# Patient Record
Sex: FEMALE | Race: BLACK OR AFRICAN AMERICAN | Employment: UNEMPLOYED | URBAN - METROPOLITAN AREA
[De-identification: names, ages, dates, MRNs, and addresses within clinical notes are randomized per-mention and may not be internally consistent; named-entity substitution may affect disease eponyms.]

---

## 2017-01-04 ENCOUNTER — OFFICE VISIT (OUTPATIENT)
Dept: FAMILY MEDICINE CLINIC | Age: 62
End: 2017-01-04

## 2017-01-04 ENCOUNTER — HOSPITAL ENCOUNTER (OUTPATIENT)
Dept: LAB | Age: 62
Discharge: HOME | End: 2017-01-04
Payer: MEDICARE

## 2017-01-04 VITALS
HEIGHT: 72 IN | TEMPERATURE: 98.6 F | DIASTOLIC BLOOD PRESSURE: 76 MMHG | SYSTOLIC BLOOD PRESSURE: 129 MMHG | HEART RATE: 82 BPM | WEIGHT: 173.8 LBS | OXYGEN SATURATION: 96 % | BODY MASS INDEX: 23.54 KG/M2 | RESPIRATION RATE: 14 BRPM

## 2017-01-04 DIAGNOSIS — R29.6 FALLS FREQUENTLY: ICD-10-CM

## 2017-01-04 DIAGNOSIS — E03.9 ACQUIRED HYPOTHYROIDISM: ICD-10-CM

## 2017-01-04 DIAGNOSIS — E11.65 TYPE 2 DIABETES MELLITUS WITH HYPERGLYCEMIA, WITHOUT LONG-TERM CURRENT USE OF INSULIN (HCC): ICD-10-CM

## 2017-01-04 DIAGNOSIS — Z00.00 MEDICARE ANNUAL WELLNESS VISIT, SUBSEQUENT: Primary | ICD-10-CM

## 2017-01-04 PROCEDURE — 83036 HEMOGLOBIN GLYCOSYLATED A1C: CPT

## 2017-01-04 NOTE — PATIENT INSTRUCTIONS
Learning About Diabetes Food Guidelines  Your Care Instructions  Meal planning is important to manage diabetes. It helps keep your blood sugar at a target level (which you set with your doctor). You don't have to eat special foods. You can eat what your family eats, including sweets once in a while. But you do have to pay attention to how often you eat and how much you eat of certain foods. You may want to work with a dietitian or a certified diabetes educator (CDE) to help you plan meals and snacks. A dietitian or CDE can also help you lose weight if that is one of your goals. What should you know about eating carbs? Managing the amount of carbohydrate (carbs) you eat is an important part of healthy meals when you have diabetes. Carbohydrate is found in many foods. · Learn which foods have carbs. And learn the amounts of carbs in different foods. ¨ Bread, cereal, pasta, and rice have about 15 grams of carbs in a serving. A serving is 1 slice of bread (1 ounce), ½ cup of cooked cereal, or 1/3 cup of cooked pasta or rice. ¨ Fruits have 15 grams of carbs in a serving. A serving is 1 small fresh fruit, such as an apple or orange; ½ of a banana; ½ cup of cooked or canned fruit; ½ cup of fruit juice; 1 cup of melon or raspberries; or 2 tablespoons of dried fruit. ¨ Milk and no-sugar-added yogurt have 15 grams of carbs in a serving. A serving is 1 cup of milk or 2/3 cup of no-sugar-added yogurt. ¨ Starchy vegetables have 15 grams of carbs in a serving. A serving is ½ cup of mashed potatoes or sweet potato; 1 cup winter squash; ½ of a small baked potato; ½ cup of cooked beans; or ½ cup cooked corn or green peas. · Learn how much carbs to eat each day and at each meal. A dietitian or CDE can teach you how to keep track of the amount of carbs you eat. This is called carbohydrate counting. · If you are not sure how to count carbohydrate grams, use the Plate Method to plan meals.  It is a good, quick way to make sure that you have a balanced meal. It also helps you spread carbs throughout the day. ¨ Divide your plate by types of foods. Put non-starchy vegetables on half the plate, meat or other protein food on one-quarter of the plate, and a grain or starchy vegetable in the final quarter of the plate. To this you can add a small piece of fruit and 1 cup of milk or yogurt, depending on how many carbs you are supposed to eat at a meal.  · Try to eat about the same amount of carbs at each meal. Do not \"save up\" your daily allowance of carbs to eat at one meal.  · Proteins have very little or no carbs per serving. Examples of proteins are beef, chicken, turkey, fish, eggs, tofu, cheese, cottage cheese, and peanut butter. A serving size of meat is 3 ounces, which is about the size of a deck of cards. Examples of meat substitute serving sizes (equal to 1 ounce of meat) are 1/4 cup of cottage cheese, 1 egg, 1 tablespoon of peanut butter, and ½ cup of tofu. How can you eat out and still eat healthy? · Learn to estimate the serving sizes of foods that have carbohydrate. If you measure food at home, it will be easier to estimate the amount in a serving of restaurant food. · If the meal you order has too much carbohydrate (such as potatoes, corn, or baked beans), ask to have a low-carbohydrate food instead. Ask for a salad or green vegetables. · If you use insulin, check your blood sugar before and after eating out to help you plan how much to eat in the future. · If you eat more carbohydrate at a meal than you had planned, take a walk or do other exercise. This will help lower your blood sugar. What else should you know? · Limit saturated fat, such as the fat from meat and dairy products. This is a healthy choice because people who have diabetes are at higher risk of heart disease. So choose lean cuts of meat and nonfat or low-fat dairy products. Use olive or canola oil instead of butter or shortening when cooking.   · Don't skip meals. Your blood sugar may drop too low if you skip meals and take insulin or certain medicines for diabetes. · Check with your doctor before you drink alcohol. Alcohol can cause your blood sugar to drop too low. Alcohol can also cause a bad reaction if you take certain diabetes medicines. Follow-up care is a key part of your treatment and safety. Be sure to make and go to all appointments, and call your doctor if you are having problems. It's also a good idea to know your test results and keep a list of the medicines you take. Where can you learn more? Go to http://marie-alvin.info/. Enter Y048 in the search box to learn more about \"Learning About Diabetes Food Guidelines. \"  Current as of: May 23, 2016  Content Version: 11.1  © 9143-2266 [employer]. Care instructions adapted under license by [employer] (which disclaims liability or warranty for this information). If you have questions about a medical condition or this instruction, always ask your healthcare professional. [place] [address] any warranty or liability for your use of this information. Schedule of Personalized Health Plan  (Provide Copy to Patient)  The best way to stay healthy is to live a healthy lifestyle. A healthy lifestyle includes regular exercise, eating a well-balanced diet, keeping a healthy weight and not smoking. Regular physical exams and screening tests are another important way to take care of yourself. Preventive exams provided by health care providers can find health problems early when treatment works best and can keep you from getting certain diseases or illnesses. Preventive services include exams, lab tests, screenings, shots, monitoring and information to help you take care of your own health. All people over 65 should have a pneumonia shot. Pneumonia shots are usually only needed once in a lifetime unless your doctor decides differently.      All people over 65 should have a yearly flu shot. People over 65 are at medium to high risk for Hepatitis B. Three shots are needed for complete protection. In addition to your physical exam, some screening tests are recommended:    Bone mass measurement (dexa scan) is recommended every two years  Diabetes Mellitus screening is recommended every year. Glaucoma is an eye disease caused by high pressure in the eye. An eye exam is recommended every year. Cardiovascular screening tests that check your cholesterol and other blood fat (lipid) levels are recommended every five years. Colorectal Cancer screening tests help to find pre-cancerous polyps (growths in the colon) so they can be removed before they turn into cancer. Tests ordered for screening depend on your personal and family history risk factors.     Screening for Breast Cancer is recommended yearly with a mammogram.    Screening for Cervical Cancer is recommended every two years (annually for certain risk factors, such as previous history of STD or abnormal PAP in past 7 years), with a Pelvic Exam with PAP    Here is a list of your current Health Maintenance items with a due date:  Health Maintenance   Topic Date Due    INFLUENZA AGE 9 TO ADULT  08/01/2016    EYE EXAM RETINAL OR DILATED Q1  11/25/2016    HEMOGLOBIN A1C Q6M  12/08/2016    FOOT EXAM Q1  06/08/2017    MICROALBUMIN Q1  06/08/2017    LIPID PANEL Q1  06/15/2017    BREAST CANCER SCRN MAMMOGRAM  12/14/2018    PAP AKA CERVICAL CYTOLOGY  01/04/2020    COLONOSCOPY  01/18/2022    DTaP/Tdap/Td series (2 - Td) 06/16/2024    Hepatitis C Screening  Completed    Pneumococcal 19-64 Medium Risk  Addressed

## 2017-01-04 NOTE — PROGRESS NOTES
Chief Complaint   Patient presents with    Diabetes       1. Have you been to the ER, urgent care clinic since your last visit? Hospitalized since your last visit? No    2. Have you seen or consulted any other health care providers outside of the [address] [place] [address] since your last visit? Include any pap smears or colon screening. No    Body mass index is 23.57 kg/(m^2).

## 2017-01-04 NOTE — MR AVS SNAPSHOT
Visit Information Date & Time Provider Department Dept. Phone Encounter #  
 1/4/2017  2:45 PM Jacob Lamar, [address] [phone] [id number] Follow-up Instructions Return in about 6 months (around 7/4/2017) for DM with Dr. Kirill Hannah. Routing History Upcoming Health Maintenance Date Due INFLUENZA AGE 9 TO ADULT 8/1/2016 EYE EXAM RETINAL OR DILATED Q1 11/25/2016 HEMOGLOBIN A1C Q6M 12/8/2016 FOOT EXAM Q1 6/8/2017 MICROALBUMIN Q1 6/8/2017 LIPID PANEL Q1 6/15/2017 BREAST CANCER SCRN MAMMOGRAM 12/14/2018 PAP AKA CERVICAL CYTOLOGY 1/4/2020 COLONOSCOPY 1/18/2022 DTaP/Tdap/Td series (2 - Td) 6/16/2024 Allergies as of 1/4/2017  Review Complete On: 1/4/2017 By: Jacob Lamar DO Severity Noted Reaction Type Reactions Bee Sting [Sting, Bee]  06/25/2010    Not Reported This Time Also allergic to egg products Betadine [Povidone-iodine]  05/17/2016    Rash Egg  06/25/2015    Itching Penicillins  06/25/2010    Not Reported This Time Current Immunizations  Reviewed on 1/4/2017 Name Date Tdap 6/16/2014 Reviewed by Jacob Lamar DO on 1/4/2017 at  3:02 PM  
You Were Diagnosed With   
  
 Codes Comments Medicare annual wellness visit, subsequent    -  Primary ICD-10-CM: Z00.00 ICD-9-CM: V70.0 Type 2 diabetes mellitus with hyperglycemia, without long-term current use of insulin (HCC)     ICD-10-CM: E11.65 ICD-9-CM: 250.00, 790.29 Acquired hypothyroidism     ICD-10-CM: E03.9 ICD-9-CM: 244.9 Falls frequently     ICD-10-CM: R29.6 ICD-9-CM: V15.88 Vitals BP Pulse Temp Resp Height(growth percentile) Weight(growth percentile) 129/76 (BP 1 Location: Left arm, BP Patient Position: Sitting) 82 98.6 °F (37 °C) (Oral) 14 6' (1.829 m) 173 lb 12.8 oz (78.8 kg) LMP SpO2 BMI OB Status Smoking Status 09/01/2010 96% 23.57 kg/m2 Postmenopausal Current Every Day Smoker Vitals History BMI and BSA Data Body Mass Index Body Surface Area  
 23.57 kg/m 2 2 m 2 Preferred Pharmacy Pharmacy Name Phone [facility] [address], [address] [address] [phone] Your Updated Medication List  
  
   
This list is accurate as of: 1/4/17  3:20 PM.  Always use your most recent med list.  
  
  
  
  
 [employer] 80 unit/mL injectable gel Generic drug:  corticotropin 1 mL by SubCUTAneous route daily. 80 units subq q day for 10 days  
  
 aspirin 81 mg chewable tablet Take 1 Tab by mouth daily. * Blood-Glucose Meter monitoring kit Per insurance preference on glucometer. To test 2x/d. Dx diabetes 250.00 Easy Touch Health Pro meter * Blood-Glucose Meter monitoring kit  
by SubCUTAneous route Before breakfast and dinner. Free Style Lite glucometer and test strips * Blood-Glucose Meter monitoring kit  
by SubCUTAneous route Before breakfast and dinner. Glucometer that insurance will cover * CRESTOR 20 mg tablet Generic drug:  rosuvastatin TAKE 1 TABLET BY MOUTH EVERY EVENING  
  
 * rosuvastatin 20 mg tablet Commonly known as:  CRESTOR  
TAKE 1 TABLET BY MOUTH EVERY EVENING  
  
 dantrolene 100 mg capsule Commonly known as:  DANTRIUM  
100 mg three (3) times daily. DURAGESIC 75 mcg/hr Generic drug:  fentaNYL  
1 Patch by TransDERmal route every seventy-two (72) hours. GILENYA 0.5 mg Cap Generic drug:  fingolimod Take 1 Cap by mouth daily. * glucose blood VI test strips strip Commonly known as:  ASCENSIA AUTODISC VI, ONE TOUCH ULTRA TEST VI  
100 Each by Does Not Apply route two (2) times a day. Free Style Precision Daniel glucometer Dx E11.9  Indications: TYPE 1 DIABETES MELLITUS  
  
 * glucose blood VI test strips strip Commonly known as:  FREESTYLE LITE STRIPS  
 Use as directed 2 times daily  
  
 insulin glargine 100 unit/mL injection Commonly known as:  LANTUS  
70 Units by SubCUTAneous route daily. Insulin Syringe-Needle U-100 0.5 mL 31 gauge x 5/16 Syrg 1 Syringe by SubCUTAneous route four (4) times daily. Insulin Syringe-Needle U-100 1 mL 30 gauge x 1/2\" Syrg Commonly known as:  BD INSULIN SYRINGE ULTRA-FINE  
1 Syringe by SubCUTAneous route nightly. Lancets Misc Use to check blood sugar bid , dx-Diabetes Mellitus 250.00  
  
 levothyroxine 175 mcg tablet Commonly known as:  SYNTHROID  
TAKE 1 TABLET BY MOUTH DAILY BEFORE BREAKFAST  
  
 LIDODERM 5 % Generic drug:  lidocaine  
by TransDERmal route every twenty-four (24) hours. Apply patch to the affected area for 12 hours a day and remove for 12 hours a day. LYRICA 200 mg capsule Generic drug:  pregabalin Take 200 mg by mouth two (2) times a day. MESTINON TIMESPAN 180 mg SR tablet Generic drug:  pyridostigmine bromide Take 180 mg by mouth two (2) times a day. metFORMIN 1,000 mg tablet Commonly known as:  GLUCOPHAGE  
TAKE 1 TAB BY MOUTH TWO (2) TIMES DAILY (WITH MEALS). PARoxetine 40 mg tablet Commonly known as:  PAXIL TAKE 1.5 TABS BY MOUTH DAILY. PROVIGIL 200 mg tablet Generic drug:  modafinil Take 200 mg by mouth two (2) times a day. ROBAXIN-750 PO Take  by mouth. TENS UNIT ELECTRODES  
by Does Not Apply route. prn  
  
 TOPAMAX 200 mg tablet Generic drug:  topiramate Take  by mouth two (2) times a day. traZODone 50 mg tablet Commonly known as:  DESYREL  
  
 VALIUM 10 mg tablet Generic drug:  diazePAM  
Take 5 mg by mouth every eight (8) hours as needed for Anxiety. VITAMIN D2 50,000 unit capsule Generic drug:  ergocalciferol Take 50,000 Units by mouth two (2) times a week. * Notice:   This list has 7 medication(s) that are the same as other medications prescribed for you. Read the directions carefully, and ask your doctor or other care provider to review them with you. Follow-up Instructions Return in about 6 months (around 7/4/2017) for DM with Dr. Rob Mora. Referral Information Referral ID Referred By Referred To  
  
 [id number] Griffin Tavares Not Available Visits Status Start Date End Date 1 New Request 1/4/17 1/4/18 If your referral has a status of pending review or denied, additional information will be sent to support the outcome of this decision. Patient Instructions Learning About Diabetes Food Guidelines Your Care Instructions Meal planning is important to manage diabetes. It helps keep your blood sugar at a target level (which you set with your doctor). You don't have to eat special foods. You can eat what your family eats, including sweets once in a while. But you do have to pay attention to how often you eat and how much you eat of certain foods. You may want to work with a dietitian or a certified diabetes educator (CDE) to help you plan meals and snacks. A dietitian or CDE can also help you lose weight if that is one of your goals. What should you know about eating carbs? Managing the amount of carbohydrate (carbs) you eat is an important part of healthy meals when you have diabetes. Carbohydrate is found in many foods. · Learn which foods have carbs. And learn the amounts of carbs in different foods. ¨ Bread, cereal, pasta, and rice have about 15 grams of carbs in a serving. A serving is 1 slice of bread (1 ounce), ½ cup of cooked cereal, or 1/3 cup of cooked pasta or rice. ¨ Fruits have 15 grams of carbs in a serving. A serving is 1 small fresh fruit, such as an apple or orange; ½ of a banana; ½ cup of cooked or canned fruit; ½ cup of fruit juice; 1 cup of melon or raspberries; or 2 tablespoons of dried fruit. ¨ Milk and no-sugar-added yogurt have 15 grams of carbs in a serving. A serving is 1 cup of milk or 2/3 cup of no-sugar-added yogurt. ¨ Starchy vegetables have 15 grams of carbs in a serving. A serving is ½ cup of mashed potatoes or sweet potato; 1 cup winter squash; ½ of a small baked potato; ½ cup of cooked beans; or ½ cup cooked corn or green peas. · Learn how much carbs to eat each day and at each meal. A dietitian or CDE can teach you how to keep track of the amount of carbs you eat. This is called carbohydrate counting. · If you are not sure how to count carbohydrate grams, use the Plate Method to plan meals. It is a good, quick way to make sure that you have a balanced meal. It also helps you spread carbs throughout the day. ¨ Divide your plate by types of foods. Put non-starchy vegetables on half the plate, meat or other protein food on one-quarter of the plate, and a grain or starchy vegetable in the final quarter of the plate. To this you can add a small piece of fruit and 1 cup of milk or yogurt, depending on how many carbs you are supposed to eat at a meal. 
· Try to eat about the same amount of carbs at each meal. Do not \"save up\" your daily allowance of carbs to eat at one meal. 
· Proteins have very little or no carbs per serving. Examples of proteins are beef, chicken, turkey, fish, eggs, tofu, cheese, cottage cheese, and peanut butter. A serving size of meat is 3 ounces, which is about the size of a deck of cards. Examples of meat substitute serving sizes (equal to 1 ounce of meat) are 1/4 cup of cottage cheese, 1 egg, 1 tablespoon of peanut butter, and ½ cup of tofu. How can you eat out and still eat healthy? · Learn to estimate the serving sizes of foods that have carbohydrate. If you measure food at home, it will be easier to estimate the amount in a serving of restaurant food.  
· If the meal you order has too much carbohydrate (such as potatoes, corn, or baked beans), ask to have a low-carbohydrate food instead. Ask for a salad or green vegetables. · If you use insulin, check your blood sugar before and after eating out to help you plan how much to eat in the future. · If you eat more carbohydrate at a meal than you had planned, take a walk or do other exercise. This will help lower your blood sugar. What else should you know? · Limit saturated fat, such as the fat from meat and dairy products. This is a healthy choice because people who have diabetes are at higher risk of heart disease. So choose lean cuts of meat and nonfat or low-fat dairy products. Use olive or canola oil instead of butter or shortening when cooking. · Don't skip meals. Your blood sugar may drop too low if you skip meals and take insulin or certain medicines for diabetes. · Check with your doctor before you drink alcohol. Alcohol can cause your blood sugar to drop too low. Alcohol can also cause a bad reaction if you take certain diabetes medicines. Follow-up care is a key part of your treatment and safety. Be sure to make and go to all appointments, and call your doctor if you are having problems. It's also a good idea to know your test results and keep a list of the medicines you take. Where can you learn more? Go to http://marie-alvin.info/. Enter Q184 in the search box to learn more about \"Learning About Diabetes Food Guidelines. \" Current as of: May 23, 2016 Content Version: 11.1 © 9349-0231 [employer]. Care instructions adapted under license by [employer] (which disclaims liability or warranty for this information). If you have questions about a medical condition or this instruction, always ask your healthcare professional. [place] [address] any warranty or liability for your use of this information. Schedule of Personalized Health Plan (Provide Copy to Patient) The best way to stay healthy is to live a healthy lifestyle. A healthy lifestyle includes regular exercise, eating a well-balanced diet, keeping a healthy weight and not smoking. Regular physical exams and screening tests are another important way to take care of yourself. Preventive exams provided by health care providers can find health problems early when treatment works best and can keep you from getting certain diseases or illnesses. Preventive services include exams, lab tests, screenings, shots, monitoring and information to help you take care of your own health. All people over 65 should have a pneumonia shot. Pneumonia shots are usually only needed once in a lifetime unless your doctor decides differently. All people over 65 should have a yearly flu shot. People over 65 are at medium to high risk for Hepatitis B. Three shots are needed for complete protection. In addition to your physical exam, some screening tests are recommended: 
 
Bone mass measurement (dexa scan) is recommended every two years Diabetes Mellitus screening is recommended every year. Glaucoma is an eye disease caused by high pressure in the eye. An eye exam is recommended every year. Cardiovascular screening tests that check your cholesterol and other blood fat (lipid) levels are recommended every five years. Colorectal Cancer screening tests help to find pre-cancerous polyps (growths in the colon) so they can be removed before they turn into cancer. Tests ordered for screening depend on your personal and family history risk factors. Screening for Breast Cancer is recommended yearly with a mammogram. 
 
Screening for Cervical Cancer is recommended every two years (annually for certain risk factors, such as previous history of STD or abnormal PAP in past 7 years), with a Pelvic Exam with PAP Here is a list of your current Health Maintenance items with a due date: 
Health Maintenance Topic Date Due  
  INFLUENZA AGE 9 TO ADULT  08/01/2016  
 EYE EXAM RETINAL OR DILATED Q1  11/25/2016  
 HEMOGLOBIN A1C Q6M  12/08/2016  
 FOOT EXAM Q1  06/08/2017  MICROALBUMIN Q1  06/08/2017  LIPID PANEL Q1  06/15/2017  BREAST CANCER SCRN MAMMOGRAM  12/14/2018  PAP AKA CERVICAL CYTOLOGY  01/04/2020  COLONOSCOPY  01/18/2022  DTaP/Tdap/Td series (2 - Td) 06/16/2024  Hepatitis C Screening  Completed  Pneumococcal 19-64 Medium Risk  Addressed Introducing [facility] & HEALTH SERVICES! Dear Sid Paiz: 
Thank you for requesting a [employer] account. Our records indicate that you already have an active [employer] account. You can access your account anytime at [email or web address] New [email or web address]. [employer]/The New York Times Did you know that you can access your hospital and ER discharge instructions at any time in [employer]? You can also review all of your test results from your hospital stay or ER visit. Additional Information If you have questions, please visit the Frequently Asked Questions section of the [employer] website at [email or web address] [email or web address] Remember, [employer] is NOT to be used for urgent needs. For medical emergencies, dial 911. Now available from your iPhone and Android! Please provide this summary of care documentation to your next provider. Your primary care clinician is listed as Esperanza Macias. If you have any questions after today's visit, please call [phone].

## 2017-01-04 NOTE — PROGRESS NOTES
Subjective:     Manuel Galvin is a 63 y.o. female seen for follow up of diabetes and hypothyroidism. 1.  DM     Fasting AM BG have been in 80s despite holding metformin and lantus since about 9/7/16 due to reported symptomatic hypoglycemia. Most of the time in 62s    Is not feeling hypoglycemic, is due for repeat A1C now    She also has diabetes, hyperlipidemia. Diabetic Review of Systems - medication compliance: compliant most of the time, diabetic diet compliance: compliant all of the time    Diabetic monitoring:     Lab Results   Component Value Date/Time    Hemoglobin A1c 6.5 06/08/2016 12:15 PM    Hemoglobin A1c 6.5 12/16/2015 03:29 PM    Hemoglobin A1c 6.1 06/25/2015 12:13 PM    Glucose 164 05/18/2016 04:45 AM    Glucose (POC) 194 05/19/2016 08:57 PM    Microalbumin/Creat ratio (mg/g creat) 5 08/23/2010 12:19 PM    Microalb/Creat ratio (ug/mg creat.) 10.7 06/08/2016 12:15 PM    Microalbumin,urine random 0.58 08/23/2010 12:19 PM    LDL, calculated 51 06/15/2016 09:15 AM    Creatinine 0.96 05/18/2016 04:45 AM       Last A1C: 6.5% in 6/2016  Serum Cr:  UTD   Urine microalbumin: UTD  Lipid panel: LDL at goal  Diabetic Eye exam: UTD, will request this  Foot exam: sees podiatry, UTD    Pneumonia vaccine: not indicated due to recent exacerbation of MS and neurological symptoms  Annual flu vaccine for this season: refuses    On Ace-i or ARB: yes  On statin: yes, Crestor 20 mg PO daily   On metformin:  In past but currently holding     Following medications are for above conditions    Medication Sig    rosuvastatin (CRESTOR) 20 mg tablet TAKE 1 TABLET BY MOUTH EVERY EVENING        insulin glargine (LANTUS) 100 unit/mL injection 70 Units by SubCUTAneous route daily. (Patient taking differently: 70 Units by SubCUTAneous route nightly.)    NOT TAKING         metFORMIN (GLUCOPHAGE) 1,000 mg tablet TAKE 1 TAB BY MOUTH TWO (2) TIMES DAILY (WITH MEALS).     NOT TAKING       Patient Active Problem List   Diagnosis Code    Sleep apnea G47.30    Endometriosis N80.9    Lumbosacral plexopathy G54.1    HTN (hypertension) I10    FH: breast cancer Z80.3    Hyperlipemia E78.5    Hypothyroid E03.9    Ureteral calculus N20.1    MS (multiple sclerosis) ([address] G35    Myasthenia gravis ([address] G70.00    Vitamin D deficiency E55.9    Benign cyst of left breast N60.02    Multiple sclerosis exacerbation ([address] G35    Diabetes mellitus type 2, controlled ([address] E11.9    Cervical spinal stenosis M48.02     Patient Active Problem List    Diagnosis Date Noted    Cervical spinal stenosis 12/02/2016    Diabetes mellitus type 2, controlled ([address] 09/13/2016    Multiple sclerosis exacerbation ([address] 05/17/2016    Benign cyst of left breast 03/21/2016    Vitamin D deficiency 03/17/2016    Myasthenia gravis ([address]     MS (multiple sclerosis) ([facility]     Sleep apnea 06/25/2010    Endometriosis 06/25/2010    Lumbosacral plexopathy 06/25/2010    HTN (hypertension) 06/25/2010    FH: breast cancer 06/25/2010    Hyperlipemia 06/25/2010    Hypothyroid 06/25/2010    Ureteral calculus 06/25/2010     Current Outpatient Prescriptions   Medication Sig Dispense Refill    TENS UNIT ELECTRODES by Does Not Apply route. prn      glucose blood VI test strips (FREESTYLE LITE STRIPS) strip Use as directed 2 times daily 100 Strip 11    Blood-Glucose Meter monitoring kit by SubCUTAneous route Before breakfast and dinner. Free Style Lite glucometer and test strips 1 Kit 0    Blood-Glucose Meter monitoring kit by SubCUTAneous route Before breakfast and dinner. Glucometer that insurance will cover 1 Kit 0    rosuvastatin (CRESTOR) 20 mg tablet TAKE 1 TABLET BY MOUTH EVERY EVENING 90 Tab 3    glucose blood VI test strips (ASCENSIA AUTODISC VI, ONE TOUCH ULTRA TEST VI) strip 100 Each by Does Not Apply route two (2) times a day.  Free Style Precision Daniel glucometer  Dx E11.9  Indications: TYPE 1 DIABETES MELLITUS 100 Strip 3    Lancets misc Use to check blood sugar bid , dx-Diabetes Mellitus 250.00 1 Package 11    levothyroxine (SYNTHROID) 175 mcg tablet TAKE 1 TABLET BY MOUTH DAILY BEFORE BREAKFAST 30 Tab 11    CRESTOR 20 mg tablet TAKE 1 TABLET BY MOUTH EVERY EVENING 30 Tab 0    dantrolene (DANTRIUM) 100 mg capsule 100 mg three (3) times daily.  aspirin 81 mg chewable tablet Take 1 Tab by mouth daily. 30 Tab 3    insulin glargine (LANTUS) 100 unit/mL injection 70 Units by SubCUTAneous route daily. (Patient taking differently: 70 Units by SubCUTAneous route nightly.) 1 Vial 11    PARoxetine (PAXIL) 40 mg tablet TAKE 1.5 TABS BY MOUTH DAILY. (Patient taking differently: nightly. TAKE 1.5 TABS BY MOUTH DAILY. - Note Pt prefers to take at HS) 45 Tab 2    GILENYA 0.5 mg cap Take 1 Cap by mouth daily.  traZODone (DESYREL) 50 mg tablet       Insulin Syringe-Needle U-100 1/2 mL 31 x 5/16\" syrg 1 Syringe by SubCUTAneous route four (4) times daily. 100 Syringe 0    metFORMIN (GLUCOPHAGE) 1,000 mg tablet TAKE 1 TAB BY MOUTH TWO (2) TIMES DAILY (WITH MEALS). 180 Tab 2    Blood-Glucose Meter monitoring kit Per insurance preference on glucometer. To test 2x/d. Dx diabetes 250.00 Easy Touch Health Pro meter 1 Kit 0    pyridostigmine bromide (MESTINON TIMESPAN) 180 mg SR tablet Take 180 mg by mouth two (2) times a day.  modafinil (PROVIGIL) 200 mg tablet Take 200 mg by mouth two (2) times a day.  LYRICA 200 mg capsule Take 200 mg by mouth two (2) times a day.  METHOCARBAMOL (ROBAXIN-750 PO) Take  by mouth.  lidocaine (LIDODERM) 5 %(700 mg/patch) by TransDERmal route every twenty-four (24) hours. Apply patch to the affected area for 12 hours a day and remove for 12 hours a day.  ACTHAR H.P. 80 unit/mL injectable gel 1 mL by SubCUTAneous route daily. 80 units subq q day for 10 days      diazepam (VALIUM) 10 mg tablet Take 5 mg by mouth every eight (8) hours as needed for Anxiety.       ergocalciferol (VITAMIN D2) 50,000 unit capsule Take 50,000 Units by mouth two (2) times a week.  Insulin Syringe-Needle U-100 (BD INSULIN SYRINGE ULTRA-FINE) 1 mL 30 x 1/2\" Syrg 1 Syringe by SubCUTAneous route nightly. 30 Syringe prn    fentaNYL (DURAGESIC) 75 mcg/hr 1 Patch by TransDERmal route every seventy-two (72) hours.  topiramate (TOPAMAX) 200 mg tablet Take  by mouth two (2) times a day. Allergies   Allergen Reactions    Bee Sting [Sting, Bee] Not Reported This Time     Also allergic to egg products    Betadine [Povidone-Iodine] Rash    Egg Itching    Penicillins Not Reported This Time        Lab Results   Component Value Date/Time    Hemoglobin A1c 6.5 06/08/2016 12:15 PM    Hemoglobin A1c 6.5 12/16/2015 03:29 PM    Hemoglobin A1c 6.1 06/25/2015 12:13 PM    Glucose 164 05/18/2016 04:45 AM    Glucose (POC) 194 05/19/2016 08:57 PM    Microalbumin/Creat ratio (mg/g creat) 5 08/23/2010 12:19 PM    Microalb/Creat ratio (ug/mg creat.) 10.7 06/08/2016 12:15 PM    Microalbumin,urine random 0.58 08/23/2010 12:19 PM    LDL, calculated 51 06/15/2016 09:15 AM    Creatinine 0.96 05/18/2016 04:45 AM      Lab Results   Component Value Date/Time    Cholesterol, total 119 06/15/2016 09:15 AM    Cholesterol, Total 171 12/11/2014 12:00 AM    HDL Cholesterol 50 06/15/2016 09:15 AM    LDL, calculated 51 06/15/2016 09:15 AM    Triglyceride 92 06/15/2016 09:15 AM    CHOL/HDL Ratio 4.3 08/23/2010 12:19 PM       Lab Results   Component Value Date/Time    ALT 23 05/17/2016 04:53 PM    AST 12 05/17/2016 04:53 PM    Alk.  phosphatase 56 05/17/2016 04:53 PM    Bilirubin, direct 0.0 02/10/2016    Bilirubin, total 0.2 05/17/2016 04:53 PM       Lab Results   Component Value Date/Time    GFR est AA >60 05/18/2016 04:45 AM    GFR est non-AA 60 05/18/2016 04:45 AM    Creatinine 0.96 05/18/2016 04:45 AM    BUN 15 05/18/2016 04:45 AM    Sodium 142 05/18/2016 04:45 AM    Potassium 4.2 05/18/2016 04:45 AM    Chloride 111 05/18/2016 04:45 AM    CO2 25 05/18/2016 04:45 AM Review of Systems  Pertinent items are noted in HPI. Objective:     Visit Vitals    /76 (BP 1 Location: Left arm, BP Patient Position: Sitting)    Pulse 82    Temp 98.6 °F (37 °C) (Oral)    Resp 14    Ht 6' (1.829 m)    Wt 173 lb 12.8 oz (78.8 kg)    LMP 09/01/2010    SpO2 96%    BMI 23.57 kg/m2     Appearance: alert, well appearing, and in no distress. Exam: BP well controlled  Pulm: non labored on RA  Gait: ambulates with cane  Neuro: sits to stand=7 seconds    Lab review: orders written for new lab studies as appropriate; see orders. Assessment/Plan:         ICD-10-CM ICD-9-CM    1. Type 2 diabetes mellitus with hyperglycemia, without long-term current use of insulin (HCC) E11.65 250.00 HEMOGLOBIN A1C WITH EAG to determine dose and re initiation of metformin and lantus as needed. At baseline, I believe that she needs metformin given benefits of metformin             790.29      2. Acquired hypothyroidism E03.9 244.9 TSH due annually in 3/2016               Follow-up Disposition:  Return in about 6 months (around 7/4/2017) for DM with Dr. Radha Holley. Dr. Mario Medina D.O.   Physician

## 2017-01-04 NOTE — PROGRESS NOTES
Benita Narvaez is a 63 y.o. female and presents for annual Medicare Wellness Visit. Problem List: Reviewed with patient and discussed risk factors.     Patient Active Problem List   Diagnosis Code    Sleep apnea G47.30    Endometriosis N80.9    Lumbosacral plexopathy G54.1    HTN (hypertension) I10    FH: breast cancer Z80.3    Hyperlipemia E78.5    Hypothyroid E03.9    Ureteral calculus N20.1    MS (multiple sclerosis) ([address] G35    Myasthenia gravis ([address] G70.00    Vitamin D deficiency E55.9    Benign cyst of left breast N60.02    Multiple sclerosis exacerbation ([address] G35    Diabetes mellitus type 2, controlled ([address] E11.9    Cervical spinal stenosis M48.02       Current medical providers:  Patient Care Team:  Eunice Sr DO as PCP - General  Kashif Danielle RN as Nurse Navigator (Family Practice)  Bobby Juárez MD (Orthopedic Surgery)    [facility]  Past Surgical History   Procedure Laterality Date    Pr thyroidectomy       1975    Pr abdomen surgery proc unlisted  9/01     bowel resection    Hx gyn  9/01     ovarian cyst    Hx orthopaedic  2/98     arthroscopy right knee    Hx orthopaedic  1997     right thumb tendon repair x 2    Pr breast surgery procedure unlisted  2006     breast cyst-both breasts at different times    Pr colonoscopy flx dx w/collj spec when pfrmd  3/25/02    Hx cyst incision and drainage          SH: Reviewed with patient  Social History   Substance Use Topics    Smoking status: Current Every Day Smoker     Packs/day: 0.50     Years: 30.00     Types: Cigarettes    Smokeless tobacco: Never Used    Alcohol use No       FH: Reviewed with patient  Family History   Problem Relation Age of Onset    Arthritis-osteo Mother     Diabetes Mother     Elevated Lipids Mother     Headache Mother     Heart Disease Mother     Hypertension Mother     Stroke Mother     Diabetes Father     Elevated Lipids Father    [employer] Heart Disease Father     Hypertension Father     Diabetes Sister    Eden Villalobos Elevated Lipids Sister     Headache Sister     Hypertension Sister     Migraines Sister     Cancer Sister 62     breast ca    Breast Cancer Sister 62    Diabetes Brother     Elevated Lipids Brother     Hypertension Brother     Asthma Son     Lung Disease Son        Medications/Allergies: Reviewed with patient  Current Outpatient Prescriptions on File Prior to Visit   Medication Sig Dispense Refill    TENS UNIT ELECTRODES by Does Not Apply route. prn      glucose blood VI test strips (FREESTYLE LITE STRIPS) strip Use as directed 2 times daily 100 Strip 11    Blood-Glucose Meter monitoring kit by SubCUTAneous route Before breakfast and dinner. Free Style Lite glucometer and test strips 1 Kit 0    Blood-Glucose Meter monitoring kit by SubCUTAneous route Before breakfast and dinner. Glucometer that insurance will cover 1 Kit 0    rosuvastatin (CRESTOR) 20 mg tablet TAKE 1 TABLET BY MOUTH EVERY EVENING 90 Tab 3    glucose blood VI test strips (ASCENSIA AUTODISC VI, ONE TOUCH ULTRA TEST VI) strip 100 Each by Does Not Apply route two (2) times a day. Free Style Precision Daniel glucometer  Dx E11.9  Indications: TYPE 1 DIABETES MELLITUS 100 Strip 3    Lancets misc Use to check blood sugar bid , dx-Diabetes Mellitus 250.00 1 Package 11    levothyroxine (SYNTHROID) 175 mcg tablet TAKE 1 TABLET BY MOUTH DAILY BEFORE BREAKFAST 30 Tab 11    CRESTOR 20 mg tablet TAKE 1 TABLET BY MOUTH EVERY EVENING 30 Tab 0    dantrolene (DANTRIUM) 100 mg capsule 100 mg three (3) times daily.  aspirin 81 mg chewable tablet Take 1 Tab by mouth daily. 30 Tab 3    insulin glargine (LANTUS) 100 unit/mL injection 70 Units by SubCUTAneous route daily. (Patient taking differently: 70 Units by SubCUTAneous route nightly.) 1 Vial 11    PARoxetine (PAXIL) 40 mg tablet TAKE 1.5 TABS BY MOUTH DAILY. (Patient taking differently: nightly.  TAKE 1.5 TABS BY MOUTH DAILY.  - Note Pt prefers to take at HS) 45 Tab 2    GILENYA 0.5 mg cap Take 1 Cap by mouth daily.  traZODone (DESYREL) 50 mg tablet       Insulin Syringe-Needle U-100 1/2 mL 31 x 5/16\" syrg 1 Syringe by SubCUTAneous route four (4) times daily. 100 Syringe 0    metFORMIN (GLUCOPHAGE) 1,000 mg tablet TAKE 1 TAB BY MOUTH TWO (2) TIMES DAILY (WITH MEALS). 180 Tab 2    Blood-Glucose Meter monitoring kit Per insurance preference on glucometer. To test 2x/d. Dx diabetes 250.00 Easy Touch Health Pro meter 1 Kit 0    pyridostigmine bromide (MESTINON TIMESPAN) 180 mg SR tablet Take 180 mg by mouth two (2) times a day.  modafinil (PROVIGIL) 200 mg tablet Take 200 mg by mouth two (2) times a day.  LYRICA 200 mg capsule Take 200 mg by mouth two (2) times a day.  METHOCARBAMOL (ROBAXIN-750 PO) Take  by mouth.  lidocaine (LIDODERM) 5 %(700 mg/patch) by TransDERmal route every twenty-four (24) hours. Apply patch to the affected area for 12 hours a day and remove for 12 hours a day.  ACTHAR H.P. 80 unit/mL injectable gel 1 mL by SubCUTAneous route daily. 80 units subq q day for 10 days      diazepam (VALIUM) 10 mg tablet Take 5 mg by mouth every eight (8) hours as needed for Anxiety.  ergocalciferol (VITAMIN D2) 50,000 unit capsule Take 50,000 Units by mouth two (2) times a week.  Insulin Syringe-Needle U-100 (BD INSULIN SYRINGE ULTRA-FINE) 1 mL 30 x 1/2\" Syrg 1 Syringe by SubCUTAneous route nightly. 30 Syringe prn    fentaNYL (DURAGESIC) 75 mcg/hr 1 Patch by TransDERmal route every seventy-two (72) hours.  topiramate (TOPAMAX) 200 mg tablet Take  by mouth two (2) times a day. No current facility-administered medications on file prior to visit.        Allergies   Allergen Reactions    Bee Sting [Sting, Bee] Not Reported This Time     Also allergic to egg products    Betadine [Povidone-Iodine] Rash    Egg Itching    Penicillins Not Reported This Time Objective:  Visit Vitals    /76 (BP 1 Location: Left arm, BP Patient Position: Sitting)    Pulse 82    Temp 98.6 °F (37 °C) (Oral)    Resp 14    Ht 6' (1.829 m)    Wt 173 lb 12.8 oz (78.8 kg)    LMP 09/01/2010    SpO2 96%    BMI 23.57 kg/m2    Body mass index is 23.57 kg/(m^2). Assessment of cognitive impairment: Alert and oriented x 3    Depression Screen:   PHQ 2 / 9, over the last two weeks 1/4/2017   Little interest or pleasure in doing things Not at all   Feeling down, depressed or hopeless Not at all   Total Score PHQ 2 0       Fall Risk Assessment:  No flowsheet data found. Functional Ability:   Does the patient exhibit a steady gait? No, ambulates with cane and has MS as well   How long did it take the patient to get up and walk from a sitting position? 7 seconds   Is the patient self reliant?  (ie can do own laundry, meals, household chores)  Needs some assistance with this from son that lives with her, sometimes she needs more help than others    She has someone come in 2 days per week to assist with showers     Does the patient handle his/her own medications? yes     Does the patient handle his/her own money? Yes       Is the patients home safe (ie good lighting, handrails on stairs and bath, etc.)? Yes       Did you notice or did patient express any hearing difficulties? no     Did you notice or did patient express any vision difficulties? yes     Were distance and reading eye charts used? No, sees someone for this regularly       Advance Care Planning:   Patient was offered the opportunity to discuss advance care planning:  yes     Does patient have an Advance Directive:  yes   If no, did you provide information on Caring Connections?   yes       Plan:      Health Maintenance   Topic Date Due    INFLUENZA AGE 9 TO ADULT  08/01/2016    EYE EXAM RETINAL OR DILATED Q1  11/25/2016    HEMOGLOBIN A1C Q6M  12/08/2016    FOOT EXAM Q1  06/08/2017    MICROALBUMIN Q1 06/08/2017    LIPID PANEL Q1  06/15/2017    BREAST CANCER SCRN MAMMOGRAM  12/14/2018    PAP AKA CERVICAL CYTOLOGY  01/04/2020    COLONOSCOPY  01/18/2022    DTaP/Tdap/Td series (2 - Td) 06/16/2024    Hepatitis C Screening  Completed    Pneumococcal 19-64 Medium Risk  Addressed       *Patient verbalized understanding and agreement with the plan. A copy of the After Visit Summary with personalized health plan was given to the patient today. Dr. Lashae Cedeño D.O.   Physician

## 2017-01-04 NOTE — Clinical Note
Im going to order home safety eval with [facility] home health for them to make recommendations for home safety equipment, will follow up with you as well

## 2017-01-05 ENCOUNTER — DOCUMENTATION ONLY (OUTPATIENT)
Dept: HOME HEALTH SERVICES | Facility: HOME HEALTH | Age: 62
End: 2017-01-05

## 2017-01-05 ENCOUNTER — TELEPHONE (OUTPATIENT)
Dept: FAMILY MEDICINE CLINIC | Age: 62
End: 2017-01-05

## 2017-01-05 LAB
EST. AVERAGE GLUCOSE BLD GHB EST-MCNC: 123 MG/DL
HBA1C MFR BLD: 5.9 % (ref 4.8–5.6)

## 2017-01-05 NOTE — PROGRESS NOTES
Spoke to [place] nurse of Dr. Brookie Baumgarten and informed her that patient is open to [facility]. Writer spoke to the patient and confirmed she is open to [facility] for SN. She would like PT/OT referral sent to [facility], [facility] nurse is aware and will send referral to [place].

## 2017-01-05 NOTE — TELEPHONE ENCOUNTER
[facility] [address] [facility] [facility] Sherrill temple advised the patient was previously evaluated with [facility] home health and would like to continue care with there office. Writer requested referral coordinator to schedule with [facility].

## 2017-01-05 NOTE — PROGRESS NOTES
A1C is low, which is great for a couple of reasons. 1.  Her diabetes is well controlled off of the insulin. 2.  Lower A1C means less damage that diabetes is doing to body. PLAN:     1. Continue to HOLD lantus insulin, DO NOT restart. 2. Restart metformin at dose of 1000 mg PO with dinner (NOT TWICE A DAY AS BEFORE), because metformin has great benefit in diabetics. 3.  Track Blood glucose daily for 1 week after restarting metformin and then call me with BG readings. TASHA, can you call her next Friday to review BG log and report then back to me please?   Thank you, Dr. Carline Moore

## 2017-01-10 ENCOUNTER — TELEPHONE (OUTPATIENT)
Dept: FAMILY MEDICINE CLINIC | Age: 62
End: 2017-01-10

## 2017-01-10 ENCOUNTER — PATIENT OUTREACH (OUTPATIENT)
Dept: FAMILY MEDICINE CLINIC | Age: 62
End: 2017-01-10

## 2017-01-10 NOTE — TELEPHONE ENCOUNTER
----- Message from Roberta Denver sent at 1/10/2017 10:42 AM EST -----  Regarding: Dr. Krystle Darling  Patient needs an order for OT and PT sent over to [facility] at [phone], ATTN: Will Batista. She no longer uses [employer]. Best contact number is [phone].

## 2017-01-10 NOTE — PROGRESS NOTES
Patient called NN after having left a message earlier today for 's nurse. Gilmer Moreno had order Home Health eval by OT/PT after last ov- to evaluate for home safety and DME recommendations for safety. Order originally sent to [facility] but patient has been using [facility] and requested that the order be faxed to them. NN printed off order and faxed to attn of Bianca at [facility], fax #[phone] and called Bianca([phone]) to let her know it was faxed. Advised Bianca to call NN if needs anything else. Notified patient that this was done. Reminded her that I would be checking with her Friday for BS numbers for past week. She said Gilmer Moreno had told her to call on Wednesday-we decided to do this on Thursday and told her I would call her that morning for the results. Patient thanked me for faxing the order to [facility].

## 2017-01-12 ENCOUNTER — PATIENT OUTREACH (OUTPATIENT)
Dept: FAMILY MEDICINE CLINIC | Age: 62
End: 2017-01-12

## 2017-01-12 NOTE — PROGRESS NOTES
Patient saw Janelle Merino on 1/4-she advised Hiwot to restart the Metformin once a day. NN called patient today to see how her blood sugar had been running since starting the Metformin. Patient says she has been taking Metformin 1000mg q pm(after dinner). FBS as follow:  1/5- 91 1/6-40 (felt very foggy-ate something to bring it up)   1/7-69     1/8-67    1/9-80    1/10-80    1/11-79    Today, 1/12-95. Advised will let Janelle Merino know the results. Patient reports [facility] to come Monday to recert her for Medicare, PT to start after that time.

## 2017-01-12 NOTE — PATIENT INSTRUCTIONS
Diabetes and Preventing Falls: Care Instructions  Your Care Instructions  If you are an older adult who has diabetes, you may have a higher risk of falling. Complications of diabetessuch as nerve damage, foot problems, and reduced visionmay increase your risk of a fall. Some of your medicines also may add to your risk. By making your home safer, you can lower your risk of falling. Doing things to prevent diabetes complications may also help to lower your risk. You can make your home safer with a few simple measures. Follow-up care is a key part of your treatment and safety. Be sure to make and go to all appointments, and call your doctor if you are having problems. It's also a good idea to know your test results and keep a list of the medicines you take. How can you care for yourself at home? Taking care of yourself  · Keep your blood sugar at a target level (which you set with your doctor). · Exercise regularly to improve your strength, muscle tone, and balance. Walk if you can. Swimming may be a good choice if you cannot walk easily. · Have your vision checked as often as your doctor recommends. It is usually once a year or more often if you have eye problems. · Know the side effects of the medicines you take. Ask your doctor or pharmacist whether the medicines you take can affect your balance. Sleeping pills or sedatives can affect your balance. · Limit the amount of alcohol you drink. Alcohol can impair your balance and other senses. · Have your doctor check your feet during each visit. If you have a foot problem, see your doctor. Preventing falls at home  · Remove raised doorway thresholds, throw rugs, and clutter. Repair loose carpet or raised areas in the floor. · Move furniture and electrical cords to keep them out of walking paths. · Use nonskid floor wax, and wipe up spills right away, especially on ceramic tile floors. · If you use a walker or cane, put rubber tips on it.  If you use crutches, clean the bottoms of them regularly with an abrasive pad, such as steel wool. · Keep your house well lit, especially Mammie Sitter, and outside walkways. Use night-lights in areas such as hallways and bathrooms. Add extra light switches or use remote switches (such as switches that go on or off when you clap your hands) to make it easier to turn lights on if you have to get up during the night. · Install sturdy handrails on stairways. Put grab bars near your shower, bathtub, and toilet. · Store household items on low shelves so that you do not have to climb or reach high. Or use a reaching device that you can get at a medical supply store. If you have to climb for something, use a step stool with handrails, or ask someone to get it for you. · Keep a cordless phone and a flashlight with new batteries by your bed. If possible, put a phone in each of the main rooms of your house, or carry a cell phone in case you fall and cannot reach a phone. Or you can wear a device around your neck or wrist. You push a button that sends a signal for help. · Wear low-heeled shoes that fit well and give your feet good support. Use footwear with nonskid soles. Check the heels and soles of your shoes for wear. Repair or replace worn heels or soles. · Do not wear socks without shoes on wood floors. · Walk on the grass when the sidewalks are slippery. If you live in an area that gets snow and ice in the winter, sprinkle salt on slippery steps and sidewalks. Where can you learn more? Go to http://marie-alvin.info/. Enter D039 in the search box to learn more about \"Diabetes and Preventing Falls: Care Instructions. \"  Current as of: August 4, 2016  Content Version: 11.1  © 5626-8785 [employer]. Care instructions adapted under license by [employer] (which disclaims liability or warranty for this information).  If you have questions about a medical condition or this instruction, always ask your healthcare professional. [place] [address] any warranty or liability for your use of this information.

## 2017-01-18 ENCOUNTER — CLINICAL SUPPORT (OUTPATIENT)
Dept: FAMILY MEDICINE CLINIC | Age: 62
End: 2017-01-18

## 2017-01-18 DIAGNOSIS — Z71.89 ENCOUNTER FOR COUNSELING FOR CARE MANAGEMENT OF PATIENT WITH CHRONIC CONDITIONS AND COMPLEX HEALTH NEEDS USING NURSE-BASED MODEL: Primary | ICD-10-CM

## 2017-01-18 DIAGNOSIS — G35 MULTIPLE SCLEROSIS (HCC): ICD-10-CM

## 2017-01-18 RX ORDER — METFORMIN HYDROCHLORIDE 500 MG/1
500 TABLET ORAL
COMMUNITY
End: 2017-01-19

## 2017-01-18 NOTE — PROGRESS NOTES
Complex Case Management- Session #13,  Start time=2pm    Update    Nurse Navigator met with patient for Session #13 of CCM. Patient came into the office, using cane, sat down quickly in chair next to door-somewhat off balance. Affect more down and depressed today, said nothing is going right with her body. Told NN that her right leg had been swollen since Saturday. Has been elevating her leg and finally swelling was better this am. Denies any warmth or redness to the area, no pain in leg but right knee has continued to hurt since she fell on it prior to Christmas(.History of arthroscopy by Farhan Sen about 10 years ago.) Never got an xray done on knee. Agreed that she probably needed to be seen by Fernando Alaniz. With Fernando Alaniz leaving next week, no appointments available. Scheduled her to see  tomorrow at 1:45pm.   [facility] has not done the assessment for falls in her home yet. Did come Monday to re-certify her. Believes they will come for the assessment fairly soon. Brought her log of blood sugars- decreased dose of Metformin to 500mg qd on 1/13-, 1/14-72, 15th-74, 16th-83, 17th-79 and today, the 18th-89. Will have  review and advise. Patient spoke at length about family discord. Mother's 85th birthday was this past weekend- she did attend, upset that mother brought her an outfit to wear- felt like mother didn't think she owned anything appropriate to wear. Felt left out with the planning-done by her sisters. Did give a toast for her mother and included sisters in the toast. Youngest sister did not think she meant what she said. Tearful talking about the weekend. Continues to wear the TENS unit-grateful for the relief it provides. Her mother had mentioned she could use one for her aches and pains and patient found one on [employer] and bought for her mother. Mother offered to pay her for it but patient would not accept it.    Has not weighed since appt with -but feels she may be still losing. Not having any swallowing issues, just tired and doesn't want to eat sometimes or if upset with family, loses her appetite. Continues to use the neck traction and incentive spirometer. BP remains slightly low, has had episodes with dizziness when standing or bending over- has not fallen, able to grab on to something. Did need to go to bathroom while in this session, got up quickly, unsteady, NN escorted her to the bathroom. After leaving BR, had the dizzy feeling. Once seated, NN checked her bp- 132/70. Dizzy spell passes fairly quickly. Urged her to try and get up more slowly- stand still holding onto something for few seconds, before walking. Spoke of needing to put a grab bar in her shower. Still having some occasional numbness in left arm-attributes it to the cold or rain. Speech not as clear, stumbled over words more. Spoke of upcoming cruise in August to [facility]- mood much brighter as discussed. Had hoped extended family would join them, most are not able to go but she plans to go forward with the trip. Called son when session finished and he helped steady her as she left. Review of Care Plan  Nurse Navigator reviewed care plan with patient. Continues to use the incentive spirometer and neck traction q day. Continues to meet with therapist each week. Nutrition fluctuating-no appetite when dealing with family stress  Reviewed med list-Metformin 500mg qd and will send FBS to  to review. Gait unsteady, Home Health eval pending re frequent falls. Medication List  Reviewed medication list with patient. For last week, taking decreased dose of Metformin, 500mg qd. Brought FBS for NN and  to review. No longer on the Lantus. Understands the purpose and potential side effects of each medication. Takes meds as ordered. Strengths  Continues to attend all appointments with providers. Continues to follow all doctor recommendations.   Knowledgeable about her chronic illness and diagnosis. Good sense of humor. Challenges  Depression and grief over recent loss of father. Much family discord and dysfunction. MS-frequent exacerbations that affect ambulation and speech as well as coordination and swallowing. Financial-limited income, requires help with ADL's and on costly MS meds. Transportation-unable to drive due to poor eyesight. Relies on son to drive her to all appointments and he is only available on Wednesdays. Goals  Eat 3 ADA meals per day with snacks as needed. Continue therapy with psychologist each week. Call NN prn for new issues and concerns-continue monthly CMM sessions. Take meds as ordered. Attend all appointments with providers. Monitor BP and BS. Care Team Members  Laurie Olguin,Neurologist  -Sleep Specialist  Johanna Tay-Psychologist  Kalyn Tyler Manager    History of Medical Problems  Unchanged, see 's office visit note of 1/4/17. Barriers to Care  Financial-limited income, on disability  Transportation-unable to drive, depends on son  Family dysfunction-mother and sisters cause patient much stress and angst  Lack of family support due to the above. Son is primary support since death of father and oldest sister. MS related issues-dysphagia,gait and ambulation problems. Appointments    =1/19/17 at 1:45pm re leg swelling and will be assuming care upon upcoming departure of Shannan Palaciosdy from the practice.   -February 2017  -  Jennifer Dean -February 15, 2pm    Summary of Visit  Performed assessment of current health status  Reviewed GIC and Health Maintenance needs  Reviewed importance of eating 3 well balanced meals per day  Reviewed medication list with patient  Gave support and encouragement as discussed stress and dysfunction with family members  Reviewed plan of care and updated current problems  Scheduled appointment with  to evaluate recent leg swelling and unsteady gait. Long Term Goal  Encourage effective health promoting behaviors through adherence to medical regimen and use of all resources available. Plan for Next Visit  Assess depression and grief  Assess stress  Assess nutrition  Review medications  Assess ambulation and gait  Assess episodes of dizziness and bp readings  Assess episodes of numbness in left forearm  Allow patient opportunity to vent and discuss feelings/emotions re family    Interventions for Case Management of Chronic Conditions  Assessed current health status and changes since last session-BP checked after episode of dizziness and message sent to provider. Reviewed GIC and  routine needs  Reviewed medication list-sent message to  re FBS on decreased dose of Metformin  Discussed financial issues with patient-reviewed resources  Reviewed strengths and resources available for her  Encouragement and support given re family dysfunction  Support given for her determination in managing her health care needs  Scheduled patient for appointment with  for 1/19/17 at 1:45pm re swelling of leg and pain in same knee.     Evaluation  Reviewed care plan with patient  Patient has provided input into POC and agrees with goals  Copy of revised plan mailed to patient  Reminded to call  or Doctor on call for help prn with problems or concerns  Scheduled next appointment for CCM for Wednesday, February 15th at 2pm.     End of Session- 3:02pm

## 2017-01-19 ENCOUNTER — HOSPITAL ENCOUNTER (OUTPATIENT)
Dept: VASCULAR SURGERY | Age: 62
Discharge: HOME | End: 2017-01-19
Attending: FAMILY MEDICINE
Payer: MEDICARE

## 2017-01-19 ENCOUNTER — OFFICE VISIT (OUTPATIENT)
Dept: FAMILY MEDICINE CLINIC | Age: 62
End: 2017-01-19

## 2017-01-19 ENCOUNTER — PATIENT OUTREACH (OUTPATIENT)
Dept: FAMILY MEDICINE CLINIC | Age: 62
End: 2017-01-19

## 2017-01-19 VITALS
RESPIRATION RATE: 18 BRPM | SYSTOLIC BLOOD PRESSURE: 114 MMHG | HEART RATE: 79 BPM | OXYGEN SATURATION: 98 % | BODY MASS INDEX: 23.7 KG/M2 | WEIGHT: 175 LBS | HEIGHT: 72 IN | DIASTOLIC BLOOD PRESSURE: 70 MMHG | TEMPERATURE: 98.2 F

## 2017-01-19 DIAGNOSIS — G89.29 CHRONIC PAIN OF RIGHT KNEE: Primary | ICD-10-CM

## 2017-01-19 DIAGNOSIS — M25.561 ACUTE PAIN OF RIGHT KNEE: ICD-10-CM

## 2017-01-19 DIAGNOSIS — M25.561 CHRONIC PAIN OF RIGHT KNEE: Primary | ICD-10-CM

## 2017-01-19 PROCEDURE — 93971 EXTREMITY STUDY: CPT

## 2017-01-19 NOTE — PATIENT INSTRUCTIONS
Knee Pain or Injury: Care Instructions  Your Care Instructions    Injuries are a common cause of knee problems. Sudden (acute) injuries may be caused by a direct blow to the knee. They can also be caused by abnormal twisting, bending, or falling on the knee. Pain, bruising, or swelling may be severe, and may start within minutes of the injury. Overuse is another cause of knee pain. Other causes are climbing stairs, kneeling, and other activities that use the knee. Everyday wear and tear, especially as you get older, also can cause knee pain. Rest, along with home treatment, often relieves pain and allows your knee to heal. If you have a serious knee injury, you may need tests and treatment. Follow-up care is a key part of your treatment and safety. Be sure to make and go to all appointments, and call your doctor if you are having problems. It's also a good idea to know your test results and keep a list of the medicines you take. How can you care for yourself at home? · Be safe with medicines. Read and follow all instructions on the label. ¨ If the doctor gave you a prescription medicine for pain, take it as prescribed. ¨ If you are not taking a prescription pain medicine, ask your doctor if you can take an over-the-counter medicine. · Rest and protect your knee. Take a break from any activity that may cause pain. · Put ice or a cold pack on your knee for 10 to 20 minutes at a time. Put a thin cloth between the ice and your skin. · Prop up a sore knee on a pillow when you ice it or anytime you sit or lie down for the next 3 days. Try to keep it above the level of your heart. This will help reduce swelling. · If your knee is not swollen, you can put moist heat, a heating pad, or a warm cloth on your knee. · If your doctor recommends an elastic bandage, sleeve, or other type of support for your knee, wear it as directed.   · Follow your doctor's instructions about how much weight you can put on your leg. Use a cane, crutches, or a walker as instructed. · Follow your doctor's instructions about activity during your healing process. If you can do mild exercise, slowly increase your activity. · Reach and stay at a healthy weight. Extra weight can strain the joints, especially the knees and hips, and make the pain worse. Losing even a few pounds may help. When should you call for help? Call 911 anytime you think you may need emergency care. For example, call if:  · You have symptoms of a blood clot in your lung (called a pulmonary embolism). These may include:  ¨ Sudden chest pain. ¨ Trouble breathing. ¨ Coughing up blood. Call your doctor now or seek immediate medical care if:  · You have severe or increasing pain. · Your leg or foot turns cold or changes color. · You cannot stand or put weight on your knee. · Your knee looks twisted or bent out of shape. · You cannot move your knee. · You have signs of infection, such as:  ¨ Increased pain, swelling, warmth, or redness. ¨ Red streaks leading from the knee. ¨ Pus draining from a place on your knee. ¨ A fever. · You have signs of a blood clot in your leg (called a deep vein thrombosis), such as:  ¨ Pain in your calf, back of the knee, thigh, or groin. ¨ Redness and swelling in your leg or groin. Watch closely for changes in your health, and be sure to contact your doctor if:  · You have tingling, weakness, or numbness in your knee. · You have any new symptoms, such as swelling. · You have bruises from a knee injury that last longer than 2 weeks. · You do not get better as expected. Where can you learn more? Go to http://marie-alvin.info/. Enter K195 in the search box to learn more about \"Knee Pain or Injury: Care Instructions. \"  Current as of: May 27, 2016  Content Version: 11.1  © 4339-0565 [employer].  Care instructions adapted under license by [employer] (which disclaims liability or warranty for this information). If you have questions about a medical condition or this instruction, always ask your healthcare professional. [place] [address] any warranty or liability for your use of this information.

## 2017-01-19 NOTE — PROCEDURES
[facility]  *** FINAL REPORT ***    Name: Jessica Officer  MRN: [id number]    Outpatient  : 1955  HIS Order #: [id number]  [id number] [facility] Visit #: [id number]  Date: 2017    TYPE OF TEST: Peripheral Venous Testing    REASON FOR TEST  Pain in limb    Right Leg:-  Deep venous thrombosis:           No  Superficial venous thrombosis:    No  Deep venous insufficiency:        Not examined  Superficial venous insufficiency: Not examined      INTERPRETATION/FINDINGS  PROCEDURE:  Color duplex ultrasound imaging of lower extremity veins. FINDINGS:       Right: The common femoral, deep femoral, femoral, popliteal,  posterior tibial, peroneal, and great saphenous are patent and without   evidence of thrombus where visualized; each is is compressible and  there is no narrowing of the flow channel on color Doppler imaging. Phasic flow is observed in the common femoral vein. Left:   The common femoral vein is patent and without evidence of   thrombus. Phasic flow is observed. This extremity was not otherwise   evaluated. IMPRESSION:  No evidence of right lower extremity vein thrombosis. ADDITIONAL COMMENTS    I have personally reviewed the data relevant to the interpretation of  this  study.     TECHNOLOGIST: FADUMO Tamez, RCS  Signed: 2017 04:02 PM    PHYSICIAN: Mitali Restrepo MD  Signed: 2017 04:18 PM

## 2017-01-19 NOTE — PROGRESS NOTES
Patient Name: Keri Samayoa   MRN: [id number]    Denver Cherry is a 63 y.o. female who presents with the following:     R knee pain  Reports she tripped and fell on her R leg prior to Christmas. Has been wrapping and elevated her R leg without improvement. Over the weekend, she reports increased swelling in her lower leg. The swelling seems to improve once she keeps it elevated. Has MS and has some difficulty walking at baseline due to foot drop. Does report she feels like her R knee will given out when standing on it, which is new since the fall. Does have hx of R knee arthroscopy for presumed arthritis in 1998. Denies hx of VTE, cancer, prolonged immobility, CP, SOB. Does smoke cigarettes. Review of Systems   Constitutional: Negative for chills, diaphoresis, fever, malaise/fatigue and weight loss. Respiratory: Negative for cough, hemoptysis, sputum production, shortness of breath and wheezing. Cardiovascular: Positive for leg swelling. Negative for chest pain, palpitations and PND. Musculoskeletal: Positive for falls and joint pain (R knee pain). Neurological: Negative for weakness. The patient's medications, allergies, past medical history, surgical history, family history and social history were reviewed and updated where appropriate. Current Outpatient Prescriptions   Medication Sig    TENS UNIT ELECTRODES by Does Not Apply route. prn    glucose blood VI test strips (FREESTYLE LITE STRIPS) strip Use as directed 2 times daily    Blood-Glucose Meter monitoring kit by SubCUTAneous route Before breakfast and dinner.  Free Style Lite glucometer and test strips    rosuvastatin (CRESTOR) 20 mg tablet TAKE 1 TABLET BY MOUTH EVERY EVENING    Lancets misc Use to check blood sugar bid , dx-Diabetes Mellitus 250.00    levothyroxine (SYNTHROID) 175 mcg tablet TAKE 1 TABLET BY MOUTH DAILY BEFORE BREAKFAST    CRESTOR 20 mg tablet TAKE 1 TABLET BY MOUTH EVERY EVENING    dantrolene (DANTRIUM) 100 mg capsule 100 mg three (3) times daily.  aspirin 81 mg chewable tablet Take 1 Tab by mouth daily.  PARoxetine (PAXIL) 40 mg tablet TAKE 1.5 TABS BY MOUTH DAILY. (Patient taking differently: nightly. TAKE 1.5 TABS BY MOUTH DAILY. - Note Pt prefers to take at HS)    GILENYA 0.5 mg cap Take 1 Cap by mouth daily.  traZODone (DESYREL) 50 mg tablet     pyridostigmine bromide (MESTINON TIMESPAN) 180 mg SR tablet Take 180 mg by mouth two (2) times a day.  modafinil (PROVIGIL) 200 mg tablet Take 200 mg by mouth two (2) times a day.  LYRICA 200 mg capsule Take 200 mg by mouth two (2) times a day.  METHOCARBAMOL (ROBAXIN-750 PO) Take  by mouth.  lidocaine (LIDODERM) 5 %(700 mg/patch) by TransDERmal route every twenty-four (24) hours. Apply patch to the affected area for 12 hours a day and remove for 12 hours a day.  ACTHAR H.P. 80 unit/mL injectable gel 1 mL by SubCUTAneous route daily. 80 units subq q day for 10 days    diazepam (VALIUM) 10 mg tablet Take 5 mg by mouth every eight (8) hours as needed for Anxiety.  ergocalciferol (VITAMIN D2) 50,000 unit capsule Take 50,000 Units by mouth two (2) times a week.  fentaNYL (DURAGESIC) 75 mcg/hr 1 Patch by TransDERmal route every seventy-two (72) hours.  topiramate (TOPAMAX) 200 mg tablet Take  by mouth two (2) times a day. No current facility-administered medications for this visit. OBJECTIVE      Visit Vitals    /70 (BP 1 Location: Right arm, BP Patient Position: Sitting)    Pulse 79    Temp 98.2 °F (36.8 °C) (Oral)    Resp 18    Ht 6' (1.829 m)    Wt 175 lb (79.4 kg)    LMP 09/01/2010    SpO2 98%    BMI 23.73 kg/m2       Physical Exam   Constitutional: She is well-developed, well-nourished, and in no distress. No distress. HENT:   Head: Normocephalic and atraumatic.    Right Ear: External ear normal.   Left Ear: External ear normal.   Eyes: Conjunctivae and EOM are normal. Pupils are equal, round, and reactive to light. Cardiovascular: Normal rate, regular rhythm and normal heart sounds. Exam reveals no gallop and no friction rub. No murmur heard. Pulmonary/Chest: Effort normal and breath sounds normal. No respiratory distress. She has no wheezes. Musculoskeletal:   Bilateral knees and calf diameter appear symmetric without warmth or swelling. R knee notable for TTP along posterior calf and lateral/medial joint lines. + Posterior drawer test and Maria C's sign; negative Lachman's and Deejay's. Pt unable to do Douglas test due to pain. Skin: She is not diaphoretic. Psychiatric: Mood, memory, affect and judgment normal.   Nursing note and vitals reviewed. ASSESSMENT AND PLAN  Jesus Galvin is a 63 y.o. female who presents today for:     1. Chronic pain of right knee  Will r/o bony abnormalities with xray; suspect possible OA and/or ligament involvement. Well's score for DVT is low but given physical exam and hx of smoking, will obtain US to r/o DVT. Recommend RICE therapy in the interim. Will follow up on results today. - XR KNEE RT 3 V; Future  - DUPLEX LOWER EXT VENOUS RIGHT; Future      Medications Discontinued During This Encounter   Medication Reason    Blood-Glucose Meter monitoring kit Not A Current Medication    Blood-Glucose Meter monitoring kit Not A Current Medication    glucose blood VI test strips (ASCENSIA AUTODISC VI, ONE TOUCH ULTRA TEST VI) strip Not A Current Medication    metFORMIN (GLUCOPHAGE) 500 mg tablet Not A Current Medication    insulin glargine (LANTUS) 100 unit/mL injection Not A Current Medication    Insulin Syringe-Needle U-100 (BD INSULIN SYRINGE ULTRA-FINE) 1 mL 30 x 1/2\" Syrg Not A Current Medication    Insulin Syringe-Needle U-100 1/2 mL 31 x 5/16\" syrg Not A Current Medication    metFORMIN (GLUCOPHAGE) 1,000 mg tablet Not A Current Medication       Follow-up Disposition:  Return if symptoms worsen or fail to improve.     Medication risks/benefits/costs/interactions/alternatives discussed with patient. Advised patient to call back or return to office if symptoms worsen/change/persist. If patient cannot reach us or should anything more severe/urgent arise he/she should proceed directly to the nearest emergency department. Discussed expected course/resolution/complications of diagnosis in detail with patient. Patient given a written after visit summary which includes his/her diagnoses, current medications and vitals. Patient expressed understanding with the diagnosis and plan.      Luz Maria Gonzalez M.D.

## 2017-01-19 NOTE — PROGRESS NOTES
Patient presents in office today to establish care with provider. vesta transfer  Patient c/o right leg discomfort and swelling x 1 month    Reviewed record in preparation for visit and have obtained necessary documentation. Identified pt with two pt identifiers(name and ).       Health Maintenance Due   Topic    INFLUENZA AGE 9 TO ADULT          Chief Complaint   Patient presents with   [facility] transfer    Leg Swelling     right leg pain post fall in december        Wt Readings from Last 3 Encounters:   17 175 lb (79.4 kg)   17 173 lb 12.8 oz (78.8 kg)   10/26/16 172 lb (78 kg)     Temp Readings from Last 3 Encounters:   17 98.2 °F (36.8 °C) (Oral)   17 98.6 °F (37 °C) (Oral)   10/26/16 98.4 °F (36.9 °C) (Oral)     BP Readings from Last 3 Encounters:   17 114/70   17 129/76   10/26/16 134/78     Pulse Readings from Last 3 Encounters:   17 79   17 82   10/26/16 78           Learning Assessment:  :     Learning Assessment 2014   PRIMARY LEARNER Patient Patient   HIGHEST LEVEL OF EDUCATION - PRIMARY LEARNER  - 4 YEARS OF COLLEGE   BARRIERS PRIMARY LEARNER - PHYSICAL   PRIMARY LANGUAGE ENGLISH ENGLISH   LEARNER PREFERENCE PRIMARY LISTENING LISTENING   ANSWERED BY patient self   RELATIONSHIP SELF SELF       Depression Screening:  :     PHQ 2 / 9, over the last two weeks 2017   Little interest or pleasure in doing things Not at all   Feeling down, depressed or hopeless Several days   Total Score PHQ 2 1         Coordination of Care Questionnaire:  :     1) Have you been to an emergency room, urgent care clinic since your last visit? no   Hospitalized since your last visit? no             2) Have you seen or consulted any other health care providers outside of [address] [place] [address] since your last visit? no  (Include any pap smears or colon screenings in this section.)    Patient is accompanied by self I have received verbal consent from [address] [place] [address] to discuss any/all medical information while they are present in the room.

## 2017-01-19 NOTE — MR AVS SNAPSHOT
Visit Information Date & Time Provider Department Dept. Phone Encounter #  
 1/19/2017  1:45 PM Katia Lauren  [facility] [phone] [id number] Follow-up Instructions Return if symptoms worsen or fail to improve. Your Appointments 2/15/2017  2:00 PM  
Nurse Visit with Angie Han [facility] ([facility]) Appt Note: [facility]  
 [address] [facility] [address]  
[phone]  
  
   
 [place] [address] [address]  
  
    
 6/8/2017  9:30 AM  
ROUTINE CARE with Katia Lauern MD  
[facility] ([facility]) Appt Note: 6 months f/u per dr Avila Urbano [address] [phone]  
[phone]  
  
   
 [address] [address] Upcoming Health Maintenance Date Due INFLUENZA AGE 9 TO ADULT 8/1/2016 FOOT EXAM Q1 6/8/2017 MICROALBUMIN Q1 6/8/2017 LIPID PANEL Q1 6/15/2017 HEMOGLOBIN A1C Q6M 7/4/2017 EYE EXAM RETINAL OR DILATED Q1 10/12/2017 BREAST CANCER SCRN MAMMOGRAM 12/14/2018 PAP AKA CERVICAL CYTOLOGY 1/4/2020 COLONOSCOPY 1/18/2022 DTaP/Tdap/Td series (2 - Td) 6/16/2024 Allergies as of 1/19/2017  Review Complete On: 1/19/2017 By: Brenda Campos LPN Severity Noted Reaction Type Reactions Bee Sting [Sting, Bee]  06/25/2010    Not Reported This Time Also allergic to egg products Betadine [Povidone-iodine]  05/17/2016    Rash Egg  06/25/2015    Itching Penicillins  06/25/2010    Not Reported This Time Current Immunizations  Reviewed on 1/4/2017 Name Date Tdap 6/16/2014 Not reviewed this visit You Were Diagnosed With   
  
 Codes Comments Acute pain of right knee    -  Primary ICD-10-CM: M25.561 ICD-9-CM: 719.46 Vitals BP Pulse Temp Resp Height(growth percentile) Weight(growth percentile) 114/70 (BP 1 Location: Right arm, BP Patient Position: Sitting) 79 98.2 °F (36.8 °C) (Oral) 18 6' (1.829 m) 175 lb (79.4 kg) LMP SpO2 BMI OB Status Smoking Status 09/01/2010 98% 23.73 kg/m2 Postmenopausal Current Every Day Smoker Vitals History BMI and BSA Data Body Mass Index Body Surface Area  
 23.73 kg/m 2 2.01 m 2 Preferred Pharmacy Pharmacy Name Phone [facility] [address] [phone] Your Updated Medication List  
  
   
This list is accurate as of: 1/19/17  2:24 PM.  Always use your most recent med list.  
  
  
  
  
 Shantelle Scale H.P. 80 unit/mL injectable gel Generic drug:  corticotropin 1 mL by SubCUTAneous route daily. 80 units subq q day for 10 days  
  
 aspirin 81 mg chewable tablet Take 1 Tab by mouth daily. Blood-Glucose Meter monitoring kit  
by SubCUTAneous route Before breakfast and dinner. Free Style Lite glucometer and test strips * CRESTOR 20 mg tablet Generic drug:  rosuvastatin TAKE 1 TABLET BY MOUTH EVERY EVENING  
  
 * rosuvastatin 20 mg tablet Commonly known as:  CRESTOR  
TAKE 1 TABLET BY MOUTH EVERY EVENING  
  
 dantrolene 100 mg capsule Commonly known as:  DANTRIUM  
100 mg three (3) times daily. DURAGESIC 75 mcg/hr Generic drug:  fentaNYL  
1 Patch by TransDERmal route every seventy-two (72) hours. GILENYA 0.5 mg Cap Generic drug:  fingolimod Take 1 Cap by mouth daily. glucose blood VI test strips strip Commonly known as:  FREESTYLE LITE STRIPS Use as directed 2 times daily  
  
 insulin glargine 100 unit/mL injection Commonly known as:  LANTUS  
70 Units by SubCUTAneous route daily. Insulin Syringe-Needle U-100 0.5 mL 31 gauge x 5/16 Syrg 1 Syringe by SubCUTAneous route four (4) times daily. Insulin Syringe-Needle U-100 1 mL 30 gauge x 1/2\" Syrg Commonly known as:  BD INSULIN SYRINGE ULTRA-FINE  
1 Syringe by SubCUTAneous route nightly. Lancets Misc Use to check blood sugar bid , dx-Diabetes Mellitus 250.00  
  
 levothyroxine 175 mcg tablet Commonly known as:  SYNTHROID  
TAKE 1 TABLET BY MOUTH DAILY BEFORE BREAKFAST  
  
 LIDODERM 5 % Generic drug:  lidocaine  
by TransDERmal route every twenty-four (24) hours. Apply patch to the affected area for 12 hours a day and remove for 12 hours a day. LYRICA 200 mg capsule Generic drug:  pregabalin Take 200 mg by mouth two (2) times a day. MESTINON TIMESPAN 180 mg SR tablet Generic drug:  pyridostigmine bromide Take 180 mg by mouth two (2) times a day. metFORMIN 1,000 mg tablet Commonly known as:  GLUCOPHAGE  
TAKE 1 TAB BY MOUTH TWO (2) TIMES DAILY (WITH MEALS). PARoxetine 40 mg tablet Commonly known as:  PAXIL TAKE 1.5 TABS BY MOUTH DAILY. PROVIGIL 200 mg tablet Generic drug:  modafinil Take 200 mg by mouth two (2) times a day. ROBAXIN-750 PO Take  by mouth. TENS UNIT ELECTRODES  
by Does Not Apply route. prn  
  
 TOPAMAX 200 mg tablet Generic drug:  topiramate Take  by mouth two (2) times a day. traZODone 50 mg tablet Commonly known as:  DESYREL  
  
 VALIUM 10 mg tablet Generic drug:  diazePAM  
Take 5 mg by mouth every eight (8) hours as needed for Anxiety. VITAMIN D2 50,000 unit capsule Generic drug:  ergocalciferol Take 50,000 Units by mouth two (2) times a week. * Notice: This list has 2 medication(s) that are the same as other medications prescribed for you. Read the directions carefully, and ask your doctor or other care provider to review them with you. Follow-up Instructions Return if symptoms worsen or fail to improve. To-Do List   
 01/19/2017 Imaging:  DUPLEX LOWER EXT VENOUS RIGHT   
  
 01/19/2017 Imaging:  XR KNEE RT 3 V Patient Instructions Knee Pain or Injury: Care Instructions Your Care Instructions Injuries are a common cause of knee problems. Sudden (acute) injuries may be caused by a direct blow to the knee. They can also be caused by abnormal twisting, bending, or falling on the knee. Pain, bruising, or swelling may be severe, and may start within minutes of the injury. Overuse is another cause of knee pain. Other causes are climbing stairs, kneeling, and other activities that use the knee. Everyday wear and tear, especially as you get older, also can cause knee pain. Rest, along with home treatment, often relieves pain and allows your knee to heal. If you have a serious knee injury, you may need tests and treatment. Follow-up care is a key part of your treatment and safety. Be sure to make and go to all appointments, and call your doctor if you are having problems. It's also a good idea to know your test results and keep a list of the medicines you take. How can you care for yourself at home? · Be safe with medicines. Read and follow all instructions on the label. ¨ If the doctor gave you a prescription medicine for pain, take it as prescribed. ¨ If you are not taking a prescription pain medicine, ask your doctor if you can take an over-the-counter medicine. · Rest and protect your knee. Take a break from any activity that may cause pain. · Put ice or a cold pack on your knee for 10 to 20 minutes at a time. Put a thin cloth between the ice and your skin. · Prop up a sore knee on a pillow when you ice it or anytime you sit or lie down for the next 3 days. Try to keep it above the level of your heart. This will help reduce swelling. · If your knee is not swollen, you can put moist heat, a heating pad, or a warm cloth on your knee. · If your doctor recommends an elastic bandage, sleeve, or other type of support for your knee, wear it as directed.  
· Follow your doctor's instructions about how much weight you can put on your leg. Use a cane, crutches, or a walker as instructed. · Follow your doctor's instructions about activity during your healing process. If you can do mild exercise, slowly increase your activity. · Reach and stay at a healthy weight. Extra weight can strain the joints, especially the knees and hips, and make the pain worse. Losing even a few pounds may help. When should you call for help? Call 911 anytime you think you may need emergency care. For example, call if: 
· You have symptoms of a blood clot in your lung (called a pulmonary embolism). These may include: 
¨ Sudden chest pain. ¨ Trouble breathing. ¨ Coughing up blood. Call your doctor now or seek immediate medical care if: 
· You have severe or increasing pain. · Your leg or foot turns cold or changes color. · You cannot stand or put weight on your knee. · Your knee looks twisted or bent out of shape. · You cannot move your knee. · You have signs of infection, such as: 
¨ Increased pain, swelling, warmth, or redness. ¨ Red streaks leading from the knee. ¨ Pus draining from a place on your knee. ¨ A fever. · You have signs of a blood clot in your leg (called a deep vein thrombosis), such as: 
¨ Pain in your calf, back of the knee, thigh, or groin. ¨ Redness and swelling in your leg or groin. Watch closely for changes in your health, and be sure to contact your doctor if: 
· You have tingling, weakness, or numbness in your knee. · You have any new symptoms, such as swelling. · You have bruises from a knee injury that last longer than 2 weeks. · You do not get better as expected. Where can you learn more? Go to http://marie-alvin.info/. Enter K195 in the search box to learn more about \"Knee Pain or Injury: Care Instructions. \" Current as of: May 27, 2016 Content Version: 11.1 © 5554-5086 [employer].  Care instructions adapted under license by [address] (which disclaims liability or warranty for this information). If you have questions about a medical condition or this instruction, always ask your healthcare professional. [address] any warranty or liability for your use of this information. Introducing [facility]! Dear Jenene Epley: 
Thank you for requesting a [employer] account. Our records indicate that you already have an active [employer] account. You can access your account anytime at [email or web address] Did you know that you can access your hospital and ER discharge instructions at any time in [employer]? You can also review all of your test results from your hospital stay or ER visit. Additional Information If you have questions, please visit the Frequently Asked Questions section of the [employer] website at [email or web address] Remember, [employer] is NOT to be used for urgent needs. For medical emergencies, dial 911. Now available from your iPhone and Android! Please provide this summary of care documentation to your next provider. Your primary care clinician is listed as Brandon Diamond. If you have any questions after today's visit, please call [phone].

## 2017-01-19 NOTE — PROGRESS NOTES
NN had sent Karl Irelandurning list of patient's FBS after reducing Metformin to 500mg qd. Per  advice, notified patient to discontinue the Metformin. Patient agreed and thought that was a good idea. Will continue to monitor FBS and report if notices changes.

## 2017-01-20 ENCOUNTER — PATIENT MESSAGE (OUTPATIENT)
Dept: FAMILY MEDICINE CLINIC | Age: 62
End: 2017-01-20

## 2017-01-20 DIAGNOSIS — M17.11 PRIMARY OSTEOARTHRITIS OF RIGHT KNEE: Primary | ICD-10-CM

## 2017-01-23 ENCOUNTER — TELEPHONE (OUTPATIENT)
Dept: FAMILY MEDICINE CLINIC | Age: 62
End: 2017-01-23

## 2017-01-23 ENCOUNTER — PATIENT OUTREACH (OUTPATIENT)
Dept: FAMILY MEDICINE CLINIC | Age: 62
End: 2017-01-23

## 2017-01-23 NOTE — PROGRESS NOTES
Patient called NN for results of Xray and venous doppler. NN read her the following results:  Notes Recorded by Noreen Lovelace MD on 1/20/2017 at 6:44 PM  Sent via Cloudary:    Your ultrasound did not show any blood clots which is great news but your xray of your right knee shows a small fluid build up as well as age-related arthritis (a.k.a. Degenerative changes of the joint). I'd recommend you to see an orthopedic doctor given your ongoing symptoms and history of arthritis. Do you have an orthopedic doctor that you follow? If not, I'm happy to place a new referral and our scheduling department can help schedule this for you. In the interim, please continue to rest, use heat/ice therapy (20 minutes on, alternating heat and ice, several times daily), and ACE bandage (a knee sleeve with a cut out of the knee cap may provide more stability; you can buy these at any retail pharmacy). You may also use Tylenol 650 mg every 6 hours as needed (do not take more than 3000 mg in a 24 hour period).            Study Result   Patient says she went to [facility] years ago. Requests that appointment be scheduled on a Wednesday when her son can take her to the appt. Advised patient that Belinda Ferrell in scheduling would probably be calling her with the appointment.

## 2017-01-23 NOTE — Clinical Note
[facility] called me for her results-had not seen the My Chart message-read it to her. [facility] day son can take her. Thanks.

## 2017-01-23 NOTE — PROGRESS NOTES
Please notify patient regarding their test results (sent via [address] but pt has not yet read results online): Your ultrasound did not show any blood clots which is great news but your xray of your right knee shows a small fluid build up as well as age-related arthritis (a.k.a. Degenerative changes of the joint). I'd recommend you to see an orthopedic doctor given your ongoing symptoms and history of arthritis. Do you have an orthopedic doctor that you follow? If not, I'm happy to place a new referral and our scheduling department can help schedule this for you. In the interim, please continue to rest, use heat/ice therapy (20 minutes on, alternating heat and ice, several times daily), and ACE bandage (a knee sleeve with a cut out of the knee cap may provide more stability; you can buy these at any retail pharmacy). You may also use Tylenol 650 mg every 6 hours as needed (do not take more than 3000 mg in a 24 hour period).

## 2017-01-23 NOTE — PROGRESS NOTES
Spoke with patient regarding results and recommendations. Voiced understanding. Preferred that we call [facility] to schedule appt - left message for Dr Gabriella Horne nurse to call us back. Patients prefers Monday or Wednesday, but if to far out, will take first available.

## 2017-02-15 ENCOUNTER — CLINICAL SUPPORT (OUTPATIENT)
Dept: FAMILY MEDICINE CLINIC | Age: 62
End: 2017-02-15

## 2017-02-15 DIAGNOSIS — G35 MS (MULTIPLE SCLEROSIS) (HCC): Primary | ICD-10-CM

## 2017-02-15 NOTE — PROGRESS NOTES
Complex Case Management- Session #14     Start Time 2:20pm      Update  Nurse Navigator met with patient for Session #14 of CCM. Patient came into office, few minutes late, using cane. Apologized for tardiness. Sat down in chair, balance seemed slightly better today than last month. Affect brighter but admits that her MS is not doing \"too well. \"  Says she will see neuro, , on 2/17 for follow up visit. But says after today, having several appointments, she will be in bed until Friday-achey,tired and joints swollen. Had cortisone injection at [address] but did not work like it has in the past. Had her last injection about the end of January or early February and didn't last long at all. Has not helped her knee much at all. NN asked her about her balance- says she has good and bad days. Has exercises to improve balance from Physical Therapy- does work on the exercises, not sure if they help. Medications are the same. Brought in a list of her FBS- range from low of 61 up to 141(after eating pizza night before). Advised to continue to monitor BS. Says she is swallowing ok with most foods, except steak or other thick beefs. Nutrition is better for most part. Some days appetite is poor-nothing excites her but will try and eat. NN suggested she use the Boost drinks at that time to get in her calories and vitamins and minerals. Has [facility] Pt coming to work with her- the physical therapist works on exercises with her to improve balance and strength. Never had the home assessment done re falls. Reports she hasn't been falling lately- usually just loses balance and grabs onto wall or son. Occasional bouts of dizziness- when she checks, bp is normal. Happens with change of position or just walking. Also spoke of her urgency with urination. Has never been able to tolerate the OAB medications. Has tried them all she says.   With her balance, and TENS unit in back pocket, sometimes incontinent with dealing with all of that. NN suggested she start a urinary program of going q hour-whether has the urge or not. So that she won't be rushed and won;t have the incontinence. Briefly mentioned her mother-says mother going on a cruise to [place]- patient had been planning a trip to [facility]. Mother wants her to change her plans and go with she and sister. Patient says she is not going, doesn't see much of mother and is best for her this way. Did try to reach out to mother and invited her to dinner, but mother called that morning to say she was having GI bug and afraid to go. Patient says she does not plan on asking again. Review of Care Plan  Nurse Navigator reviewed care plan with patient. Continues to use incentive spirometer and neck traction q day. Continues to meet with therapist each week. Nutrition- improving, but occasional days with poor appetite. Reviewed medication list- no longer on DM medication. BS seems stable. Will continue to monitor. Gait slightly better today-continues to work with PT on exercises to improve. Medication List  Reviewed mediation list with patient. No longer on the Metformin. Understands the purpose and potential side effects of each medication and takes as ordered. Strengths  Continues to attend all appointments with providers. Continues to follow all doctor recommendations and POC. Knowledgeable about her chronic illness and diagnosis. Great sense of humor. Coping well with loss of father and sister this past year. Challenges  Recent loss of father and loss of sister last spring-depression and grief. Much family discord and dysfunction. MS-frequent exacerbations that affect ambulation and speech as well as coordination and swallowing. Financial-limited income and requires help with ADL's and on costly MS meds. Transportation-unable to drive d/t poor eyesight.  Relies on son to drive her to all appointments on his one day off from work-Wednesdays. Goals  Eat 3 ADA meals per day with snacks as needed. Continue psychological therapy q week. Call NN prn for new issues and concerns-continue monthly CCM sessions. Take meds as ordered. Attend all appointments with providers. Monitor bp and bs. Care Team Members  , PCP  , Neurologist  , Sleep specialist  Amanda Campbell, Psychologist  Ran Sheffield Manager    History of Medical Problems  See 1/4/17 note per -unchanged since then. Barriers to Care  Financial-limited income,on disability  Transportation-unable to drive, must rely on son  Family dysfunction-mother and sisters cause patient much stress and angst. Provide little to no support. Son is primary support. MS issues-dysphagia,gait,ambulation problems,urgency of urination    Appointments  - February 17   -June 8  [address]  Olga Zach- March 15th,2 pm    Summary of Visit  Performed assessment of current health status  Reviewed GIC and HM needs  Reviewed importance of eating 3 well balanced meals q day  Reviewed medication list with patient  Gave support and encouragement re stress and dysfunction from family members  Reviewed plan of care and updated current problems     Long Term Goal  Encourage effective health promoting behaviors through adherence to medical regimen and use of all resources available.     Plan for Next Visit  Assess depression and grief  Assess stress  Assess nutrition  Review medications  Assess ambulation and gait  Assess episodes of dizziness and bp readings  Assess episodes of numbness in left forearm  Allow patient opportunity to vent and discuss feelings/emotions re family  Assess OAB    Interventions for Case Management of Chronic Conditions  Assessed current health status and changes since last session  Reviewed GIC and HM routine needs  Reviewed medication list.  Reviewed her blood sugar numbers after stopping the Metformin  Made suggestions re OAB- establish a bathroom plan for q one hour regardless of need  Discussed financial issues with patient-reviewed resources  Reviewed her strengths and gave support  Encouragement and support given re family dysfunction  Support given for her determination in managing her health care needs. Evaluation  Reviewed care plan with patient  Patient has provided input into POC and agrees with goals  Copy of revised plan provided to patient  Reminded to call  of Doctor for help prn with problems or concerns.   Scheduled next appointment for CCM for Wednesday, March 15 at 2pm    End of Session- 3:15pm

## 2017-02-20 ENCOUNTER — PATIENT OUTREACH (OUTPATIENT)
Dept: FAMILY MEDICINE CLINIC | Age: 62
End: 2017-02-20

## 2017-02-20 NOTE — PROGRESS NOTES
Patient called, had mammogram done in December 2016-normal, advised repeat one year. Received letter today stating she was due for mammogram. Prior to December, had mammo and ultrasound of left breast done 3/2016-normal and recommended repeat mammogram in one year. Advised they must have overlooked the once she had in December, can disregard that reminder. Good until Dec 2017. Patient thanked me for checking.

## 2017-02-24 DIAGNOSIS — E78.5 HYPERLIPIDEMIA LDL GOAL <100: ICD-10-CM

## 2017-02-24 RX ORDER — ROSUVASTATIN CALCIUM 20 MG/1
TABLET, COATED ORAL
Qty: 90 TAB | Refills: 1 | Status: SHIPPED | OUTPATIENT
Start: 2017-02-24 | End: 2020-01-06 | Stop reason: SDUPTHER

## 2017-03-01 ENCOUNTER — PATIENT OUTREACH (OUTPATIENT)
Dept: FAMILY MEDICINE CLINIC | Age: 62
End: 2017-03-01

## 2017-03-01 NOTE — Clinical Note
Had a fall on Saturday. See note. PT/OT plan to discharge her in about a week-she doesn't feel ready for that-feels she has a balance issue and needs more therapy.

## 2017-03-01 NOTE — PROGRESS NOTES
NN called patient today, returning her request for a call. Patient gave 2 identifiers. Reported she had a big fall on Saturday. Was outside, had gone out in her wheelchair, and decided to walk around yard a little. Got up slowly from chair, walked about 15 steps and then fell-landed in grass, but ground was hard. Landed on right side and right knee. Had no idea why it happened, denied any dizziness. Did not have cane with her. Son was inside asleep and didn't hear her calling for him. She crawled over to her wheelchair and pulled herself up into the chair. Denied hitting her head and said no LOC. Very sore and achey since, has been resting and laying around since then. Did not go to ED for evaluation because she said she could \"move everything. \" Did report it to OT and PT. Says OT and PT are planning on discharging her in about a week and she doesn't think she is ready for that especially after this fall- says she has a balance issue. Advised will notify  and check with her re recommendations. Advised to call back for any changes in condition or concerns.

## 2017-03-02 ENCOUNTER — TELEPHONE (OUTPATIENT)
Dept: FAMILY MEDICINE CLINIC | Age: 62
End: 2017-03-02

## 2017-03-15 ENCOUNTER — CLINICAL SUPPORT (OUTPATIENT)
Dept: FAMILY MEDICINE CLINIC | Age: 62
End: 2017-03-15

## 2017-03-15 DIAGNOSIS — G35 MULTIPLE SCLEROSIS (HCC): ICD-10-CM

## 2017-03-15 DIAGNOSIS — R42 DIZZINESS: ICD-10-CM

## 2017-03-15 DIAGNOSIS — Z71.89 COMPLEX CARE COORDINATION: Primary | ICD-10-CM

## 2017-03-15 NOTE — PROGRESS NOTES
Complex Case Management    Session #15, Start time-  1:50pm    Update  Nurse Navigator met with patient for Session #15 of CCM. Patient arrived early today, using her cane, but appeared more steady on her feet. Affect brighter today. Reports she is wearing her leg braces for added support- had to get them adjusted at [facility] orth[facility] before being able to use them. Gradually getting used to them again, says they are helping stabilize her. No falls reported since her phone call to me on 3/1 when said she had fallen a few days before while out in her yard. Discussed the importance of always have her cane or walker with her. She agreed. Had mentioned at last session that PT and OT were discharging her. She told them that  did not want them to stop until he re-evaluated her in April. Said that PT had recertified her today and OT will certify her tomorrow. Says PT still hasn't done the safety assessment of her house. Described a hand held device that she is using throughout day to strengthen hands and arms. To see neurologist, Julita Guzman, in April, on the 12th. Still has a nursing assistant come 2 x a week to help her with baths and other ADL's. Gave NN list of blood sugars from last month. Range from high of 128 down to 51. Currently not on any DM medications. Will give list to pcp to review, . As far as her swallowing and eating, says doing well- still has to chew beef thoroughly and it will still sometimes get stuck. But does well with most other foods. Still issues with no appetite at times, but knows to have a Boost at those times and maybe Oodles of Noodles. Speech, stuttering at times, also noted to be searching for words at times during session. Still has occasional dizziness -passes quickly. Not able to check bp at the time. Considering a wrist bp monitor so she can check.   Briefly discussed mother- mother wants her to call her each day and patient not willing to do that-\"she can call me. \"  When asked about depression- patient upset and told NN that her therapist had lost her job and has not seen her in several months. Had worked with same therapist for several years. Hoping when she gets a new job that she can see her there. Review of Care Plan  Nurse Navigator reviewed POC with patient. Continues to use incentive spirometer and neck traction each day. Has not been able to meet with therapist.  Nayeli Clancy, occasional days with poor appetite. Reviewed medication list-no changes. Not on any DM medications-blood sugar stable. Will continue to monitor. Gait better today-wearing leg braces. Continues to work with PT/OT on exercises to improve. Medication List  Reviewed with patient-no changes. Strengths   Continues to attend all appointments with providers. Continues to follow all doctor recommendations and POC  Knowledgeable about her chronic illness  Great sense of humor  Coping well with loss of father and sister in last year    Challenges  Loss of father and sister in last year. Much family discord and dysfunction  MS-frequent exacerbations that affect ambulation and speech as well as coordination and swallowing  Financial-limited income and requires help with ADL's and on costly MS meds. Transportation-unable to drive d/t poor vision-relies on son who is only available one day a week. Goals  Eat 3 ADA meals per day with nutritious snacks prn  Find new psychologist  Call NN prn for new issues and concerns-continue monthly CCM sessions  Take meds as ordered. Attend all appointments with providers. Monitor blood pressure and blood sugar. Care Team Members and Appointments  , PCP-June 8th  ,Neurologist-April 12  ,Sleep Specialist-prn  Psychologist-to be determined  Damon Driver Manager-April 12    History of Medical Problems  See Vane Gutierrez note of 1/4/17-no changes since then.     Summary of Visit  Performed assessment of current health status  Reviewed GIC and HM needs  Reviewed importance of eating 3 well balanced meals per day  Reviewed med list with patient  Gave support and encouragement re continued dysfunction in family and recent loss of therapist.  Reviewed POC and updated current problems    Long Term Goal  Encourage effective health promoting behaviors through adherence to medical regimen and use of all resources available.     Plan for Next Visit  Assess for depression and grief  Assess stress  Assess nutrition  Review meds  Assess ambulation and gait  Assess episodes of dizziness and bp readings  Allow patient opportunity to vent and discuss feelings/emotions towards family  Asses OAB    Interventions for Case Management of Chronic Conditions  Assessed current health status and changes since last session  Reviewed GIC and HM needs  Reviewed med list  Reviewed blood sugar numbers since off all DM meds  Encouraged her to follow bathroom plan-q one hour regardless of need  Discussed financial issues with patient-reviewed resources  Reviewed her strengths and gave support  Encouragement and support given re family dysfunction and loss of therapist  Support given for her determination in managing her health care needs    Evaluation  Reviewed care plan with patient  Patient has provided input into POC and agrees with goals  Copy of revised plan provided to patient  Reminded to call  for help prn with problems or concerns  Scheduled next session for April 12 at 2pm    End of Session- 2:40pm

## 2017-04-06 ENCOUNTER — PATIENT OUTREACH (OUTPATIENT)
Dept: FAMILY MEDICINE CLINIC | Age: 62
End: 2017-04-06

## 2017-04-12 ENCOUNTER — OFFICE VISIT (OUTPATIENT)
Dept: NEUROLOGY | Age: 62
End: 2017-04-12

## 2017-04-12 ENCOUNTER — CLINICAL SUPPORT (OUTPATIENT)
Dept: FAMILY MEDICINE CLINIC | Age: 62
End: 2017-04-12

## 2017-04-12 VITALS
OXYGEN SATURATION: 98 % | HEIGHT: 72 IN | HEART RATE: 75 BPM | SYSTOLIC BLOOD PRESSURE: 114 MMHG | DIASTOLIC BLOOD PRESSURE: 68 MMHG | TEMPERATURE: 98 F | RESPIRATION RATE: 18 BRPM | BODY MASS INDEX: 24.33 KG/M2 | WEIGHT: 179.6 LBS

## 2017-04-12 DIAGNOSIS — G35 MS (MULTIPLE SCLEROSIS) (HCC): Primary | ICD-10-CM

## 2017-04-12 DIAGNOSIS — M21.372 FOOT DROP, BILATERAL: ICD-10-CM

## 2017-04-12 DIAGNOSIS — G70.00 MG (MYASTHENIA GRAVIS) (HCC): ICD-10-CM

## 2017-04-12 DIAGNOSIS — G43.709 CHRONIC MIGRAINE WITHOUT AURA WITHOUT STATUS MIGRAINOSUS, NOT INTRACTABLE: ICD-10-CM

## 2017-04-12 DIAGNOSIS — G35 MULTIPLE SCLEROSIS (HCC): ICD-10-CM

## 2017-04-12 DIAGNOSIS — M21.371 FOOT DROP, BILATERAL: ICD-10-CM

## 2017-04-12 DIAGNOSIS — R26.9 GAIT DISORDER: ICD-10-CM

## 2017-04-12 DIAGNOSIS — R29.6 FREQUENT FALLS: ICD-10-CM

## 2017-04-12 DIAGNOSIS — Z71.89 COMPLEX CARE COORDINATION: Primary | ICD-10-CM

## 2017-04-12 RX ORDER — FENTANYL 75 UG/H
1 PATCH TRANSDERMAL
Qty: 10 PATCH | Refills: 0 | Status: SHIPPED | OUTPATIENT
Start: 2017-04-12 | End: 2017-05-17 | Stop reason: SDUPTHER

## 2017-04-12 RX ORDER — PREGABALIN 200 MG/1
200 CAPSULE ORAL 2 TIMES DAILY
Qty: 180 CAP | Refills: 3 | Status: SHIPPED | OUTPATIENT
Start: 2017-04-12 | End: 2017-10-18 | Stop reason: SDUPTHER

## 2017-04-12 NOTE — MR AVS SNAPSHOT
Visit Information Date & Time Provider Department Dept. Phone Encounter #  
 4/12/2017  9:40 AM Los Heart MD [facility] at [facility] [phone] [id number] Follow-up Instructions Return in about 4 weeks (around 5/10/2017). Your Appointments 4/12/2017  2:00 PM  
Nurse Visit with Favio Valentino [address] [facility] ([facility]) Appt Note: Complex Case Management [address] [facility] [address]  
[phone]  
  
   
 [address] [address]  
  
    
 6/8/2017  9:30 AM  
ROUTINE CARE with Nick Stapelton MD  
[address] [place] [address] ([facility]) Appt Note: 6 months f/u per dr Jo Tanner [address] [address]  
[phone]  
  
   
 [address] [facility] [address] [address] Upcoming Health Maintenance Date Due INFLUENZA AGE 9 TO ADULT 8/1/2016 FOOT EXAM Q1 6/8/2017 MICROALBUMIN Q1 6/8/2017 LIPID PANEL Q1 6/15/2017 HEMOGLOBIN A1C Q6M 7/4/2017 EYE EXAM RETINAL OR DILATED Q1 10/12/2017 BREAST CANCER SCRN MAMMOGRAM 12/14/2018 PAP AKA CERVICAL CYTOLOGY 1/4/2020 COLONOSCOPY 1/18/2022 DTaP/Tdap/Td series (2 - Td) 6/16/2024 Allergies as of 4/12/2017  Review Complete On: 4/12/2017 By: Jeanette Montes De Oca MD  
  
 Severity Noted Reaction Type Reactions Bee Sting [Sting, Bee]  06/25/2010    Not Reported This Time Also allergic to egg products Betadine [Povidone-iodine]  05/17/2016    Rash Egg  06/25/2015    Itching Penicillins  06/25/2010    Not Reported This Time Current Immunizations  Reviewed on 1/4/2017 Name Date Tdap 6/16/2014 Not reviewed this visit You Were Diagnosed With   
  
 Codes Comments MS (multiple sclerosis) ([facility]    -  Primary ICD-10-CM: G35 
ICD-9-CM: 271 MG (myasthenia gravis) ([facility]     ICD-10-CM: G70.00 ICD-9-CM: 358.00 Frequent falls     ICD-10-CM: R29.6 ICD-9-CM: V15.88 Gait disorder     ICD-10-CM: R26.9 ICD-9-CM: 484. 2 Chronic migraine without aura without status migrainosus, not intractable     ICD-10-CM: L42.640 ICD-9-CM: 346.70 Foot drop, bilateral     ICD-10-CM: M21.371, M21.372 ICD-9-CM: 736.79 Vitals BP Pulse Temp Resp Height(growth percentile) Weight(growth percentile) 114/68 (BP 1 Location: Right arm, BP Patient Position: Sitting) 75 98 °F (36.7 °C) (Oral) 18 6' (1.829 m) 179 lb 9.6 oz (81.5 kg) LMP SpO2 BMI OB Status Smoking Status 09/01/2010 98% 24.36 kg/m2 Postmenopausal Current Every Day Smoker Vitals History BMI and BSA Data Body Mass Index Body Surface Area  
 24.36 kg/m 2 2.03 m 2 Preferred Pharmacy Pharmacy Name Phone [facility] [address] [phone] Your Updated Medication List  
  
   
This list is accurate as of: 4/12/17 11:07 AM.  Always use your most recent med list.  
  
  
  
  
 ACTHAR H.P. 80 unit/mL injectable gel Generic drug:  corticotropin 1 mL by SubCUTAneous route daily. 80 units subq q day for 10 days  
  
 aspirin 81 mg chewable tablet Take 1 Tab by mouth daily. Blood-Glucose Meter monitoring kit  
by SubCUTAneous route Before breakfast and dinner. Free Style Lite glucometer and test strips  
  
 dantrolene 100 mg capsule Commonly known as:  DANTRIUM  
100 mg three (3) times daily. fentaNYL 75 mcg/hr Commonly known as:  DURAGESIC  
1 Patch by TransDERmal route every seventy-two (72) hours. Max Daily Amount: 1 Patch. GILENYA 0.5 mg Cap Generic drug:  fingolimod Take 1 Cap by mouth daily. glucose blood VI test strips strip Commonly known as:  FREESTYLE LITE STRIPS Use as directed 2 times daily Lancets Misc Use to check blood sugar bid , dx-Diabetes Mellitus 250.00  
  
 levothyroxine 175 mcg tablet Commonly known as:  SYNTHROID  
TAKE 1 TABLET BY MOUTH DAILY BEFORE BREAKFAST  
  
 LIDODERM 5 % Generic drug:  lidocaine  
by TransDERmal route every twenty-four (24) hours. Apply patch to the affected area for 12 hours a day and remove for 12 hours a day. LYRICA 200 mg capsule Generic drug:  pregabalin Take 200 mg by mouth two (2) times a day. MESTINON TIMESPAN 180 mg SR tablet Generic drug:  pyridostigmine bromide Take 180 mg by mouth two (2) times a day. PARoxetine 40 mg tablet Commonly known as:  PAXIL TAKE 1.5 TABS BY MOUTH DAILY. PROVIGIL 200 mg tablet Generic drug:  modafinil Take 200 mg by mouth two (2) times a day. ROBAXIN-750 PO Take  by mouth. rosuvastatin 20 mg tablet Commonly known as:  CRESTOR  
TAKE 1 TABLET BY MOUTH EVERY EVENING  
  
 TENS UNIT ELECTRODES  
by Does Not Apply route. prn  
  
 TOPAMAX 200 mg tablet Generic drug:  topiramate Take  by mouth two (2) times a day. traZODone 50 mg tablet Commonly known as:  DESYREL  
  
 VALIUM 10 mg tablet Generic drug:  diazePAM  
Take 5 mg by mouth every eight (8) hours as needed for Anxiety. VITAMIN D2 50,000 unit capsule Generic drug:  ergocalciferol Take 50,000 Units by mouth every seven (7) days. Prescriptions Printed Refills  
 fentaNYL (DURAGESIC) 75 mcg/hr 0 Si Patch by TransDERmal route every seventy-two (72) hours. Max Daily Amount: 1 Patch. Class: Print Route: TransDERmal  
  
Follow-up Instructions Return in about 4 weeks (around 5/10/2017). Introducing [facility]! Dear Angi Tenorio: 
Thank you for requesting a [employer]OM account. Our records indicate that you already have an active DZZOM account. You can access your account anytime at [email or web address] Did you know that you can access your hospital and ER discharge instructions at any time in DZZOM?   You can also review all of your test results from your hospital stay or ER visit. Additional Information If you have questions, please visit the Frequently Asked Questions section of the [employer] website at [email or web address] Remember, [employer] is NOT to be used for urgent needs. For medical emergencies, dial 911. Now available from your iPhone and Android! Please provide this summary of care documentation to your next provider. Your primary care clinician is listed as Brandon Diamond. If you have any questions after today's visit, please call [phone].

## 2017-04-12 NOTE — TELEPHONE ENCOUNTER
[facility] assistance program, spoke with [place]. Patient will need a new prescription for Lyrica 200 mg 1 tablet 1 po bid #180. Fax new prescription to [phone] attention Alida Snyder.

## 2017-04-12 NOTE — PROGRESS NOTES
Chief Complaint   Patient presents with    Multiple Sclerosis    Medication Refill     Patient stated she had three falls since December 2016.

## 2017-04-12 NOTE — PROGRESS NOTES
Complex Case Management - Session #16, Start time- 2:00pm      Update  Patient in good spirits. Arrived promptly, driven by son as usual. Using cane, gait appeared more steady. Braces on legs noted. States she uses them for added support whenever goes out. Saw  today, neurologist, he wants to see her back in one month. (usually sees him q 3 months). Denies any recent falls.  did order for PT and OT to continue. Brought list of blood sugars from past month- FBS range from 63 to 96. Advised will share with . Continues to remain off of DM medications. Eating and swallowing well, minimal problems. Infrequent dizziness she reports. Speech, seems improved on this visit. Less stuttering and less searching for words noted. Does complain of right knee pain and sciatica. Has scheduled appt to see ortho, appt to see  is tomorrow. Patient will call me with update on that visit. Hopeful pain can be dealt with prior to her cruise in August- really looking forward to that trip. Has a female friend going with her for assistance, son also to go. Review of Care Plan   Nurse Navigator reviewed care plan with patient. Continues to use incentive spirometer and neck traction each day as directed. Still has not met with psychologist.  Nutrition stable, rare days with no appetite. Reviewed meds-no changes. Gait better today for most part-wearing leg braces. Continues to meet with PT/OT. Medication List  Reviewed with patient- no changes    Strengths  Attends all appointments with providers. Follows all doctor recommendations and POC  Knowledgeable about her chronic disease  Great sense of humor  Coping well with loss of father and sister in last year    Challenges  Loss of father and sister last year.   Family discord and dysfunction  MS-frequent exacerbations that affect ambulation and speech as well as coordination and swallowing  Financial-limited income, requires help with ADL's and on costly MS meds. Transportation-unable to drive d/t poor vision-relies on son who is only available one day a week. Goals  Eat 3 ADA meals per day with nutritious snacks prn  Find new psychologist  Call NN prn for new issues and concerns-continue monthly CCM sessions  Take meds as ordered. Attend all appointments with providers. Monitor blood pressure and blood sugar. Care Team Members and Appointments  ,PCP, June 8th  ,Neurologist-  [facility] Specialist  Psychologist-  Americo Cast manager- May     History of Medical Problems  See Carolina Michel note of 1/4/17-no changes since then. Summary of Visit  Performed assessment of current health status  Reviewed HM needs  Reviewed importance of eating 3 well balanced meals qd  Reviewed med list with patient  Gave support and encouragement re continued dysfunction in family and loss of cherished therapist  Encouraged to update NN on recommendations from ortho appointment tomorrow  Reviewed POC and updated current problems    Long Term Goal  Encourage effective health promoting behaviors through adherence to medical regimen and use of all resources availaable. Plan for Next Visit  Assess depression and grief  Assess stress  Assess nutrition  Review meds  Assess ambulation and gait  Assess episodes of dizziness and bp readings  Allow patient opportunity to vent and discuss feeling towards family   Assess OAB  Discuss results from seeing orthopedic, Dr.Dobzyniak prado right knee pain.     Interventions for Case Management of Chronic Conditions  Assessed current health status and changes since last session  Reviewed GIC and HM needs  Reviewed med list  Reviewed blood sugar numbers since off all DM meds  Encouraged her to continue to follow bathroom plan-q one hour regardless of need  Discussed financial issues with patient-reviewed resources  Reviewed her strengths and gave support  Encouragement and support given re family dysfunction and loss of therapist  Support given for her determination in managing her health care needs     Evaluation  Reviewed care plan with patient  Patient has provided input into POC and agrees with goals  Copy of revised plan provided to patient  Reminded to call  for help prn with problems or concerns  Scheduled next session for May- patient will make date of appt when calls back with update from Ortho.     End of Session- 3:15pm

## 2017-04-12 NOTE — PROGRESS NOTES
Neurology Progress Note    NAME:  Ab Galvin   :   1955   MRN:   [id number]     Date/Time:  2017  Subjective:      Ab Galvin is a 63 y.o. female here today for follow up. Says she has been having more falls eventhough she is currently under physical therapy. The knee pain also has gotten worse. Patient is scheduled to see orthopedic. Review of Systems:  Per HPI - Otherwise 12 point ROS was negative     []Unable to obtain  ROS due to  []mental status change  []sedated   []intubated    Medications reviewed:  Current Outpatient Prescriptions   Medication Sig Dispense Refill    fentaNYL (DURAGESIC) 75 mcg/hr 1 Patch by TransDERmal route every seventy-two (72) hours. Max Daily Amount: 1 Patch. 10 Patch 0    rosuvastatin (CRESTOR) 20 mg tablet TAKE 1 TABLET BY MOUTH EVERY EVENING 90 Tab 1    TENS UNIT ELECTRODES by Does Not Apply route. prn      glucose blood VI test strips (FREESTYLE LITE STRIPS) strip Use as directed 2 times daily 100 Strip 11    Blood-Glucose Meter monitoring kit by SubCUTAneous route Before breakfast and dinner. Free Style Lite glucometer and test strips 1 Kit 0    Lancets misc Use to check blood sugar bid , dx-Diabetes Mellitus 250.00 1 Package 11    levothyroxine (SYNTHROID) 175 mcg tablet TAKE 1 TABLET BY MOUTH DAILY BEFORE BREAKFAST 30 Tab 11    dantrolene (DANTRIUM) 100 mg capsule 100 mg three (3) times daily.  aspirin 81 mg chewable tablet Take 1 Tab by mouth daily. 30 Tab 3    PARoxetine (PAXIL) 40 mg tablet TAKE 1.5 TABS BY MOUTH DAILY. (Patient taking differently: nightly. TAKE 1.5 TABS BY MOUTH DAILY. - Note Pt prefers to take at HS) 45 Tab 2    GILENYA 0.5 mg cap Take 1 Cap by mouth daily.  traZODone (DESYREL) 50 mg tablet       pyridostigmine bromide (MESTINON TIMESPAN) 180 mg SR tablet Take 180 mg by mouth two (2) times a day.  modafinil (PROVIGIL) 200 mg tablet Take 200 mg by mouth two (2) times a day.       LYRICA 200 mg capsule Take 200 mg by mouth two (2) times a day.  METHOCARBAMOL (ROBAXIN-750 PO) Take  by mouth.  lidocaine (LIDODERM) 5 %(700 mg/patch) by TransDERmal route every twenty-four (24) hours. Apply patch to the affected area for 12 hours a day and remove for 12 hours a day.  ACTHAR H.P. 80 unit/mL injectable gel 1 mL by SubCUTAneous route daily. 80 units subq q day for 10 days      diazepam (VALIUM) 10 mg tablet Take 5 mg by mouth every eight (8) hours as needed for Anxiety.  ergocalciferol (VITAMIN D2) 50,000 unit capsule Take 50,000 Units by mouth every seven (7) days.  topiramate (TOPAMAX) 200 mg tablet Take  by mouth two (2) times a day. Objective:   Vitals:  Vitals:    04/12/17 1033   BP: 114/68   Pulse: 75   Resp: 18   Temp: 98 °F (36.7 °C)   TempSrc: Oral   SpO2: 98%   Weight: 179 lb 9.6 oz (81.5 kg)   Height: 6' (1.829 m)   PainSc:   9   PainLoc: Leg   LMP: 09/01/2010       PHYSICAL EXAM:  General:    Alert, cooperative, no distress, appears stated age. Head:   Normocephalic, without obvious abnormality, atraumatic. Eyes:   Conjunctivae/corneas clear. PERRLA  Nose:  Nares normal. No drainage or sinus tenderness. Throat:    Lips, mucosa, and tongue normal.  No Thrush  Neck:  Supple, symmetrical,  no adenopathy, thyroid: non tender    no carotid bruit and no JVD. Back:    Symmetric,  tenderness. Lungs:   Clear to auscultation bilaterally. No Wheezing or Rhonchi. No rales. Chest wall:  No tenderness or deformity. No Accessory muscle use. Heart:   Regular rate and rhythm,  no murmur, rub or gallop. Abdomen:   Soft, non-tender. Not distended. Bowel sounds normal. No masses  Extremities: Extremities normal, atraumatic, No cyanosis. No edema. No clubbing  Skin:     Texture, turgor normal. No rashes or lesions. Not Jaundiced  Lymph nodes: Cervical, supraclavicular normal.  Psych:  Good insight. Not depressed. Anxious. NEUROLOGICAL EXAM:  Appearance:   The patient is well developed, well nourished, provides a coherent history and is in no acute distress. Mental Status: Oriented to time, place and person. Mood and affect appropriate. Cranial Nerves:   Intact visual fields. Fundi are benign. RACHEL, Decreased EOM, no nystagmus, no ptosis. Facial sensation is normal. Corneal reflexes are intact. Facial movement is symmetric. Hearing is normal bilaterally. Palate is midline with normal sternocleidomastoid and trapezius muscles are normal. Tongue is midline. Motor:  5/5 strength in upper and 4/5 lower proximal and distal muscles. Normal bulk and tone. No fasciculations. Reflexes:   Deep tendon reflexes 2+/4 and symmetrical.   Sensory:   Normal to touch, pinprick and vibration. Gait:  Unsteady  gait. Tremor:   tremor noted. Cerebellar:  No cerebellar signs present. Neurovascular:  Normal heart sounds and regular rhythm, peripheral pulses intact, and no carotid bruits. Lab Data Reviewed:    No visits with results within 3 Month(s) from this visit. Latest known visit with results is:    Office Visit on 01/04/2017   Component Date Value Ref Range Status    Hemoglobin A1c 01/04/2017 5.9* 4.8 - 5.6 % Final    Comment:          Pre-diabetes: 5.7 - 6.4           Diabetes: >6.4           Glycemic control for adults with diabetes: <7.0      Estimated average glucose 01/04/2017 123  mg/dL Final       CT Results (recent):    Results from [place] encounter on 05/17/16   CT HEAD WO CONT   Narrative **Final Report**      ICD Codes / Adm. Diagnosis: 75  185336 / Numbness  Extremity Weakness  Examination:  CT HEAD WO CON  - [id number] - May 17 2016  6:03PM  Accession No:  [id number]  Reason:  left arm numbness/weakness      REPORT:  EXAM:  CT HEAD WO CON  INDICATION:   left arm numbness/weakness  COMPARISON: None. .  TECHNIQUE: Unenhanced CT of the head was performed using 5 mm images. Coronal and sagittal reformats were produced. Brain and bone windows were   generated. Safia Canela FINDINGS:  The ventricles and sulci are normal in size, shape and configuration and   midline. There is no significant white matter disease. There is no   intracranial hemorrhage, extra-axial collection, mass, mass effect or   midline shift. The basilar cisterns are open. No acute infarct is   identified. The bone windows demonstrate no abnormalities. The visualized   portions of the paranasal sinuses and mastoid air cells are clear. Safia Lacyo IMPRESSION:   1. No evidence of acute intracranial abnormality by this modality. Signing/Reading Doctor: Shawn Cuenca ([id number])    Milla Nichole ([id number])  May 17 2016  6:25PM                                   MRI Results (recent):    Results from Hospital Encounter encounter on 11/02/16    [facility] CONT   Narrative CLINICAL HISTORY: Neck pain     INDICATION: Neck pain    COMPARISON: None    TECHNIQUE: MR imaging of the cervical spine was performed including sagittal T1,  T2, STIR;  axial GRE, T1. Contrast was not administered. FINDINGS:    There is normal alignment of the cervical spine. Vertebral body heights are  maintained. No marrow edema. The craniocervical junction is intact. Cord signal intensity is within normal limits. Minimal increased STIR signal at  T1-T2 is not confirmed on the thoracic imaging. C2/3:   The spinal canal and foramina are widely patent. C3/4:  There is a moderate-sized left central disc protrusion with uncovertebral  spurring and facet degeneration, slightly indenting the left ventral spinal cord  and causing mild to moderate leftward spinal canal and foraminal stenosis. Mild  right foraminal stenosis. C4/5:  There is diffuse disc bulge, causing minimal spinal canal stenosis. Foramina are patent. C5/6:  Posterior disc osteophyte complex with uncovertebral spurring and facet  degeneration results in mild spinal canal and minimal bilateral foraminal  stenosis.     C6/7:  Posterior disc osteophyte complex with uncovertebral spurring and facet  degeneration results in mild to moderate spinal canal and mild bilateral  foraminal stenosis. C7/T1:   The spinal canal and foramina are widely patent. Impression IMPRESSION:      Stable degenerative changes particularly on the left at C3-4, and at C6-7 as  discussed above. 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 TECHNIQUE:   MR imaging of the thoracic spine was performed with the following sequences:  sagittal T1, T2, stir; axial T1, gradient echo. FINDINGS:    There is a mild disc protrusion at T12-L1 with minimal superimposed extrusion  cranially oriented. . There is no canal compromise. There is no foraminal  compromise. Normal disc bulge at T4-T5. The thoracic aorta is normal in caliber. There is no pleural effusion. No fracture or dislocation. . Vertebral body heights are maintained. Marrow signal is normal. The course,  caliber, and signal intensity of the spinal cord are normal. The spinal canal  and neural foramina are widely patent at all levels. IMPRESSION:   Minimal disc degenerative change at T12-L1 is stable when compared to  10/10/2011. Otherwise minimal degenerative changes. There is no canal or foraminal compromise present. IR Results (recent):  No results found for this or any previous visit.     VAS/US Results (recent):    Results from Hospital Encounter encounter on 01/19/17   DUPLEX LOWER EXT VENOUS RIGHT          Assesment  Patient Active Problem List   Diagnosis Code    Sleep apnea G47.30    Endometriosis N80.9    Lumbosacral plexopathy G54.1    HTN (hypertension) I10    FH: breast cancer Z80.3    Hyperlipemia E78.5    Hypothyroid E03.9    Ureteral calculus N20.1    MS (multiple sclerosis) ([address] G35    Myasthenia gravis ([address] G70.00    Vitamin D deficiency E55.9    Benign cyst of left breast N60.02    Multiple sclerosis exacerbation ([address] G35    Diabetes mellitus type 2, controlled ([address] E11.9    Cervical spinal stenosis M48.02      ___________________________________________________  PLAN: Continue current management      ICD-10-CM ICD-9-CM    1. MS (multiple sclerosis) (HCC) G35 340 fentaNYL (DURAGESIC) 75 mcg/hr   2. MG (myasthenia gravis) ([address] G70.00 358.00    3. Frequent falls R29.6 V15.88    4. Gait disorder R26.9 781.2    5. Chronic migraine without aura without status migrainosus, not intractable G43.709 346.70    6. Foot drop, bilateral M21.371 736.79     M21.372       Follow-up Disposition:  Return in about 4 weeks (around 5/10/2017).            ___________________________________________________    Total time spent with patient:  []15   []25   []35   [] __ minutes    Care Plan discussed with:    []Patient   []Family    []Care Manager   []Consultant/Specialist :    ___________________________________________________    Attending Physician: Mayra Hayes MD

## 2017-04-13 ENCOUNTER — PATIENT OUTREACH (OUTPATIENT)
Dept: FAMILY MEDICINE CLINIC | Age: 62
End: 2017-04-13

## 2017-04-13 NOTE — PROGRESS NOTES
Patient called NN today to report on ortho appt with  today. He took xrays of her hip and said it is in bad shape and causing knee to hurt. Said the hip is much worse than the knee. He will schedule her to have a cortisone shot at [facility] under fluoroscopy next week. Then she will see him to f/u in 2 weeks to determine if it helped or not. If no better, will need a hip replacement. She told him about her cruise in August, he told her she will be fine by then. Patient disappointed on the phone, NN reminded her that she suspected something was wrong and that medicine itself would not fix it. Urged her to stay positive and hope that the injection will help her since she has not had one before. Also told her that if she needs a hip replacement, recovery from hip replacement is easier than if had knee replaced. Patient listened and seemed to brighten up a little. Agrees to take it one day at a time. Agreed to talk to NN again next week re cortisone injection. Will defer scheduling next CCM session until knows what is going to happen. Patient agreed with that POC. Reminded to call NN prn- patient thanked me.

## 2017-04-19 ENCOUNTER — HOSPITAL ENCOUNTER (OUTPATIENT)
Dept: GENERAL RADIOLOGY | Age: 62
Discharge: HOME | End: 2017-04-19
Attending: ORTHOPAEDIC SURGERY
Payer: MEDICARE

## 2017-04-19 DIAGNOSIS — M16.11 OSTEOARTHRITIS OF ONE HIP, RIGHT: ICD-10-CM

## 2017-04-19 DIAGNOSIS — M17.11 LOCALIZED OSTEOARTHRITIS OF RIGHT KNEE: ICD-10-CM

## 2017-04-19 PROCEDURE — 20610 DRAIN/INJ JOINT/BURSA W/O US: CPT

## 2017-04-19 PROCEDURE — 74011636320 HC RX REV CODE- 636/320: Performed by: RADIOLOGY

## 2017-04-19 RX ORDER — BUPIVACAINE HYDROCHLORIDE 5 MG/ML
5 INJECTION, SOLUTION EPIDURAL; INTRACAUDAL
Status: CANCELLED | OUTPATIENT
Start: 2017-04-19 | End: 2017-04-19

## 2017-04-19 RX ORDER — TRIAMCINOLONE ACETONIDE 40 MG/ML
40 INJECTION, SUSPENSION INTRA-ARTICULAR; INTRAMUSCULAR
Status: CANCELLED | OUTPATIENT
Start: 2017-04-19 | End: 2017-04-19

## 2017-04-19 RX ORDER — TRIAMCINOLONE ACETONIDE 40 MG/ML
40 INJECTION, SUSPENSION INTRA-ARTICULAR; INTRAMUSCULAR
Status: DISPENSED | OUTPATIENT
Start: 2017-04-19 | End: 2017-04-19

## 2017-04-19 RX ORDER — BUPIVACAINE HYDROCHLORIDE 5 MG/ML
5 INJECTION, SOLUTION EPIDURAL; INTRACAUDAL
Status: DISPENSED | OUTPATIENT
Start: 2017-04-19 | End: 2017-04-19

## 2017-04-19 RX ADMIN — IOHEXOL 3 ML: 180 INJECTION INTRAVENOUS at 12:00

## 2017-04-19 NOTE — PROGRESS NOTES
Spoke with Latesha Terrell, Pharmacist, regarding use of Kenalog and pt. Taking Gilenya and Mestinon Timespan and \"red flag\" alert - Latesha Terrell states it is ok to give the pt. The Kenalog for the hip injection and it would not affect either medicine.

## 2017-05-05 ENCOUNTER — PATIENT OUTREACH (OUTPATIENT)
Dept: FAMILY MEDICINE CLINIC | Age: 62
End: 2017-05-05

## 2017-05-05 NOTE — PROGRESS NOTES
Patient called 5/4 to report the ortho injection had not helped her hip pain and surgeon wants to do a hip replacement on 6/5. Needs to have a Pre Op appointment with pcp. Message sent to  for Nasra Al- scheduled patient for 5/11 at 9am.  Will meet with patient directly after that appointment for monthly CCM session.

## 2017-05-11 ENCOUNTER — CLINICAL SUPPORT (OUTPATIENT)
Dept: FAMILY MEDICINE CLINIC | Age: 62
End: 2017-05-11

## 2017-05-11 ENCOUNTER — HOSPITAL ENCOUNTER (OUTPATIENT)
Dept: LAB | Age: 62
Discharge: HOME | End: 2017-05-11
Payer: MEDICARE

## 2017-05-11 ENCOUNTER — OFFICE VISIT (OUTPATIENT)
Dept: FAMILY MEDICINE CLINIC | Age: 62
End: 2017-05-11

## 2017-05-11 VITALS
HEART RATE: 76 BPM | OXYGEN SATURATION: 98 % | TEMPERATURE: 98.3 F | WEIGHT: 182.38 LBS | DIASTOLIC BLOOD PRESSURE: 66 MMHG | HEIGHT: 72 IN | BODY MASS INDEX: 24.7 KG/M2 | SYSTOLIC BLOOD PRESSURE: 102 MMHG | RESPIRATION RATE: 18 BRPM

## 2017-05-11 DIAGNOSIS — G35 MS (MULTIPLE SCLEROSIS) (HCC): ICD-10-CM

## 2017-05-11 DIAGNOSIS — Z01.818 PRE-OP EXAMINATION: Primary | ICD-10-CM

## 2017-05-11 DIAGNOSIS — E11.8 CONTROLLED TYPE 2 DIABETES MELLITUS WITH COMPLICATION, WITHOUT LONG-TERM CURRENT USE OF INSULIN (HCC): ICD-10-CM

## 2017-05-11 DIAGNOSIS — E03.9 HYPOTHYROIDISM, UNSPECIFIED TYPE: ICD-10-CM

## 2017-05-11 DIAGNOSIS — E78.5 HYPERLIPIDEMIA, UNSPECIFIED HYPERLIPIDEMIA TYPE: ICD-10-CM

## 2017-05-11 PROCEDURE — 82043 UR ALBUMIN QUANTITATIVE: CPT

## 2017-05-11 PROCEDURE — 85027 COMPLETE CBC AUTOMATED: CPT

## 2017-05-11 PROCEDURE — 84443 ASSAY THYROID STIM HORMONE: CPT

## 2017-05-11 PROCEDURE — 83036 HEMOGLOBIN GLYCOSYLATED A1C: CPT

## 2017-05-11 PROCEDURE — 87086 URINE CULTURE/COLONY COUNT: CPT

## 2017-05-11 PROCEDURE — 80053 COMPREHEN METABOLIC PANEL: CPT

## 2017-05-11 PROCEDURE — 86901 BLOOD TYPING SEROLOGIC RH(D): CPT

## 2017-05-11 PROCEDURE — 80061 LIPID PANEL: CPT

## 2017-05-11 PROCEDURE — 85610 PROTHROMBIN TIME: CPT

## 2017-05-11 NOTE — PROGRESS NOTES
Patient Name: Mariela Johnson   MRN: [id number]    Evan Pratt is a 63 y.o. female who presents with the following:     Pre Op  Procedure: Right hip replacement  Expected Date: 6/5/2017  Surgeon: Dr. Sid Gutierrez  Hx of complications with general anesthesia: none  Signs and symptoms of cardiovascular disease:  none  Have any of the following: CVA, CHF, Cr > 2.0, insulin-dependent DM, ischemic cardiac disease, or suprainguinal vascular/intrathroacic/intraabdominal surgery:  none  Able to meet > 4 METS: yes  Hx of possible KRISHAN per sleep study, not compliant with CPAP machine. Denies history of allergy to latex/tape/adhesive, bleeding problems, VTE. No records of prior influenza or pneumonia vaccines on file. Hx of diet-controlled diabetes meilltus  Hx of multiple sclerosis and myasthenia gravis. Has upcoming neurology appointment next week. Review of Systems   Constitutional: Negative for chills, fever, malaise/fatigue and weight loss. Respiratory: Negative for cough, hemoptysis, sputum production, shortness of breath and wheezing. Cardiovascular: Negative for chest pain, palpitations, leg swelling and PND. Gastrointestinal: Negative for abdominal pain, blood in stool, constipation, diarrhea, nausea and vomiting. Musculoskeletal: Positive for joint pain (R hip). Negative for back pain, falls, myalgias and neck pain. Neurological: Negative for dizziness, sensory change, focal weakness and loss of consciousness. Psychiatric/Behavioral: Negative for depression. The patient is not nervous/anxious. All other systems reviewed and are negative. The patient's medications, allergies, past medical history, surgical history, family history and social history were reviewed and updated where appropriate. Prior to Admission medications    Medication Sig Start Date End Date Taking?  Authorizing Provider   fentaNYL (DURAGESIC) 75 mcg/hr 1 Patch by TransDERmal route every seventy-two (72) hours. Max Daily Amount: 1 Patch. 4/12/17  Yes Los Kahn MD   pregabalin (LYRICA) 200 mg capsule Take 1 Cap by mouth two (2) times a day. Max Daily Amount: 400 mg. 4/12/17  Yes Los Kahn MD   rosuvastatin (CRESTOR) 20 mg tablet TAKE 1 TABLET BY MOUTH EVERY EVENING 2/24/17  Yes Opal Maier MD   TENS UNIT ELECTRODES by Does Not Apply route. prn   Yes Historical Provider   glucose blood VI test strips (FREESTYLE LITE STRIPS) strip Use as directed 2 times daily 9/28/16  Yes Corrine Burnett, DO   Blood-Glucose Meter monitoring kit by SubCUTAneous route Before breakfast and dinner. Free Style Lite glucometer and test strips 9/13/16  Yes Corrine Burnett, DO   Lancets misc Use to check blood sugar bid , dx-Diabetes Mellitus 250.00 9/7/16  Yes Corrine Burnett, DO   levothyroxine (SYNTHROID) 175 mcg tablet TAKE 1 TABLET BY MOUTH DAILY BEFORE BREAKFAST 9/7/16  Yes Corrine Burnett, DO   dantrolene (DANTRIUM) 100 mg capsule 100 mg three (3) times daily. 6/3/16  Yes Historical Provider   aspirin 81 mg chewable tablet Take 1 Tab by mouth daily. 5/19/16  Yes Candis Arce MD   PARoxetine (PAXIL) 40 mg tablet TAKE 1.5 TABS BY MOUTH DAILY. Patient taking differently: nightly. TAKE 1.5 TABS BY MOUTH DAILY. - Note Pt prefers to take at [facility] 12/29/15  Yes Corrine Burnett,    GILENYA 0.5 mg cap Take 1 Cap by mouth daily. 11/20/15  Yes Historical Provider   traZODone (DESYREL) 50 mg tablet  10/15/15  Yes Historical Provider   pyridostigmine bromide (MESTINON TIMESPAN) 180 mg SR tablet Take 180 mg by mouth two (2) times a day. Yes Historical Provider   modafinil (PROVIGIL) 200 mg tablet Take 200 mg by mouth two (2) times a day. Yes Historical Provider   METHOCARBAMOL (ROBAXIN-750 PO) Take  by mouth. Yes Historical Provider   lidocaine (LIDODERM) 5 %(700 mg/patch) by TransDERmal route every twenty-four (24) hours. Apply patch to the affected area for 12 hours a day and remove for 12 hours a day.    Yes Historical Provider   ACTHAR H.P. 80 unit/mL injectable gel 1 mL by SubCUTAneous route daily. 80 units subq q day for 10 days 14  Yes Historical Provider   diazepam (VALIUM) 10 mg tablet Take 5 mg by mouth every eight (8) hours as needed for Anxiety. Yes Historical Provider   ergocalciferol (VITAMIN D2) 50,000 unit capsule Take 50,000 Units by mouth every seven (7) days. Yes Historical Provider   topiramate (TOPAMAX) 200 mg tablet Take  by mouth two (2) times a day.    Yes Historical Provider       Allergies   Allergen Reactions    Bee Sting [Sting, Bee] Not Reported This Time     Also allergic to egg products    Betadine [Povidone-Iodine] Rash    Egg Itching    Penicillins Not Reported This Time         Past Medical History:   Diagnosis Date    Benign cyst of left breast 3/21/2016    Cervical spinal stenosis 2016    Moderate C6-7    Depression     Diabetes mellitus type 2, controlled ([facility] 2016    Endometriosis 2010    FH: breast cancer 2010    Chart states FH breast/ovary Ca, CAD,DM     HTN, goal below 140/90 2010    Hypercholesterolemia     Hypothyroid 2010    Incontinence     Lumbosacral plexopathy 2010    MS (multiple sclerosis) ([facility]     Myasthenia gravis ([facility] [id number]    Sleep apnea 2010    Ureteral calculus 2010    Vitamin D deficiency 3/17/2016       Past Surgical History:   Procedure Laterality Date    ABDOMEN SURGERY PROC UNLISTED      bowel resection    BREAST SURGERY PROCEDURE UNLISTED      breast cyst-both breasts at different times    HX  SECTION  1986    HX CYST INCISION AND DRAINAGE      HX GYN      ovarian cyst    HX ORTHOPAEDIC      arthroscopy right knee    HX ORTHOPAEDIC      right thumb tendon repair x 2    HX OTHER SURGICAL      Janee Cath x2    HX SMALL BOWEL RESECTION      HX THYROIDECTOMY             Family History   Problem Relation Age of Onset    Arthritis-osteo Mother     Diabetes Mother     Elevated Lipids Mother     Headache Mother     Heart Disease Mother     Hypertension Mother     Stroke Mother     Neuropathy Mother     Diabetes Father     Elevated Lipids Father     Heart Disease Father     Hypertension Father     Diabetes Sister     Elevated Lipids Sister     Headache Sister     Hypertension Sister     Migraines Sister     Cancer Sister 62     breast ca    Breast Cancer Sister 62    Diabetes Brother     Elevated Lipids Brother     Hypertension Brother     Asthma Son     Lung Disease Son        Social History     Social History    Marital status:      Spouse name: N/A    Number of children: N/A    Years of education: N/A     Occupational History    Not on file. Social History Main Topics    Smoking status: Current Every Day Smoker     Packs/day: 0.50     Years: 30.00     Types: Cigarettes    Smokeless tobacco: Never Used    Alcohol use No    Drug use: No    Sexual activity: No     Other Topics Concern    Not on file     Social History Narrative           OBJECTIVE    Visit Vitals    /66 (BP 1 Location: Right arm, BP Patient Position: Sitting)    Pulse 76    Temp 98.3 °F (36.8 °C) (Oral)    Resp 18    Ht 6' (1.829 m)    Wt 182 lb 6 oz (82.7 kg)    LMP 09/01/2010    SpO2 98%    BMI 24.73 kg/m2       Physical Exam   Constitutional: She is well-developed, well-nourished, and in no distress. No distress. Walks with cane   HENT:   Head: Normocephalic and atraumatic. Right Ear: External ear normal.   Left Ear: External ear normal.   Eyes: Conjunctivae and EOM are normal. Pupils are equal, round, and reactive to light. Cardiovascular: Normal rate, regular rhythm and normal heart sounds. Exam reveals no gallop and no friction rub. No murmur heard. Pulmonary/Chest: Effort normal and breath sounds normal. No respiratory distress. She has no wheezes. Skin: She is not diaphoretic.    Psychiatric: Mood, memory, affect and judgment normal.   Nursing note and vitals reviewed. ASSESSMENT AND PLAN  Ann Galvin is a 63 y.o. female who presents today for:    1. Pre-op examination  Risk of cardiac death and nonfatal myocardial infarction for noncardiac surgical procedures:  Intermediate due to orthopedic surgery (1% to 5%)  Revised Cardiac Risk Index of a major cardiac event is 0.4%. Patient has one clinical predictor of perioperative cardiac complications given history of diabetes (diet-controlled). These risk calculators have been reviewed with the patient who expressed understanding of the relative cardiac risk of his/her upcoming procedure given his/her current risk factors. Will hold off on official surgical clearance until patient see her neurologist next week given her history of multiple sclerosis and myasthenia gravis and review of medications as well as anesthesiology considerations. ASA hold for 10 days prior to surgery. Pt will have EKG done at pre-op visit at orthopedic's office.  - CULTURE, URINE  - METABOLIC PANEL, COMPREHENSIVE  - CBC W/O DIFF  - PROTHROMBIN TIME + INR  - TYPE & SCREEN      2. Controlled type 2 diabetes mellitus with complication, without long-term current use of insulin (HCC)  Stable, continue current treatment pending review of labs. - HEMOGLOBIN A1C WITH EAG  - MICROALBUMIN, UR, RAND W/ MICROALBUMIN/CREA RATIO    3. Hyperlipidemia, unspecified hyperlipidemia type  Will calculate ASCVD risk score pending labs. - LIPID PANEL    4. Hypothyroidism, unspecified type  - TSH AND FREE T4       There are no discontinued medications. Follow-up Disposition:  Return in about 8 weeks (around 7/3/2017) for DM follow up. Medication risks/benefits/costs/interactions/alternatives discussed with patient.   Advised patient to call back or return to office if symptoms worsen/change/persist. If patient cannot reach us or should anything more severe/urgent arise he/she should proceed directly to the nearest emergency department. Discussed expected course/resolution/complications of diagnosis in detail with patient. Patient given a written after visit summary which includes his/her diagnoses, current medications and vitals. Patient expressed understanding with the diagnosis and plan.      Racheal Marinelli M.D.

## 2017-05-11 NOTE — PROGRESS NOTES
Patient presents in office today for pre-op exam for hip replacement 5/9/2017 with Veronique Benitez MD at [address] [facility] Complaint   Patient presents with    Pre-op Exam     hip replacement 5/9/2017 with [facility]onique [facility]itez MD at [facility]       1. Have you been to the ER, urgent care clinic since your last visit? Hospitalized since your last visit? No    2. Have you seen or consulted any other health care providers outside of the [address] [place] [address] since your last visit? Include any pap smears or colon screening.  No     Vitals:    05/11/17 0929   BP: 102/66   BP 1 Location: Right arm   BP Patient Position: Sitting   Pulse: 76   Resp: 18   Temp: 98.3 °F (36.8 °C)   TempSrc: Oral   SpO2: 98%   Weight: 182 lb 6 oz (82.7 kg)   Height: 6' (1.829 m)

## 2017-05-11 NOTE — MR AVS SNAPSHOT
Visit Information Date & Time Provider Department Dept. Phone Encounter #  
 5/11/2017  9:00 AM Shalini Garcia MD [address] [place] [address] [phone] [id number] Follow-up Instructions Return in about 8 weeks (around 7/3/2017) for DM follow up. Your Appointments 5/17/2017  9:40 AM  
Follow Up with Los Cox MD  
[facility]) Appt Note: FOLLOW UP, EST, MS PATIENT  
 [address] 
[facility] [address] [facility] Upcoming Health Maintenance Date Due ZOSTER VACCINE AGE 60> 4/25/2017 FOOT EXAM Q1 6/8/2017 MICROALBUMIN Q1 6/8/2017 LIPID PANEL Q1 6/15/2017 HEMOGLOBIN A1C Q6M 7/4/2017 INFLUENZA AGE 9 TO ADULT 8/1/2017 EYE EXAM RETINAL OR DILATED Q1 10/12/2017 BREAST CANCER SCRN MAMMOGRAM 12/14/2018 PAP AKA CERVICAL CYTOLOGY 1/4/2020 COLONOSCOPY 1/18/2022 DTaP/Tdap/Td series (2 - Td) 6/16/2024 Allergies as of 5/11/2017  Review Complete On: 5/11/2017 By: Shalini Garcia MD  
  
 Severity Noted Reaction Type Reactions Bee Sting [Sting, Bee] High 06/25/2010    Anaphylaxis Also allergic to egg products Penicillins High 06/25/2010    Anaphylaxis Betadine [Povidone-iodine]  05/17/2016    Rash Egg  06/25/2015    Itching Current Immunizations  Reviewed on 1/4/2017 Name Date Tdap 6/16/2014 Not reviewed this visit You Were Diagnosed With   
  
 Codes Comments Pre-op examination    -  Primary ICD-10-CM: T18.332 ICD-9-CM: V72.84 Controlled type 2 diabetes mellitus with complication, without long-term current use of insulin (HCC)     ICD-10-CM: E11.8 ICD-9-CM: 250.90 Hyperlipidemia, unspecified hyperlipidemia type     ICD-10-CM: E78.5 ICD-9-CM: 272.4 Hypothyroidism, unspecified type     ICD-10-CM: E03.9 ICD-9-CM: 803. 9 Vitals BP Pulse Temp Resp Height(growth percentile) Weight(growth percentile) 102/66 (BP 1 Location: Right arm, BP Patient Position: Sitting) 76 98.3 °F (36.8 °C) (Oral) 18 6' (1.829 m) 182 lb 6 oz (82.7 kg) LMP SpO2 BMI OB Status Smoking Status 09/01/2010 98% 24.73 kg/m2 Postmenopausal Current Every Day Smoker BMI and BSA Data Body Mass Index Body Surface Area 24.73 kg/m 2 2.05 m 2 Preferred Pharmacy Pharmacy Name Phone [facility] [address] [place] [address], [facility] [address] [phone] Your Updated Medication List  
  
   
This list is accurate as of: 5/11/17 10:00 AM.  Always use your most recent med list.  
  
  
  
  
 Genny Middleton H.P. 80 unit/mL injectable gel Generic drug:  corticotropin 1 mL by SubCUTAneous route daily. 80 units subq q day for 10 days  
  
 aspirin 81 mg chewable tablet Take 1 Tab by mouth daily. Blood-Glucose Meter monitoring kit  
by SubCUTAneous route Before breakfast and dinner. Free Style Lite glucometer and test strips  
  
 dantrolene 100 mg capsule Commonly known as:  DANTRIUM  
100 mg three (3) times daily. fentaNYL 75 mcg/hr Commonly known as:  DURAGESIC  
1 Patch by TransDERmal route every seventy-two (72) hours. Max Daily Amount: 1 Patch. GILENYA 0.5 mg Cap Generic drug:  fingolimod Take 1 Cap by mouth daily. glucose blood VI test strips strip Commonly known as:  FREESTYLE LITE STRIPS Use as directed 2 times daily Lancets Misc Use to check blood sugar bid , dx-Diabetes Mellitus 250.00  
  
 levothyroxine 175 mcg tablet Commonly known as:  SYNTHROID  
TAKE 1 TABLET BY MOUTH DAILY BEFORE BREAKFAST  
  
 LIDODERM 5 % Generic drug:  lidocaine  
by TransDERmal route every twenty-four (24) hours. Apply patch to the affected area for 12 hours a day and remove for 12 hours a day. MESTINON TIMESPAN 180 mg SR tablet Generic drug:  pyridostigmine bromide Take 180 mg by mouth two (2) times a day. PARoxetine 40 mg tablet Commonly known as:  PAXIL TAKE 1.5 TABS BY MOUTH DAILY. pregabalin 200 mg capsule Commonly known as:  Kai Salinas Take 1 Cap by mouth two (2) times a day. Max Daily Amount: 400 mg. PROVIGIL 200 mg tablet Generic drug:  modafinil Take 200 mg by mouth two (2) times a day. ROBAXIN-750 PO Take  by mouth. rosuvastatin 20 mg tablet Commonly known as:  CRESTOR  
TAKE 1 TABLET BY MOUTH EVERY EVENING  
  
 TENS UNIT ELECTRODES  
by Does Not Apply route. prn  
  
 TOPAMAX 200 mg tablet Generic drug:  topiramate Take  by mouth two (2) times a day. traZODone 50 mg tablet Commonly known as:  DESYREL  
  
 VALIUM 10 mg tablet Generic drug:  diazePAM  
Take 5 mg by mouth every eight (8) hours as needed for Anxiety. VITAMIN D2 50,000 unit capsule Generic drug:  ergocalciferol Take 50,000 Units by mouth every seven (7) days. We Performed the Following CULTURE, URINE [id number] CPT(R)] HEMOGLOBIN A1C WITH EAG [21237 CPT(R)] LIPID PANEL [87880 CPT(R)] MICROALBUMIN, UR, RAND W/ MICROALBUMIN/CREA RATIO [id number] CPT(R)] TSH AND FREE T4 [45066 CPT(R)] Follow-up Instructions Return in about 8 weeks (around 7/3/2017) for DM follow up. To-Do List   
 05/18/2017 8:00 AM  
  Appointment with [facility] PAT EXAM RM 1 at [place] [address] ([phone])  
  
 05/18/2017 9:30 AM  
  Appointment with [facility] PAT CLASSROOM at [place] [address] ([phone]) Patient Instructions Learning About Diabetes Food Guidelines Your Care Instructions Meal planning is important to manage diabetes. It helps keep your blood sugar at a target level (which you set with your doctor). You don't have to eat special foods.  You can eat what your family eats, including sweets once in a while. But you do have to pay attention to how often you eat and how much you eat of certain foods. You may want to work with a dietitian or a certified diabetes educator (CDE) to help you plan meals and snacks. A dietitian or CDE can also help you lose weight if that is one of your goals. What should you know about eating carbs? Managing the amount of carbohydrate (carbs) you eat is an important part of healthy meals when you have diabetes. Carbohydrate is found in many foods. · Learn which foods have carbs. And learn the amounts of carbs in different foods. ¨ Bread, cereal, pasta, and rice have about 15 grams of carbs in a serving. A serving is 1 slice of bread (1 ounce), ½ cup of cooked cereal, or 1/3 cup of cooked pasta or rice. ¨ Fruits have 15 grams of carbs in a serving. A serving is 1 small fresh fruit, such as an apple or orange; ½ of a banana; ½ cup of cooked or canned fruit; ½ cup of fruit juice; 1 cup of melon or raspberries; or 2 tablespoons of dried fruit. ¨ Milk and no-sugar-added yogurt have 15 grams of carbs in a serving. A serving is 1 cup of milk or 2/3 cup of no-sugar-added yogurt. ¨ Starchy vegetables have 15 grams of carbs in a serving. A serving is ½ cup of mashed potatoes or sweet potato; 1 cup winter squash; ½ of a small baked potato; ½ cup of cooked beans; or ½ cup cooked corn or green peas. · Learn how much carbs to eat each day and at each meal. A dietitian or CDE can teach you how to keep track of the amount of carbs you eat. This is called carbohydrate counting. · If you are not sure how to count carbohydrate grams, use the Plate Method to plan meals. It is a good, quick way to make sure that you have a balanced meal. It also helps you spread carbs throughout the day. ¨ Divide your plate by types of foods.  Put non-starchy vegetables on half the plate, meat or other protein food on one-quarter of the plate, and a grain or starchy vegetable in the final quarter of the plate. To this you can add a small piece of fruit and 1 cup of milk or yogurt, depending on how many carbs you are supposed to eat at a meal. 
· Try to eat about the same amount of carbs at each meal. Do not \"save up\" your daily allowance of carbs to eat at one meal. 
· Proteins have very little or no carbs per serving. Examples of proteins are beef, chicken, turkey, fish, eggs, tofu, cheese, cottage cheese, and peanut butter. A serving size of meat is 3 ounces, which is about the size of a deck of cards. Examples of meat substitute serving sizes (equal to 1 ounce of meat) are 1/4 cup of cottage cheese, 1 egg, 1 tablespoon of peanut butter, and ½ cup of tofu. How can you eat out and still eat healthy? · Learn to estimate the serving sizes of foods that have carbohydrate. If you measure food at home, it will be easier to estimate the amount in a serving of restaurant food. · If the meal you order has too much carbohydrate (such as potatoes, corn, or baked beans), ask to have a low-carbohydrate food instead. Ask for a salad or green vegetables. · If you use insulin, check your blood sugar before and after eating out to help you plan how much to eat in the future. · If you eat more carbohydrate at a meal than you had planned, take a walk or do other exercise. This will help lower your blood sugar. What else should you know? · Limit saturated fat, such as the fat from meat and dairy products. This is a healthy choice because people who have diabetes are at higher risk of heart disease. So choose lean cuts of meat and nonfat or low-fat dairy products. Use olive or canola oil instead of butter or shortening when cooking. · Don't skip meals. Your blood sugar may drop too low if you skip meals and take insulin or certain medicines for diabetes. · Check with your doctor before you drink alcohol.  Alcohol can cause your blood sugar to drop too low. Alcohol can also cause a bad reaction if you take certain diabetes medicines. Follow-up care is a key part of your treatment and safety. Be sure to make and go to all appointments, and call your doctor if you are having problems. It's also a good idea to know your test results and keep a list of the medicines you take. Where can you learn more? Go to http://marie-alvin.info/. Enter W245 in the search box to learn more about \"Learning About Diabetes Food Guidelines. \" Current as of: May 23, 2016 Content Version: 11.2 © 0843-3311 [employer]. Care instructions adapted under license by [employer] (which disclaims liability or warranty for this information). If you have questions about a medical condition or this instruction, always ask your healthcare professional. [address] any warranty or liability for your use of this information. Introducing [facility]! Dear Yessenia Rodriguez: 
Thank you for requesting a Re-APP account. Our records indicate that you already have an active Re-APP account. You can access your account anytime at [email or web address] Did you know that you can access your hospital and ER discharge instructions at any time in Re-APP? You can also review all of your test results from your hospital stay or ER visit. Additional Information If you have questions, please visit the Frequently Asked Questions section of the Re-APP website at [email or web address] Remember, Re-APP is NOT to be used for urgent needs. For medical emergencies, dial 911. Now available from your iPhone and Android! Please provide this summary of care documentation to your next provider. Your primary care clinician is listed as Brandon Diamond. If you have any questions after today's visit, please call [phone].

## 2017-05-11 NOTE — Clinical Note
Hi Dr. Santos Craven,  We have a mutual patient who is about to undergo elective hip surgery. Given her history of MS and myasthenia gravis with anesthesia considerations, I would appreciate your input on her surgical clearance and if she needs to take any additional steps. From a cardiac standpoint, I do not see any barriers. Thanks so much!   Sincerely, Diamond Aguayo M.D.

## 2017-05-11 NOTE — LETTER
6/12/2017 9:42 AM 
 
Ms. Hiwot Galvin [address] [facility] [address] Dear Hay SHAI Kamar: 
 
Please find your most recent results below. Resulted Orders HEMOGLOBIN A1C WITH EAG Result Value Ref Range Hemoglobin A1c 6.2 (H) 4.8 - 5.6 % Comment:  
            Pre-diabetes: 5.7 - 6.4 Diabetes: >6.4 Glycemic control for adults with diabetes: <7.0 Estimated average glucose 131 mg/dL Narrative Performed at:  [address]  [id number] : Marvin Gonzalez MD, Phone:  [phone] MICROALBUMIN, UR, RAND W/ MICROALBUMIN/CREA RATIO Result Value Ref Range Creatinine, urine 154.4 Not Estab. mg/dL Microalbumin, urine 13.0 Not Estab. ug/mL Microalb/Creat ratio (ug/mg creat.) 8.4 0.0 - 30.0 mg/g creat Narrative Performed at:  [address]  [id number] : Marvin Gonzalez MD, Phone:  [phone] CULTURE, URINE Result Value Ref Range Urine Culture, Routine No growth Narrative Performed at:  [address] [place] [address]  [id number] : Arnoldo Goff MD, Phone:  [phone] TSH AND FREE T4 Result Value Ref Range TSH 1.730 0.450 - 4.500 uIU/mL T4, Free 1.19 0.82 - 1.77 ng/dL Narrative Performed at:  [address]  [id number] : Marvin Gonzalez MD, Phone:  [phone] LIPID PANEL Result Value Ref Range Cholesterol, total 186 100 - 199 mg/dL Triglyceride 92 0 - 149 mg/dL HDL Cholesterol 52 >39 mg/dL VLDL, calculated 18 5 - 40 mg/dL LDL, calculated 116 (H) 0 - 99 mg/dL Narrative Performed at:  [address]  [id number] : Marvin Gonzalez MD, Phone:  [phone] METABOLIC PANEL, COMPREHENSIVE Result Value Ref Range Glucose 87 65 - 99 mg/dL BUN 11 8 - 27 mg/dL Creatinine 0.71 0.57 - 1.00 mg/dL GFR est non-AA 93 >59 mL/min/1.73 GFR est  >59 mL/min/1.73  
 BUN/Creatinine ratio 15 12 - 28 Sodium 143 134 - 144 mmol/L Potassium 4.5 3.5 - 5.2 mmol/L Chloride 105 96 - 106 mmol/L  
 CO2 21 18 - 29 mmol/L Calcium 9.0 8.7 - 10.3 mg/dL Protein, total 6.1 6.0 - 8.5 g/dL Albumin 4.0 3.6 - 4.8 g/dL GLOBULIN, TOTAL 2.1 1.5 - 4.5 g/dL A-G Ratio 1.9 1.2 - 2.2 Bilirubin, total <0.2 0.0 - 1.2 mg/dL Alk. phosphatase 49 39 - 117 IU/L  
 AST (SGOT) 12 0 - 40 IU/L  
 ALT (SGPT) 8 0 - 32 IU/L Narrative Performed at:  [address]  [id number] : Piper Chavira MD, Phone:  [phone] CBC W/O DIFF Result Value Ref Range WBC 3.9 3.4 - 10.8 x10E3/uL  
 RBC 4.47 3.77 - 5.28 x10E6/uL HGB 13.1 11.1 - 15.9 g/dL HCT 41.2 34.0 - 46.6 % MCV 92 79 - 97 fL  
 MCH 29.3 26.6 - 33.0 pg  
 MCHC 31.8 31.5 - 35.7 g/dL  
 RDW 14.8 12.3 - 15.4 % PLATELET 861 (L) 993 - 379 x10E3/uL Narrative Performed at:  [address]  [id number] : Piper Chavira MD, Phone:  [phone] PROTHROMBIN TIME + INR Result Value Ref Range INR 1.0 0.8 - 1.2 Comment:  
   Reference interval is for non-anticoagulated patients. Suggested INR therapeutic range for Vitamin K 
antagonist therapy: 
   Standard Dose (moderate intensity 
                  therapeutic range):       2.0 - 3.0 Higher intensity therapeutic range       2.5 - 3.5 Prothrombin time 10.6 9.1 - 12.0 sec Narrative Performed at:  [address]  [id number] : Piper Chavira MD, Phone:  [phone] TYPE & SCREEN Result Value Ref Range ABO Group O Rh (D) Positive Comment:  
   Please note: Prior records for this patient's ABO / Rh type are not available for additional verification. Antibody screen Negative Negative Narrative Performed at:  [address]  [id number] : Doris Griffith MD, Phone:  [phone] CVD REPORT Result Value Ref Range INTERPRETATION Note Comment:  
   Supplement report is available. Narrative Performed at:  [address] [place] [address]  [id number] : Amy Valladraes PhD, Phone:  [phone] RECOMMENDATIONS: 
We have attempted to contact you by phone to inform you of these results and were unsuccessful so we are sending you this letter of notification. A1c stable, continue diet/exercise. No medications needed at this time. TSH WNL, continue current medications. Mildly elevated LDL, continue statin. Platelets are 092 (improved compared to prior). Please call me if you have any questions: [phone] Sincerely, Cathy Holman MD

## 2017-05-11 NOTE — PATIENT INSTRUCTIONS

## 2017-05-11 NOTE — PROGRESS NOTES
Complex Case Management   - Session #17            Start Time 10:30am    Update  Patient came to see  for pre op appointment today and then saw NN for CCM. Has surgery scheduled for 6/5 for right Total Hip Replacement at [facility] by . Spirits were good- seems very accepting of need for this surgery to improve quality of her life. Hopeful that by the time of her cruise in August, that she will be much more comfortable than now. Has attended the class at the hospital re the surgery. Had her book with her today of what to expect, etc.  Told NN that she is starting to freeze some meals in preparation for when won't be cooking, to help her son. Expects to be in hospital one night and then return home. Son works nights- he will take off the day she comes home and the next day. She hopes he will go back to work after that. Trying to find someone to stay with her for the first 2 nights he goes back to work in case she needs anything. Friends are checking around for her as well. Says mother has offered to help pay for any assistance she may need. Also reports mother bought her a new mattress and box spring. Says mother would rather help her monetarily than in person. Has not heard anything from her 2 sisters about offering any help- does not expect to hear from them. Blood sugar numbers remain very good- range from low of 68 to high of 111. Has appointment to see neurologist next week. Speech better, no stuttering noticed- did have several instances of word searching. Gait about the same- walked patient to her car after session ended. Carried her water bottle and Hip replacement notebook for her. She used her cane for support. Reports no falls in last month. No dizziness noted. No problems with her swallowing. Says she is trying hard to stay healthy so can have surgery 6/5 as scheduled.   Has not started seeing her psychologist again but has heard she may be starting at a new office so hopeful that she will be able to reconnect with her. Review of Care Plan  Nurse Navigator reviewed care plan with patient. Continues to use incentive spirometer and neck traction daily as directed. Still has not met with psychologist yet. Hopeful it will be soon. Nutrition stable, no swallowing issues noted, appetite stable. Reviewed meds-no changes. Gait stable- continues to wear leg braces and using cane. PT/OT continues. Medication List  Reviewed, unchanged. Strengths  Attends all appointments with providers. Follows all doctor recommendations and POC  Knowledgeable about her chronic disease  Great sense of humor  Coping well with loss of father and sister in last year    Challenges  Loss of father and sister last year  Family discord and dysfunction  MS-frequent exacerbations that affect ambulation and speech as well as coordination and swallowing  Financial-limited income, requires help with ADL's and on costly MS meds. Transportation- unable to drive d/t poor vision-relies on son who is only available one day a week. Upcoming surgery for right hip replacement. Goals  Eat 3 ADA meals per day with nutritious snacks prn  Find new psychologist  Call NN prn for new issues and concerns-continue monthly CCM sessions. Take meds as ordered. Attend all appointments with providers. Monitor blood pressure and blood sugar. Care Team Members    ,PCP  ,Neurologist  [facility] [place] Specialist  ,Orthopedist  Psychologist,_________  Lowell Hernandez Manager    History of Medical Problems  See  note for 5/11/17 at [facility] visit. Summary of Visit  Performed assessment of current health status  Reviewed HM needs  Reviewed importance of eating 3 well balanced meals each day  Reviewed medication list with patient  Gave support and encouragement re continued dysfunction in family.   Allowed to express feelings and plans as prepares for upcoming hip surgery in June. Reviewed POC and updated current problems    Long Term Goal  Encourage effective health promoting behaviors through adherence to medical regimen and use of all available resources    Plan for Next Visit  Assess depression and grief  Assess stress  Assess nutrition and review meds  Assess post op recovery and needs  Assess episodes of dizziness and bp readings  Allow patient to vent feelings towards family  Assess OAB    Interventions for Case Management of Chronic Conditions  Assessed current health status and changes since last session  Reviewed GIC and HM needs  Reviewed med list  Reviewed blood sugar results  Discussed financial issues with patient- reviewed resources  Reviewed strengths and gave support  Encouragement and support given re family dysfunction  Support given for her determination in managing her health care needs  Reviewed plans for upcoming surgery and care for when home    Evaluation  Reviewed care plan with patient  Patient has provided input into POC and agrees with goals  Copy of revised plan given to patient  Reminded to call  for help with problems or concerns  Will schedule next session after surgery.     End of Session- 11:05am

## 2017-05-12 LAB
ABO GROUP BLD: NORMAL
ALBUMIN SERPL-MCNC: 4 G/DL (ref 3.6–4.8)
ALBUMIN/CREAT UR: 8.4 MG/G CREAT (ref 0–30)
ALBUMIN/GLOB SERPL: 1.9 {RATIO} (ref 1.2–2.2)
ALP SERPL-CCNC: 49 IU/L (ref 39–117)
ALT SERPL-CCNC: 8 IU/L (ref 0–32)
AST SERPL-CCNC: 12 IU/L (ref 0–40)
BACTERIA UR CULT: NO GROWTH
BILIRUB SERPL-MCNC: <0.2 MG/DL (ref 0–1.2)
BLD GP AB SCN SERPL QL: NEGATIVE
BUN SERPL-MCNC: 11 MG/DL (ref 8–27)
BUN/CREAT SERPL: 15 (ref 12–28)
CALCIUM SERPL-MCNC: 9 MG/DL (ref 8.7–10.3)
CHLORIDE SERPL-SCNC: 105 MMOL/L (ref 96–106)
CHOLEST SERPL-MCNC: 186 MG/DL (ref 100–199)
CO2 SERPL-SCNC: 21 MMOL/L (ref 18–29)
CREAT SERPL-MCNC: 0.71 MG/DL (ref 0.57–1)
CREAT UR-MCNC: 154.4 MG/DL
ERYTHROCYTE [DISTWIDTH] IN BLOOD BY AUTOMATED COUNT: 14.8 % (ref 12.3–15.4)
EST. AVERAGE GLUCOSE BLD GHB EST-MCNC: 131 MG/DL
GLOBULIN SER CALC-MCNC: 2.1 G/DL (ref 1.5–4.5)
GLUCOSE SERPL-MCNC: 87 MG/DL (ref 65–99)
HBA1C MFR BLD: 6.2 % (ref 4.8–5.6)
HCT VFR BLD AUTO: 41.2 % (ref 34–46.6)
HDLC SERPL-MCNC: 52 MG/DL
HGB BLD-MCNC: 13.1 G/DL (ref 11.1–15.9)
INR PPP: 1 (ref 0.8–1.2)
INTERPRETATION, 910389: NORMAL
LDLC SERPL CALC-MCNC: 116 MG/DL (ref 0–99)
MCH RBC QN AUTO: 29.3 PG (ref 26.6–33)
MCHC RBC AUTO-ENTMCNC: 31.8 G/DL (ref 31.5–35.7)
MCV RBC AUTO: 92 FL (ref 79–97)
MICROALBUMIN UR-MCNC: 13 UG/ML
PLATELET # BLD AUTO: 127 X10E3/UL (ref 150–379)
POTASSIUM SERPL-SCNC: 4.5 MMOL/L (ref 3.5–5.2)
PROT SERPL-MCNC: 6.1 G/DL (ref 6–8.5)
PROTHROMBIN TIME: 10.6 SEC (ref 9.1–12)
RBC # BLD AUTO: 4.47 X10E6/UL (ref 3.77–5.28)
RH BLD: POSITIVE
SODIUM SERPL-SCNC: 143 MMOL/L (ref 134–144)
T4 FREE SERPL-MCNC: 1.19 NG/DL (ref 0.82–1.77)
TRIGL SERPL-MCNC: 92 MG/DL (ref 0–149)
TSH SERPL DL<=0.005 MIU/L-ACNC: 1.73 UIU/ML (ref 0.45–4.5)
VLDLC SERPL CALC-MCNC: 18 MG/DL (ref 5–40)
WBC # BLD AUTO: 3.9 X10E3/UL (ref 3.4–10.8)

## 2017-05-17 ENCOUNTER — OFFICE VISIT (OUTPATIENT)
Dept: NEUROLOGY | Age: 62
End: 2017-05-17

## 2017-05-17 VITALS
WEIGHT: 181.2 LBS | BODY MASS INDEX: 24.54 KG/M2 | OXYGEN SATURATION: 99 % | TEMPERATURE: 98.2 F | HEIGHT: 72 IN | SYSTOLIC BLOOD PRESSURE: 102 MMHG | RESPIRATION RATE: 16 BRPM | DIASTOLIC BLOOD PRESSURE: 61 MMHG | HEART RATE: 66 BPM

## 2017-05-17 DIAGNOSIS — R26.9 GAIT DISORDER: ICD-10-CM

## 2017-05-17 DIAGNOSIS — G43.009 MIGRAINE WITHOUT AURA AND WITHOUT STATUS MIGRAINOSUS, NOT INTRACTABLE: ICD-10-CM

## 2017-05-17 DIAGNOSIS — M21.372 FOOT DROP, BILATERAL: ICD-10-CM

## 2017-05-17 DIAGNOSIS — G89.4 CHRONIC PAIN DISORDER: ICD-10-CM

## 2017-05-17 DIAGNOSIS — G70.00 MG (MYASTHENIA GRAVIS) (HCC): ICD-10-CM

## 2017-05-17 DIAGNOSIS — G35 MS (MULTIPLE SCLEROSIS) (HCC): Primary | ICD-10-CM

## 2017-05-17 DIAGNOSIS — M21.371 FOOT DROP, BILATERAL: ICD-10-CM

## 2017-05-17 DIAGNOSIS — R29.6 FREQUENT FALLS: ICD-10-CM

## 2017-05-17 DIAGNOSIS — G37.9 DEMYELINATING DISEASE (HCC): ICD-10-CM

## 2017-05-17 RX ORDER — FENTANYL 75 UG/H
1 PATCH TRANSDERMAL
Qty: 10 PATCH | Refills: 0 | Status: SHIPPED | OUTPATIENT
Start: 2017-05-17 | End: 2017-10-18 | Stop reason: SDUPTHER

## 2017-05-17 NOTE — PROGRESS NOTES
Chief Complaint   Patient presents with    Multiple Sclerosis    Medication Refill     1. Have you been to the ER, urgent care clinic since your last visit? Hospitalized since your last visit? no    2. Have you seen or consulted any other health care providers outside of the [address] [place] [address] since your last visit? Include any pap smears or colon screening.  Dr. Phoebe Anne

## 2017-05-17 NOTE — MR AVS SNAPSHOT
Visit Information Date & Time Provider Department Dept. Phone Encounter #  
 5/17/2017  9:40 AM Los Gant MD [facility] at [facility] [phone] [id number] Follow-up Instructions Return in about 4 weeks (around 6/14/2017). Your Appointments 7/3/2017  9:45 AM  
ROUTINE CARE with Evelia Jain MD  
[facility]) Appt Note: 2 mo. follow up  
 [address] [phone]  
[phone]  
  
   
 [address] [address] Upcoming Health Maintenance Date Due ZOSTER VACCINE AGE 60> 4/25/2017 FOOT EXAM Q1 6/8/2017 INFLUENZA AGE 9 TO ADULT 8/1/2017 EYE EXAM RETINAL OR DILATED Q1 10/12/2017 HEMOGLOBIN A1C Q6M 11/11/2017 MICROALBUMIN Q1 5/11/2018 LIPID PANEL Q1 5/11/2018 BREAST CANCER SCRN MAMMOGRAM 12/14/2018 PAP AKA CERVICAL CYTOLOGY 1/4/2020 COLONOSCOPY 1/18/2022 DTaP/Tdap/Td series (2 - Td) 6/16/2024 Allergies as of 5/17/2017  Review Complete On: 5/17/2017 By: Mayra Hayes MD  
  
 Severity Noted Reaction Type Reactions Bee Sting [Sting, Bee] High 06/25/2010    Anaphylaxis Also allergic to egg products Penicillins High 06/25/2010    Anaphylaxis Betadine [Povidone-iodine]  05/17/2016    Rash Egg  06/25/2015    Itching Current Immunizations  Reviewed on 1/4/2017 Name Date Tdap 6/16/2014 Not reviewed this visit You Were Diagnosed With   
  
 Codes Comments MS (multiple sclerosis) ([facility]    -  Primary ICD-10-CM: G35 
ICD-9-CM: 866 Chronic pain disorder     ICD-10-CM: G89.4 ICD-9-CM: 338.4 MG (myasthenia gravis) ([facility]     ICD-10-CM: G70.00 ICD-9-CM: 358.00 Gait disorder     ICD-10-CM: R26.9 ICD-9-CM: 781.2 Frequent falls     ICD-10-CM: R29.6 ICD-9-CM: V15.88 Migraine without aura and without status migrainosus, not intractable     ICD-10-CM: V89.541 ICD-9-CM: 346.10 Foot drop, bilateral     ICD-10-CM: M21.371, M21.372 ICD-9-CM: 736.79 Vitals BP Pulse Temp Resp Height(growth percentile) Weight(growth percentile) 102/61 (BP 1 Location: Right arm, BP Patient Position: Sitting) 66 98.2 °F (36.8 °C) (Oral) 16 6' (1.829 m) 181 lb 3.2 oz (82.2 kg) LMP SpO2 BMI OB Status Smoking Status 09/01/2010 99% 24.58 kg/m2 Postmenopausal Current Every Day Smoker Vitals History BMI and BSA Data Body Mass Index Body Surface Area 24.58 kg/m 2 2.04 m 2 Preferred Pharmacy Pharmacy Name Phone [facility] [address] [place] [address] [phone] Your Updated Medication List  
  
   
This list is accurate as of: 5/17/17 11:10 AM.  Always use your most recent med list.  
  
  
  
  
 Joshua Velez H.P. 80 unit/mL injectable gel Generic drug:  corticotropin 1 mL by SubCUTAneous route daily. 80 units subq q day for 10 days  
  
 aspirin 81 mg chewable tablet Take 1 Tab by mouth daily. Blood-Glucose Meter monitoring kit  
by SubCUTAneous route Before breakfast and dinner. Free Style Lite glucometer and test strips  
  
 dantrolene 100 mg capsule Commonly known as:  DANTRIUM  
100 mg three (3) times daily. fentaNYL 75 mcg/hr Commonly known as:  DURAGESIC  
1 Patch by TransDERmal route every seventy-two (72) hours. Max Daily Amount: 1 Patch. GILENYA 0.5 mg Cap Generic drug:  fingolimod Take 1 Cap by mouth daily. glucose blood VI test strips strip Commonly known as:  FREESTYLE LITE STRIPS Use as directed 2 times daily Lancets Misc Use to check blood sugar bid , dx-Diabetes Mellitus 250.00  
  
 levothyroxine 175 mcg tablet Commonly known as:  SYNTHROID  
TAKE 1 TABLET BY MOUTH DAILY BEFORE BREAKFAST  
  
 LIDODERM 5 % Generic drug:  lidocaine  
by TransDERmal route every twenty-four (24) hours.  Apply patch to the affected area for 12 hours a day and remove for 12 hours a day. MESTINON TIMESPAN 180 mg SR tablet Generic drug:  pyridostigmine bromide Take 180 mg by mouth two (2) times a day. PARoxetine 40 mg tablet Commonly known as:  PAXIL TAKE 1.5 TABS BY MOUTH DAILY. pregabalin 200 mg capsule Commonly known as:  Daphane Ang Take 1 Cap by mouth two (2) times a day. Max Daily Amount: 400 mg. PROVIGIL 200 mg tablet Generic drug:  modafinil Take 200 mg by mouth two (2) times a day. ROBAXIN-750 PO Take  by mouth. rosuvastatin 20 mg tablet Commonly known as:  CRESTOR  
TAKE 1 TABLET BY MOUTH EVERY EVENING  
  
 TENS UNIT ELECTRODES  
by Does Not Apply route. prn  
  
 TOPAMAX 200 mg tablet Generic drug:  topiramate Take  by mouth two (2) times a day. traZODone 50 mg tablet Commonly known as:  DESYREL  
  
 VALIUM 10 mg tablet Generic drug:  diazePAM  
Take 5 mg by mouth every eight (8) hours as needed for Anxiety. VITAMIN D2 50,000 unit capsule Generic drug:  ergocalciferol Take 50,000 Units by mouth every seven (7) days. Prescriptions Printed Refills  
 fentaNYL (DURAGESIC) 75 mcg/hr 0 Si Patch by TransDERmal route every seventy-two (72) hours. Max Daily Amount: 1 Patch. Class: Print Route: TransDERmal  
  
Follow-up Instructions Return in about 4 weeks (around 2017). To-Do List   
 2017 8:00 AM  
  Appointment with [facility] PAT EXAM RM 1 at [place] [address] ([phone])  
  
 2017 9:30 AM  
  Appointment with [facility] PAT CLASSROOM at [place] [address] ([phone]) Introducing [facility]! Dear Evie Lafleur: 
Thank you for requesting a [email or web address] account. Our records indicate that you already have an active [email or web address] account. You can access your account anytime at [email or web address] Did you know that you can access your hospital and ER discharge instructions at any time in Aereo? You can also review all of your test results from your hospital stay or ER visit. Additional Information If you have questions, please visit the Frequently Asked Questions section of the Aereo website at [email or web address] Remember, Aereo is NOT to be used for urgent needs. For medical emergencies, dial 911. Now available from your iPhone and Android! Please provide this summary of care documentation to your next provider. Your primary care clinician is listed as Brandon Diamond. If you have any questions after today's visit, please call [phone].

## 2017-05-18 ENCOUNTER — HOSPITAL ENCOUNTER (OUTPATIENT)
Dept: PREADMISSION TESTING | Age: 62
Discharge: HOME | End: 2017-05-18
Payer: MEDICARE

## 2017-05-18 ENCOUNTER — APPOINTMENT (OUTPATIENT)
Dept: PREADMISSION TESTING | Age: 62
End: 2017-05-18
Payer: MEDICARE

## 2017-05-18 VITALS
HEART RATE: 73 BPM | TEMPERATURE: 98.6 F | SYSTOLIC BLOOD PRESSURE: 112 MMHG | BODY MASS INDEX: 24.41 KG/M2 | HEIGHT: 72 IN | WEIGHT: 180.25 LBS | DIASTOLIC BLOOD PRESSURE: 74 MMHG

## 2017-05-18 LAB
ABO + RH BLD: NORMAL
ATRIAL RATE: 65 BPM
BLOOD GROUP ANTIBODIES SERPL: NORMAL
CALCULATED P AXIS, ECG09: 39 DEGREES
CALCULATED R AXIS, ECG10: -12 DEGREES
CALCULATED T AXIS, ECG11: 5 DEGREES
DIAGNOSIS, 93000: NORMAL
P-R INTERVAL, ECG05: 160 MS
Q-T INTERVAL, ECG07: 406 MS
QRS DURATION, ECG06: 78 MS
QTC CALCULATION (BEZET), ECG08: 422 MS
SPECIMEN EXP DATE BLD: NORMAL
VENTRICULAR RATE, ECG03: 65 BPM

## 2017-05-18 PROCEDURE — 93005 ELECTROCARDIOGRAM TRACING: CPT

## 2017-05-18 PROCEDURE — 36415 COLL VENOUS BLD VENIPUNCTURE: CPT | Performed by: ORTHOPAEDIC SURGERY

## 2017-05-18 PROCEDURE — 86900 BLOOD TYPING SEROLOGIC ABO: CPT | Performed by: ORTHOPAEDIC SURGERY

## 2017-05-18 RX ORDER — NAPROXEN 500 MG/1
500 TABLET ORAL AS NEEDED
COMMUNITY
End: 2017-06-07

## 2017-05-18 NOTE — PERIOP NOTES
PREOPERATIVE INSTRUCTIONS REVIEWED WITH PATIENT. PATIENT GIVEN SIX PACKS OF CHG WIPES. INSTRUCTIONS (TO BE REVIEWED IN CLASS) ON USE OF CHG WIPES. PATIENT GIVEN SSI INFECTION SHEET. MRSA/MMSA TREATMENT INSTRUCTION SHEET GIVEN WITH AN EXPLANATION TO PATIENT THAT THEY WILL BE NOTIFIED IF TREATMENT INSTRUCTIONS NEED TO BE INITIATED. PATIENT WAS GIVEN THE OPPORTUNITY TO ASK QUESTIONS ON THE INFORMATION PROVIDED. Sidney Barthel

## 2017-05-19 LAB
BACTERIA SPEC CULT: NORMAL
BACTERIA SPEC CULT: NORMAL
SERVICE CMNT-IMP: NORMAL

## 2017-05-22 ENCOUNTER — APPOINTMENT (OUTPATIENT)
Dept: CT IMAGING | Age: 62
End: 2017-05-22
Attending: EMERGENCY MEDICINE
Payer: MEDICARE

## 2017-05-22 ENCOUNTER — PATIENT OUTREACH (OUTPATIENT)
Dept: FAMILY MEDICINE CLINIC | Age: 62
End: 2017-05-22

## 2017-05-22 ENCOUNTER — HOSPITAL ENCOUNTER (EMERGENCY)
Age: 62
Discharge: HOME | End: 2017-05-22
Attending: EMERGENCY MEDICINE
Payer: MEDICARE

## 2017-05-22 VITALS
SYSTOLIC BLOOD PRESSURE: 106 MMHG | RESPIRATION RATE: 18 BRPM | OXYGEN SATURATION: 99 % | DIASTOLIC BLOOD PRESSURE: 50 MMHG | BODY MASS INDEX: 24.9 KG/M2 | HEART RATE: 66 BPM | HEIGHT: 72 IN | WEIGHT: 183.86 LBS | TEMPERATURE: 98.5 F

## 2017-05-22 DIAGNOSIS — S06.0X0A CONCUSSION, WITHOUT LOC, INITIAL ENCOUNTER: Primary | ICD-10-CM

## 2017-05-22 PROCEDURE — 99283 EMERGENCY DEPT VISIT LOW MDM: CPT

## 2017-05-22 PROCEDURE — 70450 CT HEAD/BRAIN W/O DYE: CPT

## 2017-05-22 RX ORDER — BUTALBITAL, ACETAMINOPHEN AND CAFFEINE 300; 40; 50 MG/1; MG/1; MG/1
1 CAPSULE ORAL
Qty: 10 CAP | Refills: 0 | Status: SHIPPED | OUTPATIENT
Start: 2017-05-22 | End: 2019-05-03

## 2017-05-22 NOTE — ED PROVIDER NOTES
HPI Comments:   Janeth Gutierrez is a 63 y.o. female with a hx of MS, sleep apnea, endometriosis, hypothyroidism, hypercholesteremia, myasthenia gravis, depression, DM, cervical spinal stenosis, arthritis, and HTN presenting to the ED with her  C/O HA, nausea, fatigue, epistaxis, and nasal pain s/p GLF that occurred 2 days ago. Pt reports she was walking in her yard 2 days ago when her legs gave out and she fell forward, hitting her face directly on a piece of wood. She denies LOC and saw her PCP today regarding the symptoms who told her to come directly to the ED for a head CT. Pt is ambulatory with a cane at her baseline and is scheduled to have a hip replacement on 06/05/17 because of her arthritis. She is a current smoker (0.5ppd). Patient denies any other symptoms or complaints. She specifically denies any fevers, chills, vomiting, chest pain, shortness of breath, rash, diarrhea, sweating or weight loss. PCP: Micaela Wang MD    There are no other complaints, changes or physical findings at this time. Written by KRISTEN Hdz, as dictated by Carnella Cranker. Saqib Greene MD      The history is provided by the patient. No  was used.         Past Medical History:   Diagnosis Date    Arthritis     hip, hands    Benign cyst of left breast 3/21/2016    Cervical spinal stenosis 12/2/2016    Moderate C6-7    Depression     Diabetes mellitus type 2, controlled ([address] 9/13/2016    Endometriosis 6/25/2010    FH: breast cancer 6/25/2010    Chart states FH breast/ovary Ca, CAD,DM     HTN, goal below 140/90 6/25/2010    CURRENTLY NO MEDICATIONS (5/18/17)    Hypercholesterolemia     Hypothyroid 6/25/2010    Incontinence     Lumbosacral plexopathy 6/25/2010    MS (multiple sclerosis) ([address]     Myasthenia gravis ([address] [id number]    Sleep apnea 6/25/2010    Ureteral calculus 6/25/2010    Vitamin D deficiency 3/17/2016       Past Surgical History:   Procedure Laterality Date    ABDOMEN SURGERY PROC UNLISTED      bowel resection    BREAST SURGERY PROCEDURE UNLISTED      breast cyst-both breasts at different times    HX  SECTION  1986    HX CYST INCISION AND DRAINAGE      HX GI      COLONOSCOPY    HX GYN      ovarian cyst    HX HEENT  1974    THYROIDECTOMY    HX KNEE ARTHROSCOPY Right     HX ORTHOPAEDIC  1997    right thumb tendon repair x 2    HX ORTHOPAEDIC Left     CARPAL TUNNEL    HX OTHER SURGICAL      Janee Cath x2    HX SMALL BOWEL RESECTION      HX THYROIDECTOMY      1975    HX VASCULAR ACCESS      PORT -A- CATH X2         Family History:   Problem Relation Age of Onset   Masoud Arthritis-osteo Mother     Diabetes Mother     Elevated Lipids Mother     Headache Mother     Heart Disease Mother     Hypertension Mother     Stroke Mother     Neuropathy Mother     Diabetes Father     Elevated Lipids Father     Heart Disease Father     Hypertension Father     Diabetes Sister     Elevated Lipids Sister     Headache Sister     Hypertension Sister     Migraines Sister     Cancer Sister 62     breast ca W/METS TO BRAIN    Breast Cancer Sister 62    Diabetes Brother     Elevated Lipids Brother     Hypertension Brother     Asthma Son     Lung Disease Son     Thyroid Disease Son      GRAVES    Diabetes Sister     Neuropathy Sister     Anesth Problems Neg Hx        Social History     Social History    Marital status:      Spouse name: N/A    Number of children: N/A    Years of education: N/A     Occupational History    Not on file. Social History Main Topics    Smoking status: Current Every Day Smoker     Packs/day: 0.50     Years: 30.00     Types: Cigarettes    Smokeless tobacco: Never Used    Alcohol use No    Drug use: No    Sexual activity: No     Other Topics Concern    Not on file     Social History Narrative         ALLERGIES: Bee sting [sting, bee]; Penicillins;  Betadine [povidone-iodine]; and Egg    Review of Systems   Constitutional: Positive for fatigue. Negative for activity change, appetite change, chills, fever and unexpected weight change. HENT: Positive for nosebleeds. Negative for congestion, hearing loss, rhinorrhea, sneezing and voice change. +Nasal pain   Eyes: Negative. Negative for pain and visual disturbance. Respiratory: Negative. Negative for apnea, cough, choking, chest tightness and shortness of breath. Cardiovascular: Negative. Negative for chest pain and palpitations. Gastrointestinal: Positive for nausea. Negative for abdominal distention, abdominal pain, blood in stool, diarrhea and vomiting. Genitourinary: Negative. Negative for difficulty urinating, flank pain, frequency and urgency. No discharge   Musculoskeletal: Negative. Negative for arthralgias, back pain, myalgias and neck stiffness. Skin: Negative. Negative for color change and rash. Neurological: Positive for headaches. Negative for dizziness, seizures, syncope, speech difficulty, weakness and numbness. Hematological: Negative for adenopathy. Psychiatric/Behavioral: Negative. Negative for agitation, behavioral problems, dysphoric mood and suicidal ideas. The patient is not nervous/anxious. Vitals:    05/22/17 1153 05/22/17 1245   BP: 141/74 106/50   Pulse: 66    Resp: 18    Temp: 98.5 °F (36.9 °C)    SpO2: 98% 99%   Weight: 83.4 kg (183 lb 13.8 oz)    Height: 6' (1.829 m)             Physical Exam   Constitutional: She is oriented to person, place, and time. She appears well-developed and well-nourished. No distress. HENT:   Head: Normocephalic and atraumatic. Mouth/Throat: Oropharynx is clear and moist. No oropharyngeal exudate. Eyes: Conjunctivae and EOM are normal. Pupils are equal, round, and reactive to light. Right eye exhibits no discharge. Left eye exhibits no discharge. Neck: Normal range of motion. Neck supple.    No cervical spine tenderness Cardiovascular: Normal rate, regular rhythm and intact distal pulses. Exam reveals no gallop and no friction rub. No murmur heard. Pulmonary/Chest: Effort normal and breath sounds normal. No respiratory distress. She has no wheezes. She has no rales. She exhibits no tenderness. Abdominal: Soft. Bowel sounds are normal. She exhibits no distension and no mass. There is no tenderness. There is no rebound and no guarding. Musculoskeletal: Normal range of motion. She exhibits no edema. Lymphadenopathy:     She has no cervical adenopathy. Neurological: She is alert and oriented to person, place, and time. No cranial nerve deficit. Coordination normal.   Skin: Skin is warm and dry. No rash noted. No erythema. Psychiatric: She has a normal mood and affect. Nursing note and vitals reviewed. MDM  Number of Diagnoses or Management Options  Diagnosis management comments: DDx: contusion, subdural, concussion       Amount and/or Complexity of Data Reviewed  Tests in the radiology section of CPT®: ordered and reviewed    Patient Progress  Patient progress: stable    Procedures    Chief Complaint   Patient presents with   Tc Mera     pt reports she fell on Saturday due to having MS, reports she hit her nose and had bleeding, continues with headache and soreness to nose    Headache    Facial Pain       12:45 PM  The patients presenting problems have been discussed, and they are in agreement with the care plan formulated and outlined with them. I have encouraged them to ask questions as they arise throughout their visit. VITAL SIGNS:  Patient Vitals for the past 12 hrs:   Temp Pulse Resp BP SpO2   05/22/17 1245 - - - 106/50 99 %   05/22/17 1153 98.5 °F (36.9 °C) 66 18 141/74 98 %       RADIOLOGY RESULTS:  The following have been ordered and reviewed:  CT Results  (Last 48 hours)               05/22/17 1302  CT HEAD WO CONT Final result    Impression:  IMPRESSION: No acute findings. Narrative:  EXAM:  CT HEAD WO CONT       INDICATION:   Concussion mild or moderate acute, status post fall on Saturday   onto face with headache since. COMPARISON: MRI 5/18/2016, CT 5/17/2016. TECHNIQUE: Unenhanced CT of the head was performed using 5 mm images. Brain and   bone windows were generated. CT dose reduction was achieved through use of a   standardized protocol tailored for this examination and automatic exposure   control for dose modulation. FINDINGS:   The ventricles and sulci are normal in size, shape and configuration and   midline. There is no significant white matter disease. There is no intracranial   hemorrhage, extra-axial collection, mass, mass effect or midline shift. The   basilar cisterns are open. No acute infarct is identified. The bone windows   demonstrate no abnormalities. The visualized portions of the paranasal sinuses   and mastoid air cells are clear. PROGRESS NOTES:    1:19 PM  Hiwot Galvin's  results have been reviewed with her. She has been counseled regarding her diagnosis. She verbally conveys understanding and agreement of the signs, symptoms, diagnosis, treatment and prognosis and additionally agrees to follow up as recommended with Dr. Leidy Rocha MD in 24 - 48 hours. She also agrees with the care-plan and conveys that all of her questions have been answered. I have also put together some discharge instructions for her that include: 1) educational information regarding their diagnosis, 2) how to care for their diagnosis at home, as well a 3) list of reasons why they would want to return to the ED prior to their follow-up appointment, should their condition change. DIAGNOSIS:    1.  Concussion, without LOC, initial encounter        PLAN:  Follow-up Information     Follow up With Details Comments JAS Gray MD Call in 2 days As needed, If symptoms worsen [place] [address]  [phone]          Current Discharge Medication List      START taking these medications    Details   butalbital-acetaminophen-caff (FIORICET) -40 mg per capsule Take 1 Cap by mouth every four (4) hours as needed for Pain. Max Daily Amount: 6 Caps. Qty: 10 Cap, Refills: 0               ED COURSE: The patients hospital course has been uncomplicated. Attestation: This note is prepared by Macey Santos, acting as Scribe for Candelario Payne. Jany Gibson, [facility] Jany Gisbon MD: The scribe's documentation has been prepared under my direction and personally reviewed by me in its entirety. I confirm that the note above accurately reflects all work, treatment, procedures, and medical decision making performed by me.

## 2017-05-22 NOTE — PROGRESS NOTES
Patient called NN to report that she fell on Saturday while outside in her yard. Landed on face/nose. Bled x 3 hours. Nose still sore and eyes red. Nose tender and swollen on bridge of nose and left nostril. Unsure if hit her head, hit face on a piece of wood. Denies any LOC. Was starting to get up when son came out. Has had a bad headache since and balance seems worse. Using her cane. Did not go to ED on Saturday, requesting appt to see pcp.  checked with  who advised she should go to ED for evaluation. May need CT. Called patient back and relayed the recommendation.  Said she would get her son to take her to [id number] [facility] ED.

## 2017-05-22 NOTE — ED NOTES
Assumed care of pt. Pt. Placed in position of comfort. Call bell within reach. Pt. Made aware of care plan. Pt came in with complaints with a fall on Saturday. Pt stated that she fell on her face. Her nose bleed for three hours per the patient. The patient also stated she has had a headache since the fall.

## 2017-05-22 NOTE — PROGRESS NOTES
Called patient back re her visit to ED today after our conversation this am when she told me about her fall on Saturday. Patient reports she did have a concussion which explains the nasty headache. Was given a prescription for Fioricet - CT was done, no fracture. Taking it easy. Her aide is coming this evening to give her a bath, but otherwise resting. Using her walker in the house instead of just the cane for additional support. Was treated at [address] [facility] ED and discharged to home. Reviewed discharge instructions and meds- no changes except did get new rx for the Fioricet. REviewed red flags- dizziness, LOC, confusion, can't stay awake, worsening of the headache not relieved by medication. Advised will check back with her in 1-2 days, she has my direct number if any concerns or issues. She thanked me for the call.

## 2017-05-22 NOTE — DISCHARGE INSTRUCTIONS
Concussion: Care Instructions  Your Care Instructions    A concussion is a kind of injury to the brain. It happens when the head receives a hard blow. The impact can jar or shake the brain against the skull. This interrupts the brain's normal activities. Although you may have cuts or bruises on your head or face, you may have no other visible signs of a brain injury. In most cases, damage to the brain from a concussion can't be seen in tests such as a CT or MRI scan. For a few weeks, you may have low energy, dizziness, trouble sleeping, a headache, ringing in your ears, or nausea. You may also feel anxious, grumpy, or depressed. You may have problems with memory and concentration. These symptoms are common after a concussion. They should slowly improve over time. Sometimes this takes weeks or even months. Someone who lives with you should know how to care for you. Please share this and all information with a caregiver who will be available to help if needed. Follow-up care is a key part of your treatment and safety. Be sure to make and go to all appointments, and call your doctor if you are having problems. It's also a good idea to know your test results and keep a list of the medicines you take. How can you care for yourself at home? Pain control  · Put ice or a cold pack on the part of your head that hurts for 10 to 20 minutes at a time. Put a thin cloth between the ice and your skin. · Be safe with medicines. Read and follow all instructions on the label. ¨ If the doctor gave you a prescription medicine for pain, take it as prescribed. ¨ If you are not taking a prescription pain medicine, ask your doctor if you can take an over-the-counter medicine. Recovery  · Follow your doctor's instructions. He or she will tell you if you need someone to watch you closely for the next 24 hours or longer. · Rest is the best way to recover from a concussion.  You need to rest your body and your brain:  ¨ Get plenty of sleep at night. And take rest breaks during the day. ¨ Avoid activities that take a lot of physical or mental work. This includes housework, exercise, schoolwork, video games, text messaging, and using the computer. ¨ You may need to change your school or work schedule while you recover. ¨ Return to your normal activities slowly. Do not try to do too much at once. · Do not drink alcohol or use illegal drugs. Alcohol and illegal drugs can slow your recovery. And they can increase your risk of a second brain injury. · Avoid activities that could lead to another concussion. Follow your doctor's instructions for a gradual return to activity and sports. · Ask your doctor when it's okay for you to drive a car, ride a bike, or operate machinery. How should you return to activity? Your return to sports or activity should be gradual. It should only begin when all symptoms of a concussion are gone, both while at rest and during exercise or exertion. Doctors and concussion specialists suggest steps to follow for returning to sports after a concussion. Use these steps as a guide. You should slowly progress through the following levels of activity:  1. No activity. This means complete physical and mental rest.  2. Light aerobic activity. This can include walking, swimming, or other exercise at less than 70% of maximum heart rate. No resistance training is included in this step. 3. Sport-specific exercise. This includes running drills or skating drills (depending on the sport), but no head impact. 4. Noncontact training drills. This includes more complex training drills such as passing. The athlete may also begin light resistance training. 5. Full-contact practice. The athlete can participate in normal training. 6. Return to normal game play. This is the final step and allows the athlete to join in normal game play. Watch and keep track of your progress.  It should take at least 6 days for you to go from light activity to normal game play. Make sure that you can stay at each new level of activity for at least 24 hours without symptoms, or as long as your doctor says, before doing more. If one or more symptoms come back, return to a lower level of activity for at least 24 hours. Don't move on until all symptoms are gone. When should you call for help? Call 911 anytime you think you may need emergency care. For example, call if:  · You have a seizure. · You passed out (lost consciousness). · You are confused or can't stay awake. Call your doctor now or seek immediate medical care if:  · You have new or worse vomiting. · You feel less alert. · You have new weakness or numbness in any part of your body. Watch closely for changes in your health, and be sure to contact your doctor if:  · You do not get better as expected. · You have new symptoms, such as headaches, trouble concentrating, or changes in mood. Where can you learn more? Go to http://maire-alvin.info/. Enter E957 in the search box to learn more about \"Concussion: Care Instructions. \"  Current as of: October 14, 2016  Content Version: 11.2  © 9723-7848 [email or web address]. Care instructions adapted under license by [employer] (which disclaims liability or warranty for this information). If you have questions about a medical condition or this instruction, always ask your healthcare professional. [place] [address] any warranty or liability for your use of this information.

## 2017-05-22 NOTE — PROGRESS NOTES
Physical Therapy Screening:  Physical Therapy consult is not indicated at this time. A screening was performed on this patient's chart based on their entrance into the emergency department and potential need for physical therapy identified. The patients chart was reviewed and the patient was interviewed/screened. A physical therapy consult is not indicated at this time based on their prior level of function or current medical status. Pt to ED after fall in her yard on Sat. Her main concern is that she hit her face on a piece of wood. She has a hx of MS and R hip pain for which she is scheduled for THR on June 5, 2017. She lives with her son. She states that her functional status at baseline is fluctuating and that sometimes she needs A and sometimes not. She uses a cane, has a rollator, and often uses a motorized scooter out of doors but states that over the weekend the chair needed a new battery (which she has now received) and therefore she was walking outside. She also owns a BSC and a shower seat. She has support of family and friends and per NN note, she is working on extra support for after her surgery. She voices no new needs, concerns, or questions. Please consult physical therapy if any therapy needs arise. Thank you.      Alen Camarena, PT

## 2017-05-22 NOTE — PROGRESS NOTES
Physical Therapy    Met with pt just prior to d/c to answer questions she then voiced re: rehab and therapy post her THR. Questions answered about inpt rehab vs. [facility] rehab and about choosing [facility] agency. Pt for d/c to home with son. No other needs voiced.     Mortimer Many, PT

## 2017-05-22 NOTE — ED NOTES
Dr. Sen Heard at bedside to provide discharge paperwork. Vital signs stable. Pt in no apparent distress at this time. Mental status at baseline. Ambulatory to waiting room with steady gate, discharge paperwork in hand. Accompanied by son.

## 2017-06-01 ENCOUNTER — PATIENT OUTREACH (OUTPATIENT)
Dept: FAMILY MEDICINE CLINIC | Age: 62
End: 2017-06-01

## 2017-06-01 NOTE — PROGRESS NOTES
Please notify patient regarding their test results:    A1c stable, continue diet/exercise. No medications needed at this time. TSH WNL, continue current medications. Mildly elevated LDL, continue statin. Platelets are 857 (improved compared to prior). Please fax all results to orthopedic's office, including my recent office note and this note. According to the [facility] and AHA Guidelines of perioperative cardiovascular evaluation for noncardiac surgery, patient is cleared for elective R hip replacement (one clinical predictor, intermediate risk procedure, > 4 METS). However, pt should meet with anesthesia prior to surgery to discuss how her history of multiple sclerosis and myasthenia gravis may affect anesthesia options.  Pt has already seen her neurologist.

## 2017-06-04 PROBLEM — M16.9 DEGENERATIVE JOINT DISEASE (DJD) OF HIP: Status: ACTIVE | Noted: 2017-06-04

## 2017-06-05 ENCOUNTER — APPOINTMENT (OUTPATIENT)
Dept: GENERAL RADIOLOGY | Age: 62
DRG: 470 | End: 2017-06-05
Attending: ORTHOPAEDIC SURGERY
Payer: MEDICARE

## 2017-06-05 ENCOUNTER — HOSPITAL ENCOUNTER (INPATIENT)
Age: 62
LOS: 2 days | Discharge: HOME HEALTH | DRG: 470 | End: 2017-06-07
Attending: ORTHOPAEDIC SURGERY | Admitting: ORTHOPAEDIC SURGERY
Payer: MEDICARE

## 2017-06-05 ENCOUNTER — ANESTHESIA EVENT (OUTPATIENT)
Dept: SURGERY | Age: 62
DRG: 470 | End: 2017-06-05
Payer: MEDICARE

## 2017-06-05 ENCOUNTER — ANESTHESIA (OUTPATIENT)
Dept: SURGERY | Age: 62
DRG: 470 | End: 2017-06-05
Payer: MEDICARE

## 2017-06-05 PROBLEM — M17.11 RIGHT KNEE DJD: Status: ACTIVE | Noted: 2017-06-05

## 2017-06-05 LAB
ABO + RH BLD: NORMAL
BLOOD GROUP ANTIBODIES SERPL: NORMAL
GLUCOSE BLD STRIP.AUTO-MCNC: 124 MG/DL (ref 65–100)
GLUCOSE BLD STRIP.AUTO-MCNC: 193 MG/DL (ref 65–100)
GLUCOSE BLD STRIP.AUTO-MCNC: 75 MG/DL (ref 65–100)
GLUCOSE BLD STRIP.AUTO-MCNC: 79 MG/DL (ref 65–100)
GLUCOSE BLD STRIP.AUTO-MCNC: 83 MG/DL (ref 65–100)
GLUCOSE BLD STRIP.AUTO-MCNC: 99 MG/DL (ref 65–100)
SERVICE CMNT-IMP: ABNORMAL
SERVICE CMNT-IMP: ABNORMAL
SERVICE CMNT-IMP: NORMAL
SPECIMEN EXP DATE BLD: NORMAL

## 2017-06-05 PROCEDURE — 73501 X-RAY EXAM HIP UNI 1 VIEW: CPT

## 2017-06-05 PROCEDURE — 77030018836 HC SOL IRR NACL ICUM -A: Performed by: ORTHOPAEDIC SURGERY

## 2017-06-05 PROCEDURE — 77030006822 HC BLD SAW SAG BRSM -B: Performed by: ORTHOPAEDIC SURGERY

## 2017-06-05 PROCEDURE — C1776 JOINT DEVICE (IMPLANTABLE): HCPCS | Performed by: ORTHOPAEDIC SURGERY

## 2017-06-05 PROCEDURE — 77030018846 HC SOL IRR STRL H20 ICUM -A: Performed by: ORTHOPAEDIC SURGERY

## 2017-06-05 PROCEDURE — G8978 MOBILITY CURRENT STATUS: HCPCS

## 2017-06-05 PROCEDURE — 77030002933 HC SUT MCRYL J&J -A: Performed by: ORTHOPAEDIC SURGERY

## 2017-06-05 PROCEDURE — 74011250636 HC RX REV CODE- 250/636

## 2017-06-05 PROCEDURE — 74011250636 HC RX REV CODE- 250/636: Performed by: PHYSICIAN ASSISTANT

## 2017-06-05 PROCEDURE — G8979 MOBILITY GOAL STATUS: HCPCS

## 2017-06-05 PROCEDURE — 77030012935 HC DRSG AQUACEL BMS -B: Performed by: ORTHOPAEDIC SURGERY

## 2017-06-05 PROCEDURE — 97161 PT EVAL LOW COMPLEX 20 MIN: CPT

## 2017-06-05 PROCEDURE — 74011250637 HC RX REV CODE- 250/637: Performed by: PHYSICIAN ASSISTANT

## 2017-06-05 PROCEDURE — 76010000171 HC OR TIME 2 TO 2.5 HR INTENSV-TIER 1: Performed by: ORTHOPAEDIC SURGERY

## 2017-06-05 PROCEDURE — 0SR903A REPLACEMENT OF RIGHT HIP JOINT WITH CERAMIC SYNTHETIC SUBSTITUTE, UNCEMENTED, OPEN APPROACH: ICD-10-PCS | Performed by: ORTHOPAEDIC SURGERY

## 2017-06-05 PROCEDURE — 82962 GLUCOSE BLOOD TEST: CPT

## 2017-06-05 PROCEDURE — 77030020365 HC SOL INJ SOD CL 0.9% 50ML: Performed by: ORTHOPAEDIC SURGERY

## 2017-06-05 PROCEDURE — 76000 FLUOROSCOPY <1 HR PHYS/QHP: CPT

## 2017-06-05 PROCEDURE — 65270000029 HC RM PRIVATE

## 2017-06-05 PROCEDURE — 74011000250 HC RX REV CODE- 250

## 2017-06-05 PROCEDURE — 77030011640 HC PAD GRND REM COVD -A: Performed by: ORTHOPAEDIC SURGERY

## 2017-06-05 PROCEDURE — C9290 INJ, BUPIVACAINE LIPOSOME: HCPCS | Performed by: PHYSICIAN ASSISTANT

## 2017-06-05 PROCEDURE — 36415 COLL VENOUS BLD VENIPUNCTURE: CPT | Performed by: ORTHOPAEDIC SURGERY

## 2017-06-05 PROCEDURE — 97530 THERAPEUTIC ACTIVITIES: CPT

## 2017-06-05 PROCEDURE — 77030029372 HC ADH SKN CLSR PRINEO J&J -C: Performed by: ORTHOPAEDIC SURGERY

## 2017-06-05 PROCEDURE — 74011000258 HC RX REV CODE- 258

## 2017-06-05 PROCEDURE — 77030034850: Performed by: ORTHOPAEDIC SURGERY

## 2017-06-05 PROCEDURE — 74011250636 HC RX REV CODE- 250/636: Performed by: ANESTHESIOLOGY

## 2017-06-05 PROCEDURE — 76060000035 HC ANESTHESIA 2 TO 2.5 HR: Performed by: ORTHOPAEDIC SURGERY

## 2017-06-05 PROCEDURE — 74011000250 HC RX REV CODE- 250: Performed by: PHYSICIAN ASSISTANT

## 2017-06-05 PROCEDURE — 77030012893

## 2017-06-05 PROCEDURE — 76210000016 HC OR PH I REC 1 TO 1.5 HR: Performed by: ORTHOPAEDIC SURGERY

## 2017-06-05 PROCEDURE — 77030020788: Performed by: ORTHOPAEDIC SURGERY

## 2017-06-05 PROCEDURE — 77030031139 HC SUT VCRL2 J&J -A: Performed by: ORTHOPAEDIC SURGERY

## 2017-06-05 PROCEDURE — 74011000258 HC RX REV CODE- 258: Performed by: PHYSICIAN ASSISTANT

## 2017-06-05 PROCEDURE — 86900 BLOOD TYPING SEROLOGIC ABO: CPT | Performed by: ORTHOPAEDIC SURGERY

## 2017-06-05 DEVICE — ACETABULAR SHELL Ø54 TWO HOLES
Type: IMPLANTABLE DEVICE | Site: HIP | Status: FUNCTIONAL
Brand: MPACT ACETABULAR SYSTEM

## 2017-06-05 DEVICE — FEMORAL HEAD Ø 36 SIZE S
Type: IMPLANTABLE DEVICE | Site: HIP | Status: FUNCTIONAL
Brand: MECTACER BIOLOX DELTA FEMORAL BALL HEAD

## 2017-06-05 DEVICE — MPACT SHELL SCREW PLUG
Type: IMPLANTABLE DEVICE | Site: HIP | Status: FUNCTIONAL
Brand: MPACT ACETABULAR SYSTEM

## 2017-06-05 DEVICE — CANCELLOUS BONE SCREW FLAT HEAD Ø 6,5 L35
Type: IMPLANTABLE DEVICE | Site: HIP | Status: FUNCTIONAL
Brand: MPACT ACETABULAR SYSTEM

## 2017-06-05 DEVICE — QUADRA H CEMENTLESS STEM STANDARD SIZE 2 SHORT NECK
Type: IMPLANTABLE DEVICE | Site: HIP | Status: FUNCTIONAL
Brand: QUADRA H FEMORAL STEMS

## 2017-06-05 DEVICE — FLAT PE  HC LINER Ø 36 / E
Type: IMPLANTABLE DEVICE | Site: HIP | Status: FUNCTIONAL
Brand: MPACT ACETABULAR SYSTEM

## 2017-06-05 DEVICE — STEM FEM PRSS FT 1 HIP PRIMARY CEM UPLR/BPLR LNR POLYETH: Type: IMPLANTABLE DEVICE | Status: FUNCTIONAL

## 2017-06-05 DEVICE — CANCELLOUS BONE SCREW FLAT HEAD Ø 6,5 L20
Type: IMPLANTABLE DEVICE | Site: HIP | Status: FUNCTIONAL
Brand: MPACT ACETABULAR SYSTEM

## 2017-06-05 RX ORDER — OXYCODONE HYDROCHLORIDE 5 MG/1
10 TABLET ORAL
Status: DISCONTINUED | OUTPATIENT
Start: 2017-06-05 | End: 2017-06-07 | Stop reason: HOSPADM

## 2017-06-05 RX ORDER — HYDROMORPHONE HYDROCHLORIDE 1 MG/ML
0.5 INJECTION, SOLUTION INTRAMUSCULAR; INTRAVENOUS; SUBCUTANEOUS
Status: ACTIVE | OUTPATIENT
Start: 2017-06-05 | End: 2017-06-06

## 2017-06-05 RX ORDER — HYDROMORPHONE HYDROCHLORIDE 1 MG/ML
0.2 INJECTION, SOLUTION INTRAMUSCULAR; INTRAVENOUS; SUBCUTANEOUS
Status: DISCONTINUED | OUTPATIENT
Start: 2017-06-05 | End: 2017-06-05 | Stop reason: HOSPADM

## 2017-06-05 RX ORDER — HYDROXYZINE HYDROCHLORIDE 10 MG/1
10 TABLET, FILM COATED ORAL
Status: DISCONTINUED | OUTPATIENT
Start: 2017-06-05 | End: 2017-06-07 | Stop reason: HOSPADM

## 2017-06-05 RX ORDER — MIDAZOLAM HYDROCHLORIDE 1 MG/ML
0.5 INJECTION, SOLUTION INTRAMUSCULAR; INTRAVENOUS
Status: DISCONTINUED | OUTPATIENT
Start: 2017-06-05 | End: 2017-06-05 | Stop reason: HOSPADM

## 2017-06-05 RX ORDER — OXYCODONE HYDROCHLORIDE 5 MG/1
5 TABLET ORAL
Status: DISCONTINUED | OUTPATIENT
Start: 2017-06-05 | End: 2017-06-07 | Stop reason: HOSPADM

## 2017-06-05 RX ORDER — DIAZEPAM 5 MG/1
5-10 TABLET ORAL
Status: DISCONTINUED | OUTPATIENT
Start: 2017-06-05 | End: 2017-06-07 | Stop reason: HOSPADM

## 2017-06-05 RX ORDER — PROPOFOL 10 MG/ML
INJECTION, EMULSION INTRAVENOUS AS NEEDED
Status: DISCONTINUED | OUTPATIENT
Start: 2017-06-05 | End: 2017-06-05 | Stop reason: HOSPADM

## 2017-06-05 RX ORDER — POLYETHYLENE GLYCOL 3350 17 G/17G
17 POWDER, FOR SOLUTION ORAL DAILY
Status: DISCONTINUED | OUTPATIENT
Start: 2017-06-06 | End: 2017-06-07 | Stop reason: HOSPADM

## 2017-06-05 RX ORDER — BUPIVACAINE HYDROCHLORIDE 5 MG/ML
INJECTION, SOLUTION EPIDURAL; INTRACAUDAL AS NEEDED
Status: DISCONTINUED | OUTPATIENT
Start: 2017-06-05 | End: 2017-06-05 | Stop reason: HOSPADM

## 2017-06-05 RX ORDER — SODIUM CHLORIDE 0.9 % (FLUSH) 0.9 %
5-10 SYRINGE (ML) INJECTION EVERY 8 HOURS
Status: DISCONTINUED | OUTPATIENT
Start: 2017-06-06 | End: 2017-06-07 | Stop reason: HOSPADM

## 2017-06-05 RX ORDER — VANCOMYCIN/0.9 % SOD CHLORIDE 1.5G/250ML
1500 PLASTIC BAG, INJECTION (ML) INTRAVENOUS ONCE
Status: COMPLETED | OUTPATIENT
Start: 2017-06-05 | End: 2017-06-05

## 2017-06-05 RX ORDER — FACIAL-BODY WIPES
10 EACH TOPICAL DAILY PRN
Status: DISCONTINUED | OUTPATIENT
Start: 2017-06-07 | End: 2017-06-07 | Stop reason: HOSPADM

## 2017-06-05 RX ORDER — LIDOCAINE HYDROCHLORIDE 10 MG/ML
0.1 INJECTION, SOLUTION EPIDURAL; INFILTRATION; INTRACAUDAL; PERINEURAL AS NEEDED
Status: DISCONTINUED | OUTPATIENT
Start: 2017-06-05 | End: 2017-06-05 | Stop reason: HOSPADM

## 2017-06-05 RX ORDER — SODIUM CHLORIDE 0.9 % (FLUSH) 0.9 %
5-10 SYRINGE (ML) INJECTION AS NEEDED
Status: DISCONTINUED | OUTPATIENT
Start: 2017-06-05 | End: 2017-06-05 | Stop reason: HOSPADM

## 2017-06-05 RX ORDER — SODIUM CHLORIDE 0.9 % (FLUSH) 0.9 %
5-10 SYRINGE (ML) INJECTION EVERY 8 HOURS
Status: DISCONTINUED | OUTPATIENT
Start: 2017-06-05 | End: 2017-06-05 | Stop reason: HOSPADM

## 2017-06-05 RX ORDER — ROSUVASTATIN CALCIUM 10 MG/1
20 TABLET, COATED ORAL
Status: DISCONTINUED | OUTPATIENT
Start: 2017-06-06 | End: 2017-06-07 | Stop reason: HOSPADM

## 2017-06-05 RX ORDER — ROPIVACAINE HYDROCHLORIDE 5 MG/ML
30 INJECTION, SOLUTION EPIDURAL; INFILTRATION; PERINEURAL ONCE
Status: DISCONTINUED | OUTPATIENT
Start: 2017-06-05 | End: 2017-06-05 | Stop reason: HOSPADM

## 2017-06-05 RX ORDER — KETOROLAC TROMETHAMINE 30 MG/ML
30 INJECTION, SOLUTION INTRAMUSCULAR; INTRAVENOUS EVERY 6 HOURS
Status: DISPENSED | OUTPATIENT
Start: 2017-06-05 | End: 2017-06-06

## 2017-06-05 RX ORDER — DANTROLENE SODIUM 25 MG/1
100 CAPSULE ORAL
Status: DISCONTINUED | OUTPATIENT
Start: 2017-06-05 | End: 2017-06-07 | Stop reason: HOSPADM

## 2017-06-05 RX ORDER — SODIUM CHLORIDE 9 MG/ML
25 INJECTION, SOLUTION INTRAVENOUS CONTINUOUS
Status: DISCONTINUED | OUTPATIENT
Start: 2017-06-05 | End: 2017-06-05 | Stop reason: HOSPADM

## 2017-06-05 RX ORDER — LIDOCAINE 50 MG/G
1 PATCH TOPICAL
Status: DISCONTINUED | OUTPATIENT
Start: 2017-06-05 | End: 2017-06-07 | Stop reason: HOSPADM

## 2017-06-05 RX ORDER — MIDAZOLAM HYDROCHLORIDE 1 MG/ML
1 INJECTION, SOLUTION INTRAMUSCULAR; INTRAVENOUS AS NEEDED
Status: DISCONTINUED | OUTPATIENT
Start: 2017-06-05 | End: 2017-06-05 | Stop reason: HOSPADM

## 2017-06-05 RX ORDER — PREGABALIN 100 MG/1
200 CAPSULE ORAL 2 TIMES DAILY
Status: DISCONTINUED | OUTPATIENT
Start: 2017-06-05 | End: 2017-06-07 | Stop reason: HOSPADM

## 2017-06-05 RX ORDER — PYRIDOSTIGMINE BROMIDE 180 MG/1
180 TABLET, EXTENDED RELEASE ORAL 2 TIMES DAILY
Status: DISCONTINUED | OUTPATIENT
Start: 2017-06-05 | End: 2017-06-07 | Stop reason: HOSPADM

## 2017-06-05 RX ORDER — NALOXONE HYDROCHLORIDE 0.4 MG/ML
0.4 INJECTION, SOLUTION INTRAMUSCULAR; INTRAVENOUS; SUBCUTANEOUS AS NEEDED
Status: DISCONTINUED | OUTPATIENT
Start: 2017-06-05 | End: 2017-06-07 | Stop reason: HOSPADM

## 2017-06-05 RX ORDER — CELECOXIB 200 MG/1
200 CAPSULE ORAL ONCE
Status: COMPLETED | OUTPATIENT
Start: 2017-06-05 | End: 2017-06-05

## 2017-06-05 RX ORDER — SODIUM CHLORIDE, SODIUM LACTATE, POTASSIUM CHLORIDE, CALCIUM CHLORIDE 600; 310; 30; 20 MG/100ML; MG/100ML; MG/100ML; MG/100ML
75 INJECTION, SOLUTION INTRAVENOUS CONTINUOUS
Status: DISCONTINUED | OUTPATIENT
Start: 2017-06-05 | End: 2017-06-05 | Stop reason: HOSPADM

## 2017-06-05 RX ORDER — MORPHINE SULFATE 2 MG/ML
2 INJECTION, SOLUTION INTRAMUSCULAR; INTRAVENOUS
Status: DISCONTINUED | OUTPATIENT
Start: 2017-06-05 | End: 2017-06-05 | Stop reason: HOSPADM

## 2017-06-05 RX ORDER — DEXAMETHASONE SODIUM PHOSPHATE 4 MG/ML
INJECTION, SOLUTION INTRA-ARTICULAR; INTRALESIONAL; INTRAMUSCULAR; INTRAVENOUS; SOFT TISSUE AS NEEDED
Status: DISCONTINUED | OUTPATIENT
Start: 2017-06-05 | End: 2017-06-05 | Stop reason: HOSPADM

## 2017-06-05 RX ORDER — METHOCARBAMOL 500 MG/1
500 TABLET, FILM COATED ORAL
Status: DISCONTINUED | OUTPATIENT
Start: 2017-06-05 | End: 2017-06-07 | Stop reason: HOSPADM

## 2017-06-05 RX ORDER — AMOXICILLIN 250 MG
1 CAPSULE ORAL 2 TIMES DAILY
Status: DISCONTINUED | OUTPATIENT
Start: 2017-06-05 | End: 2017-06-07 | Stop reason: HOSPADM

## 2017-06-05 RX ORDER — FENTANYL CITRATE 50 UG/ML
25 INJECTION, SOLUTION INTRAMUSCULAR; INTRAVENOUS
Status: DISCONTINUED | OUTPATIENT
Start: 2017-06-05 | End: 2017-06-05 | Stop reason: HOSPADM

## 2017-06-05 RX ORDER — PROPOFOL 10 MG/ML
INJECTION, EMULSION INTRAVENOUS
Status: DISCONTINUED | OUTPATIENT
Start: 2017-06-05 | End: 2017-06-05 | Stop reason: HOSPADM

## 2017-06-05 RX ORDER — PAROXETINE HYDROCHLORIDE 20 MG/1
60 TABLET, FILM COATED ORAL
Status: DISCONTINUED | OUTPATIENT
Start: 2017-06-05 | End: 2017-06-07 | Stop reason: HOSPADM

## 2017-06-05 RX ORDER — ACETAMINOPHEN 325 MG/1
650 TABLET ORAL EVERY 6 HOURS
Status: DISCONTINUED | OUTPATIENT
Start: 2017-06-05 | End: 2017-06-07 | Stop reason: HOSPADM

## 2017-06-05 RX ORDER — DIPHENHYDRAMINE HYDROCHLORIDE 50 MG/ML
12.5 INJECTION, SOLUTION INTRAMUSCULAR; INTRAVENOUS AS NEEDED
Status: DISCONTINUED | OUTPATIENT
Start: 2017-06-05 | End: 2017-06-05 | Stop reason: HOSPADM

## 2017-06-05 RX ORDER — TOPIRAMATE 100 MG/1
200 TABLET, FILM COATED ORAL 2 TIMES DAILY
Status: DISCONTINUED | OUTPATIENT
Start: 2017-06-05 | End: 2017-06-07 | Stop reason: HOSPADM

## 2017-06-05 RX ORDER — SODIUM CHLORIDE, SODIUM LACTATE, POTASSIUM CHLORIDE, CALCIUM CHLORIDE 600; 310; 30; 20 MG/100ML; MG/100ML; MG/100ML; MG/100ML
INJECTION, SOLUTION INTRAVENOUS
Status: DISCONTINUED | OUTPATIENT
Start: 2017-06-05 | End: 2017-06-05 | Stop reason: HOSPADM

## 2017-06-05 RX ORDER — SODIUM CHLORIDE, SODIUM LACTATE, POTASSIUM CHLORIDE, CALCIUM CHLORIDE 600; 310; 30; 20 MG/100ML; MG/100ML; MG/100ML; MG/100ML
125 INJECTION, SOLUTION INTRAVENOUS CONTINUOUS
Status: DISCONTINUED | OUTPATIENT
Start: 2017-06-05 | End: 2017-06-05 | Stop reason: HOSPADM

## 2017-06-05 RX ORDER — SODIUM CHLORIDE 0.9 % (FLUSH) 0.9 %
5-10 SYRINGE (ML) INJECTION AS NEEDED
Status: DISCONTINUED | OUTPATIENT
Start: 2017-06-05 | End: 2017-06-07 | Stop reason: HOSPADM

## 2017-06-05 RX ORDER — BUTALBITAL, ACETAMINOPHEN AND CAFFEINE 50; 325; 40 MG/1; MG/1; MG/1
1 TABLET ORAL
Status: DISCONTINUED | OUTPATIENT
Start: 2017-06-05 | End: 2017-06-07 | Stop reason: HOSPADM

## 2017-06-05 RX ORDER — PHENYLEPHRINE HCL IN 0.9% NACL 0.4MG/10ML
SYRINGE (ML) INTRAVENOUS AS NEEDED
Status: DISCONTINUED | OUTPATIENT
Start: 2017-06-05 | End: 2017-06-05 | Stop reason: HOSPADM

## 2017-06-05 RX ORDER — PREGABALIN 75 MG/1
75 CAPSULE ORAL ONCE
Status: COMPLETED | OUTPATIENT
Start: 2017-06-05 | End: 2017-06-05

## 2017-06-05 RX ORDER — ASPIRIN 325 MG
325 TABLET, DELAYED RELEASE (ENTERIC COATED) ORAL 2 TIMES DAILY
Status: DISCONTINUED | OUTPATIENT
Start: 2017-06-05 | End: 2017-06-07 | Stop reason: HOSPADM

## 2017-06-05 RX ORDER — ONDANSETRON 2 MG/ML
INJECTION INTRAMUSCULAR; INTRAVENOUS AS NEEDED
Status: DISCONTINUED | OUTPATIENT
Start: 2017-06-05 | End: 2017-06-05 | Stop reason: HOSPADM

## 2017-06-05 RX ORDER — VANCOMYCIN/0.9 % SOD CHLORIDE 1.5G/250ML
1500 PLASTIC BAG, INJECTION (ML) INTRAVENOUS EVERY 12 HOURS
Status: COMPLETED | OUTPATIENT
Start: 2017-06-05 | End: 2017-06-06

## 2017-06-05 RX ORDER — SODIUM CHLORIDE 9 MG/ML
125 INJECTION, SOLUTION INTRAVENOUS CONTINUOUS
Status: DISPENSED | OUTPATIENT
Start: 2017-06-05 | End: 2017-06-06

## 2017-06-05 RX ORDER — MODAFINIL 100 MG/1
200 TABLET ORAL 2 TIMES DAILY
Status: DISCONTINUED | OUTPATIENT
Start: 2017-06-05 | End: 2017-06-06

## 2017-06-05 RX ORDER — ONDANSETRON 2 MG/ML
4 INJECTION INTRAMUSCULAR; INTRAVENOUS
Status: ACTIVE | OUTPATIENT
Start: 2017-06-05 | End: 2017-06-06

## 2017-06-05 RX ORDER — LEVOTHYROXINE SODIUM 175 UG/1
175 TABLET ORAL
Status: DISCONTINUED | OUTPATIENT
Start: 2017-06-05 | End: 2017-06-07 | Stop reason: HOSPADM

## 2017-06-05 RX ORDER — TRAZODONE HYDROCHLORIDE 50 MG/1
50 TABLET ORAL
Status: DISCONTINUED | OUTPATIENT
Start: 2017-06-05 | End: 2017-06-07 | Stop reason: HOSPADM

## 2017-06-05 RX ORDER — FENTANYL CITRATE 50 UG/ML
50 INJECTION, SOLUTION INTRAMUSCULAR; INTRAVENOUS AS NEEDED
Status: DISCONTINUED | OUTPATIENT
Start: 2017-06-05 | End: 2017-06-05 | Stop reason: HOSPADM

## 2017-06-05 RX ORDER — FENTANYL 75 UG/H
1 PATCH TRANSDERMAL
Status: DISCONTINUED | OUTPATIENT
Start: 2017-06-06 | End: 2017-06-07 | Stop reason: HOSPADM

## 2017-06-05 RX ADMIN — CELECOXIB 200 MG: 200 CAPSULE ORAL at 09:55

## 2017-06-05 RX ADMIN — SODIUM CHLORIDE, SODIUM LACTATE, POTASSIUM CHLORIDE, AND CALCIUM CHLORIDE 125 ML/HR: 600; 310; 30; 20 INJECTION, SOLUTION INTRAVENOUS at 09:45

## 2017-06-05 RX ADMIN — PYRIDOSTIGMINE BROMIDE 180 MG: 180 TABLET, EXTENDED RELEASE ORAL at 17:38

## 2017-06-05 RX ADMIN — LEVOTHYROXINE SODIUM 175 MCG: 175 TABLET ORAL at 22:24

## 2017-06-05 RX ADMIN — OXYCODONE HYDROCHLORIDE 5 MG: 5 TABLET ORAL at 18:41

## 2017-06-05 RX ADMIN — OXYCODONE HYDROCHLORIDE 5 MG: 5 TABLET ORAL at 19:34

## 2017-06-05 RX ADMIN — DEXAMETHASONE SODIUM PHOSPHATE 4 MG: 4 INJECTION, SOLUTION INTRA-ARTICULAR; INTRALESIONAL; INTRAMUSCULAR; INTRAVENOUS; SOFT TISSUE at 11:53

## 2017-06-05 RX ADMIN — KETOROLAC TROMETHAMINE 30 MG: 30 INJECTION, SOLUTION INTRAMUSCULAR at 19:34

## 2017-06-05 RX ADMIN — PREGABALIN 75 MG: 75 CAPSULE ORAL at 10:00

## 2017-06-05 RX ADMIN — SODIUM CHLORIDE, SODIUM LACTATE, POTASSIUM CHLORIDE, CALCIUM CHLORIDE: 600; 310; 30; 20 INJECTION, SOLUTION INTRAVENOUS at 11:40

## 2017-06-05 RX ADMIN — VANCOMYCIN HYDROCHLORIDE 1500 MG: 10 INJECTION, POWDER, LYOPHILIZED, FOR SOLUTION INTRAVENOUS at 10:26

## 2017-06-05 RX ADMIN — BUPIVACAINE HYDROCHLORIDE 14 MG: 5 INJECTION, SOLUTION EPIDURAL; INTRACAUDAL at 11:50

## 2017-06-05 RX ADMIN — SODIUM CHLORIDE 125 ML/HR: 900 INJECTION, SOLUTION INTRAVENOUS at 14:45

## 2017-06-05 RX ADMIN — PROPOFOL 50 MG: 10 INJECTION, EMULSION INTRAVENOUS at 12:58

## 2017-06-05 RX ADMIN — OXYCODONE HYDROCHLORIDE 5 MG: 5 TABLET ORAL at 23:43

## 2017-06-05 RX ADMIN — PROPOFOL 50 MG: 10 INJECTION, EMULSION INTRAVENOUS at 12:55

## 2017-06-05 RX ADMIN — OXYCODONE HYDROCHLORIDE 5 MG: 5 TABLET ORAL at 23:24

## 2017-06-05 RX ADMIN — Medication 80 MCG: at 13:29

## 2017-06-05 RX ADMIN — ACETAMINOPHEN 650 MG: 325 TABLET, FILM COATED ORAL at 17:38

## 2017-06-05 RX ADMIN — ONDANSETRON 4 MG: 2 INJECTION INTRAMUSCULAR; INTRAVENOUS at 11:53

## 2017-06-05 RX ADMIN — ASPIRIN 325 MG: 325 TABLET, DELAYED RELEASE ORAL at 17:38

## 2017-06-05 RX ADMIN — VANCOMYCIN HYDROCHLORIDE 1500 MG: 10 INJECTION, POWDER, LYOPHILIZED, FOR SOLUTION INTRAVENOUS at 22:23

## 2017-06-05 RX ADMIN — TOPIRAMATE 200 MG: 100 TABLET, FILM COATED ORAL at 17:38

## 2017-06-05 RX ADMIN — Medication 100 MCG: at 12:59

## 2017-06-05 RX ADMIN — Medication 80 MCG: at 13:22

## 2017-06-05 RX ADMIN — DOCUSATE SODIUM AND SENNOSIDES 1 TABLET: 8.6; 5 TABLET, FILM COATED ORAL at 17:38

## 2017-06-05 RX ADMIN — PAROXETINE HYDROCHLORIDE 60 MG: 20 TABLET, FILM COATED ORAL at 22:24

## 2017-06-05 RX ADMIN — SODIUM CHLORIDE 125 ML/HR: 900 INJECTION, SOLUTION INTRAVENOUS at 23:26

## 2017-06-05 RX ADMIN — PROPOFOL 100 MCG/KG/MIN: 10 INJECTION, EMULSION INTRAVENOUS at 11:47

## 2017-06-05 RX ADMIN — PREGABALIN 200 MG: 100 CAPSULE ORAL at 17:38

## 2017-06-05 RX ADMIN — ACETAMINOPHEN 650 MG: 325 TABLET, FILM COATED ORAL at 23:24

## 2017-06-05 RX ADMIN — Medication 80 MCG: at 12:57

## 2017-06-05 NOTE — ANESTHESIA PREPROCEDURE EVALUATION
Anesthetic History   No history of anesthetic complications            Review of Systems / Medical History  Patient summary reviewed, nursing notes reviewed and pertinent labs reviewed    Pulmonary        Sleep apnea: No treatment           Neuro/Psych   Within defined limits           Cardiovascular    Hypertension: well controlled                   GI/Hepatic/Renal  Within defined limits              Endo/Other    Diabetes: well controlled, type 2  Hypothyroidism: well controlled       Other Findings   Comments: Multiple Sclerosis  Myasthenia Gravis         Physical Exam    Airway  Mallampati: II  TM Distance: > 6 cm  Neck ROM: normal range of motion   Mouth opening: Normal     Cardiovascular  Regular rate and rhythm,  S1 and S2 normal,  no murmur, click, rub, or gallop             Dental  No notable dental hx       Pulmonary  Breath sounds clear to auscultation               Abdominal  GI exam deferred       Other Findings            Anesthetic Plan    ASA: 3  Anesthesia type: spinal          Induction: Intravenous  Anesthetic plan and risks discussed with: Patient

## 2017-06-05 NOTE — H&P
Hiwot TOSCANO Minor  Location: [place] [address] [address]  Patient #: [id number]  : 1955  Single / Language: English / Race: Black or   Female      History of Present Illness  The patient is a 63year old female who presents with a complaint of follow up for right hip pain after intra-articular injection on 17. Patient gained only a very short duration of symptomatic relief after her right hip injection.  She states that she has to do something with his hip.  It is limiting her ability to ambulate.  It's causing her pain and decreased function.  She has significant osteoarthritis and protrusio of the right hip on x-rays.  On exam she has a very stiff hip with irritability to range of motion.  We discussed all other options today. Problem List/Past Medical   Osteoarthritis of one hip, right (715.95  M16.11)   HAND, ARTHRITIS (715.94)   HAND, PAIN (719.43)   TRIGGER FINGER (727.03  M65.30)   HAND, CONTUSION (923.20)   REVIEW OF SYSTEMS: Systems were reviewed by the provider.   Localized osteoarthritis of right knee (715.36  M17.11)   Knee pain, right anterior (719.46  M25.561)   Knee effusion, right (719.06  M25.461)   Essential Hypertension     Allergies  Penicillins  Anaphylaxis. Family History   Bladder problems   Asthma   Breast cancer   Heart disease in female family member before age 77   Diabetes Mellitus   Thyroid problems     Social History   Tobacco use  Current every day smoker. 2014: Current every day smoker: 0.5 packs per day, started smoking in  at age 23 (18.5 pack years), smokes 0 cigar(s) per week, uses 0 can(s) of smokeless tobacco per week. Johana John Exposure  2014: occasionally  Exercise  2014: Walks occasionally. Seat Belt Use  : always  Illicit drug use  : none  Alcohol use  2014: Never drinks. Caffeine use  2014: Drinks coffee and soft drinks 3-4 times a day.   Tobacco / smoke exposure  2014: No    Medication History   Tecfidera  (240MG Capsule DR, Oral) Active. MetFORMIN HCl  (Oral) Specific dose unknown - Active. Topamax  (200MG Tablet, Oral) Active. Mestinon  (180MG Tablet ER, Oral) Active. Lipitor  (40MG Tablet, Oral) Active. Paxil  (Oral) Specific dose unknown - Active. Neurontin  (Oral) Specific dose unknown - Active. FentaNYL  (75MCG/HR Patch 72HR, Transdermal) Active. Levothyroxine Sodium  (175MCG Tablet, Oral) Active. Valium  (10MG Tablet, Oral) Active. Vitamin D2  (Oral) Specific dose unknown - Active. Naproxen Sodium  (500MG Tablet ER, Oral) Active. Robaxin-750  (750MG Tablet, Oral) Active. Lisinopril  (10MG Tablet, Oral) Active. Lidoderm  (5% Patch, External) Active. Provigil  (Oral) Specific dose unknown - Active. Medications Reconciled     Past Surgical History   Thyroidectomy; Total   Dilation and Curettage of Uterus   Carpal Tunnel Repair  left  Arthroscopy of Knee  right   Delivery  1 time  Colon Polyp Removal - Colonoscopy     Other Problems  Unspecified Diagnosis   Diabetes Mellitus   Bladder Problems   Hypercholesterolemia   Arthritis   Thyroid Disease   Other disease, cancer, significant illness         Review of Systems  General Present- Fatigue and Weight Loss. Not Present- Appetite Loss, Chills, Fever, Night Sweats and Weight Gain. Skin Not Present- Itching, Nail Changes, Poor Wound Healing, Rash, Skin Color Changes, Suspicious Lesions and Yellowish Skin Color. HEENT Not Present- Decreased Hearing, Earache, Hoarseness, Loose Teeth, Nose Bleed, Ringing in the Ears and Sore Throat. Respiratory Present- Snoring. Not Present- Bloody sputum, Chronic Cough, Difficulty Breathing, Wakes up from Sleep Wheezing or Short of Breath and Wheezing. Gastrointestinal Not Present- Abdominal Pain, Black, Tarry Stool, Change in Bowel Habits, Cirrhosis, Constipation, Diarrhea, Difficulty Swallowing, Nausea and Vomiting. Female Genitourinary Present- Frequency.  Not Present- Blood in Urine, Painful Urination, Pelvic Pain, Trouble Starting Urinary Stream and Urgency. Musculoskeletal Present- Joint Pain. Not Present- Back Pain, Joint Stiffness and Joint Swelling. Neurological Present- Numbness and Tremor. Not Present- Fainting, Headaches, Memory Loss, Seizures, Tingling, Unsteadiness and Weakness. Psychiatric Not Present- Anxiety, Bipolar and Depression. Hematology Not Present- Abnormal Bruising , Enlarged Lymph Nodes, Excessive bleeding and Skin Discoloration. Vitals   5/3/2017 9:51 AM  Weight: 170 lb   Height: 72 in   Weight was reported by patient. Height was reported by patient. Body Surface Area: 1.99 m²   Body Mass Index: 23.06 kg/m²                Physical Exam   Musculoskeletal  Global Assessment  Examination of related systems reveals - well-developed, well-nourished, in no acute distress, alert and oriented x 3. Right Lower Extremity - Note: Patient's hip was examined. Her hip is stiff and painful. Very limited range of motion. She extends fully and flexes only to about 85°. Log roll test and impingement tests are both positive Straight leg raise and femoral nerve stretch test are negative. Extremity is sensate and perfused with palpable dorsalis pedis pulse. Hip flexors, quads, ankle plantar flexors, ankle dorsiflexors have 5 out of 5 strength. Assessment & Plan  Osteoarthritis of one hip, right (715.95  M16.11)  Impression: Long discussion today. All questions are answered. She has decided to proceed with a total hip. I discussed her previous surgeries and she states that she does well with anesthesia. She will obtain preoperative clearance from her primary care Dr. Aura Castaneda questions are answered. She was scheduled for a right total hip today. Current Plans  Pt Education - How to access health information online: discussed with patient and provided information.   Pt Education - Educational materials were provided.: discussed with patient and provided information.   EDUCATION ABOUT SMOKING CESSATION BY ONE ON ONE INSTRUCTION (4000F)  Pt Education - Smoking: Ways to Quit: smoking  REVIEW OF SYSTEMS: Systems were reviewed by the provider.(V49.9)  Weight above 97th percentile (V49.89  Z78.9)  Current Plans  LIFESTYLE EDUCATION REGARDING DIET (68859)  Localized osteoarthritis of right knee (715.36  M17.11)

## 2017-06-05 NOTE — PERIOP NOTES
TRANSFER - OUT REPORT:    Verbal report given to Alan Smith RN(name) on Hay GIBBONS Minor  being transferred to [facility](unit) for routine post - op       Report consisted of patients Situation, Background, Assessment and   Recommendations(SBAR). Time Pre op antibiotic given:1026  Anesthesia Stop time: 1401    Information from the following report(s) SBAR, OR Summary, Intake/Output, MAR, Recent Results and Cardiac Rhythm SR/SB. was reviewed with the receiving nurse. Opportunity for questions and clarification was provided. Is the patient on 02? NO    Is the patient on a monitor? NO    Is the nurse transporting with the patient? NO    Surgical Waiting Area notified of patient's transfer from PACU? YES      The following personal items collected during your admission accompanied patient upon transfer:   Dental Appliance: Dental Appliances: None  Vision:    Hearing Aid:    Jewelry: Jewelry: Ring (Not able to remove ring on right ring finger.)  Clothing: Clothing: With patient  Other Valuables:  Other Valuables: Rosaura Varma, With patient  Valuables sent to safe:

## 2017-06-05 NOTE — IP AVS SNAPSHOT
[facility] [facility] [address] [place] [address] 
[phone] Patient: Nahum Kent MRN: [id number] ETM:6/63/[id number] You are allergic to the following Allergen Reactions Bee Sting (Sting, Bee) Anaphylaxis Also allergic to egg products Penicillins Anaphylaxis Betadine (Povidone-Iodine) Rash Egg Itching Recent Documentation Height Weight BMI OB Status Smoking Status 1.829 m 81.6 kg 24.41 kg/m2 Postmenopausal Current Every Day Smoker Emergency Contacts Name Discharge Info Relation Home Work Mobile Minor III,Wilmer DISCHARGE CAREGIVER [3] Son [22] [phone] About your hospitalization You were admitted on:  June 5, 2017 You last received care in the:  [address] [facility] You were discharged on:  June 7, 2017 Unit phone number:  [phone] Why you were hospitalized Your primary diagnosis was:  Degenerative Joint Disease (Djd) Of Hip Your diagnoses also included:  Chronic, Continuous Use Of Opioids Providers Seen During Your Hospitalizations Provider Role Specialty Primary office phone David Johnston MD Attending Provider Orthopedic Surgery [phone] Your Primary Care Physician (PCP) Primary Care Physician Office Phone Office Fax [facility] [phone] Follow-up Information Follow up With Details Comments Contact Info [address] [place] [id number] 
[phone] Shalini Garcia MD   [address] [place] [address] 
[phone] Your Appointments Wednesday June 28, 2017 10:00 AM EDT New Patient with [address] MD [facility]) [address] [place] [address]  
[phone]  Monday July 03, 2017  9:45 AM EDT  
 ROUTINE CARE with Ary Foreman MD  
[facility]) [address]  
[phone] Current Discharge Medication List  
  
START taking these medications Dose & Instructions Dispensing Information Comments Morning Noon Evening Bedtime  
 aspirin delayed-release 325 mg tablet Your last dose was: Your next dose is:    
   
   
 Dose:  325 mg Take 1 Tab by mouth two (2) times a day. Quantity:  60 Tab Refills:  0  
     
   
   
   
  
 oxyCODONE IR 10 mg Tab immediate release tablet Commonly known as:  Angela Gutierrez Your last dose was: Your next dose is:    
   
   
 Dose:  2.5-5 mg Take 0.5 Tabs by mouth every three (3) hours as needed. Max Daily Amount: 40 mg.  
 Quantity:  60 Tab Refills:  0 CONTINUE these medications which have CHANGED Dose & Instructions Dispensing Information Comments Morning Noon Evening Bedtime  
 levothyroxine 175 mcg tablet Commonly known as:  SYNTHROID What changed:   
- how much to take 
- how to take this - when to take this 
- additional instructions Your last dose was: Your next dose is: TAKE 1 TABLET BY MOUTH DAILY BEFORE BREAKFAST Quantity:  30 Tab Refills:  11 PARoxetine 40 mg tablet Commonly known as:  PAXIL What changed:   
- how much to take 
- how to take this - when to take this 
- additional instructions Your last dose was: Your next dose is: TAKE 1.5 TABS BY MOUTH DAILY. Quantity:  45 Tab Refills:  2 CONTINUE these medications which have NOT CHANGED Dose & Instructions Dispensing Information Comments Morning Noon Evening Bedtime ACTHAR H.P. 80 unit/mL injectable gel Generic drug:  corticotropin Your last dose was: Your next dose is: Dose:  1 mL  
1 mL by SubCUTAneous route daily. 80 units subq q day for 10 days Refills:  0 Blood-Glucose Meter monitoring kit Your last dose was: Your next dose is:    
   
   
 by SubCUTAneous route Before breakfast and dinner. Free Style Lite glucometer and test strips Quantity:  1 Kit Refills:  0 Free Style Lite glucometer please  
    
   
   
   
  
 butalbital-acetaminophen-caff -40 mg per capsule Commonly known as:  [employer] Your last dose was: Your next dose is:    
   
   
 Dose:  1 Cap Take 1 Cap by mouth every four (4) hours as needed for Pain. Max Daily Amount: 6 Caps. Quantity:  10 Cap Refills:  0  
     
   
   
   
  
 dantrolene 100 mg capsule Commonly known as:  DANTRIUM Your last dose was: Your next dose is:    
   
   
 Dose:  100 mg  
100 mg as needed. Refills:  0  
     
   
   
   
  
 fentaNYL 75 mcg/hr Commonly known as:  Cheraw [place] Your last dose was: Your next dose is:    
   
   
 Dose:  1 Patch 1 Patch by TransDERmal route every seventy-two (72) hours. Max Daily Amount: 1 Patch. Quantity:  10 Patch Refills:  0 GILENYA 0.5 mg Cap Generic drug:  fingolimod Your last dose was: Your next dose is:    
   
   
 Dose:  1 Cap Take 1 Cap by mouth daily. Refills:  0 Lancets Misc Your last dose was: Your next dose is:    
   
   
 Use to check blood sugar bid , dx-Diabetes Mellitus 250.00 Quantity:  1 Package Refills:  11 LIDODERM 5 % Generic drug:  lidocaine Your last dose was: Your next dose is:    
   
   
 by TransDERmal route every twenty-four (24) hours. Apply patch to the affected area for 12 hours a day and remove for 12 hours a day. Refills:  0 MESTINON TIMESPAN 180 mg SR tablet Generic drug:  pyridostigmine bromide Your last dose was: Your next dose is:    
   
   
 Dose:  180 mg Take 180 mg by mouth two (2) times a day. Refills:  0  
     
   
   
   
  
 pregabalin 200 mg capsule Commonly known as:  Cathryne Sit Your last dose was: Your next dose is:    
   
   
 Dose:  200 mg Take 1 Cap by mouth two (2) times a day. Max Daily Amount: 400 mg. Quantity:  180 Cap Refills:  3 PROVIGIL 200 mg tablet Generic drug:  modafinil Your last dose was: Your next dose is:    
   
   
 Dose:  200 mg Take 200 mg by mouth two (2) times a day. Refills:  0  
     
   
   
   
  
 ROBAXIN-750 PO Your last dose was: Your next dose is:    
   
   
 Dose:  1 Tab Take 1 Tab by mouth as needed. Refills:  0  
     
   
   
   
  
 rosuvastatin 20 mg tablet Commonly known as:  CRESTOR Your last dose was: Your next dose is: TAKE 1 TABLET BY MOUTH EVERY EVENING Quantity:  90 Tab Refills:  1 TENS UNIT ELECTRODES Your last dose was: Your next dose is:    
   
   
 by Does Not Apply route. prn Refills:  0  
     
   
   
   
  
 TOPAMAX 200 mg tablet Generic drug:  topiramate Your last dose was: Your next dose is:    
   
   
 Dose:  200 mg Take 200 mg by mouth two (2) times a day. Refills:  0  
     
   
   
   
  
 traZODone 50 mg tablet Commonly known as:  Jerelene Occitan Your last dose was: Your next dose is:    
   
   
 Dose:  50 mg Take 50 mg by mouth nightly as needed. Refills:  0 VALIUM 10 mg tablet Generic drug:  diazePAM  
   
Your last dose was: Your next dose is:    
   
   
 Dose:  5-10 mg Take 5-10 mg by mouth as needed for Anxiety. Refills:  0  
     
   
   
   
  
 VITAMIN D2 50,000 unit capsule Generic drug:  ergocalciferol Your last dose was: Your next dose is: Dose:  95992 Units Take 50,000 Units by mouth every . Refills:  0 STOP taking these medications   
 glucose blood VI test strips strip Commonly known as:  FREESTYLE LITE STRIPS  
   
  
 naproxen 500 mg tablet Commonly known as:  NAPROSYN Where to Get Your Medications Information on where to get these meds will be given to you by the nurse or doctor. ! Ask your nurse or doctor about these medications  
  aspirin delayed-release 325 mg tablet  
 oxyCODONE IR 10 mg Tab immediate release tablet Discharge Instructions Patient meets criteria for BUNDLED PAYMENT  
for Care Improvement Initiative Criteria Contact Information for Orthopedic Nurse Navigator:     
KAMILLA Huerta, RN-BC 
[phone] X:[phone] D:[phone] After Hospital Care Plan:  Discharge Instructions Anterior Approach Hip Replacement-Dr. Farhana Pretty Patient Name:Hiwot Galvin Date of procedure:2017 Procedure:Procedure(s): RIGHT TOTAL HIP ARTHROPLASTY ANTERIOR APPROACH Surgeon:Surgeon(s) and Role: Luis Rasheed MD - Primary PCP: Rogers You MD 
Date of discharge: No discharge date for patient encounter. Follow up appointments ? Follow up with Dr. Farhana Pretty in 3 weeks. Call [phone] to make an appointment. ? If home health has been arranged for you the agency will contact you to arrange dates/times for visits. Please call them if you do not hear from them within 24 hours after you are discharged When to call your Orthopaedic Surgeon: Call [phone]. If you need to reach us after 5pm or on a weekend; call [phone] and the on call physician will be contacted ? Increased hip pain, unrelieved pain or if you have difficulty or are unable to walk ? Signs of infection-if your incision is red; continues to have drainage; drainage has a foul odor or if you have a persistent fever over 101 degrees ? Signs of a blood clot in your leg-calf pain, tenderness, redness, swelling of lower leg When to call your Primary Care Physician: 
? Concerns about medical conditions such as diabetes, high blood pressure, asthma, congestive heart failure ? Call if blood sugars are elevated, persistent headache or dizziness, coughing or congestion, constipation or diarrhea, burning with urination, abnormal heart rate When to call 911and go to the nearest emergency room ? Acute onset of chest pain, shortness of breath, difficulty breathing Activity ? Weight bearing as tolerated with walker or crutches. Refer to pages 23-33 of your handbook for instructions and pictures ? Complete your Home Exercise Program daily as instructed by your therapist.  Refer to pages 36-42 of your handbook for instructions and pictures ? Get up every one hour and walk (except at night when sleeping) ? AVOID sudden and extreme movement of your hip (surgical leg) ? Do not drive or operate heavy machinery Incision Care ? The Aquacel (brown, waterproof) surgical dressing is to remain on your hip for 7 days. On the 7th day have someone gently peel the dressing off by carefully lifting the edge and stretching it slightly to break the adhesive seal 
? If you have steri-strips (small, white pieces of tape) on your incision, they may come off when you remove the Aquacel surgical dressing. This is okay. You may now leave your incision open to air ? If your Aquacel dressing comes loose/off before the 7th day, you may replace it with a dry sterile gauze dressing; change it daily. Once your incision is not draining, you may leave it open to air ? You may take a shower with the Aquacel dressing in place. Once the Aquacel is removed, you may shower and get your incision wet but do not submerge your incision under water in a bath tub, hot tub or swimming pool for 6 weeks after surgery. Preventing blood clots ? Take Aspirin as prescribed by Dr. Van Jain for one month following surgery ? Wear elastic stockings (TEDS) for 4 weeks. You may remove them for approximately 1 hour daily for showering/sponge bathing Pain management ? Take pain medication as prescribed; decrease the amount you use as your pain lessens ? Avoid alcoholic beverages while taking pain medication ? Do not take any over-the-counter medication except for Tylenol (acetaminophen) ? Please be aware that many medications contain Tylenol. We do not want you to over medicate so please read the information below as a guide. Do not take more than 4 Grams of Tylenol in a 24 hour period. (There are 1000 milligrams in one Gram) 
o  325 mg of Tylenol per tablet (do not take more than 12 tablets in 24 hours) 
o 500 mg of Tylenol per tablet (do not take more than 8 tablets in 24 hours) ? You may place an ice bag on your hip for 15-20 minutes after exercising and as needed throughout the day and night Diet Resume usual diet; drink plenty of fluids; eat foods high in fiber You may want to take a stool softener (such as Senokot-S or Colace) to prevent constipation while you are taking pain medication. If constipation occurs, take a laxative (such as Dulcolax tablets, Milk of Magnesia, or a suppository) Home Health Care Protocol (to be followed by [address]) Nursing-per physicians order Remove Aquacel dressing at 7 days post-op Complete head to toe assessment, vital signs Medication reconciliation Review pain management Manage chronic medical conditions Physical Therapy-per physicians order Weight bearing status: 
Precautions at Admission: WBAT, Fall Right Side Weight Bearing: As tolerated Mobility Status: 
Supine to Sit: Modified independent, Supervision Sit to Stand: Stand-by asssistance Sit to Supine:  (pt returned to chair) Gait: 
Distance (ft): 250 Feet (ft) Ambulation - Level of Assistance: Stand-by asssistance Assistive Device: Gait belt, Walker, rolling Gait Abnormalities: Decreased step clearance (minor path deviations w/ rollator (B), pt able to correct) ADL status overall composite: Toilet Transfer : Minimum assistance, Additional time, Adaptive equipment, Assist x1 Physical Therapy ? Assessment and evaluation-bed mobility; functional transfers (bed, chair, bathroom, stairs); ambulation with equipment, car transfers, safety and ability to get out of house in the event of an emergency ? AVOID sudden and extreme movement of the hip (surgical leg) ? Discuss pain management ? Review how to do ADLs. Refer to pages 43-47 of handbook Home Exercise Program-refer to pages 36-42 of handbook Discharge Orders None ACO Transitions of Care Introducing [employer] [address] offers a voluntary care coordination program to provide high quality service and care to [facility] fee-for-service beneficiaries. [facility]ids[facility] Boss was designed to help you enhance your health and well-being through the following services: ? Transitions of Care  support for individuals who are transitioning from one care setting to another (example: Hospital to home). ? Chronic and Complex Care Coordination  support for individuals and caregivers of those with serious or chronic illnesses or with more than one chronic (ongoing) condition and those who take a number of different medications. If you meet specific medical criteria, a [facility] [address] may call you directly to coordinate your care with your primary care physician and your other care providers. For questions about the [facility]) programs, please, contact your physicians office. For general questions or additional information about Accountable Care Organizations: Please visit www.medicare.gov/acos. html or call 1-800-MEDICARE (1-159.906.5802) TTY users should call 7-891.908.6394. Introducing [facility]! Dear Smiley Vick: 
Thank you for requesting a [employer] account. Our records indicate that you already have an active [employer] account. You can access your account anytime at [email or web address] Did you know that you can access your hospital and ER discharge instructions at any time in [employer]? You can also review all of your test results from your hospital stay or ER visit. Additional Information If you have questions, please visit the Frequently Asked Questions section of the [employer] website at [email or web address] Remember, [employer] is NOT to be used for urgent needs. For medical emergencies, dial 911. Now available from your iPhone and Android! General Information Please provide this summary of care documentation to your next provider. Patient Signature:  ____________________________________________________________ Date:  ____________________________________________________________  
  
Gianni Spenc[place] Provider Signature:  ____________________________________________________________ Date:  ____________________________________________________________

## 2017-06-05 NOTE — PROGRESS NOTES
1700 - BG = 79, patient given 1 cup juice  1715 - BG = 75, patient given 1 cup juice  1730 - BG = 99

## 2017-06-05 NOTE — PROGRESS NOTES
TRANSFER - IN REPORT:    Verbal report received from PACU on Hiwot K Minor  being received from PACU for routine post - op      Report consisted of patients Situation, Background, Assessment and   Recommendations(SBAR). Information from the following report(s) SBAR was reviewed with the receiving nurse. Opportunity for questions and clarification was provided. Assessment completed upon patients arrival to unit and care assumed.

## 2017-06-05 NOTE — PROGRESS NOTES
Primary Nurse Ary Kat and Doris Wilde, RN performed a dual skin assessment on this patient No impairment noted  Esteban score is 19

## 2017-06-05 NOTE — PROGRESS NOTES
Problem: Mobility Impaired (Adult and Pediatric)  Goal: *Acute Goals and Plan of Care (Insert Text)  Physical Therapy Goals  Initiated 6/5/2017    1. Patient will move from supine to sit and sit to supine , scoot up and down and roll side to side in bed with independence within 4 days. 2. Patient will perform sit to stand with modified independence within 4 days. 3. Patient will ambulate with modified independence for 300 feet with the least restrictive device within 4 days. 4. Patient will perform hip home exercise program per protocol with independence within 4 days. Does not have stairs in her discharge environment  PHYSICAL THERAPY EVALUATION  Patient: Bogdan Galvin (59 y.o. female)  Date: 6/5/2017  Primary Diagnosis: OSTEOARTHRITIS RIGHT HIP  Right knee DJD  Procedure(s) (LRB):  RIGHT TOTAL HIP ARTHROPLASTY ANTERIOR APPROACH (Right) Day of Surgery   Precautions:   Fall, WBAT      ASSESSMENT :  Based on the objective data described below, the patient presents with resolving numbness but not fully resolved from anesthesia. She was agreeable to eval. She has a history of MS and at baseline amb using bilateral AFOs which she described as \"90% of the time\", but also has a history of falls. She required mod assist X 2 to come to sitting at EOB and note good unsupported sitting balance. Once in sitting her BP was 143/98 (per chart HTN goal is < 140/90). She sat at the EOB for several minutes and then returned to supine with mod assist X 2. Above discussed with her nurse. PT will see to progress mobility. Pt expressed desire to be discharged back to her home where she lives with her son who will be her . Disposition depending on progress     Patient will benefit from skilled intervention to address the above impairments.   Patients rehabilitation potential is considered to be Good  Factors which may influence rehabilitation potential include:   [ ]         None noted  [ ]         Mental ability/status  [X] Medical condition  [X]         Home/family situation and support systems  [ ]         Safety awareness  [ ]         Pain tolerance/management  [ ]         Other:        PLAN :  Recommendations and Planned Interventions:  [X]           Bed Mobility Training             [ ]    Neuromuscular Re-Education  [X]           Transfer Training                   [ ]    Orthotic/Prosthetic Training  [X]           Gait Training                         [ ]    Modalities  [X]           Therapeutic Exercises           [ ]    Edema Management/Control  [X]           Therapeutic Activities            [X]    Patient and Family Training/Education  [ ]           Other (comment):     Frequency/Duration: Patient will be followed by physical therapy  twice daily to address goals. Discharge Recommendations: Home Health and To Be Determined  Further Equipment Recommendations for Discharge: to be determined by PT, has a rollator but not a rolling walker. SUBJECTIVE:   Patient stated Oh, I'm not going to walk?, this after we had discussed how her BP was elevated after supine to sit and how she was still partially numb still from anesthesia.       OBJECTIVE DATA SUMMARY:   Consult received, chart reviewed, pt cleared by nursing  HISTORY:    Past Medical History:   Diagnosis Date    Arthritis       hip, hands    Benign cyst of left breast 3/21/2016    Cervical spinal stenosis 12/2/2016     Moderate C6-7    Depression      Diabetes mellitus type 2, controlled ([address] 9/13/2016    Endometriosis 6/25/2010    FH: breast cancer 6/25/2010     Chart states FH breast/ovary Ca, CAD,DM     HTN, goal below 140/90 6/25/2010     CURRENTLY NO MEDICATIONS (5/18/17)    Hypercholesterolemia      Hypothyroid 6/25/2010    Incontinence      Lumbosacral plexopathy 6/25/2010    MS (multiple sclerosis) ([address]      Myasthenia gravis ([facility] [address] [id number]    Sleep apnea 6/25/2010    Ureteral calculus 6/25/2010    Vitamin D deficiency 3/17/2016 Past Surgical History:   Procedure Laterality Date    ABDOMEN SURGERY PROC UNLISTED        bowel resection    BREAST SURGERY PROCEDURE UNLISTED        breast cyst-both breasts at different times    HX  SECTION   1986    HX CYST INCISION AND DRAINAGE        HX GI         COLONOSCOPY    HX GYN        ovarian cyst    HX HEENT   1974     THYROIDECTOMY    HX KNEE ARTHROSCOPY Right     HX ORTHOPAEDIC   1997     right thumb tendon repair x 2    HX ORTHOPAEDIC Left       CARPAL TUNNEL    HX OTHER SURGICAL         Janee Cath x2    HX SMALL BOWEL RESECTION        HX THYROIDECTOMY         1975    HX VASCULAR ACCESS         PORT -A- CATH X2     Prior Level of Function/Home Situation: mod I using a rollator and bilateral AFOs. History of multiple falls. Personal factors and/or comorbidities impacting plan of care: MS, DM, myasthesia Gravis, and alone at times at home     [address]: Private residence  Wheelchair Ramp: Yes  One/Two Story Residence: One story  Living Alone: No  Support Systems: Family member(s), Child(jose m)  Patient Expects to be Discharged to[de-identified] Private residence  Current DME Used/Available at Home: Ayse Nieves, rollator, Wheelchair, has AFOs here in her room     EXAMINATION/PRESENTATION/DECISION MAKING:   Critical Behavior:  Neurologic State: Alert  Orientation Level: Oriented X4, Appropriate for age  Cognition: Appropriate decision making, Appropriate safety awareness, Appropriate for age attention/concentration, Follows commands     Hearing:   Auditory  Auditory Impairment: None  Hearing Aids/Status: Other (comment) (does not own)  Skin:  Refer to MD and nursing notes  Edema: none noted  Range Of Motion:  AROM: Generally decreased, functional                       Strength:    Strength: Generally decreased, functional                    Tone & Sensation:                  Sensation: Impaired (still partially numb from anesthesia ) Coordination:     Vision:      Functional Mobility:  Bed Mobility:     Supine to Sit: Assist x2; Moderate assistance  Sit to Supine: Assist x2; Moderate assistance     Transfers:      not assessed                       Balance:   Sitting: Impaired  Sitting - Static: Fair (occasional)  Sitting - Dynamic: Fair (occasional)  Ambulation/Gait Training:                                                                               Stairs: Therapeutic Exercises: Ankle pumps     Functional Measure:  Timed up and go:      Timed Get Up And Go Test: 0 (not able to complete)      Timed Up and Go and G-code impairment scale:  Percentage of Impairment CH     0%    CI     1-19% CJ     20-39% CK     40-59% CL     60-79% CM     80-99% CN      100%   Timed   Score 0-56 10 11-12 13-14 15-16 17-18 19 20          < than 10 seconds=Normal  Greater then 13.5 seconds (in elderly)=Increased fall risk   Tamar Kate Woolacott M. Predicting the probability for falls in community dwelling older adults using the Timed Up and Go Test. Phys Ther. 2000;80:896-903. G codes: In compliance with CMSs Claims Based Outcome Reporting, the following G-code set was chosen for this patient based on their primary functional limitation being treated: The outcome measure chosen to determine the severity of the functional limitation was the TUG with a score of 0 which was correlated with the impairment scale.       · Mobility - Walking and Moving Around:               - CURRENT STATUS:    CN - 100% impaired, limited or restricted               - GOAL STATUS:           CJ - 20%-39% impaired, limited or restricted               - D/C STATUS:                       ---------------To be determined---------------      Physical Therapy Evaluation Charge Determination   History Examination Presentation Decision-Making   HIGH Complexity :3+ comorbidities / personal factors will impact the outcome/ POC  LOW Complexity : 1-2 Standardized tests and measures addressing body structure, function, activity limitation and / or participation in recreation  MEDIUM Complexity : Evolving with changing characteristics  LOW Complexity : FOTO score of       Based on the above components, the patient evaluation is determined to be of the following complexity level: LOW      Pain:  Pain Scale 1: Numeric (0 - 10)  Pain Intensity 1: 5  Pain Location 1: Hip  Pain Orientation 1: Right  Pain Description 1: Sharp  Pain Intervention(s) 1: Medication (see MAR)  Activity Tolerance:   See assessment above  Please refer to the flowsheet for vital signs taken during this treatment. After treatment:   [ ]         Patient left in no apparent distress sitting up in chair  [X]         Patient left in no apparent distress in bed  [X]         Call bell left within reach  [X]         Nursing notified  [ ]         Caregiver present  [ ]         Bed alarm activated      COMMUNICATION/EDUCATION:   The patients plan of care was discussed with: Registered Nurse.  [X]         Fall prevention education was provided and the patient/caregiver indicated understanding. [X]         Patient/family have participated as able in goal setting and plan of care. [X]         Patient/family agree to work toward stated goals and plan of care. [ ]         Patient understands intent and goals of therapy, but is neutral about his/her participation. [ ]         Patient is unable to participate in goal setting and plan of care.      Thank you for this referral.  Eliazar Ackerman   Time Calculation: 26 mins

## 2017-06-05 NOTE — OP NOTES
OPERATIVE REPORT  RIGHT TOTAL HIP REPLACEMENT (ANTERIOR APPROACH)    NAME: Kinsey Galvin  MRN: [id number]  :  1955  AGE: 63 y.o. DATE OF SURGERY:  2017    PREOPERATIVE DIAGNOSIS: Severe degenerative joint disease, right hip. POSTOPERATIVE DIAGNOSIS: Severe degenerative joint disease, right hip. PROCEDURES PERFORMED: Right total hip replacement - Anterior approach    SURGEON: Venkatesh Dent MD.    ASSISTANT: Mikal Beck. Haritha Kang PA-C    ANESTHESIA: Spinal    ESTIMATED BLOOD LOSS: 150 mL. DRAINS: None. COMPLICATIONS: None. SPECIMENS REMOVED: None. PRE-OP ANTIBIOTIC: Ancef 2 gram    IMPLANT:   Implant Name Type Inv. Item Serial No.  Lot No. LRB No. Used Action   SHELL ACET CUP 2H 52MM -- MPACT TWO HOLE - SNA  SHELL ACET CUP 2H 52MM -- MPACT TWO HOLE NA [address] [facility] [id number] Right 1 Implanted   LINER ACET FLAT HI X SZ E 36MM -- MPACT - SNA  LINER ACET FLAT HI X SZ E 36MM -- MPACT NA [employer] [place] [id number] Right 1 Implanted   PLUG ACET SHELL -- MPACT - SNA  PLUG ACET SHELL -- MPACT NA [employer] [place] [id number] Right 1 Implanted   SCR BNE CANC FLAT HD 6.5X35MM -- MPACT - SNA  SCR BNE CANC FLAT HD 6.5X35MM -- MPACT NA [employer] [place] [id number] Right 1 Implanted   SCR BNE CANC FLAT HD 6.5X20MM -- MPACT - SNA  SCR BNE CANC FLAT HD 6.5X20MM -- MPACT NA [employer] [place] [id number] Right 1 Implanted   HEAD FEM SM 36MM CER -- BIOLOX DELTA - SNA  HEAD FEM SM 36MM CER -- BIOLOX DELTA NA [employer] [place] [id number] Right 1 Implanted   STEM FEM CMTL STD SZ 2 -- SHRT NECK QUADRA H - SNA   STEM FEM CMTL STD SZ 2 -- SHRT NECK QUADRA H NA [employer] [place] [id number] Right 1 Implanted       INDICATIONS: 63 yrs female  with severe DJD of the right hip. The patient's right hip has been progressive in terms of symptoms. The patient now has severe activity limitation. The patient has continued with conservative management without adequate relief or improvement of functional limitations. We discussed options and she wished to proceed with right total hip replacement. The patient has continued with conservative management without adequate relief or improvement of functional limitations. DESCRIPTION OF PROCEDURE: Anesthetic was initiated. Preoperative dose of IV  Ancef was given. Og catheter was placed. The right side was  confirmed as the operative side, prepped and draped in the usual sterile fashion. Skin was covered with Ioban occlusive dressing. Direct anterior exposure was made to the patient's hip through the sartorius tensor interval. Anterior hip vasculature was cauterized. Retractors were taken out to observe for bleeding and there was none. The capsule was identified, opened and T'd distally. The femoral neck was osteotomized. Femoral head was removed from the acetabulum, which was exposed and soft tissues were removed. The acetabulum was progressively  reamed to 54 and a 54 trial shell was impacted with good press-fit. This was removed and a 54 Medacta shell was impacted in the acetabulum in 40 degrees of abduction in an anatomic-type anteversion. Bone spurs were removed and 6.5 screws x2 were placed. The polyethylene liner was placed. Femur was positioned and elevated from the wound. The medullary canal was entered. Flexible reamers were utilized as the patient had a narrowed femoral canal, broached to a size 2. Calcar planed and then trialed. A 36 mm, -4 hip ball was the most appropriate for leg length and tension with a standard offset stem. The hip was dislocated. The anterior greater trochanter was trimmed down to enhance flexion, rotation and stability. The trial was removed and the real stem was impacted. The real hip ball was placed. The hip was reduced. After copious irrigation, the capsule was closed with #2 Vicryl sutures. I irrigated the skin, subcutaneous and deep wound. I closed the fascia of the tensor fascia kimberly with #2 Vicryl sutures. Skin and subcutaneous were irrigated.  Soft tissues were infiltrated with local anesthetic. Skin and subcutaneous were closed in a standard fashion. Sterile dressing was applied. There were no complications. No specimen was sent. The procedure was a RIGHT TOTAL HIP REPLACEMENT using a Medacta total hip construct. Anh Faye PA-C was of vital assistance throughout the duration of the procedure. The patient was transferred to the recovery room in stable condition.

## 2017-06-05 NOTE — IP AVS SNAPSHOT
Current Discharge Medication List  
  
START taking these medications Dose & Instructions Dispensing Information Comments Morning Noon Evening Bedtime  
 aspirin delayed-release 325 mg tablet Your last dose was: Your next dose is:    
   
   
 Dose:  325 mg Take 1 Tab by mouth two (2) times a day. Quantity:  60 Tab Refills:  0  
     
   
   
   
  
 oxyCODONE IR 10 mg Tab immediate release tablet Commonly known as:  Ilda Ryder Your last dose was: Your next dose is:    
   
   
 Dose:  2.5-5 mg Take 0.5 Tabs by mouth every three (3) hours as needed. Max Daily Amount: 40 mg.  
 Quantity:  60 Tab Refills:  0 CONTINUE these medications which have CHANGED Dose & Instructions Dispensing Information Comments Morning Noon Evening Bedtime  
 levothyroxine 175 mcg tablet Commonly known as:  SYNTHROID What changed:   
- how much to take 
- how to take this - when to take this 
- additional instructions Your last dose was: Your next dose is: TAKE 1 TABLET BY MOUTH DAILY BEFORE BREAKFAST Quantity:  30 Tab Refills:  11 PARoxetine 40 mg tablet Commonly known as:  PAXIL What changed:   
- how much to take 
- how to take this - when to take this 
- additional instructions Your last dose was: Your next dose is: TAKE 1.5 TABS BY MOUTH DAILY. Quantity:  45 Tab Refills:  2 CONTINUE these medications which have NOT CHANGED Dose & Instructions Dispensing Information Comments Morning Noon Evening Bedtime ACTHAR H.P. 80 unit/mL injectable gel Generic drug:  corticotropin Your last dose was: Your next dose is:    
   
   
 Dose:  1 mL  
1 mL by SubCUTAneous route daily. 80 units subq q day for 10 days Refills:  0 Blood-Glucose Meter monitoring kit Your last dose was: Your next dose is:    
   
   
 by SubCUTAneous route Before breakfast and dinner. Free Style Lite glucometer and test strips Quantity:  1 Kit Refills:  0 Free Style Lite glucometer please  
    
   
   
   
  
 butalbital-acetaminophen-caff -40 mg per capsule Commonly known as:  [employer] Your last dose was: Your next dose is:    
   
   
 Dose:  1 Cap Take 1 Cap by mouth every four (4) hours as needed for Pain. Max Daily Amount: 6 Caps. Quantity:  10 Cap Refills:  0  
     
   
   
   
  
 dantrolene 100 mg capsule Commonly known as:  DANTRIUM Your last dose was: Your next dose is:    
   
   
 Dose:  100 mg  
100 mg as needed. Refills:  0  
     
   
   
   
  
 fentaNYL 75 mcg/hr Commonly known as:  Inetta Breslow Your last dose was: Your next dose is:    
   
   
 Dose:  1 Patch 1 Patch by TransDERmal route every seventy-two (72) hours. Max Daily Amount: 1 Patch. Quantity:  10 Patch Refills:  0 GILENYA 0.5 mg Cap Generic drug:  fingolimod Your last dose was: Your next dose is:    
   
   
 Dose:  1 Cap Take 1 Cap by mouth daily. Refills:  0 Lancets Misc Your last dose was: Your next dose is:    
   
   
 Use to check blood sugar bid , dx-Diabetes Mellitus 250.00 Quantity:  1 Package Refills:  11 LIDODERM 5 % Generic drug:  lidocaine Your last dose was: Your next dose is:    
   
   
 by TransDERmal route every twenty-four (24) hours. Apply patch to the affected area for 12 hours a day and remove for 12 hours a day. Refills:  0 MESTINON TIMESPAN 180 mg SR tablet Generic drug:  pyridostigmine bromide Your last dose was: Your next dose is:    
   
   
 Dose:  180 mg Take 180 mg by mouth two (2) times a day. Refills:  0 pregabalin 200 mg capsule Commonly known as:  Yvette Lopez Your last dose was: Your next dose is:    
   
   
 Dose:  200 mg Take 1 Cap by mouth two (2) times a day. Max Daily Amount: 400 mg. Quantity:  180 Cap Refills:  3 PROVIGIL 200 mg tablet Generic drug:  modafinil Your last dose was: Your next dose is:    
   
   
 Dose:  200 mg Take 200 mg by mouth two (2) times a day. Refills:  0  
     
   
   
   
  
 ROBAXIN-750 PO Your last dose was: Your next dose is:    
   
   
 Dose:  1 Tab Take 1 Tab by mouth as needed. Refills:  0  
     
   
   
   
  
 rosuvastatin 20 mg tablet Commonly known as:  CRESTOR Your last dose was: Your next dose is: TAKE 1 TABLET BY MOUTH EVERY EVENING Quantity:  90 Tab Refills:  1 TENS UNIT ELECTRODES Your last dose was: Your next dose is:    
   
   
 by Does Not Apply route. prn Refills:  0  
     
   
   
   
  
 TOPAMAX 200 mg tablet Generic drug:  topiramate Your last dose was: Your next dose is:    
   
   
 Dose:  200 mg Take 200 mg by mouth two (2) times a day. Refills:  0  
     
   
   
   
  
 traZODone 50 mg tablet Commonly known as:  Deatra Madie Your last dose was: Your next dose is:    
   
   
 Dose:  50 mg Take 50 mg by mouth nightly as needed. Refills:  0 VALIUM 10 mg tablet Generic drug:  diazePAM  
   
Your last dose was: Your next dose is:    
   
   
 Dose:  5-10 mg Take 5-10 mg by mouth as needed for Anxiety. Refills:  0  
     
   
   
   
  
 VITAMIN D2 50,000 unit capsule Generic drug:  ergocalciferol Your last dose was: Your next dose is:    
   
   
 Dose:  95146 Units Take 50,000 Units by mouth every Sunday. Refills:  0 STOP taking these medications glucose blood VI test strips strip Commonly known as:  FREESTYLE LITE STRIPS  
   
  
 naproxen 500 mg tablet Commonly known as:  NAPROSYN Where to Get Your Medications Information on where to get these meds will be given to you by the nurse or doctor. ! Ask your nurse or doctor about these medications  
  aspirin delayed-release 325 mg tablet  
 oxyCODONE IR 10 mg Tab immediate release tablet

## 2017-06-05 NOTE — PERIOP NOTES
Patient: Alessnadra Jimenez MRN: [id number]  SSN: [id number]   YOB: 1955  Age: 63 y.o. Sex: female     Patient is status post Procedure(s):  RIGHT TOTAL HIP ARTHROPLASTY ANTERIOR APPROACH. Surgeon(s) and Role:     * Marsha Borges MD - Primary    Local/Dose/Irrigation:  exparel 20ml, morphine 10mg, toradol 30mg, bupivicaine 0.5% plain, 70ml of normal saline injected to right hip                Venous Access Device power port 05/17/16 (Active)       Venous Access Device POWER PORT Upper chest (subclavicular area, right (Active)      Peripheral IV 06/05/17 Left Hand (Active)   Site Assessment Clean, dry, & intact 6/5/2017  9:44 AM   Phlebitis Assessment 0 6/5/2017  9:44 AM   Infiltration Assessment 0 6/5/2017  9:44 AM   Dressing Status Clean, dry, & intact 6/5/2017  9:44 AM   Dressing Type Transparent 6/5/2017  9:44 AM   Hub Color/Line Status Green; Infusing 6/5/2017  9:44 AM   Alcohol Cap Used Yes 6/5/2017  9:44 AM                           Dressing/Packing:  Wound Hip Anterior;Right-DRESSING TYPE: Adhesive wound closure strips (Steri-Strips); Silver products (aquacell dressing) (06/05/17 1321)  Splint/Cast:  ]    Other:

## 2017-06-06 PROBLEM — F11.90 CHRONIC, CONTINUOUS USE OF OPIOIDS: Chronic | Status: ACTIVE | Noted: 2017-06-05

## 2017-06-06 LAB
ANION GAP BLD CALC-SCNC: 4 MMOL/L (ref 5–15)
BUN SERPL-MCNC: 13 MG/DL (ref 6–20)
BUN/CREAT SERPL: 17 (ref 12–20)
CALCIUM SERPL-MCNC: 7.7 MG/DL (ref 8.5–10.1)
CHLORIDE SERPL-SCNC: 116 MMOL/L (ref 97–108)
CO2 SERPL-SCNC: 23 MMOL/L (ref 21–32)
CREAT SERPL-MCNC: 0.77 MG/DL (ref 0.55–1.02)
GLUCOSE BLD STRIP.AUTO-MCNC: 118 MG/DL (ref 65–100)
GLUCOSE BLD STRIP.AUTO-MCNC: 159 MG/DL (ref 65–100)
GLUCOSE BLD STRIP.AUTO-MCNC: 187 MG/DL (ref 65–100)
GLUCOSE BLD STRIP.AUTO-MCNC: 209 MG/DL (ref 65–100)
GLUCOSE SERPL-MCNC: 173 MG/DL (ref 65–100)
HGB BLD-MCNC: 10.6 G/DL (ref 11.5–16)
POTASSIUM SERPL-SCNC: 4.2 MMOL/L (ref 3.5–5.1)
SERVICE CMNT-IMP: ABNORMAL
SODIUM SERPL-SCNC: 143 MMOL/L (ref 136–145)

## 2017-06-06 PROCEDURE — 74011250636 HC RX REV CODE- 250/636: Performed by: PHYSICIAN ASSISTANT

## 2017-06-06 PROCEDURE — G8988 SELF CARE GOAL STATUS: HCPCS

## 2017-06-06 PROCEDURE — 82962 GLUCOSE BLOOD TEST: CPT

## 2017-06-06 PROCEDURE — 97165 OT EVAL LOW COMPLEX 30 MIN: CPT

## 2017-06-06 PROCEDURE — 77010033678 HC OXYGEN DAILY

## 2017-06-06 PROCEDURE — 80048 BASIC METABOLIC PNL TOTAL CA: CPT | Performed by: ORTHOPAEDIC SURGERY

## 2017-06-06 PROCEDURE — 74011636637 HC RX REV CODE- 636/637: Performed by: NURSE PRACTITIONER

## 2017-06-06 PROCEDURE — 36415 COLL VENOUS BLD VENIPUNCTURE: CPT | Performed by: ORTHOPAEDIC SURGERY

## 2017-06-06 PROCEDURE — 97530 THERAPEUTIC ACTIVITIES: CPT

## 2017-06-06 PROCEDURE — 97116 GAIT TRAINING THERAPY: CPT

## 2017-06-06 PROCEDURE — 85018 HEMOGLOBIN: CPT | Performed by: ORTHOPAEDIC SURGERY

## 2017-06-06 PROCEDURE — 65270000029 HC RM PRIVATE

## 2017-06-06 PROCEDURE — G8987 SELF CARE CURRENT STATUS: HCPCS

## 2017-06-06 PROCEDURE — 97535 SELF CARE MNGMENT TRAINING: CPT

## 2017-06-06 PROCEDURE — 74011250637 HC RX REV CODE- 250/637: Performed by: PHYSICIAN ASSISTANT

## 2017-06-06 RX ORDER — INSULIN LISPRO 100 [IU]/ML
INJECTION, SOLUTION INTRAVENOUS; SUBCUTANEOUS
Status: DISCONTINUED | OUTPATIENT
Start: 2017-06-06 | End: 2017-06-07 | Stop reason: HOSPADM

## 2017-06-06 RX ORDER — MODAFINIL 100 MG/1
200 TABLET ORAL
Status: DISCONTINUED | OUTPATIENT
Start: 2017-06-06 | End: 2017-06-07 | Stop reason: HOSPADM

## 2017-06-06 RX ORDER — DEXTROSE 50 % IN WATER (D50W) INTRAVENOUS SYRINGE
12.5-25 AS NEEDED
Status: DISCONTINUED | OUTPATIENT
Start: 2017-06-06 | End: 2017-06-06 | Stop reason: ALTCHOICE

## 2017-06-06 RX ORDER — MAGNESIUM SULFATE 100 %
4 CRYSTALS MISCELLANEOUS AS NEEDED
Status: DISCONTINUED | OUTPATIENT
Start: 2017-06-06 | End: 2017-06-07 | Stop reason: HOSPADM

## 2017-06-06 RX ADMIN — ASPIRIN 325 MG: 325 TABLET, DELAYED RELEASE ORAL at 18:00

## 2017-06-06 RX ADMIN — OXYCODONE HYDROCHLORIDE 10 MG: 5 TABLET ORAL at 08:58

## 2017-06-06 RX ADMIN — Medication 10 ML: at 05:41

## 2017-06-06 RX ADMIN — ROSUVASTATIN CALCIUM 20 MG: 10 TABLET ORAL at 21:50

## 2017-06-06 RX ADMIN — KETOROLAC TROMETHAMINE 30 MG: 30 INJECTION, SOLUTION INTRAMUSCULAR at 14:00

## 2017-06-06 RX ADMIN — TOPIRAMATE 200 MG: 100 TABLET, FILM COATED ORAL at 08:58

## 2017-06-06 RX ADMIN — PREGABALIN 200 MG: 100 CAPSULE ORAL at 19:55

## 2017-06-06 RX ADMIN — POLYETHYLENE GLYCOL 3350 17 G: 17 POWDER, FOR SOLUTION ORAL at 09:00

## 2017-06-06 RX ADMIN — PYRIDOSTIGMINE BROMIDE 180 MG: 180 TABLET, EXTENDED RELEASE ORAL at 19:56

## 2017-06-06 RX ADMIN — PYRIDOSTIGMINE BROMIDE 180 MG: 180 TABLET, EXTENDED RELEASE ORAL at 08:54

## 2017-06-06 RX ADMIN — DOCUSATE SODIUM AND SENNOSIDES 1 TABLET: 8.6; 5 TABLET, FILM COATED ORAL at 09:00

## 2017-06-06 RX ADMIN — PREGABALIN 200 MG: 100 CAPSULE ORAL at 08:56

## 2017-06-06 RX ADMIN — TOPIRAMATE 200 MG: 100 TABLET, FILM COATED ORAL at 19:55

## 2017-06-06 RX ADMIN — ACETAMINOPHEN 650 MG: 325 TABLET, FILM COATED ORAL at 19:55

## 2017-06-06 RX ADMIN — PAROXETINE HYDROCHLORIDE 60 MG: 20 TABLET, FILM COATED ORAL at 21:50

## 2017-06-06 RX ADMIN — Medication 10 ML: at 15:54

## 2017-06-06 RX ADMIN — LEVOTHYROXINE SODIUM 175 MCG: 175 TABLET ORAL at 21:50

## 2017-06-06 RX ADMIN — ACETAMINOPHEN 650 MG: 325 TABLET, FILM COATED ORAL at 05:41

## 2017-06-06 RX ADMIN — DOCUSATE SODIUM AND SENNOSIDES 1 TABLET: 8.6; 5 TABLET, FILM COATED ORAL at 19:55

## 2017-06-06 RX ADMIN — ASPIRIN 325 MG: 325 TABLET, DELAYED RELEASE ORAL at 09:00

## 2017-06-06 RX ADMIN — INSULIN LISPRO 2 UNITS: 100 INJECTION, SOLUTION INTRAVENOUS; SUBCUTANEOUS at 21:50

## 2017-06-06 RX ADMIN — OXYCODONE HYDROCHLORIDE 10 MG: 5 TABLET ORAL at 04:43

## 2017-06-06 NOTE — PROGRESS NOTES
NP ORTHO PROGRESS NOTE  Post Op day: 1 Day Post-Op    June 6, 2017 2:53 PM     Misael Galvin  [id number]  female  63 y.o.   1955    Admit date: 6/5/2017  Date of Surgery: 6/5/2017   Procedures: Procedure(s):  RIGHT TOTAL HIP ARTHROPLASTY ANTERIOR APPROACH  Admitting Physician: Carol Dooley MD   Surgeon: Sisi Felix) and Role:     * Carol Dooley MD - Primary    Chart/Meds/Labs Reviewed  Current Facility-Administered Medications   Medication Dose Route Frequency    glucose chewable tablet 16 g  4 Tab Oral PRN    glucagon (GLUCAGEN) injection 1 mg  1 mg IntraMUSCular PRN    insulin lispro (HUMALOG) injection   SubCUTAneous AC&HS    dextrose 10 % infusion 125-250 mL  125-250 mL IntraVENous PRN    modafinil (PROVIGIL) tablet 200 mg  200 mg Oral BID PRN    0.9% sodium chloride infusion  125 mL/hr IntraVENous CONTINUOUS    sodium chloride (NS) flush 5-10 mL  5-10 mL IntraVENous Q8H    sodium chloride (NS) flush 5-10 mL  5-10 mL IntraVENous PRN    acetaminophen (TYLENOL) tablet 650 mg  650 mg Oral Q6H    oxyCODONE IR (ROXICODONE) tablet 5 mg  5 mg Oral Q3H PRN    oxyCODONE IR (ROXICODONE) tablet 10 mg  10 mg Oral Q3H PRN    HYDROmorphone (PF) (DILAUDID) injection 0.5 mg  0.5 mg IntraVENous Q4H PRN    naloxone (NARCAN) injection 0.4 mg  0.4 mg IntraVENous PRN    ondansetron (ZOFRAN) injection 4 mg  4 mg IntraVENous Q4H PRN    hydrOXYzine HCl (ATARAX) tablet 10 mg  10 mg Oral Q8H PRN    senna-docusate (PERICOLACE) 8.6-50 mg per tablet 1 Tab  1 Tab Oral BID    polyethylene glycol (MIRALAX) packet 17 g  17 g Oral DAILY    [START ON 6/7/2017] bisacodyl (DULCOLAX) suppository 10 mg  10 mg Rectal DAILY PRN    aspirin delayed-release tablet 325 mg  325 mg Oral BID    ketorolac (TORADOL) injection 30 mg  30 mg IntraVENous Q6H    corticotropin injectable gel 80 Units  80 Units SubCUTAneous DAILY    fingolimod (Gilenya) cap 0.5 mg **Patient Supplied Medication**  0.5 mg Oral DAILY    butalbital-acetaminophen-caffeine (FIORICET, ESGIC) -40 mg per tablet 1 Tab  1 Tab Oral Q6H PRN    diazePAM (VALIUM) tablet 5-10 mg  5-10 mg Oral Q8H PRN    fentaNYL (DURAGESIC) 75 mcg/hr patch 1 Patch  1 Patch TransDERmal Q72H    pregabalin (LYRICA) capsule 200 mg  200 mg Oral BID    topiramate (TOPAMAX) tablet 200 mg  200 mg Oral BID    rosuvastatin (CRESTOR) tablet 20 mg  20 mg Oral QHS    pyridostigmine bromide (MESTINON SR) tablet 180 mg  180 mg Oral BID    PARoxetine (PAXIL) tablet 60 mg  60 mg Oral QHS    levothyroxine (SYNTHROID) tablet 175 mcg  175 mcg Oral QHS    lidocaine (LIDODERM) 5 % patch 1 Patch  1 Patch TransDERmal DAILY PRN    methocarbamol (ROBAXIN) tablet 500 mg  500 mg Oral TID PRN    dantrolene (DANTRIUM) capsule 100 mg  100 mg Oral TID PRN    traZODone (DESYREL) tablet 50 mg  50 mg Oral QHS PRN    sodium chloride 0.9 % bolus infusion 500 mL  500 mL IntraVENous PRN       Subjective:    Complaints: None  Denies Dizziness, CP, SOB, N/V, Abdominal pain, Constipation, diarrhea, numbness or tingling of extremities. Able to perform ankle pumps very slowly--limited by her MS & MG. .  Progressing very slowly with mobility. Hoping she'll be able to go home with her son & home care services. Incisional pain control: well controlled  Pain Control:   Pain Assessment  Pain Scale 1: Numeric (0 - 10)  Pain Intensity 1: 3  Pain Onset 1: post op  Pain Location 1: Hip  Pain Orientation 1: Right  Pain Description 1: Aching  Pain Intervention(s) 1: Medication (see MAR), Ice    Oral diet: Tolerating diet well    Objective:  General: Tends to repeat herself & poor eye contact but is Alert,Ox4, cooperative, NAD. HEENT: Atraumatic, PERRL, anicteric sclerae  Lungs: Bilateral expansion. Equal excursion. No accessory muscle use. Gastrointestinal:  Soft, non-tender, non-distended  Extremities:  Neurovasc exam WDL. + DP pulses. Sensation intact to light touch.           Motor: + DF/PF Weak  --baseline see PMH          Calves non-tender upon palpation or with passive stretch. No significant erythema or swelling. Dressing: clean, dry and intact.     Vital Signs:   Visit Vitals    /66 (BP 1 Location: Right arm, BP Patient Position: At rest)    Pulse 72    Temp 99.6 °F (37.6 °C)    Resp 16    Ht 6' (1.829 m)    Wt 81.6 kg (180 lb)    LMP 2010    SpO2 97%    BMI 24.41 kg/m2      O2 Device: Room air   Patient Vitals for the past 24 hrs:   BP Temp Pulse Resp SpO2   17 0800 107/66 99.6 °F (37.6 °C) 72 16 97 %   17 0417 110/71 98.6 °F (37 °C) 75 15 98 %   17 2347 101/52 98.6 °F (37 °C) 66 14 98 %   17 2150 100/61 98.2 °F (36.8 °C) 70 16 100 %   17 1832 110/59 97.8 °F (36.6 °C) 64 16 100 %   17 1733 118/63 98 °F (36.7 °C) 74 16 96 %   17 1648 143/68 97.3 °F (36.3 °C) (!) 55 16 100 %   17 1640 (!) 143/98 - (!) 56 - -   17 1635 142/75 - (!) 58 - -   17 1533 117/59 97.4 °F (36.3 °C) (!) 55 14 100 %   17 1500 134/55 - (!) 55 10 98 %     Temp (24hrs), Av.2 °F (36.8 °C), Min:97.3 °F (36.3 °C), Max:99.6 °F (37.6 °C)      Intake/output-last 8 hours:        Intake/output- 24 hours:   1901 -  0700  In: 650 [I.V.:650]  Out: 1071 [Urine:2275]    LAB:   Recent Results (from the past 24 hour(s))   GLUCOSE, POC    Collection Time: 17  3:06 PM   Result Value Ref Range    Glucose (POC) 83 65 - 100 mg/dL    Performed by Pool Zavala    GLUCOSE, POC    Collection Time: 17  5:00 PM   Result Value Ref Range    Glucose (POC) 79 65 - 100 mg/dL    Performed by JOSÉ ANTONIO RUSSELL    GLUCOSE, POC    Collection Time: 17  5:16 PM   Result Value Ref Range    Glucose (POC) 75 65 - 100 mg/dL    Performed by JOSÉ ANTONIO RUSSELL    GLUCOSE, POC    Collection Time: 17  5:34 PM   Result Value Ref Range    Glucose (POC) 99 65 - 100 mg/dL    Performed by JOSÉ ANTONIO RUSSELL    GLUCOSE, POC    Collection Time: 17  9:36 PM Result Value Ref Range    Glucose (POC) 193 (H) 65 - 100 mg/dL    Performed by Kourtney Antunez    METABOLIC PANEL, BASIC    Collection Time: 06/06/17  4:59 AM   Result Value Ref Range    Sodium 143 136 - 145 mmol/L    Potassium 4.2 3.5 - 5.1 mmol/L    Chloride 116 (H) 97 - 108 mmol/L    CO2 23 21 - 32 mmol/L    Anion gap 4 (L) 5 - 15 mmol/L    Glucose 173 (H) 65 - 100 mg/dL    BUN 13 6 - 20 MG/DL    Creatinine 0.77 0.55 - 1.02 MG/DL    BUN/Creatinine ratio 17 12 - 20      GFR est AA >60 >60 ml/min/1.73m2    GFR est non-AA >60 >60 ml/min/1.73m2    Calcium 7.7 (L) 8.5 - 10.1 MG/DL   HEMOGLOBIN    Collection Time: 06/06/17  4:59 AM   Result Value Ref Range    HGB 10.6 (L) 11.5 - 16.0 g/dL   GLUCOSE, POC    Collection Time: 06/06/17  6:22 AM   Result Value Ref Range    Glucose (POC) 187 (H) 65 - 100 mg/dL    Performed by Sheree, POC    Collection Time: 06/06/17 11:58 AM   Result Value Ref Range    Glucose (POC) 159 (H) 65 - 100 mg/dL    Performed by Acacia Bruno       Lab Results   Component Value Date/Time    INR 1.0 05/11/2017 10:20 AM    INR 1.0 05/17/2016 04:53 PM    INR 1.1 04/04/2015 01:00 PM     Lab Results   Component Value Date/Time    HGB 10.6 06/06/2017 04:59 AM    HGB 13.1 05/11/2017 10:20 AM    HGB 13.5 05/18/2016 04:45 AM    HGB 14.0 05/17/2016 04:53 PM     Recent Labs      06/06/17   0459   NA  143   K  4.2   CL  116*   BUN  13   CREA  0.77   GLU  173*   CA  7.7*       PT/OT:   Gait:  Gait  Speed/Gerda: Pace decreased (<100 feet/min), Slow  Step Length: Left shortened, Right shortened  Stance:  (right slightly decreased)  Ambulation - Level of Assistance: Contact guard assistance  Distance (ft): 100 Feet (ft)  Assistive Device: Gait belt, Walker, rolling                 PATIENT MOBILITY  Bed Mobility Training  Supine to Sit:  (pt recieved sitting in chair)  Sit to Supine: Stand-by asssistance, Additional time  Transfer Training  Sit to Stand: Contact guard assistance  Stand to Sit: Contact guard assistance      Gait Training  Assistive Device: Gait belt, Walker, rolling  Ambulation - Level of Assistance: Contact guard assistance  Distance (ft): 100 Feet (ft)   Weight Bearing Status  Right Side Weight Bearing: As tolerated        Assessment and Plan    Principal Problem:    Degenerative joint disease (DJD) of hip (6/4/2017)      Overview: RIGHT TOTAL HIP REPLACEMENT 6/5/17    Active Problems:    Chronic, continuous use of opioids (6/5/2017)      Overview: Fentanyl patch          POD#1 Procedure(s):  RIGHT TOTAL HIP ARTHROPLASTY ANTERIOR APPROACH  Expected acute blood loss anemia related to surgery. No indications of bleeding. VSSAF  BG elevated this afternoon. Labs trending as expected. VTE prophylaxis: ASA, Mobilization, Ankle pumping exercises, SCDs per order   Weight bearing:  WBAT  Pain management:  Multi-modal pain plan, see above for medication,  Ice packs & elevation of extremity per orders, active gentle ROM & mobilize frequently for short periods of time. Bowel regimen. PT/OT:  Needs AFO for ambulation  Wound benign. Neurovascularly intact. Progressing steadily for this woman with multiple co-morbidites. Continue present plans per orthopedic attending surgeon & interdisciplinary team for joint replacement. Discharge Planning: Home with home health care services if continues to progress.   Plans per Dr. Bradley Ford    Signed By: Oriana Samayoa, SWATI    RN, MSN, MA, Adult NP-BC

## 2017-06-06 NOTE — PROGRESS NOTES
Care Management Interventions  PCP Verified by CM: Yes (Dr. Godwin Vazquez)  Palliative Care Consult (Criteria: CHF and RRAT>21): No  Reason for No Palliative Care Consult: Other (see comment) (no needs)  Mode of Transport at Discharge: Other (see comment) (family)  Transition of Care Consult (CM Consult): [address]: No  Reason Outside [place]: Patient already serviced by other home care/hospice agency (patient is followed by [facility])  MyChart Signup: No  Discharge Durable Medical Equipment: No (has all DME)  Physical Therapy Consult: Yes  Occupational Therapy Consult: Yes  Speech Therapy Consult: No  Current Support Network: Own Home (Live with son)  Confirm Follow Up Transport: Family  Plan discussed with Pt/Family/Caregiver: Yes  Freedom of Choice Offered: Yes  Discharge Location  Discharge Placement: Home with home health    Home care orders noted. CRM met with the patient to offer agency choice. The patient has been followed by [facility] for [facility] PT, OT , and [facility] aid. CRM will resume this care and add the orthopedic orders to the referral. Orders sent via [employer]. The patient will discharge home tomorrow by car/family. She lives with her son.  CURTIS

## 2017-06-06 NOTE — PROGRESS NOTES
Spiritual Care Partner Volunteer visited patient in [address] [place] [address] [address] on 6/6/17. Documented by:  Yuliet Mina M.Div.    Paging Service [phone])

## 2017-06-06 NOTE — PROGRESS NOTES
Problem: Self Care Deficits Care Plan (Adult)  Goal: *Acute Goals and Plan of Care (Insert Text)  Occupational Therapy Goals  Initiated 6/6/2017  1. Patient will perform lower body ADLs with AE supervision/set-up within 7 day(s). 2. Patient will perform upper body ADLs standing 5 mins without fatigue or LOB with modified independence within 7 day(s). 3. Patient will perform toilet transfer with modified independence within 7 day(s). 4. Patient will perform all aspects of toileting with modified independence within 7 day(s). 5. Patient will participate in upper extremity therapeutic exercise/activities with modified independence for 10 minutes within 7 day(s). 6. Patient will utilize energy conservation techniques during functional activities without cues within 7 day(s). OCCUPATIONAL THERAPY EVALUATION  Patient: Carisa Galvin (59 y.o. female)  Date: 6/6/2017  Primary Diagnosis: OSTEOARTHRITIS RIGHT HIP  Right knee DJD  Procedure(s) (LRB):  RIGHT TOTAL HIP ARTHROPLASTY ANTERIOR APPROACH (Right) 1 Day Post-Op   Precautions:   WBAT, Fall      ASSESSMENT :  Based on the objective data described below, the patient presents with impaired standing balance and standing tolerance, decreased functional reach to feet, h/o MS and myasthenia gravis, episode of perseveration during assessment, h/o falls, generally decreased functional strength s/p R THR POD 1. Pt is overall IND to modA for self-care tasks. At baseline, pt lives with son, has home aide (2x week for bathing) and HHOT/PT. Pt wears B AFOs at baseline with h/o frequent falls. Pt able to complete bed mobility supervision, sit<>stand, including toilet transfer with Clarissa and CGA using RW for mobility. Pt does require additional time with all tasks, good safety awareness. Pt has all necessary DME/AE at baseline. Pt educated on home safety/modification, ADL safety and activity modification; pt verbalized good understanding.  Pt was able to don AFOs seated EOB with supervision/set-up. Recommend follow-up next session with further LB dressing and standing tasks. Anticipate pt to discharge home with HHPT/OT. Patient will benefit from skilled intervention to address the above impairments. Patients rehabilitation potential is considered to be Good  Factors which may influence rehabilitation potential include:   [ ]                None noted  [ ]                Mental ability/status  [X]                Medical condition  [ ]                Home/family situation and support systems  [ ]                Safety awareness  [ ]                Pain tolerance/management  [ ]                Other:        PLAN :  Recommendations and Planned Interventions:  [X]                  Self Care Training                  [X]           Therapeutic Activities  [X]                  Functional Mobility Training    [X]           Cognitive Retraining  [X]                  Therapeutic Exercises           [X]           Endurance Activities  [X]                  Balance Training                   [ ]           Neuromuscular Re-Education  [ ]                  Visual/Perceptual Training     [X]      Home Safety Training  [X]                  Patient Education                 [X]           Family Training/Education  [ ]                  Other (comment):     Frequency/Duration: Patient will be followed by occupational therapy 5 times a week to address goals. Discharge Recommendations: Home Health OT  Further Equipment Recommendations for Discharge: TBD       SUBJECTIVE:   Patient stated The bedside commode is next to my. ...  Pt with episode of perseveration during assessment.       OBJECTIVE DATA SUMMARY:   HISTORY:   Past Medical History:   Diagnosis Date    Arthritis       hip, hands    Benign cyst of left breast 3/21/2016    Cervical spinal stenosis 12/2/2016     Moderate C6-7    Depression      Diabetes mellitus type 2, controlled ([address] 9/13/2016    Endometriosis 6/25/2010    FH: breast cancer 2010     Chart states FH breast/ovary Ca, CAD,DM     HTN, goal below 140/90 2010     CURRENTLY NO MEDICATIONS (17)    Hypercholesterolemia      Hypothyroid 2010    Incontinence      Lumbosacral plexopathy 2010    [facility] [address]      Myasthenia gravis ([facility] [address]     Sleep apnea 2010    Ureteral calculus 2010    Vitamin D deficiency 3/17/2016     Past Surgical History:   Procedure Laterality Date    ABDOMEN SURGERY PROC UNLISTED        bowel resection    BREAST SURGERY PROCEDURE UNLISTED        breast cyst-both breasts at different times    HX  SECTION   1986    HX CYST INCISION AND DRAINAGE        HX GI         COLONOSCOPY    HX GYN        ovarian cyst    HX HEENT        THYROIDECTOMY    HX KNEE ARTHROSCOPY Right     HX ORTHOPAEDIC        right thumb tendon repair x 2    HX ORTHOPAEDIC Left       CARPAL TUNNEL    HX OTHER SURGICAL         Janee Cath x2    HX SMALL BOWEL RESECTION        HX THYROIDECTOMY             HX VASCULAR ACCESS         PORT -A- CATH X2        Prior Level of Function/Home Situation: Pt reports she lives with son, overall Ana to Clarissa for self-care tasks, uses rollator/RW/wheelchair for mobility. Has home health aide 2x week for bathing, HHOT/PT.    Expanded or extensive additional review of patient history: h/o MS and Mysathenia gravis, h/o falls     Home Situation  Home Environment: Private residence  # Steps to Enter: 0  Wheelchair Ramp: Yes  One/Two Story Residence: One story  Living Alone: No  Support Systems: Child(jose m)  Patient Expects to be Discharged to[de-identified] Private residence  Current DME Used/Available at Home: Pattricia Delay, rollator, Wheelchair (bedside commode)  [X]  Right hand dominant             [ ]  Left hand dominant     EXAMINATION OF PERFORMANCE DEFICITS:  Cognitive/Behavioral Status:  Neurologic State: Alert  Orientation Level: Oriented X4  Cognition: Appropriate safety awareness; Appropriate decision making  Perception: Appears intact  Perseveration: Perseverates during conversation  Safety/Judgement: Awareness of environment;Good awareness of safety precautions     Skin: appears intact     Edema: none noted in BUEs     Hearing: Auditory  Auditory Impairment: None  Hearing Aids/Status: Other (comment) (does not own)     Vision/Perceptual:                 Glasses, WFL, no diplopoa                     Range of Motion:     AROM: Within functional limits  PROM: Within functional limits                       Strength:     Strength: Within functional limits                 Coordination:     Fine Motor Skills-Upper: Left Intact; Right Intact    Gross Motor Skills-Upper: Left Intact; Right Intact     Tone & Sensation:     Sensation: Intact                       Balance:  Sitting: Intact  Standing: Intact; With support  Standing - Static: Good  Standing - Dynamic : Fair     Functional Mobility and Transfers for ADLs:  Bed Mobility:  Supine to Sit:  (pt received sitting in chair)  Sit to Supine: Minimum assistance (LE management)     Transfers:  Sit to Stand: Contact guard assistance  Stand to Sit: Contact guard assistance  Toilet Transfer : Minimum assistance; Additional time; Adaptive equipment;Assist x1     ADL Assessment:  Feeding: Modified independent     Oral Facial Hygiene/Grooming: Stand-by assistance (standing at sink)     Bathing: Minimum assistance; Adaptive equipment; Additional time;Assist x1 (inferred if seated)     Upper Body Dressing: Supervision;Setup     Lower Body Dressing: Minimum assistance; Adaptive equipment; Additional time;Assist x1     Toileting: Minimum assistance; Adaptive equipment; Additional time;Assist x1                 ADL Intervention and task modifications:           Cognitive Retraining  Safety/Judgement: Awareness of environment;Good awareness of safety precautions     Bathing: Patient instructed when bathing to not submerge wound in water, stand to shower or sponge bathe, cover wound with plastic and tape to ensure no water reaches bandage/wound without cues. Patient indicated understanding. Dressing joint: Patient instructed to don/doff RLE first/last.  Patient instructed to don all clothing while sitting prior to standing, doff all clothing to knees while standing, then sit to doff clothing off from knees to feet in order to facilitate fall prevention, pain management, and energy conservation with RW. Patient indicated understanding/recalled strategies with min cues. Home safety: Patient instructed on home modifications and safety (raise height of ADL objects, appropriate height of chair surfaces, recliner safety, change of floor surfaces, clear pathways) to increase independence and fall prevention with RW. Patient indicated understanding. Standing: Patient instructed to walk up to sink/counter top/surfaces, step into walker to increase safety of joint and fall prevention with RW. Patient educated about hip anatomy verbally and with pictures and educated to avoid internal rotation of RLE. Instructed to apply concept to ADLs within the home (no reaching across body to R side, square off while using objects, slide objects along surfaces). Patient instructed to increase amount of time standing, observe standing position during ADLs in order to increase even weight bearing through bilateral LEs in order to increase independence with ADLs. Goal to be reached 30 days post - op, per orthopedic surgeon or per PT. Patient indicated understanding. Tub transfer: Patient instructed regarding when it is safe to begin transfer into tub (complete stairs with PT, advance exercises with PT high enough to clear tub height). Patient instructed to use the same technique as used with stairs when entering and exiting tub (\"up with good leg, down with bad leg\"). Patient indicated understanding.         Functional Measure:  Barthel Index:      Bathing: 0  Bladder: 5  Bowels: 5  Groomin  Dressin  Feeding: 10  Mobility: 10  Stairs: 0  Toilet Use: 5  Transfer (Bed to Chair and Back): 10  Total: 55         Barthel and G-code impairment scale:  Percentage of impairment CH  0% CI  1-19% CJ  20-39% CK  40-59% CL  60-79% CM  80-99% CN  100%   Barthel Score 0-100 100 99-80 79-60 59-40 20-39 1-19    0   Barthel Score 0-20 20 17-19 13-16 9-12 5-8 1-4 0      The Barthel ADL Index: Guidelines  1. The index should be used as a record of what a patient does, not as a record of what a patient could do. 2. The main aim is to establish degree of independence from any help, physical or verbal, however minor and for whatever reason. 3. The need for supervision renders the patient not independent. 4. A patient's performance should be established using the best available evidence. Asking the patient, friends/relatives and nurses are the usual sources, but direct observation and common sense are also important. However direct testing is not needed. 5. Usually the patient's performance over the preceding 24-48 hours is important, but occasionally longer periods will be relevant. 6. Middle categories imply that the patient supplies over 50 per cent of the effort. 7. Use of aids to be independent is allowed. Sal Ni., Barthel, DMarcosW. (4317). Functional evaluation: the Barthel Index. [address] [facility] (14)2. BETTY Nguyen, Abdifatah Mckeon., Fantasma Gordon., [place], [address] [place] [address] (). Measuring the change indisability after inpatient rehabilitation; comparison of the responsiveness of the Barthel Index and Functional [place] Measure. Journal of Neurology, Neurosurgery, and Psychiatry, 66(4), 077-620. Nereida Antonio, N.J.A, CHEN Estevez, & Pablo Sherman M.A. (2004.) Assessment of post-stroke quality of life in cost-effectiveness studies: The usefulness of the Barthel Index and the EuroQoL-5D. Quality of Life Research, 13, 474-32            G codes:   In compliance with CMSs Claims Based Outcome Reporting, the following G-code set was chosen for this patient based on their primary functional limitation being treated: The outcome measure chosen to determine the severity of the functional limitation was the Barthel Index with a score of 55/100 which was correlated with the impairment scale. · Self Care:               - CURRENT STATUS:    CK - 40%-59% impaired, limited or restricted               - GOAL STATUS:           CJ - 20%-39% impaired, limited or restricted               - D/C STATUS:                       ---------------To be determined---------------      Occupational Therapy Evaluation Charge Determination   History Examination Decision-Making   MEDIUM Complexity : Expanded review of history including physical, cognitive and psychosocial  history  MEDIUM Complexity : 3-5 performance deficits relating to physical, cognitive , or psychosocial skils that result in activity limitations and / or participation restrictions MEDIUM Complexity : Patient may present with comorbidities that affect occupational performnce. Miniml to moderate modification of tasks or assistance (eg, physical or verbal ) with assesment(s) is necessary to enable patient to complete evaluation       Based on the above components, the patient evaluation is determined to be of the following complexity level: MEDIUM  Activity Tolerance:   VSS     Please refer to the flowsheet for vital signs taken during this treatment. After treatment:   [X] Patient left in no apparent distress sitting up in chair  [ ] Patient left in no apparent distress in bed  [X] Call bell left within reach  [X] Nursing notified  [ ] Caregiver present  [ ] Bed alarm activated      COMMUNICATION/EDUCATION:   The patients plan of care was discussed with: Physical Therapist and Registered Nurse.  [X]    Home safety education was provided and the patient/caregiver indicated understanding.   [X] Patient/family have participated as able in goal setting and plan of care. [X]    Patient/family agree to work toward stated goals and plan of care. [ ]    Patient understands intent and goals of therapy, but is neutral about his/her participation. [ ]    Patient is unable to participate in goal setting and plan of care. This patients plan of care is appropriate for delegation to ZACK.      Thank you for this referral.  Mario Curry, OT  Time Calculation: 45 mins

## 2017-06-06 NOTE — DIABETES MGMT
DTC Progress Note    Recommendations/ Comments: Chart reviewed due to variable blood sugars. Pt with low blood sugar yesterday afternoon but today blood sugars is >180. Pt's diet has been advanced and correction scale Humalog has been ordered. DTC to continue to follow. Chart reviewed on Hiwot Galvin. Patient is a 63 y.o. female with known diabetes on no diabetes medication at home. A1c:   Lab Results   Component Value Date/Time    Hemoglobin A1c 6.2 05/11/2017 10:20 AM    Hemoglobin A1c 5.9 01/04/2017 03:31 PM       Recent Glucose Results:   Lab Results   Component Value Date/Time     (H) 06/06/2017 04:59 AM    GLUCPOC 187 (H) 06/06/2017 06:22 AM    GLUCPOC 193 (H) 06/05/2017 09:36 PM    GLUCPOC 99 06/05/2017 05:34 PM        Lab Results   Component Value Date/Time    Creatinine 0.77 06/06/2017 04:59 AM     Estimated Creatinine Clearance: 89.7 mL/min (based on Cr of 0.77). Active Orders   Diet    DIET DIABETIC CONSISTENT CARB Regular        PO intake: No data found. Current hospital DM medication: correction scale Humalog, normal sensitivity. Will continue to follow as needed.     Thank you  Kenn Coon RD, CDE

## 2017-06-06 NOTE — PROGRESS NOTES
Problem: Mobility Impaired (Adult and Pediatric)  Goal: *Acute Goals and Plan of Care (Insert Text)  Physical Therapy Goals  Initiated 6/5/2017    1. Patient will move from supine to sit and sit to supine , scoot up and down and roll side to side in bed with independence within 4 days. 2. Patient will perform sit to stand with modified independence within 4 days. 3. Patient will ambulate with modified independence for 300 feet with the least restrictive device within 4 days. 4. Patient will perform hip home exercise program per protocol with independence within 4 days. Does not have stairs in her discharge environment   PHYSICAL THERAPY TREATMENT  Patient: Kinsey Galvin (59 y.o. female)  Date: 6/6/2017  Diagnosis: OSTEOARTHRITIS RIGHT HIP  Right knee DJD Degenerative joint disease (DJD) of hip  Procedure(s) (LRB):  RIGHT TOTAL HIP ARTHROPLASTY ANTERIOR APPROACH (Right) 1 Day Post-Op  Precautions: WBAT, Fall  Chart, physical therapy assessment, plan of care and goals were reviewed. ASSESSMENT:  Pt received sitting in chair, agreeable to PT however pt appeared to be w/ pain upon therapist entry to room (pt hands clasped together and held close to mouth as pt grimacing w/EC). Pt at this point reported she was \"feeling okay\" but would occassionally begin to grimace with gait belt placement (pt still agreeable to gait training). Pt amb approx 50 Ft this afternoon as she reported 8/10 p! w/ gait. Noted during gait pt appeared somewhat loopy as pt randomly making music tunes and with delayed answers to therapist questions and somewhat forgetful about current task at hand (verbal/tactile cues to assist pt and RW w/ turning around to head back to room. Pt returned supine to bed (Min A for LE's only), /73 from 119/66 (pre-gait). RN informed of above. Will follow continue to follow.    Progression toward goals:  [X]      Improving appropriately and progressing toward goals  [ ]      Improving slowly and progressing toward goals  [ ]      Not making progress toward goals and plan of care will be adjusted       PLAN:  Patient continues to benefit from skilled intervention to address the above impairments. Continue treatment per established plan of care. Discharge Recommendations:  Home Health  Further Equipment Recommendations for Discharge:  Rolling walker vs rollator (pt owns)       SUBJECTIVE:   Pt reported 8/10 pain in during gait training. Reported she would be ready for pain med while getting into bed. OBJECTIVE DATA SUMMARY:   Functional Mobility Training:  Bed Mobility:     Supine to Sit:  (pt received sitting in chair)  Sit to Supine: Minimum assistance (LE management)                       Transfers:  Sit to Stand: Contact guard assistance  Stand to Sit: Contact guard assistance                             Ambulation/Gait Training:  Distance (ft): 50 Feet (ft)  Assistive Device: Gait belt;Walker, rolling  Ambulation - Level of Assistance: Contact guard assistance           Right Side Weight Bearing: As tolerated        Stance:  (right slightly decreased)  Speed/Gerda: Pace decreased (<100 feet/min); Slow  Step Length: Left shortened;Right shortened                                Therapeutic Exercises:   Exercises performed per protocol. See morning treatment note for description. Pain:  Pain Scale 1: Numeric (0 - 10)  Pain Intensity 1: 3  Pain Location 1: Hip  Pain Orientation 1: Right  Pain Description 1: Aching  Pain Intervention(s) 1: Medication (see MAR); Ice  Activity Tolerance:   Fair  Please refer to the flowsheet for vital signs taken during this treatment.   After treatment:   [ ] Patient left in no apparent distress sitting up  in chair  [X] Patient left in no apparent distress in bed  [X] Call bell left within reach  [X] Nursing notified  [X] Caregiver present  [ ] Bed alarm activated      COMMUNICATION/COLLABORATION:   The patients plan of care was discussed with: Registered Nurse Ruddy Morales A Means,PTA   Time Calculation: 24 mins

## 2017-06-06 NOTE — PROGRESS NOTES
Problem: Mobility Impaired (Adult and Pediatric)  Goal: *Acute Goals and Plan of Care (Insert Text)  Physical Therapy Goals  Initiated 6/5/2017    1. Patient will move from supine to sit and sit to supine , scoot up and down and roll side to side in bed with independence within 4 days. 2. Patient will perform sit to stand with modified independence within 4 days. 3. Patient will ambulate with modified independence for 300 feet with the least restrictive device within 4 days. 4. Patient will perform hip home exercise program per protocol with independence within 4 days. Does not have stairs in her discharge environment   PHYSICAL THERAPY TREATMENT  Patient: Kevin Galvin (59 y.o. 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 ASSESSMENT:  Pt received sitting in chair, just received lunch tray but pt agreeable to work w/ PT. Pt able to don/doff AFO's (B) w/ independence using shoe horn. While pt doing so, IV site bleeding and RN called for assistance. Pt required CGA-SBA for bed mobility and transfer. Pt CGA w/ gait training using RW (aprox 100FT), reports full WBing through RLE, no LOB or buckling noted. Pt VSS as BP remained in low 100's/60's. Pt returned to bed (SBA),additional time for bed mobility as pt transferred into max elevated bed as pt reported having high bed at home). Pt w/ needs met items in reach. Will follow for afternoon PT, will plan for stair training this afternoon as able and appropriate.    Progression toward goals:  [X]      Improving appropriately and progressing toward goals  [ ]      Improving slowly and progressing toward goals  [ ]      Not making progress toward goals and plan of care will be adjusted       PLAN:  Patient continues to benefit from skilled intervention to address the above impairments. Continue treatment per established plan of care. Discharge Recommendations:  Home Health  Further Equipment Recommendations for Discharge:  rolling walker vs rollator (pt owns )       SUBJECTIVE:   Patient stated I can walk with you now.  as pt just received lunch tray. OBJECTIVE DATA SUMMARY:   Critical Behavior:  Neurologic State: Alert  Orientation Level: Oriented X4  Cognition: Follows commands, Decreased attention/concentration, Recognition of people/places, Appropriate decision making, Appropriate for age attention/concentration, Appropriate safety awareness     Functional Mobility Training:  Bed Mobility:     Supine to Sit:  (pt recieved sitting in chair)  Sit to Supine: Stand-by asssistance; Additional time                       Transfers:  Sit to Stand: Contact guard assistance  Stand to Sit: Contact guard assistance                             Balance:  Sitting: Intact  Standing: Intact; With support  Ambulation/Gait Training:  Distance (ft): 100 Feet (ft)  Assistive Device: Gait belt;Walker, rolling  Ambulation - Level of Assistance: Contact guard assistance           Right Side Weight Bearing: As tolerated        Stance:  (right slightly decreased)  Speed/Gerda: Pace decreased (<100 feet/min); Slow  Step Length: Left shortened;Right shortened                    Stairs:            Therapeutic Exercises:   SUPINE  EXERCISES   Sets   Reps   Active Active Assist   Passive Self ROM   Comments   Ankle Pumps   10 [X]                           [ ]                           [ ]                           [ ]                               Quad Sets   8 [ ]                           [ ]                           [ ]                           [ ]                               Heel Slides   8 [ ]                           [ ]                           [X]                           [ ]                               Hip Abduction [ ]                           [ ]                           [ ]                           [ ]                               Glut Sets   8 [ ]                           [ ]                           [ ]                           [ ]                                     [ ]                           [ ]                           [ ]                           [ ]                                     [ ]                           [ ]                           [ ]                           [ ]                                   Steven Ordoñez     [ ]                           [ ]                           [ ]                           [ ]                               Hip Abduction     [ ]                           [ ]                           [ ]                           [ ]                                     [ ]                           [ ]                           [ ]                           [ ]                                     [ ]                           [ ]                           [ ]                           [ ]                                     Pain:  Pain Scale 1: Numeric (0 - 10)  Pain Intensity 1: 3  Pain Location 1: Hip  Pain Orientation 1: Right  Pain Description 1: Aching  Pain Intervention(s) 1: Medication (see MAR); Ice  Activity Tolerance:   Good  Please refer to the flowsheet for vital signs taken during this treatment.   After treatment:   [ ] Patient left in no apparent distress sitting up in chair  [X] Patient left in no apparent distress in bed  [X] Call bell left within reach  [X] Nursing notified  [ ] Caregiver present  [ ] Bed alarm activated      COMMUNICATION/COLLABORATION:   The patients plan of care was discussed with: Registered Nurse Jose Alejandro Barnes PTA   Time Calculation: 36 mins

## 2017-06-06 NOTE — PROGRESS NOTES
Problem: Discharge Planning  Goal: *Discharge to safe environment  Outcome: Progressing Towards Goal  For home care

## 2017-06-06 NOTE — ANESTHESIA POSTPROCEDURE EVALUATION
Post-Anesthesia Evaluation and Assessment    Patient: Janeth Dale MRN: [id number]  SSN: [id number]    YOB: 1955  Age: 63 y.o. Sex: female       Cardiovascular Function/Vital Signs  Visit Vitals    /71 (BP 1 Location: Right arm, BP Patient Position: At rest)    Pulse 75    Temp 37 °C (98.6 °F)    Resp 15    Ht 6' (1.829 m)    Wt 81.6 kg (180 lb)    SpO2 98%    BMI 24.41 kg/m2       Patient is status post general anesthesia for Procedure(s):  RIGHT TOTAL HIP ARTHROPLASTY ANTERIOR APPROACH. Nausea/Vomiting: None    Postoperative hydration reviewed and adequate. Pain:  Pain Scale 1: Numeric (0 - 10) (06/06/17 0443)  Pain Intensity 1: 7 (06/06/17 0443)   Managed    Neurological Status:   Neuro (WDL): Exceptions to [facility] (06/05/17 1445)  Neuro  Neurologic State: Alert; Appropriate for age;Eyes open spontaneously (06/05/17 2100)  Orientation Level: Appropriate for age;Oriented to place;Oriented to person;Oriented to situation;Oriented to time;Oriented X4 (06/05/17 2100)  Cognition: Appropriate decision making; Appropriate for age attention/concentration; Appropriate safety awareness; Follows commands (06/05/17 2100)  Speech: Appropriate for age;Delayed responses (06/05/17 2100)  Assessment L Pupil: Brisk;Round (06/05/17 1550)  Size L Pupil (mm): 3 (06/05/17 1550)  Assessment R Pupil: Brisk;Round (06/05/17 1550)  Size R Pupil (mm): 3 (06/05/17 1550)  LUE Motor Response: Purposeful (06/05/17 1550)  LLE Motor Response: Weak;Numbness (06/05/17 1550)  RUE Motor Response: Purposeful (06/05/17 1550)  RLE Motor Response: Weak;Numbness (06/05/17 1550)   At baseline    Mental Status and Level of Consciousness: Arousable    Pulmonary Status:   O2 Device: Room air (06/05/17 1832)   Adequate oxygenation and airway patent    Complications related to anesthesia: None    Post-anesthesia assessment completed.  No concerns    Signed By: Brian Spencer MD     June 6, 2017

## 2017-06-06 NOTE — ANESTHESIA PROCEDURE NOTES
Spinal Block    Start time: 6/5/2017 11:41 AM  End time: 6/5/2017 11:46 AM  Performed by: Ralph Rollins  Authorized by: Ralph Rollins     Pre-procedure:   Indications: primary anesthetic  Preanesthetic Checklist: patient identified, risks and benefits discussed, anesthesia consent, patient being monitored and timeout performed    Timeout Time: 11:41          Spinal Block:   Patient Position:  Seated    Prep: Betadine      Location:  L3-4  Technique:  Single shot        Needle:   Needle Type:  Pencil-tip  Needle Gauge:  25 G  Attempts:  1      Events: CSF confirmed, no blood with aspiration and no paresthesia        Assessment:  Insertion:  Uncomplicated  Patient tolerance:  Patient tolerated the procedure well with no immediate complications

## 2017-06-07 VITALS
TEMPERATURE: 98.6 F | OXYGEN SATURATION: 97 % | HEIGHT: 72 IN | SYSTOLIC BLOOD PRESSURE: 89 MMHG | BODY MASS INDEX: 24.38 KG/M2 | WEIGHT: 180 LBS | RESPIRATION RATE: 16 BRPM | HEART RATE: 65 BPM | DIASTOLIC BLOOD PRESSURE: 49 MMHG

## 2017-06-07 LAB
ANION GAP BLD CALC-SCNC: 9 MMOL/L (ref 5–15)
BUN SERPL-MCNC: 10 MG/DL (ref 6–20)
BUN/CREAT SERPL: 13 (ref 12–20)
CALCIUM SERPL-MCNC: 8.8 MG/DL (ref 8.5–10.1)
CHLORIDE SERPL-SCNC: 115 MMOL/L (ref 97–108)
CO2 SERPL-SCNC: 19 MMOL/L (ref 21–32)
CREAT SERPL-MCNC: 0.76 MG/DL (ref 0.55–1.02)
GLUCOSE BLD STRIP.AUTO-MCNC: 145 MG/DL (ref 65–100)
GLUCOSE BLD STRIP.AUTO-MCNC: 154 MG/DL (ref 65–100)
GLUCOSE SERPL-MCNC: 117 MG/DL (ref 65–100)
HGB BLD-MCNC: 11.7 G/DL (ref 11.5–16)
POTASSIUM SERPL-SCNC: 4.5 MMOL/L (ref 3.5–5.1)
SERVICE CMNT-IMP: ABNORMAL
SERVICE CMNT-IMP: ABNORMAL
SODIUM SERPL-SCNC: 143 MMOL/L (ref 136–145)

## 2017-06-07 PROCEDURE — 85018 HEMOGLOBIN: CPT | Performed by: ORTHOPAEDIC SURGERY

## 2017-06-07 PROCEDURE — 97530 THERAPEUTIC ACTIVITIES: CPT

## 2017-06-07 PROCEDURE — 36415 COLL VENOUS BLD VENIPUNCTURE: CPT | Performed by: ORTHOPAEDIC SURGERY

## 2017-06-07 PROCEDURE — 82962 GLUCOSE BLOOD TEST: CPT

## 2017-06-07 PROCEDURE — 77030012893

## 2017-06-07 PROCEDURE — 80048 BASIC METABOLIC PNL TOTAL CA: CPT | Performed by: ORTHOPAEDIC SURGERY

## 2017-06-07 PROCEDURE — 74011250637 HC RX REV CODE- 250/637: Performed by: PHYSICIAN ASSISTANT

## 2017-06-07 PROCEDURE — 97116 GAIT TRAINING THERAPY: CPT

## 2017-06-07 RX ORDER — ASPIRIN 325 MG
325 TABLET, DELAYED RELEASE (ENTERIC COATED) ORAL 2 TIMES DAILY
Qty: 60 TAB | Refills: 0 | Status: SHIPPED | OUTPATIENT
Start: 2017-06-07 | End: 2018-06-05

## 2017-06-07 RX ORDER — OXYCODONE HYDROCHLORIDE 10 MG/1
2.5-5 TABLET ORAL
Qty: 60 TAB | Refills: 0 | Status: SHIPPED | OUTPATIENT
Start: 2017-06-07 | End: 2017-12-07

## 2017-06-07 RX ADMIN — ASPIRIN 325 MG: 325 TABLET, DELAYED RELEASE ORAL at 09:49

## 2017-06-07 RX ADMIN — PREGABALIN 200 MG: 100 CAPSULE ORAL at 09:49

## 2017-06-07 RX ADMIN — ACETAMINOPHEN 650 MG: 325 TABLET, FILM COATED ORAL at 06:30

## 2017-06-07 RX ADMIN — PYRIDOSTIGMINE BROMIDE 180 MG: 180 TABLET, EXTENDED RELEASE ORAL at 09:48

## 2017-06-07 RX ADMIN — TOPIRAMATE 200 MG: 100 TABLET, FILM COATED ORAL at 09:49

## 2017-06-07 NOTE — PROGRESS NOTES
..Bedside and Verbal shift change report given to Lesvia Bacon (oncoming nurse) by García Avila (offgoing nurse). Report included the following information SBAR.

## 2017-06-07 NOTE — PROGRESS NOTES
[address]  [facility] Bundled Payment Handoff     FROM:    [place]                            TO:[facility]                                                      ([address] [place] [address] or Facility name)  Maciej Webb [address]  [address] [address]  Dept: [address]: [phone]                                      Room#:  555/01                                                      Discharging Nurse:  Judie Acevedo RN  Unit FW#:[phone]         SITUATION      ASAScore: ASA 3 - Patient with moderate systemic disease with functional limitations    Admitted:  6/5/2017  Hospital Day: 3      Attending Provider:  Reuben Babin MD     Consultations:  None    PCP:  Shakila Moya MD   [phone]     Admitting Dx:  OSTEOARTHRITIS RIGHT HIP  Right knee DJD       Principal Problem:    Degenerative joint disease (DJD) of hip (6/4/2017)      Overview: RIGHT TOTAL HIP REPLACEMENT 6/5/17    Active Problems:    Chronic, continuous use of opioids (6/5/2017)      Overview: Fentanyl patch      2 Days Post-Op of   Procedure(s):  RIGHT TOTAL HIP ARTHROPLASTY ANTERIOR APPROACH   BY: Reuben Babin MD             ON: 6/5/2017                  Code Status: Full Code             Advance Directive? Verified (Send w/patient)     Isolation:  There are currently no Active Isolations       MDRO: No current active infections    BACKGROUND     Allergies:   Allergies   Allergen Reactions    Bee Sting [Sting, Bee] Anaphylaxis     Also allergic to egg products    Penicillins Anaphylaxis    Betadine [Povidone-Iodine] Rash    Egg Itching       Past Medical History:   Diagnosis Date    Arthritis     hip, hands    Benign cyst of left breast 3/21/2016    Cervical spinal stenosis 12/2/2016    Moderate C6-7    Depression     Diabetes mellitus type 2, controlled ([facility] 9/13/2016    Endometriosis 6/25/2010    FH: breast cancer 6/25/2010    Chart states FH breast/ovary Ca, CAD,DM     HTN, goal below 140/90 2010    CURRENTLY NO MEDICATIONS (17)    Hypercholesterolemia     Hypothyroid 2010    Incontinence     Lumbosacral plexopathy 2010    [facility]) [facility]     Myasthenia gravis ([facility]     Sleep apnea 2010    Ureteral calculus 2010    Vitamin D deficiency 3/17/2016       Past Surgical History:   Procedure Laterality Date    ABDOMEN SURGERY PROC UNLISTED      bowel resection    BREAST SURGERY PROCEDURE UNLISTED      breast cyst-both breasts at different times    HX  SECTION  1986    HX CYST INCISION AND DRAINAGE      HX GI      COLONOSCOPY    HX GYN      ovarian cyst    HX HEENT  1974    THYROIDECTOMY    HX KNEE ARTHROSCOPY Right     HX ORTHOPAEDIC      right thumb tendon repair x 2    HX ORTHOPAEDIC Left     CARPAL TUNNEL    HX OTHER SURGICAL      Janee Cath x2    HX SMALL BOWEL RESECTION      HX THYROIDECTOMY          HX VASCULAR ACCESS      PORT -A- CATH X2       Prior to Admission Medications   Prescriptions Last Dose Informant Patient Reported? Taking? ACTHAR H.P. 80 unit/mL injectable gel Not Taking at Unknown time  Yes No   Si mL by SubCUTAneous route daily. 80 units subq q day for 10 days   Blood-Glucose Meter monitoring kit   No No   Sig: by SubCUTAneous route Before breakfast and dinner. Free Style Lite glucometer and test strips   GILENYA 0.5 mg cap 2017 at 0600 Self Yes Yes   Sig: Take 1 Cap by mouth daily. Lancets misc   No No   Sig: Use to check blood sugar bid , dx-Diabetes Mellitus 250.00   METHOCARBAMOL (ROBAXIN-750 PO) Not Taking at Unknown time Self Yes No   Sig: Take 1 Tab by mouth as needed. PARoxetine (PAXIL) 40 mg tablet 2017 at 1900 Self No Yes   Sig: TAKE 1.5 TABS BY MOUTH DAILY. Patient taking differently: Take 60 mg by mouth nightly. TAKE 1.5 TABS BY MOUTH DAILY.   - Note Pt prefers to take at HS   TENS UNIT ELECTRODES   Yes No   Sig: by Does Not Apply route. prn   butalbital-acetaminophen-caff (FIORICET) -40 mg per capsule Not Taking at Unknown time  No No   Sig: Take 1 Cap by mouth every four (4) hours as needed for Pain. Max Daily Amount: 6 Caps. dantrolene (DANTRIUM) 100 mg capsule 2017 Self Yes No   Si mg as needed. diazepam (VALIUM) 10 mg tablet Not Taking at Unknown time Self Yes No   Sig: Take 5-10 mg by mouth as needed for Anxiety.   ergocalciferol (VITAMIN D2) 50,000 unit capsule 2017 Self Yes No   Sig: Take 50,000 Units by mouth every . fentaNYL (DURAGESIC) 75 mcg/hr 6/3/2017  No No   Si Patch by TransDERmal route every seventy-two (72) hours. Max Daily Amount: 1 Patch. glucose blood VI test strips (FREESTYLE LITE STRIPS) strip   No No   Sig: Use as directed 2 times daily   levothyroxine (SYNTHROID) 175 mcg tablet 2017 at 1900 Self No Yes   Sig: TAKE 1 TABLET BY MOUTH DAILY BEFORE BREAKFAST   Patient taking differently: Take 175 mcg by mouth nightly. TAKE 1 TABLET BY MOUTH DAILY BEFORE BREAKFAST   lidocaine (LIDODERM) 5 %(700 mg/patch) 2017 at Unknown time  Yes Yes   Sig: by TransDERmal route every twenty-four (24) hours. Apply patch to the affected area for 12 hours a day and remove for 12 hours a day. modafinil (PROVIGIL) 200 mg tablet 2017 at 0700 Self Yes Yes   Sig: Take 200 mg by mouth two (2) times a day. naproxen (NAPROSYN) 500 mg tablet Not Taking at Unknown time Self Yes No   Sig: Take 500 mg by mouth as needed. pregabalin (LYRICA) 200 mg capsule 2017 at 0600 Self No Yes   Sig: Take 1 Cap by mouth two (2) times a day. Max Daily Amount: 400 mg.   pyridostigmine bromide (MESTINON TIMESPAN) 180 mg SR tablet 2017 at 0600 Self Yes Yes   Sig: Take 180 mg by mouth two (2) times a day.    rosuvastatin (CRESTOR) 20 mg tablet 2017 at 0700  No Yes   Sig: TAKE 1 TABLET BY MOUTH EVERY EVENING   topiramate (TOPAMAX) 200 mg tablet 2017 at 0600 Self Yes Yes   Sig: Take 200 mg by mouth two (2) times a day. traZODone (DESYREL) 50 mg tablet Not Taking at Unknown time Self Yes No   Sig: Take 50 mg by mouth nightly as needed. Facility-Administered Medications: None       Vaccinations:    Immunization History   Administered Date(s) Administered    Tdap 06/16/2014         ASSESSMENT   Age: 63 y.o. Gender: female        Height: Height: 6' (182.9 cm)                    Weight:Weight: 81.6 kg (180 lb)     Patient Vitals for the past 8 hrs:   Temp Pulse Resp BP SpO2   06/07/17 0910 98.8 °F (37.1 °C) 73 16 109/63 96 %   06/07/17 0900 - 73 - 92/63 -   06/07/17 0730 - - 16 - -            Active Orders   Diet    DIET DIABETIC CONSISTENT CARB Regular       Orientation: Orientation Level: Oriented to person, Oriented to place, Disoriented to time, Oriented to situation    Active Lines/Drains:  (Peg Tube / Og / CL or S/L?):no    Urinary Status: Incontinent briefs, Incontinent      Last BM: Last Bowel Movement Date: 06/06/17 (per report)     Skin Integrity: Incision (comment) (right hip)   Wound Hip Anterior;Right-DRESSING STATUS: Clean, dry, and intact    Wound Hip Anterior;Right-DRESSING TYPE: Silver products (aquacel)    Mobility: Slightly limited   Weight Bearing Status: WBAT (Weight Bearing as Tolerated)      Gait Training  Assistive Device: Gait belt, Walker, rolling  Ambulation - Level of Assistance: Stand-by asssistance  Distance (ft): 250 Feet (ft)  Stairs - Level of Assistance: Contact guard assistance  Number of Stairs Trained: 4  Rail Use: Both     On Anticoagulation?  YES  Aspirin                                      Last dose:  6/7/2017at 0900    Pain Medications given: Tylenol 650 mg                               Last dose: 6/7/2017 at  1350    Lab Results   Component Value Date/Time    Glucose 117 06/07/2017 05:02 AM    Hemoglobin A1c 6.2 05/11/2017 10:20 AM    INR 1.0 05/11/2017 10:20 AM    INR 1.0 05/17/2016 04:53 PM    HGB 11.7 06/07/2017 05:02 AM    HGB 10.6 06/06/2017 04:59 AM    HGB 13.1 05/11/2017 10:20 AM    HGB 13.5 05/18/2016 04:45 AM       Readmission Risks:  Score:19    RECOMMENDATION     See After Visit Summary (AVS) for:  · Discharge instructions  · After [address]   · Medication Reconciliation          [facility] Orthopaedic Nurse Navigator  KAMILLA Silveira, RN-BC       Office  [phone]  Cell      [phone]  Fax      [phone]  [email or web address]             . [email or web address]

## 2017-06-07 NOTE — PROGRESS NOTES
Susan Romo accepted the [facility] referral. Bandar Bass

## 2017-06-07 NOTE — DISCHARGE INSTRUCTIONS
Patient meets criteria for   BUNDLED PAYMENT   for Care Improvement Initiative Criteria    Contact Information for Orthopedic Nurse Navigator:      KAMILLA Martinez, RN-BC  M:[phone]  U:[phone]  Q:[phone]      After Hospital Care Plan:  Discharge Instructions Anterior Approach   Hip Replacement-Dr. Hancock    Patient Name:Hiwot Galvin  Date of procedure:6/5/2017    Procedure:Procedure(s):  RIGHT TOTAL HIP ARTHROPLASTY ANTERIOR APPROACH  Surgeon:Surgeon(s) and Role:     * Nohemi Robb MD - Primary   PCP: Geovany Gomes MD  Date of discharge: No discharge date for patient encounter. Follow up appointments   Follow up with Dr. Helga Wright in 3 weeks. Call [phone] to make an appointment.  If home health has been arranged for you the agency will contact you to arrange dates/times for visits. Please call them if you do not hear from them within 24 hours after you are discharged    When to call your Orthopaedic Surgeon: Call [phone]. If you need to reach us after 5pm or on a weekend; call [phone] and the on call physician will be contacted   Increased hip pain, unrelieved pain or if you have difficulty or are unable to walk   Signs of infection-if your incision is red; continues to have drainage; drainage has a foul odor or if you have a persistent fever over 101 degrees   Signs of a blood clot in your leg-calf pain, tenderness, redness, swelling of lower leg    When to call your Primary Care Physician:   Concerns about medical conditions such as diabetes, high blood pressure, asthma, congestive heart failure   Call if blood sugars are elevated, persistent headache or dizziness, coughing or congestion, constipation or diarrhea, burning with urination, abnormal heart rate    When to call [id number] go to the nearest emergency room   Acute onset of chest pain, shortness of breath, difficulty breathing    Activity   Weight bearing as tolerated with walker or crutches. Refer to pages 23-33 of your handbook for instructions and pictures   Complete your Home Exercise Program daily as instructed by your therapist.  Refer to pages 36-42 of your handbook for instructions and pictures   Get up every one hour and walk (except at night when sleeping)   AVOID sudden and extreme movement of your hip (surgical leg)   Do not drive or operate heavy machinery    Incision Care     The Aquacel (brown, waterproof) surgical dressing is to remain on your hip for 7 days. On the 7th day have someone gently peel the dressing off by carefully lifting the edge and stretching it slightly to break the adhesive seal   If you have steri-strips (small, white pieces of tape) on your incision, they may come off when you remove the Aquacel surgical dressing. This is okay. You may now leave your incision open to air   If your Aquacel dressing comes loose/off before the 7th day, you may replace it with a dry sterile gauze dressing; change it daily. Once your incision is not draining, you may leave it open to air   You may take a shower with the Aquacel dressing in place. Once the Aquacel is removed, you may shower and get your incision wet but do not submerge your incision under water in a bath tub, hot tub or swimming pool for 6 weeks after surgery. Preventing blood clots   Take Aspirin as prescribed by Dr. Sommer Sotelo for one month following surgery   Wear elastic stockings (TEDS) for 4 weeks. You may remove them for approximately 1 hour daily for showering/sponge bathing    Pain management   Take pain medication as prescribed; decrease the amount you use as your pain lessens   Avoid alcoholic beverages while taking pain medication   Do not take any over-the-counter medication except for Tylenol (acetaminophen)   Please be aware that many medications contain Tylenol. We do not want you to over medicate so please read the information below as a guide.   Do not take more than 4 Grams of Tylenol in a 24 hour period. (There are 1000 milligrams in one Gram)  o  325 mg of Tylenol per tablet (do not take more than 12 tablets in 24 hours)  o 500 mg of Tylenol per tablet (do not take more than 8 tablets in 24 hours)     You may place an ice bag on your hip for 15-20 minutes after exercising and as needed throughout the day and night    Diet  Resume usual diet; drink plenty of fluids; eat foods high in fiber  You may want to take a stool softener (such as Senokot-S or Colace) to prevent constipation while you are taking pain medication. If constipation occurs, take a laxative (such as Dulcolax tablets, Milk of Magnesia, or a suppository)    [facility] Protocol ([facility])    Nursing-per physicians order  Remove Aquacel dressing at 7 days post-op  Complete head to toe assessment, vital signs  Medication reconciliation  Review pain management  Manage chronic medical conditions    Physical Therapy-per physicians order    Weight bearing status:  Precautions at Admission: WBAT, Fall     Right Side Weight Bearing: As tolerated    Mobility Status:  Supine to Sit: Modified independent, Supervision  Sit to Stand: Stand-by asssistance  Sit to Supine:  (pt returned to chair)       Gait:  Distance (ft): 250 Feet (ft)  Ambulation - Level of Assistance: Stand-by asssistance  Assistive Device: Gait belt, Walker, rolling  Gait Abnormalities: Decreased step clearance (minor path deviations w/ rollator (B), pt able to correct)    ADL status overall composite: Toilet Transfer : Minimum assistance, Additional time, Adaptive equipment, Assist x1       Physical Therapy   Assessment and evaluation-bed mobility; functional transfers (bed, chair, bathroom, stairs); ambulation with equipment, car transfers, safety and ability to get out of house in the event of an emergency   AVOID sudden and extreme movement of the hip (surgical leg)   Discuss pain management   Review how to do ADLs. Refer to pages 43-47 of handbook    Home Exercise Program-refer to pages 36-42 of handbook

## 2017-06-07 NOTE — PROGRESS NOTES
Patient and son instructed in all discharge  information, medications, wound management and home care is arranged. Discharged with all belongings, rxs and supplies for home use.

## 2017-06-07 NOTE — PROGRESS NOTES
Problem: Mobility Impaired (Adult and Pediatric)  Goal: *Acute Goals and Plan of Care (Insert Text)  Physical Therapy Goals  Initiated 6/5/2017    1. Patient will move from supine to sit and sit to supine , scoot up and down and roll side to side in bed with independence within 4 days. 2. Patient will perform sit to stand with modified independence within 4 days. 3. Patient will ambulate with modified independence for 300 feet with the least restrictive device within 4 days. 4. Patient will perform hip home exercise program per protocol with independence within 4 days. Does not have stairs in her discharge environment   PHYSICAL THERAPY TREATMENT  Patient: Kevin Galvin (59 y.o. female)  Date: 6/7/2017  Diagnosis: OSTEOARTHRITIS RIGHT HIP  Right knee DJD Degenerative joint disease (DJD) of hip  Procedure(s) (LRB):  RIGHT TOTAL HIP ARTHROPLASTY ANTERIOR APPROACH (Right) 2 Days Post-Op  Precautions: WBAT, Fall  Chart, physical therapy assessment, plan of care and goals were reviewed. ASSESSMENT:  Pt received supine in bed sleeping, but easy to awaken; agreeable to PT. Pt able to transfer to sitting EOB w/ Mod I-Supervision and able to hipolito shorts and (B) AFO's and shoes Independently. Pt transfers w/ SBA. Pt improved gait distance this morning, amb approx 250 FT w/ use of Rollator (minor path deviations noted bilaterally but pt able to correct). Although pt does not have stairs as part of goal or to enter home, pt practiced 4 steps using both rails,CGA (cleared). Pt requested to use bathroom prior to returning to chair (Independent-Mod I). Noted pt BP 80's/50's (right arm) after rest but asymptomatic (64/68 left arm s/p ankle pumps); RN made aware. Pt remained in chair and informed to call out for RN assist if beginning to feel orthostatic sxs. Provided pt w/ gait belt for LE assist, reviewed HEP , and icing schedule w/ pt. Pt reported son works nights but home during day, and has night nurse.   Pt cleared from PT standpoint for d/c home. Owns rollator and reports comfortability and confidence w/ use as she used rollator PTA. Progression toward goals:  [X]      Improving appropriately and progressing toward goals  [ ]      Improving slowly and progressing toward goals  [ ]      Not making progress toward goals and plan of care will be adjusted       PLAN:  Patient continues to benefit from skilled intervention to address the above impairments. Continue treatment per established plan of care. Discharge Recommendations:  Home Health  Further Equipment Recommendations for Discharge:  Rollator (pt owns and reported feeling more comfortable and steady w/use of rollator at this time)       SUBJECTIVE:   \"Oh that would be delicious! \" referring to sitting up in chair. OBJECTIVE DATA SUMMARY:   Functional Mobility Training:  Bed Mobility:     Supine to Sit: Modified independent;Supervision  Sit to Supine:  (pt returned to chair)                       Transfers:  Sit to Stand: Stand-by asssistance  Stand to Sit: Stand-by asssistance                             Ambulation/Gait Training:  Distance (ft): 250 Feet (ft)  Assistive Device: Gait belt;Walker, rolling  Ambulation - Level of Assistance: Stand-by asssistance        Gait Abnormalities: Decreased step clearance (minor path deviations w/ rollator (B), pt able to correct)  Right Side Weight Bearing: As tolerated     Base of Support: Narrowed     Speed/Gerda: Pace decreased (<100 feet/min); Slow  Step Length: Left shortened;Right shortened                               Stairs:  Number of Stairs Trained: 4  Stairs - Level of Assistance: Contact guard assistance  Rail Use: Both           Pain:  Pain Scale 1: Numeric (0 - 10)  Pain Intensity 1: 6  Pain Location 1: Hip  Pain Orientation 1: Right  Pain Description 1: Sore  Pain Intervention(s) 1: Ice  Activity Tolerance:   Good  Please refer to the flowsheet for vital signs taken during this treatment.   After treatment: [X] Patient left in no apparent distress sitting up  in chair  [ ] Patient left in no apparent distress in bed  [X] Call bell left within reach  [X] Nursing notified  [ ] Caregiver present  [ ] Bed alarm activated      COMMUNICATION/COLLABORATION:   The patients plan of care was discussed with: Registered Nurse Herb Barnes PTA   Time Calculation: 44 mins

## 2017-06-07 NOTE — PROGRESS NOTES
Bedside and Verbal shift change report given to SUMIT Soriano (oncoming nurse) by Cathleen Grimm RN (offgoing nurse). Report included the following information SBAR, Procedure Summary, Intake/Output, MAR, Recent Results and Med Rec Status.

## 2017-06-07 NOTE — PROGRESS NOTES
Complaints: none   Events: none      GEN:  NAD. AOx3   ABD:  S/NT/ND   RLE:  Dressing C/D/I    5/5 motor    Calf nttp (Bilat)    Sensate all distribution to light touch    1+ dp/pt pulses, foot perfused      Lab Results   Component Value Date/Time    HGB 11.7 06/07/2017 05:02 AM    INR 1.0 05/11/2017 10:20 AM            POD #2 RIGHT TOTAL HIP REPLACEMENT. Satisfactory progress. DVT Prophylaxis: Aspirin, SCD, BRENDA   Weight Bearing: WBAT RLE   Pain Control: PRN oral narcotics, celebrex  Anticipated Discharge Date: 6/7/17   Disposition: Home, HHPT.

## 2017-06-08 ENCOUNTER — PATIENT OUTREACH (OUTPATIENT)
Dept: FAMILY MEDICINE CLINIC | Age: 62
End: 2017-06-08

## 2017-06-08 NOTE — PROGRESS NOTES
Patient was admitted to [facility] 6/5 and discharged to home 6/7 after having right Total Hip Replacement per .    Per discharge summary by  on 6/7:  ENGLISH [13]   Hospital Problems as of 6/7/2017   Reviewed: 6/6/2017  2:53 PM by Ivy Girard NP            Class Noted - Resolved Last Modified POA   Active Problems    Degenerative joint disease (DJD) of hip  6/4/2017 - Present 6/4/2017 by Payal Lopes PA-C Yes    Entered by Payal Lopes PA-C    Overview Signed 6/4/2017 10:48 PM by Payal Lopes PA-C    RIGHT TOTAL HIP REPLACEMENT 6/5/17    Chronic, continuous use of opioids (Chronic)  6/5/2017 - Present 6/6/2017 by Ivy Girard NP Yes    Entered by Ivy Girard NP    Overview Signed 6/6/2017  2:53 PM by Ivy Girard NP    Fentanyl patch   Non-Hospital Problems as of 6/7/2017   Reviewed: 6/6/2017  2:53 PM by Ivy Girard NP            Class Noted - Resolved Last Modified   Active Problems    Sleep apnea  6/25/2010 - Present 6/25/2010 by Gt Cutler    Entered by Gt Cutler    Endometriosis  6/25/2010 - Present 6/25/2010 by Gt Cutler    Entered by Gt Cutler    Lumbosacral plexopathy  6/25/2010 - Present 6/25/2010 by Gt Cutler    Entered by Gt Cutler    HTN, goal below 140/90  6/25/2010 - Present 5/11/2017 by Diamond Aguayo MD    Entered by Gt Cutler    FH: breast cancer  6/25/2010 - Present 6/25/2010 by Gt Cutler    Entered by Gt Cutler    Overview Signed 6/25/2010 10:05 AM by Gt Cutler    Chart states [place] breast/ovary Ca, CAD,DM    Hyperlipemia  6/25/2010 - Present 6/25/2010 by Gt Cutler    Entered by Gt Cutler    Hypothyroid  6/25/2010 - Present 6/25/2010 by Gt Cutler    Entered by Gt Cutler    Ureteral calculus  6/25/2010 - Present 6/25/2010 by Gt Culter    Entered by Gt Cutler    MS (multiple sclerosis) [facility])  Unknown - Present 8/25/2011 by Larry Alejandre RN    Entered by Orion John Mary Rockwell RN    Myasthenia gravis [facility])  Unknown - Present 8/30/2012 by Lin Vasquez RN    Entered by Lin Vasquez RN    Vitamin D deficiency  3/17/2016 - Present 3/17/2016 by Zee Borges, DO    Entered by Zee Borges, DO    Benign cyst of left breast  3/21/2016 - Present 3/21/2016 by Zee Borges, DO    Entered by Zee Borges, DO    Diabetes mellitus type 2, controlled ([address]  9/13/2016 - Present 9/13/2016 by Monique Li, DO    Entered by Monique Li, DO    Cervical spinal stenosis  12/2/2016 - Present 12/2/2016 by Monique Li, DO    Entered by Monique Li, DO    Overview Signed 12/2/2016 11:29 AM by Monique Li, DO    Moderate C6-7    Depression  Unknown - Present 5/11/2017 by Shakila Moya MD    Entered by Shakila Moya MD   You are allergic to the following   Allergen Reactions   Bee Sting (Sting, Bee) Anaphylaxis   Also allergic to egg products       Penicillins Anaphylaxis       Betadine (Povidone-Iodine) Rash       Egg Itching   Current Discharge Medication List   START taking these medications      Dose & Instructions Dispensing Information Comments   aspirin delayed-release 325 mg tablet    Dose: 325 mg   Take 1 Tab by mouth two (2) times a day. Quantity: 60 Tab   Refills: 0       oxyCODONE IR 10 mg Tab immediate release tablet   Commonly known as: ROXICODONE    Dose: 2.5-5 mg   Take 0.5 Tabs by mouth every three (3) hours as needed.  Max Daily Amount: 40 mg.    Quantity: 60 Tab   Refills: 0       CONTINUE these medications which have CHANGED      Dose & Instructions Dispensing Information Comments   levothyroxine 175 mcg tablet   Commonly known as: SYNTHROID   What changed:   - how much to take  - how to take this  - when to take this  - additional instructions    TAKE 1 TABLET BY MOUTH DAILY BEFORE BREAKFAST    Quantity: 30 Tab   Refills: 11       PARoxetine 40 mg tablet   Commonly known as: PAXIL   What changed:   - how much to take  - how to take this  - when to take this  - additional instructions    TAKE 1.5 TABS BY MOUTH DAILY. Quantity: 45 Tab   Refills: 2       CONTINUE these medications which have NOT CHANGED      Dose & Instructions Dispensing Information Comments   ACTHAR H.P. 80 unit/mL injectable gel   Generic drug: corticotropin    Dose: 1 mL   1 mL by SubCUTAneous route daily. 80 units subq q day for 10 days    Refills: 0       Blood-Glucose Meter monitoring kit    by SubCUTAneous route Before breakfast and dinner. Free Style Lite glucometer and test strips    Quantity: 1 Kit   Refills: 0    Free Style Lite glucometer please      butalbital-acetaminophen-caff -40 mg per capsule   Commonly known as: FIORICET    Dose: 1 Cap   Take 1 Cap by mouth every four (4) hours as needed for Pain. Max Daily Amount: 6 Caps. Quantity: 10 Cap   Refills: 0       dantrolene 100 mg capsule   Commonly known as: DANTRIUM    Dose: 100 mg   100 mg as needed. Refills: 0       fentaNYL 75 mcg/hr   Commonly known as: DURAGESIC    Dose: 1 Patch   1 Patch by TransDERmal route every seventy-two (72) hours. Max Daily Amount: 1 Patch. Quantity: 10 Patch   Refills: 0       GILENYA 0.5 mg Cap   Generic drug: fingolimod    Dose: 1 Cap   Take 1 Cap by mouth daily. Refills: 0       Lancets Misc    Use to check blood sugar bid , dx-Diabetes Mellitus 250.00    Quantity: 1 Package   Refills: 11       LIDODERM 5 %   Generic drug: lidocaine    by TransDERmal route every twenty-four (24) hours. Apply patch to the affected area for 12 hours a day and remove for 12 hours a day. Refills: 0       MESTINON TIMESPAN 180 mg SR tablet   Generic drug: pyridostigmine bromide    Dose: 180 mg   Take 180 mg by mouth two (2) times a day. Refills: 0       pregabalin 200 mg capsule   Commonly known as: LYRICA    Dose: 200 mg   Take 1 Cap by mouth two (2) times a day. Max Daily Amount: 400 mg.     Quantity: 180 Cap   Refills: 3       PROVIGIL 200 mg tablet Generic drug: modafinil    Dose: 200 mg   Take 200 mg by mouth two (2) times a day. Refills: 0       ROBAXIN-750 PO    Dose: 1 Tab   Take 1 Tab by mouth as needed. Refills: 0       rosuvastatin 20 mg tablet   Commonly known as: CRESTOR    TAKE 1 TABLET BY MOUTH EVERY EVENING    Quantity: 90 Tab   Refills: 1       TENS UNIT ELECTRODES    by Does Not Apply route. prn    Refills: 0       TOPAMAX 200 mg tablet   Generic drug: topiramate    Dose: 200 mg   Take 200 mg by mouth two (2) times a day. Refills: 0       traZODone 50 mg tablet   Commonly known as: DESYREL    Dose: 50 mg   Take 50 mg by mouth nightly as needed. Refills: 0       VALIUM 10 mg tablet   Generic drug: diazePAM    Dose: 5-10 mg   Take 5-10 mg by mouth as needed for Anxiety. Refills: 0       VITAMIN D2 50,000 unit capsule   Generic drug: ergocalciferol    Dose: 46298 Units   Take 50,000 Units by mouth every Sunday. Refills: 0       STOP taking these medications      Comments   glucose blood VI test strips strip   Commonly known as: FREESTYLE LITE STRIPS           naproxen 500 mg tablet   Commonly known as: NAPROSYN                  Current Immunizations   Name Date   Tdap 6/16/2014   Surgery Information   ID Date/Time Status Primary Surgeon All Procedures Location         [id number] 6/5/2017 11 Shobha Langston MD RIGHT TOTAL HIP ARTHROPLASTY ANTERIOR APPROACH [facility] MAIN OR     Follow-up Information   Follow up With Details Comments Contact Info   [facility]   [address] [place] [address].    [facility] [address]   [address] [address] [address]   [phone]      Discharge Instructions   Patient meets criteria for   BUNDLED PAYMENT   for Care Improvement Initiative Criteria     Contact Information for Orthopedic Nurse Navigator:      KAMILLA Babb, RN-BC  D:[phone]  Y:[phone]  L:[phone]        After Hospital Care Plan: Discharge Instructions Anterior Approach   Hip Replacement-Dr. Shahab Moraes     Patient Name:Hiwot Galvin  Date of procedure:6/5/2017    Procedure:Procedure(s):  RIGHT TOTAL HIP ARTHROPLASTY ANTERIOR APPROACH  Surgeon:Surgeon(s) and Role:  * Hilda Briggs MD - Primary   PCP: Leidy Rocha MD  Date of discharge: No discharge date for patient encounter.       Follow up appointments  · Follow up with Dr. Shahab Moraes in 3 weeks. Call [phone] to make an appointment.     · If home health has been arranged for you the agency will contact you to arrange dates/times for visits. Please call them if you do not hear from them within 24 hours after you are discharged     When to call your Orthopaedic Surgeon: Call [phone]. If you need to reach us after 5pm or on a weekend; call [phone] and the on call physician will be contacted  · Increased hip pain, unrelieved pain or if you have difficulty or are unable to walk  · Signs of infection-if your incision is red; continues to have drainage; drainage has a foul odor or if you have a persistent fever over 101 degrees  · Signs of a blood clot in your leg-calf pain, tenderness, redness, swelling of lower leg     When to call your Primary Care Physician:  · Concerns about medical conditions such as diabetes, high blood pressure, asthma, congestive heart failure  · Call if blood sugars are elevated, persistent headache or dizziness, coughing or congestion, constipation or diarrhea, burning with urination, abnormal heart rate     When to call 911and go to the nearest emergency room  · Acute onset of chest pain, shortness of breath, difficulty breathing     Activity  · Weight bearing as tolerated with walker or crutches.  Refer to pages 23-33 of your handbook for instructions and pictures  · Complete your Home Exercise Program daily as instructed by your therapist. Refer to pages 36-42 of your handbook for instructions and pictures  · Get up every one hour and walk (except at night when sleeping)  · AVOID sudden and extreme movement of your hip (surgical leg)  · Do not drive or operate heavy machinery     Incision Care     · The Aquacel (brown, waterproof) surgical dressing is to remain on your hip for 7 days. On the 7th day have someone gently peel the dressing off by carefully lifting the edge and stretching it slightly to break the adhesive seal  · If you have steri-strips (small, white pieces of tape) on your incision, they may come off when you remove the Aquacel surgical dressing. This is okay. You may now leave your incision open to air  · If your Aquacel dressing comes loose/off before the 7th day, you may replace it with a dry sterile gauze dressing; change it daily. Once your incision is not draining, you may leave it open to air  · You may take a shower with the Aquacel dressing in place. Once the Aquacel is removed, you may shower and get your incision wet but do not submerge your incision under water in a bath tub, hot tub or swimming pool for 6 weeks after surgery.     Preventing blood clots  · Take Aspirin as prescribed by Dr. Alfreda Gabriel for one month following surgery  · Wear elastic stockings (TEDS) for 4 weeks. You may remove them for approximately 1 hour daily for showering/sponge bathing     Pain management  · Take pain medication as prescribed; decrease the amount you use as your pain lessens  · Avoid alcoholic beverages while taking pain medication  · Do not take any over-the-counter medication except for Tylenol (acetaminophen)  · Please be aware that many medications contain Tylenol. We do not want you to over medicate so please read the information below as a guide. Do not take more than 4 Grams of Tylenol in a 24 hour period.  (There are 1000 milligrams in one Gram)  ¨ 325 mg of Tylenol per tablet (do not take more than 12 tablets in 24 hours)  ¨ 500 mg of Tylenol per tablet (do not take more than 8 tablets in 24 hours)     · You may place an ice bag on your hip for 15-20 minutes after exercising and as needed throughout the day and night     Diet  Resume usual diet; drink plenty of fluids; eat foods high in fiber  You may want to take a stool softener (such as Senokot-S or Colace) to prevent constipation while you are taking pain medication. If constipation occurs, take a laxative (such as Dulcolax tablets, Milk of Magnesia, or a suppository)     Home Health Care Protocol (to be followed by [address])     Nursing-per physicians order  Remove Aquacel dressing at 7 days post-op  Complete head to toe assessment, vital signs  Medication reconciliation  Review pain management  Manage chronic medical conditions     Physical Therapy-per physicians order     Weight bearing status:  Precautions at Admission: WBAT, Fall      Right Side Weight Bearing: As tolerated     Mobility Status:  Supine to Sit: Modified independent, Supervision  Sit to Stand: Stand-by asssistance  Sit to Supine: (pt returned to chair)         Gait:  Distance (ft): 250 Feet (ft)  Ambulation - Level of Assistance: Stand-by asssistance  Assistive Device: Gait belt, Walker, rolling  Gait Abnormalities: Decreased step clearance (minor path deviations w/ rollator (B), pt able to correct)     ADL status overall composite:              Toilet Transfer : Minimum assistance, Additional time, Adaptive equipment, Assist x1        Physical Therapy  · Assessment and evaluation-bed mobility; functional transfers (bed, chair, bathroom, stairs); ambulation with equipment, car transfers, safety and ability to get out of house in the event of an emergency  · AVOID sudden and extreme movement of the hip (surgical leg)  · Discuss pain management  · Review how to do ADLs. Refer to pages 43-47 of handbook     Home Exercise Program-refer to pages 36-42 of handbook           Called patient today, CAMILLE, will call her back shortly.

## 2017-06-09 ENCOUNTER — NURSE NAVIGATOR (OUTPATIENT)
Dept: OTHER | Age: 62
End: 2017-06-09

## 2017-06-09 ENCOUNTER — PATIENT OUTREACH (OUTPATIENT)
Dept: FAMILY MEDICINE CLINIC | Age: 62
End: 2017-06-09

## 2017-06-09 NOTE — PROGRESS NOTES
This note will not be viewable in [address] Post Discharge Follow-up contact after Joint Replacement    Patient discharged on 6/7/17  By  Sherita Sahu   following  right hip Arthroplasty. Spoke with patient today, who reports they are \" not having any pain to speak of.\"  Denies Fever, Shortness of Breath or Chest Pain. Home Health has visited. Patient also reports:. Incision  clean, dry, intact  Calf is non-tender,   operative extremity has moderate swelling. Pain is well managed. Discussed use of ice & elevation. is progressing with therapy and is exercising independently. Taking Aspirin for anticoagulation, Tylenol for pain. Patient   is not experiencing symptoms of constipation & urinating without difficulty. Discussed side effects of anticoagulants & pain medications (bleeding/bruising, constipation, lightheaded/dizziness)  Follow up appointment is not scheduled; but patient states plan to schedule. Discussed calling surgeon Dr Raquel Huynh  for drainage, bleeding, swelling in operative extremity, fever or pain. Discussed calling PCP Dr Josie Shafer with other medical issues.

## 2017-06-09 NOTE — PROGRESS NOTES
Patient admitted to [facility] 6/5 and discharged to home 6/7 after right total hip replacement per .  Per  Discharge Summary note on 6/7:    ENGLISH [13]   Hospital Problems as of 6/7/2017   Reviewed: 6/6/2017  2:53 PM by Manfred Navarro NP            Class Noted - Resolved Last Modified POA   Active Problems    Degenerative joint disease (DJD) of hip  6/4/2017 - Present 6/4/2017 by Nehemiah Carson PA-C Yes    Entered by Nehemiah Carson PA-C    Overview Signed 6/4/2017 10:48 PM by Nehemiah Carson PA-C    RIGHT TOTAL HIP REPLACEMENT 6/5/17    Chronic, continuous use of opioids (Chronic)  6/5/2017 - Present 6/6/2017 by Manfred Navarro NP Yes    Entered by Manfred Navarro NP    Overview Signed 6/6/2017  2:53 PM by Manfred Navarro NP    Fentanyl patch   Non-Hospital Problems as of 6/7/2017   Reviewed: 6/6/2017  2:53 PM by Manfred Navarro NP            Class Noted - Resolved Last Modified   Active Problems    Sleep apnea  6/25/2010 - Present 6/25/2010 by Giovanna Craig    Entered by Giovanna Craig    Endometriosis  6/25/2010 - Present 6/25/2010 by Giovanna Craig    Entered by Giovanna Craig    Lumbosacral plexopathy  6/25/2010 - Present 6/25/2010 by Giovanna Craig    Entered by Giovanna Craig    HTN, goal below 140/90  6/25/2010 - Present 5/11/2017 by Loki Negron MD    Entered by Giovanna Craig    FH: breast cancer  6/25/2010 - Present 6/25/2010 by Giovanna Craig    Entered by Giovanna Craig    Overview Signed 6/25/2010 10:05 AM by Giovanna Craig    Chart states [place] breast/ovary Ca, CAD,DM    Hyperlipemia  6/25/2010 - Present 6/25/2010 by Giovanna Craig    Entered by Giovanna Craig    Hypothyroid  6/25/2010 - Present 6/25/2010 by Giovanna Craig    Entered by Giovanna Craig    Ureteral calculus  6/25/2010 - Present 6/25/2010 by Giovanna Craig    Entered by Giovanna Craig    MS (multiple sclerosis) [facility])  Unknown - Present 8/25/2011 by Jeffy Liu RN    Entered by Tiffanie Alarcon Jessica Gil RN    Myasthenia gravis [facility])  Unknown - Present 8/30/2012 by Nito Degroot RN    Entered by Nito Degroot RN    Vitamin D deficiency  3/17/2016 - Present 3/17/2016 by Carlos Manuel Cartagena, DO    Entered by Carlos Manuel Cartagena, DO    Benign cyst of left breast  3/21/2016 - Present 3/21/2016 by Carlos Manuel Cartagena, DO    Entered by Carlos Manuel Cartagena,     Diabetes mellitus type 2, controlled ([facility] [address]  9/13/2016 - Present 9/13/2016 by Breana Velez, DO    Entered by Breana Velez,     Cervical spinal stenosis  12/2/2016 - Present 12/2/2016 by Breana Velez, DO    Entered by Breana Velez, DO    Overview Signed 12/2/2016 11:29 AM by Breana Velez,     Moderate C6-7    Depression  Unknown - Present 5/11/2017 by Eduardo Dorsey MD    Entered by Eduardo Dorsey MD   You are allergic to the following   Allergen Reactions   Bee Sting (Sting, Bee) Anaphylaxis   Also allergic to egg products       Penicillins Anaphylaxis       Betadine (Povidone-Iodine) Rash       Egg Itching   Current Discharge Medication List   START taking these medications      Dose & Instructions Dispensing Information Comments   aspirin delayed-release 325 mg tablet    Dose: 325 mg   Take 1 Tab by mouth two (2) times a day. Quantity: 60 Tab   Refills: 0       oxyCODONE IR 10 mg Tab immediate release tablet   Commonly known as: ROXICODONE    Dose: 2.5-5 mg   Take 0.5 Tabs by mouth every three (3) hours as needed.  Max Daily Amount: 40 mg.    Quantity: 60 Tab   Refills: 0       CONTINUE these medications which have CHANGED      Dose & Instructions Dispensing Information Comments   levothyroxine 175 mcg tablet   Commonly known as: SYNTHROID   What changed:   - how much to take  - how to take this  - when to take this  - additional instructions    TAKE 1 TABLET BY MOUTH DAILY BEFORE BREAKFAST    Quantity: 30 Tab   Refills: 11       PARoxetine 40 mg tablet   Commonly known as: PAXIL   What changed:   - how much to take  - how to take this  - when to take this  - additional instructions    TAKE 1.5 TABS BY MOUTH DAILY. Quantity: 45 Tab   Refills: 2       CONTINUE these medications which have NOT CHANGED      Dose & Instructions Dispensing Information Comments   ACTHAR H.P. 80 unit/mL injectable gel   Generic drug: corticotropin    Dose: 1 mL   1 mL by SubCUTAneous route daily. 80 units subq q day for 10 days    Refills: 0       Blood-Glucose Meter monitoring kit    by SubCUTAneous route Before breakfast and dinner. Free Style Lite glucometer and test strips    Quantity: 1 Kit   Refills: 0    Free Style Lite glucometer please      butalbital-acetaminophen-caff -40 mg per capsule   Commonly known as: FIORICET    Dose: 1 Cap   Take 1 Cap by mouth every four (4) hours as needed for Pain. Max Daily Amount: 6 Caps. Quantity: 10 Cap   Refills: 0       dantrolene 100 mg capsule   Commonly known as: DANTRIUM    Dose: 100 mg   100 mg as needed. Refills: 0       fentaNYL 75 mcg/hr   Commonly known as: DURAGESIC    Dose: 1 Patch   1 Patch by TransDERmal route every seventy-two (72) hours. Max Daily Amount: 1 Patch. Quantity: 10 Patch   Refills: 0       GILENYA 0.5 mg Cap   Generic drug: fingolimod    Dose: 1 Cap   Take 1 Cap by mouth daily. Refills: 0       Lancets Misc    Use to check blood sugar bid , dx-Diabetes Mellitus 250.00    Quantity: 1 Package   Refills: 11       LIDODERM 5 %   Generic drug: lidocaine    by TransDERmal route every twenty-four (24) hours. Apply patch to the affected area for 12 hours a day and remove for 12 hours a day. Refills: 0       MESTINON TIMESPAN 180 mg SR tablet   Generic drug: pyridostigmine bromide    Dose: 180 mg   Take 180 mg by mouth two (2) times a day. Refills: 0       pregabalin 200 mg capsule   Commonly known as: LYRICA    Dose: 200 mg   Take 1 Cap by mouth two (2) times a day. Max Daily Amount: 400 mg.     Quantity: 180 Cap   Refills: 3       PROVIGIL 200 mg tablet Generic drug: modafinil    Dose: 200 mg   Take 200 mg by mouth two (2) times a day. Refills: 0       ROBAXIN-750 PO    Dose: 1 Tab   Take 1 Tab by mouth as needed. Refills: 0       rosuvastatin 20 mg tablet   Commonly known as: CRESTOR    TAKE 1 TABLET BY MOUTH EVERY EVENING    Quantity: 90 Tab   Refills: 1       TENS UNIT ELECTRODES    by Does Not Apply route. prn    Refills: 0       TOPAMAX 200 mg tablet   Generic drug: topiramate    Dose: 200 mg   Take 200 mg by mouth two (2) times a day. Refills: 0       traZODone 50 mg tablet   Commonly known as: DESYREL    Dose: 50 mg   Take 50 mg by mouth nightly as needed. Refills: 0       VALIUM 10 mg tablet   Generic drug: diazePAM    Dose: 5-10 mg   Take 5-10 mg by mouth as needed for Anxiety. Refills: 0       VITAMIN D2 50,000 unit capsule   Generic drug: ergocalciferol    Dose: 72302 Units   Take 50,000 Units by mouth every Sunday. Refills: 0       STOP taking these medications      Comments   glucose blood VI test strips strip   Commonly known as: FREESTYLE LITE STRIPS           naproxen 500 mg tablet   Commonly known as: NAPROSYN                  Current Immunizations   Name Date   Tdap 6/16/2014   Surgery Information   ID Date/Time Status Primary Surgeon All Procedures Location         [id number] 6/5/2017 11 Shobha Langston MD RIGHT TOTAL HIP ARTHROPLASTY ANTERIOR APPROACH [facility] MAIN OR     Follow-up Information   Follow up With Details Comments Contact Info   [facility]   [address] [place] [address] 99.    [facility] [address]   [address] [address] [address]   [phone]      Discharge Instructions   Patient meets criteria for   BUNDLED PAYMENT   for Care Improvement Initiative Criteria     Contact Information for Orthopedic Nurse Navigator:      KAMILLA Clayton, RN-BC  P:[phone]  P:[phone]  M:[phone]        After Hospital Care Plan: Discharge Instructions Anterior Approach   Hip Replacement-Dr. Bernard Arellano     Patient Name:Hiwot Galvin  Date of procedure:6/5/2017    Procedure:Procedure(s):  RIGHT TOTAL HIP ARTHROPLASTY ANTERIOR APPROACH  Surgeon:Surgeon(s) and Role:  * Thurlow Spurling, MD - Primary   PCP: Stephanie Espinoza MD  Date of discharge: No discharge date for patient encounter.       Follow up appointments  · Follow up with Dr. Bernard Arellano in 3 weeks. Call [phone] to make an appointment.     · If home health has been arranged for you the agency will contact you to arrange dates/times for visits. Please call them if you do not hear from them within 24 hours after you are discharged     When to call your Orthopaedic Surgeon: Call [phone]. If you need to reach us after 5pm or on a weekend; call [phone] and the on call physician will be contacted  · Increased hip pain, unrelieved pain or if you have difficulty or are unable to walk  · Signs of infection-if your incision is red; continues to have drainage; drainage has a foul odor or if you have a persistent fever over 101 degrees  · Signs of a blood clot in your leg-calf pain, tenderness, redness, swelling of lower leg     When to call your Primary Care Physician:  · Concerns about medical conditions such as diabetes, high blood pressure, asthma, congestive heart failure  · Call if blood sugars are elevated, persistent headache or dizziness, coughing or congestion, constipation or diarrhea, burning with urination, abnormal heart rate     When to call 911and go to the nearest emergency room  · Acute onset of chest pain, shortness of breath, difficulty breathing     Activity  · Weight bearing as tolerated with walker or crutches.  Refer to pages 23-33 of your handbook for instructions and pictures  · Complete your Home Exercise Program daily as instructed by your therapist. Refer to pages 36-42 of your handbook for instructions and pictures  · Get up every one hour and walk (except at night when sleeping)  · AVOID sudden and extreme movement of your hip (surgical leg)  · Do not drive or operate heavy machinery     Incision Care     · The Aquacel (brown, waterproof) surgical dressing is to remain on your hip for 7 days. On the 7th day have someone gently peel the dressing off by carefully lifting the edge and stretching it slightly to break the adhesive seal  · If you have steri-strips (small, white pieces of tape) on your incision, they may come off when you remove the Aquacel surgical dressing. This is okay. You may now leave your incision open to air  · If your Aquacel dressing comes loose/off before the 7th day, you may replace it with a dry sterile gauze dressing; change it daily. Once your incision is not draining, you may leave it open to air  · You may take a shower with the Aquacel dressing in place. Once the Aquacel is removed, you may shower and get your incision wet but do not submerge your incision under water in a bath tub, hot tub or swimming pool for 6 weeks after surgery.     Preventing blood clots  · Take Aspirin as prescribed by Dr. Rajiv Barnett for one month following surgery  · Wear elastic stockings (TEDS) for 4 weeks. You may remove them for approximately 1 hour daily for showering/sponge bathing     Pain management  · Take pain medication as prescribed; decrease the amount you use as your pain lessens  · Avoid alcoholic beverages while taking pain medication  · Do not take any over-the-counter medication except for Tylenol (acetaminophen)  · Please be aware that many medications contain Tylenol. We do not want you to over medicate so please read the information below as a guide. Do not take more than 4 Grams of Tylenol in a 24 hour period.  (There are 1000 milligrams in one Gram)  ¨ 325 mg of Tylenol per tablet (do not take more than 12 tablets in 24 hours)  ¨ 500 mg of Tylenol per tablet (do not take more than 8 tablets in 24 hours)     · You may place an ice bag on your hip for 15-20 minutes after exercising and as needed throughout the day and night     Diet  Resume usual diet; drink plenty of fluids; eat foods high in fiber  You may want to take a stool softener (such as Senokot-S or Colace) to prevent constipation while you are taking pain medication. If constipation occurs, take a laxative (such as Dulcolax tablets, Milk of Magnesia, or a suppository)     Home Health Care Protocol (to be followed by Wiliam Palacioss Baldemar)     Nursing-per physicians order  Remove Aquacel dressing at 7 days post-op  Complete head to toe assessment, vital signs  Medication reconciliation  Review pain management  Manage chronic medical conditions     Physical Therapy-per physicians order     Weight bearing status:  Precautions at Admission: WBAT, Fall      Right Side Weight Bearing: As tolerated     Mobility Status:  Supine to Sit: Modified independent, Supervision  Sit to Stand: Stand-by asssistance  Sit to Supine: (pt returned to chair)         Gait:  Distance (ft): 250 Feet (ft)  Ambulation - Level of Assistance: Stand-by asssistance  Assistive Device: Gait belt, Walker, rolling  Gait Abnormalities: Decreased step clearance (minor path deviations w/ rollator (B), pt able to correct)     ADL status overall composite:              Toilet Transfer : Minimum assistance, Additional time, Adaptive equipment, Assist x1        Physical Therapy  · Assessment and evaluation-bed mobility; functional transfers (bed, chair, bathroom, stairs); ambulation with equipment, car transfers, safety and ability to get out of house in the event of an emergency  · AVOID sudden and extreme movement of the hip (surgical leg)  · Discuss pain management  · Review how to do ADLs. Refer to pages 43-47 of handbook     Home Exercise Program-refer to pages 36-42 of handbook        NN called patient- she gave 2 identifiers. Sounded good on the phone, in good spirits. Reports pain is \"just a 7.\" For her, not bad at all.  Has not taken the Oxycodone since before left the hospital.  Has all the equipment- did need to raise the shower chair. OT came last night, told her she was doing better than most patients that had the surgery 2 weeks before. Walking around the house, sleeping well. Did say the numbness was starting to wear off and may need to take the Oxycodone. Reviewed med list, reviewed discharge instructions and red flags. Mother came by and brought 3  Meals and changed her bed. Sister-in-law came by today and brought some more food. Pleased to have family checking on her. Made appt for f/u with ortho on 6/29 with . Advised her that I will check back with her next week-can call me prn.

## 2017-06-12 NOTE — PROGRESS NOTES
Tried several times to reach the patient, results were already sent to [facility]  before the result note, will mail letter to patient today to convey.

## 2017-06-22 ENCOUNTER — PATIENT OUTREACH (OUTPATIENT)
Dept: FAMILY MEDICINE CLINIC | Age: 62
End: 2017-06-22

## 2017-06-22 NOTE — PROGRESS NOTES
Yancy Pond is a 63 y.o. female   This patient is in [facility]. Had right hip replacement on 6/5 at [facility] by . Summary of patients top three medical problems:     Problem 1: MS     Problem 2: Myastenia Gravis     Problem 3: DM    Patient's challenges to self management identified:  MS -frequent exacerbations, lack of family support and financial concerns-on Medicare/Medicaid and with her MS, on expensive medications. Patients motivational level on a scale of 0-10: 10+  Medication Management:  Patient is a former nurse. Excellent understanding of her meds. Has only needed to take the Oxycodone x 1 since coming home post hip replacement. Advance Care Planning:   Patient was offered the opportunity to discuss advance care planning:  NA   Does patient have an Advance Directive: yes   If no, did you provide information on Advance Care Planning? NA     [employer], Referrals, and Durable Medical Equipment: Has Home health with [facility]- PT and OT as well as a nurse q week, has cane and rollator, leg braces. Follow up appointments:  - June 28, -June 29, -J[place]- 7/3. Goals      Attends follow-up appointments as directed.  Attends follow-up appointments as directed.  Identification of barriers to adherence to a plan of care such as inability to afford medications, lack of insurance, lack of transportation, etc.            Need to use walker instead of cane for added stability       Knowledge and adherence to medication plan. Take new rx- Fioricet, as directed for h/a pain       Prevent complications post hospitalization.  Understands red flags post discharge.        Low Risk            10       Total Score        4 More than 1 Admission in calendar year    6 Charlson Comorbidity Score        Criteria that do not apply:    Relationship with PCP    Patient Living Status    Patient Length of Stay > 5    Patient Insurance is Medicare, Medicaid or Self Pay       Pain reports her pain has been very minimal, very tolerable-currently about a #4. Has only taken one of the Oxycodone tablets since discharge. Steady on her feet, walking a lot- in her house and outside when with OT or her son. Was outside yesterday while son cut the grass, used her walker to walk and then rested in chair. Hip was hurting when she came in but not unbearable. Has a bedside commode in her room- had her son put the frame over her toilet so she could have more privacy and feel more \"normal.\" Uses [facility]-Physical therapy come 3 x week and OT 2 x week. Nurse comes weekly to check incision- healing well. Moving well getting in and out of bed. So glad she had the surgery, feels it was very successful. Does still have some areas of numbness- advised this is normal. She will discuss with  next week. Able to sleep on right side without discomfort. BS numbers have been good- FBS highest has been 124-but usually around 74-93. Not on any diabetic meds at this time. Pleased that she can now move her right foot up and down-has not been able to do this in years. MS has been stable, no exacerbations since surgery. Sees neurologist,  on 6/28 and ortho on 6/29. Barriers to care- family discord and lack of support. Has improved a bit with her mother who has come to see her several times, one sister has not been in touch to check on her. Financial- on limited income, takes expensive meds for her MS. Son lives with her and is her primary support. Determination remains very high- first time PT came to her house, she was ready to take a walk, the therapist had to slow her down and explain that they had to first do the bed exercises. Continues to use her rollator but hoping to graduate to the cane next week. She feels she is ready. Fall risk much lower at this time. POC- advised patient to call with update after neuro and ortho appt next week.  Patient agreed. Reminded to call NN if any questions or concerns in meantime. Total time during encounter- 25 minutes. Patient verbalized understanding of all information discussed. Patient has this Nurse Navigators contact information for any further questions, concerns, or needs. A written copy of this care plan was mailed to patient.

## 2017-06-23 NOTE — DISCHARGE SUMMARY
@[email or web address]@ [address] [place] [id number]    DISCHARGE SUMMARY     Patient: Carisa Galvin                             Medical Record Number: [id number]                : 1955  Age: 63 y.o. Admit Date: 2017  Discharge Date: 2017  Admission Diagnosis: OSTEOARTHRITIS RIGHT HIP  Right knee DJD  Discharge Diagnosis: OSTEOARTHRITIS RIGHT HIP  Procedures: Procedure(s):  RIGHT TOTAL HIP ARTHROPLASTY ANTERIOR APPROACH  Surgeon: Nohemi Robb MD  Assistants: Jackie Cao PA-C  Anesthesia: general  Complications: None     History of Present Illness:  Tracy Billings is a 63 y.o. female with a history of Right hip pain, swelling, and marked loss of function. Despite conservative management and after clinical and radiographic evaluation, it was determined that she suffered from end-stage osteoarthritis and would benefit from Procedure(s):  RIGHT TOTAL HIP ARTHROPLASTY ANTERIOR APPROACH, which she consented to undergo after a discussion of the risks, benefits, alternatives, rehab concerns, and potential complications of surgery. Hospital Course:  Carisa Galvin tolerated the procedure well. She was transferred  to the recovery room in stable condition. After a brief stay the patient was then transferred to the Joint Replacement Unit at [address] [place] [address].  On postoperative day #1, the dressing was clean and dry, she was neurovascularly intact. The patient was afebrile and vital signs were stable. Calves were soft and non-tender bilaterally. On postoperative day  # 2, the patient was tolerating a regular diet and making satisfactory progress with physical therapy. Hemoglobin and INR prior to discharge were   Lab Results   Component Value Date/Time    HGB 11.7 2017 05:02 AM    INR 1.0 2017 10:20 AM   .  Sharonda Galvin made satisfactory progress with physical therapy and was discharged to Home in stable condition on postoperative day 2.   She was provided with routine postoperative instructions and advised to follow up in my office in 3 weeks following discharge from the hospital.  She was prescribed aspirin for DVT prophylaxis and oxycodone for post-operative pain. Discharge Medications:  Cannot display discharge medications since this patient is not currently admitted.       Signed by: Doris Dill MD  6/22/2017

## 2017-06-26 ENCOUNTER — PATIENT OUTREACH (OUTPATIENT)
Dept: FAMILY MEDICINE CLINIC | Age: 62
End: 2017-06-26

## 2017-06-26 NOTE — Clinical Note
Was dizzy this am, did not eat much yesterday. FBS today was 50. Ate breakfast, came up to 70. Advised to eat 3 well balanced meals q day and check FBS for next 3-4 days and let us know.

## 2017-06-26 NOTE — PROGRESS NOTES
Patient called NN today to report she was feeling a little bit dizzy this am. Checked her bp and it was normal at 110/70. Then checked her FBS- was 50. Ate breakfast and checked it again at 70. Reports she didn't eat much yesterday, didn't have much of an appetite. Advised to check FBS for next 3-4 days and call back Thursday. Advised if remains low, call sooner. Patient agreed to [address]. Reminded to try and eat 3 well balanced meals, substitute Boost if no appetite. Patient agreeable. Reports doing well post hip replacement. Sitting outside this am waiting for PT to come. No complications noted with rehab at this time. Will talk to her on Thursday, prn sooner if needed.

## 2017-06-30 ENCOUNTER — PATIENT OUTREACH (OUTPATIENT)
Dept: FAMILY MEDICINE CLINIC | Age: 62
End: 2017-06-30

## 2017-06-30 NOTE — PROGRESS NOTES
Returned patient call from 6/29-when called her today, LM for her to call me back. Patient called me back. Saw her ortho, Subhash Noble, yesterday. He was very pleased with her progress. Incision looks good, she can stop the BRENDA stockings and return to see him for f/u in 2 months. He asked if she needed any more of the pain medication, she told him she had only used one tab since her surgery. Also he extended her Home PT/OT for another month, 2 x week. She can transition to a cane, patient has been using her cane when in her house already. Also can start riding the stationary bike-she has one at home, will begin with PT supervision. Patient in great spirits, pleased with her progress. Looking forward to her cruise in August. Dr. Pamela العراقي says she will be in good shape for the cruise. Positive support given for all of her hard work. She has appt with pcp on 7/3 at 9am and will stop by to see NN after that appointment. On track with all of her goals-see below. Goals      Attends follow-up appointments as directed.  Attends follow-up appointments as directed.  Identification of barriers to adherence to a plan of care such as inability to afford medications, lack of insurance, lack of transportation, etc.            Need to use walker instead of cane for added stability       Knowledge and adherence to medication plan. Take new rx- Fioricet, as directed for h/a pain       Prevent complications post hospitalization.  Understands red flags post discharge. Will see her briefly on 7/3 for follow up after appt with pcp.

## 2017-07-03 ENCOUNTER — OFFICE VISIT (OUTPATIENT)
Dept: FAMILY MEDICINE CLINIC | Age: 62
End: 2017-07-03

## 2017-07-03 VITALS
OXYGEN SATURATION: 98 % | SYSTOLIC BLOOD PRESSURE: 118 MMHG | BODY MASS INDEX: 24.46 KG/M2 | WEIGHT: 180.6 LBS | HEART RATE: 71 BPM | DIASTOLIC BLOOD PRESSURE: 111 MMHG | TEMPERATURE: 98.7 F | HEIGHT: 72 IN | RESPIRATION RATE: 18 BRPM

## 2017-07-03 DIAGNOSIS — R22.1 PALPABLE MASS OF NECK: ICD-10-CM

## 2017-07-03 DIAGNOSIS — E11.8 CONTROLLED TYPE 2 DIABETES MELLITUS WITH COMPLICATION, WITHOUT LONG-TERM CURRENT USE OF INSULIN (HCC): Primary | ICD-10-CM

## 2017-07-03 NOTE — PROGRESS NOTES
\"Reviewed record in preparation for visit and have obtained the necessary documentation\"  Chief Complaint   Patient presents with    Cholesterol Problem     follow up     Diabetes     follow up     Thyroid Problem     folllow up     Hypertension     follow up       Patient presents in the office today for a follow up of hypertension,hyperlipidemia,diabetes,and hypothyroidism       1. Have you been to the ER, urgent care clinic since your last visit? Hospitalized since your last visit? No    2. Have you seen or consulted any other health care providers outside of the [address] [place] [address] since your last visit? Include any pap smears or colon screening.  No

## 2017-07-03 NOTE — PROGRESS NOTES
Patient Name: Donovan Arango   MRN: [id number]    Connie Bower is a 63 y.o. female who presents with the following:     She is seen for diabetes, hypertension and hyperlipidemia. Since last visit she reports no chest pain, dyspnea or TIA's, no numbness, tingling or pain in extremities, no unusual visual symptoms, no hypoglycemia. Home glucose monitoring: is performed regularly, values are usually normal.  She reports medication compliance: n/a - patient not on medications (diet controlled). Medication side effects: none. Diabetic diet compliance: compliant most of the time. Lab review: labs reviewed and discussed with patient. Eye exam: UTD. States that she has difficulty with her vision and has trouble caring for her feet. Lab Results   Component Value Date/Time    Hemoglobin A1c 6.2 05/11/2017 10:20 AM     States that she noticed 2 weeks ago, a palpable bump on her anterior neck. Denies pain, difficulty swallowing, increase in size; patient is unsure if it has been there for a long time and she just recently noticed. History of hypothyroidism status post thyroidectomy. She recently had her right hip replaced very well with recovery. Currently participates in physical therapy. Review of Systems   Constitutional: Negative for fever, malaise/fatigue and weight loss. Respiratory: Negative for cough, hemoptysis, shortness of breath and wheezing. Cardiovascular: Negative for chest pain, palpitations, leg swelling and PND. Gastrointestinal: Negative for abdominal pain, constipation, diarrhea, nausea and vomiting. The patient's medications, allergies, past medical history, surgical history, family history and social history were reviewed and updated where appropriate. Prior to Admission medications    Medication Sig Start Date End Date Taking?  Authorizing Provider   oxyCODONE IR (ROXICODONE) 10 mg tab immediate release tablet Take 0.5 Tabs by mouth every three (3) hours as needed. Max Daily Amount: 40 mg. 6/7/17  Yes Karolyn Hatchet, MD   butalbital-acetaminophen-caff (FIORICET) -40 mg per capsule Take 1 Cap by mouth every four (4) hours as needed for Pain. Max Daily Amount: 6 Caps. 5/22/17  Yes Pancho Streeter MD   fentaNYL (DURAGESIC) 75 mcg/hr 1 Patch by TransDERmal route every seventy-two (72) hours. Max Daily Amount: 1 Patch. 5/17/17  Yes Los Kahn MD   pregabalin (LYRICA) 200 mg capsule Take 1 Cap by mouth two (2) times a day. Max Daily Amount: 400 mg. 4/12/17  Yes Los Kahn MD   rosuvastatin (CRESTOR) 20 mg tablet TAKE 1 TABLET BY MOUTH EVERY EVENING 2/24/17  Yes Regine Conti MD   TENS UNIT ELECTRODES by Does Not Apply route. prn   Yes Historical Provider   Blood-Glucose Meter monitoring kit by SubCUTAneous route Before breakfast and dinner. Free Style Lite glucometer and test strips 9/13/16  Yes Lark Falling, DO   Lancets misc Use to check blood sugar bid , dx-Diabetes Mellitus 250.00 9/7/16  Yes Lark Falling, DO   levothyroxine (SYNTHROID) 175 mcg tablet TAKE 1 TABLET BY MOUTH DAILY BEFORE BREAKFAST  Patient taking differently: Take 175 mcg by mouth nightly. TAKE 1 TABLET BY MOUTH DAILY BEFORE BREAKFAST 9/7/16  Yes Lark Falling, DO   dantrolene (DANTRIUM) 100 mg capsule 100 mg as needed. 6/3/16  Yes Historical Provider   PARoxetine (PAXIL) 40 mg tablet TAKE 1.5 TABS BY MOUTH DAILY. Patient taking differently: Take 60 mg by mouth nightly. TAKE 1.5 TABS BY MOUTH DAILY. - Note Pt prefers to take at [facility] 12/29/15  Yes Lark Falling, DO   GILENYA 0.5 mg cap Take 1 Cap by mouth daily. 11/20/15  Yes Historical Provider   traZODone (DESYREL) 50 mg tablet Take 50 mg by mouth nightly as needed. 10/15/15  Yes Historical Provider   pyridostigmine bromide (MESTINON TIMESPAN) 180 mg SR tablet Take 180 mg by mouth two (2) times a day. Yes Historical Provider   modafinil (PROVIGIL) 200 mg tablet Take 200 mg by mouth two (2) times a day. Yes Historical Provider   METHOCARBAMOL (ROBAXIN-750 PO) Take 1 Tab by mouth as needed. Yes Historical Provider   lidocaine (LIDODERM) 5 %(700 mg/patch) by TransDERmal route every twenty-four (24) hours. Apply patch to the affected area for 12 hours a day and remove for 12 hours a day. Yes Historical Provider   ACTHAR H.P. 80 unit/mL injectable gel 1 mL by SubCUTAneous route daily. 80 units subq q day for 10 days 6/13/14  Yes Historical Provider   diazepam (VALIUM) 10 mg tablet Take 5-10 mg by mouth as needed for Anxiety. Yes Historical Provider   ergocalciferol (VITAMIN D2) 50,000 unit capsule Take 50,000 Units by mouth every Sunday. Yes Historical Provider   topiramate (TOPAMAX) 200 mg tablet Take 200 mg by mouth two (2) times a day. Yes Historical Provider   aspirin delayed-release 325 mg tablet Take 1 Tab by mouth two (2) times a day. 6/7/17   Jordan Fuchs MD       Allergies   Allergen Reactions    Bee Sting [Sting, Bee] Anaphylaxis     Also allergic to egg products    Penicillins Anaphylaxis    Betadine [Povidone-Iodine] Rash    Egg Itching           OBJECTIVE    Visit Vitals    BP (!) 118/111 (BP 1 Location: Left arm, BP Patient Position: Sitting)    Pulse 71    Temp 98.7 °F (37.1 °C) (Oral)    Resp 18    Ht 6' (1.829 m)    Wt 180 lb 9.6 oz (81.9 kg)    LMP 09/01/2010    SpO2 98%    BMI 24.49 kg/m2       Physical Exam   Constitutional: She is well-developed, well-nourished, and in no distress. No distress. Neck: Trachea normal. No thyroid mass and no thyromegaly present. Cardiovascular: Normal rate, regular rhythm and normal heart sounds. Exam reveals no gallop and no friction rub. No murmur heard. Pulmonary/Chest: Effort normal and breath sounds normal. No respiratory distress. She has no wheezes. Musculoskeletal: Normal range of motion. She exhibits no edema, tenderness or deformity.    Neurological:   Sensory exam of the foot is normal, tested with the monofilament. Good pulses, no lesions or ulcers. Skin: Skin is warm and dry. No rash noted. She is not diaphoretic. No erythema. Nursing note and vitals reviewed. ASSESSMENT AND PLAN  Malinda Galvin is a 63 y.o. female who presents today for:    1. Controlled type 2 diabetes mellitus with complication, without long-term current use of insulin ([address]  Well-controlled with diet. Will refer patient to podiatry given difficulty with vision for regular foot care. - REFERRAL TO PODIATRY  -  DIABETES FOOT EXAM    2. Palpable mass of neck  Likely normal anatomic variant, possibly laryngeal prominence. Discussed with patient to call clinic if she observes any symptoms or changes in appearance. There are no discontinued medications. Follow-up Disposition:  Return in about 3 months (around 10/3/2017) for DM follow up. Time: 25 minutes was spent with this patient face to face discussing test results, follow up visits, and when repeat testing. I discussed diagnoses, risk factors and treatment for each based on current recommendations and literature. Greater than 50% of total visit time was spent in counseling and coordination of care. Medication risks/benefits/costs/interactions/alternatives discussed with patient. Advised patient to call back or return to office if symptoms worsen/change/persist. If patient cannot reach us or should anything more severe/urgent arise he/she should proceed directly to the nearest emergency department. Discussed expected course/resolution/complications of diagnosis in detail with patient. Patient given a written after visit summary which includes his/her diagnoses, current medications and vitals. Patient expressed understanding with the diagnosis and plan.      Bobbi Alejandre M.D.

## 2017-07-03 NOTE — PATIENT INSTRUCTIONS
Learning About Foot and Toenail Care  Checking your loved one's feet and keeping them clean and soft can help prevent cracks and infection in the skin. This is especially important for people who have diabetes. Keeping toenails trimmed--and polished if that's what the person likes--also helps the person feel well-groomed. If the person you care for has diabetes or has foot problems, such as bad bunions and corns, think about taking them to see a podiatrist. This is a doctor who specializes in the care of the feet. Sometimes a podiatrist will come to the home if the person can't go out for visits. Try to take the person for salon pedicures if that is what they want. It's a chance to get out and see people and continue a favorite activity. You can do basic nail care at home. Usually all you need to do is keep the nails clean and at a safe length. How do you trim someone's toenails? Try to trim the person's nails every week. Or check the nails each week to see if they need to be trimmed. It's easiest to trim nails after the person has had a shower or foot bath. It makes the nails softer and easier to trim. Start by gathering your supplies. You will need toenail clippers and a nail file. You may also need nail polish and nail polish remover. To trim the nails:  1. Wash and dry your hands. You don't need to wear gloves. 2. Use nail polish remover to take off any polish. 3. Hold the person's foot and toe steady with one hand while you trim the nail with your other hand. Trim the nails straight across. Leave the nails a little longer at the corners so that the sharp ends don't cut into the skin. 4. Keep the nails no longer than the tip of the toes. 5. Let the nails dry if they are still damp and soft. 6. Use a nail file to gently smooth the edges of the nails, especially at the corners. They may be sharp after the nails are cut straight. 7. Apply nail polish, if the person wants it.   If the person's nails are thick and discolored, it may be safest to have a podiatrist cut them. What else do you need to know? When you're caring for someone's nails, it is important to remember not to trim or cut the cuticles. A minor cut in a cuticle could lead to an infection. Wash the feet daily in the shower or bath or in a basin made for washing feet. It's extra important to wash the feet carefully if the person has diabetes. After washing the feet, dry gently. Put lotion on the feet, especially on the heels. But don't put it between the toes. If the person doesn't have diabetes and you see signs of athlete's foot (such as dry, cracking, or itchy skin between the toes), you can try an over-the-counter medicine. These medicines can kill the fungus that causes athlete's foot. If the problem doesn't go away, talk to the person's doctor. Look every day for cuts or signs of infection, such as pain, swelling, redness, or warmth. If you see any of these signs--especially in someone who has diabetes--call the doctor. Where can you learn more? Go to http://marie-alvin.info/. Enter A726 in the search box to learn more about \"Learning About Foot and Toenail Care. \"  Current as of: March 17, 2017  Content Version: 11.3  © 4971-2580 [employer]. Care instructions adapted under license by [employer] (which disclaims liability or warranty for this information). If you have questions about a medical condition or this instruction, always ask your healthcare professional. [place] [address] any warranty or liability for your use of this information.

## 2017-07-03 NOTE — MR AVS SNAPSHOT
Visit Information Date & Time Provider Department Dept. Phone Encounter #  
 7/3/2017  9:45 AM Abrahan Capone MD [facility] [phone] [id number] Follow-up Instructions Return in about 3 months (around 10/3/2017). Your Appointments 7/3/2017  9:45 AM  
ROUTINE CARE with Abrahan Capone MD  
[facility]) Appt Note: 2 mo. follow up  
 [address]  
[phone]  
  
   
 [place] [address] [address]  
  
    
 7/12/2017  9:00 AM  
Follow Up with Los Carranza MD  
[facility]) Appt Note: 6 weeks; f/u  
 [address] [facility] Upcoming Health Maintenance Date Due ZOSTER VACCINE AGE 60> 4/25/2017 FOOT EXAM Q1 6/8/2017 INFLUENZA AGE 9 TO ADULT 8/1/2017 EYE EXAM RETINAL OR DILATED Q1 10/12/2017 HEMOGLOBIN A1C Q6M 11/11/2017 MICROALBUMIN Q1 5/11/2018 LIPID PANEL Q1 5/11/2018 BREAST CANCER SCRN MAMMOGRAM 12/14/2018 PAP AKA CERVICAL CYTOLOGY 1/4/2020 COLONOSCOPY 1/18/2022 DTaP/Tdap/Td series (2 - Td) 6/16/2024 Allergies as of 7/3/2017  Review Complete On: 7/3/2017 By: Bobbi Galan LPN Severity Noted Reaction Type Reactions Bee Sting [Sting, Bee] High 06/25/2010    Anaphylaxis Also allergic to egg products Penicillins High 06/25/2010    Anaphylaxis Betadine [Povidone-iodine]  05/17/2016    Rash Egg  06/25/2015    Itching Current Immunizations  Reviewed on 1/4/2017 Name Date Tdap 6/16/2014 Not reviewed this visit You Were Diagnosed With   
  
 Codes Comments Controlled type 2 diabetes mellitus with complication, without long-term current use of insulin ([facility] [address]    -  Primary ICD-10-CM: E11.8 ICD-9-CM: 250.90 Vitals BP Pulse Temp Resp Height(growth percentile) Weight(growth percentile) (!) 118/111 (BP 1 Location: Left arm, BP Patient Position: Sitting) 71 98.7 °F (37.1 °C) (Oral) 18 6' (1.829 m) 180 lb 9.6 oz (81.9 kg) LMP SpO2 BMI OB Status Smoking Status 09/01/2010 98% 24.49 kg/m2 Postmenopausal Current Every Day Smoker Vitals History BMI and BSA Data Body Mass Index Body Surface Area  
 24.49 kg/m 2 2.04 m 2 Preferred Pharmacy Pharmacy Name Phone [facility] [address] [place] [address], [facility] [address] [phone] Your Updated Medication List  
  
   
This list is accurate as of: 7/3/17  9:39 AM.  Always use your most recent med list.  
  
  
  
  
 ACTHAR H.P. 80 unit/mL injectable gel Generic drug:  corticotropin 1 mL by SubCUTAneous route daily. 80 units subq q day for 10 days  
  
 aspirin delayed-release 325 mg tablet Take 1 Tab by mouth two (2) times a day. Blood-Glucose Meter monitoring kit  
by SubCUTAneous route Before breakfast and dinner. Free Style Lite glucometer and test strips  
  
 butalbital-acetaminophen-caff -40 mg per capsule Commonly known as:  [employer] Take 1 Cap by mouth every four (4) hours as needed for Pain. Max Daily Amount: 6 Caps. dantrolene 100 mg capsule Commonly known as:  DANTRIUM  
100 mg as needed. fentaNYL 75 mcg/hr Commonly known as:  DURAGESIC  
1 Patch by TransDERmal route every seventy-two (72) hours. Max Daily Amount: 1 Patch. GILENYA 0.5 mg Cap Generic drug:  fingolimod Take 1 Cap by mouth daily. Lancets Misc Use to check blood sugar bid , dx-Diabetes Mellitus 250.00  
  
 levothyroxine 175 mcg tablet Commonly known as:  SYNTHROID  
TAKE 1 TABLET BY MOUTH DAILY BEFORE BREAKFAST  
  
 LIDODERM 5 % Generic drug:  lidocaine  
by TransDERmal route every twenty-four (24) hours.  Apply patch to the affected area for 12 hours a day and remove for 12 hours a day. MESTINON TIMESPAN 180 mg SR tablet Generic drug:  pyridostigmine bromide Take 180 mg by mouth two (2) times a day. oxyCODONE IR 10 mg Tab immediate release tablet Commonly known as:  [place] Take 0.5 Tabs by mouth every three (3) hours as needed. Max Daily Amount: 40 mg. PARoxetine 40 mg tablet Commonly known as:  PAXIL TAKE 1.5 TABS BY MOUTH DAILY. pregabalin 200 mg capsule Commonly known as:  [place] Take 1 Cap by mouth two (2) times a day. Max Daily Amount: 400 mg. PROVIGIL 200 mg tablet Generic drug:  modafinil Take 200 mg by mouth two (2) times a day. ROBAXIN-750 PO Take 1 Tab by mouth as needed. rosuvastatin 20 mg tablet Commonly known as:  CRESTOR  
TAKE 1 TABLET BY MOUTH EVERY EVENING  
  
 TENS UNIT ELECTRODES  
by Does Not Apply route. prn  
  
 TOPAMAX 200 mg tablet Generic drug:  topiramate Take 200 mg by mouth two (2) times a day. traZODone 50 mg tablet Commonly known as:  [place] Take 50 mg by mouth nightly as needed. VALIUM 10 mg tablet Generic drug:  diazePAM  
Take 5-10 mg by mouth as needed for Anxiety. VITAMIN D2 50,000 unit capsule Generic drug:  ergocalciferol Take 50,000 Units by mouth every Sunday. We Performed the Following REFERRAL TO PODIATRY [REF90 Custom] Follow-up Instructions Return in about 3 months (around 10/3/2017). Referral Information Referral ID Referred By Referred To  
  
 [id number] [facility] [address]   
   [address] [place] Visits Status Start Date End Date 1 New Request 7/3/17 7/3/18 If your referral has a status of pending review or denied, additional information will be sent to support the outcome of this decision. Patient Instructions Learning About Foot and Toenail Care Checking your loved one's feet and keeping them clean and soft can help prevent cracks and infection in the skin. This is especially important for people who have diabetes. Keeping toenails trimmedand polished if that's what the person likesalso helps the person feel well-groomed. If the person you care for has diabetes or has foot problems, such as bad bunions and corns, think about taking them to see a podiatrist. This is a doctor who specializes in the care of the feet. Sometimes a podiatrist will come to the home if the person can't go out for visits. Try to take the person for salon pedicures if that is what they want. It's a chance to get out and see people and continue a favorite activity. You can do basic nail care at home. Usually all you need to do is keep the nails clean and at a safe length. How do you trim someone's toenails? Try to trim the person's nails every week. Or check the nails each week to see if they need to be trimmed. It's easiest to trim nails after the person has had a shower or foot bath. It makes the nails softer and easier to trim. Start by gathering your supplies. You will need toenail clippers and a nail file. You may also need nail polish and nail polish remover. To trim the nails: 
1. Wash and dry your hands. You don't need to wear gloves. 2. Use nail polish remover to take off any polish. 3. Hold the person's foot and toe steady with one hand while you trim the nail with your other hand. Trim the nails straight across. Leave the nails a little longer at the corners so that the sharp ends don't cut into the skin. 4. Keep the nails no longer than the tip of the toes. 5. Let the nails dry if they are still damp and soft. 6. Use a nail file to gently smooth the edges of the nails, especially at the corners. They may be sharp after the nails are cut straight. 7. Apply nail polish, if the person wants it. If the person's nails are thick and discolored, it may be safest to have a podiatrist cut them. What else do you need to know? When you're caring for someone's nails, it is important to remember not to trim or cut the cuticles. A minor cut in a cuticle could lead to an infection. Wash the feet daily in the shower or bath or in a basin made for washing feet. It's extra important to wash the feet carefully if the person has diabetes. After washing the feet, dry gently. Put lotion on the feet, especially on the heels. But don't put it between the toes. If the person doesn't have diabetes and you see signs of athlete's foot (such as dry, cracking, or itchy skin between the toes), you can try an over-the-counter medicine. These medicines can kill the fungus that causes athlete's foot. If the problem doesn't go away, talk to the person's doctor. Look every day for cuts or signs of infection, such as pain, swelling, redness, or warmth. If you see any of these signsespecially in someone who has diabetescall the doctor. Where can you learn more? Go to http://marie-alvin.info/. Enter A726 in the search box to learn more about \"Learning About Foot and Toenail Care. \" Current as of: March 17, 2017 Content Version: 11.3 © 6005-3065 [employer]. Care instructions adapted under license by [employer] (which disclaims liability or warranty for this information). If you have questions about a medical condition or this instruction, always ask your healthcare professional. [place] [address] any warranty or liability for your use of this information. Introducing [facility]! Dear Lidia Barker: 
Thank you for requesting a [facility] account. Our records indicate that you already have an active [facility] account. You can access your account anytime at [email or web address]. [employer] Did you know that you can access your hospital and ER discharge instructions at any time in [employer]? You can also review all of your test results from your hospital stay or ER visit. Additional Information If you have questions, please visit the Frequently Asked Questions section of the [employer] website at [email or web address] Remember, [employer] is NOT to be used for urgent needs. For medical emergencies, dial 911. Now available from your iPhone and Android! Please provide this summary of care documentation to your next provider. Your primary care clinician is listed as Brandon Diamond. If you have any questions after today's visit, please call [phone].

## 2017-07-12 ENCOUNTER — OFFICE VISIT (OUTPATIENT)
Dept: NEUROLOGY | Age: 62
End: 2017-07-12

## 2017-07-12 VITALS
OXYGEN SATURATION: 97 % | BODY MASS INDEX: 24.49 KG/M2 | SYSTOLIC BLOOD PRESSURE: 118 MMHG | HEART RATE: 85 BPM | WEIGHT: 180.8 LBS | DIASTOLIC BLOOD PRESSURE: 67 MMHG | TEMPERATURE: 97.7 F | RESPIRATION RATE: 16 BRPM | HEIGHT: 72 IN

## 2017-07-12 DIAGNOSIS — M54.16 LUMBAR RADICULOPATHY: ICD-10-CM

## 2017-07-12 DIAGNOSIS — G89.4 CHRONIC PAIN SYNDROME: ICD-10-CM

## 2017-07-12 DIAGNOSIS — R26.9 GAIT DISORDER: ICD-10-CM

## 2017-07-12 DIAGNOSIS — F11.90 CHRONIC, CONTINUOUS USE OF OPIOIDS: Primary | ICD-10-CM

## 2017-07-12 DIAGNOSIS — Z98.890 STATUS POST HIP SURGERY: ICD-10-CM

## 2017-07-12 DIAGNOSIS — G35 MS (MULTIPLE SCLEROSIS) (HCC): ICD-10-CM

## 2017-07-12 DIAGNOSIS — G43.009 MIGRAINE WITHOUT AURA AND WITHOUT STATUS MIGRAINOSUS, NOT INTRACTABLE: ICD-10-CM

## 2017-07-12 DIAGNOSIS — G70.00 MG (MYASTHENIA GRAVIS) (HCC): ICD-10-CM

## 2017-07-12 RX ORDER — CLINDAMYCIN HYDROCHLORIDE 300 MG/1
300 CAPSULE ORAL AS NEEDED
COMMUNITY
Start: 2017-06-30 | End: 2021-10-26

## 2017-07-12 NOTE — MR AVS SNAPSHOT
Visit Information Date & Time Provider Department Dept. Phone Encounter #  
 7/12/2017  9:00 AM Cletus Katrina Lefort, MD [facility] at [facility] [phone] [id number] Follow-up Instructions Return in about 3 months (around 10/12/2017). Your Appointments 10/2/2017 10:00 AM  
New Patient with Lor Avila MD  
[facility]) Appt Note: 3 mo follow up  
 [address] [phone]  
[phone]  
  
   
 [address] [address] Upcoming Health Maintenance Date Due INFLUENZA AGE 9 TO ADULT 8/1/2017 EYE EXAM RETINAL OR DILATED Q1 10/12/2017 HEMOGLOBIN A1C Q6M 11/11/2017 MICROALBUMIN Q1 5/11/2018 LIPID PANEL Q1 5/11/2018 FOOT EXAM Q1 7/3/2018 BREAST CANCER SCRN MAMMOGRAM 12/14/2018 PAP AKA CERVICAL CYTOLOGY 1/4/2020 COLONOSCOPY 1/18/2022 DTaP/Tdap/Td series (2 - Td) 6/16/2024 Allergies as of 7/12/2017  Review Complete On: 7/12/2017 By: Matt Womack MD  
  
 Severity Noted Reaction Type Reactions Bee Sting [Sting, Bee] High 06/25/2010    Anaphylaxis Also allergic to egg products Penicillins High 06/25/2010    Anaphylaxis Betadine [Povidone-iodine]  05/17/2016    Rash Egg  06/25/2015    Itching Current Immunizations  Reviewed on 1/4/2017 Name Date Tdap 6/16/2014 Not reviewed this visit You Were Diagnosed With   
  
 Codes Comments Chronic, continuous use of opioids    -  Primary ICD-10-CM: F11.90 ICD-9-CM: 305.51   
 MS (multiple sclerosis) ([facility] [address] [address]     ICD-10-CM: G35 
ICD-9-CM: 828 Lumbar radiculopathy     ICD-10-CM: M54.16 
ICD-9-CM: 724.4 MG (myasthenia gravis) ([facility] [address]     ICD-10-CM: G70.00 ICD-9-CM: 358.00 Migraine without aura and without status migrainosus, not intractable     ICD-10-CM: V32.488 ICD-9-CM: 346.10 Gait disorder     ICD-10-CM: R26.9 ICD-9-CM: 781.2 Chronic pain syndrome     ICD-10-CM: G89.4 ICD-9-CM: 338. 4 Vitals BP Pulse Temp Resp Height(growth percentile) Weight(growth percentile)  
 118/67 (BP 1 Location: Right arm, BP Patient Position: Sitting) 85 97.7 °F (36.5 °C) (Oral) 16 6' (1.829 m) 180 lb 12.8 oz (82 kg) LMP SpO2 BMI OB Status Smoking Status 09/01/2010 97% 24.52 kg/m2 Postmenopausal Current Every Day Smoker Vitals History BMI and BSA Data Body Mass Index Body Surface Area 24.52 kg/m 2 2.04 m 2 Preferred Pharmacy Pharmacy Name Phone [facility] [address] [facility], [facility] [address] [phone] Your Updated Medication List  
  
   
This list is accurate as of: 7/12/17  9:33 AM.  Always use your most recent med list.  
  
  
  
  
 ACTHAR H.P. 80 unit/mL injectable gel Generic drug:  corticotropin 1 mL by SubCUTAneous route daily. 80 units subq q day for 10 days  
  
 aspirin delayed-release 325 mg tablet Take 1 Tab by mouth two (2) times a day. Blood-Glucose Meter monitoring kit  
by SubCUTAneous route Before breakfast and dinner. Free Style Lite glucometer and test strips  
  
 butalbital-acetaminophen-caff -40 mg per capsule Commonly known as:  [employer] Take 1 Cap by mouth every four (4) hours as needed for Pain. Max Daily Amount: 6 Caps. clindamycin 300 mg capsule Commonly known as:  CLEOCIN  
  
 dantrolene 100 mg capsule Commonly known as:  DANTRIUM  
100 mg as needed. fentaNYL 75 mcg/hr Commonly known as:  DURAGESIC  
1 Patch by TransDERmal route every seventy-two (72) hours. Max Daily Amount: 1 Patch. GILENYA 0.5 mg Cap Generic drug:  fingolimod Take 1 Cap by mouth daily. Lancets Misc Use to check blood sugar bid , dx-Diabetes Mellitus 250.00  
  
 levothyroxine 175 mcg tablet Commonly known as:  SYNTHROID  
TAKE 1 TABLET BY MOUTH DAILY BEFORE BREAKFAST  
  
 LIDODERM 5 % Generic drug:  lidocaine  
by TransDERmal route every twenty-four (24) hours. Apply patch to the affected area for 12 hours a day and remove for 12 hours a day. MESTINON TIMESPAN 180 mg SR tablet Generic drug:  pyridostigmine bromide Take 180 mg by mouth two (2) times a day. oxyCODONE IR 10 mg Tab immediate release tablet Commonly known as:  [employer] Estimable Take 0.5 Tabs by mouth every three (3) hours as needed. Max Daily Amount: 40 mg. PARoxetine 40 mg tablet Commonly known as:  PAXIL TAKE 1.5 TABS BY MOUTH DAILY. pregabalin 200 mg capsule Commonly known as:  Jayant Lefort Take 1 Cap by mouth two (2) times a day. Max Daily Amount: 400 mg. PROVIGIL 200 mg tablet Generic drug:  modafinil Take 200 mg by mouth two (2) times a day. ROBAXIN-750 PO Take 1 Tab by mouth as needed. rosuvastatin 20 mg tablet Commonly known as:  CRESTOR  
TAKE 1 TABLET BY MOUTH EVERY EVENING  
  
 TENS UNIT ELECTRODES  
by Does Not Apply route. prn  
  
 TOPAMAX 200 mg tablet Generic drug:  topiramate Take 200 mg by mouth two (2) times a day. traZODone 50 mg tablet Commonly known as:  Rico Harrier Take 50 mg by mouth nightly as needed. VALIUM 10 mg tablet Generic drug:  diazePAM  
Take 5-10 mg by mouth as needed for Anxiety. VITAMIN D2 50,000 unit capsule Generic drug:  ergocalciferol Take 50,000 Units by mouth every Sunday. We Performed the Following [facility] MONITORING [[id number] Custom] [address] Comments:  
 PT/OT  
 REFERRAL TO OCCUPATIONAL THERAPY [REF53 Custom] REFERRAL TO PHYSICAL THERAPY [JRB74 Custom] Follow-up Instructions Return in about 3 months (around 10/12/2017). Referral Information Referral ID Referred By Referred To  
  
 [id number] Dawna Ashby C Not Available Visits Status Start Date End Date 1 New Request 7/12/17 7/12/18 If your referral has a status of pending review or denied, additional information will be sent to support the outcome of this decision. Referral ID Referred By Referred To  
 [id number] Fernando CISNEROS Not Available Visits Status Start Date End Date 1 New Request 7/12/17 7/12/18 If your referral has a status of pending review or denied, additional information will be sent to support the outcome of this decision. Referral ID Referred By Referred To  
 [id number] Fernando CISNEROS Not Available Visits Status Start Date End Date 1 New Request 7/12/17 7/12/18 If your referral has a status of pending review or denied, additional information will be sent to support the outcome of this decision. Introducing [facility]! Dear Kimberlyn December: 
Thank you for requesting a brick&mobile account. Our records indicate that you already have an active brick&mobile account. You can access your account anytime at [email or web address] Did you know that you can access your hospital and ER discharge instructions at any time in brick&mobile? You can also review all of your test results from your hospital stay or ER visit. Additional Information If you have questions, please visit the Frequently Asked Questions section of the brick&mobile website at [email or web address] Remember, brick&mobile is NOT to be used for urgent needs. For medical emergencies, dial 911. Now available from your iPhone and Android! Please provide this summary of care documentation to your next provider. Your primary care clinician is listed as Brandon Diamond. If you have any questions after today's visit, please call [phone].

## 2017-07-12 NOTE — PROGRESS NOTES
Chief Complaint   Patient presents with    Multiple Sclerosis     1. Have you been to the ER, urgent care clinic since your last visit? Hospitalized since your last visit? 5/22/17 [facility]    2. Have you seen or consulted any other health care providers outside of the [address] [place] [address] since your last visit? Include any pap smears or colon screening. [facility].     Pill count =   Patient did not bring medications     Pill count not verified with patient  Patient reports taking medication    UDS obtained and sent  Pain contract on file   made available for Dr. Wilfrid Joseph for review  Script given for

## 2017-07-12 NOTE — PROGRESS NOTES
Neurology Progress Note    NAME:  Caroline Galvin   :   1955   MRN:   [id number]     Date/Time:  8/3/2017  Subjective:      Caroline Galvin is a 63 y.o. female here today for follow up. Patient was accompanied by her  to the visit. She just had hip surgery , according to patient everything went well and she has been doing fairly well, she is yet to start active physical therapy and occupational therapy. Says she still experiences pain but the pain has been or slightly increased after the surgery, medication has been helping. Headache waxes and wanes, throbbing in nature, frequency is variable, slightly increased since after the surgery. Admits fatigue, blurry vision, occasional double vision, neck pain, back pain, joint and muscle pain, numbness and tingling sensation, dysphagia. Denies odynophagia, chest pain, constipation diarrhea, hematochezia, hematuria,      Review of Systems:   Neurological ROS: positive for - dizziness, gait disturbance, headaches, impaired coordination/balance, numbness/tingling and weakness      Medications reviewed:  Current Outpatient Prescriptions   Medication Sig Dispense Refill    clindamycin (CLEOCIN) 300 mg capsule       aspirin delayed-release 325 mg tablet Take 1 Tab by mouth two (2) times a day. 60 Tab 0    butalbital-acetaminophen-caff (FIORICET) -40 mg per capsule Take 1 Cap by mouth every four (4) hours as needed for Pain. Max Daily Amount: 6 Caps. 10 Cap 0    fentaNYL (DURAGESIC) 75 mcg/hr 1 Patch by TransDERmal route every seventy-two (72) hours. Max Daily Amount: 1 Patch. 10 Patch 0    pregabalin (LYRICA) 200 mg capsule Take 1 Cap by mouth two (2) times a day. Max Daily Amount: 400 mg. 180 Cap 3    rosuvastatin (CRESTOR) 20 mg tablet TAKE 1 TABLET BY MOUTH EVERY EVENING 90 Tab 1    TENS UNIT ELECTRODES by Does Not Apply route. prn      Blood-Glucose Meter monitoring kit by SubCUTAneous route Before breakfast and dinner.  Free Style Lite glucometer and test strips 1 Kit 0    Lancets misc Use to check blood sugar bid , dx-Diabetes Mellitus 250.00 1 Package 11    levothyroxine (SYNTHROID) 175 mcg tablet TAKE 1 TABLET BY MOUTH DAILY BEFORE BREAKFAST (Patient taking differently: Take 175 mcg by mouth nightly. TAKE 1 TABLET BY MOUTH DAILY BEFORE BREAKFAST) 30 Tab 11    dantrolene (DANTRIUM) 100 mg capsule 100 mg as needed.  PARoxetine (PAXIL) 40 mg tablet TAKE 1.5 TABS BY MOUTH DAILY. (Patient taking differently: Take 60 mg by mouth nightly. TAKE 1.5 TABS BY MOUTH DAILY. - Note Pt prefers to take at HS) 45 Tab 2    GILENYA 0.5 mg cap Take 1 Cap by mouth daily.  traZODone (DESYREL) 50 mg tablet Take 50 mg by mouth nightly as needed.  pyridostigmine bromide (MESTINON TIMESPAN) 180 mg SR tablet Take 180 mg by mouth two (2) times a day.  modafinil (PROVIGIL) 200 mg tablet Take 200 mg by mouth two (2) times a day.  METHOCARBAMOL (ROBAXIN-750 PO) Take 1 Tab by mouth as needed.  lidocaine (LIDODERM) 5 %(700 mg/patch) by TransDERmal route every twenty-four (24) hours. Apply patch to the affected area for 12 hours a day and remove for 12 hours a day.  ACTHAR H.P. 80 unit/mL injectable gel 1 mL by SubCUTAneous route daily. 80 units subq q day for 10 days      diazepam (VALIUM) 10 mg tablet Take 5-10 mg by mouth as needed for Anxiety.  ergocalciferol (VITAMIN D2) 50,000 unit capsule Take 50,000 Units by mouth every Sunday.  topiramate (TOPAMAX) 200 mg tablet Take 200 mg by mouth two (2) times a day.  oxyCODONE IR (ROXICODONE) 10 mg tab immediate release tablet Take 0.5 Tabs by mouth every three (3) hours as needed.  Max Daily Amount: 40 mg. 60 Tab 0        Objective:   Vitals:  Vitals:    07/12/17 0847 07/12/17 0850   BP: (!) 143/103 118/67   Pulse: 83 85   Resp: 16    Temp: 97.7 °F (36.5 °C)    TempSrc: Oral    SpO2: 97%    Weight: 180 lb 12.8 oz (82 kg)    Height: 6' (1.829 m)    PainSc:   0 - No pain    LMP: 09/01/2010               Lab Data Reviewed:  Lab Results   Component Value Date/Time    WBC 3.9 05/11/2017 10:20 AM    HCT 41.2 05/11/2017 10:20 AM    HGB 11.7 06/07/2017 05:02 AM    PLATELET 100 16/34/9897 10:20 AM       Lab Results   Component Value Date/Time    Sodium 143 06/07/2017 05:02 AM    Potassium 4.5 06/07/2017 05:02 AM    Chloride 115 06/07/2017 05:02 AM    CO2 19 06/07/2017 05:02 AM    Glucose 117 06/07/2017 05:02 AM    BUN 10 06/07/2017 05:02 AM    Creatinine 0.76 06/07/2017 05:02 AM    Calcium 8.8 06/07/2017 05:02 AM       No components found for: TROPQUANT    No results found for: SHAHIDA      Lab Results   Component Value Date/Time    Hemoglobin A1c 6.2 05/11/2017 10:20 AM        Lab Results   Component Value Date/Time    Vitamin B12 434 02/10/2016    Folate 12.0 02/10/2016       No results found for: SHAHIDA, CLARIBEL Jain    Lab Results   Component Value Date/Time    Cholesterol, total 186 05/11/2017 10:20 AM    HDL Cholesterol 52 05/11/2017 10:20 AM    LDL, calculated 116 05/11/2017 10:20 AM    VLDL, calculated 18 05/11/2017 10:20 AM    Triglyceride 92 05/11/2017 10:20 AM    CHOL/HDL Ratio 4.3 08/23/2010 12:19 PM         CT Results (recent):    Results from [place] encounter on 05/22/17   CT HEAD WO CONT   Narrative EXAM:  CT HEAD WO CONT    INDICATION:   Concussion mild or moderate acute, status post fall on Saturday  onto face with headache since. COMPARISON: MRI 5/18/2016, CT 5/17/2016. TECHNIQUE: Unenhanced CT of the head was performed using 5 mm images. Brain and  bone windows were generated. CT dose reduction was achieved through use of a  standardized protocol tailored for this examination and automatic exposure  control for dose modulation. FINDINGS:  The ventricles and sulci are normal in size, shape and configuration and  midline. There is no significant white matter disease.  There is no intracranial  hemorrhage, extra-axial collection, mass, mass effect or midline shift.  The  basilar cisterns are open. No acute infarct is identified. The bone windows  demonstrate no abnormalities. The visualized portions of the paranasal sinuses  and mastoid air cells are clear. Impression IMPRESSION: No acute findings. MRI Results (recent):    Results from [place] encounter on 11/02/16   MRI [facility] SPINE WO CONT   Narrative CLINICAL HISTORY: Neck pain     INDICATION: Neck pain    COMPARISON: None    TECHNIQUE: MR imaging of the cervical spine was performed including sagittal T1,  T2, STIR;  axial GRE, T1. Contrast was not administered. FINDINGS:    There is normal alignment of the cervical spine. Vertebral body heights are  maintained. No marrow edema. The craniocervical junction is intact. Cord signal intensity is within normal limits. Minimal increased STIR signal at  T1-T2 is not confirmed on the thoracic imaging. C2/3:   The spinal canal and foramina are widely patent. C3/4:  There is a moderate-sized left central disc protrusion with uncovertebral  spurring and facet degeneration, slightly indenting the left ventral spinal cord  and causing mild to moderate leftward spinal canal and foraminal stenosis. Mild  right foraminal stenosis. C4/5:  There is diffuse disc bulge, causing minimal spinal canal stenosis. Foramina are patent. C5/6:  Posterior disc osteophyte complex with uncovertebral spurring and facet  degeneration results in mild spinal canal and minimal bilateral foraminal  stenosis. C6/7:  Posterior disc osteophyte complex with uncovertebral spurring and facet  degeneration results in mild to moderate spinal canal and mild bilateral  foraminal stenosis. C7/T1:   The spinal canal and foramina are widely patent. Impression IMPRESSION:      Stable degenerative changes particularly on the left at C3-4, and at C6-7 as  discussed above.                 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 TECHNIQUE:   MR imaging of the thoracic spine was performed with the following sequences:  sagittal T1, T2, stir; axial T1, gradient echo. FINDINGS:    There is a mild disc protrusion at T12-L1 with minimal superimposed extrusion  cranially oriented. . There is no canal compromise. There is no foraminal  compromise. Normal disc bulge at T4-T5. The thoracic aorta is normal in caliber. There is no pleural effusion. No fracture or dislocation. . Vertebral body heights are maintained. Marrow signal is normal. The course,  caliber, and signal intensity of the spinal cord are normal. The spinal canal  and neural foramina are widely patent at all levels. IMPRESSION:   Minimal disc degenerative change at T12-L1 is stable when compared to  10/10/2011. Otherwise minimal degenerative changes. There is no canal or foraminal compromise present. IR Results (recent):  No results found for this or any previous visit. VAS/US Results (recent):    Results from Hospital Encounter encounter on 01/19/17   DUPLEX LOWER EXT VENOUS RIGHT    PHYSICAL EXAM:  General:    Alert, cooperative, no distress, appears stated age. Head:   Normocephalic, without obvious abnormality, atraumatic. Eyes:   Conjunctivae/corneas clear. PERRLA  Nose:  Nares normal. No drainage or sinus tenderness. Throat:    Lips, mucosa, and tongue normal.  No Thrush  Neck:  Supple, symmetrical,  no adenopathy, thyroid: non tender    no carotid bruit and no JVD. Back:    Symmetric,  No CVA tenderness. Lungs:   Clear to auscultation bilaterally. No Wheezing or Rhonchi. No rales. Chest wall:  No tenderness or deformity. No Accessory muscle use.   Heart:   Regular rate and rhythm,  no murmur, rub or gallop. Abdomen:   Soft, non-tender. Not distended. Bowel sounds normal. No masses  Extremities: Extremities normal, atraumatic, No cyanosis. No edema. No clubbing  Skin:     Texture, turgor normal. No rashes or lesions. Not Jaundiced  Lymph nodes: Cervical, supraclavicular normal.  Psych:  Good insight. Not depressed. Not anxious or agitated. NEUROLOGICAL EXAM:  Appearance: The patient is well developed, well nourished, provides a coherent history and is in no acute distress. Mental Status: Oriented to time, place and person. Mood and affect appropriate. Cranial Nerves:   Intact visual fields. Fundi are benign. RACHEL, EOM's full, no nystagmus, no ptosis. Facial sensation is normal. Corneal reflexes are intact. Facial movement is symmetric. Hearing is normal bilaterally. Palate is midline with normal sternocleidomastoid and trapezius muscles are normal. Tongue is midline. Motor:  5/5 strength in upper and lower proximal and distal muscles. Normal bulk and tone. No fasciculations. Reflexes:   Deep tendon reflexes 2+/4 and symmetrical.   Sensory:   Decreased sensation to touch, pinprick and vibration. Gait:  Unsteady gait. Ambulates with walker. Tremor:   No tremor noted. Cerebellar:  No cerebellar signs present. Neurovascular:  Normal heart sounds and regular rhythm, peripheral pulses intact, and no carotid bruits. Assesment  1. Chronic, continuous use of opioids    - COMPLIANCE DRUG SCREEN/PRESCRIPTION MONITORING    ___________________________________________________  PLAN:      ICD-10-CM ICD-9-CM    1. Chronic, continuous use of opioids F11.90 305.51 COMPLIANCE DRUG SCREEN/PRESCRIPTION MONITORING   2. MS (multiple sclerosis) ([facility] G35 340    3.  Lumbar radiculopathy M54.16 724.4 REFERRAL TO PHYSICAL THERAPY      REFERRAL TO OCCUPATIONAL THERAPY      REFERRAL TO HOME HEALTH   4. MG (myasthenia gravis) ([facility] G70.00 358.00 REFERRAL TO PHYSICAL THERAPY      REFERRAL TO OCCUPATIONAL THERAPY      REFERRAL TO HOME HEALTH   5. Migraine without aura and without status migrainosus, not intractable G43.009 346.10    6. Gait disorder R26.9 781.2 REFERRAL TO PHYSICAL THERAPY      REFERRAL TO OCCUPATIONAL THERAPY      REFERRAL TO HOME HEALTH   7. Chronic pain syndrome G89.4 338.4    8. Status post hip surgery Z98.890 V45.89      Follow-up Disposition:  Return in about 3 months (around 10/12/2017).          ___________________________________________________    Total time spent with patient:  []15   []25   []35   [] __ minutes    Care Plan discussed with:    [x]Patient   []Family    []Care Manager   []Consultant/Specialist :    ___________________________________________________    Attending Physician: Kaye Sutton MD

## 2017-07-21 LAB — DRUGS UR: NORMAL

## 2017-07-28 ENCOUNTER — TELEPHONE (OUTPATIENT)
Dept: NEUROLOGY | Age: 62
End: 2017-07-28

## 2017-07-28 NOTE — TELEPHONE ENCOUNTER
Called patient, ID verified times 2. Patient would like to resume PT with [facility]. Order faxed over to Giovanny Trina Mcdermott I at [phone].

## 2017-08-11 DIAGNOSIS — E11.9 CONTROLLED TYPE 2 DIABETES MELLITUS WITHOUT COMPLICATION, WITHOUT LONG-TERM CURRENT USE OF INSULIN (HCC): Primary | ICD-10-CM

## 2017-08-11 RX ORDER — LANCETS
EACH MISCELLANEOUS
Qty: 200 EACH | Refills: 5 | Status: SHIPPED | OUTPATIENT
Start: 2017-08-11 | End: 2017-12-07

## 2017-09-05 ENCOUNTER — PATIENT OUTREACH (OUTPATIENT)
Dept: FAMILY MEDICINE CLINIC | Age: 62
End: 2017-09-05

## 2017-09-05 NOTE — PROGRESS NOTES
NN called patient to schedule appt for Case Management. She had just returned from a cruise. Scheduled appt for 9/13 at [address] [place] [address] of how much she had enjoyed the cruise and new hip did well.

## 2017-10-02 ENCOUNTER — OFFICE VISIT (OUTPATIENT)
Dept: FAMILY MEDICINE CLINIC | Age: 62
End: 2017-10-02

## 2017-10-02 VITALS
BODY MASS INDEX: 24.24 KG/M2 | SYSTOLIC BLOOD PRESSURE: 130 MMHG | HEART RATE: 95 BPM | DIASTOLIC BLOOD PRESSURE: 70 MMHG | TEMPERATURE: 98.4 F | RESPIRATION RATE: 18 BRPM | HEIGHT: 72 IN | WEIGHT: 179 LBS

## 2017-10-02 DIAGNOSIS — E11.9 DIABETES MELLITUS TYPE 2, DIET-CONTROLLED (HCC): Primary | ICD-10-CM

## 2017-10-02 LAB — HBA1C MFR BLD HPLC: 5.7 %

## 2017-10-02 NOTE — PROGRESS NOTES
Patient Name: Angie Lovelace   MRN: [id number]    Cal Craig is a 63 y.o. female who presents with the following:     She is seen for diabetes. Since last visit she reports no chest pain or dyspnea, no chest pain, dyspnea or TIA's, no unusual visual symptoms, no hypoglycemia, no medication side effects noted, had one episodes of dizziness with BP of 100/50 last week; resolved in 30 mins; no CP or numbness. Home glucose monitoring: values are usually normal.  She reports medication compliance: n/a - patient not on medications (diet controlled). Medication side effects: none. Diabetic diet compliance: noncompliant much of the time. Lab review: A1c today is 5.7. Eye exam: UTD. Lab Results   Component Value Date/Time    Hemoglobin A1c 6.2 05/11/2017 10:20 AM    Hemoglobin A1c (POC) 5.7 10/02/2017 10:40 AM       Review of Systems   Constitutional: Negative for fever, malaise/fatigue and weight loss. Respiratory: Negative for cough, hemoptysis, shortness of breath and wheezing. Cardiovascular: Negative for chest pain, palpitations, leg swelling and PND. Gastrointestinal: Negative for abdominal pain, constipation, diarrhea, nausea and vomiting. Neurological: Positive for dizziness. The patient's medications, allergies, past medical history, surgical history, family history and social history were reviewed and updated where appropriate. Prior to Admission medications    Medication Sig Start Date End Date Taking? Authorizing Provider   Lancets misc Use to check blood sugar bid , dx-Diabetes Mellitus E11.9 8/11/17  Yes Eligah Schirmer, MD   clindamycin (CLEOCIN) 300 mg capsule  6/30/17  Yes Historical Provider   aspirin delayed-release 325 mg tablet Take 1 Tab by mouth two (2) times a day. 6/7/17  Yes Lucas Noble MD   oxyCODONE IR (ROXICODONE) 10 mg tab immediate release tablet Take 0.5 Tabs by mouth every three (3) hours as needed.  Max Daily Amount: 40 mg. 6/7/17  Yes Toby Castillo MD   butalbital-acetaminophen-caff (FIORICET) -40 mg per capsule Take 1 Cap by mouth every four (4) hours as needed for Pain. Max Daily Amount: 6 Caps. 5/22/17  Yes Prince Wheeler MD   fentaNYL (DURAGESIC) 75 mcg/hr 1 Patch by TransDERmal route every seventy-two (72) hours. Max Daily Amount: 1 Patch. 5/17/17  Yes Los Kahn MD   pregabalin (LYRICA) 200 mg capsule Take 1 Cap by mouth two (2) times a day. Max Daily Amount: 400 mg. 4/12/17  Yes Los Kahn MD   rosuvastatin (CRESTOR) 20 mg tablet TAKE 1 TABLET BY MOUTH EVERY EVENING 2/24/17  Yes Chandler Davidson MD   TENS UNIT ELECTRODES by Does Not Apply route. prn   Yes Historical Provider   Blood-Glucose Meter monitoring kit by SubCUTAneous route Before breakfast and dinner. Free Style Lite glucometer and test strips 9/13/16  Yes Cal Harrington DO   levothyroxine (SYNTHROID) 175 mcg tablet TAKE 1 TABLET BY MOUTH DAILY BEFORE BREAKFAST  Patient taking differently: Take 175 mcg by mouth nightly. TAKE 1 TABLET BY MOUTH DAILY BEFORE BREAKFAST 9/7/16  Yes Cal Harrington DO   dantrolene (DANTRIUM) 100 mg capsule 100 mg as needed. 6/3/16  Yes Historical Provider   PARoxetine (PAXIL) 40 mg tablet TAKE 1.5 TABS BY MOUTH DAILY. Patient taking differently: Take 60 mg by mouth nightly. TAKE 1.5 TABS BY MOUTH DAILY. - Note Pt prefers to take at [facility] 12/29/15  Yes Cal Harrington DO   GILENYA 0.5 mg cap Take 1 Cap by mouth daily. 11/20/15  Yes Historical Provider   traZODone (DESYREL) 50 mg tablet Take 50 mg by mouth nightly as needed. 10/15/15  Yes Historical Provider   pyridostigmine bromide (MESTINON TIMESPAN) 180 mg SR tablet Take 180 mg by mouth two (2) times a day. Yes Historical Provider   modafinil (PROVIGIL) 200 mg tablet Take 200 mg by mouth two (2) times a day. Yes Historical Provider   METHOCARBAMOL (ROBAXIN-750 PO) Take 1 Tab by mouth as needed.    Yes Historical Provider   lidocaine (LIDODERM) 5 %(700 mg/patch) by TransDERmal route every twenty-four (24) hours. Apply patch to the affected area for 12 hours a day and remove for 12 hours a day. Yes Historical Provider   ACTHAR H.P. 80 unit/mL injectable gel 1 mL by SubCUTAneous route daily. 80 units subq q day for 10 days 6/13/14  Yes Historical Provider   diazepam (VALIUM) 10 mg tablet Take 5-10 mg by mouth as needed for Anxiety. Yes Historical Provider   ergocalciferol (VITAMIN D2) 50,000 unit capsule Take 50,000 Units by mouth every Sunday. Yes Historical Provider   topiramate (TOPAMAX) 200 mg tablet Take 200 mg by mouth two (2) times a day. Yes Historical Provider       Allergies   Allergen Reactions    Bee Sting [Sting, Bee] Anaphylaxis     Also allergic to egg products    Penicillins Anaphylaxis    Betadine [Povidone-Iodine] Rash    Egg Itching           OBJECTIVE    Visit Vitals    /70 (BP 1 Location: Left arm, BP Patient Position: Sitting)    Pulse 95    Temp 98.4 °F (36.9 °C) (Oral)    Resp 18    Ht 6' 1.93\" (1.878 m)    Wt 179 lb (81.2 kg)    LMP 09/01/2010    BMI 23.03 kg/m2       Physical Exam   Constitutional: She is well-developed, well-nourished, and in no distress. No distress. HENT:   Head: Normocephalic and atraumatic. Right Ear: External ear normal.   Left Ear: External ear normal.   Eyes: Conjunctivae and EOM are normal. Pupils are equal, round, and reactive to light. Cardiovascular: Normal rate, regular rhythm and normal heart sounds. Exam reveals no gallop and no friction rub. No murmur heard. Pulmonary/Chest: Effort normal and breath sounds normal. No respiratory distress. She has no wheezes. Skin: She is not diaphoretic. Psychiatric: Mood, memory, affect and judgment normal.   Nursing note and vitals reviewed. ASSESSMENT AND PLAN  Frances Galvin is a 63 y.o. female who presents today for:    1. Diabetes mellitus type 2, diet-controlled ([address]  Stable, continue current treatment.   One episode of dizziness, likely due to dehydration. Pt to call back if she notices any worsening symptoms.  - AMB POC HEMOGLOBIN A1C       There are no discontinued medications. Follow-up Disposition:  Return in about 6 months (around 4/2/2018) for DM follow up. Medication risks/benefits/costs/interactions/alternatives discussed with patient. Advised patient to call back or return to office if symptoms worsen/change/persist. If patient cannot reach us or should anything more severe/urgent arise he/she should proceed directly to the nearest emergency department. Discussed expected course/resolution/complications of diagnosis in detail with patient. Patient given a written after visit summary which includes his/her diagnoses, current medications and vitals. Patient expressed understanding with the diagnosis and plan.      Jurgen Nugent M.D.

## 2017-10-02 NOTE — PATIENT INSTRUCTIONS

## 2017-10-02 NOTE — PROGRESS NOTES
Chief Complaint   Patient presents with    Diabetes     follow up     1. Have you been to the ER, urgent care clinic since your last visit? Hospitalized since your last visit? No    2. Have you seen or consulted any other health care providers outside of the [address] [place] [address] since your last visit? Include any pap smears or colon screening.  No

## 2017-10-02 NOTE — MR AVS SNAPSHOT
Visit Information Date & Time Provider Department Dept. Phone Encounter #  
 10/2/2017 10:00 AM Noreen Lovelace MD [address] [place] [address] [phone] [id number] Follow-up Instructions Return in about 6 months (around 4/2/2018) for DM follow up. Your Appointments 10/18/2017  9:40 AM  
Follow Up with Los Zepeda MD  
[facility]) Appt Note: 3 months [place] [address] [facility] Upcoming Health Maintenance Date Due  
 EYE EXAM RETINAL OR DILATED Q1 10/12/2017 HEMOGLOBIN A1C Q6M 11/11/2017 MEDICARE YEARLY EXAM 1/5/2018 MICROALBUMIN Q1 5/11/2018 LIPID PANEL Q1 5/11/2018 FOOT EXAM Q1 7/3/2018 BREAST CANCER SCRN MAMMOGRAM 12/14/2018 PAP AKA CERVICAL CYTOLOGY 1/4/2020 COLONOSCOPY 1/18/2022 DTaP/Tdap/Td series (2 - Td) 6/16/2024 Allergies as of 10/2/2017  Review Complete On: 10/2/2017 By: Noreen Lovelace MD  
  
 Severity Noted Reaction Type Reactions Bee Sting [Sting, Bee] High 06/25/2010    Anaphylaxis Also allergic to egg products Penicillins High 06/25/2010    Anaphylaxis Betadine [Povidone-iodine]  05/17/2016    Rash Egg  06/25/2015    Itching Current Immunizations  Reviewed on 1/4/2017 Name Date Tdap 6/16/2014 Not reviewed this visit You Were Diagnosed With   
  
 Codes Comments Diabetes mellitus type 2, diet-controlled ([facility]    -  Primary ICD-10-CM: E11.9 ICD-9-CM: 250.00 Vitals BP Pulse Temp Resp Height(growth percentile) Weight(growth percentile) 130/70 (BP 1 Location: Left arm, BP Patient Position: Sitting) 95 98.4 °F (36.9 °C) (Oral) 18 6' 1.93\" (1.878 m) 179 lb (81.2 kg) LMP BMI OB Status Smoking Status 09/01/2010 23.03 kg/m2 Postmenopausal Current Every Day Smoker Vitals History BMI and BSA Data Body Mass Index Body Surface Area 23.03 kg/m 2 2.06 m 2 Preferred Pharmacy Pharmacy Name Phone [facility] [address], [address] [place] [address] Drive [phone] Your Updated Medication List  
  
   
This list is accurate as of: 10/2/17 10:49 AM.  Always use your most recent med list.  
  
  
  
  
 Renetta Braver H.P. 80 unit/mL injectable gel Generic drug:  corticotropin 1 mL by SubCUTAneous route daily. 80 units subq q day for 10 days  
  
 aspirin delayed-release 325 mg tablet Take 1 Tab by mouth two (2) times a day. Blood-Glucose Meter monitoring kit  
by SubCUTAneous route Before breakfast and dinner. Free Style Lite glucometer and test strips  
  
 butalbital-acetaminophen-caff -40 mg per capsule Commonly known as:  [employer] Take 1 Cap by mouth every four (4) hours as needed for Pain. Max Daily Amount: 6 Caps. clindamycin 300 mg capsule Commonly known as:  CLEOCIN  
  
 dantrolene 100 mg capsule Commonly known as:  DANTRIUM  
100 mg as needed. fentaNYL 75 mcg/hr Commonly known as:  DURAGESIC  
1 Patch by TransDERmal route every seventy-two (72) hours. Max Daily Amount: 1 Patch. GILENYA 0.5 mg Cap Generic drug:  fingolimod Take 1 Cap by mouth daily. Lancets Misc Use to check blood sugar bid , dx-Diabetes Mellitus E11.9  
  
 levothyroxine 175 mcg tablet Commonly known as:  SYNTHROID  
TAKE 1 TABLET BY MOUTH DAILY BEFORE BREAKFAST  
  
 LIDODERM 5 % Generic drug:  lidocaine  
by TransDERmal route every twenty-four (24) hours. Apply patch to the affected area for 12 hours a day and remove for 12 hours a day. MESTINON TIMESPAN 180 mg SR tablet Generic drug:  pyridostigmine bromide Take 180 mg by mouth two (2) times a day. oxyCODONE IR 10 mg Tab immediate release tablet Commonly known as:  Jeni Dinh  
 Take 0.5 Tabs by mouth every three (3) hours as needed. Max Daily Amount: 40 mg. PARoxetine 40 mg tablet Commonly known as:  PAXIL TAKE 1.5 TABS BY MOUTH DAILY. pregabalin 200 mg capsule Commonly known as:  Fredrick Flash Take 1 Cap by mouth two (2) times a day. Max Daily Amount: 400 mg. PROVIGIL 200 mg tablet Generic drug:  modafinil Take 200 mg by mouth two (2) times a day. ROBAXIN-750 PO Take 1 Tab by mouth as needed. rosuvastatin 20 mg tablet Commonly known as:  CRESTOR  
TAKE 1 TABLET BY MOUTH EVERY EVENING  
  
 TENS UNIT ELECTRODES  
by Does Not Apply route. prn  
  
 TOPAMAX 200 mg tablet Generic drug:  topiramate Take 200 mg by mouth two (2) times a day. traZODone 50 mg tablet Commonly known as:  [place] [employer] Take 50 mg by mouth nightly as needed. VALIUM 10 mg tablet Generic drug:  diazePAM  
Take 5-10 mg by mouth as needed for Anxiety. VITAMIN D2 50,000 unit capsule Generic drug:  ergocalciferol Take 50,000 Units by mouth every Sunday. We Performed the Following AMB POC HEMOGLOBIN A1C [69666 CPT(R)] Follow-up Instructions Return in about 6 months (around 4/2/2018) for DM follow up. Patient Instructions Learning About Diabetes Food Guidelines Your Care Instructions Meal planning is important to manage diabetes. It helps keep your blood sugar at a target level (which you set with your doctor). You don't have to eat special foods. You can eat what your family eats, including sweets once in a while. But you do have to pay attention to how often you eat and how much you eat of certain foods. You may want to work with a dietitian or a certified diabetes educator (CDE) to help you plan meals and snacks. A dietitian or CDE can also help you lose weight if that is one of your goals. What should you know about eating carbs?  
Managing the amount of carbohydrate (carbs) you eat is an important part of healthy meals when you have diabetes. Carbohydrate is found in many foods. · Learn which foods have carbs. And learn the amounts of carbs in different foods. ¨ Bread, cereal, pasta, and rice have about 15 grams of carbs in a serving. A serving is 1 slice of bread (1 ounce), ½ cup of cooked cereal, or 1/3 cup of cooked pasta or rice. ¨ Fruits have 15 grams of carbs in a serving. A serving is 1 small fresh fruit, such as an apple or orange; ½ of a banana; ½ cup of cooked or canned fruit; ½ cup of fruit juice; 1 cup of melon or raspberries; or 2 tablespoons of dried fruit. ¨ Milk and no-sugar-added yogurt have 15 grams of carbs in a serving. A serving is 1 cup of milk or 2/3 cup of no-sugar-added yogurt. ¨ Starchy vegetables have 15 grams of carbs in a serving. A serving is ½ cup of mashed potatoes or sweet potato; 1 cup winter squash; ½ of a small baked potato; ½ cup of cooked beans; or ½ cup cooked corn or green peas. · Learn how much carbs to eat each day and at each meal. A dietitian or CDE can teach you how to keep track of the amount of carbs you eat. This is called carbohydrate counting. · If you are not sure how to count carbohydrate grams, use the Plate Method to plan meals. It is a good, quick way to make sure that you have a balanced meal. It also helps you spread carbs throughout the day. ¨ Divide your plate by types of foods. Put non-starchy vegetables on half the plate, meat or other protein food on one-quarter of the plate, and a grain or starchy vegetable in the final quarter of the plate. To this you can add a small piece of fruit and 1 cup of milk or yogurt, depending on how many carbs you are supposed to eat at a meal. 
· Try to eat about the same amount of carbs at each meal. Do not \"save up\" your daily allowance of carbs to eat at one meal. 
· Proteins have very little or no carbs per serving.  Examples of proteins are beef, chicken, turkey, fish, eggs, tofu, cheese, cottage cheese, and peanut butter. A serving size of meat is 3 ounces, which is about the size of a deck of cards. Examples of meat substitute serving sizes (equal to 1 ounce of meat) are 1/4 cup of cottage cheese, 1 egg, 1 tablespoon of peanut butter, and ½ cup of tofu. How can you eat out and still eat healthy? · Learn to estimate the serving sizes of foods that have carbohydrate. If you measure food at home, it will be easier to estimate the amount in a serving of restaurant food. · If the meal you order has too much carbohydrate (such as potatoes, corn, or baked beans), ask to have a low-carbohydrate food instead. Ask for a salad or green vegetables. · If you use insulin, check your blood sugar before and after eating out to help you plan how much to eat in the future. · If you eat more carbohydrate at a meal than you had planned, take a walk or do other exercise. This will help lower your blood sugar. What else should you know? · Limit saturated fat, such as the fat from meat and dairy products. This is a healthy choice because people who have diabetes are at higher risk of heart disease. So choose lean cuts of meat and nonfat or low-fat dairy products. Use olive or canola oil instead of butter or shortening when cooking. · Don't skip meals. Your blood sugar may drop too low if you skip meals and take insulin or certain medicines for diabetes. · Check with your doctor before you drink alcohol. Alcohol can cause your blood sugar to drop too low. Alcohol can also cause a bad reaction if you take certain diabetes medicines. Follow-up care is a key part of your treatment and safety. Be sure to make and go to all appointments, and call your doctor if you are having problems. It's also a good idea to know your test results and keep a list of the medicines you take. Where can you learn more? Go to http://marie-alvin.info/.  
Enter J046 in the search box to learn more about \"Learning About Diabetes Food Guidelines. \" Current as of: March 13, 2017 Content Version: 11.3 © 5190-5295 [employer], [employer]. Care instructions adapted under license by [employer] (which disclaims liability or warranty for this information). If you have questions about a medical condition or this instruction, always ask your healthcare professional. [address] any warranty or liability for your use of this information. Introducing [facility]! Dear [facility]: 
Thank you for requesting a [employer] account. Our records indicate that you already have an active [employer] account. You can access your account anytime at [email or web address]. [email or web address] Did you know that you can access your hospital and ER discharge instructions at any time in [employer]? You can also review all of your test results from your hospital stay or ER visit. Additional Information If you have questions, please visit the Frequently Asked Questions section of the [employer] website at [email or web address] Remember, [employer] is NOT to be used for urgent needs. For medical emergencies, dial 911. Now available from your iPhone and Android! Please provide this summary of care documentation to your next provider. Your primary care clinician is listed as Brandon Diamond. If you have any questions after today's visit, please call [phone].

## 2017-10-18 ENCOUNTER — OFFICE VISIT (OUTPATIENT)
Dept: NEUROLOGY | Age: 62
End: 2017-10-18

## 2017-10-18 VITALS
HEART RATE: 76 BPM | SYSTOLIC BLOOD PRESSURE: 105 MMHG | WEIGHT: 178.6 LBS | OXYGEN SATURATION: 99 % | BODY MASS INDEX: 24.19 KG/M2 | RESPIRATION RATE: 16 BRPM | HEIGHT: 72 IN | TEMPERATURE: 97.8 F | DIASTOLIC BLOOD PRESSURE: 70 MMHG

## 2017-10-18 DIAGNOSIS — G70.00 MG (MYASTHENIA GRAVIS) (HCC): ICD-10-CM

## 2017-10-18 DIAGNOSIS — G35 MS (MULTIPLE SCLEROSIS) (HCC): ICD-10-CM

## 2017-10-18 DIAGNOSIS — Z79.891 USE OF OPIATES FOR THERAPEUTIC PURPOSES: Primary | ICD-10-CM

## 2017-10-18 DIAGNOSIS — R26.9 GAIT DISORDER: ICD-10-CM

## 2017-10-18 DIAGNOSIS — M47.819 VERTEBRAL ARTHROPATHY: ICD-10-CM

## 2017-10-18 DIAGNOSIS — R20.2 PARESTHESIA: ICD-10-CM

## 2017-10-18 RX ORDER — FENTANYL 75 UG/H
1 PATCH TRANSDERMAL
Qty: 10 PATCH | Refills: 0 | Status: SHIPPED | OUTPATIENT
Start: 2017-10-18 | End: 2017-10-18 | Stop reason: SDUPTHER

## 2017-10-18 RX ORDER — FENTANYL 75 UG/H
1 PATCH TRANSDERMAL
Qty: 10 PATCH | Refills: 0 | Status: SHIPPED | OUTPATIENT
Start: 2017-11-18 | End: 2017-12-26 | Stop reason: SDUPTHER

## 2017-10-18 RX ORDER — ERGOCALCIFEROL 1.25 MG/1
50000 CAPSULE ORAL
Qty: 4 CAP | Refills: 3 | Status: SHIPPED | OUTPATIENT
Start: 2017-10-18 | End: 2018-02-07 | Stop reason: SDUPTHER

## 2017-10-18 RX ORDER — PREGABALIN 200 MG/1
200 CAPSULE ORAL 2 TIMES DAILY
Qty: 180 CAP | Refills: 3 | Status: SHIPPED | OUTPATIENT
Start: 2017-10-18 | End: 2018-12-14 | Stop reason: SDUPTHER

## 2017-10-18 NOTE — MR AVS SNAPSHOT
Visit Information Date & Time Provider Department Dept. Phone Encounter #  
 10/18/2017  9:40 AM Los Pastrana MD [facility] [phone] [id number] Follow-up Instructions Return in about 2 months (around 12/18/2017). Upcoming Health Maintenance Date Due  
 EYE EXAM RETINAL OR DILATED Q1 10/12/2017 MEDICARE YEARLY EXAM 1/5/2018 HEMOGLOBIN A1C Q6M 4/2/2018 MICROALBUMIN Q1 5/11/2018 LIPID PANEL Q1 5/11/2018 FOOT EXAM Q1 7/3/2018 BREAST CANCER SCRN MAMMOGRAM 12/14/2018 PAP AKA CERVICAL CYTOLOGY 1/4/2020 COLONOSCOPY 1/18/2022 DTaP/Tdap/Td series (2 - Td) 6/16/2024 Allergies as of 10/18/2017  Review Complete On: 10/18/2017 By: Triny Mendoza MD  
  
 Severity Noted Reaction Type Reactions Bee Sting [Sting, Bee] High 06/25/2010    Anaphylaxis Also allergic to egg products Penicillins High 06/25/2010    Anaphylaxis Betadine [Povidone-iodine]  05/17/2016    Rash Egg  06/25/2015    Itching Current Immunizations  Reviewed on 1/4/2017 Name Date Tdap 6/16/2014 Not reviewed this visit You Were Diagnosed With   
  
 Codes Comments Use of opiates for therapeutic purposes    -  Primary ICD-10-CM: N90.878 ICD-9-CM: V58.69   
 MS (multiple sclerosis) ([email or web address]     ICD-10-CM: G35 
ICD-9-CM: 556 Gait disorder     ICD-10-CM: R26.9 ICD-9-CM: 781.2 MG (myasthenia gravis) ([facility] [[email or web address] 75.)     ICD-10-CM: G70.00 ICD-9-CM: 358.00 Paresthesia     ICD-10-CM: R20.2 ICD-9-CM: 753. 0 Vertebral arthropathy     ICD-10-CM: M95.8 ICD-9-CM: 721.90 Vitals BP Pulse Temp Resp Height(growth percentile) Weight(growth percentile) 105/70 (BP 1 Location: Left arm, BP Patient Position: Sitting) 76 97.8 °F (36.6 °C) (Temporal) 16 6' 1\" (1.854 m) 178 lb 9.6 oz (81 kg) LMP SpO2 BMI OB Status Smoking Status 09/01/2010 99% 23.56 kg/m2 Postmenopausal Current Every Day Smoker Vitals History BMI and BSA Data Body Mass Index Body Surface Area  
 23.56 kg/m 2 2.04 m 2 Preferred Pharmacy Pharmacy Name [facility] [address], [address] [place] [address] Drive [phone] Your Updated Medication List  
  
   
This list is accurate as of: 10/18/17 11:05 AM.  Always use your most recent med list.  
  
  
  
  
 ACTHAR H.P. 80 unit/mL injectable gel Generic drug:  corticotropin 1 mL by SubCUTAneous route daily. 80 units subq q day for 10 days  
  
 aspirin delayed-release 325 mg tablet Take 1 Tab by mouth two (2) times a day. Blood-Glucose Meter monitoring kit  
by SubCUTAneous route Before breakfast and dinner. Free Style Lite glucometer and test strips  
  
 butalbital-acetaminophen-caff -40 mg per capsule Commonly known as:  [employer] Take 1 Cap by mouth every four (4) hours as needed for Pain. Max Daily Amount: 6 Caps. clindamycin 300 mg capsule Commonly known as:  CLEOCIN Prior to dental procedures  
  
 dantrolene 100 mg capsule Commonly known as:  DANTRIUM  
100 mg as needed. ergocalciferol 50,000 unit capsule Commonly known as:  VITAMIN D2 Take 1 Cap by mouth every seven (7) days. fentaNYL 75 mcg/hr Commonly known as:  DURAGESIC  
1 Patch by TransDERmal route every seventy-two (72) hours. Max Daily Amount: 1 Patch. Start taking on:  11/18/2017 GILENYA 0.5 mg Cap Generic drug:  fingolimod Take 1 Cap by mouth daily. Lancets Misc Use to check blood sugar bid , dx-Diabetes Mellitus E11.9  
  
 levothyroxine 175 mcg tablet Commonly known as:  SYNTHROID  
TAKE 1 TABLET BY MOUTH DAILY BEFORE BREAKFAST  
  
 LIDODERM 5 % Generic drug:  lidocaine  
by TransDERmal route every twenty-four (24) hours. Apply patch to the affected area for 12 hours a day and remove for 12 hours a day. MESTINON TIMESPAN 180 mg SR tablet Generic drug:  pyridostigmine bromide Take 180 mg by mouth two (2) times a day. oxyCODONE IR 10 mg Tab immediate release tablet Commonly known as:  [place] Take 0.5 Tabs by mouth every three (3) hours as needed. Max Daily Amount: 40 mg. PARoxetine 40 mg tablet Commonly known as:  PAXIL TAKE 1.5 TABS BY MOUTH DAILY. pregabalin 200 mg capsule Commonly known as:  [employer] Take 1 Cap by mouth two (2) times a day. Max Daily Amount: 400 mg. PROVIGIL 200 mg tablet Generic drug:  modafinil Take 200 mg by mouth two (2) times a day. ROBAXIN-750 PO Take 1 Tab by mouth as needed. rosuvastatin 20 mg tablet Commonly known as:  CRESTOR  
TAKE 1 TABLET BY MOUTH EVERY EVENING  
  
 TENS UNIT ELECTRODES  
by Does Not Apply route. prn  
  
 TOPAMAX 200 mg tablet Generic drug:  topiramate Take 200 mg by mouth two (2) times a day. traZODone 50 mg tablet Commonly known as:  Jose Cower Take 50 mg by mouth nightly as needed. VALIUM 10 mg tablet Generic drug:  diazePAM  
Take 5-10 mg by mouth as needed for Anxiety. Prescriptions Printed Refills  
 pregabalin (LYRICA) 200 mg capsule 3 Sig: Take 1 Cap by mouth two (2) times a day. Max Daily Amount: 400 mg. Class: Print Route: Oral  
 fentaNYL (DURAGESIC) 75 mcg/hr 0 Starting on: 2017 Si Patch by TransDERmal route every seventy-two (72) hours. Max Daily Amount: 1 Patch. Class: Print Route: TransDERmal  
  
Prescriptions Sent to Pharmacy Refills  
 ergocalciferol (VITAMIN D2) 50,000 unit capsule 3 Sig: Take 1 Cap by mouth every seven (7) days. Class: Normal  
 Pharmacy: [facility] [address] Ph #: [phone] Route: Oral  
  
We Performed the Following [address] [place] [address] MONITORING [[id number] Custom] Follow-up Instructions Return in about 2 months (around 12/18/2017). Introducing [facility] & HEALTH [facility]! Dear Rossy Hebert: 
Thank you for requesting a [employer] account. Our records indicate that you already have an active [employer] account. You can access your account anytime at [email or web address] Did you know that you can access your hospital and ER discharge instructions at any time in [employer]? You can also review all of your test results from your hospital stay or ER visit. Additional Information If you have questions, please visit the Frequently Asked Questions section of the [employer] website at [email or web address] Remember, [employer] is NOT to be used for urgent needs. For medical emergencies, dial 911. Now available from your iPhone and Android! Please provide this summary of care documentation to your next provider. Your primary care clinician is listed as Brandon Diamond. If you have any questions after today's visit, please call [phone].

## 2017-10-18 NOTE — PROGRESS NOTES
Neurology Progress Note    NAME:  Ashlyn Galvin   :   1955   MRN:   [id number]     Date/Time:  10/18/2017  Subjective:      Ashlyn Galvin is a 63 y.o. female here today for follow up for MS,Pain and fall. Patient was accompanied by her son to this visit  Headache waxes and wanes, throbbing in nature, frequency is variable  Pain is sharp in nature and radiate down to the leg, activity makes it worse, howevever, medication helps   Says she had one fall since last visit, which is a big improvement from previous  She noted that there is eye pain with movement of the eye, but is improving. Review of Systems - General ROS: positive for  - fatigue  Psychological ROS: positive for - anxiety and depression  Ophthalmic ROS: positive for - blurry vision, decreased vision, eye pain and uses glasses  ENT ROS: positive for - headaches and vertigo  Allergy and Immunology ROS: negative  Hematological and Lymphatic ROS: negative  Endocrine ROS: negative  Respiratory ROS: no cough, shortness of breath, or wheezing  Cardiovascular ROS: no chest pain or dyspnea on exertion  Gastrointestinal ROS: no abdominal pain, change in bowel habits, or black or bloody stools  Genito-Urinary ROS: no dysuria, trouble voiding, or hematuria  Musculoskeletal ROS: positive for - gait disturbance, joint pain, joint stiffness, joint swelling, muscle pain and muscular weakness  Neurological ROS: positive for - dizziness, gait disturbance, impaired coordination/balance, numbness/tingling, visual changes and weakness  Dermatological ROS: negative        Medications reviewed:  Current Outpatient Prescriptions   Medication Sig Dispense Refill    Lancets misc Use to check blood sugar bid , dx-Diabetes Mellitus E11.9 200 Each 5    clindamycin (CLEOCIN) 300 mg capsule Prior to dental procedures      aspirin delayed-release 325 mg tablet Take 1 Tab by mouth two (2) times a day.  60 Tab 0    butalbital-acetaminophen-caff (FIORICET) -40 mg per capsule Take 1 Cap by mouth every four (4) hours as needed for Pain. Max Daily Amount: 6 Caps. 10 Cap 0    fentaNYL (DURAGESIC) 75 mcg/hr 1 Patch by TransDERmal route every seventy-two (72) hours. Max Daily Amount: 1 Patch. 10 Patch 0    pregabalin (LYRICA) 200 mg capsule Take 1 Cap by mouth two (2) times a day. Max Daily Amount: 400 mg. 180 Cap 3    rosuvastatin (CRESTOR) 20 mg tablet TAKE 1 TABLET BY MOUTH EVERY EVENING 90 Tab 1    TENS UNIT ELECTRODES by Does Not Apply route. prn      Blood-Glucose Meter monitoring kit by SubCUTAneous route Before breakfast and dinner. Free Style Lite glucometer and test strips 1 Kit 0    levothyroxine (SYNTHROID) 175 mcg tablet TAKE 1 TABLET BY MOUTH DAILY BEFORE BREAKFAST (Patient taking differently: Take 175 mcg by mouth nightly. TAKE 1 TABLET BY MOUTH DAILY BEFORE BREAKFAST) 30 Tab 11    dantrolene (DANTRIUM) 100 mg capsule 100 mg as needed.  PARoxetine (PAXIL) 40 mg tablet TAKE 1.5 TABS BY MOUTH DAILY. (Patient taking differently: Take 60 mg by mouth nightly. TAKE 1.5 TABS BY MOUTH DAILY. - Note Pt prefers to take at HS) 45 Tab 2    GILENYA 0.5 mg cap Take 1 Cap by mouth daily.  traZODone (DESYREL) 50 mg tablet Take 50 mg by mouth nightly as needed.  pyridostigmine bromide (MESTINON TIMESPAN) 180 mg SR tablet Take 180 mg by mouth two (2) times a day.  modafinil (PROVIGIL) 200 mg tablet Take 200 mg by mouth two (2) times a day.  lidocaine (LIDODERM) 5 %(700 mg/patch) by TransDERmal route every twenty-four (24) hours. Apply patch to the affected area for 12 hours a day and remove for 12 hours a day.  ACTHAR H.P. 80 unit/mL injectable gel 1 mL by SubCUTAneous route daily. 80 units subq q day for 10 days      diazepam (VALIUM) 10 mg tablet Take 5-10 mg by mouth as needed for Anxiety.  ergocalciferol (VITAMIN D2) 50,000 unit capsule Take 50,000 Units by mouth every Sunday.       topiramate (TOPAMAX) 200 mg tablet Take 200 mg by mouth two (2) times a day.  oxyCODONE IR (ROXICODONE) 10 mg tab immediate release tablet Take 0.5 Tabs by mouth every three (3) hours as needed. Max Daily Amount: 40 mg. 60 Tab 0    METHOCARBAMOL (ROBAXIN-750 PO) Take 1 Tab by mouth as needed.           Objective:   Vitals:  Vitals:    10/18/17 1008   BP: 105/70   Pulse: 76   Resp: 16   Temp: 97.8 °F (36.6 °C)   TempSrc: Temporal   SpO2: 99%   Weight: 178 lb 9.6 oz (81 kg)   Height: 6' 1\" (1.854 m)   PainSc:   4   PainLoc: Generalized   LMP: 09/01/2010               Lab Data Reviewed:  Lab Results   Component Value Date/Time    WBC 3.9 05/11/2017 10:20 AM    HCT 41.2 05/11/2017 10:20 AM    HGB 11.7 06/07/2017 05:02 AM    PLATELET 419 75/76/5497 10:20 AM       Lab Results   Component Value Date/Time    Sodium 143 06/07/2017 05:02 AM    Potassium 4.5 06/07/2017 05:02 AM    Chloride 115 06/07/2017 05:02 AM    CO2 19 06/07/2017 05:02 AM    Glucose 117 06/07/2017 05:02 AM    BUN 10 06/07/2017 05:02 AM    Creatinine 0.76 06/07/2017 05:02 AM    Calcium 8.8 06/07/2017 05:02 AM       No components found for: TROPQUANT    No results found for: SHAHIDA      Lab Results   Component Value Date/Time    Hemoglobin A1c 6.2 05/11/2017 10:20 AM    Hemoglobin A1c (POC) 5.7 10/02/2017 10:40 AM        Lab Results   Component Value Date/Time    Vitamin B12 434 02/10/2016    Folate 12.0 02/10/2016       No results found for: Vira PINEDO XBANA    Lab Results   Component Value Date/Time    Cholesterol, total 186 05/11/2017 10:20 AM    HDL Cholesterol 52 05/11/2017 10:20 AM    LDL, calculated 116 05/11/2017 10:20 AM    VLDL, calculated 18 05/11/2017 10:20 AM    Triglyceride 92 05/11/2017 10:20 AM    CHOL/HDL Ratio 4.3 08/23/2010 12:19 PM         CT Results (recent):    Results from Hospital Encounter encounter on 05/22/17   CT HEAD WO CONT   Narrative EXAM:  CT HEAD WO CONT    INDICATION:   Concussion mild or moderate acute, status post fall on Saturday  onto face with headache since.    COMPARISON: MRI 5/18/2016, CT 5/17/2016. TECHNIQUE: Unenhanced CT of the head was performed using 5 mm images. Brain and  bone windows were generated. CT dose reduction was achieved through use of a  standardized protocol tailored for this examination and automatic exposure  control for dose modulation. FINDINGS:  The ventricles and sulci are normal in size, shape and configuration and  midline. There is no significant white matter disease. There is no intracranial  hemorrhage, extra-axial collection, mass, mass effect or midline shift. The  basilar cisterns are open. No acute infarct is identified. The bone windows  demonstrate no abnormalities. The visualized portions of the paranasal sinuses  and mastoid air cells are clear. Impression IMPRESSION: No acute findings. MRI Results (recent):    Results from [place] encounter on 11/02/16   MRI [facility] SPINE WO CONT   Narrative CLINICAL HISTORY: Neck pain     INDICATION: Neck pain    COMPARISON: None    TECHNIQUE: MR imaging of the cervical spine was performed including sagittal T1,  T2, STIR;  axial GRE, T1. Contrast was not administered. FINDINGS:    There is normal alignment of the cervical spine. Vertebral body heights are  maintained. No marrow edema. The craniocervical junction is intact. Cord signal intensity is within normal limits. Minimal increased STIR signal at  T1-T2 is not confirmed on the thoracic imaging. C2/3:   The spinal canal and foramina are widely patent. C3/4:  There is a moderate-sized left central disc protrusion with uncovertebral  spurring and facet degeneration, slightly indenting the left ventral spinal cord  and causing mild to moderate leftward spinal canal and foraminal stenosis. Mild  right foraminal stenosis. C4/5:  There is diffuse disc bulge, causing minimal spinal canal stenosis. Foramina are patent.     C5/6:  Posterior disc osteophyte complex with uncovertebral spurring and facet  degeneration results in mild spinal canal and minimal bilateral foraminal  stenosis. C6/7:  Posterior disc osteophyte complex with uncovertebral spurring and facet  degeneration results in mild to moderate spinal canal and mild bilateral  foraminal stenosis. C7/T1:   The spinal canal and foramina are widely patent. Impression IMPRESSION:      Stable degenerative changes particularly on the left at C3-4, and at C6-7 as  discussed above. 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 TECHNIQUE:   MR imaging of the thoracic spine was performed with the following sequences:  sagittal T1, T2, stir; axial T1, gradient echo. FINDINGS:    There is a mild disc protrusion at T12-L1 with minimal superimposed extrusion  cranially oriented. . There is no canal compromise. There is no foraminal  compromise. Normal disc bulge at T4-T5. The thoracic aorta is normal in caliber. There is no pleural effusion. No fracture or dislocation. . Vertebral body heights are maintained. Marrow signal is normal. The course,  caliber, and signal intensity of the spinal cord are normal. The spinal canal  and neural foramina are widely patent at all levels. IMPRESSION:   Minimal disc degenerative change at T12-L1 is stable when compared to  10/10/2011. Otherwise minimal degenerative changes. There is no canal or foraminal compromise present. IR Results (recent):  No results found for this or any previous visit.     VAS/US Results (recent):    Results from Hospital Encounter encounter on 01/19/17   DUPLEX LOWER EXT VENOUS RIGHT    PHYSICAL EXAM:  General:    Alert, cooperative, no distress, appears stated age.     Head:   Normocephalic, without obvious abnormality, atraumatic. Eyes:   Conjunctivae/corneas clear. PERRLA  Nose:  Nares normal. No drainage or sinus tenderness. Throat:    Lips, mucosa, and tongue normal.  No Thrush  Neck:  Supple, symmetrical,  no adenopathy, thyroid: non tender    no carotid bruit and no JVD. Back:    Symmetric,  No CVA tenderness. Lungs:   Clear to auscultation bilaterally. No Wheezing or Rhonchi. No rales. Chest wall:  No tenderness or deformity. No Accessory muscle use. Heart:   Regular rate and rhythm,  no murmur, rub or gallop. Abdomen:   Soft, non-tender. Not distended. Bowel sounds normal. No masses  Extremities: Extremities normal, atraumatic, No cyanosis. No edema. No clubbing  Skin:     Texture, turgor normal. No rashes or lesions. Not Jaundiced  Lymph nodes: Cervical, supraclavicular normal.  Psych:  Good insight. Not depressed. Not anxious or agitated. NEUROLOGICAL EXAM:  Appearance: The patient is well developed, well nourished, provides a coherent history and is in no acute distress. Mental Status: Oriented to time, place and person. Mood and affect appropriate. Cranial Nerves:   Intact visual fields. Fundi are benign. RACHEL, EOM's full, no nystagmus, no ptosis. Facial sensation is normal. Corneal reflexes are intact. Facial movement is symmetric. Hearing is normal bilaterally. Palate is midline with normal sternocleidomastoid and trapezius muscles are normal. Tongue is midline. Motor:  5/5 strength in upper and 5-/5 lower proximal and distal muscles. Normal bulk and tone. No fasciculations. Reflexes:   Deep tendon reflexes 2+/4 and symmetrical.   Sensory:   Decreased sensation to touch, pinprick and vibration. Gait:  Abnormal gait. Ambulates with cane   Tremor:   No tremor noted. Cerebellar:  No cerebellar signs present. Neurovascular:  Normal heart sounds and regular rhythm, peripheral pulses intact, and no carotid bruits. Assesment  1. MS (multiple sclerosis) ([facility])    - COMPLIANCE DRUG SCREEN/PRESCRIPTION MONITORING  - fentaNYL (DURAGESIC) 75 mcg/hr; 1 Patch by TransDERmal route every seventy-two (72) hours. Max Daily Amount: 1 Patch. Dispense: 10 Patch; Refill: 0  - ergocalciferol (VITAMIN D2) 50,000 unit capsule; Take 1 Cap by mouth every Sunday. - pregabalin (LYRICA) 200 mg capsule; Take 1 Cap by mouth two (2) times a day. Max Daily Amount: 400 mg. Dispense: 180 Cap; Refill: 3    2. Use of opiates for therapeutic purposes    - COMPLIANCE DRUG SCREEN/PRESCRIPTION MONITORING    3. Gait disorder  Physical therapy    4. MG (myasthenia gravis) ([facility])  Stable    5. Paresthesia  Stable    6. Vertebral arthropathy  Stable    ___________________________________________________  PLAN:      ICD-10-CM ICD-9-CM    1. Use of opiates for therapeutic purposes Z79.891 V58.69 COMPLIANCE DRUG SCREEN/PRESCRIPTION MONITORING   2. MS (multiple sclerosis) ([facility]) G35 340 COMPLIANCE DRUG SCREEN/PRESCRIPTION MONITORING      fentaNYL (DURAGESIC) 75 mcg/hr      ergocalciferol (VITAMIN D2) 50,000 unit capsule      pregabalin (LYRICA) 200 mg capsule   3. Gait disorder R26.9 781.2    4. MG (myasthenia gravis) ([address] G70.00 358.00    5. Paresthesia R20.2 782.0    6. Vertebral arthropathy M95.8 721.90      Follow-up Disposition:  Return in about 2 months (around 12/18/2017).            ___________________________________________________    Total time spent with patient:  []15   []25   []35   [] __ minutes    Care Plan discussed with:    [x]Patient   []Family    []Care Manager   []Consultant/Specialist :    ___________________________________________________    Attending Physician: Dorita Puga MD

## 2017-10-18 NOTE — PROGRESS NOTES
Chief Complaint   Patient presents with    Multiple Sclerosis    Medication Refill     1. Have you been to the ER, urgent care clinic since your last visit? Hospitalized since your last visit? no    2. Have you seen or consulted any other health care providers outside of the [address] [place] [address] since your last visit? Include any pap smears or colon screening.  Podiatry center    Pill count =        Pill count verified with patient  Patient reports taking medication  Q   UDS obtained and sent =  Pain contract =  [facility] made available for             Review  Script given for

## 2017-10-19 ENCOUNTER — TELEPHONE (OUTPATIENT)
Dept: FAMILY MEDICINE CLINIC | Age: 62
End: 2017-10-19

## 2017-10-19 NOTE — TELEPHONE ENCOUNTER
----- Message from Basia Espino sent at 10/19/2017 12:29 PM EDT -----  Regarding: Dr. Iliana Barr is requesting 3 mo f/up scheduling for January, February, or March. Best contact number is [phone] or [phone].

## 2017-10-19 NOTE — TELEPHONE ENCOUNTER
Whitley Richard can you please assist with scheduling this as Dr. Foreign Inman schedule is not made yet for next year?

## 2017-10-24 LAB — DRUGS UR: NORMAL

## 2017-11-01 ENCOUNTER — PATIENT OUTREACH (OUTPATIENT)
Dept: FAMILY MEDICINE CLINIC | Age: 62
End: 2017-11-01

## 2017-11-01 NOTE — PROGRESS NOTES
Complex Case Management    Session started- 3pm  Ended-4pm    Patient in good spirits. Walking well without cane or walker. Not wearing leg braces today. Only slightly off balance at times. Initial time for appointment was at 2pm- but she took a nap and overslept. Called to notify me that she was running late. Rescheduled for 3pm.   Had not met with patient since May-prior to her hip replacement. Saw  on 10/18 and  on 10/2. Has scheduled f/u ov with both. Prior psychologist did not start up with a new firm, has retired instead. Patient disappointed to lose her. Reports blood sugar readings run about 79-85. Blood pressure about 110-120/60-70. Pain today about #6 due to a \"MS headache. \" Thinks probably weather related. The weather changes recently have caused more headaches. Still uses her neck traction and works on the incentive spirometer. Nutrition-appetite good and bad at times. Only occasional swallowing issues. No falls, several instances of losing her balance-thinks it may be d/t her going \"too fast.\"   Review of meds done- no longer taking Oxycodone,Fioricet,Valium, Robaxin or Provigil. Noted in med reconciliation. Has wedding this Saturday for a nephew. Expecting mother or sisters to question what she will be wearing. Plans to go with her son. Next week, going to a cabin in [facility] with her mother for a few days. Mother had invited sisters but patient insisted that this was just time for she and her mother to connect and others are not invited. Not sure how this will work out- support given to her for trying to improve their strained relationship. Son will drive them there on Wednesday, Nov 8th and pick them up on Saturday, Nov 11th. Checking her new Medicare Part D coverage to see what the cost of her meds will be. On several expensive meds for her MS- gets 2 of them from the manufacturers (Nas Cox would have cost her $8000/month).  Financial concerns continue d/t her limited income and requires help from Aides with her ADL's and the costly meds. Speech better, no stuttering noted, but did have several instances of word searching. Goals      Attends follow-up appointments as directed. 11/1/17 Patient continues to see neurologist on regular basis-last appt was 10/18 and will see again in December. Saw pcp,  on 10/2 and has scheduled f/u with her. Met with CM today for monthly appt.mbt       Attends follow-up appointments as directed. 11/1/17 Saw neurologist,, on 10/18-will see again in December. Saw pcp,  on 10/2/17 and will see again in 6 months. Diligent about attending her appointments. mbt       Identification of barriers to adherence to a plan of care such as inability to afford medications, lack of insurance, lack of transportation, etc.            11/1/17-Patient reviewing what her new Medicare Part D policy will cover. Has several costly MS meds- gets 2 from the drug  companies. Doesn't qualify for LIS d/t her income. mbt       Knowledge and adherence to medication plan. Take new rx- Fioricet, as directed for h/a pain  11/1/17-reviewed meds. Good understanding of each one- no longer on Oxycodone, Fioricet,Valium,Robaxin or Provigil. Takes current meds as directed. mbt          Encouraged patient to continue to use the incentive spirometer and neck traction qd as directed. Encouraged to eat 3 well balanced meals per day- chew thoroughly and small bites. Monitor blood sugar several times per week as directed. Report any high or low readings to pcp. Attend all appointments as scheduled and follow POC. Use cane or walker as needed when gait impaired, leg braces prn. Monitor bp and report any episodes of hypertension or hypotension. Notify NN CM of any concerns or questions prn.     End of session- 4pm  Next appointment, Dec 13th at 3pm.

## 2017-11-20 ENCOUNTER — TELEPHONE (OUTPATIENT)
Dept: NEUROLOGY | Age: 62
End: 2017-11-20

## 2017-11-20 NOTE — TELEPHONE ENCOUNTER
Patient nneds rx done for Lyrica refaxed to [facility] at [phone]    Name,  Address,  Telephone,       has to be on the Rx

## 2017-12-04 ENCOUNTER — PATIENT OUTREACH (OUTPATIENT)
Dept: FAMILY MEDICINE CLINIC | Age: 62
End: 2017-12-04

## 2017-12-04 NOTE — PROGRESS NOTES
12/4/17-patient called to report that she fell on Saturday. Was standing next to a chair, next thing she remembers was being on floor and yelling for her son. Thinks she hit her left hip on a table but doesn't remember the circumstances. May have had brief LOC. Son was asleep, heard her fall and then yelling for him and he came and helped her up. Did not go to the ED. Says she is just sore on that side, no pain except for that hip. (right hip replacement 6/2017). Says area is swollen,bruised and tender but able to walk without problem. Pain no worse than her usual MS pain. Has not needed to take any PRN pain medication. Also reports a brief dizzy spell last week- grabbed onto something and sat down. Lasted about 5 minutes- was alone-got into bed as soon as the dizziness passed. Reports the dizzy spells occur about 1-2 x a month. BP usually very normal, lowest was at 80/40 when taken by OT. Unusual for her. Advised will discuss with  for further recs and let her know.

## 2017-12-04 NOTE — Clinical Note
Also mentioned bp was 80/40 -lowest she has had, taken by OT- usually normal. Did not say how recent bp was done.

## 2017-12-04 NOTE — Clinical Note
Patient called me today-fell on Saturday, ? Brief LOC. Hit left hip-sore and tender but walking without problem. Did not go to ED. Also mentioned brief dizzy spell last week- 5 min-grabbed onto something and sat down, once over, got in bed. Happens about 1-2 times a month. OV to evaluate?

## 2017-12-06 ENCOUNTER — PATIENT OUTREACH (OUTPATIENT)
Dept: FAMILY MEDICINE CLINIC | Age: 62
End: 2017-12-06

## 2017-12-06 NOTE — PROGRESS NOTES
Patient had called to report she fell at home on Saturday, ? Brief LOC- doesn't remember what happened but son heard the fall, and heard her calling for him. Complaining of left hip pain. Denies any pain except for  Hip. He helped her up, sore and tender, but has been able to walk without problem. Pain in general is no worse than her usual pain from her MS. Not taking any prn pain meds. Sees neuro on 12/26. Also reported dizzy spell last week that lasted about 5 minutes. Discussed with , recommend OV to evaluate. Patient hesitant but agreed to come in tomorrow, 12/7 at 11am to see pcp for evaluation.

## 2017-12-07 ENCOUNTER — HOSPITAL ENCOUNTER (OUTPATIENT)
Dept: CT IMAGING | Age: 62
Discharge: HOME | End: 2017-12-07
Attending: FAMILY MEDICINE
Payer: MEDICARE

## 2017-12-07 ENCOUNTER — OFFICE VISIT (OUTPATIENT)
Dept: FAMILY MEDICINE CLINIC | Age: 62
End: 2017-12-07

## 2017-12-07 VITALS
HEIGHT: 72 IN | RESPIRATION RATE: 18 BRPM | SYSTOLIC BLOOD PRESSURE: 112 MMHG | BODY MASS INDEX: 25.06 KG/M2 | OXYGEN SATURATION: 98 % | TEMPERATURE: 98.3 F | HEART RATE: 63 BPM | DIASTOLIC BLOOD PRESSURE: 56 MMHG | WEIGHT: 185 LBS

## 2017-12-07 DIAGNOSIS — R40.20 LOC (LOSS OF CONSCIOUSNESS) (HCC): Primary | ICD-10-CM

## 2017-12-07 DIAGNOSIS — R55 SYNCOPE, UNSPECIFIED SYNCOPE TYPE: Primary | ICD-10-CM

## 2017-12-07 DIAGNOSIS — R40.20 LOC (LOSS OF CONSCIOUSNESS) (HCC): ICD-10-CM

## 2017-12-07 DIAGNOSIS — R01.1 SYSTOLIC MURMUR: ICD-10-CM

## 2017-12-07 PROCEDURE — 70450 CT HEAD/BRAIN W/O DYE: CPT

## 2017-12-07 NOTE — PATIENT INSTRUCTIONS

## 2017-12-07 NOTE — MR AVS SNAPSHOT
Visit Information Date & Time Provider Department Dept. Phone Encounter #  
 12/7/2017 11:00 AM Don Glynn MD [address] [place] [address] [phone] [id number] Follow-up Instructions Return if symptoms worsen or fail to improve. Your Appointments 12/26/2017  1:40 PM  
[facility] Appt Note: fuv  
 [address] [place] [address] [facility] [address]  
[phone]  
  
   
 [address] [facility]  
  
    
 4/2/2018  1:30 PM  
ROUTINE CARE with Don Glynn MD  
[facility]) Appt Note: 6 month dm  
 [address] [address]  
[phone]  
  
   
 [address] [address] Upcoming Health Maintenance Date Due  
 EYE EXAM RETINAL OR DILATED Q1 10/12/2017 MEDICARE YEARLY EXAM 1/5/2018 HEMOGLOBIN A1C Q6M 4/2/2018 MICROALBUMIN Q1 5/11/2018 LIPID PANEL Q1 5/11/2018 FOOT EXAM Q1 7/3/2018 BREAST CANCER SCRN MAMMOGRAM 12/14/2018 PAP AKA CERVICAL CYTOLOGY 1/4/2020 COLONOSCOPY 1/18/2022 DTaP/Tdap/Td series (2 - Td) 6/16/2024 Allergies as of 12/7/2017  Review Complete On: 12/7/2017 By: Narinder Major Severity Noted Reaction Type Reactions Bee Sting [Sting, Bee] High 06/25/2010    Anaphylaxis Also allergic to egg products Penicillins High 06/25/2010    Anaphylaxis Betadine [Povidone-iodine]  05/17/2016    Rash Egg  06/25/2015    Itching Current Immunizations  Reviewed on 1/4/2017 Name Date Tdap 6/16/2014 Not reviewed this visit You Were Diagnosed With   
  
 Codes Comments LOC (loss of consciousness) ([facility] [address]    -  Primary ICD-10-CM: R40.20 ICD-9-CM: 780.09 Vitals BP Pulse Temp Resp Height(growth percentile) Weight(growth percentile) 112/56 (BP 1 Location: Right arm, BP Patient Position: Sitting) 63 98.3 °F (36.8 °C) (Oral) 18 6' 1\" (1.854 m) 185 lb (83.9 kg) LMP SpO2 BMI OB Status Smoking Status 09/01/2010 98% 24.41 kg/m2 Postmenopausal Current Every Day Smoker Vitals History BMI and BSA Data Body Mass Index Body Surface Area  
 24.41 kg/m 2 2.08 m 2 Preferred Pharmacy Pharmacy Name Phone [facility] [address] [address] [address] [phone] Your Updated Medication List  
  
   
This list is accurate as of: 12/7/17 11:51 AM.  Always use your most recent med list.  
  
  
  
  
 [employer]. 80 unit/mL injectable gel Generic drug:  corticotropin 1 mL by SubCUTAneous route daily. 80 units subq q day for 10 days  
  
 aspirin delayed-release 325 mg tablet Take 1 Tab by mouth two (2) times a day. Blood-Glucose Meter monitoring kit  
by SubCUTAneous route Before breakfast and dinner. Free Style Lite glucometer and test strips  
  
 butalbital-acetaminophen-caff -40 mg per capsule Commonly known as:  [employer] Take 1 Cap by mouth every four (4) hours as needed for Pain. Max Daily Amount: 6 Caps. clindamycin 300 mg capsule Commonly known as:  CLEOCIN Prior to dental procedures  
  
 dantrolene 100 mg capsule Commonly known as:  DANTRIUM  
100 mg as needed. ergocalciferol 50,000 unit capsule Commonly known as:  VITAMIN D2 Take 1 Cap by mouth every seven (7) days. fentaNYL 75 mcg/hr Commonly known as:  DURAGESIC  
1 Patch by TransDERmal route every seventy-two (72) hours. Max Daily Amount: 1 Patch. GILENYA 0.5 mg Cap Generic drug:  fingolimod Take 1 Cap by mouth daily. levothyroxine 175 mcg tablet Commonly known as:  SYNTHROID  
TAKE 1 TABLET BY MOUTH DAILY BEFORE BREAKFAST  
  
 LIDODERM 5 % Generic drug:  lidocaine by TransDERmal route every twenty-four (24) hours. Apply patch to the affected area for 12 hours a day and remove for 12 hours a day. MESTINON TIMESPAN 180 mg SR tablet Generic drug:  pyridostigmine bromide Take 180 mg by mouth two (2) times a day. PARoxetine 40 mg tablet Commonly known as:  PAXIL TAKE 1.5 TABS BY MOUTH DAILY. pregabalin 200 mg capsule Commonly known as:  Edman Speaker Take 1 Cap by mouth two (2) times a day. Max Daily Amount: 400 mg.  
  
 rosuvastatin 20 mg tablet Commonly known as:  CRESTOR  
TAKE 1 TABLET BY MOUTH EVERY EVENING  
  
 TENS UNIT ELECTRODES  
by Does Not Apply route. prn  
  
 TOPAMAX 200 mg tablet Generic drug:  topiramate Take 200 mg by mouth two (2) times a day. Follow-up Instructions Return if symptoms worsen or fail to improve. To-Do List   
 12/07/2017 Imaging:  CT HEAD WO CONT   
  
 12/20/2017 2:00 PM  
  Appointment with [facility] 3 at [address] [place] [address] ([phone]) Shower or bathe using soap and water. Do not use deodorant, powder, perfumes, or lotion the day of your exam.  If your prior mammograms were not performed at [facility] 6 please bring films with you or forward prior images 2 days before your procedure. Check in at registration 15min before your appointment time unless you were instructed to do otherwise. A script is not necessary, but if you have one, please bring it on the day of the mammogram or have it faxed to the department. [facility] [phone] [facility]  [phone] [facility] [address] [place]  [phone] [facility] [phone] [facility] [address] [facility] [phone] Patient Instructions Preventing Falls: Care Instructions Your Care Instructions Getting around your home safely can be a challenge if you have injuries or health problems that make it easy for you to fall.  Loose rugs and furniture in walkways are among the dangers for many older people who have problems walking or who have poor eyesight. People who have conditions such as arthritis, osteoporosis, or dementia also have to be careful not to fall. You can make your home safer with a few simple measures. Follow-up care is a key part of your treatment and safety. Be sure to make and go to all appointments, and call your doctor if you are having problems. It's also a good idea to know your test results and keep a list of the medicines you take. How can you care for yourself at home? Taking care of yourself · You may get dizzy if you do not drink enough water. To prevent dehydration, drink plenty of fluids, enough so that your urine is light yellow or clear like water. Choose water and other caffeine-free clear liquids. If you have kidney, heart, or liver disease and have to limit fluids, talk with your doctor before you increase the amount of fluids you drink. · Exercise regularly to improve your strength, muscle tone, and balance. Walk if you can. Swimming may be a good choice if you cannot walk easily. · Have your vision and hearing checked each year or any time you notice a change. If you have trouble seeing and hearing, you might not be able to avoid objects and could lose your balance. · Know the side effects of the medicines you take. Ask your doctor or pharmacist whether the medicines you take can affect your balance. Sleeping pills or sedatives can affect your balance. · Limit the amount of alcohol you drink. Alcohol can impair your balance and other senses. · Ask your doctor whether calluses or corns on your feet need to be removed. If you wear loose-fitting shoes because of calluses or corns, you can lose your balance and fall. · Talk to your doctor if you have numbness in your feet. Preventing falls at home · Remove raised doorway thresholds, throw rugs, and clutter. Repair loose carpet or raised areas in the floor. · Move furniture and electrical cords to keep them out of walking paths. · Use nonskid floor wax, and wipe up spills right away, especially on ceramic tile floors. · If you use a walker or cane, put rubber tips on it. If you use crutches, clean the bottoms of them regularly with an abrasive pad, such as steel wool. · Keep your house well lit, especially Larry Fake, and outside walkways. Use night-lights in areas such as hallways and bathrooms. Add extra light switches or use remote switches (such as switches that go on or off when you clap your hands) to make it easier to turn lights on if you have to get up during the night. · Install sturdy handrails on stairways. · Move items in your cabinets so that the things you use a lot are on the lower shelves (about waist level). · Keep a cordless phone and a flashlight with new batteries by your bed. If possible, put a phone in each of the main rooms of your house, or carry a cell phone in case you fall and cannot reach a phone. Or, you can wear a device around your neck or wrist. You push a button that sends a signal for help. · Wear low-heeled shoes that fit well and give your feet good support. Use footwear with nonskid soles. Check the heels and soles of your shoes for wear. Repair or replace worn heels or soles. · Do not wear socks without shoes on wood floors. · Walk on the grass when the sidewalks are slippery. If you live in an area that gets snow and ice in the winter, sprinkle salt on slippery steps and sidewalks. Preventing falls in the bath · Install grab bars and nonskid mats inside and outside your shower or tub and near the toilet and sinks. · Use shower chairs and bath benches. · Use a hand-held shower head that will allow you to sit while showering.  
· Get into a tub or shower by putting the weaker leg in first. Get out of a tub or shower with your strong side first. 
 · Repair loose toilet seats and consider installing a raised toilet seat to make getting on and off the toilet easier. · Keep your bathroom door unlocked while you are in the shower. Where can you learn more? Go to http://marie-alvin.info/. Enter [phone] in the search box to learn more about \"Preventing Falls: Care Instructions. \" Current as of: May 12, 2017 Content Version: 11.4 © 7729-9359 [employer]. Care instructions adapted under license by [employer] (which disclaims liability or warranty for this information). If you have questions about a medical condition or this instruction, always ask your healthcare professional. [address] any warranty or liability for your use of this information. Introducing [facility] & HEALTH SERVICES! Dear Jerad John: 
Thank you for requesting a [employer] account. Our records indicate that you already have an active [employer] account. You can access your account anytime at [email or web address] Did you know that you can access your hospital and ER discharge instructions at any time in [employer]? You can also review all of your test results from your hospital stay or ER visit. Additional Information If you have questions, please visit the Frequently Asked Questions section of the [employer] website at [email or web address] Remember, [employer] is NOT to be used for urgent needs. For medical emergencies, dial 911. Now available from your iPhone and Android! Please provide this summary of care documentation to your next provider. Your primary care clinician is listed as Brandon Diamond. If you have any questions after today's visit, please call [phone].

## 2017-12-07 NOTE — PROGRESS NOTES
Chief Complaint   Patient presents with    Dizziness     on and off for a few months    Fall Saturday - follow up      1. Have you been to the ER, urgent care clinic since your last visit? Hospitalized since your last visit? No    2. Have you seen or consulted any other health care providers outside of the [facility] since your last visit? Include any pap smears or colon screening.  No

## 2017-12-07 NOTE — PROGRESS NOTES
Please notify patient regarding their test results:    Good news, CT head does not show any bleed. Recommend pursuing TTE to evaluate heart murmur.

## 2017-12-07 NOTE — PROGRESS NOTES
Patient Name: Daiana Eric   MRN: [id number]    Opal Bangura is a 63 y.o. female who presents with the following:     Patient reports fall episode last Saturday. Was feeling well prior to fall. Woke up from the ground without recalling how she fell. Fall was unwitnessed. Noted that picture frames fell off the wall therefore patient assumed she hit the table, presumably with her left hip as it was sore. Denies any headache, head injury, tongue biting, urination. Denies feeling unwell prior to fall. Her son found her on the floor and stated that she expressed some slow speech at the time. Since the fall, she feels a little confused and that she \"is not all the way there. \"  Left hip pain has resolved. Does have a chronic history of imbalance secondary to MS, followed by neurology. Is on multiple controlled substances for pain management. Denies any recent changes in medications. Reports that this fall is the first time in which she has lost consciousness. Previously received physical therapy for gait stability. Has a home aide that comes her house about twice a week. Review of Systems   Constitutional: Negative for fever, malaise/fatigue and weight loss. Respiratory: Negative for cough, hemoptysis, shortness of breath and wheezing. Cardiovascular: Negative for chest pain, palpitations, leg swelling and PND. Gastrointestinal: Negative for abdominal pain, constipation, diarrhea, nausea and vomiting. Musculoskeletal: Positive for falls and joint pain. Neurological: Positive for loss of consciousness. Negative for dizziness, tingling, tremors, sensory change, focal weakness, seizures and headaches. The patient's medications, allergies, past medical history, surgical history, family history and social history were reviewed and updated where appropriate. Prior to Admission medications    Medication Sig Start Date End Date Taking?  Authorizing Provider   ergocalciferol (VITAMIN D2) 50,000 unit capsule Take 1 Cap by mouth every seven (7) days. 10/18/17  Yes Los Kahn MD   pregabalin (LYRICA) 200 mg capsule Take 1 Cap by mouth two (2) times a day. Max Daily Amount: 400 mg. 10/18/17  Yes Los Kahn MD   fentaNYL (DURAGESIC) 75 mcg/hr 1 Patch by TransDERmal route every seventy-two (72) hours. Max Daily Amount: 1 Patch. 11/18/17  Yes Los Kahn MD   clindamycin (CLEOCIN) 300 mg capsule Prior to dental procedures 6/30/17  Yes Historical Provider   aspirin delayed-release 325 mg tablet Take 1 Tab by mouth two (2) times a day. 6/7/17  Yes Obdulio Mcghee MD   butalbital-acetaminophen-caff (FIORICET) -40 mg per capsule Take 1 Cap by mouth every four (4) hours as needed for Pain. Max Daily Amount: 6 Caps. 5/22/17  Yes Tracie Gonzalez MD   rosuvastatin (CRESTOR) 20 mg tablet TAKE 1 TABLET BY MOUTH EVERY EVENING 2/24/17  Yes Charity Coon MD   TENS UNIT ELECTRODES by Does Not Apply route. prn   Yes Historical Provider   levothyroxine (SYNTHROID) 175 mcg tablet TAKE 1 TABLET BY MOUTH DAILY BEFORE BREAKFAST  Patient taking differently: Take 175 mcg by mouth nightly. TAKE 1 TABLET BY MOUTH DAILY BEFORE BREAKFAST 9/7/16  Yes [place] Ibanivers, DO   dantrolene (DANTRIUM) 100 mg capsule 100 mg as needed. 6/3/16  Yes Historical Provider   PARoxetine (PAXIL) 40 mg tablet TAKE 1.5 TABS BY MOUTH DAILY. Patient taking differently: Take 60 mg by mouth nightly. TAKE 1.5 TABS BY MOUTH DAILY. - Note Pt prefers to take at [facility] 12/29/15  Yes Tamir Quivers, DO   GILENYA 0.5 mg cap Take 1 Cap by mouth daily. 11/20/15  Yes Historical Provider   pyridostigmine bromide (MESTINON TIMESPAN) 180 mg SR tablet Take 180 mg by mouth two (2) times a day. Yes Historical Provider   lidocaine (LIDODERM) 5 %(700 mg/patch) by TransDERmal route every twenty-four (24) hours. Apply patch to the affected area for 12 hours a day and remove for 12 hours a day.    Yes Historical Provider   [employer] 80 unit/mL injectable gel 1 mL by SubCUTAneous route daily. 80 units subq q day for 10 days 6/13/14  Yes Historical Provider   topiramate (TOPAMAX) 200 mg tablet Take 200 mg by mouth two (2) times a day. Yes Historical Provider   Blood-Glucose Meter monitoring kit by SubCUTAneous route Before breakfast and dinner. Free Style Lite glucometer and test strips 9/13/16   Lashae Davidson, DO       Allergies   Allergen Reactions    Bee Sting [Sting, Bee] Anaphylaxis     Also allergic to egg products    Penicillins Anaphylaxis    Betadine [Povidone-Iodine] Rash    Egg Itching           OBJECTIVE    Visit Vitals    /56 (BP 1 Location: Right arm, BP Patient Position: Sitting)    Pulse 63    Temp 98.3 °F (36.8 °C) (Oral)    Resp 18    Ht 6' 1\" (1.854 m)    Wt 185 lb (83.9 kg)    LMP 09/01/2010    SpO2 98%    BMI 24.41 kg/m2       Physical Exam   Constitutional: She is oriented to person, place, and time and well-developed, well-nourished, and in no distress. No distress. HENT:   Head: Normocephalic and atraumatic. Right Ear: External ear normal.   Left Ear: External ear normal.   Eyes: Conjunctivae and EOM are normal. Pupils are equal, round, and reactive to light. Cardiovascular: Normal rate and regular rhythm. Exam reveals no gallop and no friction rub. Murmur (grade II/VII systolic murmur at LLSB) heard. Pulmonary/Chest: Effort normal and breath sounds normal. No respiratory distress. She has no wheezes. Neurological: She is alert and oriented to person, place, and time. She is not agitated and not disoriented. She displays no weakness, no tremor, facial symmetry, normal stance and normal speech. No cranial nerve deficit or sensory deficit. She has an abnormal Romberg Test. Coordination and gait normal.   Skin: She is not diaphoretic. Psychiatric: Mood, memory, affect and judgment normal.   Nursing note and vitals reviewed. ASSESSMENT AND PLAN  Quinton Galvin is a 63 y.o. female who presents today for:    1. LOC (loss of consciousness) ([address]  First episode in which pt had LOC with fall. Will obtain CT head today to r/o ICH. If normal, consider TTE given murmur for syncope work up. Pt otherwise appears clinically well. Pt to keep upcoming neuro appt. - CT HEAD WO CONT; Future       Medications Discontinued During This Encounter   Medication Reason    Lancets misc Not A Current Medication    oxyCODONE IR (ROXICODONE) 10 mg tab immediate release tablet Not A Current Medication    diazepam (VALIUM) 10 mg tablet Not A Current Medication    METHOCARBAMOL (ROBAXIN-750 PO) Not A Current Medication    modafinil (PROVIGIL) 200 mg tablet Not A Current Medication    traZODone (DESYREL) 50 mg tablet Not A Current Medication       Follow-up Disposition:  Return if symptoms worsen or fail to improve. Medication risks/benefits/costs/interactions/alternatives discussed with patient. Advised patient to call back or return to office if symptoms worsen/change/persist. If patient cannot reach us or should anything more severe/urgent arise he/she should proceed directly to the nearest emergency department. Discussed expected course/resolution/complications of diagnosis in detail with patient. Patient given a written after visit summary which includes his/her diagnoses, current medications and vitals. Patient expressed understanding with the diagnosis and plan.      Pari Coffman M.D.

## 2017-12-18 DIAGNOSIS — G43.909 MIGRAINE WITHOUT STATUS MIGRAINOSUS, NOT INTRACTABLE, UNSPECIFIED MIGRAINE TYPE: Primary | ICD-10-CM

## 2017-12-18 RX ORDER — TOPIRAMATE 200 MG/1
200 TABLET ORAL 2 TIMES DAILY
Qty: 60 TAB | Refills: 3 | Status: SHIPPED | OUTPATIENT
Start: 2017-12-18 | End: 2018-04-12 | Stop reason: SDUPTHER

## 2017-12-20 ENCOUNTER — HOSPITAL ENCOUNTER (OUTPATIENT)
Dept: MAMMOGRAPHY | Age: 62
Discharge: HOME | End: 2017-12-20
Attending: FAMILY MEDICINE
Payer: MEDICARE

## 2017-12-20 DIAGNOSIS — Z12.31 VISIT FOR SCREENING MAMMOGRAM: ICD-10-CM

## 2017-12-20 PROCEDURE — 77067 SCR MAMMO BI INCL CAD: CPT

## 2017-12-26 ENCOUNTER — TELEPHONE (OUTPATIENT)
Dept: NEUROLOGY | Age: 62
End: 2017-12-26

## 2017-12-26 ENCOUNTER — OFFICE VISIT (OUTPATIENT)
Dept: NEUROLOGY | Age: 62
End: 2017-12-26

## 2017-12-26 VITALS
WEIGHT: 183 LBS | OXYGEN SATURATION: 90 % | HEIGHT: 72 IN | BODY MASS INDEX: 24.79 KG/M2 | DIASTOLIC BLOOD PRESSURE: 70 MMHG | HEART RATE: 71 BPM | SYSTOLIC BLOOD PRESSURE: 130 MMHG
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
## 2017-12-26 DIAGNOSIS — R42 DIZZINESS: ICD-10-CM

## 2017-12-26 DIAGNOSIS — G54.1 LUMBOSACRAL PLEXOPATHY: ICD-10-CM

## 2017-12-26 DIAGNOSIS — G70.00 MG (MYASTHENIA GRAVIS) (HCC): ICD-10-CM

## 2017-12-26 DIAGNOSIS — R40.4 TRANSIENT ALTERATION OF AWARENESS: ICD-10-CM

## 2017-12-26 DIAGNOSIS — G43.009 MIGRAINE WITHOUT AURA AND WITHOUT STATUS MIGRAINOSUS, NOT INTRACTABLE: ICD-10-CM

## 2017-12-26 DIAGNOSIS — G35 MS (MULTIPLE SCLEROSIS) (HCC): Primary | ICD-10-CM

## 2017-12-26 DIAGNOSIS — G35 MULTIPLE SCLEROSIS EXACERBATION (HCC): ICD-10-CM

## 2017-12-26 PROBLEM — F33.9 RECURRENT DEPRESSION (HCC): Status: ACTIVE | Noted: 2017-12-26

## 2017-12-26 RX ORDER — FENTANYL 75 UG/H
1 PATCH TRANSDERMAL
Qty: 10 PATCH | Refills: 0 | Status: SHIPPED | OUTPATIENT
Start: 2017-12-26 | End: 2018-01-24 | Stop reason: SDUPTHER

## 2017-12-26 RX ORDER — DANTROLENE SODIUM 100 MG/1
100 CAPSULE ORAL AS NEEDED
Qty: 90 CAP | Refills: 3 | Status: SHIPPED | OUTPATIENT
Start: 2017-12-26 | End: 2019-03-19 | Stop reason: SDUPTHER

## 2017-12-26 NOTE — PATIENT INSTRUCTIONS

## 2017-12-26 NOTE — TELEPHONE ENCOUNTER
Spoke with Rui Varghese at [address] [place] [address] at [phone] to cancel prescription for Acthar H.P.

## 2017-12-26 NOTE — PROGRESS NOTES
Neurology Progress Note    NAME:  Yanet Galvin   :   1955   MRN:   [id number]     Date/Time:  2017  Subjective:      Yanet Galvin is a 63 y.o. female here today for  follow up for MS , fall  and headache  Patient was accompanied by her son to the visit. She said that lately she has not been feeling herself, she is always tired and weak. Says she had a fall because her legs gave out on her. She has been having a lot of leg spasms, the leg spasm tend to wake patient up at night. She noted that she has been having frequent headaches, ,headache is throbbing in nature mostly frontal, associated with blurry vision, occasional double vision nausea. According to patient, she has been having dizziness with or without headaches, numbness and tingling sensation. I told patient that she is definitely having relapse of MS and as she does not tolerate Steroid, will star patient on Acthar gel. Patient has had osteoporosis from steroid. .  Review of Systems - General ROS: positive for  - fatigue, hot flashes, night sweats and sleep disturbance  Psychological ROS: positive for - anxiety, depression and sleep disturbances  Ophthalmic ROS: positive for - blurry vision, decreased vision, double vision and photophobia  ENT ROS: positive for - headaches, tinnitus, vertigo and visual changes  Allergy and Immunology ROS: negative  Hematological and Lymphatic ROS: negative  Endocrine ROS: negative  Respiratory ROS: no cough, shortness of breath, or wheezing  Cardiovascular ROS: no chest pain or dyspnea on exertion  Gastrointestinal ROS: no abdominal pain, change in bowel habits, or black or bloody stools  Genito-Urinary ROS: no dysuria, trouble voiding, or hematuria  Musculoskeletal ROS: positive for - gait disturbance, joint pain, joint stiffness, muscle pain and muscular weakness  Neurological ROS: positive for - dizziness, gait disturbance, headaches, impaired coordination/balance, numbness/tingling, speech problems, visual changes and weakness  Dermatological ROS: negative,    Medications reviewed:  Current Outpatient Prescriptions   Medication Sig Dispense Refill    topiramate (TOPAMAX) 200 mg tablet Take 1 Tab by mouth two (2) times a day. 60 Tab 3    ergocalciferol (VITAMIN D2) 50,000 unit capsule Take 1 Cap by mouth every seven (7) days. 4 Cap 3    pregabalin (LYRICA) 200 mg capsule Take 1 Cap by mouth two (2) times a day. Max Daily Amount: 400 mg. 180 Cap 3    fentaNYL (DURAGESIC) 75 mcg/hr 1 Patch by TransDERmal route every seventy-two (72) hours. Max Daily Amount: 1 Patch. 10 Patch 0    clindamycin (CLEOCIN) 300 mg capsule Prior to dental procedures      aspirin delayed-release 325 mg tablet Take 1 Tab by mouth two (2) times a day. 60 Tab 0    butalbital-acetaminophen-caff (FIORICET) -40 mg per capsule Take 1 Cap by mouth every four (4) hours as needed for Pain. Max Daily Amount: 6 Caps. 10 Cap 0    rosuvastatin (CRESTOR) 20 mg tablet TAKE 1 TABLET BY MOUTH EVERY EVENING 90 Tab 1    TENS UNIT ELECTRODES by Does Not Apply route. prn      Blood-Glucose Meter monitoring kit by SubCUTAneous route Before breakfast and dinner. Free Style Lite glucometer and test strips 1 Kit 0    levothyroxine (SYNTHROID) 175 mcg tablet TAKE 1 TABLET BY MOUTH DAILY BEFORE BREAKFAST (Patient taking differently: Take 175 mcg by mouth nightly. TAKE 1 TABLET BY MOUTH DAILY BEFORE BREAKFAST) 30 Tab 11    dantrolene (DANTRIUM) 100 mg capsule 100 mg as needed.  PARoxetine (PAXIL) 40 mg tablet TAKE 1.5 TABS BY MOUTH DAILY. (Patient taking differently: Take 60 mg by mouth nightly. TAKE 1.5 TABS BY MOUTH DAILY. - Note Pt prefers to take at HS) 45 Tab 2    GILENYA 0.5 mg cap Take 1 Cap by mouth daily.  pyridostigmine bromide (MESTINON TIMESPAN) 180 mg SR tablet Take 180 mg by mouth two (2) times a day.  lidocaine (LIDODERM) 5 %(700 mg/patch) by TransDERmal route every twenty-four (24) hours.  Apply patch to the affected area for 12 hours a day and remove for 12 hours a day.  ACTHAR H.P. 80 unit/mL injectable gel 1 mL by SubCUTAneous route daily. 80 units subq q day for 10 days          Objective:   Vitals:  Vitals:    12/26/17 1348   BP: 130/70   Pulse: 71   SpO2: 90%   Weight: 183 lb (83 kg)   Height: 6' 1\" (1.854 m)   PainSc:   8   PainLoc: Generalized   LMP: 09/01/2010       Lab Data Reviewed:  Lab Results   Component Value Date/Time    WBC 3.9 05/11/2017 10:20 AM    HCT 41.2 05/11/2017 10:20 AM    HGB 11.7 06/07/2017 05:02 AM    PLATELET 266 39/33/1343 10:20 AM       Lab Results   Component Value Date/Time    Sodium 143 06/07/2017 05:02 AM    Potassium 4.5 06/07/2017 05:02 AM    Chloride 115 06/07/2017 05:02 AM    CO2 19 06/07/2017 05:02 AM    Glucose 117 06/07/2017 05:02 AM    BUN 10 06/07/2017 05:02 AM    Creatinine 0.76 06/07/2017 05:02 AM    Calcium 8.8 06/07/2017 05:02 AM       No components found for: TROPQUANT    No results found for: SHAHIDA      Lab Results   Component Value Date/Time    Hemoglobin A1c 6.2 05/11/2017 10:20 AM    Hemoglobin A1c (POC) 5.7 10/02/2017 10:40 AM        Lab Results   Component Value Date/Time    Vitamin B12 434 02/10/2016    Folate 12.0 02/10/2016       No results found for: Arnoldo PINEDO GaveCLARIBEL    Lab Results   Component Value Date/Time    Cholesterol, total 186 05/11/2017 10:20 AM    HDL Cholesterol 52 05/11/2017 10:20 AM    LDL, calculated 116 05/11/2017 10:20 AM    VLDL, calculated 18 05/11/2017 10:20 AM    Triglyceride 92 05/11/2017 10:20 AM    CHOL/HDL Ratio 4.3 08/23/2010 12:19 PM         CT Results (recent):    Results from Hospital Encounter encounter on 12/07/17   CT HEAD WO CONT   Narrative EXAM:  CT HEAD WO CONT    INDICATION: Fall one week ago with head injury and loss of consciousness. COMPARISON: CT head 5/22/2017    TECHNIQUE: Noncontrast head CT. Coronal and sagittal reformats.  CT dose  reduction was achieved through use of a standardized protocol tailored for this  examination and automatic exposure control for dose modulation. Adaptive  statistical iterative reconstruction (ASIR) was utilized. FINDINGS: The ventricles and sulci are age-appropriate without hydrocephalus. There is no mass effect or midline shift. There is no intracranial hemorrhage or  extra-axial fluid collection. There is no abnormal parenchymal attenuation. The  gray-white matter differentiation is maintained. The basal cisterns are patent. The osseous structures are intact. There is a small amount of aerated secretions  in the right greater than left maxillary sinuses. The remaining visualized  paranasal sinuses and mastoid air cells are clear. Impression IMPRESSION:   1. No acute intracranial abnormality. 2. A small amount of aerated secretions in the right greater than left maxillary  sinuses can be seen with acute sinusitis. MRI Results (recent):    Results from [place] encounter on 11/02/16   MRI [facility] SPINE WO CONT   Narrative CLINICAL HISTORY: Neck pain     INDICATION: Neck pain    COMPARISON: None    TECHNIQUE: MR imaging of the cervical spine was performed including sagittal T1,  T2, STIR;  axial GRE, T1. Contrast was not administered. FINDINGS:    There is normal alignment of the cervical spine. Vertebral body heights are  maintained. No marrow edema. The craniocervical junction is intact. Cord signal intensity is within normal limits. Minimal increased STIR signal at  T1-T2 is not confirmed on the thoracic imaging. C2/3:   The spinal canal and foramina are widely patent. C3/4:  There is a moderate-sized left central disc protrusion with uncovertebral  spurring and facet degeneration, slightly indenting the left ventral spinal cord  and causing mild to moderate leftward spinal canal and foraminal stenosis. Mild  right foraminal stenosis. C4/5:  There is diffuse disc bulge, causing minimal spinal canal stenosis.   Foramina are patent. C5/6:  Posterior disc osteophyte complex with uncovertebral spurring and facet  degeneration results in mild spinal canal and minimal bilateral foraminal  stenosis. C6/7:  Posterior disc osteophyte complex with uncovertebral spurring and facet  degeneration results in mild to moderate spinal canal and mild bilateral  foraminal stenosis. C7/T1:   The spinal canal and foramina are widely patent. Impression IMPRESSION:      Stable degenerative changes particularly on the left at C3-4, and at C6-7 as  discussed above. 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 TECHNIQUE:   MR imaging of the thoracic spine was performed with the following sequences:  sagittal T1, T2, stir; axial T1, gradient echo. FINDINGS:    There is a mild disc protrusion at T12-L1 with minimal superimposed extrusion  cranially oriented. . There is no canal compromise. There is no foraminal  compromise. Normal disc bulge at T4-T5. The thoracic aorta is normal in caliber. There is no pleural effusion. No fracture or dislocation. . Vertebral body heights are maintained. Marrow signal is normal. The course,  caliber, and signal intensity of the spinal cord are normal. The spinal canal  and neural foramina are widely patent at all levels. IMPRESSION:   Minimal disc degenerative change at T12-L1 is stable when compared to  10/10/2011. Otherwise minimal degenerative changes. There is no canal or foraminal compromise present. IR Results (recent):  No results found for this or any previous visit.     VAS/US Results (recent):    Results from Hospital Encounter encounter on 01/19/17   DUPLEX LOWER EXT VENOUS RIGHT    PHYSICAL EXAM:  General:    Alert, cooperative, no distress, appears stated age. Head:   Normocephalic, without obvious abnormality, atraumatic. Eyes:   Conjunctivae/corneas clear. PERRLA  Nose:  Nares normal. No drainage or sinus tenderness. Throat:    Lips, mucosa, and tongue normal.  No Thrush  Neck:  Supple, symmetrical,  no adenopathy, thyroid: non tender    no carotid bruit and no JVD. Paraspinal tenderness  Back:    Symmetric,  Bilateral tenderness. Lungs:   Clear to auscultation bilaterally. No Wheezing or Rhonchi. No rales. Chest wall:  No tenderness or deformity. No Accessory muscle use. Heart:   Regular rate and rhythm,  no murmur, rub or gallop. Abdomen:   Soft, non-tender. Not distended. Bowel sounds normal. No masses  Extremities: Extremities normal, atraumatic, No cyanosis. No edema. No clubbing  Skin:     Texture, turgor normal. No rashes or lesions. Not Jaundiced  Lymph nodes: Cervical, supraclavicular normal.  Psych:  Good insight. Not depressed. Not anxious or agitated. NEUROLOGICAL EXAM:  Appearance: The patient is well developed, well nourished, provides a coherent history and is in no acute distress. Mental Status: Oriented to time, place and person. Mood and affect appropriate. Cranial Nerves:   Intact visual fields. Fundi are benign. RACHEL, EOM's full, no nystagmus, no ptosis. Facial sensation is normal. Corneal reflexes are intact. Facial movement is symmetric. Hearing is normal bilaterally. Palate is midline with normal sternocleidomastoid and trapezius muscles are normal. Tongue is midline. Motor:  5/5 strength in upper and 4+/5 lower proximal and distal muscles. Normal bulk and tone. No fasciculations. Reflexes:   Deep tendon reflexes 2+/4 and symmetrical.   Sensory:   Decrease sensation to touch, pinprick and vibration. Gait:  Abormal gait. Ambulates with cane    Tremor:   Mild  tremor noted. Cerebellar:  No cerebellar signs present. Neurovascular:  Normal heart sounds and regular rhythm, peripheral pulses intact, and no carotid bruits. Assesment  1. MS (multiple sclerosis) ([facility])  Acthar gel for relapse    2. Lumbosacral plexopathy  Physical therapy    3. Fall, initial encounter  Physical therapy    4. MG (myasthenia gravis) ([facility])  Stable    5. Migraine without aura and without status migrainosus, not intractable  Stable    ___________________________________________________  PLAN:Medication reviewed with patient      ICD-10-CM ICD-9-CM    1. MS (multiple sclerosis) ([facility] G35 340    2. Lumbosacral plexopathy G54.1 353.1    3. Fall, initial encounter [address] XXXA E888.9    4. [facility] G70.00 358.00    5. Migraine without aura and without status migrainosus, not intractable G43.009 346.10      Follow-up Disposition:  Return in about 3 months (around 3/26/2018).          ___________________________________________________    Total time spent with patient:  []15   []25   []35   [] __ minutes    Care Plan discussed with:    [x]Patient   []Family    []Care Manager   []Consultant/Specialist :    ___________________________________________________    Attending Physician: Kaleb Khan MD

## 2017-12-26 NOTE — MR AVS SNAPSHOT
Visit Information Date & Time Provider Department Dept. Phone Encounter #  
 12/26/2017  1:40 PM Los Ramirez MD [facility] at [facility] [phone] [id number] Follow-up Instructions Return in about 4 weeks (around 1/23/2018). Your Appointments 4/2/2018  1:30 PM  
ROUTINE CARE with Jahaira Mcduffie MD  
[facility]) Appt Note: 6 month dm  
 [address] [address]  
[phone]  
  
   
 [address] [address] Upcoming Health Maintenance Date Due  
 EYE EXAM RETINAL OR DILATED Q1 10/12/2017 MEDICARE YEARLY EXAM 1/5/2018 HEMOGLOBIN A1C Q6M 4/2/2018 MICROALBUMIN Q1 5/11/2018 LIPID PANEL Q1 5/11/2018 FOOT EXAM Q1 7/3/2018 PAP AKA CERVICAL CYTOLOGY 1/4/2020 COLONOSCOPY 1/18/2022 DTaP/Tdap/Td series (2 - Td) 6/16/2024 Allergies as of 12/26/2017  Review Complete On: 12/26/2017 By: Thania Stearns MD  
  
 Severity Noted Reaction Type Reactions Bee Sting [Sting, Bee] High 06/25/2010    Anaphylaxis Also allergic to egg products Penicillins High 06/25/2010    Anaphylaxis Betadine [Povidone-iodine]  05/17/2016    Rash Egg  06/25/2015    Itching Current Immunizations  Reviewed on 1/4/2017 Name Date Tdap 6/16/2014 Not reviewed this visit You Were Diagnosed With   
  
 Codes Comments MS (multiple sclerosis) ([facility]    -  Primary ICD-10-CM: G35 
ICD-9-CM: 183 Lumbosacral plexopathy     ICD-10-CM: G54.1 ICD-9-CM: 353.1 Fall, initial encounter     ICD-10-CM: W19. Rei Potash ICD-9-CM: E888.9 MG (myasthenia gravis) ([facility]     ICD-10-CM: G70.00 ICD-9-CM: 358.00 Migraine without aura and without status migrainosus, not intractable     ICD-10-CM: I27.683 ICD-9-CM: 346.10 Multiple sclerosis exacerbation ([address]     ICD-10-CM: G35 
ICD-9-CM: 070 Dizziness     ICD-10-CM: V55 ICD-9-CM: 780.4 Transient alteration of awareness     ICD-10-CM: R40.4 ICD-9-CM: 780.02 Vitals BP Pulse Height(growth percentile) Weight(growth percentile) LMP SpO2  
 130/70 71 6' 1\" (1.854 m) 183 lb (83 kg) 09/01/2010 90% BMI OB Status Smoking Status 24.14 kg/m2 Postmenopausal Current Every Day Smoker BMI and BSA Data Body Mass Index Body Surface Area  
 24.14 kg/m 2 2.07 m 2 Preferred Pharmacy Pharmacy Name Phone [facility] [address], [facility] [address] [phone] Your Updated Medication List  
  
   
This list is accurate as of: 12/26/17  2:31 PM.  Always use your most recent med list.  
  
  
  
  
 aspirin delayed-release 325 mg tablet Take 1 Tab by mouth two (2) times a day. Blood-Glucose Meter monitoring kit  
by SubCUTAneous route Before breakfast and dinner. Free Style Lite glucometer and test strips  
  
 butalbital-acetaminophen-caff -40 mg per capsule Commonly known as:  [employer] Take 1 Cap by mouth every four (4) hours as needed for Pain. Max Daily Amount: 6 Caps. clindamycin 300 mg capsule Commonly known as:  CLEOCIN Prior to dental procedures  
  
 corticotropin 80 unit/mL injectable gel Commonly known as:  ACTHAR H.P.  
1 mL by SubCUTAneous route daily. 80 units subq q day for 10 days  
  
 dantrolene 100 mg capsule Commonly known as:  DANTRIUM Take 1 Cap by mouth as needed. ergocalciferol 50,000 unit capsule Commonly known as:  VITAMIN D2 Take 1 Cap by mouth every seven (7) days. fentaNYL 75 mcg/hr Commonly known as:  DURAGESIC  
1 Patch by TransDERmal route every seventy-two (72) hours. Max Daily Amount: 1 Patch. GILENYA 0.5 mg Cap Generic drug:  fingolimod Take 1 Cap by mouth daily. levothyroxine 175 mcg tablet Commonly known as:  SYNTHROID  
TAKE 1 TABLET BY MOUTH DAILY BEFORE BREAKFAST  
  
 LIDODERM 5 % Generic drug:  lidocaine  
by TransDERmal route every twenty-four (24) hours. Apply patch to the affected area for 12 hours a day and remove for 12 hours a day. MESTINON TIMESPAN 180 mg SR tablet Generic drug:  pyridostigmine bromide Take 180 mg by mouth two (2) times a day. PARoxetine 40 mg tablet Commonly known as:  PAXIL TAKE 1.5 TABS BY MOUTH DAILY. pregabalin 200 mg capsule Commonly known as:  Murriel Simone Take 1 Cap by mouth two (2) times a day. Max Daily Amount: 400 mg.  
  
 rosuvastatin 20 mg tablet Commonly known as:  CRESTOR  
TAKE 1 TABLET BY MOUTH EVERY EVENING  
  
 TENS UNIT ELECTRODES  
by Does Not Apply route. prn  
  
 topiramate 200 mg tablet Commonly known as:  TOPAMAX Take 1 Tab by mouth two (2) times a day. Prescriptions Printed Refills  
 fentaNYL (DURAGESIC) 75 mcg/hr 0 Si Patch by TransDERmal route every seventy-two (72) hours. Max Daily Amount: 1 Patch. Class: Print Route: TransDERmal  
  
Prescriptions Sent to Pharmacy Refills  
 corticotropin (ACTHAR H.P.) 80 unit/mL injectable gel 1 Si mL by SubCUTAneous route daily. 80 units subq q day for 10 days Class: Normal  
 Pharmacy: [facility] [address] [place] [address] [employer] [facility] Ph #: [phone] Route: SubCUTAneous  
 dantrolene (DANTRIUM) 100 mg capsule 3 Sig: Take 1 Cap by mouth as needed. Class: Normal  
 Pharmacy: [facility] [address] [place] [address] Ph #: [phone] Route: Oral  
  
Follow-up Instructions Return in about 4 weeks (around 2018). To-Do List   
 2017 10:00 AM  
(Arrive by 9:30 AM) Appointment with ECHOCARDIOGRAM ROOM ED [facility]; ECHOCARDIOGRAM ROOM [facility] NON-INVASIVE CARD ([phone]) Please be prepared to remove everything from the waist up and put on a gown. 2018 Imaging:  MRI BRAIN W WO CONT Patient Instructions A Healthy Lifestyle: Care Instructions Your Care Instructions A healthy lifestyle can help you feel good, stay at a healthy weight, and have plenty of energy for both work and play. A healthy lifestyle is something you can share with your whole family. A healthy lifestyle also can lower your risk for serious health problems, such as high blood pressure, heart disease, and diabetes. You can follow a few steps listed below to improve your health and the health of your family. Follow-up care is a key part of your treatment and safety. Be sure to make and go to all appointments, and call your doctor if you are having problems. It's also a good idea to know your test results and keep a list of the medicines you take. How can you care for yourself at home? · Do not eat too much sugar, fat, or fast foods. You can still have dessert and treats now and then. The goal is moderation. · Start small to improve your eating habits. Pay attention to portion sizes, drink less juice and soda pop, and eat more fruits and vegetables. ¨ Eat a healthy amount of food. A 3-ounce serving of meat, for example, is about the size of a deck of cards. Fill the rest of your plate with vegetables and whole grains. ¨ Limit the amount of soda and sports drinks you have every day. Drink more water when you are thirsty. ¨ Eat at least 5 servings of fruits and vegetables every day. It may seem like a lot, but it is not hard to reach this goal. A serving or helping is 1 piece of fruit, 1 cup of vegetables, or 2 cups of leafy, raw vegetables. Have an apple or some carrot sticks as an afternoon snack instead of a candy bar. Try to have fruits and/or vegetables at every meal. 
· Make exercise part of your daily routine. You may want to start with simple activities, such as walking, bicycling, or slow swimming.  Try to be active 30 to 60 minutes every day. You do not need to do all 30 to 60 minutes all at once. For example, you can exercise 3 times a day for 10 or 20 minutes. Moderate exercise is safe for most people, but it is always a good idea to talk to your doctor before starting an exercise program. 
· Keep moving. Jethro Broom the lawn, work in the garden, or [employer]. Take the stairs instead of the elevator at work. · If you smoke, quit. People who smoke have an increased risk for heart attack, stroke, cancer, and other lung illnesses. Quitting is hard, but there are ways to boost your chance of quitting tobacco for good. ¨ Use nicotine gum, patches, or lozenges. ¨ Ask your doctor about stop-smoking programs and medicines. ¨ Keep trying. In addition to reducing your risk of diseases in the future, you will notice some benefits soon after you stop using tobacco. If you have shortness of breath or asthma symptoms, they will likely get better within a few weeks after you quit. · Limit how much alcohol you drink. Moderate amounts of alcohol (up to 2 drinks a day for men, 1 drink a day for women) are okay. But drinking too much can lead to liver problems, high blood pressure, and other health problems. Family health If you have a family, there are many things you can do together to improve your health. · Eat meals together as a family as often as possible. · Eat healthy foods. This includes fruits, vegetables, lean meats and dairy, and whole grains. · Include your family in your fitness plan. Most people think of activities such as jogging or tennis as the way to fitness, but there are many ways you and your family can be more active. Anything that makes you breathe hard and gets your heart pumping is exercise. Here are some tips: 
¨ Walk to do errands or to take your child to school or the bus. ¨ Go for a family bike ride after dinner instead of watching TV. Where can you learn more? Go to http://marie-alvin.info/. Enter Q515 in the search box to learn more about \"A Healthy Lifestyle: Care Instructions. \" Current as of: May 12, 2017 Content Version: 11.4 © 1078-5407 ISH. Care instructions adapted under license by [employer] (which disclaims liability or warranty for this information). If you have questions about a medical condition or this instruction, always ask your healthcare professional. [address] any warranty or liability for your use of this information. Introducing [facility]! Dear Asaf Zhang: 
Thank you for requesting a [employer] account. Our records indicate that you already have an active [employer] account. You can access your account anytime at [email or web address] Did you know that you can access your hospital and ER discharge instructions at any time in [employer]? You can also review all of your test results from your hospital stay or ER visit. Additional Information If you have questions, please visit the Frequently Asked Questions section of the [employer] website at [email or web address] Remember, [employer] is NOT to be used for urgent needs. For medical emergencies, dial 911. Now available from your iPhone and Android! Please provide this summary of care documentation to your next provider. Your primary care clinician is listed as Brandon Diamond. If you have any questions after today's visit, please call [phone].

## 2017-12-27 ENCOUNTER — HOSPITAL ENCOUNTER (OUTPATIENT)
Dept: NON INVASIVE DIAGNOSTICS | Age: 62
Discharge: HOME | End: 2017-12-27
Attending: FAMILY MEDICINE
Payer: MEDICARE

## 2017-12-27 DIAGNOSIS — R01.1 SYSTOLIC MURMUR: ICD-10-CM

## 2017-12-27 DIAGNOSIS — R55 SYNCOPE, UNSPECIFIED SYNCOPE TYPE: ICD-10-CM

## 2017-12-27 PROCEDURE — 93306 TTE W/DOPPLER COMPLETE: CPT

## 2017-12-28 ENCOUNTER — PATIENT OUTREACH (OUTPATIENT)
Dept: FAMILY MEDICINE CLINIC | Age: 62
End: 2017-12-28

## 2017-12-28 NOTE — PROGRESS NOTES
Patient called  to update her on recent office visit with her neurologist, Estiven Marrero, on Tuesday, 12/26. Had been having lots of problems recently with dizziness, balance issues, etc. No falls.  said she was having a serious exacerbation of MS and put her on 10 days of the Acthgar gel, MRI of brain sched for 1/10 and EEG- sched for 1/10. Will go back to see him in one month. Speech slow and lot of trouble finding her words in our conversation today. Says she usually sees some improvement in symptoms by fifth day of the medication. Was given one vial of the Achthgar, will equal 5 doses. Hoping her specialty pharmacy, [facility], will be able to send her another 5 days and bill insurance for 1/1/18. (currently in [facility]). Suggested she call [facility] specialty to discuss with nurse there and if an issue, could get 's nurse to try and help. (he only had the one vial sample.)   Advised to let me know if she needs anything from us or if symptoms worsen. Patient agreed to do so.   Scheduled next CCM session for 1/17 at 3pm.

## 2018-01-04 NOTE — PROGRESS NOTES
Please notify patient regarding their test results:    TTE of heart shows mild leaky valves (tricuspid and aortic valve regurgitation). There is evidence of pulmonary hypertension (high blood pressure from the lung, likely from chronic smoking). Will continue to monitor.

## 2018-01-05 PROBLEM — E11.40 TYPE 2 DIABETES MELLITUS WITH DIABETIC NEUROPATHY (HCC): Status: ACTIVE | Noted: 2018-01-05

## 2018-01-06 NOTE — PROGRESS NOTES
Call to patient.  verified. Informed patient of results and recommendations.  Letter sent per request.

## 2018-01-10 ENCOUNTER — HOSPITAL ENCOUNTER (OUTPATIENT)
Dept: MRI IMAGING | Age: 63
Discharge: HOME | End: 2018-01-10
Attending: PSYCHIATRY & NEUROLOGY
Payer: MEDICARE

## 2018-01-10 ENCOUNTER — HOSPITAL ENCOUNTER (OUTPATIENT)
Dept: NEUROLOGY | Age: 63
Discharge: HOME | End: 2018-01-10
Attending: PSYCHIATRY & NEUROLOGY
Payer: MEDICARE
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
## 2018-01-10 DIAGNOSIS — R42 DIZZINESS: ICD-10-CM

## 2018-01-10 DIAGNOSIS — G35 MS (MULTIPLE SCLEROSIS) (HCC): ICD-10-CM

## 2018-01-10 DIAGNOSIS — R40.4 TRANSIENT ALTERATION OF AWARENESS: ICD-10-CM

## 2018-01-10 DIAGNOSIS — G35 MULTIPLE SCLEROSIS EXACERBATION (HCC): ICD-10-CM

## 2018-01-10 PROCEDURE — A9576 INJ PROHANCE MULTIPACK: HCPCS | Performed by: PSYCHIATRY & NEUROLOGY

## 2018-01-10 PROCEDURE — 70553 MRI BRAIN STEM W/O & W/DYE: CPT

## 2018-01-10 PROCEDURE — 74011250636 HC RX REV CODE- 250/636: Performed by: PSYCHIATRY & NEUROLOGY

## 2018-01-10 PROCEDURE — 95816 EEG AWAKE AND DROWSY: CPT

## 2018-01-10 RX ADMIN — GADOTERIDOL 16 ML: 279.3 INJECTION, SOLUTION INTRAVENOUS at 13:00

## 2018-01-11 ENCOUNTER — TELEPHONE (OUTPATIENT)
Dept: NEUROLOGY | Age: 63
End: 2018-01-11

## 2018-01-11 DIAGNOSIS — G35 MS (MULTIPLE SCLEROSIS) (HCC): Primary | ICD-10-CM

## 2018-01-11 NOTE — TELEPHONE ENCOUNTER
Edwin Erickson of [facility] radiology called to inquire if the office received the MRI of the brain with and without contrast. Edwin Erickson will fax over the report.

## 2018-01-12 RX ORDER — CLOPIDOGREL BISULFATE 75 MG/1
75 TABLET ORAL DAILY
Qty: 30 TAB | Refills: 2 | Status: SHIPPED | OUTPATIENT
Start: 2018-01-12 | End: 2018-04-12 | Stop reason: SDUPTHER

## 2018-01-12 NOTE — PROCEDURES
Patient Name: Susanne Villasenor  : 1955  Age: 63 y.o. Ordering physician: Los Kahn  Date of EE/10/18  EEG procedure number: [id number]  Diagnosis: Altered mental status  Interpreting physician: Leidy Yousif MD      ELECTROENCEPHALOGRAM REPORT     PROCEDURE: EEG. CLINICAL INDICATION: The patient is a 63 y.o. female who is being evaluated for baseline electro cerebral activities and to rule out seizure focus. EEG CLASSIFICATION: Normal    DESCRIPTION OF THE RECORD:   The background of this recording contains a posteriorly-located occipital alpha rhythm of 8 Hz that attenuates with eye opening. Throughout the recording, there were no clear areas of focal slowing nor spike or spike-and-wave discharges seen. Hyperventilation was not performed. Photic stimulation produced no response in the posterior head regions. During the recording the patient did not achieve stage II sleep    INTERPRETATION: This is a normal electroencephalogram with the patient awake, showing no clear focal abnormalities or epileptiform activity. A normal EEG doesn't not rule out seizures. Clinical correlation recommended.       Leidy Yousif MD  Neurologist

## 2018-01-16 ENCOUNTER — PATIENT OUTREACH (OUTPATIENT)
Dept: FAMILY MEDICINE CLINIC | Age: 63
End: 2018-01-16

## 2018-01-16 NOTE — PROGRESS NOTES
Patient called, scheduled to come tomorrow for appt with NN for CCM. Concerned about possible snow-request call from NN to confirm in am. NN agreed to do so. Can reschedule if doesn't look like safe conditions.

## 2018-01-17 ENCOUNTER — PATIENT OUTREACH (OUTPATIENT)
Dept: FAMILY MEDICINE CLINIC | Age: 63
End: 2018-01-17

## 2018-01-17 NOTE — Clinical Note
Recent MRI and EEG show ? Stroke per neuro. Started Plavix. appt to see neuro 1/24. Do you want to see her sooner than April?

## 2018-01-17 NOTE — PROGRESS NOTES
Called patient to reschedule appt for this afternoon d/t snow. Rescheduled for 1/24 at 1:30pm.  Patient reports just got a call from  re MRI and EEG done last week- showed possible stroke. He ordered her to start Plavix and stop the ASA. Has appt to see him 1/24 at 10am.   Patient sounded upset on phone. Hasn't told her son, discussed how she will let him know. Reminded to check bp and report out of range results. Has been using cane in her home, admits balance is really off-urged her to use walker to help avoid any potential falls. Advised will notify  of recent results. Was not scheduled to see her until April. Will check to see if she suggests earlier appt.

## 2018-01-19 ENCOUNTER — PATIENT OUTREACH (OUTPATIENT)
Dept: FAMILY MEDICINE CLINIC | Age: 63
End: 2018-01-19

## 2018-01-19 NOTE — PROGRESS NOTES
Patient had reported to NN earlier in the week, that neurologist had told her the EEG and MRI indicated she had had a stroke. She has f/u with pcp in April, wondered if she should see pcp earlier. Advised, ok to wait until April, neuro is best to manage this issue. (per ).   Reminded of our CM appt WEdnesday 1/24 at 1:30pm.

## 2018-01-24 ENCOUNTER — TELEPHONE (OUTPATIENT)
Dept: NEUROLOGY | Age: 63
End: 2018-01-24

## 2018-01-24 ENCOUNTER — PATIENT OUTREACH (OUTPATIENT)
Dept: FAMILY MEDICINE CLINIC | Age: 63
End: 2018-01-24

## 2018-01-24 ENCOUNTER — OFFICE VISIT (OUTPATIENT)
Dept: NEUROLOGY | Age: 63
End: 2018-01-24

## 2018-01-24 VITALS
OXYGEN SATURATION: 99 % | BODY MASS INDEX: 26.06 KG/M2 | SYSTOLIC BLOOD PRESSURE: 112 MMHG | RESPIRATION RATE: 16 BRPM | WEIGHT: 192.4 LBS | HEIGHT: 72 IN | HEART RATE: 73 BPM | DIASTOLIC BLOOD PRESSURE: 73 MMHG | TEMPERATURE: 98.1 F

## 2018-01-24 DIAGNOSIS — G35 MS (MULTIPLE SCLEROSIS) (HCC): ICD-10-CM

## 2018-01-24 DIAGNOSIS — I63.39 CEREBROVASCULAR ACCIDENT (CVA) DUE TO THROMBOSIS OF OTHER CEREBRAL ARTERY (HCC): ICD-10-CM

## 2018-01-24 DIAGNOSIS — G25.3 MYOCLONUS: ICD-10-CM

## 2018-01-24 DIAGNOSIS — G89.4 CHRONIC PAIN SYNDROME: Primary | ICD-10-CM

## 2018-01-24 DIAGNOSIS — R26.9 GAIT DISORDER: ICD-10-CM

## 2018-01-24 DIAGNOSIS — Z79.891 USE OF OPIATES FOR THERAPEUTIC PURPOSES: ICD-10-CM

## 2018-01-24 RX ORDER — FENTANYL 75 UG/H
1 PATCH TRANSDERMAL
Qty: 10 PATCH | Refills: 0 | Status: SHIPPED | OUTPATIENT
Start: 2018-01-24 | End: 2018-03-07 | Stop reason: SDUPTHER

## 2018-01-24 NOTE — TELEPHONE ENCOUNTER
Order faxed to [facility], confirmation received.

## 2018-01-24 NOTE — PROGRESS NOTES
Chief Complaint   Patient presents with    Multiple Sclerosis    Results     MRI     1. Have you been to the ER, urgent care clinic since your last visit? Hospitalized since your last visit? no    2. Have you seen or consulted any other health care providers outside of the [address] [place] [address] since your last visit? Include any pap smears or colon screening.  No    Pill count not performed patient did not bring medications   Pill count not verified with patient  Patient reports taking medication     UDS obtained and sent   Pain contract on file   made available for Dr. Jhony Bedoya given for Fentanyl patches

## 2018-01-24 NOTE — MR AVS SNAPSHOT
[address] [place] [address] 
[phone] Patient: Liliane Meyers MRN: [id number] SXV:6/64/2097 Visit Information Date & Time Provider Department Dept. Phone Encounter #  
 1/24/2018 10:00 AM [facility] [phone] [id number] Follow-up Instructions Return in about 6 weeks (around 3/7/2018). Your Appointments 4/2/2018  1:30 PM  
ROUTINE CARE with Twin Varma MD  
[facility]) Appt Note: 6 month dm  
 [address] [address]  
[phone]  
  
   
 [address] [address] Upcoming Health Maintenance Date Due  
 EYE EXAM RETINAL OR DILATED Q1 10/12/2017 MEDICARE YEARLY EXAM 1/5/2018 HEMOGLOBIN A1C Q6M 4/2/2018 MICROALBUMIN Q1 5/11/2018 LIPID PANEL Q1 5/11/2018 FOOT EXAM Q1 7/3/2018 BREAST CANCER SCRN MAMMOGRAM 12/20/2019 PAP AKA CERVICAL CYTOLOGY 1/4/2020 COLONOSCOPY 1/18/2022 DTaP/Tdap/Td series (2 - Td) 6/16/2024 Allergies as of 1/24/2018  Review Complete On: 1/24/2018 By: Sivakumar Rothman MD  
  
 Severity Noted Reaction Type Reactions Bee Sting [Sting, Bee] High 06/25/2010    Anaphylaxis Also allergic to egg products Penicillins High 06/25/2010    Anaphylaxis Betadine [Povidone-iodine]  05/17/2016    Rash Egg  06/25/2015    Itching Current Immunizations  Reviewed on 1/4/2017 Name Date Tdap 6/16/2014 Not reviewed this visit You Were Diagnosed With   
  
 Codes Comments Chronic pain syndrome    -  Primary ICD-10-CM: G89.4 ICD-9-CM: 338.4 MS (multiple sclerosis) ([facility]     ICD-10-CM: G35 
ICD-9-CM: 053 Use of opiates for therapeutic purposes     ICD-10-CM: Z79.891 ICD-9-CM: V58.69  Cerebrovascular accident (CVA) due to thrombosis of other cerebral artery ([facility]     ICD-10-CM: I63.39 
 ICD-9-CM: 434.01 Gait disorder     ICD-10-CM: R26.9 ICD-9-CM: 112. 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  
 25.38 kg/m 2 2.12 m 2 Preferred Pharmacy Pharmacy Name Phone [facility] [address] [address] [address] [phone] Your Updated Medication List  
  
   
This list is accurate as of: 1/24/18 11:14 AM.  Always use your most recent med list.  
  
  
  
  
 aspirin delayed-release 325 mg tablet Take 1 Tab by mouth two (2) times a day. Blood-Glucose Meter monitoring kit  
by SubCUTAneous route Before breakfast and dinner. Free Style Lite glucometer and test strips  
  
 butalbital-acetaminophen-caff -40 mg per capsule Commonly known as:  [employer] Take 1 Cap by mouth every four (4) hours as needed for Pain. Max Daily Amount: 6 Caps. clindamycin 300 mg capsule Commonly known as:  CLEOCIN Prior to dental procedures  
  
 clopidogrel 75 mg Tab Commonly known as:  PLAVIX Take 1 Tab by mouth daily. corticotropin 80 unit/mL injectable gel Commonly known as:  ACTHAR H.P.  
1 mL by SubCUTAneous route daily. 80 units subq q day for 10 days  
  
 dantrolene 100 mg capsule Commonly known as:  DANTRIUM Take 1 Cap by mouth as needed. ergocalciferol 50,000 unit capsule Commonly known as:  VITAMIN D2 Take 1 Cap by mouth every seven (7) days. fentaNYL 75 mcg/hr Commonly known as:  DURAGESIC  
1 Patch by TransDERmal route every seventy-two (72) hours.  Max Daily Amount: 1 Patch. GILENYA 0.5 mg Cap Generic drug:  fingolimod Take 1 Cap by mouth daily. levothyroxine 175 mcg tablet Commonly known as:  SYNTHROID  
TAKE 1 TABLET BY MOUTH DAILY BEFORE BREAKFAST  
  
 LIDODERM 5 % Generic drug:  lidocaine  
by TransDERmal route every twenty-four (24) hours. Apply patch to the affected area for 12 hours a day and remove for 12 hours a day. MESTINON TIMESPAN 180 mg SR tablet Generic drug:  pyridostigmine bromide Take 180 mg by mouth two (2) times a day. PARoxetine 40 mg tablet Commonly known as:  PAXIL TAKE 1.5 TABS BY MOUTH DAILY. pregabalin 200 mg capsule Commonly known as:  Har[place]  Take 1 Cap by mouth two (2) times a day. Max Daily Amount: 400 mg.  
  
 rosuvastatin 20 mg tablet Commonly known as:  CRESTOR  
TAKE 1 TABLET BY MOUTH EVERY EVENING  
  
 TENS UNIT ELECTRODES  
by Does Not Apply route. prn  
  
 topiramate 200 mg tablet Commonly known as:  TOPAMAX Take 1 Tab by mouth two (2) times a day. Prescriptions Printed Refills  
 fentaNYL (DURAGESIC) 75 mcg/hr 0 Si Patch by TransDERmal route every seventy-two (72) hours. Max Daily Amount: 1 Patch. Class: Print Route: TransDERmal  
  
We Performed the Following [address] MONITORING [[id number] Custom] [address] Comments: FOR  PT/OT 
S/P CVA Follow-up Instructions Return in about 6 weeks (around 3/7/2018). Referral Information Referral ID Referred By Referred To  
  
 [id number] [place] CISNEROS Not Available Visits Status Start Date End Date 1 New Request 18 If your referral has a status of pending review or denied, additional information will be sent to support the outcome of this decision. Introducing [facility]! Dear Hay: 
Thank you for requesting a [employer] account.   Our records indicate that you already have an active [employer] account. You can access your account anytime at [email or web address] Did you know that you can access your hospital and ER discharge instructions at any time in [employer]? You can also review all of your test results from your hospital stay or ER visit. Additional Information If you have questions, please visit the Frequently Asked Questions section of the [employer] website at [email or web address] Remember, [employer] is NOT to be used for urgent needs. For medical emergencies, dial 911. Now available from your iPhone and Android! Please provide this summary of care documentation to your next provider. Your primary care clinician is listed as Brandon Diamond. If you have any questions after today's visit, please call [phone].

## 2018-01-24 NOTE — PROGRESS NOTES
Complex Case Management    Session started at 1:30pm.  Ended- 2:32pm    Patient came in with son assisting her, using her walker. Saw her neurologist earlier in day. He had notified her last week that after reviewing results of MRI and EEG done 1/10, dx as having CVA. Started her on Plavix 75mg last week. Had son explain to me today that neurologist was unsure why she had suffered a stroke- bp always excellent, cholesterol good, doesn't fit the mold. Said stroke was in left frontal area of brain. Has a 50/50 chance of reoccurring. Said this stroke was small, but next one will be \"bad. \" He ordered OT/PT HH to help improve her strength/balance/gait. Will use [facility] as has used them before. He had really wanted her to go to [place] [address] 3 x week for PT/OT, but would have disrupted her son's sleep schedule too much and she would not have been able to continue having the aide to help her with bathing. So patient requested to try the [place] at home for now. Will see  in about 6 weeks, will reassess then. Reports pain level of about #8 today, \"hurts all over. \" Blood sugars remain very good, usually about 100's. BP ranges from 100-120/63-78. Speech has not worsened, less stuttering and word searching noted. Has noticed big difference in her handwriting and says she leans to left when ambulating and increased difficulty in walking. Patient tearful at times, sad affect. Spoke of wishing her father and sister were around to talk to about this event. (both are ). Asked her if she had spoken to mother, nodded her head and said mother just told her after listening, that she was going out shopping. Son sat with mother throughout session, very supportive. She is very close to him. Spoke of wanting to work in her garden when spring comes and wanting to take a trip to the [place] in [place].   Reminded patient of how well she does when has PT/OT, encouraged her to stay focused on plans she wants to do and work towards those goals. Her previous therapist is hopefully going to start a new job soon and patient seemed very excited about being able to see her again for therapy. Spoke of family reunion planned for July in [address] be expensive, nothing to do there both she and son said; Feels obligated to go-urged patient to think of own feelings and needs before making decision to go or not. Scheduled next session for 2/28-reminded to call NN if upset and needs to talk or vent feelings. Goals        Patient Stated     improve balance and gait (pt-stated)            1/24/18 Met with patient for CCM. Recent dx of stroke. Saw neuro earlier today-has ordered PT/OT to assist with ambulation/strength/balance. Using walker when out of house and cane/walker when at home to prevent falls. Has LifeAlert bracelet to call for help if alone. mbt         Other     Attends follow-up appointments as directed. 11/1/17 Saw neurologist,, on 10/18-will see again in December. Saw pcp,  on 10/2/17 and will see again in 6 months. Diligent about attending her appointments. mbt       Identification of barriers to adherence to a plan of care such as inability to afford medications, lack of insurance, lack of transportation, etc.            11/1/17-Patient reviewing what her new Medicare Part D policy will cover. Has several costly MS meds- gets 2 from the drug  companies. Doesn't qualify for LIS d/t her income. mbt       Knowledge and adherence to medication plan. Take new rx- Fioricet, as directed for h/a pain  11/1/17-reviewed meds. Good understanding of each one- no longer on Oxycodone, Fioricet,Valium,Robaxin or Provigil. Takes current meds as directed. mbt

## 2018-01-24 NOTE — PROGRESS NOTES
Neurology Progress Note    NAME:  Elio Galvin   :   1955   MRN:   [id number]     Date/Time:  2018  Subjective:      Eilo Galvin is a 63 y.o. female here today for follow up for  Abnoral  Brain  Mri. Patient was walked in today because of abnormal MRI Result, the Brain MRI showed Ischemic lesions which was acute consistent with stroke, this was discussed with patient. Patient says her body jumps at times, she continues to have numbness and tingling sensation. in the hands, there is also weakness. She says she is usually falling to her right side when she is walking, she has difficulty writing. She reports periodic choking. Review of Systems - General ROS: positive for  - fatigue and sleep disturbance  Psychological ROS: positive for - anxiety, depression and sleep disturbances  Ophthalmic ROS: positive for - blurry vision, decreased vision, double vision and photophobia  ENT ROS: positive for - headaches, tinnitus, vertigo and visual changes  Allergy and Immunology ROS: negative  Hematological and Lymphatic ROS: negative  Endocrine ROS: negative  Respiratory ROS: no cough, shortness of breath, or wheezing  Cardiovascular ROS: no chest pain or dyspnea on exertion  Gastrointestinal ROS: no abdominal pain, change in bowel habits, or black or bloody stools  Genito-Urinary ROS: no dysuria, trouble voiding, or hematuria  Musculoskeletal ROS: positive for - gait disturbance, joint pain, joint stiffness, joint swelling, muscle pain and muscular weakness  Neurological ROS: positive for - dizziness, gait disturbance, headaches, impaired coordination/balance, numbness/tingling, speech problems, tremors, visual changes and weakness  Dermatological ROS: negative    Medications reviewed:  Current Outpatient Prescriptions   Medication Sig Dispense Refill    clopidogrel (PLAVIX) 75 mg tab Take 1 Tab by mouth daily. 30 Tab 2    fentaNYL (DURAGESIC) 75 mcg/hr 1 Patch by TransDERmal route every seventy-two (72) hours. Max Daily Amount: 1 Patch. 10 Patch 0    dantrolene (DANTRIUM) 100 mg capsule Take 1 Cap by mouth as needed. 90 Cap 3    corticotropin (ACTHAR H.P.) 80 unit/mL injectable gel 1 mL by SubCUTAneous route daily. 80 units subq q day for 10 days 10 mL 1    topiramate (TOPAMAX) 200 mg tablet Take 1 Tab by mouth two (2) times a day. 60 Tab 3    ergocalciferol (VITAMIN D2) 50,000 unit capsule Take 1 Cap by mouth every seven (7) days. 4 Cap 3    pregabalin (LYRICA) 200 mg capsule Take 1 Cap by mouth two (2) times a day. Max Daily Amount: 400 mg. 180 Cap 3    clindamycin (CLEOCIN) 300 mg capsule Prior to dental procedures      butalbital-acetaminophen-caff (FIORICET) -40 mg per capsule Take 1 Cap by mouth every four (4) hours as needed for Pain. Max Daily Amount: 6 Caps. 10 Cap 0    rosuvastatin (CRESTOR) 20 mg tablet TAKE 1 TABLET BY MOUTH EVERY EVENING 90 Tab 1    TENS UNIT ELECTRODES by Does Not Apply route. prn      levothyroxine (SYNTHROID) 175 mcg tablet TAKE 1 TABLET BY MOUTH DAILY BEFORE BREAKFAST (Patient taking differently: Take 175 mcg by mouth nightly. TAKE 1 TABLET BY MOUTH DAILY BEFORE BREAKFAST) 30 Tab 11    PARoxetine (PAXIL) 40 mg tablet TAKE 1.5 TABS BY MOUTH DAILY. (Patient taking differently: Take 60 mg by mouth nightly. TAKE 1.5 TABS BY MOUTH DAILY. - Note Pt prefers to take at HS) 45 Tab 2    GILENYA 0.5 mg cap Take 1 Cap by mouth daily.  pyridostigmine bromide (MESTINON TIMESPAN) 180 mg SR tablet Take 180 mg by mouth two (2) times a day.  lidocaine (LIDODERM) 5 %(700 mg/patch) by TransDERmal route every twenty-four (24) hours. Apply patch to the affected area for 12 hours a day and remove for 12 hours a day.  aspirin delayed-release 325 mg tablet Take 1 Tab by mouth two (2) times a day. 60 Tab 0    Blood-Glucose Meter monitoring kit by SubCUTAneous route Before breakfast and dinner.  Free Style Lite glucometer and test strips 1 Kit 0        Objective:   Vitals:  Vitals: 01/24/18 1004   BP: 112/73   Pulse: 73   Resp: 16   Temp: 98.1 °F (36.7 °C)   TempSrc: Temporal   SpO2: 99%   Weight: 192 lb 6.4 oz (87.3 kg)   Height: 6' 1\" (1.854 m)   PainSc:   8   PainLoc: Generalized   LMP: 09/01/2010               Lab Data Reviewed:  Lab Results   Component Value Date/Time    WBC 3.9 05/11/2017 10:20 AM    HCT 41.2 05/11/2017 10:20 AM    HGB 11.7 06/07/2017 05:02 AM    PLATELET 730 49/09/5021 10:20 AM       Lab Results   Component Value Date/Time    Sodium 143 06/07/2017 05:02 AM    Potassium 4.5 06/07/2017 05:02 AM    Chloride 115 06/07/2017 05:02 AM    CO2 19 06/07/2017 05:02 AM    Glucose 117 06/07/2017 05:02 AM    BUN 10 06/07/2017 05:02 AM    Creatinine 0.76 06/07/2017 05:02 AM    Calcium 8.8 06/07/2017 05:02 AM       No components found for: TROPQUANT    No results found for: SHAHIDA      Lab Results   Component Value Date/Time    Hemoglobin A1c 6.2 05/11/2017 10:20 AM    Hemoglobin A1c (POC) 5.7 10/02/2017 10:40 AM        Lab Results   Component Value Date/Time    Vitamin B12 434 02/10/2016    Folate 12.0 02/10/2016       No results found for: SHAHIDA, Dossie Fong, XBANA    Lab Results   Component Value Date/Time    Cholesterol, total 186 05/11/2017 10:20 AM    HDL Cholesterol 52 05/11/2017 10:20 AM    LDL, calculated 116 05/11/2017 10:20 AM    VLDL, calculated 18 05/11/2017 10:20 AM    Triglyceride 92 05/11/2017 10:20 AM    CHOL/HDL Ratio 4.3 08/23/2010 12:19 PM         CT Results (recent):    Results from Hospital Encounter encounter on 12/07/17   CT HEAD WO CONT   Narrative EXAM:  CT HEAD WO CONT    INDICATION: Fall one week ago with head injury and loss of consciousness. COMPARISON: CT head 5/22/2017    TECHNIQUE: Noncontrast head CT. Coronal and sagittal reformats. CT dose  reduction was achieved through use of a standardized protocol tailored for this  examination and automatic exposure control for dose modulation.  Adaptive  statistical iterative reconstruction (ASIR) was utilized. FINDINGS: The ventricles and sulci are age-appropriate without hydrocephalus. There is no mass effect or midline shift. There is no intracranial hemorrhage or  extra-axial fluid collection. There is no abnormal parenchymal attenuation. The  gray-white matter differentiation is maintained. The basal cisterns are patent. The osseous structures are intact. There is a small amount of aerated secretions  in the right greater than left maxillary sinuses. The remaining visualized  paranasal sinuses and mastoid air cells are clear. Impression IMPRESSION:   1. No acute intracranial abnormality. 2. A small amount of aerated secretions in the right greater than left maxillary  sinuses can be seen with acute sinusitis. MRI Results (recent):    Results from [place] encounter on 01/10/18   MRI BRAIN W WO CONT   Narrative INDICATION:  ms exacerbation, ams     COMPARISON:  May 18, 2016    TECHNIQUE:  MR imaging of the brain was performed with sagittal T1, axial T1,  T2, FLAIR, GRE, DWI/ADC; pre and post contrast multiplanar T1 utilizing 20 mL  gadolinium. FINDINGS:      Ventricles:  Midline, no hydrocephalus. Brain Parenchyma/Brainstem:  No definite change in a few small T2  hyperintensities in the supratentorial white matter and antonieta allowing for motion  on the prior study. Punctate focus of diffusion restriction in the posterior  left frontal lobe near the vertex is new. Intracranial Hemorrhage:  None. Basal Cisterns:  Normal.   Flow Voids:  Normal.  Post Contrast:  No abnormal parenchymal or meningeal enhancement. Additional Comments:  Small fluid level right maxillary sinus may have increased  slightly in the interval.         Impression IMPRESSION:    1. Punctate focus of diffusion restriction without enhancement posterior left  frontal lobe near the vertex suggests small acute infarction.  Demyelinating  disease may also present in this fashion but felt to be less likely. 2. Other nonspecific white matter lesions as well as pontine T2 hyperintensity  without definite change. 23X              IR Results (recent):  No results found for this or any previous visit. VAS/US Results (recent):    Results from Hospital Encounter encounter on 01/19/17   DUPLEX LOWER EXT VENOUS RIGHT    PHYSICAL EXAM:  General:    Alert, cooperative, no distress, appears stated age. Head:   Normocephalic, without obvious abnormality, atraumatic. Eyes:   Conjunctivae/corneas clear. PERRLA  Nose:  Nares normal. No drainage or sinus tenderness. Throat:    Lips, mucosa, and tongue normal.  No Thrush  Neck:  Supple, symmetrical,  no adenopathy, thyroid: non tender    no carotid bruit and no JVD. Paraspinal tenderness  Back:    Symmetric,  Bilateral tenderness. Lungs:   Clear to auscultation bilaterally. No Wheezing or Rhonchi. No rales. Chest wall:  No tenderness or deformity. No Accessory muscle use. Heart:   Regular rate and rhythm,  no murmur, rub or gallop. Abdomen:   Soft, non-tender. Not distended. Bowel sounds normal. No masses  Extremities: Extremities normal, atraumatic, No cyanosis. No edema. No clubbing  Skin:     Texture, turgor normal. No rashes or lesions. Not Jaundiced  Lymph nodes: Cervical, supraclavicular normal.  Psych:  Good insight. Not depressed. Anxious . NEUROLOGICAL EXAM:  Appearance: The patient is well developed, well nourished, provides a coherent history and is in no acute distress. Mental Status: Oriented to time, place and person. Mood and affect appropriate. Cranial Nerves:   Intact visual fields. Fundi are benign. RACHEL, EOM's full, no nystagmus, no ptosis. Facial sensation is normal. Corneal reflexes are intact. Facial movement is symmetric. Hearing is normal bilaterally. Palate is midline with normal sternocleidomastoid and trapezius muscles are normal. Tongue is midline.    Motor:  5/5 strength in upper and 4+/5 lower proximal and distal muscles. Normal bulk and tone. No fasciculations. Reflexes:   Deep tendon reflexes 2+/4 and symmetrical.   Sensory:   Decrease senasation to touch, pinprick and vibration. Gait:  Abnormal gait. Ambulates with cane   Tremor:   Mild tremor noted. Cerebellar:  No cerebellar signs present. Neurovascular:  Normal heart sounds and regular rhythm, peripheral pulses intact, and no carotid bruits. Assesment  1. MS (multiple sclerosis) ([facility])    - fentaNYL (DURAGESIC) 75 mcg/hr; 1 Patch by TransDERmal route every seventy-two (72) hours. Max Daily Amount: 1 Patch. Dispense: 10 Patch; Refill: 0  - COMPLIANCE DRUG SCREEN/PRESCRIPTION MONITORING    2. Chronic pain syndrome    - COMPLIANCE DRUG SCREEN/PRESCRIPTION MONITORING    3. Use of opiates for therapeutic purposes    - COMPLIANCE DRUG SCREEN/PRESCRIPTION MONITORING    4. Cerebrovascular accident (CVA) due to thrombosis of other cerebral artery ([facility]  Continue management    5. Gait disorder  Physical therapy    6. Myoclonus  Continue management    ___________________________________________________  PLAN:Medication reviewed with patient      ICD-10-CM ICD-9-CM    1. Chronic pain syndrome G89.4 338.4 COMPLIANCE DRUG SCREEN/PRESCRIPTION MONITORING   2. MS (multiple sclerosis) ([facility]) G35 340 fentaNYL (DURAGESIC) 75 mcg/hr      COMPLIANCE DRUG SCREEN/PRESCRIPTION MONITORING   3. Use of opiates for therapeutic purposes Z79.891 V58.69 COMPLIANCE DRUG SCREEN/PRESCRIPTION MONITORING   4. Cerebrovascular accident (CVA) due to thrombosis of other cerebral artery ([facility] I63.39 434.01    5. Gait disorder R26.9 781.2    6. Myoclonus G25.3 333.2      Follow-up Disposition:  Return in about 2 months (around 3/24/2018).          ___________________________________________________    Total time spent with patient:  []15   []25   []35   [] __ minutes    Care Plan discussed with:    [x]Patient   []Family    []Care Manager   []Consultant/Specialist :    ___________________________________________________    Attending Physician: Alyson Iqbal MD

## 2018-01-30 LAB — DRUGS UR: NORMAL

## 2018-02-07 ENCOUNTER — TELEPHONE (OUTPATIENT)
Dept: NEUROLOGY | Age: 63
End: 2018-02-07

## 2018-02-07 DIAGNOSIS — G35 MS (MULTIPLE SCLEROSIS) (HCC): ICD-10-CM

## 2018-02-07 RX ORDER — ERGOCALCIFEROL 1.25 MG/1
CAPSULE ORAL
Qty: 4 CAP | Refills: 0 | Status: SHIPPED | OUTPATIENT
Start: 2018-02-07 | End: 2018-05-22 | Stop reason: SDUPTHER

## 2018-02-07 NOTE — TELEPHONE ENCOUNTER
Called patient to see if she knew the date of when she started the Gilenya due to [employer] needed the  Initial date of when she started the medication. Patient stated she started 6/3/2016. Spoke with [employer], ID of the patient verified times 2. Marylin was given the date of 6/3/16 per the patient. Marylin stated please fax documentation to [phone].

## 2018-02-07 NOTE — TELEPHONE ENCOUNTER
Call Counts include 234 beds at the [facility] at [phone] and find out what they are waiting. Patient mentioned something about first dose observation. Patient would like a call back to know you are working on. She cannot get her medication until this is done.

## 2018-02-28 ENCOUNTER — PATIENT OUTREACH (OUTPATIENT)
Dept: FAMILY MEDICINE CLINIC | Age: 63
End: 2018-02-28

## 2018-02-28 NOTE — PROGRESS NOTES
Met with patient for CCM session. Began at 3:08pm and ended at 4:05pm.  Patient very unsteady as she came in, using cane, accompanied by son. He did not stay, did help her sit in the chair. She was in good spirits initially but mood changed as began to talk about son's loss of job. Depends on his income to supplement her disability to pay the bills. Unsure where the money will come from if he is out of work for very long. Reports she is tired all the time, sleeps off and on during the night. No energy, fell asleep during OT exercises yesterday. Says she had not taken any prn meds, just her regular everyday meds-nothing that would cause her to fall asleep. Frequent days of just dragging. Says her bp has been good- 106-120/50-74 is the range. BS has been good as well. Highest BS was 140(had eaten ice cream night before.) Next appt with  is 3/7. Has not had any blackout spells, does get dizzy - but bp ok when she checks it. Drinking fluids, urinating and having regular BMs. Stress over son's loss of job-hopes he can find a job in his field of IT. Has discussed with mother but she has not offered any assistance, did tell rest of her family without asking Hi[place]ot's permission to do so. She has not heard from any of them either. Medications for her MS are very expensive but neurology office gets hers from Patient Assistance. The only one that insurance covers is the Fentanyl patch for pain, and when she gets in the donut hole, has to pay out of pocket. Gave her information on [employer] to give to neurology office in hopes that they can sign up to participate with the program-she would be eligible to get the patches through [employer] if they signed up. Sees  3/7-urged to advise him of the fatigue and dizziness that she is experiencing.    Spoke of family reunion in [place] this summer-had not been excited about it before, now definitely plans to not go-mother has asked her why not- patient didn't bother to explain, recognizing that mother does not have \"a clue. \"  Urged patient to focus on her health-and to call if symptoms worsen or has any questions or concerns. Goals        Patient Stated     improve balance and gait (pt-stated)            1/24/18 Met with patient for CCM. Recent dx of stroke. Saw neuro earlier today-has ordered PT/OT to assist with ambulation/strength/balance. Using walker when out of house and cane/walker when at home to prevent falls. Has LifeAlert bracelet to call for help if alone. mbt  2/28/18 CCM session with patient. Balance very unsteady. Using cane for support. NN held her arm to walk her out. Continues to have OT/PT. Urged caution when ambulating. mbt         Other     Attends follow-up appointments as directed. 11/1/17 Saw neurologist,, on 10/18-will see again in December. Saw pcp,  on 10/2/17 and will see again in 6 months. Diligent about attending her appointments. mbt       Identification of barriers to adherence to a plan of care such as inability to afford medications, lack of insurance, lack of transportation, etc.            11/1/17-Patient reviewing what her new Medicare Part D policy will cover. Has several costly MS meds- gets 2 from the drug  companies. Doesn't qualify for LIS d/t her income. mbt  2/28/18 Son has just lost his job and benefits, relies on his income to help pay the bills. Meds very expensive, neurology office gets most for her through Patient Assistance. Gave her information on [employer] to see if  would be able to participate. mbt       Knowledge and adherence to medication plan. Take new rx- Fioricet, as directed for h/a pain  11/1/17-reviewed meds. Good understanding of each one- no longer on Oxycodone, Fioricet,Valium,Robaxin or Provigil. Takes current meds as directed. mbt

## 2018-03-07 ENCOUNTER — OFFICE VISIT (OUTPATIENT)
Dept: NEUROLOGY | Age: 63
End: 2018-03-07

## 2018-03-07 VITALS
WEIGHT: 195.6 LBS | TEMPERATURE: 99.2 F | DIASTOLIC BLOOD PRESSURE: 70 MMHG | RESPIRATION RATE: 16 BRPM | BODY MASS INDEX: 26.49 KG/M2 | SYSTOLIC BLOOD PRESSURE: 100 MMHG | OXYGEN SATURATION: 97 % | HEART RATE: 82 BPM | HEIGHT: 72 IN

## 2018-03-07 DIAGNOSIS — G70.00 MG (MYASTHENIA GRAVIS) (HCC): ICD-10-CM

## 2018-03-07 DIAGNOSIS — G35 MS (MULTIPLE SCLEROSIS) (HCC): ICD-10-CM

## 2018-03-07 DIAGNOSIS — Z79.891 USE OF OPIATES FOR THERAPEUTIC PURPOSES: Primary | ICD-10-CM

## 2018-03-07 DIAGNOSIS — R20.2 PARESTHESIA: ICD-10-CM

## 2018-03-07 DIAGNOSIS — R26.9 GAIT DISORDER: ICD-10-CM

## 2018-03-07 DIAGNOSIS — R20.9 CVA, OLD, ALTERATIONS OF SENSATIONS: ICD-10-CM

## 2018-03-07 DIAGNOSIS — I69.398 CVA, OLD, ALTERATIONS OF SENSATIONS: ICD-10-CM

## 2018-03-07 RX ORDER — FENTANYL 75 UG/H
1 PATCH TRANSDERMAL
Qty: 10 PATCH | Refills: 0 | Status: SHIPPED | OUTPATIENT
Start: 2018-04-07 | End: 2018-06-08 | Stop reason: SDUPTHER

## 2018-03-07 RX ORDER — SUMATRIPTAN 100 MG/1
TABLET, FILM COATED ORAL
Qty: 12 TAB | Refills: 3 | Status: SHIPPED | OUTPATIENT
Start: 2018-03-07 | End: 2019-05-03

## 2018-03-07 RX ORDER — FENTANYL 75 UG/H
1 PATCH TRANSDERMAL
Qty: 10 PATCH | Refills: 0 | Status: SHIPPED | OUTPATIENT
Start: 2018-03-07 | End: 2018-03-07 | Stop reason: SDUPTHER

## 2018-03-07 RX ORDER — MECLIZINE HYDROCHLORIDE 25 MG/1
25 TABLET ORAL
Qty: 30 TAB | Refills: 1 | Status: SHIPPED | OUTPATIENT
Start: 2018-03-07 | End: 2018-03-17

## 2018-03-07 NOTE — MR AVS SNAPSHOT
Finesse Bengali 
 
 
 [place] [address] [facility] 
[phone] Patient: Kye Henry MRN: [id number] AJM:[id number] Visit Information Date & Time Provider Department Dept. Phone Encounter #  
 3/7/2018  1:40 PM Los Huggins MD [facility] at [facility] [phone] [id number] Follow-up Instructions Return in about 3 months (around 6/7/2018). Your Appointments 4/2/2018  1:30 PM  
ROUTINE CARE with Gabriel Brown MD  
[facility]) Appt Note: 6 month dm  
 [address] [address]  
[phone]  
  
   
 [address] [address] Upcoming Health Maintenance Date Due  
 EYE EXAM RETINAL OR DILATED Q1 10/12/2017 MEDICARE YEARLY EXAM 1/5/2018 HEMOGLOBIN A1C Q6M 4/2/2018 MICROALBUMIN Q1 5/11/2018 LIPID PANEL Q1 5/11/2018 FOOT EXAM Q1 7/3/2018 BREAST CANCER SCRN MAMMOGRAM 12/20/2019 PAP AKA CERVICAL CYTOLOGY 1/4/2020 COLONOSCOPY 1/18/2022 DTaP/Tdap/Td series (2 - Td) 6/16/2024 Allergies as of 3/7/2018  Review Complete On: 3/7/2018 By: Fede Aguilar MD  
  
 Severity Noted Reaction Type Reactions Bee Sting [Sting, Bee] High 06/25/2010    Anaphylaxis Also allergic to egg products Penicillins High 06/25/2010    Anaphylaxis Betadine [Povidone-iodine]  05/17/2016    Rash Egg  06/25/2015    Itching Current Immunizations  Reviewed on 1/4/2017 Name Date Tdap 6/16/2014 Not reviewed this visit You Were Diagnosed With   
  
 Codes Comments Use of opiates for therapeutic purposes    -  Primary ICD-10-CM: A25.778 ICD-9-CM: V58.69   
 MS [facility]) ([facility]     ICD-10-CM: G35 
ICD-9-CM: 292 Gait disorder     ICD-10-CM: R26.9 ICD-9-CM: 781.2 CVA, old, alterations of sensations     ICD-10-CM: I69.398, R20.9 ICD-9-CM: 438.6 Paresthesia     ICD-10-CM: R20.2 ICD-9-CM: 782.0 MG (myasthenia gravis) ([facility]     ICD-10-CM: G70.00 ICD-9-CM: 358.00 Vitals BP Pulse Temp Resp Height(growth percentile) Weight(growth percentile) 100/70 (BP 1 Location: Left arm, BP Patient Position: Sitting) 82 99.2 °F (37.3 °C) (Oral) 16 6' 1\" (1.854 m) 195 lb 9.6 oz (88.7 kg) LMP SpO2 BMI OB Status Smoking Status 09/01/2010 97% 25.81 kg/m2 Postmenopausal Current Every Day Smoker Vitals History BMI and BSA Data Body Mass Index Body Surface Area  
 25.81 kg/m 2 2.14 m 2 Preferred Pharmacy Pharmacy Name Phone [facility] [address] [phone] Your Updated Medication List  
  
   
This list is accurate as of 3/7/18  2:45 PM.  Always use your most recent med list.  
  
  
  
  
 aspirin delayed-release 325 mg tablet Take 1 Tab by mouth two (2) times a day. Blood-Glucose Meter monitoring kit  
by SubCUTAneous route Before breakfast and dinner. Free Style Lite glucometer and test strips  
  
 butalbital-acetaminophen-caff -40 mg per capsule Commonly known as:  [employer] Take 1 Cap by mouth every four (4) hours as needed for Pain. Max Daily Amount: 6 Caps. clindamycin 300 mg capsule Commonly known as:  CLEOCIN Prior to dental procedures  
  
 clopidogrel 75 mg Tab Commonly known as:  PLAVIX Take 1 Tab by mouth daily. corticotropin 80 unit/mL injectable gel Commonly known as:  ACTHAR H.P.  
1 mL by SubCUTAneous route daily. 80 units subq q day for 10 days  
  
 dantrolene 100 mg capsule Commonly known as:  DANTRIUM Take 1 Cap by mouth as needed. ergocalciferol 50,000 unit capsule Commonly known as:  ERGOCALCIFEROL  
TAKE 1 CAPSULE BY MOUTH EVERY 7 DAYS  
  
 fentaNYL 75 mcg/hr Commonly known as:  DURAGESIC  
1 Patch by TransDERmal route every seventy-two (72) hours.  Max Daily Amount: 1 Patch. Start taking on:  2018 GILENYA 0.5 mg Cap Generic drug:  fingolimod Take 1 Cap by mouth daily. levothyroxine 175 mcg tablet Commonly known as:  SYNTHROID  
TAKE 1 TABLET BY MOUTH DAILY BEFORE BREAKFAST  
  
 LIDODERM 5 % Generic drug:  lidocaine  
by TransDERmal route every twenty-four (24) hours. Apply patch to the affected area for 12 hours a day and remove for 12 hours a day. meclizine 25 mg tablet Commonly known as:  ANTIVERT Take 1 Tab by mouth nightly for 10 days. MESTINON TIMESPAN 180 mg SR tablet Generic drug:  pyridostigmine bromide Take 180 mg by mouth two (2) times a day. PARoxetine 40 mg tablet Commonly known as:  PAXIL TAKE 1.5 TABS BY MOUTH DAILY. pregabalin 200 mg capsule Commonly known as:  [facility] Take 1 Cap by mouth two (2) times a day. Max Daily Amount: 400 mg.  
  
 rosuvastatin 20 mg tablet Commonly known as:  CRESTOR  
TAKE 1 TABLET BY MOUTH EVERY EVENING  
  
 SUMAtriptan 100 mg tablet Commonly known as:  IMITREX  
1 tab at onset of moderate-severe migraine; may repeat 1 tab in 2 hours; Limit: 2 tabs in 24/ hrs, not more than 3 days a week TENS UNIT ELECTRODES  
by Does Not Apply route. prn  
  
 topiramate 200 mg tablet Commonly known as:  TOPAMAX Take 1 Tab by mouth two (2) times a day. Prescriptions Printed Refills  
 fentaNYL (DURAGESIC) 75 mcg/hr 0 Starting on: 2018 Si Patch by TransDERmal route every seventy-two (72) hours. Max Daily Amount: 1 Patch. Class: Print Route: TransDERmal  
  
Prescriptions Sent to Pharmacy Refills  
 meclizine (ANTIVERT) 25 mg tablet 1 Sig: Take 1 Tab by mouth nightly for 10 days. Class: Normal  
 Pharmacy: [facility] [address] [place] [address] Ph #: [phone]  Route: Oral  
 SUMAtriptan (IMITREX) 100 mg tablet 3  
 Si tab at onset of moderate-severe migraine; may repeat 1 tab in 2 hours; Limit: 2 tabs in 24/ hrs, not more than 3 days a week Class: Normal  
 Pharmacy: [facility] [address], [address] [place] [address] #: [phone] We Performed the Following [address] MONITORING [[id number] Custom] Follow-up Instructions Return in about 3 months (around 2018). Introducing [facility]! Dear Tal Austin: 
Thank you for requesting a [email or web address] account. Our records indicate that you already have an active [email or web address] account. You can access your account anytime at [email or web address] Did you know that you can access your hospital and ER discharge instructions at any time in [email or web address]? You can also review all of your test results from your hospital stay or ER visit. Additional Information If you have questions, please visit the Frequently Asked Questions section of the [email or web address] website at [email or web address] Remember, [email or web address] is NOT to be used for urgent needs. For medical emergencies, dial 911. Now available from your iPhone and Android! Please provide this summary of care documentation to your next provider. Your primary care clinician is listed as Brandon Diamond. If you have any questions after today's visit, please call [phone].

## 2018-03-07 NOTE — PROGRESS NOTES
Neurology Progress Note    NAME:  Gaviota Galvin   :   1955   MRN:   [id number]     Date/Time:  3/7/2018  Subjective:      Gaviota Galvin is a 63 y.o. female here today for follow up for MS,CVA, Frequent falls,MG. Patient was accompanied by son to the visit. Says he has been having increasing difficulty ambulating, this has been going quite sometime,her motorized wheel chair  has not been working, according to patient, she has had some falls. The wheel chair needs to be fixed or procuring of a new one. She reported frequent Vertigo which started lately, she has not identified aggravating factor. She noted that she has been drowsy most of the time and always very tired. Headache has been frequent, more than 3 times a week, headache is throbbing in nature, associated with dizziness , blurry vision and double vision, nausea and occasional vomiting.   Review of Systems - General ROS: positive for  - fatigue, malaise, night sweats, sleep disturbance and weight loss  Psychological ROS: positive for - anxiety, depression and sleep disturbances  Ophthalmic ROS: positive for - blurry vision, decreased vision, double vision and photophobia  ENT ROS: positive for - headaches, tinnitus, vertigo and visual changes  Allergy and Immunology ROS: negative  Hematological and Lymphatic ROS: negative  Endocrine ROS: negative  Respiratory ROS: no cough, shortness of breath, or wheezing  Cardiovascular ROS: no chest pain or dyspnea on exertion  Gastrointestinal ROS: no abdominal pain, change in bowel habits, or black or bloody stools  Genito-Urinary ROS: no dysuria, trouble voiding, or hematuria  Musculoskeletal ROS: positive for - gait disturbance, joint pain, joint stiffness, joint swelling, muscle pain and muscular weakness  Neurological ROS: positive for - dizziness, gait disturbance, headaches, impaired coordination/balance, numbness/tingling, speech problems, visual changes and weakness  Dermatological ROS: negative  Medications reviewed:  Current Outpatient Prescriptions   Medication Sig Dispense Refill    ergocalciferol (ERGOCALCIFEROL) 50,000 unit capsule TAKE 1 CAPSULE BY MOUTH EVERY 7 DAYS 4 Cap 0    fentaNYL (DURAGESIC) 75 mcg/hr 1 Patch by TransDERmal route every seventy-two (72) hours. Max Daily Amount: 1 Patch. 10 Patch 0    clopidogrel (PLAVIX) 75 mg tab Take 1 Tab by mouth daily. 30 Tab 2    dantrolene (DANTRIUM) 100 mg capsule Take 1 Cap by mouth as needed. 90 Cap 3    corticotropin (ACTHAR H.P.) 80 unit/mL injectable gel 1 mL by SubCUTAneous route daily. 80 units subq q day for 10 days 10 mL 1    topiramate (TOPAMAX) 200 mg tablet Take 1 Tab by mouth two (2) times a day. 60 Tab 3    pregabalin (LYRICA) 200 mg capsule Take 1 Cap by mouth two (2) times a day. Max Daily Amount: 400 mg. 180 Cap 3    clindamycin (CLEOCIN) 300 mg capsule Prior to dental procedures      butalbital-acetaminophen-caff (FIORICET) -40 mg per capsule Take 1 Cap by mouth every four (4) hours as needed for Pain. Max Daily Amount: 6 Caps. 10 Cap 0    rosuvastatin (CRESTOR) 20 mg tablet TAKE 1 TABLET BY MOUTH EVERY EVENING 90 Tab 1    TENS UNIT ELECTRODES by Does Not Apply route. prn      Blood-Glucose Meter monitoring kit by SubCUTAneous route Before breakfast and dinner. Free Style Lite glucometer and test strips 1 Kit 0    levothyroxine (SYNTHROID) 175 mcg tablet TAKE 1 TABLET BY MOUTH DAILY BEFORE BREAKFAST (Patient taking differently: Take 175 mcg by mouth nightly. TAKE 1 TABLET BY MOUTH DAILY BEFORE BREAKFAST) 30 Tab 11    PARoxetine (PAXIL) 40 mg tablet TAKE 1.5 TABS BY MOUTH DAILY. (Patient taking differently: Take 60 mg by mouth nightly. TAKE 1.5 TABS BY MOUTH DAILY. - Note Pt prefers to take at HS) 45 Tab 2    GILENYA 0.5 mg cap Take 1 Cap by mouth daily.  pyridostigmine bromide (MESTINON TIMESPAN) 180 mg SR tablet Take 180 mg by mouth two (2) times a day.       lidocaine (LIDODERM) 5 %(700 mg/patch) by TransDERmal route every twenty-four (24) hours. Apply patch to the affected area for 12 hours a day and remove for 12 hours a day.  aspirin delayed-release 325 mg tablet Take 1 Tab by mouth two (2) times a day. 60 Tab 0        Objective:   Vitals:  Vitals:    03/07/18 1413   BP: 100/70   Pulse: 82   Resp: 16   Temp: 99.2 °F (37.3 °C)   TempSrc: Oral   SpO2: 97%   Weight: 195 lb 9.6 oz (88.7 kg)   Height: 6' 1\" (1.854 m)   PainSc:   8   PainLoc: Generalized   LMP: 09/01/2010     Lab Data Reviewed:  Lab Results   Component Value Date/Time    WBC 3.9 05/11/2017 10:20 AM    HCT 41.2 05/11/2017 10:20 AM    HGB 11.7 06/07/2017 05:02 AM    PLATELET 645 (L) 95/62/0100 10:20 AM       Lab Results   Component Value Date/Time    Sodium 143 06/07/2017 05:02 AM    Potassium 4.5 06/07/2017 05:02 AM    Chloride 115 (H) 06/07/2017 05:02 AM    CO2 19 (L) 06/07/2017 05:02 AM    Glucose 117 (H) 06/07/2017 05:02 AM    BUN 10 06/07/2017 05:02 AM    Creatinine 0.76 06/07/2017 05:02 AM    Calcium 8.8 06/07/2017 05:02 AM       No components found for: TROPQUANT    No results found for: SHAHIDA      Lab Results   Component Value Date/Time    Hemoglobin A1c 6.2 (H) 05/11/2017 10:20 AM    Hemoglobin A1c (POC) 5.7 10/02/2017 10:40 AM        Lab Results   Component Value Date/Time    Vitamin B12 434 02/10/2016    Folate 12.0 02/10/2016       No results found for: Aziza PINEDO XBANA    Lab Results   Component Value Date/Time    Cholesterol, total 186 05/11/2017 10:20 AM    HDL Cholesterol 52 05/11/2017 10:20 AM    LDL, calculated 116 (H) 05/11/2017 10:20 AM    VLDL, calculated 18 05/11/2017 10:20 AM    Triglyceride 92 05/11/2017 10:20 AM    CHOL/HDL Ratio 4.3 08/23/2010 12:19 PM         CT Results (recent):    Results from Hospital Encounter encounter on 12/07/17   CT HEAD WO CONT   Narrative EXAM:  CT HEAD WO CONT    INDICATION: Fall one week ago with head injury and loss of consciousness.     COMPARISON: CT head 5/22/2017    TECHNIQUE: Noncontrast head CT. Coronal and sagittal reformats. CT dose  reduction was achieved through use of a standardized protocol tailored for this  examination and automatic exposure control for dose modulation. Adaptive  statistical iterative reconstruction (ASIR) was utilized. FINDINGS: The ventricles and sulci are age-appropriate without hydrocephalus. There is no mass effect or midline shift. There is no intracranial hemorrhage or  extra-axial fluid collection. There is no abnormal parenchymal attenuation. The  gray-white matter differentiation is maintained. The basal cisterns are patent. The osseous structures are intact. There is a small amount of aerated secretions  in the right greater than left maxillary sinuses. The remaining visualized  paranasal sinuses and mastoid air cells are clear. Impression IMPRESSION:   1. No acute intracranial abnormality. 2. A small amount of aerated secretions in the right greater than left maxillary  sinuses can be seen with acute sinusitis. MRI Results (recent):    Results from [place] encounter on 01/10/18   MRI BRAIN W WO CONT   Narrative INDICATION:  ms exacerbation, ams     COMPARISON:  May 18, 2016    TECHNIQUE:  MR imaging of the brain was performed with sagittal T1, axial T1,  T2, FLAIR, GRE, DWI/ADC; pre and post contrast multiplanar T1 utilizing 20 mL  gadolinium. FINDINGS:      Ventricles:  Midline, no hydrocephalus. Brain Parenchyma/Brainstem:  No definite change in a few small T2  hyperintensities in the supratentorial white matter and antonieta allowing for motion  on the prior study. Punctate focus of diffusion restriction in the posterior  left frontal lobe near the vertex is new. Intracranial Hemorrhage:  None. Basal Cisterns:  Normal.   Flow Voids:  Normal.  Post Contrast:  No abnormal parenchymal or meningeal enhancement.   Additional Comments:  Small fluid level right maxillary sinus may have increased  slightly in the interval.         Impression IMPRESSION:    1. Punctate focus of diffusion restriction without enhancement posterior left  frontal lobe near the vertex suggests small acute infarction. Demyelinating  disease may also present in this fashion but felt to be less likely. 2. Other nonspecific white matter lesions as well as pontine T2 hyperintensity  without definite change. 23X              IR Results (recent):  No results found for this or any previous visit. VAS/US Results (recent):    Results from Hospital Encounter encounter on 01/19/17   DUPLEX LOWER EXT VENOUS RIGHT    PHYSICAL EXAM:  General:    Alert, cooperative, no distress, appears stated age. Head:   Normocephalic, without obvious abnormality, atraumatic. Eyes:   Conjunctivae/corneas clear. PERRLA  Nose:  Nares normal. No drainage or sinus tenderness. Throat:    Lips, mucosa, and tongue normal.  No Thrush  Neck:  Supple, symmetrical,  no adenopathy, thyroid: non tender    no carotid bruit and no JVD. Paraspinal tenderness  Back:    Symmetric,  Diffuse tenderness. Lungs:   Clear to auscultation bilaterally. No Wheezing or Rhonchi. No rales. Chest wall:  No tenderness or deformity. No Accessory muscle use. Heart:   Regular rate and rhythm,  no murmur, rub or gallop. Abdomen:   Soft, non-tender. Not distended. Bowel sounds normal. No masses  Extremities: Extremities normal, atraumatic, No cyanosis. No edema. No clubbing  Skin:     Texture, turgor normal. No rashes or lesions. Not Jaundiced  Lymph nodes: Cervical, supraclavicular normal.  Psych:  Good insight. Not depressed. Anxious . NEUROLOGICAL EXAM:  Appearance: The patient is well developed, well nourished, provides a coherent history and is in no acute distress. Mental Status: Oriented to time, place and person. Mood and affect appropriate. Cranial Nerves:   Intact visual fields. Fundi are benign. RACHEL, EOM's full, no nystagmus, no ptosis. Facial sensation is normal. Corneal reflexes are intact. Facial movement is symmetric. Hearing is normal bilaterally. Palate is midline with normal sternocleidomastoid and trapezius muscles are normal. Tongue is midline. Motor:  5-/5 strength in upper and 3+/5  lower proximal and distal muscles. Normal bulk and tone. No fasciculations. Reflexes:   Deep tendon reflexes 2+/4 and symmetrical.   Sensory:   Decreased sensation to touch, pinprick and vibration. Gait:  Abnormal gait. Ambulates with walker and wheelchair   Tremor:   No tremor noted. Cerebellar:  No cerebellar signs present. Neurovascular:  Normal heart sounds and regular rhythm, peripheral pulses intact, and no carotid bruits. Assesment  1. MS (multiple sclerosis) ([facility])    - fentaNYL (DURAGESIC) 75 mcg/hr; 1 Patch by TransDERmal route every seventy-two (72) hours. Max Daily Amount: 1 Patch. Dispense: 10 Patch; Refill: 0  - COMPLIANCE DRUG SCREEN/PRESCRIPTION MONITORING    2. Use of opiates for therapeutic purposes    - COMPLIANCE DRUG SCREEN/PRESCRIPTION MONITORING    3. Gait disorder  Motorized wheelchair repair    4. CVA, old, alterations of sensations   Stable  Follow up MRI Brain next visit    5. Paresthesia  Stable    ___________________________________________________  PLAN: Medication reviewed with patient  Motorized wheelchair      ICD-10-CM ICD-9-CM    1. Use of opiates for therapeutic purposes Z79.891 V58.69 COMPLIANCE DRUG SCREEN/PRESCRIPTION MONITORING   2. MS (multiple sclerosis) ([facility]) G35 340 fentaNYL (DURAGESIC) 75 mcg/hr      COMPLIANCE DRUG SCREEN/PRESCRIPTION MONITORING   3. Gait disorder R26.9 781.2    4. CVA, old, alterations of sensations I69.398 438.6     R20.9     5. Paresthesia R20.2 782.0      Follow-up Disposition:  Return in about 3 months (around 6/7/2018).            ___________________________________________________    Total time spent with patient:  []15   []25   []35   [] __ minutes    Care Plan discussed with:    [x]Patient   []Family    []Care Manager   []Consultant/Specialist :    ___________________________________________________    Attending Physician: Fede Aguilar MD

## 2018-03-07 NOTE — PROGRESS NOTES
Chief Complaint   Patient presents with    Multiple Sclerosis    Gait Problem     several months per patient     1. Have you been to the ER, urgent care clinic since your last visit? Hospitalized since your last visit? No    2. Have you seen or consulted any other health care providers outside of the [address] [place] [address] since your last visit? Include any pap smears or colon screening.  No    Pill count not performed patient did not bring medicaitons      Pill count not verified with patient  Patient reports taking medication     UDS obtained and sent   Pain contract on file   made available for Dr. Casillas Serve given for Fentanyl

## 2018-03-13 LAB — DRUGS UR: NORMAL

## 2018-03-28 ENCOUNTER — PATIENT OUTREACH (OUTPATIENT)
Dept: FAMILY MEDICINE CLINIC | Age: 63
End: 2018-03-28

## 2018-03-28 NOTE — PROGRESS NOTES
Patient came in accompanied by son. He was holding onto her arm to steady her, she was not using a cane or walker. Said she had left cane in the car. Plopped down in the chair. Asked her about recent falls- said she has had many close calls. Physical therapy had discharged her earlier in the month and she feels her gait is still off and ambulation unsteady as well. Continues with dizziness,  gave her rx for Meclizine to take at -but makes her sleep late. He also gave her Imitrex for headaches, seem more frequent and intense. Hasn't started using the Imitrex yet. Rates pain level today at #8- pain in right shoulder and arm. Has started using TENS unit again on her right leg and very effective. Has noticed decrease in energy and increase in sleeping. Says she has good days and bad days, tired of being inside, looking forward to warmer weather to get outside. Had told  that her motorized WC was not working, says part has been ordered for it but it is still usable as is now. Son still unemployed. Worried about finances. They live together and depend on his income and her disability. Has reapplied to the [address] [place] [address] for funding to cover an aide 4 hours a week. Had used [id number] before but not pleased and working on getting another agency  ([facility]) and try their aides. Given information on [facility] as well to consider. Reviewed medications- 17 in total of RX and OTC. Assessment- physical decline noted as MS symptoms continue. Depression noted although able to laugh and joke at times with son during session. Gait disturbances prominent. Encouraged to use cane/walker at all times, discussed other fall precautions-remove scatter rugs, straighten rooms/floor so won't trip on anything, get up from chair/bed slowly to get balance, etc. Suggest meditation techniques to help with anxiety/depression. Call NN prn if concerns/anxiety.      Scheduled next session for 4/25 at 2pm.     Goals        Patient Stated     improve balance and gait (pt-stated)            1/24/18 Met with patient for CCM. Recent dx of stroke. Saw neuro earlier today-has ordered PT/OT to assist with ambulation/strength/balance. Using walker when out of house and cane/walker when at home to prevent falls. Has LifeAlert bracelet to call for help if alone. mbt  2/28/18 CCM session with patient. Balance very unsteady. Using cane for support. NN held her arm to walk her out. Continues to have OT/PT. Urged caution when ambulating. mbt  3/28/18 CCM session-gait continues to be poor. Did not have cane or walker-son lightly assisting her. PT discharged her earlier in month. Continues with OT. Urged caution when ambulating, reminded to use cane/walker at all times. Notify  of poor gait and request PT be restarted. mbt         Other     Attends follow-up appointments as directed. 11/1/17 Saw neurologist,, on 10/18-will see again in December. Saw pcp,  on 10/2/17 and will see again in 6 months. Diligent about attending her appointments. mbt  3/28/18 Saw  3/7/18. Attends all f/u appointments. 4/2-appt with  for f/u scheduled. mbt       Identification of barriers to adherence to a plan of care such as inability to afford medications, lack of insurance, lack of transportation, etc.            11/1/17-Patient reviewing what her new Medicare Part D policy will cover. Has several costly MS meds- gets 2 from the drug  companies. Doesn't qualify for LIS d/t her income. mbt  2/28/18 Son has just lost his job and benefits, relies on his income to help pay the bills. Meds very expensive, neurology office gets most for her through Patient Assistance. Gave her information on [employer] to see if  would be able to participate. mbt  3/28/18 Son still out of work. Finances tight.  He is able to assist her more at home and taking her to appointments. Medicare insurance -does not qualify for Medicaid. NN to continue to research resources for her.mbt       Knowledge and adherence to medication plan. Take new rx- Fioricet, as directed for h/a pain  11/1/17-reviewed meds. Good understanding of each one- no longer on Oxycodone, Fioricet,Valium,Robaxin or Provigil. Takes current meds as directed. mbt  3/28/18 Review of meds. Has not started imitrex yet- rxd for her headaches. Urged to use prn. Tries to avoid prn meds unless absolutely needed. mbt labor/delivery

## 2018-04-05 ENCOUNTER — PATIENT OUTREACH (OUTPATIENT)
Dept: FAMILY MEDICINE CLINIC | Age: 63
End: 2018-04-05

## 2018-04-05 NOTE — PROGRESS NOTES
Patient called, had scheduled to see NN for Case Management on Wed 4/25 but has an appt with pcp on Friday, 4/27, so would like to see NN that morning. Advised will need to check on possible NN meeting that morning, but if none-would be glad to see her then.

## 2018-04-12 DIAGNOSIS — G35 MS (MULTIPLE SCLEROSIS) (HCC): ICD-10-CM

## 2018-04-12 DIAGNOSIS — G43.909 MIGRAINE WITHOUT STATUS MIGRAINOSUS, NOT INTRACTABLE, UNSPECIFIED MIGRAINE TYPE: ICD-10-CM

## 2018-04-12 RX ORDER — TOPIRAMATE 200 MG/1
TABLET ORAL
Qty: 60 TAB | Refills: 0 | Status: SHIPPED | OUTPATIENT
Start: 2018-04-12 | End: 2018-11-28 | Stop reason: SDUPTHER

## 2018-04-12 RX ORDER — CLOPIDOGREL BISULFATE 75 MG/1
TABLET ORAL
Qty: 30 TAB | Refills: 0 | Status: SHIPPED | OUTPATIENT
Start: 2018-04-12 | End: 2018-05-12 | Stop reason: SDUPTHER

## 2018-04-18 ENCOUNTER — PATIENT OUTREACH (OUTPATIENT)
Dept: FAMILY MEDICINE CLINIC | Age: 63
End: 2018-04-18

## 2018-04-18 NOTE — PROGRESS NOTES
Patient called to confirm appt for CM on 4/27 after appt with  for ov. Advised still trying to determine if I have a meeting that morning off-site. Will let her know asap. Also, patient mentioned she had a minor fall last pm, in her bedroom, sprained her right wrist. Did  Not go to ED, said it is painful, but not too bad. Icing it off and on. Swelling is minimal. Declined suggestion to go to ED for evaluation or office visit here. Wants to wait and see how it does for now. Advised to call back if symptoms don't improve. Patient agreed to do so.

## 2018-04-27 ENCOUNTER — OFFICE VISIT (OUTPATIENT)
Dept: FAMILY MEDICINE CLINIC | Age: 63
End: 2018-04-27

## 2018-04-27 VITALS
BODY MASS INDEX: 27.06 KG/M2 | WEIGHT: 199.8 LBS | HEART RATE: 72 BPM | SYSTOLIC BLOOD PRESSURE: 112 MMHG | RESPIRATION RATE: 18 BRPM | HEIGHT: 72 IN | TEMPERATURE: 98.5 F | DIASTOLIC BLOOD PRESSURE: 62 MMHG | OXYGEN SATURATION: 95 %

## 2018-04-27 DIAGNOSIS — G70.00 MYASTHENIA GRAVIS (HCC): ICD-10-CM

## 2018-04-27 DIAGNOSIS — E11.9 DIABETES MELLITUS TYPE 2, DIET-CONTROLLED (HCC): ICD-10-CM

## 2018-04-27 DIAGNOSIS — R13.10 DYSPHAGIA, UNSPECIFIED TYPE: ICD-10-CM

## 2018-04-27 DIAGNOSIS — E78.5 HYPERLIPIDEMIA, UNSPECIFIED HYPERLIPIDEMIA TYPE: ICD-10-CM

## 2018-04-27 DIAGNOSIS — Z00.00 ENCOUNTER FOR MEDICARE ANNUAL WELLNESS EXAM: Primary | ICD-10-CM

## 2018-04-27 DIAGNOSIS — G35 MS (MULTIPLE SCLEROSIS) (HCC): ICD-10-CM

## 2018-04-27 RX ORDER — MECLIZINE HYDROCHLORIDE 25 MG/1
TABLET ORAL
COMMUNITY
End: 2018-10-25

## 2018-04-27 NOTE — ASSESSMENT & PLAN NOTE
This condition is managed by Specialist.  Lab Results   Component Value Date/Time    WBC 3.9 05/11/2017 10:20 AM    HGB 11.7 06/07/2017 05:02 AM    HCT 41.2 05/11/2017 10:20 AM    PLATELET 193 52/58/7777 10:20 AM    Creatinine 0.76 06/07/2017 05:02 AM    BUN 10 06/07/2017 05:02 AM    Potassium 4.5 06/07/2017 05:02 AM    INR 1.0 05/11/2017 10:20 AM    Prothrombin time 10.6 05/11/2017 10:20 AM

## 2018-04-27 NOTE — PATIENT INSTRUCTIONS

## 2018-04-27 NOTE — PROGRESS NOTES
Chief Complaint   Patient presents with    Diabetes     fasting follow up    Cholesterol Problem    Hypertension     1. Have you been to the ER, urgent care clinic since your last visit? Hospitalized since your last visit? No    2. Have you seen or consulted any other health care providers outside of the [facility] since your last visit? Include any pap smears or colon screening.  No

## 2018-04-27 NOTE — ASSESSMENT & PLAN NOTE
Stable, based on history, physical exam and review of pertinent labs, studies and medications; meds reconciled; continue current treatment plan. Key Antihyperglycemic Medications     Patient is on no antihyperglycemic meds. Other Key Diabetic Medications             topiramate (TOPAMAX) 200 mg tablet  (Taking) TAKE 1 TABLET BY MOUTH TWICE DAILY    pregabalin (LYRICA) 200 mg capsule  (Taking) Take 1 Cap by mouth two (2) times a day.  Max Daily Amount: 400 mg.    rosuvastatin (CRESTOR) 20 mg tablet  (Taking) TAKE 1 TABLET BY MOUTH EVERY EVENING        Lab Results   Component Value Date/Time    Hemoglobin A1c 6.2 05/11/2017 10:20 AM    Hemoglobin A1c (POC) 5.7 10/02/2017 10:40 AM    Glucose 117 06/07/2017 05:02 AM    Creatinine 0.76 06/07/2017 05:02 AM    Microalb/Creat ratio (ug/mg creat.) 8.4 05/11/2017 10:21 AM    Cholesterol, total 186 05/11/2017 10:20 AM    HDL Cholesterol 52 05/11/2017 10:20 AM    LDL, calculated 116 05/11/2017 10:20 AM    Triglyceride 92 05/11/2017 10:20 AM     Diabetic Foot and Eye Exam HM Status   Topic Date Due    Eye Exam  10/12/2017    Diabetic Foot Care  07/03/2018

## 2018-04-27 NOTE — MR AVS SNAPSHOT
[place] Plan 
 
 
 [address] [facility] [address] [place] [address] 
[phone] Patient: Nestor Stokes MRN: [id number] SMU:[id number] Visit Information Date & Time Provider Department Dept. Phone Encounter #  
 4/27/2018  9:30 AM Robi Doty  [place] [phone] [id number] Follow-up Instructions Return in about 6 months (around 10/27/2018) for DM follow up. Your Appointments 6/8/2018  1:40 PM  
Follow Up with MD [facility]) Appt Note: fuv  
 [address] [facility] [place], operated [facility] Upcoming Health Maintenance Date Due  
 EYE EXAM RETINAL OR DILATED Q1 10/12/2017 MEDICARE YEARLY EXAM 3/14/2018 HEMOGLOBIN A1C Q6M 4/2/2018 MICROALBUMIN Q1 5/11/2018 LIPID PANEL Q1 5/11/2018 FOOT EXAM Q1 7/3/2018 Influenza Age 5 to Adult 8/1/2018 BREAST CANCER SCRN MAMMOGRAM 12/20/2019 PAP AKA CERVICAL CYTOLOGY 1/4/2020 COLONOSCOPY 1/18/2022 DTaP/Tdap/Td series (2 - Td) 6/16/2024 Allergies as of 4/27/2018  Review Complete On: 4/27/2018 By: Per Gutierrez LPN Severity Noted Reaction Type Reactions Bee Sting [Sting, Bee] High 06/25/2010    Anaphylaxis Also allergic to egg products Penicillins High 06/25/2010    Anaphylaxis Betadine [Povidone-iodine]  05/17/2016    Rash Egg  06/25/2015    Itching Current Immunizations  Reviewed on 1/4/2017 Name Date Tdap 6/16/2014 Not reviewed this visit You Were Diagnosed With   
  
 Codes Comments Diabetes mellitus type 2, diet-controlled ([facility] [address]    -  Primary ICD-10-CM: E11.9 ICD-9-CM: 250.00 Hyperlipidemia, unspecified hyperlipidemia type     ICD-10-CM: E78.5 ICD-9-CM: 272.4 Dysphagia, unspecified type     ICD-10-CM: R13.10 ICD-9-CM: 787.20 Vitals BP Pulse Temp Resp Height(growth percentile) Weight(growth percentile) 112/62 (BP 1 Location: Right arm, BP Patient Position: Sitting) 72 98.5 °F (36.9 °C) (Oral) 18 6' 1\" (1.854 m) 199 lb 12.8 oz (90.6 kg) LMP SpO2 BMI OB Status Smoking Status 09/01/2010 95% 26.36 kg/m2 Postmenopausal Current Every Day Smoker Vitals History BMI and BSA Data Body Mass Index Body Surface Area  
 26.36 kg/m 2 2.16 m 2 Preferred Pharmacy Pharmacy Name Phone [facility] [address], [facility] [address] [phone] Your Updated Medication List  
  
   
This list is accurate as of 4/27/18 10:34 AM.  Always use your most recent med list.  
  
  
  
  
 aspirin delayed-release 325 mg tablet Take 1 Tab by mouth two (2) times a day. Blood-Glucose Meter monitoring kit  
by SubCUTAneous route Before breakfast and dinner. Free Style Lite glucometer and test strips  
  
 butalbital-acetaminophen-caff -40 mg per capsule Commonly known as:  [employer] Take 1 Cap by mouth every four (4) hours as needed for Pain. Max Daily Amount: 6 Caps. clindamycin 300 mg capsule Commonly known as:  CLEOCIN Prior to dental procedures  
  
 clopidogrel 75 mg Tab Commonly known as:  PLAVIX TAKE 1 TABLET BY MOUTH EVERY DAY  
  
 corticotropin 80 unit/mL injectable gel Commonly known as:  ACTHAR H.P.  
1 mL by SubCUTAneous route daily. 80 units subq q day for 10 days  
  
 dantrolene 100 mg capsule Commonly known as:  DANTRIUM Take 1 Cap by mouth as needed. ergocalciferol 50,000 unit capsule Commonly known as:  ERGOCALCIFEROL  
TAKE 1 CAPSULE BY MOUTH EVERY 7 DAYS  
  
 fentaNYL 75 mcg/hr Commonly known as:  DURAGESIC  
1 Patch by TransDERmal route every seventy-two (72) hours. Max Daily Amount: 1 Patch. GILENYA 0.5 mg Cap Generic drug:  fingolimod Take 1 Cap by mouth daily. levothyroxine 175 mcg tablet Commonly known as:  SYNTHROID  
TAKE 1 TABLET BY MOUTH DAILY BEFORE BREAKFAST  
  
 LIDODERM 5 % Generic drug:  lidocaine  
by TransDERmal route every twenty-four (24) hours. Apply patch to the affected area for 12 hours a day and remove for 12 hours a day. meclizine 25 mg tablet Commonly known as:  ANTIVERT Take  by mouth three (3) times daily as needed. MESTINON TIMESPAN 180 mg SR tablet Generic drug:  pyridostigmine bromide Take 180 mg by mouth two (2) times a day. PARoxetine 40 mg tablet Commonly known as:  PAXIL TAKE 1.5 TABS BY MOUTH DAILY. pregabalin 200 mg capsule Commonly known as:  Anika Neville Take 1 Cap by mouth two (2) times a day. Max Daily Amount: 400 mg.  
  
 rosuvastatin 20 mg tablet Commonly known as:  CRESTOR  
TAKE 1 TABLET BY MOUTH EVERY EVENING  
  
 SUMAtriptan 100 mg tablet Commonly known as:  IMITREX  
1 tab at onset of moderate-severe migraine; may repeat 1 tab in 2 hours; Limit: 2 tabs in 24/ hrs, not more than 3 days a week TENS UNIT ELECTRODES  
by Does Not Apply route. prn  
  
 topiramate 200 mg tablet Commonly known as:  TOPAMAX TAKE 1 TABLET BY MOUTH TWICE DAILY We Performed the Following HEMOGLOBIN A1C WITH EAG [84810 CPT(R)] LIPID PANEL [88017 CPT(R)] METABOLIC PANEL, COMPREHENSIVE [44645 CPT(R)] MICROALBUMIN, UR, RAND W/ MICROALB/CREAT RATIO [id number] CPT(R)] Follow-up Instructions Return in about 6 months (around 10/27/2018) for DM follow up. To-Do List   
 04/27/2018 Imaging:  US THYROID/PARATHYROID/SOFT TISS Patient Instructions Learning About Diabetes Food Guidelines Your Care Instructions Meal planning is important to manage diabetes. It helps keep your blood sugar at a target level (which you set with your doctor). You don't have to eat special foods.  You can eat what your family eats, including sweets once in a while. But you do have to pay attention to how often you eat and how much you eat of certain foods. You may want to work with a dietitian or a certified diabetes educator (CDE) to help you plan meals and snacks. A dietitian or CDE can also help you lose weight if that is one of your goals. What should you know about eating carbs? Managing the amount of carbohydrate (carbs) you eat is an important part of healthy meals when you have diabetes. Carbohydrate is found in many foods. · Learn which foods have carbs. And learn the amounts of carbs in different foods. ¨ Bread, cereal, pasta, and rice have about 15 grams of carbs in a serving. A serving is 1 slice of bread (1 ounce), ½ cup of cooked cereal, or 1/3 cup of cooked pasta or rice. ¨ Fruits have 15 grams of carbs in a serving. A serving is 1 small fresh fruit, such as an apple or orange; ½ of a banana; ½ cup of cooked or canned fruit; ½ cup of fruit juice; 1 cup of melon or raspberries; or 2 tablespoons of dried fruit. ¨ Milk and no-sugar-added yogurt have 15 grams of carbs in a serving. A serving is 1 cup of milk or 2/3 cup of no-sugar-added yogurt. ¨ Starchy vegetables have 15 grams of carbs in a serving. A serving is ½ cup of mashed potatoes or sweet potato; 1 cup winter squash; ½ of a small baked potato; ½ cup of cooked beans; or ½ cup cooked corn or green peas. · Learn how much carbs to eat each day and at each meal. A dietitian or CDE can teach you how to keep track of the amount of carbs you eat. This is called carbohydrate counting. · If you are not sure how to count carbohydrate grams, use the Plate Method to plan meals. It is a good, quick way to make sure that you have a balanced meal. It also helps you spread carbs throughout the day. ¨ Divide your plate by types of foods.  Put non-starchy vegetables on half the plate, meat or other protein food on one-quarter of the plate, and a grain or starchy vegetable in the final quarter of the plate. To this you can add a small piece of fruit and 1 cup of milk or yogurt, depending on how many carbs you are supposed to eat at a meal. 
· Try to eat about the same amount of carbs at each meal. Do not \"save up\" your daily allowance of carbs to eat at one meal. 
· Proteins have very little or no carbs per serving. Examples of proteins are beef, chicken, turkey, fish, eggs, tofu, cheese, cottage cheese, and peanut butter. A serving size of meat is 3 ounces, which is about the size of a deck of cards. Examples of meat substitute serving sizes (equal to 1 ounce of meat) are 1/4 cup of cottage cheese, 1 egg, 1 tablespoon of peanut butter, and ½ cup of tofu. How can you eat out and still eat healthy? · Learn to estimate the serving sizes of foods that have carbohydrate. If you measure food at home, it will be easier to estimate the amount in a serving of restaurant food. · If the meal you order has too much carbohydrate (such as potatoes, corn, or baked beans), ask to have a low-carbohydrate food instead. Ask for a salad or green vegetables. · If you use insulin, check your blood sugar before and after eating out to help you plan how much to eat in the future. · If you eat more carbohydrate at a meal than you had planned, take a walk or do other exercise. This will help lower your blood sugar. What else should you know? · Limit saturated fat, such as the fat from meat and dairy products. This is a healthy choice because people who have diabetes are at higher risk of heart disease. So choose lean cuts of meat and nonfat or low-fat dairy products. Use olive or canola oil instead of butter or shortening when cooking. · Don't skip meals. Your blood sugar may drop too low if you skip meals and take insulin or certain medicines for diabetes. · Check with your doctor before you drink alcohol.  Alcohol can cause your blood sugar to drop too low. Alcohol can also cause a bad reaction if you take certain diabetes medicines. Follow-up care is a key part of your treatment and safety. Be sure to make and go to all appointments, and call your doctor if you are having problems. It's also a good idea to know your test results and keep a list of the medicines you take. Where can you learn more? Go to http://marie-alvin.info/. Enter F021 in the search box to learn more about \"Learning About Diabetes Food Guidelines. \" Current as of: March 13, 2017 Content Version: 11.4 © 5007-6834 [employer]. Care instructions adapted under license by [employer] (which disclaims liability or warranty for this information). If you have questions about a medical condition or this instruction, always ask your healthcare professional. [address] any warranty or liability for your use of this information. Introducing [facility]! Dear Abhijit Paz: 
Thank you for requesting a [employer] account. Our records indicate that you already have an active [employer] account. You can access your account anytime at [email or web address] Did you know that you can access your hospital and ER discharge instructions at any time in [employer]? You can also review all of your test results from your hospital stay or ER visit. Additional Information If you have questions, please visit the Frequently Asked Questions section of the [employer] website at [email or web address] Remember, [employer] is NOT to be used for urgent needs. For medical emergencies, dial 911. Now available from your iPhone and Android! Please provide this summary of care documentation to your next provider. Your primary care clinician is listed as Brandon Diamond. If you have any questions after today's visit, please call [phone].

## 2018-04-27 NOTE — PROGRESS NOTES
Patient Name: Slick Hoyos   MRN: [id number]    Jakob Armas is a 66 y.o. female who presents with the following:     She is seen for diabetes and hyperlipidemia. Since last visit she reports no chest pain, dyspnea or TIA's, no numbness, tingling or pain in extremities, no unusual visual symptoms, no hypoglycemia, no significant changes. Home glucose monitoring: is performed regularly, fasting values range . She reports medication compliance: n/a - patient not on medications (diet controlled). Medication side effects: none. Diabetic diet compliance: noncompliant some of the time. Lab review: orders written for new lab studies as appropriate; see orders. Eye exam: overdue. Patient would like to give another 3 months before she does blood work as she knows that she has not been eating well lately. Hoping that warmer weather will encourage her to be more physically active. Recently had reported a palpable bump on her anterior neck. She does have a history of hypothyroidism status post partial thyroidectomy. Recently over the past month, she states that she has noticed that it has become more difficult for her to swallow and feels like there is more of a fullness in her neck. Denies any active heartburn. Is an active cigarette smoker. Lab Results   Component Value Date/Time    Hemoglobin A1c 6.2 (H) 05/11/2017 10:20 AM    Hemoglobin A1c (POC) 5.7 10/02/2017 10:40 AM       Review of Systems   Constitutional: Negative for fever, malaise/fatigue and weight loss. Respiratory: Negative for cough, hemoptysis, shortness of breath and wheezing. Cardiovascular: Negative for chest pain, palpitations, leg swelling and PND. Gastrointestinal: Negative for abdominal pain, constipation, diarrhea, heartburn, nausea and vomiting.        The patient's medications, allergies, past medical history, surgical history, family history and social history were reviewed and updated where appropriate. Prior to Admission medications    Medication Sig Start Date End Date Taking? Authorizing Provider   meclizine (ANTIVERT) 25 mg tablet Take  by mouth three (3) times daily as needed. Yes Historical Provider   clopidogrel (PLAVIX) 75 mg tab TAKE 1 TABLET BY MOUTH EVERY DAY 4/12/18  Yes Los Kahn MD   topiramate (TOPAMAX) 200 mg tablet TAKE 1 TABLET BY MOUTH TWICE DAILY 4/12/18  Yes Los Kahn MD   fentaNYL (DURAGESIC) 75 mcg/hr 1 Patch by TransDERmal route every seventy-two (72) hours. Max Daily Amount: 1 Patch. 4/7/18  Yes Los Kahn MD   ergocalciferol (ERGOCALCIFEROL) 50,000 unit capsule TAKE 1 CAPSULE BY MOUTH EVERY 7 DAYS 2/7/18  Yes Los Kahn MD   dantrolene (DANTRIUM) 100 mg capsule Take 1 Cap by mouth as needed. 12/26/17  Yes Los Kahn MD   corticotropin (ACTHAR H.P.) 80 unit/mL injectable gel 1 mL by SubCUTAneous route daily. 80 units subq q day for 10 days 12/26/17  Yes Los Kahn MD   pregabalin (LYRICA) 200 mg capsule Take 1 Cap by mouth two (2) times a day. Max Daily Amount: 400 mg. 10/18/17  Yes Los Kahn MD   clindamycin (CLEOCIN) 300 mg capsule Prior to dental procedures 6/30/17  Yes Historical Provider   butalbital-acetaminophen-caff (FIORICET) -40 mg per capsule Take 1 Cap by mouth every four (4) hours as needed for Pain. Max Daily Amount: 6 Caps. 5/22/17  Yes Nghia Perez MD   rosuvastatin (CRESTOR) 20 mg tablet TAKE 1 TABLET BY MOUTH EVERY EVENING 2/24/17  Yes Marlon Hernandez MD   TENS UNIT ELECTRODES by Does Not Apply route. prn   Yes Historical Provider   Blood-Glucose Meter monitoring kit by SubCUTAneous route Before breakfast and dinner. Free Style Lite glucometer and test strips  Patient taking differently: by SubCUTAneous route Before breakfast and dinner.  Free Style Lite glucometer and test strips-TESTS COUPLE TIMES PER WEEK 9/13/16  Yes Azzie Car, DO   levothyroxine (SYNTHROID) 175 mcg tablet TAKE 1 TABLET BY MOUTH DAILY BEFORE BREAKFAST  Patient taking differently: Take 175 mcg by mouth nightly. TAKE 1 TABLET BY MOUTH DAILY BEFORE BREAKFAST 9/7/16  Yes Eduardo Dry, DO   PARoxetine (PAXIL) 40 mg tablet TAKE 1.5 TABS BY MOUTH DAILY. Patient taking differently: Take 60 mg by mouth nightly. TAKE 1.5 TABS BY MOUTH DAILY. - Note Pt prefers to take at [facility] 12/29/15  Yes Eduardo Dry, DO   GILENYA 0.5 mg cap Take 1 Cap by mouth daily. 11/20/15  Yes Historical Provider   pyridostigmine bromide (MESTINON TIMESPAN) 180 mg SR tablet Take 180 mg by mouth two (2) times a day. Yes Historical Provider   lidocaine (LIDODERM) 5 %(700 mg/patch) by TransDERmal route every twenty-four (24) hours. Apply patch to the affected area for 12 hours a day and remove for 12 hours a day. Yes Historical Provider   SUMAtriptan (IMITREX) 100 mg tablet 1 tab at onset of moderate-severe migraine; may repeat 1 tab in 2 hours; Limit: 2 tabs in 24/ hrs, not more than 3 days a week 3/7/18   Los Kahn MD   aspirin delayed-release 325 mg tablet Take 1 Tab by mouth two (2) times a day. 6/7/17   Jordan Fuchs MD       Allergies   Allergen Reactions    Bee Sting [Sting, Bee] Anaphylaxis     Also allergic to egg products    Penicillins Anaphylaxis    Betadine [Povidone-Iodine] Rash    Egg Itching           OBJECTIVE    Visit Vitals    /62 (BP 1 Location: Right arm, BP Patient Position: Sitting)    Pulse 72    Temp 98.5 °F (36.9 °C) (Oral)    Resp 18    Ht 6' 1\" (1.854 m)    Wt 199 lb 12.8 oz (90.6 kg)    LMP 09/01/2010    SpO2 95%    BMI 26.36 kg/m2       Physical Exam   Constitutional: She is oriented to person, place, and time and well-developed, well-nourished, and in no distress. No distress. Eyes: Conjunctivae and EOM are normal. Pupils are equal, round, and reactive to light. Neck: Normal range of motion. Neck supple. No tracheal deviation present. No thyromegaly present. Palpable laryngeal prominence. Cardiovascular: Normal rate, regular rhythm and normal heart sounds. Exam reveals no gallop and no friction rub. No murmur heard. Pulmonary/Chest: Effort normal and breath sounds normal. No respiratory distress. She has no wheezes. Lymphadenopathy:     She has no cervical adenopathy. Neurological: She is alert and oriented to person, place, and time. Skin: Skin is warm and dry. No rash noted. She is not diaphoretic. Psychiatric: Mood, memory, affect and judgment normal.   Nursing note and vitals reviewed. ASSESSMENT AND PLAN  Santiago Galvin is a 66 y.o. female who presents today for:    1. Encounter for Medicare annual wellness exam  See other note. 2. Diabetes mellitus type 2, diet-controlled ([facility] [address]  Pt to come by lab only in 3 months. I have reviewed/discussed the above normal BMI with the patient. I have recommended the following interventions: dietary management education, guidance, and counseling, encourage exercise, monitor weight and prescribed dietary intake.   - HEMOGLOBIN A1C WITH EAG  - MICROALBUMIN, UR, RAND W/ MICROALB/CREAT RATIO    3. Hyperlipidemia, unspecified hyperlipidemia type  Will calculate ASCVD risk score pending labs. - METABOLIC PANEL, COMPREHENSIVE  - LIPID PANEL    4. Dysphagia, unspecified type  Will evaluate soft tissue anatomy first given history of partial thyroidectomy. If negative, may consider GI referral for dysphasia. - US THYROID/PARATHYROID/SOFT TISS; Future    5. MS (multiple sclerosis) (HCC)  Stable, continue current treatment. 6. Myasthenia gravis ([facility] [address]  Stable, continue current treatment. There are no discontinued medications. Follow-up Disposition:  Return in about 6 months (around 10/27/2018) for DM follow up. Medication risks/benefits/costs/interactions/alternatives discussed with patient.   Advised patient to call back or return to office if symptoms worsen/change/persist. If patient cannot reach us or should anything more severe/urgent arise he/she should proceed directly to the nearest emergency department. Discussed expected course/resolution/complications of diagnosis in detail with patient. Patient given a written after visit summary which includes his/her diagnoses, current medications and vitals. Patient expressed understanding with the diagnosis and plan. Gemma Garcia M.D. Diagnoses and all orders for this visit:    1. Encounter for Medicare annual wellness exam    2. Diabetes mellitus type 2, diet-controlled ([facility] [address]  -     HEMOGLOBIN A1C WITH EAG  -     MICROALBUMIN, UR, RAND W/ MICROALB/CREAT RATIO    3. Hyperlipidemia, unspecified hyperlipidemia type  -     METABOLIC PANEL, COMPREHENSIVE  -     LIPID PANEL    4. Dysphagia, unspecified type  -     US THYROID/PARATHYROID/SOFT TISS; Future    5. MS (multiple sclerosis) ([facility] [address]  Assessment & Plan: This condition is managed by Specialist.  Lab Results   Component Value Date/Time    WBC 3.9 05/11/2017 10:20 AM    HGB 11.7 06/07/2017 05:02 AM    HCT 41.2 05/11/2017 10:20 AM    PLATELET 866 31/34/7887 10:20 AM    Creatinine 0.76 06/07/2017 05:02 AM    BUN 10 06/07/2017 05:02 AM    Potassium 4.5 06/07/2017 05:02 AM    INR 1.0 05/11/2017 10:20 AM    Prothrombin time 10.6 05/11/2017 10:20 AM         6. Myasthenia gravis [facility])  Assessment & Plan:   This condition is managed by Specialist.  Lab Results   Component Value Date/Time    WBC 3.9 05/11/2017 10:20 AM    HGB 11.7 06/07/2017 05:02 AM    HCT 41.2 05/11/2017 10:20 AM    PLATELET 267 27/95/5756 10:20 AM    Creatinine 0.76 06/07/2017 05:02 AM    BUN 10 06/07/2017 05:02 AM    Potassium 4.5 06/07/2017 05:02 AM    INR 1.0 05/11/2017 10:20 AM    Prothrombin time 10.6 05/11/2017 10:20 AM

## 2018-04-27 NOTE — PROGRESS NOTES
Gael Vogt is a 66 y.o. female and presents for annual Medicare Wellness Visit. Problem List: Reviewed with patient and discussed risk factors.     Patient Active Problem List   Diagnosis Code    Sleep apnea G47.30    Endometriosis N80.9    Lumbosacral plexopathy G54.1    HTN, goal below 140/90 I5    FH: breast cancer Z80.3    Hyperlipemia E78.5    Hypothyroid E03.9    Ureteral calculus N20.1    MS (multiple sclerosis) ([facility] G35    Myasthenia gravis ([facility] [address] G70.00    Vitamin D deficiency E55.9    Benign cyst of left breast N60.02    Cervical spinal stenosis M48.02    Depression F32.9    Degenerative joint disease (DJD) of hip M16.9    Chronic, continuous use of opioids F11.90    Recurrent depression (HCC) F33.9    Type 2 diabetes mellitus with diabetic neuropathy ([facility] [address] E11.40       Current medical providers:  Patient Care Team:  Jennifer Cisneros MD as PCP - General (Family Practice)  Anabell Villela RN as Nurse Navigator (Family Practice)  Avni Plata MD (Orthopedic Surgery)  Tania Rose MD as Physician (Neurology)  Pressley Goldberg, OD as Physician (Optometry)    PSH: Reviewed with patient  Past Surgical History:   Procedure Laterality Date    ABDOMEN SURGERY PROC UNLISTED      bowel resection    BREAST SURGERY PROCEDURE UNLISTED      breast cyst-both breasts at different times    HX  SECTION  1986    HX CYST INCISION AND DRAINAGE      HX GI      COLONOSCOPY    HX GYN      ovarian cyst    HX HEENT  1974    THYROIDECTOMY    HX HIP REPLACEMENT Right 2017    anterior approach    HX KNEE ARTHROSCOPY Right     HX ORTHOPAEDIC  1997    right thumb tendon repair x 2    HX ORTHOPAEDIC Left     CARPAL TUNNEL    HX OTHER SURGICAL      Janee Cath x2    HX SMALL BOWEL RESECTION      HX THYROIDECTOMY      1975    HX VASCULAR ACCESS      PORT -A- CATH X2        SH: Reviewed with patient  Social History   Substance Use Topics    Smoking status: Current Every Day Smoker     Packs/day: 0.50     Years: 30.00     Types: Cigarettes    Smokeless tobacco: Never Used    Alcohol use No       FH: Reviewed with patient  Family History   Problem Relation Age of Onset   [facility] Arthritis-osteo Mother     Diabetes Mother     Elevated Lipids Mother     Headache Mother     Heart Disease Mother     Hypertension Mother     Stroke Mother     Neuropathy Mother     Diabetes Father     Elevated Lipids Father     Heart Disease Father     Hypertension Father     Diabetes Sister     Elevated Lipids Sister     Headache Sister     Hypertension Sister     Migraines Sister     Cancer Sister 62     breast ca W/METS TO BRAIN    Breast Cancer Sister 62    Diabetes Brother     Elevated Lipids Brother     Hypertension Brother     Asthma Son     Lung Disease Son     Thyroid Disease Son      GRAVES    Breast Cancer Maternal Grandmother 54    Diabetes Sister     Neuropathy Sister     Anesth Problems Neg Hx        Medications/Allergies: Reviewed with patient  Current Outpatient Prescriptions on File Prior to Visit   Medication Sig Dispense Refill    clopidogrel (PLAVIX) 75 mg tab TAKE 1 TABLET BY MOUTH EVERY DAY 30 Tab 0    topiramate (TOPAMAX) 200 mg tablet TAKE 1 TABLET BY MOUTH TWICE DAILY 60 Tab 0    fentaNYL (DURAGESIC) 75 mcg/hr 1 Patch by TransDERmal route every seventy-two (72) hours. Max Daily Amount: 1 Patch. 10 Patch 0    ergocalciferol (ERGOCALCIFEROL) 50,000 unit capsule TAKE 1 CAPSULE BY MOUTH EVERY 7 DAYS 4 Cap 0    dantrolene (DANTRIUM) 100 mg capsule Take 1 Cap by mouth as needed. 90 Cap 3    corticotropin (ACTHAR H.P.) 80 unit/mL injectable gel 1 mL by SubCUTAneous route daily. 80 units subq q day for 10 days 10 mL 1    pregabalin (LYRICA) 200 mg capsule Take 1 Cap by mouth two (2) times a day.  Max Daily Amount: 400 mg. 180 Cap 3    clindamycin (CLEOCIN) 300 mg capsule Prior to dental procedures      butalbital-acetaminophen-caff (FIORICET) -40 mg per capsule Take 1 Cap by mouth every four (4) hours as needed for Pain. Max Daily Amount: 6 Caps. 10 Cap 0    rosuvastatin (CRESTOR) 20 mg tablet TAKE 1 TABLET BY MOUTH EVERY EVENING 90 Tab 1    TENS UNIT ELECTRODES by Does Not Apply route. prn      Blood-Glucose Meter monitoring kit by SubCUTAneous route Before breakfast and dinner. Free Style Lite glucometer and test strips (Patient taking differently: by SubCUTAneous route Before breakfast and dinner. Free Style Lite glucometer and test strips-TESTS COUPLE TIMES PER WEEK) 1 Kit 0    levothyroxine (SYNTHROID) 175 mcg tablet TAKE 1 TABLET BY MOUTH DAILY BEFORE BREAKFAST (Patient taking differently: Take 175 mcg by mouth nightly. TAKE 1 TABLET BY MOUTH DAILY BEFORE BREAKFAST) 30 Tab 11    PARoxetine (PAXIL) 40 mg tablet TAKE 1.5 TABS BY MOUTH DAILY. (Patient taking differently: Take 60 mg by mouth nightly. TAKE 1.5 TABS BY MOUTH DAILY. - Note Pt prefers to take at HS) 45 Tab 2    GILENYA 0.5 mg cap Take 1 Cap by mouth daily.  pyridostigmine bromide (MESTINON TIMESPAN) 180 mg SR tablet Take 180 mg by mouth two (2) times a day.  lidocaine (LIDODERM) 5 %(700 mg/patch) by TransDERmal route every twenty-four (24) hours. Apply patch to the affected area for 12 hours a day and remove for 12 hours a day.  SUMAtriptan (IMITREX) 100 mg tablet 1 tab at onset of moderate-severe migraine; may repeat 1 tab in 2 hours; Limit: 2 tabs in 24/ hrs, not more than 3 days a week 12 Tab 3    aspirin delayed-release 325 mg tablet Take 1 Tab by mouth two (2) times a day. 60 Tab 0     No current facility-administered medications on file prior to visit.        Allergies   Allergen Reactions    Bee Sting [Sting, Bee] Anaphylaxis     Also allergic to egg products    Penicillins Anaphylaxis    Betadine [Povidone-Iodine] Rash    Egg Itching       Objective:  Visit Vitals    /62 (BP 1 Location: Right arm, BP Patient Position: Sitting)    Pulse 72    Temp 98.5 °F (36.9 °C) (Oral)    Resp 18    Ht 6' 1\" (1.854 m)    Wt 199 lb 12.8 oz (90.6 kg)    LMP 09/01/2010    SpO2 95%    BMI 26.36 kg/m2    Body mass index is 26.36 kg/(m^2). Assessment of cognitive impairment: Alert and oriented x 3    Depression Screen:   PHQ over the last two weeks 12/7/2017   PHQ Not Done Active Diagnosis of Depression or Bipolar Disorder   Little interest or pleasure in doing things -   Feeling down, depressed or hopeless -   Total Score PHQ 2 -   Trouble falling or staying asleep, or sleeping too much -   Feeling tired or having little energy -   Poor appetite or overeating -   Feeling bad about yourself - or that you are a failure or have let yourself or your family down -   Trouble concentrating on things such as school, work, reading or watching TV -   Moving or speaking so slowly that other people could have noticed; or the opposite being so fidgety that others notice -   Thoughts of being better off dead, or hurting yourself in some way -   PHQ 9 Score -   How difficult have these problems made it for you to do your work, take care of your home and get along with others -       Fall Risk Assessment:    Fall Risk Assessment, last 12 mths 4/27/2018   Able to walk? Yes   Fall in past 12 months? Yes   Fall with injury? Yes   Number of falls in past 12 months 1   Fall Risk Score 2       Functional Ability:   Does the patient exhibit a steady gait? Uses cane, hx of MS   How long did it take the patient to get up and walk from a sitting position? 3 secs   Is the patient self reliant?  (ie can do own laundry, meals, household chores)  yes     Does the patient handle his/her own medications? yes     Does the patient handle his/her own money? yes     Is the patients home safe (ie good lighting, handrails on stairs and bath, etc.)? yes     Did you notice or did patient express any hearing difficulties? no     Did you notice or did patient express any vision difficulties? no     Were distance and reading eye charts used? no       Advance Care Planning:   Patient was offered the opportunity to discuss advance care planning:  yes     Does patient have an Advance Directive:  yes   If no, did you provide information on Caring Connections? In chart       Plan:      Orders Placed This Encounter    US THYROID/PARATHYROID/SOFT TISS    HEMOGLOBIN A1C WITH EAG    METABOLIC PANEL, COMPREHENSIVE    LIPID PANEL    MICROALBUMIN, UR, RAND W/ MICROALB/CREAT RATIO    meclizine (ANTIVERT) 25 mg tablet       Health Maintenance   Topic Date Due    EYE EXAM RETINAL OR DILATED Q1  10/12/2017    MEDICARE YEARLY EXAM  03/14/2018    HEMOGLOBIN A1C Q6M  04/02/2018    MICROALBUMIN Q1  05/11/2018    LIPID PANEL Q1  05/11/2018    FOOT EXAM Q1  07/03/2018    Influenza Age 9 to Adult  08/01/2018    BREAST CANCER SCRN MAMMOGRAM  12/20/2019    PAP AKA CERVICAL CYTOLOGY  01/04/2020    COLONOSCOPY  01/18/2022    DTaP/Tdap/Td series (2 - Td) 06/16/2024    Hepatitis C Screening  Completed    ZOSTER VACCINE AGE 60>  Completed    Pneumococcal 19-64 Medium Risk  Addressed       *Patient verbalized understanding and agreement with the plan. A copy of the After Visit Summary with personalized health plan was given to the patient today.

## 2018-04-27 NOTE — ASSESSMENT & PLAN NOTE
This condition is managed by Specialist.  Lab Results   Component Value Date/Time    WBC 3.9 05/11/2017 10:20 AM    HGB 11.7 06/07/2017 05:02 AM    HCT 41.2 05/11/2017 10:20 AM    PLATELET 157 84/52/5201 10:20 AM    Creatinine 0.76 06/07/2017 05:02 AM    BUN 10 06/07/2017 05:02 AM    Potassium 4.5 06/07/2017 05:02 AM    INR 1.0 05/11/2017 10:20 AM    Prothrombin time 10.6 05/11/2017 10:20 AM

## 2018-05-06 ENCOUNTER — HOSPITAL ENCOUNTER (OUTPATIENT)
Dept: ULTRASOUND IMAGING | Age: 63
Discharge: HOME | End: 2018-05-06
Attending: FAMILY MEDICINE
Payer: MEDICARE

## 2018-05-06 DIAGNOSIS — R13.10 DYSPHAGIA, UNSPECIFIED TYPE: ICD-10-CM

## 2018-05-06 PROCEDURE — 76536 US EXAM OF HEAD AND NECK: CPT

## 2018-05-07 ENCOUNTER — TELEPHONE (OUTPATIENT)
Dept: FAMILY MEDICINE CLINIC | Age: 63
End: 2018-05-07

## 2018-05-07 DIAGNOSIS — D48.1 NEOPLASM OF UNCERTAIN BEHAVIOR OF CONNECTIVE AND SOFT TISSUE OF NECK: Primary | ICD-10-CM

## 2018-05-07 NOTE — TELEPHONE ENCOUNTER
Patient is calling in regards to a missed call from Marcos Landeros in regards to her most recent test results. Tried contacting nurse, no success.     Best call back # for patient: [phone]

## 2018-05-07 NOTE — TELEPHONE ENCOUNTER
Notes Recorded by Maxim Rajan LPN on  at 4:54 AM  Call to patient.  verified. Informed patient of results and recommendations. Patient had no further questions. We are scheduling with ENT physician. Re:    Notes Recorded by Donya Godfrey MD on 2018 at 8:05 AM  Please notify patient regarding their test results:    Ultrasound of neck shows finding of soft tissue mass of uncertain etiology; likely causing some symptoms of difficulty swallowing/fullness of neck.  Recommend patient to have ENT referral, first available to evaluate further.  Please schedule for patient to expedite appointment.

## 2018-05-07 NOTE — PROGRESS NOTES
Call to patient.  verified. Informed patient of results and recommendations. Patient had no further questions. We are scheduling with ENT physician.

## 2018-05-07 NOTE — PROGRESS NOTES
Notes Recorded by Tavares Trejo LPN on [id number] at 3:87 AM  Call to patient.  verified. Informed patient of results and recommendations. Patient had no further questions. We are scheduling with ENT physician. Re:    Notes Recorded by Jaylin Mcmillan MD on 2018 at 8:05 AM  Please notify patient regarding their test results:    Ultrasound of neck shows finding of soft tissue mass of uncertain etiology; likely causing some symptoms of difficulty swallowing/fullness of neck.  Recommend patient to have ENT referral, first available to evaluate further.  Please schedule for patient to expedite appointment.

## 2018-05-12 DIAGNOSIS — G35 MS (MULTIPLE SCLEROSIS) (HCC): ICD-10-CM

## 2018-05-13 RX ORDER — CLOPIDOGREL BISULFATE 75 MG/1
TABLET ORAL
Qty: 30 TAB | Refills: 0 | Status: SHIPPED | OUTPATIENT
Start: 2018-05-13 | End: 2018-06-08 | Stop reason: SDUPTHER

## 2018-05-14 ENCOUNTER — HOSPITAL ENCOUNTER (OUTPATIENT)
Dept: CT IMAGING | Age: 63
Discharge: HOME | End: 2018-05-14
Attending: OTOLARYNGOLOGY
Payer: MEDICARE

## 2018-05-14 DIAGNOSIS — R22.1 NECK MASS: ICD-10-CM

## 2018-05-14 PROCEDURE — 70491 CT SOFT TISSUE NECK W/DYE: CPT

## 2018-05-14 PROCEDURE — 74011636320 HC RX REV CODE- 636/320: Performed by: OTOLARYNGOLOGY

## 2018-05-14 PROCEDURE — 74011000258 HC RX REV CODE- 258: Performed by: OTOLARYNGOLOGY

## 2018-05-14 RX ORDER — SODIUM CHLORIDE 0.9 % (FLUSH) 0.9 %
10 SYRINGE (ML) INJECTION
Status: COMPLETED | OUTPATIENT
Start: 2018-05-14 | End: 2018-05-14

## 2018-05-14 RX ADMIN — IOPAMIDOL 100 ML: 612 INJECTION, SOLUTION INTRAVENOUS at 15:51

## 2018-05-14 RX ADMIN — SODIUM CHLORIDE 100 ML: 900 INJECTION, SOLUTION INTRAVENOUS at 15:51

## 2018-05-14 RX ADMIN — Medication 10 ML: at 15:51

## 2018-05-22 DIAGNOSIS — G35 MS (MULTIPLE SCLEROSIS) (HCC): ICD-10-CM

## 2018-05-22 RX ORDER — ERGOCALCIFEROL 1.25 MG/1
CAPSULE ORAL
Qty: 4 CAP | Refills: 0 | Status: SHIPPED | OUTPATIENT
Start: 2018-05-22 | End: 2018-06-08 | Stop reason: SDUPTHER

## 2018-05-24 ENCOUNTER — HOSPITAL ENCOUNTER (OUTPATIENT)
Dept: LAB | Age: 63
Discharge: HOME | End: 2018-05-24
Payer: MEDICARE

## 2018-05-24 ENCOUNTER — PATIENT OUTREACH (OUTPATIENT)
Dept: FAMILY MEDICINE CLINIC | Age: 63
End: 2018-05-24

## 2018-05-24 PROCEDURE — 80053 COMPREHEN METABOLIC PANEL: CPT

## 2018-05-24 PROCEDURE — 80061 LIPID PANEL: CPT

## 2018-05-24 PROCEDURE — 36415 COLL VENOUS BLD VENIPUNCTURE: CPT

## 2018-05-24 PROCEDURE — 82043 UR ALBUMIN QUANTITATIVE: CPT

## 2018-05-24 PROCEDURE — 83036 HEMOGLOBIN GLYCOSYLATED A1C: CPT

## 2018-05-24 NOTE — PROGRESS NOTES
Patient in for monthly CCM session. In good spirits, using cane, gait much more steady than last visit. Reports that she is having surgery by ENT, Letha Sanches, on  for a Thyroglossal duct cyst.  Has been watching it, causing her to cough and have more troubles with swallowing so he recommended removing it. Scheduled her for a pre-op appointment with pcp for  at 2pm.  Has started having her aide again, once a week, to help with ADL's (bathing,etc) and household help for 4 hours. Thrilled to have this assistance again, gets it through the [place] and has to reapply each year and hope that funding is available. The aide that came last week did an excellent job, her only request was that Hay not smoke in the house while she is there. Hay was very agreeable to smoking outside during that time. Did caution her again about not smoking in bed since she continues to fall asleep quickly throughout day and evening. She said everyone tells her that- agrees to not smoke in bedroom. Spoke of enjoying getting outside with the weather being jatin and warm and working on her garden. Jetty Robert it brings her a lot of manuel and contentment. Son has been helping with the yard work, he is still unemployed, continues to look for a job. She is glad that he now gets enough rest and doesn't look as \"old\" as he did when he was working at the [employer] job in [facility]. Admits that funds are limited but that they are making it on a tight budget. If money gets tighter, he will have to go back to factory work but is hoping to find a job in his field of computers. Asked about relationship with mother, says it is improving. Mother had she and her 3 other siblings over all at the same time and gave each of them an envelope with a check and note from her to them.  Said she and their father had wanted to give them some of their inheritance while they were still living(father is  now) so they could see their children enjoy the money.And mother told them that there were no strings attached and they could do whatever they wanted with the money. Jaqueline Arroyo said her mother also wrote a felix note to her. Said she thanked her mother. Bought herself a new TV and put the remainder in the bank to help as needed. Has next appt with  on 6/8. Currently has pain level of #6- \"not bad for her\" she stated. Pain mostly in her head, had not taken any meds today d/t having labs done prior to our appointment. Did not start using the Imitrex that  had recommended for her, just said she didn't want to start something else. Did not yet schedule appointment for next session, will call CM to make the appointment when she checks her schedule. Walked her out to the car, really did not require any assistance, much improved in walking over last visit. Reminded to call prn for questions/concerns. She thanked me for the visit. Goals        Patient Stated     improve balance and gait (pt-stated)            1/24/18 Met with patient for CCM. Recent dx of stroke. Saw neuro earlier today-has ordered PT/OT to assist with ambulation/strength/balance. Using walker when out of house and cane/walker when at home to prevent falls. [facility] to call for help if alone. mbt  2/28/18 CCM session with patient. Balance very unsteady. Using cane for support. NN held her arm to walk her out. Continues to have OT/PT. Urged caution when ambulating. mbt  3/28/18 CCM session-gait continues to be poor. Did not have cane or walker-son lightly assisting her. PT discharged her earlier in month. Continues with OT. Urged caution when ambulating, reminded to use cane/walker at all times. Notify  of poor gait and request PT be restarted. mbt  5/24/18 CCM session-gait much improved. Using cane but not wobbly today. Needed minimal assistance going out to car. Continues to work with PT/OT-reminded to have caution when ambulating.  Reports has not fallen in last month.mbt         Other     Attends follow-up appointments as directed. 11/1/17 Saw neurologist,, on 10/18-will see again in December. Saw pcp,  on 10/2/17 and will see again in 6 months. Diligent about attending her appointments. mbt  3/28/18 Saw  3/7/18. Attends all f/u appointments. 4/2-appt with  for f/u scheduled. mbt  5/24/18 CCM session today. Had fasting lab for pcp done prior to appt. Made preop with pcp for 6/5. Having ENT surgery 6/20- Thyroglossal duct cyst per . Sees neurologist 6/8.mbt       Identification of barriers to adherence to a plan of care such as inability to afford medications, lack of insurance, lack of transportation, etc.            11/1/17-Patient reviewing what her new Medicare Part D policy will cover. Has several costly MS meds- gets 2 from the drug  companies. Doesn't qualify for LIS d/t her income. mbt  2/28/18 Son has just lost his job and benefits, relies on his income to help pay the bills. Meds very expensive, neurology office gets most for her through Patient Assistance. Gave her information on [employer] to see if  would be able to participate. mbt  3/28/18 Son still out of work. Finances tight. He is able to assist her more at home and taking her to appointments. Medicare insurance -does not qualify for Medicaid. NN to continue to research resources for her.mbt  5/24/18 Son still unemployed. Continues to look for work. Finances are tight but mother gave her a check recently that she has put in bank to help them until son finds job. Did get an aide through the [employer], 4 hours a week. mbt       Knowledge and adherence to medication plan. Take new rx- Fioricet, as directed for h/a pain  11/1/17-reviewed meds. Good understanding of each one- no longer on Oxycodone, Fioricet,Valium,Robaxin or Provigil. Takes current meds as directed. mbt  3/28/18 Review of meds.  Has not started imitrex yet- rxd for her headaches. Urged to use prn. Tries to avoid prn meds unless absolutely needed. mbt

## 2018-05-25 LAB
ALBUMIN SERPL-MCNC: 4.1 G/DL (ref 3.6–4.8)
ALBUMIN/CREAT UR: 8.9 MG/G CREAT (ref 0–30)
ALBUMIN/GLOB SERPL: 1.8 {RATIO} (ref 1.2–2.2)
ALP SERPL-CCNC: 57 IU/L (ref 39–117)
ALT SERPL-CCNC: 7 IU/L (ref 0–32)
AST SERPL-CCNC: 15 IU/L (ref 0–40)
BILIRUB SERPL-MCNC: 0.3 MG/DL (ref 0–1.2)
BUN SERPL-MCNC: 9 MG/DL (ref 8–27)
BUN/CREAT SERPL: 10 (ref 12–28)
CALCIUM SERPL-MCNC: 9.2 MG/DL (ref 8.7–10.3)
CHLORIDE SERPL-SCNC: 107 MMOL/L (ref 96–106)
CHOLEST SERPL-MCNC: 179 MG/DL (ref 100–199)
CO2 SERPL-SCNC: 21 MMOL/L (ref 18–29)
CREAT SERPL-MCNC: 0.9 MG/DL (ref 0.57–1)
CREAT UR-MCNC: 167.4 MG/DL
EST. AVERAGE GLUCOSE BLD GHB EST-MCNC: 134 MG/DL
GFR SERPLBLD CREATININE-BSD FMLA CKD-EPI: 69 ML/MIN/1.73
GFR SERPLBLD CREATININE-BSD FMLA CKD-EPI: 80 ML/MIN/1.73
GLOBULIN SER CALC-MCNC: 2.3 G/DL (ref 1.5–4.5)
GLUCOSE SERPL-MCNC: 82 MG/DL (ref 65–99)
HBA1C MFR BLD: 6.3 % (ref 4.8–5.6)
HDLC SERPL-MCNC: 51 MG/DL
INTERPRETATION, 910389: NORMAL
LDLC SERPL CALC-MCNC: 109 MG/DL (ref 0–99)
MICROALBUMIN UR-MCNC: 14.9 UG/ML
POTASSIUM SERPL-SCNC: 4.2 MMOL/L (ref 3.5–5.2)
PROT SERPL-MCNC: 6.4 G/DL (ref 6–8.5)
SODIUM SERPL-SCNC: 142 MMOL/L (ref 134–144)
TRIGL SERPL-MCNC: 96 MG/DL (ref 0–149)
VLDLC SERPL CALC-MCNC: 19 MG/DL (ref 5–40)

## 2018-06-05 ENCOUNTER — OFFICE VISIT (OUTPATIENT)
Dept: FAMILY MEDICINE CLINIC | Age: 63
End: 2018-06-05

## 2018-06-05 VITALS
BODY MASS INDEX: 26.79 KG/M2 | WEIGHT: 197.8 LBS | DIASTOLIC BLOOD PRESSURE: 58 MMHG | HEIGHT: 72 IN | OXYGEN SATURATION: 98 % | RESPIRATION RATE: 18 BRPM | SYSTOLIC BLOOD PRESSURE: 108 MMHG | TEMPERATURE: 99.1 F | HEART RATE: 71 BPM

## 2018-06-05 DIAGNOSIS — Z01.818 PRE-OP EVALUATION: Primary | ICD-10-CM

## 2018-06-05 DIAGNOSIS — M21.372 BILATERAL FOOT-DROP: ICD-10-CM

## 2018-06-05 DIAGNOSIS — G35 MS (MULTIPLE SCLEROSIS) (HCC): ICD-10-CM

## 2018-06-05 DIAGNOSIS — M21.371 BILATERAL FOOT-DROP: ICD-10-CM

## 2018-06-05 PROBLEM — F33.9 RECURRENT DEPRESSION (HCC): Status: RESOLVED | Noted: 2017-12-26 | Resolved: 2018-06-05

## 2018-06-05 PROBLEM — E11.9 DIET-CONTROLLED DIABETES MELLITUS (HCC): Status: ACTIVE | Noted: 2018-01-05

## 2018-06-05 PROBLEM — Z86.73 HISTORY OF CVA (CEREBROVASCULAR ACCIDENT): Status: ACTIVE | Noted: 2018-06-05

## 2018-06-05 NOTE — PROGRESS NOTES
Patient Name: Gretta Wakefield   MRN: [id number]    Lb Valladares is a 66 y.o. female who presents with the following:     Pre Op  Procedure: Removal of thyroglossal duct cyst  Expected Date: 6/20/18  Surgeon: Dr. Rachel Laureano  Hx of complications with general anesthesia: none  Signs and symptoms of cardiovascular disease:  none  Have any of the following: CVA, CHF, Cr > 2.0, insulin-dependent DM, ischemic cardiac disease, or suprainguinal vascular/intrathroacic/intraabdominal surgery:  Hx of CVA, stable, followed by neurology, on Plavix  Able to meet > 4 METS: yes  Hx of possible KRISHAN per sleep study, not compliant with CPAP machine. Denies history of allergy to latex/tape/adhesive, bleeding problems, VTE. Hx of diet-controlled diabetes meilltus  Hx of multiple sclerosis and myasthenia gravis. Requests prescription for updated bilateral ankle-foot orthosis due to history of foot drop. Review of Systems   Constitutional: Negative for fever, malaise/fatigue and weight loss. Respiratory: Negative for cough, hemoptysis, shortness of breath and wheezing. Cardiovascular: Negative for chest pain, palpitations, leg swelling and PND. Gastrointestinal: Negative for abdominal pain, constipation, diarrhea, nausea and vomiting. The patient's medications, allergies, past medical history, surgical history, family history and social history were reviewed and updated where appropriate. Prior to Admission medications    Medication Sig Start Date End Date Taking? Authorizing Provider   ergocalciferol (ERGOCALCIFEROL) 50,000 unit capsule TAKE 1 CAPSULE BY MOUTH EVERY 7 DAYS 5/22/18  Yes Los Kahn MD   clopidogrel (PLAVIX) 75 mg tab TAKE 1 TABLET BY MOUTH EVERY DAY 5/13/18  Yes Los Kahn MD   meclizine (ANTIVERT) 25 mg tablet Take  by mouth three (3) times daily as needed.    Yes Historical Provider   topiramate (TOPAMAX) 200 mg tablet TAKE 1 TABLET BY MOUTH TWICE DAILY 4/12/18  Yes Los Kahn MD   SUMAtriptan (IMITREX) 100 mg tablet 1 tab at onset of moderate-severe migraine; may repeat 1 tab in 2 hours; Limit: 2 tabs in 24/ hrs, not more than 3 days a week 3/7/18  Yes Los Kahn MD   fentaNYL (DURAGESIC) 75 mcg/hr 1 Patch by TransDERmal route every seventy-two (72) hours. Max Daily Amount: 1 Patch. 4/7/18  Yes Los Kahn MD   dantrolene (DANTRIUM) 100 mg capsule Take 1 Cap by mouth as needed. 12/26/17  Yes Los Kahn MD   corticotropin (ACTHAR H.P.) 80 unit/mL injectable gel 1 mL by SubCUTAneous route daily. 80 units subq q day for 10 days 12/26/17  Yes Los Kahn MD   pregabalin (LYRICA) 200 mg capsule Take 1 Cap by mouth two (2) times a day. Max Daily Amount: 400 mg. 10/18/17  Yes Los Kahn MD   clindamycin (CLEOCIN) 300 mg capsule Take 300 mg by mouth. Prior to dental procedures 6/30/17  Yes Historical Provider   butalbital-acetaminophen-caff (FIORICET) -40 mg per capsule Take 1 Cap by mouth every four (4) hours as needed for Pain. Max Daily Amount: 6 Caps. 5/22/17  Yes Nicholas Adame MD   rosuvastatin (CRESTOR) 20 mg tablet TAKE 1 TABLET BY MOUTH EVERY EVENING 2/24/17  Yes Pia Huitron MD   TENS UNIT ELECTRODES by Does Not Apply route. prn   Yes Historical Provider   Blood-Glucose Meter monitoring kit by SubCUTAneous route Before breakfast and dinner. Free Style Lite glucometer and test strips  Patient taking differently: by SubCUTAneous route Before breakfast and dinner. Free Style Lite glucometer and test strips-TESTS COUPLE TIMES PER WEEK 9/13/16  Yes Delma Connor DO   levothyroxine (SYNTHROID) 175 mcg tablet TAKE 1 TABLET BY MOUTH DAILY BEFORE BREAKFAST  Patient taking differently: Take 175 mcg by mouth nightly. TAKE 1 TABLET BY MOUTH DAILY BEFORE BREAKFAST 9/7/16  Yes Delma Connor DO   PARoxetine (PAXIL) 40 mg tablet TAKE 1.5 TABS BY MOUTH DAILY. Patient taking differently: Take 60 mg by mouth nightly.  TAKE 1.5 TABS BY MOUTH DAILY.  - Note Pt prefers to take at [facility] 12/29/15  Yes Teresa Grayson,    GILENYA 0.5 mg cap Take 1 Cap by mouth daily. 11/20/15  Yes Historical Provider   pyridostigmine bromide (MESTINON TIMESPAN) 180 mg SR tablet Take 180 mg by mouth two (2) times a day. Yes Historical Provider   lidocaine (LIDODERM) 5 %(700 mg/patch) by TransDERmal route every twenty-four (24) hours. Apply patch to the affected area for 12 hours a day and remove for 12 hours a day.    Yes Historical Provider       Allergies   Allergen Reactions    Bee Sting [Sting, Bee] Anaphylaxis     Also allergic to egg products    Penicillins Anaphylaxis    Betadine [Povidone-Iodine] Rash    Egg Itching         Past Medical History:   Diagnosis Date    Arthritis     hip, hands    Benign cyst of left breast 3/21/2016    Cervical spinal stenosis 2016    Moderate C6-7    Depression     Diabetes mellitus type 2, controlled ([address] 2016    Diet-controlled diabetes mellitus ([address] 2018    Endometriosis 2010    FH: breast cancer 2010    Chart states FH breast/ovary Ca, CAD,DM     History of CVA (cerebrovascular accident) 2018    HTN, goal below 140/90 2010    CURRENTLY NO MEDICATIONS (17)    Hypercholesterolemia     Hypothyroid 2010    Incontinence     Lumbosacral plexopathy 2010    MS (multiple sclerosis) ([address]     Myasthenia gravis ([facility] [id number]    Sleep apnea 2010    Ureteral calculus 2010    Vitamin D deficiency 3/17/2016       Past Surgical History:   Procedure Laterality Date    ABDOMEN SURGERY PROC UNLISTED      bowel resection    BREAST SURGERY PROCEDURE UNLISTED      breast cyst-both breasts at different times    HX  SECTION  1986    HX CYST INCISION AND DRAINAGE      HX GI      COLONOSCOPY    HX GYN      ovarian cyst    HX HEENT  1974    THYROIDECTOMY    HX HIP REPLACEMENT Right 2017    anterior approach    HX KNEE ARTHROSCOPY Right 2/98    HX ORTHOPAEDIC  1997    right thumb tendon repair x 2    HX ORTHOPAEDIC Left     CARPAL TUNNEL    HX OTHER SURGICAL      Janee Cath x2    HX SMALL BOWEL RESECTION      HX THYROIDECTOMY      1975    HX VASCULAR ACCESS      PORT -A- CATH X2       Family History   Problem Relation Age of Onset   Joselyn Felix Arthritis-osteo Mother     Diabetes Mother     Elevated Lipids Mother     Headache Mother     Heart Disease Mother     Hypertension Mother     Stroke Mother     Neuropathy Mother     Diabetes Father     Elevated Lipids Father     Heart Disease Father     Hypertension Father     Diabetes Sister     Elevated Lipids Sister     Headache Sister     Hypertension Sister     Migraines Sister     Cancer Sister 62     breast ca W/METS TO BRAIN    Breast Cancer Sister 62    Diabetes Brother     Elevated Lipids Brother     Hypertension Brother     Asthma Son     Lung Disease Son     Thyroid Disease Son      GRAVES    Breast Cancer Maternal Grandmother 54    Diabetes Sister     Neuropathy Sister     Anesth Problems Neg Hx        Social History     Social History    Marital status:      Spouse name: N/A    Number of children: N/A    Years of education: N/A     Occupational History    Not on file.      Social History Main Topics    Smoking status: Current Every Day Smoker     Packs/day: 0.50     Years: 30.00     Types: Cigarettes    Smokeless tobacco: Never Used    Alcohol use No    Drug use: No    Sexual activity: No     Other Topics Concern    Not on file     Social History Narrative           OBJECTIVE    Visit Vitals    /58 (BP 1 Location: Right arm, BP Patient Position: Sitting)    Pulse 71    Temp 99.1 °F (37.3 °C) (Oral)    Resp 18    Ht 6' 1\" (1.854 m)    Wt 197 lb 12.8 oz (89.7 kg)    LMP 09/01/2010    SpO2 98%    BMI 26.1 kg/m2       Physical Exam   Constitutional: She is oriented to person, place, and time and well-developed, well-nourished, and in no distress. No distress. Eyes: Conjunctivae and EOM are normal. Pupils are equal, round, and reactive to light. Cardiovascular: Normal rate, regular rhythm and normal heart sounds. Exam reveals no gallop and no friction rub. No murmur heard. Pulmonary/Chest: Effort normal and breath sounds normal. No respiratory distress. She has no wheezes. Neurological: She is alert and oriented to person, place, and time. Skin: Skin is warm and dry. No rash noted. She is not diaphoretic. Psychiatric: Mood, memory, affect and judgment normal.   Nursing note and vitals reviewed. ASSESSMENT AND PLAN  Dez Galvin is a 66 y.o. female who presents today for:    1. Pre-op evaluation  Risk of cardiac death and nonfatal myocardial infarction for noncardiac surgical procedures:  Intermediate (1% to 5%) due to head and neck surgery. Revised Cardiac Risk Index of a major cardiac event is 0.9 % given hx of CVA. Patient has one clinical predictor of perioperative cardiac complications given history of diet controlled DM. These risk calculators have been reviewed with the patient who expressed understanding of the relative cardiac risk of his/her upcoming procedure given his/her current risk factors. Addendum: 6/8/2018 1:03 PM  Per ENT preoperative forms, they requested updated EKG, which is NSR. Labs notable for mild thrombocytopenia, which is a chronic issue (unclear etiology, will obtain further work up at future visit). According to the [employer] of Cardiology and AHA Guidelines of perioperative cardiovascular evaluation for noncardiac surgery, patient is cleared for removal of thyroglossal duct cyst (one to two clinical predictors, intermediate risk procedure, > 4 METS).      2. Bilateral foot-drop  Will fax over DME Rx.    3. MS (multiple sclerosis) ([facility]  She is to follow-up with neurology in a few days; asked patient to discuss with neurology re: recommendations on pre-operative medication management, including Plavix as well as her other medications related to her MS. Medications Discontinued During This Encounter   Medication Reason    aspirin delayed-release 325 mg tablet Not A Current Medication       Follow-up Disposition:  Return if symptoms worsen or fail to improve. Medication risks/benefits/costs/interactions/alternatives discussed with patient. Advised patient to call back or return to office if symptoms worsen/change/persist. If patient cannot reach us or should anything more severe/urgent arise he/she should proceed directly to the nearest emergency department. Discussed expected course/resolution/complications of diagnosis in detail with patient. Patient given a written after visit summary which includes his/her diagnoses, current medications and vitals. Patient expressed understanding with the diagnosis and plan.      Louise Rivers M.D.

## 2018-06-05 NOTE — PROGRESS NOTES
Chief Complaint   Patient presents with    Pre-op Exam     to remove a cyst     1. Have you been to the ER, urgent care clinic since your last visit? Hospitalized since your last visit? No    2. Have you seen or consulted any other health care providers outside of the [address] [place] [address] since your last visit? Include any pap smears or colon screening.  No

## 2018-06-05 NOTE — Clinical Note
Please print out this office note and fax it over to Dr. Emir Butler office for preoperative clearance; can you also also send an updated EKG and recent lab work including CBC, and BMP?

## 2018-06-05 NOTE — MR AVS SNAPSHOT
[address] 
 
 
 [address] 
[phone] Patient: Sara Holguin MRN: [id number] OHB:[id number] Visit Information Date & Time Provider Department Dept. Phone Encounter #  
 6/5/2018  2:00 PM Abrahan Capone  [facility] [phone] [id number] Follow-up Instructions Return if symptoms worsen or fail to improve. Your Appointments 6/8/2018  1:40 PM  
Follow Up with MD [facility]) Appt Note: fuv  
 [address] [facility]  
  
    
 10/29/2018  1:30 PM  
ROUTINE CARE with Abrahan Capone MD  
[address] [facility] ([address]) Appt Note: 6 months follow up DM  
 [address] [phone]  
[phone]  
  
   
 [address] [address] Upcoming Health Maintenance Date Due  
 EYE EXAM RETINAL OR DILATED Q1 10/12/2017 FOOT EXAM Q1 7/3/2018 Influenza Age 5 to Adult 8/1/2018 HEMOGLOBIN A1C Q6M 11/24/2018 MEDICARE YEARLY EXAM 4/28/2019 MICROALBUMIN Q1 5/24/2019 LIPID PANEL Q1 5/24/2019 BREAST CANCER SCRN MAMMOGRAM 12/20/2019 PAP AKA CERVICAL CYTOLOGY 1/4/2020 COLONOSCOPY 1/18/2022 DTaP/Tdap/Td series (2 - Td) 6/16/2024 Allergies as of 6/5/2018  Review Complete On: 6/5/2018 By: Vik Sanchez Severity Noted Reaction Type Reactions Bee Sting [Sting, Bee] High 06/25/2010    Anaphylaxis Also allergic to egg products Penicillins High 06/25/2010    Anaphylaxis Betadine [Povidone-iodine]  05/17/2016    Rash Egg  06/25/2015    Itching Current Immunizations  Reviewed on 1/4/2017 Name Date Tdap 6/16/2014 Not reviewed this visit Vitals BP Pulse Temp Resp Height(growth percentile) Weight(growth percentile) 108/58 (BP 1 Location: Right arm, BP Patient Position: Sitting) 71 99.1 °F (37.3 °C) (Oral) 18 6' 1\" (1.854 m) 197 lb 12.8 oz (89.7 kg) LMP SpO2 BMI OB Status Smoking Status 09/01/2010 98% 26.1 kg/m2 Postmenopausal Current Every Day Smoker Vitals History BMI and BSA Data Body Mass Index Body Surface Area  
 26.1 kg/m 2 2.15 m 2 Preferred Pharmacy Pharmacy Name Phone [facility] [address], [facility] [address] [phone] Your Updated Medication List  
  
   
This list is accurate as of 6/5/18  2:37 PM.  Always use your most recent med list.  
  
  
  
  
 Blood-Glucose Meter monitoring kit  
by SubCUTAneous route Before breakfast and dinner. Free Style Lite glucometer and test strips  
  
 butalbital-acetaminophen-caff -40 mg per capsule Commonly known as:  [employer] Take 1 Cap by mouth every four (4) hours as needed for Pain. Max Daily Amount: 6 Caps. clindamycin 300 mg capsule Commonly known as:  CLEOCIN Take 300 mg by mouth. Prior to dental procedures  
  
 clopidogrel 75 mg Tab Commonly known as:  PLAVIX TAKE 1 TABLET BY MOUTH EVERY DAY  
  
 corticotropin 80 unit/mL injectable gel Commonly known as:  ACTHAR H.P.  
1 mL by SubCUTAneous route daily. 80 units subq q day for 10 days  
  
 dantrolene 100 mg capsule Commonly known as:  DANTRIUM Take 1 Cap by mouth as needed. ergocalciferol 50,000 unit capsule Commonly known as:  ERGOCALCIFEROL  
TAKE 1 CAPSULE BY MOUTH EVERY 7 DAYS  
  
 fentaNYL 75 mcg/hr Commonly known as:  DURAGESIC  
1 Patch by TransDERmal route every seventy-two (72) hours. Max Daily Amount: 1 Patch. GILENYA 0.5 mg Cap Generic drug:  fingolimod Take 1 Cap by mouth daily. levothyroxine 175 mcg tablet Commonly known as:  SYNTHROID  
 TAKE 1 TABLET BY MOUTH DAILY BEFORE BREAKFAST  
  
 LIDODERM 5 % Generic drug:  lidocaine  
by TransDERmal route every twenty-four (24) hours. Apply patch to the affected area for 12 hours a day and remove for 12 hours a day. meclizine 25 mg tablet Commonly known as:  ANTIVERT Take  by mouth three (3) times daily as needed. MESTINON TIMESPAN 180 mg SR tablet Generic drug:  pyridostigmine bromide Take 180 mg by mouth two (2) times a day. PARoxetine 40 mg tablet Commonly known as:  PAXIL TAKE 1.5 TABS BY MOUTH DAILY. pregabalin 200 mg capsule Commonly known as:  Sameul Tea Take 1 Cap by mouth two (2) times a day. Max Daily Amount: 400 mg.  
  
 rosuvastatin 20 mg tablet Commonly known as:  CRESTOR  
TAKE 1 TABLET BY MOUTH EVERY EVENING  
  
 SUMAtriptan 100 mg tablet Commonly known as:  IMITREX  
1 tab at onset of moderate-severe migraine; may repeat 1 tab in 2 hours; Limit: 2 tabs in 24/ hrs, not more than 3 days a week TENS UNIT ELECTRODES  
by Does Not Apply route. prn  
  
 topiramate 200 mg tablet Commonly known as:  TOPAMAX TAKE 1 TABLET BY MOUTH TWICE DAILY Follow-up Instructions Return if symptoms worsen or fail to improve. To-Do List   
 06/13/2018 10:30 AM  
  Appointment with [facility] PAT EXAM RM 4 at [facility] ([phone]) Patient Instructions Eating Healthy Foods: Care Instructions Your Care Instructions Eating healthy foods can help lower your risk for disease. Healthy food gives you energy and keeps your heart strong, your brain active, your muscles working, and your bones strong. A healthy diet includes a variety of foods from the basic food groups: grains, vegetables, fruits, milk and milk products, and meat and beans. Some people may eat more of their favorite foods from only one food group and, as a result, miss getting the nutrients they need.  So, it is important to pay attention not only to what you eat but also to what you are missing from your diet. You can eat a healthy, balanced diet by making a few small changes. Follow-up care is a key part of your treatment and safety. Be sure to make and go to all appointments, and call your doctor if you are having problems. It's also a good idea to know your test results and keep a list of the medicines you take. How can you care for yourself at home? Look at what you eat · Keep a food diary for a week or two and record everything you eat or drink. Track the number of servings you eat from each food group. · For a balanced diet every day, eat a variety of: ¨ 6 or more ounce-equivalents of grains, such as cereals, breads, crackers, rice, or pasta, every day. An ounce-equivalent is 1 slice of bread, 1 cup of ready-to-eat cereal, or ½ cup of cooked rice, cooked pasta, or cooked cereal. 
¨ 2½ cups of vegetables, especially: § Dark-green vegetables such as broccoli and spinach. § Orange vegetables such as carrots and sweet potatoes. § Dry beans (such as lancaster and kidney beans) and peas (such as lentils). ¨ 2 cups of fresh, frozen, or canned fruit. A small apple or 1 banana or orange equals 1 cup. ¨ 3 cups of nonfat or low-fat milk, yogurt, or other milk products. ¨ 5½ ounces of meat and beans, such as chicken, fish, lean meat, beans, nuts, and seeds. One egg, 1 tablespoon of peanut butter, ½ ounce nuts or seeds, or ¼ cup of cooked beans equals 1 ounce of meat. · Learn how to read food labels for serving sizes and ingredients. Fast-food and convenience-food meals often contain few or no fruits or vegetables. Make sure you eat some fruits and vegetables to make the meal more nutritious. · Look at your food diary. For each food group, add up what you have eaten and then divide the total by the number of days. This will give you an idea of how much you are eating from each food group.  See if you can find some ways to change your diet to make it more healthy. Start small · Do not try to make dramatic changes to your diet all at once. You might feel that you are missing out on your favorite foods and then be more likely to fail. · Start slowly, and gradually change your habits. Try some of the following: ¨ Use whole wheat bread instead of white bread. ¨ Use nonfat or low-fat milk instead of whole milk. ¨ Eat brown rice instead of white rice, and eat whole wheat pasta instead of white-flour pasta. ¨ Try low-fat cheeses and low-fat yogurt. ¨ Add more fruits and vegetables to meals and have them for snacks. ¨ Add lettuce, tomato, cucumber, and onion to sandwiches. ¨ Add fruit to yogurt and cereal. 
Enjoy food · You can still eat your favorite foods. You just may need to eat less of them. If your favorite foods are high in fat, salt, and sugar, limit how often you eat them, but do not cut them out entirely. · Eat a wide variety of foods. Make healthy choices when eating out · The type of restaurant you choose can help you make healthy choices. Even fast-food chains are now offering more low-fat or healthier choices on the menu. · Choose smaller portions, or take half of your meal home. · When eating out, try: ¨ A veggie pizza with a whole wheat crust or grilled chicken (instead of sausage or pepperoni). ¨ Pasta with roasted vegetables, grilled chicken, or marinara sauce instead of cream sauce. ¨ A vegetable wrap or grilled chicken wrap. ¨ Broiled or poached food instead of fried or breaded items. Make healthy choices easy · Buy packaged, prewashed, ready-to-eat fresh vegetables and fruits, such as baby carrots, salad mixes, and chopped or shredded broccoli and cauliflower. · Buy packaged, presliced fruits, such as melon or pineapple. · Choose 100% fruit or vegetable juice instead of soda. Limit juice intake to 4 to 6 oz (½ to ¾ cup) a day. · Blend low-fat yogurt, fruit juice, and canned or frozen fruit to make a smoothie for breakfast or a snack. Where can you learn more? Go to http://marie-alvin.info/. Enter T756 in the search box to learn more about \"Eating Healthy Foods: Care Instructions. \" Current as of: May 12, 2017 Content Version: 11.4 © 0005-2235 [employer]. Care instructions adapted under license by [employer] (which disclaims liability or warranty for this information). If you have questions about a medical condition or this instruction, always ask your healthcare professional. [place] [address] any warranty or liability for your use of this information. Introducing [facility]! Dear Lidia Barker: 
Thank you for requesting a Battle[employer] account. Our records indicate that you already have an active Battle[employer] account. You can access your account anytime at [email or web address] Did you know that you can access your hospital and ER discharge instructions at any time in Battle[employer]? You can also review all of your test results from your hospital stay or ER visit. Additional Information If you have questions, please visit the Frequently Asked Questions section of the Battle[employer] website at [email or web address] Remember, Battle[employer] is NOT to be used for urgent needs. For medical emergencies, dial 911. Now available from your iPhone and Android! Please provide this summary of care documentation to your next provider. Your primary care clinician is listed as Brandon Diamond. If you have any questions after today's visit, please call [phone].

## 2018-06-05 NOTE — PATIENT INSTRUCTIONS

## 2018-06-07 ENCOUNTER — CLINICAL SUPPORT (OUTPATIENT)
Dept: FAMILY MEDICINE CLINIC | Age: 63
End: 2018-06-07

## 2018-06-07 ENCOUNTER — HOSPITAL ENCOUNTER (OUTPATIENT)
Dept: LAB | Age: 63
Discharge: HOME | End: 2018-06-07
Payer: MEDICARE

## 2018-06-07 DIAGNOSIS — Z01.818 PRE-OP EXAMINATION: Primary | ICD-10-CM

## 2018-06-07 PROCEDURE — 85027 COMPLETE CBC AUTOMATED: CPT

## 2018-06-07 NOTE — PROGRESS NOTES
Chief Complaint   Patient presents with    Cardiac Testing     EKG    Labs     CBC     Patient presented to the office today for a EKG and Lab (CBC) for a pre op exam. We will fax form once labs results are in.

## 2018-06-08 ENCOUNTER — OFFICE VISIT (OUTPATIENT)
Dept: NEUROLOGY | Age: 63
End: 2018-06-08

## 2018-06-08 VITALS
OXYGEN SATURATION: 97 % | WEIGHT: 199.8 LBS | HEIGHT: 72 IN | SYSTOLIC BLOOD PRESSURE: 106 MMHG | RESPIRATION RATE: 16 BRPM | HEART RATE: 70 BPM | TEMPERATURE: 97.5 F | DIASTOLIC BLOOD PRESSURE: 49 MMHG | BODY MASS INDEX: 27.06 KG/M2

## 2018-06-08 DIAGNOSIS — G54.1 LUMBOSACRAL PLEXOPATHY: ICD-10-CM

## 2018-06-08 DIAGNOSIS — G70.00 MG (MYASTHENIA GRAVIS) (HCC): ICD-10-CM

## 2018-06-08 DIAGNOSIS — G35 MS (MULTIPLE SCLEROSIS) (HCC): ICD-10-CM

## 2018-06-08 DIAGNOSIS — R26.9 GAIT DISORDER: ICD-10-CM

## 2018-06-08 DIAGNOSIS — G62.9 POLYNEUROPATHY: ICD-10-CM

## 2018-06-08 DIAGNOSIS — G89.4 CHRONIC PAIN SYNDROME: Primary | ICD-10-CM

## 2018-06-08 LAB
ERYTHROCYTE [DISTWIDTH] IN BLOOD BY AUTOMATED COUNT: 14.7 % (ref 12.3–15.4)
HCT VFR BLD AUTO: 42.5 % (ref 34–46.6)
HGB BLD-MCNC: 13.7 G/DL (ref 11.1–15.9)
MCH RBC QN AUTO: 29.7 PG (ref 26.6–33)
MCHC RBC AUTO-ENTMCNC: 32.2 G/DL (ref 31.5–35.7)
MCV RBC AUTO: 92 FL (ref 79–97)
PLATELET # BLD AUTO: 112 X10E3/UL (ref 150–379)
RBC # BLD AUTO: 4.61 X10E6/UL (ref 3.77–5.28)
WBC # BLD AUTO: 4.1 X10E3/UL (ref 3.4–10.8)

## 2018-06-08 RX ORDER — CLOPIDOGREL BISULFATE 75 MG/1
TABLET ORAL
Qty: 90 TAB | Refills: 0 | Status: SHIPPED | OUTPATIENT
Start: 2018-06-08 | End: 2018-06-21

## 2018-06-08 RX ORDER — FENTANYL 75 UG/H
1 PATCH TRANSDERMAL
Qty: 10 PATCH | Refills: 0 | Status: SHIPPED | OUTPATIENT
Start: 2018-07-08 | End: 2018-08-15 | Stop reason: SDUPTHER

## 2018-06-08 RX ORDER — FENTANYL 75 UG/H
1 PATCH TRANSDERMAL
Qty: 10 PATCH | Refills: 0 | Status: SHIPPED | OUTPATIENT
Start: 2018-06-08 | End: 2018-06-08 | Stop reason: SDUPTHER

## 2018-06-08 RX ORDER — CLOPIDOGREL BISULFATE 75 MG/1
75 TABLET ORAL DAILY
Qty: 30 TAB | Refills: 0 | Status: SHIPPED | OUTPATIENT
Start: 2018-06-08 | End: 2018-06-08 | Stop reason: SDUPTHER

## 2018-06-08 RX ORDER — ERGOCALCIFEROL 1.25 MG/1
50000 CAPSULE ORAL
Qty: 4 CAP | Refills: 0 | Status: SHIPPED | OUTPATIENT
Start: 2018-06-08 | End: 2018-07-03 | Stop reason: SDUPTHER

## 2018-06-08 NOTE — PROGRESS NOTES
Neurology Progress Note    NAME:  Warren Galvin   :   1955   MRN:   [id number]     Date/Time:  2018  Subjective:      Warren Galvin is a 66 y.o. female here today for follow up for multiple sclerosis, neuropathy, and pain. Patient was accompanied by her son to the visit. She says she has been having more or less increasing neuropathy, according to patient, there has been  increasing weakness numbness and tingling sensation of the extremities. She says however she has not had any falls. She she noted that she is always careful when getting up and going, she uses her walker. Pain is still persistent, sharp in nature generalized, however, the medication is keeping it down so the pain is bearable. She says sometimes the neck pain is very sharp and goes down to her arms. Back pain also is sharp in nature and tend to go down to the legs. Patient says she is scheduled to have a throat surgery coming up, I told patient that neurologically she is cleared to have the surgery. She denies loss of consciousness difficulty breathing, however, she says she periodically has dysphagia and odynophagia.   Review of Systems - General ROS: positive for  - fatigue, hot flashes, malaise and sleep disturbance  Psychological ROS: positive for - anxiety and sleep disturbances  Ophthalmic ROS: positive for - blurry vision, decreased vision, double vision and photophobia  ENT ROS: positive for - headaches, vertigo and visual changes  Allergy and Immunology ROS: negative  Hematological and Lymphatic ROS: negative  Endocrine ROS: negative  Respiratory ROS: no cough, shortness of breath, or wheezing  Cardiovascular ROS: no chest pain or dyspnea on exertion  Gastrointestinal ROS: no abdominal pain, change in bowel habits, or black or bloody stools  Genito-Urinary ROS: no dysuria, trouble voiding, or hematuria  Musculoskeletal ROS: positive for - gait disturbance, joint pain, joint stiffness, muscle pain and muscular weakness  Neurological ROS: positive for - dizziness, gait disturbance, headaches, impaired coordination/balance, numbness/tingling, tremors, visual changes and weakness  Dermatological ROS: negative    Medications reviewed:  Current Outpatient Prescriptions   Medication Sig Dispense Refill    ergocalciferol (ERGOCALCIFEROL) 50,000 unit capsule TAKE 1 CAPSULE BY MOUTH EVERY 7 DAYS 4 Cap 0    clopidogrel (PLAVIX) 75 mg tab TAKE 1 TABLET BY MOUTH EVERY DAY 30 Tab 0    meclizine (ANTIVERT) 25 mg tablet Take  by mouth three (3) times daily as needed.  topiramate (TOPAMAX) 200 mg tablet TAKE 1 TABLET BY MOUTH TWICE DAILY 60 Tab 0    SUMAtriptan (IMITREX) 100 mg tablet 1 tab at onset of moderate-severe migraine; may repeat 1 tab in 2 hours; Limit: 2 tabs in 24/ hrs, not more than 3 days a week 12 Tab 3    fentaNYL (DURAGESIC) 75 mcg/hr 1 Patch by TransDERmal route every seventy-two (72) hours. Max Daily Amount: 1 Patch. 10 Patch 0    dantrolene (DANTRIUM) 100 mg capsule Take 1 Cap by mouth as needed. 90 Cap 3    corticotropin (ACTHAR H.P.) 80 unit/mL injectable gel 1 mL by SubCUTAneous route daily. 80 units subq q day for 10 days 10 mL 1    pregabalin (LYRICA) 200 mg capsule Take 1 Cap by mouth two (2) times a day. Max Daily Amount: 400 mg. 180 Cap 3    clindamycin (CLEOCIN) 300 mg capsule Take 300 mg by mouth. Prior to dental procedures      butalbital-acetaminophen-caff (FIORICET) -40 mg per capsule Take 1 Cap by mouth every four (4) hours as needed for Pain. Max Daily Amount: 6 Caps. 10 Cap 0    rosuvastatin (CRESTOR) 20 mg tablet TAKE 1 TABLET BY MOUTH EVERY EVENING 90 Tab 1    TENS UNIT ELECTRODES by Does Not Apply route. prn      Blood-Glucose Meter monitoring kit by SubCUTAneous route Before breakfast and dinner. Free Style Lite glucometer and test strips (Patient taking differently: by SubCUTAneous route Before breakfast and dinner.  Free Style Lite glucometer and test strips-TESTS COUPLE TIMES PER WEEK) 1 Kit 0    levothyroxine (SYNTHROID) 175 mcg tablet TAKE 1 TABLET BY MOUTH DAILY BEFORE BREAKFAST (Patient taking differently: Take 175 mcg by mouth nightly. TAKE 1 TABLET BY MOUTH DAILY BEFORE BREAKFAST) 30 Tab 11    PARoxetine (PAXIL) 40 mg tablet TAKE 1.5 TABS BY MOUTH DAILY. (Patient taking differently: Take 60 mg by mouth nightly. TAKE 1.5 TABS BY MOUTH DAILY. - Note Pt prefers to take at HS) 45 Tab 2    GILENYA 0.5 mg cap Take 1 Cap by mouth daily.  pyridostigmine bromide (MESTINON TIMESPAN) 180 mg SR tablet Take 180 mg by mouth two (2) times a day.  lidocaine (LIDODERM) 5 %(700 mg/patch) by TransDERmal route every twenty-four (24) hours. Apply patch to the affected area for 12 hours a day and remove for 12 hours a day.           Objective:   Vitals:  Vitals:    06/08/18 1357   BP: 106/49   Pulse: 70   Resp: 16   Temp: 97.5 °F (36.4 °C)   TempSrc: Temporal   SpO2: 97%   Weight: 199 lb 12.8 oz (90.6 kg)   Height: 6' 1\" (1.854 m)   PainSc:   6   PainLoc: Generalized   LMP: 09/01/2010       Lab Data Reviewed:  Lab Results   Component Value Date/Time    WBC 4.1 06/07/2018 01:54 PM    HCT 42.5 06/07/2018 01:54 PM    HGB 13.7 06/07/2018 01:54 PM    PLATELET 968 (L) 47/63/2503 01:54 PM       Lab Results   Component Value Date/Time    Sodium 142 05/24/2018 01:19 PM    Potassium 4.2 05/24/2018 01:19 PM    Chloride 107 (H) 05/24/2018 01:19 PM    CO2 21 05/24/2018 01:19 PM    Glucose 82 05/24/2018 01:19 PM    BUN 9 05/24/2018 01:19 PM    Creatinine 0.90 05/24/2018 01:19 PM    Calcium 9.2 05/24/2018 01:19 PM       No components found for: TROPQUANT    No results found for: SHAHIDA      Lab Results   Component Value Date/Time    Hemoglobin A1c 6.3 (H) 05/24/2018 01:19 PM    Hemoglobin A1c (POC) 5.7 10/02/2017 10:40 AM        Lab Results   Component Value Date/Time    Vitamin B12 434 02/10/2016    Folate 12.0 02/10/2016       No results found for: Dali PINEDO    Lab Results   Component Value Date/Time    Cholesterol, total 179 05/24/2018 01:19 PM    HDL Cholesterol 51 05/24/2018 01:19 PM    LDL, calculated 109 (H) 05/24/2018 01:19 PM    VLDL, calculated 19 05/24/2018 01:19 PM    Triglyceride 96 05/24/2018 01:19 PM    CHOL/HDL Ratio 4.3 08/23/2010 12:19 PM         CT Results (recent):    Results from [facility] encounter on 05/14/18   CT NECK SOFT TISSUE W CONT   Narrative HISTORY: Neck mass. PROCEDURE: Multiple contiguous helically acquired axial images were obtained of  the cervical region from the skull base through to the thoracic inlet following  intravenous contrast administration. Sagittal and coronal reconstructions were  performed. CT dose reduction was achieved through the use of a standardized protocol  tailored for this examination and automatic exposure control for dose  modulation. FINDINGS:     Images through the nasopharynx reveal symmetric soft tissues. Images through the oropharynx reveals symmetric soft tissues. The parotid glands are symmetric in appearance. Images through the hypopharynx reveals symmetric soft tissues. Just below the level of the hyoid bone, is a soft tissue structure measuring 9.7  x 7.8 mm with a small satellite lesions. No evidence of lingual thyroid per se. The submandibular glands have a symmetric appearance. Images through the larynx proper reveal symmetric soft tissues. Images through the subglottic larynx reveal symmetric soft tissues. The thyroid gland has a normal appearance with symmetric lobes. No significant cervical lymphadenopathy. Incidental mild mucoperiosteal thickening involving the right greater than left  maxillary sinus of uncertain clinical significance. Impression IMPRESSION:    9.7 x 7.8 mm soft tissue structure anterior to the hyoid bone.  Differential  considerations would include thyroglossal duct cyst, ectopic thyroid, dermoid,  etc.          MRI Results (recent):    Results from Hospital Encounter encounter on 01/10/18   MRI BRAIN W WO CONT   Narrative INDICATION:  ms exacerbation, ams     COMPARISON:  May 18, 2016    TECHNIQUE:  MR imaging of the brain was performed with sagittal T1, axial T1,  T2, FLAIR, GRE, DWI/ADC; pre and post contrast multiplanar T1 utilizing 20 mL  gadolinium. FINDINGS:      Ventricles:  Midline, no hydrocephalus. Brain Parenchyma/Brainstem:  No definite change in a few small T2  hyperintensities in the supratentorial white matter and antonieta allowing for motion  on the prior study. Punctate focus of diffusion restriction in the posterior  left frontal lobe near the vertex is new. Intracranial Hemorrhage:  None. Basal Cisterns:  Normal.   Flow Voids:  Normal.  Post Contrast:  No abnormal parenchymal or meningeal enhancement. Additional Comments:  Small fluid level right maxillary sinus may have increased  slightly in the interval.         Impression IMPRESSION:    1. Punctate focus of diffusion restriction without enhancement posterior left  frontal lobe near the vertex suggests small acute infarction. Demyelinating  disease may also present in this fashion but felt to be less likely. 2. Other nonspecific white matter lesions as well as pontine T2 hyperintensity  without definite change. 23X              IR Results (recent):  No results found for this or any previous visit. VAS/US Results (recent):    Results from Hospital Encounter encounter on 01/19/17   DUPLEX LOWER EXT VENOUS RIGHT    PHYSICAL EXAM:  General:    Alert, cooperative, no distress, appears stated age. Head:   Normocephalic, without obvious abnormality, atraumatic. Eyes:   Conjunctivae/corneas clear. PERRLA  Nose:  Nares normal. No drainage or sinus tenderness. Throat:    Lips, mucosa, and tongue normal.  No Thrush  Neck:  Supple, symmetrical,  no adenopathy, thyroid: non tender    no carotid bruit and no JVD. Paraspinal tenderness  Back:    Symmetric,  Bilateral tenderness. Lungs:   Clear to auscultation bilaterally. No Wheezing or Rhonchi. No rales. Chest wall:  No tenderness or deformity. No Accessory muscle use. Heart:   Regular rate and rhythm,  no murmur, rub or gallop. Abdomen:   Soft, non-tender. Not distended. Bowel sounds normal. No masses  Extremities: Extremities normal, atraumatic, No cyanosis. No edema. No clubbing  Skin:     Texture, turgor normal. No rashes or lesions. Not Jaundiced  Lymph nodes: Cervical, supraclavicular normal.  Psych:  Good insight. Not depressed. Not anxious or agitated. NEUROLOGICAL EXAM:  Appearance: The patient is well developed, well nourished, provides a coherent history and is in no acute distress. Mental Status: Oriented to time, place and person. Mood and affect appropriate. Cranial Nerves:   Intact visual fields. Fundi are benign. RACHEL, EOM's full, no nystagmus, no ptosis. Facial sensation is normal. Corneal reflexes are intact. Facial movement is symmetric. Hearing is normal bilaterally. Palate is midline with normal sternocleidomastoid and trapezius muscles are normal. Tongue is midline. Motor:  5/5 strength in upper and 5-/5 lower proximal and distal muscles. Normal bulk and tone. No fasciculations. Reflexes:   Deep tendon reflexes 2+/4 and symmetrical.   Sensory:   Dysesthesia to touch, pinprick and vibration. Gait:  Abnormal gait. Ambbulates with cane   Tremor:   Mild tremor noted. Cerebellar:  No cerebellar signs present. Neurovascular:  Normal heart sounds and regular rhythm, peripheral pulses intact, and no carotid bruits. Assesment  1. MS (multiple sclerosis) (HCC)    - fentaNYL (DURAGESIC) 75 mcg/hr; 1 Patch by TransDERmal route every seventy-two (72) hours. Max Daily Amount: 1 Patch. Dispense: 10 Patch; Refill: 0  - ergocalciferol (ERGOCALCIFEROL) 50,000 unit capsule; Dispense: 4 Cap; Refill: 0  - clopidogrel (PLAVIX) 75 mg tab;   Dispense: 30 Tab;  Refill: 0  - REFERRAL TO PAIN MANAGEMENT    2. Chronic pain syndrome    - REFERRAL TO PAIN MANAGEMENT    3. MG (myasthenia gravis) ([address]  Continue management    4. Polyneuropathy  Continue management    5. Gait disorder  Physical    6. Lumbosacral plexopathy  Continue management    ___________________________________________________  PLAN: Medication reviewed with patient      ICD-10-CM ICD-9-CM    1. Chronic pain syndrome G89.4 338.4 REFERRAL TO PAIN MANAGEMENT   2. MS (multiple sclerosis) ([facility]) G35 340 fentaNYL (DURAGESIC) 75 mcg/hr      ergocalciferol (ERGOCALCIFEROL) 50,000 unit capsule      clopidogrel (PLAVIX) 75 mg tab      REFERRAL TO PAIN MANAGEMENT   3. MG (myasthenia gravis) ([facility]) G70.00 358.00    4. Polyneuropathy G62.9 356.9    5. Gait disorder R26.9 781.2    6.  Lumbosacral plexopathy G54.1 353.1      Follow-up Disposition: Not on File           ___________________________________________________    Total time spent with patient:  []15   []25   []35   [] __ minutes    Care Plan discussed with:    [x]Patient   []Family    []Care Manager   []Consultant/Specialist :    ___________________________________________________    Attending Physician: Susana Mills MD

## 2018-06-08 NOTE — PROGRESS NOTES
Please notify patient regarding their test results:    Mild low platelets, which is a chronic issue. We will relay results to her ENT doctor for preop. Please fax over my last office visit to Dr. Elizabeth Cochran office.

## 2018-06-08 NOTE — MR AVS SNAPSHOT
[address] [place] [address] [facility] 
[phone] Patient: Gael Vogt MRN: [id number] NXA:[birth year] Visit Information Date & Time Provider Department Dept. Phone Encounter #  
 6/8/2018  1:40 PM MD Florentin Bautista [facility] [phone] [id number] Follow-up Instructions Return in about 2 months (around 8/8/2018). Your Appointments 10/29/2018  1:30 PM  
ROUTINE CARE with Jennifer Cisneros MD  
[facility]) Appt Note: 6 months follow up DM  
 [address] [id number]  
[phone]  
  
   
 [address] [address] Upcoming Health Maintenance Date Due  
 EYE EXAM RETINAL OR DILATED Q1 10/12/2017 FOOT EXAM Q1 7/3/2018 Influenza Age 5 to Adult 8/1/2018 HEMOGLOBIN A1C Q6M 11/24/2018 MEDICARE YEARLY EXAM 4/28/2019 MICROALBUMIN Q1 5/24/2019 LIPID PANEL Q1 5/24/2019 BREAST CANCER SCRN MAMMOGRAM 12/20/2019 PAP AKA CERVICAL CYTOLOGY 1/4/2020 COLONOSCOPY 1/18/2022 DTaP/Tdap/Td series (2 - Td) 6/16/2024 Allergies as of 6/8/2018  Review Complete On: 6/8/2018 By: Tania Rose MD  
  
 Severity Noted Reaction Type Reactions Bee Sting [Sting, Bee] High 06/25/2010    Anaphylaxis Also allergic to egg products Penicillins High 06/25/2010    Anaphylaxis Betadine [Povidone-iodine]  05/17/2016    Rash Egg  06/25/2015    Itching Current Immunizations  Reviewed on 1/4/2017 Name Date Tdap 6/16/2014 Not reviewed this visit You Were Diagnosed With   
  
 Codes Comments Chronic pain syndrome    -  Primary ICD-10-CM: G89.4 ICD-9-CM: 338.4 MS (multiple sclerosis) ([address]     ICD-10-CM: G35 
ICD-9-CM: 892 MG (myasthenia gravis) ([address]     ICD-10-CM: G70.00 ICD-9-CM: 358.00 Polyneuropathy     ICD-10-CM: G62.9 ICD-9-CM: 356.9 Gait disorder     ICD-10-CM: R26.9 ICD-9-CM: 291. 2 Lumbosacral plexopathy     ICD-10-CM: G54.1 ICD-9-CM: 353.1 Vitals BP Pulse Temp Resp Height(growth percentile) Weight(growth percentile) 106/49 (BP 1 Location: Right arm, BP Patient Position: Sitting) 70 97.5 °F (36.4 °C) (Temporal) 16 6' 1\" (1.854 m) 199 lb 12.8 oz (90.6 kg) LMP SpO2 BMI OB Status Smoking Status 09/01/2010 97% 26.36 kg/m2 Postmenopausal Current Every Day Smoker BMI and BSA Data Body Mass Index Body Surface Area  
 26.36 kg/m 2 2.16 m 2 Preferred Pharmacy Pharmacy Name Phone [facility] [address] [phone] Your Updated Medication List  
  
   
This list is accurate as of 6/8/18  2:24 PM.  Always use your most recent med list.  
  
  
  
  
 Blood-Glucose Meter monitoring kit  
by SubCUTAneous route Before breakfast and dinner. Free Style Lite glucometer and test strips  
  
 butalbital-acetaminophen-caff -40 mg per capsule Commonly known as:  [employer] Take 1 Cap by mouth every four (4) hours as needed for Pain. Max Daily Amount: 6 Caps. clindamycin 300 mg capsule Commonly known as:  CLEOCIN Take 300 mg by mouth. Prior to dental procedures  
  
 clopidogrel 75 mg Tab Commonly known as:  PLAVIX Take 1 Tab by mouth daily. corticotropin 80 unit/mL injectable gel Commonly known as:  ACTHAR H.P.  
1 mL by SubCUTAneous route daily. 80 units subq q day for 10 days  
  
 dantrolene 100 mg capsule Commonly known as:  DANTRIUM Take 1 Cap by mouth as needed. ergocalciferol 50,000 unit capsule Commonly known as:  ERGOCALCIFEROL Take 1 Cap by mouth every seven (7) days. fentaNYL 75 mcg/hr Commonly known as:  DURAGESIC  
1 Patch by TransDERmal route every seventy-two (72) hours. Max Daily Amount: 1 Patch. Start taking on:  7/8/2018 GILENYA 0.5 mg Cap Generic drug:  fingolimod Take 1 Cap by mouth daily. levothyroxine 175 mcg tablet Commonly known as:  SYNTHROID  
TAKE 1 TABLET BY MOUTH DAILY BEFORE BREAKFAST  
  
 LIDODERM 5 % Generic drug:  lidocaine  
by TransDERmal route every twenty-four (24) hours. Apply patch to the affected area for 12 hours a day and remove for 12 hours a day. meclizine 25 mg tablet Commonly known as:  ANTIVERT Take  by mouth three (3) times daily as needed. MESTINON TIMESPAN 180 mg SR tablet Generic drug:  pyridostigmine bromide Take 180 mg by mouth two (2) times a day. PARoxetine 40 mg tablet Commonly known as:  PAXIL TAKE 1.5 TABS BY MOUTH DAILY. pregabalin 200 mg capsule Commonly known as:  Constantino Dewitt Take 1 Cap by mouth two (2) times a day. Max Daily Amount: 400 mg.  
  
 rosuvastatin 20 mg tablet Commonly known as:  CRESTOR  
TAKE 1 TABLET BY MOUTH EVERY EVENING  
  
 SUMAtriptan 100 mg tablet Commonly known as:  IMITREX  
1 tab at onset of moderate-severe migraine; may repeat 1 tab in 2 hours; Limit: 2 tabs in 24/ hrs, not more than 3 days a week TENS UNIT ELECTRODES  
by Does Not Apply route. prn  
  
 topiramate 200 mg tablet Commonly known as:  TOPAMAX TAKE 1 TABLET BY MOUTH TWICE DAILY Prescriptions Printed Refills  
 fentaNYL (DURAGESIC) 75 mcg/hr 0 Starting on: 2018 Si Patch by TransDERmal route every seventy-two (72) hours. Max Daily Amount: 1 Patch. Class: Print Route: TransDERmal  
  
Prescriptions Sent to Pharmacy Refills  
 ergocalciferol (ERGOCALCIFEROL) 50,000 unit capsule 0 Sig: Take 1 Cap by mouth every seven (7) days. Class: Normal  
 Pharmacy: [facility] [address] [place] [address] Ph #: [phone] Route: Oral  
 clopidogrel (PLAVIX) 75 mg tab 0 Sig: Take 1 Tab by mouth daily.   
 Class: Normal  
 Pharmacy: [facility] [address] [place] [address] #: [phone] Route: Oral  
  
We Performed the Following REFERRAL TO PAIN MANAGEMENT [SOC331 Custom] Follow-up Instructions Return in about 2 months (around 8/8/2018). To-Do List   
 06/13/2018 10:30 AM  
  Appointment with [facility] PAT EXAM RM 4 at [address] [facility] ([phone]) Referral Information Referral ID Referred By Referred To  
  
 [id number] Norma CISNEROS Not Available Visits Status Start Date End Date 1 New Request 6/8/18 6/8/19 If your referral has a status of pending review or denied, additional information will be sent to support the outcome of this decision. Introducing [facility]! Dear Jaqueline Arroyo: 
Thank you for requesting a [employer] account. Our records indicate that you already have an active [employer] account. You can access your account anytime at [email or web address] Did you know that you can access your hospital and ER discharge instructions at any time in [employer]? You can also review all of your test results from your hospital stay or ER visit. Additional Information If you have questions, please visit the Frequently Asked Questions section of the [employer] website at [email or web address] Remember, [employer] is NOT to be used for urgent needs. For medical emergencies, dial 911. Now available from your iPhone and Android! Please provide this summary of care documentation to your next provider. Your primary care clinician is listed as Brandon Diamond. If you have any questions after today's visit, please call [phone].

## 2018-06-08 NOTE — PROGRESS NOTES
Chief Complaint   Patient presents with    Multiple Sclerosis     1. Have you been to the ER, urgent care clinic since your last visit? Hospitalized since your last visit? 2. Have you seen or consulted any other health care providers outside of the [address] [place] [address] since your last visit? Include any pap smears or colon screening.  Dr. Roel Sinha count not performed patient did not bring medications        Pill count not verified with patient  Patient reports taking medication    UDS obtained 3/7/18  Pain contract on file   not available               Script given for

## 2018-06-12 ENCOUNTER — PATIENT OUTREACH (OUTPATIENT)
Dept: FAMILY MEDICINE CLINIC | Age: 63
End: 2018-06-12

## 2018-06-13 NOTE — PROGRESS NOTES
Outbound call to pt, made pt aware of result below, understanding voiced.  Copy of results and office visit note faxed to Dr Marcus Malin office

## 2018-06-19 ENCOUNTER — ANESTHESIA EVENT (OUTPATIENT)
Dept: MEDSURG UNIT | Age: 63
End: 2018-06-19
Payer: MEDICARE

## 2018-06-20 ENCOUNTER — ANESTHESIA (OUTPATIENT)
Dept: MEDSURG UNIT | Age: 63
End: 2018-06-20
Payer: MEDICARE

## 2018-06-20 ENCOUNTER — HOSPITAL ENCOUNTER (OUTPATIENT)
Age: 63
Setting detail: OBSERVATION
Discharge: HOME | End: 2018-06-21
Attending: OTOLARYNGOLOGY | Admitting: OTOLARYNGOLOGY
Payer: MEDICARE

## 2018-06-20 DIAGNOSIS — Q89.2 THYROGLOSSAL DUCT CYST: Primary | ICD-10-CM

## 2018-06-20 LAB
GLUCOSE BLD STRIP.AUTO-MCNC: 139 MG/DL (ref 65–100)
GLUCOSE BLD STRIP.AUTO-MCNC: 148 MG/DL (ref 65–100)
GLUCOSE BLD STRIP.AUTO-MCNC: 167 MG/DL (ref 65–100)
GLUCOSE BLD STRIP.AUTO-MCNC: 224 MG/DL (ref 65–100)
SERVICE CMNT-IMP: ABNORMAL

## 2018-06-20 PROCEDURE — 88311 DECALCIFY TISSUE: CPT | Performed by: OTOLARYNGOLOGY

## 2018-06-20 PROCEDURE — 77030020782 HC GWN BAIR PAWS FLX 3M -B

## 2018-06-20 PROCEDURE — 74011250636 HC RX REV CODE- 250/636: Performed by: ANESTHESIOLOGY

## 2018-06-20 PROCEDURE — 74011250636 HC RX REV CODE- 250/636: Performed by: OTOLARYNGOLOGY

## 2018-06-20 PROCEDURE — 76060000063 HC AMB SURG ANES 1.5 TO 2 HR: Performed by: OTOLARYNGOLOGY

## 2018-06-20 PROCEDURE — 77030027138 HC INCENT SPIROMETER -A

## 2018-06-20 PROCEDURE — 77030008684 HC TU ET CUF COVD -B: Performed by: ANESTHESIOLOGY

## 2018-06-20 PROCEDURE — 74011000250 HC RX REV CODE- 250

## 2018-06-20 PROCEDURE — 77030002996 HC SUT SLK J&J -A: Performed by: OTOLARYNGOLOGY

## 2018-06-20 PROCEDURE — 82962 GLUCOSE BLOOD TEST: CPT

## 2018-06-20 PROCEDURE — 77030002933 HC SUT MCRYL J&J -A: Performed by: OTOLARYNGOLOGY

## 2018-06-20 PROCEDURE — 77030027714 HC DRN WND KT TLS STRY -B: Performed by: OTOLARYNGOLOGY

## 2018-06-20 PROCEDURE — 74011000250 HC RX REV CODE- 250: Performed by: OTOLARYNGOLOGY

## 2018-06-20 PROCEDURE — 74011250637 HC RX REV CODE- 250/637: Performed by: OTOLARYNGOLOGY

## 2018-06-20 PROCEDURE — 99218 HC RM OBSERVATION: CPT

## 2018-06-20 PROCEDURE — 76030000003 HC AMB SURG OR TIME 1.5 TO 2: Performed by: OTOLARYNGOLOGY

## 2018-06-20 PROCEDURE — 74011250636 HC RX REV CODE- 250/636

## 2018-06-20 PROCEDURE — 77030018836 HC SOL IRR NACL ICUM -A: Performed by: OTOLARYNGOLOGY

## 2018-06-20 PROCEDURE — 76210000042 HC AMBSU PH I REC 5 TO 5.5 HR: Performed by: OTOLARYNGOLOGY

## 2018-06-20 PROCEDURE — 77030031139 HC SUT VCRL2 J&J -A: Performed by: OTOLARYNGOLOGY

## 2018-06-20 PROCEDURE — 77030013079 HC BLNKT BAIR HGGR 3M -A: Performed by: ANESTHESIOLOGY

## 2018-06-20 PROCEDURE — 77030026438 HC STYL ET INTUB CARD -A: Performed by: ANESTHESIOLOGY

## 2018-06-20 PROCEDURE — 88305 TISSUE EXAM BY PATHOLOGIST: CPT | Performed by: OTOLARYNGOLOGY

## 2018-06-20 PROCEDURE — 77030011267 HC ELECTRD BLD COVD -A: Performed by: OTOLARYNGOLOGY

## 2018-06-20 RX ORDER — DEXAMETHASONE SODIUM PHOSPHATE 4 MG/ML
INJECTION, SOLUTION INTRA-ARTICULAR; INTRALESIONAL; INTRAMUSCULAR; INTRAVENOUS; SOFT TISSUE AS NEEDED
Status: DISCONTINUED | OUTPATIENT
Start: 2018-06-20 | End: 2018-06-20 | Stop reason: HOSPADM

## 2018-06-20 RX ORDER — SODIUM CHLORIDE 0.9 % (FLUSH) 0.9 %
5-10 SYRINGE (ML) INJECTION EVERY 8 HOURS
Status: DISCONTINUED | OUTPATIENT
Start: 2018-06-20 | End: 2018-06-20 | Stop reason: HOSPADM

## 2018-06-20 RX ORDER — ACETAMINOPHEN 325 MG/1
975 TABLET ORAL EVERY 6 HOURS
Status: DISCONTINUED | OUTPATIENT
Start: 2018-06-20 | End: 2018-06-21 | Stop reason: HOSPADM

## 2018-06-20 RX ORDER — FENTANYL CITRATE 50 UG/ML
50 INJECTION, SOLUTION INTRAMUSCULAR; INTRAVENOUS AS NEEDED
Status: DISCONTINUED | OUTPATIENT
Start: 2018-06-20 | End: 2018-06-20 | Stop reason: HOSPADM

## 2018-06-20 RX ORDER — MORPHINE SULFATE 10 MG/ML
2 INJECTION, SOLUTION INTRAMUSCULAR; INTRAVENOUS
Status: DISCONTINUED | OUTPATIENT
Start: 2018-06-20 | End: 2018-06-20 | Stop reason: HOSPADM

## 2018-06-20 RX ORDER — FENTANYL CITRATE 50 UG/ML
50 INJECTION, SOLUTION INTRAMUSCULAR; INTRAVENOUS
Status: DISCONTINUED | OUTPATIENT
Start: 2018-06-20 | End: 2018-06-21 | Stop reason: HOSPADM

## 2018-06-20 RX ORDER — DEXTROSE, SODIUM CHLORIDE, AND POTASSIUM CHLORIDE 5; .45; .15 G/100ML; G/100ML; G/100ML
100 INJECTION INTRAVENOUS CONTINUOUS
Status: DISCONTINUED | OUTPATIENT
Start: 2018-06-20 | End: 2018-06-21 | Stop reason: HOSPADM

## 2018-06-20 RX ORDER — PREGABALIN 100 MG/1
200 CAPSULE ORAL 2 TIMES DAILY
Status: DISCONTINUED | OUTPATIENT
Start: 2018-06-20 | End: 2018-06-21 | Stop reason: HOSPADM

## 2018-06-20 RX ORDER — MIDAZOLAM HYDROCHLORIDE 1 MG/ML
1 INJECTION, SOLUTION INTRAMUSCULAR; INTRAVENOUS AS NEEDED
Status: DISCONTINUED | OUTPATIENT
Start: 2018-06-20 | End: 2018-06-20 | Stop reason: HOSPADM

## 2018-06-20 RX ORDER — GLYCOPYRROLATE 0.2 MG/ML
INJECTION INTRAMUSCULAR; INTRAVENOUS AS NEEDED
Status: DISCONTINUED | OUTPATIENT
Start: 2018-06-20 | End: 2018-06-20 | Stop reason: HOSPADM

## 2018-06-20 RX ORDER — HYDROMORPHONE HYDROCHLORIDE 1 MG/ML
0.5 INJECTION, SOLUTION INTRAMUSCULAR; INTRAVENOUS; SUBCUTANEOUS
Status: DISCONTINUED | OUTPATIENT
Start: 2018-06-20 | End: 2018-06-20 | Stop reason: HOSPADM

## 2018-06-20 RX ORDER — FENTANYL CITRATE 50 UG/ML
25 INJECTION, SOLUTION INTRAMUSCULAR; INTRAVENOUS
Status: DISCONTINUED | OUTPATIENT
Start: 2018-06-20 | End: 2018-06-20 | Stop reason: HOSPADM

## 2018-06-20 RX ORDER — SUCCINYLCHOLINE CHLORIDE 20 MG/ML
INJECTION INTRAMUSCULAR; INTRAVENOUS AS NEEDED
Status: DISCONTINUED | OUTPATIENT
Start: 2018-06-20 | End: 2018-06-20 | Stop reason: HOSPADM

## 2018-06-20 RX ORDER — NALOXONE HYDROCHLORIDE 0.4 MG/ML
0.4 INJECTION, SOLUTION INTRAMUSCULAR; INTRAVENOUS; SUBCUTANEOUS AS NEEDED
Status: DISCONTINUED | OUTPATIENT
Start: 2018-06-20 | End: 2018-06-21 | Stop reason: HOSPADM

## 2018-06-20 RX ORDER — OXYCODONE HYDROCHLORIDE 5 MG/1
5 TABLET ORAL
Status: DISCONTINUED | OUTPATIENT
Start: 2018-06-20 | End: 2018-06-21 | Stop reason: HOSPADM

## 2018-06-20 RX ORDER — DIPHENHYDRAMINE HYDROCHLORIDE 50 MG/ML
12.5 INJECTION, SOLUTION INTRAMUSCULAR; INTRAVENOUS
Status: DISCONTINUED | OUTPATIENT
Start: 2018-06-20 | End: 2018-06-21 | Stop reason: HOSPADM

## 2018-06-20 RX ORDER — MIDAZOLAM HYDROCHLORIDE 1 MG/ML
1 INJECTION, SOLUTION INTRAMUSCULAR; INTRAVENOUS
Status: DISCONTINUED | OUTPATIENT
Start: 2018-06-20 | End: 2018-06-20 | Stop reason: HOSPADM

## 2018-06-20 RX ORDER — ACETAMINOPHEN 10 MG/ML
INJECTION, SOLUTION INTRAVENOUS AS NEEDED
Status: DISCONTINUED | OUTPATIENT
Start: 2018-06-20 | End: 2018-06-20 | Stop reason: HOSPADM

## 2018-06-20 RX ORDER — LIDOCAINE HYDROCHLORIDE 10 MG/ML
0.5 INJECTION, SOLUTION EPIDURAL; INFILTRATION; INTRACAUDAL; PERINEURAL AS NEEDED
Status: DISCONTINUED | OUTPATIENT
Start: 2018-06-20 | End: 2018-06-20 | Stop reason: HOSPADM

## 2018-06-20 RX ORDER — ONDANSETRON 2 MG/ML
4 INJECTION INTRAMUSCULAR; INTRAVENOUS AS NEEDED
Status: DISCONTINUED | OUTPATIENT
Start: 2018-06-20 | End: 2018-06-20 | Stop reason: HOSPADM

## 2018-06-20 RX ORDER — OXYCODONE HYDROCHLORIDE 5 MG/1
5 TABLET ORAL AS NEEDED
Status: DISCONTINUED | OUTPATIENT
Start: 2018-06-20 | End: 2018-06-20 | Stop reason: HOSPADM

## 2018-06-20 RX ORDER — ROCURONIUM BROMIDE 10 MG/ML
INJECTION, SOLUTION INTRAVENOUS AS NEEDED
Status: DISCONTINUED | OUTPATIENT
Start: 2018-06-20 | End: 2018-06-20 | Stop reason: HOSPADM

## 2018-06-20 RX ORDER — MAGNESIUM SULFATE 100 %
4 CRYSTALS MISCELLANEOUS AS NEEDED
Status: DISCONTINUED | OUTPATIENT
Start: 2018-06-20 | End: 2018-06-21 | Stop reason: HOSPADM

## 2018-06-20 RX ORDER — SODIUM CHLORIDE 0.9 % (FLUSH) 0.9 %
5-10 SYRINGE (ML) INJECTION AS NEEDED
Status: DISCONTINUED | OUTPATIENT
Start: 2018-06-20 | End: 2018-06-20 | Stop reason: HOSPADM

## 2018-06-20 RX ORDER — ONDANSETRON 2 MG/ML
INJECTION INTRAMUSCULAR; INTRAVENOUS AS NEEDED
Status: DISCONTINUED | OUTPATIENT
Start: 2018-06-20 | End: 2018-06-20 | Stop reason: HOSPADM

## 2018-06-20 RX ORDER — SODIUM CHLORIDE, SODIUM LACTATE, POTASSIUM CHLORIDE, CALCIUM CHLORIDE 600; 310; 30; 20 MG/100ML; MG/100ML; MG/100ML; MG/100ML
125 INJECTION, SOLUTION INTRAVENOUS CONTINUOUS
Status: DISCONTINUED | OUTPATIENT
Start: 2018-06-20 | End: 2018-06-20 | Stop reason: HOSPADM

## 2018-06-20 RX ORDER — SODIUM CHLORIDE 0.9 % (FLUSH) 0.9 %
5-10 SYRINGE (ML) INJECTION EVERY 8 HOURS
Status: DISCONTINUED | OUTPATIENT
Start: 2018-06-20 | End: 2018-06-21 | Stop reason: HOSPADM

## 2018-06-20 RX ORDER — LIDOCAINE HYDROCHLORIDE 20 MG/ML
INJECTION, SOLUTION EPIDURAL; INFILTRATION; INTRACAUDAL; PERINEURAL AS NEEDED
Status: DISCONTINUED | OUTPATIENT
Start: 2018-06-20 | End: 2018-06-20 | Stop reason: HOSPADM

## 2018-06-20 RX ORDER — ONDANSETRON 2 MG/ML
4 INJECTION INTRAMUSCULAR; INTRAVENOUS
Status: DISCONTINUED | OUTPATIENT
Start: 2018-06-20 | End: 2018-06-21 | Stop reason: HOSPADM

## 2018-06-20 RX ORDER — DEXTROSE 50 % IN WATER (D50W) INTRAVENOUS SYRINGE
12.5-25 AS NEEDED
Status: DISCONTINUED | OUTPATIENT
Start: 2018-06-20 | End: 2018-06-21 | Stop reason: HOSPADM

## 2018-06-20 RX ORDER — NEOSTIGMINE METHYLSULFATE 1 MG/ML
INJECTION INTRAVENOUS AS NEEDED
Status: DISCONTINUED | OUTPATIENT
Start: 2018-06-20 | End: 2018-06-20 | Stop reason: HOSPADM

## 2018-06-20 RX ORDER — DEXTROSE, SODIUM CHLORIDE, SODIUM LACTATE, POTASSIUM CHLORIDE, AND CALCIUM CHLORIDE 5; .6; .31; .03; .02 G/100ML; G/100ML; G/100ML; G/100ML; G/100ML
125 INJECTION, SOLUTION INTRAVENOUS CONTINUOUS
Status: DISCONTINUED | OUTPATIENT
Start: 2018-06-20 | End: 2018-06-20 | Stop reason: HOSPADM

## 2018-06-20 RX ORDER — BUTALBITAL, ACETAMINOPHEN AND CAFFEINE 50; 325; 40 MG/1; MG/1; MG/1
1 TABLET ORAL
Status: DISCONTINUED | OUTPATIENT
Start: 2018-06-20 | End: 2018-06-21 | Stop reason: HOSPADM

## 2018-06-20 RX ORDER — LIDOCAINE HYDROCHLORIDE AND EPINEPHRINE 10; 10 MG/ML; UG/ML
INJECTION, SOLUTION INFILTRATION; PERINEURAL AS NEEDED
Status: DISCONTINUED | OUTPATIENT
Start: 2018-06-20 | End: 2018-06-20 | Stop reason: HOSPADM

## 2018-06-20 RX ORDER — GLYCOPYRROLATE 1 MG/1
1 TABLET ORAL 2 TIMES DAILY
Status: DISCONTINUED | OUTPATIENT
Start: 2018-06-20 | End: 2018-06-21 | Stop reason: HOSPADM

## 2018-06-20 RX ORDER — PROPOFOL 10 MG/ML
INJECTION, EMULSION INTRAVENOUS AS NEEDED
Status: DISCONTINUED | OUTPATIENT
Start: 2018-06-20 | End: 2018-06-20 | Stop reason: HOSPADM

## 2018-06-20 RX ORDER — SODIUM CHLORIDE 0.9 % (FLUSH) 0.9 %
5-10 SYRINGE (ML) INJECTION AS NEEDED
Status: DISCONTINUED | OUTPATIENT
Start: 2018-06-20 | End: 2018-06-21 | Stop reason: HOSPADM

## 2018-06-20 RX ORDER — CLINDAMYCIN PHOSPHATE 900 MG/50ML
INJECTION INTRAVENOUS AS NEEDED
Status: DISCONTINUED | OUTPATIENT
Start: 2018-06-20 | End: 2018-06-20 | Stop reason: HOSPADM

## 2018-06-20 RX ORDER — DIPHENHYDRAMINE HYDROCHLORIDE 50 MG/ML
12.5 INJECTION, SOLUTION INTRAMUSCULAR; INTRAVENOUS AS NEEDED
Status: DISCONTINUED | OUTPATIENT
Start: 2018-06-20 | End: 2018-06-20 | Stop reason: HOSPADM

## 2018-06-20 RX ORDER — FENTANYL CITRATE 50 UG/ML
INJECTION, SOLUTION INTRAMUSCULAR; INTRAVENOUS AS NEEDED
Status: DISCONTINUED | OUTPATIENT
Start: 2018-06-20 | End: 2018-06-20 | Stop reason: HOSPADM

## 2018-06-20 RX ORDER — MIDAZOLAM HYDROCHLORIDE 1 MG/ML
INJECTION, SOLUTION INTRAMUSCULAR; INTRAVENOUS AS NEEDED
Status: DISCONTINUED | OUTPATIENT
Start: 2018-06-20 | End: 2018-06-20 | Stop reason: HOSPADM

## 2018-06-20 RX ADMIN — ACETAMINOPHEN 975 MG: 325 TABLET ORAL at 17:25

## 2018-06-20 RX ADMIN — HYDROMORPHONE HYDROCHLORIDE 0.5 MG: 1 INJECTION, SOLUTION INTRAMUSCULAR; INTRAVENOUS; SUBCUTANEOUS at 11:55

## 2018-06-20 RX ADMIN — DEXTROSE MONOHYDRATE, SODIUM CHLORIDE, AND POTASSIUM CHLORIDE 100 ML/HR: 50; 4.5; 1.49 INJECTION, SOLUTION INTRAVENOUS at 20:15

## 2018-06-20 RX ADMIN — ROCURONIUM BROMIDE 10 MG: 10 INJECTION, SOLUTION INTRAVENOUS at 08:34

## 2018-06-20 RX ADMIN — ACETAMINOPHEN 1000 MG: 10 INJECTION, SOLUTION INTRAVENOUS at 08:56

## 2018-06-20 RX ADMIN — HYDROMORPHONE HYDROCHLORIDE 0.5 MG: 1 INJECTION, SOLUTION INTRAMUSCULAR; INTRAVENOUS; SUBCUTANEOUS at 14:40

## 2018-06-20 RX ADMIN — Medication 10 ML: at 21:50

## 2018-06-20 RX ADMIN — ONDANSETRON 4 MG: 2 INJECTION INTRAMUSCULAR; INTRAVENOUS at 09:43

## 2018-06-20 RX ADMIN — ONDANSETRON 4 MG: 2 INJECTION INTRAMUSCULAR; INTRAVENOUS at 08:45

## 2018-06-20 RX ADMIN — PROPOFOL 50 MG: 10 INJECTION, EMULSION INTRAVENOUS at 10:07

## 2018-06-20 RX ADMIN — GLYCOPYRROLATE 0.4 MG: 0.2 INJECTION INTRAMUSCULAR; INTRAVENOUS at 09:58

## 2018-06-20 RX ADMIN — SODIUM CHLORIDE, POTASSIUM CHLORIDE, SODIUM LACTATE AND CALCIUM CHLORIDE: 600; 310; 30; 20 INJECTION, SOLUTION INTRAVENOUS at 08:20

## 2018-06-20 RX ADMIN — Medication 10 ML: at 16:04

## 2018-06-20 RX ADMIN — Medication 10 ML: at 16:03

## 2018-06-20 RX ADMIN — LIDOCAINE HYDROCHLORIDE 60 MG: 20 INJECTION, SOLUTION EPIDURAL; INFILTRATION; INTRACAUDAL; PERINEURAL at 08:34

## 2018-06-20 RX ADMIN — FENTANYL CITRATE 50 MCG: 50 INJECTION, SOLUTION INTRAMUSCULAR; INTRAVENOUS at 09:00

## 2018-06-20 RX ADMIN — GLYCOPYRROLATE 1 MG: 1 TABLET ORAL at 17:26

## 2018-06-20 RX ADMIN — PROPOFOL 25 MG: 10 INJECTION, EMULSION INTRAVENOUS at 08:52

## 2018-06-20 RX ADMIN — FENTANYL CITRATE 50 MCG: 50 INJECTION, SOLUTION INTRAMUSCULAR; INTRAVENOUS at 16:03

## 2018-06-20 RX ADMIN — DEXAMETHASONE SODIUM PHOSPHATE 4 MG: 4 INJECTION, SOLUTION INTRA-ARTICULAR; INTRALESIONAL; INTRAMUSCULAR; INTRAVENOUS; SOFT TISSUE at 08:45

## 2018-06-20 RX ADMIN — PREGABALIN 200 MG: 100 CAPSULE ORAL at 17:25

## 2018-06-20 RX ADMIN — SUCCINYLCHOLINE CHLORIDE 160 MG: 20 INJECTION INTRAMUSCULAR; INTRAVENOUS at 08:34

## 2018-06-20 RX ADMIN — NEOSTIGMINE METHYLSULFATE 2 MG: 1 INJECTION INTRAVENOUS at 09:58

## 2018-06-20 RX ADMIN — FENTANYL CITRATE 50 MCG: 50 INJECTION, SOLUTION INTRAMUSCULAR; INTRAVENOUS at 10:25

## 2018-06-20 RX ADMIN — CLINDAMYCIN PHOSPHATE 900 MG: 900 INJECTION INTRAVENOUS at 08:43

## 2018-06-20 RX ADMIN — PROPOFOL 125 MG: 10 INJECTION, EMULSION INTRAVENOUS at 08:34

## 2018-06-20 RX ADMIN — FENTANYL CITRATE 50 MCG: 50 INJECTION, SOLUTION INTRAMUSCULAR; INTRAVENOUS at 11:55

## 2018-06-20 RX ADMIN — MIDAZOLAM HYDROCHLORIDE 2 MG: 1 INJECTION, SOLUTION INTRAMUSCULAR; INTRAVENOUS at 08:27

## 2018-06-20 RX ADMIN — ROCURONIUM BROMIDE 20 MG: 10 INJECTION, SOLUTION INTRAVENOUS at 08:53

## 2018-06-20 RX ADMIN — LIDOCAINE HYDROCHLORIDE 40 MG: 20 INJECTION, SOLUTION EPIDURAL; INFILTRATION; INTRACAUDAL; PERINEURAL at 08:35

## 2018-06-20 RX ADMIN — HYDROMORPHONE HYDROCHLORIDE 0.5 MG: 1 INJECTION, SOLUTION INTRAMUSCULAR; INTRAVENOUS; SUBCUTANEOUS at 10:30

## 2018-06-20 NOTE — IP AVS SNAPSHOT
[address] [facility] [address] [place] [address] 
[phone] Patient: Flora Frost MRN: [id number] EDK:[id number] About your hospitalization You were admitted on:  June 20, 2018 You last received care in the:  [place] [address] You were discharged on:  June 21, 2018 Why you were hospitalized Your primary diagnosis was:  Not on File Your diagnoses also included:  Thyroglossal Duct Cyst  
  
Follow-up Information Follow up With Details Comments Contact Info Lorena Chavez MD   [address] [place] [address] 
[phone] Your Scheduled Appointments Wednesday August 15, 2018  1:40 PM EDT Follow Up with Los Chavez MD  
[facility] at [facility]) [address]  
[phone] Discharge Orders None A check kiya indicates which time of day the medication should be taken. My Medications START taking these medications Instructions Each Dose to Equal  
 Morning Noon Evening Bedtime  
 glycopyrrolate 2 mg tablet Commonly known as:  Cecilia Malhotra Your last dose was: Your next dose is: Take 1 Tab by mouth two (2) times a day. 2 mg  
    
   
   
   
  
 oxyCODONE IR 5 mg immediate release tablet Commonly known as:  Tania Chávez Your last dose was: Your next dose is: Take 1 Tab by mouth every four (4) hours as needed for Pain. Max Daily Amount: 30 mg.  
 5 mg CHANGE how you take these medications Instructions Each Dose to Equal  
 Morning Noon Evening Bedtime Blood-Glucose Meter monitoring kit What changed:  additional instructions Your last dose was: Your next dose is:    
   
   
 by SubCUTAneous route Before breakfast and dinner. Free Style Lite glucometer and test strips levothyroxine 175 mcg tablet Commonly known as:  SYNTHROID What changed:   
- how much to take 
- how to take this - when to take this 
- additional instructions Your last dose was: Your next dose is: TAKE 1 TABLET BY MOUTH DAILY BEFORE BREAKFAST PARoxetine 40 mg tablet Commonly known as:  PAXIL What changed:   
- how much to take 
- how to take this - when to take this 
- additional instructions Your last dose was: Your next dose is: TAKE 1.5 TABS BY MOUTH DAILY. CONTINUE taking these medications Instructions Each Dose to Equal  
 Morning Noon Evening Bedtime  
 butalbital-acetaminophen-caff -40 mg per capsule Commonly known as:  Cloak Your last dose was: Your next dose is: Take 1 Cap by mouth every four (4) hours as needed for Pain. Max Daily Amount: 6 Caps. 1 Cap  
    
   
   
   
  
 clindamycin 300 mg capsule Commonly known as:  CLEOCIN Your last dose was: Your next dose is: Take 300 mg by mouth. Prior to dental procedures 300 mg  
    
   
   
   
  
 corticotropin 80 unit/mL injectable gel Commonly known as:  ACTHAR H.P.  
   
Your last dose was: Your next dose is:    
   
   
 1 mL by SubCUTAneous route daily. 80 units subq q day for 10 days 80 Units  
    
   
   
   
  
 dantrolene 100 mg capsule Commonly known as:  DANTRIUM Your last dose was: Your next dose is: Take 1 Cap by mouth as needed. 100 mg  
    
   
   
   
  
 ergocalciferol 50,000 unit capsule Commonly known as:  ERGOCALCIFEROL Your last dose was: Your next dose is: Take 1 Cap by mouth every seven (7) days. [address] Units  
    
   
   
   
  
 fentaNYL 75 mcg/hr Commonly known as:  Chrales Melo Start taking on:  7/8/2018 Your last dose was: Your next dose is:    
   
   
 1 Patch by TransDERmal route every seventy-two (72) hours. Max Daily Amount: 1 Patch. 1 Patch GILENYA 0.5 mg Cap Generic drug:  fingolimod Your last dose was: Your next dose is: Take 1 Cap by mouth daily. 1 Cap LIDODERM 5 % Generic drug:  lidocaine Your last dose was: Your next dose is:    
   
   
 by TransDERmal route every twenty-four (24) hours. Apply patch to the affected area for 12 hours a day and remove for 12 hours a day. meclizine 25 mg tablet Commonly known as:  ANTIVERT Your last dose was: Your next dose is: Take  by mouth three (3) times daily as needed. MESTINON TIMESPAN 180 mg SR tablet Generic drug:  pyridostigmine bromide Your last dose was: Your next dose is: Take 180 mg by mouth two (2) times a day. 180 mg  
    
   
   
   
  
 pregabalin 200 mg capsule Commonly known as:  Raimundo Berry Your last dose was: Your next dose is: Take 1 Cap by mouth two (2) times a day. Max Daily Amount: 400 mg.  
 200 mg  
    
   
   
   
  
 rosuvastatin 20 mg tablet Commonly known as:  CRESTOR Your last dose was: Your next dose is: TAKE 1 TABLET BY MOUTH EVERY EVENING  
     
   
   
   
  
 SUMAtriptan 100 mg tablet Commonly known as:  IMITREX Your last dose was: Your next dose is:    
   
   
 1 tab at onset of moderate-severe migraine; may repeat 1 tab in 2 hours; Limit: 2 tabs in 24/ hrs, not more than 3 days a week TENS UNIT ELECTRODES Your last dose was: Your next dose is:    
   
   
 by Does Not Apply route. prn  
     
   
   
   
  
 topiramate 200 mg tablet Commonly known as:  TOPAMAX Your last dose was: Your next dose is: TAKE 1 TABLET BY MOUTH TWICE DAILY  
     
   
   
   
  
  
STOP taking these medications   
 clopidogrel 75 mg Tab Commonly known as:  PLAVIX Where to Get Your Medications Information on where to get these meds will be given to you by the nurse or doctor. ! Ask your nurse or doctor about these medications  
  glycopyrrolate 2 mg tablet  
 oxyCODONE IR 5 mg immediate release tablet Opioid Education Prescription Opioids: What You Need to Know: 
 
 
 
ACTIVITIES  For the next 24 hours, do not drive or operate dangerous machinery or 
power tools, drink alcoholic beverages, make any important personal or 
business decisions, or sign any legal documents.  Limit your activity for the rest of the day.  Do NOT engage in sports, heavy work or lifting for two weeks following Surgery. Do not lift over 10 pounds for the next two weeks. DIET  Advance your diet as tolerated to a regular diet. SPECIAL CARE NEEDED  Your wound is covered in small white band-aids call \"steri-strips\". These should stay in place until your follow up appointment. Sometimes they fall off before then. If that happens, keep the wound covered in Bacitracin ointment. Do your best to keep water off of the wound. MEDICATIONS Resume your normal medications as prescribed by your primary doctor with the exception of Plavix. Do not resume the Plavix until 6/25/18. Pain medication: _____oxycodone______.  DO NOT DRIVE, OPERATE DANGEROUS MACHINERY, OR DRINK ALCOHOL WHILE TAKING NARCOTIC PAIN MEDICATION. Extra Strength Tylenol (acetaminophen)  You may take 2 tablets every 6 hours as needed for pain.  Do not take while still taking your narcotic pain medication. Robinul: take as prescribed for one week. WHEN TO CALL YOUR DOCTOR  A temperature greater than 100 F or 38 C.  Severe swelling over the neck. (It is normal to have a moderate amount of swelling around the incision. If you are not sure what is normal, please either call the number below or come to clinic to be examined.)  Blurred vision.  Stiff neck.  Extreme drowsiness after the first day.  Inability to urinate 8 hours after surgery.  Vomiting lasting more than 4 hours.  Any other symptoms which concern you. If you have any questions or concerns call my clinic at [phone]. Call [phone] if you have chest pain or shortness of breath FOLLOW UP: 
You will need to arrange a follow up appointment with me for approximately 7-10 days from now. I am always happy to see you sooner if you would like to review wound care instructions or for any other reason at all. [facility], [facility]  
200 [facility], [address] [place] [address]  
E) [phone] (O) [phone] [employer] Activation Thank you for requesting access to [employer]. Please follow the instructions below to securely access and download your online medical record. [employer] allows you to send messages to your doctor, view your test results, renew your prescriptions, schedule appointments, and more. How Do I Sign Up? 1. In your internet browser, go to [email or web address] 
2. Click on the First Time User? Click Here link in the Sign In box. You will be redirect to the New Member Sign Up page. 3. Enter your [employer] Access Code exactly as it appears below. You will not need to use this code after youve completed the sign-up process. If you do not sign up before the expiration date, you must request a new code. K[employer] Access Code: Activation code not generated Current Information Assurance Status: Active (This is the date your Information Assurance access code will ) 4. Enter the last four digits of your Social Security Number (xxxx) and Date of Birth (mm/dd/yyyy) as indicated and click Submit. You will be taken to the next sign-up page. 5. Create a OurHealthMatet ID. This will be your Information Assurance login ID and cannot be changed, so think of one that is secure and easy to remember. 6. Create a Information Assurance password. You can change your password at any time. 7. Enter your Password Reset Question and Answer. This can be used at a later time if you forget your password. 8. Enter your e-mail address. You will receive e-mail notification when new information is available in [address] 9. Click Sign Up. You can now view and download portions of your medical record. 10. Click the Download Summary menu link to download a portable copy of your medical information. Additional Information If you have questions, please visit the Frequently Asked Questions section of the Information Assurance website at [email or web address] Remember, Information Assurance is NOT to be used for urgent needs. For medical emergencies, dial 911. DISCHARGE SUMMARY from Nurse PATIENT INSTRUCTIONS: 
 
After general anesthesia or intravenous sedation, for 24 hours or while taking prescription Narcotics: · Limit your activities · Do not drive and operate hazardous machinery · Do not make important personal or business decisions · Do  not drink alcoholic beverages · If you have not urinated within 8 hours after discharge, please contact your surgeon on call. Report the following to your surgeon: 
· Excessive pain, swelling, redness or odor of or around the surgical area · Temperature over 100.5 · Nausea and vomiting lasting longer than 4 hours or if unable to take medications · Any signs of decreased circulation or nerve impairment to extremity: change in color, persistent  numbness, tingling, coldness or increase pain · Any questions What to do at Home: *  Please give a list of your current medications to your Primary Care Provider. *  Please update this list whenever your medications are discontinued, doses are 
    changed, or new medications (including over-the-counter products) are added. *  Please carry medication information at all times in case of emergency situations. These are general instructions for a healthy lifestyle: No smoking/ No tobacco products/ Avoid exposure to second hand smoke Surgeon General's Warning:  Quitting smoking now greatly reduces serious risk to your health. Obesity, smoking, and sedentary lifestyle greatly increases your risk for illness A healthy diet, regular physical exercise & weight monitoring are important for maintaining a healthy lifestyle You may be retaining fluid if you have a history of heart failure or if you experience any of the following symptoms:  Weight gain of 3 pounds or more overnight or 5 pounds in a week, increased swelling in our hands or feet or shortness of breath while lying flat in bed. Please call your doctor as soon as you notice any of these symptoms; do not wait until your next office visit. Recognize signs and symptoms of STROKE: 
 
F-face looks uneven A-arms unable to move or move unevenly S-speech slurred or non-existent T-time-call 911 as soon as signs and symptoms begin-DO NOT go Back to bed or wait to see if you get better-TIME IS BRAIN. Warning Signs of HEART ATTACK Call 911 if you have these symptoms: 
? Chest discomfort. Most heart attacks involve discomfort in the center of the chest that lasts more than a few minutes, or that goes away and comes back. It can feel like uncomfortable pressure, squeezing, fullness, or pain. ? Discomfort in other areas of the upper body. Symptoms can include pain or discomfort in one or both arms, the back, neck, jaw, or stomach. ? Shortness of breath with or without chest discomfort. ? Other signs may include breaking out in a cold sweat, nausea, or lightheadedness. Don't wait more than five minutes to call [address]! Fast action can save your life. Calling 911 is almost always the fastest way to get lifesaving treatment. Emergency Medical Services staff can begin treatment when they arrive  up to an hour sooner than if someone gets to the hospital by car. The discharge information has been reviewed with the patient. The patient verbalized understanding. Discharge medications reviewed with the patient and appropriate educational materials and side effects teaching were provided. ___________________________________________________________________________________________________________________________________ ACO Transitions of Care Introducing [employer] [address] Zaina Silviano offers a voluntary care coordination program to provide high quality service and care to [facility] fee-for-service beneficiaries. Molly Cockayne was designed to help you enhance your health and well-being through the following services: ? Transitions of Care  support for individuals who are transitioning from one care setting to another (example: Hospital to home). ? Chronic and Complex Care Coordination  support for individuals and caregivers of those with serious or chronic illnesses or with more than one chronic (ongoing) condition and those who take a number of different medications. If you meet specific medical criteria, a [address] [place] [address] Rd may call you directly to coordinate your care with your primary care physician and your other care providers. For questions about the [facility] programs, please, contact your physicians office. For general questions or additional information about Accountable Care Organizations: Please visit www.medicare.gov/acos. html or call 1-800-MEDICARE (4-920.526.4828) TTY users should call 7-398.882.2971. Needbox AS Announcement We are excited to announce that we are making your provider's discharge notes available to you in Needbox AS. You will see these notes when they are completed and signed by the physician that discharged you from your recent hospital stay. If you have any questions or concerns about any information you see in Needbox AS, please call the Health Information Department where you were seen or reach out to your Primary Care Provider for more information about your plan of care. Introducing [facility]! Dear Tu Cronin: 
Thank you for requesting a Needbox AS account. Our records indicate that you already have an active Needbox AS account. You can access your account anytime at [email or web address] Did you know that you can access your hospital and ER discharge instructions at any time in Needbox AS? You can also review all of your test results from your hospital stay or ER visit. Additional Information If you have questions, please visit the Frequently Asked Questions section of the Needbox AS website at [email or web address] Remember, Needbox AS is NOT to be used for urgent needs. For medical emergencies, dial 911. Now available from your iPhone and Android! Introducing Dexter Villareal As a Micaela Barbour patient, I wanted to make you aware of our electronic visit tool called Dexter Villareal. Micaelatimoteo Barbour 24/7 allows you to connect within minutes with a medical provider 24 hours a day, seven days a week via a mobile device or tablet or logging into a secure website from your computer. You can access Dexter Villareal from anywhere in the [place].  
 
A virtual visit might be right for you when you have a simple condition and feel like you just dont want to get out of bed, or cant get away from work for an appointment, when your regular [employer] provider is not available (evenings, weekends or holidays), or when youre out of town and need minor care. Electronic visits cost only $49 and if the [employer] 24/7 provider determines a prescription is needed to treat your condition, one can be electronically transmitted to a nearby pharmacy*. Please take a moment to enroll today if you have not already done so. The enrollment process is free and takes just a few minutes. To enroll, please download the [employer] 24/7 jodie to your tablet or phone, or visit [email or web address] to enroll on your computer. And, as an [address] [place] [address] patient with a [employer] account, the results of your visits will be scanned into your electronic medical record and your primary care provider will be able to view the scanned results. We urge you to continue to see your regular [employer] provider for your ongoing medical care. And while your primary care provider may not be the one available when you seek a Sequenomjaydonfin virtual visit, the peace of mind you get from getting a real diagnosis real time can be priceless. For more information on RunMyProcess, view our Frequently Asked Questions (FAQs) at [email or web address]. Sincerely, 
 
Piper Lipscomb MD 
Chief Medical Officer [facility] Zaina Shore *:  certain medications cannot be prescribed via Sequenomjaydonfin Providers Seen During Your Hospitalization Provider Specialty Primary office phone Chetan Saravia MD Otolaryngology [phone] Your Primary Care Physician (PCP) Primary Care Physician Office Phone Office Fax Valencia Abraham [phone] You are allergic to the following Allergen Reactions Bee Sting (Sting, Bee) Anaphylaxis Also allergic to egg products Penicillins Anaphylaxis Betadine (Povidone-Iodine) Rash Egg Itching Recent Documentation Height Weight BMI OB Status Smoking Status 1.829 m 90.6 kg 27.09 kg/m2 Postmenopausal Current Every Day Smoker Emergency Contacts Name Discharge Info Relation Home Work Mobile Minor III,Wilmer DISCHARGE CAREGIVER [3] Son [22] [phone] Nichols Du  Child [2] [phone] Patient Belongings The following personal items are in your possession at time of discharge: 
  Dental Appliances: None Please provide this summary of care documentation to your next provider. Signatures-by signing, you are acknowledging that this After Visit Summary has been reviewed with you and you have received a copy. Patient Signature:  ____________________________________________________________ Date:  ____________________________________________________________  
  
[facility] Provider Signature:  ____________________________________________________________ Date:  ____________________________________________________________

## 2018-06-20 NOTE — H&P
H&P Update:  Hussein Galvin was seen and examined. History and physical has been reviewed. The patient has been examined.  There have been no significant clinical changes since the completion of the originally dated History and Physical.    Signed By: Trey Venegas MD     June 20, 2018 8:02 AM

## 2018-06-20 NOTE — PERIOP NOTES
Patient very pleasant woman. Has significant hx for MS and Myasthenia Gravis. 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 Patient has significant neurologic issues with hands and legs. Wears braces to bilateral feet, able to complete ADL without assistance. Patient will be spending the night due to PMH. Family sent to have breakfast.  Dr. Lay Bartlett will contact family when surgical procedure is complete.

## 2018-06-20 NOTE — PROGRESS NOTES
Bedside shift change report given to Doc Noriega (oncoming nurse) by Priyank Gonzales (offgoing nurse). Report included the following information SBAR, Kardex and MAR.

## 2018-06-20 NOTE — PROGRESS NOTES
Bedside shift change report given to Evelyne Colon RN (oncoming nurse) by Joy Guzman RN (offgoing nurse). Report included the following information SBAR, Kardex, Procedure Summary, Intake/Output, MAR and Recent Results.

## 2018-06-20 NOTE — OP NOTES
[address]   OPERATIVE REPORT    Darylene Pulse  MR#: [id number]  : 1955  ACCOUNT #: [de-identified]   DATE OF SERVICE: 2018    PREOPERATIVE DIAGNOSIS:  Thyroglossal duct cyst.    POSTOPERATIVE DIAGNOSIS:  Thyroglossal duct cyst.    PROCEDURE PERFORMED:  Sistrunk procedure. SURGEON:  Dr. Mini Melo. OPERATIVE FINDINGS:  Midline neck cyst removed in continuity with the central portion of the hyoid bone. ANESTHESIA:  General endotracheal.    IV FLUIDS:  1 liter. ESTIMATED BLOOD LOSS:  5 mL. SPECIMENS REMOVED:  Central neck cyst.    DRAINS:  7-Welsh TLS drain. COMPLICATIONS:  None. DISPOSITION:  PACU, then surgical unit. CONDITION:  Stable. ASSISTANTS:  None. IMPLANTS:  None. BRIEF HISTORY OF PRESENT ILLNESS:  The patient is a 62-year-old female with a midline neck cyst consistent with a thyroglossal duct cyst.  She presents for excision. DESCRIPTION OF PROCEDURE:  The patient was identified in the preop holding area and brought to the operating room. She was placed in supine position. General anesthesia was induced and she was orotracheally intubated. A total of 6 mL of 1% lidocaine with 1:100,000 parts epinephrine were injected into the skin over the hyoid bone. She was prepped and draped in sterile fashion. A timeout was performed in which we identified her name, medical record number, and the proposed procedure. A 4 cm horizontal incision was made over the hyoid bone. Subplatysmal flaps were raised superiorly and inferiorly over the cyst.  The strap muscles were divided vertically inferior to the cyst and I carefully lifted the cyst off of the thyrohyoid membrane, moving superiorly. I divided the infrahyoid strap muscles off of the central portion of the body of the hyoid bone bilaterally. I used a right-angle clamp to dissect under the bone and then I divided the bone sharply with bone cutters.   I then divided the mylohyoid and geniohyoid muscles off of the superior aspect of the hyoid bone and came across the tract of the thyroglossal duct remnant superior to the hyoid bone. I did not enter into the vallecula. I then obtained hemostasis in the wound with the Bovie. I turned attention to closure. I approximated the infrahyoid strap muscles with 3-0 Vicryl sutures. I then reattached the infrahyoid strap muscles to the suprahyoid strap muscles to help obliterate the dissection cavity. I placed a 7-Greek TLS drain and made a puncture wound through a separate stab incision for the drain. I then closed the platysma fascial layer and the deep dermal layer using 3-0 Vicryl sutures. I closed the skin using 5-0 Monocryl in subcuticular fashion followed by Mastisol and Steri-Strips. I then turned care over to the anesthesia team who weaned the anesthetic and transferred the patient to the PACU after extubation. She tolerated the procedure well.       MD SID Nguyen / JENNIFER  D: 06/20/2018 10:12     T: 06/20/2018 11:51  JOB #: [id number]

## 2018-06-20 NOTE — PERIOP NOTES
TRANSFER - OUT REPORT:    Verbal report given to Nima Ruff (name) on Hay GIBBONS Minor  being transferred to  #506 B (unit) for routine progression of care       Report consisted of patients Situation, Background, Assessment and   Recommendations(SBAR). Information from the following report(s) SBAR, Kardex, OR Summary, Procedure Summary, Intake/Output, MAR, Accordion, Recent Results and Med Rec Status was reviewed with the receiving nurse. Lines:   Venous Access Device power port 05/17/16 (Active)       Venous Access Device POWER PORT Upper chest (subclavicular area, right (Active)       Peripheral IV 06/20/18 Left; Anterior Hand (Active)   Site Assessment Clean, dry, & intact 6/20/2018 10:00 AM   Phlebitis Assessment 0 6/20/2018 10:00 AM   Infiltration Assessment 0 6/20/2018 10:00 AM   Dressing Status Clean, dry, & intact 6/20/2018 10:00 AM   Dressing Type Non-adherent dressing 6/20/2018 10:00 AM   Hub Color/Line Status Blue 6/20/2018 10:00 AM        Opportunity for questions and clarification was provided. Patient transported with:   Registered Nurse     Family sent to room to await patient arrival  Clothing, sneakers and ankle braces on bed transferred with patient.

## 2018-06-20 NOTE — ROUTINE PROCESS
Patient: Aden Obrien MRN: [id number]  SSN: [id number]   YOB: 1955  Age: 66 y.o. Sex: female     Patient is status post Procedure(s):  Excision Thyroglossal Duct Cyst.    Surgeon(s) and Role:     * Thelma Payton MD - Primary    Local/Dose/Irrigation:  SEE MAR                Venous Access Device power port 05/17/16 (Active)       Venous Access Device POWER PORT Upper chest (subclavicular area, right (Active)      Peripheral IV 06/20/18 Left; Anterior Hand (Active)   Site Assessment Clean, dry, & intact 6/20/2018  7:25 AM   Phlebitis Assessment 0 6/20/2018  7:25 AM   Infiltration Assessment 0 6/20/2018  7:25 AM   Dressing Status Clean, dry, & intact 6/20/2018  7:25 AM   Dressing Type Non-adherent dressing 6/20/2018  7:25 AM   Hub Color/Line Status Infusing;Blue 6/20/2018  7:25 AM            Airway - Endotracheal Tube 06/20/18 (Active)                   Dressing/Packing:     Splint/Cast:  ]    Other:

## 2018-06-20 NOTE — ANESTHESIA POSTPROCEDURE EVALUATION
Post-Anesthesia Evaluation and Assessment    Patient: Oleksandr Babin MRN: [id number]  SSN: [id number]    YOB: 1955  Age: 66 y.o. Sex: female       Cardiovascular Function/Vital Signs  Visit Vitals    /87    Pulse 92    Temp 36.8 °C (98.2 °F)    Resp 16    Ht 6' (1.829 m)    Wt 90.6 kg (199 lb 11.8 oz)    SpO2 98%    BMI 27.09 kg/m2       Patient is status post general anesthesia for Procedure(s):  Excision Thyroglossal Duct Cyst.    Nausea/Vomiting: None    Postoperative hydration reviewed and adequate. Pain:  Pain Scale 1: Numeric (0 - 10) (18 1035)  Pain Intensity 1: 10 (18 1035)   Managed    Neurological Status:   Neuro (WDL): Exceptions to WDL (Myasthenia  and Multiple Sclerosis) (18 0735)   At baseline    Mental Status and Level of Consciousness: Arousable    Pulmonary Status:   O2 Device: CO2 nasal cannula (18 1015)   Adequate oxygenation and airway patent    Complications related to anesthesia: None    Post-anesthesia assessment completed.  No concerns    Signed By: Fabrizio Hill MD     2018

## 2018-06-20 NOTE — BRIEF OP NOTE
BRIEF OPERATIVE NOTE    Date of Procedure: 6/20/2018   Preoperative Diagnosis: THYROGLOSSAL DUCT CYST  Postoperative Diagnosis: THYROGLOSSAL DUCT CYST    Procedure(s):  Excision Thyroglossal Duct Cyst (Sistrunk procedure)  Surgeon(s) and Role:     * Joby Krishnamurthy MD - Primary         Surgical Assistant: none    Surgical Staff:  Circ-1: Lissy Overton RN  Circ-Relief: Rosalva Sotelo RN  Registered Nurse Assistant: Jaylene Morel RN  Scrub RN-1: Laura Eden RN  Event Time In   Incision Start 2741   Incision Close 3591     Anesthesia: General   Estimated Blood Loss: 5mL  Specimens:   ID Type Source Tests Collected by Time Destination   1 : [address] [place] CYST Fresh Tissue  Joby Krishnamurthy MD 6/20/2018 1251 Pathology      Findings: central portion of hyoid bone removed   Complications: none  Implants: * No implants in log *

## 2018-06-20 NOTE — DISCHARGE INSTRUCTIONS
POSTOPERTIVE INSTRUCTIONS:      ACTIVITIES   For the next 24 hours, do not drive or operate dangerous machinery or  power tools, drink alcoholic beverages, make any important personal or  business decisions, or sign any legal documents.  Limit your activity for the rest of the day.  Do NOT engage in sports, heavy work or lifting for two weeks following  Surgery. Do not lift over 10 pounds for the next two weeks. DIET   Advance your diet as tolerated to a regular diet. SPECIAL CARE NEEDED   Your wound is covered in small white band-aids call \"steri-strips\". These should stay in place until your follow up appointment. Sometimes they fall off before then. If that happens, keep the wound covered in Bacitracin ointment. Do your best to keep water off of the wound. MEDICATIONS  Resume your normal medications as prescribed by your primary doctor with the exception of Plavix. Do not resume the Plavix until 6/25/18. Pain medication: _____oxycodone______.  DO NOT DRIVE, OPERATE DANGEROUS MACHINERY, OR DRINK  ALCOHOL WHILE TAKING NARCOTIC PAIN MEDICATION. Extra Strength Tylenol (acetaminophen)   You may take 2 tablets every 6 hours as needed for pain.  Do not take while still taking your narcotic pain medication. Robinul: take as prescribed for one week. WHEN TO CALL YOUR DOCTOR   A temperature greater than 100 F or 38 C.  Severe swelling over the neck. (It is normal to have a moderate amount of swelling around the incision. If you are not sure what is normal, please either call the number below or come to clinic to be examined.)   Blurred vision.  Stiff neck.  Extreme drowsiness after the first day.  Inability to urinate 8 hours after surgery.  Vomiting lasting more than 4 hours.  Any other symptoms which concern you. If you have any questions or concerns call my clinic at [phone].   Call [phone] if you have chest pain or shortness of breath    FOLLOW UP:  You will need to arrange a follow up appointment with me for approximately 7-10 days from now. I am always happy to see you sooner if you would like to review wound care instructions or for any other reason at all. Laura Shane Bachelor, [address] [facility]   [address] [facility], [address] [place] [address], [address] [place] [address]   S) [phone] (O) [phone]    MyChart Activation    Thank you for requesting access to [address] Please follow the instructions below to securely access and download your online medical record. [employer] allows you to send messages to your doctor, view your test results, renew your prescriptions, schedule appointments, and more. How Do I Sign Up? 1. In your internet browser, go to [email or web address]  2. Click on the First Time User? Click Here link in the Sign In box. You will be redirect to the New Member Sign Up page. 3. Enter your [employer] Access Code exactly as it appears below. You will not need to use this code after youve completed the sign-up process. If you do not sign up before the expiration date, you must request a new code. [employer] Access Code: Activation code not generated  Current [employer] Status: Active (This is the date your [employer] access code will )    4. Enter the last four digits of your Social Security Number (xxxx) and Date of Birth (mm/dd/yyyy) as indicated and click Submit. You will be taken to the next sign-up page. 5. Create a [employer] ID. This will be your [employer] login ID and cannot be changed, so think of one that is secure and easy to remember. 6. Create a [employer] password. You can change your password at any time. 7. Enter your Password Reset Question and Answer. This can be used at a later time if you forget your password. 8. Enter your e-mail address. You will receive e-mail notification when new information is available in [address] 9. Click Sign Up.  You can now view and download portions of your medical record. 10. Click the Download Summary menu link to download a portable copy of your medical information. Additional Information    If you have questions, please visit the Frequently Asked Questions section of the [employer] website at [email or web address] Remember, [employer] is NOT to be used for urgent needs. For medical emergencies, dial 911. DISCHARGE SUMMARY from Nurse    PATIENT INSTRUCTIONS:    After general anesthesia or intravenous sedation, for 24 hours or while taking prescription Narcotics:  · Limit your activities  · Do not drive and operate hazardous machinery  · Do not make important personal or business decisions  · Do  not drink alcoholic beverages  · If you have not urinated within 8 hours after discharge, please contact your surgeon on call. Report the following to your surgeon:  · Excessive pain, swelling, redness or odor of or around the surgical area  · Temperature over 100.5  · Nausea and vomiting lasting longer than 4 hours or if unable to take medications  · Any signs of decreased circulation or nerve impairment to extremity: change in color, persistent  numbness, tingling, coldness or increase pain  · Any questions    What to do at Home:      *  Please give a list of your current medications to your Primary Care Provider. *  Please update this list whenever your medications are discontinued, doses are      changed, or new medications (including over-the-counter products) are added. *  Please carry medication information at all times in case of emergency situations. These are general instructions for a healthy lifestyle:    No smoking/ No tobacco products/ Avoid exposure to second hand smoke  Surgeon General's Warning:  Quitting smoking now greatly reduces serious risk to your health.     Obesity, smoking, and sedentary lifestyle greatly increases your risk for illness    A healthy diet, regular physical exercise & weight monitoring are important for maintaining a healthy lifestyle    You may be retaining fluid if you have a history of heart failure or if you experience any of the following symptoms:  Weight gain of 3 pounds or more overnight or 5 pounds in a week, increased swelling in our hands or feet or shortness of breath while lying flat in bed. Please call your doctor as soon as you notice any of these symptoms; do not wait until your next office visit. Recognize signs and symptoms of STROKE:    F-face looks uneven    A-arms unable to move or move unevenly    S-speech slurred or non-existent    T-time-call 911 as soon as signs and symptoms begin-DO NOT go       Back to bed or wait to see if you get better-TIME IS BRAIN. Warning Signs of HEART ATTACK     Call 911 if you have these symptoms:   Chest discomfort. Most heart attacks involve discomfort in the center of the chest that lasts more than a few minutes, or that goes away and comes back. It can feel like uncomfortable pressure, squeezing, fullness, or pain.  Discomfort in other areas of the upper body. Symptoms can include pain or discomfort in one or both arms, the back, neck, jaw, or stomach.  Shortness of breath with or without chest discomfort.  Other signs may include breaking out in a cold sweat, nausea, or lightheadedness. Don't wait more than five minutes to call 911 - MINUTES MATTER! Fast action can save your life. Calling 911 is almost always the fastest way to get lifesaving treatment. Emergency Medical Services staff can begin treatment when they arrive -- up to an hour sooner than if someone gets to the hospital by car. The discharge information has been reviewed with the patient. The patient verbalized understanding.   Discharge medications reviewed with the patient and appropriate educational materials and side effects teaching were provided.   ___________________________________________________________________________________________________________________________________

## 2018-06-20 NOTE — ANESTHESIA PREPROCEDURE EVALUATION
Anesthetic History   No history of anesthetic complications            Review of Systems / Medical History  Patient summary reviewed, nursing notes reviewed and pertinent labs reviewed    Pulmonary        Sleep apnea: No treatment           Neuro/Psych   Within defined limits           Cardiovascular    Hypertension: well controlled                   GI/Hepatic/Renal  Within defined limits              Endo/Other    Diabetes: well controlled, type 2  Hypothyroidism: well controlled       Other Findings   Comments: Multiple Sclerosis  Myasthenia Gravis           Physical Exam    Airway  Mallampati: II  TM Distance: > 6 cm  Neck ROM: normal range of motion   Mouth opening: Normal     Cardiovascular  Regular rate and rhythm,  S1 and S2 normal,  no murmur, click, rub, or gallop             Dental  No notable dental hx       Pulmonary  Breath sounds clear to auscultation               Abdominal  GI exam deferred       Other Findings            Anesthetic Plan    ASA: 3  Anesthesia type: general          Induction: Intravenous  Anesthetic plan and risks discussed with: Patient

## 2018-06-20 NOTE — IP AVS SNAPSHOT
[address] 
[phone] Patient: Mohan Frankel MRN: [id number] BNS:3/[birth year]7331 A check kiya indicates which time of day the medication should be taken. My Medications START taking these medications Instructions Each Dose to Equal  
 Morning Noon Evening Bedtime  
 glycopyrrolate 2 mg tablet Commonly known as:  Jaradriel Byram Your last dose was: Your next dose is: Take 1 Tab by mouth two (2) times a day. 2 mg  
    
   
   
   
  
 oxyCODONE IR 5 mg immediate release tablet Commonly known as:   [place] Your last dose was: Your next dose is: Take 1 Tab by mouth every four (4) hours as needed for Pain. Max Daily Amount: 30 mg.  
 5 mg CHANGE how you take these medications Instructions Each Dose to Equal  
 Morning Noon Evening Bedtime Blood-Glucose Meter monitoring kit What changed:  additional instructions Your last dose was: Your next dose is:    
   
   
 by SubCUTAneous route Before breakfast and dinner. Free Style Lite glucometer and test strips  
     
   
   
   
  
 levothyroxine 175 mcg tablet Commonly known as:  SYNTHROID What changed:   
- how much to take 
- how to take this - when to take this 
- additional instructions Your last dose was: Your next dose is: TAKE 1 TABLET BY MOUTH DAILY BEFORE BREAKFAST PARoxetine 40 mg tablet Commonly known as:  PAXIL What changed:   
- how much to take 
- how to take this - when to take this 
- additional instructions Your last dose was: Your next dose is: TAKE 1.5 TABS BY MOUTH DAILY. CONTINUE taking these medications Instructions Each Dose to Equal  
 Morning Noon Evening Bedtime  
 butalbital-acetaminophen-caff -40 mg per capsule Commonly known as:  [employer] Your last dose was: Your next dose is: Take 1 Cap by mouth every four (4) hours as needed for Pain. Max Daily Amount: 6 Caps. 1 Cap  
    
   
   
   
  
 clindamycin 300 mg capsule Commonly known as:  CLEOCIN Your last dose was: Your next dose is: Take 300 mg by mouth. Prior to dental procedures 300 mg  
    
   
   
   
  
 corticotropin 80 unit/mL injectable gel Commonly known as:  ACTHAR H.P.  
   
Your last dose was: Your next dose is:    
   
   
 1 mL by SubCUTAneous route daily. 80 units subq q day for 10 days 80 Units  
    
   
   
   
  
 dantrolene 100 mg capsule Commonly known as:  DANTRIUM Your last dose was: Your next dose is: Take 1 Cap by mouth as needed. 100 mg  
    
   
   
   
  
 ergocalciferol 50,000 unit capsule Commonly known as:  ERGOCALCIFEROL Your last dose was: Your next dose is: Take 1 Cap by mouth every seven (7) days. 98436 Units  
    
   
   
   
  
 fentaNYL 75 mcg/hr Commonly known as:  Adan Mina Start taking on:  7/8/2018 Your last dose was: Your next dose is:    
   
   
 1 Patch by TransDERmal route every seventy-two (72) hours. Max Daily Amount: 1 Patch. 1 Patch GILENYA 0.5 mg Cap Generic drug:  fingolimod Your last dose was: Your next dose is: Take 1 Cap by mouth daily. 1 Cap LIDODERM 5 % Generic drug:  lidocaine Your last dose was: Your next dose is:    
   
   
 by TransDERmal route every twenty-four (24) hours. Apply patch to the affected area for 12 hours a day and remove for 12 hours a day. meclizine 25 mg tablet Commonly known as:  ANTIVERT Your last dose was: Your next dose is: Take  by mouth three (3) times daily as needed. MESTINON TIMESPAN 180 mg SR tablet Generic drug:  pyridostigmine bromide Your last dose was: Your next dose is: Take 180 mg by mouth two (2) times a day. 180 mg  
    
   
   
   
  
 pregabalin 200 mg capsule Commonly known as:  Madi Damon Your last dose was: Your next dose is: Take 1 Cap by mouth two (2) times a day. Max Daily Amount: 400 mg.  
 200 mg  
    
   
   
   
  
 rosuvastatin 20 mg tablet Commonly known as:  CRESTOR Your last dose was: Your next dose is: TAKE 1 TABLET BY MOUTH EVERY EVENING  
     
   
   
   
  
 SUMAtriptan 100 mg tablet Commonly known as:  IMITREX Your last dose was: Your next dose is:    
   
   
 1 tab at onset of moderate-severe migraine; may repeat 1 tab in 2 hours; Limit: 2 tabs in 24/ hrs, not more than 3 days a week TENS UNIT ELECTRODES Your last dose was: Your next dose is:    
   
   
 by Does Not Apply route. prn  
     
   
   
   
  
 topiramate 200 mg tablet Commonly known as:  TOPAMAX Your last dose was: Your next dose is: TAKE 1 TABLET BY MOUTH TWICE DAILY  
     
   
   
   
  
  
STOP taking these medications   
 clopidogrel 75 mg Tab Commonly known as:  PLAVIX Where to Get Your Medications Information on where to get these meds will be given to you by the nurse or doctor. ! Ask your nurse or doctor about these medications  
  glycopyrrolate 2 mg tablet  
 oxyCODONE IR 5 mg immediate release tablet

## 2018-06-21 VITALS
HEART RATE: 65 BPM | WEIGHT: 199.74 LBS | RESPIRATION RATE: 16 BRPM | SYSTOLIC BLOOD PRESSURE: 137 MMHG | DIASTOLIC BLOOD PRESSURE: 74 MMHG | TEMPERATURE: 98.6 F | OXYGEN SATURATION: 98 % | BODY MASS INDEX: 27.05 KG/M2 | HEIGHT: 72 IN

## 2018-06-21 LAB
GLUCOSE BLD STRIP.AUTO-MCNC: 148 MG/DL (ref 65–100)
SERVICE CMNT-IMP: ABNORMAL

## 2018-06-21 PROCEDURE — 99218 HC RM OBSERVATION: CPT

## 2018-06-21 PROCEDURE — 82962 GLUCOSE BLOOD TEST: CPT

## 2018-06-21 PROCEDURE — 74011250637 HC RX REV CODE- 250/637: Performed by: OTOLARYNGOLOGY

## 2018-06-21 RX ORDER — GLYCOPYRROLATE 2 MG/1
2 TABLET ORAL 2 TIMES DAILY
Qty: 14 TAB | Refills: 0 | Status: SHIPPED | OUTPATIENT
Start: 2018-06-21 | End: 2018-10-25

## 2018-06-21 RX ORDER — OXYCODONE HYDROCHLORIDE 5 MG/1
5 TABLET ORAL
Qty: 30 TAB | Refills: 0 | Status: SHIPPED | OUTPATIENT
Start: 2018-06-21 | End: 2018-10-25

## 2018-06-21 RX ADMIN — Medication 10 ML: at 06:25

## 2018-06-21 RX ADMIN — OXYCODONE HYDROCHLORIDE 5 MG: 5 TABLET ORAL at 06:27

## 2018-06-21 RX ADMIN — GLYCOPYRROLATE 1 MG: 1 TABLET ORAL at 09:55

## 2018-06-21 RX ADMIN — PREGABALIN 200 MG: 100 CAPSULE ORAL at 09:55

## 2018-06-21 NOTE — PROGRESS NOTES
[facility] follow-up appointment on Thursday June 28,2018 @ 10:00 a.m. with Naren Chopra.    Added to AVS.  Oni Escamilla CM Specialist

## 2018-06-21 NOTE — ROUTINE PROCESS
Bedside and Verbal shift change report given to Durga Prieto RN (oncoming nurse) by Daljit Moon RN (offgoing nurse). Report included the following information SBAR, Kardex, Intake/Output, MAR, Accordion and Recent Results.

## 2018-06-21 NOTE — DISCHARGE SUMMARY
Discharge Summary     Patient: Donovan Arango MRN: [id number]  SSN: [id number]    YOB: 1955  Age: 66 y.o.   Sex: female       Admit Date: 6/20/2018    Discharge Date: 6/21/2018      Admission Diagnoses: THYROGLOSSAL DUCT CYST  Thyroglossal duct cyst    Discharge Diagnoses:   Problem List as of 6/21/2018  Date Reviewed: 6/20/2018          Codes Class Noted - Resolved    Thyroglossal duct cyst ICD-10-CM: Q89.2  ICD-9-CM: 759.2  6/20/2018 - Present        History of CVA (cerebrovascular accident) ICD-10-CM: Z86.73  ICD-9-CM: V12.54  6/5/2018 - Present        Diet-controlled diabetes mellitus ([facility] ICD-10-CM: E11.9  ICD-9-CM: 250.00  1/5/2018 - Present        Chronic, continuous use of opioids (Chronic) ICD-10-CM: F11.90  ICD-9-CM: 305.51  6/5/2017 - Present    Overview Signed 6/6/2017  2:53 PM by Lanita Schaumann, NP     Fentanyl patch             Degenerative joint disease (DJD) of hip ICD-10-CM: M16.9  ICD-9-CM: 715.95  6/4/2017 - Present    Overview Signed 6/4/2017 10:48 PM by Rogers Osorio PA-C     RIGHT TOTAL HIP REPLACEMENT 6/5/17             Depression ICD-10-CM: F32.9  ICD-9-CM: 311  Unknown - Present        Cervical spinal stenosis ICD-10-CM: M48.02  ICD-9-CM: 723.0  12/2/2016 - Present    Overview Signed 12/2/2016 11:29 AM by Shauna Shrestha DO     Moderate C6-7             Benign cyst of left breast ICD-10-CM: N60.02  ICD-9-CM: 610.9  3/21/2016 - Present        Vitamin D deficiency ICD-10-CM: E55.9  ICD-9-CM: 268.9  3/17/2016 - Present        Myasthenia gravis ([facility] ICD-10-CM: G70.00  ICD-9-CM: 358.00  Unknown - Present        MS (multiple sclerosis) ([address] ICD-10-CM: G35  ICD-9-CM: 340  Unknown - Present        Sleep apnea ICD-10-CM: G47.30  ICD-9-CM: 780.57  6/25/2010 - Present        Endometriosis ICD-10-CM: N80.9  ICD-9-CM: 617.9  6/25/2010 - Present        Lumbosacral plexopathy ICD-10-CM: G54.1  ICD-9-CM: 353.1  6/25/2010 - Present        HTN, goal below 140/90 ICD-10-CM: I10  ICD-9-CM: 401.9  6/25/2010 - Present        FH: breast cancer ICD-10-CM: Z80.3  ICD-9-CM: V16.3  6/25/2010 - Present    Overview Signed 6/25/2010 10:05 AM by Jessica Wilde     Chart states FH breast/ovary Ca, CAD,DM             Hyperlipemia ICD-10-CM: E78.5  ICD-9-CM: 272.4  6/25/2010 - Present        Hypothyroid ICD-10-CM: E03.9  ICD-9-CM: 244.9  6/25/2010 - Present        Ureteral calculus ICD-10-CM: N20.1  ICD-9-CM: 592.1  6/25/2010 - Present        RESOLVED: Recurrent depression ([facility] ICD-10-CM: F33.9  ICD-9-CM: 296.30  12/26/2017 - 6/5/2018        RESOLVED: Diabetes mellitus type 2, controlled ([facility] ICD-10-CM: E11.9  ICD-9-CM: 250.00  9/13/2016 - 4/27/2018        RESOLVED: Multiple sclerosis exacerbation ([facility] ICD-10-CM: G35  ICD-9-CM: 340  5/17/2016 - 5/11/2017        RESOLVED: Type 1 diabetes mellitus ([facility] ICD-10-CM: E10.9  ICD-9-CM: 250.01  Unknown - 9/13/2016               Discharge Condition: Good    Hospital Course: observed overnight. Drain removed POD 1. Discharged home that morning. Consults: None    Disposition: home    Discharge Medications:   Current Discharge Medication List      START taking these medications    Details   glycopyrrolate (ROBINUL FORTE) 2 mg tablet Take 1 Tab by mouth two (2) times a day. Qty: 14 Tab, Refills: 0    Associated Diagnoses: Thyroglossal duct cyst      oxyCODONE IR (ROXICODONE) 5 mg immediate release tablet Take 1 Tab by mouth every four (4) hours as needed for Pain. Max Daily Amount: 30 mg.  Qty: 30 Tab, Refills: 0    Associated Diagnoses: Thyroglossal duct cyst         CONTINUE these medications which have NOT CHANGED    Details   ergocalciferol (ERGOCALCIFEROL) 50,000 unit capsule Take 1 Cap by mouth every seven (7) days. Qty: 4 Cap, Refills: 0    Associated Diagnoses: MS (multiple sclerosis) (HCC)      meclizine (ANTIVERT) 25 mg tablet Take  by mouth three (3) times daily as needed.       topiramate (TOPAMAX) 200 mg tablet TAKE 1 TABLET BY MOUTH TWICE DAILY  Qty: 60 Tab, Refills: 0    Associated Diagnoses: Migraine without status migrainosus, not intractable, unspecified migraine type      pregabalin (LYRICA) 200 mg capsule Take 1 Cap by mouth two (2) times a day. Max Daily Amount: 400 mg. Qty: 180 Cap, Refills: 3    Associated Diagnoses: MS (multiple sclerosis) ([facility])      rosuvastatin (CRESTOR) 20 mg tablet TAKE 1 TABLET BY MOUTH EVERY EVENING  Qty: 90 Tab, Refills: 1    Associated Diagnoses: Hyperlipidemia LDL goal <100      TENS UNIT ELECTRODES by Does Not Apply route. prn      levothyroxine (SYNTHROID) 175 mcg tablet TAKE 1 TABLET BY MOUTH DAILY BEFORE BREAKFAST  Qty: 30 Tab, Refills: 11    Associated Diagnoses: Hyperglycemia due to type 2 diabetes mellitus ([facility])      PARoxetine (PAXIL) 40 mg tablet TAKE 1.5 TABS BY MOUTH DAILY. Qty: 45 Tab, Refills: 2      GILENYA 0.5 mg cap Take 1 Cap by mouth daily. pyridostigmine bromide (MESTINON TIMESPAN) 180 mg SR tablet Take 180 mg by mouth two (2) times a day. lidocaine (LIDODERM) 5 %(700 mg/patch) by TransDERmal route every twenty-four (24) hours. Apply patch to the affected area for 12 hours a day and remove for 12 hours a day. fentaNYL (DURAGESIC) 75 mcg/hr 1 Patch by TransDERmal route every seventy-two (72) hours. Max Daily Amount: 1 Patch. Qty: 10 Patch, Refills: 0    Associated Diagnoses: MS (multiple sclerosis) ([facility])      SUMAtriptan (IMITREX) 100 mg tablet 1 tab at onset of moderate-severe migraine; may repeat 1 tab in 2 hours; Limit: 2 tabs in 24/ hrs, not more than 3 days a week  Qty: 12 Tab, Refills: 3      dantrolene (DANTRIUM) 100 mg capsule Take 1 Cap by mouth as needed. Qty: 90 Cap, Refills: 3      corticotropin (ACTHAR H.P.) 80 unit/mL injectable gel 1 mL by SubCUTAneous route daily. 80 units subq q day for 10 days  Qty: 10 mL, Refills: 1      clindamycin (CLEOCIN) 300 mg capsule Take 300 mg by mouth.  Prior to dental procedures      butalbital-acetaminophen-caff (FIORICET) -40 mg per capsule Take 1 Cap by mouth every four (4) hours as needed for Pain. Max Daily Amount: 6 Caps. Qty: 10 Cap, Refills: 0      Blood-Glucose Meter monitoring kit by SubCUTAneous route Before breakfast and dinner. Free Style Lite glucometer and test strips  Qty: 1 Kit, Refills: 0    Comments: Free Style Lite glucometer please  Associated Diagnoses: Hyperglycemia due to type 2 diabetes mellitus (HCC)         STOP taking these medications       clopidogrel (PLAVIX) 75 mg tab Comments:   Reason for Stopping:               Activity: No lifting, Driving, or Strenuous exercise for 2 weeks  Diet: Regular Diet  Wound Care: Keep wound clean and dry    Follow-up Appointments   Procedures    FOLLOW UP VISIT Appointment in: One Week     Standing Status:   Standing     Number of Occurrences:   1     Order Specific Question:   Appointment in     Answer:    One Week       Signed By: Mary Ellen Erickson MD     June 21, 2018

## 2018-06-21 NOTE — PROGRESS NOTES
Doing great on POD 1. Wound clean and intact. Visit Vitals    /61 (BP 1 Location: Left arm, BP Patient Position: At rest)    Pulse 93    Temp 98.7 °F (37.1 °C)    Resp 16    Ht 6' (1.829 m)    Wt 90.6 kg (199 lb 11.8 oz)    LMP 09/01/2010    SpO2 97%    BMI 27.09 kg/m2    NAD    Drain removed    D/c home on robinul. F/u 1 week. [facility], [address] [place] [address], [facility] [address], [address]   [address] [facility], [address] [place] [address]   (J) [phone]  (G) [phone]  (B) [phone]

## 2018-06-22 ENCOUNTER — PATIENT OUTREACH (OUTPATIENT)
Dept: FAMILY MEDICINE CLINIC | Age: 63
End: 2018-06-22

## 2018-06-22 NOTE — PROGRESS NOTES
Hospital Discharge Follow-Up      Date/Time:  2018 11:30 AM    Patient was admitted to [facility] on  and discharged on  for Excision of thyroglossal duct cyst.. The physician discharge summary was available at the time of outreach. Patient was contacted within 1 business days of discharge. Top Challenges reviewed with the provider   ENT gave her rx for Robinul Forte bid x 7 days. Concerns with Myasthenia Gravis. She will check with ENT and neuro. Method of communication with provider :chart routing,face to face    Inpatient RRAT score: 23  Was this a readmission? no       Nurse Navigator (NN) contacted the patient by telephone to perform post hospital discharge assessment. Verified name and  with patient as identifiers. Provided introduction to self, and explanation of the Nurse Navigator role. Reviewed discharge instructions and red flags with patient who verbalized understanding. Patient given an opportunity to ask questions and does not have any further questions or concerns at this time. The patient agrees to contact the PCP office for questions related to their healthcare. NN provided contact information for future reference. Disease Specific:   N/A    Summary of patient's top problems:  1. Excision of thyroglossal duct cyst- [facility] -.   2. MS  3. Myasthenia Gravis- concern about new rx for Robinul-not recommended with h/o MG. Patient to check with ENT and her neuro re this one week course. Home Health orders at discharge: [address]: na  Date of initial visit: na    Durable Medical Equipment ordered/company: none  Durable Medical Equipment received: na    Barriers to care? Financial- patient on disability, son lives with her-out of work.      Advance Care Planning:   Does patient have an Advance Directive:  reviewed and current     Medication(s):     New Medications at Discharge: Oxycodone IR 5mg one q 4 hours prn pain #30, Robinul Forte 2mg tab bid x 7 days  Changed Medications at Discharge: none  Discontinued Medications at Discharge: Plavix    Medication reconciliation was performed with patient, who verbalizes understanding of administration of home medications. There were no barriers to obtaining medications identified at this time. Did advise patient to check with  re the Robinul as it is usually contraindicated in patients with Myasthenia Gravis. She will have son call ENT and neuro also. Says she is doing ok, \"survived\"- has not needed to take any of the Oxycodone. Pain is manageable she says. Throat sore and swollen- doing well with ice cream and soup. Reviewed discharge instructions and medications with her. Son home with her to help. Made her f/u with  for 6/28 at 11 am. Did not want to come in her for STANLEY f/u. Cancelled her appt with NP on 6/28 per her request. Fayrene Yasirlet her info on [facility] in case she feels like she needs to be seen and can't get out. Suggested she call provider on call if after hours. Referral to Pharm D needed: no    Current Outpatient Prescriptions   Medication Sig    glycopyrrolate (ROBINUL FORTE) 2 mg tablet Take 1 Tab by mouth two (2) times a day.  oxyCODONE IR (ROXICODONE) 5 mg immediate release tablet Take 1 Tab by mouth every four (4) hours as needed for Pain. Max Daily Amount: 30 mg.    ergocalciferol (ERGOCALCIFEROL) 50,000 unit capsule Take 1 Cap by mouth every seven (7) days.  [START ON 7/8/2018] fentaNYL (DURAGESIC) 75 mcg/hr 1 Patch by TransDERmal route every seventy-two (72) hours. Max Daily Amount: 1 Patch.  meclizine (ANTIVERT) 25 mg tablet Take  by mouth three (3) times daily as needed.     topiramate (TOPAMAX) 200 mg tablet TAKE 1 TABLET BY MOUTH TWICE DAILY    SUMAtriptan (IMITREX) 100 mg tablet 1 tab at onset of moderate-severe migraine; may repeat 1 tab in 2 hours; Limit: 2 tabs in 24/ hrs, not more than 3 days a week    dantrolene (DANTRIUM) 100 mg capsule Take 1 Cap by mouth as needed.  corticotropin (ACTHAR H.P.) 80 unit/mL injectable gel 1 mL by SubCUTAneous route daily. 80 units subq q day for 10 days    pregabalin (LYRICA) 200 mg capsule Take 1 Cap by mouth two (2) times a day. Max Daily Amount: 400 mg.  clindamycin (CLEOCIN) 300 mg capsule Take 300 mg by mouth. Prior to dental procedures    butalbital-acetaminophen-caff (FIORICET) -40 mg per capsule Take 1 Cap by mouth every four (4) hours as needed for Pain. Max Daily Amount: 6 Caps.  rosuvastatin (CRESTOR) 20 mg tablet TAKE 1 TABLET BY MOUTH EVERY EVENING    TENS UNIT ELECTRODES by Does Not Apply route. prn    Blood-Glucose Meter monitoring kit by SubCUTAneous route Before breakfast and dinner. Free Style Lite glucometer and test strips (Patient taking differently: by SubCUTAneous route Before breakfast and dinner. Free Style Lite glucometer and test strips-TESTS COUPLE TIMES PER WEEK)    levothyroxine (SYNTHROID) 175 mcg tablet TAKE 1 TABLET BY MOUTH DAILY BEFORE BREAKFAST (Patient taking differently: Take 175 mcg by mouth nightly. TAKE 1 TABLET BY MOUTH DAILY BEFORE BREAKFAST)    PARoxetine (PAXIL) 40 mg tablet TAKE 1.5 TABS BY MOUTH DAILY. (Patient taking differently: Take 60 mg by mouth nightly. TAKE 1.5 TABS BY MOUTH DAILY. - Note Pt prefers to take at HS)    GILENYA 0.5 mg cap Take 1 Cap by mouth daily.  pyridostigmine bromide (MESTINON TIMESPAN) 180 mg SR tablet Take 180 mg by mouth two (2) times a day.  lidocaine (LIDODERM) 5 %(700 mg/patch) by TransDERmal route every twenty-four (24) hours. Apply patch to the affected area for 12 hours a day and remove for 12 hours a day. No current facility-administered medications for this visit. There are no discontinued medications.     PCP/Specialist follow up:   Future Appointments  Date Time Provider Roel Collins   8/15/2018 1:40 PM [place]  [facility]   10/29/2018 1:30 PM Brandon Diamond MD FABIAN TONNY [address] [place] [address] Road      6/28/18 11:00am- Dr.Brian Juliocesar Mota    Goals        Patient Stated     improve balance and gait (pt-stated)            1/24/18 Met with patient for CCM. Recent dx of stroke. Saw neuro earlier today-has ordered PT/OT to assist with ambulation/strength/balance. Using walker when out of house and cane/walker when at home to prevent falls. Has LifeAlert bracelet to call for help if alone. mbt  2/28/18 CCM session with patient. Balance very unsteady. Using cane for support. NN held her arm to walk her out. Continues to have OT/PT. Urged caution when ambulating. mbt  3/28/18 CCM session-gait continues to be poor. Did not have cane or walker-son lightly assisting her. PT discharged her earlier in month. Continues with OT. Urged caution when ambulating, reminded to use cane/walker at all times. Notify  of poor gait and request PT be restarted. mbt  5/24/18 CCM session-gait much improved. Using cane but not wobbly today. Needed minimal assistance going out to car. Continues to work with PT/OT-reminded to have caution when ambulating. Reports has not fallen in last month.mbt         Other     Attends follow-up appointments as directed. 11/1/17 Saw neurologist,, on 10/18-will see again in December. Saw pcp,  on 10/2/17 and will see again in 6 months. Diligent about attending her appointments. mbt  3/28/18 Saw  3/7/18. Attends all f/u appointments. 4/2-appt with  for f/u scheduled. mbt  5/24/18 CCM session today. Had fasting lab for pcp done prior to appt. Made preop with pcp for 6/5. Having ENT surgery 6/20- Thyroglossal duct cyst per . Sees neurologist 6/8.mbt  6/22/18 [facility] 6/20-6/21 for excision of Thyroglossal duct cyst. F/u with  6/28 11 am. She did not want to come in to see pcp for hospital STANLEY.  Cancelled 6/28 appt with SWATI Laughlin per her request. Given contact info on Travis Krishnamurthy in case she has concerns and cannot come in to office. Advised to call provider on call if after hours. mbt       Identification of barriers to adherence to a plan of care such as inability to afford medications, lack of insurance, lack of transportation, etc.            11/1/17-Patient reviewing what her new Medicare Part D policy will cover. Has several costly MS meds- gets 2 from the drug  companies. Doesn't qualify for LIS d/t her income. mbt  2/28/18 Son has just lost his job and benefits, relies on his income to help pay the bills. Meds very expensive, neurology office gets most for her through Patient Assistance. Gave her information on [employer] to see if  would be able to participate. mbt  3/28/18 Son still out of work. Finances tight. He is able to assist her more at home and taking her to appointments. Medicare insurance -does not qualify for Medicaid. NN to continue to research resources for her.mbt  5/24/18 Son still unemployed. Continues to look for work. Finances are tight but mother gave her a check recently that she has put in bank to help them until son finds job. Did get an aide through the [employer], 4 hours a week. mbt       Knowledge and adherence to medication plan. Take new rx- Fioricet, as directed for h/a pain  11/1/17-reviewed meds. Good understanding of each one- no longer on Oxycodone, Fioricet,Valium,Robaxin or Provigil. Takes current meds as directed. mbt  3/28/18 Review of meds. Has not started imitrex yet- rxd for her headaches. Urged to use prn. Tries to avoid prn meds unless absolutely needed. mbt  6/22/18 Reviewed new meds given at discharge -Robinul is contraindicated with Myasthenia Gravis and patient will have son check with ENT and Neuro to make sure the one week dose will not be a problem. mbt

## 2018-06-27 ENCOUNTER — PATIENT OUTREACH (OUTPATIENT)
Dept: FAMILY MEDICINE CLINIC | Age: 63
End: 2018-06-27

## 2018-06-27 NOTE — PROGRESS NOTES
Called patient for recheck. Says she is doing better slowly, swelling has decreased. Still limited on what she can eat. Drinking liquids -drinks warm liquids in am to decrease secretions, then switches over to cold liquids. Tired of soup and pudding. Also has decreased smoking, only able to tolerate a few puffs. Encouraged to continue to decrease or wean off. More alert and speech is better. Has f/u with  tomorrow.  Scheduled next CM session for 7/18 at 2pm.

## 2018-06-29 ENCOUNTER — PATIENT OUTREACH (OUTPATIENT)
Dept: FAMILY MEDICINE CLINIC | Age: 63
End: 2018-06-29

## 2018-07-03 DIAGNOSIS — G35 MS (MULTIPLE SCLEROSIS) (HCC): ICD-10-CM

## 2018-07-03 RX ORDER — ERGOCALCIFEROL 1.25 MG/1
CAPSULE ORAL
Qty: 4 CAP | Refills: 0 | Status: SHIPPED | OUTPATIENT
Start: 2018-07-03 | End: 2018-08-01 | Stop reason: SDUPTHER

## 2018-07-12 DIAGNOSIS — G35 MS (MULTIPLE SCLEROSIS) (HCC): Primary | ICD-10-CM

## 2018-07-12 RX ORDER — FINGOLIMOD HCL 0.5 MG/1
1 CAPSULE ORAL DAILY
Qty: 30 CAP | Refills: 11 | Status: SHIPPED | OUTPATIENT
Start: 2018-07-12 | End: 2019-02-15 | Stop reason: SDUPTHER

## 2018-07-12 NOTE — TELEPHONE ENCOUNTER
Need refills on Gilenya 0.5mg 90 day supply.  . Please call [facility] @[phone]

## 2018-07-18 ENCOUNTER — PATIENT OUTREACH (OUTPATIENT)
Dept: FAMILY MEDICINE CLINIC | Age: 63
End: 2018-07-18

## 2018-07-18 NOTE — PROGRESS NOTES
Patient scheduled for appt with MARLENE today at 2pm, called her to reschedule due to a meeting conflict MARLENE has this afternoon. Rescheduled her appt for next Wednesday, July 25 at [address] that is fine with her, \"wiped out\" with the recent heat. Has had several appt this week, goes to see Eleazar Rodriguez, tomorrow. Said she got up early this am, around [place], already back in bed resting. Advised to relax, rest, hydrate, and call me prn.   POC- see her for appt 7/25 at 2pm.

## 2018-07-25 ENCOUNTER — PATIENT OUTREACH (OUTPATIENT)
Dept: FAMILY MEDICINE CLINIC | Age: 63
End: 2018-07-25

## 2018-07-25 NOTE — PROGRESS NOTES
Patient in for monthly CCM session. In good spirits, braces on legs noted, not using cane today. Gait a little unsteady, but managing well. Says her left knee has been more painful, attributes it to the weather, recent rainy days. Had her Thyroglossal duct cyst surgery 6/20 by ENT, Kennedy Chaudhari. Has seen him for follow up and he has discharged her from his care. Still has some swelling, due to scar tissue. Less coughing noted, less issues with the swallowing. Now back to normal diet for the most part, still hasn't tried any steak. Doing ok with moist chicken and most other foods. Continues with speech therapist 2 x week and PT/OT twice a week also. Continues to have the aide 2 x week to assist her with bathing and adl's as well as light cleaning around the house. Son still out of job, continues to apply for positions with IT. Relationship with mother is improving, mentioned the annual family reunion scheduled for this weekend. Patient and son not going due to cost and finances, says mother has accepted their decision and says she understands. Sees  in August for routine follow up. She has not been able to find a PM specialist to prescribe her pain medications-says has checked with everyone on the list he gave her, all they do is injections for spine and such. Plans to discuss with . Medication reconciliation completed. No new meds, no changes. Says she was in the bed the last few days due to right knee pain. Has her \"good days and bad days. \" No falls -more steady on feet overall. Rates pain at about #7-8 but manageable. Support given, allowed to ventilate about her concerns about finding someone to prescribe her narcotic medications that allow her to function and have a decent quality of life. Advised that I will discuss with her pcp as well. Scheduled next session for Sept 12 at 2pm with MARLENE ACHARYA.

## 2018-07-25 NOTE — Clinical Note
had wanted her to find PM to rx her narcotic meds- had given her a list of PM specialists, none will rx the meds-only do spinal injections,etc. Will discuss with neuro at next appt. Any ideas? Says what she takes enables her to function and have some quality of life.

## 2018-08-01 DIAGNOSIS — G35 MS (MULTIPLE SCLEROSIS) (HCC): ICD-10-CM

## 2018-08-01 RX ORDER — ERGOCALCIFEROL 1.25 MG/1
CAPSULE ORAL
Qty: 4 CAP | Refills: 0 | Status: SHIPPED | OUTPATIENT
Start: 2018-08-01 | End: 2018-08-31 | Stop reason: SDUPTHER

## 2018-08-15 ENCOUNTER — OFFICE VISIT (OUTPATIENT)
Dept: NEUROLOGY | Age: 63
End: 2018-08-15

## 2018-08-15 VITALS
DIASTOLIC BLOOD PRESSURE: 61 MMHG | HEART RATE: 89 BPM | RESPIRATION RATE: 18 BRPM | HEIGHT: 72 IN | OXYGEN SATURATION: 92 % | SYSTOLIC BLOOD PRESSURE: 119 MMHG | TEMPERATURE: 97.7 F | BODY MASS INDEX: 30.1 KG/M2 | WEIGHT: 222.2 LBS

## 2018-08-15 DIAGNOSIS — R26.9 GAIT DISORDER: ICD-10-CM

## 2018-08-15 DIAGNOSIS — R20.2 PARESTHESIA: ICD-10-CM

## 2018-08-15 DIAGNOSIS — G89.4 CHRONIC PAIN SYNDROME: ICD-10-CM

## 2018-08-15 DIAGNOSIS — G70.00 MG (MYASTHENIA GRAVIS) (HCC): ICD-10-CM

## 2018-08-15 DIAGNOSIS — G35 MS (MULTIPLE SCLEROSIS) (HCC): ICD-10-CM

## 2018-08-15 DIAGNOSIS — R42 DIZZINESS: Primary | ICD-10-CM

## 2018-08-15 RX ORDER — FENTANYL 50 UG/1
1 PATCH TRANSDERMAL
Qty: 5 PATCH | Refills: 0 | Status: SHIPPED | OUTPATIENT
Start: 2018-09-15 | End: 2018-10-15 | Stop reason: DRUGHIGH

## 2018-08-15 RX ORDER — PYRIDOSTIGMINE BROMIDE 180 MG/1
180 TABLET, EXTENDED RELEASE ORAL 2 TIMES DAILY
Qty: 60 TAB | Refills: 6 | Status: SHIPPED | OUTPATIENT
Start: 2018-08-15 | End: 2019-08-08 | Stop reason: SDUPTHER

## 2018-08-15 RX ORDER — LIDOCAINE 50 MG/G
PATCH TOPICAL
Qty: 30 EACH | Refills: 3 | Status: SHIPPED | OUTPATIENT
Start: 2018-08-15

## 2018-08-15 RX ORDER — FENTANYL 75 UG/H
1 PATCH TRANSDERMAL
Qty: 10 PATCH | Refills: 0 | Status: SHIPPED | OUTPATIENT
Start: 2018-08-15 | End: 2018-08-15 | Stop reason: DRUGHIGH

## 2018-08-15 NOTE — PROGRESS NOTES
Chief Complaint   Patient presents with    Multiple Sclerosis    Medication Refill     1. Have you been to the ER, urgent care clinic since your last visit? Hospitalized since your last visit? No    2. Have you seen or consulted any other health care providers outside of the [address] [place] [address] since your last visit? Include any pap smears or colon screening. [facility]    Patient stated she cannot find a pain management provider.     Pill count not performed patient did not bring medications      Pill count not verified with patient  Patient reports taking medication    UDS obtained 3/7/18  Pain contract on file   not available for review  Script given for Fentanyl (August and September)

## 2018-08-15 NOTE — MR AVS SNAPSHOT
[address] 
[phone] Patient: Augusta Burgess MRN: [id number] Visit Information Date & Time Provider Department Dept. Phone Encounter #  
 8/15/2018  1:40 PM [place] MD [facility] at [facility] [phone] [id number] Follow-up Instructions Return in about 2 months (around 10/15/2018). Your Appointments 10/29/2018  1:30 PM  
ROUTINE CARE with Chelsea Millan MD  
[facility]) Appt Note: 6 months follow up DM  
 [phone]  
[phone]  
  
   
 [address] [address] Upcoming Health Maintenance Date Due  
 EYE EXAM RETINAL OR DILATED Q1 10/12/2017 FOOT EXAM Q1 7/3/2018 Influenza Age 5 to Adult 8/1/2018 HEMOGLOBIN A1C Q6M 11/24/2018 MEDICARE YEARLY EXAM 4/28/2019 MICROALBUMIN Q1 5/24/2019 LIPID PANEL Q1 5/24/2019 BREAST CANCER SCRN MAMMOGRAM 12/20/2019 PAP AKA CERVICAL CYTOLOGY 1/4/2020 COLONOSCOPY 1/18/2022 DTaP/Tdap/Td series (2 - Td) 6/16/2024 Allergies as of 8/15/2018  Review Complete On: 8/15/2018 By: Shant Macias MD  
  
 Severity Noted Reaction Type Reactions Bee Sting [Sting, Bee] High 06/25/2010    Anaphylaxis Also allergic to egg products Penicillins High 06/25/2010    Anaphylaxis Betadine [Povidone-iodine]  05/17/2016    Rash Egg  06/25/2015    Itching Current Immunizations  Reviewed on 1/4/2017 Name Date Tdap 6/16/2014 Not reviewed this visit You Were Diagnosed With   
  
 Codes Comments Dizziness    -  Primary ICD-10-CM: F65 ICD-9-CM: 780.4 MS (multiple sclerosis) ([facility]     ICD-10-CM: G35 
ICD-9-CM: 890 MG (myasthenia gravis) ([facility] [address]     ICD-10-CM: G70.00 ICD-9-CM: 358.00 Gait disorder     ICD-10-CM: R26.9 ICD-9-CM: 781.2 Chronic pain syndrome     ICD-10-CM: G89.4 ICD-9-CM: 338.4 Paresthesia     ICD-10-CM: R20.2 ICD-9-CM: 513. 0 Vitals BP Pulse Temp Resp Height(growth percentile) Weight(growth percentile)  
 119/61 (BP 1 Location: Left arm, BP Patient Position: Sitting) 89 97.7 °F (36.5 °C) (Temporal) 18 6' (1.829 m) 222 lb 3.2 oz (100.8 kg) LMP SpO2 BMI OB Status Smoking Status 09/01/2010 92% 30.14 kg/m2 Postmenopausal Current Every Day Smoker BMI and BSA Data Body Mass Index Body Surface Area  
 30.14 kg/m 2 2.26 m 2 Preferred Pharmacy Pharmacy Name Phone [facility] [address] [phone] Your Updated Medication List  
  
   
This list is accurate as of 8/15/18  2:35 PM.  Always use your most recent med list.  
  
  
  
  
 Blood-Glucose Meter monitoring kit  
by SubCUTAneous route Before breakfast and dinner. Free Style Lite glucometer and test strips  
  
 butalbital-acetaminophen-caff -40 mg per capsule Commonly known as:  [employer] Take 1 Cap by mouth every four (4) hours as needed for Pain. Max Daily Amount: 6 Caps. clindamycin 300 mg capsule Commonly known as:  CLEOCIN Take 300 mg by mouth. Prior to dental procedures  
  
 corticotropin 80 unit/mL injectable gel Commonly known as:  ACTHAR H.P.  
1 mL by SubCUTAneous route daily. 80 units subq q day for 10 days  
  
 dantrolene 100 mg capsule Commonly known as:  DANTRIUM Take 1 Cap by mouth as needed. ergocalciferol 50,000 unit capsule Commonly known as:  ERGOCALCIFEROL  
TAKE ONE CAPSULE BY MOUTH EVERY 7 DAYS  
  
 fentaNYL 50 mcg/hr PATCH Commonly known as:  DURAGESIC  
1 Patch by TransDERmal route every seventy-two (72) hours. Max Daily Amount: 1 Patch. Start taking on:  9/15/2018 GILENYA 0.5 mg Cap Generic drug:  fingolimod Take 1 Cap by mouth daily. glycopyrrolate 2 mg tablet Commonly known as:  Pamla Rajas Take 1 Tab by mouth two (2) times a day. levothyroxine 175 mcg tablet Commonly known as:  SYNTHROID  
TAKE 1 TABLET BY MOUTH DAILY BEFORE BREAKFAST  
  
 LIDODERM 5 % Generic drug:  lidocaine  
by TransDERmal route every twenty-four (24) hours. Apply patch to the affected area for 12 hours a day and remove for 12 hours a day. meclizine 25 mg tablet Commonly known as:  ANTIVERT Take  by mouth three (3) times daily as needed. oxyCODONE IR 5 mg immediate release tablet Commonly known as:  Chares Mikayla Take 1 Tab by mouth every four (4) hours as needed for Pain. Max Daily Amount: 30 mg. PARoxetine 40 mg tablet Commonly known as:  PAXIL TAKE 1.5 TABS BY MOUTH DAILY. pregabalin 200 mg capsule Commonly known as:  Margarie Ta Take 1 Cap by mouth two (2) times a day. Max Daily Amount: 400 mg.  
  
 pyridostigmine bromide 180 mg SR tablet Commonly known as:  MESTINON TIMESPAN Take 1 Tab by mouth two (2) times a day. rosuvastatin 20 mg tablet Commonly known as:  CRESTOR  
TAKE 1 TABLET BY MOUTH EVERY EVENING  
  
 SUMAtriptan 100 mg tablet Commonly known as:  IMITREX  
1 tab at onset of moderate-severe migraine; may repeat 1 tab in 2 hours; Limit: 2 tabs in 24/ hrs, not more than 3 days a week TENS UNIT ELECTRODES  
by Does Not Apply route. prn  
  
 topiramate 200 mg tablet Commonly known as:  TOPAMAX TAKE 1 TABLET BY MOUTH TWICE DAILY Prescriptions Printed Refills  
 fentaNYL (DURAGESIC) 50 mcg/hr PATCH 0 Starting on: 9/15/2018 Si Patch by TransDERmal route every seventy-two (72) hours. Max Daily Amount: 1 Patch. Class: Print Route: TransDERmal  
  
Prescriptions Sent to Pharmacy Refills  
 pyridostigmine bromide (MESTINON TIMESPAN) 180 mg SR tablet 6 Sig: Take 1 Tab by mouth two (2) times a day.   
 Class: Normal  
 Pharmacy: [facility] [address] [place] [address] #: [phone] Route: Oral  
  
Follow-up Instructions Return in about 2 months (around 10/15/2018). Introducing [facility]! [facility] REINA: 
Thank you for requesting a [employer] account. Our records indicate that you already have an active [employer] account. You can access your account anytime at [email or web address] Did you know that you can access your hospital and ER discharge instructions at any time in [employer]? You can also review all of your test results from your hospital stay or ER visit. Additional Information If you have questions, please visit the Frequently Asked Questions section of the [employer] website at [email or web address] Remember, [employer]ocal[employer] is NOT to be used for urgent needs. For medical emergencies, dial 911. Now available from your iPhone and Android! Please provide this summary of care documentation to your next provider. Your primary care clinician is listed as Brandon Diamond. If you have any questions after today's visit, please call [phone].

## 2018-08-15 NOTE — PROGRESS NOTES
Neurology Progress Note    NAME:  Carol Ann Galvin   :   1955   MRN:   [id number]     Date/Time:  8/15/2018  Subjective:      Carol Ann Galvin is a 66 y.o. female here today for follow-up for pain, headaches, multiple sclerosis, segment gravis, gait disorder. Patient was accompanied by her son and her caregiver. She says pain is variable, waxes and wanes, pain is sharp in nature, achy and spasm discomfort in the muscles of the back. She says when the pain is coming from the neck is sharp in nature and goes down to the arms causing numbness and tingling sensation with weakness. She states she has dropped things because of the weakness of the hands. Activities tend to aggravate the pain. She noted however the medication helps. She periodically experiences with severe spasm in the muscles of the back. At times, walking is difficult, patient ambulates with walker, no recent falls but has had near falls. Headache is throbbing in nature, frequency is variable, mostly frontal, occasional sharp pain coming from the back of the head. Headache associated with nausea, blurry vision, double vision, dizziness, photophobia, phonophobia. Medications help the headaches. After discussion with patient, we will try to wean patient down on her pain medication, I will start by reducing fentanyl patch from 75 mcg to 50 mcg, by next visit with I will see how much we will can reduce the pain medications to use the minimal amount to control patient's pain. She denies loss of consciousness, occasional dysphagia, but no odynophagia.   Review of Systems - General ROS: positive for  - fatigue, hot flashes, malaise, night sweats and sleep disturbance  Psychological ROS: positive for - anxiety, depression and sleep disturbances  Ophthalmic ROS: positive for - blurry vision, decreased vision, double vision, photophobia and uses glasses  ENT ROS: positive for - headaches, tinnitus, vertigo and visual changes  Allergy and Immunology ROS: negative  Hematological and Lymphatic ROS: negative  Endocrine ROS: negative  Respiratory ROS: no cough, shortness of breath, or wheezing  Cardiovascular ROS: no chest pain or dyspnea on exertion  Gastrointestinal ROS: no abdominal pain, change in bowel habits, or black or bloody stools  Genito-Urinary ROS: no dysuria, trouble voiding, or hematuria  Musculoskeletal ROS: positive for - gait disturbance, joint pain, joint stiffness, muscle pain and muscular weakness  Neurological ROS: positive for - dizziness, gait disturbance, headaches, impaired coordination/balance, numbness/tingling and visual changes  Dermatological ROS: negative  Medications :  Current Outpatient Prescriptions   Medication Sig Dispense Refill    ergocalciferol (ERGOCALCIFEROL) 50,000 unit capsule TAKE ONE CAPSULE BY MOUTH EVERY 7 DAYS 4 Cap 0    GILENYA 0.5 mg cap Take 1 Cap by mouth daily. 30 Cap 11    fentaNYL (DURAGESIC) 75 mcg/hr 1 Patch by TransDERmal route every seventy-two (72) hours. Max Daily Amount: 1 Patch. 10 Patch 0    meclizine (ANTIVERT) 25 mg tablet Take  by mouth three (3) times daily as needed.  topiramate (TOPAMAX) 200 mg tablet TAKE 1 TABLET BY MOUTH TWICE DAILY 60 Tab 0    SUMAtriptan (IMITREX) 100 mg tablet 1 tab at onset of moderate-severe migraine; may repeat 1 tab in 2 hours; Limit: 2 tabs in 24/ hrs, not more than 3 days a week 12 Tab 3    dantrolene (DANTRIUM) 100 mg capsule Take 1 Cap by mouth as needed. 90 Cap 3    corticotropin (ACTHAR H.P.) 80 unit/mL injectable gel 1 mL by SubCUTAneous route daily. 80 units subq q day for 10 days 10 mL 1    pregabalin (LYRICA) 200 mg capsule Take 1 Cap by mouth two (2) times a day. Max Daily Amount: 400 mg. 180 Cap 3    clindamycin (CLEOCIN) 300 mg capsule Take 300 mg by mouth. Prior to dental procedures      butalbital-acetaminophen-caff (FIORICET) -40 mg per capsule Take 1 Cap by mouth every four (4) hours as needed for Pain. Max Daily Amount: 6 Caps.  10 Cap 0    rosuvastatin (CRESTOR) 20 mg tablet TAKE 1 TABLET BY MOUTH EVERY EVENING 90 Tab 1    TENS UNIT ELECTRODES by Does Not Apply route. prn      Blood-Glucose Meter monitoring kit by SubCUTAneous route Before breakfast and dinner. Free Style Lite glucometer and test strips (Patient taking differently: by SubCUTAneous route Before breakfast and dinner. Free Style Lite glucometer and test strips-TESTS COUPLE TIMES PER WEEK) 1 Kit 0    levothyroxine (SYNTHROID) 175 mcg tablet TAKE 1 TABLET BY MOUTH DAILY BEFORE BREAKFAST (Patient taking differently: Take 175 mcg by mouth nightly. TAKE 1 TABLET BY MOUTH DAILY BEFORE BREAKFAST) 30 Tab 11    PARoxetine (PAXIL) 40 mg tablet TAKE 1.5 TABS BY MOUTH DAILY. (Patient taking differently: Take 60 mg by mouth nightly. TAKE 1.5 TABS BY MOUTH DAILY. - Note Pt prefers to take at HS) 45 Tab 2    pyridostigmine bromide (MESTINON TIMESPAN) 180 mg SR tablet Take 180 mg by mouth two (2) times a day.  lidocaine (LIDODERM) 5 %(700 mg/patch) by TransDERmal route every twenty-four (24) hours. Apply patch to the affected area for 12 hours a day and remove for 12 hours a day.  glycopyrrolate (ROBINUL FORTE) 2 mg tablet Take 1 Tab by mouth two (2) times a day. 14 Tab 0    oxyCODONE IR (ROXICODONE) 5 mg immediate release tablet Take 1 Tab by mouth every four (4) hours as needed for Pain.  Max Daily Amount: 30 mg. 30 Tab 0        Objective:   Vitals:  Vitals:    08/15/18 1401   BP: 119/61   Pulse: 89   Resp: 18   Temp: 97.7 °F (36.5 °C)   TempSrc: Temporal   SpO2: 92%   Weight: 222 lb 3.2 oz (100.8 kg)   Height: 6' (1.829 m)   PainSc:   7   PainLoc: Generalized   LMP: 09/01/2010       Lab Data Reviewed:  Lab Results   Component Value Date/Time    WBC 4.1 06/07/2018 01:54 PM    HCT 42.5 06/07/2018 01:54 PM    HGB 13.7 06/07/2018 01:54 PM    PLATELET 135 (L) 43/38/7130 01:54 PM       Lab Results   Component Value Date/Time    Sodium 142 05/24/2018 01:19 PM    Potassium 4.2 05/24/2018 01:19 PM    Chloride 107 (H) 05/24/2018 01:19 PM    CO2 21 05/24/2018 01:19 PM    Glucose 82 05/24/2018 01:19 PM    BUN 9 05/24/2018 01:19 PM    Creatinine 0.90 05/24/2018 01:19 PM    Calcium 9.2 05/24/2018 01:19 PM       No components found for: TROPQUANT    No results found for: SHAHIDA      Lab Results   Component Value Date/Time    Hemoglobin A1c 6.3 (H) 05/24/2018 01:19 PM    Hemoglobin A1c (POC) 5.7 10/02/2017 10:40 AM        Lab Results   Component Value Date/Time    Vitamin B12 434 02/10/2016    Folate 12.0 02/10/2016       No results found for: SHAHIDA, Willye Numbers, XBANA    Lab Results   Component Value Date/Time    Cholesterol, total 179 05/24/2018 01:19 PM    HDL Cholesterol 51 05/24/2018 01:19 PM    LDL, calculated 109 (H) 05/24/2018 01:19 PM    VLDL, calculated 19 05/24/2018 01:19 PM    Triglyceride 96 05/24/2018 01:19 PM    CHOL/HDL Ratio 4.3 08/23/2010 12:19 PM         CT Results (recent):    [facility]   CT NECK SOFT TISSUE W CONT   Narrative HISTORY: Neck mass. PROCEDURE: Multiple contiguous helically acquired axial images were obtained of  the cervical region from the skull base through to the thoracic inlet following  intravenous contrast administration. Sagittal and coronal reconstructions were  performed. CT dose reduction was achieved through the use of a standardized protocol  tailored for this examination and automatic exposure control for dose  modulation. FINDINGS:     Images through the nasopharynx reveal symmetric soft tissues. Images through the oropharynx reveals symmetric soft tissues. The parotid glands are symmetric in appearance. Images through the hypopharynx reveals symmetric soft tissues. Just below the level of the hyoid bone, is a soft tissue structure measuring 9.7  x 7.8 mm with a small satellite lesions. No evidence of lingual thyroid per se.     The submandibular glands have a symmetric appearance. Images through the larynx proper reveal symmetric soft tissues. Images through the subglottic larynx reveal symmetric soft tissues. The thyroid gland has a normal appearance with symmetric lobes. No significant cervical lymphadenopathy. Incidental mild mucoperiosteal thickening involving the right greater than left  maxillary sinus of uncertain clinical significance. Impression IMPRESSION:    9.7 x 7.8 mm soft tissue structure anterior to the hyoid bone. Differential  considerations would include thyroglossal duct cyst, ectopic thyroid, dermoid,  etc.          MRI Results (recent):    Results from Hospital Encounter encounter on 01/10/18   MRI BRAIN W WO CONT   Narrative INDICATION:  ms exacerbation, ams     COMPARISON:  May 18, 2016    TECHNIQUE:  MR imaging of the brain was performed with sagittal T1, axial T1,  T2, FLAIR, GRE, DWI/ADC; pre and post contrast multiplanar T1 utilizing 20 mL  gadolinium. FINDINGS:      Ventricles:  Midline, no hydrocephalus. Brain Parenchyma/Brainstem:  No definite change in a few small T2  hyperintensities in the supratentorial white matter and antonieta allowing for motion  on the prior study. Punctate focus of diffusion restriction in the posterior  left frontal lobe near the vertex is new. Intracranial Hemorrhage:  None. Basal Cisterns:  Normal.   Flow Voids:  Normal.  Post Contrast:  No abnormal parenchymal or meningeal enhancement. Additional Comments:  Small fluid level right maxillary sinus may have increased  slightly in the interval.         Impression IMPRESSION:    1. Punctate focus of diffusion restriction without enhancement posterior left  frontal lobe near the vertex suggests small acute infarction. Demyelinating  disease may also present in this fashion but felt to be less likely. 2. Other nonspecific white matter lesions as well as pontine T2 hyperintensity  without definite change.     23X              IR Results (recent):  No results found for this or any previous visit. VAS/US Results (recent):    Results from Hospital Encounter encounter on 01/19/17   DUPLEX LOWER EXT VENOUS RIGHT    PHYSICAL EXAM:  General:    Alert, cooperative, no distress, appears stated age. Head:   Normocephalic, without obvious abnormality, atraumatic. Eyes:   Conjunctivae/corneas clear. PERRLA  Nose:  Nares normal. No drainage or sinus tenderness. Throat:    Lips, mucosa, and tongue normal.  No Thrush  Neck:  Supple, symmetrical,  no adenopathy, thyroid: non tender    no carotid bruit and no JVD. Paraspinal tenderness  Back:    Symmetric, bilateral tenderness. Lungs:   Clear to auscultation bilaterally. No Wheezing or Rhonchi. No rales. Chest wall:  No tenderness or deformity. No Accessory muscle use. Heart:   Regular rate and rhythm,  no murmur, rub or gallop. Abdomen:   Soft, non-tender. Not distended. Bowel sounds normal. No masses  Extremities: Extremities normal, atraumatic, No cyanosis. No edema. No clubbing  Skin:     Texture, turgor normal. No rashes or lesions. Not Jaundiced  Lymph nodes: Cervical, supraclavicular normal.  Psych:  Good insight. Not depressed. Anxious . NEUROLOGICAL EXAM:  Appearance: The patient is well developed, well nourished, provides a coherent history and is in no acute distress. Mental Status: Oriented to time, place and person. Mood and affect appropriate. Cranial Nerves:   Intact visual fields. Fundi are benign. RACHEL, EOM's full, no nystagmus, no ptosis. Facial sensation is normal. Corneal reflexes are intact. Facial movement is symmetric. Hearing is normal bilaterally. Palate is midline with normal sternocleidomastoid and trapezius muscles are normal. Tongue is midline. Motor:  5/5 strength in upper and lower proximal and distal muscles. Normal bulk and tone. No fasciculations.    Reflexes:   Deep tendon reflexes 2+/4 and symmetrical.   Sensory:    Decreased sensation to touch, pinprick and vibration. Gait:  Abnormal gait. Ambulates with cane   Tremor:    My tremor noted. Cerebellar:  No cerebellar signs present. Neurovascular:  Normal heart sounds and regular rhythm, peripheral pulses intact, and no carotid bruits. Assesment  1. MS (multiple sclerosis) ([facility])    - pyridostigmine bromide (MESTINON TIMESPAN) 180 mg SR tablet; Take 1 Tab by mouth two (2) times a day. - fentaNYL (DURAGESIC) 75 mcg/hr; 1 Patch by TransDERmal route every seventy-two (72) hours. Max Daily Amount: 1 Patch. Dispense: 10 Patch; Refill: 0    2. Dizziness  Stable    3. MG (myasthenia gravis) ([address]  Continue management    4. Gait disorder  Physical therapy    5. Chronic pain syndrome  Continue management    6. Paresthesia  Stable    ___________________________________________________  PLAN: Medication reviewed with patient      ICD-10-CM ICD-9-CM    1. Dizziness R42 780.4    2. MS (multiple sclerosis) ([facility]) G35 340 pyridostigmine bromide (MESTINON TIMESPAN) 180 mg SR tablet      fentaNYL (DURAGESIC) 75 mcg/hr   3. MG (myasthenia gravis) ([address] G70.00 358.00    4. Gait disorder R26.9 781.2    5. Chronic pain syndrome G89.4 338.4    6. Paresthesia R20.2 782.0      Follow-up Disposition:  Return in about 2 months (around 10/15/2018).          ___________________________________________________    Total time spent with patient:  []15   []25   []35   [] __ minutes    Care Plan discussed with:    [x]Patient   []Family    []Care Manager   []Consultant/Specialist :    ___________________________________________________    Attending Physician: Dorita Puga MD

## 2018-08-16 ENCOUNTER — PATIENT OUTREACH (OUTPATIENT)
Dept: FAMILY MEDICINE CLINIC | Age: 63
End: 2018-08-16

## 2018-08-16 NOTE — PROGRESS NOTES
Patient called, wants to stop by today to see NN regarding recent neuro appt for update. Says she is doing well, sounded perky on the phone. Will stop by for brief visit around 3pm with son.

## 2018-08-31 DIAGNOSIS — G35 MS (MULTIPLE SCLEROSIS) (HCC): ICD-10-CM

## 2018-08-31 RX ORDER — ERGOCALCIFEROL 1.25 MG/1
CAPSULE ORAL
Qty: 4 CAP | Refills: 0 | Status: SHIPPED | OUTPATIENT
Start: 2018-08-31 | End: 2018-09-30 | Stop reason: SDUPTHER

## 2018-08-31 RX ORDER — CLOPIDOGREL BISULFATE 75 MG/1
TABLET ORAL
Qty: 90 TAB | Refills: 0 | Status: SHIPPED | OUTPATIENT
Start: 2018-08-31 | End: 2018-12-11 | Stop reason: SDUPTHER

## 2018-09-30 DIAGNOSIS — G35 MS (MULTIPLE SCLEROSIS) (HCC): ICD-10-CM

## 2018-09-30 RX ORDER — ERGOCALCIFEROL 1.25 MG/1
CAPSULE ORAL
Qty: 4 CAP | Refills: 0 | Status: SHIPPED | OUTPATIENT
Start: 2018-09-30 | End: 2018-10-15 | Stop reason: SDUPTHER

## 2018-10-15 ENCOUNTER — OFFICE VISIT (OUTPATIENT)
Dept: NEUROLOGY | Age: 63
End: 2018-10-15

## 2018-10-15 VITALS
DIASTOLIC BLOOD PRESSURE: 63 MMHG | HEART RATE: 74 BPM | HEIGHT: 72 IN | TEMPERATURE: 97.9 F | SYSTOLIC BLOOD PRESSURE: 104 MMHG | BODY MASS INDEX: 26.6 KG/M2 | RESPIRATION RATE: 16 BRPM | WEIGHT: 196.4 LBS | OXYGEN SATURATION: 98 %

## 2018-10-15 DIAGNOSIS — G89.4 CHRONIC PAIN SYNDROME: ICD-10-CM

## 2018-10-15 DIAGNOSIS — Z79.891 USE OF OPIATES FOR THERAPEUTIC PURPOSES: ICD-10-CM

## 2018-10-15 DIAGNOSIS — G70.00 MG (MYASTHENIA GRAVIS) (HCC): ICD-10-CM

## 2018-10-15 DIAGNOSIS — G35 MS (MULTIPLE SCLEROSIS) (HCC): ICD-10-CM

## 2018-10-15 DIAGNOSIS — E55.9 VITAMIN D DEFICIENCY: Primary | ICD-10-CM

## 2018-10-15 DIAGNOSIS — R13.11 ORAL PHASE DYSPHAGIA: ICD-10-CM

## 2018-10-15 RX ORDER — FENTANYL 75 UG/H
1 PATCH TRANSDERMAL
Qty: 10 PATCH | Refills: 0 | Status: SHIPPED | OUTPATIENT
Start: 2018-11-15 | End: 2018-11-13 | Stop reason: SDUPTHER

## 2018-10-15 RX ORDER — FENTANYL 75 UG/H
1 PATCH TRANSDERMAL
Qty: 5 PATCH | Refills: 0 | Status: SHIPPED | OUTPATIENT
Start: 2018-10-15 | End: 2018-10-15 | Stop reason: SDUPTHER

## 2018-10-15 RX ORDER — FENTANYL 75 UG/H
1 PATCH TRANSDERMAL
Qty: 10 PATCH | Refills: 0 | Status: SHIPPED | OUTPATIENT
Start: 2018-10-15 | End: 2018-10-15 | Stop reason: SDUPTHER

## 2018-10-15 RX ORDER — ERGOCALCIFEROL 1.25 MG/1
50000 CAPSULE ORAL
Qty: 4 CAP | Refills: 3 | Status: SHIPPED | OUTPATIENT
Start: 2018-10-15 | End: 2018-12-18 | Stop reason: SDUPTHER

## 2018-10-15 RX ORDER — FENTANYL 50 UG/1
1 PATCH TRANSDERMAL
Qty: 5 PATCH | Refills: 0 | Status: CANCELLED | OUTPATIENT
Start: 2018-10-15

## 2018-10-15 NOTE — PROGRESS NOTES
Chief Complaint   Patient presents with    Multiple Sclerosis     1. Have you been to the ER, urgent care clinic since your last visit? Hospitalized since your last visit?  no    2. Have you seen or consulted any other health care providers outside of the [address] [place] [address] since your last visit? Include any pap smears or colon screening.  no      Pill count not performed patient did not bring medications     Pill count not verified with patient  Patient reports taking medication   UDS obtained and sent   Pain contract on file   made available for Dr. Ivy Sevilla review  Script given for Fentanyl patches

## 2018-10-15 NOTE — PROGRESS NOTES
Neurology Progress Note    NAME:  Balaji Galvin   :   1955   MRN:   [id number]     Date/Time:  10/15/2018  Subjective:      Balaji Galvin is a 66 y.o. female here today for follow-up for MS, MG, chronic pain syndrome, difficulty walking. Patient was accompanied by her son to the visit. Patient today says she has been having a lot of pain pain is sharp in nature, diffuse, burning sensation that goes up to the arms causing numbness and tingling sensation and weakness of the hands, down to the legs causing  numbness and tingling sensation of the legs with weakness of the legs. She says since the time to reduce fentanyl, pain has been excruciating. She noted that with the patch, pain is been manageable, she says now, barely get up from bed. At this time, will put back the dosage of the patch to 75 mcg, as patient tolerated the dose without, she has been compliant, has not misused or abused the medication, this was proven by regular  and UDS. She says her headache has increased in frequency and intensity, frequency of 1-2/week, headache is frontal, throbbing in nature, with occasional sharp pain from the back of the head, headache associated with dizziness, nausea, blurry vision, double vision, photophobia, phonophobia. The increase has been since reduction of patient's pain medication. She says she has a lot of muscle spasms mostly in the back. She has been having intermittent dysphasia but no odynophagia. She denies loss of consciousness.   Review of Systems - General ROS: positive for  - fatigue, night sweats and sleep disturbance  Psychological ROS: positive for - anxiety, concentration difficulties, depression, mood swings and sleep disturbances  Ophthalmic ROS: positive for - blurry vision, decreased vision, double vision and photophobia  ENT ROS: positive for - headaches, tinnitus, vertigo and visual changes  Allergy and Immunology ROS: negative  Hematological and Lymphatic ROS: negative  Endocrine ROS: negative  Respiratory ROS: no cough, shortness of breath, or wheezing  Cardiovascular ROS: no chest pain or dyspnea on exertion  Gastrointestinal ROS: no abdominal pain, change in bowel habits, or black or bloody stools  Genito-Urinary ROS: no dysuria, trouble voiding, or hematuria  Musculoskeletal ROS: positive for - gait disturbance, joint pain, joint stiffness, joint swelling and muscle pain  Neurological ROS: positive for - dizziness, gait disturbance, headaches, impaired coordination/balance, numbness/tingling, speech problems, tremors, visual changes and weakness  Dermatological ROS: negative  Medications reviewed:  Current Outpatient Prescriptions   Medication Sig Dispense Refill    ergocalciferol (ERGOCALCIFEROL) 50,000 unit capsule TAKE ONE CAPSULE BY MOUTH EVERY 7 DAYS 4 Cap 0    clopidogrel (PLAVIX) 75 mg tab TAKE 1 TABLET BY MOUTH EVERY DAY 90 Tab 0    pyridostigmine bromide (MESTINON TIMESPAN) 180 mg SR tablet Take 1 Tab by mouth two (2) times a day. 60 Tab 6    fentaNYL (DURAGESIC) 50 mcg/hr PATCH 1 Patch by TransDERmal route every seventy-two (72) hours. Max Daily Amount: 1 Patch. 5 Patch 0    lidocaine (LIDODERM) 5 % Apply patch to the affected area for 12 hours a day and remove for 12 hours a day. 30 Each 3    GILENYA 0.5 mg cap Take 1 Cap by mouth daily. 30 Cap 11    meclizine (ANTIVERT) 25 mg tablet Take  by mouth three (3) times daily as needed.  topiramate (TOPAMAX) 200 mg tablet TAKE 1 TABLET BY MOUTH TWICE DAILY 60 Tab 0    SUMAtriptan (IMITREX) 100 mg tablet 1 tab at onset of moderate-severe migraine; may repeat 1 tab in 2 hours; Limit: 2 tabs in 24/ hrs, not more than 3 days a week 12 Tab 3    dantrolene (DANTRIUM) 100 mg capsule Take 1 Cap by mouth as needed. 90 Cap 3    corticotropin (ACTHAR H.P.) 80 unit/mL injectable gel 1 mL by SubCUTAneous route daily.  80 units subq q day for 10 days 10 mL 1    pregabalin (LYRICA) 200 mg capsule Take 1 Cap by mouth two (2) times a day. Max Daily Amount: 400 mg. 180 Cap 3    clindamycin (CLEOCIN) 300 mg capsule Take 300 mg by mouth. Prior to dental procedures      butalbital-acetaminophen-caff (FIORICET) -40 mg per capsule Take 1 Cap by mouth every four (4) hours as needed for Pain. Max Daily Amount: 6 Caps. 10 Cap 0    rosuvastatin (CRESTOR) 20 mg tablet TAKE 1 TABLET BY MOUTH EVERY EVENING 90 Tab 1    TENS UNIT ELECTRODES by Does Not Apply route. prn      Blood-Glucose Meter monitoring kit by SubCUTAneous route Before breakfast and dinner. Free Style Lite glucometer and test strips (Patient taking differently: by SubCUTAneous route Before breakfast and dinner. Free Style Lite glucometer and test strips-TESTS COUPLE TIMES PER WEEK) 1 Kit 0    levothyroxine (SYNTHROID) 175 mcg tablet TAKE 1 TABLET BY MOUTH DAILY BEFORE BREAKFAST (Patient taking differently: Take 175 mcg by mouth nightly. TAKE 1 TABLET BY MOUTH DAILY BEFORE BREAKFAST) 30 Tab 11    PARoxetine (PAXIL) 40 mg tablet TAKE 1.5 TABS BY MOUTH DAILY. (Patient taking differently: Take 60 mg by mouth nightly. TAKE 1.5 TABS BY MOUTH DAILY. - Note Pt prefers to take at HS) 45 Tab 2    glycopyrrolate (ROBINUL FORTE) 2 mg tablet Take 1 Tab by mouth two (2) times a day. 14 Tab 0    oxyCODONE IR (ROXICODONE) 5 mg immediate release tablet Take 1 Tab by mouth every four (4) hours as needed for Pain.  Max Daily Amount: 30 mg. 30 Tab 0        Objective:   Vitals:  Vitals:    10/15/18 1344   BP: 104/63   Pulse: 74   Resp: 16   Temp: 97.9 °F (36.6 °C)   TempSrc: Temporal   SpO2: 98%   Weight: 196 lb 6.4 oz (89.1 kg)   Height: 6' (1.829 m)   PainSc:  10 - Worst pain ever   PainLoc: Generalized   LMP: 09/01/2010     Lab Data Reviewed:  Lab Results   Component Value Date/Time    WBC 4.1 06/07/2018 01:54 PM    HCT 42.5 06/07/2018 01:54 PM    HGB 13.7 06/07/2018 01:54 PM    PLATELET 395 (L) 40/83/5779 01:54 PM       Lab Results   Component Value Date/Time    Sodium 142 05/24/2018 01:19 PM    Potassium 4.2 05/24/2018 01:19 PM    Chloride 107 (H) 05/24/2018 01:19 PM    CO2 21 05/24/2018 01:19 PM    Glucose 82 05/24/2018 01:19 PM    BUN 9 05/24/2018 01:19 PM    Creatinine 0.90 05/24/2018 01:19 PM    Calcium 9.2 05/24/2018 01:19 PM       No components found for: TROPQUANT    No results found for: SHAHIDA      Lab Results   Component Value Date/Time    Hemoglobin A1c 6.3 (H) 05/24/2018 01:19 PM    Hemoglobin A1c (POC) 5.7 10/02/2017 10:40 AM        Lab Results   Component Value Date/Time    Vitamin B12 434 02/10/2016    Folate 12.0 02/10/2016       No results found for: SHAHIDA, Janis Lights, XBANA    Lab Results   Component Value Date/Time    Cholesterol, total 179 05/24/2018 01:19 PM    HDL Cholesterol 51 05/24/2018 01:19 PM    LDL, calculated 109 (H) 05/24/2018 01:19 PM    VLDL, calculated 19 05/24/2018 01:19 PM    Triglyceride 96 05/24/2018 01:19 PM    CHOL/HDL Ratio 4.3 08/23/2010 12:19 PM         CT Results (recent):    [facility]   CT NECK SOFT TISSUE W CONT   Narrative HISTORY: Neck mass. PROCEDURE: Multiple contiguous helically acquired axial images were obtained of  the cervical region from the skull base through to the thoracic inlet following  intravenous contrast administration. Sagittal and coronal reconstructions were  performed. CT dose reduction was achieved through the use of a standardized protocol  tailored for this examination and automatic exposure control for dose  modulation. FINDINGS:     Images through the nasopharynx reveal symmetric soft tissues. Images through the oropharynx reveals symmetric soft tissues. The parotid glands are symmetric in appearance. Images through the hypopharynx reveals symmetric soft tissues. Just below the level of the hyoid bone, is a soft tissue structure measuring 9.7  x 7.8 mm with a small satellite lesions. No evidence of lingual thyroid per se.     The submandibular glands have a symmetric appearance. Images through the larynx proper reveal symmetric soft tissues. Images through the subglottic larynx reveal symmetric soft tissues. The thyroid gland has a normal appearance with symmetric lobes. No significant cervical lymphadenopathy. Incidental mild mucoperiosteal thickening involving the right greater than left  maxillary sinus of uncertain clinical significance. Impression IMPRESSION:    9.7 x 7.8 mm soft tissue structure anterior to the hyoid bone. Differential  considerations would include thyroglossal duct cyst, ectopic thyroid, dermoid,  etc.          MRI Results (recent):    Results from Hospital Encounter encounter on 01/10/18   MRI BRAIN W WO CONT   Narrative INDICATION:  ms exacerbation, ams     COMPARISON:  May 18, 2016    TECHNIQUE:  MR imaging of the brain was performed with sagittal T1, axial T1,  T2, FLAIR, GRE, DWI/ADC; pre and post contrast multiplanar T1 utilizing 20 mL  gadolinium. FINDINGS:      Ventricles:  Midline, no hydrocephalus. Brain Parenchyma/Brainstem:  No definite change in a few small T2  hyperintensities in the supratentorial white matter and antonieta allowing for motion  on the prior study. Punctate focus of diffusion restriction in the posterior  left frontal lobe near the vertex is new. Intracranial Hemorrhage:  None. Basal Cisterns:  Normal.   Flow Voids:  Normal.  Post Contrast:  No abnormal parenchymal or meningeal enhancement. Additional Comments:  Small fluid level right maxillary sinus may have increased  slightly in the interval.         Impression IMPRESSION:    1. Punctate focus of diffusion restriction without enhancement posterior left  frontal lobe near the vertex suggests small acute infarction. Demyelinating  disease may also present in this fashion but felt to be less likely. 2. Other nonspecific white matter lesions as well as pontine T2 hyperintensity  without definite change.     23X              IR Results (recent):  No results found for this or any previous visit. VAS/US Results (recent):    Results from Hospital Encounter encounter on 01/19/17   DUPLEX LOWER EXT VENOUS RIGHT    PHYSICAL EXAM:  General:    Alert, cooperative, no distress, appears stated age. Head:   Normocephalic, without obvious abnormality, atraumatic. Eyes:   Conjunctivae/corneas clear. PERRLA  Nose:  Nares normal. No drainage or sinus tenderness. Throat:    Lips, mucosa, and tongue normal.  No Thrush  Neck:  Supple, symmetrical,  no adenopathy, thyroid: non tender    no carotid bruit and no JVD. Paraspinal tenderness  Back:    Symmetric, bilateral tenderness. Lungs:   Clear to auscultation bilaterally. No Wheezing or Rhonchi. No rales. Chest wall:  No tenderness or deformity. No Accessory muscle use. Heart:   Regular rate and rhythm,  no murmur, rub or gallop. Abdomen:   Soft, non-tender. Not distended. Bowel sounds normal. No masses  Extremities: Extremities normal, atraumatic, No cyanosis. No edema. No clubbing  Skin:     Texture, turgor normal. No rashes or lesions. Not Jaundiced  Lymph nodes: Cervical, supraclavicular normal.  Psych:  Good insight. Not depressed. Anxious . NEUROLOGICAL EXAM:  Appearance: The patient is well developed, well nourished, provides a coherent history and is in no acute distress. Mental Status: Oriented to time, place and person. Mood and affect appropriate. Cranial Nerves:   Intact visual fields. Fundi are benign. RACHEL, EOM's full, no nystagmus, no ptosis. Facial sensation is normal. Corneal reflexes are intact. Facial movement is symmetric. Hearing is normal bilaterally. Palate is midline within normal sternocleidomastoid and trapezius muscles are tender. Tongue is midline. Motor:  5/5 strength in upper and 4+/5 lower proximal and distal muscles. Normal bulk and tone. No fasciculations.    Reflexes:   Deep tendon reflexes 2+/4 and symmetrical.   Sensory:    Dysesthesia to touch, pinprick and vibration. Gait:   Abnormal gait. Ambulates with walker   Tremor:    Mild tremor noted. Cerebellar:  No cerebellar signs present. Neurovascular:  Normal heart sounds and regular rhythm, peripheral pulses intact, and no carotid bruits. Assesment  1. MS (multiple sclerosis) ([facility])    - ergocalciferol (ERGOCALCIFEROL) 50,000 unit capsule; Dispense: 4 Cap; Refill: 0    2. Vitamin D deficiency    - VITAMIN D, 1, 25 DIHYDROXY    3. Chronic pain syndrome  Adjusting fentanyl patch to 75 mcg/h  - fentaNYL (DURAGESIC) 50 mcg/hr PATCH; 1 Patch by TransDERmal route every seventy-two (72) hours. Max Daily Amount: 1 Patch. Dispense: 5 Patch; Refill: 0    4. MG (myasthenia gravis) ([address]  Stable    ___________________________________________________  PLAN:      ICD-10-CM ICD-9-CM    1. Vitamin D deficiency E55.9 268.9 VITAMIN D, 1, 25 DIHYDROXY   2. MS (multiple sclerosis) ([facility]) G35 340 ergocalciferol (ERGOCALCIFEROL) 50,000 unit capsule   3. Chronic pain syndrome G89.4 338.4 fentaNYL (DURAGESIC) 50 mcg/hr PATCH   4. MG (myasthenia gravis) ([address] G70.00 358.00      Follow-up Disposition:  Return in about 2 months (around 12/15/2018).            ___________________________________________________    Total time spent with patient:  []15   []25   []35   [] __ minutes    Care Plan discussed with:    []Patient   []Family    []Care Manager   []Consultant/Specialist :    ___________________________________________________    Attending Physician: Murphy Albarran MD

## 2018-10-15 NOTE — MR AVS SNAPSHOT
[address] [place] [address] 
[phone] Patient: Tarun Meadows MRN: [id number] QRU:4/92/3227 Visit Information Date & Time Provider Department Dept. Phone Encounter #  
 10/15/2018  1:20 PM MD [facility] [phone] [id number] Follow-up Instructions Return in about 2 months (around 12/15/2018). Your Appointments 10/29/2018  1:30 PM  
ROUTINE CARE with Kalpana Huynh MD  
[facility]) Appt Note: 6 months follow up DM  
 [address] [phone]  
[phone]  
  
   
 [address] [address] Upcoming Health Maintenance Date Due Shingrix Vaccine Age 50> (1 of 2) 4/25/2007 EYE EXAM RETINAL OR DILATED Q1 10/12/2017 FOOT EXAM Q1 7/3/2018 Influenza Age 5 to Adult 8/1/2018 HEMOGLOBIN A1C Q6M 11/24/2018 MEDICARE YEARLY EXAM 4/28/2019 MICROALBUMIN Q1 5/24/2019 LIPID PANEL Q1 5/24/2019 BREAST CANCER SCRN MAMMOGRAM 12/20/2019 PAP AKA CERVICAL CYTOLOGY 1/4/2020 COLONOSCOPY 1/18/2022 DTaP/Tdap/Td series (2 - Td) 6/16/2024 Allergies as of 10/15/2018  Review Complete On: 10/15/2018 By: Shailesh Chopra MD  
  
 Severity Noted Reaction Type Reactions Bee Sting [Sting, Bee] High 06/25/2010    Anaphylaxis Also allergic to egg products Penicillins High 06/25/2010    Anaphylaxis Betadine [Povidone-iodine]  05/17/2016    Rash Egg  06/25/2015    Itching Current Immunizations  Reviewed on 1/4/2017 Name Date Tdap 6/16/2014 Not reviewed this visit You Were Diagnosed With   
  
 Codes Comments Vitamin D deficiency    -  Primary ICD-10-CM: E55.9 ICD-9-CM: 268.9 MS (multiple sclerosis) ([facility]     ICD-10-CM: G35 
ICD-9-CM: 203 Chronic pain syndrome     ICD-10-CM: G89.4 ICD-9-CM: 338.4 MG (myasthenia gravis) ([facility]     ICD-10-CM: G70.00 ICD-9-CM: 358.00 Oral phase dysphagia     ICD-10-CM: R13.11 ICD-9-CM: 787.21 Vitals BP Pulse Temp Resp Height(growth percentile) Weight(growth percentile) 104/63 (BP 1 Location: Left arm, BP Patient Position: Sitting) 74 97.9 °F (36.6 °C) (Temporal) 16 6' (1.829 m) 196 lb 6.4 oz (89.1 kg) LMP SpO2 BMI OB Status Smoking Status 09/01/2010 98% 26.64 kg/m2 Postmenopausal Current Every Day Smoker BMI and BSA Data Body Mass Index Body Surface Area  
 26.64 kg/m 2 2.13 m 2 Preferred Pharmacy Pharmacy Name Phone [facility] [address] [phone] Your Updated Medication List  
  
   
This list is accurate as of 10/15/18  2:22 PM.  Always use your most recent med list.  
  
  
  
  
 Blood-Glucose Meter monitoring kit  
by SubCUTAneous route Before breakfast and dinner. Free Style Lite glucometer and test strips  
  
 butalbital-acetaminophen-caff -40 mg per capsule Commonly known as:  [employer] Take 1 Cap by mouth every four (4) hours as needed for Pain. Max Daily Amount: 6 Caps. clindamycin 300 mg capsule Commonly known as:  CLEOCIN Take 300 mg by mouth. Prior to dental procedures  
  
 clopidogrel 75 mg Tab Commonly known as:  PLAVIX TAKE 1 TABLET BY MOUTH EVERY DAY  
  
 corticotropin 80 unit/mL injectable gel Commonly known as:  ACTHAR H.P.  
1 mL by SubCUTAneous route daily. 80 units subq q day for 10 days  
  
 dantrolene 100 mg capsule Commonly known as:  DANTRIUM Take 1 Cap by mouth as needed. ergocalciferol 50,000 unit capsule Commonly known as:  ERGOCALCIFEROL Take 1 Cap by mouth every seven (7) days. fentaNYL 75 mcg/hr Commonly known as:  DURAGESIC  
1 Patch by TransDERmal route every seventy-two (72) hours. Max Daily Amount: 1 Patch. Start taking on:  11/15/2018 GILENYA 0.5 mg Cap Generic drug:  fingolimod Take 1 Cap by mouth daily. glycopyrrolate 2 mg tablet Commonly known as:  Arely Boozer Take 1 Tab by mouth two (2) times a day. levothyroxine 175 mcg tablet Commonly known as:  SYNTHROID  
TAKE 1 TABLET BY MOUTH DAILY BEFORE BREAKFAST  
  
 lidocaine 5 % Commonly known as:  Evelyne Mariangel Apply patch to the affected area for 12 hours a day and remove for 12 hours a day. meclizine 25 mg tablet Commonly known as:  ANTIVERT Take  by mouth three (3) times daily as needed. oxyCODONE IR 5 mg immediate release tablet Commonly known as:  Carlee Pao Take 1 Tab by mouth every four (4) hours as needed for Pain. Max Daily Amount: 30 mg. PARoxetine 40 mg tablet Commonly known as:  PAXIL TAKE 1.5 TABS BY MOUTH DAILY. pregabalin 200 mg capsule Commonly known as:  Haroldalli Soumya Take 1 Cap by mouth two (2) times a day. Max Daily Amount: 400 mg.  
  
 pyridostigmine bromide 180 mg SR tablet Commonly known as:  MESTINON TIMESPAN Take 1 Tab by mouth two (2) times a day. rosuvastatin 20 mg tablet Commonly known as:  CRESTOR  
TAKE 1 TABLET BY MOUTH EVERY EVENING  
  
 SUMAtriptan 100 mg tablet Commonly known as:  IMITREX  
1 tab at onset of moderate-severe migraine; may repeat 1 tab in 2 hours; Limit: 2 tabs in 24/ hrs, not more than 3 days a week TENS UNIT ELECTRODES  
by Does Not Apply route. prn  
  
 topiramate 200 mg tablet Commonly known as:  TOPAMAX TAKE 1 TABLET BY MOUTH TWICE DAILY Prescriptions Printed Refills  
 fentaNYL (DURAGESIC) 75 mcg/hr 0 Starting on: 11/15/2018 Si Patch by TransDERmal route every seventy-two (72) hours. Max Daily Amount: 1 Patch. Class: Print Route: TransDERmal  
  
Prescriptions Sent to Pharmacy Refills  
 ergocalciferol (ERGOCALCIFEROL) 50,000 unit capsule 3 Sig: Take 1 Cap by mouth every seven (7) days.   
 Class: Normal  
 Pharmacy: [facility] [address] [place] [address] #: [phone] Route: Oral  
  
We Performed the Following [address] Comments: For SLP Dysphagia VITAMIN D, 1, 25 DIHYDROXY [93827 CPT(R)] Follow-up Instructions Return in about 2 months (around 12/15/2018). Referral Information Referral ID Referred By Referred To  
  
 [id number] Eloy CISNEROS Not Available Visits Status Start Date End Date 1 New Request 10/15/18 10/15/19 If your referral has a status of pending review or denied, additional information will be sent to support the outcome of this decision. Introducing [facility]! Dear Randal Herrera: 
Thank you for requesting a Libersy account. Our records indicate that you already have an active Libersy account. You can access your account anytime at [email or web address] Did you know that you can access your hospital and ER discharge instructions at any time in Libersy? You can also review all of your test results from your hospital stay or ER visit. Additional Information If you have questions, please visit the Frequently Asked Questions section of the Libersy website at [email or web address] Remember, Libersy is NOT to be used for urgent needs. For medical emergencies, dial 911. Now available from your iPhone and Android! Please provide this summary of care documentation to your next provider. Your primary care clinician is listed as Adra Cooper. If you have any questions after today's visit, please call [phone].

## 2018-10-17 ENCOUNTER — PATIENT OUTREACH (OUTPATIENT)
Dept: FAMILY MEDICINE CLINIC | Age: 63
End: 2018-10-17

## 2018-10-17 NOTE — Clinical Note
Dimple Wheeler told me today she found a lump in left breast. Last mammogram was 12/5/17. Has appt with you Oct 29. Please advise.

## 2018-10-17 NOTE — Clinical Note
Suggested Hiwot see one of your colleagues re the breast lump-she wanted to wait and see you. Rescheduled her appt to 10/25.

## 2018-10-17 NOTE — PROGRESS NOTES
Patient came in for monthly Case Management session. Walked in using a walker, said she was very unsteady. Speech slurred at times, frequent word searching and frequent episodes of gagging. Had seen neurologist,, yesterday. Had been on the reduced dose of the Fentanyl patch for about a month. He had decreased the dose from 75mcg to 50mcg. Pain all over body had increased, more muscle spasms, decreased energy and more headaches. \"Everything\" hurt she said. Stomach started hurting, swallowing issues again, trouble drinking fluids and eating at times. Has lost 5 lob. Tries to eat jello, ice cream,noodles,soups but even with those, often has problems getting it down.  has ordered speech therapy again for her. Mona shultz had gotten so bad, she became very depressed and was \"ready to die. \"  did increase the dose of the Fentanyl patch back up to 75mcg. Put the new patch on yesterday-hopes her body will rebound and feel more like her old self soon. She is hopeful now that her quality of life will improve once the pain is under control. Denies any thoughts of SI/HI. Reminded to call CM is gets depressed again. Also found a lump in her left breast yesterday. Last mammogram was 12/5/17. Requesting order for another mammogram. Has an appt with  on 10/29. PCP not in office today, will notify her and request order for imaging to evaluate. Also complained that under both breasts, has noticed an odor-even after washing well. No rash seen, was wearing some powder. No itching, just the odor. Advised will discuss with . Said she has used a cream in the past from the [place] [phone], will see if she has anymore left. Requested she let me know what it was she was using. Financial concerns continue-son still not employed and money is very tight. His medical insurance will run out at end of the year.  Suggested she see if he may qualify for Medicaid and can apply for [employer] as well. Made appt for next session in November, advised to call prn for any concerns/issues. Will send message to  re the breast lump. Called patient 10/18 to let her know that Franko Lee is out of the office until 10/25. Recommend she see someone re the breast lump. Offered her an appt with one of our other providers. Patient has appt 10/29-wants to see her pcp. Suggested we move that appt up if insists on seeing .  Rescheduled for her to see pcp on 10/25 at 1:15pm. Cancelled the 10/29 appt per pt request.

## 2018-10-20 LAB — DRUGS UR: NORMAL

## 2018-10-22 LAB — 1,25(OH)2D3 SERPL-MCNC: 56.1 PG/ML (ref 19.9–79.3)

## 2018-10-25 ENCOUNTER — OFFICE VISIT (OUTPATIENT)
Dept: FAMILY MEDICINE CLINIC | Age: 63
End: 2018-10-25

## 2018-10-25 VITALS
HEART RATE: 69 BPM | SYSTOLIC BLOOD PRESSURE: 110 MMHG | TEMPERATURE: 98.6 F | HEIGHT: 72 IN | OXYGEN SATURATION: 96 % | WEIGHT: 204 LBS | BODY MASS INDEX: 27.63 KG/M2 | DIASTOLIC BLOOD PRESSURE: 64 MMHG | RESPIRATION RATE: 18 BRPM

## 2018-10-25 DIAGNOSIS — N60.02 BREAST CYST, LEFT: ICD-10-CM

## 2018-10-25 DIAGNOSIS — R21 RASH: ICD-10-CM

## 2018-10-25 DIAGNOSIS — G70.00 MYASTHENIA GRAVIS (HCC): ICD-10-CM

## 2018-10-25 DIAGNOSIS — G35 MS (MULTIPLE SCLEROSIS) (HCC): ICD-10-CM

## 2018-10-25 DIAGNOSIS — E11.9 DIET-CONTROLLED DIABETES MELLITUS (HCC): Primary | ICD-10-CM

## 2018-10-25 LAB — HBA1C MFR BLD HPLC: 6.8 %

## 2018-10-25 RX ORDER — NYSTATIN 100000 [USP'U]/G
POWDER TOPICAL
Qty: 30 G | Refills: 0 | Status: SHIPPED | OUTPATIENT
Start: 2018-10-25 | End: 2021-02-16 | Stop reason: SDUPTHER

## 2018-10-25 RX ORDER — MENTHOL AND ZINC OXIDE .44; 20.625 G/100G; G/100G
1 OINTMENT TOPICAL
COMMUNITY

## 2018-10-25 NOTE — ASSESSMENT & PLAN NOTE
Lab Results Component Value Date/Time  WBC 4.1 06/07/2018 01:54 PM  
 HGB 13.7 06/07/2018 01:54 PM  
 HCT 42.5 06/07/2018 01:54 PM  
 PLATELET 554 57/10/6647 01:54 PM  
 Creatinine 0.90 05/24/2018 01:19 PM  
 BUN 9 05/24/2018 01:19 PM  
 Potassium 4.2 05/24/2018 01:19 PM  
 INR 1.0 05/11/2017 10:20 AM  
 Prothrombin time 10.6 05/11/2017 10:20 AM

## 2018-10-25 NOTE — PROGRESS NOTES
Patient Name: Hilda Smith MRN: [id number] SUBJECTIVE Hilda Smith is a 66 y.o. female who presents with the following: She is seen for diabetes. Since last visit she reports no chest pain, dyspnea or TIA's, no numbness, tingling or pain in extremities, no unusual visual symptoms, no hypoglycemia, no significant changes. Home glucose monitoring: is performed regularly. She reports medication compliance: n/a - patient not on medications (diet controlled). Medication side effects: none. Diabetic diet compliance: has been non compliant with diet. States she has been in more pain due to her neurology decreasing her pain meds so she has been eating more comfort food. Lab review: labs reviewed and discussed with patient, A1c today is 6.8. Eye exam: overdue. Had felt two day of palpable lump on her left breast. 
Previously had an oil cyst in left breast in 2016 per US/mammo. Pt is unsure if the prior cyst was located where her current lump is. Has a fhx of breast cancer. Also has a dry itchy scaly rash underneath both breasts. Has been using baby powder. Lab Results Component Value Date/Time Hemoglobin A1c 6.3 (H) 05/24/2018 01:19 PM  
 Hemoglobin A1c (POC) 6.8 10/25/2018 02:53 PM  
 
 
[facility] Results (most recent): 
Results from Hospital Encounter encounter on 12/20/17 [facility] MAMMO BI SCREENING INCL CAD Narrative STUDY: Bilateral digital screening mammogram 
 
INDICATION:  Screening. COMPARISON:  12/2016, 11/2015, and dating back to 8/2009 BREAST COMPOSITION:  The breasts are heterogeneously dense, which may obscure 
small masses. FINDINGS: Bilateral digital screening mammography was performed and is 
interpreted in conjunction with a computer assisted detection (CAD) system. No 
suspicious masses or calcifications are identified. There has been no 
significant change. Impression IMPRESSION: 
BI-RADS 1: Negative. No mammographic evidence of malignancy.  
 
RECOMMENDATIONS: 
 Next screening mammogram is recommended in one year. The patient will be notified of these results. Review of Systems Constitutional: Negative for fever, malaise/fatigue and weight loss. Respiratory: Negative for cough, hemoptysis, shortness of breath and wheezing. Cardiovascular: Negative for chest pain, palpitations, leg swelling and PND. Gastrointestinal: Negative for abdominal pain, constipation, diarrhea, nausea and vomiting. Skin: Positive for itching and rash. The patient's medications, allergies, past medical history, surgical history, family history and social history were reviewed and updated where appropriate. Prior to Admission medications Medication Sig Start Date End Date Taking? Authorizing Provider Menthol-Zinc Oxide (CALMOSEPTINE) 0.44-20.6 % oint Apply  to affected area as needed. Yes Provider, Historical  
ergocalciferol (ERGOCALCIFEROL) 50,000 unit capsule Take 1 Cap by mouth every seven (7) days. 10/15/18  Yes Los Kahn MD  
fentaNYL (DURAGESIC) 75 mcg/hr 1 Patch by TransDERmal route every seventy-two (72) hours. Max Daily Amount: 1 Patch. 11/15/18  Yes Los Kahn MD  
clopidogrel (PLAVIX) 75 mg tab TAKE 1 TABLET BY MOUTH EVERY DAY 8/31/18  Yes Los Kahn MD  
pyridostigmine bromide (MESTINON TIMESPAN) 180 mg SR tablet Take 1 Tab by mouth two (2) times a day. 8/15/18  Yes Los Kahn MD  
lidocaine (LIDODERM) 5 % Apply patch to the affected area for 12 hours a day and remove for 12 hours a day. 8/15/18  Yes Los Kahn MD  
GILENYA 0.5 mg cap Take 1 Cap by mouth daily.  7/12/18  Yes Los Kahn MD  
topiramate (TOPAMAX) 200 mg tablet TAKE 1 TABLET BY MOUTH TWICE DAILY 4/12/18  Yes Los Kahn MD  
SUMAtriptan (IMITREX) 100 mg tablet 1 tab at onset of moderate-severe migraine; may repeat 1 tab in 2 hours; Limit: 2 tabs in 24/ hrs, not more than 3 days a week 3/7/18  Yes Lyudmila Longoria MD  
 dantrolene (DANTRIUM) 100 mg capsule Take 1 Cap by mouth as needed. 12/26/17  Yes Los Kahn MD  
corticotropin (ACTHAR H.P.) 80 unit/mL injectable gel 1 mL by SubCUTAneous route daily. 80 units subq q day for 10 days 12/26/17  Yes Los Kahn MD  
pregabalin (LYRICA) 200 mg capsule Take 1 Cap by mouth two (2) times a day. Max Daily Amount: 400 mg. 10/18/17  Yes Los Kahn MD  
clindamycin (CLEOCIN) 300 mg capsule Take 300 mg by mouth. Prior to dental procedures 6/30/17  Yes Provider, Historical  
butalbital-acetaminophen-caff (FIORICET) -40 mg per capsule Take 1 Cap by mouth every four (4) hours as needed for Pain. Max Daily Amount: 6 Caps. 5/22/17  Yes Christina Mayes MD  
rosuvastatin (CRESTOR) 20 mg tablet TAKE 1 TABLET BY MOUTH EVERY EVENING 2/24/17  Yes Keri Ross MD  
TENS UNIT ELECTRODES by Does Not Apply route. prn   Yes Provider, Historical  
Blood-Glucose Meter monitoring kit by SubCUTAneous route Before breakfast and dinner. Free Style Lite glucometer and test strips Patient taking differently: by SubCUTAneous route Before breakfast and dinner. Free Style Lite glucometer and test strips-TESTS COUPLE TIMES PER WEEK 9/13/16  Yes Deo Patient, DO  
levothyroxine (SYNTHROID) 175 mcg tablet TAKE 1 TABLET BY MOUTH DAILY BEFORE BREAKFAST Patient taking differently: Take 175 mcg by mouth nightly. TAKE 1 TABLET BY MOUTH DAILY BEFORE BREAKFAST 9/7/16  Yes Deo Patient, DO  
PARoxetine (PAXIL) 40 mg tablet TAKE 1.5 TABS BY MOUTH DAILY. Patient taking differently: Take 60 mg by mouth nightly. TAKE 1.5 TABS BY MOUTH DAILY. - Note Pt prefers to take at [facility] 12/29/15  Yes Deo Patient, DO Allergies Allergen Reactions  Bee Sting [Sting, Bee] Anaphylaxis Also allergic to egg products  Penicillins Anaphylaxis  Betadine [Povidone-Iodine] Rash  Egg Itching OBJECTIVE Visit Vitals /64 (BP 1 Location: Left arm, BP Patient Position: Sitting) Pulse 69 Temp 98.6 °F (37 °C) (Oral) Resp 18 Ht 6' (1.829 m) Wt 204 lb (92.5 kg) LMP 09/01/2010 SpO2 96% BMI 27.67 kg/m² Physical Exam  
Constitutional: She is oriented to person, place, and time and well-developed, well-nourished, and in no distress. No distress. Eyes: Conjunctivae and EOM are normal. Pupils are equal, round, and reactive to light. Musculoskeletal: Normal range of motion. Neurological: She is alert and oriented to person, place, and time. Gait normal.  
Skin: Skin is warm and dry. Rash (dry scaly rash underneath both breasts) noted. She is not diaphoretic. Psychiatric: Mood, memory, affect and judgment normal.  
Nursing note and vitals reviewed. Breasts: right breast normal without mass, skin or nipple changes or axillary nodes, left abnormal mass palpable above nipple. ASSESSMENT AND PLAN Marsha Gonzalez is a 66 y.o. female who presents today for: 1. Diet-controlled diabetes mellitus ([facility] [address] Pt would like to work on diet/exercises before restarting metformin again. I have reviewed/discussed the above normal BMI with the patient. I have recommended the following interventions: dietary management education, guidance, and counseling, encourage exercise, monitor weight and prescribed dietary intake. - AMB POC HEMOGLOBIN A1C 2. Breast cyst, left Will reevaluate via [address] [place] [address] 4 QUAD; Future 3. Myasthenia gravis ([facility] [place] [address] 4. MS (multiple sclerosis) ([facility] [address] 5. Rash 
- nystatin (MYCOSTATIN) powder; Apply to candidal lesions TOPICALLY 2 to 3 times daily until healing complete  Dispense: 30 g; Refill: 0 Medications Discontinued During This Encounter Medication Reason  glycopyrrolate (ROBINUL FORTE) 2 mg tablet Not A Current Medication  oxyCODONE IR (ROXICODONE) 5 mg immediate release tablet Not A Current Medication  meclizine (ANTIVERT) 25 mg tablet Not A Current Medication Follow-up Disposition: 
Return in about 3 months (around 1/25/2019) for DM follow up. Medication risks/benefits/costs/interactions/alternatives discussed with patient. Advised patient to call back or return to office if symptoms worsen/change/persist. If patient cannot reach us or should anything more severe/urgent arise he/she should proceed directly to the nearest emergency department. Discussed expected course/resolution/complications of diagnosis in detail with patient. Patient given a written after visit summary which includes his/her diagnoses, current medications and vitals. Patient expressed understanding with the diagnosis and plan. Brandon Gray M.D.

## 2018-10-25 NOTE — ASSESSMENT & PLAN NOTE
This condition is managed by Specialist. 
Lab Results Component Value Date/Time  WBC 4.1 06/07/2018 01:54 PM  
 HGB 13.7 06/07/2018 01:54 PM  
 HCT 42.5 06/07/2018 01:54 PM  
 PLATELET 474 74/46/3268 01:54 PM  
 Creatinine 0.90 05/24/2018 01:19 PM  
 BUN 9 05/24/2018 01:19 PM  
 Potassium 4.2 05/24/2018 01:19 PM  
 INR 1.0 05/11/2017 10:20 AM  
 Prothrombin time 10.6 05/11/2017 10:20 AM

## 2018-10-25 NOTE — PROGRESS NOTES
Chief Complaint Patient presents with  Diabetes  
  follow up  Breast Mass  
  left breast  
 
1. Have you been to the ER, urgent care clinic since your last visit? Hospitalized since your last visit? No 
 
2. Have you seen or consulted any other health care providers outside of the [address] [place] [address] since your last visit? Include any pap smears or colon screening. Patient saw the neurologist a bout 1 week ago for routine visit.

## 2018-10-25 NOTE — ASSESSMENT & PLAN NOTE
Worsening, based on history, physical exam and review of pertinent labs, studies and medications; meds reconciled; lifestyle modifications recommended. Key Antihyperglycemic Medications Patient is on no antihyperglycemic meds. Other Key Diabetic Medications   
    
  
 topiramate (TOPAMAX) 200 mg tablet  (Taking) TAKE 1 TABLET BY MOUTH TWICE DAILY pregabalin (LYRICA) 200 mg capsule  (Taking) Take 1 Cap by mouth two (2) times a day. Max Daily Amount: 400 mg.  
 rosuvastatin (CRESTOR) 20 mg tablet  (Taking) TAKE 1 TABLET BY MOUTH EVERY EVENING Lab Results Component Value Date/Time Hemoglobin A1c 6.3 05/24/2018 01:19 PM  
 Hemoglobin A1c (POC) 6.8 10/25/2018 02:53 PM  
 Glucose 82 05/24/2018 01:19 PM  
 Creatinine 0.90 05/24/2018 01:19 PM  
 Microalb/Creat ratio (ug/mg creat.) 8.9 05/24/2018 01:19 PM  
 Cholesterol, total 179 05/24/2018 01:19 PM  
 HDL Cholesterol 51 05/24/2018 01:19 PM  
 LDL, calculated 109 05/24/2018 01:19 PM  
 Triglyceride 96 05/24/2018 01:19 PM  
 
Diabetic Foot and Eye Exam HM Status Topic Date Due [address] [place] Exam  10/12/2017 [place]  07/03/2018

## 2018-10-25 NOTE — PATIENT INSTRUCTIONS
Counting Carbohydrates: Care Instructions Your Care Instructions You don't have to eat special foods when you have diabetes. You just have to be careful to eat healthy foods. Carbohydrates (carbs) raise blood sugar higher and quicker than any other nutrient. Carbs are found in desserts, breads and cereals, and fruit. They're also in starchy vegetables. These include potatoes, corn, and grains such as rice and pasta. Carbs are also in milk and yogurt. The more carbs you eat at one time, the higher your blood sugar will rise. Spreading carbs all through the day helps keep your blood sugar levels within your target range. Counting carbs is one of the best ways to keep your blood sugar under control. If you use insulin, counting carbs helps you match the right amount of insulin to the number of grams of carbs in a meal. Then you can change your diet and insulin dose as needed. Testing your blood sugar several times a day can help you learn how carbs affect your blood sugar. A registered dietitian or certified diabetes educator can help you plan meals and snacks. Follow-up care is a key part of your treatment and safety. Be sure to make and go to all appointments, and call your doctor if you are having problems. It's also a good idea to know your test results and keep a list of the medicines you take. How can you care for yourself at home? Know your daily amount of carbohydrates Your daily amount depends on several things, such as your weight, how active you are, which diabetes medicines you take, and what your goals are for your blood sugar levels. A registered dietitian or certified diabetes educator can help you plan how many carbs to include in each meal and snack. For most adults, a guideline for the daily amount of carbs is: · 45 to 60 grams at each meal. That's about the same as 3 to 4 carbohydrate servings. · 15 to 20 grams at each snack. That's about the same as 1 carbohydrate serving. Count carbs Counting carbs lets you know how much rapid-acting insulin to take before you eat. If you use an insulin pump, you get a constant rate of insulin during the day. So the pump must be programmed at meals. This gives you extra insulin to cover the rise in blood sugar after meals. If you take insulin: 
· Learn your own insulin-to-carb ratio. You and your diabetes health professional will figure out the ratio. You can do this by testing your blood sugar after meals. For example, you may need a certain amount of insulin for every 15 grams of carbs. · Add up the carb grams in a meal. Then you can figure out how many units of insulin to take based on your insulin-to-carb ratio. · Exercise lowers blood sugar. You can use less insulin than you would if you were not doing exercise. Keep in mind that timing matters. If you exercise within 1 hour after a meal, your body may need less insulin for that meal than it would if you exercised 3 hours after the meal. Test your blood sugar to find out how exercise affects your need for insulin. If you do or don't take insulin: 
· Look at labels on packaged foods. This can tell you how many carbs are in a serving. You can also use guides from the American Diabetes Association. · Be aware of portions, or serving sizes. If a package has two servings and you eat the whole package, you need to double the number of grams of carbohydrate listed for one serving. · Protein, fat, and fiber do not raise blood sugar as much as carbs do. If you eat a lot of these nutrients in a meal, your blood sugar will rise more slowly than it would otherwise. Eat from all food groups · Eat at least three meals a day. · Plan meals to include food from all the food groups. The food groups include grains, fruits, dairy, proteins, and vegetables. · Talk to your dietitian or diabetes educator about ways to add limited amounts of sweets into your meal plan. · If you drink alcohol, talk to your doctor. It may not be recommended when you are taking certain diabetes medicines. Where can you learn more? Go to http://marie-alvin.info/. Enter T693 in the search box to learn more about \"Counting Carbohydrates: Care Instructions. \" Current as of: December 7, 2017 Content Version: 11.8 © 1984-7970 [employer]. Care instructions adapted under license by [employer] (which disclaims liability or warranty for this information). If you have questions about a medical condition or this instruction, always ask your healthcare professional. [place] [address] any warranty or liability for your use of this information.

## 2018-10-29 ENCOUNTER — PATIENT OUTREACH (OUTPATIENT)
Dept: FAMILY MEDICINE CLINIC | Age: 63
End: 2018-10-29

## 2018-10-29 NOTE — PROGRESS NOTES
Patient called , her ultrasound of breast is scheduled for Wednesday 10/31 at 12:30pm at [facility]. Also reports that her son found a job, starts today, very relieved- finances had gotten really tight so this helps decrease the stress. May need to change date and time of her next Case Management appointment-advised to just let me know and we can reschedule if needed. Support given.

## 2018-10-31 ENCOUNTER — HOSPITAL ENCOUNTER (OUTPATIENT)
Dept: MAMMOGRAPHY | Age: 63
Discharge: HOME | End: 2018-10-31
Attending: FAMILY MEDICINE
Payer: MEDICARE

## 2018-10-31 ENCOUNTER — HOSPITAL ENCOUNTER (OUTPATIENT)
Dept: ULTRASOUND IMAGING | Age: 63
Discharge: HOME | End: 2018-10-31
Attending: FAMILY MEDICINE
Payer: MEDICARE

## 2018-10-31 DIAGNOSIS — N60.02 BREAST CYST, LEFT: ICD-10-CM

## 2018-10-31 DIAGNOSIS — N63.20 MASS OF LEFT BREAST: ICD-10-CM

## 2018-10-31 DIAGNOSIS — C50.912 BREAST CANCER, LEFT (HCC): ICD-10-CM

## 2018-10-31 PROCEDURE — 77065 DX MAMMO INCL CAD UNI: CPT

## 2018-11-12 DIAGNOSIS — G89.4 CHRONIC PAIN SYNDROME: ICD-10-CM

## 2018-11-12 DIAGNOSIS — G35 MS (MULTIPLE SCLEROSIS) (HCC): Primary | ICD-10-CM

## 2018-11-13 ENCOUNTER — PATIENT OUTREACH (OUTPATIENT)
Dept: FAMILY MEDICINE CLINIC | Age: 63
End: 2018-11-13

## 2018-11-13 RX ORDER — FENTANYL 75 UG/H
1 PATCH TRANSDERMAL
Qty: 10 PATCH | Refills: 0 | Status: SHIPPED | OUTPATIENT
Start: 2018-11-15 | End: 2018-12-31

## 2018-11-13 NOTE — PROGRESS NOTES
Patient called [facility]. Had scheduled an appointment for tomorrow at 2pm for CM. Said her son is working again, and won't be able to bring her. Advised her to check with him regarding his schedule for next few days, and call me back. Should be able to see her on Thursday or Friday if he is off work. She agreed with the plan, will let me know.

## 2018-11-16 ENCOUNTER — PATIENT OUTREACH (OUTPATIENT)
Dept: FAMILY MEDICINE CLINIC | Age: 63
End: 2018-11-16

## 2018-11-28 DIAGNOSIS — G43.909 MIGRAINE WITHOUT STATUS MIGRAINOSUS, NOT INTRACTABLE, UNSPECIFIED MIGRAINE TYPE: ICD-10-CM

## 2018-11-28 RX ORDER — TOPIRAMATE 200 MG/1
TABLET ORAL
Qty: 60 TAB | Refills: 0 | Status: SHIPPED | OUTPATIENT
Start: 2018-11-28 | End: 2018-12-18 | Stop reason: SDUPTHER

## 2018-12-11 DIAGNOSIS — G35 MS (MULTIPLE SCLEROSIS) (HCC): ICD-10-CM

## 2018-12-11 RX ORDER — CLOPIDOGREL BISULFATE 75 MG/1
TABLET ORAL
Qty: 90 TAB | Refills: 0 | Status: SHIPPED | OUTPATIENT
Start: 2018-12-11 | End: 2019-03-18 | Stop reason: SDUPTHER

## 2018-12-13 ENCOUNTER — PATIENT OUTREACH (OUTPATIENT)
Dept: FAMILY MEDICINE CLINIC | Age: 63
End: 2018-12-13

## 2018-12-13 NOTE — PROGRESS NOTES
Called patient to schedule next Complex Case Management appointment-LM requesting she call me back. Patient called back, scheduled to come on 12/18 - has appt with her neurologist in the afternoon, so son will be off that day to take her to that appt and can see me earlier. Will meet with her at 12noon.

## 2018-12-14 DIAGNOSIS — G35 MS (MULTIPLE SCLEROSIS) (HCC): ICD-10-CM

## 2018-12-14 RX ORDER — PREGABALIN 200 MG/1
200 CAPSULE ORAL 2 TIMES DAILY
Qty: 180 CAP | Refills: 3 | Status: SHIPPED | OUTPATIENT
Start: 2018-12-14 | End: 2019-05-29 | Stop reason: SDUPTHER

## 2018-12-17 ENCOUNTER — PATIENT OUTREACH (OUTPATIENT)
Dept: FAMILY MEDICINE CLINIC | Age: 63
End: 2018-12-17

## 2018-12-17 DIAGNOSIS — E11.65 HYPERGLYCEMIA DUE TO TYPE 2 DIABETES MELLITUS (HCC): ICD-10-CM

## 2018-12-17 RX ORDER — LEVOTHYROXINE SODIUM 175 UG/1
TABLET ORAL
Qty: 30 TAB | Refills: 11 | Status: CANCELLED
Start: 2018-12-17

## 2018-12-17 NOTE — PROGRESS NOTES
Called patient to confirm appt for CCM for tomorrow at 12 noon. Son has to be off to drive her to her neurology appt later in afternoon, will bring her by for appt here at noon. Patient says she will be here. Sounded in good spirits. Advised to call if any issues prior.

## 2018-12-18 ENCOUNTER — OFFICE VISIT (OUTPATIENT)
Dept: NEUROLOGY | Age: 63
End: 2018-12-18

## 2018-12-18 ENCOUNTER — PATIENT OUTREACH (OUTPATIENT)
Dept: FAMILY MEDICINE CLINIC | Age: 63
End: 2018-12-18

## 2018-12-18 VITALS
BODY MASS INDEX: 27.33 KG/M2 | TEMPERATURE: 98 F | SYSTOLIC BLOOD PRESSURE: 120 MMHG | HEIGHT: 72 IN | HEART RATE: 77 BPM | DIASTOLIC BLOOD PRESSURE: 73 MMHG | WEIGHT: 201.8 LBS | OXYGEN SATURATION: 99 % | RESPIRATION RATE: 16 BRPM

## 2018-12-18 DIAGNOSIS — G35 MULTIPLE SCLEROSIS EXACERBATION (HCC): ICD-10-CM

## 2018-12-18 DIAGNOSIS — G35 MS (MULTIPLE SCLEROSIS) (HCC): ICD-10-CM

## 2018-12-18 DIAGNOSIS — G43.909 MIGRAINE WITHOUT STATUS MIGRAINOSUS, NOT INTRACTABLE, UNSPECIFIED MIGRAINE TYPE: ICD-10-CM

## 2018-12-18 DIAGNOSIS — G89.4 CHRONIC PAIN SYNDROME: ICD-10-CM

## 2018-12-18 DIAGNOSIS — R51.9 DAILY HEADACHE: ICD-10-CM

## 2018-12-18 DIAGNOSIS — R20.9 CVA, OLD, ALTERATIONS OF SENSATIONS: ICD-10-CM

## 2018-12-18 DIAGNOSIS — G70.00 MG (MYASTHENIA GRAVIS) (HCC): Primary | ICD-10-CM

## 2018-12-18 DIAGNOSIS — I69.398 CVA, OLD, ALTERATIONS OF SENSATIONS: ICD-10-CM

## 2018-12-18 DIAGNOSIS — G61.81 CIDP (CHRONIC INFLAMMATORY DEMYELINATING POLYNEUROPATHY) (HCC): ICD-10-CM

## 2018-12-18 DIAGNOSIS — R26.9 GAIT DISORDER: ICD-10-CM

## 2018-12-18 RX ORDER — TOPIRAMATE 200 MG/1
200 TABLET ORAL 2 TIMES DAILY
Qty: 60 TAB | Refills: 0 | Status: SHIPPED | OUTPATIENT
Start: 2018-12-18 | End: 2018-12-18 | Stop reason: SDUPTHER

## 2018-12-18 RX ORDER — ERGOCALCIFEROL 1.25 MG/1
50000 CAPSULE ORAL
Qty: 4 CAP | Refills: 3 | Status: SHIPPED | OUTPATIENT
Start: 2018-12-18 | End: 2019-01-29 | Stop reason: SDUPTHER

## 2018-12-18 RX ORDER — TOPIRAMATE 200 MG/1
TABLET ORAL
Qty: 180 TAB | Refills: 0 | Status: SHIPPED | OUTPATIENT
Start: 2018-12-18 | End: 2019-03-18 | Stop reason: SDUPTHER

## 2018-12-18 RX ORDER — OXYCODONE AND ACETAMINOPHEN 5; 325 MG/1; MG/1
1 TABLET ORAL
COMMUNITY
Start: 2018-12-12 | End: 2019-01-24

## 2018-12-18 RX ORDER — PAROXETINE HYDROCHLORIDE 40 MG/1
TABLET, FILM COATED ORAL
Qty: 135 TAB | Refills: 3 | Status: SHIPPED | OUTPATIENT
Start: 2018-12-18 | End: 2020-06-10 | Stop reason: SDUPTHER

## 2018-12-18 RX ORDER — PAROXETINE HYDROCHLORIDE 40 MG/1
60 TABLET, FILM COATED ORAL
Qty: 45 TAB | Refills: 3 | Status: SHIPPED | OUTPATIENT
Start: 2018-12-18 | End: 2018-12-18 | Stop reason: SDUPTHER

## 2018-12-18 NOTE — PROGRESS NOTES
Neurology Progress Note    NAME:  Hima Galvin   :   1955   MRN:   [id number]     Date/Time:  2018  Subjective:   Hima Galvin is a 66 y.o. female here today for follow-up for MS, MG, chronic pain syndrome, headaches, CVA, difficulty walking. Patient was accompanied by her son to the visit. Patient today says she has been having a lot of pain pain is sharp in nature, diffuse, burning sensation that goes up to the arms causing numbness and tingling sensation and weakness of the hands, down to the legs causing  numbness and tingling sensation of the legs with weakness of the legs. She says since fentanyl patch she is back to 75 mcg, pain has been manageable. Patient has not abused or misused pain medication medication, this was proven by regular  and UDS. She says her headache has increased in frequency and intensity, frequency of 4-5x/week, headache is frontal, throbbing in nature, with occasional sharp pain from the back of the head, headache associated with dizziness, nausea, blurry vision, double vision, photophobia, phonophobia. She says she has a lot of muscle spasms mostly in the back. She has been having intermittent dysphasia but no odynophagia. She denies loss of consciousness. Lolistent has suffered migraines more for more than six months. Migraines are characterized as severe throbbing incapacitating pain associated with nausea, dizziness, photophobia and lasting more than 5 hours.  Migraine days occur more than 15 days of the month and are refractory to the following medications:  · Imitrex and Relpax  · Non steroidal anti-inflammatory medications: including motrin and BC powder  · Including topamax and neurontin  · Anti-depressants: including elavil  Beta Blockers: propranolol  Calcium Cannel blcokers: Verapamil  Barbituates: Fioricet  Each drug was tried for at least 2 months or until and adverse reaction occurred  I will try patient on CG RP receptor antibody-Emgality to see if patient's headache frequency will be reduced. I will obtain MRI of the brain with and without gadolinium to evaluate for multiple sclerosis exacerbation and any stroke event since the last episode so as to adjust patient's anticoagulant. Review of Systems - General ROS: positive for  - fatigue, night sweats and sleep disturbance  Psychological ROS: positive for - anxiety, concentration difficulties, depression, mood swings and sleep disturbances  Ophthalmic ROS: positive for - blurry vision, decreased vision, double vision and photophobia  ENT ROS: positive for - headaches, tinnitus, vertigo and visual changes  Allergy and Immunology ROS: negative  Hematological and Lymphatic ROS: negative  Endocrine ROS: negative  Respiratory ROS: no cough, shortness of breath, or wheezing  Cardiovascular ROS: no chest pain or dyspnea on exertion  Gastrointestinal ROS: no abdominal pain, change in bowel habits, or black or bloody stools  Genito-Urinary ROS: no dysuria, trouble voiding, or hematuria  Musculoskeletal ROS: positive for - gait disturbance, joint pain, joint stiffness, joint swelling and muscle pain  Neurological ROS: positive for - dizziness, gait disturbance, headaches, impaired coordination/balance, numbness/tingling, speech problems, tremors, visual changes and weakness  Dermatological ROS: negative     Medications reviewed:  Current Outpatient Medications   Medication Sig Dispense Refill    ergocalciferol (ERGOCALCIFEROL) 50,000 unit capsule Take 1 Cap by mouth every seven (7) days. 4 Cap 3    oxyCODONE-acetaminophen (PERCOCET) 5-325 mg per tablet       galcanezumab-gnlm (EMGALITY) 120 mg/mL injection 2 mL by SubCUTAneous route every thirty (30) days. 2 mL 0    pregabalin (LYRICA) 200 mg capsule Take 1 Cap by mouth two (2) times a day.  Max Daily Amount: 400 mg. 180 Cap 3    clopidogrel (PLAVIX) 75 mg tab TAKE 1 TABLET BY MOUTH EVERY DAY 90 Tab 0    Menthol-Zinc Oxide (CALMOSEPTINE) 0.44-20.6 % oint Apply to affected area as needed.  nystatin (MYCOSTATIN) powder Apply to candidal lesions TOPICALLY 2 to 3 times daily until healing complete 30 g 0    pyridostigmine bromide (MESTINON TIMESPAN) 180 mg SR tablet Take 1 Tab by mouth two (2) times a day. 60 Tab 6    lidocaine (LIDODERM) 5 % Apply patch to the affected area for 12 hours a day and remove for 12 hours a day. 30 Each 3    GILENYA 0.5 mg cap Take 1 Cap by mouth daily. 30 Cap 11    SUMAtriptan (IMITREX) 100 mg tablet 1 tab at onset of moderate-severe migraine; may repeat 1 tab in 2 hours; Limit: 2 tabs in 24/ hrs, not more than 3 days a week 12 Tab 3    dantrolene (DANTRIUM) 100 mg capsule Take 1 Cap by mouth as needed. 90 Cap 3    corticotropin (ACTHAR H.P.) 80 unit/mL injectable gel 1 mL by SubCUTAneous route daily. 80 units subq q day for 10 days 10 mL 1    clindamycin (CLEOCIN) 300 mg capsule Take 300 mg by mouth. Prior to dental procedures      butalbital-acetaminophen-caff (FIORICET) -40 mg per capsule Take 1 Cap by mouth every four (4) hours as needed for Pain. Max Daily Amount: 6 Caps. 10 Cap 0    rosuvastatin (CRESTOR) 20 mg tablet TAKE 1 TABLET BY MOUTH EVERY EVENING 90 Tab 1    TENS UNIT ELECTRODES by Does Not Apply route. prn      Blood-Glucose Meter monitoring kit by SubCUTAneous route Before breakfast and dinner. Free Style Lite glucometer and test strips (Patient taking differently: by SubCUTAneous route Before breakfast and dinner. Free Style Lite glucometer and test strips-TESTS COUPLE TIMES PER WEEK) 1 Kit 0    levothyroxine (SYNTHROID) 175 mcg tablet Take 1 Tab by mouth nightly. 30 Tab 2    PARoxetine (PAXIL) 40 mg tablet TAKE 1 AND 1/2 TABLETS BY MOUTH EVERY NIGHT 135 Tab 3    topiramate (TOPAMAX) 200 mg tablet TAKE 1 TABLET BY MOUTH TWICE DAILY 180 Tab 0    fentaNYL (DURAGESIC) 75 mcg/hr 1 Patch by TransDERmal route every seventy-two (72) hours. Max Daily Amount: 1 Patch.  10 Patch 0        Objective: Vitals:  Vitals:    12/18/18 1431   BP: 120/73   Pulse: 77   Resp: 16   Temp: 98 °F (36.7 °C)   TempSrc: Temporal   SpO2: 99%   Weight: 201 lb 12.8 oz (91.5 kg)   Height: 6' (1.829 m)   PainSc:   9   PainLoc: Head   LMP: 09/01/2010     Lab Data Reviewed:  Lab Results   Component Value Date/Time    WBC 4.1 06/07/2018 01:54 PM    HCT 42.5 06/07/2018 01:54 PM    HGB 13.7 06/07/2018 01:54 PM    PLATELET 163 (L) 73/84/0504 01:54 PM       Lab Results   Component Value Date/Time    Sodium 142 05/24/2018 01:19 PM    Potassium 4.2 05/24/2018 01:19 PM    Chloride 107 (H) 05/24/2018 01:19 PM    CO2 21 05/24/2018 01:19 PM    Glucose 82 05/24/2018 01:19 PM    BUN 9 05/24/2018 01:19 PM    Creatinine 0.90 05/24/2018 01:19 PM    Calcium 9.2 05/24/2018 01:19 PM       No components found for: TROPQUANT    No results found for: SHAHIDA      Lab Results   Component Value Date/Time    Hemoglobin A1c 6.3 (H) 05/24/2018 01:19 PM    Hemoglobin A1c (POC) 6.8 10/25/2018 02:53 PM        Lab Results   Component Value Date/Time    Vitamin B12 434 02/10/2016    Folate 12.0 02/10/2016       No results found for: SHAHIDA, [facility]    Lab Results   Component Value Date/Time    Cholesterol, total 179 05/24/2018 01:19 PM    HDL Cholesterol 51 05/24/2018 01:19 PM    LDL, calculated 109 (H) 05/24/2018 01:19 PM    VLDL, calculated 19 05/24/2018 01:19 PM    Triglyceride 96 05/24/2018 01:19 PM    CHOL/HDL Ratio 4.3 08/23/2010 12:19 PM         CT Results (recent):  Results from Hospital Encounter encounter on 05/14/18   CT NECK SOFT TISSUE W CONT    Narrative HISTORY: Neck mass. PROCEDURE: Multiple contiguous helically acquired axial images were obtained of  the cervical region from the skull base through to the thoracic inlet following  intravenous contrast administration. Sagittal and coronal reconstructions were  performed.     CT dose reduction was achieved through the use of a standardized protocol  tailored for this examination and automatic exposure control for dose  modulation. FINDINGS:     Images through the nasopharynx reveal symmetric soft tissues. Images through the oropharynx reveals symmetric soft tissues. The parotid glands are symmetric in appearance. Images through the hypopharynx reveals symmetric soft tissues. Just below the level of the hyoid bone, is a soft tissue structure measuring 9.7  x 7.8 mm with a small satellite lesions. No evidence of lingual thyroid per se. The submandibular glands have a symmetric appearance. Images through the larynx proper reveal symmetric soft tissues. Images through the subglottic larynx reveal symmetric soft tissues. The thyroid gland has a normal appearance with symmetric lobes. No significant cervical lymphadenopathy. Incidental mild mucoperiosteal thickening involving the right greater than left  maxillary sinus of uncertain clinical significance. Impression IMPRESSION:    9.7 x 7.8 mm soft tissue structure anterior to the hyoid bone. Differential  considerations would include thyroglossal duct cyst, ectopic thyroid, dermoid,  etc.         MRI Results (recent):  Results from Hospital Encounter encounter on 01/10/18   MRI BRAIN W WO CONT    Narrative INDICATION:  ms exacerbation, ams     COMPARISON:  May 18, 2016    TECHNIQUE:  MR imaging of the brain was performed with sagittal T1, axial T1,  T2, FLAIR, GRE, DWI/ADC; pre and post contrast multiplanar T1 utilizing 20 mL  gadolinium. FINDINGS:       Ventricles:  Midline, no hydrocephalus. Brain Parenchyma/Brainstem:  No definite change in a few small T2  hyperintensities in the supratentorial white matter and antonieta allowing for motion  on the prior study. Punctate focus of diffusion restriction in the posterior  left frontal lobe near the vertex is new. Intracranial Hemorrhage:  None.     Basal Cisterns:  Normal.   Flow Voids:  Normal.  Post Contrast:  No abnormal parenchymal or meningeal enhancement. Additional Comments:  Small fluid level right maxillary sinus may have increased  slightly in the interval.      Impression IMPRESSION:    1. Punctate focus of diffusion restriction without enhancement posterior left  frontal lobe near the vertex suggests small acute infarction. Demyelinating  disease may also present in this fashion but felt to be less likely. 2. Other nonspecific white matter lesions as well as pontine T2 hyperintensity  without definite change. 23X             IR Results (recent):  No results found for this or any previous visit. VAS/US Results (recent):  Results from Hospital Encounter encounter on 01/19/17   DUPLEX LOWER EXT VENOUS RIGHT       PHYSICAL EXAM:  General:    Alert, cooperative, no distress, appears stated age. Head:   Normocephalic, without obvious abnormality, atraumatic. Eyes:   Conjunctivae/corneas clear. PERRLA  Nose:  Nares normal. No drainage or sinus tenderness. Throat:    Lips, mucosa, and tongue normal.  No Thrush  Neck:  Supple, symmetrical,  no adenopathy, thyroid: non tender    no carotid bruit and no JVD. Paraspinal tenderness  Back:    Symmetric, diffuse tenderness. Lungs:   Clear to auscultation bilaterally. No Wheezing or Rhonchi. No rales. Chest wall:  No tenderness or deformity. No Accessory muscle use. Heart:   Regular rate and rhythm,  no murmur, rub or gallop. Abdomen:   Soft, non-tender. Not distended. Bowel sounds normal. No masses  Extremities: Extremities normal, atraumatic, No cyanosis. No edema. No clubbing  Skin:     Texture, turgor normal. No rashes or lesions. Not Jaundiced  Lymph nodes: Cervical, supraclavicular normal.  Psych:  Good insight. Depressed. Anxious or agitated. NEUROLOGICAL EXAM:  Appearance: The patient is well developed, well nourished, provides a coherent history and is in no acute distress. Mental Status: Oriented to time, place and person. Mood and affect appropriate.    Cranial Nerves: Intact visual fields. Fundi are benign. RACHEL, EOM's full, no nystagmus, no ptosis. Facial sensation is normal. Corneal reflexes are intact. Facial movement is symmetric. Hearing is normal bilaterally. Palate is midline with normal sternocleidomastoid and trapezius muscles are normal. Tongue is midline. Motor:  5/5 strength in upper and 4/5 lower proximal and distal muscles. Normal bulk and tone. No fasciculations. Reflexes:   Deep tendon reflexes 2+/4 and symmetrical.   Sensory:    Decreased sensation to touch, pinprick and vibration bilateral upper extremity. Gait:   Unsteady  gait. Ambulates with cane   Tremor:    Mild tremor noted. Cerebellar:  No cerebellar signs present. Neurovascular:  Normal heart sounds and regular rhythm, peripheral pulses intact, and no carotid bruits. Assesment  1. Migraine without status migrainosus, not intractable, unspecified migraine type  Trial of Emgality  - topiramate (TOPAMAX) 200 mg tablet; Dispense: 60 Tab; Refill: 0    2. MS (multiple sclerosis) ([facility])    - ergocalciferol (ERGOCALCIFEROL) 50,000 unit capsule; Take 1 Cap by mouth every seven (7) days. Dispense: 4 Cap; Refill: 3    3. MG (myasthenia gravis) ([facility]  Continue management    4. Gait disorder  Physical therapy    5. CIDP (chronic inflammatory demyelinating polyneuropathy) ([facility])  Continue IVIG    6. Chronic pain syndrome  Continue management    ___________________________________________________  PLAN: Medication and plan discussed with patient      ICD-10-CM ICD-9-CM    1. MG (myasthenia gravis) ([facility] [address] [facility] G70.00 358.00    2. Migraine without status migrainosus, not intractable, unspecified migraine type G43.909 346.90 DISCONTINUED: topiramate (TOPAMAX) 200 mg tablet   3. MS (multiple sclerosis) ([facility]) G35 340 ergocalciferol (ERGOCALCIFEROL) 50,000 unit capsule   4. Gait disorder R26.9 781.2    5. CIDP (chronic inflammatory demyelinating polyneuropathy) ([facility]) G61.81 357.81    6.  Chronic pain syndrome G89.4 338.4    7. Multiple sclerosis exacerbation ([facility]) G35 340 MRI BRAIN W WO CONT   8.  Daily headache R51 784.0 MRI BRAIN W WO CONT   9. CVA, old, alterations of sensations I69.398 438.6 MRI BRAIN W WO CONT    R20.9       Follow-up Disposition:  Return in about 6 weeks (around 1/29/2019).           ___________________________________________________    Total time spent with patient:  []15   []25   []35   [] __ minutes    Care Plan discussed with:    []Patient   []Family    []Care Manager   []Consultant/Specialist :    ___________________________________________________    Attending Physician: Mane Godoy MD

## 2018-12-18 NOTE — PROGRESS NOTES
Patient in for monthly Case Management appointment. In good spirits, had been baking Jennifer cookies and making presents. Discussed plans for Jennifer with her son and family. Saw new pain management specialist last week, . Not very pleased with him but  has told her that he cannot continue to prescribe Fentanyl patch for her for ongoing basis.  refuses to order the Fentanyl patch, gave her an rx for Percocet. Asked patient how it was working for her, she said she takes it bid and makes her sleepy. Wants to be able to function. And it is not helping with her pain. Has f/u in one month with  and will discuss it at that time. Said she had to have a drug screen test done as well. Advised her that this is fairly routine now. Encouraged her to let his nurse know that the Percocet is making her sleepy, not helping the pain at all. Agreed to do so. Support given. Reminded to call NN prn. Will schedule January appointment after holidays are over.

## 2018-12-18 NOTE — PROGRESS NOTES
Chief Complaint   Patient presents with    Multiple Sclerosis    Medication Refill     1. Have you been to the ER, urgent care clinic since your last visit? Hospitalized since your last visit? No    2. Have you seen or consulted any other health care providers outside of the [address] [place] [address] since your last visit? Include any pap smears or colon screening.  Dr. Karan Molina

## 2018-12-20 DIAGNOSIS — E11.65 HYPERGLYCEMIA DUE TO TYPE 2 DIABETES MELLITUS (HCC): ICD-10-CM

## 2018-12-21 ENCOUNTER — PATIENT OUTREACH (OUTPATIENT)
Dept: FAMILY MEDICINE CLINIC | Age: 63
End: 2018-12-21

## 2018-12-21 NOTE — PROGRESS NOTES
Patient had requested refill of Levothyroxine 175mcg-last filled by Jill Arzola 2016. Called patient today to see who had been filling for her-said she had extra refills from prior rx, also son had been on same dose from his endo who had changed his dose. Says has been taking qd the 175mcg. Has appointment with pcp 1/24. Patient called back-has order to have labs done for (PM)-will be going next week, ? 12/26. Read orders to NN- having CBC with diff, CMP,Lipid,A1C, TSH and Vit d level done. Advised will request copy of lab results be sent to [address] [place] [address] re review roya for the rx for Levothyroxine. Called 's office, ph [phone], and asked nurse to fax us copy of results when back.

## 2018-12-21 NOTE — Clinical Note
To have labs done next week per :CBC,CMP, A1C,Lipid, TSH, and VIt D. Will request lab results for you. Thanks!  Taya Cardona

## 2018-12-21 NOTE — Clinical Note
Had extra refills from [address] [place] [address], has been taking 175mcg qd. Next appt with you in January- 1/24. \"Could you do one refill and will get lab done when sees you 1/24?

## 2018-12-23 DIAGNOSIS — E11.65 HYPERGLYCEMIA DUE TO TYPE 2 DIABETES MELLITUS (HCC): ICD-10-CM

## 2018-12-23 RX ORDER — LEVOTHYROXINE SODIUM 175 UG/1
175 TABLET ORAL
Qty: 30 TAB | Refills: 2 | Status: SHIPPED | OUTPATIENT
Start: 2018-12-23 | End: 2019-01-04 | Stop reason: SDUPTHER

## 2018-12-26 ENCOUNTER — HOSPITAL ENCOUNTER (OUTPATIENT)
Dept: MRI IMAGING | Age: 63
Discharge: HOME | End: 2018-12-26
Attending: PSYCHIATRY & NEUROLOGY
Payer: MEDICARE

## 2018-12-26 DIAGNOSIS — R51.9 DAILY HEADACHE: ICD-10-CM

## 2018-12-26 DIAGNOSIS — I69.398 CVA, OLD, ALTERATIONS OF SENSATIONS: ICD-10-CM

## 2018-12-26 DIAGNOSIS — G35 MULTIPLE SCLEROSIS EXACERBATION (HCC): ICD-10-CM

## 2018-12-26 DIAGNOSIS — R20.9 CVA, OLD, ALTERATIONS OF SENSATIONS: ICD-10-CM

## 2018-12-26 PROCEDURE — 74011250636 HC RX REV CODE- 250/636: Performed by: PSYCHIATRY & NEUROLOGY

## 2018-12-26 PROCEDURE — A9575 INJ GADOTERATE MEGLUMI 0.1ML: HCPCS | Performed by: PSYCHIATRY & NEUROLOGY

## 2018-12-26 PROCEDURE — 70553 MRI BRAIN STEM W/O & W/DYE: CPT

## 2018-12-26 RX ORDER — GADOTERATE MEGLUMINE 376.9 MG/ML
18 INJECTION INTRAVENOUS
Status: COMPLETED | OUTPATIENT
Start: 2018-12-26 | End: 2018-12-26

## 2018-12-26 RX ADMIN — GADOTERATE MEGLUMINE 18 ML: 376.9 INJECTION INTRAVENOUS at 13:17

## 2018-12-31 ENCOUNTER — OFFICE VISIT (OUTPATIENT)
Dept: FAMILY MEDICINE CLINIC | Age: 63
End: 2018-12-31

## 2018-12-31 VITALS
DIASTOLIC BLOOD PRESSURE: 70 MMHG | OXYGEN SATURATION: 98 % | HEART RATE: 74 BPM | TEMPERATURE: 98.7 F | HEIGHT: 72 IN | BODY MASS INDEX: 27.37 KG/M2 | RESPIRATION RATE: 20 BRPM | SYSTOLIC BLOOD PRESSURE: 122 MMHG

## 2018-12-31 DIAGNOSIS — R07.9 CHEST PAIN, UNSPECIFIED TYPE: ICD-10-CM

## 2018-12-31 DIAGNOSIS — R10.9 ABDOMINAL CRAMPING: ICD-10-CM

## 2018-12-31 DIAGNOSIS — R06.02 SHORTNESS OF BREATH: Primary | ICD-10-CM

## 2018-12-31 RX ORDER — ALBUTEROL SULFATE 0.83 MG/ML
2.5 SOLUTION RESPIRATORY (INHALATION)
Qty: 30 EACH | Refills: 0 | Status: SHIPPED | OUTPATIENT
Start: 2018-12-31

## 2018-12-31 RX ORDER — IPRATROPIUM BROMIDE AND ALBUTEROL SULFATE 2.5; .5 MG/3ML; MG/3ML
3 SOLUTION RESPIRATORY (INHALATION)
Qty: 3 ML | Refills: 0
Start: 2018-12-31 | End: 2018-12-31

## 2018-12-31 NOTE — PROGRESS NOTES
Patient Name: Behzad Nathan   MRN: [id number]    Magdalena Mcgraw is a 66 y.o. female who presents with the following:     Patient reports 5-day history of shortness of breath at rest and exertion. Also has some mild chest discomfort. Feels like she cannot take a big deep breath in. Is an every day smoker but has not been previously diagnosed with COPD or asthma. Denies any URI symptoms, fevers, or recent sick contact, or history of DVT. Also has some abdominal cramping which is somewhat new for her. Recently was started on low-dose Percocet by her pain management doctor. Denies any diarrhea, constipation, blood in stool. Did have some nausea with vomiting the other day but none recently. Review of Systems   Constitutional: Positive for malaise/fatigue. Negative for fever and weight loss. Respiratory: Positive for shortness of breath. Negative for cough, hemoptysis and wheezing. Cardiovascular: Positive for chest pain. Negative for palpitations, leg swelling and PND. Gastrointestinal: Positive for nausea and vomiting. Negative for abdominal pain, constipation and diarrhea. The patient's medications, allergies, past medical history, surgical history, family history and social history were reviewed and updated where appropriate. Prior to Admission medications    Medication Sig Start Date End Date Taking? Authorizing Provider   levothyroxine (SYNTHROID) 175 mcg tablet Take 1 Tab by mouth nightly. 12/23/18  Yes Ember Chavez MD   ergocalciferol (ERGOCALCIFEROL) 50,000 unit capsule Take 1 Cap by mouth every seven (7) days. 12/18/18  Yes Los Kahn MD   oxyCODONE-acetaminophen (PERCOCET) 5-325 mg per tablet Take 1 Tab by mouth every six (6) hours as needed. 12/12/18  Yes Provider, Historical   galcanezumab-gnlm (EMGALITY) 120 mg/mL injection 2 mL by SubCUTAneous route every thirty (30) days.  12/18/18  Yes Los Kahn MD   PARoxetine (PAXIL) 40 mg tablet TAKE 1 AND 1/2 TABLETS BY MOUTH EVERY NIGHT 12/18/18  Yes Los Kahn MD   topiramate (TOPAMAX) 200 mg tablet TAKE 1 TABLET BY MOUTH TWICE DAILY 12/18/18  Yes Los Kahn MD   pregabalin (LYRICA) 200 mg capsule Take 1 Cap by mouth two (2) times a day. Max Daily Amount: 400 mg. 12/14/18  Yes Los Kahn MD   clopidogrel (PLAVIX) 75 mg tab TAKE 1 TABLET BY MOUTH EVERY DAY 12/11/18  Yes Los Kahn MD   Menthol-Zinc Oxide (CALMOSEPTINE) 0.44-20.6 % oint Apply  to affected area as needed. Yes Provider, Historical   nystatin (MYCOSTATIN) powder Apply to candidal lesions TOPICALLY 2 to 3 times daily until healing complete 10/25/18  Yes Lowell Miller MD   pyridostigmine bromide (MESTINON TIMESPAN) 180 mg SR tablet Take 1 Tab by mouth two (2) times a day. 8/15/18  Yes Los Kahn MD   lidocaine (LIDODERM) 5 % Apply patch to the affected area for 12 hours a day and remove for 12 hours a day. 8/15/18  Yes Los Kahn MD   GILENYA 0.5 mg cap Take 1 Cap by mouth daily. 7/12/18  Yes Los Kahn MD   SUMAtriptan (IMITREX) 100 mg tablet 1 tab at onset of moderate-severe migraine; may repeat 1 tab in 2 hours; Limit: 2 tabs in 24/ hrs, not more than 3 days a week 3/7/18  Yes Los Kahn MD   dantrolene (DANTRIUM) 100 mg capsule Take 1 Cap by mouth as needed. 12/26/17  Yes Los Kahn MD   corticotropin (ACTHAR H.P.) 80 unit/mL injectable gel 1 mL by SubCUTAneous route daily. 80 units subq q day for 10 days 12/26/17  Yes Los Kahn MD   clindamycin (CLEOCIN) 300 mg capsule Take 300 mg by mouth. Prior to dental procedures 6/30/17  Yes Provider, Historical   butalbital-acetaminophen-caff (FIORICET) -40 mg per capsule Take 1 Cap by mouth every four (4) hours as needed for Pain.  Max Daily Amount: 6 Caps. 5/22/17  Yes Artie Ko MD   rosuvastatin (CRESTOR) 20 mg tablet TAKE 1 TABLET BY MOUTH EVERY EVENING 2/24/17  Yes Lowell Miller MD   TENS UNIT ELECTRODES by Does Not Apply route. prn   Yes Provider, Historical   Blood-Glucose Meter monitoring kit by SubCUTAneous route Before breakfast and dinner. Free Style Lite glucometer and test strips  Patient taking differently: by SubCUTAneous route Before breakfast and dinner. Free Style Lite glucometer and test strips-TESTS COUPLE TIMES PER WEEK 9/13/16  Yes Shelby DO Neil   galcanezumab-gnlm (EMGALITY) 120 mg/mL injection 1 mL by SubCUTAneous route every thirty (30) days. 12/24/18   Los Kahn MD       Allergies   Allergen Reactions    Bee Sting [Sting, Bee] Anaphylaxis     Also allergic to egg products    Penicillins Anaphylaxis    Betadine [Povidone-Iodine] Rash    Egg Itching           OBJECTIVE    Visit Vitals  /70 (BP 1 Location: Right arm, BP Patient Position: Sitting)   Pulse 74   Temp 98.7 °F (37.1 °C) (Oral)   Resp 20   Ht 6' (1.829 m)   LMP 09/01/2010   SpO2 98%   BMI 27.37 kg/m²       Physical Exam   Constitutional: She is oriented to person, place, and time and well-developed, well-nourished, and in no distress. No distress. HENT:   Head: Normocephalic and atraumatic. Right Ear: Tympanic membrane is not perforated and not erythematous. No middle ear effusion. No decreased hearing is noted. Left Ear: Tympanic membrane is not perforated and not erythematous. No middle ear effusion. No decreased hearing is noted. Nose: Nose normal. Right sinus exhibits no maxillary sinus tenderness and no frontal sinus tenderness. Left sinus exhibits no maxillary sinus tenderness and no frontal sinus tenderness. Mouth/Throat: Uvula is midline, oropharynx is clear and moist and mucous membranes are normal.   Neck: Normal range of motion. Neck supple. Cardiovascular: Normal rate, regular rhythm and normal heart sounds. Exam reveals no gallop and no friction rub. No murmur heard. Pulmonary/Chest: Breath sounds normal. No respiratory distress. She has no wheezes. Taking shallow breaths   Abdominal: Soft.  Bowel sounds are normal. She exhibits no distension and no mass. There is no tenderness. There is no rebound and no guarding. Lymphadenopathy:     She has no cervical adenopathy. Neurological: She is alert and oriented to person, place, and time. Skin: She is not diaphoretic. Psychiatric: Mood, memory, affect and judgment normal.   Nursing note and vitals reviewed. ASSESSMENT AND PLAN  Georgi Galvin is a 66 y.o. female who presents today for:    1. Shortness of breath  EKG normal sinus rhythm. Vital signs appear to be normal but peak flows show that her lung capacity is below normal.  Symptomatically improved after 1 DuoNeb treatment in office. Will treat as if this is an asthma/COPD-like exacerbation. Will also obtain chest x-ray. Reviewed signs and symptoms that would indicate a worsening medical condition which would require immediate evaluation and treatment; patient expressed understanding of plan. - XR CHEST PA LAT; Future  - albuterol (PROVENTIL VENTOLIN) 2.5 mg /3 mL (0.083 %) nebulizer solution; 3 mL by Nebulization route every six (6) hours as needed for Wheezing. Dispense: 30 Each; Refill: 0    2. Chest pain, unspecified type  EKG wnl; likely due to SOB as above. - AMB POC EKG ROUTINE W/ 12 LEADS, INTER & REP    3. Abdominal cramping  Discussed with patient that it may be secondary due to recent Percocet initiation. Recommend patient to maintain adequate water and fiber intake and to monitor for any worsening signs. Medications Discontinued During This Encounter   Medication Reason    fentaNYL (Jackalyn Horn) 75 mcg/hr        Follow-up Disposition:  Return if symptoms worsen or fail to improve. Medication risks/benefits/costs/interactions/alternatives discussed with patient.   Advised patient to call back or return to office if symptoms worsen/change/persist. If patient cannot reach us or should anything more severe/urgent arise he/she should proceed directly to the nearest emergency department. Discussed expected course/resolution/complications of diagnosis in detail with patient. Patient given a written after visit summary which includes his/her diagnoses, current medications and vitals. Patient expressed understanding with the diagnosis and plan. Brandon Martino M.D.

## 2018-12-31 NOTE — PATIENT INSTRUCTIONS
Shortness of Breath: Care Instructions  Your Care Instructions  Shortness of breath has many causes. Sometimes conditions such as anxiety can lead to shortness of breath. Some people get mild shortness of breath when they exercise. Trouble breathing also can be a symptom of a serious problem, such as asthma, lung disease, emphysema, heart problems, and pneumonia. If your shortness of breath continues, you may need tests and treatment. Watch for any changes in your breathing and other symptoms. Follow-up care is a key part of your treatment and safety. Be sure to make and go to all appointments, and call your doctor if you are having problems. It's also a good idea to know your test results and keep a list of the medicines you take. How can you care for yourself at home? · Do not smoke or allow others to smoke around you. If you need help quitting, talk to your doctor about stop-smoking programs and medicines. These can increase your chances of quitting for good. · Get plenty of rest and sleep. · Take your medicines exactly as prescribed. Call your doctor if you think you are having a problem with your medicine. · Find healthy ways to deal with stress. ? Exercise daily. ? Get plenty of sleep. ? Eat regularly and well. When should you call for help? Call 911 anytime you think you may need emergency care. For example, call if:    · You have severe shortness of breath.     · You have symptoms of a heart attack. These may include:  ? Chest pain or pressure, or a strange feeling in the chest.  ? Sweating. ? Shortness of breath. ? Nausea or vomiting. ? Pain, pressure, or a strange feeling in the back, neck, jaw, or upper belly or in one or both shoulders or arms. ? Lightheadedness or sudden weakness. ? A fast or irregular heartbeat. After you call 911, the  may tell you to chew 1 adult-strength or 2 to 4 low-dose aspirin. Wait for an ambulance.  Do not try to drive yourself.    Call your doctor now or seek immediate medical care if:    · Your shortness of breath gets worse or you start to wheeze. Wheezing is a high-pitched sound when you breathe.     · You wake up at night out of breath or have to prop your head up on several pillows to breathe.     · You are short of breath after only light activity or while at rest.    Watch closely for changes in your health, and be sure to contact your doctor if:    · You do not get better over the next 1 to 2 days. Where can you learn more? Go to http://marie-alvin.info/. Enter S780 in the search box to learn more about \"Shortness of Breath: Care Instructions. \"  Current as of: December 6, 2017  Content Version: 11.8  © 0183-2551 [employer]. Care instructions adapted under license by [employer] (which disclaims liability or warranty for this information). If you have questions about a medical condition or this instruction, always ask your healthcare professional. [address] any warranty or liability for your use of this information.

## 2018-12-31 NOTE — PROGRESS NOTES
Chief Complaint   Patient presents with    Shortness of Breath     few days     Chest Pain     x 5 days     1. Have you been to the ER, urgent care clinic since your last visit? Hospitalized since your last visit? No    2. Have you seen or consulted any other health care providers outside of the [facility] since your last visit? Include any pap smears or colon screening.  No

## 2019-01-02 ENCOUNTER — PATIENT OUTREACH (OUTPATIENT)
Dept: FAMILY MEDICINE CLINIC | Age: 64
End: 2019-01-02

## 2019-01-02 NOTE — PROGRESS NOTES
Patient in for office visit 12/31- c/o sob and chest pain. Called NN/CM to discuss. Reports she is using jet nebulizer bid-helping. Symptoms less intense. Taking it easy, still sob if up and moving but occasionally still if at rest as well. Staying inside, drinking fluids, son home with her. Did stop the Percocet (rxd per ,pain managment). Was causing too much constipation and sleepiness. Asked if  had sent her labs done 12/26-advised will call his office and request again. Gave patient [facility] information as a resource if unable to come in or if after hours or weekend. Advised her that this CM will call her back in 2-3 days, call me sooner prn. Provider on call after hours if needed. Called 's office(ph [phone]), requested copy of recent labs.  will fax them as soon as fax machine available. Requested she fax them to [phone], to my attention. Did receive lab reports from , copy placed in pcp office for review.

## 2019-01-03 ENCOUNTER — TELEPHONE (OUTPATIENT)
Dept: FAMILY MEDICINE CLINIC | Age: 64
End: 2019-01-03

## 2019-01-04 ENCOUNTER — PATIENT OUTREACH (OUTPATIENT)
Dept: FAMILY MEDICINE CLINIC | Age: 64
End: 2019-01-04

## 2019-01-04 ENCOUNTER — TELEPHONE (OUTPATIENT)
Dept: FAMILY MEDICINE CLINIC | Age: 64
End: 2019-01-04

## 2019-01-04 DIAGNOSIS — E11.65 HYPERGLYCEMIA DUE TO TYPE 2 DIABETES MELLITUS (HCC): ICD-10-CM

## 2019-01-04 RX ORDER — AZITHROMYCIN 250 MG/1
TABLET, FILM COATED ORAL
Qty: 6 TAB | Refills: 0 | Status: SHIPPED | OUTPATIENT
Start: 2019-01-04 | End: 2019-01-17 | Stop reason: ALTCHOICE

## 2019-01-04 RX ORDER — LEVOTHYROXINE SODIUM 150 UG/1
150 TABLET ORAL
Qty: 30 TAB | Refills: 2 | Status: SHIPPED | OUTPATIENT
Start: 2019-01-04 | End: 2019-04-05 | Stop reason: SDUPTHER

## 2019-01-04 RX ORDER — PREDNISONE 20 MG/1
40 TABLET ORAL
Qty: 10 TAB | Refills: 0 | Status: SHIPPED | OUTPATIENT
Start: 2019-01-04 | End: 2019-01-09

## 2019-01-04 NOTE — PROGRESS NOTES
Called patient to check on her. Saw pcp on 12/31 for sob and chest discomfort. Had spoken to her on 1/2 as well. Says she is still trying to rest and conserve her energy so doesn't become sob. Ok if resting, but if up doing anything, gets sob and has the chest discomfort. Neb treatments do help. Yesterday was better, did not need any neb treatments. Didn't do much but was able to move about a little and put in one load of clothes. Today, tried to fold clothes but became very sob so stopped. Has decreased her smoking dramatically, encouraged to attempt to stop altogether d/t the sob. No nausea or vomiting, no fever. Wants to increase her strength and energy and be able to breathe. Advised to continue to rest for now, will let  know current status. Advised her that  did receive the labs from , pending her review. Notified patient that Neville Carranza had sent in short course of Prednisone and Azithromycin for her. Advised if symptoms persist or worsen, needs office visit. Reminded of acute care hours on Saturday and can call provider on call if questions/concerns after hours. NN will check back with her on Monday.

## 2019-01-04 NOTE — PROGRESS NOTES
Notified patient that Alicia Miller had reviewed her labs from . Was going to lower the Levothyroxine from 175mcg to 150mcg. New dose sent to pharmacy. Patient asked about her A1C- advised 6.7. Patient says she has not been eating well over the holidays but will buckle down and do better.

## 2019-01-04 NOTE — TELEPHONE ENCOUNTER
Reviewed outside lab work from Dr. Sorin Mcgowan. Lab work notable for low platelets at 925, normal lipid panel, A1c of 6.7, TSH of 0.185. Normal vitamin D. Recommend patient to reduce her levothyroxine dose to 150 mcg daily as her thyroid levels suggest that she may be on too high of a dose.

## 2019-01-04 NOTE — PROGRESS NOTES
Given that symptoms are not improving, will send in short course of prednisone and azithromycin for possible asthma/COPD flareup. If symptoms persist or worsen, she needs to be reevaluated in person.

## 2019-01-07 ENCOUNTER — PATIENT OUTREACH (OUTPATIENT)
Dept: FAMILY MEDICINE CLINIC | Age: 64
End: 2019-01-07

## 2019-01-07 NOTE — PROGRESS NOTES
Called patient to check on her after our conversation on Friday when was still very sob. Rosio Oconnor had sent in new rxs for Prednisone and Azithromycin. Patient sounded much stronger on the phone, so sob noted. Says she is better. Has only needed one neb tx since started the new meds. Has been able to be up all day for last 2 days. Continuing to keep smoking to a minimum. Support and encouragement given to continue to wean off the cigarettes. Advised to call NN if symptoms don't continue to improve. Patient agreed to do so.

## 2019-01-08 ENCOUNTER — TELEPHONE (OUTPATIENT)
Dept: NEUROLOGY | Age: 64
End: 2019-01-08

## 2019-01-08 NOTE — TELEPHONE ENCOUNTER
Rec'd approval for Jayro from [facility] Shobha thrasher from 1/8/19 - 1/8/20. Faxed to Alexandria Ferrell.

## 2019-01-08 NOTE — TELEPHONE ENCOUNTER
Called [employer] called in verbals Case [id number] set up for expedited response [phone] - for 24 hr reply. This is her account ID w/ [employer] (we do not have any BIN, PCN on file) [id number].

## 2019-01-08 NOTE — TELEPHONE ENCOUNTER
[facility] REQUIRES PRIOR AUTHORIZATION. PLEASE CALL [phone] [employer] [facility] [place] TURN AROUND TIME 72 HOURS.     FAX: [phone]

## 2019-01-09 ENCOUNTER — TELEPHONE (OUTPATIENT)
Dept: FAMILY MEDICINE CLINIC | Age: 64
End: 2019-01-09

## 2019-01-09 ENCOUNTER — PATIENT OUTREACH (OUTPATIENT)
Dept: FAMILY MEDICINE CLINIC | Age: 64
End: 2019-01-09

## 2019-01-09 NOTE — Clinical Note
(Pain Management) suggested she start Metformin since A1C had gone up a little. She told him she would discuss with pcp. FBS yest was 110. Takes last dose of Pred today.

## 2019-01-09 NOTE — PROGRESS NOTES
Patient called CM to discuss appt with PM, , yesterday. Told him she doesn't want to take the Percocet for pain, all it does is makes her sleepy. Wants to be able to function. He also wanted her to start Metformin. He was concerned that her A1C had gone up a little. Patient says she is aware that she did not follow diabetic diet from [facility] through [place]. And has been on Prednisone recently. Says she is checking her FBS, not horrible but knows they will go down now she is off the steroid and back to following her diet. FBS yesterday was 110- takes last dose of Prednisone today. Told  she would discuss the Metformin with pcp at next visit- 1/24. Advised to continue to monitor her blood sugar, bring results in to her appt for  to review. If doesn't continue to come back down, call back and can address at that time. Patient agreeable with the plan.

## 2019-01-09 NOTE — TELEPHONE ENCOUNTER
Sixto from [facility] is calling on behalf of the patient, [place] is stating that the medication needs a prior authorization. albuterol (PROVENTIL VENTOLIN) 2.5 mg /3 mL (0.083 %) nebulizer solution.     Best callback: [phone]  [facility])    LOV:  Monday, December 31, 2018

## 2019-01-11 NOTE — TELEPHONE ENCOUNTER
[facility] is calling on behalf of Pt from [facility] in regards to needing to discuss Pt's diagnosis for medication albuterol (PROVENTIL VENTOLIN) 2.5 mg /3 mL (0.083 %) nebulizer solution.     Best callback: [phone]  [facility])

## 2019-01-14 ENCOUNTER — TELEPHONE (OUTPATIENT)
Dept: NEUROLOGY | Age: 64
End: 2019-01-14

## 2019-01-14 LAB
CHOLESTEROL, TOTAL, 804501: 181 MG/DL
HDL CHOLESTEROL,HDL: 65 MG/DL
LDL-CHOLESTEROL: 91 MG/DL
TRIGL SERPL-MCNC: 126 MG/DL
TSH SERPL DL<=0.005 MIU/L-ACNC: 0.18 UIU/ML
VLDL CHOLESTEROL, 804564: 25

## 2019-01-14 NOTE — TELEPHONE ENCOUNTER
Sent PA request for Emgality to [facility] via [address] Rafael[address] Ackerman w/ 12/18/18 office notes. Status is pending. Hiwot Galvin ([place]: Mary Ellen Keys)  Rx #: [id number]  Emgality 120MG/ML SC SOAJ     Form  [employer] Electronic PA Form  Created  4 days ago  Sent to Plan  18 minutes ago  Plan Response  17 minutes ago  Submit Clinical Questions  now  Determination  Wait for Determination  Please wait for [employer] to return a determination.

## 2019-01-15 ENCOUNTER — TELEPHONE (OUTPATIENT)
Dept: NEUROLOGY | Age: 64
End: 2019-01-15

## 2019-01-15 NOTE — TELEPHONE ENCOUNTER
Wants to know if we switched the Egality to the subq dosing. Rather than the pen injectors.     Mateo Salas

## 2019-01-15 NOTE — TELEPHONE ENCOUNTER
Emgality approval to 1/14/2020. Faxed to [employer]. Noted on fax - if they are unable to fill at retail, send it to the [facility] in [facility] located near [facility] and they can fill it.

## 2019-01-16 NOTE — TELEPHONE ENCOUNTER
Spoke with Becky LEWIS of the patient verified times 2. No need to change anything on the patient's prescription per Rafia Rene. The case will be closed already authorized.

## 2019-01-17 ENCOUNTER — PATIENT OUTREACH (OUTPATIENT)
Dept: FAMILY MEDICINE CLINIC | Age: 64
End: 2019-01-17

## 2019-01-17 NOTE — PROGRESS NOTES
NN called patient to check on recent blood sugar readings since off the Prednisone. She reports BS was 113 yesterday. Ranging from  for most part. Has not started the Metformin as suggested by , did not think she needed to do so. Not eating the sweets she was eating during the holidays. Advised to continue to monitor her BS, report if numbers increase. Has f/u ov with pcp next week on 1/24. Continues to not take the Percocet for pain since it makes her too sleepy. Would rather suffer with the pain than take that medication. Feels like it is a waste of her time and money to go and see . Medication reconciliation done today. Reminded to call if concerns prior to next appt. Will see her at 1pm for CM session. Goals  education on management of pain 1/17/19 Call to patient to check on her. BS much better. Continues to not take the Percocet that was rx'd per PM,. Complains it makes her too sleepy and can't function. · Reviewed meds, continue other pain meds(Lidoderm patch,Lyrica,Topamax,Imitrex,Dantrium) · Rest and relax as needed · Eat well balanced meals and adequate fluids. · Physical therapy · Practice breathing exercises and meditation to help focus your attention, relax and get rid of tension · Massage · TENS unit as directed. mbt

## 2019-01-24 ENCOUNTER — OFFICE VISIT (OUTPATIENT)
Dept: FAMILY MEDICINE CLINIC | Age: 64
End: 2019-01-24

## 2019-01-24 ENCOUNTER — PATIENT OUTREACH (OUTPATIENT)
Dept: FAMILY MEDICINE CLINIC | Age: 64
End: 2019-01-24

## 2019-01-24 VITALS
HEART RATE: 71 BPM | BODY MASS INDEX: 26.84 KG/M2 | OXYGEN SATURATION: 98 % | TEMPERATURE: 98 F | WEIGHT: 198.2 LBS | HEIGHT: 72 IN | SYSTOLIC BLOOD PRESSURE: 104 MMHG | RESPIRATION RATE: 18 BRPM | DIASTOLIC BLOOD PRESSURE: 60 MMHG

## 2019-01-24 DIAGNOSIS — E11.9 DIET-CONTROLLED DIABETES MELLITUS (HCC): Primary | ICD-10-CM

## 2019-01-24 DIAGNOSIS — E03.9 HYPOTHYROIDISM, UNSPECIFIED TYPE: ICD-10-CM

## 2019-01-24 DIAGNOSIS — D69.6 THROMBOCYTOPENIA (HCC): ICD-10-CM

## 2019-01-24 DIAGNOSIS — Z72.0 TOBACCO USE: ICD-10-CM

## 2019-01-24 RX ORDER — INSULIN PUMP SYRINGE, 3 ML
EACH MISCELLANEOUS
Qty: 1 KIT | Refills: 0 | Status: ON HOLD | OUTPATIENT
Start: 2019-01-24 | End: 2019-05-14

## 2019-01-24 RX ORDER — NALTREXONE HYDROCHLORIDE 50 MG/1
TABLET, FILM COATED ORAL
COMMUNITY
Start: 2019-01-09 | End: 2019-01-24

## 2019-01-24 RX ORDER — FENTANYL 75 UG/H
PATCH TRANSDERMAL
COMMUNITY
Start: 2018-11-15 | End: 2019-01-24

## 2019-01-24 NOTE — PATIENT INSTRUCTIONS

## 2019-01-24 NOTE — PROGRESS NOTES
Met with patient for monthly CCM. Patient in good spirits, using cane to ambulate. Seemed more steady on her feet. Was late arriving, had lost her wallet. Still looking for it. Has also lost her glucometer. Reports that she saw ,PM, again. Said he had a new rx ready for her for Percocet. Said she refused it, told him all it had done was make her sleep. She wants to be able to function. He then suggested she take 2 Motrin. Told her he was not going to rx the Fentanyl. She said she didn't ask him for that. She did ask him about a new med for pain, Naltrexone. Had heard about it from a friend in the medical field, that low doses really effective in chronic pain patients. Used primarily for alcohol and opioid dependency. But was found to help chronic pain when using about 1/10 of the normal dose. Normal dose 50mg. Patient reports that  gave her a rx for Naltrexone 50mg. Said she tried it once, made her feel very hung over. Then tried 1/2 tab, still too much for her, took 1/4 tab and still too much. Does not plan to go back to see . Plans to discuss the Naltrexone with pcp or neurologist to see if they would rx 5mg dose for her. Brought in a list of her recent blood sugar results-range from 72=656. Did not start the Metformin that  had recommended for her. Has been watching the carbs in her diet and numbers have come down. Will discuss with pcp this afternoon at her f/u appt. Positive support for working so hard on her blood sugar and diabetes. Has appointment next week with neuro, , requested she let me know what he suggests re the Naltrexone. Patient will call back for one month f/u with CM. Reminded to call prn for any concerns/issues.

## 2019-01-24 NOTE — ASSESSMENT & PLAN NOTE
Stable, based on history, physical exam and review of pertinent labs, studies and medications; meds reconciled; continue current treatment plan. Key Antihyperglycemic Medications     Patient is on no antihyperglycemic meds. Other Key Diabetic Medications             topiramate (TOPAMAX) 200 mg tablet  (Taking) TAKE 1 TABLET BY MOUTH TWICE DAILY    pregabalin (LYRICA) 200 mg capsule  (Taking) Take 1 Cap by mouth two (2) times a day.  Max Daily Amount: 400 mg.    rosuvastatin (CRESTOR) 20 mg tablet  (Taking) TAKE 1 TABLET BY MOUTH EVERY EVENING        Lab Results   Component Value Date/Time    Hemoglobin A1c 6.3 05/24/2018 01:19 PM    Hemoglobin A1c (POC) 6.8 10/25/2018 02:53 PM    Glucose 82 05/24/2018 01:19 PM    Creatinine 0.90 05/24/2018 01:19 PM    Microalb/Creat ratio (ug/mg creat.) 8.9 05/24/2018 01:19 PM    Cholesterol, total 179 05/24/2018 01:19 PM    Cholesterol, Total 181 12/26/2018    HDL Cholesterol 65 12/26/2018    HDL Cholesterol 51 05/24/2018 01:19 PM    LDL, calculated 109 05/24/2018 01:19 PM    LDL-CHOLESTEROL 91 12/26/2018    Triglyceride 96 05/24/2018 01:19 PM     Diabetic Foot and Eye Exam HM Status   Topic Date Due    Diabetic Foot Care  07/03/2018    Eye Exam  10/12/2018

## 2019-01-24 NOTE — PROGRESS NOTES
Patient Name: Gael Vogt   MRN: [id number]    Daivd Number is a 66 y.o. female who presents with the following:     Patient reports that her cough and previous shortness of breath has greatly improved since taking as needed albuterol, azithromycin, and prednisone. No prior known history of asthma or COPD. She is still an every day smoker. Recently had some blood work done by Dr. Edmund Mccord, her pain management doctor. Was notable for low platelets, Z6S of 6.7, and low TSH. She has been taking her levothyroxine 150 mcg daily since I recommended reducing her dose. She does not plan to follow-up with Dr. Edmund Mccord for pain management as she thought he gave her too strong for narcotic dose and she would prefer not to follow-up with him. Review of Systems   Constitutional: Negative for fever, malaise/fatigue and weight loss. Respiratory: Negative for cough, hemoptysis, shortness of breath and wheezing. Cardiovascular: Negative for chest pain, palpitations, leg swelling and PND. Gastrointestinal: Negative for abdominal pain, constipation, diarrhea, nausea and vomiting. The patient's medications, allergies, past medical history, surgical history, family history and social history were reviewed and updated where appropriate. Prior to Admission medications    Medication Sig Start Date End Date Taking? Authorizing Provider   levothyroxine (SYNTHROID) 150 mcg tablet Take 1 Tab by mouth nightly. Dose change 1/4/19  Yes Latina Kehr, MD   albuterol (PROVENTIL VENTOLIN) 2.5 mg /3 mL (0.083 %) nebulizer solution 3 mL by Nebulization route every six (6) hours as needed for Wheezing. 12/31/18  Yes Latina Kehr, MD   galcanezumab-gnlm (EMGALITY PEN) 120 mg/mL injection 1 mL by SubCUTAneous route every thirty (30) days. 12/24/18  Yes Los Kahn MD   ergocalciferol (ERGOCALCIFEROL) 50,000 unit capsule Take 1 Cap by mouth every seven (7) days.  12/18/18  Yes Larkin Aschoff, MD PARoxetine (PAXIL) 40 mg tablet TAKE 1 AND 1/2 TABLETS BY MOUTH EVERY NIGHT 12/18/18  Yes Los Kahn MD   topiramate (TOPAMAX) 200 mg tablet TAKE 1 TABLET BY MOUTH TWICE DAILY 12/18/18  Yes Los Kahn MD   pregabalin (LYRICA) 200 mg capsule Take 1 Cap by mouth two (2) times a day. Max Daily Amount: 400 mg. 12/14/18  Yes Los Kahn MD   clopidogrel (PLAVIX) 75 mg tab TAKE 1 TABLET BY MOUTH EVERY DAY 12/11/18  Yes Los Kahn MD   Menthol-Zinc Oxide (CALMOSEPTINE) 0.44-20.6 % oint Apply  to affected area as needed. Yes Provider, Historical   nystatin (MYCOSTATIN) powder Apply to candidal lesions TOPICALLY 2 to 3 times daily until healing complete 10/25/18  Yes Stalin Hickman MD   pyridostigmine bromide (MESTINON TIMESPAN) 180 mg SR tablet Take 1 Tab by mouth two (2) times a day. 8/15/18  Yes Los Kahn MD   lidocaine (LIDODERM) 5 % Apply patch to the affected area for 12 hours a day and remove for 12 hours a day. 8/15/18  Yes Los Kahn MD   GILENYA 0.5 mg cap Take 1 Cap by mouth daily. 7/12/18  Yes Los Kahn MD   dantrolene (DANTRIUM) 100 mg capsule Take 1 Cap by mouth as needed. 12/26/17  Yes Los Kahn MD   corticotropin (ACTHAR H.P.) 80 unit/mL injectable gel 1 mL by SubCUTAneous route daily. 80 units subq q day for 10 days 12/26/17  Yes Los Kahn MD   clindamycin (CLEOCIN) 300 mg capsule Take 300 mg by mouth. Prior to dental procedures 6/30/17  Yes Provider, Historical   butalbital-acetaminophen-caff (FIORICET) -40 mg per capsule Take 1 Cap by mouth every four (4) hours as needed for Pain. Max Daily Amount: 6 Caps. 5/22/17  Yes Bradley Smith MD   rosuvastatin (CRESTOR) 20 mg tablet TAKE 1 TABLET BY MOUTH EVERY EVENING 2/24/17  Yes Stalin Hickman MD   TENS UNIT ELECTRODES by Does Not Apply route. prn   Yes Provider, Historical   Blood-Glucose Meter monitoring kit by SubCUTAneous route Before breakfast and dinner.  Free Style Lite glucometer and test strips  Patient taking differently: by SubCUTAneous route Before breakfast and dinner. Free Style Lite glucometer and test strips-TESTS COUPLE TIMES PER WEEK 9/13/16  Yes Khang Frazier, DO   galcanezumab-gnlm (EMGALITY) 120 mg/mL injection 2 mL by SubCUTAneous route every thirty (30) days. 12/18/18   Los Kahn MD   SUMAtriptan (IMITREX) 100 mg tablet 1 tab at onset of moderate-severe migraine; may repeat 1 tab in 2 hours; Limit: 2 tabs in 24/ hrs, not more than 3 days a week 3/7/18   Los Kahn MD       Allergies   Allergen Reactions    Bee Sting [Sting, Bee] Anaphylaxis     Also allergic to egg products    Penicillins Anaphylaxis    Betadine [Povidone-Iodine] Rash    Egg Itching           OBJECTIVE    Visit Vitals  /60 (BP 1 Location: Right arm, BP Patient Position: Sitting)   Pulse 71   Temp 98 °F (36.7 °C) (Oral)   Resp 18   Ht 6' (1.829 m)   Wt 198 lb 3.2 oz (89.9 kg)   LMP 09/01/2010   SpO2 98%   BMI 26.88 kg/m²       Physical Exam   Constitutional: She is oriented to person, place, and time and well-developed, well-nourished, and in no distress. No distress. Eyes: Conjunctivae and EOM are normal. Pupils are equal, round, and reactive to light. Cardiovascular: Normal rate, regular rhythm and normal heart sounds. Exam reveals no gallop and no friction rub. No murmur heard. Pulmonary/Chest: Effort normal and breath sounds normal. No respiratory distress. She has no wheezes. Neurological: She is alert and oriented to person, place, and time. Skin: Skin is warm and dry. No rash noted. She is not diaphoretic. Psychiatric: Mood, memory, affect and judgment normal.   Nursing note and vitals reviewed. ASSESSMENT AND PLAN  Yandy Galvin is a 66 y.o. female who presents today for:    1.  Diet-controlled diabetes mellitus ([address]  Okay to hold off on starting metformin but work on lifestyle changes.  - glucose blood VI test strips (BLOOD GLUCOSE TEST) strip; Test fasting glucose daily; Dx E11.8; Pharmacist to choose based on insurance/glucose monitoring kit type  Dispense: 100 Strip; Refill: 1  - Blood-Glucose Meter monitoring kit; Test fasting glucose daily; Dx E11.8; Pharmacist to choose based on insurance/glucose monitoring kit type  Dispense: 1 Kit; Refill: 0    2. Hypothyroidism, unspecified type  Stable, continue current treatment. 3. Thrombocytopenia ([address]  Will recheck at next visit. 4. Tobacco use  Encourage tobacco cessation. Reviewed that she is at higher risk of developing COPD with ongoing tobacco use. Medications Discontinued During This Encounter   Medication Reason    fentaNYL (DURAGESIC) 75 mcg/hr     naltrexone (DEPADE) 50 mg tablet     oxyCODONE-acetaminophen (PERCOCET) 5-325 mg per tablet        Follow-up Disposition:  Return in about 2 months (around 4/4/2019) for DM follow up. Medication risks/benefits/costs/interactions/alternatives discussed with patient. Advised patient to call back or return to office if symptoms worsen/change/persist. If patient cannot reach us or should anything more severe/urgent arise he/she should proceed directly to the nearest emergency department. Discussed expected course/resolution/complications of diagnosis in detail with patient. Patient given a written after visit summary which includes his/her diagnoses, current medications and vitals. Patient expressed understanding with the diagnosis and plan. Brandon Herrera M.D.

## 2019-01-24 NOTE — PROGRESS NOTES
Chief Complaint   Patient presents with    Medication Evaluation     1. Have you been to the ER, urgent care clinic since your last visit? Hospitalized since your last visit? No    2. Have you seen or consulted any other health care providers outside of the [address] [place] [address] since your last visit? Include any pap smears or colon screening.  No

## 2019-01-29 ENCOUNTER — TELEPHONE (OUTPATIENT)
Dept: NEUROLOGY | Age: 64
End: 2019-01-29

## 2019-01-29 ENCOUNTER — OFFICE VISIT (OUTPATIENT)
Dept: NEUROLOGY | Age: 64
End: 2019-01-29

## 2019-01-29 VITALS
RESPIRATION RATE: 16 BRPM | WEIGHT: 200 LBS | TEMPERATURE: 97.8 F | HEART RATE: 67 BPM | SYSTOLIC BLOOD PRESSURE: 106 MMHG | DIASTOLIC BLOOD PRESSURE: 63 MMHG | HEIGHT: 72 IN | OXYGEN SATURATION: 97 % | BODY MASS INDEX: 27.09 KG/M2

## 2019-01-29 DIAGNOSIS — G35 MS (MULTIPLE SCLEROSIS) (HCC): ICD-10-CM

## 2019-01-29 DIAGNOSIS — R26.9 GAIT DISORDER: ICD-10-CM

## 2019-01-29 DIAGNOSIS — G61.81 CIDP (CHRONIC INFLAMMATORY DEMYELINATING POLYNEUROPATHY) (HCC): ICD-10-CM

## 2019-01-29 DIAGNOSIS — G70.00 MYASTHENIA GRAVIS (HCC): ICD-10-CM

## 2019-01-29 DIAGNOSIS — M79.18 MYOFASCIAL MUSCLE PAIN: ICD-10-CM

## 2019-01-29 DIAGNOSIS — H53.2 DIPLOPIA: ICD-10-CM

## 2019-01-29 DIAGNOSIS — G89.4 CHRONIC PAIN SYNDROME: Primary | ICD-10-CM

## 2019-01-29 RX ORDER — ERGOCALCIFEROL 1.25 MG/1
50000 CAPSULE ORAL
Qty: 4 CAP | Refills: 3 | Status: SHIPPED | OUTPATIENT
Start: 2019-01-29 | End: 2019-10-21 | Stop reason: ALTCHOICE

## 2019-01-29 NOTE — TELEPHONE ENCOUNTER
Hay called was referred to dr Gauthier Burn office, they want 6 months of office notes, copy of referral, and recent MRI faxed before she can set appt. Fax  # [phone]. Pt asked to please call once you do this.

## 2019-01-29 NOTE — PROGRESS NOTES
Neurology Progress Note    NAME:  Juan Galvin   :   1955   MRN:   [id number]     Date/Time:  2019  Subjective:     Juan Galvin is a 66 y.o. female here today for follow-up for MS, MG, chronic pain syndrome, headaches, CVA, difficulty walking, test results. Patient was accompanied by her son to the visit. Patient today says she continues to have a lot of pain pain is sharp in nature, diffuse, burning sensation that goes up to the arms causing numbness and tingling sensation and weakness of the hands, down to the legs causing  numbness and tingling sensation of the legs with weakness of the legs. Says she is seeing a pain management specialist but her pain is not controlled at all, according to patient, the medication that controls her pain was taken away from her and the one given to her not helping the pain. She wants to look for another pain specialist.  She says her headache has increased in frequency and intensity, frequency of 4-5x/week, headache is frontal, throbbing in nature, with occasional sharp pain from the back of the head, headache associated with dizziness, nausea, blurry vision, double vision, photophobia, phonophobia. She says she has a lot of muscle spasms mostly in the back. Due to increased pain, her headache has also increasing in frequency and intensity. Says she has tried the CG RP, it appeared to have a little bit, however, she  has to wait for insurance authorization so  to be able to use it couple of times and see how much it helps with the headache. Due to increased pain and anxiety, patient's tremor has increased. On a scale of 1-10, patient rates pain level to be between 8 and 9. She has been having intermittent dysphasia but no odynophagia. She denies loss of consciousness. MRI of the brain reviewed with patient was unremarkable for stroke event, at this time, I will discontinue Plavix on this patient. She is advised to use 81mg aspirin instead.   Patient will be referred to another pain management as she is not satisfied with the current one. Review of Systems - General ROS: positive for  - fatigue, night sweats and sleep disturbance  Psychological ROS: positive for - anxiety, concentration difficulties, depression, mood swings and sleep disturbances  Ophthalmic ROS: positive for - blurry vision, decreased vision, double vision and photophobia  ENT ROS: positive for - headaches, tinnitus, vertigo and visual changes  Allergy and Immunology ROS: negative  Hematological and Lymphatic ROS: negative  Endocrine ROS: negative  Respiratory ROS: no cough, shortness of breath, or wheezing  Cardiovascular ROS: no chest pain or dyspnea on exertion  Gastrointestinal ROS: no abdominal pain, change in bowel habits, or black or bloody stools  Genito-Urinary ROS: no dysuria, trouble voiding, or hematuria  Musculoskeletal ROS: positive for - gait disturbance, joint pain, joint stiffness, joint swelling and muscle pain  Neurological ROS: positive for - dizziness, gait disturbance, headaches, impaired coordination/balance, numbness/tingling, speech problems, tremors, visual changes and weakness  Dermatological ROS: negative      Medications reviewed:  Current Outpatient Medications   Medication Sig Dispense Refill    glucose blood VI test strips (BLOOD GLUCOSE TEST) strip Test fasting glucose daily; Dx E11.8; Pharmacist to choose based on insurance/glucose monitoring kit type 100 Strip 1    Blood-Glucose Meter monitoring kit Test fasting glucose daily; Dx E11.8; Pharmacist to choose based on insurance/glucose monitoring kit type 1 Kit 0    levothyroxine (SYNTHROID) 150 mcg tablet Take 1 Tab by mouth nightly. Dose change 30 Tab 2    albuterol (PROVENTIL VENTOLIN) 2.5 mg /3 mL (0.083 %) nebulizer solution 3 mL by Nebulization route every six (6) hours as needed for Wheezing. 30 Each 0    ergocalciferol (ERGOCALCIFEROL) 50,000 unit capsule Take 1 Cap by mouth every seven (7) days.  4 Cap 3    galcanezumab-gnlm (EMGALITY) 120 mg/mL injection 2 mL by SubCUTAneous route every thirty (30) days. 2 mL 0    PARoxetine (PAXIL) 40 mg tablet TAKE 1 AND 1/2 TABLETS BY MOUTH EVERY NIGHT 135 Tab 3    topiramate (TOPAMAX) 200 mg tablet TAKE 1 TABLET BY MOUTH TWICE DAILY 180 Tab 0    pregabalin (LYRICA) 200 mg capsule Take 1 Cap by mouth two (2) times a day. Max Daily Amount: 400 mg. 180 Cap 3    clopidogrel (PLAVIX) 75 mg tab TAKE 1 TABLET BY MOUTH EVERY DAY 90 Tab 0    Menthol-Zinc Oxide (CALMOSEPTINE) 0.44-20.6 % oint Apply  to affected area as needed.  nystatin (MYCOSTATIN) powder Apply to candidal lesions TOPICALLY 2 to 3 times daily until healing complete 30 g 0    pyridostigmine bromide (MESTINON TIMESPAN) 180 mg SR tablet Take 1 Tab by mouth two (2) times a day. 60 Tab 6    lidocaine (LIDODERM) 5 % Apply patch to the affected area for 12 hours a day and remove for 12 hours a day. 30 Each 3    GILENYA 0.5 mg cap Take 1 Cap by mouth daily. 30 Cap 11    SUMAtriptan (IMITREX) 100 mg tablet 1 tab at onset of moderate-severe migraine; may repeat 1 tab in 2 hours; Limit: 2 tabs in 24/ hrs, not more than 3 days a week 12 Tab 3    dantrolene (DANTRIUM) 100 mg capsule Take 1 Cap by mouth as needed. 90 Cap 3    corticotropin (ACTHAR H.P.) 80 unit/mL injectable gel 1 mL by SubCUTAneous route daily. 80 units subq q day for 10 days 10 mL 1    clindamycin (CLEOCIN) 300 mg capsule Take 300 mg by mouth. Prior to dental procedures      butalbital-acetaminophen-caff (FIORICET) -40 mg per capsule Take 1 Cap by mouth every four (4) hours as needed for Pain. Max Daily Amount: 6 Caps. 10 Cap 0    rosuvastatin (CRESTOR) 20 mg tablet TAKE 1 TABLET BY MOUTH EVERY EVENING 90 Tab 1    TENS UNIT ELECTRODES by Does Not Apply route.  prn          Objective:   Vitals:  Vitals:    01/29/19 1304   BP: 106/63   Pulse: 67   Resp: 16   Temp: 97.8 °F (36.6 °C)   TempSrc: Temporal   SpO2: 97%   Weight: 200 lb (90.7 kg) Height: 6' (1.829 m)   PainSc:   8   PainLoc: Generalized   LMP: 09/01/2010       Lab Data Reviewed:  Lab Results   Component Value Date/Time    WBC 4.1 06/07/2018 01:54 PM    HCT 42.5 06/07/2018 01:54 PM    HGB 13.7 06/07/2018 01:54 PM    PLATELET 872 (L) 69/42/7661 01:54 PM       Lab Results   Component Value Date/Time    Sodium 142 05/24/2018 01:19 PM    Potassium 4.2 05/24/2018 01:19 PM    Chloride 107 (H) 05/24/2018 01:19 PM    CO2 21 05/24/2018 01:19 PM    Glucose 82 05/24/2018 01:19 PM    BUN 9 05/24/2018 01:19 PM    Creatinine 0.90 05/24/2018 01:19 PM    Calcium 9.2 05/24/2018 01:19 PM       No components found for: TROPQUANT    No results found for: SHAHIDA      Lab Results   Component Value Date/Time    Hemoglobin A1c 6.3 (H) 05/24/2018 01:19 PM    Hemoglobin A1c (POC) 6.8 10/25/2018 02:53 PM        Lab Results   Component Value Date/Time    Vitamin B12 434 02/10/2016    Folate 12.0 02/10/2016       No results found for: Jonna PINEDO, CLARIBEL    Lab Results   Component Value Date/Time    Cholesterol, total 179 05/24/2018 01:19 PM    Cholesterol, Total 181 12/26/2018    HDL Cholesterol 65 12/26/2018    HDL Cholesterol 51 05/24/2018 01:19 PM    LDL-CHOLESTEROL 91 12/26/2018    LDL, calculated 109 (H) 05/24/2018 01:19 PM    VLDL, calculated 19 05/24/2018 01:19 PM    Triglyceride 126 12/26/2018    Triglyceride 96 05/24/2018 01:19 PM    CHOL/HDL Ratio 4.3 08/23/2010 12:19 PM         CT Results (recent):  Results from Hospital Encounter encounter on 05/14/18   CT NECK SOFT TISSUE W CONT    Narrative HISTORY: Neck mass. PROCEDURE: Multiple contiguous helically acquired axial images were obtained of  the cervical region from the skull base through to the thoracic inlet following  intravenous contrast administration. Sagittal and coronal reconstructions were  performed.     CT dose reduction was achieved through the use of a standardized protocol  tailored for this examination and automatic exposure control for dose  modulation. FINDINGS:     Images through the nasopharynx reveal symmetric soft tissues. Images through the oropharynx reveals symmetric soft tissues. The parotid glands are symmetric in appearance. Images through the hypopharynx reveals symmetric soft tissues. Just below the level of the hyoid bone, is a soft tissue structure measuring 9.7  x 7.8 mm with a small satellite lesions. No evidence of lingual thyroid per se. The submandibular glands have a symmetric appearance. Images through the larynx proper reveal symmetric soft tissues. Images through the subglottic larynx reveal symmetric soft tissues. The thyroid gland has a normal appearance with symmetric lobes. No significant cervical lymphadenopathy. Incidental mild mucoperiosteal thickening involving the right greater than left  maxillary sinus of uncertain clinical significance. Impression IMPRESSION:    9.7 x 7.8 mm soft tissue structure anterior to the hyoid bone. Differential  considerations would include thyroglossal duct cyst, ectopic thyroid, dermoid,  etc.         MRI Results (recent):  Results from Hospital Encounter encounter on 12/26/18   MRI BRAIN W WO CONT    Narrative Medical indication: Multiple sclerosis   , headache q. daily, prior CVA, new numbness, exacerbation. Technical factors: Diffusion imaging, sagittal T1-weighted, sagittal FLAIR,  axial T1-weighted pre and post 18 cc IV. dotarem T2-weighted FLAIR gradient echo  coronal and sagittal T1-weighted postcontrast. Comparison January 10, 2018. Diffusion imaging does not show acute ischemic changes her. There is no  extra-axial fluid collection hemorrhage or shift. Ventricular size is stable. Major vessels flow voids at the base of the brain are present. Incidental  maxillary sinus disease. The burden of white matter disease appears stable. No  enhancing lesion or masses. Impression IMPRESSION: Stable white matter disease.  No acute findings no masses or  enhancing lesion. IR Results (recent):  No results found for this or any previous visit. VAS/US Results (recent):  Results from Hospital Encounter encounter on 01/19/17   DUPLEX LOWER EXT VENOUS RIGHT       PHYSICAL EXAM:  General:    Alert, cooperative, no distress, appears stated age. Head:   Normocephalic, without obvious abnormality, atraumatic. Eyes:   Conjunctivae/corneas clear. PERRLA  Nose:  Nares normal. No drainage or sinus tenderness. Throat:    Lips, mucosa, and tongue normal.  No Thrush  Neck:  Supple, symmetrical,  no adenopathy, thyroid: non tender    no carotid bruit and no JVD. Paraspinal tenderness  Back:    Symmetric, diffuse tenderness. Lungs:   Clear to auscultation bilaterally. No Wheezing or Rhonchi. No rales. Chest wall:  No tenderness or deformity. No Accessory muscle use. Heart:   Regular rate and rhythm,  no murmur, rub or gallop. Abdomen:   Soft, non-tender. Not distended. Bowel sounds normal. No masses  Extremities: Extremities normal, atraumatic, No cyanosis. No edema. No clubbing  Skin:     Texture, turgor normal. No rashes or lesions. Not Jaundiced  Lymph nodes: Cervical, supraclavicular normal.  Psych:  Good insight. Depressed. Anxious . NEUROLOGICAL EXAM:  Appearance: The patient is well developed, well nourished, provides a coherent history and is in no acute distress. Mental Status: Oriented to time, place and person. Mood and affect appropriate. Cranial Nerves:   Intact visual fields. Fundi are benign. RACHEL, EOM's full, no nystagmus, no ptosis. Facial sensation is normal. Corneal reflexes are intact. Facial movement is symmetric. Hearing is normal bilaterally. Palate is midline with normal sternocleidomastoid and trapezius muscles are normal. Tongue is midline. Motor:  4+/5 strength in upper and lower proximal and distal muscles. Normal bulk and tone. No fasciculations.    Reflexes:   Deep tendon reflexes 3+/4 and symmetrical.   Sensory:    Dysesthesia to touch, pinprick and vibration. Gait:   Abnormal gait. Ambulates with walker   Tremor:   Tremor noted. Cerebellar:  No cerebellar signs present. Neurovascular:  Normal heart sounds and regular rhythm, peripheral pulses intact, and no carotid bruits. Assesment  1. MS (multiple sclerosis) ([facility])    - ergocalciferol (ERGOCALCIFEROL) 50,000 unit capsule; Take 1 Cap by mouth every seven (7) days. Dispense: 4 Cap; Refill: 3    2. Chronic pain syndrome  Referred to pain management    3. Myasthenia gravis ([facility]  Stable    4. Gait disorder  Physical therapy    5. CIDP (chronic inflammatory demyelinating polyneuropathy) ([facility])  Continue IVIG    6. Diplopia  Stable    ___________________________________________________  PLAN: Medication and plan discussed with patient      ICD-10-CM ICD-9-CM    1. Chronic pain syndrome G89.4 338.4    2. MS (multiple sclerosis) ([facility]) G35 340 ergocalciferol (ERGOCALCIFEROL) 50,000 unit capsule   3. Myasthenia gravis ([facility] G70.00 358.00    4. Gait disorder R26.9 781.2    5. CIDP (chronic inflammatory demyelinating polyneuropathy) ([facility]) G61.81 357.81    6.  Diplopia H53.2 368.2      Follow-up Disposition:  Return in about 3 months (around 4/29/2019).         ___________________________________________________    Total time spent with patient:  []15   []25   []35   [] __ minutes    Care Plan discussed with:    []Patient   []Family    []Care Manager   []Consultant/Specialist :    ___________________________________________________    Attending Physician: Wendy Carter MD

## 2019-01-29 NOTE — PROGRESS NOTES
Chief Complaint   Patient presents with    Multiple Sclerosis    Medication Refill     1. Have you been to the ER, urgent care clinic since your last visit? Hospitalized since your last visit? No    2. Have you seen or consulted any other health care providers outside of the [address] [place] [address] since your last visit? Include any pap smears or colon screening.  Dr. Weston Adan

## 2019-01-30 ENCOUNTER — PATIENT OUTREACH (OUTPATIENT)
Dept: FAMILY MEDICINE CLINIC | Age: 64
End: 2019-01-30

## 2019-01-30 NOTE — TELEPHONE ENCOUNTER
Returned call to patient, ID verified times 2. Patient made aware she has to fill out a medical release form.  Patient verbalized understanding

## 2019-02-15 DIAGNOSIS — G35 MS (MULTIPLE SCLEROSIS) (HCC): ICD-10-CM

## 2019-02-15 RX ORDER — FINGOLIMOD HCL 0.5 MG/1
1 CAPSULE ORAL DAILY
Qty: 30 CAP | Refills: 11 | Status: SHIPPED | OUTPATIENT
Start: 2019-02-15 | End: 2019-12-16 | Stop reason: SDUPTHER

## 2019-02-21 ENCOUNTER — PATIENT OUTREACH (OUTPATIENT)
Dept: FAMILY MEDICINE CLINIC | Age: 64
End: 2019-02-21

## 2019-02-21 NOTE — PROGRESS NOTES
Called patient regarding appt for CCM this afternoon, needed to reschedule due to NN conflict. Rescheduled for Tue, 2/26 at 1pm. 
Also, patient reports that  has taken her off of the Plavix. Wanted to know if she should restart the baby ASA that she was on prior to the Plavix. Advised will discuss with  and call her back. Advised patient to restart the baby ASA.

## 2019-02-23 RX ORDER — GALCANEZUMAB 120 MG/ML
INJECTION, SOLUTION SUBCUTANEOUS
Qty: 1 ML | Refills: 3 | Status: SHIPPED | OUTPATIENT
Start: 2019-02-23 | End: 2019-03-05 | Stop reason: SDUPTHER

## 2019-02-26 ENCOUNTER — PATIENT OUTREACH (OUTPATIENT)
Dept: FAMILY MEDICINE CLINIC | Age: 64
End: 2019-02-26

## 2019-02-26 NOTE — PROGRESS NOTES
Patient came in to see NN for monthly CCM session. Was walking without cane, very off balance and wobbly. NN assisted her to her office. Patient said she left cane in the car by mistake. In a lot of pain, head pain #15 on scale of 1-10. And rest of body is about the same. Using some old Fentanyl patches from prior rx-stretching them to last one week-not effective. Taking 3 Motrin at same time, not helping either. Has appt with new PM specialist, Jaky Powell, on 4/9.  referred her to him after she discussed last PM specialist.  
Adam Austin last week, was only out of the bed one day and some on the weekend. Sad and tearful. Said she cried at [facility] office, has never done that before with him. Saw  on 1/29 and next appt with him is 3/19. Sees pcp, , on 4/4. Has just been depressed and felt \"ready to die\" due to the pain overtaking everything in  Her life. Her physical therapist came to see her last Friday, saw her in her bedroom, first time for that. Denies any thoughts of wanting to hurt herself, just says the pain is too much. Gave NN her recent blood sugar numbers: 
26,325,13,878; Also 1/; 2/7- 118; 2/14- 99; and 2/. Financial problems continue, car was due for inspection- would not pass inspection unless had major body work-was advised that her car was not worth fixing. Has been looking for another car, found one for $1000.00 and asked brother to help her with it. Says years ago, everyone came to her to borrow money. Now feels ok with asking him to help her. He did agree to contribute about $300. Says with son's refund check, should be able to afford to pay for this car. Spirits did improve as discussed the car. Encouraged her to use the pain reducing strategies we had discussed before-can also call NN if having a difficult time. Support given and reminded to reach out for help prn with family and other support systems. Appointment made to see her 3/19 prior to appt with . Goals  education on management of pain 1/17/19 Call to patient to check on her. BS much better. Continues to not take the Percocet that was rx'd per PM,. Complains it makes her too sleepy and can't function. · Reviewed meds, continue other pain meds(Lidoderm patch,Lyrica,Topamax,Imitrex,Dantrium) · Rest and relax as needed · Eat well balanced meals and adequate fluids. · Physical therapy · Practice breathing exercises and meditation to help focus your attention, relax and get rid of tension · Massage · TENS unit as directed. mbt 
2/26/19 Patient in for monthly CCM session. · Reports head pain is a #15 on scale of 1-10. · Body pain about the same as head pain. · Has Percocet but doesn't take it because it just makes her sleep · Using some old Fentanyl patches from old rx, trying to make them last until sees new PM  on 4/9. Stretching them to last one week-not working. And takes 3 Motrin but not effective. · Hoping new pain management doctor can help her control the pain and be able to function. mbt

## 2019-03-05 DIAGNOSIS — G43.009 MIGRAINE WITHOUT AURA AND WITHOUT STATUS MIGRAINOSUS, NOT INTRACTABLE: Primary | ICD-10-CM

## 2019-03-18 DIAGNOSIS — G35 MS (MULTIPLE SCLEROSIS) (HCC): ICD-10-CM

## 2019-03-18 DIAGNOSIS — G43.909 MIGRAINE WITHOUT STATUS MIGRAINOSUS, NOT INTRACTABLE, UNSPECIFIED MIGRAINE TYPE: ICD-10-CM

## 2019-03-18 RX ORDER — TOPIRAMATE 200 MG/1
TABLET ORAL
Qty: 180 TAB | Refills: 0 | Status: SHIPPED | OUTPATIENT
Start: 2019-03-18 | End: 2019-06-24 | Stop reason: SDUPTHER

## 2019-03-18 RX ORDER — CLOPIDOGREL BISULFATE 75 MG/1
TABLET ORAL
Qty: 90 TAB | Refills: 0 | Status: SHIPPED | OUTPATIENT
Start: 2019-03-18 | End: 2019-04-04

## 2019-03-19 ENCOUNTER — OFFICE VISIT (OUTPATIENT)
Dept: NEUROLOGY | Age: 64
End: 2019-03-19

## 2019-03-19 VITALS
RESPIRATION RATE: 18 BRPM | TEMPERATURE: 97.4 F | WEIGHT: 198 LBS | BODY MASS INDEX: 26.82 KG/M2 | SYSTOLIC BLOOD PRESSURE: 102 MMHG | HEART RATE: 81 BPM | HEIGHT: 72 IN | DIASTOLIC BLOOD PRESSURE: 60 MMHG | OXYGEN SATURATION: 99 %

## 2019-03-19 DIAGNOSIS — G89.4 CHRONIC PAIN SYNDROME: ICD-10-CM

## 2019-03-19 DIAGNOSIS — R26.9 GAIT DISORDER: ICD-10-CM

## 2019-03-19 DIAGNOSIS — G35 MS (MULTIPLE SCLEROSIS) (HCC): Primary | ICD-10-CM

## 2019-03-19 DIAGNOSIS — G70.00 MYASTHENIA GRAVIS (HCC): ICD-10-CM

## 2019-03-19 DIAGNOSIS — M21.372 BILATERAL FOOT-DROP: ICD-10-CM

## 2019-03-19 DIAGNOSIS — G54.1 LUMBOSACRAL PLEXOPATHY: ICD-10-CM

## 2019-03-19 DIAGNOSIS — R13.19 ESOPHAGEAL DYSPHAGIA: ICD-10-CM

## 2019-03-19 DIAGNOSIS — M21.371 BILATERAL FOOT-DROP: ICD-10-CM

## 2019-03-19 RX ORDER — DANTROLENE SODIUM 100 MG/1
100 CAPSULE ORAL 3 TIMES DAILY
Qty: 90 CAP | Refills: 3 | Status: ON HOLD | OUTPATIENT
Start: 2019-03-19 | End: 2022-07-21 | Stop reason: SDUPTHER

## 2019-03-19 RX ORDER — DEXAMETHASONE 4 MG/1
4 TABLET ORAL 2 TIMES DAILY WITH MEALS
Qty: 20 TAB | Refills: 1 | Status: SHIPPED | OUTPATIENT
Start: 2019-03-19 | End: 2019-05-03

## 2019-03-19 NOTE — PROGRESS NOTES
Neurology Progress Note    NAME:  Armando Galvin   :   1955   MRN:   [id number]     Date/Time:  3/19/2019  Subjective:     Armando Galvin is a 66 y.o. female here today for follow-up for MS, MG, chronic pain syndrome, headaches, CVA, difficulty walking, test results. Patient was accompanied by her son to the visit. Patient today says she continues to have a lot of pain pain is sharp in nature, diffuse, burning sensation that goes up to the arms causing numbness and tingling sensation and weakness of the hands, down to the legs causing  numbness and tingling sensation of the legs with weakness of the legs. Says she is seeing a pain management specialist but her pain is not controlled at all, according to patient, the medication that controls her pain was taken away from her and the one given to her not helping the pain. She is yet to get the pain management specialist at. She says her headache has decreased in frequency and intensity, frequency of 3-4x/week after she tried CG RP, headache is frontal, throbbing in nature, with occasional sharp pain from the back of the head, headache associated with dizziness, nausea, blurry vision, double vision, photophobia, phonophobia. She says she has a lot of muscle spasms mostly in the back. Due to increased pain, her headache has also increasing in frequency and intensity. Still awaiting  for insurance authorization of the CG RP. Due to increased pain, patient has been having increased speech difficulty and dysphasia. She says that she stays in bed most of the time  Due to increased pain and anxiety, patient's tremor has increased. On a scale of 1-10, patient rates pain level to be between 8 and 9. She denies loss of consciousness. Patient is referred to physical therapy and GI for dysphagia.   Review of Systems - General ROS: positive for  - fatigue, night sweats and sleep disturbance  Psychological ROS: positive for - anxiety, concentration difficulties, depression, mood swings and sleep disturbances  Ophthalmic ROS: positive for - blurry vision, decreased vision, double vision and photophobia  ENT ROS: positive for - headaches, tinnitus, vertigo and visual changes  Allergy and Immunology ROS: negative  Hematological and Lymphatic ROS: negative  Endocrine ROS: negative  Respiratory ROS: no cough, shortness of breath, or wheezing  Cardiovascular ROS: no chest pain or dyspnea on exertion  Gastrointestinal ROS: no abdominal pain, change in bowel habits, or black or bloody stools  Genito-Urinary ROS: no dysuria, trouble voiding, or hematuria  Musculoskeletal ROS: positive for - gait disturbance, joint pain, joint stiffness, joint swelling and muscle pain  Neurological ROS: positive for - dizziness, gait disturbance, headaches, impaired coordination/balance, numbness/tingling, speech problems, tremors, visual changes and weakness  Dermatological ROS: negative          Medications reviewed:  Current Outpatient Medications   Medication Sig Dispense Refill    topiramate (TOPAMAX) 200 mg tablet TAKE 1 TABLET BY MOUTH TWICE DAILY 180 Tab 0    galcanezumab-gnlm (EMGALITY PEN) 120 mg/mL injection 1 mL by SubCUTAneous route every twenty-eight (28) days. 1 mL 4    GILENYA 0.5 mg cap Take 1 Cap by mouth daily. 30 Cap 11    ergocalciferol (ERGOCALCIFEROL) 50,000 unit capsule Take 1 Cap by mouth every seven (7) days. 4 Cap 3    glucose blood VI test strips (BLOOD GLUCOSE TEST) strip Test fasting glucose daily; Dx E11.8; Pharmacist to choose based on insurance/glucose monitoring kit type 100 Strip 1    Blood-Glucose Meter monitoring kit Test fasting glucose daily; Dx E11.8; Pharmacist to choose based on insurance/glucose monitoring kit type 1 Kit 0    levothyroxine (SYNTHROID) 150 mcg tablet Take 1 Tab by mouth nightly.  Dose change 30 Tab 2    albuterol (PROVENTIL VENTOLIN) 2.5 mg /3 mL (0.083 %) nebulizer solution 3 mL by Nebulization route every six (6) hours as needed for Wheezing. 30 Each 0    PARoxetine (PAXIL) 40 mg tablet TAKE 1 AND 1/2 TABLETS BY MOUTH EVERY NIGHT 135 Tab 3    pregabalin (LYRICA) 200 mg capsule Take 1 Cap by mouth two (2) times a day. Max Daily Amount: 400 mg. 180 Cap 3    Menthol-Zinc Oxide (CALMOSEPTINE) 0.44-20.6 % oint Apply  to affected area as needed.  nystatin (MYCOSTATIN) powder Apply to candidal lesions TOPICALLY 2 to 3 times daily until healing complete 30 g 0    pyridostigmine bromide (MESTINON TIMESPAN) 180 mg SR tablet Take 1 Tab by mouth two (2) times a day. 60 Tab 6    lidocaine (LIDODERM) 5 % Apply patch to the affected area for 12 hours a day and remove for 12 hours a day. 30 Each 3    SUMAtriptan (IMITREX) 100 mg tablet 1 tab at onset of moderate-severe migraine; may repeat 1 tab in 2 hours; Limit: 2 tabs in 24/ hrs, not more than 3 days a week 12 Tab 3    dantrolene (DANTRIUM) 100 mg capsule Take 1 Cap by mouth as needed. 90 Cap 3    corticotropin (ACTHAR H.P.) 80 unit/mL injectable gel 1 mL by SubCUTAneous route daily. 80 units subq q day for 10 days 10 mL 1    clindamycin (CLEOCIN) 300 mg capsule Take 300 mg by mouth. Prior to dental procedures      butalbital-acetaminophen-caff (FIORICET) -40 mg per capsule Take 1 Cap by mouth every four (4) hours as needed for Pain. Max Daily Amount: 6 Caps. 10 Cap 0    rosuvastatin (CRESTOR) 20 mg tablet TAKE 1 TABLET BY MOUTH EVERY EVENING 90 Tab 1    TENS UNIT ELECTRODES by Does Not Apply route.  prn      clopidogrel (PLAVIX) 75 mg tab TAKE 1 TABLET BY MOUTH EVERY DAY 90 Tab 0        Objective:   Vitals:  Vitals:    03/19/19 1441   BP: 102/60   Pulse: 81   Resp: 18   Temp: 97.4 °F (36.3 °C)   TempSrc: Temporal   SpO2: 99%   Weight: 198 lb (89.8 kg)   Height: 6' (1.829 m)   PainSc:  10 - Worst pain ever   PainLoc: Generalized   LMP: 09/01/2010               Lab Data Reviewed:  Lab Results   Component Value Date/Time    WBC 4.1 06/07/2018 01:54 PM    HCT 42.5 06/07/2018 01:54 PM HGB 13.7 06/07/2018 01:54 PM    PLATELET 905 (L) 87/53/3375 01:54 PM       Lab Results   Component Value Date/Time    Sodium 142 05/24/2018 01:19 PM    Potassium 4.2 05/24/2018 01:19 PM    Chloride 107 (H) 05/24/2018 01:19 PM    CO2 21 05/24/2018 01:19 PM    Glucose 82 05/24/2018 01:19 PM    BUN 9 05/24/2018 01:19 PM    Creatinine 0.90 05/24/2018 01:19 PM    Calcium 9.2 05/24/2018 01:19 PM       No components found for: TROPQUANT    No results found for: SHAHIDA      Lab Results   Component Value Date/Time    Hemoglobin A1c 6.3 (H) 05/24/2018 01:19 PM    Hemoglobin A1c (POC) 6.8 10/25/2018 02:53 PM        Lab Results   Component Value Date/Time    Vitamin B12 434 02/10/2016    Folate 12.0 02/10/2016       No results found for: Olivier PINEDO XBANA    Lab Results   Component Value Date/Time    Cholesterol, total 179 05/24/2018 01:19 PM    Cholesterol, Total 181 12/26/2018    HDL Cholesterol 65 12/26/2018    HDL Cholesterol 51 05/24/2018 01:19 PM    LDL-CHOLESTEROL 91 12/26/2018    LDL, calculated 109 (H) 05/24/2018 01:19 PM    VLDL, calculated 19 05/24/2018 01:19 PM    Triglyceride 126 12/26/2018    Triglyceride 96 05/24/2018 01:19 PM    CHOL/HDL Ratio 4.3 08/23/2010 12:19 PM         CT Results (recent):  Results from Hospital Encounter encounter on 05/14/18   CT NECK SOFT TISSUE W CONT    Narrative HISTORY: Neck mass. PROCEDURE: Multiple contiguous helically acquired axial images were obtained of  the cervical region from the skull base through to the thoracic inlet following  intravenous contrast administration. Sagittal and coronal reconstructions were  performed. CT dose reduction was achieved through the use of a standardized protocol  tailored for this examination and automatic exposure control for dose  modulation. FINDINGS:     Images through the nasopharynx reveal symmetric soft tissues. Images through the oropharynx reveals symmetric soft tissues.     The parotid glands are symmetric in appearance. Images through the hypopharynx reveals symmetric soft tissues. Just below the level of the hyoid bone, is a soft tissue structure measuring 9.7  x 7.8 mm with a small satellite lesions. No evidence of lingual thyroid per se. The submandibular glands have a symmetric appearance. Images through the larynx proper reveal symmetric soft tissues. Images through the subglottic larynx reveal symmetric soft tissues. The thyroid gland has a normal appearance with symmetric lobes. No significant cervical lymphadenopathy. Incidental mild mucoperiosteal thickening involving the right greater than left  maxillary sinus of uncertain clinical significance. Impression IMPRESSION:    9.7 x 7.8 mm soft tissue structure anterior to the hyoid bone. Differential  considerations would include thyroglossal duct cyst, ectopic thyroid, dermoid,  etc.         MRI Results (recent):  Results from Hospital Encounter encounter on 12/26/18   MRI BRAIN W WO CONT    Narrative Medical indication: Multiple sclerosis   , headache q. daily, prior CVA, new numbness, exacerbation. Technical factors: Diffusion imaging, sagittal T1-weighted, sagittal FLAIR,  axial T1-weighted pre and post 18 cc IV. dotarem T2-weighted FLAIR gradient echo  coronal and sagittal T1-weighted postcontrast. Comparison January 10, 2018. Diffusion imaging does not show acute ischemic changes her. There is no  extra-axial fluid collection hemorrhage or shift. Ventricular size is stable. Major vessels flow voids at the base of the brain are present. Incidental  maxillary sinus disease. The burden of white matter disease appears stable. No  enhancing lesion or masses. Impression IMPRESSION: Stable white matter disease. No acute findings no masses or  enhancing lesion. IR Results (recent):  No results found for this or any previous visit.     VAS/US Results (recent):  Results from Hospital Encounter encounter on 01/19/17 DUPLEX LOWER EXT VENOUS RIGHT       PHYSICAL EXAM:  General:    Alert, cooperative, no distress, appears stated age. Head:   Normocephalic, without obvious abnormality, atraumatic. Eyes:   Conjunctivae/corneas clear. PERRLA  Nose:  Nares normal. No drainage or sinus tenderness. Throat:    Lips, mucosa, and tongue normal.  No Thrush  Neck:  Supple, symmetrical,  no adenopathy, thyroid: non tender    no carotid bruit and no JVD. Paraspinal tenderness    Back:   Symmetric, diffuse tenderness. Lungs:   Clear to auscultation bilaterally. No Wheezing or Rhonchi. No rales. Chest wall:  No tenderness or deformity. No Accessory muscle use. Heart:   Regular rate and rhythm,  no murmur, rub or gallop. Abdomen:   Soft, non-tender. Not distended. Bowel sounds normal. No masses  Extremities: Extremities normal, atraumatic, No cyanosis. No edema. No clubbing  Skin:     Texture, turgor normal. No rashes or lesions. Not Jaundiced  Lymph nodes: Cervical, supraclavicular normal.  Psych:  Good insight. Depressed. Anxious. NEUROLOGICAL EXAM:  Appearance: The patient is well developed, well nourished, provides a coherent history and is in no acute distress. Mental Status: Oriented to time, place and person. Mood and affect depressed. Cranial Nerves:   Intact visual fields. Speech dysarthric. Fundi are benign. RACHEL, EOM's full, no nystagmus, no ptosis. Facial sensation is normal. Corneal reflexes are intact. Facial movement is symmetric. Hearing is normal bilaterally. Palate is midline with normal sternocleidomastoid and trapezius muscles are normal. Tongue is midline. Motor:  5/5 strength in upper extremity and 4+/5 lower extremity proximal and distal muscles. Normal bulk and tone. No fasciculations. Reflexes:   Deep tendon reflexes 2+/4 and symmetrical.   Sensory:    Dysesthesia to touch, pinprick and vibration. Gait:   Unsteady gait. Ambulates with walker   Tremor:    Tremor noted.    Cerebellar: No cerebellar signs present. Neurovascular:  Normal heart sounds and regular rhythm, peripheral pulses intact, and no carotid bruits. Assesment  1. MS (multiple sclerosis) (HCC)  Stable    2. Myasthenia gravis ([facility]  Stable    3. Chronic pain syndrome  Following pain management    4. Gait disorder  Physical therapy    5. Lumbosacral plexopathy  Physical therapy    ___________________________________________________  PLAN: Medication and plan discussed with patient      ICD-10-CM ICD-9-CM    1. MS (multiple sclerosis) ([facility] [address] [address] G35 340    2. [facility]ia gravis ([facility] G70.00 358.00    3. Chronic pain syndrome G89.4 338.4    4. Gait disorder R26.9 781.2    5.  Lumbosacral plexopathy G54.1 353.1      Follow-up Disposition:  Return in about 3 months (around 6/19/2019).           ___________________________________________________    Total time spent with patient:  []15   []25   []35   [] __ minutes    Care Plan discussed with:    []Patient   []Family    []Care Manager   []Consultant/Specialist :    ___________________________________________________    Attending Physician: Triny Mendoza MD

## 2019-03-19 NOTE — PROGRESS NOTES
Chief Complaint   Patient presents with    Multiple Sclerosis    Medication Refill     1. Have you been to the ER, urgent care clinic since your last visit? Hospitalized since your last visit? no    2. Have you seen or consulted any other health care providers outside of the [address] [place] [address] since your last visit? Include any pap smears or colon screening.  no

## 2019-03-20 ENCOUNTER — PATIENT OUTREACH (OUTPATIENT)
Dept: FAMILY MEDICINE CLINIC | Age: 64
End: 2019-03-20

## 2019-03-20 NOTE — Clinical Note
has given her a referral to GI- Who would  You recommend for her. REgarding trouble swallowing again. Alessandra Rush

## 2019-03-20 NOTE — PROGRESS NOTES
Patient called, had come for monthly CCM appointment yesterday but NN/CM was out of office sick. NN apologized for missing her yesterday. Rescheduled for tomorrow tentatively for 1:30pm. NN will have to call patient in am to confirm due to phone conference call scheduled q Thursday at 1 or 2pm. 
Patient says she saw Shailesh Deysi yesterday as well. He has put her on a steroid to help until she sees new PM doctor on 4/9. He has also given referral for her to see GI d/t trouble with swallowing again. Requested name of GI that  would recommend for her. Advised will check with pcp and let her know.  has also put in order for her to restart PT to help with balance/gait. And advised her to see him for f/u 2 months. NN to call back with name of GI per  and to confirm CM appt for 3/21. Called patient back and advised her can see her for CM at 1pm or before on 3/21. Note-will let her know that  [facility].

## 2019-03-21 ENCOUNTER — PATIENT OUTREACH (OUTPATIENT)
Dept: FAMILY MEDICINE CLINIC | Age: 64
End: 2019-03-21

## 2019-03-21 NOTE — PROGRESS NOTES
Patient in for monthly Complex Case Management appointment. In good spirits, using cane-more steady with ambulation. Needed minimal assist from CM to walk out to car after session. Reports that she saw her neurologist,, yesterday. He ordered a steroid for her to take to help with the pain until she sees new PM doctor on 4/9. Has not started the steroid yet. Will  at pharmacy today and begin. Reports that she is also having trouble with swallowing again, foods and fluids.  wants her to see a GI specialist to evaluate. Patient wanted to know who  would recommend-advised she suggested Jarred Reasons. Neurologist also ordered PT again for her to help with gait and balance. Patient brought in list of last blood sugar readings- range from . This morning FBS was 114. Patient says she does hurt all over, hoping the steroid will help her. Looking forward to warmer weather and planning out her vegetable and flower garden. Really enjoys working in her garden. Spoke at length about what she hoped to plant. Briefly mentioned her mother-says her mother has been [place]. \" Not bothering her too much. Says mother has been having some back pain-has needed injections, not been effective and taking Valium and Percocet. Says she has cautioned her mother about taking strong pain medications. Left session in good spirits. Assisted to her car in the rain. Minimal assistance needed. Will check calendar and son's work schedule and call back for appt for next month.

## 2019-04-04 ENCOUNTER — OFFICE VISIT (OUTPATIENT)
Dept: FAMILY MEDICINE CLINIC | Age: 64
End: 2019-04-04

## 2019-04-04 ENCOUNTER — HOSPITAL ENCOUNTER (OUTPATIENT)
Dept: LAB | Age: 64
Discharge: HOME | End: 2019-04-04

## 2019-04-04 ENCOUNTER — PATIENT OUTREACH (OUTPATIENT)
Dept: FAMILY MEDICINE CLINIC | Age: 64
End: 2019-04-04

## 2019-04-04 VITALS
HEART RATE: 87 BPM | HEIGHT: 72 IN | RESPIRATION RATE: 18 BRPM | TEMPERATURE: 98.8 F | BODY MASS INDEX: 27.06 KG/M2 | WEIGHT: 199.8 LBS | OXYGEN SATURATION: 98 % | DIASTOLIC BLOOD PRESSURE: 60 MMHG | SYSTOLIC BLOOD PRESSURE: 136 MMHG

## 2019-04-04 DIAGNOSIS — D69.6 THROMBOCYTOPENIA (HCC): ICD-10-CM

## 2019-04-04 DIAGNOSIS — Z00.00 MEDICARE ANNUAL WELLNESS VISIT, SUBSEQUENT: Primary | ICD-10-CM

## 2019-04-04 DIAGNOSIS — G70.00 MYASTHENIA GRAVIS (HCC): ICD-10-CM

## 2019-04-04 DIAGNOSIS — G35 MS (MULTIPLE SCLEROSIS) (HCC): ICD-10-CM

## 2019-04-04 DIAGNOSIS — E11.9 DIET-CONTROLLED DIABETES MELLITUS (HCC): ICD-10-CM

## 2019-04-04 DIAGNOSIS — E03.9 HYPOTHYROIDISM, UNSPECIFIED TYPE: ICD-10-CM

## 2019-04-04 NOTE — ASSESSMENT & PLAN NOTE
This condition is managed by Specialist. 
Lab Results Component Value Date/Time  WBC 4.1 06/07/2018 01:54 PM  
 HGB 13.7 06/07/2018 01:54 PM  
 HCT 42.5 06/07/2018 01:54 PM  
 PLATELET 171 25/53/7630 01:54 PM  
 Creatinine 0.90 05/24/2018 01:19 PM  
 BUN 9 05/24/2018 01:19 PM  
 Potassium 4.2 05/24/2018 01:19 PM

## 2019-04-04 NOTE — PROGRESS NOTES
Met briefly with patient before her routine appt with pcp today. Mentioned she had an appointment with opth recently-she had noticed a decline in her vision. Was told it had declined, he did not think a change of prescription would help her. He thinks it is due to the MS. Recommended recheck in 6 months. Said she is \"bummed\" about that news. Hopeful that it will improve but admits that it may not. Also will see new PM specialist next week. Has been in a lot of pain since the Fentanyl was discontinued. Hoping new PM can offer her an alternative so she can function better. May discuss new treatment she has heard about with CBD- encouraged her to do so. Scheduled next [facility] visit for 4/18 at 1:30pm. Advised to call if needs to talk before then about feelings/concerns. She agreed to do so.

## 2019-04-04 NOTE — PROGRESS NOTES
Chief Complaint Patient presents with  Diabetes  
  follow up 1. Have you been to the ER, urgent care clinic since your last visit? Hospitalized since your last visit? No 
 
2. Have you seen or consulted any other health care providers outside of the [address] [place] [address] since your last visit? Include any pap smears or colon screening. No  
 
Patient stated that she had an eye exam done at [facility] last week, will obtain records.

## 2019-04-04 NOTE — PROGRESS NOTES
This is the Subsequent Medicare Annual Wellness Exam, performed 12 months or more after the Initial AWV or the last Subsequent AWV I have reviewed the patient's medical history in detail and updated the computerized patient record. History Past Medical History:  
Diagnosis Date  Arthritis   
 hip, hands  Benign cyst of left breast 3/21/2016  Blindness and low vision 2004  
 legally blind from [address]  
 Cervical spinal stenosis 2016 Moderate C6-7  Depression  Diabetes mellitus type 2, controlled ([address] 2016  Diet-controlled diabetes mellitus ([address] 2018  Endometriosis 2010  
 FH: breast cancer 2010 Chart states FH breast/ovary Ca, CAD,DM   
 History of CVA (cerebrovascular accident) 2018  
 HTN, goal below 140/90 2010 CURRENTLY NO MEDICATIONS (17)  Hypercholesterolemia  Hypothyroid 2010  Incontinence  Lumbosacral plexopathy 2010  MS ([facility] sclerosis) ([facility]  Myasthenia gravis ([address] [id number]  Sleep apnea 2010  Ureteral calculus 2010  Vitamin D deficiency 3/17/2016 Past Surgical History:  
Procedure Laterality Date  ABDOMEN SURGERY PROC UNLISTED    
 bowel resection  BREAST SURGERY PROCEDURE UNLISTED    
 breast cyst-both breasts at different times  HX  SECTION  1986  HX CYST INCISION AND DRAINAGE  HX GI    
 COLONOSCOPY  
 HX GYN    
 ovarian cyst  
 HX HEENT   THYROIDECTOMY  HX HIP REPLACEMENT Right 2017  
 anterior approach  HX KNEE ARTHROSCOPY Right   HX ORTHOPAEDIC  1997  
 right thumb tendon repair x 2  
 HX ORTHOPAEDIC Left CARPAL TUNNEL  
 HX OTHER SURGICAL Janee Cath x2  
 HX SMALL BOWEL RESECTION    
 HX THYROIDECTOMY    
   HX VASCULAR ACCESS    
 PORT -A- CATH X2  
 
Current Outpatient Medications Medication Sig Dispense Refill  dantrolene (DANTRIUM) 100 mg capsule Take 1 Cap by mouth three (3) times daily. 90 Cap 3  
 dexamethasone (DECADRON) 4 mg tablet Take 4 mg by mouth two (2) times daily (with meals). 20 Tab 1  
 topiramate (TOPAMAX) 200 mg tablet TAKE 1 TABLET BY MOUTH TWICE DAILY 180 Tab 0  
 galcanezumab-gnlm (EMGALITY PEN) 120 mg/mL injection 1 mL by SubCUTAneous route every twenty-eight (28) days. 1 mL 4  
 GILENYA 0.5 mg cap Take 1 Cap by mouth daily. 30 Cap 11  
 ergocalciferol (ERGOCALCIFEROL) 50,000 unit capsule Take 1 Cap by mouth every seven (7) days. 4 Cap 3  
 glucose blood VI test strips (BLOOD GLUCOSE TEST) strip Test fasting glucose daily; Dx E11.8; Pharmacist to choose based on insurance/glucose monitoring kit type 100 Strip 1  Blood-Glucose Meter monitoring kit Test fasting glucose daily; Dx E11.8; Pharmacist to choose based on insurance/glucose monitoring kit type 1 Kit 0  
 levothyroxine (SYNTHROID) 150 mcg tablet Take 1 Tab by mouth nightly. Dose change 30 Tab 2  
 albuterol (PROVENTIL VENTOLIN) 2.5 mg /3 mL (0.083 %) nebulizer solution 3 mL by Nebulization route every six (6) hours as needed for Wheezing. 30 Each 0  
 PARoxetine (PAXIL) 40 mg tablet TAKE 1 AND 1/2 TABLETS BY MOUTH EVERY NIGHT 135 Tab 3  pregabalin (LYRICA) 200 mg capsule Take 1 Cap by mouth two (2) times a day. Max Daily Amount: 400 mg. 180 Cap 3  
 Menthol-Zinc Oxide (CALMOSEPTINE) 0.44-20.6 % oint Apply  to affected area as needed.  nystatin (MYCOSTATIN) powder Apply to candidal lesions TOPICALLY 2 to 3 times daily until healing complete 30 g 0  pyridostigmine bromide (MESTINON TIMESPAN) 180 mg SR tablet Take 1 Tab by mouth two (2) times a day. 60 Tab 6  
 lidocaine (LIDODERM) 5 % Apply patch to the affected area for 12 hours a day and remove for 12 hours a day. 30 Each 3  
 clindamycin (CLEOCIN) 300 mg capsule Take 300 mg by mouth. Prior to dental procedures  butalbital-acetaminophen-caff (FIORICET) -40 mg per capsule Take 1 Cap by mouth every four (4) hours as needed for Pain. Max Daily Amount: 6 Caps. 10 Cap 0  
 rosuvastatin (CRESTOR) 20 mg tablet TAKE 1 TABLET BY MOUTH EVERY EVENING 90 Tab 1  TENS UNIT ELECTRODES by Does Not Apply route. prn  SUMAtriptan (IMITREX) 100 mg tablet 1 tab at onset of moderate-severe migraine; may repeat 1 tab in 2 hours; Limit: 2 tabs in 24/ hrs, not more than 3 days a week 12 Tab 3  corticotropin (ACTHAR H.P.) 80 unit/mL injectable gel 1 mL by SubCUTAneous route daily. 80 units subq q day for 10 days 10 mL 1 Allergies Allergen Reactions  Bee Sting [Sting, Bee] Anaphylaxis Also allergic to egg products  Penicillins Anaphylaxis  Betadine [Povidone-Iodine] Rash  Egg Itching Family History Problem Relation Age of Onset [place] Arthritis-osteo Mother  Diabetes Mother  Elevated Lipids Mother  Headache Mother  Heart Disease Mother  Hypertension Mother  Stroke Mother  Neuropathy Mother  Diabetes Father  Elevated Lipids Father  Heart Disease Father  Hypertension Father  Diabetes Sister  Elevated Lipids Sister  Headache Sister  Hypertension Sister  Migraines Sister  Cancer Sister 62  
     breast ca W/METS TO BRAIN  
 Breast Cancer Sister 62  Diabetes Brother  Elevated Lipids Brother  Hypertension Brother  Asthma Son  Lung Disease Son  Thyroid Disease Son GRAVES  
 Breast Cancer Maternal Grandmother 54  Diabetes Sister  Neuropathy Sister  Anesth Problems Neg Hx Social History Tobacco Use  Smoking status: Current Every Day Smoker Packs/day: 0.50 Years: 30.00 Pack years: 15.00 Types: Cigarettes  Smokeless tobacco: Never Used Substance Use Topics  Alcohol use: No  
 
Patient Active Problem List  
Diagnosis Code  Sleep apnea G47.30  Endometriosis N80.9  Lumbosacral plexopathy G54.1  HTN, goal below 140/90 I10  
 FH: breast cancer Z80.3  Hyperlipemia E78.5  Hypothyroid E03.9  Ureteral calculus N20.1  MS (multiple sclerosis) ([facility] Doug Child  Myasthenia gravis ([facility] G70.00  Vitamin D deficiency E55.9  Benign cyst of left breast N60.02  
 Cervical spinal stenosis M48.02  
 Depression F32.9  Degenerative joint disease (DJD) of hip M16.9  Chronic, continuous use of opioids F11.90  Diet-controlled diabetes mellitus ([facility] E11.9  History of CVA (cerebrovascular accident) S00.85  Thyroglossal duct cyst Q89.2 Depression Risk Factor Screening:  
 
3 most recent PHQ Screens 10/25/2018 PHQ Not Done - Little interest or pleasure in doing things Not at all Feeling down, depressed, irritable, or hopeless Not at all Total Score PHQ 2 0 Trouble falling or staying asleep, or sleeping too much - Feeling tired or having little energy - Poor appetite, weight loss, or overeating - Feeling bad about yourself - or that you are a failure or have let yourself or your family down - Trouble concentrating on things such as school, work, reading, or watching TV - Moving or speaking so slowly that other people could have noticed; or the opposite being so fidgety that others notice - Thoughts of being better off dead, or hurting yourself in some way -  
PHQ 9 Score - How difficult have these problems made it for you to do your work, take care of your home and get along with others - Alcohol Risk Factor Screening: You do not drink alcohol or very rarely. Functional Ability and Level of Safety:  
Hearing Loss Hearing is good. Activities of Daily Living The home contains: handrails and grab bars Patient needs help with:  transportation and walking Fall Risk Fall Risk Assessment, last 12 mths 4/27/2018 Able to walk? Yes Fall in past 12 months? Yes Fall with injury?  Yes  
Number of falls in past 12 months 1  
 Fall Risk Score 2 Abuse Screen Patient is not abused Cognitive Screening Evaluation of Cognitive Function: 
Has your family/caregiver stated any concerns about your memory: no 
 
 
Patient Care Team  
Patient Care Team: 
Shine Hines MD as PCP - General [facility]) Kj Lucero RN as Nurse Navigator [facility]) Carrie Schmidt MD (Orthopedic Surgery) Teetee Reina MD as Physician (Neurology) David Schultz OD as Physician (Optometry) Maira Maier MD as Physician (Otolaryngology) Assessment/Plan Education and counseling provided: 
Are appropriate based on today's review and evaluation Diagnoses and all orders for this visit: 
 
1. Medicare annual wellness visit, subsequent 2. Hypothyroidism, unspecified type 
-     TSH AND FREE T4 
 
3. Diet-controlled diabetes mellitus ([facility] 
-     HEMOGLOBIN A1C WITH EAG 
 
4. Thrombocytopenia (HCC) 
-     CBC WITH AUTOMATED DIFF 
-     PATHOLOGIST REVIEW SMEARS 
 
5. MS (multiple sclerosis) ([facility] Assessment & Plan: This condition is managed by Specialist. 
Lab Results Component Value Date/Time WBC 4.1 06/07/2018 01:54 PM  
 HGB 13.7 06/07/2018 01:54 PM  
 HCT 42.5 06/07/2018 01:54 PM  
 PLATELET 049 05/93/7700 01:54 PM  
 Creatinine 0.90 05/24/2018 01:19 PM  
 BUN 9 05/24/2018 01:19 PM  
 Potassium 4.2 05/24/2018 01:19 PM  
 
 
 
6. Myasthenia gravis ([facility] Assessment & Plan: This condition is managed by Specialist. 
Lab Results Component Value Date/Time WBC 4.1 06/07/2018 01:54 PM  
 HGB 13.7 06/07/2018 01:54 PM  
 HCT 42.5 06/07/2018 01:54 PM  
 PLATELET 258 38/05/4735 01:54 PM  
 Creatinine 0.90 05/24/2018 01:19 PM  
 BUN 9 05/24/2018 01:19 PM  
 Potassium 4.2 05/24/2018 01:19 PM  
 
 
 
 
Health Maintenance Due Topic Date Due  Pneumococcal 0-64 years (1 of 1 - PPSV23) 04/[birth year]/1963  Shingrix Vaccine Age 50> (1 of 2) 04/25/2007  
 FOOT EXAM Q1  07/03/2018  EYE EXAM RETINAL OR DILATED  10/12/2018  MEDICARE YEARLY EXAM  04/28/2019

## 2019-04-04 NOTE — ASSESSMENT & PLAN NOTE
This condition is managed by Specialist. 
Lab Results Component Value Date/Time  WBC 4.1 06/07/2018 01:54 PM  
 HGB 13.7 06/07/2018 01:54 PM  
 HCT 42.5 06/07/2018 01:54 PM  
 PLATELET 770 23/33/4954 01:54 PM  
 Creatinine 0.90 05/24/2018 01:19 PM  
 BUN 9 05/24/2018 01:19 PM  
 Potassium 4.2 05/24/2018 01:19 PM

## 2019-04-04 NOTE — PROGRESS NOTES
Patient Name: Kortney Wright MRN: [id number] SUBJECTIVE Kortney Wright is a 66 y.o. female who presents with the following:  
 
History of diet controlled diabetes. Fasting glucose values have been within normal limits. History of hypothyroidism, on levothyroxine. Of note, she does have a history of thrombocytopenia but denies any spontaneous bleeding or bruising. Expresses sadness and frustration over her chronic pain due to her multiple sclerosis. Her previous neurologist had weaned her off the fentanyl and she has just been taking ibuprofen which she states is not adequate enough to control her pain. She is about to need a new pain management specialist next week. Endorses some passive SI but denies any active SI with a plan. Has been on Paxil for many years. Review of Systems Constitutional: Negative for fever, malaise/fatigue and weight loss. Respiratory: Negative for cough, hemoptysis, shortness of breath and wheezing. Cardiovascular: Negative for chest pain, palpitations, leg swelling and PND. Gastrointestinal: Negative for abdominal pain, constipation, diarrhea, nausea and vomiting. Psychiatric/Behavioral: Positive for depression. Negative for suicidal ideas. The patient is not nervous/anxious and does not have insomnia. The patient's medications, allergies, past medical history, surgical history, family history and social history were reviewed and updated where appropriate. Prior to Admission medications Medication Sig Start Date End Date Taking? Authorizing Provider  
dantrolene (DANTRIUM) 100 mg capsule Take 1 Cap by mouth three (3) times daily. 3/19/19  Yes Los Kahn MD  
dexamethasone (DECADRON) 4 mg tablet Take 4 mg by mouth two (2) times daily (with meals).  3/19/19  Yes Los Kahn MD  
topiramate (TOPAMAX) 200 mg tablet TAKE 1 TABLET BY MOUTH TWICE DAILY 3/18/19  Yes Latoya Espinoza MD  
 galcanezumab-gnlm (EMGALITY PEN) 120 mg/mL injection 1 mL by SubCUTAneous route every twenty-eight (28) days. 3/5/19  Yes Los Kahn MD  
GILENYA 0.5 mg cap Take 1 Cap by mouth daily. 2/15/19  Yes Isaac Mcknight MD  
ergocalciferol (ERGOCALCIFEROL) 50,000 unit capsule Take 1 Cap by mouth every seven (7) days. 1/29/19  Yes Los Kahn MD  
glucose blood VI test strips (BLOOD GLUCOSE TEST) strip Test fasting glucose daily; Dx E11.8; Pharmacist to choose based on insurance/glucose monitoring kit type 1/24/19  Yes Janice Parker MD  
Blood-Glucose Meter monitoring kit Test fasting glucose daily; Dx E11.8; Pharmacist to choose based on insurance/glucose monitoring kit type 1/24/19  Yes Janice Parker MD  
levothyroxine (SYNTHROID) 150 mcg tablet Take 1 Tab by mouth nightly. Dose change 1/4/19  Yes Janice Parker MD  
albuterol (PROVENTIL VENTOLIN) 2.5 mg /3 mL (0.083 %) nebulizer solution 3 mL by Nebulization route every six (6) hours as needed for Wheezing. 12/31/18  Yes Janice Parker MD  
PARoxetine (PAXIL) 40 mg tablet TAKE 1 AND 1/2 TABLETS BY MOUTH EVERY NIGHT 12/18/18  Yes Los Kahn MD  
pregabalin (LYRICA) 200 mg capsule Take 1 Cap by mouth two (2) times a day. Max Daily Amount: 400 mg. 12/14/18  Yes Los Kahn MD  
Menthol-Zinc Oxide (CALMOSEPTINE) 0.44-20.6 % oint Apply  to affected area as needed. Yes Provider, Historical  
nystatin (MYCOSTATIN) powder Apply to candidal lesions TOPICALLY 2 to 3 times daily until healing complete 10/25/18  Yes Janice Parker MD  
pyridostigmine bromide (MESTINON TIMESPAN) 180 mg SR tablet Take 1 Tab by mouth two (2) times a day. 8/15/18  Yes Los Kahn MD  
lidocaine (LIDODERM) 5 % Apply patch to the affected area for 12 hours a day and remove for 12 hours a day. 8/15/18  Yes Los Kahn MD  
clindamycin (CLEOCIN) 300 mg capsule Take 300 mg by mouth.  Prior to dental procedures 6/30/17  Yes Provider, Historical  
 butalbital-acetaminophen-caff (FIORICET) -40 mg per capsule Take 1 Cap by mouth every four (4) hours as needed for Pain. Max Daily Amount: 6 Caps. 5/22/17  Yes Stephanie Montoya MD  
rosuvastatin (CRESTOR) 20 mg tablet TAKE 1 TABLET BY MOUTH EVERY EVENING 2/24/17  Yes Dara Abad MD  
TENS UNIT ELECTRODES by Does Not Apply route. prn   Yes Provider, Historical  
clopidogrel (PLAVIX) 75 mg tab TAKE 1 TABLET BY MOUTH EVERY DAY 3/18/19   Los Kahn MD  
SUMAtriptan (IMITREX) 100 mg tablet 1 tab at onset of moderate-severe migraine; may repeat 1 tab in 2 hours; Limit: 2 tabs in 24/ hrs, not more than 3 days a week 3/7/18   Los Kahn MD  
corticotropin (ACTHAR H.P.) 80 unit/mL injectable gel 1 mL by SubCUTAneous route daily. 80 units subq q day for 10 days 12/26/17   Los Kahn MD  
 
 
Allergies Allergen Reactions  Bee Sting [Sting, Bee] Anaphylaxis Also allergic to egg products  Penicillins Anaphylaxis  Betadine [Povidone-Iodine] Rash  Egg Itching OBJECTIVE Visit Vitals /60 (BP 1 Location: Right arm, BP Patient Position: Sitting) Pulse 87 Temp 98.8 °F (37.1 °C) (Oral) Resp 18 Ht 6' (1.829 m) Wt 199 lb 12.8 oz (90.6 kg) LMP 09/01/2010 SpO2 98% BMI 27.10 kg/m² Physical Exam  
Constitutional: She is oriented to person, place, and time and well-developed, well-nourished, and in no distress. No distress. Eyes: Pupils are equal, round, and reactive to light. Conjunctivae and EOM are normal.  
Cardiovascular: Normal rate, regular rhythm and normal heart sounds. Exam reveals no gallop and no friction rub. No murmur heard. Pulmonary/Chest: Effort normal and breath sounds normal. No respiratory distress. She has no wheezes. Neurological: She is alert and oriented to person, place, and time. Skin: Skin is warm and dry. No rash noted. She is not diaphoretic.   
Psychiatric: Mood, memory, affect and judgment normal.  
 Nursing note and vitals reviewed. ASSESSMENT AND PLAN John Bah is a 66 y.o. female who presents today for: 1. Medicare annual wellness visit, subsequent See other note. 2. Hypothyroidism, unspecified type Stable, continue current treatment pending review of labs. - TSH AND FREE T4 
 
3. Diet-controlled diabetes mellitus ([facility] Stable, continue current treatment pending review of labs. - HEMOGLOBIN A1C WITH EAG 
 
4. Thrombocytopenia ([facility] Stable, continue current treatment pending review of labs. - CBC WITH AUTOMATED DIFF 
- PATHOLOGIST REVIEW SMEARS 
 
5. [facility] Pt to see pain management; discussed to ask if switching from Paxil to Cymbalta would be beneficial given chronic pain and depression. 6. Myasthenia gravis ([facility] Stable, continue current treatment. Medications Discontinued During This Encounter Medication Reason  clopidogrel (PLAVIX) 75 mg tab Follow-up and Dispositions · Return in about 3 months (around 7/4/2019) for DM follow up. Medication risks/benefits/costs/interactions/alternatives discussed with patient. Advised patient to call back or return to office if symptoms worsen/change/persist. If patient cannot reach us or should anything more severe/urgent arise he/she should proceed directly to the nearest emergency department. Discussed expected course/resolution/complications of diagnosis in detail with patient. Patient given a written after visit summary which includes his/her diagnoses, current medications and vitals. Patient expressed understanding with the diagnosis and plan. Brandon Gonzalez M.D. Diagnoses and all orders for this visit: 
 
1. Medicare annual wellness visit, subsequent 2. Hypothyroidism, unspecified type 
-     TSH AND FREE T4 
 
3. Diet-controlled diabetes mellitus ([facility] 
-     HEMOGLOBIN A1C WITH EAG 
 
4.  Thrombocytopenia (HCC) 
-     CBC WITH AUTOMATED DIFF 
 -     PATHOLOGIST REVIEW SMEARS 
 
5. MS (multiple sclerosis) ([facility] Assessment & Plan: This condition is managed by Specialist. 
Lab Results Component Value Date/Time WBC 4.1 06/07/2018 01:54 PM  
 HGB 13.7 06/07/2018 01:54 PM  
 HCT 42.5 06/07/2018 01:54 PM  
 PLATELET 219 75/73/2535 01:54 PM  
 Creatinine 0.90 05/24/2018 01:19 PM  
 BUN 9 05/24/2018 01:19 PM  
 Potassium 4.2 05/24/2018 01:19 PM  
 
 
 
6. Myasthenia gravis ([facility] Assessment & Plan: This condition is managed by Specialist. 
Lab Results Component Value Date/Time  WBC 4.1 06/07/2018 01:54 PM  
 HGB 13.7 06/07/2018 01:54 PM  
 HCT 42.5 06/07/2018 01:54 PM  
 PLATELET 704 28/39/8884 01:54 PM  
 Creatinine 0.90 05/24/2018 01:19 PM  
 BUN 9 05/24/2018 01:19 PM  
 Potassium 4.2 05/24/2018 01:19 PM

## 2019-04-04 NOTE — PATIENT INSTRUCTIONS
Medicare Wellness Visit, Female The best way to live healthy is to have a lifestyle where you eat a well-balanced diet, exercise regularly, limit alcohol use, and quit all forms of tobacco/nicotine, if applicable. Regular preventive services are another way to keep healthy. Preventive services (vaccines, screening tests, monitoring & exams) can help personalize your care plan, which helps you manage your own care. Screening tests can find health problems at the earliest stages, when they are easiest to treat. [facility] follows the current, evidence-based guidelines published by the [facility] ([facility]) when recommending preventive services for our patients. Because we follow these guidelines, sometimes recommendations change over time as research supports it. (For example, mammograms used to be recommended annually. Even though Medicare will still pay for an annual mammogram, the newer guidelines recommend a mammogram every two years for women of average risk.) Of course, you and your doctor may decide to screen more often for some diseases, based on your risk and your health status. Preventive services for you include: - Medicare offers their members a free annual wellness visit, which is time for you and your primary care provider to discuss and plan for your preventive service needs. Take advantage of this benefit every year! 
-All adults over the age of 72 should receive the recommended pneumonia vaccines. Current USPSTF guidelines recommend a series of two vaccines for the best pneumonia protection.  
-All adults should have a flu vaccine yearly and a tetanus vaccine every 10 years. All adults age 61 and older should receive a shingles vaccine once in their lifetime.   
-A bone mass density test is recommended when a woman turns 65 to screen for osteoporosis. This test is only recommended one time, as a screening. Some providers will use this same test as a disease monitoring tool if you already have osteoporosis. -All adults age 38-68 who are overweight should have a diabetes screening test once every three years.  
-Other screening tests and preventive services for persons with diabetes include: an eye exam to screen for diabetic retinopathy, a kidney function test, a foot exam, and stricter control over your cholesterol.  
-Cardiovascular screening for adults with routine risk involves an electrocardiogram (ECG) at intervals determined by your doctor.  
-Colorectal cancer screenings should be done for adults age 54-65 with no increased risk factors for colorectal cancer. There are a number of acceptable methods of screening for this type of cancer. Each test has its own benefits and drawbacks. Discuss with your doctor what is most appropriate for you during your annual wellness visit. The different tests include: colonoscopy (considered the best screening method), a fecal occult blood test, a fecal DNA test, and sigmoidoscopy. -Breast cancer screenings are recommended every other year for women of normal risk, age 54-69. 
-Cervical cancer screenings for women over age 72 are only recommended with certain risk factors.  
-All adults born between [facility] should be screened once for Hepatitis C. Here is a list of your current Health Maintenance items (your personalized list of preventive services) with a due date: 
Health Maintenance Due Topic Date Due  Pneumococcal Vaccine (1 of 1 - PPSV23) 04/25/1961  Shingles Vaccine (1 of 2) 04/25/2007 [address] [place] [address] Diabetic Foot Care  07/03/2018  Eye Exam  10/12/2018 [address] [place] [address] Annual Well Visit  04/28/2019 Duloxetine (By mouth) Duloxetine (doo-LOX-e-teen) Treats depression, anxiety, diabetic peripheral neuropathy, fibromyalgia, and chronic muscle or bone pain. This medicine is an SSNRI. Brand Name(s): Cymbalta, DermacinRx ROCÍO Quintanilla There may be other brand names for this medicine. When This Medicine Should Not Be Used: This medicine is not right for everyone. Do not use it if you had an allergic reaction to duloxetine. How to Use This Medicine:  
Capsule, Delayed Release Capsule · Take your medicine as directed. Your dose may need to be changed several times to find what works best for you. · Delayed-release capsule: Swallow the capsule whole. Do not crush, chew, break, or open it. · This medicine should come with a Medication Guide. Ask your pharmacist for a copy if you do not have one. · Missed dose: Take a dose as soon as you remember. If it is almost time for your next dose, wait until then and take a regular dose. Do not take extra medicine to make up for a missed dose. · Store the medicine in a closed container at room temperature, away from heat, moisture, and direct light. Drugs and Foods to Avoid: Ask your doctor or pharmacist before using any other medicine, including over-the-counter medicines, vitamins, and herbal products. · Do not take duloxetine if you have used an MAO inhibitor (MAOI) within the past 14 days. Do not start taking an MAO inhibitor within 5 days of stopping duloxetine. · Some medicines can affect how duloxetine works. Tell your doctor if you are using any of the following: 
¨ Buspirone, cimetidine, ciprofloxacin, enoxacin, fentanyl, lithium, Arslan's wort, theophylline, tramadol, tryptophan, or warfarin ¨ Amphetamines ¨ Blood pressure medicine ¨ Diuretic (water pill) ¨ Medicine for heart rhythm problems (including flecainide, propafenone, quinidine) ¨ Medicine to treat migraine headaches (including triptans) ¨ NSAID pain or arthritis medicine (including aspirin, celecoxib, diclofenac, ibuprofen, naproxen) ¨ Other medicine to treat depression or mood disorders (including amitriptyline, desipramine, fluoxetine, imipramine, nortriptyline, paroxetine) ¨ Phenothiazine medicine (including thioridazine) · Tell your doctor if you use anything else that makes you sleepy. Some examples are allergy medicine, narcotic pain medicine, and alcohol. · Do not drink alcohol while you are using this medicine. Warnings While Using This Medicine: · Tell your doctor if you are pregnant or breastfeeding, or if you have kidney disease, liver disease, diabetes, digestion problems, glaucoma, heart disease, high or low blood pressure, or problems with urination. Tell your doctor if you smoke or you have a history of seizures, or drug or alcohol addiction. · This medicine may cause the following problems:  
¨ Serious liver problems ¨ Serotonin syndrome (more likely when used with certain other medicines) ¨ Increased risk of bleeding problems ¨ Serious skin reactions ¨ Low sodium levels in the blood · This medicine can increase thoughts of suicide. Tell your doctor right away if you start to feel depressed and have thoughts about hurting yourself. · This medicine can cause changes in your blood pressure. This may make you dizzy or drowsy. Do not drive or do anything that could be dangerous until you know how this medicine affects you. Stand up slowly to avoid falls. · Do not stop using this medicine suddenly. Your doctor will need to slowly decrease your dose before you stop it completely. · Your doctor will check your progress and the effects of this medicine at regular visits. Keep all appointments. · Keep all medicine out of the reach of children. Never share your medicine with anyone. Possible Side Effects While Using This Medicine:  
Call your doctor right away if you notice any of these side effects: · Allergic reaction: Itching or hives, swelling in your face or hands, swelling or tingling in your mouth or throat, chest tightness, trouble breathing · Anxiety, restlessness, fever, fast heartbeat, sweating, muscle spasms, diarrhea, seeing or hearing things that are not there · Blistering, peeling, red skin rash · Confusion, weakness, muscle twitching · Dark urine or pale stools, nausea, vomiting, loss of appetite, stomach pain, yellow skin or eyes · Decrease in how much or how often you urinate · Eye pain, vision changes, seeing halos around lights · Feeling more energetic than usual 
· Lightheadedness, dizziness, or fainting · Unusual moods or behaviors, worsening depression, thoughts about hurting yourself, trouble sleeping · Unusual bleeding or bruising If you notice these less serious side effects, talk with your doctor: · Decrease in appetite or weight · Dry mouth, constipation, mild nausea · Unusual drowsiness, sleepiness, or tiredness If you notice other side effects that you think are caused by this medicine, tell your doctor. Call your doctor for medical advice about side effects. You may report side effects to FDA at 6-116-NXI-2426 © 2017 [facility] Information is for End User's use only and may not be sold, redistributed or otherwise used for commercial purposes. The above information is an  only. It is not intended as medical advice for individual conditions or treatments. Talk to your doctor, nurse or pharmacist before following any medical regimen to see if it is safe and effective for you.

## 2019-04-05 DIAGNOSIS — E11.65 HYPERGLYCEMIA DUE TO TYPE 2 DIABETES MELLITUS (HCC): ICD-10-CM

## 2019-04-05 LAB
BASOPHILS # BLD AUTO: 0 X10E3/UL (ref 0–0.2)
BASOPHILS NFR BLD AUTO: 0 %
EOSINOPHIL # BLD AUTO: 0.1 X10E3/UL (ref 0–0.4)
EOSINOPHIL NFR BLD AUTO: 2 %
ERYTHROCYTE [DISTWIDTH] IN BLOOD BY AUTOMATED COUNT: 15.2 % (ref 12.3–15.4)
EST. AVERAGE GLUCOSE BLD GHB EST-MCNC: 154 MG/DL
HBA1C MFR BLD: 7 % (ref 4.8–5.6)
HCT VFR BLD AUTO: 41.6 % (ref 34–46.6)
HGB BLD-MCNC: 13.8 G/DL (ref 11.1–15.9)
IMM GRANULOCYTES # BLD AUTO: 0 X10E3/UL (ref 0–0.1)
IMM GRANULOCYTES NFR BLD AUTO: 0 %
LYMPHOCYTES # BLD AUTO: 0.9 X10E3/UL (ref 0.7–3.1)
LYMPHOCYTES NFR BLD AUTO: 17 %
MCH RBC QN AUTO: 30.3 PG (ref 26.6–33)
MCHC RBC AUTO-ENTMCNC: 33.2 G/DL (ref 31.5–35.7)
MCV RBC AUTO: 91 FL (ref 79–97)
MONOCYTES # BLD AUTO: 0.5 X10E3/UL (ref 0.1–0.9)
MONOCYTES NFR BLD AUTO: 10 %
NEUTROPHILS # BLD AUTO: 3.7 X10E3/UL (ref 1.4–7)
NEUTROPHILS NFR BLD AUTO: 71 %
PLATELET # BLD AUTO: 130 X10E3/UL (ref 150–379)
RBC # BLD AUTO: 4.55 X10E6/UL (ref 3.77–5.28)
T4 FREE SERPL-MCNC: 1.19 NG/DL (ref 0.82–1.77)
TSH SERPL DL<=0.005 MIU/L-ACNC: 2.14 UIU/ML (ref 0.45–4.5)
WBC # BLD AUTO: 5.2 X10E3/UL (ref 3.4–10.8)

## 2019-04-05 RX ORDER — METFORMIN HYDROCHLORIDE 500 MG/1
500 TABLET, EXTENDED RELEASE ORAL
Qty: 90 TAB | Refills: 1 | Status: SHIPPED | OUTPATIENT
Start: 2019-04-05 | End: 2019-08-08 | Stop reason: SDUPTHER

## 2019-04-05 RX ORDER — LEVOTHYROXINE SODIUM 150 UG/1
150 TABLET ORAL
Qty: 90 TAB | Refills: 1 | Status: SHIPPED | OUTPATIENT
Start: 2019-04-05 | End: 2019-09-26 | Stop reason: SDUPTHER

## 2019-04-05 NOTE — PROGRESS NOTES
Please notify patient regarding their test results: 
 
A1c is higher than prior, now at 7.0. Recommend re-starting low dose metformin 500 mg once daily. Thyroid levels at goal. 
Platelet counts mildly low but improving; will continue to monitor.

## 2019-04-05 NOTE — PROGRESS NOTES
Outbound call to patient.  verified. Patient made aware of lab results and recommendations per Dr. Duncan Hawkins. Patient verbalized understanding.

## 2019-04-08 LAB
BASOPHILS # BLD AUTO: 0 X10E3/UL (ref 0–0.2)
BASOPHILS NFR BLD AUTO: 0 %
EOSINOPHIL # BLD AUTO: 0.1 X10E3/UL (ref 0–0.4)
EOSINOPHIL NFR BLD AUTO: 3 %
ERYTHROCYTE [DISTWIDTH] IN BLOOD BY AUTOMATED COUNT: 15.3 % (ref 12.3–15.4)
HCT VFR BLD AUTO: 42.6 % (ref 34–46.6)
HGB BLD-MCNC: 13.8 G/DL (ref 11.1–15.9)
IMM GRANULOCYTES # BLD AUTO: 0 X10E3/UL (ref 0–0.1)
IMM GRANULOCYTES NFR BLD AUTO: 0 %
LYMPHOCYTES # BLD AUTO: 0.9 X10E3/UL (ref 0.7–3.1)
LYMPHOCYTES NFR BLD AUTO: 17 %
MCH RBC QN AUTO: 30 PG (ref 26.6–33)
MCHC RBC AUTO-ENTMCNC: 32.4 G/DL (ref 31.5–35.7)
MCV RBC AUTO: 93 FL (ref 79–97)
MONOCYTES # BLD AUTO: 0.6 X10E3/UL (ref 0.1–0.9)
MONOCYTES NFR BLD AUTO: 12 %
NEUTROPHILS # BLD AUTO: 3.5 X10E3/UL (ref 1.4–7)
NEUTROPHILS NFR BLD AUTO: 68 %
PATH INTERP BLD-IMP: ABNORMAL
PATH REV BLD -IMP: ABNORMAL
PATHOLOGIST NAME: ABNORMAL
PLATELET # BLD AUTO: 141 X10E3/UL (ref 150–379)
RBC # BLD AUTO: 4.6 X10E6/UL (ref 3.77–5.28)
WBC # BLD AUTO: 5 X10E3/UL (ref 3.4–10.8)

## 2019-04-09 ENCOUNTER — PATIENT OUTREACH (OUTPATIENT)
Dept: FAMILY MEDICINE CLINIC | Age: 64
End: 2019-04-09

## 2019-04-10 NOTE — PROGRESS NOTES
Called patient to discuss her visit with new PM, , yesterday. Said she liked him a lot. Told him that she didn't want to be on narcotics that just made her sleep. Wanted to relieve pain but be able to function. He gave her a marijuana card-to start in fall. For now, recommended the CBD oil-but advised her to get it from a pharmacy. Suggested she start with a 1/4 cc dose and see how she does. May need to increase to 1/2 cc per day. She went to [address] [place] [address] in [place]( also one in [facility]). Mellissa Councilman it was expensive, insurance does not cover the cost. Had to get money from her mother to cover the cost.  
Will go back to see  in 2 weeks-4/23. He plans to give her another rx-for a sublingual-not Cymbalta-he has had good results with his patients with this medication-did not tell her the name of the rx. It is not an opioid.  had given her a rx for Imgality-but is an injection and costs $200/month. Gave her a sample, one dose. She can't afford it. Wants to wait on that until she sees how she does with the CBD oil and the other new rx from PM.  
Said she started the CBD yesterday. This am, headache was not as bad when she woke up. Sounded very encouraged and more upbeat. Support given, advised to call if any concerns/questions. Scheduled [facility] appt for 4/18 at 12:30pm. 
Call before if concerns. She agreed to do so.

## 2019-04-18 ENCOUNTER — PATIENT OUTREACH (OUTPATIENT)
Dept: FAMILY MEDICINE CLINIC | Age: 64
End: 2019-04-18

## 2019-04-18 ENCOUNTER — HOSPITAL ENCOUNTER (OUTPATIENT)
Dept: LAB | Age: 64
Discharge: HOME | End: 2019-04-18
Payer: MEDICARE

## 2019-04-18 ENCOUNTER — OFFICE VISIT (OUTPATIENT)
Dept: FAMILY MEDICINE CLINIC | Age: 64
End: 2019-04-18

## 2019-04-18 VITALS
RESPIRATION RATE: 18 BRPM | HEART RATE: 73 BPM | OXYGEN SATURATION: 96 % | DIASTOLIC BLOOD PRESSURE: 62 MMHG | WEIGHT: 200.4 LBS | HEIGHT: 72 IN | SYSTOLIC BLOOD PRESSURE: 124 MMHG | TEMPERATURE: 98.4 F | BODY MASS INDEX: 27.14 KG/M2

## 2019-04-18 DIAGNOSIS — Z01.818 PRE-OP EVALUATION: Primary | ICD-10-CM

## 2019-04-18 DIAGNOSIS — D69.6 THROMBOCYTOPENIA (HCC): ICD-10-CM

## 2019-04-18 PROCEDURE — 81001 URINALYSIS AUTO W/SCOPE: CPT

## 2019-04-18 PROCEDURE — 85610 PROTHROMBIN TIME: CPT

## 2019-04-18 PROCEDURE — 80048 BASIC METABOLIC PNL TOTAL CA: CPT

## 2019-04-18 PROCEDURE — 36415 COLL VENOUS BLD VENIPUNCTURE: CPT

## 2019-04-18 PROCEDURE — 85025 COMPLETE CBC W/AUTO DIFF WBC: CPT

## 2019-04-18 NOTE — PROGRESS NOTES
Patient Name: Augusta Burgess MRN: [id number] SUBJECTIVE Augusta Burgess is a 66 y.o. female who presents with the following:  
 
Pre Op Procedure: Left hip arthroplasty Expected Date: 5/13/19 Surgeon: Dr. Joey Chamberlain Hx of complications with general anesthesia: none Signs and symptoms of cardiovascular disease:  none Have any of the following: CVA, CHF, Cr > 2.0, insulin-dependent DM, ischemic cardiac disease, or suprainguinal vascular/intrathroacic/intraabdominal surgery:  Hx of CVA, stable, followed by neurology Able to meet > 4 METS: yes Hx of possible KRISHAN per sleep study, not compliant with CPAP machine. Denies history of allergy to latex/tape/adhesive, bleeding problems, VTE. Hx of diabetes mellitus, on metformin. Hx of multiple sclerosis and myasthenia gravis. Smokes about 7 cigarettes/day. Hx of low platelets, no longer on Plavix but does take ASA daily. Denies hx of easy bruising/bleeding. Review of Systems Constitutional: Negative for fever, malaise/fatigue and weight loss. Respiratory: Negative for cough, hemoptysis, shortness of breath and wheezing. Cardiovascular: Negative for chest pain, palpitations, leg swelling and PND. Gastrointestinal: Negative for abdominal pain, constipation, diarrhea, nausea and vomiting. Musculoskeletal: Positive for joint pain. The patient's medications, allergies, past medical history, surgical history, family history and social history were reviewed and updated where appropriate. Prior to Admission medications Medication Sig Start Date End Date Taking? Authorizing Provider OTHER Take  by mouth. Takes 0.25 ml a day   Yes Provider, Historical  
levothyroxine (SYNTHROID) 150 mcg tablet Take 1 Tab by mouth nightly. 4/5/19  Yes Lyudmila Caro MD  
metFORMIN ER (GLUCOPHAGE XR) 500 mg tablet Take 1 Tab by mouth daily (with dinner).  4/5/19  Yes Lyudmila Caro MD  
 dantrolene (DANTRIUM) 100 mg capsule Take 1 Cap by mouth three (3) times daily. 3/19/19  Yes Los Kahn MD  
dexamethasone (DECADRON) 4 mg tablet Take 4 mg by mouth two (2) times daily (with meals). 3/19/19  Yes Los Kahn MD  
topiramate (TOPAMAX) 200 mg tablet TAKE 1 TABLET BY MOUTH TWICE DAILY 3/18/19  Yes Los Kahn MD  
galcanezumab-gnlm (EMGALITY PEN) 120 mg/mL injection 1 mL by SubCUTAneous route every twenty-eight (28) days. 3/5/19  Yes Los Kahn MD  
GILENYA 0.5 mg cap Take 1 Cap by mouth daily. 2/15/19  Yes Sonja Augustin MD  
ergocalciferol (ERGOCALCIFEROL) 50,000 unit capsule Take 1 Cap by mouth every seven (7) days. 1/29/19  Yes Los Kahn MD  
glucose blood VI test strips (BLOOD GLUCOSE TEST) strip Test fasting glucose daily; Dx E11.8; Pharmacist to choose based on insurance/glucose monitoring kit type 1/24/19  Yes Ophelia Chadwick MD  
Blood-Glucose Meter monitoring kit Test fasting glucose daily; Dx E11.8; Pharmacist to choose based on insurance/glucose monitoring kit type 1/24/19  Yes Ophelia Chadwick MD  
albuterol (PROVENTIL VENTOLIN) 2.5 mg /3 mL (0.083 %) nebulizer solution 3 mL by Nebulization route every six (6) hours as needed for Wheezing. 12/31/18  Yes Ophelia Chadwick MD  
PARoxetine (PAXIL) 40 mg tablet TAKE 1 AND 1/2 TABLETS BY MOUTH EVERY NIGHT 12/18/18  Yes Los Kahn MD  
pregabalin (LYRICA) 200 mg capsule Take 1 Cap by mouth two (2) times a day. Max Daily Amount: 400 mg. 12/14/18  Yes Los Kahn MD  
Menthol-Zinc Oxide (CALMOSEPTINE) 0.44-20.6 % oint Apply  to affected area as needed. Yes Provider, Historical  
nystatin (MYCOSTATIN) powder Apply to candidal lesions TOPICALLY 2 to 3 times daily until healing complete 10/25/18  Yes Ophelia Chadwick MD  
pyridostigmine bromide (MESTINON TIMESPAN) 180 mg SR tablet Take 1 Tab by mouth two (2) times a day.  8/15/18  Yes Sonja Augustin MD  
 lidocaine (LIDODERM) 5 % Apply patch to the affected area for 12 hours a day and remove for 12 hours a day. 8/15/18  Yes Los Kahn MD  
SUMAtriptan (IMITREX) 100 mg tablet 1 tab at onset of moderate-severe migraine; may repeat 1 tab in 2 hours; Limit: 2 tabs in 24/ hrs, not more than 3 days a week 3/7/18  Yes Los Kahn MD  
corticotropin (ACTHAR H.P.) 80 unit/mL injectable gel 1 mL by SubCUTAneous route daily. 80 units subq q day for 10 days 12/26/17  Yes Los Kahn MD  
clindamycin (CLEOCIN) 300 mg capsule Take 300 mg by mouth. Prior to dental procedures 6/30/17  Yes Provider, Historical  
butalbital-acetaminophen-caff (FIORICET) -40 mg per capsule Take 1 Cap by mouth every four (4) hours as needed for Pain. Max Daily Amount: 6 Caps. 5/22/17  Yes Nadia Meek MD  
rosuvastatin (CRESTOR) 20 mg tablet TAKE 1 TABLET BY MOUTH EVERY EVENING 2/24/17  Yes Sandra Collier MD  
TENS UNIT ELECTRODES by Does Not Apply route. prn   Yes Provider, Historical  
 
 
Allergies Allergen Reactions  Bee Sting [Sting, Bee] Anaphylaxis Also allergic to egg products  Penicillins Anaphylaxis  Betadine [Povidone-Iodine] Rash  Egg Itching Past Medical History:  
Diagnosis Date  Arthritis   
 hip, hands  Benign cyst of left breast 3/21/2016  Blindness and low vision 2004  
 legally blind from [facility] [address]  
 Cervical spinal stenosis 12/2/2016 Moderate C6-7  Depression  Diabetes mellitus type 2, controlled ([address] 9/13/2016  Diet-controlled diabetes mellitus ([address] 1/5/2018  Endometriosis 6/25/2010  
 FH: breast cancer 6/25/2010 Chart states FH breast/ovary Ca, CAD,DM   
 History of CVA (cerebrovascular accident) 6/5/2018  
 HTN, goal below 140/90 6/25/2010 CURRENTLY NO MEDICATIONS (5/18/17)  Hypercholesterolemia  Hypothyroid 6/25/2010  Incontinence  Lumbosacral plexopathy 6/25/2010  MS (multiple sclerosis) ([address]  [facility]ia gravis [facility] [id number]  Sleep apnea 2010  Ureteral calculus 2010  Vitamin D deficiency 3/17/2016 Past Surgical History:  
Procedure Laterality Date  ABDOMEN SURGERY PROC UNLISTED    
 bowel resection  BREAST SURGERY PROCEDURE UNLISTED    
 breast cyst-both breasts at different times  HX  SECTION  1986  HX CYST INCISION AND DRAINAGE  HX GI    
 COLONOSCOPY  
 HX GYN    
 ovarian cyst  
 HX HEENT  1974 THYROIDECTOMY  HX HEENT    
 removal of thyroglossal duct cyst  
 HX HIP REPLACEMENT Right 2017  
 anterior approach  HX KNEE ARTHROSCOPY Right   HX ORTHOPAEDIC  1997  
 right thumb tendon repair x 2  
 HX ORTHOPAEDIC Left CARPAL TUNNEL  
 HX OTHER SURGICAL Janee Cath x2  
 HX SMALL BOWEL RESECTION    
 HX THYROIDECTOMY    
   HX VASCULAR ACCESS    
 PORT -A- CATH X2 Family History Problem Relation Age of Onset [facility] Arthritis-osteo Mother  Diabetes Mother  Elevated Lipids Mother  Headache Mother  Heart Disease Mother  Hypertension Mother  Stroke Mother  Neuropathy Mother  Diabetes Father  Elevated Lipids Father  Heart Disease Father  Hypertension Father  Diabetes Sister  Elevated Lipids Sister  Headache Sister  Hypertension Sister  Migraines Sister  Cancer Sister 62  
     breast ca W/METS TO BRAIN  
 Breast Cancer Sister 62  Diabetes Brother  Elevated Lipids Brother  Hypertension Brother  Asthma Son  Lung Disease Son  Thyroid Disease Son GRAVES  
 Breast Cancer Maternal Grandmother 54  Diabetes Sister  Neuropathy Sister  Anesth Problems Neg Hx Social History Socioeconomic History  Marital status:  Spouse name: Not on file  Number of children: Not on file  Years of education: Not on file  Highest education level: Not on file Occupational History  Not on file Social Needs  Financial resource strain: Not on file  Food insecurity:  
  Worry: Not on file Inability: Not on file  Transportation needs:  
  Medical: Not on file Non-medical: Not on file Tobacco Use  Smoking status: Current Every Day Smoker Packs/day: 0.50 Years: 30.00 Pack years: 15.00 Types: Cigarettes  Smokeless tobacco: Never Used Substance and Sexual Activity  Alcohol use: No  
 Drug use: No  
 Sexual activity: Never Lifestyle  Physical activity:  
  Days per week: Not on file Minutes per session: Not on file  Stress: Not on file Relationships  Social connections:  
  Talks on phone: Not on file Gets together: Not on file Attends [employer] service: Not on file Active member of club or organization: Not on file Attends meetings of clubs or organizations: Not on file Relationship status: Not on file  Intimate partner violence:  
  Fear of current or ex partner: Not on file Emotionally abused: Not on file Physically abused: Not on file Forced sexual activity: Not on file Other Topics Concern  Not on file Social History Narrative  Not on file OBJECTIVE Visit Vitals /62 (BP 1 Location: Left arm, BP Patient Position: Sitting) Pulse 73 Temp 98.4 °F (36.9 °C) (Oral) Resp 18 Ht 6' (1.829 m) Wt 200 lb 6.4 oz (90.9 kg) LMP 09/01/2010 SpO2 96% BMI 27.18 kg/m² Physical Exam  
Constitutional: She is oriented to person, place, and time and well-developed, well-nourished, and in no distress. No distress. Eyes: Pupils are equal, round, and reactive to light. Conjunctivae and EOM are normal.  
Cardiovascular: Normal rate, regular rhythm and normal heart sounds. Exam reveals no gallop and no friction rub. No murmur heard. Pulses: 
     Carotid pulses are 2+ on the right side, and 2+ on the left side. Pulmonary/Chest: Effort normal and breath sounds normal. No respiratory distress. She has no wheezes. Neurological: She is alert and oriented to person, place, and time. Skin: Skin is warm and dry. No rash noted. She is not diaphoretic. Psychiatric: Mood, memory, affect and judgment normal.  
Nursing note and vitals reviewed. ASSESSMENT AND PLAN Sergio Holder is a 66 y.o. female who presents today for: 1. Pre-op evaluation Risk of cardiac death and nonfatal myocardial infarction for noncardiac surgical procedures:  Intermediate (1-5%) due to orthopedic surgery. Revised Cardiac Risk Index of a major cardiac event is 0.9% given hx of CVA. Patient has one clinical predictor of perioperative cardiac complications given history of diabetes. These risk calculators have been reviewed with the patient who expressed understanding of the relative cardiac risk of his/her upcoming procedure given his/her current risk factors. According to the [employer] of Cardiology and AHA Guidelines of perioperative cardiovascular evaluation for noncardiac surgery, patient is cleared for left hip arthroplasty (one to two clinical predictors, intermediate procedure, > 4 METS). She has follow up with neurology; asked patient to discuss with neurology re: recommendations on pre-operative medication management related to her MS. 
- CBC WITH AUTOMATED DIFF 
- BASIC METABOLIC PANEL (7) 
- PROTHROMBIN TIME + INR 
- UA/M W/RFLX CULTURE, ROUTINE 2. Thrombocytopenia ([address] Chronic issue. Recommend formal hematology consult. - CBC WITH AUTOMATED DIFF 
- REFERRAL TO HEMATOLOGY ONCOLOGY There are no discontinued medications. Medication risks/benefits/costs/interactions/alternatives discussed with patient.  
Advised patient to call back or return to office if symptoms worsen/change/persist. If patient cannot reach us or should anything more severe/urgent arise he/she should proceed directly to the nearest emergency department. Discussed expected course/resolution/complications of diagnosis in detail with patient. Patient given a written after visit summary which includes his/her diagnoses, current medications and vitals. Patient expressed understanding with the diagnosis and plan. Brandon Gamez M.D.

## 2019-04-18 NOTE — PROGRESS NOTES
Chief Complaint Patient presents with  Pre-op Exam  
  5/13/19 1. Have you been to the ER, urgent care clinic since your last visit? Hospitalized since your last visit? No 
 
2. Have you seen or consulted any other health care providers outside of the [address] [place] [address] since your last visit? Include any pap smears or colon screening. No  
 
Patient state that she saw the pain doctor Dr. Ernst Kay this month. (pain management)

## 2019-04-18 NOTE — PROGRESS NOTES
Met with patient for monthly CM session. In good spirits. Accompanied by her son. Had just seen pcp for pre-op visit. Says will be having left hip replacement 5/13 by . Walking with cane today, appears uncomfortable when walking. Says she will be glad to have the surgery. Spoke about starting her new medication- the CBD oil-prescribed by pain management specialist, . Lee Joey it does seem to be helping her. On the lowest dose still-has cut the pain level in  Her head about 50% less now. Very pleased with the results. Still has the body aches, goes back next week for follow up and he had said he would start her on another medication at that time. Hopeful and upbeat. Support given. Expressed some concern about her mother. Was in ER recently with a fever-unable to determine cause, continues with fever altohough feeling well otherwise. Staying away from mother for fear she would get her germs. Made plans to get together for next CM session in May. Will call back with date after she checks her calendar. Reminded to call prn concerns/questions.

## 2019-04-18 NOTE — PATIENT INSTRUCTIONS
Learning About Stopping Smoking Before Surgery How does smoking affect surgery risks? After a surgery, your body puts all of its energy into healing. Smoking makes this harder. It cuts the amount of oxygen available to your body to heal. And smoking makes infection and complications more of a risk. How does being smoke-free help you recover from surgery? When you quit smoking before surgery, you lower your risk of these things after surgery: 
· Heart problems · Breathing problems and pneumonia · Wound infection · Healing problems With every day, week, or month that you've been smoke-free before surgery, you improve your chances of having a healthy recovery. Quitting also improves your health. Your risk of things like heart attack and stroke start to go down as soon as you quit. And the longer you're smoke-free, the lower your risk of things like cancer and lung disease. When you're smoke-free, you get sick less often. You are less likely to get colds, flu, bronchitis, and pneumonia. How can you be smoke-free before and after surgery? Your doctor will help you set up the plan that best meets your needs. There are medicines that may help. You may want to attend a smoking cessation program to help you quit smoking. When you choose a program, look for one that has proven success. Ask your doctor for ideas. Ask your doctor if you can try medicine. You will greatly increase your chances of success if you take medicine along with getting counseling or joining a cessation program. 
Here are some other things you can do: · Ask your family, friends, and coworkers for support. You have a better chance of quitting if you have help and support. · Join a support group, such as Nicotine Anonymous, for people who are trying to quit smoking. Or use an online program, such as www.smokefree. gov or the American Lung Association's [place] from Smoking program (www.lungusa. org). · Set a quit date. Pick your date carefully so that it is not right in the middle of a big deadline or stressful time. After you quit, don't even take a puff. Get rid of all ashtrays and lighters after your last cigarette. Clean your house, car, and clothes so that they don't smell like smoke. · Learn how to be a nonsmoker. Think about ways you can avoid those things that make you reach for a cigarette. ? Avoid situations that put you at greatest risk for smoking. For some people, it's hard to have a drink with friends without smoking. Other people might skip a coffee break with coworkers who smoke. ? Change your daily routine. Take a different route to work, or eat a meal in a different place. · Cut down on stress. Calm yourself or release tension by doing an activity you enjoy, such as reading a book, taking a hot bath, or gardening. · Some people find hypnosis, acupuncture, and massage helpful for ending the smoking habit. · Eat a healthy diet and get regular exercise. Having healthy habits will help your body move past its craving for nicotine. · Be prepared to keep trying. Most people don't succeed the first few times they try to quit. Don't get mad at yourself if you smoke again. Make a list of things you learned. Then think about when you want to try again, such as next week, next month, or next year. Where can you learn more? Go to http://marie-alvin.info/. Enter K873 in the search box to learn more about \"Learning About Stopping Smoking Before Surgery. \" Current as of: September 26, 2018 Content Version: 11.9 © 2406-2015 [employer]. Care instructions adapted under license by [employer] (which disclaims liability or warranty for this information). If you have questions about a medical condition or this instruction, always ask your healthcare professional. [address] any warranty or liability for your use of this information.

## 2019-04-19 LAB
APPEARANCE UR: CLEAR
BACTERIA #/AREA URNS HPF: NORMAL /[HPF]
BASOPHILS # BLD AUTO: 0 X10E3/UL (ref 0–0.2)
BASOPHILS NFR BLD AUTO: 0 %
BILIRUB UR QL STRIP: NEGATIVE
BUN SERPL-MCNC: 12 MG/DL (ref 8–27)
BUN/CREAT SERPL: 13 (ref 12–28)
CASTS URNS QL MICRO: NORMAL /LPF
CHLORIDE SERPL-SCNC: 108 MMOL/L (ref 96–106)
CO2 SERPL-SCNC: 19 MMOL/L (ref 20–29)
COLOR UR: YELLOW
CREAT SERPL-MCNC: 0.96 MG/DL (ref 0.57–1)
EOSINOPHIL # BLD AUTO: 0.1 X10E3/UL (ref 0–0.4)
EOSINOPHIL NFR BLD AUTO: 3 %
EPI CELLS #/AREA URNS HPF: NORMAL /HPF (ref 0–10)
ERYTHROCYTE [DISTWIDTH] IN BLOOD BY AUTOMATED COUNT: 14.9 % (ref 12.3–15.4)
GLUCOSE SERPL-MCNC: 104 MG/DL (ref 65–99)
GLUCOSE UR QL: NEGATIVE
HCT VFR BLD AUTO: 42.2 % (ref 34–46.6)
HGB BLD-MCNC: 13.6 G/DL (ref 11.1–15.9)
HGB UR QL STRIP: NEGATIVE
IMM GRANULOCYTES # BLD AUTO: 0 X10E3/UL (ref 0–0.1)
IMM GRANULOCYTES NFR BLD AUTO: 0 %
INR PPP: 1 (ref 0.8–1.2)
KETONES UR QL STRIP: NEGATIVE
LEUKOCYTE ESTERASE UR QL STRIP: NEGATIVE
LYMPHOCYTES # BLD AUTO: 0.6 X10E3/UL (ref 0.7–3.1)
LYMPHOCYTES NFR BLD AUTO: 14 %
MCH RBC QN AUTO: 30 PG (ref 26.6–33)
MCHC RBC AUTO-ENTMCNC: 32.2 G/DL (ref 31.5–35.7)
MCV RBC AUTO: 93 FL (ref 79–97)
MICRO URNS: NORMAL
MICRO URNS: NORMAL
MONOCYTES # BLD AUTO: 0.5 X10E3/UL (ref 0.1–0.9)
MONOCYTES NFR BLD AUTO: 13 %
MUCOUS THREADS URNS QL MICRO: PRESENT
NEUTROPHILS # BLD AUTO: 2.9 X10E3/UL (ref 1.4–7)
NEUTROPHILS NFR BLD AUTO: 70 %
NITRITE UR QL STRIP: NEGATIVE
PH UR STRIP: 6 [PH] (ref 5–7.5)
PLATELET # BLD AUTO: 130 X10E3/UL (ref 150–379)
POTASSIUM SERPL-SCNC: 4.2 MMOL/L (ref 3.5–5.2)
PROT UR QL STRIP: NEGATIVE
PROTHROMBIN TIME: 10.7 SEC (ref 9.1–12)
RBC # BLD AUTO: 4.54 X10E6/UL (ref 3.77–5.28)
RBC #/AREA URNS HPF: NORMAL /HPF (ref 0–2)
SODIUM SERPL-SCNC: 143 MMOL/L (ref 134–144)
SP GR UR: 1.01 (ref 1–1.03)
URINALYSIS REFLEX, 377202: NORMAL
UROBILINOGEN UR STRIP-MCNC: 0.2 MG/DL (ref 0.2–1)
WBC # BLD AUTO: 4.1 X10E3/UL (ref 3.4–10.8)
WBC #/AREA URNS HPF: NORMAL /HPF (ref 0–5)

## 2019-04-19 NOTE — PROGRESS NOTES
Please notify patient regarding their test results: 
 
Platelets are low but stable; pt to schedule with hematology. Please fax results to surgeon's office along with pre op forms (in paper outbox)

## 2019-04-24 NOTE — PROGRESS NOTES
Called patient,  verified. Informed patient of lab results and recommendations. Patient stated that she has scheduled and appointment with hematology for 2019.

## 2019-04-25 ENCOUNTER — PATIENT OUTREACH (OUTPATIENT)
Dept: FAMILY MEDICINE CLINIC | Age: 64
End: 2019-04-25

## 2019-04-25 NOTE — PROGRESS NOTES
Called patient, LM requesting she call me back. Wanted to check with her about how the CBD oil is working for her pain-if has had to increase the dose? Did she see  for f/u? Did he add any rxs? How is mother doing? Will continue to try and reach. Called patient 4/30-reached her. Reports the CBD oil is working well and feels very good-has more energy and not lethargic. Able to get some things done. Saw PM last week, - he gave her a new rx for Suboxone-can take bid but makes her too sleepy. Taking it just at hs and doing fine with it. It was expensive but he was able to do a prior authorization for her. Says she is encouraged, overall feels much better. Mother still having some medical issues but attitude is the same. Reminded to call if has concerns/questions.

## 2019-05-03 ENCOUNTER — HOSPITAL ENCOUNTER (OUTPATIENT)
Dept: PREADMISSION TESTING | Age: 64
Discharge: HOME | End: 2019-05-03
Payer: MEDICARE

## 2019-05-03 ENCOUNTER — ANESTHESIA EVENT (OUTPATIENT)
Dept: SURGERY | Age: 64
DRG: 470 | End: 2019-05-03
Payer: MEDICARE

## 2019-05-03 VITALS
HEART RATE: 75 BPM | DIASTOLIC BLOOD PRESSURE: 81 MMHG | BODY MASS INDEX: 26.63 KG/M2 | RESPIRATION RATE: 18 BRPM | HEIGHT: 72 IN | WEIGHT: 196.6 LBS | SYSTOLIC BLOOD PRESSURE: 114 MMHG | TEMPERATURE: 98.5 F

## 2019-05-03 LAB
ABO + RH BLD: NORMAL
BLOOD GROUP ANTIBODIES SERPL: NORMAL
ERYTHROCYTE [DISTWIDTH] IN BLOOD BY AUTOMATED COUNT: 14.3 % (ref 11.5–14.5)
HCT VFR BLD AUTO: 42.5 % (ref 35–47)
HGB BLD-MCNC: 12.9 G/DL (ref 11.5–16)
MCH RBC QN AUTO: 30.2 PG (ref 26–34)
MCHC RBC AUTO-ENTMCNC: 30.4 G/DL (ref 30–36.5)
MCV RBC AUTO: 99.5 FL (ref 80–99)
NRBC # BLD: 0 K/UL (ref 0–0.01)
NRBC BLD-RTO: 0 PER 100 WBC
PLATELET # BLD AUTO: 110 K/UL (ref 150–400)
PMV BLD AUTO: 11.4 FL (ref 8.9–12.9)
RBC # BLD AUTO: 4.27 M/UL (ref 3.8–5.2)
SPECIMEN EXP DATE BLD: NORMAL
WBC # BLD AUTO: 5 K/UL (ref 3.6–11)

## 2019-05-03 PROCEDURE — 85027 COMPLETE CBC AUTOMATED: CPT

## 2019-05-03 PROCEDURE — 86900 BLOOD TYPING SEROLOGIC ABO: CPT

## 2019-05-03 PROCEDURE — 36415 COLL VENOUS BLD VENIPUNCTURE: CPT

## 2019-05-03 RX ORDER — BUPRENORPHINE AND NALOXONE 4; 1 MG/1; MG/1
FILM, SOLUBLE BUCCAL; SUBLINGUAL DAILY
COMMUNITY
End: 2019-09-27 | Stop reason: ALTCHOICE

## 2019-05-03 NOTE — PERIOP NOTES
DO NOT EAT ANYTHING AFTER MIDNIGHT, except as instructed THE NIGHT BEFORE SURGERY. PT GIVEN OPPORTUNITY TO ASK ADDITIONAL QUESTIONS. PRE-OPERATIVE INSTRUCTIONS REVIEWED WITH PATIENT. PATIENT GIVEN 2 BOTTLES  OF 4% CHG LIQUID. INSTRUCTIONS REVIEWED ON USE OF CHG WIPES. PATIENT GIVEN SSI INFECTION FAQ SHEET. MRSA / MSSA TREATMENT INSTRUCTION SHEET GIVEN WITH AN EXPLANATION TO PATIENT THAT THEY WILL BE NOTIFIED IF TREATMENT INSTRUCTIONS NEED TO BE INITIATED. PATIENT WAS GIVEN THE OPPORTUNITY TO ASK QUESTIONS ON THE INFORMATION PROVIDED. Pt has an appt with Dr Brewer-Hematology on 6/6/19.

## 2019-05-05 LAB
BACTERIA SPEC CULT: NORMAL
BACTERIA SPEC CULT: NORMAL
SERVICE CMNT-IMP: NORMAL

## 2019-05-08 ENCOUNTER — OFFICE VISIT (OUTPATIENT)
Dept: NEUROLOGY | Age: 64
End: 2019-05-08

## 2019-05-08 VITALS
SYSTOLIC BLOOD PRESSURE: 99 MMHG | WEIGHT: 203.8 LBS | RESPIRATION RATE: 16 BRPM | HEART RATE: 85 BPM | TEMPERATURE: 97.7 F | BODY MASS INDEX: 27.6 KG/M2 | HEIGHT: 72 IN | DIASTOLIC BLOOD PRESSURE: 53 MMHG | OXYGEN SATURATION: 92 %

## 2019-05-08 DIAGNOSIS — G70.00 MYASTHENIA GRAVIS (HCC): ICD-10-CM

## 2019-05-08 DIAGNOSIS — G62.9 POLYNEUROPATHY: ICD-10-CM

## 2019-05-08 DIAGNOSIS — G54.1 LUMBOSACRAL PLEXOPATHY: ICD-10-CM

## 2019-05-08 DIAGNOSIS — G89.4 CHRONIC PAIN SYNDROME: ICD-10-CM

## 2019-05-08 DIAGNOSIS — G43.009 MIGRAINE WITHOUT AURA AND WITHOUT STATUS MIGRAINOSUS, NOT INTRACTABLE: ICD-10-CM

## 2019-05-08 DIAGNOSIS — R26.9 GAIT DISORDER: ICD-10-CM

## 2019-05-08 DIAGNOSIS — G35 MS (MULTIPLE SCLEROSIS) (HCC): Primary | ICD-10-CM

## 2019-05-08 NOTE — PROGRESS NOTES
Chief Complaint   Patient presents with    Multiple Sclerosis     1. Have you been to the ER, urgent care clinic since your last visit? Hospitalized since your last visit? no    2. Have you seen or consulted any other health care providers outside of the [address] [place] [address] since your last visit? Include any pap smears or colon screening.  Dr. Jenny Godwin

## 2019-05-08 NOTE — PROGRESS NOTES
Neurology Progress Note    NAME:  Ruth Galvin   :   1955   MRN:   [id number]     Date/Time:  2019  Subjective:    Ruth Galvin is a 60 y.o. female here today for follow-up for MS, MG, chronic pain syndrome, headaches, CVA, difficulty walking. Patient was accompanied by her son to the visit. Patient today says she continues to have a lot of pain but not as much as before, pain is sharp in nature, diffuse, burning sensation that goes up to the arms causing numbness and tingling sensation and weakness of the hands, down to the legs causing  numbness and tingling sensation of the legs with weakness of the legs. Patient is scheduled to have a hip surgery on the left side on 2019. Patient is seen new primary management now, Dr. Sharath Mesa, who she thinks is helping with the pain. She says she is more comfortable with him and her pain has gone down. She says her headache continues to decrease in frequency and intensity, frequency of 1-2 x/week after she tried CG RP, headache is frontal, throbbing in nature, with occasional sharp pain from the back of the head, headache associated with dizziness, nausea, blurry vision, double vision, photophobia, phonophobia. She says she has a lot of muscle spasms mostly in the back. Due to increased pain, her headache has also increasing in frequency and intensity. Due to increased pain, patient has been having increased speech difficulty and dysphasia. She says that she stays in bed most of the time  Due to increased pain and anxiety, patient's tremor has increased. On a scale of 1-10, patient rates pain level to be between 5 and 6  She denies loss of consciousness. Says dysphagia has improved. No fall was reported  I will continue patient on current medication.   Review of Systems - General ROS: positive for  - fatigue, night sweats and sleep disturbance  Psychological ROS: positive for - anxiety, concentration difficulties, depression, mood swings and sleep disturbances  Ophthalmic ROS: positive for - blurry vision, decreased vision, double vision and photophobia  ENT ROS: positive for - headaches, tinnitus, vertigo and visual changes  Allergy and Immunology ROS: negative  Hematological and Lymphatic ROS: negative  Endocrine ROS: negative  Respiratory ROS: no cough, shortness of breath, or wheezing  Cardiovascular ROS: no chest pain or dyspnea on exertion  Gastrointestinal ROS: no abdominal pain, change in bowel habits, or black or bloody stools  Genito-Urinary ROS: no dysuria, trouble voiding, or hematuria  Musculoskeletal ROS: positive for - gait disturbance, joint pain, joint stiffness, joint swelling and muscle pain  Neurological ROS: positive for - dizziness, gait disturbance, headaches, impaired coordination/balance, numbness/tingling, speech problems, tremors, visual changes and weakness  Dermatological ROS: negative         Medications reviewed:  Current Outpatient Medications   Medication Sig Dispense Refill    buprenorphine-naloxone (SUBOXONE) 4-1 mg film sublingual film by SubLINGual route daily.  OTHER 0.25 mL by SubLINGual route daily. CBD OIL      levothyroxine (SYNTHROID) 150 mcg tablet Take 1 Tab by mouth nightly. 90 Tab 1    metFORMIN ER (GLUCOPHAGE XR) 500 mg tablet Take 1 Tab by mouth daily (with dinner). (Patient taking differently: Take 500 mg by mouth daily (with breakfast). ) 90 Tab 1    dantrolene (DANTRIUM) 100 mg capsule Take 1 Cap by mouth three (3) times daily. (Patient taking differently: Take 100 mg by mouth daily as needed. Indications: muscle spasm) 90 Cap 3    topiramate (TOPAMAX) 200 mg tablet TAKE 1 TABLET BY MOUTH TWICE DAILY 180 Tab 0    GILENYA 0.5 mg cap Take 1 Cap by mouth daily. 30 Cap 11    ergocalciferol (ERGOCALCIFEROL) 50,000 unit capsule Take 1 Cap by mouth every seven (7) days. 4 Cap 3    glucose blood VI test strips (BLOOD GLUCOSE TEST) strip Test fasting glucose daily;  Dx E11.8; Pharmacist to choose based on insurance/glucose monitoring kit type 100 Strip 1    Blood-Glucose Meter monitoring kit Test fasting glucose daily; Dx E11.8; Pharmacist to choose based on insurance/glucose monitoring kit type 1 Kit 0    albuterol (PROVENTIL VENTOLIN) 2.5 mg /3 mL (0.083 %) nebulizer solution 3 mL by Nebulization route every six (6) hours as needed for Wheezing. 30 Each 0    PARoxetine (PAXIL) 40 mg tablet TAKE 1 AND 1/2 TABLETS BY MOUTH EVERY NIGHT 135 Tab 3    pregabalin (LYRICA) 200 mg capsule Take 1 Cap by mouth two (2) times a day. Max Daily Amount: 400 mg. 180 Cap 3    Menthol-Zinc Oxide (CALMOSEPTINE) 0.44-20.6 % oint Apply  to affected area as needed.  nystatin (MYCOSTATIN) powder Apply to candidal lesions TOPICALLY 2 to 3 times daily until healing complete 30 g 0    pyridostigmine bromide (MESTINON TIMESPAN) 180 mg SR tablet Take 1 Tab by mouth two (2) times a day. 60 Tab 6    lidocaine (LIDODERM) 5 % Apply patch to the affected area for 12 hours a day and remove for 12 hours a day. 30 Each 3    corticotropin (ACTHAR H.P.) 80 unit/mL injectable gel 1 mL by SubCUTAneous route daily. 80 units subq q day for 10 days (Patient taking differently: 80 Units by SubCUTAneous route daily. 80 units subq q day for 10 days    **PT TAKES AS A PRN FOR A 10 DAY PERIOD. NOT TAKING CURRENTLY (5/3/19)) 10 mL 1    clindamycin (CLEOCIN) 300 mg capsule Take 300 mg by mouth. Prior to dental procedures      rosuvastatin (CRESTOR) 20 mg tablet TAKE 1 TABLET BY MOUTH EVERY EVENING 90 Tab 1    TENS UNIT ELECTRODES by Does Not Apply route.  prn          Objective:   Vitals:  Vitals:    05/08/19 0813   BP: 99/53   Pulse: 85   Resp: 16   Temp: 97.7 °F (36.5 °C)   TempSrc: Temporal   SpO2: 92%   Weight: 203 lb 12.8 oz (92.4 kg)   Height: 6' (1.829 m)   PainSc:   5   PainLoc: Generalized   LMP: 09/01/2010       Lab Data Reviewed:  Lab Results   Component Value Date/Time    WBC 5.0 05/03/2019 10:06 AM    HCT 42.5 05/03/2019 10:06 AM    HGB 12.9 05/03/2019 10:06 AM    PLATELET 835 (L) 55/97/9518 10:06 AM       Lab Results   Component Value Date/Time    Sodium 143 04/18/2019 11:50 AM    Potassium 4.2 04/18/2019 11:50 AM    Chloride 108 (H) 04/18/2019 11:50 AM    CO2 19 (L) 04/18/2019 11:50 AM    Glucose 104 (H) 04/18/2019 11:50 AM    BUN 12 04/18/2019 11:50 AM    Creatinine 0.96 04/18/2019 11:50 AM    Calcium 9.2 05/24/2018 01:19 PM       No components found for: TROPQUANT    No results found for: SHAHIDA      Lab Results   Component Value Date/Time    Hemoglobin A1c 7.0 (H) 04/04/2019 02:04 PM    Hemoglobin A1c (POC) 6.8 10/25/2018 02:53 PM        Lab Results   Component Value Date/Time    Vitamin B12 434 02/10/2016    Folate 12.0 02/10/2016       No results found for: SHAHIDA, Laron Villagomez, XBANA    Lab Results   Component Value Date/Time    Cholesterol, total 179 05/24/2018 01:19 PM    Cholesterol, Total 181 12/26/2018    HDL Cholesterol 65 12/26/2018    HDL Cholesterol 51 05/24/2018 01:19 PM    LDL-CHOLESTEROL 91 12/26/2018    LDL, calculated 109 (H) 05/24/2018 01:19 PM    VLDL, calculated 19 05/24/2018 01:19 PM    Triglyceride 126 12/26/2018    Triglyceride 96 05/24/2018 01:19 PM    CHOL/HDL Ratio 4.3 08/23/2010 12:19 PM         CT Results (recent):  Results from Hospital Encounter encounter on 05/14/18   CT NECK SOFT TISSUE W CONT    Narrative HISTORY: Neck mass. PROCEDURE: Multiple contiguous helically acquired axial images were obtained of  the cervical region from the skull base through to the thoracic inlet following  intravenous contrast administration. Sagittal and coronal reconstructions were  performed. CT dose reduction was achieved through the use of a standardized protocol  tailored for this examination and automatic exposure control for dose  modulation. FINDINGS:     Images through the nasopharynx reveal symmetric soft tissues. Images through the oropharynx reveals symmetric soft tissues.     The parotid glands are symmetric in appearance. Images through the hypopharynx reveals symmetric soft tissues. Just below the level of the hyoid bone, is a soft tissue structure measuring 9.7  x 7.8 mm with a small satellite lesions. No evidence of lingual thyroid per se. The submandibular glands have a symmetric appearance. Images through the larynx proper reveal symmetric soft tissues. Images through the subglottic larynx reveal symmetric soft tissues. The thyroid gland has a normal appearance with symmetric lobes. No significant cervical lymphadenopathy. Incidental mild mucoperiosteal thickening involving the right greater than left  maxillary sinus of uncertain clinical significance. Impression IMPRESSION:    9.7 x 7.8 mm soft tissue structure anterior to the hyoid bone. Differential  considerations would include thyroglossal duct cyst, ectopic thyroid, dermoid,  etc.         MRI Results (recent):  Results from Hospital Encounter encounter on 12/26/18   MRI BRAIN W WO CONT    Narrative Medical indication: Multiple sclerosis   , headache q. daily, prior CVA, new numbness, exacerbation. Technical factors: Diffusion imaging, sagittal T1-weighted, sagittal FLAIR,  axial T1-weighted pre and post 18 cc IV. dotarem T2-weighted FLAIR gradient echo  coronal and sagittal T1-weighted postcontrast. Comparison January 10, 2018. Diffusion imaging does not show acute ischemic changes her. There is no  extra-axial fluid collection hemorrhage or shift. Ventricular size is stable. Major vessels flow voids at the base of the brain are present. Incidental  maxillary sinus disease. The burden of white matter disease appears stable. No  enhancing lesion or masses. Impression IMPRESSION: Stable white matter disease. No acute findings no masses or  enhancing lesion. IR Results (recent):  No results found for this or any previous visit.     VAS/US Results (recent):  Results from Hospital Encounter encounter on 01/19/17 DUPLEX LOWER EXT VENOUS RIGHT       PHYSICAL EXAM:  General:    Alert, cooperative, no distress, appears stated age. Head:   Normocephalic, without obvious abnormality, atraumatic. Eyes:   Conjunctivae/corneas clear. PERRLA  Nose:  Nares normal. No drainage or sinus tenderness. Throat:    Lips, mucosa, and tongue normal.  No Thrush  Neck:  Supple, symmetrical,  no adenopathy, thyroid: non tender    no carotid bruit and no JVD. Paraspinal tenderness  Back:    Symmetric, diffuse tenderness. Lungs:   Clear to auscultation bilaterally. No Wheezing or Rhonchi. No rales. Chest wall:  No tenderness or deformity. No Accessory muscle use. Heart:   Regular rate and rhythm,  no murmur, rub or gallop. Abdomen:   Soft, non-tender. Not distended. Bowel sounds normal. No masses  Extremities: Extremities normal, atraumatic, No cyanosis. No edema. No clubbing  Skin:     Texture, turgor normal. No rashes or lesions. Not Jaundiced  Lymph nodes: Cervical, supraclavicular normal.  Psych:  Good insight. Not depressed. Not anxious or agitated. NEUROLOGICAL EXAM:  Appearance: The patient is well developed, well nourished, provides a coherent history and is in no acute distress. Mental Status: Oriented to time, place and person. Mood and affect appropriate. Cranial Nerves:   Intact visual fields. Fundi are benign. RACHEL, EOM's full, no nystagmus, no ptosis. Facial sensation is normal. Corneal reflexes are intact. Facial movement is symmetric. Hearing is normal bilaterally. Palate is midline with normal sternocleidomastoid and trapezius muscles are normal. Tongue is midline. Motor:  4/5 strength in right upper and lower proximal and distal muscles. Normal bulk and tone. No fasciculations. Reflexes:   Deep tendon reflexes 2+/4 and symmetrical.   Sensory:    Dysesthesia to touch, pinprick and vibration. Gait:   Unsteady gait. Tremor:   No tremor noted. Cerebellar:  No cerebellar signs present. Neurovascular:  Normal heart sounds and regular rhythm, peripheral pulses intact, and no carotid bruits. Assesment  1. MS (multiple sclerosis) (HCC)  Stable    2. Myasthenia gravis ([address]  Stable    3. Lumbosacral plexopathy  Stable    4. Chronic pain syndrome  Following pain management    5. Gait disorder  Physical therapy after hip surgery    6. Polyneuropathy  Stable    ___________________________________________________  PLAN: Medication and plan discussed with patient      ICD-10-CM ICD-9-CM    1. MS (multiple sclerosis) ([facility] G35 340    2. Myasthenia gravis ([facility] G70.00 358.00    3. Lumbosacral plexopathy G54.1 353.1    4. Chronic pain syndrome G89.4 338.4    5. Gait disorder R26.9 781.2    6. Polyneuropathy G62.9 356.9    7.  Migraine without aura and without status migrainosus, not intractable G43.009 346.10      Follow-up and Dispositions    · Return in about 3 months (around 8/8/2019).            ___________________________________________________    Total time spent with patient:  []15   []25   []35   [] __ minutes    Care Plan discussed with:    []Patient   []Family    []Care Manager   []Consultant/Specialist :    ___________________________________________________    Attending Physician: Ector Mendosa MD

## 2019-05-10 NOTE — H&P
Hiwot GIBBONS Kamar  Location: - [facility]  Patient #: [id number]  : 1955  Single / Language: [place] / Race: Black or   Female      History of Present Illness Titi Franco MD; 2019 3:54 PM)  The patient is a 66year old female who presents for a recheck of left hip pain. Patient's chief complaint is left hip pain.  I know this patient well.  I treated her in the past for her right hip osteoarthritis.  The symptoms are progressing.  She is no longer able to manage her pain without pain medication.  Feels pain in her groin.  Has lost range of motion and has problems getting her shoes and socks on.  Her diabetes does appear to be well controlled with hemoglobin A1c's in the low sevens.  She has had slowly progressive left hip symptoms.  Prior x-rays that I reviewed showed osteoarthritis. Problem List/Past Medical Brandon Coleman; 2019 2:44 PM)  Essential Hypertension    TRIGGER FINGER (727.03  M65.30)    Localized osteoarthritis of right knee (715.36  M17.11)    Osteoarthritis of one hip, right (715.95  M16.11)    REVIEW OF SYSTEMS: Systems were reviewed by the provider.    HAND, CONTUSION (923.20)    HAND, PAIN (719.43)    Knee effusion, right (719.06  M25.461)    History of total right hip arthroplasty (V43.64  Z96.641)    Weight above 97th percentile (V49.89  Z78.9)    Knee pain, right anterior (719.46  M25.561)    HAND, ARTHRITIS (715.94)      Allergies Brandon Bates CMA; 2019 2:44 PM)  Penicillins   Anaphylaxis. Allergies Reconciled      Family History Brandon Bates CMA; 2019 2:44 PM)  Asthma    Bladder problems    Heart disease in female family member before age 72    Diabetes Mellitus    Thyroid problems    Breast cancer      Social History Brandon Coleman; 2019 2:44 PM)  Tobacco / smoke exposure   2014: No  Seat Belt Use   : always  Illicit drug use   : none  Caffeine use   2014: Drinks coffee and soft drinks 3-4 times a day. Alcohol use   2014: Never drinks. Exercise   2014: Walks occasionally. Sun Exposure   2014: occasionally  Tobacco use   Current every day smoker. 2014: Current every day smoker: 0.5 packs per day, started smoking in 1977 at age 23 (18.5 pack years), smokes 0 cigar(s) per week, uses 0 can(s) of smokeless tobacco per week. Medication History Gonzalez Bates CMA; 2019 2:44 PM)  Clindamycin HCl  (300MG Capsule, 2 (two) Oral One hour prior to dental procedures, including cleanings, Taken starting 2018) Active. Tecfidera  (240MG Capsule DR, Oral) Active. MetFORMIN HCl  (Oral) Specific strength unknown - Active. Topamax  (200MG Tablet, Oral) Active. Mestinon  (180MG Tablet ER, Oral) Active. Lipitor  (40MG Tablet, Oral) Active. Paxil  (Oral) Specific strength unknown - Active. Neurontin  (Oral) Specific strength unknown - Active. FentaNYL  (75MCG/HR Patch 72HR, Transdermal) Active. Levothyroxine Sodium  (175MCG Tablet, Oral) Active. Valium  (10MG Tablet, Oral) Active. Vitamin D2  (Oral) Specific strength unknown - Active. Naproxen Sodium  (500MG Tablet ER, Oral) Active. Robaxin-750  (750MG Tablet, Oral) Active. Lisinopril  (10MG Tablet, Oral) Active. Lidoderm  (5% Patch, External) Active. Provigil  (Oral) Specific strength unknown - Active. Medications Reconciled     Past Surgical History Gonzalez Bates CMA; 2019 2:44 PM)  Colon Polyp Removal - Colonoscopy    Carpal Tunnel Repair   left  Arthroscopy of Knee   right  Thyroidectomy; Total    Dilation and Curettage of Uterus     Delivery   1 time    Other Problems Gonzalez Camejo; 2019 2:44 PM)  Other disease, cancer, significant illness    Thyroid Disease    Unspecified Diagnosis    Arthritis    Diabetes Mellitus    Hypercholesterolemia    Bladder Problems    Unspecified Diagnosis          Review of Systems Gonzalez MICHEL Jana [facility]; 4/12/2019 2:44 PM)  General Not Present- Chills and Fatigue. Skin Not Present- Bruising, Pallor and Skin Color Changes. Respiratory Not Present- Cough and Difficulty Breathing. Cardiovascular Not Present- Chest Pain, Fainting / Blacking Out and Rapid Heart Rate. Musculoskeletal Present- Joint Pain. Not Present- Decreased Range of Motion and Joint Swelling. Neurological Not Present- Dysesthesia, Paresthesias and Weakness In Extremities. Hematology Not Present- Abnormal Bleeding, Blood Clots and Petechiae. Vitals   Weight: 170 lb   Height: 72 in   Weight was reported by patient. Height was reported by patient. Body Surface Area: 1.99 m²   Body Mass Index: 23.06 kg/m²                Physical Exam Meme Parada MD; 4/12/2019 3:54 PM)  Musculoskeletal  Global Assessment  Examination of related systems reveals - well-developed, well-nourished, in no acute distress, alert and oriented x 3, no rashes or ulcers of bilateral upper and lower extremities, head or trunk, no generalized swelling or edema of extremities, no digital clubbing or cyanosis, neurovascularly intact globally with normal deep tendon reflexes and normal coordination. Left Lower Extremity - Note: Patient's hip was examined. Examined the left hip. She has significant symptoms especially with flexion external rotation. Pain is present in the patient's groin with flexion internal rotation maneuvers. Her hip extends fully and flexes to 90°. Straight leg raise and femoral nerve stretch test are negative. Extremity is sensate and perfused with palpable dorsalis pedis pulse. Hip flexors, quads, ankle plantar flexors, ankle dorsiflexors have 5 out of 5 strength. Assessment & Plan Meme Parada MD; 4/12/2019 3:57 PM)  Primary osteoarthritis of left hip (715.15  M16.12)  Impression: Patient's radiographs showed advanced arthritis of left hip with bone-on-bone and osteophyte formation.  She has had slowly progressive symptoms. Based on exam and x-rays she has decided to proceed with a left total hip. Questions were answered. She has had a previous right hip with good results. States that her activities of daily living have declined. Current Plans  Pt Education - How to access health information online: discussed with patient and provided information. Pt Education - Educational materials were provided.: discussed with patient and provided information. EDUCATION ABOUT SMOKING CESSATION BY ONE ON ONE INSTRUCTION (4000F)  Pt Education - Smoking: Ways to Quit: smoking  X-RAY EXAM OF HIP COMPLETE min 2 VIEWS (96394) (AP Pelvis and left Lateral hip views were taken today using Digital Radiography. 3 x-ray views of the left hip show bone-on-bone osteoarthritis especially in the posterior aspect of the hip.  She has cysts in the acetabulum as well as larg)  REVIEW OF SYSTEMS: Systems were reviewed by the provider.(V49.9)        Signed by Gerson Parada MD

## 2019-05-13 ENCOUNTER — APPOINTMENT (OUTPATIENT)
Dept: GENERAL RADIOLOGY | Age: 64
DRG: 470 | End: 2019-05-13
Attending: ORTHOPAEDIC SURGERY
Payer: MEDICARE

## 2019-05-13 ENCOUNTER — PATIENT OUTREACH (OUTPATIENT)
Dept: FAMILY MEDICINE CLINIC | Age: 64
End: 2019-05-13

## 2019-05-13 ENCOUNTER — HOSPITAL ENCOUNTER (INPATIENT)
Age: 64
LOS: 1 days | Discharge: HOME HEALTH | DRG: 470 | End: 2019-05-14
Attending: ORTHOPAEDIC SURGERY | Admitting: ORTHOPAEDIC SURGERY
Payer: MEDICARE

## 2019-05-13 ENCOUNTER — APPOINTMENT (OUTPATIENT)
Dept: GENERAL RADIOLOGY | Age: 64
DRG: 470 | End: 2019-05-13
Attending: PHYSICIAN ASSISTANT
Payer: MEDICARE

## 2019-05-13 ENCOUNTER — ANESTHESIA (OUTPATIENT)
Dept: SURGERY | Age: 64
DRG: 470 | End: 2019-05-13
Payer: MEDICARE

## 2019-05-13 DIAGNOSIS — M16.12 ARTHRITIS OF LEFT HIP: Primary | ICD-10-CM

## 2019-05-13 LAB
GLUCOSE BLD STRIP.AUTO-MCNC: 121 MG/DL (ref 65–100)
GLUCOSE BLD STRIP.AUTO-MCNC: 137 MG/DL (ref 65–100)
GLUCOSE BLD STRIP.AUTO-MCNC: 171 MG/DL (ref 65–100)
SERVICE CMNT-IMP: ABNORMAL

## 2019-05-13 PROCEDURE — 74011250636 HC RX REV CODE- 250/636: Performed by: ORTHOPAEDIC SURGERY

## 2019-05-13 PROCEDURE — 77030008684 HC TU ET CUF COVD -B: Performed by: NURSE ANESTHETIST, CERTIFIED REGISTERED

## 2019-05-13 PROCEDURE — 74011250637 HC RX REV CODE- 250/637: Performed by: ANESTHESIOLOGY

## 2019-05-13 PROCEDURE — 77030018836 HC SOL IRR NACL ICUM -A: Performed by: ORTHOPAEDIC SURGERY

## 2019-05-13 PROCEDURE — 74011250637 HC RX REV CODE- 250/637: Performed by: ORTHOPAEDIC SURGERY

## 2019-05-13 PROCEDURE — 77030027138 HC INCENT SPIROMETER -A

## 2019-05-13 PROCEDURE — 77030012935 HC DRSG AQUACEL BMS -B: Performed by: ORTHOPAEDIC SURGERY

## 2019-05-13 PROCEDURE — 65270000029 HC RM PRIVATE

## 2019-05-13 PROCEDURE — 76210000000 HC OR PH I REC 2 TO 2.5 HR: Performed by: ORTHOPAEDIC SURGERY

## 2019-05-13 PROCEDURE — 77030039267 HC ADH SKN EXOFIN S2SG -B: Performed by: ORTHOPAEDIC SURGERY

## 2019-05-13 PROCEDURE — 77030036660

## 2019-05-13 PROCEDURE — 77030031139 HC SUT VCRL2 J&J -A: Performed by: ORTHOPAEDIC SURGERY

## 2019-05-13 PROCEDURE — 74011000250 HC RX REV CODE- 250

## 2019-05-13 PROCEDURE — 76060000036 HC ANESTHESIA 2.5 TO 3 HR: Performed by: ORTHOPAEDIC SURGERY

## 2019-05-13 PROCEDURE — 77030002933 HC SUT MCRYL J&J -A: Performed by: ORTHOPAEDIC SURGERY

## 2019-05-13 PROCEDURE — 74011000258 HC RX REV CODE- 258: Performed by: ORTHOPAEDIC SURGERY

## 2019-05-13 PROCEDURE — 82962 GLUCOSE BLOOD TEST: CPT

## 2019-05-13 PROCEDURE — 77030034850: Performed by: ORTHOPAEDIC SURGERY

## 2019-05-13 PROCEDURE — 77030006822 HC BLD SAW SAG BRSM -B: Performed by: ORTHOPAEDIC SURGERY

## 2019-05-13 PROCEDURE — 77030018846 HC SOL IRR STRL H20 ICUM -A: Performed by: ORTHOPAEDIC SURGERY

## 2019-05-13 PROCEDURE — 77030011640 HC PAD GRND REM COVD -A: Performed by: ORTHOPAEDIC SURGERY

## 2019-05-13 PROCEDURE — 74011250636 HC RX REV CODE- 250/636: Performed by: ANESTHESIOLOGY

## 2019-05-13 PROCEDURE — 74011250636 HC RX REV CODE- 250/636: Performed by: PHYSICIAN ASSISTANT

## 2019-05-13 PROCEDURE — C9290 INJ, BUPIVACAINE LIPOSOME: HCPCS | Performed by: ORTHOPAEDIC SURGERY

## 2019-05-13 PROCEDURE — 77030020782 HC GWN BAIR PAWS FLX 3M -B

## 2019-05-13 PROCEDURE — 73501 X-RAY EXAM HIP UNI 1 VIEW: CPT

## 2019-05-13 PROCEDURE — C1713 ANCHOR/SCREW BN/BN,TIS/BN: HCPCS | Performed by: ORTHOPAEDIC SURGERY

## 2019-05-13 PROCEDURE — C1776 JOINT DEVICE (IMPLANTABLE): HCPCS | Performed by: ORTHOPAEDIC SURGERY

## 2019-05-13 PROCEDURE — 77030020365 HC SOL INJ SOD CL 0.9% 50ML: Performed by: ORTHOPAEDIC SURGERY

## 2019-05-13 PROCEDURE — 0SRB03A REPLACEMENT OF LEFT HIP JOINT WITH CERAMIC SYNTHETIC SUBSTITUTE, UNCEMENTED, OPEN APPROACH: ICD-10-PCS | Performed by: ORTHOPAEDIC SURGERY

## 2019-05-13 PROCEDURE — 74011250636 HC RX REV CODE- 250/636

## 2019-05-13 PROCEDURE — 77030026438 HC STYL ET INTUB CARD -A: Performed by: NURSE ANESTHETIST, CERTIFIED REGISTERED

## 2019-05-13 PROCEDURE — 76010000172 HC OR TIME 2.5 TO 3 HR INTENSV-TIER 1: Performed by: ORTHOPAEDIC SURGERY

## 2019-05-13 PROCEDURE — 74011250637 HC RX REV CODE- 250/637: Performed by: PHYSICIAN ASSISTANT

## 2019-05-13 PROCEDURE — 74011000250 HC RX REV CODE- 250: Performed by: ORTHOPAEDIC SURGERY

## 2019-05-13 PROCEDURE — 77030020788: Performed by: ORTHOPAEDIC SURGERY

## 2019-05-13 DEVICE — PINNACLE CANCELLOUS BONE SCREW 6.5MM X 15MM
Type: IMPLANTABLE DEVICE | Site: HIP | Status: FUNCTIONAL
Brand: PINNACLE

## 2019-05-13 DEVICE — QUADRA-P STD STEM #2
Type: IMPLANTABLE DEVICE | Site: HIP | Status: FUNCTIONAL
Brand: QUADRA P FEMORAL STEMS

## 2019-05-13 DEVICE — FLAT PE  HC LINER Ø 36 / E
Type: IMPLANTABLE DEVICE | Site: HIP | Status: FUNCTIONAL
Brand: MPACT ACETABULAR SYSTEM

## 2019-05-13 DEVICE — MPACT SHELL SCREW PLUG
Type: IMPLANTABLE DEVICE | Site: HIP | Status: FUNCTIONAL
Brand: MPACT ACETABULAR SYSTEM

## 2019-05-13 DEVICE — STEM FEM PRSS FT 1 HIP PRIMARY CEM UPLR/BPLR LNR POLYETH: Type: IMPLANTABLE DEVICE | Site: HIP | Status: FUNCTIONAL

## 2019-05-13 DEVICE — CANCELLOUS BONE SCREW FLAT HEAD Ø 6,5 L40
Type: IMPLANTABLE DEVICE | Site: HIP | Status: FUNCTIONAL
Brand: MPACT ACETABULAR SYSTEM

## 2019-05-13 DEVICE — ACETABULAR SHELL Ø52 TWO HOLES
Type: IMPLANTABLE DEVICE | Site: HIP | Status: FUNCTIONAL
Brand: MPACT ACETABULAR SYSTEM

## 2019-05-13 RX ORDER — SODIUM CHLORIDE 0.9 % (FLUSH) 0.9 %
5-40 SYRINGE (ML) INJECTION AS NEEDED
Status: DISCONTINUED | OUTPATIENT
Start: 2019-05-13 | End: 2019-05-14 | Stop reason: HOSPADM

## 2019-05-13 RX ORDER — MAGNESIUM SULFATE 100 %
4 CRYSTALS MISCELLANEOUS AS NEEDED
Status: DISCONTINUED | OUTPATIENT
Start: 2019-05-13 | End: 2019-05-14 | Stop reason: HOSPADM

## 2019-05-13 RX ORDER — TRAMADOL HYDROCHLORIDE 50 MG/1
50 TABLET ORAL
Status: DISCONTINUED | OUTPATIENT
Start: 2019-05-13 | End: 2019-05-14 | Stop reason: HOSPADM

## 2019-05-13 RX ORDER — GLYCOPYRROLATE 0.2 MG/ML
INJECTION INTRAMUSCULAR; INTRAVENOUS AS NEEDED
Status: DISCONTINUED | OUTPATIENT
Start: 2019-05-13 | End: 2019-05-13 | Stop reason: HOSPADM

## 2019-05-13 RX ORDER — SUCCINYLCHOLINE CHLORIDE 20 MG/ML
INJECTION INTRAMUSCULAR; INTRAVENOUS AS NEEDED
Status: DISCONTINUED | OUTPATIENT
Start: 2019-05-13 | End: 2019-05-13 | Stop reason: HOSPADM

## 2019-05-13 RX ORDER — SODIUM CHLORIDE, SODIUM LACTATE, POTASSIUM CHLORIDE, CALCIUM CHLORIDE 600; 310; 30; 20 MG/100ML; MG/100ML; MG/100ML; MG/100ML
50 INJECTION, SOLUTION INTRAVENOUS CONTINUOUS
Status: DISCONTINUED | OUTPATIENT
Start: 2019-05-13 | End: 2019-05-13 | Stop reason: HOSPADM

## 2019-05-13 RX ORDER — HYDROMORPHONE HYDROCHLORIDE 1 MG/ML
0.2 INJECTION, SOLUTION INTRAMUSCULAR; INTRAVENOUS; SUBCUTANEOUS
Status: DISCONTINUED | OUTPATIENT
Start: 2019-05-13 | End: 2019-05-13 | Stop reason: HOSPADM

## 2019-05-13 RX ORDER — ONDANSETRON 2 MG/ML
4 INJECTION INTRAMUSCULAR; INTRAVENOUS AS NEEDED
Status: DISCONTINUED | OUTPATIENT
Start: 2019-05-13 | End: 2019-05-13 | Stop reason: HOSPADM

## 2019-05-13 RX ORDER — PROPOFOL 10 MG/ML
INJECTION, EMULSION INTRAVENOUS AS NEEDED
Status: DISCONTINUED | OUTPATIENT
Start: 2019-05-13 | End: 2019-05-13 | Stop reason: HOSPADM

## 2019-05-13 RX ORDER — TRANEXAMIC ACID 100 MG/ML
INJECTION, SOLUTION INTRAVENOUS AS NEEDED
Status: DISCONTINUED | OUTPATIENT
Start: 2019-05-13 | End: 2019-05-13 | Stop reason: HOSPADM

## 2019-05-13 RX ORDER — HYDROMORPHONE HYDROCHLORIDE 2 MG/ML
INJECTION, SOLUTION INTRAMUSCULAR; INTRAVENOUS; SUBCUTANEOUS AS NEEDED
Status: DISCONTINUED | OUTPATIENT
Start: 2019-05-13 | End: 2019-05-13 | Stop reason: HOSPADM

## 2019-05-13 RX ORDER — OXYCODONE HYDROCHLORIDE 5 MG/1
5 TABLET ORAL
Status: DISCONTINUED | OUTPATIENT
Start: 2019-05-13 | End: 2019-05-14 | Stop reason: HOSPADM

## 2019-05-13 RX ORDER — FENTANYL CITRATE 50 UG/ML
INJECTION, SOLUTION INTRAMUSCULAR; INTRAVENOUS AS NEEDED
Status: DISCONTINUED | OUTPATIENT
Start: 2019-05-13 | End: 2019-05-13 | Stop reason: HOSPADM

## 2019-05-13 RX ORDER — DANTROLENE SODIUM 25 MG/1
100 CAPSULE ORAL
Status: DISCONTINUED | OUTPATIENT
Start: 2019-05-13 | End: 2019-05-14 | Stop reason: HOSPADM

## 2019-05-13 RX ORDER — ONDANSETRON 2 MG/ML
INJECTION INTRAMUSCULAR; INTRAVENOUS AS NEEDED
Status: DISCONTINUED | OUTPATIENT
Start: 2019-05-13 | End: 2019-05-13 | Stop reason: HOSPADM

## 2019-05-13 RX ORDER — TOPIRAMATE 100 MG/1
200 TABLET, FILM COATED ORAL 2 TIMES DAILY
Status: DISCONTINUED | OUTPATIENT
Start: 2019-05-13 | End: 2019-05-14 | Stop reason: HOSPADM

## 2019-05-13 RX ORDER — FENTANYL CITRATE 50 UG/ML
25 INJECTION, SOLUTION INTRAMUSCULAR; INTRAVENOUS
Status: COMPLETED | OUTPATIENT
Start: 2019-05-13 | End: 2019-05-13

## 2019-05-13 RX ORDER — ONDANSETRON 2 MG/ML
4 INJECTION INTRAMUSCULAR; INTRAVENOUS
Status: DISCONTINUED | OUTPATIENT
Start: 2019-05-13 | End: 2019-05-14 | Stop reason: HOSPADM

## 2019-05-13 RX ORDER — ACETAMINOPHEN 500 MG
1000 TABLET ORAL EVERY 6 HOURS
Status: DISCONTINUED | OUTPATIENT
Start: 2019-05-13 | End: 2019-05-14 | Stop reason: HOSPADM

## 2019-05-13 RX ORDER — DIPHENHYDRAMINE HCL 12.5MG/5ML
12.5 ELIXIR ORAL
Status: DISCONTINUED | OUTPATIENT
Start: 2019-05-13 | End: 2019-05-14 | Stop reason: HOSPADM

## 2019-05-13 RX ORDER — CELECOXIB 200 MG/1
200 CAPSULE ORAL EVERY 12 HOURS
Status: DISCONTINUED | OUTPATIENT
Start: 2019-05-14 | End: 2019-05-14 | Stop reason: HOSPADM

## 2019-05-13 RX ORDER — DEXTROSE 50 % IN WATER (D50W) INTRAVENOUS SYRINGE
12.5-25 AS NEEDED
Status: DISCONTINUED | OUTPATIENT
Start: 2019-05-13 | End: 2019-05-14 | Stop reason: HOSPADM

## 2019-05-13 RX ORDER — LIDOCAINE HYDROCHLORIDE 10 MG/ML
0.1 INJECTION, SOLUTION EPIDURAL; INFILTRATION; INTRACAUDAL; PERINEURAL AS NEEDED
Status: DISCONTINUED | OUTPATIENT
Start: 2019-05-13 | End: 2019-05-13 | Stop reason: HOSPADM

## 2019-05-13 RX ORDER — PREGABALIN 100 MG/1
200 CAPSULE ORAL 2 TIMES DAILY
Status: DISCONTINUED | OUTPATIENT
Start: 2019-05-13 | End: 2019-05-14 | Stop reason: HOSPADM

## 2019-05-13 RX ORDER — MIDAZOLAM HYDROCHLORIDE 1 MG/ML
INJECTION, SOLUTION INTRAMUSCULAR; INTRAVENOUS AS NEEDED
Status: DISCONTINUED | OUTPATIENT
Start: 2019-05-13 | End: 2019-05-13 | Stop reason: HOSPADM

## 2019-05-13 RX ORDER — PAROXETINE HYDROCHLORIDE 20 MG/1
60 TABLET, FILM COATED ORAL DAILY
Status: DISCONTINUED | OUTPATIENT
Start: 2019-05-14 | End: 2019-05-14 | Stop reason: HOSPADM

## 2019-05-13 RX ORDER — SODIUM CHLORIDE, SODIUM LACTATE, POTASSIUM CHLORIDE, CALCIUM CHLORIDE 600; 310; 30; 20 MG/100ML; MG/100ML; MG/100ML; MG/100ML
INJECTION, SOLUTION INTRAVENOUS
Status: DISCONTINUED | OUTPATIENT
Start: 2019-05-13 | End: 2019-05-13 | Stop reason: HOSPADM

## 2019-05-13 RX ORDER — ALBUTEROL SULFATE 0.83 MG/ML
2.5 SOLUTION RESPIRATORY (INHALATION)
Status: DISCONTINUED | OUTPATIENT
Start: 2019-05-13 | End: 2019-05-14 | Stop reason: HOSPADM

## 2019-05-13 RX ORDER — SODIUM CHLORIDE 0.9 % (FLUSH) 0.9 %
5-40 SYRINGE (ML) INJECTION AS NEEDED
Status: DISCONTINUED | OUTPATIENT
Start: 2019-05-13 | End: 2019-05-13 | Stop reason: HOSPADM

## 2019-05-13 RX ORDER — CELECOXIB 200 MG/1
200 CAPSULE ORAL ONCE
Status: COMPLETED | OUTPATIENT
Start: 2019-05-13 | End: 2019-05-13

## 2019-05-13 RX ORDER — SODIUM CHLORIDE 9 MG/ML
125 INJECTION, SOLUTION INTRAVENOUS CONTINUOUS
Status: DISPENSED | OUTPATIENT
Start: 2019-05-13 | End: 2019-05-14

## 2019-05-13 RX ORDER — NALOXONE HYDROCHLORIDE 0.4 MG/ML
0.4 INJECTION, SOLUTION INTRAMUSCULAR; INTRAVENOUS; SUBCUTANEOUS AS NEEDED
Status: DISCONTINUED | OUTPATIENT
Start: 2019-05-13 | End: 2019-05-14 | Stop reason: HOSPADM

## 2019-05-13 RX ORDER — MORPHINE SULFATE 10 MG/ML
2 INJECTION, SOLUTION INTRAMUSCULAR; INTRAVENOUS
Status: DISCONTINUED | OUTPATIENT
Start: 2019-05-13 | End: 2019-05-13 | Stop reason: HOSPADM

## 2019-05-13 RX ORDER — ACETAMINOPHEN 500 MG
1000 TABLET ORAL ONCE
Status: DISCONTINUED | OUTPATIENT
Start: 2019-05-13 | End: 2019-05-13 | Stop reason: HOSPADM

## 2019-05-13 RX ORDER — LEVOTHYROXINE SODIUM 75 UG/1
150 TABLET ORAL
Status: DISCONTINUED | OUTPATIENT
Start: 2019-05-13 | End: 2019-05-14 | Stop reason: HOSPADM

## 2019-05-13 RX ORDER — KETAMINE HYDROCHLORIDE 10 MG/ML
INJECTION, SOLUTION INTRAMUSCULAR; INTRAVENOUS AS NEEDED
Status: DISCONTINUED | OUTPATIENT
Start: 2019-05-13 | End: 2019-05-13 | Stop reason: HOSPADM

## 2019-05-13 RX ORDER — ASPIRIN 325 MG
325 TABLET, DELAYED RELEASE (ENTERIC COATED) ORAL EVERY 12 HOURS
Status: DISCONTINUED | OUTPATIENT
Start: 2019-05-14 | End: 2019-05-14 | Stop reason: HOSPADM

## 2019-05-13 RX ORDER — MAGNESIUM SULFATE HEPTAHYDRATE 40 MG/ML
INJECTION, SOLUTION INTRAVENOUS AS NEEDED
Status: DISCONTINUED | OUTPATIENT
Start: 2019-05-13 | End: 2019-05-13 | Stop reason: HOSPADM

## 2019-05-13 RX ORDER — DEXMEDETOMIDINE HYDROCHLORIDE 4 UG/ML
INJECTION, SOLUTION INTRAVENOUS AS NEEDED
Status: DISCONTINUED | OUTPATIENT
Start: 2019-05-13 | End: 2019-05-13 | Stop reason: HOSPADM

## 2019-05-13 RX ORDER — SODIUM CHLORIDE 0.9 % (FLUSH) 0.9 %
5-40 SYRINGE (ML) INJECTION EVERY 8 HOURS
Status: DISCONTINUED | OUTPATIENT
Start: 2019-05-13 | End: 2019-05-14 | Stop reason: HOSPADM

## 2019-05-13 RX ORDER — ROCURONIUM BROMIDE 10 MG/ML
INJECTION, SOLUTION INTRAVENOUS AS NEEDED
Status: DISCONTINUED | OUTPATIENT
Start: 2019-05-13 | End: 2019-05-13 | Stop reason: HOSPADM

## 2019-05-13 RX ORDER — SODIUM CHLORIDE 0.9 % (FLUSH) 0.9 %
5-40 SYRINGE (ML) INJECTION EVERY 8 HOURS
Status: DISCONTINUED | OUTPATIENT
Start: 2019-05-13 | End: 2019-05-13 | Stop reason: HOSPADM

## 2019-05-13 RX ORDER — LIDOCAINE HYDROCHLORIDE 20 MG/ML
INJECTION, SOLUTION EPIDURAL; INFILTRATION; INTRACAUDAL; PERINEURAL AS NEEDED
Status: DISCONTINUED | OUTPATIENT
Start: 2019-05-13 | End: 2019-05-13 | Stop reason: HOSPADM

## 2019-05-13 RX ORDER — AMOXICILLIN 250 MG
1 CAPSULE ORAL 2 TIMES DAILY
Status: DISCONTINUED | OUTPATIENT
Start: 2019-05-14 | End: 2019-05-14 | Stop reason: HOSPADM

## 2019-05-13 RX ORDER — DEXAMETHASONE SODIUM PHOSPHATE 4 MG/ML
INJECTION, SOLUTION INTRA-ARTICULAR; INTRALESIONAL; INTRAMUSCULAR; INTRAVENOUS; SOFT TISSUE AS NEEDED
Status: DISCONTINUED | OUTPATIENT
Start: 2019-05-13 | End: 2019-05-13 | Stop reason: HOSPADM

## 2019-05-13 RX ORDER — FACIAL-BODY WIPES
10 EACH TOPICAL DAILY PRN
Status: DISCONTINUED | OUTPATIENT
Start: 2019-05-15 | End: 2019-05-14 | Stop reason: HOSPADM

## 2019-05-13 RX ORDER — MORPHINE SULFATE 2 MG/ML
1 INJECTION, SOLUTION INTRAMUSCULAR; INTRAVENOUS
Status: DISCONTINUED | OUTPATIENT
Start: 2019-05-13 | End: 2019-05-14 | Stop reason: HOSPADM

## 2019-05-13 RX ORDER — INSULIN LISPRO 100 [IU]/ML
INJECTION, SOLUTION INTRAVENOUS; SUBCUTANEOUS
Status: DISCONTINUED | OUTPATIENT
Start: 2019-05-13 | End: 2019-05-14 | Stop reason: HOSPADM

## 2019-05-13 RX ORDER — VANCOMYCIN/0.9 % SOD CHLORIDE 1.5G/250ML
1500 PLASTIC BAG, INJECTION (ML) INTRAVENOUS EVERY 12 HOURS
Status: COMPLETED | OUTPATIENT
Start: 2019-05-14 | End: 2019-05-14

## 2019-05-13 RX ORDER — PYRIDOSTIGMINE BROMIDE 60 MG/1
60 TABLET ORAL
Status: COMPLETED | OUTPATIENT
Start: 2019-05-13 | End: 2019-05-13

## 2019-05-13 RX ORDER — POLYETHYLENE GLYCOL 3350 17 G/17G
17 POWDER, FOR SOLUTION ORAL DAILY
Status: DISCONTINUED | OUTPATIENT
Start: 2019-05-14 | End: 2019-05-14 | Stop reason: HOSPADM

## 2019-05-13 RX ORDER — VANCOMYCIN/0.9 % SOD CHLORIDE 1.5G/250ML
1500 PLASTIC BAG, INJECTION (ML) INTRAVENOUS ONCE
Status: COMPLETED | OUTPATIENT
Start: 2019-05-13 | End: 2019-05-13

## 2019-05-13 RX ADMIN — PROPOFOL 50 MG: 10 INJECTION, EMULSION INTRAVENOUS at 12:31

## 2019-05-13 RX ADMIN — VANCOMYCIN HYDROCHLORIDE 1500 MG: 10 INJECTION, POWDER, LYOPHILIZED, FOR SOLUTION INTRAVENOUS at 23:47

## 2019-05-13 RX ADMIN — ONDANSETRON 4 MG: 2 INJECTION INTRAMUSCULAR; INTRAVENOUS at 12:40

## 2019-05-13 RX ADMIN — ACETAMINOPHEN 1000 MG: 500 TABLET ORAL at 20:41

## 2019-05-13 RX ADMIN — HYDROMORPHONE HYDROCHLORIDE 0.4 MG: 2 INJECTION, SOLUTION INTRAMUSCULAR; INTRAVENOUS; SUBCUTANEOUS at 13:52

## 2019-05-13 RX ADMIN — PREGABALIN 200 MG: 100 CAPSULE ORAL at 20:41

## 2019-05-13 RX ADMIN — SODIUM CHLORIDE 125 ML/HR: 900 INJECTION, SOLUTION INTRAVENOUS at 23:47

## 2019-05-13 RX ADMIN — OXYCODONE HYDROCHLORIDE 5 MG: 5 TABLET ORAL at 20:42

## 2019-05-13 RX ADMIN — SODIUM CHLORIDE 125 ML/HR: 900 INJECTION, SOLUTION INTRAVENOUS at 16:28

## 2019-05-13 RX ADMIN — DEXMEDETOMIDINE HYDROCHLORIDE 4 MCG: 4 INJECTION, SOLUTION INTRAVENOUS at 13:07

## 2019-05-13 RX ADMIN — HYDROMORPHONE HYDROCHLORIDE 0.4 MG: 2 INJECTION, SOLUTION INTRAMUSCULAR; INTRAVENOUS; SUBCUTANEOUS at 14:30

## 2019-05-13 RX ADMIN — CELECOXIB 200 MG: 200 CAPSULE ORAL at 11:45

## 2019-05-13 RX ADMIN — GLYCOPYRROLATE 0.2 MG: 0.2 INJECTION INTRAMUSCULAR; INTRAVENOUS at 12:52

## 2019-05-13 RX ADMIN — HYDROMORPHONE HYDROCHLORIDE 0.4 MG: 2 INJECTION, SOLUTION INTRAMUSCULAR; INTRAVENOUS; SUBCUTANEOUS at 14:02

## 2019-05-13 RX ADMIN — LEVOTHYROXINE SODIUM 150 MCG: 75 TABLET ORAL at 22:10

## 2019-05-13 RX ADMIN — DEXMEDETOMIDINE HYDROCHLORIDE 8 MCG: 4 INJECTION, SOLUTION INTRAVENOUS at 13:11

## 2019-05-13 RX ADMIN — SUCCINYLCHOLINE CHLORIDE 90 MG: 20 INJECTION INTRAMUSCULAR; INTRAVENOUS at 12:32

## 2019-05-13 RX ADMIN — MIDAZOLAM HYDROCHLORIDE 2 MG: 1 INJECTION, SOLUTION INTRAMUSCULAR; INTRAVENOUS at 12:24

## 2019-05-13 RX ADMIN — ASPIRIN 325 MG: 325 TABLET, DELAYED RELEASE ORAL at 23:50

## 2019-05-13 RX ADMIN — KETAMINE HYDROCHLORIDE 25 MG: 10 INJECTION, SOLUTION INTRAMUSCULAR; INTRAVENOUS at 13:03

## 2019-05-13 RX ADMIN — VANCOMYCIN HYDROCHLORIDE 1500 MG: 10 INJECTION, POWDER, LYOPHILIZED, FOR SOLUTION INTRAVENOUS at 11:41

## 2019-05-13 RX ADMIN — MORPHINE SULFATE 2 MG: 10 INJECTION, SOLUTION INTRAMUSCULAR; INTRAVENOUS at 17:06

## 2019-05-13 RX ADMIN — KETAMINE HYDROCHLORIDE 25 MG: 10 INJECTION, SOLUTION INTRAMUSCULAR; INTRAVENOUS at 12:55

## 2019-05-13 RX ADMIN — FENTANYL CITRATE 50 MCG: 50 INJECTION, SOLUTION INTRAMUSCULAR; INTRAVENOUS at 13:05

## 2019-05-13 RX ADMIN — FENTANYL CITRATE 25 MCG: 50 INJECTION INTRAMUSCULAR; INTRAVENOUS at 16:20

## 2019-05-13 RX ADMIN — DEXMEDETOMIDINE HYDROCHLORIDE 8 MCG: 4 INJECTION, SOLUTION INTRAVENOUS at 13:09

## 2019-05-13 RX ADMIN — CELECOXIB 200 MG: 200 CAPSULE ORAL at 23:50

## 2019-05-13 RX ADMIN — MAGNESIUM SULFATE HEPTAHYDRATE 2 G: 40 INJECTION, SOLUTION INTRAVENOUS at 12:52

## 2019-05-13 RX ADMIN — TOPIRAMATE 200 MG: 100 TABLET, FILM COATED ORAL at 20:52

## 2019-05-13 RX ADMIN — SODIUM CHLORIDE, SODIUM LACTATE, POTASSIUM CHLORIDE, CALCIUM CHLORIDE: 600; 310; 30; 20 INJECTION, SOLUTION INTRAVENOUS at 12:24

## 2019-05-13 RX ADMIN — FENTANYL CITRATE 25 MCG: 50 INJECTION INTRAMUSCULAR; INTRAVENOUS at 15:40

## 2019-05-13 RX ADMIN — FENTANYL CITRATE 25 MCG: 50 INJECTION INTRAMUSCULAR; INTRAVENOUS at 15:25

## 2019-05-13 RX ADMIN — FENTANYL CITRATE 50 MCG: 50 INJECTION, SOLUTION INTRAMUSCULAR; INTRAVENOUS at 12:29

## 2019-05-13 RX ADMIN — FENTANYL CITRATE 25 MCG: 50 INJECTION INTRAMUSCULAR; INTRAVENOUS at 15:18

## 2019-05-13 RX ADMIN — SODIUM CHLORIDE, SODIUM LACTATE, POTASSIUM CHLORIDE, AND CALCIUM CHLORIDE 50 ML/HR: 600; 310; 30; 20 INJECTION, SOLUTION INTRAVENOUS at 11:24

## 2019-05-13 RX ADMIN — DEXAMETHASONE SODIUM PHOSPHATE 4 MG: 4 INJECTION, SOLUTION INTRA-ARTICULAR; INTRALESIONAL; INTRAMUSCULAR; INTRAVENOUS; SOFT TISSUE at 12:40

## 2019-05-13 RX ADMIN — PROPOFOL 100 MG: 10 INJECTION, EMULSION INTRAVENOUS at 12:30

## 2019-05-13 RX ADMIN — PYRIDOSTIGMINE BROMIDE 60 MG: 60 TABLET ORAL at 12:16

## 2019-05-13 RX ADMIN — LIDOCAINE HYDROCHLORIDE 80 MG: 20 INJECTION, SOLUTION EPIDURAL; INFILTRATION; INTRACAUDAL; PERINEURAL at 12:29

## 2019-05-13 RX ADMIN — ROCURONIUM BROMIDE 20 MG: 10 INJECTION, SOLUTION INTRAVENOUS at 12:49

## 2019-05-13 NOTE — H&P
Date of Surgery Update:  Yulia Galvin was seen and examined. History and physical has been reviewed. The patient has been examined.  There have been no significant clinical changes since the completion of the originally dated History and Physical.    Signed By: Eva Sim MD     May 13, 2019 11:11 AM

## 2019-05-13 NOTE — ANESTHESIA POSTPROCEDURE EVALUATION
Post-Anesthesia Evaluation and Assessment Patient: Esther Randall MRN: [id number]  SSN: [id number] YOB: 1955  Age: 60 y.o. Sex: female I have evaluated the patient and they are stable and ready for discharge from the PACU. Cardiovascular Function/Vital Signs Visit Vitals /64 Pulse 79 Temp 36.3 °C (97.4 °F) Resp 11 Ht 6' (1.829 m) Wt 89.2 kg (196 lb 10 oz) SpO2 91% BMI 26.67 kg/m² Patient is status post General anesthesia for Procedure(s): LEFT TOTAL HIP ARTHROPLASTY ANTERIOR APPROACH. Nausea/Vomiting: None Postoperative hydration reviewed and adequate. Pain: 
Pain Scale 1: Visual (05/13/19 1638) Pain Intensity 1: 0 (05/13/19 1638) Managed Neurological Status:  
Neuro (WDL): Exceptions to WDL (05/13/19 1638) Neuro Neurologic State: Drowsy (05/13/19 1638) Orientation Level: Oriented to person;Oriented to place;Oriented to time (05/13/19 1638) Cognition: Follows commands (05/13/19 1638) Speech: Slurred (05/13/19 1638) Assessment L Pupil: Round;Sluggish (05/13/19 1638) Size L Pupil (mm): 3 (05/13/19 1638) Assessment R Pupil: Round;Sluggish (05/13/19 1638) Size R Pupil (mm): 3 (05/13/19 1638) LUE Motor Response: Purposeful (05/13/19 1638) LLE Motor Response: Purposeful (05/13/19 1638) RUE Motor Response: Purposeful (05/13/19 1638) RLE Motor Response: Purposeful (05/13/19 1638) At baseline Mental Status, Level of Consciousness: Alert and  oriented to person, place, and time Pulmonary Status:  
O2 Device: Room air (05/13/19 1638) Adequate oxygenation and airway patent Complications related to anesthesia: None Post-anesthesia assessment completed. No concerns Signed By: Tere Resendiz MD   
 May 13, 2019 Procedure(s): LEFT TOTAL HIP ARTHROPLASTY ANTERIOR APPROACH. general 
 
<BSHSIANPOST> Vitals Value Taken Time /64 5/13/2019  4:45 PM  
Temp 36.3 °C (97.4 °F) 5/13/2019  3:18 PM  
 Pulse 78 5/13/2019  4:51 PM  
Resp 10 5/13/2019  4:51 PM  
SpO2 96 % 5/13/2019  4:51 PM  
Vitals shown include unvalidated device data.

## 2019-05-13 NOTE — PROGRESS NOTES
Patient called NN to report she is having left hip replacement at [facility] today by . Wished her well, will notify pcp. Advised to have son call if we can help in any way.

## 2019-05-13 NOTE — PERIOP NOTES
TRANSFER - OUT REPORT:    Verbal report given to Christie (name) on Hiwot GIBBONS Minor  being transferred to 569 for routine post - op       Report consisted of patients Situation, Background, Assessment and   Recommendations(SBAR). Time Pre op antibiotic given:1141  Anesthesia Stop time: 3189  Og Present on Transfer to floor:no    Information from the following report(s) SBAR, Procedure Summary, Intake/Output, MAR and Cardiac Rhythm NSR was reviewed with the receiving nurse. Opportunity for questions and clarification was provided. Is the patient on 02? YES       L/Min 1    Is the patient on a monitor? NO    Is the nurse transporting with the patient? NO    Surgical Waiting Area notified of patient's transfer from PACU? YES      The following personal items collected during your admission accompanied patient upon transfer:   Dental Appliance: Dental Appliances: None  Vision:    Hearing Aid:    Jewelry:    Clothing: Clothing: (bag of belongings with patient in PACU)  Other Valuables:  Other Valuables: Eyeglasses(with patient in PACU)  Valuables sent to safe:

## 2019-05-13 NOTE — ANESTHESIA PREPROCEDURE EVALUATION
Relevant Problems   No relevant active problems       Anesthetic History               Review of Systems / Medical History      Pulmonary        Sleep apnea           Neuro/Psych         Psychiatric history    Comments: MS  Chronic pain (on suboxone) Cardiovascular    Hypertension                Comments:  Mod pulm htn   GI/Hepatic/Renal                Endo/Other    Diabetes  Hypothyroidism  Arthritis     Other Findings            Physical Exam    Airway  Mallampati: I  TM Distance: > 6 cm  Neck ROM: normal range of motion   Mouth opening: Normal     Cardiovascular    Rhythm: regular  Rate: normal         Dental  No notable dental hx       Pulmonary  Breath sounds clear to auscultation               Abdominal         Other Findings            Anesthetic Plan    ASA: 3  Anesthesia type: general          Induction: Intravenous  Anesthetic plan and risks discussed with: Patient

## 2019-05-14 VITALS
HEART RATE: 76 BPM | DIASTOLIC BLOOD PRESSURE: 59 MMHG | WEIGHT: 196.63 LBS | BODY MASS INDEX: 26.63 KG/M2 | HEIGHT: 72 IN | TEMPERATURE: 98.2 F | SYSTOLIC BLOOD PRESSURE: 110 MMHG | OXYGEN SATURATION: 97 % | RESPIRATION RATE: 16 BRPM

## 2019-05-14 LAB
ANION GAP SERPL CALC-SCNC: 2 MMOL/L (ref 5–15)
BASOPHILS # BLD: 0 K/UL (ref 0–0.1)
BASOPHILS NFR BLD: 0 % (ref 0–1)
BUN SERPL-MCNC: 11 MG/DL (ref 6–20)
BUN/CREAT SERPL: 12 (ref 12–20)
CALCIUM SERPL-MCNC: 8.4 MG/DL (ref 8.5–10.1)
CHLORIDE SERPL-SCNC: 116 MMOL/L (ref 97–108)
CO2 SERPL-SCNC: 24 MMOL/L (ref 21–32)
CREAT SERPL-MCNC: 0.93 MG/DL (ref 0.55–1.02)
DIFFERENTIAL METHOD BLD: ABNORMAL
EOSINOPHIL # BLD: 0 K/UL (ref 0–0.4)
EOSINOPHIL NFR BLD: 0 % (ref 0–7)
ERYTHROCYTE [DISTWIDTH] IN BLOOD BY AUTOMATED COUNT: 14.1 % (ref 11.5–14.5)
GLUCOSE BLD STRIP.AUTO-MCNC: 157 MG/DL (ref 65–100)
GLUCOSE BLD STRIP.AUTO-MCNC: 97 MG/DL (ref 65–100)
GLUCOSE SERPL-MCNC: 132 MG/DL (ref 65–100)
HCT VFR BLD AUTO: 36.3 % (ref 35–47)
HGB BLD-MCNC: 10.9 G/DL (ref 11.5–16)
IMM GRANULOCYTES # BLD AUTO: 0.1 K/UL (ref 0–0.04)
IMM GRANULOCYTES NFR BLD AUTO: 1 % (ref 0–0.5)
LYMPHOCYTES # BLD: 0.5 K/UL (ref 0.8–3.5)
LYMPHOCYTES NFR BLD: 6 % (ref 12–49)
MCH RBC QN AUTO: 29.5 PG (ref 26–34)
MCHC RBC AUTO-ENTMCNC: 30 G/DL (ref 30–36.5)
MCV RBC AUTO: 98.4 FL (ref 80–99)
MONOCYTES # BLD: 0.9 K/UL (ref 0–1)
MONOCYTES NFR BLD: 12 % (ref 5–13)
NEUTS SEG # BLD: 6.1 K/UL (ref 1.8–8)
NEUTS SEG NFR BLD: 81 % (ref 32–75)
NRBC # BLD: 0 K/UL (ref 0–0.01)
NRBC BLD-RTO: 0 PER 100 WBC
PLATELET # BLD AUTO: 86 K/UL (ref 150–400)
PLATELET COMMENTS,PCOM: ABNORMAL
PMV BLD AUTO: 11 FL (ref 8.9–12.9)
POTASSIUM SERPL-SCNC: 4.5 MMOL/L (ref 3.5–5.1)
RBC # BLD AUTO: 3.69 M/UL (ref 3.8–5.2)
RBC MORPH BLD: ABNORMAL
RBC MORPH BLD: ABNORMAL
SERVICE CMNT-IMP: ABNORMAL
SERVICE CMNT-IMP: NORMAL
SODIUM SERPL-SCNC: 142 MMOL/L (ref 136–145)
WBC # BLD AUTO: 7.6 K/UL (ref 3.6–11)

## 2019-05-14 PROCEDURE — 80048 BASIC METABOLIC PNL TOTAL CA: CPT

## 2019-05-14 PROCEDURE — 82962 GLUCOSE BLOOD TEST: CPT

## 2019-05-14 PROCEDURE — 74011250637 HC RX REV CODE- 250/637: Performed by: PHYSICIAN ASSISTANT

## 2019-05-14 PROCEDURE — 36415 COLL VENOUS BLD VENIPUNCTURE: CPT

## 2019-05-14 PROCEDURE — 85025 COMPLETE CBC W/AUTO DIFF WBC: CPT

## 2019-05-14 PROCEDURE — 74011636637 HC RX REV CODE- 636/637: Performed by: PHYSICIAN ASSISTANT

## 2019-05-14 PROCEDURE — 97165 OT EVAL LOW COMPLEX 30 MIN: CPT

## 2019-05-14 PROCEDURE — 77030038269 HC DRN EXT URIN PURWCK BARD -A

## 2019-05-14 PROCEDURE — 74011250636 HC RX REV CODE- 250/636: Performed by: PHYSICIAN ASSISTANT

## 2019-05-14 PROCEDURE — 97116 GAIT TRAINING THERAPY: CPT

## 2019-05-14 PROCEDURE — 97161 PT EVAL LOW COMPLEX 20 MIN: CPT

## 2019-05-14 PROCEDURE — 97535 SELF CARE MNGMENT TRAINING: CPT

## 2019-05-14 RX ORDER — TRAMADOL HYDROCHLORIDE 50 MG/1
50 TABLET ORAL
Qty: 45 TAB | Refills: 0 | Status: SHIPPED | OUTPATIENT
Start: 2019-05-14 | End: 2019-06-13

## 2019-05-14 RX ORDER — ASPIRIN 325 MG
81 TABLET, DELAYED RELEASE (ENTERIC COATED) ORAL EVERY 12 HOURS
Qty: 1 TAB | Refills: 0 | Status: SHIPPED
Start: 2019-05-14 | End: 2019-05-14

## 2019-05-14 RX ORDER — ASPIRIN 81 MG/1
81 TABLET ORAL EVERY 12 HOURS
Qty: 60 TAB | Refills: 0 | Status: ON HOLD | OUTPATIENT
Start: 2019-05-14 | End: 2022-07-21 | Stop reason: SDUPTHER

## 2019-05-14 RX ADMIN — INSULIN LISPRO 2 UNITS: 100 INJECTION, SOLUTION INTRAVENOUS; SUBCUTANEOUS at 14:16

## 2019-05-14 RX ADMIN — ACETAMINOPHEN 1000 MG: 500 TABLET ORAL at 07:23

## 2019-05-14 RX ADMIN — CELECOXIB 200 MG: 200 CAPSULE ORAL at 14:16

## 2019-05-14 RX ADMIN — PREGABALIN 200 MG: 100 CAPSULE ORAL at 09:21

## 2019-05-14 RX ADMIN — TOPIRAMATE 200 MG: 100 TABLET, FILM COATED ORAL at 09:22

## 2019-05-14 RX ADMIN — TRAMADOL HYDROCHLORIDE 50 MG: 50 TABLET, FILM COATED ORAL at 16:47

## 2019-05-14 RX ADMIN — SODIUM CHLORIDE 125 ML/HR: 900 INJECTION, SOLUTION INTRAVENOUS at 09:50

## 2019-05-14 RX ADMIN — ASPIRIN 325 MG: 325 TABLET, DELAYED RELEASE ORAL at 14:16

## 2019-05-14 RX ADMIN — TRAMADOL HYDROCHLORIDE 50 MG: 50 TABLET, FILM COATED ORAL at 09:21

## 2019-05-14 RX ADMIN — ACETAMINOPHEN 1000 MG: 500 TABLET ORAL at 14:16

## 2019-05-14 NOTE — ROUTINE PROCESS
Bedside shift change report given to Minnie DaleRN (oncoming nurse) by Stanislav Smith RN(offgoing nurse). Report given with SBAR.

## 2019-05-14 NOTE — PROGRESS NOTES
OCCUPATIONAL THERAPY EVALUATION  Patient: Josefina Galvin (66 y.o. female)  Date: 5/14/2019  Primary Diagnosis: Arthritis of left hip [M16.12]  Procedure(s) (LRB):  LEFT TOTAL HIP ARTHROPLASTY ANTERIOR APPROACH (Left) 1 Day Post-Op   Precautions:  Fall, WBAT    ASSESSMENT :  Based on the objective data described below, the patient presents with overall decreased independence with ADL activities following L THR. Pt did well with mobility to chair but needs maximal encouragement to progress independence with ADL tasks. Educated for role of OT and completed dressing and bathing from chair level. Pt at this time, needing only min A to aide with lower body socks and shoes. Pt demonstrating good use of reacher for dressing activities. She will need review for socks/shoes tomorrow should she remain in acute setting. She has completed dressing with AE following prior hip replacement in 2017 and needs practice. Pt is cleared for discharge today should she wish to discharge home. OT will follow only should she remain in acute setting. Patient will benefit from skilled intervention to address the above impairments. Patient?s rehabilitation potential is considered to be Good  Factors which may influence rehabilitation potential include:   ? None noted  ? Mental ability/status  ? Medical condition  ? Home/family situation and support systems  ? Safety awareness  ? Pain tolerance/management  ? Other:      PLAN :  Recommendations and Planned Interventions:  ?               Self Care Training                  ? Therapeutic Activities  ? Functional Mobility Training    ? Cognitive Retraining  ? Therapeutic Exercises           ? Endurance Activities  ? Balance Training                   ? Neuromuscular Re-Education  ? Visual/Perceptual Training     ?    Home Safety Training  ? Patient Education                 ? Family Training/Education  ? Other (comment):    Frequency/Duration: Patient will be followed by occupational therapy 5 times a week to address goals. Discharge Recommendations: None  Further Equipment Recommendations for Discharge: TBD      SUBJECTIVE:   Patient stated ? I am feeling alright.?    OBJECTIVE DATA SUMMARY:   HISTORY:   Past Medical History:   Diagnosis Date    Arthritis     hip, hands    Benign cyst of left breast 3/21/2016    Blindness and low vision 2004    legally blind from MS    Cervical spinal stenosis 2016    Moderate C6-7    Chronic pain     Depression     Diabetes mellitus type 2, controlled ([address] 2016    Diet-controlled diabetes mellitus ([address] 2018    Endometriosis 2010    FH: breast cancer 2010    Chart states FH breast/ovary Ca, CAD,DM     History of CVA (cerebrovascular accident) 2018    HTN, goal below 140/90 2010    CURRENTLY NO MEDICATIONS (17)    Hypercholesterolemia     Hypothyroid 2010    Incontinence     Lumbosacral plexopathy 2010    MS (multiple sclerosis) ([address]     Myasthenia gravis ([address] [facility],4712    Myasthenia gravis ([address]     Sleep apnea 2010    RESOLVED 2019    Ureteral calculus 2010    Vitamin D deficiency 3/17/2016     Past Surgical History:   Procedure Laterality Date    ABDOMEN SURGERY PROC UNLISTED      bowel resection    BREAST SURGERY PROCEDURE UNLISTED      breast cyst-both breasts at different times    HX  SECTION  1986    HX CYST INCISION AND DRAINAGE      HX GI      COLONOSCOPY    HX GYN      ovarian cyst    HX HEENT  1974    THYROIDECTOMY    HX HEENT      removal of thyroglossal duct cyst    HX HIP REPLACEMENT Right 2017    anterior approach    HX KNEE ARTHROSCOPY Right     HX ORTHOPAEDIC  1997    right thumb tendon repair x 2    HX ORTHOPAEDIC Left     CARPAL TUNNEL    HX ORTHOPAEDIC Right     HIP REPLACEMENT    HX OTHER SURGICAL      Janee Cath x2    HX SMALL BOWEL RESECTION      HX THYROIDECTOMY      1975    HX VASCULAR ACCESS  2006    PORT -A- CATH X 2       Prior Level of Function/Environment/Context: pt was independent at home prior to surgery. Expanded or extensive additional review of patient history:     Home Situation  Home Environment: Private residence  One/Two Story Residence: One story  Living Alone: No  Support Systems: Family member(s)  Patient Expects to be Discharged to[de-identified] Private residence  Current DME Used/Available at Home: [employer], straight  Tub or Shower Type: Shower    Hand dominance: Right    EXAMINATION OF PERFORMANCE DEFICITS:  Cognitive/Behavioral Status:  Neurologic State: Alert  Orientation Level: Oriented X4  Cognition: Appropriate for age attention/concentration  Perception: Appears intact  Perseveration: No perseveration noted  Safety/Judgement: Good awareness of safety precautions    Skin: see nursing notes    Edema: none     Hearing: Auditory  Auditory Impairment: None    Vision/Perceptual:                           Acuity: Within Defined Limits         Range of Motion:    AROM: Within functional limits  PROM: Within functional limits                      Strength:    Strength: Within functional limits                Coordination:  Coordination: Within functional limits  Fine Motor Skills-Upper: Right Intact; Left Intact    Gross Motor Skills-Upper: Right Intact; Left Intact    Tone & Sensation:    Tone: Normal  Sensation: Intact                      Balance:  Sitting: Intact  Standing: Intact; With support  Standing - Static: Fair  Standing - Dynamic : Fair    Functional Mobility and Transfers for ADLs:  Bed Mobility:  Rolling: Contact guard assistance  Supine to Sit: Additional time;Supervision  Sit to Supine: (remained OOB in chair)  Scooting: Minimum assistance    Transfers:  Sit to Stand: Contact guard assistance  Stand to Sit: Contact guard assistance  Bed to Chair: Contact guard assistance  Bathroom Mobility: Contact guard assistance  Toilet Transfer : Contact guard assistance    ADL Assessment:  Feeding: Supervision    Oral Facial Hygiene/Grooming: Supervision    Bathing: Minimum assistance    Upper Body Dressing: Supervision    Lower Body Dressing: Contact guard assistance    Toileting: Contact guard assistance                ADL Intervention and task modifications:     Lower Body Access:  Pt with anterior hip replacement and instructed to avoid extreme movements and allow pain to be the guide to complete lower body access. Recommend use of hip kit to complete lower body dressing to maximize independence and assist with pain control. Pt provided with education for proper  to access lower body with trunk flexion. Pt instructed with trunk flexion, both elbows and hands should reach between knees with hips externally rotated. Pt reports independence with training and education. Pt completed lower body dressing with supervision for pants, supervisions for underpants, socks with mod A (did not use AE), and max A  for shoes. Cognitive Retraining  Safety/Judgement: Good awareness of safety precautions    Functional Measure:  Barthel Index:    Bathin  Bladder: 5  Bowels: 10  Groomin  Dressin  Feeding: 10  Mobility: 10  Stairs: 0  Toilet Use: 5  Transfer (Bed to Chair and Back): 10  Total: 60/100        Percentage of impairment   0%   1-19%   20-39%   40-59%   60-79%   80-99%   100%   Barthel Score 0-100 100 99-80 79-60 59-40 20-39 1-19   0     The Barthel ADL Index: Guidelines  1. The index should be used as a record of what a patient does, not as a record of what a patient could do. 2. The main aim is to establish degree of independence from any help, physical or verbal, however minor and for whatever reason. 3. The need for supervision renders the patient not independent.   4. A patient's performance should be established using the best available evidence. Asking the patient, friends/relatives and nurses are the usual sources, but direct observation and common sense are also important. However direct testing is not needed. 5. Usually the patient's performance over the preceding 24-48 hours is important, but occasionally longer periods will be relevant. 6. Middle categories imply that the patient supplies over 50 per cent of the effort. 7. Use of aids to be independent is allowed. Ashlyn Tay., Barthel, D.W. (3168). Functional evaluation: the Barthel Index. [address] [place] [employer] (14)2. Dc Gil johana BETTY Hair, Robina Grayson., Davion Magdaleno., [place], [address] (1999). Measuring the change indisability after inpatient rehabilitation; comparison of the responsiveness of the Barthel Index and Functional [place] Measure. Journal of Neurology, Neurosurgery, and Psychiatry, 66(4), 999-324. Ken Del Cid, N.[place].A, CHEN Estevez, & Constantino Guerrero M.A. (2004.) Assessment of post-stroke quality of life in cost-effectiveness studies: The usefulness of the Barthel Index and the EuroQoL-5D. Quality of Life Research, 15, 471-44     Occupational Therapy Evaluation Charge Determination   History Examination Decision-Making   LOW Complexity : Brief history review  HIGH Complexity : 5 or more performance deficits relating to physical, cognitive , or psychosocial skils that result in activity limitations and / or participation restrictions HIGH Complexity : Patient presents with comorbidities that affect occupational performance.  Signifigant modification of tasks or assistance (eg, physical or verbal) with assessment (s) is necessary to enable patient to complete evaluation       Based on the above components, the patient evaluation is determined to be of the following complexity level: LOW   Pain:  Pain Scale 1: Numeric (0 - 10)  Pain Intensity 1: 5  Pain Location 1: Hip  Pain Orientation 1: Left  Pain Description 1: Aching  Pain Intervention(s) 1: Medication (see MAR)  Activity Tolerance:   VSS throughout session. After treatment:   ? Patient left in no apparent distress sitting up in chair  ? Patient left in no apparent distress in bed  ? Call bell left within reach  ? Nursing notified  ? Caregiver present  ? Bed alarm activated    COMMUNICATION/EDUCATION:   The patient?s plan of care was discussed with: Physical Therapist and Registered Nurse.  ? Home safety education was provided and the patient/caregiver indicated understanding. ? Patient/family have participated as able in goal setting and plan of care. ? Patient/family agree to work toward stated goals and plan of care. ? Patient understands intent and goals of therapy, but is neutral about his/her participation. ? Patient is unable to participate in goal setting and plan of care. This patient?s plan of care is appropriate for delegation to [facility].     Thank you for this referral.  Benita Messer OT  Time Calculation: 40 mins

## 2019-05-14 NOTE — PROGRESS NOTES
Problem: Mobility Impaired (Adult and Pediatric)  Goal: *Acute Goals and Plan of Care (Insert Text)  Description  Physical Therapy Goals  Initiated 5/14/2019    1. Patient will move from supine to sit and sit to supine , scoot up and down and roll side to side in bed with modified independence within 4 days. 2. Patient will perform sit to stand with modified independence within 4 days. 3. Patient will ambulate with modified independence for 150 feet with the least restrictive device within 4 days. 4. Patient will perform post-op JITENDRA home exercise program per protocol with independence within 4 days. No stairs in the home-ramp to enter   Outcome: Progressing Towards Goal  PHYSICAL THERAPY EVALUATION  Patient: Hiwot Galvin (66 y.o. female)  Date: 5/14/2019  Primary Diagnosis: Arthritis of left hip [M16.12]  Procedure(s) (LRB):  LEFT TOTAL HIP ARTHROPLASTY ANTERIOR APPROACH (Left) 1 Day Post-Op   Precautions:   Fall, WBAT    ASSESSMENT :  Based on the objective data described below, the patient presents with  impairment in functional mobility, activity tolerance and balance s/p L JITENDRA. PLOF: Modified independent with ADLs and IADLs. Has MS and has all of necessary equipment. Performed all mobility with contact guard-modified assistance. Ambulated 300 ft with Rw, gait belt and supervision for safety. Stairs not addressed as she states that she lives on one floor and has only a low step to enter the home. Patient has had a R JITENDRA and is familiar with post-JITENDRA exercises. Patient is cleared for discharge from PT standpoint. She will benefit from [address] [place] [address] Cornelio Miguel PT in order to achieve maximum level of safe, functional mobility, balance and return to independent PLOF. Case Management to arrange Home Health PT/OT/Aide from [facility] per patient request.    Patient will benefit from skilled intervention to address the above impairments.   Patient?s rehabilitation potential is considered to be Good  Factors which may influence rehabilitation potential include:   ? None noted  ? Mental ability/status  ? Medical condition  ? Home/family situation and support systems  ? Safety awareness  ? Pain tolerance/management  ? Other:      PLAN :  Recommendations and Planned Interventions:  ?           Bed Mobility Training             ? Neuromuscular Re-Education  ? Transfer Training                   ? Orthotic/Prosthetic Training  ? Gait Training                         ? Modalities  ? Therapeutic Exercises           ? Edema Management/Control  ? Therapeutic Activities            ? Patient and Family Training/Education  ? Other (comment):    Frequency/Duration: Patient will be followed by physical therapy  twice daily to address goals. Discharge Recommendations: Home Health  Further Equipment Recommendations for Discharge: None-per patient, has cane and rolling walker. SUBJECTIVE:   Patient stated ? I would like to go home. ?    OBJECTIVE DATA SUMMARY:   HISTORY:    Past Medical History:   Diagnosis Date    Arthritis     hip, hands    Benign cyst of left breast 3/21/2016    Blindness and low vision 2004    legally blind from MS    Cervical spinal stenosis 12/2/2016    Moderate C6-7    Chronic pain     Depression     Diabetes mellitus type 2, controlled ([address] 9/13/2016    Diet-controlled diabetes mellitus ([facility] 1/5/2018    Endometriosis 6/25/2010    FH: breast cancer 6/25/2010    Chart states FH breast/ovary Ca, CAD,DM     History of CVA (cerebrovascular accident) 6/5/2018    HTN, goal below 140/90 6/25/2010    CURRENTLY NO MEDICATIONS (5/18/17)    Hypercholesterolemia     Hypothyroid 6/25/2010    Incontinence     Lumbosacral plexopathy 6/25/2010    MS (multiple sclerosis) ([address] 2004    Myasthenia gravis ([address] [id number]    Myasthenia gravis ([facility] 2011    Sleep apnea 6/25/2010    RESOLVED 5/2019    Ureteral calculus 2010    Vitamin D deficiency 3/17/2016     Past Surgical History:   Procedure Laterality Date    ABDOMEN SURGERY PROC UNLISTED      bowel resection    BREAST SURGERY PROCEDURE UNLISTED      breast cyst-both breasts at different times    HX  SECTION  1986    HX CYST INCISION AND DRAINAGE      HX GI      COLONOSCOPY    HX GYN      ovarian cyst    HX HEENT  1974    THYROIDECTOMY    HX HEENT      removal of thyroglossal duct cyst    HX HIP REPLACEMENT Right 2017    anterior approach    HX KNEE ARTHROSCOPY Right     HX ORTHOPAEDIC  1997    right thumb tendon repair x 2    HX ORTHOPAEDIC Left     CARPAL TUNNEL    HX ORTHOPAEDIC Right     HIP REPLACEMENT    HX OTHER SURGICAL      Janee Cath x2    HX SMALL BOWEL RESECTION      HX THYROIDECTOMY      1975    HX VASCULAR ACCESS  2006    PORT -A- CATH X 2     Prior Level of Function/Home Situation: Independent with ADLs and IADLs  Personal factors and/or comorbidities impacting plan of care:  A & O x 4/Motivated    Home Situation  Home Environment: Private residence  One/Two Story Residence: One story  Living Alone: No  Support Systems: Family member(s)  Patient Expects to be Discharged to[de-identified] Private residence  Current DME Used/Available at Home: Cane, straight  Tub or Shower Type: Shower    EXAMINATION/PRESENTATION/DECISION MAKING:   Critical Behavior:  Neurologic State: Alert  Orientation Level: Oriented X4  Cognition: Appropriate for age attention/concentration  Safety/Judgement: Good awareness of safety precautions  Hearing: Auditory  Auditory Impairment: None  Range Of Motion:  AROM: Within functional limits           PROM: Within functional limits           Strength:    Strength:  Within functional limits                    Tone & Sensation:   Tone: Normal              Sensation: Intact               Coordination:  Coordination: Within functional limits  Vision:   Acuity: Within Defined Limits  Functional Mobility:  Bed Mobility:  Rolling: Contact guard assistance  Supine to Sit: Additional time;Supervision  Sit to Supine: (remained OOB in chair)  Scooting: Contact guard assistance  Transfers:  Sit to Stand: Contact guard assistance  Stand to Sit: Contact guard assistance        Bed to Chair: Contact guard assistance              Balance:   Sitting: Intact  Standing: Intact; With support  Standing - Static: Fair  Standing - Dynamic : Fair  Ambulation/Gait Training:  Distance (ft): 150 Feet (ft)  Assistive Device: Walker, rolling;Gait belt  Ambulation - Level of Assistance: Contact guard assistance        Gait Abnormalities: Antalgic     Left Side Weight Bearing: As tolerated  Base of Support: Widened     Speed/Gerda: Slow  Step Length: Left shortened;Right shortened  Swing Pattern: Left asymmetrical          Therapeutic Exercises:   Patient has had a R JITENDRA and is familiar with post-JITENDRA exercises. Functional Measure:  Barthel Index:    Bathin  Bladder: 5  Bowels: 10  Groomin  Dressin  Feeding: 10  Mobility: 10  Stairs: 0  Toilet Use: 5  Transfer (Bed to Chair and Back): 10  Total: 60/100       Percentage of impairment   0%   1-19%   20-39%   40-59%   60-79%   80-99%   100%   Barthel Score 0-100 100 99-80 79-60 59-40 20-39 1-19   0     The Barthel ADL Index: Guidelines  1. The index should be used as a record of what a patient does, not as a record of what a patient could do. 2. The main aim is to establish degree of independence from any help, physical or verbal, however minor and for whatever reason. 3. The need for supervision renders the patient not independent. 4. A patient's performance should be established using the best available evidence. Asking the patient, friends/relatives and nurses are the usual sources, but direct observation and common sense are also important. However direct testing is not needed.   5. Usually the patient's performance over the preceding 24-48 hours is important, but occasionally longer periods will be relevant. 6. Middle categories imply that the patient supplies over 50 per cent of the effort. 7. Use of aids to be independent is allowed. Justine Ruby., Barthel, D.W. (0435). Functional evaluation: the Barthel Index. [facility] (14)2. BETTY Bach, Fermin Carr., Maricruz Cruz., [place], [address] (1999). Measuring the change indisability after inpatient rehabilitation; comparison of the responsiveness of the Barthel Index and Functional [place] Measure. Journal of Neurology, Neurosurgery, and Psychiatry, 66(4), 337-853. Nik Boyd, N.J.A, CHEN Estevez, & Annia Daley MISAIAS. (2004.) Assessment of post-stroke quality of life in cost-effectiveness studies: The usefulness of the Barthel Index and the EuroQoL-5D. Quality of Life Research, 15, 999-20           Physical Therapy Evaluation Charge Determination   History Examination Presentation Decision-Making   HIGH Complexity :3+ comorbidities / personal factors will impact the outcome/ POC  LOW Complexity : 1-2 Standardized tests and measures addressing body structure, function, activity limitation and / or participation in recreation  LOW Complexity : Stable, uncomplicated  MEDIUM Complexity : FOTO score of 26-74      Based on the above components, the patient evaluation is determined to be of the following complexity level: LOW     Pain:  Pain Scale 1: Numeric (0 - 10)  Pain Intensity 1: 5  Pain Location 1: Hip  Pain Orientation 1: Left  Pain Description 1: Aching  Pain Intervention(s) 1: Medication (see MAR)  Activity Tolerance:   Good  After treatment:   ?         Patient left in no apparent distress sitting up in chair  ? Patient left in no apparent distress in bed  ? Call bell left within reach  ? Nursing notified  ? Caregiver present  ?          Bed alarm activated    COMMUNICATION/EDUCATION:   The patient?s plan of care was discussed with: Registered Nurse and Social Worker. ?         Fall prevention education was provided and the patient/caregiver indicated understanding. ? Patient/family have participated as able in goal setting and plan of care. ?         Patient/family agree to work toward stated goals and plan of care. ?         Patient understands intent and goals of therapy, but is neutral about his/her participation. ? Patient is unable to participate in goal setting and plan of care.     Thank you for this referral.  Jed Veras   Time Calculation: 35 mins

## 2019-05-14 NOTE — PROGRESS NOTES
Complaints: none  Events: none    VSS / AFEBRILE      GEN:  NAD. AOx3   ABD:  S/NT/ND   LLE:  Dressing C/D/I    5/5 motor    Calf nttp (Bilat)    Sensate all distribution to light touch    1+ dp/pt pulses, foot perfused      Lab Results   Component Value Date/Time    HGB 10.9 (L) 05/14/2019 03:20 AM    INR 1.0 04/18/2019 11:50 AM       Lab Results   Component Value Date/Time    Sodium 142 05/14/2019 03:20 AM    Potassium 4.5 05/14/2019 03:20 AM    Chloride 116 (H) 05/14/2019 03:20 AM    CO2 24 05/14/2019 03:20 AM    BUN 11 05/14/2019 03:20 AM    Creatinine 0.93 05/14/2019 03:20 AM    Calcium 8.4 (L) 05/14/2019 03:20 AM    Magnesium 1.7 04/04/2015 11:55 AM            POD #1 LEFT TOTAL HIP REPLACEMENT. Satisfactory progress. Patient is doing well. Her pain is minimal and she has met PT goals. Plan for discharge to home today. Her son lives with her and she states she has no stairs in her home. All questions were answered. Lower dose of ASA from 325mg BID to 81mg BID due to drop in PLTs  Follow up with her hematologist upon discharge. Follow up with Dr. Qi Douglas in 3 weeks. ABX: Complete today  PATHWAY: D/C Earle per protocol  DVT Prophylaxis: ASA 81mg  Weight Bearing: WBAT LLE   Pain Control: PRN oral narcotics, celebrex  Anticipated Discharge Date: today  Disposition: Home, PT.

## 2019-05-14 NOTE — DISCHARGE INSTRUCTIONS
Discharge Instructions Anterior Approach   Hip Replacement-Dr. Hancock    Patient Name:Hiwot Galvin  Date of procedure:5/13/2019    Procedure:Procedure(s):  LEFT TOTAL HIP ARTHROPLASTY ANTERIOR APPROACH  Surgeon:Surgeon(s) and Role:     * Mulu Aparicio MD - Primary   PCP: Issac Simmons MD  Date of discharge: 5/14/19     Follow up appointments   Follow up with Dr. Jill Aleman in 3 weeks. Call [phone] to make an appointment.  If home health has been arranged for you the agency will contact you to arrange dates/times for visits. Please call them if you do not hear from them within 24 hours after you are discharged    When to call your Orthopaedic Surgeon: Call [phone]. If you need to reach us after 5pm or on a weekend; call [phone] and the on call physician will be contacted   Increased hip pain, unrelieved pain or if you have difficulty or are unable to walk   Signs of infection-if your incision is red; continues to have drainage; drainage has a foul odor or if you have a persistent fever over 101 degrees   Signs of a blood clot in your leg-calf pain, tenderness, redness, swelling of lower leg    When to call your Primary Care Physician:   Concerns about medical conditions such as diabetes, high blood pressure, asthma, congestive heart failure   Call if blood sugars are elevated, persistent headache or dizziness, coughing or congestion, constipation or diarrhea, burning with urination, abnormal heart rate    When to call 911and go to the nearest emergency room   Sudden onset of chest pain, shortness of breath, difficulty breathing    Activity   Weight bearing as tolerated with walker or crutches.  Refer to your patient handbook for instructions and pictures   Complete your Home Exercise Program daily as instructed by your therapist.  Refer to your handbook for instructions and pictures   Get up every one hour and walk (except at night when sleeping)   AVOID sudden and extreme movement of your hip (surgical leg)   Do not drive or operate heavy machinery    Incision Care     The Aquacel (brown, waterproof) surgical dressing is to remain on your hip for 7 days. On the 7th day have someone gently peel the dressing off by carefully lifting the edge and stretching it slightly to break the adhesive seal and leave incision open to air. You may take a shower with the Aquacel dressing in place.  You do not have staples in your hip incision. If present they will be removed by the Home Health staff in 10-14 days and steri-strips will be applied. Leave the steri-steips on until they fall off.  Once the Aquacel is removed, you may get your incision wet but do not submerge your incision under water in a bath tub, hot tub or swimming pool for 6 weeks after surgery. Preventing blood clots   Take Asprin 81 mg twice a day for 1 month as prescribed    Pain management   Keep ice wrap in place except when walking; changing gel packs every 4 hours   Lie down and elevate your leg on pillows for about 30 minutes after walking to decrease swelling and pain   Do ankle pumps (10 repetitions) every hour while awake and get up frequently to walk   Take narcotic pain pill as prescribed if needed. Take with food; avoid alcohol while taking pain medication. Decrease the amount of narcotic pain medication as your pain lessens   Do not take any over-the-counter medication except for Tylenol (acetaminophen). Please be aware that many medications contain Tylenol. We do not want you to take too much Tylenol so please use this as your guide:  o Do not take more than 3 Grams of Tylenol in a 24 hour period.  (There are 1000 milligrams in one Gram  o 325mg of Tylenol per tablet (do not take more than 9 tablets in 24 hours)  o 500mg of Tylenol per tablet (do not take more than 6 tablets in 24 hours)    Diet   Resume usual diet; drink plenty of fluids; eat foods high in fiber   Take over-the-counter stool softeners and laxative to prevent constipation

## 2019-05-14 NOTE — PROGRESS NOTES
Bedside and Verbal shift change report given to [facility] (oncoming nurse) by Alexia Mena (offgoing nurse). Report included the following information SBAR, Kardex, and OR summary.      Primary Nurse Rosanne Hunter and [facility], RN performed a dual skin assessment on this patient No impairment noted  Esteban score is 21

## 2019-05-14 NOTE — PROGRESS NOTES
Milton Shetty has accepted for [facility].      Esperanza Rowell [facility]

## 2019-05-14 NOTE — PROGRESS NOTES
Care Management Interventions  PCP Verified by CM: Yes  Palliative Care Criteria Met (RRAT>21 & CHF Dx)?: No  Mode of Transport at Discharge: Other (see comment)  Transition of Care Consult (CM Consult): Discharge Planning  MyChart Signup: Yes  Discharge Durable Medical Equipment: No  Health Maintenance Reviewed: Yes  Physical Therapy Consult: Yes  Occupational Therapy Consult: Yes  Speech Therapy Consult: No  Current Support Network: Own Home  Confirm Follow Up Transport: Family  Plan discussed with Pt/Family/Caregiver: Yes  Gage Resource Information Provided?: No  Discharge Location  Discharge Placement: Home with home health    Reason for Admission:   Left total hip arthroplasty anterior approach                  RRAT Score:     18             Do you (patient/family) have any concerns for transition/discharge? Home health plan. Plan for utilizing home health:   [facility]. Current Advanced Directive/Advance Care Plan: On file. Likelihood of readmission?   low            Transition of Care Plan:            Chart reviewed for transitions of care, and discussed in rounds. CM met with patient at bedside to explain role and offer support. Patient is alert and oriented, and confirmed demographics. Her son lives with her and will provide transportation at discharge. [facility]. There are no other discharge needs at this time.      [facility]

## 2019-05-14 NOTE — PROGRESS NOTES
The Joint Replacement Discharge Education video was reviewed by the patient/family. The content was discussed utilizing teach back, questions were answered. The patient verbalized an understanding of the instructions. I have reviewed discharge instructions with the patient. The patient verbalized understanding. Pt received hard rxs. Pt's belongings were packed by pt and pt's son.

## 2019-05-15 ENCOUNTER — PATIENT OUTREACH (OUTPATIENT)
Dept: FAMILY MEDICINE CLINIC | Age: 64
End: 2019-05-15

## 2019-05-15 NOTE — NURSE NAVIGATOR
[facility]  [facility] Orthopaedic Pathway Handoff     FROM:                                TO: Adam 15                                                      ([facility] or Facility name)  [address]  [id number]  Dept: [phone]  Loc: [phone]                                      Room#:  663/06                                                       Nurse Navigator:  Mraiangel Rosenberg RN         SITUATION      ASAScore: ASA 3 - Patient with moderate systemic disease with functional limitations    Admitted:  5/13/2019  Hospital Day: 2      Attending Provider:  No att. providers found     Consultations:  None    PCP:  Kae Elizondo MD   [phone]     Admitting Dx: Arthritis of left hip [M16.12]       Active Problems:    Arthritis of left hip (5/13/2019)      2 Days Post-Op of   Procedure(s):  LEFT TOTAL HIP ARTHROPLASTY ANTERIOR APPROACH   BY: Elfego Sandoval MD             ON: 5/13/2019                  Code Status: Prior             Advance Directive? Verified (Send w/patient)     Isolation:  There are currently no Active Isolations       MDRO: No current active infections    BACKGROUND     Allergies:   Allergies   Allergen Reactions    Bee Sting [Sting, Bee] Anaphylaxis     Also allergic to egg products    Penicillins Anaphylaxis    Betadine [Povidone-Iodine] Rash    Egg Itching       Past Medical History:   Diagnosis Date    Arthritis     hip, hands    Benign cyst of left breast 3/21/2016    Blindness and low vision 2004    legally blind from [address]    Cervical spinal stenosis 12/2/2016    Moderate C6-7    Chronic pain     Depression     Diabetes mellitus type 2, controlled ([facility] [address] 9/13/2016    Diet-controlled diabetes mellitus ([facility] [address] 1/5/2018    Endometriosis 6/25/2010    FH: breast cancer 6/25/2010    Chart states FH breast/ovary Ca, CAD,DM     History of CVA (cerebrovascular accident) 6/5/2018    HTN, goal below 140/90 2010    CURRENTLY NO MEDICATIONS (17)    Hypercholesterolemia     Hypothyroid 2010    Incontinence     Lumbosacral plexopathy 2010    MS (multiple sclerosis) ([facility]     Myasthenia gravis ([facility]     Myasthenia gravis ([facility]     Sleep apnea 2010    RESOLVED 2019    Ureteral calculus 2010    Vitamin D deficiency 3/17/2016       Past Surgical History:   Procedure Laterality Date    ABDOMEN SURGERY PROC UNLISTED      bowel resection    BREAST SURGERY PROCEDURE UNLISTED      breast cyst-both breasts at different times    HX  SECTION  1986    HX CYST INCISION AND DRAINAGE      HX GI      COLONOSCOPY    HX GYN      ovarian cyst    HX HEENT  1974    THYROIDECTOMY    HX HEENT      removal of thyroglossal duct cyst    HX HIP REPLACEMENT Right 2017    anterior approach    HX KNEE ARTHROSCOPY Right     HX ORTHOPAEDIC  1997    right thumb tendon repair x 2    HX ORTHOPAEDIC Left     CARPAL TUNNEL    HX ORTHOPAEDIC Right     HIP REPLACEMENT    HX OTHER SURGICAL      Janee Cath x2    HX SMALL BOWEL RESECTION      HX THYROIDECTOMY      1975    HX VASCULAR ACCESS  2006    PORT -A- CATH X 2       Prior to Admission Medications   Prescriptions Last Dose Informant Patient Reported? Taking? GILENYA 0.5 mg cap 2019 at 0400  No Yes   Sig: Take 1 Cap by mouth daily. Menthol-Zinc Oxide (CALMOSEPTINE) 0.44-20.6 % oint 2019 at Unknown time  Yes Yes   Sig: Apply  to affected area as needed. OTHER 2019 at 0400  Yes Yes   Si.25 mL by SubLINGual route daily. CBD OIL   PARoxetine (PAXIL) 40 mg tablet 2019 at Unknown time  No Yes   Sig: TAKE 1 AND 1/2 TABLETS BY MOUTH EVERY NIGHT   TENS UNIT ELECTRODES 2019 at Unknown time  Yes Yes   Sig: by Does Not Apply route.  prn   albuterol (PROVENTIL VENTOLIN) 2.5 mg /3 mL (0.083 %) nebulizer solution 2019  No No   Sig: 3 mL by Nebulization route every six (6) hours as needed for Wheezing. buprenorphine-naloxone (SUBOXONE) 4-1 mg film sublingual film 5/3/2019  Yes No   Sig: by SubLINGual route daily. clindamycin (CLEOCIN) 300 mg capsule 2019  Yes No   Sig: Take 300 mg by mouth. Prior to dental procedures   corticotropin (ACTHAR H.P.) 80 unit/mL injectable gel 2018  No No   Si mL by SubCUTAneous route daily. 80 units subq q day for 10 days   Patient taking differently: 80 Units by SubCUTAneous route daily. 80 units subq q day for 10 days    **PT TAKES AS A PRN FOR A 10 DAY PERIOD. NOT TAKING CURRENTLY (5/3/19)   dantrolene (DANTRIUM) 100 mg capsule 2019  No No   Sig: Take 1 Cap by mouth three (3) times daily. Patient taking differently: Take 100 mg by mouth daily as needed. Indications: muscle spasm   ergocalciferol (ERGOCALCIFEROL) 50,000 unit capsule 2019 at Unknown time  No Yes   Sig: Take 1 Cap by mouth every seven (7) days. levothyroxine (SYNTHROID) 150 mcg tablet 2019 at Unknown time  No Yes   Sig: Take 1 Tab by mouth nightly. lidocaine (LIDODERM) 5 % 5/3/2019  No No   Sig: Apply patch to the affected area for 12 hours a day and remove for 12 hours a day. metFORMIN ER (GLUCOPHAGE XR) 500 mg tablet 2019 at Unknown time  No Yes   Sig: Take 1 Tab by mouth daily (with dinner). Patient taking differently: Take 500 mg by mouth daily (with breakfast). nystatin (MYCOSTATIN) powder Unknown at Unknown time  No No   Sig: Apply to candidal lesions TOPICALLY 2 to 3 times daily until healing complete   pregabalin (LYRICA) 200 mg capsule 2019 at 0400  No Yes   Sig: Take 1 Cap by mouth two (2) times a day. Max Daily Amount: 400 mg.   pyridostigmine bromide (MESTINON TIMESPAN) 180 mg SR tablet 2019 at Unknown time  No Yes   Sig: Take 1 Tab by mouth two (2) times a day.    rosuvastatin (CRESTOR) 20 mg tablet 2019 at Unknown time  No Yes   Sig: TAKE 1 TABLET BY MOUTH EVERY EVENING   topiramate (TOPAMAX) 200 mg tablet 5/13/2019 at 0400  No Yes   Sig: TAKE 1 TABLET BY MOUTH TWICE DAILY      Facility-Administered Medications: None       Vaccinations:    Immunization History   Administered Date(s) Administered    Tdap 06/16/2014         ASSESSMENT   Age: 60 y.o. Gender: female        Height: Height: 6' (182.9 cm)                    Weight:Weight: 89.2 kg (196 lb 10 oz)     No data found. Active Orders   There are no active orders of the following types: Diet. Orientation: Orientation Level: Oriented X4    Active Lines/Drains:  (Peg Tube / Og / CL or S/L?):no    Urinary Status: Incontinent briefs, Voiding      Last BM: Last Bowel Movement Date: 05/13/19     Skin Integrity: Incision (comment)             Mobility: Slightly limited   Weight Bearing Status: WBAT (Weight Bearing as Tolerated)      Gait Training  Assistive Device: Walker, rolling, Gait belt  Ambulation - Level of Assistance: Contact guard assistance  Distance (ft): 150 Feet (ft)     On Anticoagulation? YES  Aspirin  81 mg q 12 hours                                       Pain Medications given:  Tramadol 50 mg q 6 hours PRN                                    Lab Results   Component Value Date/Time    Glucose 132 (H) 05/14/2019 03:20 AM    Hemoglobin A1c 7.0 (H) 04/04/2019 02:04 PM    INR 1.0 04/18/2019 11:50 AM    INR 1.0 05/11/2017 10:20 AM    HGB 10.9 (L) 05/14/2019 03:20 AM    HGB 12.9 05/03/2019 10:06 AM    HGB 13.6 04/18/2019 11:50 AM    HGB 13.8 04/04/2019 02:04 PM       Readmission Risks:  Score:         RECOMMENDATION     See After Visit Summary (AVS) for:  · Discharge instructions  · After [address]   · Medication Reconciliation          [facility] Orthopaedic Nurse Navigator  KAMILLA Hassan, RN-BC       Office  [phone]  Cell      [phone]  Fax      [phone]  [email or web address]             . Reema

## 2019-05-15 NOTE — PROGRESS NOTES
Hospital Discharge Follow-Up Date/Time:  5/15/2019 9:15 AM 
 
Patient was admitted to [facility] on  and discharged on  for left total hip replacement. The physician discharge summary was not available at the time of outreach. Patient was contacted within 1 business days of discharge. Top Challenges reviewed with the provider [facility] - left total hip Sent home on Tramadol 50mg (on CBD oil and Suboxone PTA) Declined STANLEY with pcp at this time-will f/u with Ortho, instead. Method of communication with provider :chart routing,face to face Inpatient RRAT score: 18 Was this a readmission? no  
Patient stated reason for the readmission: na 
 
Nurse Navigator (NN) contacted the patient by telephone to perform post hospital discharge assessment. Verified name and  with patient as identifiers. Provided introduction to self, and explanation of the Nurse Navigator role. Reports she got herself up and dressed early this am, ate breakfast and then went back to bed. Son working today but left her food and drinks next to bed. Expecting [facility] nurse to come early this am. Mother coming at noon to stay with her for afternoon. Has neighbor next door that she could call if needed assistance. Took first pain pill, Tramadol, at 5am today. Currently (4 hours later) pain level #5. Using ice to hip and will have [facility] nurse replenish it. Has a walker that she is using to ambulate. Reminded to use her incentive spirometer q hour while awake to prevent keep lungs healthy and prevent PE  and reminded to do the ankle pumps. Reviewed discharge instructions and red flags with patient who verbalized understanding. Patient given an opportunity to ask questions and does not have any further questions or concerns at this time. The patient agrees to contact the PCP office for questions related to their healthcare. NN provided contact information for future reference. Disease Specific:   Ortho 
 
Summary of patient's top problems: 1. Arthritis of left hip-s/p left total hip arthroplasty anterior approach 5/13 by . No complications post surgery, discharged to home 5/14. 2. MS- managed by neuro,. Chronic pain from MS-CBD oil, Suboxone started recently. Multiple MS meds. 3. Thrombocytopenia-chronic issue. Referred to [facility]-appt 6/6 with . Platelets 86 7/05/92. 4. Smoker-smokes 1/2 pack per day. Education provided by pcp and surgeon as well as other specialists. Home Health orders at discharge: SN/PT/OT [address]: [facility] Date of initial visit: today. Durable Medical Equipment ordered/company: none Durable Medical Equipment received: na 
 
Barriers to care? Transportation-legally blind,relies on son to drive her to [facility](he works part time); Financial-on limited income and helps support son; Has MS with chronic pain. Current everyday smoker-1/2 ppd. Advance Care Planning:  
Does patient have an Advance Directive:  Reviewed and current Medication(s):  
New Medications at Discharge: ASA 81mg q 12 hours, Tramadol 50mg q 6 hours prn pain Changed Medications at Discharge: none Discontinued Medications at Discharge: none Medication reconciliation was performed with patient, who verbalizes understanding of administration of home medications. There were no barriers to obtaining medications identified at this time. Referral to Pharm D needed: no  
 
Current Outpatient Medications Medication Sig  
 traMADol (ULTRAM) 50 mg tablet Take 1 Tab by mouth every six (6) hours as needed for Pain for up to 30 days. Max Daily Amount: 200 mg.  aspirin delayed-release 81 mg tablet Take 1 Tab by mouth every twelve (12) hours.  buprenorphine-naloxone (SUBOXONE) 4-1 mg film sublingual film by SubLINGual route daily.  OTHER 0.25 mL by SubLINGual route daily.  CBD OIL  
  levothyroxine (SYNTHROID) 150 mcg tablet Take 1 Tab by mouth nightly.  metFORMIN ER (GLUCOPHAGE XR) 500 mg tablet Take 1 Tab by mouth daily (with dinner). (Patient taking differently: Take 500 mg by mouth daily (with breakfast). )  dantrolene (DANTRIUM) 100 mg capsule Take 1 Cap by mouth three (3) times daily. (Patient taking differently: Take 100 mg by mouth daily as needed. Indications: muscle spasm)  topiramate (TOPAMAX) 200 mg tablet TAKE 1 TABLET BY MOUTH TWICE DAILY  GILENYA 0.5 mg cap Take 1 Cap by mouth daily.  ergocalciferol (ERGOCALCIFEROL) 50,000 unit capsule Take 1 Cap by mouth every seven (7) days.  albuterol (PROVENTIL VENTOLIN) 2.5 mg /3 mL (0.083 %) nebulizer solution 3 mL by Nebulization route every six (6) hours as needed for Wheezing.  PARoxetine (PAXIL) 40 mg tablet TAKE 1 AND 1/2 TABLETS BY MOUTH EVERY NIGHT  pregabalin (LYRICA) 200 mg capsule Take 1 Cap by mouth two (2) times a day. Max Daily Amount: 400 mg.  
 Menthol-Zinc Oxide (CALMOSEPTINE) 0.44-20.6 % oint Apply  to affected area as needed.  nystatin (MYCOSTATIN) powder Apply to candidal lesions TOPICALLY 2 to 3 times daily until healing complete  pyridostigmine bromide (MESTINON TIMESPAN) 180 mg SR tablet Take 1 Tab by mouth two (2) times a day.  lidocaine (LIDODERM) 5 % Apply patch to the affected area for 12 hours a day and remove for 12 hours a day.  corticotropin (ACTHAR H.P.) 80 unit/mL injectable gel 1 mL by SubCUTAneous route daily. 80 units subq q day for 10 days (Patient taking differently: 80 Units by SubCUTAneous route daily. 80 units subq q day for 10 days **PT TAKES AS A PRN FOR A 10 DAY PERIOD. NOT TAKING CURRENTLY (5/3/19))  clindamycin (CLEOCIN) 300 mg capsule Take 300 mg by mouth. Prior to dental procedures  rosuvastatin (CRESTOR) 20 mg tablet TAKE 1 TABLET BY MOUTH EVERY EVENING  
 TENS UNIT ELECTRODES by Does Not Apply route.  prn  
 
 No current facility-administered medications for this visit. There are no discontinued medications. [facility] follow up appointment(s):  
Future Appointments Date Time Provider Roel Collins 6/6/2019 11:00 AM MD Barb Weeks [id number]  
7/5/2019  1:30 PM MD GLENDY ReedP TONNY DOWNEY  
8/8/2019 10:40 AM Leny Kahn  [facility] follow up appointment(s): -reminded to schedule [facility]:  information provided as a resource Goals  education on management of pain 1/17/19 Call to patient to check on her. BS much better. Continues to not take the Percocet that was rx'd per PM,. Complains it makes her too sleepy and can't function. · Reviewed meds, continue other pain meds(Lidoderm patch,Lyrica,Topamax,Imitrex,Dantrium) · Rest and relax as needed · Eat well balanced meals and adequate fluids. · Physical therapy · Practice breathing exercises and meditation to help focus your attention, relax and get rid of tension · Massage · TENS unit as directed. mbt 
2/26/19 Patient in for monthly CCM session. · Reports head pain is a #15 on scale of 1-10. · Body pain about the same as head pain. · Has Percocet but doesn't take it because it just makes her sleep · Using some old Fentanyl patches from old rx, trying to make them last until sees new PM  on 4/9. Stretching them to last one week-not working. And takes 3 Motrin but not effective. · Hoping new pain management doctor can help her control the pain and be able to function. mbt 
5/15/19 [facility] 5/13-5/14 left total hip replacement · Given rx for Tramadol 50mg q 6 hours prn pain-education provided on med. Took first dose this am at 5am. Current pain level #5 (4 hours later) · Reminded to keep ice on hip when not up · Lie down and elevate leg on pillows for 30 minutes after walking to decrease swelling/pain · Decrease Tramadol as pain lessens. · Do ankle pumps q hour while awake and get up frequently to walk. · Notify surgeon if hip pain increases and is unrelieved after med or if unable to walk · NN to check back in 5-7 days, contact info provided. mbt 
  
  Prevent complications post hospitalization. 5/15/19 [facility] 5/13-5/14 left total hip replacment · Reviewed discharge instructions with patient · Reviewed meds-education on new Tramadol using teach back method · Reviewed red flags: increased hip pain,unrelieved pain,difficulty walking,signs of infection-red incision,continues to drain or drainage has foul odor, fever,sign of blood clot in leg-calf pain,tender,red,swelling lower leg-advised to call surgeon asap; 911 if emergent · appt- reminded to call  for 3 week f/u appt · Declined STANLEY with pcp at this time. · [facility] scheduled to come this am-they have already contacted patient. SN/PT/OT · Advised to call NN if any questions/concerns. mbt

## 2019-05-16 ENCOUNTER — PATIENT OUTREACH (OUTPATIENT)
Dept: CASE MANAGEMENT | Age: 64
End: 2019-05-16

## 2019-05-16 NOTE — OP NOTES
Name: Candelario Rainey  MRN:  [id number]  : 1955  Age:  60 y.o. Surgery Date: 2019      OPERATIVE REPORT - LEFT TOTAL HIP ARTHROPLASTY -   ANTERIOR APPROACH    PREOPERATIVE DIAGNOSIS: Osteoarthritis, left hip. POSTOPERATIVE DIAGNOSIS: Osteoarthritis, left hip. PROCEDURE PERFORMED: Left total hip arthroplasty. SURGEON: Sushil Lutz MD    FIRST ASSISTANT: Pramod Cannon PA-C    ANESTHESIA: General    PRE-OP ANTIBIOTIC: Ancef 2g    COMPLICATIONS: None. ESTIMATED BLOOD LOSS: 300 mL. SPECIMENS REMOVED: None. COMPONENTS IMPLANTED:   Implant Name Type Inv. Item Serial No.  Lot No. LRB No. Used Action   SHELL ACET CUP 2H 52MM -- MPACT TWO HOLE - SNA  SHELL ACET CUP 2H 52MM -- MPACT TWO HOLE NA [address] [facility] [id number] Left 1 Implanted   PLUG ACET SHELL -- MPACT - SNA  PLUG ACET SHELL -- MPACT NA [employer] [place] [id number] Left 1 Implanted   SCR BNE CANC FLAT HD 6.5X40MM -- MPACT - SNA  SCR BNE CANC FLAT HD 6.5X40MM -- MPACT NA [employer] [place] [id number] Left 1 Implanted   SCR ACET CANC PINN 6.5X15MM SS --  - SNA  SCR ACET CANC PINN 6.5X15MM SS --  NA [facility] [employer] [id number] Left 1 Implanted   LINER ACET FLAT HI X SZ E 36MM -- MPACT - SNA  LINER ACET FLAT HI X SZ E 36MM -- MPACT NA [employer] [place] [id number] Left 1 Implanted   Femoral Stem, Size 2, Standard Joint Component  NA [employer] [place] [id number] Left 1 Implanted   HEAD FEM SM 36MM CER -- BIOLOX DELTA - SNA  HEAD FEM SM 36MM CER -- BIOLOX DELTA NA [employer] [place] [id number] Left 1 Implanted   HIP SCARLETT PRS-FIT FEM ANY LNR -- H1 BSV - [id number]  HIP SCARLETT PRS-FIT FEM ANY LNR -- H1 BSV  [employer] [place]  Left 1 Implanted       INDICATIONS: The patient is an 60 yrs female with progressive debilitating left hip and groin pain due to severe osteoarthritis. Symptoms have progressed despite comprehensive conservative treatment and he presents for left total hip replacement. Risks, benefits, alternatives of the procedure were reviewed in detail and he desires to proceed.  The patient understands the increased risk for perioperative medical complications due to medical comorbidities. DESCRIPTION OF PROCEDURE: Anesthetic was initiated. Preoperative dose of IV  antibiotic was given. Og catheter was placed. The left side was confirmed as the operative side, prepped and draped in the usual sterile fashion. Skin was covered with Ioban occlusive dressing. Direct anterior exposure was made to the patient's hip through the sartorius tensor interval. Anterior hip vasculature was cauterized. Retractors were taken out to observe for bleeding and there was none. The capsule was identified, opened and T'd distally. The femoral neck was osteotomized. Femoral head was removed from the acetabulum, which was exposed and soft tissues were removed. The acetabulum was progressively  reamed to 51 mm and a 52 mm trial shell was impacted with good press-fit. This was removed and a 52 mm medacta shell was impacted in the acetabulum in 40 degrees of abduction in an anatomic-type anteversion. Bone spurs were removed and 6.5 screws x2 were placed. The polyethylene liner was placed. Femur was positioned and elevated from the wound. The medullary canal was entered. Flexible reamers were utilized as the patient had a narrowed femoral canal, broached to a size 2. Calcar planed and then trialed. A 36 mm , - ball hip ball was the most appropriate for leg length and tension with a standard offset stem. The hip was dislocated. The anterior greater trochanter was trimmed down to enhance flexion, rotation and stability. The trial was removed and the real stem was impacted. The real hip ball was placed. The hip was reduced. After copious irrigation, the capsule was closed with #2 Vicryl sutures. I irrigated the skin, subcutaneous and deep wound. I closed the fascia of the tensor fascia kimberly with #2 Vicryl sutures. Skin and subcutaneous were irrigated. Soft tissues were infiltrated with local anesthetic. Skin and subcutaneous were closed in a standard fashion. Sterile dressing was applied. There were no complications. No specimen was sent. The procedure was a LEFT TOTAL HIP REPLACEMENT using a Medacta total hip construct. Joelle was of vital assistance throughout the duration of the procedure. The patient was transferred to the recovery room in stable condition.     Sienna Valderrama MD

## 2019-05-16 NOTE — PROGRESS NOTES
This note will not be viewable in [address] Post Discharge Follow-up contact after Joint Replacement Patient discharged on 5/14/19  By  Rain Mcclain  
following  left hip Arthroplasty. Spoke with patient today, who reports they are \" getting around the house. \" 
Denies Fever, Shortness of Breath or Chest Pain. Home Health has visited. Patient also reports:. Incision  clean, dry, intact Calf is non-tender,  
operative extremity has minimal swelling. Pain is well managed. Discussed use of ice & elevation. Patient is progressing with therapy and is exercising independently. Taking Aspirin for anticoagulation, tramadol for pain. Patient is not experiencing symptoms of constipation & urinating without difficulty. Discussed side effects of anticoagulants & pain medications (bleeding/bruising, constipation, lightheaded/dizziness) Follow up appointment is not scheduled; but patient states plan to schedule. Discussed calling surgeon Dr Jenna De La Vega  for drainage, bleeding, swelling in operative extremity, fever or pain. Discussed calling PCP Dr Marguerite Ackerman with other medical issues.

## 2019-05-17 NOTE — DISCHARGE SUMMARY
[id number]    DISCHARGE SUMMARY     Patient: Diane Galvin                             Medical Record Number: [id number]                : 1955  Age: 60 y.o. Admit Date: 2019  Discharge Date: 2019  Admission Diagnosis: Arthritis of left hip [M16.12]  Discharge Diagnosis: LEFT  HIP OSTEOARTHRITIS  Procedures: Procedure(s):  LEFT TOTAL HIP ARTHROPLASTY ANTERIOR APPROACH  Surgeon: Rui Bennett  Assistants: Joelle  Anesthesia: general  Complications: None     History of Present Illness:  Veronica Meade is a 60 y.o. female with a history of Left hip pain, swelling, and marked loss of function. Despite conservative management and after clinical and radiographic evaluation, it was determined that she suffered from end-stage osteoarthritis and would benefit from Procedure(s):  LEFT TOTAL HIP ARTHROPLASTY ANTERIOR APPROACH, which she consented to undergo after a discussion of the risks, benefits, alternatives, rehab concerns, and potential complications of surgery. Hospital Course:  Diane Galvin tolerated the procedure well. She was transferred  to the recovery room in stable condition. After a brief stay the patient was then transferred to the Joint Replacement Unit at [address] [place] [address].  On postoperative day #1, the dressing was clean and dry, she was neurovascularly intact. The patient was afebrile and vital signs were stable. Calves were soft and non-tender bilaterally. On postoperative day  # 1, the patient was tolerating a regular diet and making satisfactory progress with physical therapy. Hemoglobin and INR prior to discharge were   Lab Results   Component Value Date/Time    HGB 10.9 (L) 2019 03:20 AM    INR 1.0 2019 11:50 AM   .  [facility] satisfactory progress with physical therapy and was discharged to Home in stable condition on postoperative day 1.   She was provided with routine postoperative instructions and advised to follow up in my office in 3 weeks following discharge from the hospital.  She was prescribed ASA for DVT prophylaxis and tramadol for post-operative pain. Discharge Medications:  Cannot display discharge medications since this patient is not currently admitted.       Signed by: Lucas Noble MD  5/17/2019

## 2019-05-22 ENCOUNTER — PATIENT OUTREACH (OUTPATIENT)
Dept: FAMILY MEDICINE CLINIC | Age: 64
End: 2019-05-22

## 2019-05-22 NOTE — PROGRESS NOTES
Goals Addressed                 This Visit's Progress     education on management of pain        1/17/19 Call to patient to check on her. BS much better. Continues to not take the Percocet that was rx'd per PM,. Complains it makes her too sleepy and can't function. · Reviewed meds, continue other pain meds(Lidoderm patch,Lyrica,Topamax,Imitrex,Dantrium)  · Rest and relax as needed  · Eat well balanced meals and adequate fluids. · Physical therapy   · Practice breathing exercises and meditation to help focus your attention, relax and get rid of tension  · Massage  · TENS unit as directed. mbt  2/26/19 Patient in for monthly CCM session. · Reports head pain is a #15 on scale of 1-10. · Body pain about the same as head pain. · Has Percocet but doesn't take it because it just makes her sleep  · Using some old Fentanyl patches from old rx, trying to make them last until sees new PM  on 4/9. Stretching them to last one week-not working. And takes 3 Motrin but not effective. · Hoping new pain management doctor can help her control the pain and be able to function. mbt  5/15/19 [facility] 5/13-5/14 left total hip replacement  · Given rx for Tramadol 50mg q 6 hours prn pain-education provided on med. Took first dose this am at 5am. Current pain level #5 (4 hours later)  · Reminded to keep ice on hip when not up  · Lie down and elevate leg on pillows for 30 minutes after walking to decrease swelling/pain  · Decrease Tramadol as pain lessens. · Do ankle pumps q hour while awake and get up frequently to walk. · Notify surgeon if hip pain increases and is unrelieved after med or if unable to walk  · NN to check back in 5-7 days, contact info provided. mbt  5/22/19  Recovering well from hip replacement. Current pain level about #2. Only using Advil prn for pain. Walked outside with PT for first time today. Not sleeping well because used to sleeping on that side. Continues to use ice to area.   Sees ortho,, on 6/6 for f/u. ROM improving , can lift leg from sitting position, unable to lift leg when laying down. Continuing to work with PT.mbt       Prevent complications post hospitalization. 5/15/19 [facility] 5/13-5/14 left total hip replacment  · Reviewed discharge instructions with patient  · Reviewed meds-education on new Tramadol using teach back method  · Reviewed red flags: increased hip pain,unrelieved pain,difficulty walking,signs of infection-red incision,continues to drain or drainage has foul odor, fever,sign of blood clot in leg-calf pain,tender,red,swelling lower leg-advised to call surgeon asap; 911 if emergent  · appt- reminded to call  for 3 week f/u appt  · Declined STANLEY with pcp at this time. · [facility] scheduled to come this am-they have already contacted patient. SN/PT/OT  · Advised to call NN if any questions/concerns. mbt  5/22/19  Denies having any of the above red flags. Pain level only about #2-takes Advil prn. Appt with  6/6. ROM improving. Walked outside with PT today for first time. Incision appears to be healing well. [facility] nurse to remove dressing tomorrow. Reminded to call if any questions/concerns. mbt

## 2019-05-29 ENCOUNTER — PATIENT OUTREACH (OUTPATIENT)
Dept: FAMILY MEDICINE CLINIC | Age: 64
End: 2019-05-29

## 2019-05-29 DIAGNOSIS — G35 MS (MULTIPLE SCLEROSIS) (HCC): ICD-10-CM

## 2019-05-29 RX ORDER — PREGABALIN 200 MG/1
200 CAPSULE ORAL 2 TIMES DAILY
Qty: 180 CAP | Refills: 3 | Status: SHIPPED | OUTPATIENT
Start: 2019-05-29 | End: 2019-11-07 | Stop reason: SDUPTHER

## 2019-05-29 NOTE — PROGRESS NOTES
Called patient for update. In good spirits although says she walked too much over weekend- felt good- and overdid it. Resting last few days more. Goals      education on management of pain      1/17/19 Call to patient to check on her. BS much better. Continues to not take the Percocet that was rx'd per PM,. Complains it makes her too sleepy and can't function. · Reviewed meds, continue other pain meds(Lidoderm patch,Lyrica,Topamax,Imitrex,Dantrium)  · Rest and relax as needed  · Eat well balanced meals and adequate fluids. · Physical therapy   · Practice breathing exercises and meditation to help focus your attention, relax and get rid of tension  · Massage  · TENS unit as directed. mbt  2/26/19 Patient in for monthly CCM session. · Reports head pain is a #15 on scale of 1-10. · Body pain about the same as head pain. · Has Percocet but doesn't take it because it just makes her sleep  · Using some old Fentanyl patches from old rx, trying to make them last until sees new PM  on 4/9. Stretching them to last one week-not working. And takes 3 Motrin but not effective. · Hoping new pain management doctor can help her control the pain and be able to function. mbt  5/15/19 [address] [place] [address] 5/13-5/14 left total hip replacement  · Given rx for Tramadol 50mg q 6 hours prn pain-education provided on med. Took first dose this am at 5am. Current pain level #5 (4 hours later)  · Reminded to keep ice on hip when not up  · Lie down and elevate leg on pillows for 30 minutes after walking to decrease swelling/pain  · Decrease Tramadol as pain lessens. · Do ankle pumps q hour while awake and get up frequently to walk. · Notify surgeon if hip pain increases and is unrelieved after med or if unable to walk  · NN to check back in 5-7 days, contact info provided. mbt  5/22/19  Recovering well from hip replacement. Current pain level about #2. Only using Advil prn for pain. Walked outside with PT for first time today.   Not sleeping well because used to sleeping on that side. Continues to use ice to area. Sees ortho,, on 6/6 for f/u. ROM improving , can lift leg from sitting position, unable to lift leg when laying down. Continuing to work with PT.mbt  5/29/19  Continues to work with PT/OT and SN. Reports incision healing well. Pain minimal- about #3. Takes prn Advil for the pain. Ortho appt next week with ,6/6. Denies any issues with constipation. Said she felt good over weekend and walked too much, has had to scale back and rest more since then. Advised to rest up, pace herself when feeling better and try to not over do it.mbt       Prevent complications post hospitalization. 5/15/19 [address] [place] [address] 5/13-5/14 left total hip replacment  · Reviewed discharge instructions with patient  · Reviewed meds-education on new Tramadol using teach back method  · Reviewed red flags: increased hip pain,unrelieved pain,difficulty walking,signs of infection-red incision,continues to drain or drainage has foul odor, fever,sign of blood clot in leg-calf pain,tender,red,swelling lower leg-advised to call surgeon asap; 911 if emergent  · appt- reminded to call  for 3 week f/u appt  · Declined STANLEY with pcp at this time. · [facility] scheduled to come this am-they have already contacted patient. SN/PT/OT  · Advised to call NN if any questions/concerns. mbt  5/22/19  Denies having any of the above red flags. Pain level only about #2-takes Advil prn. Appt with  6/6. ROM improving. Walked outside with PT today for first time. Incision appears to be healing well. [facility] nurse to remove dressing tomorrow. Reminded to call if any questions/concerns. mbt  5/29/19  Denies any of red flags. Pain about #3 today, said she did too much walking over weekend. Using Advil prn. Incision looks good. PT/OT coming today and sees nurse again next week. appt with surgeon is 6/6. Reminded to call if concerns.  mbt

## 2019-06-05 ENCOUNTER — PATIENT OUTREACH (OUTPATIENT)
Dept: FAMILY MEDICINE CLINIC | Age: 64
End: 2019-06-05

## 2019-06-05 ENCOUNTER — DOCUMENTATION ONLY (OUTPATIENT)
Dept: ONCOLOGY | Age: 64
End: 2019-06-05

## 2019-06-05 NOTE — PROGRESS NOTES
Call to patient to verify appointment. Patients phone went straight to voicemail, left a message to call office back to confirm appointment.

## 2019-06-05 NOTE — PROGRESS NOTES
Called patient for update. Goals      education on management of pain      1/17/19 Call to patient to check on her. BS much better. Continues to not take the Percocet that was rx'd per PM,. Complains it makes her too sleepy and can't function. · Reviewed meds, continue other pain meds(Lidoderm patch,Lyrica,Topamax,Imitrex,Dantrium)  · Rest and relax as needed  · Eat well balanced meals and adequate fluids. · Physical therapy   · Practice breathing exercises and meditation to help focus your attention, relax and get rid of tension  · Massage  · TENS unit as directed. mbt  2/26/19 Patient in for monthly CCM session. · Reports head pain is a #15 on scale of 1-10. · Body pain about the same as head pain. · Has Percocet but doesn't take it because it just makes her sleep  · Using some old Fentanyl patches from old rx, trying to make them last until sees new PM  on 4/9. Stretching them to last one week-not working. And takes 3 Motrin but not effective. · Hoping new pain management doctor can help her control the pain and be able to function. mbt  5/15/19 [address] [place] [address] 5/13-5/14 left total hip replacement  · Given rx for Tramadol 50mg q 6 hours prn pain-education provided on med. Took first dose this am at 5am. Current pain level #5 (4 hours later)  · Reminded to keep ice on hip when not up  · Lie down and elevate leg on pillows for 30 minutes after walking to decrease swelling/pain  · Decrease Tramadol as pain lessens. · Do ankle pumps q hour while awake and get up frequently to walk. · Notify surgeon if hip pain increases and is unrelieved after med or if unable to walk  · NN to check back in 5-7 days, contact info provided. mbt  5/22/19  Recovering well from hip replacement. Current pain level about #2. Only using Advil prn for pain. Walked outside with PT for first time today. Not sleeping well because used to sleeping on that side. Continues to use ice to area.   Sees ortho,, on 6/6 for f/u.  ROM improving , can lift leg from sitting position, unable to lift leg when laying down. Continuing to work with PT.mbt  5/29/19  Continues to work with PT/OT and SN. Reports incision healing well. Pain minimal- about #3. Takes prn Advil for the pain. Ortho appt next week with ,6/6. Denies any issues with constipation. Said she felt good over weekend and walked too much, has had to scale back and rest more since then. Advised to rest up, pace herself when feeling better and try to not over do it.mbt  6/5/19  Patient in good spirits. Says no pain from hip-hasn't needed to take Advil in over a week. Continues to work with PT/OT. Says PT tells her she is doing great! Walking inside house with cane - only uses walker if outside. Sees ortho on Friday. ,[facility], tomorrow. Continues to walk and exercise on own. mbt       Prevent complications post hospitalization. 5/15/19 [facility] 5/13-5/14 left total hip replacment  · Reviewed discharge instructions with patient  · Reviewed meds-education on new Tramadol using teach back method  · Reviewed red flags: increased hip pain,unrelieved pain,difficulty walking,signs of infection-red incision,continues to drain or drainage has foul odor, fever,sign of blood clot in leg-calf pain,tender,red,swelling lower leg-advised to call surgeon asap; 911 if emergent  · appt- reminded to call  for 3 week f/u appt  · Declined STANLEY with pcp at this time. · [facility] scheduled to come this am-they have already contacted patient. SN/PT/OT  · Advised to call NN if any questions/concerns. mbt  5/22/19  Denies having any of the above red flags. Pain level only about #2-takes Advil prn. Appt with  6/6. ROM improving. Walked outside with PT today for first time. Incision appears to be healing well. [facility] tomorrow. Reminded to call if any questions/concerns. mbt  5/29/19  Denies any of red flags.  Pain about #3 today, said she did too much walking over weekend. Using Advil prn. Incision looks good. PT/OT coming today and sees nurse again next week. appt with surgeon is 6/6. Reminded to call if concerns. mbt  6/5/19  Denies any red flags. Pain 0 today, not needed Advil in over a week. Walking a lot in her house with cane, using walker if outside. Incision healing well. PT states she is doing Great!mbt        Blood sugar runs about 90's to 138. Will go up higher if eats something with lot of carbs. Trying to watch her diet. Did discuss concerns with friend of hers that told her OT that she should be in a nursing home. Patient has not discussed it with her friend, wants to cool off before she addresses it with her. They have been friends for 20 years. Allowed to vent, given support   Suggested she write down her thoughts so can be calm when they do discuss it. Patient scheduled CCM session for 7/5 after ov with pcp. Will call NN Monday to give results of appt with Luis Alberto Schaeffer and .

## 2019-06-06 ENCOUNTER — OFFICE VISIT (OUTPATIENT)
Dept: ONCOLOGY | Age: 64
End: 2019-06-06

## 2019-06-06 VITALS
BODY MASS INDEX: 26.41 KG/M2 | HEART RATE: 75 BPM | OXYGEN SATURATION: 97 % | WEIGHT: 195 LBS | DIASTOLIC BLOOD PRESSURE: 69 MMHG | HEIGHT: 72 IN | RESPIRATION RATE: 18 BRPM | SYSTOLIC BLOOD PRESSURE: 108 MMHG | TEMPERATURE: 98.8 F

## 2019-06-06 DIAGNOSIS — D64.9 ANEMIA, UNSPECIFIED TYPE: ICD-10-CM

## 2019-06-06 DIAGNOSIS — G35 MS (MULTIPLE SCLEROSIS) (HCC): ICD-10-CM

## 2019-06-06 DIAGNOSIS — G70.00 MYASTHENIA GRAVIS (HCC): ICD-10-CM

## 2019-06-06 DIAGNOSIS — D69.6 THROMBOCYTOPENIA (HCC): Primary | ICD-10-CM

## 2019-06-06 DIAGNOSIS — Z11.59 ENCOUNTER FOR SCREENING FOR OTHER VIRAL DISEASES: ICD-10-CM

## 2019-06-06 NOTE — PROGRESS NOTES
[facility] [place] [address] [address], [address]  W: [phone]  F: [phone]    Reason for Visit:   Ruby Awad is a 60 y.o. female who is seen in consultation at the request of Dr. Julieta Edge for evaluation of Thrombocytopenia. Treatment History:   · none    History of Present Illness:   Patient is a 60 y.o. female seen for thrombocytopenia    She has had mild thrombocytopenia since 2015 which some worsening recently. Platelets were down to 86k on 5/14/19 and hence she is sent for evaluation. No other CBC abnormalities. However this past cbc on 5/14/19 was noted for a Hb of 10.9 g/dl. She has Myasthenia and MS. On Fingolimide. She is also on Topamax, dantrium, paxil. She takes dantruim rarely. She has no bleeding. She has no fevers, chills, Lumps or bumps. She has no night sweats. Some hot flashes. She occasionally does feel cold. She has no change in weight or appetite. Uptodate with mammogram and colonoscopy.      She smokes 1/2 ppd   Rare ETOH    Sister had stage IV breast cancer    Past Medical History:   Diagnosis Date    Arthritis     hip, hands    Benign cyst of left breast 3/21/2016    Blindness and low vision 2004    legally blind from [address]    Cervical spinal stenosis 12/2/2016    Moderate C6-7    Chronic pain     Depression     Diabetes mellitus type 2, controlled ([address] 9/13/2016    Diet-controlled diabetes mellitus ([address] 1/5/2018    Endometriosis 6/25/2010    FH: breast cancer 6/25/2010    Chart states FH breast/ovary Ca, CAD,DM     History of CVA (cerebrovascular accident) 6/5/2018    HTN, goal below 140/90 6/25/2010    CURRENTLY NO MEDICATIONS (5/18/17)    Hypercholesterolemia     Hypothyroid 6/25/2010    Incontinence     Lumbosacral plexopathy 6/25/2010    MS (multiple sclerosis) ([address] 2004    Myasthenia gravis ([address] [id number]    Myasthenia gravis ([address] 2011    Sleep apnea 6/25/2010    RESOLVED 5/2019    Ureteral calculus 2010    Vitamin D deficiency 3/17/2016      Past Surgical History:   Procedure Laterality Date    ABDOMEN SURGERY PROC UNLISTED      bowel resection    BREAST SURGERY PROCEDURE UNLISTED      breast cyst-both breasts at different times    HX  SECTION  1986    HX CYST INCISION AND DRAINAGE      HX GI      COLONOSCOPY    HX GYN      ovarian cyst    HX HEENT  1974    THYROIDECTOMY    HX HEENT      removal of thyroglossal duct cyst    HX HIP REPLACEMENT Right 2017    anterior approach    HX KNEE ARTHROSCOPY Right     HX ORTHOPAEDIC  1997    right thumb tendon repair x 2    HX ORTHOPAEDIC Left     CARPAL TUNNEL    HX ORTHOPAEDIC Right     HIP REPLACEMENT    HX OTHER SURGICAL      Janee Cath x2    HX SMALL BOWEL RESECTION      HX THYROIDECTOMY      1975    HX VASCULAR ACCESS  2006    PORT -A- CATH X 2      Social History     Tobacco Use    Smoking status: Current Every Day Smoker     Packs/day: 0.50     Years: 30.00     Pack years: 15.00     Types: Cigarettes    Smokeless tobacco: Never Used   Substance Use Topics    Alcohol use: No      Family History   Problem Relation Age of Onset    Arthritis-osteo Mother     Diabetes Mother     Elevated Lipids Mother     Headache Mother     Heart Disease Mother     Hypertension Mother     Stroke Mother     Neuropathy Mother     Diabetes Father     Elevated Lipids Father     Heart Disease Father     Hypertension Father     Diabetes Sister     Elevated Lipids Sister     Headache Sister     Hypertension Sister     Migraines Sister     Cancer Sister 62        breast ca W/METS TO BRAIN    Breast Cancer Sister 62    Diabetes Brother     Elevated Lipids Brother     Hypertension Brother     Asthma Son     Thyroid Disease Son         Karlos Born    Sleep Apnea Son     Breast Cancer Maternal Grandmother 54    Diabetes Sister     Neuropathy Sister     Anesth Problems Neg Hx      Current Outpatient Medications   Medication Sig    pregabalin (LYRICA) 200 mg capsule Take 1 Cap by mouth two (2) times a day. Max Daily Amount: 400 mg.  traMADol (ULTRAM) 50 mg tablet Take 1 Tab by mouth every six (6) hours as needed for Pain for up to 30 days. Max Daily Amount: 200 mg.  aspirin delayed-release 81 mg tablet Take 1 Tab by mouth every twelve (12) hours.  buprenorphine-naloxone (SUBOXONE) 4-1 mg film sublingual film by SubLINGual route daily.  OTHER 0.25 mL by SubLINGual route daily. CBD OIL    levothyroxine (SYNTHROID) 150 mcg tablet Take 1 Tab by mouth nightly.  metFORMIN ER (GLUCOPHAGE XR) 500 mg tablet Take 1 Tab by mouth daily (with dinner). (Patient taking differently: Take 500 mg by mouth daily (with breakfast). )    dantrolene (DANTRIUM) 100 mg capsule Take 1 Cap by mouth three (3) times daily. (Patient taking differently: Take 100 mg by mouth daily as needed. Indications: muscle spasm)    topiramate (TOPAMAX) 200 mg tablet TAKE 1 TABLET BY MOUTH TWICE DAILY    GILENYA 0.5 mg cap Take 1 Cap by mouth daily.  ergocalciferol (ERGOCALCIFEROL) 50,000 unit capsule Take 1 Cap by mouth every seven (7) days.  albuterol (PROVENTIL VENTOLIN) 2.5 mg /3 mL (0.083 %) nebulizer solution 3 mL by Nebulization route every six (6) hours as needed for Wheezing.  PARoxetine (PAXIL) 40 mg tablet TAKE 1 AND 1/2 TABLETS BY MOUTH EVERY NIGHT    Menthol-Zinc Oxide (CALMOSEPTINE) 0.44-20.6 % oint Apply  to affected area as needed.  nystatin (MYCOSTATIN) powder Apply to candidal lesions TOPICALLY 2 to 3 times daily until healing complete    pyridostigmine bromide (MESTINON TIMESPAN) 180 mg SR tablet Take 1 Tab by mouth two (2) times a day.  lidocaine (LIDODERM) 5 % Apply patch to the affected area for 12 hours a day and remove for 12 hours a day.  corticotropin (ACTHAR H.P.) 80 unit/mL injectable gel 1 mL by SubCUTAneous route daily.  80 units subq q day for 10 days (Patient taking differently: 80 Units by SubCUTAneous route daily. 80 units subq q day for 10 days    **PT TAKES AS A PRN FOR A 10 DAY PERIOD. NOT TAKING CURRENTLY (5/3/19))    clindamycin (CLEOCIN) 300 mg capsule Take 300 mg by mouth. Prior to dental procedures    rosuvastatin (CRESTOR) 20 mg tablet TAKE 1 TABLET BY MOUTH EVERY EVENING    TENS UNIT ELECTRODES by Does Not Apply route. prn     No current facility-administered medications for this visit. Allergies   Allergen Reactions    Bee Sting [Sting, Bee] Anaphylaxis     Also allergic to egg products    Penicillins Anaphylaxis    Betadine [Povidone-Iodine] Rash    Egg Itching        Review of Systems: A complete review of systems was obtained, negative except as described above. Physical Exam:     Visit Vitals  LMP 09/01/2010     ECOG PS:2  General: No distress, comes with a walker  Eyes: PERRLA, anicteric sclerae  HENT: Atraumatic, OP clear  Neck: Supple  Lymphatic: No cervical, supraclavicular, or inguinal adenopathy  Respiratory: CTAB, normal respiratory effort  CV: Normal rate, regular rhythm, no murmurs, no peripheral edema  GI: Soft, nontender, nondistended, no masses, no hepatomegaly, no splenomegaly  MS: Uses a walker, clubbing  Skin: No rashes, ecchymoses, or petechiae. Normal temperature, turgor, and texture. Psych: Alert, oriented, appropriate affect, normal judgment/insight    Results:     Lab Results   Component Value Date/Time    WBC 7.6 05/14/2019 03:20 AM    HGB 10.9 (L) 05/14/2019 03:20 AM    HCT 36.3 05/14/2019 03:20 AM    PLATELET 86 (L) 05/19/3185 03:20 AM    MCV 98.4 05/14/2019 03:20 AM    ABS.  NEUTROPHILS 6.1 05/14/2019 03:20 AM     Lab Results   Component Value Date/Time    Sodium 142 05/14/2019 03:20 AM    Potassium 4.5 05/14/2019 03:20 AM    Chloride 116 (H) 05/14/2019 03:20 AM    CO2 24 05/14/2019 03:20 AM    Glucose 132 (H) 05/14/2019 03:20 AM    BUN 11 05/14/2019 03:20 AM    Creatinine 0.93 05/14/2019 03:20 AM    GFR est AA >60 05/14/2019 03:20 AM GFR est non-AA >60 05/14/2019 03:20 AM    Calcium 8.4 (L) 05/14/2019 03:20 AM    Glucose (POC) 157 (H) 05/14/2019 11:49 AM     Lab Results   Component Value Date/Time    Bilirubin, total 0.3 05/24/2018 01:19 PM    ALT (SGPT) 7 05/24/2018 01:19 PM    AST (SGOT) 15 05/24/2018 01:19 PM    Alk. phosphatase 57 05/24/2018 01:19 PM    Protein, total 6.4 05/24/2018 01:19 PM    Albumin 4.1 05/24/2018 01:19 PM    Globulin 3.3 05/17/2016 04:53 PM         Records reviewed and summarized above. Pathology report(s) reviewed above. Radiology report(s) reviewed above. Assessment:   1) Chronic moderate thrombocytopenia    All labs reviewed  Has slowly progressive thrombocytopenia    Causes include medications, undiagnosed liver disease, chronic infections, pseudothrombocytopenia, chronic infections , BM disorders    With her Myasthenia I suspect she could have a secondary immune thrombocytopenia  Review of her medications is notable for Dantrum and Topamax as potential culprits as well    We will obtain a screening work up and if no discernible etiology will consider a BM biopsy    2) Anemia  This is new on 5/14/19  No bleeding  Will send work up as below    3) Myasthenia gravis    4) Multiple sclerosis    5) HM  Uptodate    6) Smoking- counseled on quiting        Plan:     · CBC diff, smear, HIV, Hep panel, B12, LD, Iron profile, ferritin, Haptoglobin, CMP, USG Abdomen    RTC 4 weeks    I appreciate the opportunity to participate in Ms. Hiwot Galvin's care.     Signed By: Silvano Roe MD

## 2019-06-06 NOTE — PROGRESS NOTES
Sherly Bearden is a 60 y.o. female here as a new patient for the evaluation of therapy for thrombocytopenia.

## 2019-06-10 ENCOUNTER — HOSPITAL ENCOUNTER (OUTPATIENT)
Dept: ULTRASOUND IMAGING | Age: 64
Discharge: HOME | End: 2019-06-10
Attending: INTERNAL MEDICINE
Payer: MEDICARE

## 2019-06-10 ENCOUNTER — PATIENT OUTREACH (OUTPATIENT)
Dept: FAMILY MEDICINE CLINIC | Age: 64
End: 2019-06-10

## 2019-06-10 ENCOUNTER — TELEPHONE (OUTPATIENT)
Dept: FAMILY MEDICINE CLINIC | Age: 64
End: 2019-06-10

## 2019-06-10 ENCOUNTER — HOSPITAL ENCOUNTER (OUTPATIENT)
Dept: LAB | Age: 64
Discharge: HOME | End: 2019-06-10
Payer: MEDICARE

## 2019-06-10 DIAGNOSIS — D64.9 ANEMIA, UNSPECIFIED TYPE: ICD-10-CM

## 2019-06-10 DIAGNOSIS — D69.6 THROMBOCYTOPENIA (HCC): ICD-10-CM

## 2019-06-10 PROCEDURE — 76700 US EXAM ABDOM COMPLETE: CPT

## 2019-06-10 PROCEDURE — 85025 COMPLETE CBC W/AUTO DIFF WBC: CPT

## 2019-06-10 PROCEDURE — 82728 ASSAY OF FERRITIN: CPT

## 2019-06-10 PROCEDURE — 80053 COMPREHEN METABOLIC PANEL: CPT

## 2019-06-10 PROCEDURE — 85045 AUTOMATED RETICULOCYTE COUNT: CPT

## 2019-06-10 PROCEDURE — 86803 HEPATITIS C AB TEST: CPT

## 2019-06-10 PROCEDURE — 87340 HEPATITIS B SURFACE AG IA: CPT

## 2019-06-10 PROCEDURE — 83615 LACTATE (LD) (LDH) ENZYME: CPT

## 2019-06-10 PROCEDURE — 82607 VITAMIN B-12: CPT

## 2019-06-10 PROCEDURE — 36415 COLL VENOUS BLD VENIPUNCTURE: CPT

## 2019-06-10 PROCEDURE — 87389 HIV-1 AG W/HIV-1&-2 AB AG IA: CPT

## 2019-06-10 PROCEDURE — 83550 IRON BINDING TEST: CPT

## 2019-06-10 PROCEDURE — 83010 ASSAY OF HAPTOGLOBIN QUANT: CPT

## 2019-06-10 NOTE — PROGRESS NOTES
Patient called. Sore today, walked from parking lot to lab at [facility] today to get labs and U/S done per . Thursday, saw -ordered the labs and U/S. She thinks that some of her meds may be causing the platelets to decrease. And her nail issue, may be due to iron deficiency. Goes back in one month for f/u with her on 7/5. Saw  on Friday- he thinks she is doing well. Xray of hip good. Can now walk outside without walker to build up her strength and stamina. PT to be 3 x week to help with stamina and balance. Feels her strength is slowly coming back. Does still fall asleep early each evening. Says pain not in hip, but lower leg/knee and he will address if does not improve. Patient also mentioned some nausea that she has had for last 4-5 days. No vomiting, no fever. Can't keep warm. Sipping water, trying to eat. Still urinating quite a bit-color maybe a little darker. Urged to continue to stay hydrated. Offered appt for eval here, she wants to give it a few more days. Advised to call back if symptoms worsen. Patient agreed to do so.

## 2019-06-11 LAB
ALBUMIN SERPL-MCNC: 4.4 G/DL (ref 3.6–4.8)
ALBUMIN/GLOB SERPL: 1.9 {RATIO} (ref 1.2–2.2)
ALP SERPL-CCNC: 71 IU/L (ref 39–117)
ALT SERPL-CCNC: 5 IU/L (ref 0–32)
AST SERPL-CCNC: 9 IU/L (ref 0–40)
BASOPHILS # BLD AUTO: 0 X10E3/UL (ref 0–0.2)
BASOPHILS NFR BLD AUTO: 0 %
BILIRUB SERPL-MCNC: 0.3 MG/DL (ref 0–1.2)
BUN SERPL-MCNC: 9 MG/DL (ref 8–27)
BUN/CREAT SERPL: 9 (ref 12–28)
CALCIUM SERPL-MCNC: 9.7 MG/DL (ref 8.7–10.3)
CHLORIDE SERPL-SCNC: 110 MMOL/L (ref 96–106)
CO2 SERPL-SCNC: 20 MMOL/L (ref 20–29)
CREAT SERPL-MCNC: 0.96 MG/DL (ref 0.57–1)
EOSINOPHIL # BLD AUTO: 0.1 X10E3/UL (ref 0–0.4)
EOSINOPHIL NFR BLD AUTO: 3 %
ERYTHROCYTE [DISTWIDTH] IN BLOOD BY AUTOMATED COUNT: 15.1 % (ref 12.3–15.4)
FERRITIN SERPL-MCNC: 118 NG/ML (ref 15–150)
GLOBULIN SER CALC-MCNC: 2.3 G/DL (ref 1.5–4.5)
GLUCOSE SERPL-MCNC: 112 MG/DL (ref 65–99)
HAPTOGLOB SERPL-MCNC: 232 MG/DL (ref 34–200)
HBV SURFACE AG SERPL QL IA: NEGATIVE
HCT VFR BLD AUTO: 38.7 % (ref 34–46.6)
HCV AB S/CO SERPL IA: 0.4 S/CO RATIO (ref 0–0.9)
HGB BLD-MCNC: 12.9 G/DL (ref 11.1–15.9)
HIV 1+2 AB+HIV1 P24 AG SERPL QL IA: NON REACTIVE
IMM GRANULOCYTES # BLD AUTO: 0 X10E3/UL (ref 0–0.1)
IMM GRANULOCYTES NFR BLD AUTO: 0 %
IRON SATN MFR SERPL: 30 % (ref 15–55)
IRON SERPL-MCNC: 92 UG/DL (ref 27–139)
LDH SERPL-CCNC: 154 IU/L (ref 119–226)
LYMPHOCYTES # BLD AUTO: 0.7 X10E3/UL (ref 0.7–3.1)
LYMPHOCYTES NFR BLD AUTO: 20 %
MCH RBC QN AUTO: 29.7 PG (ref 26.6–33)
MCHC RBC AUTO-ENTMCNC: 33.3 G/DL (ref 31.5–35.7)
MCV RBC AUTO: 89 FL (ref 79–97)
MONOCYTES # BLD AUTO: 0.3 X10E3/UL (ref 0.1–0.9)
MONOCYTES NFR BLD AUTO: 10 %
NEUTROPHILS # BLD AUTO: 2.4 X10E3/UL (ref 1.4–7)
NEUTROPHILS NFR BLD AUTO: 67 %
PLATELET # BLD AUTO: 172 X10E3/UL (ref 150–450)
POTASSIUM SERPL-SCNC: 4.4 MMOL/L (ref 3.5–5.2)
PROT SERPL-MCNC: 6.7 G/DL (ref 6–8.5)
RBC # BLD AUTO: 4.34 X10E6/UL (ref 3.77–5.28)
RETICS/RBC NFR AUTO: 1.5 % (ref 0.6–2.6)
SODIUM SERPL-SCNC: 144 MMOL/L (ref 134–144)
TIBC SERPL-MCNC: 304 UG/DL (ref 250–450)
UIBC SERPL-MCNC: 212 UG/DL (ref 118–369)
VIT B12 SERPL-MCNC: 436 PG/ML (ref 232–1245)
WBC # BLD AUTO: 3.6 X10E3/UL (ref 3.4–10.8)

## 2019-06-13 ENCOUNTER — PATIENT OUTREACH (OUTPATIENT)
Dept: FAMILY MEDICINE CLINIC | Age: 64
End: 2019-06-13

## 2019-06-13 NOTE — PROGRESS NOTES
Patient called to report still having nausea. Had spoken to her on 6/10 and had mentioned then that she had been nauseated for several days at that time. No fever, no vomiting,no diarrhea noted. Trying to drink fluids- so far today, has only had 1/2 glass warm Pepsi and one cup of hot tea. Ate chicken noodle soup and little bit of applesauce, hit her stomach \"hard. \"  Hasn't been able to take routine meds due to stomach upset. Requesting rx for Zofran or something else for anti-nausea. Advised patient that Hyacinth Jacobsen not sure why she is nauseated but would need to be evaluated if it persists. Rec bland diet and stay hydrated.   Scheduled her to see pcp 6/14 at 11am.

## 2019-06-13 NOTE — Clinical Note
C/o nausea for over a week- see note. Requesting rx for Zofran. Note- I will be leaving at 4:15pm today. Please have Corinne Messing call her with your response. Thanks!  Med Richardson

## 2019-06-14 ENCOUNTER — HOSPITAL ENCOUNTER (OUTPATIENT)
Dept: LAB | Age: 64
Discharge: HOME | End: 2019-06-14
Payer: MEDICARE

## 2019-06-14 ENCOUNTER — OFFICE VISIT (OUTPATIENT)
Dept: FAMILY MEDICINE CLINIC | Age: 64
End: 2019-06-14

## 2019-06-14 VITALS
TEMPERATURE: 98.5 F | RESPIRATION RATE: 18 BRPM | BODY MASS INDEX: 25.49 KG/M2 | DIASTOLIC BLOOD PRESSURE: 80 MMHG | HEART RATE: 88 BPM | SYSTOLIC BLOOD PRESSURE: 140 MMHG | OXYGEN SATURATION: 99 % | WEIGHT: 188.2 LBS | HEIGHT: 72 IN

## 2019-06-14 DIAGNOSIS — R11.0 NAUSEA: Primary | ICD-10-CM

## 2019-06-14 DIAGNOSIS — R10.9 ABDOMINAL PAIN, UNSPECIFIED ABDOMINAL LOCATION: ICD-10-CM

## 2019-06-14 LAB
BILIRUB UR QL STRIP: ABNORMAL
GLUCOSE UR-MCNC: NEGATIVE MG/DL
KETONES P FAST UR STRIP-MCNC: ABNORMAL MG/DL
PH UR STRIP: 6 [PH] (ref 4.6–8)
PROT UR QL STRIP: ABNORMAL
SP GR UR STRIP: 1.3 (ref 1–1.03)
UA UROBILINOGEN AMB POC: ABNORMAL (ref 0.2–1)
URINALYSIS CLARITY POC: CLEAR
URINALYSIS COLOR POC: ABNORMAL
URINE BLOOD POC: ABNORMAL
URINE LEUKOCYTES POC: NEGATIVE
URINE NITRITES POC: NEGATIVE

## 2019-06-14 PROCEDURE — 87086 URINE CULTURE/COLONY COUNT: CPT

## 2019-06-14 RX ORDER — ONDANSETRON 4 MG/1
4 TABLET, ORALLY DISINTEGRATING ORAL
Qty: 15 TAB | Refills: 0 | Status: SHIPPED | OUTPATIENT
Start: 2019-06-14 | End: 2019-09-24 | Stop reason: SDUPTHER

## 2019-06-14 NOTE — PROGRESS NOTES
Chief Complaint   Patient presents with    Nausea     x1 week no vomiting    Chills     x1 week       1. Have you been to the ER, urgent care clinic since your last visit? Hospitalized since your last visit? No    2. Have you seen or consulted any other health care providers outside of the [address] [place] [address] since your last visit? Include any pap smears or colon screening.  No

## 2019-06-14 NOTE — PATIENT INSTRUCTIONS
Nausea and Vomiting: Care Instructions  Your Care Instructions    When you are nauseated, you may feel weak and sweaty and notice a lot of saliva in your mouth. Nausea often leads to vomiting. Most of the time you do not need to worry about nausea and vomiting, but they can be signs of other illnesses. Two common causes of nausea and vomiting are stomach flu and food poisoning. Nausea and vomiting from viral stomach flu will usually start to improve within 24 hours. Nausea and vomiting from food poisoning may last from 12 to 48 hours. The doctor has checked you carefully, but problems can develop later. If you notice any problems or new symptoms, get medical treatment right away. Follow-up care is a key part of your treatment and safety. Be sure to make and go to all appointments, and call your doctor if you are having problems. It's also a good idea to know your test results and keep a list of the medicines you take. How can you care for yourself at home? · To prevent dehydration, drink plenty of fluids, enough so that your urine is light yellow or clear like water. Choose water and other caffeine-free clear liquids until you feel better. If you have kidney, heart, or liver disease and have to limit fluids, talk with your doctor before you increase the amount of fluids you drink. · Rest in bed until you feel better. · When you are able to eat, try clear soups, mild foods, and liquids until all symptoms are gone for 12 to 48 hours. Other good choices include dry toast, crackers, cooked cereal, and gelatin dessert, such as Jell-O. When should you call for help? Call 911 anytime you think you may need emergency care. For example, call if:    · You passed out (lost consciousness).    Call your doctor now or seek immediate medical care if:    · You have symptoms of dehydration, such as:  ? Dry eyes and a dry mouth. ? Passing only a little dark urine. ?  Feeling thirstier than usual.     · You have new or worsening belly pain.     · You have a new or higher fever.     · You vomit blood or what looks like coffee grounds.    Watch closely for changes in your health, and be sure to contact your doctor if:    · You have ongoing nausea and vomiting.     · Your vomiting is getting worse.     · Your vomiting lasts longer than 2 days.     · You are not getting better as expected. Where can you learn more? Go to http://marie-alvin.info/. Enter 25 [id number] in the search box to learn more about \"Nausea and Vomiting: Care Instructions. \"  Current as of: September 23, 2018  Content Version: 11.9  © 3520-1196 [employer]. Care instructions adapted under license by [employer] (which disclaims liability or warranty for this information). If you have questions about a medical condition or this instruction, always ask your healthcare professional. [address] any warranty or liability for your use of this information.

## 2019-06-14 NOTE — PROGRESS NOTES
Patient Name: Tremayne Hanks   MRN: [id number]    Romulo Griggs is a 60 y.o. female who presents with the following:     Reports 1 week history of persistent nausea without vomiting, fever, bowel movement changes. States it seems to occur all day. Has not tried anything for it. Recent medication changes including Suboxone and CBD oil that was started back in April. Has noticed that her urine is darker in color than usual.  Recently had blood work and ultrasound done at the hematologist; overall within normal limits other than fatty liver. Wt Readings from Last 3 Encounters:   06/14/19 188 lb 3.2 oz (85.4 kg)   06/06/19 195 lb (88.5 kg)   05/13/19 196 lb 10 oz (89.2 kg)     Review of Systems   Constitutional: Negative for fever, malaise/fatigue and weight loss. Respiratory: Negative for cough, hemoptysis, shortness of breath and wheezing. Cardiovascular: Negative for chest pain, palpitations, leg swelling and PND. Gastrointestinal: Positive for abdominal pain and nausea. Negative for blood in stool, constipation, diarrhea, melena and vomiting. Genitourinary: Negative for dysuria, flank pain, frequency, hematuria and urgency. The patient's medications, allergies, past medical history, surgical history, family history and social history were reviewed and updated where appropriate. Prior to Admission medications    Medication Sig Start Date End Date Taking? Authorizing Provider   pregabalin (LYRICA) 200 mg capsule Take 1 Cap by mouth two (2) times a day. Max Daily Amount: 400 mg. 5/29/19  Yes Los Kahn MD   aspirin delayed-release 81 mg tablet Take 1 Tab by mouth every twelve (12) hours. 5/14/19  Yes KATIANA Parr   buprenorphine-naloxone (SUBOXONE) 4-1 mg film sublingual film by SubLINGual route daily. Yes Provider, Historical   OTHER 0.25 mL by SubLINGual route daily.  CBD OIL   Yes Provider, Historical   levothyroxine (SYNTHROID) 150 mcg tablet Take 1 Tab by mouth nightly. 4/5/19  Yes Perry Platt MD   metFORMIN ER (GLUCOPHAGE XR) 500 mg tablet Take 1 Tab by mouth daily (with dinner). Patient taking differently: Take 500 mg by mouth daily (with breakfast). 4/5/19  Yes Perry Platt MD   dantrolene (DANTRIUM) 100 mg capsule Take 1 Cap by mouth three (3) times daily. Patient taking differently: Take 100 mg by mouth daily as needed. Indications: muscle spasm 3/19/19  Yes Los Kahn MD   topiramate (TOPAMAX) 200 mg tablet TAKE 1 TABLET BY MOUTH TWICE DAILY 3/18/19  Yes Los Kahn MD   GILENYA 0.5 mg cap Take 1 Cap by mouth daily. 2/15/19  Yes Barbara Little MD   ergocalciferol (ERGOCALCIFEROL) 50,000 unit capsule Take 1 Cap by mouth every seven (7) days. 1/29/19  Yes Los Kahn MD   albuterol (PROVENTIL VENTOLIN) 2.5 mg /3 mL (0.083 %) nebulizer solution 3 mL by Nebulization route every six (6) hours as needed for Wheezing. 12/31/18  Yes Perry Platt MD   PARoxetine (PAXIL) 40 mg tablet TAKE 1 AND 1/2 TABLETS BY MOUTH EVERY NIGHT 12/18/18  Yes Los Kahn MD   Menthol-Zinc Oxide (CALMOSEPTINE) 0.44-20.6 % oint Apply  to affected area as needed. Yes Provider, Historical   nystatin (MYCOSTATIN) powder Apply to candidal lesions TOPICALLY 2 to 3 times daily until healing complete 10/25/18  Yes Perry Platt MD   pyridostigmine bromide (MESTINON TIMESPAN) 180 mg SR tablet Take 1 Tab by mouth two (2) times a day. 8/15/18  Yes Los Kahn MD   lidocaine (LIDODERM) 5 % Apply patch to the affected area for 12 hours a day and remove for 12 hours a day. 8/15/18  Yes Los Kahn MD   corticotropin (ACTHAR H.P.) 80 unit/mL injectable gel 1 mL by SubCUTAneous route daily. 80 units subq q day for 10 days  Patient taking differently: 80 Units by SubCUTAneous route daily. 80 units subq q day for 10 days    **PT TAKES AS A PRN FOR A 10 DAY PERIOD.  NOT TAKING CURRENTLY (5/3/19) 12/26/17  Yes Los Kahn MD   clindamycin (CLEOCIN) 300 mg capsule Take 300 mg by mouth. Prior to dental procedures 6/30/17  Yes Provider, Historical   rosuvastatin (CRESTOR) 20 mg tablet TAKE 1 TABLET BY MOUTH EVERY EVENING 2/24/17  Yes Yuli Carrera MD   TENS UNIT ELECTRODES by Does Not Apply route. prn   Yes Provider, Historical   traMADol (ULTRAM) 50 mg tablet Take 1 Tab by mouth every six (6) hours as needed for Pain for up to 30 days. Max Daily Amount: 200 mg. 5/14/19 6/13/19  KATIANA Andrade       Allergies   Allergen Reactions    Bee Sting [Sting, Bee] Anaphylaxis     Also allergic to egg products    Penicillins Anaphylaxis    Betadine [Povidone-Iodine] Rash    Egg Itching           OBJECTIVE    Visit Vitals  /80 (BP 1 Location: Right arm, BP Patient Position: Sitting)   Pulse 88   Temp 98.5 °F (36.9 °C) (Oral)   Resp 18   Ht 6' (1.829 m)   Wt 188 lb 3.2 oz (85.4 kg)   LMP 09/01/2010   SpO2 99%   BMI 25.52 kg/m²       Physical Exam   Constitutional: She is oriented to person, place, and time and well-developed, well-nourished, and in no distress. No distress. HENT:   Head: Normocephalic. Right Ear: External ear normal.   Left Ear: External ear normal.   Eyes: Pupils are equal, round, and reactive to light. Conjunctivae and EOM are normal.   Cardiovascular: Normal rate, regular rhythm, normal heart sounds and intact distal pulses. Exam reveals no gallop and no friction rub. No murmur heard. Pulmonary/Chest: Effort normal and breath sounds normal. No respiratory distress. She has no wheezes. She has no rales. Abdominal: Soft. Bowel sounds are normal. She exhibits no distension. There is no tenderness. There is no rebound and no guarding. Neurological: She is alert and oriented to person, place, and time. Skin: Skin is warm and dry. She is not diaphoretic. Psychiatric: Memory, affect and judgment normal.         ASSESSMENT AND PLAN  Terrence Galvin is a 60 y.o. female who presents today for:    1.  Abdominal pain, unspecified abdominal location  Will r/o UTI. Possible viral gastroenteritis vs medication side effects; recent abdominal US wnl. Reviewed signs and symptoms that would indicate a worsening medical condition which would require immediate evaluation and treatment; patient expressed understanding of plan. - CULTURE, URINE    2. Nausea  Supportive therapy with Zofran and bland diet. As above. - CULTURE, URINE  - ondansetron (ZOFRAN ODT) 4 mg disintegrating tablet; Take 1 Tab by mouth every eight (8) hours as needed for Nausea. Dispense: 15 Tab; Refill: 0       There are no discontinued medications. Medication risks/benefits/costs/interactions/alternatives discussed with patient. Advised patient to call back or return to office if symptoms worsen/change/persist. If patient cannot reach us or should anything more severe/urgent arise he/she should proceed directly to the nearest emergency department. Discussed expected course/resolution/complications of diagnosis in detail with patient. Patient given a written after visit summary which includes his/her diagnoses, current medications and vitals. Patient expressed understanding with the diagnosis and plan. Brandon Hernandez M.D.

## 2019-06-16 LAB — BACTERIA UR CULT: NORMAL

## 2019-06-18 ENCOUNTER — PATIENT OUTREACH (OUTPATIENT)
Dept: FAMILY MEDICINE CLINIC | Age: 64
End: 2019-06-18

## 2019-06-18 NOTE — PROGRESS NOTES
Called patient for update. Had been complaining about nausea last week. Saw Pasquale Jerry on Friday, got rx for Zofran. Said she is feeling much better. Took the Zofran for several days. By Sunday, was back to normal and able to eat. Encouraged to continue light diet, hydrate and add foods gradually. Advised to call back prn.

## 2019-06-24 DIAGNOSIS — G43.909 MIGRAINE WITHOUT STATUS MIGRAINOSUS, NOT INTRACTABLE, UNSPECIFIED MIGRAINE TYPE: ICD-10-CM

## 2019-06-24 RX ORDER — TOPIRAMATE 200 MG/1
TABLET ORAL
Qty: 180 TAB | Refills: 0 | Status: SHIPPED | OUTPATIENT
Start: 2019-06-24 | End: 2019-08-08 | Stop reason: DRUGHIGH

## 2019-07-05 ENCOUNTER — PATIENT OUTREACH (OUTPATIENT)
Dept: FAMILY MEDICINE CLINIC | Age: 64
End: 2019-07-05

## 2019-07-05 ENCOUNTER — OFFICE VISIT (OUTPATIENT)
Dept: FAMILY MEDICINE CLINIC | Age: 64
End: 2019-07-05

## 2019-07-05 ENCOUNTER — OFFICE VISIT (OUTPATIENT)
Dept: ONCOLOGY | Age: 64
End: 2019-07-05

## 2019-07-05 VITALS
WEIGHT: 191.2 LBS | RESPIRATION RATE: 18 BRPM | HEART RATE: 81 BPM | BODY MASS INDEX: 25.9 KG/M2 | OXYGEN SATURATION: 95 % | SYSTOLIC BLOOD PRESSURE: 98 MMHG | DIASTOLIC BLOOD PRESSURE: 60 MMHG | HEIGHT: 72 IN | TEMPERATURE: 98.4 F

## 2019-07-05 VITALS
DIASTOLIC BLOOD PRESSURE: 60 MMHG | HEART RATE: 87 BPM | HEIGHT: 72 IN | OXYGEN SATURATION: 96 % | SYSTOLIC BLOOD PRESSURE: 96 MMHG | TEMPERATURE: 98.9 F | WEIGHT: 190.3 LBS | BODY MASS INDEX: 25.78 KG/M2

## 2019-07-05 DIAGNOSIS — D69.6 THROMBOCYTOPENIA (HCC): Primary | ICD-10-CM

## 2019-07-05 DIAGNOSIS — Z23 ENCOUNTER FOR IMMUNIZATION: ICD-10-CM

## 2019-07-05 DIAGNOSIS — G70.00 MYASTHENIA GRAVIS (HCC): ICD-10-CM

## 2019-07-05 DIAGNOSIS — E11.9 TYPE 2 DIABETES MELLITUS WITHOUT COMPLICATION, WITHOUT LONG-TERM CURRENT USE OF INSULIN (HCC): Primary | ICD-10-CM

## 2019-07-05 LAB — HBA1C MFR BLD HPLC: 6.3 %

## 2019-07-05 NOTE — PROGRESS NOTES
Mendel Charles is a 60 y.o. female  Chief Complaint   Patient presents with    Follow-up     Thrombocytopenia     1. Have you been to the ER, urgent care clinic since your last visit? Hospitalized since your last visit? No.    2. Have you seen or consulted any other health care providers outside of the [address] [place] [address] since your last visit? Include any pap smears or colon screening. Yes,  for pain management.

## 2019-07-05 NOTE — PROGRESS NOTES
Chief Complaint   Patient presents with    Diabetes     3 month follow up     1. Have you been to the ER, urgent care clinic since your last visit? Hospitalized since your last visit? No    2. Have you seen or consulted any other health care providers outside of the [address] [place] [address] since your last visit? Include any pap smears or colon screening.  No

## 2019-07-05 NOTE — PROGRESS NOTES
[facility] [place] [address] [address], [address]  W: [phone]  F: [phone]    Reason for Visit:   Cristina Resendez is a 60 y.o. female who is seen for follow up of Thrombocytopenia. Treatment History:   · none    History of Present Illness:   Patient is a 60 y.o. female seen for thrombocytopeniaShe has had mild thrombocytopenia since 2015 which some worsening recently. Platelets were down to 86k on 5/14/19 and hence she is sent for evaluation. No other CBC abnormalities. However this past cbc on 5/14/19 was noted for a Hb of 10.9 g/dl. She has Myasthenia and MS. On Fingolimide. She is also on Topamax, dantrium, paxil. She takes dantruim rarely. She now comes to discuss work up. Of note - she states that she had a GI illness the week she timothy her CBC and had not taken Topamax for a week at the time. She has no other new complains. She has no fevers, chills, Lumps or bumps. She has no night sweats. Uptodate with mammogram and colonoscopy.      She smokes 1/2 ppd   Rare ETOH    Sister had stage IV breast cancer    Past Medical History:   Diagnosis Date    Arthritis     hip, hands    Benign cyst of left breast 3/21/2016    Blindness and low vision 2004    legally blind from [address]    Cervical spinal stenosis 12/2/2016    Moderate C6-7    Chronic pain     Depression     Diabetes mellitus type 2, controlled ([address] 9/13/2016    Diet-controlled diabetes mellitus ([address] 1/5/2018    Endometriosis 6/25/2010    FH: breast cancer 6/25/2010    Chart states FH breast/ovary Ca, CAD,DM     History of CVA (cerebrovascular accident) 6/5/2018    HTN, goal below 140/90 6/25/2010    CURRENTLY NO MEDICATIONS (5/18/17)    Hypercholesterolemia     Hypothyroid 6/25/2010    Incontinence     Lumbosacral plexopathy 6/25/2010    MS (multiple sclerosis) ([address] 2004    Myasthenia gravis ([address] Jan,2012    Myasthenia gravis ([address] 2011    Sleep apnea 6/25/2010    RESOLVED 2019    Ureteral calculus 2010    Vitamin D deficiency 3/17/2016      Past Surgical History:   Procedure Laterality Date    ABDOMEN SURGERY PROC UNLISTED      bowel resection    BREAST SURGERY PROCEDURE UNLISTED      breast cyst-both breasts at different times    HX  SECTION  1986    HX CYST INCISION AND DRAINAGE      HX GI      COLONOSCOPY    HX GYN      ovarian cyst    HX HEENT  1974    THYROIDECTOMY    HX HEENT      removal of thyroglossal duct cyst    HX HIP REPLACEMENT Right 2017    anterior approach    HX KNEE ARTHROSCOPY Right     HX ORTHOPAEDIC  1997    right thumb tendon repair x 2    HX ORTHOPAEDIC Left     CARPAL TUNNEL    HX ORTHOPAEDIC Right     HIP REPLACEMENT    HX OTHER SURGICAL      Janee Cath x2    HX SMALL BOWEL RESECTION      HX THYROIDECTOMY      1975    HX VASCULAR ACCESS  2006    PORT -A- CATH X 2      Social History     Tobacco Use    Smoking status: Current Every Day Smoker     Packs/day: 0.50     Years: 30.00     Pack years: 15.00     Types: Cigarettes    Smokeless tobacco: Never Used   Substance Use Topics    Alcohol use: No      Family History   Problem Relation Age of Onset    Arthritis-osteo Mother     Diabetes Mother     Elevated Lipids Mother     Headache Mother     Heart Disease Mother     Hypertension Mother     Stroke Mother     Neuropathy Mother     Diabetes Father     Elevated Lipids Father     Heart Disease Father     Hypertension Father     Diabetes Sister     Elevated Lipids Sister     Headache Sister     Hypertension Sister     Migraines Sister     Cancer Sister 62        breast ca W/METS TO BRAIN    Breast Cancer Sister 62    Diabetes Brother     Elevated Lipids Brother     Hypertension Brother     Asthma Son     Thyroid Disease Son         [address] Figures    Sleep Apnea Son     Breast Cancer Maternal Grandmother 54    Diabetes Sister     Neuropathy Sister     Anesth Problems Neg Hx Current Outpatient Medications   Medication Sig    topiramate (TOPAMAX) 200 mg tablet TAKE 1 TABLET BY MOUTH TWICE DAILY    pregabalin (LYRICA) 200 mg capsule Take 1 Cap by mouth two (2) times a day. Max Daily Amount: 400 mg.  buprenorphine-naloxone (SUBOXONE) 4-1 mg film sublingual film by SubLINGual route daily.  levothyroxine (SYNTHROID) 150 mcg tablet Take 1 Tab by mouth nightly.  metFORMIN ER (GLUCOPHAGE XR) 500 mg tablet Take 1 Tab by mouth daily (with dinner). (Patient taking differently: Take 500 mg by mouth daily (with breakfast). )    dantrolene (DANTRIUM) 100 mg capsule Take 1 Cap by mouth three (3) times daily. (Patient taking differently: Take 100 mg by mouth daily as needed. Indications: muscle spasm)    GILENYA 0.5 mg cap Take 1 Cap by mouth daily.  PARoxetine (PAXIL) 40 mg tablet TAKE 1 AND 1/2 TABLETS BY MOUTH EVERY NIGHT    pyridostigmine bromide (MESTINON TIMESPAN) 180 mg SR tablet Take 1 Tab by mouth two (2) times a day.  lidocaine (LIDODERM) 5 % Apply patch to the affected area for 12 hours a day and remove for 12 hours a day.  rosuvastatin (CRESTOR) 20 mg tablet TAKE 1 TABLET BY MOUTH EVERY EVENING    ondansetron (ZOFRAN ODT) 4 mg disintegrating tablet Take 1 Tab by mouth every eight (8) hours as needed for Nausea.  aspirin delayed-release 81 mg tablet Take 1 Tab by mouth every twelve (12) hours.  OTHER 0.25 mL by SubLINGual route daily. CBD OIL    ergocalciferol (ERGOCALCIFEROL) 50,000 unit capsule Take 1 Cap by mouth every seven (7) days.  albuterol (PROVENTIL VENTOLIN) 2.5 mg /3 mL (0.083 %) nebulizer solution 3 mL by Nebulization route every six (6) hours as needed for Wheezing.  Menthol-Zinc Oxide (CALMOSEPTINE) 0.44-20.6 % oint Apply  to affected area as needed.     nystatin (MYCOSTATIN) powder Apply to candidal lesions TOPICALLY 2 to 3 times daily until healing complete    corticotropin (ACTHAR H.P.) 80 unit/mL injectable gel 1 mL by SubCUTAneous route daily. 80 units subq q day for 10 days (Patient taking differently: 80 Units by SubCUTAneous route daily. 80 units subq q day for 10 days    **PT TAKES AS A PRN FOR A 10 DAY PERIOD. NOT TAKING CURRENTLY (5/3/19))    clindamycin (CLEOCIN) 300 mg capsule Take 300 mg by mouth. Prior to dental procedures    TENS UNIT ELECTRODES by Does Not Apply route. prn     No current facility-administered medications for this visit. Allergies   Allergen Reactions    Bee Sting [Sting, Bee] Anaphylaxis     Also allergic to egg products    Penicillins Anaphylaxis    Betadine [Povidone-Iodine] Rash    Egg Itching        Review of Systems: A complete review of systems was obtained, negative except as described above. Physical Exam:     Visit Vitals  BP 96/60 (BP 1 Location: Left arm, BP Patient Position: Sitting)   Pulse 87   Temp 98.9 °F (37.2 °C) (Oral)   Ht 6' 1\" (1.854 m)   Wt 190 lb 4.8 oz (86.3 kg)   LMP 09/01/2010   SpO2 96%   BMI 25.11 kg/m²     ECOG PS:2  General: No distress, comes with a walker  Eyes: PERRLA, anicteric sclerae  HENT: Atraumatic, OP clear  Neck: Supple  MS: Uses a walker, clubbing  Skin: No rashes, ecchymoses, or petechiae. Normal temperature, turgor, and texture. Psych: Alert, oriented, appropriate affect, normal judgment/insight    Results:     Lab Results   Component Value Date/Time    WBC 3.6 06/10/2019 10:41 AM    HGB 12.9 06/10/2019 10:41 AM    HCT 38.7 06/10/2019 10:41 AM    PLATELET 747 28/16/5122 10:41 AM    MCV 89 06/10/2019 10:41 AM    ABS.  NEUTROPHILS 2.4 06/10/2019 10:41 AM     Lab Results   Component Value Date/Time    Sodium 144 06/10/2019 10:41 AM    Potassium 4.4 06/10/2019 10:41 AM    Chloride 110 (H) 06/10/2019 10:41 AM    CO2 20 06/10/2019 10:41 AM    Glucose 112 (H) 06/10/2019 10:41 AM    BUN 9 06/10/2019 10:41 AM    Creatinine 0.96 06/10/2019 10:41 AM    GFR est AA 73 06/10/2019 10:41 AM    GFR est non-AA 64 06/10/2019 10:41 AM    Calcium 9.7 06/10/2019 10:41 AM Glucose (POC) 157 (H) 05/14/2019 11:49 AM     Lab Results   Component Value Date/Time    Bilirubin, total 0.3 06/10/2019 10:41 AM    ALT (SGPT) 5 06/10/2019 10:41 AM    AST (SGOT) 9 06/10/2019 10:41 AM    Alk. phosphatase 71 06/10/2019 10:41 AM    Protein, total 6.7 06/10/2019 10:41 AM    Albumin 4.4 06/10/2019 10:41 AM    Globulin 3.3 05/17/2016 04:53 PM     Lab Results   Component Value Date/Time    Reticulocyte count 1.5 06/10/2019 10:41 AM    Iron % saturation 30 06/10/2019 10:41 AM    TIBC 304 06/10/2019 10:41 AM    Ferritin 118 06/10/2019 10:41 AM    Vitamin B12 436 06/10/2019 10:41 AM    Folate 12.0 02/10/2016    Haptoglobin 232 (H) 06/10/2019 10:41 AM     06/10/2019 10:41 AM    Sed rate (ESR) 4 08/25/2011 12:52 PM    TSH 2.140 04/04/2019 02:04 PM    M-Charlie Not Observed 08/25/2011 12:52 PM    Lipase 93 04/04/2015 11:55 AM    Hep C Virus Ab 0.4 06/10/2019 10:41 AM    HIV SCREEN 4TH GENERATION [facility] Non Reactive 06/10/2019 10:41 AM     Lab Results   Component Value Date/Time    INR 1.0 04/18/2019 11:50 AM    aPTT 28.7 05/17/2016 04:53 PM         Records reviewed and summarized above. Pathology report(s) reviewed above. Radiology report(s) reviewed     USG abdomen 6/10/19  IMPRESSION  IMPRESSION:      No hepatosplenomegaly is identified. Heterogenous hepatic echotexture may be  related to hepatic parenchymal disease or hepatic steatosis.      Assessment:   1) Chronic moderate thrombocytopenia    All labs reviewed  Has slowly progressive thrombocytopenia  Work up is reviewed and is largely unremarkable    Indeed her thrombocytopenia corrected on 6/10/19  She was off Topamax when this CBC was drawn which supports the idea that her thrombocytopenia is likely drug induced and can at this time be observed        2) Anemia  Resolved, no iron deficiency and no B12 deficiency    3) Myasthenia gravis    4) Multiple sclerosis    5) HM  Uptodate    6) Smoking- counseled on quiting        Plan:     RTC 4 months with cbc    I appreciate the opportunity to participate in Ms. Hiwot Galvin's care.     Signed By: Elsa Whitmore MD

## 2019-07-05 NOTE — PROGRESS NOTES
Patient Name: Selma Serrato   MRN: [id number]    Beena Dale is a 60 y.o. female who presents with the following:     She is seen for diabetes. Since last visit she reports no numbness, tingling or pain in extremities, no unusual visual symptoms, no hypoglycemia, no significant changes. Home glucose monitoring: is not performed. She reports medication compliance: compliant all of the time. Medication side effects: none. Diabetic diet compliance: compliant most of the time. Lab review: A1c today is 6.3. Eye exam: UTD. Started metformin since last OV. Lab Results   Component Value Date/Time    Hemoglobin A1c 7.0 (H) 04/04/2019 02:04 PM    Hemoglobin A1c (POC) 6.3 07/05/2019 01:51 PM       Review of Systems   Constitutional: Negative for fever, malaise/fatigue and weight loss. Respiratory: Negative for cough, hemoptysis, shortness of breath and wheezing. Cardiovascular: Negative for chest pain, palpitations, leg swelling and PND. Gastrointestinal: Negative for abdominal pain, constipation, diarrhea, nausea and vomiting. The patient's medications, allergies, past medical history, surgical history, family history and social history were reviewed and updated where appropriate. Prior to Admission medications    Medication Sig Start Date End Date Taking? Authorizing Provider   topiramate (TOPAMAX) 200 mg tablet TAKE 1 TABLET BY MOUTH TWICE DAILY 6/24/19  Yes Los Kahn MD   ondansetron (ZOFRAN ODT) 4 mg disintegrating tablet Take 1 Tab by mouth every eight (8) hours as needed for Nausea. 6/14/19  Yes Adeel Clifford MD   pregabalin (LYRICA) 200 mg capsule Take 1 Cap by mouth two (2) times a day. Max Daily Amount: 400 mg. 5/29/19  Yes Los Kahn MD   aspirin delayed-release 81 mg tablet Take 1 Tab by mouth every twelve (12) hours. 5/14/19  Yes KATIANA Oconnell   buprenorphine-naloxone (SUBOXONE) 4-1 mg film sublingual film by SubLINGual route daily.    Yes Provider, Historical   OTHER 0.25 mL by SubLINGual route daily. CBD OIL   Yes Provider, Historical   levothyroxine (SYNTHROID) 150 mcg tablet Take 1 Tab by mouth nightly. 4/5/19  Yes Ranjeet Alanis MD   metFORMIN ER (GLUCOPHAGE XR) 500 mg tablet Take 1 Tab by mouth daily (with dinner). Patient taking differently: Take 500 mg by mouth daily (with breakfast). 4/5/19  Yes Ranjeet Alanis MD   dantrolene (DANTRIUM) 100 mg capsule Take 1 Cap by mouth three (3) times daily. Patient taking differently: Take 100 mg by mouth daily as needed. Indications: muscle spasm 3/19/19  Yes Los Kahn MD   GILENYA 0.5 mg cap Take 1 Cap by mouth daily. 2/15/19  Yes Ruth Ann Boss MD   ergocalciferol (ERGOCALCIFEROL) 50,000 unit capsule Take 1 Cap by mouth every seven (7) days. 1/29/19  Yes Los Kahn MD   albuterol (PROVENTIL VENTOLIN) 2.5 mg /3 mL (0.083 %) nebulizer solution 3 mL by Nebulization route every six (6) hours as needed for Wheezing. 12/31/18  Yes Ranjeet Alanis MD   PARoxetine (PAXIL) 40 mg tablet TAKE 1 AND 1/2 TABLETS BY MOUTH EVERY NIGHT 12/18/18  Yes Los Kahn MD   Menthol-Zinc Oxide (CALMOSEPTINE) 0.44-20.6 % oint Apply  to affected area as needed. Yes Provider, Historical   nystatin (MYCOSTATIN) powder Apply to candidal lesions TOPICALLY 2 to 3 times daily until healing complete 10/25/18  Yes Ranjeet Alanis MD   pyridostigmine bromide (MESTINON TIMESPAN) 180 mg SR tablet Take 1 Tab by mouth two (2) times a day. 8/15/18  Yes Los Kahn MD   lidocaine (LIDODERM) 5 % Apply patch to the affected area for 12 hours a day and remove for 12 hours a day. 8/15/18  Yes Los Kanh MD   corticotropin (ACTHAR H.P.) 80 unit/mL injectable gel 1 mL by SubCUTAneous route daily. 80 units subq q day for 10 days  Patient taking differently: 80 Units by SubCUTAneous route daily. 80 units subq q day for 10 days    **PT TAKES AS A PRN FOR A 10 DAY PERIOD.  NOT TAKING CURRENTLY (5/3/19) 12/26/17  Yes Los Kahn MD   clindamycin (CLEOCIN) 300 mg capsule Take 300 mg by mouth. Prior to dental procedures 6/30/17  Yes Provider, Historical   rosuvastatin (CRESTOR) 20 mg tablet TAKE 1 TABLET BY MOUTH EVERY EVENING 2/24/17  Yes Simón Weaver MD   TENS UNIT ELECTRODES by Does Not Apply route. prn   Yes Provider, Historical       Allergies   Allergen Reactions    Bee Sting [Sting, Bee] Anaphylaxis     Also allergic to egg products    Penicillins Anaphylaxis    Betadine [Povidone-Iodine] Rash    Egg Itching       OBJECTIVE    Visit Vitals  BP 98/60   Pulse 81   Temp 98.4 °F (36.9 °C) (Oral)   Resp 18   Ht 6' 1\" (1.854 m)   Wt 191 lb 3.2 oz (86.7 kg)   LMP 09/01/2010   SpO2 95%   BMI 25.23 kg/m²       Physical Exam   Constitutional: She is oriented to person, place, and time and well-developed, well-nourished, and in no distress. No distress. Eyes: Pupils are equal, round, and reactive to light. Conjunctivae and EOM are normal.   Musculoskeletal: Normal range of motion. Neurological: She is alert and oriented to person, place, and time. Gait normal.   Skin: Skin is warm and dry. She is not diaphoretic. Psychiatric: Mood, memory, affect and judgment normal.   Nursing note and vitals reviewed. ASSESSMENT AND PLAN  Abrahan Galvin is a 60 y.o. female who presents today for:    1. Type 2 diabetes mellitus without complication, without long-term current use of insulin (HCC)  Stable, continue current treatment. I have reviewed/discussed the above normal BMI with the patient. I have recommended the following interventions: dietary management education, guidance, and counseling, encourage exercise, monitor weight and prescribed dietary intake. - AMB POC HEMOGLOBIN A1C    2. Encounter for immunization  - pneumococcal 23-valent (PNEUMOVAX 23) 25 mcg/0.5 mL injection; 0.5 mL by IntraMUSCular route once for 1 dose.  Please fax vaccination documentation to [facility] at [phone], Attn: Dr. Yamil Downey: 0.5 mL; Refill: 0  - varicella-zoster recombinant, PF, (SHINGRIX, PF,) 50 mcg/0.5 mL susr injection; 0.5 mL by IntraMUSCular route once for 1 dose. Please fax vaccination documentation to [facility] at [phone], Attn: Dr. Yamil Downey: 0.5 mL; Refill: 0      There are no discontinued medications. Follow-up and Dispositions    · Return in about 6 months (around 1/5/2020) for DM follow up. Medication risks/benefits/costs/interactions/alternatives discussed with patient. Advised patient to call back or return to office if symptoms worsen/change/persist. If patient cannot reach us or should anything more severe/urgent arise he/she should proceed directly to the nearest emergency department. Discussed expected course/resolution/complications of diagnosis in detail with patient. Patient given a written after visit summary which includes his/her diagnoses, current medications and vitals. Patient expressed understanding with the diagnosis and plan. Brandon Owen M.D.

## 2019-07-05 NOTE — PATIENT INSTRUCTIONS

## 2019-07-05 NOTE — PROGRESS NOTES
Patient in for CM session after seeing pcp for routine office visit. In good spirits. Accompanied by son. Was not using a cane and not wearing her leg splints. Reports she has days where she sleeps a lot. And then other days, when hard to sleep. Takes the CBD oil in am and the Suboxone at hs. Can't tolerate taking the Suboxone during day, makes her too sleepy. The combination of the meds has really helped her pain level. Headache currently at about #3-before was over #10. Thinks the recent heat has bothered her more and unable to go out and work in garden. Bored with being inside so much. Discussed some things that she may be able to work on in the house until cooler outside. Also has a vacation coming up in 2 weeks with her mother and family. Says she will be with her mother for a few days alone until rest of family comes. Not sure how that will go she said, urged her to be positive and may be good opportunity for them to talk by themselves. Saw the oncologist, Dariela Loja. Believes her low platelet count due to medication, the Topamax. Patient will discuss this with  at her next visit with him in August. Also Vit D level was high normal.  Dariela Loja thinks her fingernails curling up ? From the Myasthenia Gravis. Will also discuss with . A1C down to 6.4. Will continue on Metformin per . Overall in good spirits. Reminded to call if questions/concerns.

## 2019-08-08 ENCOUNTER — PATIENT OUTREACH (OUTPATIENT)
Dept: FAMILY MEDICINE CLINIC | Age: 64
End: 2019-08-08

## 2019-08-08 ENCOUNTER — OFFICE VISIT (OUTPATIENT)
Dept: NEUROLOGY | Age: 64
End: 2019-08-08

## 2019-08-08 VITALS
DIASTOLIC BLOOD PRESSURE: 73 MMHG | OXYGEN SATURATION: 98 % | BODY MASS INDEX: 25.09 KG/M2 | HEART RATE: 94 BPM | WEIGHT: 185.2 LBS | RESPIRATION RATE: 18 BRPM | TEMPERATURE: 97.9 F | HEIGHT: 72 IN | SYSTOLIC BLOOD PRESSURE: 128 MMHG

## 2019-08-08 DIAGNOSIS — R26.9 GAIT DISORDER: ICD-10-CM

## 2019-08-08 DIAGNOSIS — G43.009 MIGRAINE WITHOUT AURA AND WITHOUT STATUS MIGRAINOSUS, NOT INTRACTABLE: ICD-10-CM

## 2019-08-08 DIAGNOSIS — G35 MS (MULTIPLE SCLEROSIS) (HCC): Primary | ICD-10-CM

## 2019-08-08 DIAGNOSIS — G35 MS (MULTIPLE SCLEROSIS) (HCC): ICD-10-CM

## 2019-08-08 DIAGNOSIS — G70.00 MYASTHENIA GRAVIS (HCC): ICD-10-CM

## 2019-08-08 DIAGNOSIS — G89.4 CHRONIC PAIN SYNDROME: ICD-10-CM

## 2019-08-08 RX ORDER — TOPIRAMATE 200 MG/1
200 TABLET ORAL
Qty: 90 TAB | Refills: 3 | Status: SHIPPED | OUTPATIENT
Start: 2019-08-08 | End: 2020-05-21 | Stop reason: DRUGHIGH

## 2019-08-08 RX ORDER — METFORMIN HYDROCHLORIDE 500 MG/1
500 TABLET, EXTENDED RELEASE ORAL
Qty: 90 TAB | Refills: 1 | Status: CANCELLED
Start: 2019-08-08

## 2019-08-08 RX ORDER — PYRIDOSTIGMINE BROMIDE 180 MG/1
180 TABLET, EXTENDED RELEASE ORAL 2 TIMES DAILY
Qty: 60 TAB | Refills: 6 | Status: SHIPPED | OUTPATIENT
Start: 2019-08-08 | End: 2019-10-21

## 2019-08-08 RX ORDER — METFORMIN HYDROCHLORIDE 500 MG/1
500 TABLET, EXTENDED RELEASE ORAL
Qty: 90 TAB | Refills: 1 | Status: SHIPPED | OUTPATIENT
Start: 2019-08-08 | End: 2019-08-11 | Stop reason: SDUPTHER

## 2019-08-08 NOTE — PROGRESS NOTES
Neurology Progress Note    NAME:  Vincenzo Galvin   :   1955   MRN:   [id number]     Date/Time:  2019  Subjective:     Vincenzo Galvin is a 60 y.o. female here today for follow-up for MS, MG, chronic pain syndrome, headaches, CVA, difficulty walking. Patient was accompanied by her son to the visit. Patient says with the new pain management, her pain. However she continues to have a lot of pain but not as much as before, pain is sharp in nature, diffuse, burning sensation that goes up to the arms causing numbness and tingling sensation and weakness of the hands, down to the legs causing  numbness and tingling sensation of the legs with weakness of the legs. She says her headache continues to decrease in frequency and intensity, frequency of 1-2 x/week after she tried CG RP, headache is frontal, throbbing in nature, with occasional sharp pain from the back of the head, headache associated with dizziness, nausea, blurry vision, double vision, photophobia, phonophobia. She says she has a lot of muscle spasms mostly in the back. Pain appears to have plateaued. Due to some speech problem, I will decrease Topamax to 200 mg p.o. nightly. Vitamin D level is normal, so at this time I will discontinue vitamin D. Due to increased pain and anxiety, patient's tremor has increased. On a scale of 1-10, patient rates pain level to be between 5 and 6  She denies loss of consciousness. Says dysphagia has improved. No fall was reported  I will continue patient on current medication.   Review of Systems - General ROS: positive for  - fatigue, night sweats and sleep disturbance  Psychological ROS: positive for - anxiety, concentration difficulties, depression, mood swings and sleep disturbances  Ophthalmic ROS: positive for - blurry vision, decreased vision, double vision and photophobia  ENT ROS: positive for - headaches, tinnitus, vertigo and visual changes  Allergy and Immunology ROS: negative  Hematological and Lymphatic ROS: negative  Endocrine ROS: negative  Respiratory ROS: no cough, shortness of breath, or wheezing  Cardiovascular ROS: no chest pain or dyspnea on exertion  Gastrointestinal ROS: no abdominal pain, change in bowel habits, or black or bloody stools  Genito-Urinary ROS: no dysuria, trouble voiding, or hematuria  Musculoskeletal ROS: positive for - gait disturbance, joint pain, joint stiffness, joint swelling and muscle pain  Neurological ROS: positive for - dizziness, gait disturbance, headaches, impaired coordination/balance, numbness/tingling, speech problems, tremors, visual changes and weakness  Dermatological ROS: negative       Medications reviewed:  Current Outpatient Medications   Medication Sig Dispense Refill    topiramate (TOPAMAX) 200 mg tablet TAKE 1 TABLET BY MOUTH TWICE DAILY 180 Tab 0    ondansetron (ZOFRAN ODT) 4 mg disintegrating tablet Take 1 Tab by mouth every eight (8) hours as needed for Nausea. 15 Tab 0    pregabalin (LYRICA) 200 mg capsule Take 1 Cap by mouth two (2) times a day. Max Daily Amount: 400 mg. 180 Cap 3    aspirin delayed-release 81 mg tablet Take 1 Tab by mouth every twelve (12) hours. 60 Tab 0    buprenorphine-naloxone (SUBOXONE) 4-1 mg film sublingual film by SubLINGual route daily.  OTHER 0.25 mL by SubLINGual route daily. CBD OIL      levothyroxine (SYNTHROID) 150 mcg tablet Take 1 Tab by mouth nightly. 90 Tab 1    metFORMIN ER (GLUCOPHAGE XR) 500 mg tablet Take 1 Tab by mouth daily (with dinner). (Patient taking differently: Take 500 mg by mouth daily (with breakfast). ) 90 Tab 1    dantrolene (DANTRIUM) 100 mg capsule Take 1 Cap by mouth three (3) times daily. (Patient taking differently: Take 100 mg by mouth daily as needed. Indications: muscle spasm) 90 Cap 3    GILENYA 0.5 mg cap Take 1 Cap by mouth daily. 30 Cap 11    ergocalciferol (ERGOCALCIFEROL) 50,000 unit capsule Take 1 Cap by mouth every seven (7) days.  4 Cap 3    albuterol (PROVENTIL VENTOLIN) 2.5 mg /3 mL (0.083 %) nebulizer solution 3 mL by Nebulization route every six (6) hours as needed for Wheezing. 30 Each 0    PARoxetine (PAXIL) 40 mg tablet TAKE 1 AND 1/2 TABLETS BY MOUTH EVERY NIGHT 135 Tab 3    Menthol-Zinc Oxide (CALMOSEPTINE) 0.44-20.6 % oint Apply  to affected area as needed.  nystatin (MYCOSTATIN) powder Apply to candidal lesions TOPICALLY 2 to 3 times daily until healing complete 30 g 0    pyridostigmine bromide (MESTINON TIMESPAN) 180 mg SR tablet Take 1 Tab by mouth two (2) times a day. 60 Tab 6    lidocaine (LIDODERM) 5 % Apply patch to the affected area for 12 hours a day and remove for 12 hours a day. 30 Each 3    corticotropin (ACTHAR H.P.) 80 unit/mL injectable gel 1 mL by SubCUTAneous route daily. 80 units subq q day for 10 days (Patient taking differently: 80 Units by SubCUTAneous route daily. 80 units subq q day for 10 days    **PT TAKES AS A PRN FOR A 10 DAY PERIOD. NOT TAKING CURRENTLY (5/3/19)) 10 mL 1    clindamycin (CLEOCIN) 300 mg capsule Take 300 mg by mouth. Prior to dental procedures      rosuvastatin (CRESTOR) 20 mg tablet TAKE 1 TABLET BY MOUTH EVERY EVENING 90 Tab 1    TENS UNIT ELECTRODES by Does Not Apply route.  prn          Objective:   Vitals:  Vitals:    08/08/19 1101   BP: 128/73   Pulse: 94   Resp: 18   Temp: 97.9 °F (36.6 °C)   TempSrc: Temporal   SpO2: 98%   Weight: 185 lb 3.2 oz (84 kg)   Height: 6' 1\" (1.854 m)   PainSc:   6   PainLoc: Head   LMP: 09/01/2010               Lab Data Reviewed:  Lab Results   Component Value Date/Time    WBC 3.6 06/10/2019 10:41 AM    HCT 38.7 06/10/2019 10:41 AM    HGB 12.9 06/10/2019 10:41 AM    PLATELET 671 75/26/5010 10:41 AM       Lab Results   Component Value Date/Time    Sodium 144 06/10/2019 10:41 AM    Potassium 4.4 06/10/2019 10:41 AM    Chloride 110 (H) 06/10/2019 10:41 AM    CO2 20 06/10/2019 10:41 AM    Glucose 112 (H) 06/10/2019 10:41 AM    BUN 9 06/10/2019 10:41 AM    Creatinine 0.96 06/10/2019 10:41 AM    Calcium 9.7 06/10/2019 10:41 AM       No components found for: TROPQUANT    No results found for: SHAHIDA      Lab Results   Component Value Date/Time    Hemoglobin A1c 7.0 (H) 04/04/2019 02:04 PM    Hemoglobin A1c (POC) 6.3 07/05/2019 01:51 PM        Lab Results   Component Value Date/Time    Vitamin B12 436 06/10/2019 10:41 AM    Folate 12.0 02/10/2016       No results found for: SHAHIDA, [place], XBANA    Lab Results   Component Value Date/Time    Cholesterol, total 179 05/24/2018 01:19 PM    Cholesterol, Total 181 12/26/2018    HDL Cholesterol 65 12/26/2018    HDL Cholesterol 51 05/24/2018 01:19 PM    LDL-CHOLESTEROL 91 12/26/2018    LDL, calculated 109 (H) 05/24/2018 01:19 PM    VLDL, calculated 19 05/24/2018 01:19 PM    Triglyceride 126 12/26/2018    Triglyceride 96 05/24/2018 01:19 PM    CHOL/HDL Ratio 4.3 08/23/2010 12:19 PM         CT Results (recent):  Results from Hospital Encounter encounter on 05/14/18   CT NECK SOFT TISSUE W CONT    Narrative HISTORY: Neck mass. PROCEDURE: Multiple contiguous helically acquired axial images were obtained of  the cervical region from the skull base through to the thoracic inlet following  intravenous contrast administration. Sagittal and coronal reconstructions were  performed. CT dose reduction was achieved through the use of a standardized protocol  tailored for this examination and automatic exposure control for dose  modulation. FINDINGS:     Images through the nasopharynx reveal symmetric soft tissues. Images through the oropharynx reveals symmetric soft tissues. The parotid glands are symmetric in appearance. Images through the hypopharynx reveals symmetric soft tissues. Just below the level of the hyoid bone, is a soft tissue structure measuring 9.7  x 7.8 mm with a small satellite lesions. No evidence of lingual thyroid per se. The submandibular glands have a symmetric appearance.     Images through the larynx proper reveal symmetric soft tissues. Images through the subglottic larynx reveal symmetric soft tissues. The thyroid gland has a normal appearance with symmetric lobes. No significant cervical lymphadenopathy. Incidental mild mucoperiosteal thickening involving the right greater than left  maxillary sinus of uncertain clinical significance. Impression IMPRESSION:    9.7 x 7.8 mm soft tissue structure anterior to the hyoid bone. Differential  considerations would include thyroglossal duct cyst, ectopic thyroid, dermoid,  etc.         MRI Results (recent):  Results from Hospital Encounter encounter on 12/26/18   MRI BRAIN W WO CONT    Narrative Medical indication: Multiple sclerosis   , headache q. daily, prior CVA, new numbness, exacerbation. Technical factors: Diffusion imaging, sagittal T1-weighted, sagittal FLAIR,  axial T1-weighted pre and post 18 cc IV. dotarem T2-weighted FLAIR gradient echo  coronal and sagittal T1-weighted postcontrast. Comparison January 10, 2018. Diffusion imaging does not show acute ischemic changes her. There is no  extra-axial fluid collection hemorrhage or shift. Ventricular size is stable. Major vessels flow voids at the base of the brain are present. Incidental  maxillary sinus disease. The burden of white matter disease appears stable. No  enhancing lesion or masses. Impression IMPRESSION: Stable white matter disease. No acute findings no masses or  enhancing lesion. IR Results (recent):  No results found for this or any previous visit. VAS/US Results (recent):  Results from Hospital Encounter encounter on 01/19/17   DUPLEX LOWER EXT VENOUS RIGHT       PHYSICAL EXAM:  General:    Alert, cooperative, no distress, appears stated age. Head:   Normocephalic, without obvious abnormality, atraumatic. Eyes:   Conjunctivae/corneas clear. PERRLA  Nose:  Nares normal. No drainage or sinus tenderness.   Throat:    Lips, mucosa, and tongue normal.  No Thrush  Neck:  Supple, symmetrical,  no adenopathy, thyroid: non tender    no carotid bruit and no JVD. Paraspinal tenderness  Back:    Symmetric, diffuse tenderness. Lungs:   Clear to auscultation bilaterally. No Wheezing or Rhonchi. No rales. Chest wall:  No tenderness or deformity. No Accessory muscle use. Heart:   Regular rate and rhythm,  no murmur, rub or gallop. Abdomen:   Soft, non-tender. Not distended. Bowel sounds normal. No masses  Extremities: Extremities normal, atraumatic, No cyanosis. No edema. No clubbing  Skin:     Texture, turgor normal. No rashes or lesions. Not Jaundiced  Lymph nodes: Cervical, supraclavicular normal.  Psych:  Good insight. Depressed. Anxious. NEUROLOGICAL EXAM:  Appearance: The patient is well developed, well nourished, provides a coherent history and is in no acute distress. Mental Status: Oriented to time, place and person. Mood and affect appropriate. Cranial Nerves:   Intact visual fields. Fundi are benign. RACHEL, EOM's full, no nystagmus, no ptosis. Facial sensation is normal. Corneal reflexes are intact. Facial movement is symmetric. Hearing is normal bilaterally. Palate is midline with normal sternocleidomastoid and trapezius muscles are normal. Tongue is midline. Motor:  5-/5 strength in upper extremity and 4+/5 lower extremity proximal and distal muscles. Normal bulk and tone. No fasciculations. Reflexes:   Deep tendon reflexes 2+/4 and symmetrical.   Sensory:    Dysesthesia to touch, pinprick and vibration. Gait:   Unsteady gait. Tremor:    Mild tremor noted. Cerebellar:  No cerebellar signs present. Neurovascular:  Normal heart sounds and regular rhythm, peripheral pulses intact, and no carotid bruits. Assesment  1. MS (multiple sclerosis) (HCC)    - pyridostigmine bromide (MESTINON TIMESPAN) 180 mg SR tablet; Take 1 Tab by mouth two (2) times a day. Dispense: 60 Tab; Refill: 6    2. Myasthenia gravis ([address]  Stable    3. Chronic pain syndrome  Following pain management    4. Gait disorder  Physical therapy    5. Migraine without aura and without status migrainosus, not intractable  Stable    ___________________________________________________  PLAN: Medication and plan discussed with patient      ICD-10-CM ICD-9-CM    1. MS (multiple sclerosis) ([facility] G35 340 pyridostigmine bromide (MESTINON TIMESPAN) 180 mg SR tablet   2. Myasthenia gravis ([facility] G70.00 358.00    3. Chronic pain syndrome G89.4 338.4    4. Gait disorder R26.9 781.2    5. Migraine without aura and without status migrainosus, not intractable G43.009 346.10      Follow-up and Dispositions    · Return in about 3 months (around 11/8/2019).          :    ___________________________________________________    Attending Physician: Anshu Erickson MD

## 2019-08-08 NOTE — PROGRESS NOTES
Patient in for monthly CCM visit. In good spirits, not using cane or leg braces today. Slightly wobbly but overall ambulating much better. Saw her neurologist, Aaron Amaya, just prior to our meeting. She discussed with him the increase in pain over all of body and especially head. He says d/t the excessive heat and humidity. She stopped the Suboxone about a week ago. Had first decreased it to 1/2 tab at hs d/t grogginess. Then started with dizziness. Since stopping altogether, dizziness is gone. Goes next week to see her PM specialist and will discuss it with him at that time. For now, taking an Advil at hs to help. Talked to  about Amy Doing concerns that her low platelet level due to the Topamax. He has decreased her from bid to qd. Advised her to let him know if headaches worsen. Stopped the Vit D per  as well since levels were high enough. Goes back for f/u with neuro in 3 months. Recent vacation with mother -went ok, mother's girlfriend joined them on day 2. Did a lot of shopping. Took turns fixing meals. Said they each had a bedroom with own TV so worked out well. Did not have much quality time with just  Her mother. Requesting refill of her Metformin and One Touch Ultra test strips. Advised will send request to [address] [place] [address]. Scheduled next session for 9/12 at 2pm.  Reminded to call if questions/concerns in the meantime.

## 2019-08-11 RX ORDER — METFORMIN HYDROCHLORIDE 500 MG/1
500 TABLET, EXTENDED RELEASE ORAL
Qty: 90 TAB | Refills: 1 | Status: SHIPPED | OUTPATIENT
Start: 2019-08-11 | End: 2020-07-07

## 2019-08-15 ENCOUNTER — PATIENT OUTREACH (OUTPATIENT)
Dept: FAMILY MEDICINE CLINIC | Age: 64
End: 2019-08-15

## 2019-08-15 NOTE — PROGRESS NOTES
Saw ,GI, earlier this week. Scheduled for endoscopy on 10/2 and will be scheduled for modified barium swallow-appt date pending. Saw eye doctor today, had visual acuity test. ? Glaucoma or nerves messed up from the MS. Will send her to a glaucoma specialist. She does feel like vision has decreased.  had decreased the Topamax to one qd due to low Platelets. But head pain has been bad ever since. , pain management, stopped the Suboxone and added a new medication-will pick it up tomorrow. Costly, $200/month. Will only get 2 weeks worth to see if it helps. Advised maybe 's office can get Patient Assistance for her if the med does work well. Patient will call back next week with update on how new medication is working for her.

## 2019-08-27 DIAGNOSIS — G35 MS (MULTIPLE SCLEROSIS) (HCC): ICD-10-CM

## 2019-08-27 RX ORDER — PYRIDOSTIGMINE BROMIDE 180 MG/1
TABLET, EXTENDED RELEASE ORAL
Qty: 60 TAB | Refills: 0 | Status: SHIPPED | OUTPATIENT
Start: 2019-08-27 | End: 2020-09-09 | Stop reason: SDUPTHER

## 2019-09-13 ENCOUNTER — PATIENT OUTREACH (OUTPATIENT)
Dept: FAMILY MEDICINE CLINIC | Age: 64
End: 2019-09-13

## 2019-09-13 NOTE — PROGRESS NOTES
Patient called to reschedule her Case Management appt with NN. Scheduled for 9/17 at 12:30pm.  Says she is doing well. Will update me at the visit.

## 2019-09-19 ENCOUNTER — PATIENT OUTREACH (OUTPATIENT)
Dept: FAMILY MEDICINE CLINIC | Age: 64
End: 2019-09-19

## 2019-09-19 NOTE — PROGRESS NOTES
Patient called. Unable to come for CCM session on 9/20 as previously scheduled. Rescheduled for 9/27. Did report some depression, hasn't seen some of her close friends recently-feels isolated. Allowed to vent, given support. Encouraged to reach out to friends via phone until can get together with them. Reminded to call NN prn.

## 2019-09-24 DIAGNOSIS — R11.0 NAUSEA: ICD-10-CM

## 2019-09-24 RX ORDER — ONDANSETRON 4 MG/1
TABLET, ORALLY DISINTEGRATING ORAL
Qty: 15 TAB | Refills: 0 | Status: SHIPPED | OUTPATIENT
Start: 2019-09-24 | End: 2022-04-28

## 2019-09-25 ENCOUNTER — PATIENT OUTREACH (OUTPATIENT)
Dept: FAMILY MEDICINE CLINIC | Age: 64
End: 2019-09-25

## 2019-09-25 NOTE — PROGRESS NOTES
Patient had called and LM for NN yesterday that she was having some nausea and could not find her Zofran that  had prescribed for her before. Requested refill. Called patient today, advised refill was approved yesterday. Reports still some nausea today. Advised sips of fluids, ginger ale,gator aide,crackers,light diet until settles down. Call back if no better.

## 2019-09-26 ENCOUNTER — PATIENT OUTREACH (OUTPATIENT)
Dept: FAMILY MEDICINE CLINIC | Age: 64
End: 2019-09-26

## 2019-09-26 NOTE — PROGRESS NOTES
Bhakti Boyd from [facility] called to report patient had called their office several times this week re needs. Having trouble raising arm -requesting OT again to help. Also asking if could have aide 2 x week (now has once a week) to help with ADL's and bathing. Bhakti Boyd thinks may be suazo to have nurse and SW evaluate for other needs. Seems to be struggling and did discuss with patient concerns over long term plans. Requested Jet fax note with request for orders to  to review and sign.

## 2019-09-27 ENCOUNTER — PATIENT OUTREACH (OUTPATIENT)
Dept: FAMILY MEDICINE CLINIC | Age: 64
End: 2019-09-27

## 2019-09-27 RX ORDER — BUPRENORPHINE HCL 150 MCG
FILM, MEDICATED (EA) BUCCAL
COMMUNITY
End: 2021-02-16 | Stop reason: ALTCHOICE

## 2019-09-27 NOTE — PROGRESS NOTES
Met with patient for monthly case management session. Walked in without cane or leg braces, very wobbly on feet. Says she has not had PT x 6 weeks and really affected her. Was able to start back this week. Also has had problems with lifting left arm, was able to get new order for OT-due to start next week. Currently has home health aide 1 x week to help with bathing and adl's. Requesting to increase to 2 x week. Reports headaches responding well to new med: Belbuca. Takes along with her CBD oil and Topamax. Has made headache go from #10 down to #2. But cost of Belbuca is very expensive, can only afford 2 weeks at a time and tries to stretch it out. With Medicare, doesn't qualify for rx assistance. And  does not offer any assistance. Son has possibiliy of full time job-she is hopeful that this will work out for him and help with their finances. Admits to having some depression- missed seeing her friend last weekend(friend's brother has new dx of cancer). Mother recently went with several other family members up [facility] to see family there and there was not room in [facility] for her. Felt left out. Encouraged her to call other friends and chat with them, possibly suggest a get together. She agreed that was good plan. Also reports lump under chin again-  removed a lump about a year ago. Suggested she call and make appt with him but she does not want to see him again. Advised that I will ask  for referral to another ENT. Scheduled f/u appt in Oct on 10/23 at 2pm.  Reminded to call NN prn if having rough time and needs support.

## 2019-09-30 ENCOUNTER — PATIENT OUTREACH (OUTPATIENT)
Dept: FAMILY MEDICINE CLINIC | Age: 64
End: 2019-09-30

## 2019-09-30 NOTE — PROGRESS NOTES
Called patient re referral to ENT, advised  recommends [facility], [address],[place], [place] [id number], ph [phone]. Indra Meng would be best to see- may need to see his NP on a Monday=Thursday when he is there, if you need surgery, he would do the surgery.

## 2019-10-03 ENCOUNTER — PATIENT OUTREACH (OUTPATIENT)
Dept: FAMILY MEDICINE CLINIC | Age: 64
End: 2019-10-03

## 2019-10-03 NOTE — Clinical Note
Had arrhythmias prior to EGD yesterday-next appt with you is January. Do you want to see EGD report and then ? Have her come in sooner?

## 2019-10-03 NOTE — Clinical Note
She is having some symptoms, but wants to wait until son gets new work schedule to make appt with you. Should know by Friday or Monday.

## 2019-10-03 NOTE — PROGRESS NOTES
Patient called to report she had EGD per Frances Pham yesterday. He did several biopsies, told her she had a hiatal hernia. Said prior to procedure, nurses told her she was having some arrhythmias. Markie Navarro had never had that problem before. Will get results of EGD next week. Has next appt with  in January. Advised will check with  about whether to wait until she gets EGD report to decide whether needs ov to evaluate possible arrhythmias. Called patient back=advised her of below per :        Would like to see results of EGD to see if they commented on her heart rhythm. As long as she remains asymptomatic, no need for closer follow up but if she having any SOB, palpitations, we can see her sooner. Advised her of above. Has had some sob and occasional chest discomfort. Offered to schedule appt for her to be seen. Says son has just gotten a job and will get his work schedule tomorrow or Monday. She will call me when knows his schedule so she can make an appt. Advised if symptoms worsen or concerned, call asap. She agreed to do so.

## 2019-10-07 NOTE — PROGRESS NOTES
Patient called on 10/7-says son is off on 10/21 and hoping she can see pcp on that date. Scheduled her for 10/21 at 2pm. Will see her for CM after that appt.

## 2019-10-21 ENCOUNTER — OFFICE VISIT (OUTPATIENT)
Dept: FAMILY MEDICINE CLINIC | Age: 64
End: 2019-10-21

## 2019-10-21 ENCOUNTER — PATIENT OUTREACH (OUTPATIENT)
Dept: FAMILY MEDICINE CLINIC | Age: 64
End: 2019-10-21

## 2019-10-21 VITALS
SYSTOLIC BLOOD PRESSURE: 118 MMHG | WEIGHT: 184.8 LBS | BODY MASS INDEX: 25.03 KG/M2 | HEIGHT: 72 IN | OXYGEN SATURATION: 98 % | HEART RATE: 72 BPM | TEMPERATURE: 98.4 F | RESPIRATION RATE: 18 BRPM | DIASTOLIC BLOOD PRESSURE: 64 MMHG

## 2019-10-21 DIAGNOSIS — Z12.31 VISIT FOR SCREENING MAMMOGRAM: ICD-10-CM

## 2019-10-21 DIAGNOSIS — Z23 ENCOUNTER FOR IMMUNIZATION: ICD-10-CM

## 2019-10-21 DIAGNOSIS — R07.9 CHEST PAIN, UNSPECIFIED TYPE: Primary | ICD-10-CM

## 2019-10-21 NOTE — PROGRESS NOTES
Met with patient for monthly CCM. Helped her fill out form requesting scooter from the [employer]. Given to pcp to sign letter of medical necessity. Patient reports she saw her PM specialist today. The medicine, Belbuca, has really helped but is very costly. Plans to ask for less costly medication. [employer] has declined, so that is why needed assistance in filling out request for the scooter. She was able to sign her name as I pointed out where to sign, her signature was barely legible. Saw pcp today about the arrhythmias she has been having. Referred her to a cardiologist. Also found a lump on her left breast, so has ordered a mammogram for her as well. Son working full time now and can apply for benefits. Pleased with that, will help them financially. In better spirits, asking about Thanksgiving plans. She plans to cook for she and son and will also visit her mother and other family members on Thanksgiving day. Reminded to call if any concerns or questions. Will call back to schedule f/u appt.

## 2019-10-21 NOTE — PATIENT INSTRUCTIONS
Chest Pain: Care Instructions  Your Care Instructions    There are many things that can cause chest pain. Some are not serious and will get better on their own in a few days. But some kinds of chest pain need more testing and treatment. Your doctor may have recommended a follow-up visit in the next 8 to 12 hours. If you are not getting better, you may need more tests or treatment. Even though your doctor has released you, you still need to watch for any problems. The doctor carefully checked you, but sometimes problems can develop later. If you have new symptoms or if your symptoms do not get better, get medical care right away. If you have worse or different chest pain or pressure that lasts more than 5 minutes or you passed out (lost consciousness), call 911 or seek other emergency help right away. A medical visit is only one step in your treatment. Even if you feel better, you still need to do what your doctor recommends, such as going to all suggested follow-up appointments and taking medicines exactly as directed. This will help you recover and help prevent future problems. How can you care for yourself at home? · Rest until you feel better. · Take your medicine exactly as prescribed. Call your doctor if you think you are having a problem with your medicine. · Do not drive after taking a prescription pain medicine. When should you call for help? Call 911 if:    · You passed out (lost consciousness).     · You have severe difficulty breathing.     · You have symptoms of a heart attack. These may include:  ? Chest pain or pressure, or a strange feeling in your chest.  ? Sweating. ? Shortness of breath. ? Nausea or vomiting. ? Pain, pressure, or a strange feeling in your back, neck, jaw, or upper belly or in one or both shoulders or arms. ? Lightheadedness or sudden weakness. ? A fast or irregular heartbeat.   After you call 911, the  may tell you to chew 1 adult-strength or 2 to 4 low-dose aspirin. Wait for an ambulance. Do not try to drive yourself.    Call your doctor today if:    · You have any trouble breathing.     · Your chest pain gets worse.     · You are dizzy or lightheaded, or you feel like you may faint.     · You are not getting better as expected.     · You are having new or different chest pain. Where can you learn more? Go to http://marie-alvin.info/. Enter A120 in the search box to learn more about \"Chest Pain: Care Instructions. \"  Current as of: June 26, 2019  Content Version: 12.2  © 1614-9587 [employer]. Care instructions adapted under license by [employer] (which disclaims liability or warranty for this information). If you have questions about a medical condition or this instruction, always ask your healthcare professional. [address] any warranty or liability for your use of this information. Vaccine Information Statement    Influenza (Flu) Vaccine (Inactivated or Recombinant): What You Need to Know    Many Vaccine Information Statements are available in [place] and other languages. See www.immunize.org/vis  Hojas de información sobre vacunas están disponibles en español y en muchos otros idiomas. Visite www.immunize.org/vis    1. Why get vaccinated? Influenza vaccine can prevent influenza (flu). Flu is a contagious disease that spreads around the [place] every year, usually between October and May. Anyone can get the flu, but it is more dangerous for some people. Infants and young children, people 72years of age and older, pregnant women, and people with certain health conditions or a weakened immune system are at greatest risk of flu complications. Pneumonia, bronchitis, sinus infections and ear infections are examples of flu-related complications. If you have a medical condition, such as heart disease, cancer or diabetes, flu can make it worse.     Flu can cause fever and chills, sore throat, muscle aches, fatigue, cough, headache, and runny or stuffy nose. Some people may have vomiting and diarrhea, though this is more common in children than adults. Each year thousands of people in the [facility] die from flu, and many more are hospitalized. Flu vaccine prevents millions of illnesses and flu-related visits to the doctor each year. 2. Influenza vaccines     CDC recommends everyone 10months of age and older get vaccinated every flu season. Children 6 months through 6years of age may need 2 doses during a single flu season. Everyone else needs only 1 dose each flu season. It takes about 2 weeks for protection to develop after vaccination. There are many flu viruses, and they are always changing. Each year a new flu vaccine is made to protect against three or four viruses that are likely to cause disease in the upcoming flu season. Even when the vaccine doesnt exactly match these viruses, it may still provide some protection. Influenza vaccine does not cause flu. Influenza vaccine may be given at the same time as other vaccines. 3. Talk with your health care provider    Tell your vaccine provider if the person getting the vaccine:   Has had an allergic reaction after a previous dose of influenza vaccine, or has any severe, life-threatening allergies.  Has ever had Guillain-Barré Syndrome (also called GBS). In some cases, your health care provider may decide to postpone influenza vaccination to a future visit. People with minor illnesses, such as a cold, may be vaccinated. People who are moderately or severely ill should usually wait until they recover before getting influenza vaccine. Your health care provider can give you more information. 4. Risks of a reaction     Soreness, redness, and swelling where shot is given, fever, muscle aches, and headache can happen after influenza vaccine.    There may be a very small increased risk of Guillain-Barré Syndrome (GBS) after inactivated influenza vaccine (the flu shot). Giuliana Claros children who get the flu shot along with pneumococcal vaccine (PCV13), and/or DTaP vaccine at the same time might be slightly more likely to have a seizure caused by fever. Tell your health care provider if a child who is getting flu vaccine has ever had a seizure. People sometimes faint after medical procedures, including vaccination. Tell your provider if you feel dizzy or have vision changes or ringing in the ears. As with any medicine, there is a very remote chance of a vaccine causing a severe allergic reaction, other serious injury, or death. 5. What if there is a serious problem? An allergic reaction could occur after the vaccinated person leaves the clinic. If you see signs of a severe allergic reaction (hives, swelling of the face and throat, difficulty breathing, a fast heartbeat, dizziness, or weakness), call 9-1-1 and get the person to the nearest hospital.    For other signs that concern you, call your health care provider. Adverse reactions should be reported to the Vaccine Adverse Event Reporting System (VAERS). Your health care provider will usually file this report, or you can do it yourself. Visit the VAERS website at www.vaers. hhs.gov or call 2-528.953.7443. VAERS is only for reporting reactions, and VAERS staff do not give medical advice. 6. The National Vaccine Injury Compensation Program    The [place] [place] Vaccine Injury Compensation Program (VICP) is a federal program that was created to compensate people who may have been injured by certain vaccines. Visit the VICP website at www.hrsa.gov/vaccinecompensation or call 2-991.937.9467 to learn about the program and about filing a claim. There is a time limit to file a claim for compensation. 7. How can I learn more?  Ask your health care provider.  Call your local or state health department.    Contact the Centers for Disease Control and Prevention (CDC):  - Call 2-558.500.4059 (1-243-LPD-INFO) or  - Visit CDCs influenza website at www.cdc.gov/flu    Vaccine Information Statement (Interim)  Inactivated Influenza Vaccine   8/15/2019  42 MARION Orozco [id number]-20   Department of Health and Human Services  Centers for Disease Control and Prevention    Office Use Only

## 2019-10-21 NOTE — PROGRESS NOTES
Patient Name: Brenda Hallman   MRN: [id number]    Ana Devries is a 60 y.o. female who presents with the following:     States that she had an endoscopy a few weeks ago. While on heart monitor, nurses stated that she had a few arrhythmias but unable to give me more details. She states that she is asymptomatic at the time. However, over the past few weeks, she has had intermittent chest pressure in the middle of her chest.  Denies any shortness of breath or wheezing she does have a history of asthma. Seems to occur at both at rest and exertion. Has no prior history of cardiology work-up. She states that her EGD showed a mild hiatal hernia as well as biopsies that are still pending. She has felt a lump along her left breast over the past few weeks. She is overdue for her mammogram.    Review of Systems   Constitutional: Negative for fever, malaise/fatigue and weight loss. Respiratory: Negative for cough, hemoptysis, shortness of breath and wheezing. Cardiovascular: Positive for chest pain. Negative for palpitations, leg swelling and PND. Gastrointestinal: Negative for abdominal pain, constipation, diarrhea, nausea and vomiting. The patient's medications, allergies, past medical history, surgical history, family history and social history were reviewed and updated where appropriate. Prior to Admission medications    Medication Sig Start Date End Date Taking? Authorizing Provider   buprenorphine HCl (BELBUCA) 150 mcg film by Buccal route three (3) times daily as needed.    Yes Provider, Historical   levothyroxine (SYNTHROID) 150 mcg tablet TAKE 1 TABLET BY MOUTH EVERY NIGHT 9/26/19  Yes Fili Dickens MD   ondansetron (ZOFRAN ODT) 4 mg disintegrating tablet DISSOLVE 1 TABLET ON THE TONGUE EVERY 8 HOURS AS NEEDED FOR NAUSEA 9/24/19  Yes Fili Dickens MD   pyridostigmine bromide (MESTINON SR) 180 mg SR tablet TAKE 1 TABLET BY MOUTH TWICE DAILY 8/27/19  Yes Moni Downey MD metFORMIN ER (GLUCOPHAGE XR) 500 mg tablet Take 1 Tab by mouth daily (with breakfast). 8/11/19  Yes Delmy Laughlin NP   glucose blood VI test strips (BLOOD GLUCOSE TEST) strip 100 Each by Does Not Apply route See Admin Instructions. One Touch Ultra Test Strips=Check blood sugar daily dx E11.8 8/11/19  Yes Delmy Laughlin NP   topiramate (TOPAMAX) 200 mg tablet Take 1 Tab by mouth nightly. 8/8/19  Yes Los Kahn MD   pregabalin (LYRICA) 200 mg capsule Take 1 Cap by mouth two (2) times a day. Max Daily Amount: 400 mg. 5/29/19  Yes Los Kahn MD   aspirin delayed-release 81 mg tablet Take 1 Tab by mouth every twelve (12) hours. 5/14/19  Yes KATIANA Mejia   OTHER 0.25 mL by SubLINGual route daily. CBD OIL   Yes Provider, Historical   dantrolene (DANTRIUM) 100 mg capsule Take 1 Cap by mouth three (3) times daily. Patient taking differently: Take 100 mg by mouth daily as needed. Indications: muscle spasm 3/19/19  Yes Los Kahn MD   GILENYA 0.5 mg cap Take 1 Cap by mouth daily. 2/15/19  Yes Los Kahn MD   albuterol (PROVENTIL VENTOLIN) 2.5 mg /3 mL (0.083 %) nebulizer solution 3 mL by Nebulization route every six (6) hours as needed for Wheezing. 12/31/18  Yes Suhas Emmanuel MD   PARoxetine (PAXIL) 40 mg tablet TAKE 1 AND 1/2 TABLETS BY MOUTH EVERY NIGHT 12/18/18  Yes Los Kahn MD   Menthol-Zinc Oxide (CALMOSEPTINE) 0.44-20.6 % oint Apply  to affected area as needed. Yes Provider, Historical   nystatin (MYCOSTATIN) powder Apply to candidal lesions TOPICALLY 2 to 3 times daily until healing complete 10/25/18  Yes Suhas Emmanuel MD   lidocaine (LIDODERM) 5 % Apply patch to the affected area for 12 hours a day and remove for 12 hours a day. 8/15/18  Yes Los Kahn MD   corticotropin (ACTHAR H.P.) 80 unit/mL injectable gel 1 mL by SubCUTAneous route daily. 80 units subq q day for 10 days  Patient taking differently: 80 Units by SubCUTAneous route daily.  80 units subq q day for 10 days    **PT TAKES AS A PRN FOR A 10 DAY PERIOD. NOT TAKING CURRENTLY (5/3/19) 12/26/17  Yes Los Kahn MD   clindamycin (CLEOCIN) 300 mg capsule Take 300 mg by mouth. Prior to dental procedures 6/30/17  Yes Provider, Historical   rosuvastatin (CRESTOR) 20 mg tablet TAKE 1 TABLET BY MOUTH EVERY EVENING 2/24/17  Yes Robert Molina MD   TENS UNIT ELECTRODES by Does Not Apply route. prn   Yes Provider, Historical   pyridostigmine bromide (MESTINON TIMESPAN) 180 mg SR tablet Take 1 Tab by mouth two (2) times a day. 8/8/19 10/21/19  Los Kahn MD   ergocalciferol (ERGOCALCIFEROL) 50,000 unit capsule Take 1 Cap by mouth every seven (7) days. 1/29/19 10/21/19  Los Kahn MD       Allergies   Allergen Reactions    Bee Sting [Sting, Bee] Anaphylaxis     Also allergic to egg products    Penicillins Anaphylaxis    Betadine [Povidone-Iodine] Rash    Egg Itching         OBJECTIVE    Visit Vitals  /64 (BP 1 Location: Left arm, BP Patient Position: Sitting)   Pulse 72   Temp 98.4 °F (36.9 °C) (Oral)   Resp 18   Ht 6' 1\" (1.854 m)   Wt 184 lb 12.8 oz (83.8 kg)   LMP 09/01/2010   SpO2 98%   BMI 24.38 kg/m²       Physical Exam   Constitutional: She is oriented to person, place, and time and well-developed, well-nourished, and in no distress. No distress. Eyes: Pupils are equal, round, and reactive to light. Conjunctivae and EOM are normal.   Cardiovascular: Normal rate, regular rhythm and normal heart sounds. Exam reveals no gallop and no friction rub. No murmur heard. Pulmonary/Chest: Effort normal and breath sounds normal. No respiratory distress. She has no wheezes. Neurological: She is alert and oriented to person, place, and time. Skin: Skin is warm and dry. No rash noted. She is not diaphoretic. Psychiatric: Mood, memory, affect and judgment normal.   Nursing note and vitals reviewed.         ASSESSMENT AND PLAN  Paige Galvin is a 60 y.o. female who presents today for:    1. Chest pain, unspecified type  EKG NSR. Recommend formal cardiology evaluation given risk factors and chest pain. Reviewed signs and symptoms that would indicate a worsening medical condition which would require immediate evaluation and treatment; patient expressed understanding of plan. - AMB POC EKG ROUTINE W/ 12 LEADS, INTER & REP  - REFERRAL TO CARDIOLOGY    2. Visit for screening mammogram  - [facility] MAMMO BI SCREENING INCL CAD; Future    3. Encounter for immunization  - INFLUENZA VIRUS VAC QUAD,SPLIT,PRESV FREE SYRINGE IM  - ADMIN INFLUENZA VIRUS VAC       Medications Discontinued During This Encounter   Medication Reason    ergocalciferol (ERGOCALCIFEROL) 50,000 unit capsule Therapy Completed    pyridostigmine bromide (MESTINON TIMESPAN) 180 mg SR tablet        Medication risks/benefits/costs/interactions/alternatives discussed with patient. Advised patient to call back or return to office if symptoms worsen/change/persist. If patient cannot reach us or should anything more severe/urgent arise he/she should proceed directly to the nearest emergency department. Discussed expected course/resolution/complications of diagnosis in detail with patient. Patient given a written after visit summary which includes his/her diagnoses, current medications and vitals. Patient expressed understanding with the diagnosis and plan. Brandon Navarro M.D.

## 2019-10-21 NOTE — PROGRESS NOTES
Chief Complaint   Patient presents with    Chest Pain     x 2 weeks      1. Have you been to the ER, urgent care clinic since your last visit? Hospitalized since your last visit? No    2. Have you seen or consulted any other health care providers outside of the [address] [place] [address] since your last visit? Include any pap smears or colon screening. No    Patient requested the flu vaccine, and stated that even being allergic to egg, patient has had flu vaccine in the past and had no reactions to it. Pcp advised nurse that it should e okay for patient to receive the vaccine. Nurse monitored patient for 15 minutes. Patient tolerated well while in the office.

## 2019-10-30 ENCOUNTER — TELEPHONE (OUTPATIENT)
Dept: ONCOLOGY | Age: 64
End: 2019-10-30

## 2019-10-30 NOTE — TELEPHONE ENCOUNTER
Patient called and stated that it is hard for her to come in due to her sons new job. Patient stated that she would like to know if she needs to come in for a follow up or if she can get her labs checked and receive a call with the results.  # [phone]

## 2019-11-07 ENCOUNTER — OFFICE VISIT (OUTPATIENT)
Dept: NEUROLOGY | Age: 64
End: 2019-11-07

## 2019-11-07 VITALS
SYSTOLIC BLOOD PRESSURE: 119 MMHG | HEART RATE: 83 BPM | HEIGHT: 72 IN | BODY MASS INDEX: 24.87 KG/M2 | DIASTOLIC BLOOD PRESSURE: 75 MMHG | OXYGEN SATURATION: 98 % | WEIGHT: 183.6 LBS | RESPIRATION RATE: 18 BRPM | TEMPERATURE: 97.8 F

## 2019-11-07 DIAGNOSIS — R26.9 GAIT DISORDER: ICD-10-CM

## 2019-11-07 DIAGNOSIS — G35 MULTIPLE SCLEROSIS EXACERBATION (HCC): ICD-10-CM

## 2019-11-07 DIAGNOSIS — G57.12 MERALGIA PARESTHETICA OF LEFT SIDE: ICD-10-CM

## 2019-11-07 DIAGNOSIS — G70.00 MYASTHENIA GRAVIS (HCC): ICD-10-CM

## 2019-11-07 DIAGNOSIS — G61.81 CIDP (CHRONIC INFLAMMATORY DEMYELINATING POLYNEUROPATHY) (HCC): ICD-10-CM

## 2019-11-07 DIAGNOSIS — G62.9 NEUROPATHY: ICD-10-CM

## 2019-11-07 DIAGNOSIS — G37.9 DEMYELINATING DISEASE (HCC): Primary | ICD-10-CM

## 2019-11-07 DIAGNOSIS — G35 MS (MULTIPLE SCLEROSIS) (HCC): ICD-10-CM

## 2019-11-07 RX ORDER — PREGABALIN 200 MG/1
200 CAPSULE ORAL 2 TIMES DAILY
Qty: 180 CAP | Refills: 3 | Status: SHIPPED | OUTPATIENT
Start: 2019-11-07 | End: 2020-06-26

## 2019-11-07 NOTE — PROGRESS NOTES
Neurology Progress Note    NAME:  Helyn Gowers Minor   :   1955   MRN:   [id number]     Date/Time:  2019  Subjective:    Helyn Gowers Minor is a 60 y.o. female here today for follow-up for MS, MG, chronic pain syndrome, headaches, CVA, difficulty walking. Patient was accompanied by her son to the visit. Patient today says she is having increasing left arm pain and difficulty ambulating. She also having thigh numbness, she says they all started not too long ago and seem to be increasing, however, she has not had any fall. She noted that her eyes tend to be weak and she sees double, this subsided previously but have come back. Patient says with the new pain management, her pain is manageable. However she continues to have a lot of pain but not as much as before, pain is sharp in nature, diffuse, burning sensation that goes up to the arms causing numbness and tingling sensation and weakness of the hands, down to the legs causing  numbness and tingling sensation of the legs with weakness of the legs. With patient's symptomatology which is possible from multiple sclerosis exacerbation, I will start patient on the course of Acthar Gel  She says her headache continues to decrease in frequency and intensity. Headache is frontal, throbbing in nature, with occasional sharp pain from the back of the head, headache associated with dizziness, nausea, blurry vision, double vision, photophobia, phonophobia. She says she has a lot of muscle spasms mostly in the back. Pain appears to have plateaued. Due to increased pain and anxiety, patient's tremor has increased. On a scale of 1-10, patient rates pain level to be between 5 and 6  She denies loss of consciousness. Says dysphagia has improved. No fall was reported  I will continue patient on current medication.   Review of Systems - General ROS: positive for  - fatigue, night sweats and sleep disturbance  Psychological ROS: positive for - anxiety, concentration difficulties, depression, mood swings and sleep disturbances  Ophthalmic ROS: positive for - blurry vision, decreased vision, double vision and photophobia  ENT ROS: positive for - headaches, tinnitus, vertigo and visual changes  Allergy and Immunology ROS: negative  Hematological and Lymphatic ROS: negative  Endocrine ROS: negative  Respiratory ROS: no cough, shortness of breath, or wheezing  Cardiovascular ROS: no chest pain or dyspnea on exertion  Gastrointestinal ROS: no abdominal pain, change in bowel habits, or black or bloody stools  Genito-Urinary ROS: no dysuria, trouble voiding, or hematuria  Musculoskeletal ROS: positive for - gait disturbance, joint pain, joint stiffness, joint swelling and muscle pain  Neurological ROS: positive for - dizziness, gait disturbance, headaches, impaired coordination/balance, numbness/tingling, speech problems, tremors, visual changes and weakness  Dermatological ROS: negative         Medications reviewed:  Current Outpatient Medications   Medication Sig Dispense Refill    buprenorphine HCl (BELBUCA) 150 mcg film by Buccal route three (3) times daily as needed.  levothyroxine (SYNTHROID) 150 mcg tablet TAKE 1 TABLET BY MOUTH EVERY NIGHT 90 Tab 1    ondansetron (ZOFRAN ODT) 4 mg disintegrating tablet DISSOLVE 1 TABLET ON THE TONGUE EVERY 8 HOURS AS NEEDED FOR NAUSEA 15 Tab 0    pyridostigmine bromide (MESTINON SR) 180 mg SR tablet TAKE 1 TABLET BY MOUTH TWICE DAILY 60 Tab 0    metFORMIN ER (GLUCOPHAGE XR) 500 mg tablet Take 1 Tab by mouth daily (with breakfast). 90 Tab 1    glucose blood VI test strips (BLOOD GLUCOSE TEST) strip 100 Each by Does Not Apply route See Admin Instructions. One Touch Ultra Test Strips=Check blood sugar daily dx E11.8 100 Strip 1    topiramate (TOPAMAX) 200 mg tablet Take 1 Tab by mouth nightly. 90 Tab 3    pregabalin (LYRICA) 200 mg capsule Take 1 Cap by mouth two (2) times a day.  Max Daily Amount: 400 mg. 180 Cap 3    aspirin delayed-release 81 mg tablet Take 1 Tab by mouth every twelve (12) hours. 60 Tab 0    OTHER 0.25 mL by SubLINGual route daily. CBD OIL      dantrolene (DANTRIUM) 100 mg capsule Take 1 Cap by mouth three (3) times daily. (Patient taking differently: Take 100 mg by mouth daily as needed. Indications: muscle spasm) 90 Cap 3    GILENYA 0.5 mg cap Take 1 Cap by mouth daily. 30 Cap 11    albuterol (PROVENTIL VENTOLIN) 2.5 mg /3 mL (0.083 %) nebulizer solution 3 mL by Nebulization route every six (6) hours as needed for Wheezing. 30 Each 0    PARoxetine (PAXIL) 40 mg tablet TAKE 1 AND 1/2 TABLETS BY MOUTH EVERY NIGHT 135 Tab 3    Menthol-Zinc Oxide (CALMOSEPTINE) 0.44-20.6 % oint Apply  to affected area as needed.  nystatin (MYCOSTATIN) powder Apply to candidal lesions TOPICALLY 2 to 3 times daily until healing complete 30 g 0    lidocaine (LIDODERM) 5 % Apply patch to the affected area for 12 hours a day and remove for 12 hours a day. 30 Each 3    corticotropin (ACTHAR H.P.) 80 unit/mL injectable gel 1 mL by SubCUTAneous route daily. 80 units subq q day for 10 days (Patient taking differently: 80 Units by SubCUTAneous route daily. 80 units subq q day for 10 days    **PT TAKES AS A PRN FOR A 10 DAY PERIOD. NOT TAKING CURRENTLY (5/3/19)) 10 mL 1    clindamycin (CLEOCIN) 300 mg capsule Take 300 mg by mouth. Prior to dental procedures      rosuvastatin (CRESTOR) 20 mg tablet TAKE 1 TABLET BY MOUTH EVERY EVENING 90 Tab 1    TENS UNIT ELECTRODES by Does Not Apply route.  prn          Objective:   Vitals:  Vitals:    11/07/19 1148   BP: 119/75   Pulse: 83   Resp: 18   Temp: 97.8 °F (36.6 °C)   TempSrc: Temporal   SpO2: 98%   Weight: 183 lb 9.6 oz (83.3 kg)   Height: 6' 1\" (1.854 m)   PainSc:   5   PainLoc: Generalized   LMP: 09/01/2010       Lab Data Reviewed:  Lab Results   Component Value Date/Time    WBC 3.6 06/10/2019 10:41 AM    HCT 38.7 06/10/2019 10:41 AM    HGB 12.9 06/10/2019 10:41 AM    PLATELET 541 06/10/2019 10:41 AM       Lab Results   Component Value Date/Time    Sodium 144 06/10/2019 10:41 AM    Potassium 4.4 06/10/2019 10:41 AM    Chloride 110 (H) 06/10/2019 10:41 AM    CO2 20 06/10/2019 10:41 AM    Glucose 112 (H) 06/10/2019 10:41 AM    BUN 9 06/10/2019 10:41 AM    Creatinine 0.96 06/10/2019 10:41 AM    Calcium 9.7 06/10/2019 10:41 AM       No components found for: TROPQUANT    No results found for: SHAHIDA      Lab Results   Component Value Date/Time    Hemoglobin A1c 7.0 (H) 04/04/2019 02:04 PM    Hemoglobin A1c (POC) 6.3 07/05/2019 01:51 PM        Lab Results   Component Value Date/Time    Vitamin B12 436 06/10/2019 10:41 AM    Folate 12.0 02/10/2016       No results found for: Charles PINEDO XBANA    Lab Results   Component Value Date/Time    Cholesterol, total 179 05/24/2018 01:19 PM    Cholesterol, Total 181 12/26/2018    HDL Cholesterol 65 12/26/2018    HDL Cholesterol 51 05/24/2018 01:19 PM    LDL-CHOLESTEROL 91 12/26/2018    LDL, calculated 109 (H) 05/24/2018 01:19 PM    VLDL, calculated 19 05/24/2018 01:19 PM    Triglyceride 126 12/26/2018    Triglyceride 96 05/24/2018 01:19 PM    CHOL/HDL Ratio 4.3 08/23/2010 12:19 PM         CT Results (recent):  Results from Hospital Encounter encounter on 05/14/18   CT NECK SOFT TISSUE W CONT    Narrative HISTORY: Neck mass. PROCEDURE: Multiple contiguous helically acquired axial images were obtained of  the cervical region from the skull base through to the thoracic inlet following  intravenous contrast administration. Sagittal and coronal reconstructions were  performed. CT dose reduction was achieved through the use of a standardized protocol  tailored for this examination and automatic exposure control for dose  modulation. FINDINGS:     Images through the nasopharynx reveal symmetric soft tissues. Images through the oropharynx reveals symmetric soft tissues. The parotid glands are symmetric in appearance.     Images through the hypopharynx reveals symmetric soft tissues. Just below the level of the hyoid bone, is a soft tissue structure measuring 9.7  x 7.8 mm with a small satellite lesions. No evidence of lingual thyroid per se. The submandibular glands have a symmetric appearance. Images through the larynx proper reveal symmetric soft tissues. Images through the subglottic larynx reveal symmetric soft tissues. The thyroid gland has a normal appearance with symmetric lobes. No significant cervical lymphadenopathy. Incidental mild mucoperiosteal thickening involving the right greater than left  maxillary sinus of uncertain clinical significance. Impression IMPRESSION:    9.7 x 7.8 mm soft tissue structure anterior to the hyoid bone. Differential  considerations would include thyroglossal duct cyst, ectopic thyroid, dermoid,  etc.         MRI Results (recent):  Results from Hospital Encounter encounter on 12/26/18   MRI BRAIN W WO CONT    Narrative Medical indication: Multiple sclerosis   , headache q. daily, prior CVA, new numbness, exacerbation. Technical factors: Diffusion imaging, sagittal T1-weighted, sagittal FLAIR,  axial T1-weighted pre and post 18 cc IV. dotarem T2-weighted FLAIR gradient echo  coronal and sagittal T1-weighted postcontrast. Comparison January 10, 2018. Diffusion imaging does not show acute ischemic changes her. There is no  extra-axial fluid collection hemorrhage or shift. Ventricular size is stable. Major vessels flow voids at the base of the brain are present. Incidental  maxillary sinus disease. The burden of white matter disease appears stable. No  enhancing lesion or masses. Impression IMPRESSION: Stable white matter disease. No acute findings no masses or  enhancing lesion. IR Results (recent):  No results found for this or any previous visit.     VAS/US Results (recent):  Results from Hospital Encounter encounter on 01/19/17   DUPLEX LOWER EXT VENOUS RIGHT PHYSICAL EXAM:  General:    Alert, cooperative, no distress, appears stated age. Head:   Normocephalic, without obvious abnormality, atraumatic. Eyes:   Conjunctivae/corneas clear. PERRLA  Nose:  Nares normal. No drainage or sinus tenderness. Throat:    Lips, mucosa, and tongue normal.  No Thrush  Neck:  Supple, symmetrical,  no adenopathy, thyroid: non tender    no carotid bruit and no JVD. Paraspinal tenderness  Back:    Symmetric, diffuse tenderness. Lungs:   Clear to auscultation bilaterally. No Wheezing or Rhonchi. No rales. Chest wall:  No tenderness or deformity. No Accessory muscle use. Heart:   Regular rate and rhythm,  no murmur, rub or gallop. Abdomen:   Soft, non-tender. Not distended. Bowel sounds normal. No masses  Extremities: Extremities normal, atraumatic, No cyanosis. No edema. No clubbing  Skin:     Texture, turgor normal. No rashes or lesions. Not Jaundiced  Lymph nodes: Cervical, supraclavicular normal.  Psych:  Good insight. Depressed. Anxious      NEUROLOGICAL EXAM:  Appearance: The patient is well developed, well nourished, provides a coherent history and is in no acute distress. Mental Status: Oriented to time, place and person. Mood and affect appropriate. Cranial Nerves:   Intact visual fields. Fundi are benign. RACHEL, EOM's full, no nystagmus, no ptosis. Facial sensation is normal. Corneal reflexes are intact. Facial movement is symmetric. Hearing is normal bilaterally. Palate is midline with normal sternocleidomastoid and trapezius muscles are normal. Tongue is midline. Motor:  5/5 strength in upper and lower proximal and distal muscles. Normal bulk and tone. No fasciculations. Reflexes:   Deep tendon reflexes 2+/4 and symmetrical.   Sensory:    Dysesthesia to touch, pinprick and vibration. Gait:   Unsteady gait. Ambulates with walker   Tremor:    Mild tremor noted. Cerebellar:  No cerebellar signs present.    Neurovascular:  Normal heart sounds and regular rhythm, peripheral pulses intact, and no carotid bruits. Assesment  1. Demyelinating disease ([facility]  Continue management    2. [facility]  Continue management  3. Myasthenia gravis ([facility]  Stable    4. CIDP (chronic inflammatory demyelinating polyneuropathy) ([facility])  Continue management    5. Multiple sclerosis exacerbation ([facility])  Acthar gel    6. Gait disorder  Physical therapy    7. Meralgia paresthetica of left side  Physical therapy    ___________________________________________________  PLAN: Medication and plan discussed with patient      ICD-10-CM ICD-9-CM    1. Demyelinating disease ([email or web address] G37.9 341.9    2. MS (multiple sclerosis) ([email or web address] G35 340    3. Myasthenia gravis ([email or web address] G70.00 358.00    4. CIDP (chronic inflammatory demyelinating polyneuropathy) ([facility]) G61.81 357.81    5. Multiple sclerosis exacerbation ([email or web address] G35 340    6. Gait disorder R26.9 781.2    7. Meralgia paresthetica of left side G57.12 355.1      Follow-up and Dispositions    · Return in about 3 months (around 2/7/2020).                  ___________________________________________________    Attending Physician: Juan Herrera MD

## 2019-11-08 ENCOUNTER — PATIENT OUTREACH (OUTPATIENT)
Dept: FAMILY MEDICINE CLINIC | Age: 64
End: 2019-11-08

## 2019-11-08 NOTE — TELEPHONE ENCOUNTER
Called patient and informed her that per the NP patient does not need to come in for a follow up at this time. Per the NP our office can review the labs and a follow up can be made if needed. Patient verbalized understanding and stated that her pain doctor was supposed to fax over the results. Patient stated that she had labs done around 10/21 and will call their office to have them fax the results again.

## 2019-11-08 NOTE — TELEPHONE ENCOUNTER
Patient did not know the name of her mail order pharmacy.  Patient left message on 10/7/19 of the mail order pharmacy

## 2019-11-08 NOTE — PROGRESS NOTES
Patient had called and LM. Called her back, reports she saw Pippa Bartlett yesterday, he thinks she is having a MS flare again. Has put her on Acthar Gel 80 units subq q day x 10 days. Reports has other appointments coming up. Son has started a new job, hates for him to miss work to transport her to [facility]. Gave her info on [facility]-advised to call and request assistance, they will send her an application. Also gave her the number for [employer], may be able to help with rides. Also, [employer]-charge $3 per trip. Advised to call back if needs additional help.

## 2019-11-14 NOTE — PROGRESS NOTES
[place]: Charity Sotelo  [phone]    History of Present Illness:    Ms. Galvin is a 61 yo Fwith multiple sclerosis, myasthenia gravis, type 2 diabetes, mixed hyperlipidemia, hypothyroid, referred by Dr. Oanh Krause for cardiac evaluation. She did have an echocardiogram in the past that noted mild leakiness of the tricuspid and aortic valve and pulmonary hypertension secondary to chronic tobacco use. She is here due to recent chest pain and possible arrhythmia. She had an endoscopy back in October and in the holding area she was told on the heart monitor that she had \"arrhythmia\". She did not feel any palpitations at the time. She has also been to other providers who have noted irregularity on physical exam listening to her heart. She has been having chest pains that can happen several times a day. It can happen with rest or exertion with no particular triggers, can last several minutes at a time, up to 15 minutes, substernal and left sided. She notes her breathing overall has been okay. She is compensated on exam with clear lungs and no lower extremity edema. Soc hx. Tobacco 1 pack 2-3 days. Have stopped in past.  Fam hx. Younger sister with CAD. Assessment and Plan:    1. Palpitations. Will obtain a 24 hour Holter to evaluate for possible arrhythmia. Given her history of stroke, it is important to rule this out. If she has benign ectopy, no therapy is indicated at this time, as she is asymptomatic with this. 2. Unstable angina. Chest pain with typical and atypical features and multiple risk factors; will proceed with a stress test for further evaluation. She cannot complete a treadmill given her multiple sclerosis and will do this Lexiscan. She did have a recent EGD that noted mild hiatal hernia and if this is unrevealing her pain could be GI or musculoskeletal in etiology. 3. Tobacco use. Encouraged cessation. 4. Mixed hyperlipidemia. Tolerating statin. 5. Type 2 diabetes. 6. Multiple sclerosis. 7. Myasthenia gravis. She  has a past medical history of Arthritis, Benign cyst of left breast (3/21/2016), Blindness and low vision (2004), Cervical spinal stenosis (12/2/2016), Chronic pain, Depression, Diabetes mellitus type 2, controlled ([facility] (9/13/2016), Diet-controlled diabetes mellitus ([facility] (1/5/2018), Endometriosis (6/25/2010), FH: breast cancer (6/25/2010), History of CVA (cerebrovascular accident) (6/5/2018), HTN, goal below 140/90 (6/25/2010), Hypercholesterolemia, Hypothyroid (6/25/2010), Incontinence, Lumbosacral plexopathy (6/25/2010), MS (multiple sclerosis) ([facility] (2004), Myasthenia gravis ([facility] (DHS,1043), Myasthenia gravis ([facility] (2011), Sleep apnea (6/25/2010), Ureteral calculus (6/25/2010), and Vitamin D deficiency (3/17/2016). All other systems negative except as above. PE  Vitals:    11/18/19 1439   BP: 114/48   Pulse: 67   Resp: 14   SpO2: 97%   Weight: 196 lb 11.2 oz (89.2 kg)   Height: 6' 1\" (1.854 m)    Body mass index is 25.95 kg/m².    General appearance - alert, well appearing, and in no distress  Mental status - affect appropriate to mood  Eyes - sclera anicteric, moist mucous membranes  Neck - supple, no JVD  Chest - clear to auscultation, no wheezes, rales or rhonchi  Heart - normal rate, regular rhythm, normal S1, S2, I/VI systolic murmur LUSB  Abdomen - soft, nontender, nondistended, no masses or organomegaly  Neurological - no focal deficit  Extremities - peripheral pulses normal, trace pedal edema      Recent Labs:  Lab Results   Component Value Date/Time    Cholesterol, total 179 05/24/2018 01:19 PM    Cholesterol, Total 181 12/26/2018    HDL Cholesterol 65 12/26/2018    HDL Cholesterol 51 05/24/2018 01:19 PM    LDL-CHOLESTEROL 91 12/26/2018    LDL, calculated 109 (H) 05/24/2018 01:19 PM    Triglyceride 126 12/26/2018    Triglyceride 96 05/24/2018 01:19 PM    CHOL/HDL Ratio 4.3 08/23/2010 12:19 PM     Lab Results   Component Value Date/Time    Creatinine 0.96 06/10/2019 10:41 AM     Lab Results   Component Value Date/Time    BUN 9 06/10/2019 10:41 AM     Lab Results   Component Value Date/Time    Potassium 4.4 06/10/2019 10:41 AM     Lab Results   Component Value Date/Time    Hemoglobin A1c 7.0 (H) 04/04/2019 02:04 PM     Lab Results   Component Value Date/Time    HGB 12.9 06/10/2019 10:41 AM     Lab Results   Component Value Date/Time    PLATELET 708 85/47/2430 10:41 AM       Reviewed:  Past Medical History:   Diagnosis Date    Arthritis     hip, hands    Benign cyst of left breast 3/21/2016    Blindness and low vision 2004    legally blind from [address]    Cervical spinal stenosis 12/2/2016    Moderate C6-7    Chronic pain     Depression     Diabetes mellitus type 2, controlled ([facility] 9/13/2016    Diet-controlled diabetes mellitus ([facility] 1/5/2018    Endometriosis 6/25/2010    FH: breast cancer 6/25/2010    Chart states FH breast/ovary Ca, CAD,DM     History of CVA (cerebrovascular accident) 6/5/2018    HTN, goal below 140/90 6/25/2010    CURRENTLY NO MEDICATIONS (5/18/17)    Hypercholesterolemia     Hypothyroid 6/25/2010    Incontinence     Lumbosacral plexopathy 6/25/2010    MS (multiple sclerosis) ([facility] 2004    Myasthenia gravis ([facility] Jan,2012    Myasthenia gravis ([facility] 2011    Sleep apnea 6/25/2010    RESOLVED 5/2019    Ureteral calculus 6/25/2010    Vitamin D deficiency 3/17/2016     Social History     Tobacco Use   Smoking Status Current Every Day Smoker    Packs/day: 0.50    Years: 30.00    Pack years: 15.00    Types: Cigarettes   Smokeless Tobacco Never Used     Social History     Substance and Sexual Activity   Alcohol Use No     Allergies   Allergen Reactions    Bee Sting [Sting, Bee] Anaphylaxis     Also allergic to egg products    Penicillins Anaphylaxis    Betadine [Povidone-Iodine] Rash    Egg Itching       Current Outpatient Medications   Medication Sig    corticotropin (ACTHAR H.P.) 80 unit/mL injectable gel 1 mL by SubCUTAneous route daily. 80 units subq q day for 10 days    pregabalin (LYRICA) 200 mg capsule Take 1 Cap by mouth two (2) times a day. Max Daily Amount: 400 mg.  buprenorphine HCl (BELBUCA) 150 mcg film by Buccal route three (3) times daily as needed.  levothyroxine (SYNTHROID) 150 mcg tablet TAKE 1 TABLET BY MOUTH EVERY NIGHT    ondansetron (ZOFRAN ODT) 4 mg disintegrating tablet DISSOLVE 1 TABLET ON THE TONGUE EVERY 8 HOURS AS NEEDED FOR NAUSEA    pyridostigmine bromide (MESTINON SR) 180 mg SR tablet TAKE 1 TABLET BY MOUTH TWICE DAILY    metFORMIN ER (GLUCOPHAGE XR) 500 mg tablet Take 1 Tab by mouth daily (with breakfast).  glucose blood VI test strips (BLOOD GLUCOSE TEST) strip 100 Each by Does Not Apply route See Admin Instructions. One Touch Ultra Test Strips=Check blood sugar daily dx E11.8    topiramate (TOPAMAX) 200 mg tablet Take 1 Tab by mouth nightly.  aspirin delayed-release 81 mg tablet Take 1 Tab by mouth every twelve (12) hours.  OTHER 0.25 mL by SubLINGual route daily. CBD OIL    dantrolene (DANTRIUM) 100 mg capsule Take 1 Cap by mouth three (3) times daily. (Patient taking differently: Take 100 mg by mouth daily as needed. Indications: muscle spasm)    GILENYA 0.5 mg cap Take 1 Cap by mouth daily.  albuterol (PROVENTIL VENTOLIN) 2.5 mg /3 mL (0.083 %) nebulizer solution 3 mL by Nebulization route every six (6) hours as needed for Wheezing.  PARoxetine (PAXIL) 40 mg tablet TAKE 1 AND 1/2 TABLETS BY MOUTH EVERY NIGHT    nystatin (MYCOSTATIN) powder Apply to candidal lesions TOPICALLY 2 to 3 times daily until healing complete    lidocaine (LIDODERM) 5 % Apply patch to the affected area for 12 hours a day and remove for 12 hours a day.  rosuvastatin (CRESTOR) 20 mg tablet TAKE 1 TABLET BY MOUTH EVERY EVENING    TENS UNIT ELECTRODES by Does Not Apply route.  prn    Menthol-Zinc Oxide (CALMOSEPTINE) 0.44-20.6 % oint Apply  to affected area as needed.  clindamycin (CLEOCIN) 300 mg capsule Take 300 mg by mouth. Prior to dental procedures     No current facility-administered medications for this visit.         Debra Briceño MD  [facility] [place]  [facility] [address] [facility] [address]  [address]

## 2019-11-15 ENCOUNTER — PATIENT OUTREACH (OUTPATIENT)
Dept: FAMILY MEDICINE CLINIC | Age: 64
End: 2019-11-15

## 2019-11-15 NOTE — Clinical Note
Did you complete the form for the Scooter she was trying to get? Was it faxed in? She has not heard anything. In midst of bad MS flare-no energy, in bed a lot. Said her WC weighs 450lb and Karly Patel can't get it in their car. Scooter folds and weighs much less. Guillermo Hernandez

## 2019-11-15 NOTE — PROGRESS NOTES
Called patient and notified her that the form for the Scooter had been faxed in by 's nurse, no word back yet.

## 2019-11-15 NOTE — PROGRESS NOTES
Patient called. Asked about the form for the Scooter she was requesting. Had given it to [address] [place] [address] at last office visit, 10/21. With her MS, lot of trouble with walking. Her WC weighs 450 lb and unable to get it into her car. Scooter is foldable and weighs much less and her son could lift it. In midst of a MS flare-taking the Achtar gel qd. Not working very well. In bed, no energy. Blood sugar has been higher while on the Achtar gel, highest has been 173. Advised to contact us if goes above 200,  may need to adjust her Metformin. Also reports going to see cardiologist on Monday about the arrhythmias. [facility] nurse has noted them as well. CTN to check with  re form for the Scooter and advised will call her back.

## 2019-11-18 ENCOUNTER — OFFICE VISIT (OUTPATIENT)
Dept: CARDIOLOGY CLINIC | Age: 64
End: 2019-11-18

## 2019-11-18 VITALS
HEART RATE: 67 BPM | BODY MASS INDEX: 26.64 KG/M2 | OXYGEN SATURATION: 97 % | RESPIRATION RATE: 14 BRPM | DIASTOLIC BLOOD PRESSURE: 48 MMHG | WEIGHT: 196.7 LBS | SYSTOLIC BLOOD PRESSURE: 114 MMHG | HEIGHT: 72 IN

## 2019-11-18 DIAGNOSIS — I20.0 UNSTABLE ANGINA (HCC): Primary | ICD-10-CM

## 2019-11-18 DIAGNOSIS — E11.8 DM TYPE 2, CONTROLLED, WITH COMPLICATION (HCC): ICD-10-CM

## 2019-11-18 DIAGNOSIS — E78.2 MIXED HYPERLIPIDEMIA: ICD-10-CM

## 2019-11-18 DIAGNOSIS — R07.89 OTHER CHEST PAIN: ICD-10-CM

## 2019-11-18 DIAGNOSIS — I49.8 OTHER CARDIAC ARRHYTHMIA: ICD-10-CM

## 2019-11-18 NOTE — PATIENT INSTRUCTIONS
You will be scheduled for a Nuclear Stress Test after your appointment today. For Nuclear Stress Test:  Wear comfortable clothing (shorts or pants with a shirt or blouse- no underwire bras) and walking or athletic shoes. DO NOT eat or drink anything, except water, for at least 2 hours prior to your appointment. AVOID tobacco products for at least 6 hours prior to your test.    DO NOT eat or drink anything containing caffeine, including but not limited to the following: chocolate, regular and decaffeinated coffee, soft drinks, or tea for at least 12-24 hours prior to your test.    Do take your scheduled medications prior to your test.     You will need to inform our office if you are pregnant, nursing, or think you may be pregnant. Your test will be performed on a 1 OR 2 day protocol. This is determined by your height, weight, and other risk factors. For a 2 day test, please allow for 2 hours in the office each day. For a 1 day test, please allow for 4 hours in the office that day. The radioactive isotope used for your testing is different from any of the dyes that are commonly used in x-ray procedures, and is ordered specially for your test. Please call to cancel or reschedule your appointment at least 24 hours prior to your scheduled appointment to avoid being billed for the expensive isotope. A 30 day loop monitor has been ordered for you. This will be mailed to the address given to us. Please read over the instructions given to you about [employer] loop monitors. If you have any insurance questions regarding coverage and out of pocket cost please contact the number below.       If you have any billing questions regarding deductible or responsibility call ([phone])   If you need additional supplies or issue with your monitor please call ([phone])

## 2019-11-20 ENCOUNTER — PATIENT OUTREACH (OUTPATIENT)
Dept: FAMILY MEDICINE CLINIC | Age: 64
End: 2019-11-20

## 2019-11-20 NOTE — PROGRESS NOTES
Patient called to report she saw  on Monday re arrhythmias. He has ordered a stress cardiolite test for her, 24 hour Holter monitor and ECHO. Scheduled for tests on 12/13 at his office. Reports she feels better now after being on the  medication(Acthar) for 10 days to help the MS flare. And blood sugar better now also. Advised will notify .

## 2019-11-23 ENCOUNTER — HOSPITAL ENCOUNTER (OUTPATIENT)
Dept: MAMMOGRAPHY | Age: 64
Discharge: HOME | End: 2019-11-23
Attending: FAMILY MEDICINE
Payer: MEDICARE

## 2019-11-23 ENCOUNTER — HOSPITAL ENCOUNTER (OUTPATIENT)
Dept: CT IMAGING | Age: 64
Discharge: HOME | End: 2019-11-23
Attending: NURSE PRACTITIONER
Payer: MEDICARE

## 2019-11-23 DIAGNOSIS — Z12.31 VISIT FOR SCREENING MAMMOGRAM: ICD-10-CM

## 2019-11-23 DIAGNOSIS — Q89.2 THYROGLOSSAL DUCT CYST: ICD-10-CM

## 2019-11-23 DIAGNOSIS — G70.00 MYASTHENIA GRAVIS (HCC): ICD-10-CM

## 2019-11-23 DIAGNOSIS — R13.10 DYSPHAGIA: ICD-10-CM

## 2019-11-23 DIAGNOSIS — E89.0 H/O THYROIDECTOMY: ICD-10-CM

## 2019-11-23 DIAGNOSIS — G35 MS (MULTIPLE SCLEROSIS) (HCC): ICD-10-CM

## 2019-11-23 PROCEDURE — 74011636320 HC RX REV CODE- 636/320

## 2019-11-23 PROCEDURE — 70491 CT SOFT TISSUE NECK W/DYE: CPT

## 2019-11-23 PROCEDURE — 77067 SCR MAMMO BI INCL CAD: CPT

## 2019-11-23 RX ORDER — SODIUM CHLORIDE 0.9 % (FLUSH) 0.9 %
10 SYRINGE (ML) INJECTION
Status: COMPLETED | OUTPATIENT
Start: 2019-11-23 | End: 2019-11-23

## 2019-11-23 RX ADMIN — Medication 10 ML: at 13:00

## 2019-11-23 RX ADMIN — IOPAMIDOL 100 ML: 755 INJECTION, SOLUTION INTRAVENOUS at 13:00

## 2019-12-04 ENCOUNTER — PATIENT OUTREACH (OUTPATIENT)
Dept: FAMILY MEDICINE CLINIC | Age: 64
End: 2019-12-04

## 2019-12-04 NOTE — PROGRESS NOTES
Advised patient Mammogram looked good, no signs of any problems. 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 No evidence of local recurrence.  No cervical lymphadenopathy or other soft  tissue abnormality in the neck.

## 2019-12-06 ENCOUNTER — PATIENT OUTREACH (OUTPATIENT)
Dept: FAMILY MEDICINE CLINIC | Age: 64
End: 2019-12-06

## 2019-12-06 NOTE — PROGRESS NOTES
Spoke to patient re CT of neck- said ENT office had called her with the results and told her to f/u prn. Symptoms better at this time.

## 2019-12-09 ENCOUNTER — PATIENT OUTREACH (OUTPATIENT)
Dept: FAMILY MEDICINE CLINIC | Age: 64
End: 2019-12-09

## 2019-12-09 NOTE — PROGRESS NOTES
Patient called, says mother had a colonoscopy last week and found out she has colon cancer. Going to see her surgeon today. Has told her children, she doesn't want chemo. Says mother has been very depressed since getting the news. Has been losing weight. Vivian Perez has had a strained relationship with her mother but says lately has grown closer to her. Mother calls her every morning to wish her a good day. Vivian Perez has lost her father and one of her sisters in last few years. Tearful and upset that may lose mother. Allowed her to talk and ventilate, support given. Says she and other sister and brother have urged mother to find out about treatments recommended before making any decisions. Suggested she verbalize to mother how much she means to her and her hope that mother will get the treatment recommended. Patient calmed down, said she would be back in touch with report from surgeon. Advised to call back prn.

## 2019-12-13 ENCOUNTER — CLINICAL SUPPORT (OUTPATIENT)
Dept: CARDIOLOGY CLINIC | Age: 64
End: 2019-12-13

## 2019-12-13 DIAGNOSIS — R01.1 SYSTOLIC MURMUR: Primary | ICD-10-CM

## 2019-12-13 DIAGNOSIS — I35.0 AORTIC VALVE STENOSIS, ETIOLOGY OF CARDIAC VALVE DISEASE UNSPECIFIED: ICD-10-CM

## 2019-12-13 DIAGNOSIS — I49.8 OTHER SPECIFIED CARDIAC ARRHYTHMIAS: Primary | ICD-10-CM

## 2019-12-16 DIAGNOSIS — G35 MS (MULTIPLE SCLEROSIS) (HCC): ICD-10-CM

## 2019-12-16 RX ORDER — FINGOLIMOD HCL 0.5 MG/1
1 CAPSULE ORAL DAILY
Qty: 90 CAP | Refills: 3 | Status: SHIPPED | OUTPATIENT
Start: 2019-12-16 | End: 2020-07-20 | Stop reason: SDUPTHER

## 2019-12-18 ENCOUNTER — PATIENT OUTREACH (OUTPATIENT)
Dept: FAMILY MEDICINE CLINIC | Age: 64
End: 2019-12-18

## 2019-12-18 ENCOUNTER — TELEPHONE (OUTPATIENT)
Dept: NEUROLOGY | Age: 64
End: 2019-12-18

## 2019-12-18 NOTE — PROGRESS NOTES
Patient called -has appt with Radha Morales on 1/3 at 1:30pm.  Will meet with her for CM after that appt. Also reports mother's pathology report came back positive for cancer. Meeting with oncologist today to discuss options. Support given .

## 2019-12-30 ENCOUNTER — TELEPHONE (OUTPATIENT)
Dept: FAMILY MEDICINE CLINIC | Age: 64
End: 2019-12-30

## 2019-12-30 NOTE — TELEPHONE ENCOUNTER
----- Message from Tanisha Henderson sent at 12/30/2019 10:03 AM EST -----  Regarding: Dr. Jose Ventura Message/Vendor Calls    Caller's first and last name: Patient      Reason for call: Ms. Heather Morales would like to know whether [facility] has a transportation service for patients.        Callback required yes/no and why: Yes      Best contact number(s): [phone]      Details to clarify the request:      Tanisha Henderson

## 2019-12-30 NOTE — TELEPHONE ENCOUNTER
Returned call to patient regarding her request. Nurse informed patient of [facility] a service through [facility]. Informed patient that it is not a free service, but they offer some type of a discount. Provided the number [phone]. Patient verbalized understanding.

## 2019-12-31 ENCOUNTER — TELEPHONE (OUTPATIENT)
Dept: CARDIOLOGY CLINIC | Age: 64
End: 2019-12-31

## 2019-12-31 NOTE — TELEPHONE ENCOUNTER
Patient returned call. Two patient indentifiers verified. Pt was informed of results. Pt verbalized understanding and denies any further questions at this time.

## 2019-12-31 NOTE — TELEPHONE ENCOUNTER
----- Message from Brittany Reed MD sent at 12/30/2019  6:03 PM EST -----  Please let pt know holter noted occasional to frequent benign PVCs/PACs. Importantly, given her history of stroke, there was no afib. As she is asymptomatic no therapy is needed. If she develops palpitations, could always add a BB.  thx

## 2020-01-03 ENCOUNTER — HOSPITAL ENCOUNTER (OUTPATIENT)
Dept: LAB | Age: 65
Discharge: HOME | End: 2020-01-03
Payer: MEDICARE

## 2020-01-03 ENCOUNTER — PATIENT OUTREACH (OUTPATIENT)
Dept: FAMILY MEDICINE CLINIC | Age: 65
End: 2020-01-03

## 2020-01-03 ENCOUNTER — OFFICE VISIT (OUTPATIENT)
Dept: FAMILY MEDICINE CLINIC | Age: 65
End: 2020-01-03

## 2020-01-03 VITALS
WEIGHT: 188.6 LBS | DIASTOLIC BLOOD PRESSURE: 70 MMHG | OXYGEN SATURATION: 96 % | TEMPERATURE: 98.5 F | HEIGHT: 71 IN | HEART RATE: 88 BPM | SYSTOLIC BLOOD PRESSURE: 118 MMHG | BODY MASS INDEX: 26.4 KG/M2 | RESPIRATION RATE: 18 BRPM

## 2020-01-03 DIAGNOSIS — E11.9 TYPE 2 DIABETES MELLITUS WITHOUT COMPLICATION, WITHOUT LONG-TERM CURRENT USE OF INSULIN (HCC): Primary | ICD-10-CM

## 2020-01-03 DIAGNOSIS — E78.5 HYPERLIPIDEMIA, UNSPECIFIED HYPERLIPIDEMIA TYPE: ICD-10-CM

## 2020-01-03 DIAGNOSIS — Z80.0 FAMILY HISTORY OF COLON CANCER IN MOTHER: ICD-10-CM

## 2020-01-03 PROCEDURE — 80061 LIPID PANEL: CPT

## 2020-01-03 PROCEDURE — 83036 HEMOGLOBIN GLYCOSYLATED A1C: CPT

## 2020-01-03 PROCEDURE — 82043 UR ALBUMIN QUANTITATIVE: CPT

## 2020-01-03 PROCEDURE — 80053 COMPREHEN METABOLIC PANEL: CPT

## 2020-01-03 NOTE — PROGRESS NOTES
Patient in for routine office visit with pcp. Stopped by to see CM. In good spirits, not using any assistive devices. No cane, no braces on legs. Reports mother is doing well with recent diagnosis of cancer. Goes Monday to see oncologist and learn what treatment plan is recommended. Says mother will then decide if she wants to pursue the treatment. Says they have been staying with mother all the time- her siblings have been taking turns. But on Saturday, mother decided she had had enough and asked everyone to leave, said she was fine on her own and would call if needed anything. Patient remarked that she and mother are too much alike, says that is why they have always butted heads. But patient now seems very supportive and tolerant of mother. Enjoying her company now. Son is employed full time, helping with her finances. He now has medical insurance which she had been concerned about. Says she is happy today, has her good days and bad days. Said she overdid it with taking [place] decorations down this week, paid for it the next few days with feeling very achey and painful all over. Overall, doing better today. Speech improved, minimal word searching noted. Steady on feet. Reminded to call if any concerns arise or needs. Will try to connect in another month for update.

## 2020-01-03 NOTE — PROGRESS NOTES
Patient Name: John Bah   MRN: [id number]    Bonnie Akhtar is a 60 y.o. female who presents with the following:     She is seen for diabetes and hyperlipidemia. Since last visit she reports no significant changes. Home glucose monitoring: is not performed. She reports medication compliance: compliant most of the time. Medication side effects: none. Diabetic diet compliance: noncompliant some of the time. Lab review: orders written for new lab studies as appropriate; see orders. Eye exam: UTD. Misses metformin about 2 times a week due to falling asleep before nightly meds. Mother was diagnosed with colon cancer. Pt has not had colonoscopy since 2012. Lab Results   Component Value Date/Time    Hemoglobin A1c 7.0 (H) 04/04/2019 02:04 PM    Hemoglobin A1c (POC) 6.3 07/05/2019 01:51 PM       Review of Systems   Constitutional: Negative for fever, malaise/fatigue and weight loss. Respiratory: Negative for cough, hemoptysis, shortness of breath and wheezing. Cardiovascular: Negative for chest pain, palpitations, leg swelling and PND. Gastrointestinal: Negative for abdominal pain, constipation, diarrhea, nausea and vomiting. The patient's medications, allergies, past medical history, surgical history, family history and social history were reviewed and updated where appropriate. Prior to Admission medications    Medication Sig Start Date End Date Taking? Authorizing Provider   GILENYA 0.5 mg cap Take 1 Cap by mouth daily. 12/16/19  Yes Los Kahn MD   corticotropin (ACTHAR H.P.) 80 unit/mL injectable gel 1 mL by SubCUTAneous route daily. 80 units subq q day for 10 days 11/8/19  Yes Los Kahn MD   pregabalin (LYRICA) 200 mg capsule Take 1 Cap by mouth two (2) times a day. Max Daily Amount: 400 mg. 11/7/19  Yes Los Kahn MD   buprenorphine HCl (BELBUCA) 150 mcg film by Buccal route three (3) times daily as needed.    Yes Provider, Historical   levothyroxine (SYNTHROID) 150 mcg tablet TAKE 1 TABLET BY MOUTH EVERY NIGHT  Patient taking differently: Take 150 mcg by mouth Daily (before breakfast). 9/26/19  Yes Magali Villalba MD   ondansetron (ZOFRAN ODT) 4 mg disintegrating tablet DISSOLVE 1 TABLET ON THE TONGUE EVERY 8 HOURS AS NEEDED FOR NAUSEA 9/24/19  Yes Katja Jorgensen MD   pyridostigmine bromide (MESTINON SR) 180 mg SR tablet TAKE 1 TABLET BY MOUTH TWICE DAILY  Patient taking differently: Take 180 mg by mouth two (2) times a day. 8/27/19  Yes Los Kahn MD   metFORMIN ER (GLUCOPHAGE XR) 500 mg tablet Take 1 Tab by mouth daily (with breakfast). 8/11/19  Yes Dell Laughlin NP   glucose blood VI test strips (BLOOD GLUCOSE TEST) strip 100 Each by Does Not Apply route See Admin Instructions. One Touch Ultra Test Strips=Check blood sugar daily dx E11.8 8/11/19  Yes Dell Laughlin NP   topiramate (TOPAMAX) 200 mg tablet Take 1 Tab by mouth nightly. 8/8/19  Yes Los Kahn MD   aspirin delayed-release 81 mg tablet Take 1 Tab by mouth every twelve (12) hours. 5/14/19  Yes KATIANA Andersen   OTHER 0.25 mL by SubLINGual route daily. CBD OIL   Yes Provider, Historical   dantrolene (DANTRIUM) 100 mg capsule Take 1 Cap by mouth three (3) times daily. Patient taking differently: Take 100 mg by mouth daily as needed. Indications: muscle spasm 3/19/19  Yes Los Kahn MD   albuterol (PROVENTIL VENTOLIN) 2.5 mg /3 mL (0.083 %) nebulizer solution 3 mL by Nebulization route every six (6) hours as needed for Wheezing. 12/31/18  Yes Magali Villalba MD   PARoxetine (PAXIL) 40 mg tablet TAKE 1 AND 1/2 TABLETS BY MOUTH EVERY NIGHT 12/18/18  Yes Los Kahn MD   Menthol-Zinc Oxide (CALMOSEPTINE) 0.44-20.6 % oint Apply  to affected area as needed.    Yes Provider, Historical   nystatin (MYCOSTATIN) powder Apply to candidal lesions TOPICALLY 2 to 3 times daily until healing complete 10/25/18  Yes Magali Villalba MD   lidocaine (LIDODERM) 5 % Apply patch to the affected area for 12 hours a day and remove for 12 hours a day. 8/15/18  Yes Los Kahn MD   clindamycin (CLEOCIN) 300 mg capsule Take 300 mg by mouth. Prior to dental procedures 6/30/17  Yes Provider, Historical   rosuvastatin (CRESTOR) 20 mg tablet TAKE 1 TABLET BY MOUTH EVERY EVENING 2/24/17  Yes Teresa Hawkins MD   TENS UNIT ELECTRODES by Does Not Apply route. prn   Yes Provider, Historical       Allergies   Allergen Reactions    Bee Sting [Sting, Bee] Anaphylaxis     Also allergic to egg products    Penicillins Anaphylaxis    Betadine [Povidone-Iodine] Rash    Egg Itching         OBJECTIVE    Visit Vitals  /70 (BP 1 Location: Left arm, BP Patient Position: Sitting)   Pulse 88   Temp 98.5 °F (36.9 °C) (Oral)   Resp 18   Ht 5' 11\" (1.803 m)   Wt 188 lb 9.6 oz (85.5 kg)   LMP 09/01/2010   SpO2 96%   BMI 26.30 kg/m²       Physical Exam  Vitals signs and nursing note reviewed. Constitutional:       General: She is not in acute distress. Appearance: She is not diaphoretic. Eyes:      Conjunctiva/sclera: Conjunctivae normal.      Pupils: Pupils are equal, round, and reactive to light. Musculoskeletal: Normal range of motion. Skin:     General: Skin is warm and dry. Neurological:      Mental Status: She is alert and oriented to person, place, and time. Gait: Gait is intact. Psychiatric:         Mood and Affect: Mood and affect normal.         Cognition and Memory: Memory normal.         Judgment: Judgment normal.           ASSESSMENT AND PLAN  Lucy Paul SHAI Galvin is a 60 y.o. female who presents today for:    1. Type 2 diabetes mellitus without complication, without long-term current use of insulin (HCC)  Stable, continue current treatment pending review of labs. I have reviewed/discussed the above normal BMI with the patient.   I have recommended the following interventions: dietary management education, guidance, and counseling, encourage exercise, monitor weight and prescribed dietary intake.   - HEMOGLOBIN A1C WITH EAG  - MICROALBUMIN, UR, RAND W/ MICROALB/CREAT RATIO    2. Hyperlipidemia, unspecified hyperlipidemia type  Will calculate ASCVD risk score pending labs. - METABOLIC PANEL, COMPREHENSIVE  - LIPID PANEL    3. Family history of colon cancer in mother  Pt to reach out to GI for repeat colonoscopy every 5 years. There are no discontinued medications. Medication risks/benefits/costs/interactions/alternatives discussed with patient. Advised patient to call back or return to office if symptoms worsen/change/persist. If patient cannot reach us or should anything more severe/urgent arise he/she should proceed directly to the nearest emergency department. Discussed expected course/resolution/complications of diagnosis in detail with patient. Patient given a written after visit summary which includes his/her diagnoses, current medications and vitals. Patient expressed understanding with the diagnosis and plan. Brandon Gonzalez M.D.

## 2020-01-03 NOTE — PROGRESS NOTES
Chief Complaint   Patient presents with    Diabetes     follow up    Cholesterol Problem     follow up     1. Have you been to the ER, urgent care clinic since your last visit? Hospitalized since your last visit? No    2. Have you seen or consulted any other health care providers outside of the [address] [place] [address] since your last visit? Include any pap smears or colon screening. No      Patient stated that she saw Dr. Jocelyne Arriaga last month.

## 2020-01-04 LAB
ALBUMIN SERPL-MCNC: 4.3 G/DL (ref 3.6–4.8)
ALBUMIN/CREAT UR: 10.2 MG/G CREAT (ref 0–30)
ALBUMIN/GLOB SERPL: 2 {RATIO} (ref 1.2–2.2)
ALP SERPL-CCNC: 57 IU/L (ref 39–117)
ALT SERPL-CCNC: 9 IU/L (ref 0–32)
AST SERPL-CCNC: 10 IU/L (ref 0–40)
BILIRUB SERPL-MCNC: 0.3 MG/DL (ref 0–1.2)
BUN SERPL-MCNC: 13 MG/DL (ref 8–27)
BUN/CREAT SERPL: 12 (ref 12–28)
CALCIUM SERPL-MCNC: 9.9 MG/DL (ref 8.7–10.3)
CHLORIDE SERPL-SCNC: 109 MMOL/L (ref 96–106)
CHOLEST SERPL-MCNC: 299 MG/DL (ref 100–199)
CO2 SERPL-SCNC: 20 MMOL/L (ref 20–29)
CREAT SERPL-MCNC: 1.06 MG/DL (ref 0.57–1)
CREAT UR-MCNC: 159.3 MG/DL
EST. AVERAGE GLUCOSE BLD GHB EST-MCNC: 148 MG/DL
GLOBULIN SER CALC-MCNC: 2.1 G/DL (ref 1.5–4.5)
GLUCOSE SERPL-MCNC: 155 MG/DL (ref 65–99)
HBA1C MFR BLD: 6.8 % (ref 4.8–5.6)
HDLC SERPL-MCNC: 50 MG/DL
INTERPRETATION, 910389: NORMAL
INTERPRETATION: NORMAL
LDLC SERPL CALC-MCNC: 216 MG/DL (ref 0–99)
MICROALBUMIN UR-MCNC: 16.3 UG/ML
PDF IMAGE, 910387: NORMAL
POTASSIUM SERPL-SCNC: 4.1 MMOL/L (ref 3.5–5.2)
PROT SERPL-MCNC: 6.4 G/DL (ref 6–8.5)
SODIUM SERPL-SCNC: 141 MMOL/L (ref 134–144)
TRIGL SERPL-MCNC: 164 MG/DL (ref 0–149)
VLDLC SERPL CALC-MCNC: 33 MG/DL (ref 5–40)

## 2020-01-06 DIAGNOSIS — E78.5 HYPERLIPIDEMIA LDL GOAL <100: ICD-10-CM

## 2020-01-06 RX ORDER — ROSUVASTATIN CALCIUM 40 MG/1
40 TABLET, COATED ORAL DAILY
Qty: 90 TAB | Refills: 2 | Status: SHIPPED | OUTPATIENT
Start: 2020-01-06 | End: 2020-01-24 | Stop reason: SDUPTHER

## 2020-01-06 NOTE — PROGRESS NOTES
Outbound call to [phone] verified , informed patient of lab results and recommendations. Patient states I will follow all recommendations.

## 2020-01-24 ENCOUNTER — PATIENT OUTREACH (OUTPATIENT)
Dept: FAMILY MEDICINE CLINIC | Age: 65
End: 2020-01-24

## 2020-01-24 DIAGNOSIS — E78.5 HYPERLIPIDEMIA LDL GOAL <100: ICD-10-CM

## 2020-01-24 RX ORDER — ROSUVASTATIN CALCIUM 40 MG/1
40 TABLET, COATED ORAL DAILY
Qty: 90 TAB | Refills: 2 | Status: SHIPPED | OUTPATIENT
Start: 2020-01-24 | End: 2020-11-23

## 2020-01-24 NOTE — Clinical Note
Crestor needs to go to [facility] [employer], ph [phone] for insurance to cover. (was sent to [facility]-they just fill her specialty meds. )When to f/u with you on new dose?

## 2020-01-24 NOTE — PROGRESS NOTES
Notified patient that rx was sent in to [facility] per request                             And to f/u with pcp in August.  Says PT started back up after not having for 2 months and really helping with her walking. Reminded to call if any concerns.

## 2020-02-04 ENCOUNTER — PATIENT OUTREACH (OUTPATIENT)
Dept: FAMILY MEDICINE CLINIC | Age: 65
End: 2020-02-04

## 2020-02-04 NOTE — PROGRESS NOTES
Patient called to report her FBS today was 265 . Has been in low 100's and didn't think it was related to what she had eaten the day before. No different than what she usually eats. Did say she started a new medication. Belbuca sublingual- 2mg qd. Started it on Sunday. Last night took it with her other hs meds and this am was dizzy and in bed until about noon. Her mother called her this am and told her she sounded drugged. Patient reports the Jilda Gowers did help her headache. Has decided to hold the Topamax tonight and take the Jilda Gowers before the other hs meds to see if that helps. Advised to check with PM  's nurse, to discuss. But she wants to try holding the Topamax and see how she feels tomorrow. Suggested she check blood sugar before dinner tonight and in am, FBS, and call with results. Patient agreed to plan and will call tomorrow with results and update.

## 2020-02-05 ENCOUNTER — PATIENT OUTREACH (OUTPATIENT)
Dept: FAMILY MEDICINE CLINIC | Age: 65
End: 2020-02-05

## 2020-02-05 NOTE — PROGRESS NOTES
Patient called back re blood sugar and medications. Reports her FBS was better today, was 138. Yesterday was 265. Said she did not take her Crestor 40mg last night. Had just started the higher dose on Sunday. Had been on 20mg before and did not take regularly. \"Only when I felt I needed it due to my diet. \"  Education provided on importance of taking meds as directed. Advised will let pcp know about blood sugar and possible relation to Crestor. Also reports not as dizzy today. Took the Belbuca earlier last night and did not take the Topamax (for headaches) at all. Says has not had a headache since starting the [address]. Nurse at Pain Management's office had told her to expect some dizziness when starting the [address]. Advised to let 's nurse know about the dizziness and how she has stopped the Topamax. Since still has some dizziness, reviewed cautions to avoid falls. Says son is home with her today and will have him assist her with any ambulation as long as necessary. Advised to call back if any concerns.

## 2020-02-07 ENCOUNTER — PATIENT OUTREACH (OUTPATIENT)
Dept: FAMILY MEDICINE CLINIC | Age: 65
End: 2020-02-07

## 2020-02-07 NOTE — Clinical Note
Advised to continue Crestor 40mg qd. Reports FBS today was 115, yest was 144. Will see me next week for our monthly appt and will bring blood sugar numbers with her. Thanks!

## 2020-02-07 NOTE — PROGRESS NOTES
Called and LM for patient, advising that  recommended that she continue taking the Crestor 40mg for now qd. Patient called back, gave her advice above. Reports FBS today was 115 and yesterday was 144. Will continue to monitor bs and bring next week to appt with me for case management.

## 2020-02-13 ENCOUNTER — OFFICE VISIT (OUTPATIENT)
Dept: NEUROLOGY | Age: 65
End: 2020-02-13

## 2020-02-13 VITALS
DIASTOLIC BLOOD PRESSURE: 72 MMHG | WEIGHT: 189 LBS | OXYGEN SATURATION: 97 % | HEART RATE: 89 BPM | TEMPERATURE: 98.2 F | SYSTOLIC BLOOD PRESSURE: 124 MMHG | RESPIRATION RATE: 18 BRPM | BODY MASS INDEX: 26.46 KG/M2 | HEIGHT: 71 IN

## 2020-02-13 DIAGNOSIS — R29.6 FREQUENT FALLS: ICD-10-CM

## 2020-02-13 DIAGNOSIS — F98.8 ATTENTION DEFICIT DISORDER (ADD) WITHOUT HYPERACTIVITY: ICD-10-CM

## 2020-02-13 DIAGNOSIS — G35 MS (MULTIPLE SCLEROSIS) (HCC): Primary | ICD-10-CM

## 2020-02-13 DIAGNOSIS — G70.00 MYASTHENIA GRAVIS (HCC): ICD-10-CM

## 2020-02-13 DIAGNOSIS — G57.12 MERALGIA PARESTHETICA OF LEFT SIDE: ICD-10-CM

## 2020-02-13 DIAGNOSIS — M79.18 MYOFASCIAL MUSCLE PAIN: ICD-10-CM

## 2020-02-13 DIAGNOSIS — G61.81 CIDP (CHRONIC INFLAMMATORY DEMYELINATING POLYNEUROPATHY) (HCC): ICD-10-CM

## 2020-02-13 DIAGNOSIS — R26.9 GAIT DISORDER: ICD-10-CM

## 2020-02-13 RX ORDER — DEXTROAMPHETAMINE SACCHARATE, AMPHETAMINE ASPARTATE, DEXTROAMPHETAMINE SULFATE AND AMPHETAMINE SULFATE 5; 5; 5; 5 MG/1; MG/1; MG/1; MG/1
20 TABLET ORAL DAILY
Qty: 30 TAB | Refills: 0 | Status: SHIPPED | OUTPATIENT
Start: 2020-02-13 | End: 2021-02-16 | Stop reason: ALTCHOICE

## 2020-02-13 NOTE — PROGRESS NOTES
Neurology Progress Note    NAME:  Lucy Galvin   :   1955   MRN:   [id number]     Date/Time:  2020  Subjective:     Lucy Galvin is a 60 y.o. female here today for follow-up for MS, MG, chronic pain syndrome, headaches, CVA, difficulty walking. Patient says she has had 2 falls since the last visit, however, she is currently in physical therapy. She says she is still having increasing left arm pain and difficulty ambulating. She also having thigh numbness, she says they all started since last visit and seem to be increasing. .  She noted that her eyes tend to be weak and she sees double but has improved since last visit. Patient  continues to follow with pain management specialist.  However she continues to have a lot of pain but not as much as before, pain is sharp in nature, diffuse, burning sensation that goes up to the arms causing numbness and tingling sensation and weakness of the hands, down to the legs causing  numbness and tingling sensation of the legs with weakness of the legs. She says her headache continues to decrease in frequency and intensity. Headache is frontal, throbbing in nature, with occasional sharp pain from the back of the head, headache associated with dizziness, nausea, blurry vision, double vision, photophobia, phonophobia. She says she has a lot of muscle spasms mostly in the back. Pain appears to have plateaued. On a scale of 1-10, patient rates pain level to be between 5 and 6  She denies loss of consciousness. Says dysphagia has improved. No fall was reported  I will continue patient on current medication. Patient continues to smoke cigarettes.   Review of Systems - General ROS: positive for  - fatigue, night sweats and sleep disturbance  Psychological ROS: positive for - anxiety, concentration difficulties, depression, mood swings and sleep disturbances  Ophthalmic ROS: positive for - blurry vision, decreased vision, double vision and photophobia  ENT ROS: positive for - headaches, tinnitus, vertigo and visual changes  Allergy and Immunology ROS: negative  Hematological and Lymphatic ROS: negative  Endocrine ROS: negative  Respiratory ROS: no cough, shortness of breath, or wheezing  Cardiovascular ROS: no chest pain or dyspnea on exertion  Gastrointestinal ROS: no abdominal pain, change in bowel habits, or black or bloody stools  Genito-Urinary ROS: no dysuria, trouble voiding, or hematuria  Musculoskeletal ROS: positive for - gait disturbance, joint pain, joint stiffness, joint swelling and muscle pain  Neurological ROS: positive for - dizziness, gait disturbance, headaches, impaired coordination/balance, numbness/tingling, speech problems, tremors, visual changes and weakness  Dermatological ROS: negative       Medications reviewed:  Current Outpatient Medications   Medication Sig Dispense Refill    rosuvastatin (CRESTOR) 40 mg tablet Take 1 Tab by mouth daily. DOSE CHANGE 90 Tab 2    GILENYA 0.5 mg cap Take 1 Cap by mouth daily. 90 Cap 3    corticotropin (ACTHAR H.P.) 80 unit/mL injectable gel 1 mL by SubCUTAneous route daily. 80 units subq q day for 10 days 10 mL 1    pregabalin (LYRICA) 200 mg capsule Take 1 Cap by mouth two (2) times a day. Max Daily Amount: 400 mg. 180 Cap 3    buprenorphine HCl (BELBUCA) 150 mcg film by Buccal route three (3) times daily as needed. Indications: per patient she is taking 2mg sublingual daily      levothyroxine (SYNTHROID) 150 mcg tablet TAKE 1 TABLET BY MOUTH EVERY NIGHT (Patient taking differently: Take 150 mcg by mouth Daily (before breakfast). ) 90 Tab 1    ondansetron (ZOFRAN ODT) 4 mg disintegrating tablet DISSOLVE 1 TABLET ON THE TONGUE EVERY 8 HOURS AS NEEDED FOR NAUSEA 15 Tab 0    pyridostigmine bromide (MESTINON SR) 180 mg SR tablet TAKE 1 TABLET BY MOUTH TWICE DAILY (Patient taking differently: Take 180 mg by mouth two (2) times a day.) 60 Tab 0    metFORMIN ER (GLUCOPHAGE XR) 500 mg tablet Take 1 Tab by mouth daily (with breakfast). 90 Tab 1    glucose blood VI test strips (BLOOD GLUCOSE TEST) strip 100 Each by Does Not Apply route See Admin Instructions. One Touch Ultra Test Strips=Check blood sugar daily dx E11.8 100 Strip 1    topiramate (TOPAMAX) 200 mg tablet Take 1 Tab by mouth nightly. 90 Tab 3    aspirin delayed-release 81 mg tablet Take 1 Tab by mouth every twelve (12) hours. 60 Tab 0    OTHER 0.25 mL by SubLINGual route daily. CBD OIL      dantrolene (DANTRIUM) 100 mg capsule Take 1 Cap by mouth three (3) times daily. (Patient taking differently: Take 100 mg by mouth daily as needed. Indications: muscle spasm) 90 Cap 3    albuterol (PROVENTIL VENTOLIN) 2.5 mg /3 mL (0.083 %) nebulizer solution 3 mL by Nebulization route every six (6) hours as needed for Wheezing. 30 Each 0    PARoxetine (PAXIL) 40 mg tablet TAKE 1 AND 1/2 TABLETS BY MOUTH EVERY NIGHT 135 Tab 3    Menthol-Zinc Oxide (CALMOSEPTINE) 0.44-20.6 % oint Apply  to affected area as needed.  nystatin (MYCOSTATIN) powder Apply to candidal lesions TOPICALLY 2 to 3 times daily until healing complete 30 g 0    lidocaine (LIDODERM) 5 % Apply patch to the affected area for 12 hours a day and remove for 12 hours a day. 30 Each 3    clindamycin (CLEOCIN) 300 mg capsule Take 300 mg by mouth. Prior to dental procedures      TENS UNIT ELECTRODES by Does Not Apply route.  prn          Objective:   Vitals:  Vitals:    02/13/20 1247   BP: 124/72   Pulse: 89   Resp: 18   Temp: 98.2 °F (36.8 °C)   TempSrc: Oral   SpO2: 97%   Weight: 189 lb (85.7 kg)   Height: 5' 11\" (1.803 m)   PainSc:   5   PainLoc: Generalized   LMP: 09/01/2010               Lab Data Reviewed:  Lab Results   Component Value Date/Time    WBC 3.6 06/10/2019 10:41 AM    HCT 38.7 06/10/2019 10:41 AM    HGB 12.9 06/10/2019 10:41 AM    PLATELET 402 14/78/7862 10:41 AM       Lab Results   Component Value Date/Time    Sodium 141 01/03/2020 01:54 PM    Potassium 4.1 01/03/2020 01:54 PM Chloride 109 (H) 01/03/2020 01:54 PM    CO2 20 01/03/2020 01:54 PM    Glucose 155 (H) 01/03/2020 01:54 PM    BUN 13 01/03/2020 01:54 PM    Creatinine 1.06 (H) 01/03/2020 01:54 PM    Calcium 9.9 01/03/2020 01:54 PM       No components found for: TROPQUANT    No results found for: SHAHIDA      Lab Results   Component Value Date/Time    Hemoglobin A1c 6.8 (H) 01/03/2020 01:54 PM    Hemoglobin A1c (POC) 6.3 07/05/2019 01:51 PM        Lab Results   Component Value Date/Time    Vitamin B12 436 06/10/2019 10:41 AM    Folate 12.0 02/10/2016       No results found for: SHAHIDA, Geofm Decent, XBANA    Lab Results   Component Value Date/Time    Cholesterol, total 299 (H) 01/03/2020 01:54 PM    HDL Cholesterol 50 01/03/2020 01:54 PM    LDL-CHOLESTEROL 91 12/26/2018    LDL, calculated 216 (H) 01/03/2020 01:54 PM    VLDL, calculated 33 01/03/2020 01:54 PM    Triglyceride 164 (H) 01/03/2020 01:54 PM    Triglyceride 126 12/26/2018    CHOL/HDL Ratio 4.3 08/23/2010 12:19 PM         CT Results (recent):  Results from Hospital Encounter encounter on 11/23/19   CT NECK SOFT TISSUE W CONT    Narrative EXAM:  CT NECK SOFT TISSUE W CONT    INDICATION:  Dysphasia. Status post thyroidectomy. COMPARISON: CT neck 5/14/2018. CONTRAST: 100 mL of Isovue-300. TECHNIQUE: Multislice helical CT was performed from the mid calvarium to the  aortic arch during uneventful rapid bolus intravenous contrast administration. Contiguous 2.5 mm axial images were reconstructed and lung and soft tissue  windows were generated. Coronal and sagittal reformations were generated. CT  dose reduction was achieved through use of a standardized protocol tailored for  this examination and automatic exposure control for dose modulation. 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 No evidence of recurrent tissue in this region. No cervical  lymphadenopathy.     Visualized brain parenchyma is normal in appearance. Paranasal sinuses and  mastoid air cells are clear. Intraorbital contents are unremarkable. The  cervical vasculature is patent. No acute fracture or aggressive osseous lesion. Multilevel degenerative disc disease most advanced at C3-C4, C5-C6 and C6-C7 is  unchanged. Visualized portions of the lung apices are normal. Right chest port  is noted. Impression IMPRESSION:   1. 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 No evidence of local recurrence. No cervical lymphadenopathy or other soft  tissue abnormality in the neck. MRI Results (recent):  Results from [place] encounter on 12/26/18   MRI BRAIN W WO CONT    Narrative Medical indication: Multiple sclerosis   , headache q. daily, prior CVA, new numbness, exacerbation. Technical factors: Diffusion imaging, sagittal T1-weighted, sagittal FLAIR,  axial T1-weighted pre and post 18 cc IV. dotarem T2-weighted FLAIR gradient echo  coronal and sagittal T1-weighted postcontrast. Comparison January 10, 2018. Diffusion imaging does not show acute ischemic changes her. There is no  extra-axial fluid collection hemorrhage or shift. Ventricular size is stable. Major vessels flow voids at the base of the brain are present. Incidental  maxillary sinus disease. The burden of white matter disease appears stable. No  enhancing lesion or masses. Impression IMPRESSION: Stable white matter disease. No acute findings no masses or  enhancing lesion. IR Results (recent):  No results found for this or any previous visit. VAS/US Results (recent):  Results from Hospital Encounter encounter on 01/19/17   DUPLEX LOWER EXT VENOUS RIGHT       PHYSICAL EXAM:  General:    Alert, cooperative, no distress, appears stated age. Head:   Normocephalic, without obvious abnormality, atraumatic. Eyes:   Conjunctivae/corneas clear. PERRLA  Nose:  Nares normal. No drainage or sinus tenderness.   Throat: Lips, mucosa, and tongue normal.  No Thrush  Neck:  Supple, symmetrical,  no adenopathy, thyroid: non tender    no carotid bruit and no JVD. Paraspinal tenderness  Back:    Symmetric, diffuse  tenderness. Lungs:   Clear to auscultation bilaterally. No Wheezing or Rhonchi. No rales. Chest wall:  No tenderness or deformity. No Accessory muscle use. Heart:   Regular rate and rhythm,  no murmur, rub or gallop. Abdomen:   Soft, non-tender. Not distended. Bowel sounds normal. No masses  Extremities: Extremities normal, atraumatic, No cyanosis. No edema. No clubbing  Skin:     Texture, turgor normal. No rashes or lesions. Not Jaundiced  Lymph nodes: Cervical, supraclavicular normal.  Psych:  Good insight. Depressed. Anxious. NEUROLOGICAL EXAM:  Appearance: The patient is well developed, well nourished, provides a coherent history and is in no acute distress. Mental Status: Oriented to time, place and person. Mood and affect appropriate. Cranial Nerves:   Intact visual fields. Fundi are benign. RACHEL, EOM's full, no nystagmus, no ptosis. Facial sensation is normal. Corneal reflexes are intact. Facial movement is symmetric. Hearing is normal bilaterally. Palate is midline with normal sternocleidomastoid and trapezius muscles are normal. Tongue is midline. Motor:  5-/5 strength in upper and lower proximal and distal muscles. Normal bulk and tone. No fasciculations. Reflexes:   Deep tendon reflexes 2+/4 and symmetrical.   Sensory:    Dizzy to touch, pinprick and vibration. Gait:   Unsteady gait. Ambulates with cane   Tremor:    Mild tremor noted. Cerebellar:  No cerebellar signs present. Neurovascular:  Normal heart sounds and regular rhythm, peripheral pulses intact, and no carotid bruits. Assesment  1. MS (multiple sclerosis) ([facility] [address]  Continue management    2. Frequent falls  Continue physical therapy    3. Attention deficit disorder (ADD) without hyperactivity  Continue management    4. Gait disorder  Physical therapy    5. Myasthenia gravis ([facility]  Stable    6. CIDP (chronic inflammatory demyelinating polyneuropathy) ([facility])  Continue IVIG    7. Meralgia paresthetica of left side  Continue management    8. Myofascial muscle pain  Following pain management    ___________________________________________________  PLAN: Medication and plan discussed with patient  Advised on the dangers of tobacco abuse  Advised on the benefits of discontinuation   Advised on available treatments. 10 minutes spent on counseling. ICD-10-CM ICD-9-CM    1. MS (multiple sclerosis) ([facility] G35 340    2. Frequent falls R29.6 V15.88    3. Attention deficit disorder (ADD) without hyperactivity F98.8 314.00    4. Gait disorder R26.9 781.2    5. Myasthenia gravis ([facility] G70.00 358.00    6. CIDP (chronic inflammatory demyelinating polyneuropathy) ([facility]) G61.81 357.81    7. Meralgia paresthetica of left side G57.12 355.1    8. Myofascial muscle pain M79.18 729.1      Follow-up and Dispositions    · Return in about 3 months (around 5/13/2020).              ___________________________________________________    Attending Physician: Cailin Avilez MD

## 2020-02-13 NOTE — PROGRESS NOTES
Chief Complaint   Patient presents with    Multiple Sclerosis     alot of bed time and aching alot/ weakness in BLE/ two falls this year     Visit Vitals  /72 (BP 1 Location: Right arm, BP Patient Position: Sitting)   Pulse 89   Temp 98.2 °F (36.8 °C) (Oral)   Resp 18   Ht 5' 11\" (1.803 m)   Wt 189 lb (85.7 kg)   SpO2 97%   BMI 26.36 kg/m²

## 2020-02-25 ENCOUNTER — PATIENT OUTREACH (OUTPATIENT)
Dept: FAMILY MEDICINE CLINIC | Age: 65
End: 2020-02-25

## 2020-02-25 NOTE — PROGRESS NOTES
Patient came by to see CTN. Reports she saw  on 2/13- he suggested she try Adderall to help increase her energy. Very reluctant to try because when she took Provigil before, it really bothered her stomach. Information given to her re Adderall. Discussed some of the pros and cons, side effects. Urged to discuss with pcp and neurologist.  Merlene Polanco today of left arm pain. Holding and rubbing arm. Using cane to ambulate. Says she has good days and bad days. Yesterday was in bed all day. Became more animated talking about trip in June for family reunion in [place]. Reports blood sugar better. Had a high of 265 on 2/4 but since then usually ranges from 144-84. Continues to see PT and OT. Left in good spirits. Says she will consider the Adderall. Advised to call if any questions/concerns.

## 2020-04-07 ENCOUNTER — TELEPHONE (OUTPATIENT)
Dept: NEUROLOGY | Age: 65
End: 2020-04-07

## 2020-04-07 RX ORDER — PREDNISONE 20 MG/1
TABLET ORAL
Qty: 30 TAB | Refills: 0 | Status: SHIPPED | OUTPATIENT
Start: 2020-04-07 | End: 2020-04-21 | Stop reason: SDUPTHER

## 2020-04-07 NOTE — TELEPHONE ENCOUNTER
----- Message from Twin Messer sent at 4/7/2020  9:25 AM EDT -----  Regarding: /Telephone  General Message/Vendor Calls    Caller's first and last name: Rosalind Flowers Minor       Reason for call:speak with the nurse       Callback required yes/no and why:yes      Best contact number(s): [phone]      Details to clarify the request:Pt called requesting a call back from the nurse concerning some issues  .       Twin Messer

## 2020-04-07 NOTE — TELEPHONE ENCOUNTER
Patient stated she cannot lift her left arm. Patient is requesting a steroid or anti inflammatory medication. Patient stated her appointment for orthopaedics to get her arm check but her appointment was cancelled. Patient was asked if she contacted her PCP she stated she on maternity leave and the provider on call does not know her. Please advise.

## 2020-04-08 NOTE — TELEPHONE ENCOUNTER
Left message prescription was sent to the pharmacy and he is not able to do a letter to get a refund for the trip scheduled in July 2020.

## 2020-04-09 ENCOUNTER — PATIENT OUTREACH (OUTPATIENT)
Dept: FAMILY MEDICINE CLINIC | Age: 65
End: 2020-04-09

## 2020-04-09 NOTE — Clinical Note
FYI= [email or web address], she is a patient of . Unsure who is covering for . Hope you and your family are well!  Leticia Haque

## 2020-04-09 NOTE — PROGRESS NOTES
Patient called and reports that she fell on Tuesday when PT was there with her. Was outside in her garden, fell on right side - c/o right arm and shoulder pain. Also hit head when she fell, on the flowers. No LOC noted. No confusion/vomiting/ or increase in head pain noted. PT offered to have her go to ER to be checked, she declined. Says right arm and shoulder still sore. Suggested [facility] should check her out- patient agreeable. [facility] and requested appointment for today to be evaluated.

## 2020-04-21 RX ORDER — PREDNISONE 20 MG/1
TABLET ORAL
Qty: 30 TAB | Refills: 0 | Status: SHIPPED | OUTPATIENT
Start: 2020-04-21 | End: 2020-08-03 | Stop reason: SDUPTHER

## 2020-05-08 ENCOUNTER — PATIENT OUTREACH (OUTPATIENT)
Dept: FAMILY MEDICINE CLINIC | Age: 65
End: 2020-05-08

## 2020-05-08 NOTE — PROGRESS NOTES
Patient called this am to report she fell this am. Had also fallen about a week ago. Said today, wasn't dizzy, FBS was 96- legs just seemed to give way. Hit head and back of neck on her dresser drawer. Son was with her and helped her up. No LOC. Does have a \"knot\" on head. No blurring of vision, no nausea and no vomiting. Advised should go to ER to be evaluated. She does not want to go d/t concerns over Covid. Said that with her MS and Myastenia Gravis, does not want to be near any Covid germs. Said son rubbed neck and head with [employer]. Son went on to work, she is resting and taking it easy, will only get up using walker to go to bathroom. Notified her mother who will call later to check on her. Patient has an emergency [facility]. Will use if needed. Again urged patient to go to ER for evaluation but she declined. She did agree to call me back if symptoms worsen or she has concerns. Called her back about 3 hours later, said she is doing fine. Resting on her sofa. Son to be home in about 3 hours from work. Denies any dizziness, nausea,vomiting,change in vision. Cautioned about getting up until son is there with her. She agrees and will call 911 if needed.

## 2020-05-12 ENCOUNTER — NURSE TRIAGE (OUTPATIENT)
Dept: OTHER | Facility: CLINIC | Age: 65
End: 2020-05-12

## 2020-05-12 ENCOUNTER — TELEPHONE (OUTPATIENT)
Dept: NEUROLOGY | Age: 65
End: 2020-05-12

## 2020-05-12 NOTE — TELEPHONE ENCOUNTER
----- Message from Nita Shay sent at 5/12/2020 10:54 AM EDT -----  Regarding: Dr Roy Lines first and last name:      Reason for call:pt said that she needs an prior authz from [place] for her Gilenya  medication in order to get the rx filled at [facility] required yes/no and why:yes for reason given above,      Best contact number(s):[phone]      Details to clarify the request:      Nita Shay

## 2020-05-12 NOTE — TELEPHONE ENCOUNTER
----- Message from Franky Middleton sent at 5/12/2020  8:06 AM EDT -----  Regarding: Dr. Trent Held Message/Vendor Calls    Caller's first and last name:      Reason for call:  Call back from [place], would rather explain when she gets the call.     Callback required yes/no and why: yes      Best contact number(s):  [phone]    Details to clarify the request:      Franky Middleton

## 2020-05-12 NOTE — TELEPHONE ENCOUNTER
Spoke with patient, ID verified times 2. Patient made aware her appointment with Dr. Mosqueda  will be virtual. Patient stated she never received her PA for her MS medication per [employer]. Patient was given the fax number to Keri Marroquin. Patient verbalized understanding.

## 2020-05-12 NOTE — TELEPHONE ENCOUNTER
Reason for Disposition   COVID-19, questions about    Answer Assessment - Initial Assessment Questions  1. COVID-19 DIAGNOSIS: \"Who made your Coronavirus (COVID-19) diagnosis? \" \"Was it confirmed by a positive lab test?\" If not diagnosed by a HCP, ask \"Are there lots of cases (community spread) where you live? \" (See public health department website, if unsure)    * MAJOR community spread: high number of cases; numbers of cases are increasing; many people hospitalized. * MINOR community spread: low number of cases; not increasing; few or no people hospitalized    minor  2. ONSET: \"When did the COVID-19 symptoms start?\"        3. WORST SYMPTOM: \"What is your worst symptom? \" (e.g., cough, fever, shortness of breath, muscle aches)     Muscle pain  4. COUGH: \"Do you have a cough? \" If so, ask: \"How bad is the cough? \"       denies  5. FEVER: \"Do you have a fever? \" If so, ask: \"What is your temperature, how was it measured, and when did it start? \"     denies  6. RESPIRATORY STATUS: \"Describe your breathing? \" (e.g., shortness of breath, wheezing, unable to speak)      denies  7. BETTER-SAME-WORSE: Leelee Plater you getting better, staying the same or getting worse compared to yesterday? \"  If getting worse, ask, \"In what way?\"       8. HIGH RISK DISEASE: \"Do you have any chronic medical problems? \" (e.g., asthma, heart or lung disease, weak immune system, etc.)     MS  9. PREGNANCY: \"Is there any chance you are pregnant? \" \"When was your last menstrual period? \"     NA  10. OTHER SYMPTOMS: \"Do you have any other symptoms? \"  (e.g., runny nose, headache, sore throat, loss of smell)       headache    Protocols used: CORONAVIRUS (COVID-19) DIAGNOSED OR SUSPECTED-ADULT-AH    Pt called stating she had called her neurologist to request a refill on her meds, sts they performed a covid screening on the phone, sts was transferred to nurse access due to being exposed to a known positive.  Pt sts her home health nurse tested positive several weeks ago, was off for 4 weeks and returned to work last week. Pt denies having had any sx. . sts has MS so headaches and muscle pain are chronic. Kaden Portillo denies fever, cough, sore throat. . Reviewed S&S to monitor and when to call back.  Pt agrees with poc and verbalizes understanding

## 2020-05-21 ENCOUNTER — VIRTUAL VISIT (OUTPATIENT)
Dept: NEUROLOGY | Age: 65
End: 2020-05-21

## 2020-05-21 VITALS — BODY MASS INDEX: 25.2 KG/M2 | WEIGHT: 180 LBS | HEIGHT: 71 IN

## 2020-05-21 DIAGNOSIS — R29.6 FREQUENT FALLS: ICD-10-CM

## 2020-05-21 DIAGNOSIS — F98.8 ATTENTION DEFICIT DISORDER (ADD) WITHOUT HYPERACTIVITY: ICD-10-CM

## 2020-05-21 DIAGNOSIS — R26.9 GAIT DISORDER: ICD-10-CM

## 2020-05-21 DIAGNOSIS — G90.1 DYSAUTONOMIA (HCC): ICD-10-CM

## 2020-05-21 DIAGNOSIS — G35 MULTIPLE SCLEROSIS (HCC): ICD-10-CM

## 2020-05-21 DIAGNOSIS — R42 DIZZINESS: ICD-10-CM

## 2020-05-21 DIAGNOSIS — G37.9 DEMYELINATING DISEASE (HCC): Primary | ICD-10-CM

## 2020-05-21 DIAGNOSIS — G70.00 MYASTHENIA GRAVIS (HCC): ICD-10-CM

## 2020-05-21 DIAGNOSIS — G43.009 MIGRAINE WITHOUT AURA AND WITHOUT STATUS MIGRAINOSUS, NOT INTRACTABLE: ICD-10-CM

## 2020-05-21 DIAGNOSIS — G89.4 CHRONIC PAIN SYNDROME: ICD-10-CM

## 2020-05-21 RX ORDER — FLUDROCORTISONE ACETATE 0.1 MG/1
TABLET ORAL
Qty: 90 TAB | Refills: 1 | Status: SHIPPED | OUTPATIENT
Start: 2020-05-21 | End: 2020-10-21 | Stop reason: SDUPTHER

## 2020-05-21 RX ORDER — FLUDROCORTISONE ACETATE 0.1 MG/1
0.1 TABLET ORAL DAILY
Qty: 30 TAB | Refills: 1 | Status: SHIPPED | OUTPATIENT
Start: 2020-05-21 | End: 2020-05-21

## 2020-05-21 RX ORDER — TOPIRAMATE 200 MG/1
200 TABLET ORAL 2 TIMES DAILY
Qty: 60 TAB | Refills: 2 | Status: SHIPPED | OUTPATIENT
Start: 2020-05-21 | End: 2020-10-21 | Stop reason: SDUPTHER

## 2020-05-21 NOTE — PATIENT INSTRUCTIONS
A Healthy Lifestyle: Care Instructions Your Care Instructions A healthy lifestyle can help you feel good, stay at a healthy weight, and have plenty of energy for both work and play. A healthy lifestyle is something you can share with your whole family. A healthy lifestyle also can lower your risk for serious health problems, such as high blood pressure, heart disease, and diabetes. You can follow a few steps listed below to improve your health and the health of your family. Follow-up care is a key part of your treatment and safety. Be sure to make and go to all appointments, and call your doctor if you are having problems. It's also a good idea to know your test results and keep a list of the medicines you take. How can you care for yourself at home? · Do not eat too much sugar, fat, or fast foods. You can still have dessert and treats now and then. The goal is moderation. · Start small to improve your eating habits. Pay attention to portion sizes, drink less juice and soda pop, and eat more fruits and vegetables. ? Eat a healthy amount of food. A 3-ounce serving of meat, for example, is about the size of a deck of cards. Fill the rest of your plate with vegetables and whole grains. ? Limit the amount of soda and sports drinks you have every day. Drink more water when you are thirsty. ? Eat at least 5 servings of fruits and vegetables every day. It may seem like a lot, but it is not hard to reach this goal. A serving or helping is 1 piece of fruit, 1 cup of vegetables, or 2 cups of leafy, raw vegetables. Have an apple or some carrot sticks as an afternoon snack instead of a candy bar. Try to have fruits and/or vegetables at every meal. 
· Make exercise part of your daily routine. You may want to start with simple activities, such as walking, bicycling, or slow swimming. Try to be active 30 to 60 minutes every day.  You do not need to do all 30 to 60 minutes all at once. For example, you can exercise 3 times a day for 10 or 20 minutes. Moderate exercise is safe for most people, but it is always a good idea to talk to your doctor before starting an exercise program. 
· Keep moving. Tisha Broaden the lawn, work in the garden, or [employer]. Take the stairs instead of the elevator at work. · If you smoke, quit. People who smoke have an increased risk for heart attack, stroke, cancer, and other lung illnesses. Quitting is hard, but there are ways to boost your chance of quitting tobacco for good. ? Use nicotine gum, patches, or lozenges. ? Ask your doctor about stop-smoking programs and medicines. ? Keep trying. In addition to reducing your risk of diseases in the future, you will notice some benefits soon after you stop using tobacco. If you have shortness of breath or asthma symptoms, they will likely get better within a few weeks after you quit. · Limit how much alcohol you drink. Moderate amounts of alcohol (up to 2 drinks a day for men, 1 drink a day for women) are okay. But drinking too much can lead to liver problems, high blood pressure, and other health problems. Family health If you have a family, there are many things you can do together to improve your health. · Eat meals together as a family as often as possible. · Eat healthy foods. This includes fruits, vegetables, lean meats and dairy, and whole grains. · Include your family in your fitness plan. Most people think of activities such as jogging or tennis as the way to fitness, but there are many ways you and your family can be more active. Anything that makes you breathe hard and gets your heart pumping is exercise. Here are some tips: 
? Walk to do errands or to take your child to school or the bus. 
? Go for a family bike ride after dinner instead of watching TV. Where can you learn more? Go to http://marie-alvin.info/ Enter C871 in the search box to learn more about \"A Healthy Lifestyle: Care Instructions. \" Current as of: May 28, 2019Content Version: 12.4 © 6840-7928 [employer]. Care instructions adapted under license by [employer] (which disclaims liability or warranty for this information). If you have questions about a medical condition or this instruction, always ask your healthcare professional. [address] any warranty or liability for your use of this information. Office Policies 
 
o Phone calls/patient messages: Please allow up to 24 hours for someone in the office to contact you about your call or message. Be mindful your provider may be out of the office or your message may require further review. We encourage you to use damntheradio for your messages as this is a faster, more efficient way to communicate with our office 
 
o Medication Refills: 
Prescription medications require up to 48 business hours to process. We encourage you to use damntheradio for your refills. For controlled medications: Please allow up to 72 business hours to process. Certain medications may require you to  a written prescription at our office. NO narcotic/controlled medications will be prescribed after 4pm Monday through Friday or on weekends 
 
o Form/Paperwork Completion: We ask that you allow 7-14 business days. You may also download your forms to damntheradio to have your doctor print off.

## 2020-05-22 ENCOUNTER — PATIENT OUTREACH (OUTPATIENT)
Dept: FAMILY MEDICINE CLINIC | Age: 65
End: 2020-05-22

## 2020-05-22 NOTE — PROGRESS NOTES
Patient called, had appt with neuro yesterday. Discussed her falls and headaches. He advised her to increase Topamax to 2 qd to help headaches. Also put her on steroid to help with falls. Hopeful that both will help. Will f/u with pcp to check labs after starts taking the Topamax. Advised to schedule appointment with pcp.

## 2020-05-26 NOTE — PROGRESS NOTES
Neurology Progress Note  Warren Galvin was seen by synchronous (real-time) audio-video technology on 20. Consent:  She and/or her healthcare decision maker is aware that this patient-initiated Telehealth encounter is a billable service, with coverage as determined by her insurance carrier. She is aware that she may receive a bill and has provided verbal consent to proceed: Yes    I was in the office while conducting this encounter. NAME:  Warren Galvin   :   1955   MRN:   [id number]     Date/Time:  2020  Subjective:     Warren Galvin is a 63 y.o. female here today for follow-up for MS, MG, chronic pain syndrome, headaches, CVA, difficulty walking. Patient says she has had 3 falls since the last visit, however, she is  still in physical therapy. Last fall was on May 1, 2020, she said that she got up quickly for a few minutes and try to go to the bathroom when she fell. According to patient, she will feel dizzy and then fall because the legs are weak and give out on her. Because of patient condition, and for the fact that patient always falls when getting up from sitting position with dizziness, will most likely dealing with dysautonomia. I will start patient on Florinef 0.1 mg p.o. daily. If the dizziness and fall continue notify patient for autonomic testing. She says she is still having increasing left arm pain and difficulty ambulating. She also having thigh numbness of the extremities. .  She noted that her eyes tend to be weak and she sees double, double vision usually associated with fatigue. However she continues to have a lot of pain but not as much as before, pain is sharp in nature, diffuse, burning sensation that goes up to the arms causing numbness and tingling sensation and weakness of the hands, down to the legs causing  numbness and tingling sensation of the legs with weakness of the legs. She follows with pain management specialist for the chronic pain.   She says her headache continues to decrease in frequency and intensity. Headache is frontal, throbbing in nature, with occasional sharp pain from the back of the head, headache associated with dizziness, nausea, blurry vision, double vision, photophobia, phonophobia. She says she has a lot of muscle spasms mostly in the back. Pain appears to have plateaued. On a scale of 1-10, patient rates pain level to be between 6 and 7  She denies loss of consciousness. Says dysphagia has improved. Patient continues to smoke cigarettes. Review of Systems - General ROS: positive for  - fatigue, night sweats and sleep disturbance  Psychological ROS: positive for - anxiety, concentration difficulties, depression, mood swings and sleep disturbances  Ophthalmic ROS: positive for - blurry vision, decreased vision, double vision and photophobia  ENT ROS: positive for - headaches, tinnitus, vertigo and visual changes  Allergy and Immunology ROS: negative  Hematological and Lymphatic ROS: negative  Endocrine ROS: negative  Respiratory ROS: no cough, shortness of breath, or wheezing  Cardiovascular ROS: no chest pain or dyspnea on exertion  Gastrointestinal ROS: no abdominal pain, change in bowel habits, or black or bloody stools  Genito-Urinary ROS: no dysuria, trouble voiding, or hematuria  Musculoskeletal ROS: positive for - gait disturbance, joint pain, joint stiffness, joint swelling and muscle pain  Neurological ROS: positive for - dizziness, gait disturbance, headaches, impaired coordination/balance, numbness/tingling, speech problems, tremors, visual changes and weakness  Dermatological ROS: negative         Medications reviewed:  Current Outpatient Medications   Medication Sig Dispense Refill    topiramate (TOPAMAX) 200 mg tablet Take 1 Tab by mouth two (2) times a day. 60 Tab 2    predniSONE (DELTASONE) 20 mg tablet 60 mg p.o. daily for 5 days, 40 mg p.o. daily for 4 days, then 20 mg p.o. daily for 3 days.  30 Tab 0    dextroamphetamine-amphetamine (ADDERALL) 20 mg tablet Take 1 Tab by mouth daily. Max Daily Amount: 20 mg. 30 Tab 0    rosuvastatin (CRESTOR) 40 mg tablet Take 1 Tab by mouth daily. DOSE CHANGE 90 Tab 2    GILENYA 0.5 mg cap Take 1 Cap by mouth daily. 90 Cap 3    corticotropin (ACTHAR H.P.) 80 unit/mL injectable gel 1 mL by SubCUTAneous route daily. 80 units subq q day for 10 days 10 mL 1    pregabalin (LYRICA) 200 mg capsule Take 1 Cap by mouth two (2) times a day. Max Daily Amount: 400 mg. 180 Cap 3    buprenorphine HCl (BELBUCA) 150 mcg film by Buccal route three (3) times daily as needed. Indications: per patient she is taking 2mg sublingual daily      levothyroxine (SYNTHROID) 150 mcg tablet TAKE 1 TABLET BY MOUTH EVERY NIGHT (Patient taking differently: Take 150 mcg by mouth Daily (before breakfast). ) 90 Tab 1    ondansetron (ZOFRAN ODT) 4 mg disintegrating tablet DISSOLVE 1 TABLET ON THE TONGUE EVERY 8 HOURS AS NEEDED FOR NAUSEA 15 Tab 0    pyridostigmine bromide (MESTINON SR) 180 mg SR tablet TAKE 1 TABLET BY MOUTH TWICE DAILY (Patient taking differently: Take 180 mg by mouth two (2) times a day.) 60 Tab 0    metFORMIN ER (GLUCOPHAGE XR) 500 mg tablet Take 1 Tab by mouth daily (with breakfast). 90 Tab 1    glucose blood VI test strips (BLOOD GLUCOSE TEST) strip 100 Each by Does Not Apply route See Admin Instructions. One Touch Ultra Test Strips=Check blood sugar daily dx E11.8 100 Strip 1    aspirin delayed-release 81 mg tablet Take 1 Tab by mouth every twelve (12) hours. 60 Tab 0    OTHER 0.25 mL by SubLINGual route daily. CBD OIL      dantrolene (DANTRIUM) 100 mg capsule Take 1 Cap by mouth three (3) times daily. (Patient taking differently: Take 100 mg by mouth daily as needed. Indications: muscle spasm) 90 Cap 3    albuterol (PROVENTIL VENTOLIN) 2.5 mg /3 mL (0.083 %) nebulizer solution 3 mL by Nebulization route every six (6) hours as needed for Wheezing.  30 Each 0    PARoxetine (PAXIL) 40 mg tablet TAKE 1 AND 1/2 TABLETS BY MOUTH EVERY NIGHT 135 Tab 3    Menthol-Zinc Oxide (CALMOSEPTINE) 0.44-20.6 % oint Apply  to affected area as needed.  nystatin (MYCOSTATIN) powder Apply to candidal lesions TOPICALLY 2 to 3 times daily until healing complete 30 g 0    lidocaine (LIDODERM) 5 % Apply patch to the affected area for 12 hours a day and remove for 12 hours a day. 30 Each 3    clindamycin (CLEOCIN) 300 mg capsule Take 300 mg by mouth. Prior to dental procedures      TENS UNIT ELECTRODES by Does Not Apply route.  prn      fludrocortisone (FLORINEF) 0.1 mg tablet TAKE 1 TABLET BY MOUTH DAILY 90 Tab 1        Objective:   Vitals:  Vitals:    05/21/20 1052   Weight: 180 lb (81.6 kg)   Height: 5' 11\" (1.803 m)   PainSc:   7   PainLoc: Generalized   LMP: 09/01/2010       Lab Data Reviewed:  Lab Results   Component Value Date/Time    WBC 3.6 06/10/2019 10:41 AM    HCT 38.7 06/10/2019 10:41 AM    HGB 12.9 06/10/2019 10:41 AM    PLATELET 391 61/48/4052 10:41 AM       Lab Results   Component Value Date/Time    Sodium 141 01/03/2020 01:54 PM    Potassium 4.1 01/03/2020 01:54 PM    Chloride 109 (H) 01/03/2020 01:54 PM    CO2 20 01/03/2020 01:54 PM    Glucose 155 (H) 01/03/2020 01:54 PM    BUN 13 01/03/2020 01:54 PM    Creatinine 1.06 (H) 01/03/2020 01:54 PM    Calcium 9.9 01/03/2020 01:54 PM       No components found for: TROPQUANT    No results found for: SHAHIDA      Lab Results   Component Value Date/Time    Hemoglobin A1c 6.8 (H) 01/03/2020 01:54 PM    Hemoglobin A1c (POC) 6.3 07/05/2019 01:51 PM        Lab Results   Component Value Date/Time    Vitamin B12 436 06/10/2019 10:41 AM    Folate 12.0 02/10/2016       No results found for: Maryellen PINEDO XBANA    Lab Results   Component Value Date/Time    Cholesterol, total 299 (H) 01/03/2020 01:54 PM    HDL Cholesterol 50 01/03/2020 01:54 PM    LDL-CHOLESTEROL 91 12/26/2018    LDL, calculated 216 (H) 01/03/2020 01:54 PM    VLDL, calculated 33 01/03/2020 01:54 PM    Triglyceride 164 (H) 01/03/2020 01:54 PM    Triglyceride 126 12/26/2018    CHOL/HDL Ratio 4.3 08/23/2010 12:19 PM         CT Results (recent):  Results from [place] encounter on 11/23/19   CT NECK SOFT TISSUE W CONT    Narrative EXAM:  CT NECK SOFT TISSUE W CONT    INDICATION:  Dysphasia. Status post thyroidectomy. COMPARISON: CT neck 5/14/2018. CONTRAST: 100 mL of Isovue-300. TECHNIQUE: Multislice helical CT was performed from the mid calvarium to the  aortic arch during uneventful rapid bolus intravenous contrast administration. Contiguous 2.5 mm axial images were reconstructed and lung and soft tissue  windows were generated. Coronal and sagittal reformations were generated. CT  dose reduction was achieved through use of a standardized protocol tailored for  this examination and automatic exposure control for dose modulation. 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 No evidence of recurrent tissue in this region. No cervical  lymphadenopathy. Visualized brain parenchyma is normal in appearance. Paranasal sinuses and  mastoid air cells are clear. Intraorbital contents are unremarkable. The  cervical vasculature is patent. No acute fracture or aggressive osseous lesion. Multilevel degenerative disc disease most advanced at C3-C4, C5-C6 and C6-C7 is  unchanged. Visualized portions of the lung apices are normal. Right chest port  is noted. Impression IMPRESSION:   1. 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 No evidence of local recurrence. No cervical lymphadenopathy or other soft  tissue abnormality in the neck. MRI Results (recent):  Results from [place] encounter on 12/26/18   MRI BRAIN W WO CONT    Narrative Medical indication: Multiple sclerosis   , headache q. daily, prior CVA, new numbness, exacerbation.     Technical factors: Diffusion imaging, sagittal T1-weighted, sagittal FLAIR,  axial T1-weighted pre and post 18 cc IV. dotarem T2-weighted FLAIR gradient echo  coronal and sagittal T1-weighted postcontrast. Comparison January 10, 2018. Diffusion imaging does not show acute ischemic changes her. There is no  extra-axial fluid collection hemorrhage or shift. Ventricular size is stable. Major vessels flow voids at the base of the brain are present. Incidental  maxillary sinus disease. The burden of white matter disease appears stable. No  enhancing lesion or masses. Impression IMPRESSION: Stable white matter disease. No acute findings no masses or  enhancing lesion. IR Results (recent):  No results found for this or any previous visit. VAS/US Results (recent):  Results from Hospital Encounter encounter on 01/19/17   DUPLEX LOWER EXT VENOUS RIGHT       PHYSICAL EXAM:  General:    Alert, cooperative, no distress, appears stated age. Head:   Normocephalic, without obvious abnormality, atraumatic. Eyes:   Conjunctivae/corneas clear. Nose:  Nares normal. .  Throat:    Lips and tongue normal.  No Thrush  Neck:  Symmetrical,  no adenopathy, thyroid. no carotid bruit and no JVD. Back:    Symmetric. Lungs:   Deferred. Chest wall:   No Accessory muscle use. Heart:   Deferred. Abdomen:    Not distended. Extremities: Extremities normal, atraumatic, No cyanosis. No edema. No clubbing  Skin:      No rashes or lesions. Not Jaundiced  Lymph nodes: Cervical, supraclavicular normal.  Psych:  Good insight. Not depressed. Not anxious or agitated. NEUROLOGICAL EXAM:  Appearance: The patient is well developed, well nourished, provides a coherent history and is in no acute distress. Mental Status: Oriented to time, place and person. Mood and affect appropriate. Cranial Nerves:   Intact visual fields. EOM's full, no nystagmus, no ptosis. Facial movement is symmetric. Hearing is normal bilaterally. Tongue is midline.    Motor: Moves all extremities. No fasciculations. Reflexes:   Deferred. Sensory:   Deferred. Gait:  Abnormal gait. Tremor:   No tremor noted. Cerebellar:  No cerebellar signs present. Assesment  1. Demyelinating disease ([facility]  Continue management    2. Multiple sclerosis ([facility]  Continue management     3. Myasthenia gravis ([facility]  Stable    4. Frequent falls  Continue physical therapy    5. Dizziness  Florinef    6. Gait disorder  Physical therapy    7. Dysautonomia ([facility])  Florinef  8. Chronic pain syndrome  Following pain management    9. Migraine without aura and without status migrainosus, not intractable  Continue management    10. Attention deficit disorder (ADD) without hyperactivity  Continue management    ___________________________________________________  PLAN: Medication and plan discussed with patient      ICD-10-CM ICD-9-CM    1. Demyelinating disease ([facility] G37.9 341.9    2. Multiple sclerosis ([facility] G35 340    3. Myasthenia gravis ([facility] G70.00 358.00    4. Frequent falls R29.6 V15.88    5. Dizziness R42 780.4    6. Gait disorder R26.9 781.2    7. Dysautonomia ([facility] G90.1 337.9    8. Chronic pain syndrome G89.4 338.4    9. Migraine without aura and without status migrainosus, not intractable G43.009 346.10    10. Attention deficit disorder (ADD) without hyperactivity F98.8 314.00      Follow-up and Dispositions    · Return in about 2 months (around 7/21/2020). Pursuant to the emergency declaration under the [facility] waiver authority and the [employer] Act, this Virtual  Visit was conducted, with patient's consent, to reduce the patient's risk of exposure to COVID-19 and provide continuity of care for an established patient.      Services were provided through a video synchronous discussion virtually to substitute for in-person clinic visit.  ___________________________________________________    Attending Physician: Dustin Bocanegra MD

## 2020-05-26 NOTE — TELEPHONE ENCOUNTER
Prior Authorization APPROVED for Gilenya by [employer]. Effective dates 05/26/20 - 05/26/21. Case #[id number]. Approval will be scanned into media.  Pharmacy notified of approval.     GERMAN Key: [id number]

## 2020-06-10 DIAGNOSIS — E11.65 HYPERGLYCEMIA DUE TO TYPE 2 DIABETES MELLITUS (HCC): ICD-10-CM

## 2020-06-10 RX ORDER — PAROXETINE HYDROCHLORIDE 40 MG/1
TABLET, FILM COATED ORAL
Qty: 135 TAB | Refills: 3 | Status: SHIPPED | OUTPATIENT
Start: 2020-06-10 | End: 2021-05-11 | Stop reason: SDUPTHER

## 2020-06-10 RX ORDER — LEVOTHYROXINE SODIUM 150 UG/1
150 TABLET ORAL
Qty: 90 TAB | Refills: 1 | Status: SHIPPED | OUTPATIENT
Start: 2020-06-10 | End: 2020-10-19

## 2020-06-26 DIAGNOSIS — G35 MS (MULTIPLE SCLEROSIS) (HCC): ICD-10-CM

## 2020-06-26 RX ORDER — PREGABALIN 200 MG/1
CAPSULE ORAL
Qty: 180 CAP | Refills: 2 | Status: SHIPPED | OUTPATIENT
Start: 2020-06-26 | End: 2020-07-01 | Stop reason: SDUPTHER

## 2020-07-01 DIAGNOSIS — G35 MS (MULTIPLE SCLEROSIS) (HCC): ICD-10-CM

## 2020-07-01 RX ORDER — PREGABALIN 200 MG/1
200 CAPSULE ORAL 2 TIMES DAILY
Qty: 180 CAP | Refills: 2 | Status: SHIPPED | OUTPATIENT
Start: 2020-07-01 | End: 2021-03-05 | Stop reason: SDUPTHER

## 2020-07-01 NOTE — TELEPHONE ENCOUNTER
----- Message from Sandeep Nayak sent at 2020  8:30 AM EDT -----  Regarding: Dr. Julieta Li  Pt would like to see if she can get a new prescription for her  [address] faxed to [address], [phone]. Also stated  needs to be on the prescription.  Contact is [phone]

## 2020-07-07 RX ORDER — METFORMIN HYDROCHLORIDE 500 MG/1
TABLET, EXTENDED RELEASE ORAL
Qty: 90 TAB | Refills: 1 | Status: SHIPPED | OUTPATIENT
Start: 2020-07-07 | End: 2020-10-20

## 2020-07-20 DIAGNOSIS — G35 MS (MULTIPLE SCLEROSIS) (HCC): ICD-10-CM

## 2020-07-20 NOTE — TELEPHONE ENCOUNTER
----- Message from Michael Welch sent at 7/20/2020  1:56 PM EDT -----  Regarding: Dr. Monserrat Benavides  Pt calling to get a refill on her Gilenya 0.5mg faxed to [address] [phone].  Pt contact is [phone]

## 2020-07-21 ENCOUNTER — VIRTUAL VISIT (OUTPATIENT)
Dept: FAMILY MEDICINE CLINIC | Age: 65
End: 2020-07-21

## 2020-07-21 ENCOUNTER — TELEPHONE (OUTPATIENT)
Dept: FAMILY MEDICINE CLINIC | Age: 65
End: 2020-07-21

## 2020-07-21 DIAGNOSIS — E78.5 HYPERLIPIDEMIA, UNSPECIFIED HYPERLIPIDEMIA TYPE: ICD-10-CM

## 2020-07-21 DIAGNOSIS — Z00.00 ENCOUNTER FOR MEDICARE ANNUAL WELLNESS EXAM: Primary | ICD-10-CM

## 2020-07-21 DIAGNOSIS — Z80.0 FAMILY HISTORY OF COLON CANCER IN MOTHER: ICD-10-CM

## 2020-07-21 DIAGNOSIS — E03.9 HYPOTHYROIDISM, UNSPECIFIED TYPE: ICD-10-CM

## 2020-07-21 DIAGNOSIS — E11.9 TYPE 2 DIABETES MELLITUS WITHOUT COMPLICATION, WITHOUT LONG-TERM CURRENT USE OF INSULIN (HCC): ICD-10-CM

## 2020-07-21 DIAGNOSIS — D69.6 THROMBOCYTOPENIA (HCC): ICD-10-CM

## 2020-07-21 RX ORDER — FINGOLIMOD HCL 0.5 MG/1
1 CAPSULE ORAL DAILY
Qty: 90 CAP | Refills: 3 | Status: SHIPPED | OUTPATIENT
Start: 2020-07-21 | End: 2020-11-21 | Stop reason: SDUPTHER

## 2020-07-21 NOTE — PROGRESS NOTES
Hiwot Galvin, who was evaluated through a synchronous (real-time) audio-video encounter, and/or her healthcare decision maker, is aware that it is a billable service, with coverage as determined by her insurance carrier. She provided verbal consent to proceed: YES, and patient identification was verified. It was conducted pursuant to the emergency declaration under the [facility], [facility] authority and the [employer] Act. A caregiver was present when appropriate. Ability to conduct physical exam was limited. I was at home. The patient was at home. This virtual visit was conducted via [employer]. Pursuant to the emergency declaration under the [facility], [facility] authority and the [employer] Act, this Virtual  Visit was conducted to reduce the patient's risk of exposure to COVID-19 and provide continuity of care for an established patient. Services were provided through a video synchronous discussion virtually to substitute for in-person clinic visit. Due to this being a TeleHealth evaluation, many elements of the physical examination are unable to be assessed. Total Time: minutes: 21-30 minutes. Collin Rodríguez MD    [phone]  Subjective:   Reg Galvin was seen for Diabetes    Main complaint are ongoing headaches which are managed by both neurology and pain management. She is seen for diabetes, hypertension and hyperlipidemia. Since last visit she reports no significant changes. Home glucose monitoring: fasting values range 100s. She reports medication compliance: compliant all of the time. Medication side effects: none. Diabetic diet compliance: noncompliant some of the time. Lab review: no lab studies available for review at time of visit. Eye exam: overdue.     Admits being sedentary being at home during pandemic. Hx of hypothyroidism, on levothyroxine. Lab Results   Component Value Date/Time    Hemoglobin A1c 6.8 (H) 01/03/2020 01:54 PM    Hemoglobin A1c (POC) 6.3 07/05/2019 01:51 PM       Prior to Admission medications    Medication Sig Start Date End Date Taking? Authorizing Provider   Gilenya 0.5 mg cap Take 1 Cap by mouth daily. 7/21/20   Los Kahn MD   metFORMIN ER (GLUCOPHAGE XR) 500 mg tablet TAKE 1 TABLET BY MOUTH DAILY WITH BREAKFAST 7/7/20   Holsinger, Dick Fabry, NP   pregabalin (Lyrica) 200 mg capsule Take 1 Cap by mouth two (2) times a day. Max Daily Amount: 400 mg. 7/1/20   Los Kahn MD   levothyroxine (SYNTHROID) 150 mcg tablet Take 1 Tab by mouth Daily (before breakfast). 6/10/20   Holsinger, Dick Fabry, NP   PARoxetine (PAXIL) 40 mg tablet TAKE 1 AND 1/2 TABLETS BY MOUTH EVERY NIGHT 6/10/20   Los Kahn MD   topiramate (TOPAMAX) 200 mg tablet Take 1 Tab by mouth two (2) times a day. 5/21/20   Los Kahn MD   fludrocortisone (FLORINEF) 0.1 mg tablet TAKE 1 TABLET BY MOUTH DAILY 5/21/20   Los Kahn MD   predniSONE (DELTASONE) 20 mg tablet 60 mg p.o. daily for 5 days, 40 mg p.o. daily for 4 days, then 20 mg p.o. daily for 3 days. 4/21/20   Los Kahn MD   dextroamphetamine-amphetamine (ADDERALL) 20 mg tablet Take 1 Tab by mouth daily. Max Daily Amount: 20 mg. 2/13/20   Los Kahn MD   rosuvastatin (CRESTOR) 40 mg tablet Take 1 Tab by mouth daily. DOSE CHANGE 1/24/20   Fred Carrillo MD   corticotropin (ACTHAR H.P.) 80 unit/mL injectable gel 1 mL by SubCUTAneous route daily. 80 units subq q day for 10 days 11/8/19   Los Kahn MD   buprenorphine HCl (BELBUCA) 150 mcg film by Buccal route three (3) times daily as needed.  Indications: per patient she is taking 2mg sublingual daily    Provider, Historical   ondansetron (ZOFRAN ODT) 4 mg disintegrating tablet DISSOLVE 1 TABLET ON THE TONGUE EVERY 8 HOURS AS NEEDED FOR NAUSEA 9/24/19   Fred Carrillo MD pyridostigmine bromide (MESTINON SR) 180 mg SR tablet TAKE 1 TABLET BY MOUTH TWICE DAILY  Patient taking differently: Take 180 mg by mouth two (2) times a day. 8/27/19   Los Kahn MD   glucose blood VI test strips (BLOOD GLUCOSE TEST) strip 100 Each by Does Not Apply route See Admin Instructions. One Touch Ultra Test Strips=Check blood sugar daily dx E11.8 8/11/19   Delmy Laughlin NP   aspirin delayed-release 81 mg tablet Take 1 Tab by mouth every twelve (12) hours. 5/14/19   KATIANA Mejia   OTHER 0.25 mL by SubLINGual route daily. CBD OIL    Provider, Historical   dantrolene (DANTRIUM) 100 mg capsule Take 1 Cap by mouth three (3) times daily. Patient taking differently: Take 100 mg by mouth daily as needed. Indications: muscle spasm 3/19/19   Los Kahn MD   albuterol (PROVENTIL VENTOLIN) 2.5 mg /3 mL (0.083 %) nebulizer solution 3 mL by Nebulization route every six (6) hours as needed for Wheezing. 12/31/18   Suhas Emmanuel MD   Menthol-Zinc Oxide (CALMOSEPTINE) 0.44-20.6 % oint Apply  to affected area as needed. Provider, Historical   nystatin (MYCOSTATIN) powder Apply to candidal lesions TOPICALLY 2 to 3 times daily until healing complete 10/25/18   Suhas Emmanuel MD   lidocaine (LIDODERM) 5 % Apply patch to the affected area for 12 hours a day and remove for 12 hours a day. 8/15/18   Los Kahn MD   clindamycin (CLEOCIN) 300 mg capsule Take 300 mg by mouth. Prior to dental procedures 6/30/17   Provider, Historical   TENS UNIT ELECTRODES by Does Not Apply route. prn    Provider, Historical       Allergies   Allergen Reactions    Bee Sting [Sting, Bee] Anaphylaxis     Also allergic to egg products    Penicillins Anaphylaxis    Betadine [Povidone-Iodine] Rash    Egg Itching       Review of Systems   Constitutional: Negative for fever, malaise/fatigue and weight loss. Respiratory: Negative for cough, hemoptysis, shortness of breath and wheezing.     Cardiovascular: Negative for chest pain, palpitations, leg swelling and PND. Gastrointestinal: Negative for abdominal pain, constipation, diarrhea, nausea and vomiting. Neurological: Positive for headaches. Physical Exam:     Visit Vitals  LMP 09/01/2010        General: alert, cooperative, no distress   Mental  status: normal mood, behavior, speech, dress, motor activity, and thought processes, able to follow commands   HENT: NCAT   Neck: no visualized mass   Resp: no respiratory distress   Neuro: no gross deficits   Skin: no discoloration or lesions of concern on visible areas   Psychiatric: normal affect, consistent with stated mood, no evidence of hallucinations       Assessment & Plan:     Isabela Galvin is a 63 y.o. female who presents today for:    1. Encounter for Medicare annual wellness exam      2. Type 2 diabetes mellitus without complication, without long-term current use of insulin (HCC)  Pt will have labs drawn from her port. - HEMOGLOBIN A1C WITH EAG    3. Hyperlipidemia, unspecified hyperlipidemia type  - METABOLIC PANEL, COMPREHENSIVE  - LIPID PANEL    4. Hypothyroidism, unspecified type  - TSH REFLEX TO T4    5. Family history of colon cancer in mother  Pt to schedule colonoscopy as she is overdue. 6. Thrombocytopenia (HCC)  - CBC W/O DIFF       There are no discontinued medications. Follow-up and Dispositions    · Return in about 3 months (around 10/21/2020) for Medicare Wellness Visit (30 min). Treatment risks/benefits/costs/interactions/alternatives discussed with patient. Advised patient to call back or return to office if symptoms worsen/change/persist. If patient cannot reach us or should anything more severe/urgent arise he/she should proceed directly to the nearest emergency department. Discussed expected course/resolution/complications of diagnosis in detail with patient. Patient expressed understanding with the diagnosis and plan. Brandon Doyle M.D.

## 2020-07-21 NOTE — ASSESSMENT & PLAN NOTE
Stable, based on history, physical exam and review of pertinent labs, studies and medications; meds reconciled; continue current treatment plan.   Lab Results   Component Value Date/Time    WBC 3.6 06/10/2019 10:41 AM    HGB 12.9 06/10/2019 10:41 AM    HCT 38.7 06/10/2019 10:41 AM    PLATELET 146 14/94/2149 10:41 AM    Creatinine 1.06 01/03/2020 01:54 PM    BUN 13 01/03/2020 01:54 PM    Potassium 4.1 01/03/2020 01:54 PM    INR 1.0 04/18/2019 11:50 AM    Prothrombin time 10.7 04/18/2019 11:50 AM

## 2020-07-21 NOTE — PROGRESS NOTES
This is the Subsequent Medicare Annual Wellness Exam, performed 12 months or more after the Initial AWV or the last Subsequent AWV    I have reviewed the patient's medical history in detail and updated the computerized patient record.      History     Patient Active Problem List   Diagnosis Code    Sleep apnea G47.30    Endometriosis N80.9    Lumbosacral plexopathy G54.1    HTN, goal below 140/90 I10    FH: breast cancer Z80.3    Hyperlipemia E78.5    Hypothyroid E03.9    Ureteral calculus N20.1    MS (multiple sclerosis) ([address] G35    Myasthenia gravis ([address] G70.00    Vitamin D deficiency E55.9    Benign cyst of left breast N60.02    Cervical spinal stenosis M48.02    Depression F32.9    Degenerative joint disease (DJD) of hip M16.9    Chronic, continuous use of opioids F11.90    Type 2 diabetes mellitus (HCC) E11.9    History of CVA (cerebrovascular accident) Z80.78    Thyroglossal duct cyst Q89.2    Arthritis of left hip M16.12    Thrombocytopenia (HCC) D69.6    Anemia D64.9     Past Medical History:   Diagnosis Date    Arthritis     hip, hands    Benign cyst of left breast 3/21/2016    Blindness and low vision 2004    legally blind from [address]    Cervical spinal stenosis 12/2/2016    Moderate C6-7    Chronic pain     Depression     Diabetes mellitus type 2, controlled ([address] 9/13/2016    Diet-controlled diabetes mellitus ([address] 1/5/2018    Endometriosis 6/25/2010    FH: breast cancer 6/25/2010    Chart states FH breast/ovary Ca, CAD,DM     History of CVA (cerebrovascular accident) 6/5/2018    HTN, goal below 140/90 6/25/2010    CURRENTLY NO MEDICATIONS (5/18/17)    Hypercholesterolemia     Hypothyroid 6/25/2010    Incontinence     Lumbosacral plexopathy 6/25/2010    MS (multiple sclerosis) ([address] 2004    Myasthenia gravis ([address] [id number]    Myasthenia gravis ([address] 2011    Sleep apnea 6/25/2010    RESOLVED 5/2019    Ureteral calculus 6/25/2010    Vitamin D deficiency 3/17/2016 Past Surgical History:   Procedure Laterality Date    ABDOMEN SURGERY PROC UNLISTED      bowel resection    BREAST SURGERY PROCEDURE UNLISTED      breast cyst-both breasts at different times    HX  SECTION  1986    HX CYST INCISION AND DRAINAGE      HX GI      COLONOSCOPY    HX GYN      ovarian cyst    HX HEENT  1974    THYROIDECTOMY    HX HEENT      removal of thyroglossal duct cyst    HX HIP REPLACEMENT Right 2017    anterior approach    HX KNEE ARTHROSCOPY Right     HX ORTHOPAEDIC  1997    right thumb tendon repair x 2    HX ORTHOPAEDIC Left     CARPAL TUNNEL    HX ORTHOPAEDIC Right     HIP REPLACEMENT    HX OTHER SURGICAL      Janee Cath x2    HX SMALL BOWEL RESECTION      HX THYROIDECTOMY      1975    HX VASCULAR ACCESS  2006    PORT -A- CATH X 2     Current Outpatient Medications   Medication Sig Dispense Refill    Gilenya 0.5 mg cap Take 1 Cap by mouth daily. 90 Cap 3    metFORMIN ER (GLUCOPHAGE XR) 500 mg tablet TAKE 1 TABLET BY MOUTH DAILY WITH BREAKFAST 90 Tab 1    pregabalin (Lyrica) 200 mg capsule Take 1 Cap by mouth two (2) times a day. Max Daily Amount: 400 mg. 180 Cap 2    levothyroxine (SYNTHROID) 150 mcg tablet Take 1 Tab by mouth Daily (before breakfast). 90 Tab 1    PARoxetine (PAXIL) 40 mg tablet TAKE 1 AND 1/2 TABLETS BY MOUTH EVERY NIGHT 135 Tab 3    topiramate (TOPAMAX) 200 mg tablet Take 1 Tab by mouth two (2) times a day. 60 Tab 2    fludrocortisone (FLORINEF) 0.1 mg tablet TAKE 1 TABLET BY MOUTH DAILY 90 Tab 1    predniSONE (DELTASONE) 20 mg tablet 60 mg p.o. daily for 5 days, 40 mg p.o. daily for 4 days, then 20 mg p.o. daily for 3 days. 30 Tab 0    dextroamphetamine-amphetamine (ADDERALL) 20 mg tablet Take 1 Tab by mouth daily. Max Daily Amount: 20 mg. 30 Tab 0    rosuvastatin (CRESTOR) 40 mg tablet Take 1 Tab by mouth daily.  DOSE CHANGE 90 Tab 2    corticotropin (ACTHAR H.P.) 80 unit/mL injectable gel 1 mL by SubCUTAneous route daily. 80 units subq q day for 10 days 10 mL 1    buprenorphine HCl (BELBUCA) 150 mcg film by Buccal route three (3) times daily as needed. Indications: per patient she is taking 2mg sublingual daily      ondansetron (ZOFRAN ODT) 4 mg disintegrating tablet DISSOLVE 1 TABLET ON THE TONGUE EVERY 8 HOURS AS NEEDED FOR NAUSEA 15 Tab 0    pyridostigmine bromide (MESTINON SR) 180 mg SR tablet TAKE 1 TABLET BY MOUTH TWICE DAILY (Patient taking differently: Take 180 mg by mouth two (2) times a day.) 60 Tab 0    glucose blood VI test strips (BLOOD GLUCOSE TEST) strip 100 Each by Does Not Apply route See Admin Instructions. One Touch Ultra Test Strips=Check blood sugar daily dx E11.8 100 Strip 1    aspirin delayed-release 81 mg tablet Take 1 Tab by mouth every twelve (12) hours. 60 Tab 0    OTHER 0.25 mL by SubLINGual route daily. CBD OIL      dantrolene (DANTRIUM) 100 mg capsule Take 1 Cap by mouth three (3) times daily. (Patient taking differently: Take 100 mg by mouth daily as needed. Indications: muscle spasm) 90 Cap 3    albuterol (PROVENTIL VENTOLIN) 2.5 mg /3 mL (0.083 %) nebulizer solution 3 mL by Nebulization route every six (6) hours as needed for Wheezing. 30 Each 0    Menthol-Zinc Oxide (CALMOSEPTINE) 0.44-20.6 % oint Apply  to affected area as needed.  nystatin (MYCOSTATIN) powder Apply to candidal lesions TOPICALLY 2 to 3 times daily until healing complete 30 g 0    lidocaine (LIDODERM) 5 % Apply patch to the affected area for 12 hours a day and remove for 12 hours a day. 30 Each 3    clindamycin (CLEOCIN) 300 mg capsule Take 300 mg by mouth. Prior to dental procedures      TENS UNIT ELECTRODES by Does Not Apply route.  prn       Allergies   Allergen Reactions    Bee Sting [Sting, Bee] Anaphylaxis     Also allergic to egg products    Penicillins Anaphylaxis    Betadine [Povidone-Iodine] Rash    Egg Itching       Family History   Problem Relation Age of Onset    Arthritis-osteo Mother     Diabetes Mother     Elevated Lipids Mother     Headache Mother     Heart Disease Mother     Hypertension Mother     Stroke Mother     Neuropathy Mother     Colon Cancer Mother     Diabetes Father     Elevated Lipids Father     Heart Disease Father     Hypertension Father     Diabetes Sister     Elevated Lipids Sister     Headache Sister     Hypertension Sister     Migraines Sister     Cancer Sister 62        breast ca W/METS TO BRAIN    Breast Cancer Sister 62    Diabetes Brother     Elevated Lipids Brother     Hypertension Brother     Asthma Son     Thyroid Disease Son         GRAVES    Sleep Apnea Son     Breast Cancer Maternal Grandmother 54    Diabetes Sister     Neuropathy Sister     Anesth Problems Neg Hx      Social History     Tobacco Use    Smoking status: Current Every Day Smoker     Packs/day: 0.50     Years: 30.00     Pack years: 15.00     Types: Cigarettes    Smokeless tobacco: Never Used   Substance Use Topics    Alcohol use: No       Depression Risk Factor Screening:     3 most recent PHQ Screens 1/3/2020   PHQ Not Done -   Little interest or pleasure in doing things Not at all   Feeling down, depressed, irritable, or hopeless Not at all   Total Score PHQ 2 0   Trouble falling or staying asleep, or sleeping too much -   Feeling tired or having little energy -   Poor appetite, weight loss, or overeating -   Feeling bad about yourself - or that you are a failure or have let yourself or your family down -   Trouble concentrating on things such as school, work, reading, or watching TV -   Moving or speaking so slowly that other people could have noticed; or the opposite being so fidgety that others notice -   Thoughts of being better off dead, or hurting yourself in some way -   PHQ 9 Score -   How difficult have these problems made it for you to do your work, take care of your home and get along with others -       Alcohol Risk Factor Screening: Do you average 1 drink per night or more than 7 drinks a week:  No    On any one occasion in the past three months have you have had more than 3 drinks containing alcohol:  No      Functional Ability and Level of Safety:   Hearing: Hearing is good. Activities of Daily Living: The home contains: handrails  Patient needs help with:  walking     Ambulation: with difficulty, uses a cane     Fall Risk:  Fall Risk Assessment, last 12 mths 1/3/2020   Able to walk? Yes   Fall in past 12 months? Yes   Fall with injury? Yes   Number of falls in past 12 months 1   Fall Risk Score 2     Abuse Screen:  Patient is not abused       Cognitive Screening   Has your family/caregiver stated any concerns about your memory: no     Patient Care Team   Patient Care Team:  Dea Ayala MD as PCP - General (Family Medicine)  Dea Ayala MD as PCP - [facility]  Mary Glover RN as Nurse Navigator (Family Medicine)  Kirti Pagan MD (Orthopedic Surgery)  Sukhdev Veras MD as Physician (Neurology)  Cat Bland OD as Physician (Optometry)  Shayla Eisenmenger, MD as Physician (Otolaryngology)  Johnyn Skelton MD as Physician (Orthopedic Surgery)  Hugh Palacios RN as Ambulatory Care Manager  Darian Townsend MD as Physician (Cardiology)  Mariya Frazier MD (Pain Management)    Assessment/Plan   Education and counseling provided:  Are appropriate based on today's review and evaluation    Diagnoses and all orders for this visit:    1. Type 2 diabetes mellitus with hyperglycemia, without long-term current use of insulin ([facility]    2. Type 2 diabetes mellitus without complication, without long-term current use of insulin ([facility])  -     HEMOGLOBIN A1C WITH EAG    3. Hyperlipidemia, unspecified hyperlipidemia type  -     METABOLIC PANEL, COMPREHENSIVE  -     LIPID PANEL    4. Hypothyroidism, unspecified type  -     TSH REFLEX TO T4    5. Family history of colon cancer in mother    10.  Thrombocytopenia [facility])  Assessment & Plan:  Stable, based on history, physical exam and review of pertinent labs, studies and medications; meds reconciled; continue current treatment plan.   Lab Results   Component Value Date/Time    WBC 3.6 06/10/2019 10:41 AM    HGB 12.9 06/10/2019 10:41 AM    HCT 38.7 06/10/2019 10:41 AM    PLATELET 725 57/32/6376 10:41 AM    Creatinine 1.06 01/03/2020 01:54 PM    BUN 13 01/03/2020 01:54 PM    Potassium 4.1 01/03/2020 01:54 PM    INR 1.0 04/18/2019 11:50 AM    Prothrombin time 10.7 04/18/2019 11:50 AM       Orders:  -     CBC W/O DIFF      Health Maintenance Due   Topic Date Due    Pneumococcal 0-64 years (1 of 1 - PPSV23) [birth year]/1963    Shingrix Vaccine Age 50> (1 of 2) 04/25/2007    Colonoscopy  01/18/2017    PAP AKA CERVICAL CYTOLOGY  01/04/2020    Medicare Yearly Exam  04/04/2020    Foot Exam Q1  07/05/2020

## 2020-07-21 NOTE — TELEPHONE ENCOUNTER
Labs printed and mailed to address on file.      ----- Message from Manuela Cody MD sent at 7/21/2020  2:40 PM EDT -----  Regarding: mail lab orders  Please print out lab orders from today's visit and mail to her home address; pt will have labs drawn from her port at home through home health nursing.

## 2020-08-03 ENCOUNTER — HOSPITAL ENCOUNTER (OUTPATIENT)
Dept: LAB | Age: 65
Discharge: HOME | End: 2020-08-03
Payer: MEDICARE

## 2020-08-03 ENCOUNTER — VIRTUAL VISIT (OUTPATIENT)
Dept: NEUROLOGY | Age: 65
End: 2020-08-03
Payer: MEDICARE

## 2020-08-03 DIAGNOSIS — G35 MULTIPLE SCLEROSIS EXACERBATION (HCC): ICD-10-CM

## 2020-08-03 DIAGNOSIS — R51.9 DAILY HEADACHE: ICD-10-CM

## 2020-08-03 DIAGNOSIS — R42 DIZZINESS: ICD-10-CM

## 2020-08-03 DIAGNOSIS — R40.0 DROWSINESS: Primary | ICD-10-CM

## 2020-08-03 DIAGNOSIS — F98.8 ATTENTION DEFICIT DISORDER (ADD) WITHOUT HYPERACTIVITY: ICD-10-CM

## 2020-08-03 DIAGNOSIS — G70.00 MYASTHENIA GRAVIS (HCC): ICD-10-CM

## 2020-08-03 DIAGNOSIS — G43.019 INTRACTABLE MIGRAINE WITHOUT AURA AND WITHOUT STATUS MIGRAINOSUS: ICD-10-CM

## 2020-08-03 PROCEDURE — 80053 COMPREHEN METABOLIC PANEL: CPT

## 2020-08-03 PROCEDURE — 83036 HEMOGLOBIN GLYCOSYLATED A1C: CPT

## 2020-08-03 PROCEDURE — 99214 OFFICE O/P EST MOD 30 MIN: CPT | Performed by: PSYCHIATRY & NEUROLOGY

## 2020-08-03 PROCEDURE — 3017F COLORECTAL CA SCREEN DOC REV: CPT | Performed by: PSYCHIATRY & NEUROLOGY

## 2020-08-03 PROCEDURE — 84443 ASSAY THYROID STIM HORMONE: CPT

## 2020-08-03 PROCEDURE — G8428 CUR MEDS NOT DOCUMENT: HCPCS | Performed by: PSYCHIATRY & NEUROLOGY

## 2020-08-03 PROCEDURE — 85027 COMPLETE CBC AUTOMATED: CPT

## 2020-08-03 PROCEDURE — 80061 LIPID PANEL: CPT

## 2020-08-03 PROCEDURE — G9899 SCRN MAM PERF RSLTS DOC: HCPCS | Performed by: PSYCHIATRY & NEUROLOGY

## 2020-08-03 PROCEDURE — G9717 DOC PT DX DEP/BP F/U NT REQ: HCPCS | Performed by: PSYCHIATRY & NEUROLOGY

## 2020-08-03 PROCEDURE — G8756 NO BP MEASURE DOC: HCPCS | Performed by: PSYCHIATRY & NEUROLOGY

## 2020-08-03 PROCEDURE — G8417 CALC BMI ABV UP PARAM F/U: HCPCS | Performed by: PSYCHIATRY & NEUROLOGY

## 2020-08-03 RX ORDER — PREDNISONE 20 MG/1
TABLET ORAL
Qty: 30 TAB | Refills: 0 | Status: ON HOLD | OUTPATIENT
Start: 2020-08-03 | End: 2021-07-26 | Stop reason: ALTCHOICE

## 2020-08-03 NOTE — PROGRESS NOTES
Neurology Progress Note  Crista Galvin was seen by synchronous (real-time) audio-video technology on 20. Consent:  She and/or her healthcare decision maker is aware that this patient-initiated Telehealth encounter is a billable service, with coverage as determined by her insurance carrier. She is aware that she may receive a bill and has provided verbal consent to proceed: Yes    I was in the office while conducting this encounter. NAME:  Crista Galvin   :   1955   MRN:   [id number]     Date/Time:  8/3/2020  Subjective:     Crista Galvin is a 63 y.o. female here today for follow-up for MS, MG, chronic pain syndrome, headaches, CVA, difficulty walking, drowsiness. Patient today says even though she has not had any falls since the last visit, since the last fall, she has been very drowsy, sleepy, she says she has difficulty getting around. Physical therapy has been coming to the house but after the therapy, patient says she will go back to sleep. She says she has also been having daily headaches since after the fall, she noted that she still has the knot on the head from the previous fall as she hit her head when she fell. Patient is not on any new medications since the last visit except for Florinef which patient says helped with the dizziness. At this time, I will obtain MRI of the brain with and without gadolinium, hold off on Florinef for now. She says she is still having increasing left arm pain and difficulty ambulating. She also having thigh numbness of the extremities. .  She noted that her eyes tend to be weak and she sees double, double vision usually associated with fatigue.   However she continues to have a lot of pain but not as much as before, pain is sharp in nature, diffuse, burning sensation that goes up to the arms causing numbness and tingling sensation and weakness of the hands, down to the legs causing  numbness and tingling sensation of the legs with weakness of the legs.  She has not been to pain specialist since the pandemic crisis. She says her headache which was decreasing in frequency, currently is nearly daily. Chance Morelos Headache is frontal, throbbing in nature, with occasional sharp pain from the back of the head, headache associated with dizziness, nausea, blurry vision, double vision, photophobia, phonophobia. She says she has a lot of muscle spasms mostly in the back. She denies loss of consciousness. Says dysphagia has improved. Patient continues to smoke cigarettes. Review of Systems - General ROS: positive for  - fatigue, night sweats and sleep disturbance  Psychological ROS: positive for - anxiety, concentration difficulties, depression, mood swings and sleep disturbances  Ophthalmic ROS: positive for - blurry vision, decreased vision, double vision and photophobia  ENT ROS: positive for - headaches, tinnitus, vertigo and visual changes  Allergy and Immunology ROS: negative  Hematological and Lymphatic ROS: negative  Endocrine ROS: negative  Respiratory ROS: no cough, shortness of breath, or wheezing  Cardiovascular ROS: no chest pain or dyspnea on exertion  Gastrointestinal ROS: no abdominal pain, change in bowel habits, or black or bloody stools  Genito-Urinary ROS: no dysuria, trouble voiding, or hematuria  Musculoskeletal ROS: positive for - gait disturbance, joint pain, joint stiffness, joint swelling and muscle pain  Neurological ROS: positive for - dizziness, gait disturbance, headaches, impaired coordination/balance, numbness/tingling, speech problems, tremors, visual changes and weakness  Dermatological ROS: negative        Medications reviewed:  Current Outpatient Medications   Medication Sig Dispense Refill    Gilenya 0.5 mg cap Take 1 Cap by mouth daily. 90 Cap 3    metFORMIN ER (GLUCOPHAGE XR) 500 mg tablet TAKE 1 TABLET BY MOUTH DAILY WITH BREAKFAST 90 Tab 1    pregabalin (Lyrica) 200 mg capsule Take 1 Cap by mouth two (2) times a day.  Max Daily Amount: 400 mg. 180 Cap 2    levothyroxine (SYNTHROID) 150 mcg tablet Take 1 Tab by mouth Daily (before breakfast). 90 Tab 1    PARoxetine (PAXIL) 40 mg tablet TAKE 1 AND 1/2 TABLETS BY MOUTH EVERY NIGHT 135 Tab 3    topiramate (TOPAMAX) 200 mg tablet Take 1 Tab by mouth two (2) times a day. 60 Tab 2    fludrocortisone (FLORINEF) 0.1 mg tablet TAKE 1 TABLET BY MOUTH DAILY 90 Tab 1    dextroamphetamine-amphetamine (ADDERALL) 20 mg tablet Take 1 Tab by mouth daily. Max Daily Amount: 20 mg. 30 Tab 0    rosuvastatin (CRESTOR) 40 mg tablet Take 1 Tab by mouth daily. DOSE CHANGE 90 Tab 2    corticotropin (ACTHAR H.P.) 80 unit/mL injectable gel 1 mL by SubCUTAneous route daily. 80 units subq q day for 10 days 10 mL 1    ondansetron (ZOFRAN ODT) 4 mg disintegrating tablet DISSOLVE 1 TABLET ON THE TONGUE EVERY 8 HOURS AS NEEDED FOR NAUSEA 15 Tab 0    pyridostigmine bromide (MESTINON SR) 180 mg SR tablet TAKE 1 TABLET BY MOUTH TWICE DAILY (Patient taking differently: Take 180 mg by mouth two (2) times a day.) 60 Tab 0    glucose blood VI test strips (BLOOD GLUCOSE TEST) strip 100 Each by Does Not Apply route See Admin Instructions. One Touch Ultra Test Strips=Check blood sugar daily dx E11.8 100 Strip 1    aspirin delayed-release 81 mg tablet Take 1 Tab by mouth every twelve (12) hours. 60 Tab 0    OTHER 0.25 mL by SubLINGual route daily. CBD OIL      dantrolene (DANTRIUM) 100 mg capsule Take 1 Cap by mouth three (3) times daily. (Patient taking differently: Take 100 mg by mouth daily as needed. Indications: muscle spasm) 90 Cap 3    albuterol (PROVENTIL VENTOLIN) 2.5 mg /3 mL (0.083 %) nebulizer solution 3 mL by Nebulization route every six (6) hours as needed for Wheezing. 30 Each 0    Menthol-Zinc Oxide (CALMOSEPTINE) 0.44-20.6 % oint Apply  to affected area as needed.       nystatin (MYCOSTATIN) powder Apply to candidal lesions TOPICALLY 2 to 3 times daily until healing complete 30 g 0    lidocaine (LIDODERM) 5 % Apply patch to the affected area for 12 hours a day and remove for 12 hours a day. 30 Each 3    clindamycin (CLEOCIN) 300 mg capsule Take 300 mg by mouth. Prior to dental procedures      TENS UNIT ELECTRODES by Does Not Apply route. prn      predniSONE (DELTASONE) 20 mg tablet 60 mg p.o. daily for 5 days, 40 mg p.o. daily for 4 days, then 20 mg p.o. daily for 3 days. 30 Tab 0    buprenorphine HCl (BELBUCA) 150 mcg film by Buccal route three (3) times daily as needed. Indications: per patient she is taking 2mg sublingual daily          Objective:   Vitals: There were no vitals filed for this visit.             Lab Data Reviewed:  Lab Results   Component Value Date/Time    WBC 3.6 06/10/2019 10:41 AM    HCT 38.7 06/10/2019 10:41 AM    HGB 12.9 06/10/2019 10:41 AM    PLATELET 151 29/92/7020 10:41 AM       Lab Results   Component Value Date/Time    Sodium 141 01/03/2020 01:54 PM    Potassium 4.1 01/03/2020 01:54 PM    Chloride 109 (H) 01/03/2020 01:54 PM    CO2 20 01/03/2020 01:54 PM    Glucose 155 (H) 01/03/2020 01:54 PM    BUN 13 01/03/2020 01:54 PM    Creatinine 1.06 (H) 01/03/2020 01:54 PM    Calcium 9.9 01/03/2020 01:54 PM       No components found for: TROPQUANT    No results found for: SHAHIDA      Lab Results   Component Value Date/Time    Hemoglobin A1c 6.8 (H) 01/03/2020 01:54 PM    Hemoglobin A1c (POC) 6.3 07/05/2019 01:51 PM        Lab Results   Component Value Date/Time    Vitamin B12 436 06/10/2019 10:41 AM    Folate 12.0 02/10/2016       No results found for: Ramin PINEDO XBANA    Lab Results   Component Value Date/Time    Cholesterol, total 299 (H) 01/03/2020 01:54 PM    HDL Cholesterol 50 01/03/2020 01:54 PM    LDL-CHOLESTEROL 91 12/26/2018    LDL, calculated 216 (H) 01/03/2020 01:54 PM    VLDL, calculated 33 01/03/2020 01:54 PM    Triglyceride 164 (H) 01/03/2020 01:54 PM    Triglyceride 126 12/26/2018    CHOL/HDL Ratio 4.3 08/23/2010 12:19 PM         CT Results (recent):  Results from Hospital Encounter encounter on 11/23/19   CT NECK SOFT TISSUE W CONT    Narrative EXAM:  CT NECK SOFT TISSUE W CONT    INDICATION:  Dysphasia. Status post thyroidectomy. COMPARISON: CT neck 5/14/2018. CONTRAST: 100 mL of Isovue-300. TECHNIQUE: Multislice helical CT was performed from the mid calvarium to the  aortic arch during uneventful rapid bolus intravenous contrast administration. Contiguous 2.5 mm axial images were reconstructed and lung and soft tissue  windows were generated. Coronal and sagittal reformations were generated. CT  dose reduction was achieved through use of a standardized protocol tailored for  this examination and automatic exposure control for dose modulation. 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 No evidence of recurrent tissue in this region. No cervical  lymphadenopathy. Visualized brain parenchyma is normal in appearance. Paranasal sinuses and  mastoid air cells are clear. Intraorbital contents are unremarkable. The  cervical vasculature is patent. No acute fracture or aggressive osseous lesion. Multilevel degenerative disc disease most advanced at C3-C4, C5-C6 and C6-C7 is  unchanged. Visualized portions of the lung apices are normal. Right chest port  is noted. Impression IMPRESSION:   1. 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 No evidence of local recurrence. No cervical lymphadenopathy or other soft  tissue abnormality in the neck. MRI Results (recent):  Results from [place] encounter on 12/26/18   MRI BRAIN W WO CONT    Narrative Medical indication: Multiple sclerosis   , headache q. daily, prior CVA, new numbness, exacerbation. Technical factors: Diffusion imaging, sagittal T1-weighted, sagittal FLAIR,  axial T1-weighted pre and post 18 cc IV. dotarem T2-weighted FLAIR gradient echo  coronal and sagittal T1-weighted postcontrast. Comparison January 10, 2018. Diffusion imaging does not show acute ischemic changes her. There is no  extra-axial fluid collection hemorrhage or shift. Ventricular size is stable. Major vessels flow voids at the base of the brain are present. Incidental  maxillary sinus disease. The burden of white matter disease appears stable. No  enhancing lesion or masses. Impression IMPRESSION: Stable white matter disease. No acute findings no masses or  enhancing lesion. IR Results (recent):  No results found for this or any previous visit. VAS/US Results (recent):  Results from Hospital Encounter encounter on 01/19/17   DUPLEX LOWER EXT VENOUS RIGHT       PHYSICAL EXAM:  General:    Alert, cooperative, no distress, appears stated age. Head:   Normocephalic, without obvious abnormality, atraumatic. Eyes:   Conjunctivae/corneas clear. Nose:  Nares normal.  Throat:      No Thrush  Neck:  Symmetrical,  no adenopathy, thyroid     no JVD. Back:    Symmetric. .  Lungs:   Deferred  Chest wall:  . No Accessory muscle use. Heart:   Deferred. Abdomen:   . Not distended. Extremities: Extremities normal, atraumatic, No cyanosis. No edema. No clubbing  Skin:      No rashes or lesions. Not Jaundiced  Lymph nodes: Cervical, supraclavicular normal.  Psych:  Good insight. Not depressed. Not anxious or agitated. NEUROLOGICAL EXAM:  Appearance: The patient is well developed, well nourished, provides a coherent history and is in no acute distress. Mental Status: Oriented to time, place and person. Mood and affect appropriate. Cranial Nerves:   Intact visual fields. EOM's full, no nystagmus, no ptosis. l. . Facial movement is symmetric. Hearing is normal bilaterally. . Tongue is midline. Motor:   Moves all extremities. No fasciculations. Reflexes:   Deferred   Sensory:   Deferred. Gait:   Not tested   Tremor:   No tremor noted. Cerebellar:  No cerebellar signs present. Assesment  1. Drowsiness    - MRI BRAIN W WO CONT; Future    2. Multiple sclerosis exacerbation ([address]    - MRI BRAIN W WO CONT; Future    3. Myasthenia gravis ([address]  Continue management    4. Daily headache    - MRI BRAIN W WO CONT; Future    5. Dizziness    - MRI BRAIN W WO CONT; Future    6. Attention deficit disorder (ADD) without hyperactivity  Continue management    7. Intractable migraine without aura and without status migrainosus  Continue management    ___________________________________________________  PLAN: Medication and plan discussed with patient      ICD-10-CM ICD-9-CM    1. Drowsiness  R40.0 780.09 MRI BRAIN W WO CONT   2. Multiple sclerosis exacerbation (HCC)  G35 340 MRI BRAIN W WO CONT   3. Myasthenia gravis ([address]  G70.00 358.00    4. Daily headache  R51 784.0 MRI BRAIN W WO CONT   5. Dizziness  R42 780.4 MRI BRAIN W WO CONT   6. Attention deficit disorder (ADD) without hyperactivity  F98.8 314.00    7. Intractable migraine without aura and without status migrainosus  G43.019 346.11      Follow-up and Dispositions    · Return in about 3 weeks (around 8/24/2020). Pursuant to the emergency declaration under the [address] [place] [address] authority and the [employer] Act, this Virtual  Visit was conducted, with patient's consent, to reduce the patient's risk of exposure to COVID-19 and provide continuity of care for an established patient.      Services were provided through a video synchronous discussion virtually to substitute for in-person clinic visit.  ___________________________________________________    Attending Physician: Juan Herrera MD

## 2020-08-04 LAB
ALBUMIN SERPL-MCNC: 4 G/DL (ref 3.8–4.8)
ALBUMIN/GLOB SERPL: 1.9 {RATIO} (ref 1.2–2.2)
ALP SERPL-CCNC: 66 IU/L (ref 39–117)
ALT SERPL-CCNC: 6 IU/L (ref 0–32)
AST SERPL-CCNC: 11 IU/L (ref 0–40)
BILIRUB SERPL-MCNC: <0.2 MG/DL (ref 0–1.2)
BUN SERPL-MCNC: 15 MG/DL (ref 8–27)
BUN/CREAT SERPL: 16 (ref 12–28)
CALCIUM SERPL-MCNC: 8.9 MG/DL (ref 8.7–10.3)
CHLORIDE SERPL-SCNC: 107 MMOL/L (ref 96–106)
CHOLEST SERPL-MCNC: 298 MG/DL (ref 100–199)
CO2 SERPL-SCNC: 19 MMOL/L (ref 20–29)
COMMENT, 011824: ABNORMAL
CREAT SERPL-MCNC: 0.94 MG/DL (ref 0.57–1)
ERYTHROCYTE [DISTWIDTH] IN BLOOD BY AUTOMATED COUNT: 14 % (ref 11.7–15.4)
EST. AVERAGE GLUCOSE BLD GHB EST-MCNC: 140 MG/DL
GLOBULIN SER CALC-MCNC: 2.1 G/DL (ref 1.5–4.5)
GLUCOSE SERPL-MCNC: 106 MG/DL (ref 65–99)
HBA1C MFR BLD: 6.5 % (ref 4.8–5.6)
HCT VFR BLD AUTO: 42.7 % (ref 34–46.6)
HDLC SERPL-MCNC: 45 MG/DL
HGB BLD-MCNC: 13.9 G/DL (ref 11.1–15.9)
INTERPRETATION, 910389: NORMAL
LDLC SERPL CALC-MCNC: 208 MG/DL (ref 0–99)
MCH RBC QN AUTO: 29.4 PG (ref 26.6–33)
MCHC RBC AUTO-ENTMCNC: 32.6 G/DL (ref 31.5–35.7)
MCV RBC AUTO: 91 FL (ref 79–97)
PLATELET # BLD AUTO: 126 X10E3/UL (ref 150–450)
POTASSIUM SERPL-SCNC: 4.5 MMOL/L (ref 3.5–5.2)
PROT SERPL-MCNC: 6.1 G/DL (ref 6–8.5)
RBC # BLD AUTO: 4.72 X10E6/UL (ref 3.77–5.28)
SODIUM SERPL-SCNC: 140 MMOL/L (ref 134–144)
TRIGL SERPL-MCNC: 223 MG/DL (ref 0–149)
TSH SERPL DL<=0.005 MIU/L-ACNC: 3.56 UIU/ML (ref 0.45–4.5)
VLDLC SERPL CALC-MCNC: 45 MG/DL (ref 5–40)
WBC # BLD AUTO: 4.3 X10E3/UL (ref 3.4–10.8)

## 2020-08-04 NOTE — TELEPHONE ENCOUNTER
Dear Ms. Galvin,    I wanted to follow up on your recent test results: Your A1c is stable at 6.5  Your cholesterol levels are still high despite increasing your rosuvastatin dose. Are you taking this medication every day? Your other labs are within normal limits.

## 2020-08-08 ENCOUNTER — HOSPITAL ENCOUNTER (OUTPATIENT)
Dept: MRI IMAGING | Age: 65
Discharge: HOME | End: 2020-08-08
Attending: PSYCHIATRY & NEUROLOGY
Payer: MEDICARE

## 2020-08-08 DIAGNOSIS — G35 MULTIPLE SCLEROSIS EXACERBATION (HCC): ICD-10-CM

## 2020-08-08 DIAGNOSIS — R40.0 DROWSINESS: ICD-10-CM

## 2020-08-08 DIAGNOSIS — R51.9 DAILY HEADACHE: ICD-10-CM

## 2020-08-08 DIAGNOSIS — R42 DIZZINESS: ICD-10-CM

## 2020-08-08 PROCEDURE — 74011250636 HC RX REV CODE- 250/636: Performed by: PSYCHIATRY & NEUROLOGY

## 2020-08-08 PROCEDURE — A9575 INJ GADOTERATE MEGLUMI 0.1ML: HCPCS | Performed by: PSYCHIATRY & NEUROLOGY

## 2020-08-08 PROCEDURE — 70553 MRI BRAIN STEM W/O & W/DYE: CPT

## 2020-08-08 RX ORDER — GADOTERATE MEGLUMINE 376.9 MG/ML
15 INJECTION INTRAVENOUS
Status: COMPLETED | OUTPATIENT
Start: 2020-08-08 | End: 2020-08-08

## 2020-08-08 RX ADMIN — GADOTERATE MEGLUMINE 15 ML: 376.9 INJECTION INTRAVENOUS at 19:26

## 2020-08-09 PROCEDURE — 74011250636 HC RX REV CODE- 250/636: Performed by: PSYCHIATRY & NEUROLOGY

## 2020-08-09 PROCEDURE — A9575 INJ GADOTERATE MEGLUMI 0.1ML: HCPCS | Performed by: PSYCHIATRY & NEUROLOGY

## 2020-08-11 ENCOUNTER — PATIENT OUTREACH (OUTPATIENT)
Dept: CASE MANAGEMENT | Age: 65
End: 2020-08-11

## 2020-08-11 NOTE — PROGRESS NOTES
Patient called CTN this am. Reports she has been having more headaches and they are more painful Had a recent VV with pcp, . Also saw - he ordered a MRI- done on Saturday. Results pending. Reports no energy. Cholesterol- 298. Realized that she had not been taking her cholesterol med for months. Also, she has an aide help her with baths and ADLs several times a week. Aide noticed she had a darkened area with hole in the middle on bottom of both feet. Located near big toe. Has h/o neuropathy but says it does hurt to walk. Not sure whether she stepped on something or not. CTN suggested having [facility] come to see her to evaluate. Patient receptive, CTN contacted [facility] and they will see her today.

## 2020-08-26 ENCOUNTER — VIRTUAL VISIT (OUTPATIENT)
Dept: NEUROLOGY | Age: 65
End: 2020-08-26
Payer: MEDICARE

## 2020-08-26 DIAGNOSIS — R25.1 TREMOR: ICD-10-CM

## 2020-08-26 DIAGNOSIS — R29.6 FREQUENT FALLS: ICD-10-CM

## 2020-08-26 DIAGNOSIS — G61.81 CIDP (CHRONIC INFLAMMATORY DEMYELINATING POLYNEUROPATHY) (HCC): Primary | ICD-10-CM

## 2020-08-26 DIAGNOSIS — R26.9 GAIT DISORDER: ICD-10-CM

## 2020-08-26 DIAGNOSIS — G70.00 MYASTHENIA GRAVIS (HCC): ICD-10-CM

## 2020-08-26 DIAGNOSIS — R42 DIZZINESS: ICD-10-CM

## 2020-08-26 DIAGNOSIS — G43.019 INTRACTABLE MIGRAINE WITHOUT AURA AND WITHOUT STATUS MIGRAINOSUS: ICD-10-CM

## 2020-08-26 DIAGNOSIS — G89.4 CHRONIC PAIN SYNDROME: ICD-10-CM

## 2020-08-26 DIAGNOSIS — G35 MS (MULTIPLE SCLEROSIS) (HCC): ICD-10-CM

## 2020-08-26 PROCEDURE — 3017F COLORECTAL CA SCREEN DOC REV: CPT | Performed by: PSYCHIATRY & NEUROLOGY

## 2020-08-26 PROCEDURE — G8756 NO BP MEASURE DOC: HCPCS | Performed by: PSYCHIATRY & NEUROLOGY

## 2020-08-26 PROCEDURE — G8427 DOCREV CUR MEDS BY ELIG CLIN: HCPCS | Performed by: PSYCHIATRY & NEUROLOGY

## 2020-08-26 PROCEDURE — G9717 DOC PT DX DEP/BP F/U NT REQ: HCPCS | Performed by: PSYCHIATRY & NEUROLOGY

## 2020-08-26 PROCEDURE — G9899 SCRN MAM PERF RSLTS DOC: HCPCS | Performed by: PSYCHIATRY & NEUROLOGY

## 2020-08-26 PROCEDURE — G8417 CALC BMI ABV UP PARAM F/U: HCPCS | Performed by: PSYCHIATRY & NEUROLOGY

## 2020-08-26 PROCEDURE — 99214 OFFICE O/P EST MOD 30 MIN: CPT | Performed by: PSYCHIATRY & NEUROLOGY

## 2020-08-26 RX ORDER — ERENUMAB-AOOE 140 MG/ML
140 INJECTION, SOLUTION SUBCUTANEOUS
Qty: 1 EACH | Refills: 2 | Status: SHIPPED | OUTPATIENT
Start: 2020-08-26 | End: 2020-09-30

## 2020-08-26 RX ORDER — RIZATRIPTAN BENZOATE 10 MG/1
10 TABLET, ORALLY DISINTEGRATING ORAL
Qty: 12 TAB | Refills: 1 | Status: SHIPPED | OUTPATIENT
Start: 2020-08-26 | End: 2020-08-26

## 2020-08-26 RX ORDER — BROMOCRIPTINE MESYLATE 5 MG/1
5 CAPSULE ORAL DAILY
Qty: 30 CAP | Refills: 1 | Status: SHIPPED | OUTPATIENT
Start: 2020-08-26 | End: 2020-10-19

## 2020-08-26 RX ORDER — LANOLIN ALCOHOL/MO/W.PET/CERES
1000 CREAM (GRAM) TOPICAL DAILY
Qty: 30 TAB | Refills: 2 | Status: SHIPPED | OUTPATIENT
Start: 2020-08-26 | End: 2021-02-18 | Stop reason: SDUPTHER

## 2020-09-07 NOTE — PROGRESS NOTES
Neurology Progress Note  John Galvin was seen by synchronous (real-time) audio-video technology on 20. Consent:  She is aware that this patient-initiated Telehealth encounter is a billable service, with coverage as determined by her insurance carrier. She is aware that she may receive a bill and has provided verbal consent to proceed: Yes    I was in the office while conducting this encounter. Pursuant to the emergency declaration under the [address] [place] [address] [facility] authority and the [employer] Act, this Virtual  Visit was conducted, with patient's consent, to reduce the patient's risk of exposure to COVID-19 and provide continuity of care for an established patient. Services were provided through a video synchronous discussion virtually to substitute for in-person clinic visit. NAME:  John Galvin   :   1955   MRN:   [id number]     Date/Time:  2020  Subjective:     John Galvin is a 63 y.o. female here today for follow-up for MS, MG, chronic pain syndrome, headaches, CVA, difficulty walking, drowsiness, test results. Patient says she still having tremors in both hands and has gone down to legs. MRI of the brain reviewed with patient did not show any significant change or any new lesion from the previous  She says she is still having increasing left arm pain and difficulty ambulating. She also having thigh numbness of the extremities. .  She noted that her eyes tend to be weak and she sees double, double vision usually associated with fatigue. However she continues to have a lot of pain but not as much as before, pain is sharp in nature, diffuse, burning sensation that goes up to the arms causing numbness and tingling sensation and weakness of the hands, down to the legs causing  numbness and tingling sensation of the legs with weakness of the legs.   She has not been to pain specialist since the pandemic crisis. She says her headache which was decreasing in frequency, currently is nearly daily. Lorri Austin Headache is frontal, throbbing in nature, with occasional sharp pain from the back of the head, headache associated with dizziness, nausea, blurry vision, double vision, photophobia, phonophobia. She says she has a lot of muscle spasms mostly in the back. Patient should continue her physical therapy. At this time, due to COVID-19 crisis, I will hold off on patient colonoscopy as it is not emergency. She denies loss of consciousness. Says dysphagia has improved. Patient continues to smoke cigarettes. Review of Systems - General ROS: positive for  - fatigue, night sweats and sleep disturbance  Psychological ROS: positive for - anxiety, concentration difficulties, depression, mood swings and sleep disturbances  Ophthalmic ROS: positive for - blurry vision, decreased vision, double vision and photophobia  ENT ROS: positive for - headaches, tinnitus, vertigo and visual changes  Allergy and Immunology ROS: negative  Hematological and Lymphatic ROS: negative  Endocrine ROS: negative  Respiratory ROS: no cough, shortness of breath, or wheezing  Cardiovascular ROS: no chest pain or dyspnea on exertion  Gastrointestinal ROS: no abdominal pain, change in bowel habits, or black or bloody stools  Genito-Urinary ROS: no dysuria, trouble voiding, or hematuria  Musculoskeletal ROS: positive for - gait disturbance, joint pain, joint stiffness, joint swelling and muscle pain  Neurological ROS: positive for - dizziness, gait disturbance, headaches, impaired coordination/balance, numbness/tingling, speech problems, tremors, visual changes and weakness  Dermatological ROS: negative              Medications reviewed:  Current Outpatient Medications   Medication Sig Dispense Refill    erenumab-aooe (Aimovig Autoinjector) 140 mg/mL injection 1 mL by SubCUTAneous route every thirty (30) days.  1 Each 2    bromocriptine (PARLODEL) 5 mg capsule Take 1 Cap by mouth daily. 30 Cap 1    cyanocobalamin (Vitamin B-12) 1,000 mcg tablet Take 1 Tab by mouth daily. 30 Tab 2    Gilenya 0.5 mg cap Take 1 Cap by mouth daily. 90 Cap 3    metFORMIN ER (GLUCOPHAGE XR) 500 mg tablet TAKE 1 TABLET BY MOUTH DAILY WITH BREAKFAST 90 Tab 1    pregabalin (Lyrica) 200 mg capsule Take 1 Cap by mouth two (2) times a day. Max Daily Amount: 400 mg. 180 Cap 2    levothyroxine (SYNTHROID) 150 mcg tablet Take 1 Tab by mouth Daily (before breakfast). 90 Tab 1    PARoxetine (PAXIL) 40 mg tablet TAKE 1 AND 1/2 TABLETS BY MOUTH EVERY NIGHT 135 Tab 3    topiramate (TOPAMAX) 200 mg tablet Take 1 Tab by mouth two (2) times a day. 60 Tab 2    dextroamphetamine-amphetamine (ADDERALL) 20 mg tablet Take 1 Tab by mouth daily. Max Daily Amount: 20 mg. 30 Tab 0    rosuvastatin (CRESTOR) 40 mg tablet Take 1 Tab by mouth daily. DOSE CHANGE 90 Tab 2    corticotropin (ACTHAR H.P.) 80 unit/mL injectable gel 1 mL by SubCUTAneous route daily. 80 units subq q day for 10 days 10 mL 1    ondansetron (ZOFRAN ODT) 4 mg disintegrating tablet DISSOLVE 1 TABLET ON THE TONGUE EVERY 8 HOURS AS NEEDED FOR NAUSEA 15 Tab 0    pyridostigmine bromide (MESTINON SR) 180 mg SR tablet TAKE 1 TABLET BY MOUTH TWICE DAILY (Patient taking differently: Take 180 mg by mouth two (2) times a day.) 60 Tab 0    glucose blood VI test strips (BLOOD GLUCOSE TEST) strip 100 Each by Does Not Apply route See Admin Instructions. One Touch Ultra Test Strips=Check blood sugar daily dx E11.8 100 Strip 1    aspirin delayed-release 81 mg tablet Take 1 Tab by mouth every twelve (12) hours. 60 Tab 0    OTHER 0.25 mL by SubLINGual route daily. CBD OIL      dantrolene (DANTRIUM) 100 mg capsule Take 1 Cap by mouth three (3) times daily. (Patient taking differently: Take 100 mg by mouth daily as needed.  Indications: muscle spasm) 90 Cap 3    albuterol (PROVENTIL VENTOLIN) 2.5 mg /3 mL (0.083 %) nebulizer solution 3 mL by Nebulization route every six (6) hours as needed for Wheezing. 30 Each 0    Menthol-Zinc Oxide (CALMOSEPTINE) 0.44-20.6 % oint Apply  to affected area as needed.  nystatin (MYCOSTATIN) powder Apply to candidal lesions TOPICALLY 2 to 3 times daily until healing complete 30 g 0    lidocaine (LIDODERM) 5 % Apply patch to the affected area for 12 hours a day and remove for 12 hours a day. 30 Each 3    clindamycin (CLEOCIN) 300 mg capsule Take 300 mg by mouth. Prior to dental procedures      TENS UNIT ELECTRODES by Does Not Apply route. prn      predniSONE (DELTASONE) 20 mg tablet 60 mg p.o. daily for 5 days, 40 mg p.o. daily for 4 days, then 20 mg p.o. daily for 3 days. 30 Tab 0    fludrocortisone (FLORINEF) 0.1 mg tablet TAKE 1 TABLET BY MOUTH DAILY 90 Tab 1    buprenorphine HCl (BELBUCA) 150 mcg film by Buccal route three (3) times daily as needed. Indications: per patient she is taking 2mg sublingual daily          Objective:   Vitals: There were no vitals filed for this visit.             Lab Data Reviewed:  Lab Results   Component Value Date/Time    WBC 4.3 08/03/2020 08:05 AM    HCT 42.7 08/03/2020 08:05 AM    HGB 13.9 08/03/2020 08:05 AM    PLATELET 995 (L) 63/28/6400 08:05 AM       Lab Results   Component Value Date/Time    Sodium 140 08/03/2020 08:05 AM    Potassium 4.5 08/03/2020 08:05 AM    Chloride 107 (H) 08/03/2020 08:05 AM    CO2 19 (L) 08/03/2020 08:05 AM    Glucose 106 (H) 08/03/2020 08:05 AM    BUN 15 08/03/2020 08:05 AM    Creatinine 0.94 08/03/2020 08:05 AM    Calcium 8.9 08/03/2020 08:05 AM       No components found for: TROPQUANT    No results found for: SHAHIDA      Lab Results   Component Value Date/Time    Hemoglobin A1c 6.5 (H) 08/03/2020 08:05 AM    Hemoglobin A1c (POC) 6.3 07/05/2019 01:51 PM        Lab Results   Component Value Date/Time    Vitamin B12 436 06/10/2019 10:41 AM    Folate 12.0 02/10/2016       No results found for: Eric PINEDO    Lab Results Component Value Date/Time    Cholesterol, total 298 (H) 08/03/2020 08:05 AM    HDL Cholesterol 45 08/03/2020 08:05 AM    LDL-CHOLESTEROL 91 12/26/2018    LDL, calculated 208 (H) 08/03/2020 08:05 AM    VLDL, calculated 45 (H) 08/03/2020 08:05 AM    Triglyceride 223 (H) 08/03/2020 08:05 AM    Triglyceride 126 12/26/2018    CHOL/HDL Ratio 4.3 08/23/2010 12:19 PM         CT Results (recent):  Results from [place] encounter on 11/23/19   CT NECK SOFT TISSUE W CONT    Narrative EXAM:  CT NECK SOFT TISSUE W CONT    INDICATION:  Dysphasia. Status post thyroidectomy. COMPARISON: CT neck 5/14/2018. CONTRAST: 100 mL of Isovue-300. TECHNIQUE: Multislice helical CT was performed from the mid calvarium to the  aortic arch during uneventful rapid bolus intravenous contrast administration. Contiguous 2.5 mm axial images were reconstructed and lung and soft tissue  windows were generated. Coronal and sagittal reformations were generated. CT  dose reduction was achieved through use of a standardized protocol tailored for  this examination and automatic exposure control for dose modulation. 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 No evidence of recurrent tissue in this region. No cervical  lymphadenopathy. Visualized brain parenchyma is normal in appearance. Paranasal sinuses and  mastoid air cells are clear. Intraorbital contents are unremarkable. The  cervical vasculature is patent. No acute fracture or aggressive osseous lesion. Multilevel degenerative disc disease most advanced at C3-C4, C5-C6 and C6-C7 is  unchanged. Visualized portions of the lung apices are normal. Right chest port  is noted. Impression IMPRESSION:   1. 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 No evidence of local recurrence. No cervical lymphadenopathy or other soft  tissue abnormality in the neck.          MRI Results (recent):  Results from [place] encounter on 08/08/20   MRI BRAIN W WO CONT    Narrative Clinical history: Fatigue, drowsiness  INDICATION:   Fatigue, drowsiness, multiple sclerosis, prior CVA    COMPARISON: 12/20/2018  TECHNIQUE: MR examination of the brain includes axial and sagittal T1 , axial  T2, axial FLAIR, axial gradient echo, axial DWI, coronal T1 . Pre and post  contrast axial T1-weighted imaging. Postcontrast T1-weighted imaging coronal  plane. CONTRAST: The patient was administered gadolinium-based contrast material,  subsequently axial and sagittal T1-weighted postcontrast imaging was obtained. FINDINGS:   There is no intracranial mass, hemorrhage or acute infarction. Scattered foci of abnormal increased T2 signal intensity predominantly  superiorly, subcortical left parietal lobe but also noted in the frontal lobes  on the right and on the left. No associated abnormal postcontrast enhancement. No new diffusion restriction. Air-fluid level in the right maxillary sinus. IACs are symmetric in size. . There  is no abnormal parenchymal enhancement. There is no abnormal meningeal  enhancement demonstrated. The brain architecture is normal. There is no evidence  of midline shift or mass-effect. The ventricles are normal in size, position and  configuration. There are no extra-axial fluid collections. Major intracranial  vascular flow-voids are unremarkable. The orbits are grossly symmetric. Dural  venous sinuses are grossly unremarkable. There is no Chiari or syrinx. Pituitary  and infundibulum grossly unremarkable. Cerebellopontine angles are unremarkable. No skull base mass is identified. Cavernous sinuses are symmetric. The mastoid  air cells and are well pneumatized and clear. Impression IMPRESSION:  Scattered foci of abnormal increased T2 signal intensity predominantly  subcortical and superior adjacent to the vertex.  Stable overall appearance  compared to the 12/26/2018 examination. No postcontrast enhancement or diffusion  restriction to suggest active demyelination. Right maxillary sinus disease. No intracranial mass, hemorrhage or evidence of acute infarction. IR Results (recent):  No results found for this or any previous visit. VAS/US Results (recent):  Results from Hospital Encounter encounter on 01/19/17   DUPLEX LOWER EXT VENOUS RIGHT       PHYSICAL EXAM:  General:    Alert, cooperative, no distress, appears stated age. Head:   Normocephalic, without obvious abnormality, atraumatic. Eyes:   Conjunctivae/corneas clear. Nose:  Nares normal. .  Throat:    Lips and tongue normal.    Neck:  Symmetrical,  no adenopathy, thyroid. no JVD. Back:    Symmetric. .  Lungs:   Deferred. .  Chest wall:   No Accessory muscle use. Heart:   Deferred. Abdomen:   No masses  Extremities: Extremities normal, atraumatic, No cyanosis. No edema. No clubbing  Skin:      No rashes or lesions. Lymph nodes: Cervical, supraclavicular normal.  Psych:  Good insight. Depressed. Anxious. NEUROLOGICAL EXAM:  Appearance: The patient is well developed, well nourished, provides a coherent history and is in no acute distress. Mental Status: Oriented to time, place and person. Mood and affect appropriate. Cranial Nerves:   Intact visual fields., EOM's full, no nystagmus, no ptosis. . Facial movement is symmetric. Hearing is normal bilaterally. Tongue is midline. Motor:   Moves all extremities. No fasciculations. Reflexes:   Deferred. Sensory:   Deferred. Gait:   Unsteady gait. Tremor:   Tremor noted. Cerebellar:  No cerebellar signs present. Assesment  1. CIDP (chronic inflammatory demyelinating polyneuropathy) ([facility])  Continue IVIG    2. Myasthenia gravis ([facility]  Continue management    3. MS (multiple sclerosis) ([facility]  Continue management  4. Chronic migraine  Aimovig  5.  Intractable migraine without aura and without status migrainosus  Continue management    6. Tremor  Bromocriptine  7. Dizziness    - REFERRAL TO PHYSICAL THERAPY    8. Gait disorder    - REFERRAL TO PHYSICAL THERAPY    9. Frequent falls    - REFERRAL TO PHYSICAL THERAPY    10. Chronic pain syndrome  Following pain management    ___________________________________________________  PLAN: Medication and plan discussed with patient      ICD-10-CM ICD-9-CM    1. CIDP (chronic inflammatory demyelinating polyneuropathy) ([facility])  G61.81 357.81    2. Myasthenia gravis ([facility]  G70.00 358.00    3. MS (multiple sclerosis) ([facility]  G35 340    4. Chronic migraine  G43.709 346.70    5. Intractable migraine without aura and without status migrainosus  G43.019 346.11    6. Tremor  R25.1 781.0    7. Dizziness  R42 780.4 REFERRAL TO PHYSICAL THERAPY   8. Gait disorder  R26.9 781.2 REFERRAL TO PHYSICAL THERAPY   9. Frequent falls  R29.6 V15.88 REFERRAL TO PHYSICAL THERAPY   10. Chronic pain syndrome  G89.4 338.4      Follow-up and Dispositions    · Return in about 2 months (around 10/26/2020).          ___________________________________________________    Attending Physician: Isaiah Person MD

## 2020-09-09 DIAGNOSIS — G35 MS (MULTIPLE SCLEROSIS) (HCC): ICD-10-CM

## 2020-09-09 RX ORDER — PYRIDOSTIGMINE BROMIDE 180 MG/1
TABLET, EXTENDED RELEASE ORAL
Qty: 180 TAB | Refills: 0 | Status: SHIPPED | OUTPATIENT
Start: 2020-09-09 | End: 2021-03-08 | Stop reason: SDUPTHER

## 2020-09-10 ENCOUNTER — PATIENT OUTREACH (OUTPATIENT)
Dept: CASE MANAGEMENT | Age: 65
End: 2020-09-10

## 2020-09-10 RX ORDER — RIZATRIPTAN BENZOATE 10 MG/1
10 TABLET ORAL
COMMUNITY
End: 2021-02-16 | Stop reason: ALTCHOICE

## 2020-09-10 NOTE — PROGRESS NOTES
Patient called to review med list. Had some changes after seeing neurologist.   Has been having worsening headaches so changes were made. [place] started at beginning of month, has helped decrease the intensity of the headaches. Has definitely noticed improvement. Tired of the isolation required from the pandemic. Understands the need, especially due to her medical problems. Is concerned about cost of Aimovig, was $85 for first month. Can manage that amount but worried it will increase. Advised the neuro office may be able to help if that happens with patient assistance. Advised to continue POC. Call back prn.

## 2020-09-21 ENCOUNTER — TELEPHONE (OUTPATIENT)
Dept: FAMILY MEDICINE CLINIC | Age: 65
End: 2020-09-21

## 2020-09-21 NOTE — TELEPHONE ENCOUNTER
----- Message from Alfonzo Saeed sent at 9/21/2020  2:40 PM EDT -----  Regarding: Dr. Cee Arriaza first and last name: Pt   Reason for call: Stated that PCP can mail lab orders to her if she needs to get it done before scheduled appt on 10/23.   Callback required yes/no and why: yes  Best contact number(s): [phone]  Details to clarify the request: N/A

## 2020-09-22 ENCOUNTER — TELEPHONE (OUTPATIENT)
Dept: NEUROLOGY | Age: 65
End: 2020-09-22

## 2020-09-22 NOTE — TELEPHONE ENCOUNTER
She had blood work done on 8/3 and it's too soon to recheck (should be every 3 months). Does she want to postpone her appt so it's later in November?

## 2020-09-22 NOTE — TELEPHONE ENCOUNTER
----- Message from Albert Morales sent at 9/22/2020 12:33 PM EDT -----  Regarding: Dr. Judie Gates Message/Vendor Calls    Caller's first and last name:Hiwot Galvin      Reason for call:medication      Callback required yes/no and why:yes      Best contact number(s):[phone]      Details to clarify the request:Pt stated she was advised by the doctor to call in 2 wks to let him know how meds are working for her (\"Bromocriptine\" and \"Rizatriptan\") but the \"Rizatriptan she is not taking due to interaction, and was advised by the pharmacist not to take both at same time. Pt also stated the \"Aimovig\" worked well for her headaches.       Albert Morales

## 2020-09-28 NOTE — TELEPHONE ENCOUNTER
Returned call to patient, ID verified times 2. Patient made aware the Maxalt can be discontinued per Dr. Sheryl Akhtar. Patient verbalized understanding.

## 2020-09-30 ENCOUNTER — TELEPHONE (OUTPATIENT)
Dept: NEUROLOGY | Age: 65
End: 2020-09-30

## 2020-09-30 RX ORDER — ERENUMAB-AOOE 140 MG/ML
INJECTION, SOLUTION SUBCUTANEOUS
Qty: 3 ML | Refills: 3 | Status: SHIPPED | OUTPATIENT
Start: 2020-09-30 | End: 2021-10-26 | Stop reason: SDUPTHER

## 2020-09-30 NOTE — TELEPHONE ENCOUNTER
Patient called to chk on the status of the PA for aimovig.  Please is due for her injection tomorrow

## 2020-09-30 NOTE — TELEPHONE ENCOUNTER
Re: Jackson Gallegos approved     Approval rec'd from [facility] via [facility]    Effective  08/31/20-09/30/21  PA- [id number]    Patient called and given approval info

## 2020-10-19 RX ORDER — BROMOCRIPTINE MESYLATE 5 MG/1
CAPSULE ORAL
Qty: 30 CAP | Refills: 1 | Status: SHIPPED | OUTPATIENT
Start: 2020-10-19 | End: 2020-12-07

## 2020-10-20 RX ORDER — METFORMIN HYDROCHLORIDE 500 MG/1
TABLET, EXTENDED RELEASE ORAL
Qty: 90 TAB | Refills: 1 | Status: SHIPPED | OUTPATIENT
Start: 2020-10-20 | End: 2020-11-24 | Stop reason: SDUPTHER

## 2020-10-21 ENCOUNTER — TRANSCRIBE ORDER (OUTPATIENT)
Dept: INTERNAL MEDICINE CLINIC | Age: 65
End: 2020-10-21

## 2020-10-21 RX ORDER — FLUDROCORTISONE ACETATE 0.1 MG/1
TABLET ORAL
Qty: 90 TAB | Refills: 1 | Status: SHIPPED | OUTPATIENT
Start: 2020-10-21 | End: 2021-02-16 | Stop reason: ALTCHOICE

## 2020-10-21 RX ORDER — TOPIRAMATE 200 MG/1
200 TABLET ORAL 2 TIMES DAILY
Qty: 180 TAB | Refills: 2 | Status: SHIPPED | OUTPATIENT
Start: 2020-10-21 | End: 2021-07-30 | Stop reason: SDUPTHER

## 2020-10-28 ENCOUNTER — VIRTUAL VISIT (OUTPATIENT)
Dept: NEUROLOGY | Age: 65
End: 2020-10-28
Payer: MEDICARE

## 2020-10-28 DIAGNOSIS — G54.1 LUMBOSACRAL PLEXOPATHY: ICD-10-CM

## 2020-10-28 DIAGNOSIS — G35 MS (MULTIPLE SCLEROSIS) (HCC): Primary | ICD-10-CM

## 2020-10-28 DIAGNOSIS — R29.6 FREQUENT FALLS: ICD-10-CM

## 2020-10-28 DIAGNOSIS — G61.81 CIDP (CHRONIC INFLAMMATORY DEMYELINATING POLYNEUROPATHY) (HCC): ICD-10-CM

## 2020-10-28 DIAGNOSIS — R42 DIZZINESS: ICD-10-CM

## 2020-10-28 DIAGNOSIS — R26.9 GAIT DISORDER: ICD-10-CM

## 2020-10-28 DIAGNOSIS — G70.00 MYASTHENIA GRAVIS (HCC): ICD-10-CM

## 2020-10-28 DIAGNOSIS — R25.1 TREMOR: ICD-10-CM

## 2020-10-28 PROCEDURE — G9899 SCRN MAM PERF RSLTS DOC: HCPCS | Performed by: PSYCHIATRY & NEUROLOGY

## 2020-10-28 PROCEDURE — G8427 DOCREV CUR MEDS BY ELIG CLIN: HCPCS | Performed by: PSYCHIATRY & NEUROLOGY

## 2020-10-28 PROCEDURE — G8756 NO BP MEASURE DOC: HCPCS | Performed by: PSYCHIATRY & NEUROLOGY

## 2020-10-28 PROCEDURE — G9717 DOC PT DX DEP/BP F/U NT REQ: HCPCS | Performed by: PSYCHIATRY & NEUROLOGY

## 2020-10-28 PROCEDURE — 3017F COLORECTAL CA SCREEN DOC REV: CPT | Performed by: PSYCHIATRY & NEUROLOGY

## 2020-10-28 PROCEDURE — G8417 CALC BMI ABV UP PARAM F/U: HCPCS | Performed by: PSYCHIATRY & NEUROLOGY

## 2020-10-28 PROCEDURE — 99214 OFFICE O/P EST MOD 30 MIN: CPT | Performed by: PSYCHIATRY & NEUROLOGY

## 2020-10-28 NOTE — PROGRESS NOTES
Neurology Progress Note  Olivier Galvin was seen by synchronous (real-time) audio-video technology on 10/28/20    Consent:  She  is aware that this patient-initiated Telehealth encounter is a billable service, with coverage as determined by her insurance carrier. She is aware that she may receive a bill and has provided verbal consent to proceed: Yes    I was in the office while conducting this encounter. Pursuant to the emergency declaration under the [address] [place] [address] [facility] authority and the [employer] Act, this Virtual  Visit was conducted, with patient's consent, to reduce the patient's risk of exposure to COVID-19 and provide continuity of care for an established patient. Services were provided through a video synchronous discussion virtually to substitute for in-person clinic visit. NAME:  Olivier Galvin   :   1955   MRN:   [id number]     Date/Time:  10/28/2020  Subjective:     Olivier Galvin is a 63 y.o. female here today for follow-up for MS, MG, chronic pain syndrome, headaches, CVA, difficulty walking, drowsiness, fall. Patient today says even though she has not had any falls since the last visit, since the last fall, however, she is still very unsteady. No fall reported since the last visit. As  dizziness has improved since since starting on Florinef patient has not had dizzy feeling, even after gradually tapering Florinef, I will discontinue Florinef at this time. MRI of the brain reviewed with patient was unremarkable  She says she is still having increasing left arm pain and difficulty ambulating. She also having thigh numbness of the extremities. .  She noted that her eyes tend to be weak and she sees double, double vision usually associated with fatigue.   However she continues to have a lot of pain but not as much as before, pain is sharp in nature, diffuse, burning sensation that goes up to the arms causing numbness and tingling sensation and weakness of the hands, down to the legs causing  numbness and tingling sensation of the legs with weakness of the legs. I will refer patient to home health to evaluate for physical therapy, Occupational Therapy, speech therapy and medication management, continue treatment . Patient says headache is still frequent, it is mostly frontal, throbbing in nature, with occasional sharp pain from the back of the head, headache associated with dizziness, nausea, blurry vision, double vision, photophobia, phonophobia. She says she has a lot of muscle spasms mostly in the back. She denies loss of consciousness. Says dysphagia has improved. Patient continues to smoke cigarettes.   Review of Systems - General ROS: positive for  - fatigue, night sweats and sleep disturbance  Psychological ROS: positive for - anxiety, concentration difficulties, depression, mood swings and sleep disturbances  Ophthalmic ROS: positive for - blurry vision, decreased vision, double vision and photophobia  ENT ROS: positive for - headaches, tinnitus, vertigo and visual changes  Allergy and Immunology ROS: negative  Hematological and Lymphatic ROS: negative  Endocrine ROS: negative  Respiratory ROS: no cough, shortness of breath, or wheezing  Cardiovascular ROS: no chest pain or dyspnea on exertion  Gastrointestinal ROS: no abdominal pain, change in bowel habits, or black or bloody stools  Genito-Urinary ROS: no dysuria, trouble voiding, or hematuria  Musculoskeletal ROS: positive for - gait disturbance, joint pain, joint stiffness, joint swelling and muscle pain  Neurological ROS: positive for - dizziness, gait disturbance, headaches, impaired coordination/balance, numbness/tingling, speech problems, tremors, visual changes and weakness  Dermatological ROS: negative           Medications reviewed:  Current Outpatient Medications   Medication Sig Dispense Refill    topiramate (TOPAMAX) 200 mg tablet Take 1 Tab by mouth two (2) times a day. 180 Tab 2    fludrocortisone (FLORINEF) 0.1 mg tablet TAKE 1 TABLET BY MOUTH DAILY 90 Tab 1    metFORMIN ER (GLUCOPHAGE XR) 500 mg tablet TAKE 1 TABLET BY MOUTH DAILY WITH BREAKFAST 90 Tab 1    levothyroxine (SYNTHROID) 150 mcg tablet TAKE 1 TABLET BY MOUTH DAILY BEFORE BREAKFAST 90 Tab 2    bromocriptine (PARLODEL) 5 mg capsule TAKE 1 CAPSULE BY MOUTH DAILY. 30 Cap 1    Aimovig Autoinjector 140 mg/mL injection ADMINISTER 1 ML(140MG) UNDER THE SKIN EVERY 30 DAYS 3 mL 3    rizatriptan (MAXALT) 10 mg tablet Take 10 mg by mouth once as needed for Migraine. May repeat in 2 hours if needed   Do not take within in 24 hours of taking Parlodel.  pyridostigmine bromide (MESTINON SR) 180 mg SR tablet TAKE 1 TABLET BY MOUTH TWICE DAILY 180 Tab 0    cyanocobalamin (Vitamin B-12) 1,000 mcg tablet Take 1 Tab by mouth daily. 30 Tab 2    Gilenya 0.5 mg cap Take 1 Cap by mouth daily. 90 Cap 3    pregabalin (Lyrica) 200 mg capsule Take 1 Cap by mouth two (2) times a day. Max Daily Amount: 400 mg. 180 Cap 2    PARoxetine (PAXIL) 40 mg tablet TAKE 1 AND 1/2 TABLETS BY MOUTH EVERY NIGHT 135 Tab 3    dextroamphetamine-amphetamine (ADDERALL) 20 mg tablet Take 1 Tab by mouth daily. Max Daily Amount: 20 mg. 30 Tab 0    rosuvastatin (CRESTOR) 40 mg tablet Take 1 Tab by mouth daily. DOSE CHANGE 90 Tab 2    corticotropin (ACTHAR H.P.) 80 unit/mL injectable gel 1 mL by SubCUTAneous route daily. 80 units subq q day for 10 days 10 mL 1    ondansetron (ZOFRAN ODT) 4 mg disintegrating tablet DISSOLVE 1 TABLET ON THE TONGUE EVERY 8 HOURS AS NEEDED FOR NAUSEA 15 Tab 0    glucose blood VI test strips (BLOOD GLUCOSE TEST) strip 100 Each by Does Not Apply route See Admin Instructions. One Touch Ultra Test Strips=Check blood sugar daily dx E11.8 100 Strip 1    aspirin delayed-release 81 mg tablet Take 1 Tab by mouth every twelve (12) hours.  60 Tab 0    OTHER 0.25 mL by SubLINGual route daily. CBD OIL      dantrolene (DANTRIUM) 100 mg capsule Take 1 Cap by mouth three (3) times daily. (Patient taking differently: Take 100 mg by mouth daily as needed. Indications: muscle spasm) 90 Cap 3    albuterol (PROVENTIL VENTOLIN) 2.5 mg /3 mL (0.083 %) nebulizer solution 3 mL by Nebulization route every six (6) hours as needed for Wheezing. 30 Each 0    Menthol-Zinc Oxide (CALMOSEPTINE) 0.44-20.6 % oint Apply  to affected area as needed.  nystatin (MYCOSTATIN) powder Apply to candidal lesions TOPICALLY 2 to 3 times daily until healing complete 30 g 0    lidocaine (LIDODERM) 5 % Apply patch to the affected area for 12 hours a day and remove for 12 hours a day. 30 Each 3    clindamycin (CLEOCIN) 300 mg capsule Take 300 mg by mouth. Prior to dental procedures      TENS UNIT ELECTRODES by Does Not Apply route. prn      predniSONE (DELTASONE) 20 mg tablet 60 mg p.o. daily for 5 days, 40 mg p.o. daily for 4 days, then 20 mg p.o. daily for 3 days. 30 Tab 0    buprenorphine HCl (BELBUCA) 150 mcg film by Buccal route three (3) times daily as needed. Indications: per patient she is taking 2mg sublingual daily          Objective:   Vitals: There were no vitals filed for this visit.             Lab Data Reviewed:  Lab Results   Component Value Date/Time    WBC 4.3 08/03/2020 08:05 AM    HCT 42.7 08/03/2020 08:05 AM    HGB 13.9 08/03/2020 08:05 AM    PLATELET 849 (L) 12/90/0795 08:05 AM       Lab Results   Component Value Date/Time    Sodium 140 08/03/2020 08:05 AM    Potassium 4.5 08/03/2020 08:05 AM    Chloride 107 (H) 08/03/2020 08:05 AM    CO2 19 (L) 08/03/2020 08:05 AM    Glucose 106 (H) 08/03/2020 08:05 AM    BUN 15 08/03/2020 08:05 AM    Creatinine 0.94 08/03/2020 08:05 AM    Calcium 8.9 08/03/2020 08:05 AM       No components found for: TROPQUANT    No results found for: SHAHIDA      Lab Results   Component Value Date/Time    Hemoglobin A1c 6.5 (H) 08/03/2020 08:05 AM    Hemoglobin A1c (POC) 6.3 07/05/2019 01:51 PM        Lab Results   Component Value Date/Time    Vitamin B12 436 06/10/2019 10:41 AM    Folate 12.0 02/10/2016       No results found for: Sita PINEDO XBANA    Lab Results   Component Value Date/Time    Cholesterol, total 298 (H) 08/03/2020 08:05 AM    HDL Cholesterol 45 08/03/2020 08:05 AM    LDL-CHOLESTEROL 91 12/26/2018    LDL, calculated 208 (H) 08/03/2020 08:05 AM    VLDL, calculated 45 (H) 08/03/2020 08:05 AM    Triglyceride 223 (H) 08/03/2020 08:05 AM    Triglyceride 126 12/26/2018    CHOL/HDL Ratio 4.3 08/23/2010 12:19 PM         CT Results (recent):  Results from [place] encounter on 11/23/19   CT NECK SOFT TISSUE W CONT    Narrative EXAM:  CT NECK SOFT TISSUE W CONT    INDICATION:  Dysphasia. Status post thyroidectomy. COMPARISON: CT neck 5/14/2018. CONTRAST: 100 mL of Isovue-300. TECHNIQUE: Multislice helical CT was performed from the mid calvarium to the  aortic arch during uneventful rapid bolus intravenous contrast administration. Contiguous 2.5 mm axial images were reconstructed and lung and soft tissue  windows were generated. Coronal and sagittal reformations were generated. CT  dose reduction was achieved through use of a standardized protocol tailored for  this examination and automatic exposure control for dose modulation. 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 No evidence of recurrent tissue in this region. No cervical  lymphadenopathy. Visualized brain parenchyma is normal in appearance. Paranasal sinuses and  mastoid air cells are clear. Intraorbital contents are unremarkable. The  cervical vasculature is patent. No acute fracture or aggressive osseous lesion. Multilevel degenerative disc disease most advanced at C3-C4, C5-C6 and C6-C7 is  unchanged. Visualized portions of the lung apices are normal. Right chest port  is noted. Impression IMPRESSION:   1. 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 No evidence of local recurrence. No cervical lymphadenopathy or other soft  tissue abnormality in the neck. MRI Results (recent):  Results from [place] encounter on 08/08/20   MRI BRAIN W WO CONT    Narrative Clinical history: Fatigue, drowsiness  INDICATION:   Fatigue, drowsiness, multiple sclerosis, prior CVA    COMPARISON: 12/20/2018  TECHNIQUE: MR examination of the brain includes axial and sagittal T1 , axial  T2, axial FLAIR, axial gradient echo, axial DWI, coronal T1 . Pre and post  contrast axial T1-weighted imaging. Postcontrast T1-weighted imaging coronal  plane. CONTRAST: The patient was administered gadolinium-based contrast material,  subsequently axial and sagittal T1-weighted postcontrast imaging was obtained. FINDINGS:   There is no intracranial mass, hemorrhage or acute infarction. Scattered foci of abnormal increased T2 signal intensity predominantly  superiorly, subcortical left parietal lobe but also noted in the frontal lobes  on the right and on the left. No associated abnormal postcontrast enhancement. No new diffusion restriction. Air-fluid level in the right maxillary sinus. IACs are symmetric in size. . There  is no abnormal parenchymal enhancement. There is no abnormal meningeal  enhancement demonstrated. The brain architecture is normal. There is no evidence  of midline shift or mass-effect. The ventricles are normal in size, position and  configuration. There are no extra-axial fluid collections. Major intracranial  vascular flow-voids are unremarkable. The orbits are grossly symmetric. Dural  venous sinuses are grossly unremarkable. There is no Chiari or syrinx. Pituitary  and infundibulum grossly unremarkable. Cerebellopontine angles are unremarkable. No skull base mass is identified. Cavernous sinuses are symmetric. The mastoid  air cells and are well pneumatized and clear. Impression IMPRESSION:  Scattered foci of abnormal increased T2 signal intensity predominantly  subcortical and superior adjacent to the vertex. Stable overall appearance  compared to the 12/26/2018 examination. No postcontrast enhancement or diffusion  restriction to suggest active demyelination. Right maxillary sinus disease. No intracranial mass, hemorrhage or evidence of acute infarction. IR Results (recent):  No results found for this or any previous visit. VAS/US Results (recent):  Results from Hospital Encounter encounter on 01/19/17   DUPLEX LOWER EXT VENOUS RIGHT   PHYSICAL EXAM:  General:    Alert, cooperative, no distress, appears stated age. Head:   Normocephalic, without obvious abnormality, atraumatic. Eyes:   Conjunctivae/corneas clear. Nose:  Nares normal. .  Throat:    Lips and tongue normal.    Neck:  Symmetrical,  no adenopathy, thyroid. no JVD. Back:    Symmetric. .  Lungs:   Deferred. .  Chest wall:   No Accessory muscle use. Heart:   Deferred. Abdomen:   No masses  Extremities: Extremities normal, atraumatic, No cyanosis. No edema. No clubbing  Skin:      No rashes or lesions. Lymph nodes: Cervical, supraclavicular normal.  Psych:  Good insight. Depressed. Anxious. NEUROLOGICAL EXAM:  Appearance: The patient is well developed, well nourished, provides a coherent history and is in no acute distress. Mental Status: Oriented to time, place and person. Mood and affect appropriate. Cranial Nerves:   Intact visual fields., EOM's full, no nystagmus, no ptosis. . Facial movement is symmetric. Hearing is normal bilaterally. Tongue is midline. Motor:   Moves all extremities. No fasciculations. Reflexes:   Deferred. Sensory:   Deferred. Gait:   Unsteady gait. Tremor:   Tremor noted. Cerebellar:  No cerebellar signs present. Assesment  1. MS (multiple sclerosis) ([facility]  Continue management    2.  CIDP (chronic inflammatory demyelinating polyneuropathy) ([facility])  Stable  3. [facility]  Stable    4. Dizziness    - REFERRAL TO HOME HEALTH    5. Gait disorder    - REFERRAL TO HOME HEALTH    6. Frequent falls    - [facility]    7. Lumbosacral plexopathy    - REFERRAL TO HOME HEALTH    8. Chronic migraine  Continue management    9. Tremor    - REFERRAL TO HOME HEALTH    ___________________________________________________  PLAN: Medication and plan discussed with patient        ICD-10-CM ICD-9-CM    1. MS (multiple sclerosis) ([facility]  G35 340    2. CIDP (chronic inflammatory demyelinating polyneuropathy) ([facility])  G61.81 357.81    3. Myasthenia gravis ([facility]  G70.00 358.00    4. Dizziness  R42 780.4 REFERRAL TO HOME HEALTH   5. Gait disorder  R26.9 781.2 REFERRAL TO HOME HEALTH   6. Frequent falls  R29.6 V15.88 REFERRAL TO HOME HEALTH   7. Lumbosacral plexopathy  G54.1 353.1 REFERRAL TO HOME HEALTH   8. Chronic migraine  G43.709 346.70    9. Tremor  R25.1 781.0 REFERRAL TO HOME HEALTH     Follow-up and Dispositions    · Return in about 3 months (around 1/28/2021).             :    ___________________________________________________    Attending Physician: Danyn Luque MD

## 2020-10-29 ENCOUNTER — HOME HEALTH ADMISSION (OUTPATIENT)
Dept: HOME HEALTH SERVICES | Facility: HOME HEALTH | Age: 65
End: 2020-10-29

## 2020-10-30 ENCOUNTER — TELEPHONE (OUTPATIENT)
Dept: NEUROLOGY | Age: 65
End: 2020-10-30

## 2020-10-30 ENCOUNTER — TRANSCRIBE ORDER (OUTPATIENT)
Dept: SCHEDULING | Age: 65
End: 2020-10-30

## 2020-10-30 DIAGNOSIS — Z12.31 VISIT FOR SCREENING MAMMOGRAM: Primary | ICD-10-CM

## 2020-10-30 NOTE — TELEPHONE ENCOUNTER
----- Message from Rishi Waller sent at 10/30/2020  2:08 PM EDT -----  Regarding: Dr Dior Ryan first and last name:Debora from  [facility]       Reason for call:they need verification if the pt needs home care, and waiting for MD orders       Callback required yes/no and why:yes for reason given above      Best contact number(s):[phone]       Details to clarify the request:they only keep the order valid for 5 days after that it will , they are having issues with missing signatures on his notes. Which holds up the process.       Rishi Waller

## 2020-11-02 ENCOUNTER — TELEPHONE (OUTPATIENT)
Dept: NEUROLOGY | Age: 65
End: 2020-11-02

## 2020-11-02 NOTE — TELEPHONE ENCOUNTER
----- Message from [facility] sent at 11/2/2020 12:32 PM EST -----  Regarding: Dr. Caro Valladares Message/Vendor Calls    Caller's first and last name: N/A      Reason for call: Order to [facility]      Callback required yes/no and why: Y      Best contact number(s):  [phone]      Details to clarify the request: Patient is requesting a call back to discuss the order sent to [facility], last Wednesday. She said it is very urgent that she speaks with Dr. Lore Smart or one of the nurses about the order.       [facility]

## 2020-11-04 ENCOUNTER — TELEPHONE (OUTPATIENT)
Dept: FAMILY MEDICINE CLINIC | Age: 65
End: 2020-11-04

## 2020-11-04 DIAGNOSIS — E11.9 TYPE 2 DIABETES MELLITUS WITHOUT COMPLICATION, WITHOUT LONG-TERM CURRENT USE OF INSULIN (HCC): Primary | ICD-10-CM

## 2020-11-04 DIAGNOSIS — E78.5 HYPERLIPIDEMIA, UNSPECIFIED HYPERLIPIDEMIA TYPE: ICD-10-CM

## 2020-11-04 NOTE — TELEPHONE ENCOUNTER
Pt needs the [facility] orders sent to her but there is no lab orders on her file and she needs it by tomorrow 11/5.  She gets her labs drawn on Tuesday 11/10    Pt would like them sent to her home address     [facility]# [phone]

## 2020-11-05 ENCOUNTER — TELEPHONE (OUTPATIENT)
Dept: NEUROLOGY | Age: 65
End: 2020-11-05

## 2020-11-05 NOTE — TELEPHONE ENCOUNTER
Returned call to patient, ID verified times 2. Patient stated she was discharged/terminated from care with [facility]. Patient stated she was going to appeal it to [employer]. Patient made aware if the appeal is denied call [facility] referral for PT/OT. Patient verbalized understanding.

## 2020-11-05 NOTE — TELEPHONE ENCOUNTER
----- Message from [facility] sent at 11/5/2020  9:45 AM EST -----  Regarding: Dr. Chowdhury Main Message/Vendor Calls    Caller's first and last name: N/A      Reason for call: Physical Therapy and Occupational Therapy      Callback required yes/no and why: Y      Best contact number(s): [phone] or [phone]      Details to clarify the request: Patient is requesting a call back because [facility] stopped all her physical therapy and occupational therapy services and she needs to know what to do, what is her alternative. They completely discharged her from the agency itself, and she doesn't know why.       [facility]

## 2020-11-06 NOTE — TELEPHONE ENCOUNTER
Patient is having labs drawn in her home. Patient needed requisition sent directly. Asked front office to scan lab order into my chart and sent it as an attachment. Patient aware that it was to be sent and would be on the look out.

## 2020-11-06 NOTE — TELEPHONE ENCOUNTER
Labs orders printed; it would be faster for us to fax lab to the specific lab [facility] location she is going to versus sending it snail mail.

## 2020-11-11 ENCOUNTER — HOSPITAL ENCOUNTER (OUTPATIENT)
Dept: LAB | Age: 65
Discharge: HOME | End: 2020-11-11
Payer: MEDICARE

## 2020-11-11 PROCEDURE — 83036 HEMOGLOBIN GLYCOSYLATED A1C: CPT

## 2020-11-11 PROCEDURE — 80061 LIPID PANEL: CPT

## 2020-11-11 PROCEDURE — 80053 COMPREHEN METABOLIC PANEL: CPT

## 2020-11-12 LAB
ALBUMIN SERPL-MCNC: 4.1 G/DL (ref 3.8–4.8)
ALBUMIN/GLOB SERPL: 2.2 {RATIO} (ref 1.2–2.2)
ALP SERPL-CCNC: 59 IU/L (ref 39–117)
ALT SERPL-CCNC: 15 IU/L (ref 0–32)
AST SERPL-CCNC: 17 IU/L (ref 0–40)
BILIRUB SERPL-MCNC: <0.2 MG/DL (ref 0–1.2)
BUN SERPL-MCNC: 15 MG/DL (ref 8–27)
BUN/CREAT SERPL: 17 (ref 12–28)
CALCIUM SERPL-MCNC: 8.8 MG/DL (ref 8.7–10.3)
CHLORIDE SERPL-SCNC: 108 MMOL/L (ref 96–106)
CHOLEST SERPL-MCNC: 140 MG/DL (ref 100–199)
CO2 SERPL-SCNC: 20 MMOL/L (ref 20–29)
CREAT SERPL-MCNC: 0.9 MG/DL (ref 0.57–1)
EST. AVERAGE GLUCOSE BLD GHB EST-MCNC: 148 MG/DL
GLOBULIN SER CALC-MCNC: 1.9 G/DL (ref 1.5–4.5)
GLUCOSE SERPL-MCNC: 87 MG/DL (ref 65–99)
HBA1C MFR BLD: 6.8 % (ref 4.8–5.6)
HDLC SERPL-MCNC: 54 MG/DL
INTERPRETATION, 910389: NORMAL
LDLC SERPL CALC-MCNC: 67 MG/DL (ref 0–99)
POTASSIUM SERPL-SCNC: 4.1 MMOL/L (ref 3.5–5.2)
PROT SERPL-MCNC: 6 G/DL (ref 6–8.5)
SODIUM SERPL-SCNC: 141 MMOL/L (ref 134–144)
TRIGL SERPL-MCNC: 101 MG/DL (ref 0–149)
VLDLC SERPL CALC-MCNC: 19 MG/DL (ref 5–40)

## 2020-11-18 DIAGNOSIS — G35 MS (MULTIPLE SCLEROSIS) (HCC): ICD-10-CM

## 2020-11-18 NOTE — TELEPHONE ENCOUNTER
----- Message from Ana Luisa Levi Page sent at 11/18/2020 10:52 AM EST -----  Regarding: Dr. Williams Sotelo Message/Vendor Calls    Caller's first and last name: Patient      Reason for call: Incomplete paperwork      Callback required yes/no and why: No. Patient notification see below      Best contact number(s): [phone]      Details to clarify the request: Patient is sending paper work to the office by her son to have missing information added and refaxed. Patient's son will bring paperwork on Wednesday or Thursday.       Lyudmila Sanchez

## 2020-11-21 RX ORDER — PREGABALIN 200 MG/1
200 CAPSULE ORAL 2 TIMES DAILY
Qty: 180 CAP | Refills: 1 | Status: CANCELLED | OUTPATIENT
Start: 2020-11-21

## 2020-11-22 RX ORDER — FINGOLIMOD HCL 0.5 MG/1
1 CAPSULE ORAL DAILY
Qty: 90 CAP | Refills: 3 | Status: SHIPPED | OUTPATIENT
Start: 2020-11-22 | End: 2021-03-05 | Stop reason: SDUPTHER

## 2020-11-23 DIAGNOSIS — E78.5 HYPERLIPIDEMIA LDL GOAL <100: ICD-10-CM

## 2020-11-23 RX ORDER — ROSUVASTATIN CALCIUM 40 MG/1
TABLET, COATED ORAL
Qty: 90 TAB | Refills: 2 | Status: SHIPPED | OUTPATIENT
Start: 2020-11-23 | End: 2021-07-30

## 2020-11-24 ENCOUNTER — VIRTUAL VISIT (OUTPATIENT)
Dept: FAMILY MEDICINE CLINIC | Age: 65
End: 2020-11-24
Payer: MEDICARE

## 2020-11-24 DIAGNOSIS — E78.5 HYPERLIPIDEMIA, UNSPECIFIED HYPERLIPIDEMIA TYPE: ICD-10-CM

## 2020-11-24 DIAGNOSIS — E11.9 TYPE 2 DIABETES MELLITUS WITHOUT COMPLICATION, WITHOUT LONG-TERM CURRENT USE OF INSULIN (HCC): Primary | ICD-10-CM

## 2020-11-24 DIAGNOSIS — R05.9 COUGH: ICD-10-CM

## 2020-11-24 PROCEDURE — 99442 PR PHYS/QHP TELEPHONE EVALUATION 11-20 MIN: CPT | Performed by: FAMILY MEDICINE

## 2020-11-24 RX ORDER — METFORMIN HYDROCHLORIDE 500 MG/1
TABLET, EXTENDED RELEASE ORAL
Qty: 180 TAB | Refills: 1 | Status: SHIPPED | OUTPATIENT
Start: 2020-11-24 | End: 2021-05-21 | Stop reason: SDUPTHER

## 2020-11-24 NOTE — PROGRESS NOTES
Monie Cooper is a 63 y.o. female, evaluated via audio-only technology on 11/24/2020 for Diabetes  . Assessment & Plan:     Monie Cooper is a 63 y.o. female who presents today for:    1. Type 2 diabetes mellitus without complication, without long-term current use of insulin (HCC)  Increase metformin to 1000 mg/day. - metFORMIN ER (GLUCOPHAGE XR) 500 mg tablet; TAKE 2 TABLETS BY MOUTH DAILY WITH BREAKFAST  Dispense: 180 Tab; Refill: 1    2. Hyperlipidemia, unspecified hyperlipidemia type  Stable, continue current treatment. 3. Cough  Can try Mucinex or antihistamine. If persistent for another month, consider CXR. Medications Discontinued During This Encounter   Medication Reason    metFORMIN ER (GLUCOPHAGE XR) 500 mg tablet REORDER       Follow-up and Dispositions    · Return in about 3 months (around 2/24/2021) for DM follow up. Treatment risks/benefits/costs/interactions/alternatives discussed with patient. Advised patient to call back or return to office if symptoms worsen/change/persist. If patient cannot reach us or should anything more severe/urgent arise he/she should proceed directly to the nearest emergency department. Discussed expected course/resolution/complications of diagnosis in detail with patient. Patient expressed understanding with the diagnosis and plan. Brandon Stewart M.D.        12  Subjective:     She is seen for diabetes and hyperlipidemia. Since last visit she reports no significant changes. Home glucose monitoring: is not performed. She reports medication compliance: compliant all of the time. Medication side effects: none. Diabetic diet compliance: noncompliant some of the time. Lab review: labs reviewed and discussed with patient. Eye exam: overdue. Reports one month hx of wet cough, seems clear. Had URI symptoms in the initial stage, now better. Continues to smoke cigarettes. Denies CP, fevers, wheezing, SOB.     Lab Results   Component Value Date/Time    Hemoglobin A1c 6.8 (H) 11/11/2020 10:00 AM    Hemoglobin A1c (POC) 6.3 07/05/2019 01:51 PM       Prior to Admission medications    Medication Sig Start Date End Date Taking? Authorizing Provider   metFORMIN ER (GLUCOPHAGE XR) 500 mg tablet TAKE 2 TABLETS BY MOUTH DAILY WITH BREAKFAST 11/24/20  Yes Dana Rahman MD   rosuvastatin (CRESTOR) 40 mg tablet TAKE 1 TABLET BY MOUTH EVERY DAY 11/23/20   Dana Rahman MD   Gilenya 0.5 mg cap Take 1 Cap by mouth daily. 11/22/20   Los Kahn MD   topiramate (TOPAMAX) 200 mg tablet Take 1 Tab by mouth two (2) times a day. 10/21/20   Los Kahn MD   fludrocortisone (FLORINEF) 0.1 mg tablet TAKE 1 TABLET BY MOUTH DAILY 10/21/20   Los Kahn MD   metFORMIN ER (GLUCOPHAGE XR) 500 mg tablet TAKE 1 TABLET BY MOUTH DAILY WITH BREAKFAST 10/20/20 11/24/20  Dana Rahman MD   levothyroxine (SYNTHROID) 150 mcg tablet TAKE 1 TABLET BY MOUTH DAILY BEFORE BREAKFAST 10/19/20   Dana Rahman MD   bromocriptine (PARLODEL) 5 mg capsule TAKE 1 CAPSULE BY MOUTH DAILY. 10/19/20   Los Kahn MD   Aimovig Autoinjector 140 mg/mL injection ADMINISTER 1 ML(140MG) UNDER THE SKIN EVERY 30 DAYS 9/30/20   Los Kahn MD   rizatriptan (MAXALT) 10 mg tablet Take 10 mg by mouth once as needed for Migraine. May repeat in 2 hours if needed   Do not take within in 24 hours of taking Parlodel. Provider, Historical   pyridostigmine bromide (MESTINON SR) 180 mg SR tablet TAKE 1 TABLET BY MOUTH TWICE DAILY 9/9/20   Los Kahn MD   cyanocobalamin (Vitamin B-12) 1,000 mcg tablet Take 1 Tab by mouth daily. 8/26/20   Los Kahn MD   predniSONE (DELTASONE) 20 mg tablet 60 mg p.o. daily for 5 days, 40 mg p.o. daily for 4 days, then 20 mg p.o. daily for 3 days. 8/3/20   Los Kahn MD   pregabalin (Lyrica) 200 mg capsule Take 1 Cap by mouth two (2) times a day.  Max Daily Amount: 400 mg. 7/1/20   Los Kahn MD   PARoxetine (PAXIL) 40 mg tablet TAKE 1 AND 1/2 TABLETS BY MOUTH EVERY NIGHT 6/10/20   Los Kahn MD   dextroamphetamine-amphetamine (ADDERALL) 20 mg tablet Take 1 Tab by mouth daily. Max Daily Amount: 20 mg. 2/13/20   Los Kahn MD   corticotropin (ACTHAR H.P.) 80 unit/mL injectable gel 1 mL by SubCUTAneous route daily. 80 units subq q day for 10 days 11/8/19   Los Kahn MD   buprenorphine HCl (BELBUCA) 150 mcg film by Buccal route three (3) times daily as needed. Indications: per patient she is taking 2mg sublingual daily    Provider, Historical   ondansetron (ZOFRAN ODT) 4 mg disintegrating tablet DISSOLVE 1 TABLET ON THE TONGUE EVERY 8 HOURS AS NEEDED FOR NAUSEA 9/24/19   Lucero Sampson MD   glucose blood VI test strips (BLOOD GLUCOSE TEST) strip 100 Each by Does Not Apply route See Admin Instructions. One Touch Ultra Test Strips=Check blood sugar daily dx E11.8 8/11/19   Roberto Carlos Laughlin NP   aspirin delayed-release 81 mg tablet Take 1 Tab by mouth every twelve (12) hours. 5/14/19   KATIANA Paz   OTHER 0.25 mL by SubLINGual route daily. CBD OIL    Provider, Historical   dantrolene (DANTRIUM) 100 mg capsule Take 1 Cap by mouth three (3) times daily. Patient taking differently: Take 100 mg by mouth daily as needed. Indications: muscle spasm 3/19/19   Los Kahn MD   albuterol (PROVENTIL VENTOLIN) 2.5 mg /3 mL (0.083 %) nebulizer solution 3 mL by Nebulization route every six (6) hours as needed for Wheezing. 12/31/18   Lucero Sampson MD   Menthol-Zinc Oxide (CALMOSEPTINE) 0.44-20.6 % oint Apply  to affected area as needed. Provider, Historical   nystatin (MYCOSTATIN) powder Apply to candidal lesions TOPICALLY 2 to 3 times daily until healing complete 10/25/18   Lucero Sampson MD   lidocaine (LIDODERM) 5 % Apply patch to the affected area for 12 hours a day and remove for 12 hours a day. 8/15/18   Los Kahn MD   clindamycin (CLEOCIN) 300 mg capsule Take 300 mg by mouth.  Prior to dental procedures 6/30/17   Provider, Historical   TENS UNIT ELECTRODES by Does Not Apply route. prn    Provider, Historical       Review of Systems   Constitutional: Negative for fever, malaise/fatigue and weight loss. Respiratory: Positive for cough. Negative for hemoptysis, shortness of breath and wheezing. Cardiovascular: Negative for chest pain, palpitations, leg swelling and PND. Gastrointestinal: Negative for abdominal pain, constipation, diarrhea, nausea and vomiting. No flowsheet data found. Hiwot Galvin, who was evaluated through a patient-initiated, synchronous (real-time) audio only encounter, and/or her healthcare decision maker, is aware that it is a billable service, with coverage as determined by her insurance carrier. She provided verbal consent to proceed: Yes. She has not had a related appointment within my department in the past 7 days or scheduled within the next 24 hours.       Total Time: minutes: 11-20 minutes    Mike Pierre MD

## 2020-12-07 RX ORDER — BROMOCRIPTINE MESYLATE 5 MG/1
CAPSULE ORAL
Qty: 30 CAP | Refills: 1 | Status: SHIPPED | OUTPATIENT
Start: 2020-12-07 | End: 2021-04-08 | Stop reason: SDUPTHER

## 2020-12-12 ENCOUNTER — HOSPITAL ENCOUNTER (OUTPATIENT)
Dept: MAMMOGRAPHY | Age: 65
Discharge: HOME | End: 2020-12-12
Attending: FAMILY MEDICINE

## 2020-12-12 DIAGNOSIS — Z12.31 VISIT FOR SCREENING MAMMOGRAM: ICD-10-CM

## 2020-12-14 ENCOUNTER — TELEPHONE (OUTPATIENT)
Dept: FAMILY MEDICINE CLINIC | Age: 65
End: 2020-12-14

## 2020-12-14 DIAGNOSIS — N63.0 BREAST LUMP: Primary | ICD-10-CM

## 2020-12-14 NOTE — TELEPHONE ENCOUNTER
Flakita Burkett w[facility] called stating the pt is feeling a lump on her breast and that her order needs to be changed to a diagnostic order for left breast lump     They are trying to get the pt in on Thursday    BCB# [phone]

## 2020-12-16 ENCOUNTER — ANCILLARY PROCEDURE (OUTPATIENT)
Dept: CARDIOLOGY CLINIC | Age: 65
End: 2020-12-16
Payer: MEDICARE

## 2020-12-16 ENCOUNTER — OFFICE VISIT (OUTPATIENT)
Dept: CARDIOLOGY CLINIC | Age: 65
End: 2020-12-16
Payer: MEDICARE

## 2020-12-16 ENCOUNTER — TELEPHONE (OUTPATIENT)
Dept: NEUROLOGY | Age: 65
End: 2020-12-16

## 2020-12-16 VITALS
HEIGHT: 71 IN | HEART RATE: 76 BPM | DIASTOLIC BLOOD PRESSURE: 70 MMHG | WEIGHT: 194 LBS | RESPIRATION RATE: 18 BRPM | OXYGEN SATURATION: 97 % | BODY MASS INDEX: 27.16 KG/M2 | SYSTOLIC BLOOD PRESSURE: 129 MMHG

## 2020-12-16 VITALS
WEIGHT: 180 LBS | SYSTOLIC BLOOD PRESSURE: 124 MMHG | BODY MASS INDEX: 25.2 KG/M2 | HEIGHT: 71 IN | DIASTOLIC BLOOD PRESSURE: 72 MMHG

## 2020-12-16 DIAGNOSIS — I35.0 AORTIC VALVE STENOSIS, ETIOLOGY OF CARDIAC VALVE DISEASE UNSPECIFIED: ICD-10-CM

## 2020-12-16 DIAGNOSIS — R01.1 SYSTOLIC MURMUR: ICD-10-CM

## 2020-12-16 DIAGNOSIS — E78.2 MIXED HYPERLIPIDEMIA: ICD-10-CM

## 2020-12-16 DIAGNOSIS — R06.02 SHORT OF BREATH ON EXERTION: ICD-10-CM

## 2020-12-16 DIAGNOSIS — E11.8 DM TYPE 2, CONTROLLED, WITH COMPLICATION (HCC): ICD-10-CM

## 2020-12-16 DIAGNOSIS — I35.1 NONRHEUMATIC AORTIC VALVE INSUFFICIENCY: Primary | ICD-10-CM

## 2020-12-16 LAB
AV R PG: 65.19 MMHG
ECHO AO ASC DIAM: 3.39 CM
ECHO AO ROOT DIAM: 3.11 CM
ECHO AR MAX VEL PISA: 403.7 CM/S
ECHO AV AREA PEAK VELOCITY: 2.49 CM2
ECHO AV AREA VTI: 2.69 CM2
ECHO AV AREA/BSA PEAK VELOCITY: 1.2 CM2/M2
ECHO AV AREA/BSA VTI: 1.3 CM2/M2
ECHO AV MEAN GRADIENT: 3.78 MMHG
ECHO AV PEAK GRADIENT: 8.78 MMHG
ECHO AV PEAK VELOCITY: 148.16 CM/S
ECHO AV REGURGITANT PHT: 615.36 MS
ECHO AV VTI: 24.07 CM
ECHO LA AREA 4C: 14.46 CM2
ECHO LA MAJOR AXIS: 2.95 CM
ECHO LA MINOR AXIS: 1.42 CM
ECHO LA VOL 2C: 48.37 ML (ref 22–52)
ECHO LA VOL 4C: 31.02 ML (ref 22–52)
ECHO LA VOL BP: 41.38 ML (ref 22–52)
ECHO LA VOL/BSA BIPLANE: 19.88 ML/M2 (ref 16–28)
ECHO LA VOLUME INDEX A2C: 23.24 ML/M2 (ref 16–28)
ECHO LA VOLUME INDEX A4C: 14.9 ML/M2 (ref 16–28)
ECHO LV E' LATERAL VELOCITY: 8.47 CM/S
ECHO LV E' SEPTAL VELOCITY: 6.5 CM/S
ECHO LV EDV A2C: 107.16 ML
ECHO LV EDV A4C: 98.96 ML
ECHO LV EDV BP: 104.66 ML (ref 56–104)
ECHO LV EDV INDEX A4C: 47.5 ML/M2
ECHO LV EDV INDEX BP: 50.3 ML/M2
ECHO LV EDV NDEX A2C: 51.5 ML/M2
ECHO LV EJECTION FRACTION A2C: 61 PERCENT
ECHO LV EJECTION FRACTION A4C: 64 PERCENT
ECHO LV EJECTION FRACTION BIPLANE: 62.3 PERCENT (ref 55–100)
ECHO LV ESV A2C: 41.95 ML
ECHO LV ESV A4C: 36.08 ML
ECHO LV ESV BP: 39.49 ML (ref 19–49)
ECHO LV ESV INDEX A2C: 20.2 ML/M2
ECHO LV ESV INDEX A4C: 17.3 ML/M2
ECHO LV ESV INDEX BP: 19 ML/M2
ECHO LV INTERNAL DIMENSION DIASTOLIC: 4.65 CM (ref 3.9–5.3)
ECHO LV INTERNAL DIMENSION SYSTOLIC: 3.25 CM
ECHO LV IVSD: 0.92 CM (ref 0.6–0.9)
ECHO LV MASS 2D: 147.6 G (ref 67–162)
ECHO LV MASS INDEX 2D: 70.9 G/M2 (ref 43–95)
ECHO LV POSTERIOR WALL DIASTOLIC: 0.95 CM (ref 0.6–0.9)
ECHO LVOT DIAM: 2.17 CM
ECHO LVOT PEAK GRADIENT: 3.96 MMHG
ECHO LVOT PEAK VELOCITY: 99.47 CM/S
ECHO LVOT SV: 64.7 ML
ECHO LVOT VTI: 17.46 CM
ECHO MV A VELOCITY: 63.28 CM/S
ECHO MV AREA PHT: 3.86 CM2
ECHO MV E DECELERATION TIME (DT): 196.34 MS
ECHO MV E VELOCITY: 44.19 CM/S
ECHO MV E/A RATIO: 0.7
ECHO MV E/E' LATERAL: 5.22
ECHO MV E/E' RATIO (AVERAGED): 6.01
ECHO MV E/E' SEPTAL: 6.8
ECHO MV PRESSURE HALF TIME (PHT): 56.94 MS
ECHO RA MAJOR AXIS: 4.09 CM
ECHO RV INTERNAL DIMENSION: 3.25 CM
ECHO RV TAPSE: 1.84 CM (ref 1.5–2)
LA VOL DISK BP: 39.65 ML (ref 22–52)
LVOT MG: 1.91 MMHG

## 2020-12-16 PROCEDURE — G8417 CALC BMI ABV UP PARAM F/U: HCPCS | Performed by: INTERNAL MEDICINE

## 2020-12-16 PROCEDURE — G8752 SYS BP LESS 140: HCPCS | Performed by: INTERNAL MEDICINE

## 2020-12-16 PROCEDURE — 3017F COLORECTAL CA SCREEN DOC REV: CPT | Performed by: INTERNAL MEDICINE

## 2020-12-16 PROCEDURE — 93306 TTE W/DOPPLER COMPLETE: CPT | Performed by: INTERNAL MEDICINE

## 2020-12-16 PROCEDURE — G8754 DIAS BP LESS 90: HCPCS | Performed by: INTERNAL MEDICINE

## 2020-12-16 PROCEDURE — G9717 DOC PT DX DEP/BP F/U NT REQ: HCPCS | Performed by: INTERNAL MEDICINE

## 2020-12-16 PROCEDURE — 93010 ELECTROCARDIOGRAM REPORT: CPT | Performed by: INTERNAL MEDICINE

## 2020-12-16 PROCEDURE — 3044F HG A1C LEVEL LT 7.0%: CPT | Performed by: INTERNAL MEDICINE

## 2020-12-16 PROCEDURE — G8427 DOCREV CUR MEDS BY ELIG CLIN: HCPCS | Performed by: INTERNAL MEDICINE

## 2020-12-16 PROCEDURE — 99214 OFFICE O/P EST MOD 30 MIN: CPT | Performed by: INTERNAL MEDICINE

## 2020-12-16 PROCEDURE — G0463 HOSPITAL OUTPT CLINIC VISIT: HCPCS | Performed by: INTERNAL MEDICINE

## 2020-12-16 PROCEDURE — 93005 ELECTROCARDIOGRAM TRACING: CPT | Performed by: INTERNAL MEDICINE

## 2020-12-16 PROCEDURE — G9899 SCRN MAM PERF RSLTS DOC: HCPCS | Performed by: INTERNAL MEDICINE

## 2020-12-16 PROCEDURE — 2022F DILAT RTA XM EVC RTNOPTHY: CPT | Performed by: INTERNAL MEDICINE

## 2020-12-16 NOTE — PROGRESS NOTES
[place]: Zee Rodriguez  [phone]    History of Present Illness:    Ms. Galvin is a 64 yo F with multiple sclerosis, myasthenia gravis, type 2 diabetes, mixed hyperlipidemia, hypothyroid, seen in the past for chest pain. Echo in the past that noted mild leakiness of the tricuspid and aortic valve and pulmonary hypertension secondary to chronic tobacco use. On her last visit due to palpitations and chest pain and shortness of breath, proceeded with a stress test that was normal.  Her Holter demonstrated frequent, but benign PVCs and PACs. Importantly with her history of stroke, there was no atrial fibrillation. Her echocardiogram demonstrated normal LV function, but there was moderate aortic regurgitation and mild aortic stenosis and we repeated an echocardiogram today. From a symptom standpoint, she has baseline shortness of breath with exertion, but is unchanged. No chest pain. Rare palpitations. Her echocardiogram today was unchanged with a normal EF and moderate aortic regurgitation, mild aortic stenosis and we discussed the results. Her blood pressure was normal.  She is compensated on exam, II/VI diastolic murmur LSB. Soc hx. Tobacco 1 pack 2-3 days. Have stopped in past.  Fam hx. Younger sister with CAD. Assessment and Plan:    1. Aortic regurgitation. In the moderate range and not contributing. Will have her follow up with a same day echocardiogram in one year for continued surveillance. We discussed the moderate degree severity of aortic regurgitation and the prognosis. 2. PVCs, PACs. Benign. She is asymptomatic from these and no additional evaluation is indicated. 3. Tobacco use. Encouraged cessation. 4. Mixed hyperlipidemia. Tolerating statin. 5. Type 2 diabetes. 6. Multiple sclerosis. 7. Myasthenia gravis.           She  has a past medical history of Arthritis, Benign cyst of left breast (3/21/2016), Blindness and low vision (2004), Cervical spinal stenosis (12/2/2016), Chronic pain, Depression, Diabetes mellitus type 2, controlled ([facility] [address] (9/13/2016), Diet-controlled diabetes mellitus ([address] (1/5/2018), Endometriosis (6/25/2010), FH: breast cancer (6/25/2010), History of CVA (cerebrovascular accident) (6/5/2018), HTN, goal below 140/90 (6/25/2010), Hypercholesterolemia, Hypothyroid (6/25/2010), Incontinence, Lumbosacral plexopathy (6/25/2010), MS (multiple sclerosis) ([facility] [address] (2004), Myasthenia gravis ([facility] [address] (KPA,[id number]), Myasthenia gravis ([facility] [address] (2011), Sleep apnea (6/25/2010), Ureteral calculus (6/25/2010), and Vitamin D deficiency (3/17/2016). All other systems negative except as above. PE  Vitals:    12/16/20 1104   Pulse: 88   Resp: 18   SpO2: 97%   Weight: 194 lb (88 kg)   Height: 5' 11\" (1.803 m)    Body mass index is 27.06 kg/m².    General appearance - alert, well appearing, and in no distress  Mental status - affect appropriate to mood  Eyes - sclera anicteric, moist mucous membranes  Neck - supple, no JVD  Chest - clear to auscultation, no wheezes, rales or rhonchi  Heart - normal rate, regular rhythm, normal S1, S2, I/VI systolic murmur LUSB  Abdomen - soft, nontender, nondistended, no masses or organomegaly  Neurological - no focal deficit  Extremities - peripheral pulses normal, trace pedal edema      Recent Labs:  Lab Results   Component Value Date/Time    Cholesterol, total 140 11/11/2020 10:00 AM    HDL Cholesterol 54 11/11/2020 10:00 AM    LDL-CHOLESTEROL 91 12/26/2018    LDL, calculated 208 (H) 08/03/2020 08:05 AM    LDL Chol Calc (NIH) 67 11/11/2020 10:00 AM    Triglyceride 101 11/11/2020 10:00 AM    Triglyceride 126 12/26/2018    CHOL/HDL Ratio 4.3 08/23/2010 12:19 PM     Lab Results   Component Value Date/Time    Creatinine 0.90 11/11/2020 10:00 AM     Lab Results   Component Value Date/Time    BUN 15 11/11/2020 10:00 AM     Lab Results   Component Value Date/Time    Potassium 4.1 11/11/2020 10:00 AM     Lab Results   Component Value Date/Time Hemoglobin A1c 6.8 (H) 11/11/2020 10:00 AM     Lab Results   Component Value Date/Time    HGB 13.9 08/03/2020 08:05 AM     Lab Results   Component Value Date/Time    PLATELET 713 (L) 81/11/6186 08:05 AM       Reviewed:  Past Medical History:   Diagnosis Date    Arthritis     hip, hands    Benign cyst of left breast 3/21/2016    Blindness and low vision 2004    legally blind from [address]    Cervical spinal stenosis 12/2/2016    Moderate C6-7    Chronic pain     Depression     Diabetes mellitus type 2, controlled ([facility] [address] 9/13/2016    Diet-controlled diabetes mellitus ([facility] [address] 1/5/2018    Endometriosis 6/25/2010    FH: breast cancer 6/25/2010    Chart states FH breast/ovary Ca, CAD,DM     History of CVA (cerebrovascular accident) 6/5/2018    HTN, goal below 140/90 6/25/2010    CURRENTLY NO MEDICATIONS (5/18/17)    Hypercholesterolemia     Hypothyroid 6/25/2010    Incontinence     Lumbosacral plexopathy 6/25/2010    MS (multiple sclerosis) ([facility] [address] 2004    Myasthenia gravis ([facility] [address] Jan,2012    Myasthenia gravis ([facility] [address] 2011    Sleep apnea 6/25/2010    RESOLVED 5/2019    Ureteral calculus 6/25/2010    Vitamin D deficiency 3/17/2016     Social History     Tobacco Use   Smoking Status Current Every Day Smoker    Packs/day: 0.50    Years: 30.00    Pack years: 15.00    Types: Cigarettes   Smokeless Tobacco Never Used     Social History     Substance and Sexual Activity   Alcohol Use No     Allergies   Allergen Reactions    Bee Sting [Sting, Bee] Anaphylaxis     Also allergic to egg products    Penicillins Anaphylaxis    Betadine [Povidone-Iodine] Rash    Egg Itching       Current Outpatient Medications   Medication Sig    bromocriptine (PARLODEL) 5 mg capsule TAKE 1 CAPSULE BY MOUTH DAILY.  metFORMIN ER (GLUCOPHAGE XR) 500 mg tablet TAKE 2 TABLETS BY MOUTH DAILY WITH BREAKFAST    rosuvastatin (CRESTOR) 40 mg tablet TAKE 1 TABLET BY MOUTH EVERY DAY    Gilenya 0.5 mg cap Take 1 Cap by mouth daily.     topiramate (TOPAMAX) 200 mg tablet Take 1 Tab by mouth two (2) times a day.  fludrocortisone (FLORINEF) 0.1 mg tablet TAKE 1 TABLET BY MOUTH DAILY    levothyroxine (SYNTHROID) 150 mcg tablet TAKE 1 TABLET BY MOUTH DAILY BEFORE BREAKFAST    Aimovig Autoinjector 140 mg/mL injection ADMINISTER 1 ML(140MG) UNDER THE SKIN EVERY 30 DAYS    rizatriptan (MAXALT) 10 mg tablet Take 10 mg by mouth once as needed for Migraine. May repeat in 2 hours if needed   Do not take within in 24 hours of taking Parlodel.  pyridostigmine bromide (MESTINON SR) 180 mg SR tablet TAKE 1 TABLET BY MOUTH TWICE DAILY    cyanocobalamin (Vitamin B-12) 1,000 mcg tablet Take 1 Tab by mouth daily.  predniSONE (DELTASONE) 20 mg tablet 60 mg p.o. daily for 5 days, 40 mg p.o. daily for 4 days, then 20 mg p.o. daily for 3 days.  pregabalin (Lyrica) 200 mg capsule Take 1 Cap by mouth two (2) times a day. Max Daily Amount: 400 mg.    PARoxetine (PAXIL) 40 mg tablet TAKE 1 AND 1/2 TABLETS BY MOUTH EVERY NIGHT    dextroamphetamine-amphetamine (ADDERALL) 20 mg tablet Take 1 Tab by mouth daily. Max Daily Amount: 20 mg.    corticotropin (ACTHAR H.P.) 80 unit/mL injectable gel 1 mL by SubCUTAneous route daily. 80 units subq q day for 10 days    buprenorphine HCl (BELBUCA) 150 mcg film by Buccal route three (3) times daily as needed. Indications: per patient she is taking 2mg sublingual daily    ondansetron (ZOFRAN ODT) 4 mg disintegrating tablet DISSOLVE 1 TABLET ON THE TONGUE EVERY 8 HOURS AS NEEDED FOR NAUSEA    glucose blood VI test strips (BLOOD GLUCOSE TEST) strip 100 Each by Does Not Apply route See Admin Instructions. One Touch Ultra Test Strips=Check blood sugar daily dx E11.8    aspirin delayed-release 81 mg tablet Take 1 Tab by mouth every twelve (12) hours.  OTHER 0.25 mL by SubLINGual route daily. CBD OIL    dantrolene (DANTRIUM) 100 mg capsule Take 1 Cap by mouth three (3) times daily.  (Patient taking differently: Take 100 mg by mouth daily as needed. Indications: muscle spasm)    albuterol (PROVENTIL VENTOLIN) 2.5 mg /3 mL (0.083 %) nebulizer solution 3 mL by Nebulization route every six (6) hours as needed for Wheezing.  Menthol-Zinc Oxide (CALMOSEPTINE) 0.44-20.6 % oint Apply  to affected area as needed.  nystatin (MYCOSTATIN) powder Apply to candidal lesions TOPICALLY 2 to 3 times daily until healing complete    lidocaine (LIDODERM) 5 % Apply patch to the affected area for 12 hours a day and remove for 12 hours a day.  clindamycin (CLEOCIN) 300 mg capsule Take 300 mg by mouth. Prior to dental procedures    TENS UNIT ELECTRODES by Does Not Apply route. prn     No current facility-administered medications for this visit.         Lenin Emery MD  [facility] [place]  [address] [facility] [address]  [address]

## 2020-12-31 ENCOUNTER — HOSPITAL ENCOUNTER (OUTPATIENT)
Dept: GENERAL RADIOLOGY | Age: 65
Discharge: HOME | End: 2020-12-31
Attending: FAMILY MEDICINE
Payer: MEDICARE

## 2020-12-31 ENCOUNTER — HOSPITAL ENCOUNTER (OUTPATIENT)
Dept: MAMMOGRAPHY | Age: 65
Discharge: HOME | End: 2020-12-31
Attending: FAMILY MEDICINE
Payer: MEDICARE

## 2020-12-31 DIAGNOSIS — N63.0 BREAST LUMP: ICD-10-CM

## 2020-12-31 DIAGNOSIS — R05.9 COUGH: ICD-10-CM

## 2020-12-31 DIAGNOSIS — N63.20 MASS OF BREAST, LEFT: ICD-10-CM

## 2020-12-31 PROCEDURE — 71046 X-RAY EXAM CHEST 2 VIEWS: CPT

## 2020-12-31 PROCEDURE — 77066 DX MAMMO INCL CAD BI: CPT

## 2020-12-31 PROCEDURE — 76642 ULTRASOUND BREAST LIMITED: CPT

## 2021-01-05 ENCOUNTER — TELEPHONE (OUTPATIENT)
Dept: NEUROLOGY | Age: 66
End: 2021-01-05

## 2021-01-05 ENCOUNTER — OFFICE VISIT (OUTPATIENT)
Dept: NEUROLOGY | Age: 66
End: 2021-01-05
Payer: MEDICARE

## 2021-01-05 ENCOUNTER — HOME HEALTH ADMISSION (OUTPATIENT)
Dept: HOME HEALTH SERVICES | Facility: HOME HEALTH | Age: 66
End: 2021-01-05

## 2021-01-05 ENCOUNTER — VIRTUAL VISIT (OUTPATIENT)
Dept: NEUROLOGY | Age: 66
End: 2021-01-05

## 2021-01-05 DIAGNOSIS — G70.00 MYASTHENIA GRAVIS (HCC): ICD-10-CM

## 2021-01-05 DIAGNOSIS — H81.13 BENIGN PAROXYSMAL POSITIONAL VERTIGO DUE TO BILATERAL VESTIBULAR DISORDER: ICD-10-CM

## 2021-01-05 DIAGNOSIS — R29.6 FREQUENT FALLS: ICD-10-CM

## 2021-01-05 DIAGNOSIS — G35 MS (MULTIPLE SCLEROSIS) (HCC): Primary | ICD-10-CM

## 2021-01-05 DIAGNOSIS — R25.1 TREMOR: ICD-10-CM

## 2021-01-05 DIAGNOSIS — G54.1 LUMBOSACRAL PLEXOPATHY: ICD-10-CM

## 2021-01-05 DIAGNOSIS — G61.81 CIDP (CHRONIC INFLAMMATORY DEMYELINATING POLYNEUROPATHY) (HCC): ICD-10-CM

## 2021-01-05 DIAGNOSIS — G89.4 CHRONIC PAIN SYNDROME: ICD-10-CM

## 2021-01-05 DIAGNOSIS — R26.9 GAIT DISORDER: ICD-10-CM

## 2021-01-05 PROCEDURE — 99443 PR PHYS/QHP TELEPHONE EVALUATION 21-30 MIN: CPT | Performed by: PSYCHIATRY & NEUROLOGY

## 2021-01-05 RX ORDER — MECLIZINE HYDROCHLORIDE 25 MG/1
25 TABLET ORAL
Qty: 30 TAB | Refills: 1 | Status: SHIPPED | OUTPATIENT
Start: 2021-01-05 | End: 2021-01-15

## 2021-01-05 NOTE — PROGRESS NOTES
Left patient a message advising her to check her my chart for x ray results. Providers question regarding pulmonologist also included.

## 2021-01-05 NOTE — PROGRESS NOTES
Neurology Progress Note    NAME:  [place]lette [place] Minor   :   1955   MRN:   [id number]     Date/Time:  2021  Subjective:     [place] Minor is a 63 y.o. female here today for follow-up for MS, MG, chronic pain syndrome, headaches, CVA, difficulty walking, drowsiness, fall. Patient today says even though she has not had any falls since the last visit, since the last fall, however, she is still very unsteady. Patient noted that she has been having occasional dizziness with vertigo, she says that has decreased hearing, with nausea. According to patient it started spontaneously, she says a day prior to the visit she was unable to get out of bed. She noted that the same time she was seening triple improve vision, as her vision was highly distorted  Patient is given meclizine for the vertigo to be used as as needed. Patient dizziness/vertigo may be coming from brainstem lesion and demyelinating disease. Patient says she is very unsteady, and because of the unsteadiness, she has been unable to undertake her activities of daily living. She says for some time now, she has not been able to take a full shower because she falls. Patient will need a healthcare aide while he is 12 hours and also occupational therapy. I will plan patient to get into home health-[place] for occupational, physical, healthcare aide. Patient should continue her physical therapy and speech therapy  She says she is still having increasing left arm pain and difficulty ambulating. She also having thigh numbness of the extremities. .  She noted that her eyes tend to be weak and she sees double, double vision usually associated with fatigue.   However she continues to have a lot of pain but not as much as before, pain is sharp in nature, diffuse, burning sensation that goes up to the arms causing numbness and tingling sensation and weakness of the hands, down to the legs causing  numbness and tingling sensation of the legs with weakness of the legs. Patient says headache is still frequent, it is mostly frontal, throbbing in nature, with occasional sharp pain from the back of the head, headache associated with dizziness, nausea, blurry vision, double vision, photophobia, phonophobia. She says she has a lot of muscle spasms mostly in the back. She denies loss of consciousness. Says dysphagia has improved. Patient continues to smoke cigarettes. Review of Systems - General ROS: positive for  - fatigue, night sweats and sleep disturbance  Psychological ROS: positive for - anxiety, concentration difficulties, depression, mood swings and sleep disturbances  Ophthalmic ROS: positive for - blurry vision, decreased vision, double vision and photophobia  ENT ROS: positive for - headaches, tinnitus, vertigo and visual changes  Allergy and Immunology ROS: negative  Hematological and Lymphatic ROS: negative  Endocrine ROS: negative  Respiratory ROS: no cough, shortness of breath, or wheezing  Cardiovascular ROS: no chest pain or dyspnea on exertion  Gastrointestinal ROS: no abdominal pain, change in bowel habits, or black or bloody stools  Genito-Urinary ROS: no dysuria, trouble voiding, or hematuria  Musculoskeletal ROS: positive for - gait disturbance, joint pain, joint stiffness, joint swelling and muscle pain  Neurological ROS: positive for - dizziness, gait disturbance, headaches, impaired coordination/balance, numbness/tingling, speech problems, tremors, visual changes and weakness  Dermatological ROS: negative           Medications reviewed:  Current Outpatient Medications   Medication Sig Dispense Refill    OTHER Electric scooter  Diagnosis: Multiple sclerosis  Frequent fall  Gait disorder 1 Units 0    bromocriptine (PARLODEL) 5 mg capsule TAKE 1 CAPSULE BY MOUTH DAILY.  30 Cap 1    metFORMIN ER (GLUCOPHAGE XR) 500 mg tablet TAKE 2 TABLETS BY MOUTH DAILY WITH BREAKFAST 180 Tab 1    rosuvastatin (CRESTOR) 40 mg tablet TAKE 1 TABLET BY MOUTH EVERY DAY 90 Tab 2    Gilenya 0.5 mg cap Take 1 Cap by mouth daily. 90 Cap 3    topiramate (TOPAMAX) 200 mg tablet Take 1 Tab by mouth two (2) times a day. 180 Tab 2    fludrocortisone (FLORINEF) 0.1 mg tablet TAKE 1 TABLET BY MOUTH DAILY 90 Tab 1    levothyroxine (SYNTHROID) 150 mcg tablet TAKE 1 TABLET BY MOUTH DAILY BEFORE BREAKFAST 90 Tab 2    Aimovig Autoinjector 140 mg/mL injection ADMINISTER 1 ML(140MG) UNDER THE SKIN EVERY 30 DAYS 3 mL 3    rizatriptan (MAXALT) 10 mg tablet Take 10 mg by mouth once as needed for Migraine. May repeat in 2 hours if needed   Do not take within in 24 hours of taking Parlodel.  pyridostigmine bromide (MESTINON SR) 180 mg SR tablet TAKE 1 TABLET BY MOUTH TWICE DAILY 180 Tab 0    cyanocobalamin (Vitamin B-12) 1,000 mcg tablet Take 1 Tab by mouth daily. 30 Tab 2    predniSONE (DELTASONE) 20 mg tablet 60 mg p.o. daily for 5 days, 40 mg p.o. daily for 4 days, then 20 mg p.o. daily for 3 days. 30 Tab 0    pregabalin (Lyrica) 200 mg capsule Take 1 Cap by mouth two (2) times a day. Max Daily Amount: 400 mg. 180 Cap 2    PARoxetine (PAXIL) 40 mg tablet TAKE 1 AND 1/2 TABLETS BY MOUTH EVERY NIGHT 135 Tab 3    dextroamphetamine-amphetamine (ADDERALL) 20 mg tablet Take 1 Tab by mouth daily. Max Daily Amount: 20 mg. 30 Tab 0    corticotropin (ACTHAR H.P.) 80 unit/mL injectable gel 1 mL by SubCUTAneous route daily. 80 units subq q day for 10 days 10 mL 1    buprenorphine HCl (BELBUCA) 150 mcg film by Buccal route three (3) times daily as needed. Indications: per patient she is taking 2mg sublingual daily      ondansetron (ZOFRAN ODT) 4 mg disintegrating tablet DISSOLVE 1 TABLET ON THE TONGUE EVERY 8 HOURS AS NEEDED FOR NAUSEA 15 Tab 0    glucose blood VI test strips (BLOOD GLUCOSE TEST) strip 100 Each by Does Not Apply route See Admin Instructions.  One Touch Ultra Test Strips=Check blood sugar daily dx E11.8 100 Strip 1    aspirin delayed-release 81 mg tablet Take 1 Tab by mouth every twelve (12) hours. 60 Tab 0    OTHER 0.25 mL by SubLINGual route daily. CBD OIL      dantrolene (DANTRIUM) 100 mg capsule Take 1 Cap by mouth three (3) times daily. (Patient taking differently: Take 100 mg by mouth daily as needed. Indications: muscle spasm) 90 Cap 3    albuterol (PROVENTIL VENTOLIN) 2.5 mg /3 mL (0.083 %) nebulizer solution 3 mL by Nebulization route every six (6) hours as needed for Wheezing. 30 Each 0    Menthol-Zinc Oxide (CALMOSEPTINE) 0.44-20.6 % oint Apply  to affected area as needed.  nystatin (MYCOSTATIN) powder Apply to candidal lesions TOPICALLY 2 to 3 times daily until healing complete 30 g 0    lidocaine (LIDODERM) 5 % Apply patch to the affected area for 12 hours a day and remove for 12 hours a day. 30 Each 3    clindamycin (CLEOCIN) 300 mg capsule Take 300 mg by mouth. Prior to dental procedures      TENS UNIT ELECTRODES by Does Not Apply route. prn          Objective:   Vitals: There were no vitals filed for this visit.             Lab Data Reviewed:  Lab Results   Component Value Date/Time    WBC 4.3 08/03/2020 08:05 AM    HCT 42.7 08/03/2020 08:05 AM    HGB 13.9 08/03/2020 08:05 AM    PLATELET 147 (L) 43/69/0827 08:05 AM       Lab Results   Component Value Date/Time    Sodium 141 11/11/2020 10:00 AM    Potassium 4.1 11/11/2020 10:00 AM    Chloride 108 (H) 11/11/2020 10:00 AM    CO2 20 11/11/2020 10:00 AM    Glucose 87 11/11/2020 10:00 AM    BUN 15 11/11/2020 10:00 AM    Creatinine 0.90 11/11/2020 10:00 AM    Calcium 8.8 11/11/2020 10:00 AM       No components found for: TROPQUANT    No results found for: SHAHIDA      Lab Results   Component Value Date/Time    Hemoglobin A1c 6.8 (H) 11/11/2020 10:00 AM    Hemoglobin A1c (POC) 6.3 07/05/2019 01:51 PM        Lab Results   Component Value Date/Time    Vitamin B12 436 06/10/2019 10:41 AM    Folate 12.0 02/10/2016       No results found for: Hayden PINEDO    Lab Results   Component Value Date/Time Cholesterol, total 140 11/11/2020 10:00 AM    HDL Cholesterol 54 11/11/2020 10:00 AM    LDL-CHOLESTEROL 91 12/26/2018    LDL, calculated 67 11/11/2020 10:00 AM    LDL, calculated 208 (H) 08/03/2020 08:05 AM    VLDL, calculated 19 11/11/2020 10:00 AM    VLDL, calculated 45 (H) 08/03/2020 08:05 AM    Triglyceride 101 11/11/2020 10:00 AM    Triglyceride 126 12/26/2018    CHOL/HDL Ratio 4.3 08/23/2010 12:19 PM         CT Results (recent):  Results from [place] encounter on 11/23/19   CT NECK SOFT TISSUE W CONT    Narrative EXAM:  CT NECK SOFT TISSUE W CONT    INDICATION:  Dysphasia. Status post thyroidectomy. COMPARISON: CT neck 5/14/2018. CONTRAST: 100 mL of Isovue-300. TECHNIQUE: Multislice helical CT was performed from the mid calvarium to the  aortic arch during uneventful rapid bolus intravenous contrast administration. Contiguous 2.5 mm axial images were reconstructed and lung and soft tissue  windows were generated. Coronal and sagittal reformations were generated. CT  dose reduction was achieved through use of a standardized protocol tailored for  this examination and automatic exposure control for dose modulation. 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 No evidence of recurrent tissue in this region. No cervical  lymphadenopathy. Visualized brain parenchyma is normal in appearance. Paranasal sinuses and  mastoid air cells are clear. Intraorbital contents are unremarkable. The  cervical vasculature is patent. No acute fracture or aggressive osseous lesion. Multilevel degenerative disc disease most advanced at C3-C4, C5-C6 and C6-C7 is  unchanged. Visualized portions of the lung apices are normal. Right chest port  is noted. Impression IMPRESSION:   1. 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 No evidence of local recurrence.  No cervical lymphadenopathy or other soft  tissue abnormality in the neck. MRI Results (recent):  Results from [place] encounter on 08/08/20   MRI BRAIN W WO CONT    Narrative Clinical history: Fatigue, drowsiness  INDICATION:   Fatigue, drowsiness, multiple sclerosis, prior CVA    COMPARISON: 12/20/2018  TECHNIQUE: MR examination of the brain includes axial and sagittal T1 , axial  T2, axial FLAIR, axial gradient echo, axial DWI, coronal T1 . Pre and post  contrast axial T1-weighted imaging. Postcontrast T1-weighted imaging coronal  plane. CONTRAST: The patient was administered gadolinium-based contrast material,  subsequently axial and sagittal T1-weighted postcontrast imaging was obtained. FINDINGS:   There is no intracranial mass, hemorrhage or acute infarction. Scattered foci of abnormal increased T2 signal intensity predominantly  superiorly, subcortical left parietal lobe but also noted in the frontal lobes  on the right and on the left. No associated abnormal postcontrast enhancement. No new diffusion restriction. Air-fluid level in the right maxillary sinus. IACs are symmetric in size. . There  is no abnormal parenchymal enhancement. There is no abnormal meningeal  enhancement demonstrated. The brain architecture is normal. There is no evidence  of midline shift or mass-effect. The ventricles are normal in size, position and  configuration. There are no extra-axial fluid collections. Major intracranial  vascular flow-voids are unremarkable. The orbits are grossly symmetric. Dural  venous sinuses are grossly unremarkable. There is no Chiari or syrinx. Pituitary  and infundibulum grossly unremarkable. Cerebellopontine angles are unremarkable. No skull base mass is identified. Cavernous sinuses are symmetric. The mastoid  air cells and are well pneumatized and clear. Impression IMPRESSION:  Scattered foci of abnormal increased T2 signal intensity predominantly  subcortical and superior adjacent to the vertex.  Stable overall appearance  compared to the 12/26/2018 examination. No postcontrast enhancement or diffusion  restriction to suggest active demyelination. Right maxillary sinus disease. No intracranial mass, hemorrhage or evidence of acute infarction. IR Results (recent):  No results found for this or any previous visit. VAS/US Results (recent):  Results from Hospital Encounter encounter on 01/19/17   DUPLEX LOWER EXT VENOUS RIGHT       PHYSICAL EXAM:  Deferred  NEUROLOGICAL EXAM:   Deferred      Assesment  1. MS (multiple sclerosis) ([facility]  Continue management    2. CIDP (chronic inflammatory demyelinating polyneuropathy) ([facility])  Continue IVIG     3. Myasthenia gravis ([facility]  Continue management    4. Chronic migraine  Continue management    5. Tremor  Continue management    6. Benign paroxysmal positional vertigo due to bilateral vestibular disorder  Meclizine    7. Lumbosacral plexopathy  Physical therapy    8. Frequent falls  Physical therapy    9. Gait disorder  Physical therapy    10. Chronic pain syndrome  Following pain management    ___________________________________________________  PLAN: Medication plan discussed with patient      ICD-10-CM ICD-9-CM    1. MS (multiple sclerosis) ([facility]  G35 340    2. CIDP (chronic inflammatory demyelinating polyneuropathy) ([facility])  G61.81 357.81    3. Myasthenia gravis ([facility]  G70.00 358.00    4. Chronic migraine  G43.709 346.70    5. Tremor  R25.1 781.0    6. Benign paroxysmal positional vertigo due to bilateral vestibular disorder  H81.13 386.11    7. Lumbosacral plexopathy  G54.1 353.1    8. Frequent falls  R29.6 V15.88    9. Gait disorder  R26.9 781.2    10. Chronic pain syndrome  G89.4 338.4      Follow-up and Dispositions    · Return in about 3 months (around 4/5/2021).        About 25 minutes was spent with patient on the phone, half of which was on counseling.   :    ___________________________________________________    Attending Physician: Bishnu Newsome MD Femi

## 2021-01-07 ENCOUNTER — TELEPHONE (OUTPATIENT)
Dept: NEUROLOGY | Age: 66
End: 2021-01-07

## 2021-02-02 ENCOUNTER — TRANSCRIBE ORDER (OUTPATIENT)
Dept: NEUROLOGY | Age: 66
End: 2021-02-02

## 2021-02-02 ENCOUNTER — TELEPHONE (OUTPATIENT)
Dept: NEUROLOGY | Age: 66
End: 2021-02-02

## 2021-02-05 ENCOUNTER — TRANSCRIBE ORDER (OUTPATIENT)
Dept: NEUROLOGY | Age: 66
End: 2021-02-05

## 2021-02-08 ENCOUNTER — HOSPITAL ENCOUNTER (OUTPATIENT)
Dept: LAB | Age: 66
Discharge: HOME | End: 2021-02-08
Payer: MEDICARE

## 2021-02-08 ENCOUNTER — TELEPHONE (OUTPATIENT)
Dept: NEUROLOGY | Age: 66
End: 2021-02-08

## 2021-02-08 PROCEDURE — 80053 COMPREHEN METABOLIC PANEL: CPT

## 2021-02-08 PROCEDURE — 82043 UR ALBUMIN QUANTITATIVE: CPT

## 2021-02-08 PROCEDURE — 80061 LIPID PANEL: CPT

## 2021-02-08 PROCEDURE — 83036 HEMOGLOBIN GLYCOSYLATED A1C: CPT

## 2021-02-08 PROCEDURE — 85027 COMPLETE CBC AUTOMATED: CPT

## 2021-02-08 PROCEDURE — 84443 ASSAY THYROID STIM HORMONE: CPT

## 2021-02-09 LAB
ALBUMIN SERPL-MCNC: 3.9 G/DL (ref 3.8–4.8)
ALBUMIN/GLOB SERPL: 1.6 {RATIO} (ref 1.2–2.2)
ALP SERPL-CCNC: 56 IU/L (ref 39–117)
ALT SERPL-CCNC: 8 IU/L (ref 0–32)
AST SERPL-CCNC: 6 IU/L (ref 0–40)
BILIRUB SERPL-MCNC: <0.2 MG/DL (ref 0–1.2)
BUN SERPL-MCNC: 11 MG/DL (ref 8–27)
BUN/CREAT SERPL: 14 (ref 12–28)
CALCIUM SERPL-MCNC: 9.6 MG/DL (ref 8.7–10.3)
CHLORIDE SERPL-SCNC: 108 MMOL/L (ref 96–106)
CHOLEST SERPL-MCNC: 138 MG/DL (ref 100–199)
CO2 SERPL-SCNC: 18 MMOL/L (ref 20–29)
CREAT SERPL-MCNC: 0.8 MG/DL (ref 0.57–1)
ERYTHROCYTE [DISTWIDTH] IN BLOOD BY AUTOMATED COUNT: 13.6 % (ref 11.7–15.4)
EST. AVERAGE GLUCOSE BLD GHB EST-MCNC: 169 MG/DL
GLOBULIN SER CALC-MCNC: 2.4 G/DL (ref 1.5–4.5)
GLUCOSE SERPL-MCNC: 156 MG/DL (ref 65–99)
HBA1C MFR BLD: 7.5 % (ref 4.8–5.6)
HCT VFR BLD AUTO: 41.6 % (ref 34–46.6)
HDLC SERPL-MCNC: 66 MG/DL
HGB BLD-MCNC: 14 G/DL (ref 11.1–15.9)
INTERPRETATION, 910389: NORMAL
LDLC SERPL CALC-MCNC: 46 MG/DL (ref 0–99)
MCH RBC QN AUTO: 30.4 PG (ref 26.6–33)
MCHC RBC AUTO-ENTMCNC: 33.7 G/DL (ref 31.5–35.7)
MCV RBC AUTO: 90 FL (ref 79–97)
MORPHOLOGY BLD-IMP: ABNORMAL
PLATELET # BLD AUTO: 88 X10E3/UL (ref 150–450)
POTASSIUM SERPL-SCNC: 4.5 MMOL/L (ref 3.5–5.2)
PROT SERPL-MCNC: 6.3 G/DL (ref 6–8.5)
RBC # BLD AUTO: 4.6 X10E6/UL (ref 3.77–5.28)
SODIUM SERPL-SCNC: 143 MMOL/L (ref 134–144)
TRIGL SERPL-MCNC: 156 MG/DL (ref 0–149)
TSH SERPL DL<=0.005 MIU/L-ACNC: 0.2 UIU/ML (ref 0.45–4.5)
VLDLC SERPL CALC-MCNC: 26 MG/DL (ref 5–40)
WBC # BLD AUTO: 8.2 X10E3/UL (ref 3.4–10.8)

## 2021-02-15 ENCOUNTER — TELEPHONE (OUTPATIENT)
Dept: NEUROLOGY | Age: 66
End: 2021-02-15

## 2021-02-15 DIAGNOSIS — R29.6 FREQUENT FALLS: ICD-10-CM

## 2021-02-15 DIAGNOSIS — G37.9 DEMYELINATING DISEASE (HCC): Primary | ICD-10-CM

## 2021-02-15 DIAGNOSIS — R26.9 GAIT DISORDER: ICD-10-CM

## 2021-02-16 ENCOUNTER — HOME HEALTH ADMISSION (OUTPATIENT)
Dept: HOME HEALTH SERVICES | Facility: HOME HEALTH | Age: 66
End: 2021-02-16
Payer: MEDICARE

## 2021-02-16 ENCOUNTER — VIRTUAL VISIT (OUTPATIENT)
Dept: FAMILY MEDICINE CLINIC | Age: 66
End: 2021-02-16
Payer: MEDICARE

## 2021-02-16 DIAGNOSIS — G70.00 MYASTHENIA GRAVIS (HCC): ICD-10-CM

## 2021-02-16 DIAGNOSIS — E11.9 TYPE 2 DIABETES MELLITUS WITHOUT COMPLICATION, WITHOUT LONG-TERM CURRENT USE OF INSULIN (HCC): Primary | ICD-10-CM

## 2021-02-16 DIAGNOSIS — G35 MS (MULTIPLE SCLEROSIS) (HCC): ICD-10-CM

## 2021-02-16 DIAGNOSIS — D69.6 THROMBOCYTOPENIA (HCC): ICD-10-CM

## 2021-02-16 DIAGNOSIS — E03.9 HYPOTHYROIDISM, UNSPECIFIED TYPE: ICD-10-CM

## 2021-02-16 DIAGNOSIS — R21 RASH: ICD-10-CM

## 2021-02-16 PROCEDURE — 99442 PR PHYS/QHP TELEPHONE EVALUATION 11-20 MIN: CPT | Performed by: FAMILY MEDICINE

## 2021-02-16 RX ORDER — LEVOTHYROXINE SODIUM 137 UG/1
TABLET ORAL
Qty: 90 TAB | Refills: 1 | Status: SHIPPED | OUTPATIENT
Start: 2021-02-16 | End: 2021-08-09

## 2021-02-16 RX ORDER — NYSTATIN 100000 [USP'U]/G
POWDER TOPICAL
Qty: 30 G | Refills: 0 | Status: SHIPPED | OUTPATIENT
Start: 2021-02-16 | End: 2021-07-28

## 2021-02-16 RX ORDER — NYSTATIN 100000 U/G
CREAM TOPICAL 2 TIMES DAILY
Qty: 30 G | Refills: 1 | Status: SHIPPED | OUTPATIENT
Start: 2021-02-16 | End: 2021-07-28

## 2021-02-16 NOTE — ASSESSMENT & PLAN NOTE
Uncontrolled, based on history, physical exam and review of pertinent labs, studies and medications; meds reconciled; continue current treatment plan.

## 2021-02-16 NOTE — PROGRESS NOTES
Sabiha Kat is a 63 y.o. female, evaluated via audio-only technology on 2/16/2021 for No chief complaint on file. .    Assessment & Plan:     Sabiha Kat is a 63 y.o. female who presents today for:    1. Type 2 diabetes mellitus without complication, without long-term current use of insulin ([address]  Will keep meds the same. If A1c high again at next visit, increase metformin. 2. MS (multiple sclerosis) ([address]    3. Myasthenia gravis ([address]    4. Thrombocytopenia ([address]    5. Hypothyroidism, unspecified type  Lower dose to 137 mcg daily. - levothyroxine (SYNTHROID) 137 mcg tablet; TAKE 1 TABLET BY MOUTH DAILY BEFORE BREAKFAST; dose change  Dispense: 90 Tab; Refill: 1    6. Rash  Likely yeast skin infection. Recommend working on glucose control and keeping area as dry as possible. - nystatin (MYCOSTATIN) powder; Apply to candidal lesions TOPICALLY 2 to 3 times daily until healing complete  Dispense: 30 g; Refill: 0  - nystatin (MYCOSTATIN) topical cream; Apply  to affected area two (2) times a day. Dispense: 30 g; Refill: 1       Medications Discontinued During This Encounter   Medication Reason    levothyroxine (SYNTHROID) 150 mcg tablet REORDER    buprenorphine HCl (BELBUCA) 150 mcg film Therapy Completed    dextroamphetamine-amphetamine (ADDERALL) 20 mg tablet Therapy Completed    fludrocortisone (FLORINEF) 0.1 mg tablet Therapy Completed    rizatriptan (MAXALT) 10 mg tablet Therapy Completed    nystatin (MYCOSTATIN) powder REORDER       Follow-up and Dispositions    · Return in about 3 months (around 5/16/2021) for DM follow up. Treatment risks/benefits/costs/interactions/alternatives discussed with patient. Advised patient to call back or return to office if symptoms worsen/change/persist. If patient cannot reach us or should anything more severe/urgent arise he/she should proceed directly to the nearest emergency department.   Discussed expected course/resolution/complications of diagnosis in detail with patient. Patient expressed understanding with the diagnosis and plan. Brandon Martin M.D.      12  Subjective:     Patient admits that she has not been eating well over the holidays. She would like to work on her diet and exercise before increasing her Metformin dose. Her TSH is too high; she occasionally will forget a levothyroxine dose once or twice a month. She has had a very red, tender, and raw appearing rash underneath her breasts. Has been using diaper rash ointment cream which is not helping. She also has had some dark and painful areas on both feet. She is unable to get her video capabilities working today but will send me a picture through my chart. She does have an appointment with a foot and ankle doctor next week. Denies any drainage or fevers. Prior to Admission medications    Medication Sig Start Date End Date Taking? Authorizing Provider   OTHER Electric scooter  Diagnosis: Multiple sclerosis  Frequent fall  Gait disorder 12/16/20   Los Kahn MD   bromocriptine (PARLODEL) 5 mg capsule TAKE 1 CAPSULE BY MOUTH DAILY. 12/7/20   Los Kahn MD   metFORMIN ER (GLUCOPHAGE XR) 500 mg tablet TAKE 2 TABLETS BY MOUTH DAILY WITH BREAKFAST 11/24/20   Favio Hagan MD   rosuvastatin (CRESTOR) 40 mg tablet TAKE 1 TABLET BY MOUTH EVERY DAY 11/23/20   Favio Hagan MD   Gilenya 0.5 mg cap Take 1 Cap by mouth daily. 11/22/20   Los Kahn MD   topiramate (TOPAMAX) 200 mg tablet Take 1 Tab by mouth two (2) times a day.  10/21/20   Los Kahn MD   fludrocortisone (FLORINEF) 0.1 mg tablet TAKE 1 TABLET BY MOUTH DAILY 10/21/20   Los Kahn MD   levothyroxine (SYNTHROID) 150 mcg tablet TAKE 1 TABLET BY MOUTH DAILY BEFORE BREAKFAST 10/19/20   Favio Hagan MD   Aimovig Autoinjector 140 mg/mL injection ADMINISTER 1 ML(140MG) UNDER THE SKIN EVERY 30 DAYS 9/30/20   Los Kahn MD   rizatriptan (MAXALT) 10 mg tablet Take 10 mg by mouth once as needed for Migraine. May repeat in 2 hours if needed   Do not take within in 24 hours of taking Parlodel. Provider, Historical   pyridostigmine bromide (MESTINON SR) 180 mg SR tablet TAKE 1 TABLET BY MOUTH TWICE DAILY 9/9/20   Los Kahn MD   cyanocobalamin (Vitamin B-12) 1,000 mcg tablet Take 1 Tab by mouth daily. 8/26/20   Los Kahn MD   predniSONE (DELTASONE) 20 mg tablet 60 mg p.o. daily for 5 days, 40 mg p.o. daily for 4 days, then 20 mg p.o. daily for 3 days. 8/3/20   Los Kahn MD   pregabalin (Lyrica) 200 mg capsule Take 1 Cap by mouth two (2) times a day. Max Daily Amount: 400 mg. 7/1/20   Los Kahn MD   PARoxetine (PAXIL) 40 mg tablet TAKE 1 AND 1/2 TABLETS BY MOUTH EVERY NIGHT 6/10/20   Los Kahn MD   dextroamphetamine-amphetamine (ADDERALL) 20 mg tablet Take 1 Tab by mouth daily. Max Daily Amount: 20 mg. 2/13/20   Los Kahn MD   corticotropin (ACTHAR H.P.) 80 unit/mL injectable gel 1 mL by SubCUTAneous route daily. 80 units subq q day for 10 days 11/8/19   Los Kahn MD   buprenorphine HCl (BELBUCA) 150 mcg film by Buccal route three (3) times daily as needed. Indications: per patient she is taking 2mg sublingual daily    Provider, Historical   ondansetron (ZOFRAN ODT) 4 mg disintegrating tablet DISSOLVE 1 TABLET ON THE TONGUE EVERY 8 HOURS AS NEEDED FOR NAUSEA 9/24/19   Shekhar Butler MD   glucose blood VI test strips (BLOOD GLUCOSE TEST) strip 100 Each by Does Not Apply route See Admin Instructions. One Touch Ultra Test Strips=Check blood sugar daily dx E11.8 8/11/19   Rachel Laughlin NP   aspirin delayed-release 81 mg tablet Take 1 Tab by mouth every twelve (12) hours. 5/14/19   KATIANA Almonte   OTHER 0.25 mL by SubLINGual route daily. CBD OIL    Provider, Historical   dantrolene (DANTRIUM) 100 mg capsule Take 1 Cap by mouth three (3) times daily. Patient taking differently: Take 100 mg by mouth daily as needed.  Indications: muscle spasm 3/19/19   Los Kahn MD   albuterol (PROVENTIL VENTOLIN) 2.5 mg /3 mL (0.083 %) nebulizer solution 3 mL by Nebulization route every six (6) hours as needed for Wheezing. 12/31/18   Rito Ibarra MD   Menthol-Zinc Oxide (CALMOSEPTINE) 0.44-20.6 % oint Apply  to affected area as needed. Provider, Historical   nystatin (MYCOSTATIN) powder Apply to candidal lesions TOPICALLY 2 to 3 times daily until healing complete 10/25/18   Rito Ibarra MD   lidocaine (LIDODERM) 5 % Apply patch to the affected area for 12 hours a day and remove for 12 hours a day. 8/15/18   Los Kahn MD   clindamycin (CLEOCIN) 300 mg capsule Take 300 mg by mouth. Prior to dental procedures 6/30/17   Provider, Historical   TENS UNIT ELECTRODES by Does Not Apply route. prn    Provider, Historical     Review of Systems   Constitutional: Negative for fever, malaise/fatigue and weight loss. Respiratory: Negative for cough, hemoptysis, shortness of breath and wheezing. Cardiovascular: Negative for chest pain, palpitations, leg swelling and PND. Gastrointestinal: Negative for abdominal pain, constipation, diarrhea, nausea and vomiting. Skin: Positive for itching and rash. No flowsheet data found. Hiwot Galvin, who was evaluated through a patient-initiated, synchronous (real-time) audio only encounter, and/or her healthcare decision maker, is aware that it is a billable service, with coverage as determined by her insurance carrier. She provided verbal consent to proceed: Yes. She has not had a related appointment within my department in the past 7 days or scheduled within the next 24 hours.       Total Time: minutes: 11-20 minutes    Francheska Vera MD

## 2021-02-18 ENCOUNTER — TELEPHONE (OUTPATIENT)
Dept: NEUROLOGY | Age: 66
End: 2021-02-18

## 2021-02-18 RX ORDER — LANOLIN ALCOHOL/MO/W.PET/CERES
1000 CREAM (GRAM) TOPICAL DAILY
Qty: 30 TAB | Refills: 2 | Status: SHIPPED | OUTPATIENT
Start: 2021-02-18 | End: 2021-03-19

## 2021-02-19 ENCOUNTER — HOME CARE VISIT (OUTPATIENT)
Dept: SCHEDULING | Facility: HOME HEALTH | Age: 66
End: 2021-02-19
Payer: MEDICARE

## 2021-02-19 VITALS
OXYGEN SATURATION: 98 % | RESPIRATION RATE: 18 BRPM | DIASTOLIC BLOOD PRESSURE: 72 MMHG | SYSTOLIC BLOOD PRESSURE: 118 MMHG | HEART RATE: 68 BPM | TEMPERATURE: 97.9 F

## 2021-02-19 PROCEDURE — 3331090001 HH PPS REVENUE CREDIT

## 2021-02-19 PROCEDURE — 400018 HH-NO PAY CLAIM PROCEDURE

## 2021-02-19 PROCEDURE — G0299 HHS/HOSPICE OF RN EA 15 MIN: HCPCS

## 2021-02-19 PROCEDURE — 400013 HH SOC

## 2021-02-19 PROCEDURE — 3331090002 HH PPS REVENUE DEBIT

## 2021-02-20 PROCEDURE — 3331090001 HH PPS REVENUE CREDIT

## 2021-02-20 PROCEDURE — 3331090002 HH PPS REVENUE DEBIT

## 2021-02-21 PROCEDURE — 3331090002 HH PPS REVENUE DEBIT

## 2021-02-21 PROCEDURE — 3331090001 HH PPS REVENUE CREDIT

## 2021-02-22 ENCOUNTER — HOME CARE VISIT (OUTPATIENT)
Dept: SCHEDULING | Facility: HOME HEALTH | Age: 66
End: 2021-02-22
Payer: MEDICARE

## 2021-02-22 VITALS
TEMPERATURE: 96.8 F | DIASTOLIC BLOOD PRESSURE: 70 MMHG | RESPIRATION RATE: 18 BRPM | OXYGEN SATURATION: 94 % | HEART RATE: 71 BPM | SYSTOLIC BLOOD PRESSURE: 120 MMHG

## 2021-02-22 VITALS
TEMPERATURE: 95.9 F | SYSTOLIC BLOOD PRESSURE: 120 MMHG | RESPIRATION RATE: 17 BRPM | DIASTOLIC BLOOD PRESSURE: 60 MMHG | OXYGEN SATURATION: 95 % | HEART RATE: 75 BPM

## 2021-02-22 PROCEDURE — G0152 HHCP-SERV OF OT,EA 15 MIN: HCPCS

## 2021-02-22 PROCEDURE — G0151 HHCP-SERV OF PT,EA 15 MIN: HCPCS

## 2021-02-22 PROCEDURE — 3331090002 HH PPS REVENUE DEBIT

## 2021-02-22 PROCEDURE — 3331090001 HH PPS REVENUE CREDIT

## 2021-02-23 ENCOUNTER — HOME CARE VISIT (OUTPATIENT)
Dept: SCHEDULING | Facility: HOME HEALTH | Age: 66
End: 2021-02-23
Payer: MEDICARE

## 2021-02-23 ENCOUNTER — TELEPHONE (OUTPATIENT)
Dept: NEUROLOGY | Age: 66
End: 2021-02-23

## 2021-02-23 ENCOUNTER — HOME CARE VISIT (OUTPATIENT)
Dept: HOME HEALTH SERVICES | Facility: HOME HEALTH | Age: 66
End: 2021-02-23
Payer: MEDICARE

## 2021-02-23 PROCEDURE — 3331090002 HH PPS REVENUE DEBIT

## 2021-02-23 PROCEDURE — G0299 HHS/HOSPICE OF RN EA 15 MIN: HCPCS

## 2021-02-23 PROCEDURE — 3331090001 HH PPS REVENUE CREDIT

## 2021-02-24 PROCEDURE — 3331090001 HH PPS REVENUE CREDIT

## 2021-02-24 PROCEDURE — 3331090002 HH PPS REVENUE DEBIT

## 2021-02-25 ENCOUNTER — HOME CARE VISIT (OUTPATIENT)
Dept: SCHEDULING | Facility: HOME HEALTH | Age: 66
End: 2021-02-25
Payer: MEDICARE

## 2021-02-25 VITALS
DIASTOLIC BLOOD PRESSURE: 68 MMHG | SYSTOLIC BLOOD PRESSURE: 130 MMHG | OXYGEN SATURATION: 98 % | HEART RATE: 69 BPM | RESPIRATION RATE: 16 BRPM | TEMPERATURE: 97.8 F

## 2021-02-25 VITALS
RESPIRATION RATE: 16 BRPM | HEART RATE: 69 BPM | TEMPERATURE: 97.8 F | OXYGEN SATURATION: 98 % | DIASTOLIC BLOOD PRESSURE: 68 MMHG | SYSTOLIC BLOOD PRESSURE: 130 MMHG

## 2021-02-25 VITALS
HEART RATE: 78 BPM | DIASTOLIC BLOOD PRESSURE: 60 MMHG | RESPIRATION RATE: 18 BRPM | TEMPERATURE: 98 F | OXYGEN SATURATION: 94 % | SYSTOLIC BLOOD PRESSURE: 118 MMHG

## 2021-02-25 VITALS
TEMPERATURE: 97.7 F | OXYGEN SATURATION: 98 % | HEART RATE: 64 BPM | SYSTOLIC BLOOD PRESSURE: 110 MMHG | RESPIRATION RATE: 18 BRPM | DIASTOLIC BLOOD PRESSURE: 72 MMHG

## 2021-02-25 PROCEDURE — G0299 HHS/HOSPICE OF RN EA 15 MIN: HCPCS

## 2021-02-25 PROCEDURE — 3331090001 HH PPS REVENUE CREDIT

## 2021-02-25 PROCEDURE — 3331090002 HH PPS REVENUE DEBIT

## 2021-02-25 PROCEDURE — G0152 HHCP-SERV OF OT,EA 15 MIN: HCPCS

## 2021-02-25 PROCEDURE — G0151 HHCP-SERV OF PT,EA 15 MIN: HCPCS

## 2021-02-26 ENCOUNTER — HOME CARE VISIT (OUTPATIENT)
Dept: HOME HEALTH SERVICES | Facility: HOME HEALTH | Age: 66
End: 2021-02-26
Payer: MEDICARE

## 2021-02-26 ENCOUNTER — HOME CARE VISIT (OUTPATIENT)
Dept: SCHEDULING | Facility: HOME HEALTH | Age: 66
End: 2021-02-26
Payer: MEDICARE

## 2021-02-26 PROCEDURE — 3331090002 HH PPS REVENUE DEBIT

## 2021-02-26 PROCEDURE — 3331090001 HH PPS REVENUE CREDIT

## 2021-02-26 PROCEDURE — G0156 HHCP-SVS OF AIDE,EA 15 MIN: HCPCS

## 2021-02-27 PROCEDURE — 3331090001 HH PPS REVENUE CREDIT

## 2021-02-27 PROCEDURE — 3331090002 HH PPS REVENUE DEBIT

## 2021-02-28 PROCEDURE — 3331090002 HH PPS REVENUE DEBIT

## 2021-02-28 PROCEDURE — 3331090001 HH PPS REVENUE CREDIT

## 2021-03-01 ENCOUNTER — HOME CARE VISIT (OUTPATIENT)
Dept: SCHEDULING | Facility: HOME HEALTH | Age: 66
End: 2021-03-01
Payer: MEDICARE

## 2021-03-01 VITALS
DIASTOLIC BLOOD PRESSURE: 65 MMHG | OXYGEN SATURATION: 96 % | RESPIRATION RATE: 16 BRPM | HEART RATE: 84 BPM | SYSTOLIC BLOOD PRESSURE: 130 MMHG | TEMPERATURE: 97.4 F

## 2021-03-01 PROCEDURE — 3331090001 HH PPS REVENUE CREDIT

## 2021-03-01 PROCEDURE — 3331090002 HH PPS REVENUE DEBIT

## 2021-03-01 PROCEDURE — G0151 HHCP-SERV OF PT,EA 15 MIN: HCPCS

## 2021-03-02 ENCOUNTER — HOME CARE VISIT (OUTPATIENT)
Dept: HOME HEALTH SERVICES | Facility: HOME HEALTH | Age: 66
End: 2021-03-02
Payer: MEDICARE

## 2021-03-02 ENCOUNTER — HOME CARE VISIT (OUTPATIENT)
Dept: SCHEDULING | Facility: HOME HEALTH | Age: 66
End: 2021-03-02
Payer: MEDICARE

## 2021-03-02 DIAGNOSIS — G35 MS (MULTIPLE SCLEROSIS) (HCC): ICD-10-CM

## 2021-03-02 PROCEDURE — G0299 HHS/HOSPICE OF RN EA 15 MIN: HCPCS

## 2021-03-02 PROCEDURE — G0152 HHCP-SERV OF OT,EA 15 MIN: HCPCS

## 2021-03-02 PROCEDURE — 3331090002 HH PPS REVENUE DEBIT

## 2021-03-02 PROCEDURE — 3331090001 HH PPS REVENUE CREDIT

## 2021-03-02 NOTE — TELEPHONE ENCOUNTER
----- Message from Matthew Welsh sent at 3/2/2021 10:34 AM EST -----  Regarding: Dr. Randa Isaac Message/Vendor Calls    Caller's first and last name: Pt      Reason for call: requesting status of her medication      Callback required yes/no and why: Yes      Best contact number(s): [phone]      Details to clarify the request: Pt is calling in regards to her medications Lyrica and Gilenya stating that she still has not been able to get this information because per patient she states the office is not doing their part in getting the information sent over to [employer]. Pt said she has been trying to get this figured out since November of 2020 and is very frustrated that it has taken as long as it has and it still isn't done. Please contact pt to advise on the steps for this process.        Matthew Welsh

## 2021-03-03 ENCOUNTER — HOME CARE VISIT (OUTPATIENT)
Dept: SCHEDULING | Facility: HOME HEALTH | Age: 66
End: 2021-03-03
Payer: MEDICARE

## 2021-03-03 VITALS
HEART RATE: 80 BPM | OXYGEN SATURATION: 97 % | DIASTOLIC BLOOD PRESSURE: 78 MMHG | SYSTOLIC BLOOD PRESSURE: 110 MMHG | TEMPERATURE: 97 F | RESPIRATION RATE: 18 BRPM

## 2021-03-03 VITALS
TEMPERATURE: 96 F | HEART RATE: 73 BPM | RESPIRATION RATE: 16 BRPM | OXYGEN SATURATION: 98 % | DIASTOLIC BLOOD PRESSURE: 85 MMHG | SYSTOLIC BLOOD PRESSURE: 125 MMHG

## 2021-03-03 PROCEDURE — G0152 HHCP-SERV OF OT,EA 15 MIN: HCPCS

## 2021-03-03 PROCEDURE — 3331090002 HH PPS REVENUE DEBIT

## 2021-03-03 PROCEDURE — G0151 HHCP-SERV OF PT,EA 15 MIN: HCPCS

## 2021-03-03 PROCEDURE — 3331090001 HH PPS REVENUE CREDIT

## 2021-03-04 ENCOUNTER — HOME CARE VISIT (OUTPATIENT)
Dept: SCHEDULING | Facility: HOME HEALTH | Age: 66
End: 2021-03-04
Payer: MEDICARE

## 2021-03-04 ENCOUNTER — DOCUMENTATION ONLY (OUTPATIENT)
Dept: CASE MANAGEMENT | Age: 66
End: 2021-03-04

## 2021-03-04 ENCOUNTER — PATIENT OUTREACH (OUTPATIENT)
Dept: CASE MANAGEMENT | Age: 66
End: 2021-03-04

## 2021-03-04 VITALS
TEMPERATURE: 97.5 F | SYSTOLIC BLOOD PRESSURE: 118 MMHG | DIASTOLIC BLOOD PRESSURE: 70 MMHG | HEART RATE: 77 BPM | RESPIRATION RATE: 16 BRPM | OXYGEN SATURATION: 98 %

## 2021-03-04 PROCEDURE — G0156 HHCP-SVS OF AIDE,EA 15 MIN: HCPCS

## 2021-03-04 PROCEDURE — 3331090001 HH PPS REVENUE CREDIT

## 2021-03-04 PROCEDURE — 3331090002 HH PPS REVENUE DEBIT

## 2021-03-04 NOTE — PROGRESS NOTES
Patient called this am.  Reports she has called 's office many times, starting in November re form to fill out with RX for her Lyrica and Gilenya. Even had her son take a message in person to the office. Still waiting for  to complete forms (needs rx info) and fax to [employer] for her Gilenya and to [facility] for the Lyrica. Advised CTN will send message to him as well. Advised to call back next week if no response.

## 2021-03-05 DIAGNOSIS — G35 MS (MULTIPLE SCLEROSIS) (HCC): ICD-10-CM

## 2021-03-05 PROCEDURE — 3331090002 HH PPS REVENUE DEBIT

## 2021-03-05 PROCEDURE — 3331090001 HH PPS REVENUE CREDIT

## 2021-03-05 RX ORDER — PREGABALIN 200 MG/1
200 CAPSULE ORAL 2 TIMES DAILY
Qty: 180 CAP | Refills: 4 | Status: SHIPPED | OUTPATIENT
Start: 2021-03-05 | End: 2022-10-18 | Stop reason: SDUPTHER

## 2021-03-05 RX ORDER — FINGOLIMOD HCL 0.5 MG/1
1 CAPSULE ORAL DAILY
Qty: 90 CAP | Refills: 3 | Status: SHIPPED | OUTPATIENT
Start: 2021-03-05 | End: 2022-03-09 | Stop reason: SDUPTHER

## 2021-03-06 PROCEDURE — 3331090002 HH PPS REVENUE DEBIT

## 2021-03-06 PROCEDURE — 3331090001 HH PPS REVENUE CREDIT

## 2021-03-07 PROCEDURE — 3331090001 HH PPS REVENUE CREDIT

## 2021-03-07 PROCEDURE — 3331090002 HH PPS REVENUE DEBIT

## 2021-03-08 ENCOUNTER — HOME CARE VISIT (OUTPATIENT)
Dept: SCHEDULING | Facility: HOME HEALTH | Age: 66
End: 2021-03-08
Payer: MEDICARE

## 2021-03-08 PROCEDURE — 3331090002 HH PPS REVENUE DEBIT

## 2021-03-08 PROCEDURE — G0156 HHCP-SVS OF AIDE,EA 15 MIN: HCPCS

## 2021-03-08 PROCEDURE — 3331090001 HH PPS REVENUE CREDIT

## 2021-03-08 RX ORDER — PYRIDOSTIGMINE BROMIDE 180 MG/1
TABLET, EXTENDED RELEASE ORAL
Qty: 180 TAB | Refills: 0 | Status: SHIPPED | OUTPATIENT
Start: 2021-03-08 | End: 2022-01-27 | Stop reason: SDUPTHER

## 2021-03-09 ENCOUNTER — HOME CARE VISIT (OUTPATIENT)
Dept: SCHEDULING | Facility: HOME HEALTH | Age: 66
End: 2021-03-09
Payer: MEDICARE

## 2021-03-09 ENCOUNTER — HOME CARE VISIT (OUTPATIENT)
Dept: HOME HEALTH SERVICES | Facility: HOME HEALTH | Age: 66
End: 2021-03-09
Payer: MEDICARE

## 2021-03-09 VITALS
RESPIRATION RATE: 18 BRPM | OXYGEN SATURATION: 99 % | TEMPERATURE: 97.6 F | HEART RATE: 79 BPM | SYSTOLIC BLOOD PRESSURE: 132 MMHG | DIASTOLIC BLOOD PRESSURE: 84 MMHG

## 2021-03-09 PROCEDURE — G0152 HHCP-SERV OF OT,EA 15 MIN: HCPCS

## 2021-03-09 PROCEDURE — 3331090002 HH PPS REVENUE DEBIT

## 2021-03-09 PROCEDURE — 3331090001 HH PPS REVENUE CREDIT

## 2021-03-10 ENCOUNTER — HOME CARE VISIT (OUTPATIENT)
Dept: SCHEDULING | Facility: HOME HEALTH | Age: 66
End: 2021-03-10
Payer: MEDICARE

## 2021-03-10 VITALS
HEART RATE: 86 BPM | RESPIRATION RATE: 18 BRPM | SYSTOLIC BLOOD PRESSURE: 120 MMHG | TEMPERATURE: 97.7 F | DIASTOLIC BLOOD PRESSURE: 80 MMHG | OXYGEN SATURATION: 97 %

## 2021-03-10 PROCEDURE — 3331090002 HH PPS REVENUE DEBIT

## 2021-03-10 PROCEDURE — 3331090001 HH PPS REVENUE CREDIT

## 2021-03-10 PROCEDURE — G0151 HHCP-SERV OF PT,EA 15 MIN: HCPCS

## 2021-03-10 PROCEDURE — G0152 HHCP-SERV OF OT,EA 15 MIN: HCPCS

## 2021-03-11 ENCOUNTER — HOME CARE VISIT (OUTPATIENT)
Dept: SCHEDULING | Facility: HOME HEALTH | Age: 66
End: 2021-03-11
Payer: MEDICARE

## 2021-03-11 VITALS
DIASTOLIC BLOOD PRESSURE: 80 MMHG | RESPIRATION RATE: 18 BRPM | TEMPERATURE: 98 F | OXYGEN SATURATION: 95 % | SYSTOLIC BLOOD PRESSURE: 140 MMHG | HEART RATE: 86 BPM

## 2021-03-11 VITALS
OXYGEN SATURATION: 98 % | RESPIRATION RATE: 16 BRPM | SYSTOLIC BLOOD PRESSURE: 136 MMHG | HEART RATE: 76 BPM | DIASTOLIC BLOOD PRESSURE: 79 MMHG | TEMPERATURE: 98.5 F

## 2021-03-11 PROCEDURE — 3331090002 HH PPS REVENUE DEBIT

## 2021-03-11 PROCEDURE — G0299 HHS/HOSPICE OF RN EA 15 MIN: HCPCS

## 2021-03-11 PROCEDURE — 3331090001 HH PPS REVENUE CREDIT

## 2021-03-12 ENCOUNTER — TELEPHONE (OUTPATIENT)
Dept: NEUROLOGY | Age: 66
End: 2021-03-12

## 2021-03-12 ENCOUNTER — HOME CARE VISIT (OUTPATIENT)
Dept: SCHEDULING | Facility: HOME HEALTH | Age: 66
End: 2021-03-12
Payer: MEDICARE

## 2021-03-12 VITALS
SYSTOLIC BLOOD PRESSURE: 160 MMHG | OXYGEN SATURATION: 97 % | HEART RATE: 85 BPM | TEMPERATURE: 97.3 F | RESPIRATION RATE: 16 BRPM | DIASTOLIC BLOOD PRESSURE: 90 MMHG

## 2021-03-12 PROCEDURE — 3331090001 HH PPS REVENUE CREDIT

## 2021-03-12 PROCEDURE — G0156 HHCP-SVS OF AIDE,EA 15 MIN: HCPCS

## 2021-03-12 PROCEDURE — 3331090002 HH PPS REVENUE DEBIT

## 2021-03-12 PROCEDURE — G0151 HHCP-SERV OF PT,EA 15 MIN: HCPCS

## 2021-03-12 NOTE — TELEPHONE ENCOUNTER
[id number]  [facility]  [facility] [id number]    Sent Jayro PA to [employer]     An active PA is already on file with expiration date of 05/26/2021. Please wait to resubmit request within 60 days of that expiration date to obtain a PA renewal.    Lyrica - I need dx for PA. Forwarded to Dr. Juan Ramon Aleman for assistance.

## 2021-03-13 PROCEDURE — 3331090002 HH PPS REVENUE DEBIT

## 2021-03-13 PROCEDURE — 3331090001 HH PPS REVENUE CREDIT

## 2021-03-14 PROCEDURE — 3331090001 HH PPS REVENUE CREDIT

## 2021-03-14 PROCEDURE — 3331090002 HH PPS REVENUE DEBIT

## 2021-03-15 ENCOUNTER — HOME CARE VISIT (OUTPATIENT)
Dept: SCHEDULING | Facility: HOME HEALTH | Age: 66
End: 2021-03-15
Payer: MEDICARE

## 2021-03-15 ENCOUNTER — HOME CARE VISIT (OUTPATIENT)
Dept: HOME HEALTH SERVICES | Facility: HOME HEALTH | Age: 66
End: 2021-03-15
Payer: MEDICARE

## 2021-03-15 VITALS
HEART RATE: 76 BPM | OXYGEN SATURATION: 99 % | RESPIRATION RATE: 16 BRPM | TEMPERATURE: 97 F | SYSTOLIC BLOOD PRESSURE: 130 MMHG | DIASTOLIC BLOOD PRESSURE: 70 MMHG

## 2021-03-15 VITALS
TEMPERATURE: 97.9 F | RESPIRATION RATE: 17 BRPM | SYSTOLIC BLOOD PRESSURE: 135 MMHG | HEART RATE: 74 BPM | OXYGEN SATURATION: 99 % | DIASTOLIC BLOOD PRESSURE: 70 MMHG

## 2021-03-15 PROCEDURE — G0152 HHCP-SERV OF OT,EA 15 MIN: HCPCS

## 2021-03-15 PROCEDURE — 3331090002 HH PPS REVENUE DEBIT

## 2021-03-15 PROCEDURE — 3331090001 HH PPS REVENUE CREDIT

## 2021-03-15 PROCEDURE — G0151 HHCP-SERV OF PT,EA 15 MIN: HCPCS

## 2021-03-16 PROCEDURE — 3331090001 HH PPS REVENUE CREDIT

## 2021-03-16 PROCEDURE — 3331090002 HH PPS REVENUE DEBIT

## 2021-03-17 ENCOUNTER — HOME CARE VISIT (OUTPATIENT)
Dept: SCHEDULING | Facility: HOME HEALTH | Age: 66
End: 2021-03-17
Payer: MEDICARE

## 2021-03-17 VITALS
RESPIRATION RATE: 19 BRPM | OXYGEN SATURATION: 95 % | TEMPERATURE: 97.6 F | HEART RATE: 98 BPM | SYSTOLIC BLOOD PRESSURE: 120 MMHG | DIASTOLIC BLOOD PRESSURE: 62 MMHG

## 2021-03-17 PROCEDURE — 3331090001 HH PPS REVENUE CREDIT

## 2021-03-17 PROCEDURE — G0152 HHCP-SERV OF OT,EA 15 MIN: HCPCS

## 2021-03-17 PROCEDURE — 3331090002 HH PPS REVENUE DEBIT

## 2021-03-18 ENCOUNTER — TELEPHONE (OUTPATIENT)
Dept: NEUROLOGY | Age: 66
End: 2021-03-18

## 2021-03-18 ENCOUNTER — HOME CARE VISIT (OUTPATIENT)
Dept: SCHEDULING | Facility: HOME HEALTH | Age: 66
End: 2021-03-18
Payer: MEDICARE

## 2021-03-18 VITALS
RESPIRATION RATE: 16 BRPM | SYSTOLIC BLOOD PRESSURE: 118 MMHG | OXYGEN SATURATION: 98 % | DIASTOLIC BLOOD PRESSURE: 60 MMHG | HEART RATE: 82 BPM | TEMPERATURE: 98.2 F

## 2021-03-18 PROCEDURE — 3331090001 HH PPS REVENUE CREDIT

## 2021-03-18 PROCEDURE — G0151 HHCP-SERV OF PT,EA 15 MIN: HCPCS

## 2021-03-18 PROCEDURE — G0156 HHCP-SVS OF AIDE,EA 15 MIN: HCPCS

## 2021-03-18 PROCEDURE — 3331090002 HH PPS REVENUE DEBIT

## 2021-03-18 NOTE — TELEPHONE ENCOUNTER
The diagnosis listed on the Lyrica Rx shows MS G35. Dr. Kt Roth,     What diagnosis for Lyrica? Please update chart and advise.

## 2021-03-19 ENCOUNTER — IMMUNIZATION (OUTPATIENT)
Dept: INTERNAL MEDICINE CLINIC | Age: 66
End: 2021-03-19
Payer: MEDICARE

## 2021-03-19 DIAGNOSIS — Z23 ENCOUNTER FOR IMMUNIZATION: Primary | ICD-10-CM

## 2021-03-19 PROCEDURE — 3331090001 HH PPS REVENUE CREDIT

## 2021-03-19 PROCEDURE — 3331090002 HH PPS REVENUE DEBIT

## 2021-03-19 PROCEDURE — 0001A COVID-19, MRNA, LNP-S, PF, 30MCG/0.3ML DOSE(PFIZER): CPT | Performed by: FAMILY MEDICINE

## 2021-03-19 PROCEDURE — 91300 COVID-19, MRNA, LNP-S, PF, 30MCG/0.3ML DOSE(PFIZER): CPT | Performed by: FAMILY MEDICINE

## 2021-03-19 RX ORDER — LANOLIN ALCOHOL/MO/W.PET/CERES
CREAM (GRAM) TOPICAL
Qty: 30 TAB | Refills: 2 | Status: SHIPPED | OUTPATIENT
Start: 2021-03-19 | End: 2021-07-08 | Stop reason: SDUPTHER

## 2021-03-20 PROCEDURE — 3331090001 HH PPS REVENUE CREDIT

## 2021-03-20 PROCEDURE — 3331090003 HH PPS REVENUE ADJ

## 2021-03-20 PROCEDURE — 3331090002 HH PPS REVENUE DEBIT

## 2021-03-21 DIAGNOSIS — G35 MULTIPLE SCLEROSIS (HCC): ICD-10-CM

## 2021-03-21 DIAGNOSIS — R26.9 GAIT DISORDER: ICD-10-CM

## 2021-03-21 DIAGNOSIS — R29.6 FREQUENT FALLS: Primary | ICD-10-CM

## 2021-03-21 PROCEDURE — 3331090001 HH PPS REVENUE CREDIT

## 2021-03-21 PROCEDURE — 400018 HH-NO PAY CLAIM PROCEDURE

## 2021-03-21 PROCEDURE — 3331090002 HH PPS REVENUE DEBIT

## 2021-03-22 ENCOUNTER — HOME CARE VISIT (OUTPATIENT)
Dept: SCHEDULING | Facility: HOME HEALTH | Age: 66
End: 2021-03-22
Payer: MEDICARE

## 2021-03-22 VITALS
TEMPERATURE: 98.1 F | SYSTOLIC BLOOD PRESSURE: 122 MMHG | DIASTOLIC BLOOD PRESSURE: 78 MMHG | HEART RATE: 77 BPM | RESPIRATION RATE: 17 BRPM | OXYGEN SATURATION: 98 %

## 2021-03-22 PROCEDURE — 3331090001 HH PPS REVENUE CREDIT

## 2021-03-22 PROCEDURE — 3331090002 HH PPS REVENUE DEBIT

## 2021-03-22 PROCEDURE — G0152 HHCP-SERV OF OT,EA 15 MIN: HCPCS

## 2021-03-22 PROCEDURE — 400013 HH SOC

## 2021-03-23 ENCOUNTER — HOME CARE VISIT (OUTPATIENT)
Dept: SCHEDULING | Facility: HOME HEALTH | Age: 66
End: 2021-03-23
Payer: MEDICARE

## 2021-03-23 VITALS
HEART RATE: 77 BPM | DIASTOLIC BLOOD PRESSURE: 90 MMHG | RESPIRATION RATE: 16 BRPM | OXYGEN SATURATION: 99 % | TEMPERATURE: 97.7 F | SYSTOLIC BLOOD PRESSURE: 150 MMHG

## 2021-03-23 PROCEDURE — 3331090002 HH PPS REVENUE DEBIT

## 2021-03-23 PROCEDURE — G0156 HHCP-SVS OF AIDE,EA 15 MIN: HCPCS

## 2021-03-23 PROCEDURE — 3331090001 HH PPS REVENUE CREDIT

## 2021-03-23 PROCEDURE — G0151 HHCP-SERV OF PT,EA 15 MIN: HCPCS

## 2021-03-24 ENCOUNTER — HOME CARE VISIT (OUTPATIENT)
Dept: SCHEDULING | Facility: HOME HEALTH | Age: 66
End: 2021-03-24
Payer: MEDICARE

## 2021-03-24 VITALS
HEART RATE: 79 BPM | TEMPERATURE: 97.9 F | RESPIRATION RATE: 17 BRPM | SYSTOLIC BLOOD PRESSURE: 122 MMHG | DIASTOLIC BLOOD PRESSURE: 80 MMHG | OXYGEN SATURATION: 94 %

## 2021-03-24 PROCEDURE — 3331090002 HH PPS REVENUE DEBIT

## 2021-03-24 PROCEDURE — G0152 HHCP-SERV OF OT,EA 15 MIN: HCPCS

## 2021-03-24 PROCEDURE — 3331090001 HH PPS REVENUE CREDIT

## 2021-03-25 ENCOUNTER — TELEPHONE (OUTPATIENT)
Dept: NEUROLOGY | Age: 66
End: 2021-03-25

## 2021-03-25 ENCOUNTER — HOME CARE VISIT (OUTPATIENT)
Dept: SCHEDULING | Facility: HOME HEALTH | Age: 66
End: 2021-03-25
Payer: MEDICARE

## 2021-03-25 VITALS
SYSTOLIC BLOOD PRESSURE: 125 MMHG | DIASTOLIC BLOOD PRESSURE: 70 MMHG | OXYGEN SATURATION: 99 % | HEART RATE: 75 BPM | RESPIRATION RATE: 16 BRPM | TEMPERATURE: 96.6 F

## 2021-03-25 PROCEDURE — G0151 HHCP-SERV OF PT,EA 15 MIN: HCPCS

## 2021-03-25 PROCEDURE — 3331090002 HH PPS REVENUE DEBIT

## 2021-03-25 PROCEDURE — G0156 HHCP-SVS OF AIDE,EA 15 MIN: HCPCS

## 2021-03-25 PROCEDURE — 3331090001 HH PPS REVENUE CREDIT

## 2021-03-25 NOTE — TELEPHONE ENCOUNTER
Jayro  - resubmitted PA - An active PA is already on file with expiration date of 05/26/2021. Please wait to resubmit request within 60 days of that expiration date to obtain a PA renewal.      Same message received when submitted on 3/12/21. Patient should be able to get her [facility]. Dr. Franko Mc,     In order to process her Lyrica, the Rx diagnosis needs to reflect what you show below.      I need a new Rx written for Lyrica with the diagnosis associated from 3/18/21 message from Dr. Franko Mc:     Reina Falling history of fibromyalgia and neuropathy          MINOR, GIANA  -  2021 [employer] PDP Secure 6T-RTL ([employer])  Covered: Retail, Mail Order Unknown: Specialty, Long-Term Care               Member ID: [id number] - F     Group ID: [id number] Name: [id number] Sherice Maharaj 144    Use As Primary Coverage

## 2021-03-26 PROCEDURE — 3331090002 HH PPS REVENUE DEBIT

## 2021-03-26 PROCEDURE — 3331090001 HH PPS REVENUE CREDIT

## 2021-03-27 PROCEDURE — 3331090002 HH PPS REVENUE DEBIT

## 2021-03-27 PROCEDURE — 3331090001 HH PPS REVENUE CREDIT

## 2021-03-28 PROCEDURE — 3331090001 HH PPS REVENUE CREDIT

## 2021-03-28 PROCEDURE — 3331090002 HH PPS REVENUE DEBIT

## 2021-03-29 ENCOUNTER — HOME CARE VISIT (OUTPATIENT)
Dept: SCHEDULING | Facility: HOME HEALTH | Age: 66
End: 2021-03-29
Payer: MEDICARE

## 2021-03-29 ENCOUNTER — HOME CARE VISIT (OUTPATIENT)
Dept: HOME HEALTH SERVICES | Facility: HOME HEALTH | Age: 66
End: 2021-03-29
Payer: MEDICARE

## 2021-03-29 VITALS
SYSTOLIC BLOOD PRESSURE: 127 MMHG | HEART RATE: 77 BPM | OXYGEN SATURATION: 97 % | TEMPERATURE: 97.7 F | RESPIRATION RATE: 17 BRPM | DIASTOLIC BLOOD PRESSURE: 76 MMHG

## 2021-03-29 PROCEDURE — 3331090001 HH PPS REVENUE CREDIT

## 2021-03-29 PROCEDURE — 3331090002 HH PPS REVENUE DEBIT

## 2021-03-29 PROCEDURE — G0152 HHCP-SERV OF OT,EA 15 MIN: HCPCS

## 2021-03-30 ENCOUNTER — HOME CARE VISIT (OUTPATIENT)
Dept: HOME HEALTH SERVICES | Facility: HOME HEALTH | Age: 66
End: 2021-03-30
Payer: MEDICARE

## 2021-03-30 ENCOUNTER — HOME CARE VISIT (OUTPATIENT)
Dept: SCHEDULING | Facility: HOME HEALTH | Age: 66
End: 2021-03-30
Payer: MEDICARE

## 2021-03-30 VITALS
SYSTOLIC BLOOD PRESSURE: 145 MMHG | RESPIRATION RATE: 16 BRPM | OXYGEN SATURATION: 91 % | HEART RATE: 71 BPM | DIASTOLIC BLOOD PRESSURE: 80 MMHG | TEMPERATURE: 96.8 F

## 2021-03-30 PROCEDURE — 3331090001 HH PPS REVENUE CREDIT

## 2021-03-30 PROCEDURE — 3331090002 HH PPS REVENUE DEBIT

## 2021-03-30 PROCEDURE — G0151 HHCP-SERV OF PT,EA 15 MIN: HCPCS

## 2021-03-31 PROCEDURE — 3331090001 HH PPS REVENUE CREDIT

## 2021-03-31 PROCEDURE — 3331090002 HH PPS REVENUE DEBIT

## 2021-04-01 ENCOUNTER — HOME CARE VISIT (OUTPATIENT)
Dept: SCHEDULING | Facility: HOME HEALTH | Age: 66
End: 2021-04-01
Payer: MEDICARE

## 2021-04-01 VITALS
DIASTOLIC BLOOD PRESSURE: 80 MMHG | OXYGEN SATURATION: 96 % | RESPIRATION RATE: 17 BRPM | TEMPERATURE: 97.9 F | SYSTOLIC BLOOD PRESSURE: 125 MMHG | HEART RATE: 88 BPM

## 2021-04-01 VITALS
RESPIRATION RATE: 16 BRPM | OXYGEN SATURATION: 95 % | HEART RATE: 85 BPM | TEMPERATURE: 97.9 F | SYSTOLIC BLOOD PRESSURE: 150 MMHG | DIASTOLIC BLOOD PRESSURE: 90 MMHG

## 2021-04-01 PROCEDURE — G0151 HHCP-SERV OF PT,EA 15 MIN: HCPCS

## 2021-04-01 PROCEDURE — 3331090002 HH PPS REVENUE DEBIT

## 2021-04-01 PROCEDURE — 3331090001 HH PPS REVENUE CREDIT

## 2021-04-01 PROCEDURE — G0152 HHCP-SERV OF OT,EA 15 MIN: HCPCS

## 2021-04-02 ENCOUNTER — TELEPHONE (OUTPATIENT)
Dept: NEUROLOGY | Age: 66
End: 2021-04-02

## 2021-04-02 PROCEDURE — 3331090001 HH PPS REVENUE CREDIT

## 2021-04-02 PROCEDURE — 3331090002 HH PPS REVENUE DEBIT

## 2021-04-03 PROCEDURE — 3331090001 HH PPS REVENUE CREDIT

## 2021-04-03 PROCEDURE — 3331090002 HH PPS REVENUE DEBIT

## 2021-04-04 PROCEDURE — 3331090002 HH PPS REVENUE DEBIT

## 2021-04-04 PROCEDURE — 3331090001 HH PPS REVENUE CREDIT

## 2021-04-05 ENCOUNTER — HOME CARE VISIT (OUTPATIENT)
Dept: SCHEDULING | Facility: HOME HEALTH | Age: 66
End: 2021-04-05
Payer: MEDICARE

## 2021-04-05 ENCOUNTER — VIRTUAL VISIT (OUTPATIENT)
Dept: NEUROLOGY | Age: 66
End: 2021-04-05
Payer: MEDICARE

## 2021-04-05 VITALS — DIASTOLIC BLOOD PRESSURE: 90 MMHG | TEMPERATURE: 97.3 F | RESPIRATION RATE: 17 BRPM | SYSTOLIC BLOOD PRESSURE: 140 MMHG

## 2021-04-05 DIAGNOSIS — G35 MS (MULTIPLE SCLEROSIS) (HCC): Primary | ICD-10-CM

## 2021-04-05 DIAGNOSIS — F98.8 ATTENTION DEFICIT DISORDER (ADD) WITHOUT HYPERACTIVITY: ICD-10-CM

## 2021-04-05 DIAGNOSIS — G89.4 CHRONIC PAIN SYNDROME: ICD-10-CM

## 2021-04-05 DIAGNOSIS — G70.00 MYASTHENIA GRAVIS (HCC): ICD-10-CM

## 2021-04-05 DIAGNOSIS — R26.9 GAIT DISORDER: ICD-10-CM

## 2021-04-05 DIAGNOSIS — G61.81 CIDP (CHRONIC INFLAMMATORY DEMYELINATING POLYNEUROPATHY) (HCC): ICD-10-CM

## 2021-04-05 DIAGNOSIS — R29.6 FREQUENT FALLS: ICD-10-CM

## 2021-04-05 PROCEDURE — G0156 HHCP-SVS OF AIDE,EA 15 MIN: HCPCS

## 2021-04-05 PROCEDURE — 3017F COLORECTAL CA SCREEN DOC REV: CPT | Performed by: PSYCHIATRY & NEUROLOGY

## 2021-04-05 PROCEDURE — G9899 SCRN MAM PERF RSLTS DOC: HCPCS | Performed by: PSYCHIATRY & NEUROLOGY

## 2021-04-05 PROCEDURE — 3331090002 HH PPS REVENUE DEBIT

## 2021-04-05 PROCEDURE — G8755 DIAS BP > OR = 90: HCPCS | Performed by: PSYCHIATRY & NEUROLOGY

## 2021-04-05 PROCEDURE — G8427 DOCREV CUR MEDS BY ELIG CLIN: HCPCS | Performed by: PSYCHIATRY & NEUROLOGY

## 2021-04-05 PROCEDURE — 99214 OFFICE O/P EST MOD 30 MIN: CPT | Performed by: PSYCHIATRY & NEUROLOGY

## 2021-04-05 PROCEDURE — G8419 CALC BMI OUT NRM PARAM NOF/U: HCPCS | Performed by: PSYCHIATRY & NEUROLOGY

## 2021-04-05 PROCEDURE — 3331090001 HH PPS REVENUE CREDIT

## 2021-04-05 PROCEDURE — G9717 DOC PT DX DEP/BP F/U NT REQ: HCPCS | Performed by: PSYCHIATRY & NEUROLOGY

## 2021-04-05 PROCEDURE — G0152 HHCP-SERV OF OT,EA 15 MIN: HCPCS

## 2021-04-05 PROCEDURE — G8753 SYS BP > OR = 140: HCPCS | Performed by: PSYCHIATRY & NEUROLOGY

## 2021-04-05 NOTE — PROGRESS NOTES
Neurology Progress Note  Joana Ganser Minor was seen by synchronous (real-time) audio-video technology on 21. Consent:  She  is aware that this patient-initiated Telehealth encounter is a billable service, with coverage as determined by her insurance carrier. She is aware that she may receive a bill and has provided verbal consent to proceed: Yes    I was in the office while conducting this encounter. Pursuant to the emergency declaration under the [facility] [place] [address] [facility] authority and the [employer] Act, this Virtual  Visit was conducted, with patient's consent, to reduce the patient's risk of exposure to COVID-19 and provide continuity of care for an established patient. Services were provided through a video synchronous discussion virtually to substitute for in-person clinic visit. NAME:  Joana Ganser Minor   :   1955   MRN:   [id number]     Date/Time:  2021  Subjective:    Joana Ganser Minor is a 63 y.o. female here today for follow-up for MS, MG, chronic pain syndrome, headaches, CVA, difficulty walking, drowsiness, fall. Patient says she also had a fall with some bruises since the last visit, apparently, her leg gave out on her. She noted that she is still very unsteady. She continues to engage in physical therapy      Patient said that she still has  occasional dizziness with vertigo, she says that has decreased hearing, with nausea. According to patient it started spontaneously, she says a day prior to the visit she was unable to get out of bed. She noted that sometimes her vision was highly distorted  Patient uses meclizine for the vertigo to be used as as needed. Patient dizziness/vertigo may be coming from brainstem lesion and demyelinating disease as noted previously. Due to patient's unsteadiness, she has been unable to undertake her activities of daily living.   She says for some time now, she has not been able to take a full shower because she falls. Home health provides physical therapy, occupational therapy and medication management and also patient needs long-term health aide to help her with activities of daily living  Patient should continue  speech therapy  She says she is still having increasing left arm pain and difficulty ambulating. She also having thigh numbness of the extremities. .  She noted that her eyes tend to be weak and she sees double, double vision usually associated with fatigue. She says she is still having pain but not as much as before, pain is sharp in nature, diffuse, burning sensation that goes up to the arms causing numbness and tingling sensation and weakness of the hands, down to the legs causing  numbness and tingling sensation of the legs with weakness of the legs. Patient says headache is still frequent, it is mostly frontal, throbbing in nature, with occasional sharp pain from the back of the head, headache associated with dizziness, nausea, blurry vision, double vision, photophobia, phonophobia. She says she has a lot of muscle spasms mostly in the back. She denies loss of consciousness. Says dysphagia has improved. Patient continues to smoke cigarettes.   Review of Systems - General ROS: positive for  - fatigue, night sweats and sleep disturbance  Psychological ROS: positive for - anxiety, concentration difficulties, depression, mood swings and sleep disturbances  Ophthalmic ROS: positive for - blurry vision, decreased vision, double vision and photophobia  ENT ROS: positive for - headaches, tinnitus, vertigo and visual changes  Allergy and Immunology ROS: negative  Hematological and Lymphatic ROS: negative  Endocrine ROS: negative  Respiratory ROS: no cough, shortness of breath, or wheezing  Cardiovascular ROS: no chest pain or dyspnea on exertion  Gastrointestinal ROS: no abdominal pain, change in bowel habits, or black or bloody stools  Genito-Urinary ROS: no dysuria, trouble voiding, or hematuria  Musculoskeletal ROS: positive for - gait disturbance, joint pain, joint stiffness, joint swelling and muscle pain  Neurological ROS: positive for - dizziness, gait disturbance, headaches, impaired coordination/balance, numbness/tingling, speech problems, tremors, visual changes and weakness  Dermatological ROS: negative         Medications reviewed:  Current Outpatient Medications   Medication Sig Dispense Refill    cyanocobalamin 1,000 mcg tablet TAKE 1 TABLET BY MOUTH EVERY DAY 30 Tab 2    pyridostigmine bromide (MESTINON SR) 180 mg SR tablet TAKE 1 TABLET BY MOUTH TWICE DAILY 180 Tab 0    pregabalin (Lyrica) 200 mg capsule Take 1 Cap by mouth two (2) times a day. Max Daily Amount: 400 mg. 180 Cap 4    Gilenya 0.5 mg cap Take 1 Cap by mouth daily. 90 Cap 3    acetaminophen (TYLENOL) 500 mg tablet Take 500 mg by mouth every six (6) hours as needed for Pain.  levothyroxine (SYNTHROID) 137 mcg tablet TAKE 1 TABLET BY MOUTH DAILY BEFORE BREAKFAST; dose change (Patient taking differently: TAKE 1 TABLET BY MOUTH DAILY BEFORE BREAKFAST; dose change    taking at bedtime ) 90 Tab 1    nystatin (MYCOSTATIN) powder Apply to candidal lesions TOPICALLY 2 to 3 times daily until healing complete (Patient taking differently: Apply 1 Units to affected area two (2) times a day. Apply to candidal lesions TOPICALLY 2 to 3 times daily until healing complete    right breast ) 30 g 0    nystatin (MYCOSTATIN) topical cream Apply  to affected area two (2) times a day. (Patient taking differently: Apply 1 Units to affected area two (2) times a day. right breast) 30 g 1    OTHER Electric scooter  Diagnosis: Multiple sclerosis  Frequent fall  Gait disorder 1 Units 0    bromocriptine (PARLODEL) 5 mg capsule TAKE 1 CAPSULE BY MOUTH DAILY.  30 Cap 1    metFORMIN ER (GLUCOPHAGE XR) 500 mg tablet TAKE 2 TABLETS BY MOUTH DAILY WITH BREAKFAST (Patient taking differently: Take 500 mg by mouth two (2) times a day. TAKE 2 TABLETS BY MOUTH DAILY WITH BREAKFAST) 180 Tab 1    rosuvastatin (CRESTOR) 40 mg tablet TAKE 1 TABLET BY MOUTH EVERY DAY 90 Tab 2    topiramate (TOPAMAX) 200 mg tablet Take 1 Tab by mouth two (2) times a day. 180 Tab 2    Aimovig Autoinjector 140 mg/mL injection ADMINISTER 1 ML(140MG) UNDER THE SKIN EVERY 30 DAYS 3 mL 3    predniSONE (DELTASONE) 20 mg tablet 60 mg p.o. daily for 5 days, 40 mg p.o. daily for 4 days, then 20 mg p.o. daily for 3 days. 30 Tab 0    PARoxetine (PAXIL) 40 mg tablet TAKE 1 AND 1/2 TABLETS BY MOUTH EVERY NIGHT 135 Tab 3    corticotropin (ACTHAR H.P.) 80 unit/mL injectable gel 1 mL by SubCUTAneous route daily. 80 units subq q day for 10 days 10 mL 1    ondansetron (ZOFRAN ODT) 4 mg disintegrating tablet DISSOLVE 1 TABLET ON THE TONGUE EVERY 8 HOURS AS NEEDED FOR NAUSEA 15 Tab 0    glucose blood VI test strips (BLOOD GLUCOSE TEST) strip 100 Each by Does Not Apply route See Admin Instructions. One Touch Ultra Test Strips=Check blood sugar daily dx E11.8 100 Strip 1    aspirin delayed-release 81 mg tablet Take 1 Tab by mouth every twelve (12) hours. (Patient taking differently: Take 1 Tab by mouth daily.) 60 Tab 0    OTHER 0.25 mL by SubLINGual route daily. CBD OIL      dantrolene (DANTRIUM) 100 mg capsule Take 1 Cap by mouth three (3) times daily. (Patient taking differently: Take 100 mg by mouth daily as needed. Indications: muscle spasm) 90 Cap 3    albuterol (PROVENTIL VENTOLIN) 2.5 mg /3 mL (0.083 %) nebulizer solution 3 mL by Nebulization route every six (6) hours as needed for Wheezing. 30 Each 0    Menthol-Zinc Oxide (CALMOSEPTINE) 0.44-20.6 % oint Apply 1 Each to affected area daily as needed for Other (thin layer to buttocks for skin irritation).  lidocaine (LIDODERM) 5 % Apply patch to the affected area for 12 hours a day and remove for 12 hours a day.  30 Each 3    clindamycin (CLEOCIN) 300 mg capsule Take 300 mg by mouth as needed for Other (prior to dental procedures ). Prior to dental procedures      TENS UNIT ELECTRODES by Does Not Apply route. prn          Objective:   Vitals: There were no vitals filed for this visit. Lab Data Reviewed:  Lab Results   Component Value Date/Time    WBC 8.2 02/08/2021 04:22 PM    HCT 41.6 02/08/2021 04:22 PM    HGB 14.0 02/08/2021 04:22 PM    PLATELET 88 (LL) 08/30/7414 04:22 PM       Lab Results   Component Value Date/Time    Sodium 143 02/08/2021 04:22 PM    Potassium 4.5 02/08/2021 04:22 PM    Chloride 108 (H) 02/08/2021 04:22 PM    CO2 18 (L) 02/08/2021 04:22 PM    Glucose 156 (H) 02/08/2021 04:22 PM    BUN 11 02/08/2021 04:22 PM    Creatinine 0.80 02/08/2021 04:22 PM    Calcium 9.6 02/08/2021 04:22 PM       No components found for: TROPQUANT    No results found for: SHAHIDA      Lab Results   Component Value Date/Time    Hemoglobin A1c 7.5 (H) 02/08/2021 04:22 PM    Hemoglobin A1c (POC) 6.3 07/05/2019 01:51 PM        Lab Results   Component Value Date/Time    Vitamin B12 436 06/10/2019 10:41 AM    Folate 12.0 02/10/2016       No results found for: Марина PINEDO XBANA    Lab Results   Component Value Date/Time    Cholesterol, total 138 02/08/2021 04:22 PM    HDL Cholesterol 66 02/08/2021 04:22 PM    LDL-CHOLESTEROL 91 12/26/2018    LDL, calculated 46 02/08/2021 04:22 PM    LDL, calculated 208 (H) 08/03/2020 08:05 AM    VLDL, calculated 26 02/08/2021 04:22 PM    VLDL, calculated 45 (H) 08/03/2020 08:05 AM    Triglyceride 156 (H) 02/08/2021 04:22 PM    Triglyceride 126 12/26/2018    CHOL/HDL Ratio 4.3 08/23/2010 12:19 PM         CT Results (recent):  Results from Hospital Encounter encounter on 11/23/19   CT NECK SOFT TISSUE W CONT    Narrative EXAM:  CT NECK SOFT TISSUE W CONT    INDICATION:  Dysphasia. Status post thyroidectomy. COMPARISON: CT neck 5/14/2018. CONTRAST: 100 mL of Isovue-300.     TECHNIQUE: Multislice helical CT was performed from the mid calvarium to the  aortic arch during uneventful rapid bolus intravenous contrast administration. Contiguous 2.5 mm axial images were reconstructed and lung and soft tissue  windows were generated. Coronal and sagittal reformations were generated. CT  dose reduction was achieved through use of a standardized protocol tailored for  this examination and automatic exposure control for dose modulation. 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 No evidence of recurrent tissue in this region. No cervical  lymphadenopathy. Visualized brain parenchyma is normal in appearance. Paranasal sinuses and  mastoid air cells are clear. Intraorbital contents are unremarkable. The  cervical vasculature is patent. No acute fracture or aggressive osseous lesion. Multilevel degenerative disc disease most advanced at C3-C4, C5-C6 and C6-C7 is  unchanged. Visualized portions of the lung apices are normal. Right chest port  is noted. Impression IMPRESSION:   1. 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 No evidence of local recurrence. No cervical lymphadenopathy or other soft  tissue abnormality in the neck. MRI Results (recent):  Results from [place] encounter on 08/08/20   MRI BRAIN W WO CONT    Narrative Clinical history: Fatigue, drowsiness  INDICATION:   Fatigue, drowsiness, multiple sclerosis, prior CVA    COMPARISON: 12/20/2018  TECHNIQUE: MR examination of the brain includes axial and sagittal T1 , axial  T2, axial FLAIR, axial gradient echo, axial DWI, coronal T1 . Pre and post  contrast axial T1-weighted imaging. Postcontrast T1-weighted imaging coronal  plane. CONTRAST: The patient was administered gadolinium-based contrast material,  subsequently axial and sagittal T1-weighted postcontrast imaging was obtained. FINDINGS:   There is no intracranial mass, hemorrhage or acute infarction.    Scattered foci of abnormal increased T2 signal intensity predominantly  superiorly, subcortical left parietal lobe but also noted in the frontal lobes  on the right and on the left. No associated abnormal postcontrast enhancement. No new diffusion restriction. Air-fluid level in the right maxillary sinus. IACs are symmetric in size. . There  is no abnormal parenchymal enhancement. There is no abnormal meningeal  enhancement demonstrated. The brain architecture is normal. There is no evidence  of midline shift or mass-effect. The ventricles are normal in size, position and  configuration. There are no extra-axial fluid collections. Major intracranial  vascular flow-voids are unremarkable. The orbits are grossly symmetric. Dural  venous sinuses are grossly unremarkable. There is no Chiari or syrinx. Pituitary  and infundibulum grossly unremarkable. Cerebellopontine angles are unremarkable. No skull base mass is identified. Cavernous sinuses are symmetric. The mastoid  air cells and are well pneumatized and clear. Impression IMPRESSION:  Scattered foci of abnormal increased T2 signal intensity predominantly  subcortical and superior adjacent to the vertex. Stable overall appearance  compared to the 12/26/2018 examination. No postcontrast enhancement or diffusion  restriction to suggest active demyelination. Right maxillary sinus disease. No intracranial mass, hemorrhage or evidence of acute infarction. IR Results (recent):  No results found for this or any previous visit. VAS/US Results (recent):  Results from Hospital Encounter encounter on 01/19/17   DUPLEX LOWER EXT VENOUS RIGHT     PHYSICAL EXAM:  General:    Alert, cooperative, no distress, appears stated age. Head:   Normocephalic, without obvious abnormality, atraumatic. Eyes:   Conjunctivae/corneas clear. Nose:  Nares normal. .  Throat:    Lips and tongue normal.    Neck:  Symmetrical,  no adenopathy, thyroid. no JVD. Back:    Symmetric. .  Lungs: Deferred. .  Chest wall:   No Accessory muscle use. Heart:   Deferred. Abdomen:   No masses  Extremities: Extremities normal, atraumatic, No cyanosis. No edema. No clubbing  Skin:      No rashes or lesions. Lymph nodes: Cervical, supraclavicular normal.  Psych:  Good insight. Depressed. Anxious. NEUROLOGICAL EXAM:  Appearance: The patient is well developed, well nourished, provides a coherent history and is in no acute distress. Mental Status: Oriented to time, place and person. Mood and affect appropriate. Cranial Nerves:   Intact visual fields., EOM's full, no nystagmus, no ptosis. . Facial movement is symmetric. Hearing is normal bilaterally. Tongue is midline. Motor:   Moves all extremities. No fasciculations. Reflexes:   Deferred. Sensory:   Deferred. Gait:   Unsteady gait. Tremor:   Tremor noted. Cerebellar:  No cerebellar signs present. Assesment  1. MS (multiple sclerosis) ([facility]  Continue management    2. CIDP (chronic inflammatory demyelinating polyneuropathy) ([facility])  Continue management    3. Chronic migraine  [facility]    4. Myasthenia gravis ([facility]  Continue Mestinon    5. Gait disorder  Intermittent physical therapy    6. Frequent falls  Physical therapy    7. Attention deficit disorder (ADD) without hyperactivity  Stable    8. Chronic pain syndrome  Following pain management  ___________________________________________________  PLAN: Medication and plan discussed with patient   Advised on the dangers of tobacco abuse  Advised on the benefits of discontinuation   Advised on available treatments. 10 minutes spent on counseling. ICD-10-CM ICD-9-CM    1. MS (multiple sclerosis) ([facility]  G35 340    2. CIDP (chronic inflammatory demyelinating polyneuropathy) ([facility])  G61.81 357.81    3. Chronic migraine  G43.709 346.70    4. Myasthenia gravis ([facility]  G70.00 358.00    5. Gait disorder  R26.9 781.2    6. Frequent falls  R29.6 V15.88    7.  Attention deficit disorder (ADD) without hyperactivity  F98.8 314.00    8. Chronic pain syndrome  G89.4 338.4      Follow-up and Dispositions    · Return in about 3 months (around 7/5/2021).          :    ___________________________________________________    Attending Physician: Kiana Gonzalez MD

## 2021-04-06 ENCOUNTER — HOME CARE VISIT (OUTPATIENT)
Dept: SCHEDULING | Facility: HOME HEALTH | Age: 66
End: 2021-04-06
Payer: MEDICARE

## 2021-04-06 VITALS
TEMPERATURE: 97.9 F | SYSTOLIC BLOOD PRESSURE: 140 MMHG | OXYGEN SATURATION: 98 % | HEART RATE: 89 BPM | RESPIRATION RATE: 16 BRPM | DIASTOLIC BLOOD PRESSURE: 90 MMHG

## 2021-04-06 PROCEDURE — 3331090002 HH PPS REVENUE DEBIT

## 2021-04-06 PROCEDURE — G0159 HHC PT MAINT EA 15 MIN: HCPCS

## 2021-04-06 PROCEDURE — 3331090001 HH PPS REVENUE CREDIT

## 2021-04-07 PROCEDURE — 3331090001 HH PPS REVENUE CREDIT

## 2021-04-07 PROCEDURE — 3331090002 HH PPS REVENUE DEBIT

## 2021-04-08 ENCOUNTER — HOME CARE VISIT (OUTPATIENT)
Dept: SCHEDULING | Facility: HOME HEALTH | Age: 66
End: 2021-04-08
Payer: MEDICARE

## 2021-04-08 ENCOUNTER — HOME CARE VISIT (OUTPATIENT)
Dept: HOME HEALTH SERVICES | Facility: HOME HEALTH | Age: 66
End: 2021-04-08
Payer: MEDICARE

## 2021-04-08 ENCOUNTER — TELEPHONE (OUTPATIENT)
Dept: NEUROLOGY | Age: 66
End: 2021-04-08

## 2021-04-08 VITALS
OXYGEN SATURATION: 98 % | HEART RATE: 75 BPM | RESPIRATION RATE: 17 BRPM | DIASTOLIC BLOOD PRESSURE: 82 MMHG | TEMPERATURE: 98 F | SYSTOLIC BLOOD PRESSURE: 145 MMHG

## 2021-04-08 PROCEDURE — 3331090002 HH PPS REVENUE DEBIT

## 2021-04-08 PROCEDURE — G0152 HHCP-SERV OF OT,EA 15 MIN: HCPCS

## 2021-04-08 PROCEDURE — 3331090001 HH PPS REVENUE CREDIT

## 2021-04-08 RX ORDER — BROMOCRIPTINE MESYLATE 5 MG/1
CAPSULE ORAL
Qty: 30 CAP | Refills: 2 | Status: SHIPPED | OUTPATIENT
Start: 2021-04-08 | End: 2021-05-21

## 2021-04-08 NOTE — TELEPHONE ENCOUNTER
Patient is requesting refills on Bromocriptine 5mg    Last seen 4/5/21    Please refill if warranted

## 2021-04-09 ENCOUNTER — IMMUNIZATION (OUTPATIENT)
Dept: INTERNAL MEDICINE CLINIC | Age: 66
End: 2021-04-09
Payer: MEDICARE

## 2021-04-09 DIAGNOSIS — Z23 ENCOUNTER FOR IMMUNIZATION: Primary | ICD-10-CM

## 2021-04-09 PROCEDURE — 91300 COVID-19, MRNA, LNP-S, PF, 30MCG/0.3ML DOSE(PFIZER): CPT | Performed by: FAMILY MEDICINE

## 2021-04-09 PROCEDURE — 3331090002 HH PPS REVENUE DEBIT

## 2021-04-09 PROCEDURE — 0002A COVID-19, MRNA, LNP-S, PF, 30MCG/0.3ML DOSE(PFIZER): CPT | Performed by: FAMILY MEDICINE

## 2021-04-09 PROCEDURE — 3331090001 HH PPS REVENUE CREDIT

## 2021-04-09 RX ORDER — BROMOCRIPTINE MESYLATE 5 MG/1
CAPSULE ORAL
Qty: 30 CAP | Refills: 2 | Status: CANCELLED | OUTPATIENT
Start: 2021-04-09

## 2021-04-10 PROCEDURE — 3331090001 HH PPS REVENUE CREDIT

## 2021-04-10 PROCEDURE — 3331090002 HH PPS REVENUE DEBIT

## 2021-04-11 PROCEDURE — 3331090002 HH PPS REVENUE DEBIT

## 2021-04-11 PROCEDURE — 3331090001 HH PPS REVENUE CREDIT

## 2021-04-12 PROCEDURE — 3331090002 HH PPS REVENUE DEBIT

## 2021-04-12 PROCEDURE — 3331090001 HH PPS REVENUE CREDIT

## 2021-04-13 ENCOUNTER — HOME CARE VISIT (OUTPATIENT)
Dept: HOME HEALTH SERVICES | Facility: HOME HEALTH | Age: 66
End: 2021-04-13
Payer: MEDICARE

## 2021-04-13 PROCEDURE — 3331090002 HH PPS REVENUE DEBIT

## 2021-04-13 PROCEDURE — 3331090001 HH PPS REVENUE CREDIT

## 2021-04-14 ENCOUNTER — TELEPHONE (OUTPATIENT)
Dept: NEUROLOGY | Age: 66
End: 2021-04-14

## 2021-04-14 ENCOUNTER — HOME CARE VISIT (OUTPATIENT)
Dept: SCHEDULING | Facility: HOME HEALTH | Age: 66
End: 2021-04-14
Payer: MEDICARE

## 2021-04-14 PROCEDURE — G0156 HHCP-SVS OF AIDE,EA 15 MIN: HCPCS

## 2021-04-14 PROCEDURE — 3331090001 HH PPS REVENUE CREDIT

## 2021-04-14 PROCEDURE — 3331090002 HH PPS REVENUE DEBIT

## 2021-04-14 NOTE — TELEPHONE ENCOUNTER
Lyrica  - submitted to [employer]. Reply:  Drug is covered by current benefit plan. No further PA activity needed.      Faxed this rich to [email or web address].

## 2021-04-15 PROCEDURE — 3331090002 HH PPS REVENUE DEBIT

## 2021-04-15 PROCEDURE — 3331090001 HH PPS REVENUE CREDIT

## 2021-04-16 ENCOUNTER — HOME CARE VISIT (OUTPATIENT)
Dept: SCHEDULING | Facility: HOME HEALTH | Age: 66
End: 2021-04-16
Payer: MEDICARE

## 2021-04-16 PROCEDURE — G0159 HHC PT MAINT EA 15 MIN: HCPCS

## 2021-04-16 PROCEDURE — 3331090001 HH PPS REVENUE CREDIT

## 2021-04-16 PROCEDURE — 3331090002 HH PPS REVENUE DEBIT

## 2021-04-17 VITALS
SYSTOLIC BLOOD PRESSURE: 120 MMHG | OXYGEN SATURATION: 96 % | RESPIRATION RATE: 16 BRPM | DIASTOLIC BLOOD PRESSURE: 80 MMHG | TEMPERATURE: 97.3 F | HEART RATE: 83 BPM

## 2021-04-17 PROCEDURE — 3331090002 HH PPS REVENUE DEBIT

## 2021-04-17 PROCEDURE — 3331090001 HH PPS REVENUE CREDIT

## 2021-04-18 PROCEDURE — 3331090001 HH PPS REVENUE CREDIT

## 2021-04-18 PROCEDURE — 3331090002 HH PPS REVENUE DEBIT

## 2021-04-19 ENCOUNTER — HOME CARE VISIT (OUTPATIENT)
Dept: SCHEDULING | Facility: HOME HEALTH | Age: 66
End: 2021-04-19
Payer: MEDICARE

## 2021-04-19 PROCEDURE — 3331090001 HH PPS REVENUE CREDIT

## 2021-04-19 PROCEDURE — G0156 HHCP-SVS OF AIDE,EA 15 MIN: HCPCS

## 2021-04-19 PROCEDURE — 3331090002 HH PPS REVENUE DEBIT

## 2021-04-20 PROCEDURE — 400018 HH-NO PAY CLAIM PROCEDURE

## 2021-04-20 PROCEDURE — 3331090002 HH PPS REVENUE DEBIT

## 2021-04-20 PROCEDURE — 3331090001 HH PPS REVENUE CREDIT

## 2021-04-21 PROCEDURE — 3331090001 HH PPS REVENUE CREDIT

## 2021-04-21 PROCEDURE — 3331090002 HH PPS REVENUE DEBIT

## 2021-04-22 ENCOUNTER — HOME CARE VISIT (OUTPATIENT)
Dept: SCHEDULING | Facility: HOME HEALTH | Age: 66
End: 2021-04-22
Payer: MEDICARE

## 2021-04-22 VITALS
DIASTOLIC BLOOD PRESSURE: 75 MMHG | RESPIRATION RATE: 16 BRPM | OXYGEN SATURATION: 97 % | TEMPERATURE: 96.9 F | HEART RATE: 64 BPM | SYSTOLIC BLOOD PRESSURE: 130 MMHG

## 2021-04-22 PROCEDURE — 3331090001 HH PPS REVENUE CREDIT

## 2021-04-22 PROCEDURE — G0156 HHCP-SVS OF AIDE,EA 15 MIN: HCPCS

## 2021-04-22 PROCEDURE — 3331090002 HH PPS REVENUE DEBIT

## 2021-04-22 PROCEDURE — G0159 HHC PT MAINT EA 15 MIN: HCPCS

## 2021-04-22 PROCEDURE — 400014 HH F/U

## 2021-04-23 PROCEDURE — 3331090002 HH PPS REVENUE DEBIT

## 2021-04-23 PROCEDURE — 3331090001 HH PPS REVENUE CREDIT

## 2021-04-24 PROCEDURE — 3331090001 HH PPS REVENUE CREDIT

## 2021-04-24 PROCEDURE — 3331090002 HH PPS REVENUE DEBIT

## 2021-04-25 PROCEDURE — 3331090001 HH PPS REVENUE CREDIT

## 2021-04-25 PROCEDURE — 3331090002 HH PPS REVENUE DEBIT

## 2021-04-26 PROCEDURE — 3331090001 HH PPS REVENUE CREDIT

## 2021-04-26 PROCEDURE — 3331090002 HH PPS REVENUE DEBIT

## 2021-04-27 PROCEDURE — 3331090001 HH PPS REVENUE CREDIT

## 2021-04-27 PROCEDURE — 3331090002 HH PPS REVENUE DEBIT

## 2021-04-28 PROCEDURE — 3331090002 HH PPS REVENUE DEBIT

## 2021-04-28 PROCEDURE — 3331090001 HH PPS REVENUE CREDIT

## 2021-04-29 ENCOUNTER — HOME CARE VISIT (OUTPATIENT)
Dept: SCHEDULING | Facility: HOME HEALTH | Age: 66
End: 2021-04-29
Payer: MEDICARE

## 2021-04-29 VITALS
RESPIRATION RATE: 16 BRPM | TEMPERATURE: 97.4 F | HEART RATE: 79 BPM | OXYGEN SATURATION: 98 % | SYSTOLIC BLOOD PRESSURE: 120 MMHG | DIASTOLIC BLOOD PRESSURE: 90 MMHG

## 2021-04-29 PROCEDURE — 3331090002 HH PPS REVENUE DEBIT

## 2021-04-29 PROCEDURE — G0156 HHCP-SVS OF AIDE,EA 15 MIN: HCPCS

## 2021-04-29 PROCEDURE — G0159 HHC PT MAINT EA 15 MIN: HCPCS

## 2021-04-29 PROCEDURE — 3331090001 HH PPS REVENUE CREDIT

## 2021-04-30 PROCEDURE — 3331090001 HH PPS REVENUE CREDIT

## 2021-04-30 PROCEDURE — 3331090002 HH PPS REVENUE DEBIT

## 2021-05-01 PROCEDURE — 3331090001 HH PPS REVENUE CREDIT

## 2021-05-01 PROCEDURE — 3331090002 HH PPS REVENUE DEBIT

## 2021-05-02 PROCEDURE — 3331090001 HH PPS REVENUE CREDIT

## 2021-05-02 PROCEDURE — 3331090002 HH PPS REVENUE DEBIT

## 2021-05-03 PROCEDURE — 3331090002 HH PPS REVENUE DEBIT

## 2021-05-03 PROCEDURE — 3331090001 HH PPS REVENUE CREDIT

## 2021-05-04 PROCEDURE — 3331090001 HH PPS REVENUE CREDIT

## 2021-05-04 PROCEDURE — 3331090002 HH PPS REVENUE DEBIT

## 2021-05-05 PROCEDURE — 3331090001 HH PPS REVENUE CREDIT

## 2021-05-05 PROCEDURE — 3331090002 HH PPS REVENUE DEBIT

## 2021-05-06 ENCOUNTER — HOME CARE VISIT (OUTPATIENT)
Dept: SCHEDULING | Facility: HOME HEALTH | Age: 66
End: 2021-05-06
Payer: MEDICARE

## 2021-05-06 VITALS
DIASTOLIC BLOOD PRESSURE: 70 MMHG | SYSTOLIC BLOOD PRESSURE: 130 MMHG | TEMPERATURE: 97.1 F | HEART RATE: 82 BPM | OXYGEN SATURATION: 97 % | RESPIRATION RATE: 16 BRPM

## 2021-05-06 PROCEDURE — 3331090002 HH PPS REVENUE DEBIT

## 2021-05-06 PROCEDURE — G0156 HHCP-SVS OF AIDE,EA 15 MIN: HCPCS

## 2021-05-06 PROCEDURE — 3331090001 HH PPS REVENUE CREDIT

## 2021-05-06 PROCEDURE — G0159 HHC PT MAINT EA 15 MIN: HCPCS

## 2021-05-07 PROCEDURE — 3331090001 HH PPS REVENUE CREDIT

## 2021-05-07 PROCEDURE — 3331090002 HH PPS REVENUE DEBIT

## 2021-05-08 PROCEDURE — 3331090002 HH PPS REVENUE DEBIT

## 2021-05-08 PROCEDURE — 3331090001 HH PPS REVENUE CREDIT

## 2021-05-09 PROCEDURE — 3331090001 HH PPS REVENUE CREDIT

## 2021-05-09 PROCEDURE — 3331090002 HH PPS REVENUE DEBIT

## 2021-05-10 PROCEDURE — 3331090001 HH PPS REVENUE CREDIT

## 2021-05-10 PROCEDURE — 3331090002 HH PPS REVENUE DEBIT

## 2021-05-11 PROCEDURE — 3331090001 HH PPS REVENUE CREDIT

## 2021-05-11 PROCEDURE — 3331090002 HH PPS REVENUE DEBIT

## 2021-05-11 RX ORDER — PAROXETINE HYDROCHLORIDE 40 MG/1
TABLET, FILM COATED ORAL
Qty: 135 TAB | Refills: 3 | Status: SHIPPED | OUTPATIENT
Start: 2021-05-11 | End: 2022-03-14

## 2021-05-12 PROCEDURE — 3331090002 HH PPS REVENUE DEBIT

## 2021-05-12 PROCEDURE — 3331090001 HH PPS REVENUE CREDIT

## 2021-05-13 ENCOUNTER — HOME CARE VISIT (OUTPATIENT)
Dept: SCHEDULING | Facility: HOME HEALTH | Age: 66
End: 2021-05-13
Payer: MEDICARE

## 2021-05-13 VITALS — RESPIRATION RATE: 16 BRPM | TEMPERATURE: 97.6 F | DIASTOLIC BLOOD PRESSURE: 80 MMHG | SYSTOLIC BLOOD PRESSURE: 125 MMHG

## 2021-05-13 PROCEDURE — G0159 HHC PT MAINT EA 15 MIN: HCPCS

## 2021-05-13 PROCEDURE — 3331090001 HH PPS REVENUE CREDIT

## 2021-05-13 PROCEDURE — 3331090002 HH PPS REVENUE DEBIT

## 2021-05-14 ENCOUNTER — HOME CARE VISIT (OUTPATIENT)
Dept: SCHEDULING | Facility: HOME HEALTH | Age: 66
End: 2021-05-14
Payer: MEDICARE

## 2021-05-14 PROCEDURE — 3331090002 HH PPS REVENUE DEBIT

## 2021-05-14 PROCEDURE — G0156 HHCP-SVS OF AIDE,EA 15 MIN: HCPCS

## 2021-05-14 PROCEDURE — 3331090001 HH PPS REVENUE CREDIT

## 2021-05-15 PROCEDURE — 3331090002 HH PPS REVENUE DEBIT

## 2021-05-15 PROCEDURE — 3331090001 HH PPS REVENUE CREDIT

## 2021-05-16 PROCEDURE — 3331090001 HH PPS REVENUE CREDIT

## 2021-05-16 PROCEDURE — 3331090002 HH PPS REVENUE DEBIT

## 2021-05-17 PROCEDURE — 3331090002 HH PPS REVENUE DEBIT

## 2021-05-17 PROCEDURE — 3331090001 HH PPS REVENUE CREDIT

## 2021-05-18 ENCOUNTER — HOME CARE VISIT (OUTPATIENT)
Dept: SCHEDULING | Facility: HOME HEALTH | Age: 66
End: 2021-05-18
Payer: MEDICARE

## 2021-05-18 VITALS
SYSTOLIC BLOOD PRESSURE: 118 MMHG | TEMPERATURE: 97.5 F | OXYGEN SATURATION: 99 % | DIASTOLIC BLOOD PRESSURE: 65 MMHG | RESPIRATION RATE: 16 BRPM | HEART RATE: 80 BPM

## 2021-05-18 PROCEDURE — 3331090002 HH PPS REVENUE DEBIT

## 2021-05-18 PROCEDURE — 3331090001 HH PPS REVENUE CREDIT

## 2021-05-18 PROCEDURE — G0159 HHC PT MAINT EA 15 MIN: HCPCS

## 2021-05-19 PROCEDURE — 3331090002 HH PPS REVENUE DEBIT

## 2021-05-19 PROCEDURE — 3331090001 HH PPS REVENUE CREDIT

## 2021-05-20 PROCEDURE — 3331090001 HH PPS REVENUE CREDIT

## 2021-05-20 PROCEDURE — 3331090002 HH PPS REVENUE DEBIT

## 2021-05-20 PROCEDURE — 400018 HH-NO PAY CLAIM PROCEDURE

## 2021-05-21 ENCOUNTER — VIRTUAL VISIT (OUTPATIENT)
Dept: FAMILY MEDICINE CLINIC | Age: 66
End: 2021-05-21
Payer: MEDICARE

## 2021-05-21 DIAGNOSIS — H26.9 CATARACT, UNSPECIFIED CATARACT TYPE, UNSPECIFIED LATERALITY: ICD-10-CM

## 2021-05-21 DIAGNOSIS — E11.9 TYPE 2 DIABETES MELLITUS WITHOUT COMPLICATION, WITHOUT LONG-TERM CURRENT USE OF INSULIN (HCC): Primary | ICD-10-CM

## 2021-05-21 PROCEDURE — 3331090001 HH PPS REVENUE CREDIT

## 2021-05-21 PROCEDURE — G9717 DOC PT DX DEP/BP F/U NT REQ: HCPCS | Performed by: FAMILY MEDICINE

## 2021-05-21 PROCEDURE — G8427 DOCREV CUR MEDS BY ELIG CLIN: HCPCS | Performed by: FAMILY MEDICINE

## 2021-05-21 PROCEDURE — G0463 HOSPITAL OUTPT CLINIC VISIT: HCPCS | Performed by: FAMILY MEDICINE

## 2021-05-21 PROCEDURE — 3051F HG A1C>EQUAL 7.0%<8.0%: CPT | Performed by: FAMILY MEDICINE

## 2021-05-21 PROCEDURE — 3017F COLORECTAL CA SCREEN DOC REV: CPT | Performed by: FAMILY MEDICINE

## 2021-05-21 PROCEDURE — 2022F DILAT RTA XM EVC RTNOPTHY: CPT | Performed by: FAMILY MEDICINE

## 2021-05-21 PROCEDURE — G8756 NO BP MEASURE DOC: HCPCS | Performed by: FAMILY MEDICINE

## 2021-05-21 PROCEDURE — 99213 OFFICE O/P EST LOW 20 MIN: CPT | Performed by: FAMILY MEDICINE

## 2021-05-21 PROCEDURE — G9899 SCRN MAM PERF RSLTS DOC: HCPCS | Performed by: FAMILY MEDICINE

## 2021-05-21 PROCEDURE — 3331090002 HH PPS REVENUE DEBIT

## 2021-05-21 RX ORDER — METFORMIN HYDROCHLORIDE 500 MG/1
TABLET, EXTENDED RELEASE ORAL
Qty: 270 TABLET | Refills: 1 | Status: SHIPPED | OUTPATIENT
Start: 2021-05-21 | End: 2022-04-21

## 2021-05-21 NOTE — PROGRESS NOTES
Hiwot Galvin, who was evaluated through a synchronous (real-time) audio-video encounter, and/or her healthcare decision maker, is aware that it is a billable service, with coverage as determined by her insurance carrier. She provided verbal consent to proceed: YES, and patient identification was verified. It was conducted pursuant to the emergency declaration under the [facility], [facility] authority and the [employer] and [employer] Act. A caregiver was present when appropriate. Ability to conduct physical exam was limited. I was at home. The patient was at home. This virtual visit was conducted via [employer]. Pursuant to the emergency declaration under the [facility], [address] [place] [address] authority and the [employer] and [employer] Act, this Virtual  Visit was conducted to reduce the patient's risk of exposure to COVID-19 and provide continuity of care for an established patient. Services were provided through a video synchronous discussion virtually to substitute for in-person clinic visit. Due to this being a TeleHealth evaluation, many elements of the physical examination are unable to be assessed. Total Time: minutes: 5-10 minutes. Florentino Barrios MD    [phone]  Subjective:   Hiwot Galvin was seen for:    A1c is still high at 7.5. Pt was trying to work on eating better but had a few birthday celebrations with cake. May need cataract surgery. Seeing orthopedics for her right knee pain. Having issues with locking and hyperextending knee. Needs forms filled out for diabetes shoes; seen by podiatry for her foot exam which was notable for painful inflamed hyperkeratosis of first and fifth toe bilaterally. She has a history of diabetic neuropathy and metatarsalgia of both feet.  Prescription of diabetic shoes has been signed by her podiatrist ( [facility]). Prior to Admission medications    Medication Sig Start Date End Date Taking? Authorizing Provider   PARoxetine (PAXIL) 40 mg tablet TAKE 1 AND 1/2 TABLETS BY MOUTH EVERY NIGHT 5/11/21   Los Kahn MD   bromocriptine (PARLODEL) 5 mg capsule TAKE 1 CAPSULE BY MOUTH DAILY. 4/8/21   Los Kahn MD   cyanocobalamin 1,000 mcg tablet TAKE 1 TABLET BY MOUTH EVERY DAY 3/19/21   Los Kahn MD   pyridostigmine bromide (MESTINON SR) 180 mg SR tablet TAKE 1 TABLET BY MOUTH TWICE DAILY 3/8/21   Los Kahn MD   pregabalin (Lyrica) 200 mg capsule Take 1 Cap by mouth two (2) times a day. Max Daily Amount: 400 mg. 3/5/21   Los Kahn MD   Gilenya 0.5 mg cap Take 1 Cap by mouth daily. 3/5/21   Los Kahn MD   acetaminophen (TYLENOL) 500 mg tablet Take 500 mg by mouth every six (6) hours as needed for Pain. Provider, Historical   levothyroxine (SYNTHROID) 137 mcg tablet TAKE 1 TABLET BY MOUTH DAILY BEFORE BREAKFAST; dose change  Patient taking differently: TAKE 1 TABLET BY MOUTH DAILY BEFORE BREAKFAST; dose change    taking at bedtime  2/16/21   Ellen Gonzales MD   nystatin (MYCOSTATIN) powder Apply to candidal lesions TOPICALLY 2 to 3 times daily until healing complete  Patient taking differently: Apply 1 Units to affected area two (2) times a day. Apply to candidal lesions TOPICALLY 2 to 3 times daily until healing complete    right breast  2/16/21   Ellen Gonzales MD   nystatin (MYCOSTATIN) topical cream Apply  to affected area two (2) times a day. Patient taking differently: Apply 1 Units to affected area two (2) times a day. right breast 2/16/21   Ellen Gonzales MD   OTHER Electric scooter  Diagnosis: Multiple sclerosis  Frequent fall  Gait disorder 12/16/20   Los Kahn MD   metFORMIN ER (GLUCOPHAGE XR) 500 mg tablet TAKE 2 TABLETS BY MOUTH DAILY WITH BREAKFAST  Patient taking differently: Take 500 mg by mouth two (2) times a day.  TAKE 2 TABLETS BY MOUTH DAILY WITH BREAKFAST 11/24/20   Zachery Garcia MD   rosuvastatin (CRESTOR) 40 mg tablet TAKE 1 TABLET BY MOUTH EVERY DAY 11/23/20   Zachery Garcia MD   topiramate (TOPAMAX) 200 mg tablet Take 1 Tab by mouth two (2) times a day. 10/21/20   Los Kahn MD   Aimovig Autoinjector 140 mg/mL injection ADMINISTER 1 ML(140MG) UNDER THE SKIN EVERY 30 DAYS 9/30/20   Los Kahn MD   predniSONE (DELTASONE) 20 mg tablet 60 mg p.o. daily for 5 days, 40 mg p.o. daily for 4 days, then 20 mg p.o. daily for 3 days. 8/3/20   Los Kahn MD   corticotropin (ACTHAR H.P.) 80 unit/mL injectable gel 1 mL by SubCUTAneous route daily. 80 units subq q day for 10 days 11/8/19   Los Kahn MD   ondansetron (ZOFRAN ODT) 4 mg disintegrating tablet DISSOLVE 1 TABLET ON THE TONGUE EVERY 8 HOURS AS NEEDED FOR NAUSEA 9/24/19   Zachery Garcia MD   glucose blood VI test strips (BLOOD GLUCOSE TEST) strip 100 Each by Does Not Apply route See Admin Instructions. One Touch Ultra Test Strips=Check blood sugar daily dx E11.8 8/11/19   Rigo Laughlin, NP   aspirin delayed-release 81 mg tablet Take 1 Tab by mouth every twelve (12) hours. Patient taking differently: Take 1 Tab by mouth daily. 5/14/19   KATIANA Loomis   OTHER 0.25 mL by SubLINGual route daily. CBD OIL    Provider, Historical   dantrolene (DANTRIUM) 100 mg capsule Take 1 Cap by mouth three (3) times daily. Patient taking differently: Take 100 mg by mouth daily as needed. Indications: muscle spasm 3/19/19   Los Kahn MD   albuterol (PROVENTIL VENTOLIN) 2.5 mg /3 mL (0.083 %) nebulizer solution 3 mL by Nebulization route every six (6) hours as needed for Wheezing. 12/31/18   Zachery Garcia MD   Menthol-Zinc Oxide (CALMOSEPTINE) 0.44-20.6 % oint Apply 1 Each to affected area daily as needed for Other (thin layer to buttocks for skin irritation).     Provider, Historical   lidocaine (LIDODERM) 5 % Apply patch to the affected area for 12 hours a day and remove for 12 hours a day. 8/15/18   Los Kahn MD   clindamycin (CLEOCIN) 300 mg capsule Take 300 mg by mouth as needed for Other (prior to dental procedures ). Prior to dental procedures 6/30/17   Provider, Historical   TENS UNIT ELECTRODES by Does Not Apply route. prn    Provider, Historical       Allergies   Allergen Reactions    Bee Sting [Sting, Bee] Anaphylaxis     Also allergic to egg products    Penicillins Anaphylaxis    Betadine [Povidone-Iodine] Rash    Egg Itching       Review of Systems   Constitutional: Negative for fever, malaise/fatigue and weight loss. Respiratory: Negative for cough, hemoptysis, shortness of breath and wheezing. Cardiovascular: Negative for chest pain, palpitations, leg swelling and PND. Gastrointestinal: Negative for abdominal pain, constipation, diarrhea, nausea and vomiting. Musculoskeletal: Positive for joint pain. Physical Exam:     Visit Vitals  LMP 09/01/2010        General: alert, cooperative, no distress   Mental  status: normal mood, behavior, speech, dress, motor activity, and thought processes, able to follow commands   HENT: NCAT   Neck: no visualized mass   Resp: no respiratory distress   Neuro: no gross deficits   Skin: no discoloration or lesions of concern on visible areas   Psychiatric: normal affect, consistent with stated mood, no evidence of hallucinations       Assessment & Plan:     Corinne Haller Minor is a 61 y.o. female who presents today for:    1. Type 2 diabetes mellitus without complication, without long-term current use of insulin (HCC)  Will increase her metformin to 3 tabs per day. Forms filled for her diabetic shoes; exam and prescription was provided by her podiatrist.  - metFORMIN ER (GLUCOPHAGE XR) 500 mg tablet; TAKE 3 TABLETS BY MOUTH DAILY; dose change  Dispense: 270 Tablet; Refill: 1    2. Cataract, unspecified cataract type, unspecified laterality  Pt to f/u with ophthalmology.        Medications Discontinued During This Encounter   Medication Reason    metFORMIN ER (GLUCOPHAGE XR) 500 mg tablet REORDER    bromocriptine (PARLODEL) 5 mg capsule LIST CLEANUP        Follow-up and Dispositions    · Return in about 3 months (around 8/21/2021). Follow-up and Disposition History        Treatment risks/benefits/costs/interactions/alternatives discussed with patient. Advised patient to call back or return to office if symptoms worsen/change/persist. If patient cannot reach us or should anything more severe/urgent arise he/she should proceed directly to the nearest emergency department. Discussed expected course/resolution/complications of diagnosis in detail with patient. Patient expressed understanding with the diagnosis and plan. Brandon Mina M.D.

## 2021-05-22 PROCEDURE — 3331090001 HH PPS REVENUE CREDIT

## 2021-05-22 PROCEDURE — 3331090002 HH PPS REVENUE DEBIT

## 2021-05-23 PROCEDURE — 3331090001 HH PPS REVENUE CREDIT

## 2021-05-23 PROCEDURE — 3331090002 HH PPS REVENUE DEBIT

## 2021-05-24 PROCEDURE — 3331090001 HH PPS REVENUE CREDIT

## 2021-05-24 PROCEDURE — 3331090002 HH PPS REVENUE DEBIT

## 2021-05-25 PROCEDURE — 3331090001 HH PPS REVENUE CREDIT

## 2021-05-25 PROCEDURE — 3331090002 HH PPS REVENUE DEBIT

## 2021-05-26 ENCOUNTER — HOME CARE VISIT (OUTPATIENT)
Dept: SCHEDULING | Facility: HOME HEALTH | Age: 66
End: 2021-05-26
Payer: MEDICARE

## 2021-05-26 VITALS
TEMPERATURE: 98.2 F | OXYGEN SATURATION: 98 % | RESPIRATION RATE: 18 BRPM | SYSTOLIC BLOOD PRESSURE: 138 MMHG | HEART RATE: 75 BPM | DIASTOLIC BLOOD PRESSURE: 70 MMHG

## 2021-05-26 PROCEDURE — 3331090001 HH PPS REVENUE CREDIT

## 2021-05-26 PROCEDURE — 400014 HH F/U

## 2021-05-26 PROCEDURE — 3331090002 HH PPS REVENUE DEBIT

## 2021-05-26 PROCEDURE — G0159 HHC PT MAINT EA 15 MIN: HCPCS

## 2021-05-27 ENCOUNTER — PATIENT OUTREACH (OUTPATIENT)
Dept: CASE MANAGEMENT | Age: 66
End: 2021-05-27

## 2021-05-27 PROCEDURE — 3331090002 HH PPS REVENUE DEBIT

## 2021-05-27 PROCEDURE — 3331090001 HH PPS REVENUE CREDIT

## 2021-05-27 NOTE — PROGRESS NOTES
Patient called - saw Brenna Goff, on 5/25 re right knee pain. He gave her a cortisone injection but did not help, only lasted about 2 hours. She is scheduled to f/u with him in 2 month, he may schedule her for a knee replacement. Also saw her eye doctor at [facility]- may need cataract surgery. Goes back in January for additional testing. Support and encouragement given. REquested CTN notify pcp, , of above information. Wished her well.

## 2021-05-28 PROCEDURE — 3331090001 HH PPS REVENUE CREDIT

## 2021-05-28 PROCEDURE — 3331090002 HH PPS REVENUE DEBIT

## 2021-05-29 PROCEDURE — 3331090001 HH PPS REVENUE CREDIT

## 2021-05-29 PROCEDURE — 3331090002 HH PPS REVENUE DEBIT

## 2021-05-30 PROCEDURE — 3331090002 HH PPS REVENUE DEBIT

## 2021-05-30 PROCEDURE — 3331090001 HH PPS REVENUE CREDIT

## 2021-05-31 PROCEDURE — 3331090001 HH PPS REVENUE CREDIT

## 2021-05-31 PROCEDURE — 3331090002 HH PPS REVENUE DEBIT

## 2021-06-01 PROCEDURE — 3331090001 HH PPS REVENUE CREDIT

## 2021-06-01 PROCEDURE — 3331090002 HH PPS REVENUE DEBIT

## 2021-06-02 PROCEDURE — 3331090001 HH PPS REVENUE CREDIT

## 2021-06-02 PROCEDURE — 3331090002 HH PPS REVENUE DEBIT

## 2021-06-03 ENCOUNTER — HOME CARE VISIT (OUTPATIENT)
Dept: SCHEDULING | Facility: HOME HEALTH | Age: 66
End: 2021-06-03
Payer: MEDICARE

## 2021-06-03 VITALS
SYSTOLIC BLOOD PRESSURE: 120 MMHG | TEMPERATURE: 97.7 F | RESPIRATION RATE: 16 BRPM | OXYGEN SATURATION: 98 % | HEART RATE: 83 BPM | DIASTOLIC BLOOD PRESSURE: 70 MMHG

## 2021-06-03 PROCEDURE — 3331090002 HH PPS REVENUE DEBIT

## 2021-06-03 PROCEDURE — 3331090001 HH PPS REVENUE CREDIT

## 2021-06-03 PROCEDURE — G0159 HHC PT MAINT EA 15 MIN: HCPCS

## 2021-06-04 PROCEDURE — 3331090001 HH PPS REVENUE CREDIT

## 2021-06-04 PROCEDURE — 3331090002 HH PPS REVENUE DEBIT

## 2021-06-05 PROCEDURE — 3331090001 HH PPS REVENUE CREDIT

## 2021-06-05 PROCEDURE — 3331090002 HH PPS REVENUE DEBIT

## 2021-06-06 PROCEDURE — 3331090002 HH PPS REVENUE DEBIT

## 2021-06-06 PROCEDURE — 3331090001 HH PPS REVENUE CREDIT

## 2021-06-07 PROCEDURE — 3331090001 HH PPS REVENUE CREDIT

## 2021-06-07 PROCEDURE — 3331090002 HH PPS REVENUE DEBIT

## 2021-06-08 ENCOUNTER — HOME CARE VISIT (OUTPATIENT)
Dept: HOME HEALTH SERVICES | Facility: HOME HEALTH | Age: 66
End: 2021-06-08
Payer: MEDICARE

## 2021-06-08 PROCEDURE — 3331090002 HH PPS REVENUE DEBIT

## 2021-06-08 PROCEDURE — 3331090001 HH PPS REVENUE CREDIT

## 2021-06-09 ENCOUNTER — HOME CARE VISIT (OUTPATIENT)
Dept: SCHEDULING | Facility: HOME HEALTH | Age: 66
End: 2021-06-09
Payer: MEDICARE

## 2021-06-09 VITALS
SYSTOLIC BLOOD PRESSURE: 135 MMHG | HEART RATE: 95 BPM | TEMPERATURE: 98.2 F | OXYGEN SATURATION: 99 % | RESPIRATION RATE: 16 BRPM | DIASTOLIC BLOOD PRESSURE: 70 MMHG

## 2021-06-09 PROCEDURE — 3331090001 HH PPS REVENUE CREDIT

## 2021-06-09 PROCEDURE — 3331090002 HH PPS REVENUE DEBIT

## 2021-06-09 PROCEDURE — G0159 HHC PT MAINT EA 15 MIN: HCPCS

## 2021-06-10 PROCEDURE — 3331090002 HH PPS REVENUE DEBIT

## 2021-06-10 PROCEDURE — 3331090001 HH PPS REVENUE CREDIT

## 2021-06-11 PROCEDURE — 3331090001 HH PPS REVENUE CREDIT

## 2021-06-11 PROCEDURE — 3331090002 HH PPS REVENUE DEBIT

## 2021-06-12 PROCEDURE — 3331090002 HH PPS REVENUE DEBIT

## 2021-06-12 PROCEDURE — 3331090001 HH PPS REVENUE CREDIT

## 2021-06-13 PROCEDURE — 3331090001 HH PPS REVENUE CREDIT

## 2021-06-13 PROCEDURE — 3331090002 HH PPS REVENUE DEBIT

## 2021-06-14 PROCEDURE — 3331090001 HH PPS REVENUE CREDIT

## 2021-06-14 PROCEDURE — 3331090002 HH PPS REVENUE DEBIT

## 2021-06-15 ENCOUNTER — HOME CARE VISIT (OUTPATIENT)
Dept: SCHEDULING | Facility: HOME HEALTH | Age: 66
End: 2021-06-15
Payer: MEDICARE

## 2021-06-15 VITALS
HEART RATE: 81 BPM | SYSTOLIC BLOOD PRESSURE: 110 MMHG | DIASTOLIC BLOOD PRESSURE: 65 MMHG | TEMPERATURE: 97.8 F | RESPIRATION RATE: 16 BRPM | OXYGEN SATURATION: 98 %

## 2021-06-15 PROCEDURE — G0159 HHC PT MAINT EA 15 MIN: HCPCS

## 2021-06-15 PROCEDURE — 3331090001 HH PPS REVENUE CREDIT

## 2021-06-15 PROCEDURE — 3331090002 HH PPS REVENUE DEBIT

## 2021-07-08 ENCOUNTER — OFFICE VISIT (OUTPATIENT)
Dept: FAMILY MEDICINE CLINIC | Age: 66
End: 2021-07-08
Payer: MEDICARE

## 2021-07-08 ENCOUNTER — OFFICE VISIT (OUTPATIENT)
Dept: NEUROLOGY | Age: 66
End: 2021-07-08
Payer: MEDICARE

## 2021-07-08 VITALS
DIASTOLIC BLOOD PRESSURE: 76 MMHG | BODY MASS INDEX: 27.28 KG/M2 | TEMPERATURE: 98.1 F | OXYGEN SATURATION: 99 % | SYSTOLIC BLOOD PRESSURE: 134 MMHG | RESPIRATION RATE: 20 BRPM | HEART RATE: 76 BPM | WEIGHT: 195.6 LBS

## 2021-07-08 VITALS
SYSTOLIC BLOOD PRESSURE: 116 MMHG | OXYGEN SATURATION: 96 % | WEIGHT: 196 LBS | HEIGHT: 71 IN | RESPIRATION RATE: 18 BRPM | BODY MASS INDEX: 27.44 KG/M2 | HEART RATE: 75 BPM | TEMPERATURE: 97.1 F | DIASTOLIC BLOOD PRESSURE: 74 MMHG

## 2021-07-08 DIAGNOSIS — F98.8 ATTENTION DEFICIT DISORDER (ADD) WITHOUT HYPERACTIVITY: ICD-10-CM

## 2021-07-08 DIAGNOSIS — G35 MS (MULTIPLE SCLEROSIS) (HCC): Primary | ICD-10-CM

## 2021-07-08 DIAGNOSIS — G70.00 MYASTHENIA GRAVIS (HCC): ICD-10-CM

## 2021-07-08 DIAGNOSIS — R29.6 FREQUENT FALLS: ICD-10-CM

## 2021-07-08 DIAGNOSIS — R26.9 GAIT DISORDER: ICD-10-CM

## 2021-07-08 DIAGNOSIS — G61.81 CIDP (CHRONIC INFLAMMATORY DEMYELINATING POLYNEUROPATHY) (HCC): ICD-10-CM

## 2021-07-08 DIAGNOSIS — G89.4 CHRONIC PAIN SYNDROME: ICD-10-CM

## 2021-07-08 DIAGNOSIS — M17.11 PRIMARY OSTEOARTHRITIS OF RIGHT KNEE: ICD-10-CM

## 2021-07-08 DIAGNOSIS — I10 HTN, GOAL BELOW 140/90: Primary | ICD-10-CM

## 2021-07-08 DIAGNOSIS — E11.9 TYPE 2 DIABETES MELLITUS WITHOUT COMPLICATION, WITHOUT LONG-TERM CURRENT USE OF INSULIN (HCC): ICD-10-CM

## 2021-07-08 PROCEDURE — G8419 CALC BMI OUT NRM PARAM NOF/U: HCPCS | Performed by: PSYCHIATRY & NEUROLOGY

## 2021-07-08 PROCEDURE — G8754 DIAS BP LESS 90: HCPCS | Performed by: FAMILY MEDICINE

## 2021-07-08 PROCEDURE — G8419 CALC BMI OUT NRM PARAM NOF/U: HCPCS | Performed by: FAMILY MEDICINE

## 2021-07-08 PROCEDURE — 3017F COLORECTAL CA SCREEN DOC REV: CPT | Performed by: PSYCHIATRY & NEUROLOGY

## 2021-07-08 PROCEDURE — 2022F DILAT RTA XM EVC RTNOPTHY: CPT | Performed by: FAMILY MEDICINE

## 2021-07-08 PROCEDURE — G8752 SYS BP LESS 140: HCPCS | Performed by: PSYCHIATRY & NEUROLOGY

## 2021-07-08 PROCEDURE — G8752 SYS BP LESS 140: HCPCS | Performed by: FAMILY MEDICINE

## 2021-07-08 PROCEDURE — G9717 DOC PT DX DEP/BP F/U NT REQ: HCPCS | Performed by: FAMILY MEDICINE

## 2021-07-08 PROCEDURE — G0463 HOSPITAL OUTPT CLINIC VISIT: HCPCS | Performed by: FAMILY MEDICINE

## 2021-07-08 PROCEDURE — 99213 OFFICE O/P EST LOW 20 MIN: CPT | Performed by: FAMILY MEDICINE

## 2021-07-08 PROCEDURE — G8427 DOCREV CUR MEDS BY ELIG CLIN: HCPCS | Performed by: FAMILY MEDICINE

## 2021-07-08 PROCEDURE — G9899 SCRN MAM PERF RSLTS DOC: HCPCS | Performed by: PSYCHIATRY & NEUROLOGY

## 2021-07-08 PROCEDURE — 3051F HG A1C>EQUAL 7.0%<8.0%: CPT | Performed by: FAMILY MEDICINE

## 2021-07-08 PROCEDURE — G9899 SCRN MAM PERF RSLTS DOC: HCPCS | Performed by: FAMILY MEDICINE

## 2021-07-08 PROCEDURE — 3017F COLORECTAL CA SCREEN DOC REV: CPT | Performed by: FAMILY MEDICINE

## 2021-07-08 PROCEDURE — G8427 DOCREV CUR MEDS BY ELIG CLIN: HCPCS | Performed by: PSYCHIATRY & NEUROLOGY

## 2021-07-08 PROCEDURE — G9717 DOC PT DX DEP/BP F/U NT REQ: HCPCS | Performed by: PSYCHIATRY & NEUROLOGY

## 2021-07-08 PROCEDURE — G8754 DIAS BP LESS 90: HCPCS | Performed by: PSYCHIATRY & NEUROLOGY

## 2021-07-08 PROCEDURE — 99214 OFFICE O/P EST MOD 30 MIN: CPT | Performed by: PSYCHIATRY & NEUROLOGY

## 2021-07-08 RX ORDER — TRAMADOL HYDROCHLORIDE 50 MG/1
TABLET ORAL
COMMUNITY
End: 2021-07-12

## 2021-07-08 RX ORDER — TRAZODONE HYDROCHLORIDE 50 MG/1
50 TABLET ORAL
COMMUNITY
End: 2021-07-08 | Stop reason: SDUPTHER

## 2021-07-08 RX ORDER — LEVOTHYROXINE SODIUM 137 UG/1
TABLET ORAL
COMMUNITY
Start: 2021-05-14 | End: 2021-07-08 | Stop reason: SDUPTHER

## 2021-07-08 RX ORDER — LANOLIN ALCOHOL/MO/W.PET/CERES
CREAM (GRAM) TOPICAL
Qty: 30 TABLET | Refills: 2 | Status: SHIPPED | OUTPATIENT
Start: 2021-07-08 | End: 2022-04-28

## 2021-07-08 RX ORDER — ROSUVASTATIN CALCIUM 40 MG/1
TABLET, COATED ORAL
COMMUNITY
Start: 2020-11-23 | End: 2021-07-08 | Stop reason: SDUPTHER

## 2021-07-08 NOTE — PROGRESS NOTES
Chief Complaint   Patient presents with    Hypertension     Follow up    Thyroid Problem    Diabetes     1. Have you been to the ER, urgent care clinic since your last visit? Hospitalized since your last visit? No    2. Have you seen or consulted any other health care providers outside of the [address] [place] [address] since your last visit? Include any pap smears or colon screening.  No

## 2021-07-08 NOTE — PROGRESS NOTES
Patient Name: Lisa Holder   MRN: [id number]    Meme Marley is a 61 y.o. female who presents with the following: Will have a right knee replacement on 7/19 by Dr. Nhi Pierson. She is hoping this will allow her to be more active. Overdue for pap smear; wants to do it here. No prior issues with general anesthesia or current symptoms of ACS. Denies history of allergy to latex/tape/adhesive, bleeding problems, VTE. Hx of diabetes mellitus, on metformin. Lab Results   Component Value Date/Time    Hemoglobin A1c 7.5 (H) 05/13/2021 10:50 AM    Hemoglobin A1c (POC) 6.3 07/05/2019 01:51 PM     Review of Systems   Constitutional: Negative for fever, malaise/fatigue and weight loss. Respiratory: Negative for cough, hemoptysis, shortness of breath and wheezing. Cardiovascular: Negative for chest pain, palpitations, leg swelling and PND. Gastrointestinal: Negative for abdominal pain, constipation, diarrhea, nausea and vomiting. Musculoskeletal: Positive for joint pain. The patient's medications, allergies, past medical history, surgical history, family history and social history were reviewed and updated where appropriate. Prior to Admission medications    Medication Sig Start Date End Date Taking? Authorizing Provider   cyanocobalamin 1,000 mcg tablet TAKE 1 TABLET BY MOUTH EVERY DAY 7/8/21  Yes Los Kahn MD   traMADoL (ULTRAM) 50 mg tablet tramadol 50 mg tablet   Yes Provider, Historical   metFORMIN ER (GLUCOPHAGE XR) 500 mg tablet TAKE 3 TABLETS BY MOUTH DAILY; dose change 5/21/21  Yes Rm Weller MD   PARoxetine (PAXIL) 40 mg tablet TAKE 1 AND 1/2 TABLETS BY MOUTH EVERY NIGHT 5/11/21  Yes Los Kahn MD   pyridostigmine bromide (MESTINON SR) 180 mg SR tablet TAKE 1 TABLET BY MOUTH TWICE DAILY 3/8/21  Yes Los Kahn MD   pregabalin (Lyrica) 200 mg capsule Take 1 Cap by mouth two (2) times a day.  Max Daily Amount: 400 mg. 3/5/21  Yes Emery Voss MD Gilenya 0.5 mg cap Take 1 Cap by mouth daily. 3/5/21  Yes Los Kahn MD   acetaminophen (TYLENOL) 500 mg tablet Take 500 mg by mouth every six (6) hours as needed for Pain. Yes Provider, Historical   levothyroxine (SYNTHROID) 137 mcg tablet TAKE 1 TABLET BY MOUTH DAILY BEFORE BREAKFAST; dose change  Patient taking differently: TAKE 1 TABLET BY MOUTH DAILY BEFORE BREAKFAST; dose change    taking at bedtime  2/16/21  Yes Jessy Gonzalez MD   nystatin (MYCOSTATIN) powder Apply to candidal lesions TOPICALLY 2 to 3 times daily until healing complete  Patient taking differently: Apply 1 Units to affected area two (2) times a day. Apply to candidal lesions TOPICALLY 2 to 3 times daily until healing complete    right breast  2/16/21  Yes Jessy Gonzalez MD   nystatin (MYCOSTATIN) topical cream Apply  to affected area two (2) times a day. Patient taking differently: Apply 1 Units to affected area two (2) times a day. right breast 2/16/21  Yes Jessy Gonzalez MD   OTHER Electric scooter  Diagnosis: Multiple sclerosis  Frequent fall  Gait disorder 12/16/20  Yes Los Kahn MD   rosuvastatin (CRESTOR) 40 mg tablet TAKE 1 TABLET BY MOUTH EVERY DAY 11/23/20  Yes Jessy Gonzalez MD   topiramate (TOPAMAX) 200 mg tablet Take 1 Tab by mouth two (2) times a day. 10/21/20  Yes Los Kahn MD   Aimovig Autoinjector 140 mg/mL injection ADMINISTER 1 ML(140MG) UNDER THE SKIN EVERY 30 DAYS 9/30/20  Yes Los Kahn MD   corticotropin (ACTHAR H.P.) 80 unit/mL injectable gel 1 mL by SubCUTAneous route daily. 80 units subq q day for 10 days 11/8/19  Yes Los Kahn MD   ondansetron (ZOFRAN ODT) 4 mg disintegrating tablet DISSOLVE 1 TABLET ON THE TONGUE EVERY 8 HOURS AS NEEDED FOR NAUSEA 9/24/19  Yes Jessy Gonzalez MD   glucose blood VI test strips (BLOOD GLUCOSE TEST) strip 100 Each by Does Not Apply route See Admin Instructions.  One Touch Ultra Test Strips=Check blood sugar daily dx E11.8 8/11/19 Yes Hiram Laughlin NP   aspirin delayed-release 81 mg tablet Take 1 Tab by mouth every twelve (12) hours. Patient taking differently: Take 1 Tab by mouth daily. 5/14/19  Yes KATIANA Calvert   OTHER 0.25 mL by SubLINGual route daily. CBD OIL   Yes Provider, Historical   dantrolene (DANTRIUM) 100 mg capsule Take 1 Cap by mouth three (3) times daily. Patient taking differently: Take 100 mg by mouth daily as needed. Indications: muscle spasm 3/19/19  Yes Los Kahn MD   albuterol (PROVENTIL VENTOLIN) 2.5 mg /3 mL (0.083 %) nebulizer solution 3 mL by Nebulization route every six (6) hours as needed for Wheezing. 12/31/18  Yes Jim Kat MD   Menthol-Zinc Oxide (CALMOSEPTINE) 0.44-20.6 % oint Apply 1 Each to affected area daily as needed for Other (thin layer to buttocks for skin irritation). Yes Provider, Historical   lidocaine (LIDODERM) 5 % Apply patch to the affected area for 12 hours a day and remove for 12 hours a day. 8/15/18  Yes Los Kahn MD   clindamycin (CLEOCIN) 300 mg capsule Take 300 mg by mouth as needed for Other (prior to dental procedures ). Prior to dental procedures 6/30/17  Yes Provider, Historical   TENS UNIT ELECTRODES by Does Not Apply route. prn   Yes Provider, Historical   traZODone (DESYREL) 50 mg tablet 50 mg.  7/8/21  Provider, Historical   levothyroxine (SYNTHROID) 137 mcg tablet TAKE 1 TABLET BY MOUTH DAILY BEFORE BREAKFAST DOSE CHANGE 5/14/21 7/8/21  Provider, Historical   rosuvastatin (CRESTOR) 40 mg tablet  11/23/20 7/8/21  Provider, Historical   cyanocobalamin 1,000 mcg tablet TAKE 1 TABLET BY MOUTH EVERY DAY 3/19/21 7/8/21  Los Kahn MD   predniSONE (DELTASONE) 20 mg tablet 60 mg p.o. daily for 5 days, 40 mg p.o. daily for 4 days, then 20 mg p.o. daily for 3 days.   Patient not taking: Reported on 7/8/2021 8/3/20   Los Kahn MD       Allergies   Allergen Reactions    Bee Sting [Sting, Bee] Anaphylaxis     Also allergic to egg products  Penicillins Anaphylaxis    Betadine [Povidone-Iodine] Rash    Egg Itching         Past Medical History:   Diagnosis Date    Arthritis     hip, hands    Benign cyst of left breast 3/21/2016    Blindness and low vision 2004    legally blind from [address]    Cervical spinal stenosis 2016    Moderate C6-7    Chronic pain     Depression     Diabetes mellitus type 2, controlled ([address] 2016    Diet-controlled diabetes mellitus ([address] 2018    Endometriosis 2010    FH: breast cancer 2010    Chart states FH breast/ovary Ca, CAD,DM     History of CVA (cerebrovascular accident) 2018    HTN, goal below 140/90 2010    CURRENTLY NO MEDICATIONS (17)    Hypercholesterolemia     Hypothyroid 2010    Incontinence     Lumbosacral plexopathy 2010    MS (multiple sclerosis) ([address]     Myasthenia gravis ([address]     Myasthenia gravis ([address]     Sleep apnea 2010    RESOLVED 2019    Ureteral calculus 2010    Vitamin D deficiency 3/17/2016       Past Surgical History:   Procedure Laterality Date    HX  SECTION  1986    HX CYST INCISION AND DRAINAGE      HX GI      COLONOSCOPY    HX GYN      ovarian cyst    HX HEENT  1974    THYROIDECTOMY    HX HEENT      removal of thyroglossal duct cyst    HX HIP REPLACEMENT Right 2017    anterior approach    HX KNEE ARTHROSCOPY Right     HX ORTHOPAEDIC  1997    right thumb tendon repair x 2    HX ORTHOPAEDIC Left     CARPAL TUNNEL    HX ORTHOPAEDIC Right     HIP REPLACEMENT    HX OTHER SURGICAL      Janee Cath x2    HX SMALL BOWEL RESECTION      HX THYROIDECTOMY      1975    HX VASCULAR ACCESS  2006    PORT -A- CATH X 2    NC ABDOMEN SURGERY PROC UNLISTED      bowel resection    NC BREAST SURGERY PROCEDURE UNLISTED      breast cyst-both breasts at different times       Family History   Problem Relation Age of Onset    Arthritis-osteo Mother     Diabetes Mother  Elevated Lipids Mother     Headache Mother     Heart Disease Mother     Hypertension Mother     Stroke Mother     Neuropathy Mother     Colon Cancer Mother     Diabetes Father     Elevated Lipids Father     Heart Disease Father     Hypertension Father     Diabetes Sister     Elevated Lipids Sister     Headache Sister     Hypertension Sister     Migraines Sister     Cancer Sister 62        breast ca W/METS TO BRAIN    Breast Cancer Sister 62    Diabetes Brother     Elevated Lipids Brother     Hypertension Brother     Asthma Son     Thyroid Disease Son         GRAVES    Sleep Apnea Son     Breast Cancer Maternal Grandmother 54    Diabetes Sister     Neuropathy Sister     Anesth Problems Neg Hx        Social History     Socioeconomic History    Marital status:      Spouse name: Not on file    Number of children: Not on file    Years of education: Not on file    Highest education level: Not on file   Occupational History    Not on file   Tobacco Use    Smoking status: Current Every Day Smoker     Packs/day: 0.50     Years: 30.00     Pack years: 15.00     Types: Cigarettes    Smokeless tobacco: Never Used   Substance and Sexual Activity    Alcohol use: No    Drug use: No    Sexual activity: Not Currently   Other Topics Concern    Not on file   Social History Narrative    Not on file     Social Determinants of Health     Financial Resource Strain:     Difficulty of Paying Living Expenses:    Food Insecurity:     Worried About Running Out of Food in the Last Year:     Ran Out of Food in the Last Year:    Transportation Needs:     Lack of Transportation (Medical):      Lack of Transportation (Non-Medical):    Physical Activity:     Days of Exercise per Week:     Minutes of Exercise per Session:    Stress:     Feeling of Stress :    Social Connections:     Frequency of Communication with Friends and Family:     Frequency of Social Gatherings with Friends and Family:  Attends Gnosticism Services:     Active Member of Clubs or Organizations:     Attends Club or Organization Meetings:     Marital Status:    Intimate Partner Violence:     Fear of Current or Ex-Partner:     Emotionally Abused:     Physically Abused:     Sexually Abused:          OBJECTIVE    Visit Vitals  /74 (BP 1 Location: Left upper arm, BP Patient Position: Sitting, BP Cuff Size: Adult)   Pulse 75   Temp 97.1 °F (36.2 °C) (Temporal)   Resp 18   Ht 5' 11\" (1.803 m)   Wt 196 lb (88.9 kg)   LMP 09/01/2010   SpO2 96%   BMI 27.34 kg/m²       Physical Exam  Vitals and nursing note reviewed. Constitutional:       General: She is not in acute distress. Appearance: She is not diaphoretic. Eyes:      Conjunctiva/sclera: Conjunctivae normal.      Pupils: Pupils are equal, round, and reactive to light. Cardiovascular:      Rate and Rhythm: Normal rate and regular rhythm. Heart sounds: Normal heart sounds. No murmur heard. No friction rub. No gallop. Pulmonary:      Effort: Pulmonary effort is normal. No respiratory distress. Breath sounds: Normal breath sounds. No wheezing. Skin:     General: Skin is warm and dry. Neurological:      Mental Status: She is alert. ASSESSMENT AND PLAN  Mohan Galvin is a 61 y.o. female who presents today for:    1. HTN, goal below 140/90  Stable, continue current treatment. 2. Primary osteoarthritis of right knee  Medically cleared for surgery. 3. Type 2 diabetes mellitus without complication, without long-term current use of insulin ([address]  Will do labs at next visit. Medications Discontinued During This Encounter   Medication Reason    levothyroxine (SYNTHROID) 808 mcg tablet DUPLICATE ORDER    rosuvastatin (CRESTOR) 40 mg tablet DUPLICATE ORDER    traZODone (DESYREL) 50 mg tablet DUPLICATE ORDER       Follow-up and Dispositions    · Return in about 3 months (around 10/8/2021) for DM follow up/pap smear.          Treatment risks/benefits/costs/interactions/alternatives discussed with patient. Advised patient to call back or return to office if symptoms worsen/change/persist. If patient cannot reach us or should anything more severe/urgent arise he/she should proceed directly to the nearest emergency department. Discussed expected course/resolution/complications of diagnosis in detail with patient. Patient expressed understanding with the diagnosis and plan. Brandon Garcia M.D.

## 2021-07-08 NOTE — PATIENT INSTRUCTIONS

## 2021-07-08 NOTE — PROGRESS NOTES
Neurology Progress Note    NAME:  Blease Dubin Minor   :   1955   MRN:   [id number]     Date/Time:  2021  Subjective:   Blease Dubin Minor is a 61 y.o. female here today for follow-up for MS, MG, chronic pain syndrome, headaches, CVA, difficulty walking, drowsiness, fall. Patient was accompanied by her son to the visit  Patient today says even though she had 1 fall on Memorial Day weekend, she noted that her leg gave out, she is still very unsteady. She says she experiences intermittent weakness. Patient noted that she is still having occasional dizziness with vertigo, she says that has decreased hearing, with nausea. She says her vertigo and nausea have improved. Patient currently is not having any form of physical therapy, she says the home health working with her has signed off. This patient will need intermittent physical and speech therapy. Patient is planning to have knee surgery, according to patient she has been having right knee pain, went to the orthopedic, was evaluated and was told that she would need knee replacement. She says she is still having increasing left arm pain and difficulty ambulating. She also having thigh numbness of the extremities. .  Patient said that her eyes tend to be weak and she sees double, double vision usually associated with fatigue. She continues to have a lot of pain but not as much as before, pain is sharp in nature, diffuse, burning sensation that goes up to the arms causing numbness and tingling sensation and weakness of the hands, down to the legs causing  numbness and tingling sensation of the legs with weakness of the legs. Patient says headache is still frequent, it is mostly frontal, throbbing in nature, with occasional sharp pain from the back of the head, headache associated with dizziness, nausea, blurry vision, double vision, photophobia, phonophobia. She says she has a lot of muscle spasms mostly in the back. She denies loss of consciousness. Says dysphagia has improved. She  continues to smoke cigarettes. Review of Systems - General ROS: positive for  - fatigue, night sweats and sleep disturbance  Psychological ROS: positive for - anxiety, concentration difficulties, depression, mood swings and sleep disturbances  Ophthalmic ROS: positive for - blurry vision, decreased vision, double vision and photophobia  ENT ROS: positive for - headaches, tinnitus, vertigo and visual changes  Allergy and Immunology ROS: negative  Hematological and Lymphatic ROS: negative  Endocrine ROS: negative  Respiratory ROS: no cough, shortness of breath, or wheezing  Cardiovascular ROS: no chest pain or dyspnea on exertion  Gastrointestinal ROS: no abdominal pain, change in bowel habits, or black or bloody stools  Genito-Urinary ROS: no dysuria, trouble voiding, or hematuria  Musculoskeletal ROS: positive for - gait disturbance, joint pain, joint stiffness, joint swelling and muscle pain  Neurological ROS: positive for - dizziness, gait disturbance, headaches, impaired coordination/balance, numbness/tingling, speech problems, tremors, visual changes and weakness  Dermatological ROS: negative              Medications reviewed:  Current Outpatient Medications   Medication Sig Dispense Refill    cyanocobalamin 1,000 mcg tablet TAKE 1 TABLET BY MOUTH EVERY DAY 30 Tablet 2    metFORMIN ER (GLUCOPHAGE XR) 500 mg tablet TAKE 3 TABLETS BY MOUTH DAILY; dose change (Patient taking differently: Take 1,500 mg by mouth nightly. TAKE 3 TABLETS BY MOUTH DAILY; dose change) 270 Tablet 1    PARoxetine (PAXIL) 40 mg tablet TAKE 1 AND 1/2 TABLETS BY MOUTH EVERY NIGHT (Patient taking differently: Take 60 mg by mouth nightly.) 135 Tab 3    pyridostigmine bromide (MESTINON SR) 180 mg SR tablet TAKE 1 TABLET BY MOUTH TWICE DAILY 180 Tab 0    pregabalin (Lyrica) 200 mg capsule Take 1 Cap by mouth two (2) times a day.  Max Daily Amount: 400 mg. 180 Cap 4    Gilenya 0.5 mg cap Take 1 Cap by mouth daily. (Patient taking differently: Take 1 Capsule by mouth nightly.) 90 Cap 3    acetaminophen (TYLENOL) 500 mg tablet Take 500 mg by mouth every six (6) hours as needed for Pain.  levothyroxine (SYNTHROID) 137 mcg tablet TAKE 1 TABLET BY MOUTH DAILY BEFORE BREAKFAST; dose change (Patient taking differently: TAKE 1 TABLET BY MOUTH DAILY BEFORE BREAKFAST; dose change    taking at bedtime ) 90 Tab 1    nystatin (MYCOSTATIN) powder Apply to candidal lesions TOPICALLY 2 to 3 times daily until healing complete (Patient taking differently: Apply 1 Units to affected area two (2) times a day. Apply to candidal lesions TOPICALLY 2 to 3 times daily until healing complete    right breast ) 30 g 0    nystatin (MYCOSTATIN) topical cream Apply  to affected area two (2) times a day. (Patient taking differently: Apply 1 Units to affected area two (2) times a day. right breast) 30 g 1    OTHER Electric scooter  Diagnosis: Multiple sclerosis  Frequent fall  Gait disorder 1 Units 0    rosuvastatin (CRESTOR) 40 mg tablet TAKE 1 TABLET BY MOUTH EVERY DAY (Patient taking differently: Take 40 mg by mouth nightly.) 90 Tab 2    topiramate (TOPAMAX) 200 mg tablet Take 1 Tab by mouth two (2) times a day. 180 Tab 2    Aimovig Autoinjector 140 mg/mL injection ADMINISTER 1 ML(140MG) UNDER THE SKIN EVERY 30 DAYS 3 mL 3    predniSONE (DELTASONE) 20 mg tablet 60 mg p.o. daily for 5 days, 40 mg p.o. daily for 4 days, then 20 mg p.o. daily for 3 days. (Patient not taking: Reported on 7/8/2021) 30 Tab 0    corticotropin (ACTHAR H.P.) 80 unit/mL injectable gel 1 mL by SubCUTAneous route daily. 80 units subq q day for 10 days (Patient taking differently: 80 Units by SubCUTAneous route as needed.  80 units subq q day for 10 days) 10 mL 1    ondansetron (ZOFRAN ODT) 4 mg disintegrating tablet DISSOLVE 1 TABLET ON THE TONGUE EVERY 8 HOURS AS NEEDED FOR NAUSEA 15 Tab 0    glucose blood VI test strips (BLOOD GLUCOSE TEST) strip 100 Each by Does Not Apply route See Admin Instructions. One Touch Ultra Test Strips=Check blood sugar daily dx E11.8 100 Strip 1    aspirin delayed-release 81 mg tablet Take 1 Tab by mouth every twelve (12) hours. (Patient taking differently: Take 1 Tab by mouth daily.) 60 Tab 0    OTHER 0.25 mL by SubLINGual route daily. CBD OIL      dantrolene (DANTRIUM) 100 mg capsule Take 1 Cap by mouth three (3) times daily. (Patient taking differently: Take 100 mg by mouth daily as needed. Indications: muscle spasm) 90 Cap 3    albuterol (PROVENTIL VENTOLIN) 2.5 mg /3 mL (0.083 %) nebulizer solution 3 mL by Nebulization route every six (6) hours as needed for Wheezing. 30 Each 0    Menthol-Zinc Oxide (CALMOSEPTINE) 0.44-20.6 % oint Apply 1 Each to affected area daily as needed for Other (thin layer to buttocks for skin irritation).  lidocaine (LIDODERM) 5 % Apply patch to the affected area for 12 hours a day and remove for 12 hours a day. 30 Each 3    clindamycin (CLEOCIN) 300 mg capsule Take 300 mg by mouth as needed for Other (prior to dental procedures ). Prior to dental procedures      TENS UNIT ELECTRODES by Does Not Apply route. prn      bromocriptine (PARLODEL) 5 mg capsule Take 5 mg by mouth nightly.           Objective:   Vitals:  Vitals:    07/08/21 1056   BP: 134/76   Pulse: 76   Resp: 20   Temp: 98.1 °F (36.7 °C)   SpO2: 99%   Weight: 195 lb 9.6 oz (88.7 kg)   PainSc:   6   PainLoc: Generalized   LMP: 09/01/2010               Lab Data Reviewed:  Lab Results   Component Value Date/Time    WBC 4.8 07/12/2021 04:04 PM    HCT 42.7 07/12/2021 04:04 PM    HGB 13.6 07/12/2021 04:04 PM    PLATELET 722 (L) 60/76/2816 04:04 PM       Lab Results   Component Value Date/Time    Sodium 140 07/12/2021 04:04 PM    Potassium 4.2 07/12/2021 04:04 PM    Chloride 110 (H) 07/12/2021 04:04 PM    CO2 23 07/12/2021 04:04 PM    Glucose 194 (H) 07/12/2021 04:04 PM    BUN 12 07/12/2021 04:04 PM    Creatinine 0.97 07/12/2021 04:04 PM Calcium 9.7 07/12/2021 04:04 PM       No components found for: TROPQUANT    No results found for: SHAHIDA      Lab Results   Component Value Date/Time    Hemoglobin A1c 7.0 (H) 07/12/2021 04:04 PM    Hemoglobin A1c (POC) 6.3 07/05/2019 01:51 PM        Lab Results   Component Value Date/Time    Vitamin B12 436 06/10/2019 10:41 AM    Folate 12.0 02/10/2016 12:00 AM       No results found for: SHAHIDA, Thereasa [place], NASRINANA    Lab Results   Component Value Date/Time    Cholesterol, total 138 02/08/2021 04:22 PM    HDL Cholesterol 66 02/08/2021 04:22 PM    LDL-CHOLESTEROL 91 12/26/2018 12:00 AM    LDL, calculated 46 02/08/2021 04:22 PM    LDL, calculated 208 (H) 08/03/2020 08:05 AM    VLDL, calculated 26 02/08/2021 04:22 PM    VLDL, calculated 45 (H) 08/03/2020 08:05 AM    Triglyceride 156 (H) 02/08/2021 04:22 PM    Triglyceride 126 12/26/2018 12:00 AM    CHOL/HDL Ratio 4.3 08/23/2010 12:19 PM         CT Results (recent):  Results from Hospital Encounter encounter on 11/23/19    CT NECK SOFT TISSUE W CONT    Narrative  EXAM:  CT NECK SOFT TISSUE W CONT    INDICATION:  Dysphasia. Status post thyroidectomy. COMPARISON: CT neck 5/14/2018. CONTRAST: 100 mL of Isovue-300. TECHNIQUE: Multislice helical CT was performed from the mid calvarium to the  aortic arch during uneventful rapid bolus intravenous contrast administration. Contiguous 2.5 mm axial images were reconstructed and lung and soft tissue  windows were generated. Coronal and sagittal reformations were generated. CT  dose reduction was achieved through use of a standardized protocol tailored for  this examination and automatic exposure control for dose modulation. 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 No evidence of recurrent tissue in this region. No cervical  lymphadenopathy. Visualized brain parenchyma is normal in appearance. Paranasal sinuses and  mastoid air cells are clear. Intraorbital contents are unremarkable. The  cervical vasculature is patent. No acute fracture or aggressive osseous lesion. Multilevel degenerative disc disease most advanced at C3-C4, C5-C6 and C6-C7 is  unchanged. Visualized portions of the lung apices are normal. Right chest port  is noted. Impression  IMPRESSION:  1. 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 No evidence of local recurrence. No cervical lymphadenopathy or other soft  tissue abnormality in the neck. MRI Results (recent):  Results from [place] encounter on 08/08/20    MRI BRAIN W WO CONT    Narrative  Clinical history: Fatigue, drowsiness  INDICATION:   Fatigue, drowsiness, multiple sclerosis, prior CVA    COMPARISON: 12/20/2018  TECHNIQUE: MR examination of the brain includes axial and sagittal T1 , axial  T2, axial FLAIR, axial gradient echo, axial DWI, coronal T1 . Pre and post  contrast axial T1-weighted imaging. Postcontrast T1-weighted imaging coronal  plane. CONTRAST: The patient was administered gadolinium-based contrast material,  subsequently axial and sagittal T1-weighted postcontrast imaging was obtained. FINDINGS:  There is no intracranial mass, hemorrhage or acute infarction. Scattered foci of abnormal increased T2 signal intensity predominantly  superiorly, subcortical left parietal lobe but also noted in the frontal lobes  on the right and on the left. No associated abnormal postcontrast enhancement. No new diffusion restriction. Air-fluid level in the right maxillary sinus. IACs are symmetric in size. . There  is no abnormal parenchymal enhancement. There is no abnormal meningeal  enhancement demonstrated. The brain architecture is normal. There is no evidence  of midline shift or mass-effect. The ventricles are normal in size, position and  configuration. There are no extra-axial fluid collections. Major intracranial  vascular flow-voids are unremarkable.  The orbits are grossly symmetric. Dural  venous sinuses are grossly unremarkable. There is no Chiari or syrinx. Pituitary  and infundibulum grossly unremarkable. Cerebellopontine angles are unremarkable. No skull base mass is identified. Cavernous sinuses are symmetric. The mastoid  air cells and are well pneumatized and clear. Impression  IMPRESSION:  Scattered foci of abnormal increased T2 signal intensity predominantly  subcortical and superior adjacent to the vertex. Stable overall appearance  compared to the 12/26/2018 examination. No postcontrast enhancement or diffusion  restriction to suggest active demyelination. Right maxillary sinus disease. No intracranial mass, hemorrhage or evidence of acute infarction. IR Results (recent):  No results found for this or any previous visit. VAS/US Results (recent): PHYSICAL EXAM:  General:    Alert, cooperative, no distress, appears stated age. Head:   Normocephalic, without obvious abnormality, atraumatic. Eyes:   Conjunctivae/corneas clear. PERRLA  Nose:  Nares normal. No drainage or sinus tenderness. Throat:    Lips, mucosa, and tongue normal.  No Thrush  Neck:  Supple, symmetrical,  no adenopathy, thyroid: non tender    no carotid bruit and no JVD. Paraspinal tenderness  Back:    Symmetric, diffuse  tenderness. Lungs:   Clear to auscultation bilaterally. No Wheezing or Rhonchi. No rales. Chest wall:  No tenderness or deformity. No Accessory muscle use. Heart:   Regular rate and rhythm,  no murmur, rub or gallop. Abdomen:   Soft, non-tender. Not distended. Bowel sounds normal. No masses  Extremities: Extremities normal, atraumatic, No cyanosis. No edema. No clubbing  Skin:     Texture, turgor normal. No rashes or lesions. Not Jaundiced  Lymph nodes: Cervical, supraclavicular normal.  Psych:  Good insight. Depressed. Anxious. NEUROLOGICAL EXAM:  Appearance:   The patient is well developed, well nourished, provides a coherent history and is in no acute distress. Mental Status: Oriented to time, place and person. Mood and affect appropriate. Cranial Nerves:   Intact visual fields. Fundi are benign. RACHEL, EOM's full, no nystagmus, no ptosis. Facial sensation is normal. Corneal reflexes are intact. Facial movement is symmetric. Hearing is normal bilaterally. Palate is midline with normal sternocleidomastoid and trapezius muscles are normal. Tongue is midline. Motor:  5-/5 strength in upper extremity and 4+/5 lower extremity proximal and distal muscles. Normal bulk and tone. No fasciculations. Reflexes:   Deep tendon reflexes 2+/4 and symmetrical.   Sensory:    Dizzy to touch, pinprick and vibration. Gait:   Unsteady gait. Ambulates with cane   Tremor:    Mild tremor noted. Cerebellar:  No cerebellar signs present. Neurovascular:  Normal heart sounds and regular rhythm, peripheral pulses intact, and no carotid bruits. Assesment  1. MS (multiple sclerosis) ([facility]  [place]    2. CIDP (chronic inflammatory demyelinating polyneuropathy) ([facility])  Stable    3. Myasthenia gravis ([facility]  Continue Mestinon    4. Chronic migraine  Aimovig  Topamax    5. Chronic pain syndrome   Following pain management    6. Frequent fall  Intermittent physical therapy    7. Gait disorder  Intermittent physical therapy    8. Attention deficit disorder (ADD) without hyperactivity  Stable    ___________________________________________________  PLAN: Medication and plan discussed with patient      ICD-10-CM ICD-9-CM    1. MS (multiple sclerosis) ([facility]  G35 340    2. CIDP (chronic inflammatory demyelinating polyneuropathy) ([facility])  G61.81 357.81    3. Myasthenia gravis ([facility]  G70.00 358.00    4. Chronic migraine  G43.709 346.70    5. Chronic pain syndrome  G89.4 338.4    6. Frequent falls  R29.6 V15.88    7. Gait disorder  R26.9 781.2    8.  Attention deficit disorder (ADD) without hyperactivity  F98.8 314.00      Follow-up and Dispositions    · Return in about 3 months (around 10/8/2021).              ___________________________________________________    Attending Physician: Ivana Liang MD

## 2021-07-12 ENCOUNTER — HOSPITAL ENCOUNTER (OUTPATIENT)
Dept: PREADMISSION TESTING | Age: 66
Discharge: HOME | End: 2021-07-12
Payer: MEDICARE

## 2021-07-12 VITALS
DIASTOLIC BLOOD PRESSURE: 71 MMHG | SYSTOLIC BLOOD PRESSURE: 133 MMHG | HEIGHT: 72 IN | WEIGHT: 191.8 LBS | TEMPERATURE: 98.7 F | OXYGEN SATURATION: 97 % | BODY MASS INDEX: 25.98 KG/M2 | HEART RATE: 87 BPM

## 2021-07-12 LAB
ABO + RH BLD: NORMAL
ANION GAP SERPL CALC-SCNC: 7 MMOL/L (ref 5–15)
APPEARANCE UR: CLEAR
BACTERIA URNS QL MICRO: ABNORMAL /HPF
BILIRUB UR QL: NEGATIVE
BLOOD GROUP ANTIBODIES SERPL: NORMAL
BUN SERPL-MCNC: 12 MG/DL (ref 6–20)
BUN/CREAT SERPL: 12 (ref 12–20)
CALCIUM SERPL-MCNC: 9.7 MG/DL (ref 8.5–10.1)
CHLORIDE SERPL-SCNC: 110 MMOL/L (ref 97–108)
CO2 SERPL-SCNC: 23 MMOL/L (ref 21–32)
COLOR UR: ABNORMAL
CREAT SERPL-MCNC: 0.97 MG/DL (ref 0.55–1.02)
EPITH CASTS URNS QL MICRO: ABNORMAL /LPF
ERYTHROCYTE [DISTWIDTH] IN BLOOD BY AUTOMATED COUNT: 14.6 % (ref 11.5–14.5)
EST. AVERAGE GLUCOSE BLD GHB EST-MCNC: 154 MG/DL
GLUCOSE SERPL-MCNC: 194 MG/DL (ref 65–100)
GLUCOSE UR STRIP.AUTO-MCNC: NEGATIVE MG/DL
HBA1C MFR BLD: 7 % (ref 4–5.6)
HCT VFR BLD AUTO: 42.7 % (ref 35–47)
HGB BLD-MCNC: 13.6 G/DL (ref 11.5–16)
HGB UR QL STRIP: NEGATIVE
INR PPP: 1.1 (ref 0.9–1.1)
KETONES UR QL STRIP.AUTO: ABNORMAL MG/DL
LEUKOCYTE ESTERASE UR QL STRIP.AUTO: ABNORMAL
MCH RBC QN AUTO: 29.7 PG (ref 26–34)
MCHC RBC AUTO-ENTMCNC: 31.9 G/DL (ref 30–36.5)
MCV RBC AUTO: 93.2 FL (ref 80–99)
NITRITE UR QL STRIP.AUTO: NEGATIVE
NRBC # BLD: 0 K/UL (ref 0–0.01)
NRBC BLD-RTO: 0 PER 100 WBC
PH UR STRIP: 6.5 [PH] (ref 5–8)
PLATELET # BLD AUTO: 113 K/UL (ref 150–400)
PMV BLD AUTO: 11.1 FL (ref 8.9–12.9)
POTASSIUM SERPL-SCNC: 4.2 MMOL/L (ref 3.5–5.1)
PROT UR STRIP-MCNC: ABNORMAL MG/DL
PROTHROMBIN TIME: 11.2 SEC (ref 9–11.1)
RBC # BLD AUTO: 4.58 M/UL (ref 3.8–5.2)
RBC #/AREA URNS HPF: ABNORMAL /HPF (ref 0–5)
SODIUM SERPL-SCNC: 140 MMOL/L (ref 136–145)
SP GR UR REFRACTOMETRY: 1.02 (ref 1–1.03)
SPECIMEN EXP DATE BLD: NORMAL
UA: UC IF INDICATED,UAUC: ABNORMAL
UROBILINOGEN UR QL STRIP.AUTO: 1 EU/DL (ref 0.2–1)
WBC # BLD AUTO: 4.8 K/UL (ref 3.6–11)
WBC URNS QL MICRO: ABNORMAL /HPF (ref 0–4)

## 2021-07-12 PROCEDURE — 36415 COLL VENOUS BLD VENIPUNCTURE: CPT

## 2021-07-12 PROCEDURE — 83036 HEMOGLOBIN GLYCOSYLATED A1C: CPT

## 2021-07-12 PROCEDURE — 93005 ELECTROCARDIOGRAM TRACING: CPT

## 2021-07-12 PROCEDURE — 80048 BASIC METABOLIC PNL TOTAL CA: CPT

## 2021-07-12 PROCEDURE — 86901 BLOOD TYPING SEROLOGIC RH(D): CPT

## 2021-07-12 PROCEDURE — 81001 URINALYSIS AUTO W/SCOPE: CPT

## 2021-07-12 PROCEDURE — 85027 COMPLETE CBC AUTOMATED: CPT

## 2021-07-12 PROCEDURE — 85610 PROTHROMBIN TIME: CPT

## 2021-07-12 RX ORDER — BROMOCRIPTINE MESYLATE 5 MG/1
5 CAPSULE ORAL
COMMUNITY
End: 2021-07-28

## 2021-07-12 NOTE — PERIOP NOTES
Preoperative instructions reviewed with patient and son. Patient given two bottles CHG soap. Instructions reviewed on use of CHG soap. Patient given SSI infection sheet. MRSA/MSSA treatment instruction sheet given with an explanation to patient that they  will be notified if treatment instructions need to be initiated. Patient was given the opportunity to ask questions on the information provided. Patient instructed to self quarantine between testing and arrival time day of surgery. Patient instructed re: check-in procedure for day of surgery.

## 2021-07-13 LAB
ATRIAL RATE: 77 BPM
BACTERIA SPEC CULT: NORMAL
BACTERIA SPEC CULT: NORMAL
CALCULATED P AXIS, ECG09: 51 DEGREES
CALCULATED R AXIS, ECG10: -23 DEGREES
CALCULATED T AXIS, ECG11: 33 DEGREES
DIAGNOSIS, 93000: NORMAL
P-R INTERVAL, ECG05: 162 MS
Q-T INTERVAL, ECG07: 390 MS
QRS DURATION, ECG06: 78 MS
QTC CALCULATION (BEZET), ECG08: 441 MS
SERVICE CMNT-IMP: NORMAL
VENTRICULAR RATE, ECG03: 77 BPM

## 2021-07-13 NOTE — PERIOP NOTES
PAT Nurse Practitioner   Pre-Operative Chart Review/Assessment:-ORTHOPEDIC                Patient Name:  Ganesh Goss                                                           Age:   61 y.o.    :  1955     Today's Date:  2021     Date of PAT:   2021      Date of Surgery:    2021      Procedure(s):  Right Total Knee Arthroplasty     Surgeon:   Dr. Dc Light                       PLAN:      1)  Medical Clearance:  Dr. Jourdan Lorenzo      2)  Cardiac Clearance:  Pt followed by Dr. Vicente Adams. Hilda Morris 20. Condition stable, no new symptoms or changes in current regimen. OV note and ECHO in CC. EKG and METs reviewed. No further cardiac testing requested. 3)  Diabetic Treatment Consult:  Not indicated. A1c-7.0. Pt known DM. 4)  Sleep Apnea evaluation:   Hx of KRISHAN, resolved per pt.  KRISHAN Score 2.       5) Treatment for MRSA/Staph Aureus:  Neg      6) Additional Concerns:  Current 0.5 PPD smoker, HTN (no meds), hx of CVA w/ R-sided weakness, T2DM, MS, Myasthenia Gravis, legally blind, depression, migraines, *High Fall Risk*                Vital Signs:         Vitals:    21 1541   BP: 133/71   Pulse: 87   Temp: 98.7 °F (37.1 °C)   SpO2: 97%   Weight: 87 kg (191 lb 12.8 oz)   Height: 6' (1.829 m)   LMP: 2010            ____________________________________________  PAST MEDICAL HISTORY  Past Medical History:   Diagnosis Date    Arthritis     hip, hands    Benign cyst of left breast 3/21/2016    Blindness and low vision 2004    legally blind from [address]    Cervical spinal stenosis 2016    Moderate C6-7    Chronic pain     COVID-19 vaccine series completed     PFIZER 3/19/21, 21    Depression     Diabetes mellitus type 2, controlled ([address] 2016    Diet-controlled diabetes mellitus ([address] 2018    Endometriosis 2010    FH: breast cancer 2010    Chart states FH breast/ovary Ca, CAD,DM     History of CVA (cerebrovascular accident) 2018    HTN, goal below 140/90 2010    CURRENTLY NO MEDICATIONS (21)    Hypercholesterolemia     Hypothyroid 2010    Incontinence     Lumbosacral plexopathy 2010    Migraine     H/O MIGRAINE    [facility]     [facility]     Sleep apnea 2010    RESOLVED 2019    Stroke ([facility] 2018    RIGHT SIDE WEAKNESS    Ureteral calculus 2010    Vitamin D deficiency 3/17/2016      ____________________________________________  PAST SURGICAL HISTORY  Past Surgical History:   Procedure Laterality Date    HX CARPAL TUNNEL RELEASE Left     HX  SECTION  1986    HX CYST INCISION AND DRAINAGE      HX GI      COLONOSCOPY    HX GYN      ovarian cyst    HX HEENT      removal of thyroglossal duct cyst    HX HIP REPLACEMENT Right 2017    anterior approach    HX KNEE ARTHROSCOPY Right     HX ORTHOPAEDIC      right thumb tendon repair x 2    HX OTHER SURGICAL      Janee Cath x2    HX SMALL BOWEL RESECTION      HX THYROIDECTOMY  1974    HX VASCULAR ACCESS  2006    PORT -A- CATH X 2    DC ABDOMEN SURGERY PROC UNLISTED      bowel resection    DC BREAST SURGERY PROCEDURE UNLISTED      breast cyst-both breasts at different times    DC TOTAL HIP ARTHROPLASTY Left 2019      ____________________________________________  HOME MEDICATIONS  Current Outpatient Medications   Medication Sig    cyanocobalamin 1,000 mcg tablet TAKE 1 TABLET BY MOUTH EVERY DAY    metFORMIN ER (GLUCOPHAGE XR) 500 mg tablet TAKE 3 TABLETS BY MOUTH DAILY; dose change (Patient taking differently: Take 1,500 mg by mouth nightly.  TAKE 3 TABLETS BY MOUTH DAILY; dose change)    PARoxetine (PAXIL) 40 mg tablet TAKE 1 AND 1/2 TABLETS BY MOUTH EVERY NIGHT (Patient taking differently: Take 60 mg by mouth nightly.)    pyridostigmine bromide (MESTINON SR) 180 mg SR tablet TAKE 1 TABLET BY MOUTH TWICE DAILY    pregabalin (Lyrica) 200 mg capsule Take 1 Cap by mouth two (2) times a day. Max Daily Amount: 400 mg.    Gilenya 0.5 mg cap Take 1 Cap by mouth daily. (Patient taking differently: Take 1 Capsule by mouth nightly.)    acetaminophen (TYLENOL) 500 mg tablet Take 500 mg by mouth every six (6) hours as needed for Pain.  levothyroxine (SYNTHROID) 137 mcg tablet TAKE 1 TABLET BY MOUTH DAILY BEFORE BREAKFAST; dose change (Patient taking differently: TAKE 1 TABLET BY MOUTH DAILY BEFORE BREAKFAST; dose change    taking at bedtime )    nystatin (MYCOSTATIN) powder Apply to candidal lesions TOPICALLY 2 to 3 times daily until healing complete (Patient taking differently: Apply 1 Units to affected area two (2) times a day. Apply to candidal lesions TOPICALLY 2 to 3 times daily until healing complete    right breast )    nystatin (MYCOSTATIN) topical cream Apply  to affected area two (2) times a day. (Patient taking differently: Apply 1 Units to affected area two (2) times a day. right breast)    OTHER Electric scooter  Diagnosis: Multiple sclerosis  Frequent fall  Gait disorder    rosuvastatin (CRESTOR) 40 mg tablet TAKE 1 TABLET BY MOUTH EVERY DAY (Patient taking differently: Take 40 mg by mouth nightly.)    topiramate (TOPAMAX) 200 mg tablet Take 1 Tab by mouth two (2) times a day.  Aimovig Autoinjector 140 mg/mL injection ADMINISTER 1 ML(140MG) UNDER THE SKIN EVERY 30 DAYS    corticotropin (ACTHAR H.P.) 80 unit/mL injectable gel 1 mL by SubCUTAneous route daily. 80 units subq q day for 10 days (Patient taking differently: 80 Units by SubCUTAneous route as needed. 80 units subq q day for 10 days)    ondansetron (ZOFRAN ODT) 4 mg disintegrating tablet DISSOLVE 1 TABLET ON THE TONGUE EVERY 8 HOURS AS NEEDED FOR NAUSEA    aspirin delayed-release 81 mg tablet Take 1 Tab by mouth every twelve (12) hours. (Patient taking differently: Take 1 Tab by mouth daily.)    OTHER 0.25 mL by SubLINGual route daily.  CBD OIL    dantrolene (DANTRIUM) 100 mg capsule Take 1 Cap by mouth three (3) times daily. (Patient taking differently: Take 100 mg by mouth daily as needed. Indications: muscle spasm)    albuterol (PROVENTIL VENTOLIN) 2.5 mg /3 mL (0.083 %) nebulizer solution 3 mL by Nebulization route every six (6) hours as needed for Wheezing.  Menthol-Zinc Oxide (CALMOSEPTINE) 0.44-20.6 % oint Apply 1 Each to affected area daily as needed for Other (thin layer to buttocks for skin irritation).  lidocaine (LIDODERM) 5 % Apply patch to the affected area for 12 hours a day and remove for 12 hours a day.  clindamycin (CLEOCIN) 300 mg capsule Take 300 mg by mouth as needed for Other (prior to dental procedures ). Prior to dental procedures    bromocriptine (PARLODEL) 5 mg capsule Take 5 mg by mouth nightly.  predniSONE (DELTASONE) 20 mg tablet 60 mg p.o. daily for 5 days, 40 mg p.o. daily for 4 days, then 20 mg p.o. daily for 3 days. (Patient not taking: Reported on 7/8/2021)    glucose blood VI test strips (BLOOD GLUCOSE TEST) strip 100 Each by Does Not Apply route See Admin Instructions. One Touch Ultra Test Strips=Check blood sugar daily dx E11.8    TENS UNIT ELECTRODES by Does Not Apply route.  prn     No current facility-administered medications for this encounter.      ____________________________________________  ALLERGIES  Allergies   Allergen Reactions    Bee Sting [Sting, Bee] Anaphylaxis     Also allergic to egg products    Penicillins Anaphylaxis    Betadine [Povidone-Iodine] Rash    Egg Itching      ____________________________________________  SOCIAL HISTORY  Social History     Tobacco Use    Smoking status: Current Every Day Smoker     Packs/day: 0.50     Years: 30.00     Pack years: 15.00     Types: Cigarettes    Smokeless tobacco: Never Used   Substance Use Topics    Alcohol use: No      ____________________________________________        Labs:     Hospital Outpatient Visit on 07/12/2021   Component Date Value Ref Range Status    Sodium 07/12/2021 140  136 - 145 mmol/L Final    Potassium 07/12/2021 4.2  3.5 - 5.1 mmol/L Final    Chloride 07/12/2021 110* 97 - 108 mmol/L Final    CO2 07/12/2021 23  21 - 32 mmol/L Final    Anion gap 07/12/2021 7  5 - 15 mmol/L Final    Glucose 07/12/2021 194* 65 - 100 mg/dL Final    BUN 07/12/2021 12  6 - 20 MG/DL Final    Creatinine 07/12/2021 0.97  0.55 - 1.02 MG/DL Final    BUN/Creatinine ratio 07/12/2021 12  12 - 20   Final    GFR est AA 07/12/2021 >60  >60 ml/min/1.73m2 Final    GFR est non-AA 07/12/2021 58* >60 ml/min/1.73m2 Final    Estimated GFR is calculated using the IDMS-traceable Modification of Diet in Renal Disease (MDRD) Study equation, reported for both  Americans (GFRAA) and non- Americans (GFRNA), and normalized to 1.73m2 body surface area. The physician must decide which value applies to the patient.  Calcium 07/12/2021 9.7  8.5 - 10.1 MG/DL Final    WBC 07/12/2021 4.8  3.6 - 11.0 K/uL Final    RBC 07/12/2021 4.58  3.80 - 5.20 M/uL Final    HGB 07/12/2021 13.6  11.5 - 16.0 g/dL Final    HCT 07/12/2021 42.7  35.0 - 47.0 % Final    MCV 07/12/2021 93.2  80.0 - 99.0 FL Final    MCH 07/12/2021 29.7  26.0 - 34.0 PG Final    MCHC 07/12/2021 31.9  30.0 - 36.5 g/dL Final    RDW 07/12/2021 14.6* 11.5 - 14.5 % Final    PLATELET 70/41/5138 447* 150 - 400 K/uL Final    MPV 07/12/2021 11.1  8.9 - 12.9 FL Final    NRBC 07/12/2021 0.0  0  WBC Final    ABSOLUTE NRBC 07/12/2021 0.00  0.00 - 0.01 K/uL Final    Crossmatch Expiration 07/12/2021 07/26/2021,2359   Final    ABO/Rh(D) 07/12/2021 O POSITIVE   Final    Antibody screen 07/12/2021 NEG   Final    INR 07/12/2021 1.1  0.9 - 1.1   Final    A single therapeutic range for Vit K antagonists may not be optimal for all indications - see June, 2008 issue of Chest, American College of Chest Physicians Evidence-Based Clinical Practice Guidelines, 8th Edition.     Prothrombin time 07/12/2021 11.2* 9.0 - 11.1 sec Final    Instrument and technical errors have been ruled out    Color 07/12/2021 YELLOW/STRAW    Final    Color Reference Range: Straw, Yellow or Dark Yellow    Appearance 07/12/2021 CLEAR  CLEAR   Final    Specific gravity 07/12/2021 1.023  1.003 - 1.030   Final    pH (UA) 07/12/2021 6.5  5.0 - 8.0   Final    Protein 07/12/2021 TRACE* NEG mg/dL Final    Glucose 07/12/2021 Negative  NEG mg/dL Final    Ketone 07/12/2021 TRACE* NEG mg/dL Final    Bilirubin 07/12/2021 Negative  NEG   Final    Blood 07/12/2021 Negative  NEG   Final    Urobilinogen 07/12/2021 1.0  0.2 - 1.0 EU/dL Final    Nitrites 07/12/2021 Negative  NEG   Final    Leukocyte Esterase 07/12/2021 TRACE* NEG   Final    WBC 07/12/2021 0-4  0 - 4 /hpf Final    RBC 07/12/2021 0-5  0 - 5 /hpf Final    Epithelial cells 07/12/2021 MODERATE* FEW /lpf Final    Epithelial cell category consists of squamous cells and /or transitional urothelial cells. Renal tubular cells, if present, are separately identified as such.     Bacteria 07/12/2021 1+* NEG /hpf Final    UA:UC IF INDICATED 07/12/2021 CULTURE NOT INDICATED BY UA RESULT  CNI   Final    Ventricular Rate 07/12/2021 77  BPM Final    Atrial Rate 07/12/2021 77  BPM Final    P-R Interval 07/12/2021 162  ms Final    QRS Duration 07/12/2021 78  ms Final    Q-T Interval 07/12/2021 390  ms Final    QTC Calculation (Bezet) 07/12/2021 441  ms Final    Calculated P Axis 07/12/2021 51  degrees Final    Calculated R Axis 07/12/2021 -23  degrees Final    Calculated T Axis 07/12/2021 33  degrees Final    Diagnosis 07/12/2021    Final                    Value:Normal sinus rhythm  Normal ECG  When compared with ECG of 18-MAY-2017 11:54,  No significant change was found  Confirmed by Jackelyn Zepeda MD. ([id number]) on 7/13/2021 11:45:08 PM      Hemoglobin A1c 07/12/2021 7.0* 4.0 - 5.6 % Final    Comment: NEW METHOD  PLEASE NOTE NEW REFERENCE RANGE  (NOTE)  HbA1C Interpretive Ranges  <5.7              Normal  5.7 - 6.4         Consider Prediabetes  >6.5              Consider Diabetes      Est. average glucose 07/12/2021 154  mg/dL Final    Special Requests: 07/12/2021 NO SPECIAL REQUESTS    Final    Culture result: 07/12/2021 MRSA NOT PRESENT    Final       Skin:     Denies open wounds, cuts, sores, rashes or other areas of concern in PAT assessment.           Palak De La Paz NP

## 2021-07-22 ENCOUNTER — OFFICE VISIT (OUTPATIENT)
Dept: CARDIOLOGY CLINIC | Age: 66
End: 2021-07-22
Payer: MEDICARE

## 2021-07-22 VITALS
DIASTOLIC BLOOD PRESSURE: 60 MMHG | OXYGEN SATURATION: 95 % | HEIGHT: 72 IN | BODY MASS INDEX: 26.01 KG/M2 | SYSTOLIC BLOOD PRESSURE: 100 MMHG | RESPIRATION RATE: 16 BRPM | WEIGHT: 192 LBS | HEART RATE: 96 BPM

## 2021-07-22 DIAGNOSIS — E78.2 MIXED HYPERLIPIDEMIA: ICD-10-CM

## 2021-07-22 DIAGNOSIS — I35.1 AORTIC VALVE INSUFFICIENCY, ETIOLOGY OF CARDIAC VALVE DISEASE UNSPECIFIED: Primary | ICD-10-CM

## 2021-07-22 DIAGNOSIS — E11.8 DM TYPE 2, CONTROLLED, WITH COMPLICATION (HCC): ICD-10-CM

## 2021-07-22 DIAGNOSIS — Z01.810 PRE-OPERATIVE CARDIOVASCULAR EXAMINATION: ICD-10-CM

## 2021-07-22 DIAGNOSIS — I49.3 PVC'S (PREMATURE VENTRICULAR CONTRACTIONS): ICD-10-CM

## 2021-07-22 PROCEDURE — G0463 HOSPITAL OUTPT CLINIC VISIT: HCPCS | Performed by: SPECIALIST

## 2021-07-22 PROCEDURE — 99214 OFFICE O/P EST MOD 30 MIN: CPT | Performed by: SPECIALIST

## 2021-07-22 NOTE — PROGRESS NOTES
Hiwot Galvin     1955       Lakshmi Ramírez MD, [facility]  Date of Visit-7/22/2021   PCP is Brandie Acosta MD   [facility]  [facility]  HPI:  Wing Galvin is a 61 y.o. female   New consult preop on established patient  Seeing me as urgent appt preop  Pt of Dr. Claritza Hennessy with AI, DM, and palpitations. She is to have a knee replacement in 4 days. She had a normal EKG on 7/12. She is blind, has MS, and MG minus the knee grafts. Pt is accompanied by her son. Pt ambulates with a walker. She reports feeling SOB upon exertion. Denies chest pain, edema, syncope or shortness of breath at rest, has no tachycardia, palpitations or sense of arrhythmia. Assessment/Plan:     1. Aortic valve insufficiency, etiology of cardiac valve disease unspecified  Pre-op for knee surgery. She is clinically stable. No CHF, normal EKG. No objection to surgery. Has normal EF and moderate AR with mild AS, murmur present  Compensated    2. PREOP cards exam  I have no objection to the planned  surgery. Patient seems to be at a low risk for eduardo-operative severe adverse cardiac events. 3. PVC's (premature ventricular contractions)  Stable and benign     4. Mixed hyperlipidemia  At goal , denies excess muscle aches or new liver issues  Key Antihyperlipidemia Meds             rosuvastatin (CRESTOR) 40 mg tablet (Taking/Discontinued) TAKE 1 TABLET BY MOUTH EVERY DAY         Lab Results   Component Value Date/Time    LDL-CHOLESTEROL 91 12/26/2018 12:00 AM    LDL, calculated 46 02/08/2021 04:22 PM    LDL, calculated 208 (H) 08/03/2020 08:05 AM         5 . DM type 2, controlled, with complication ([address]  cardiovascular disease risk factors   Lab Results   Component Value Date/Time    Hemoglobin A1c 7.0 (H) 07/12/2021 04:04 PM    Hemoglobin A1c (POC) 6.3 07/05/2019 01:51 PM        F/u PRN     Impression:   1.  Aortic valve insufficiency, etiology of cardiac valve disease unspecified    2. Pre-operative cardiovascular examination    3. PVC's (premature ventricular contractions)    4. Mixed hyperlipidemia    5. DM type 2, controlled, with complication [facility])       Cardiac History:   No specialty comments available. Future Appointments   Date Time Provider Roel Collins   10/26/2021 10:30 AM MD RUI Cisneros BS AMB   10/26/2021  2:00 PM Los Kahn MD [facility] BS AMB   12/15/2021 10:00 AM DANA ROLLE BS AMB   12/15/2021 10:40 AM MD BRIDGET Palacios BS AMB        ROS-except as noted above. . A complete cardiac and respiratory are reviewed and negative except as above ; Resp-denies wheezing  or productive cough,.  Const- No unusual weight loss or fever; Neuro-no recent seizure or CVA ; GI- No BRBPR, abdom pain, bloating ; - no  hematuria   see supplement sheet, initialed and to be scanned by staff  Past Medical History:   Diagnosis Date    Arthritis     hip, hands    Benign cyst of left breast 3/21/2016    Blindness and low vision 2004    legally blind from [address]    Cervical spinal stenosis 12/2/2016    Moderate C6-7    Chronic pain     COVID-19 vaccine series completed     PFIZER 3/19/21, 4/9/21    Depression     Diabetes mellitus type 2, controlled ([address] 9/13/2016    Diet-controlled diabetes mellitus ([address] 1/5/2018    Endometriosis 6/25/2010    FH: breast cancer 6/25/2010    Chart states FH breast/ovary Ca, CAD,DM     History of CVA (cerebrovascular accident) 6/5/2018    HTN, goal below 140/90 6/25/2010    CURRENTLY NO MEDICATIONS (7-12-21)    Hypercholesterolemia     Hypothyroid 6/25/2010    Incontinence     Lumbosacral plexopathy 6/25/2010    Migraine     H/O MIGRAINE    MS (multiple sclerosis) ([address] 2004    Myasthenia gravis ([address] 2011    Sleep apnea 6/25/2010    RESOLVED 5/2019    Stroke ([address] 2018    RIGHT SIDE WEAKNESS    Ureteral calculus 6/25/2010    Vitamin D deficiency 3/17/2016      Social Hx= reports that she has been smoking cigarettes. She has a 15.00 pack-year smoking history. She has never used smokeless tobacco. She reports that she does not drink alcohol and does not use drugs. Exam and Labs:  /60   Pulse 96   Resp 16   Ht 6' (1.829 m)   Wt 192 lb (87.1 kg)   LMP 09/01/2010   SpO2 95%   BMI 26.04 kg/m² Constitutional:  NAD, comfortable  Head: NC,AT. Eyes: No scleral icterus. Neck:  Neck supple. No JVD present. Throat: moist mucous membranes. Chest: Effort normal & normal respiratory excursion . Neurological: alert, conversant and oriented . Skin: Skin is not cold. No obvious systemic rash noted. Not diaphoretic. No erythema. Psychiatric:  Grossly normal mood and affect. Behavior appears normal. Extremities:  no clubbing or cyanosis. Abdomen: non distended    Lungs:limited air movement without wheezing. No stridor. distress, wheezes or  Rales. Heart: normal rate, regular rhythm, normal S1, S2, no murmurs, rubs, clicks or gallops , PMI non displaced. Edema: Edema is none.   Lab Results   Component Value Date/Time    Cholesterol, total 138 02/08/2021 04:22 PM    HDL Cholesterol 66 02/08/2021 04:22 PM    LDL-CHOLESTEROL 91 12/26/2018 12:00 AM    LDL, calculated 46 02/08/2021 04:22 PM    LDL, calculated 208 (H) 08/03/2020 08:05 AM    Triglyceride 156 (H) 02/08/2021 04:22 PM    Triglyceride 126 12/26/2018 12:00 AM    CHOL/HDL Ratio 4.3 08/23/2010 12:19 PM     Lab Results   Component Value Date/Time    Sodium 140 07/12/2021 04:04 PM    Potassium 4.2 07/12/2021 04:04 PM    Chloride 110 (H) 07/12/2021 04:04 PM    CO2 23 07/12/2021 04:04 PM    Anion gap 7 07/12/2021 04:04 PM    Glucose 194 (H) 07/12/2021 04:04 PM    BUN 12 07/12/2021 04:04 PM    Creatinine 0.97 07/12/2021 04:04 PM    BUN/Creatinine ratio 12 07/12/2021 04:04 PM    GFR est AA >60 07/12/2021 04:04 PM    GFR est non-AA 58 (L) 07/12/2021 04:04 PM    Calcium 9.7 07/12/2021 04:04 PM      Wt Readings from Last 3 Encounters:   07/22/21 192 lb (87.1 kg) 07/12/21 191 lb 12.8 oz (87 kg)   07/08/21 196 lb (88.9 kg)      BP Readings from Last 3 Encounters:   07/22/21 100/60   07/12/21 133/71   07/08/21 116/74      Current Outpatient Medications   Medication Sig    bromocriptine (PARLODEL) 5 mg capsule Take 5 mg by mouth nightly.  cyanocobalamin 1,000 mcg tablet TAKE 1 TABLET BY MOUTH EVERY DAY    metFORMIN ER (GLUCOPHAGE XR) 500 mg tablet TAKE 3 TABLETS BY MOUTH DAILY; dose change (Patient taking differently: Take 1,500 mg by mouth nightly. TAKE 3 TABLETS BY MOUTH DAILY; dose change)    pyridostigmine bromide (MESTINON SR) 180 mg SR tablet TAKE 1 TABLET BY MOUTH TWICE DAILY    pregabalin (Lyrica) 200 mg capsule Take 1 Cap by mouth two (2) times a day. Max Daily Amount: 400 mg.    Gilenya 0.5 mg cap Take 1 Cap by mouth daily. (Patient taking differently: Take 1 Capsule by mouth nightly.)    acetaminophen (TYLENOL) 500 mg tablet Take 500 mg by mouth every six (6) hours as needed for Pain.  levothyroxine (SYNTHROID) 137 mcg tablet TAKE 1 TABLET BY MOUTH DAILY BEFORE BREAKFAST; dose change (Patient taking differently: TAKE 1 TABLET BY MOUTH DAILY BEFORE BREAKFAST; dose change    taking at bedtime )    nystatin (MYCOSTATIN) powder Apply to candidal lesions TOPICALLY 2 to 3 times daily until healing complete (Patient taking differently: Apply 1 Units to affected area two (2) times a day. Apply to candidal lesions TOPICALLY 2 to 3 times daily until healing complete    right breast )    nystatin (MYCOSTATIN) topical cream Apply  to affected area two (2) times a day. (Patient taking differently: Apply 1 Units to affected area two (2) times a day. right breast)    OTHER Electric scooter  Diagnosis: Multiple sclerosis  Frequent fall  Gait disorder    rosuvastatin (CRESTOR) 40 mg tablet TAKE 1 TABLET BY MOUTH EVERY DAY (Patient taking differently: Take 40 mg by mouth nightly.)    topiramate (TOPAMAX) 200 mg tablet Take 1 Tab by mouth two (2) times a day.     Aimovig Autoinjector 140 mg/mL injection ADMINISTER 1 ML(140MG) UNDER THE SKIN EVERY 30 DAYS    predniSONE (DELTASONE) 20 mg tablet 60 mg p.o. daily for 5 days, 40 mg p.o. daily for 4 days, then 20 mg p.o. daily for 3 days.  corticotropin (ACTHAR H.P.) 80 unit/mL injectable gel 1 mL by SubCUTAneous route daily. 80 units subq q day for 10 days (Patient taking differently: 80 Units by SubCUTAneous route as needed. 80 units subq q day for 10 days)    ondansetron (ZOFRAN ODT) 4 mg disintegrating tablet DISSOLVE 1 TABLET ON THE TONGUE EVERY 8 HOURS AS NEEDED FOR NAUSEA    glucose blood VI test strips (BLOOD GLUCOSE TEST) strip 100 Each by Does Not Apply route See Admin Instructions. One Touch Ultra Test Strips=Check blood sugar daily dx E11.8    OTHER 0.25 mL by SubLINGual route daily. CBD OIL    dantrolene (DANTRIUM) 100 mg capsule Take 1 Cap by mouth three (3) times daily. (Patient taking differently: Take 100 mg by mouth daily as needed. Indications: muscle spasm)    albuterol (PROVENTIL VENTOLIN) 2.5 mg /3 mL (0.083 %) nebulizer solution 3 mL by Nebulization route every six (6) hours as needed for Wheezing.  Menthol-Zinc Oxide (CALMOSEPTINE) 0.44-20.6 % oint Apply 1 Each to affected area daily as needed for Other (thin layer to buttocks for skin irritation).  lidocaine (LIDODERM) 5 % Apply patch to the affected area for 12 hours a day and remove for 12 hours a day.  clindamycin (CLEOCIN) 300 mg capsule Take 300 mg by mouth as needed for Other (prior to dental procedures ). Prior to dental procedures    TENS UNIT ELECTRODES by Does Not Apply route. prn    PARoxetine (PAXIL) 40 mg tablet TAKE 1 AND 1/2 TABLETS BY MOUTH EVERY NIGHT (Patient taking differently: Take 60 mg by mouth nightly.)    aspirin delayed-release 81 mg tablet Take 1 Tab by mouth every twelve (12) hours. (Patient taking differently: Take 1 Tab by mouth daily.)     No current facility-administered medications for this visit. Impression see above.       Written by Andrew Reid, as dictated by Delinda Epley, MD.

## 2021-07-23 ENCOUNTER — ANESTHESIA EVENT (OUTPATIENT)
Dept: SURGERY | Age: 66
End: 2021-07-23
Payer: MEDICARE

## 2021-07-23 NOTE — H&P
Chief Complaint: Pain and Follow-up of the Right Knee    HPI: Sandy West is a 61 y.o. female who returns for follow-up of her right knee pain. She is s/p a left total hip replacement performed in 05/13/19 and a right total hip replacement performed on 06/05/17, both performed by Dr. Fabby Garcia. She has a documented history of right knee osteoarthritis. The last x-rays of her right knee ordered around 7 months ago on 12/01/21 shows lateral subluxation of the patella with severe degenerative changes of the patellofemoral joint. In her last visit into our office around 6 weeks ago on 05/25/21, I administered a steroid injection to her right knee. She returns today for reassessment. She complains of continued pain in the right knee. She states the pain is constant. She complains of night pain and pain which interrupts her sleep. She notes a past steroid injection to the right knee did not provide significant pain relief. She states she is constantly concerned about her right knee buckling during ambulation. She reports she has experienced several falls this year due to her knee buckling. She is ambulating with a Rolator assistance today. Of note, she reports she has MS and myosiniagravis. Objective: There were no vitals filed for this visit. Height: 6'   Wt Readings from Last 3 Encounters:   07/08/21 198 lb   05/25/21 198 lb   12/01/20 198 lb     Body mass index is 26.85 kg/m². Musculoskeletal : Right Knee: She has full range of the bilateral hips with no pain on motion. She has full range of motion of the bilateral knees with significant pain on motion. ROM 0-130 degrees. Crepitus and tenderness over the medial joint line. Ligaments intact. No significant effusion. Positive patellofemoral grind. Strong pedal pulses. No pedal edema. Skin healthy and intact. No apparent atrophy.  Leg lengths appear equal.     Radiographs:   Order: XR KNEE 3 VW RIGHT - Indication: Primary osteoarthritis of right knee      X-ray knee right 3 views    Result Date: 7/8/2021  Views: Standing AP, Lat, [place]. Impression: The 3-view x-ray of the right knee shows severe degenerative changes of patellofemoral joint with moderate degenerative changes of the tibia-femoral joint. Assessment:     1. Primary osteoarthritis of right knee     Plan:     I reviewed the x-ray of the right knee ordered today with the patient. The x-ray shows severe degenerative changes of patellofemoral joint with moderate degenerative changes of the tibia-femoral joint. I discussed the continued diagnosis of right knee osteoarthritis with the patient. I explained to the patient that her last x-ray showed bone-on-bone osteoarthritis of the patellofemoral joint. I also explained to the patient that I do not believe PT or a brace will provide significant pain relief. I also explained to the patient she is a high fall risk due to her MS and buckling / poor balance due to her right knee. I discussed treatment options including continued conservative management vs a right total knee replacement and the pros and cons of each. I explained to the patient the only treatment that will truly resolve her symptoms is a right total knee replacement. We discussed total knee replacement surgery at length including expected outcomes and post-op recovery as well as risks and complications, specifically DVT, PE, infection, and nerve injury. I explained to the patient that total knee replacements tend to be more difficult to recover from than total hip replacements. I also explained to the patient her underlying MS and myosiniagravis may prolong her recovery. After a lengthy discussion, she desires to proceed with surgery. We will schedule surgery at her convenience. Follow-up for scheduled surgery. Orders Placed This Encounter    X-ray knee right 3 views    BMI >=25 PATIENT INSTRUCTIONS & EDUCATION     No follow-ups on file.      Medical documentation was entered by me, Shaan Mueller, Medical Scribe for Dr. Nik Prieto.    7/8/2021    I, Larry Singh MD, personally, performed the services described in this documentation, as scribed in my presence, and it is both accurate and complete.    Electronically signed by Larry Singh MD at 07/08/2021 5:18 PM EDT

## 2021-07-26 ENCOUNTER — HOSPITAL ENCOUNTER (OUTPATIENT)
Age: 66
Discharge: HOME HEALTH | End: 2021-07-28
Attending: ORTHOPAEDIC SURGERY | Admitting: ORTHOPAEDIC SURGERY
Payer: MEDICARE

## 2021-07-26 ENCOUNTER — ANESTHESIA (OUTPATIENT)
Dept: SURGERY | Age: 66
End: 2021-07-26
Payer: MEDICARE

## 2021-07-26 DIAGNOSIS — Z96.651 PAIN DUE TO TOTAL RIGHT KNEE REPLACEMENT, INITIAL ENCOUNTER (HCC): Primary | ICD-10-CM

## 2021-07-26 DIAGNOSIS — T84.84XA PAIN DUE TO TOTAL RIGHT KNEE REPLACEMENT, INITIAL ENCOUNTER (HCC): Primary | ICD-10-CM

## 2021-07-26 PROBLEM — M17.11 OSTEOARTHRITIS OF RIGHT KNEE: Status: ACTIVE | Noted: 2021-07-26

## 2021-07-26 LAB
GLUCOSE BLD STRIP.AUTO-MCNC: 128 MG/DL (ref 65–117)
GLUCOSE BLD STRIP.AUTO-MCNC: 149 MG/DL (ref 65–117)
GLUCOSE BLD STRIP.AUTO-MCNC: 177 MG/DL (ref 65–117)
SERVICE CMNT-IMP: ABNORMAL

## 2021-07-26 PROCEDURE — 97161 PT EVAL LOW COMPLEX 20 MIN: CPT

## 2021-07-26 PROCEDURE — 74011000250 HC RX REV CODE- 250: Performed by: ANESTHESIOLOGY

## 2021-07-26 PROCEDURE — C1713 ANCHOR/SCREW BN/BN,TIS/BN: HCPCS | Performed by: ORTHOPAEDIC SURGERY

## 2021-07-26 PROCEDURE — 74011250637 HC RX REV CODE- 250/637: Performed by: PHYSICIAN ASSISTANT

## 2021-07-26 PROCEDURE — 74011000250 HC RX REV CODE- 250: Performed by: NURSE ANESTHETIST, CERTIFIED REGISTERED

## 2021-07-26 PROCEDURE — 77030014077 HC TOWER MX CEM J&J -C: Performed by: ORTHOPAEDIC SURGERY

## 2021-07-26 PROCEDURE — 2709999900 HC NON-CHARGEABLE SUPPLY: Performed by: ORTHOPAEDIC SURGERY

## 2021-07-26 PROCEDURE — 74011250636 HC RX REV CODE- 250/636: Performed by: ANESTHESIOLOGY

## 2021-07-26 PROCEDURE — C1776 JOINT DEVICE (IMPLANTABLE): HCPCS | Performed by: ORTHOPAEDIC SURGERY

## 2021-07-26 PROCEDURE — 77030003601 HC NDL NRV BLK BBMI -A

## 2021-07-26 PROCEDURE — 74011636637 HC RX REV CODE- 636/637: Performed by: PHYSICIAN ASSISTANT

## 2021-07-26 PROCEDURE — 76060000035 HC ANESTHESIA 2 TO 2.5 HR: Performed by: ORTHOPAEDIC SURGERY

## 2021-07-26 PROCEDURE — 74011250636 HC RX REV CODE- 250/636: Performed by: NURSE ANESTHETIST, CERTIFIED REGISTERED

## 2021-07-26 PROCEDURE — 77030008684 HC TU ET CUF COVD -B: Performed by: ANESTHESIOLOGY

## 2021-07-26 PROCEDURE — 77030040922 HC BLNKT HYPOTHRM STRY -A

## 2021-07-26 PROCEDURE — 77030035236 HC SUT PDS STRATFX BARB J&J -B: Performed by: ORTHOPAEDIC SURGERY

## 2021-07-26 PROCEDURE — 76210000001 HC OR PH I REC 2.5 TO 3 HR: Performed by: ORTHOPAEDIC SURGERY

## 2021-07-26 PROCEDURE — 74011000258 HC RX REV CODE- 258: Performed by: NURSE ANESTHETIST, CERTIFIED REGISTERED

## 2021-07-26 PROCEDURE — 97116 GAIT TRAINING THERAPY: CPT

## 2021-07-26 PROCEDURE — 74011000250 HC RX REV CODE- 250: Performed by: ORTHOPAEDIC SURGERY

## 2021-07-26 PROCEDURE — 82962 GLUCOSE BLOOD TEST: CPT

## 2021-07-26 PROCEDURE — 77030019908 HC STETH ESOPH SIMS -A: Performed by: ANESTHESIOLOGY

## 2021-07-26 PROCEDURE — 77030005513 HC CATH URETH FOL11 MDII -B: Performed by: ORTHOPAEDIC SURGERY

## 2021-07-26 PROCEDURE — 76010000171 HC OR TIME 2 TO 2.5 HR INTENSV-TIER 1: Performed by: ORTHOPAEDIC SURGERY

## 2021-07-26 PROCEDURE — 77030026438 HC STYL ET INTUB CARD -A: Performed by: ANESTHESIOLOGY

## 2021-07-26 PROCEDURE — 77030040361 HC SLV COMPR DVT MDII -B

## 2021-07-26 PROCEDURE — 77030006822 HC BLD SAW SAG BRSM -B: Performed by: ORTHOPAEDIC SURGERY

## 2021-07-26 PROCEDURE — 97530 THERAPEUTIC ACTIVITIES: CPT

## 2021-07-26 PROCEDURE — 77030008462 HC STPLR SKN PROX J&J -A: Performed by: ORTHOPAEDIC SURGERY

## 2021-07-26 PROCEDURE — 74011250636 HC RX REV CODE- 250/636: Performed by: PHYSICIAN ASSISTANT

## 2021-07-26 PROCEDURE — 77030031139 HC SUT VCRL2 J&J -A: Performed by: ORTHOPAEDIC SURGERY

## 2021-07-26 DEVICE — SMARTSET GHV GENTAMICIN HIGH VISCOSITY BONE CEMENT 40G
Type: IMPLANTABLE DEVICE | Site: KNEE | Status: FUNCTIONAL
Brand: SMARTSET

## 2021-07-26 DEVICE — IMPLANTABLE DEVICE
Type: IMPLANTABLE DEVICE | Site: KNEE | Status: FUNCTIONAL
Brand: PERSONA® VIVACIT-E®

## 2021-07-26 DEVICE — IMPLANTABLE DEVICE
Type: IMPLANTABLE DEVICE | Site: KNEE | Status: FUNCTIONAL
Brand: PERSONA®

## 2021-07-26 DEVICE — IMPLANTABLE DEVICE
Type: IMPLANTABLE DEVICE | Site: KNEE | Status: FUNCTIONAL
Brand: PERSONA® NATURAL TIBIA®

## 2021-07-26 DEVICE — KNEE K1 TOT HEMI STD CEM IMPL CAPPED K1 ZIM: Type: IMPLANTABLE DEVICE | Status: FUNCTIONAL

## 2021-07-26 RX ORDER — ALBUTEROL SULFATE 0.83 MG/ML
2.5 SOLUTION RESPIRATORY (INHALATION) AS NEEDED
Status: DISCONTINUED | OUTPATIENT
Start: 2021-07-26 | End: 2021-07-26 | Stop reason: HOSPADM

## 2021-07-26 RX ORDER — GLYCOPYRROLATE 0.2 MG/ML
0.2 INJECTION INTRAMUSCULAR; INTRAVENOUS
Status: COMPLETED | OUTPATIENT
Start: 2021-07-26 | End: 2021-07-26

## 2021-07-26 RX ORDER — SODIUM CHLORIDE 9 MG/ML
25 INJECTION, SOLUTION INTRAVENOUS CONTINUOUS
Status: DISCONTINUED | OUTPATIENT
Start: 2021-07-26 | End: 2021-07-26 | Stop reason: HOSPADM

## 2021-07-26 RX ORDER — PYRIDOSTIGMINE BROMIDE 180 MG/1
180 TABLET, EXTENDED RELEASE ORAL 2 TIMES DAILY
Status: DISCONTINUED | OUTPATIENT
Start: 2021-07-26 | End: 2021-07-28 | Stop reason: HOSPADM

## 2021-07-26 RX ORDER — HYDROMORPHONE HYDROCHLORIDE 1 MG/ML
0.2 INJECTION, SOLUTION INTRAMUSCULAR; INTRAVENOUS; SUBCUTANEOUS
Status: DISCONTINUED | OUTPATIENT
Start: 2021-07-26 | End: 2021-07-26 | Stop reason: HOSPADM

## 2021-07-26 RX ORDER — HYDROXYZINE HYDROCHLORIDE 10 MG/1
10 TABLET, FILM COATED ORAL
Status: DISCONTINUED | OUTPATIENT
Start: 2021-07-26 | End: 2021-07-28 | Stop reason: HOSPADM

## 2021-07-26 RX ORDER — MIDAZOLAM HYDROCHLORIDE 1 MG/ML
1 INJECTION, SOLUTION INTRAMUSCULAR; INTRAVENOUS AS NEEDED
Status: DISCONTINUED | OUTPATIENT
Start: 2021-07-26 | End: 2021-07-26 | Stop reason: HOSPADM

## 2021-07-26 RX ORDER — FACIAL-BODY WIPES
10 EACH TOPICAL DAILY PRN
Status: DISCONTINUED | OUTPATIENT
Start: 2021-07-28 | End: 2021-07-28 | Stop reason: HOSPADM

## 2021-07-26 RX ORDER — DEXMEDETOMIDINE HYDROCHLORIDE 100 UG/ML
INJECTION, SOLUTION INTRAVENOUS AS NEEDED
Status: DISCONTINUED | OUTPATIENT
Start: 2021-07-26 | End: 2021-07-26 | Stop reason: HOSPADM

## 2021-07-26 RX ORDER — SUCCINYLCHOLINE CHLORIDE 20 MG/ML
INJECTION INTRAMUSCULAR; INTRAVENOUS AS NEEDED
Status: DISCONTINUED | OUTPATIENT
Start: 2021-07-26 | End: 2021-07-26 | Stop reason: HOSPADM

## 2021-07-26 RX ORDER — AMOXICILLIN 250 MG
1 CAPSULE ORAL 2 TIMES DAILY
Status: DISCONTINUED | OUTPATIENT
Start: 2021-07-26 | End: 2021-07-28 | Stop reason: HOSPADM

## 2021-07-26 RX ORDER — TRANEXAMIC ACID 100 MG/ML
INJECTION, SOLUTION INTRAVENOUS AS NEEDED
Status: DISCONTINUED | OUTPATIENT
Start: 2021-07-26 | End: 2021-07-26 | Stop reason: HOSPADM

## 2021-07-26 RX ORDER — SODIUM CHLORIDE 9 MG/ML
INJECTION, SOLUTION INTRAVENOUS
Status: DISCONTINUED | OUTPATIENT
Start: 2021-07-26 | End: 2021-07-26 | Stop reason: HOSPADM

## 2021-07-26 RX ORDER — NALOXONE HYDROCHLORIDE 0.4 MG/ML
0.4 INJECTION, SOLUTION INTRAMUSCULAR; INTRAVENOUS; SUBCUTANEOUS AS NEEDED
Status: DISCONTINUED | OUTPATIENT
Start: 2021-07-26 | End: 2021-07-28 | Stop reason: HOSPADM

## 2021-07-26 RX ORDER — TRAZODONE HYDROCHLORIDE 50 MG/1
50 TABLET ORAL
Status: DISCONTINUED | OUTPATIENT
Start: 2021-07-26 | End: 2021-07-28 | Stop reason: HOSPADM

## 2021-07-26 RX ORDER — ONDANSETRON 2 MG/ML
4 INJECTION INTRAMUSCULAR; INTRAVENOUS
Status: ACTIVE | OUTPATIENT
Start: 2021-07-26 | End: 2021-07-27

## 2021-07-26 RX ORDER — KETOROLAC TROMETHAMINE 30 MG/ML
15 INJECTION, SOLUTION INTRAMUSCULAR; INTRAVENOUS
Status: DISCONTINUED | OUTPATIENT
Start: 2021-07-26 | End: 2021-07-28 | Stop reason: HOSPADM

## 2021-07-26 RX ORDER — LIDOCAINE HYDROCHLORIDE 10 MG/ML
0.1 INJECTION, SOLUTION EPIDURAL; INFILTRATION; INTRACAUDAL; PERINEURAL AS NEEDED
Status: DISCONTINUED | OUTPATIENT
Start: 2021-07-26 | End: 2021-07-26 | Stop reason: HOSPADM

## 2021-07-26 RX ORDER — ACETAMINOPHEN 325 MG/1
650 TABLET ORAL EVERY 6 HOURS
Status: DISCONTINUED | OUTPATIENT
Start: 2021-07-26 | End: 2021-07-28 | Stop reason: HOSPADM

## 2021-07-26 RX ORDER — DANTROLENE SODIUM 25 MG/1
100 CAPSULE ORAL
Status: DISCONTINUED | OUTPATIENT
Start: 2021-07-26 | End: 2021-07-28 | Stop reason: HOSPADM

## 2021-07-26 RX ORDER — ROPIVACAINE HYDROCHLORIDE 5 MG/ML
30 INJECTION, SOLUTION EPIDURAL; INFILTRATION; PERINEURAL AS NEEDED
Status: DISCONTINUED | OUTPATIENT
Start: 2021-07-26 | End: 2021-07-26 | Stop reason: HOSPADM

## 2021-07-26 RX ORDER — ONDANSETRON 2 MG/ML
4 INJECTION INTRAMUSCULAR; INTRAVENOUS AS NEEDED
Status: DISCONTINUED | OUTPATIENT
Start: 2021-07-26 | End: 2021-07-26 | Stop reason: HOSPADM

## 2021-07-26 RX ORDER — VANCOMYCIN/0.9 % SOD CHLORIDE 1.5G/250ML
1500 PLASTIC BAG, INJECTION (ML) INTRAVENOUS EVERY 12 HOURS
Status: COMPLETED | OUTPATIENT
Start: 2021-07-26 | End: 2021-07-27

## 2021-07-26 RX ORDER — PAROXETINE HYDROCHLORIDE 20 MG/1
60 TABLET, FILM COATED ORAL
Status: DISCONTINUED | OUTPATIENT
Start: 2021-07-26 | End: 2021-07-28 | Stop reason: HOSPADM

## 2021-07-26 RX ORDER — CLINDAMYCIN PHOSPHATE 900 MG/50ML
900 INJECTION INTRAVENOUS ONCE
Status: ACTIVE | OUTPATIENT
Start: 2021-07-26 | End: 2021-07-26

## 2021-07-26 RX ORDER — MORPHINE SULFATE 2 MG/ML
2 INJECTION, SOLUTION INTRAMUSCULAR; INTRAVENOUS
Status: DISCONTINUED | OUTPATIENT
Start: 2021-07-26 | End: 2021-07-26 | Stop reason: HOSPADM

## 2021-07-26 RX ORDER — TOPIRAMATE 100 MG/1
200 TABLET, FILM COATED ORAL 2 TIMES DAILY
Status: DISCONTINUED | OUTPATIENT
Start: 2021-07-26 | End: 2021-07-28 | Stop reason: HOSPADM

## 2021-07-26 RX ORDER — DEXAMETHASONE SODIUM PHOSPHATE 4 MG/ML
INJECTION, SOLUTION INTRA-ARTICULAR; INTRALESIONAL; INTRAMUSCULAR; INTRAVENOUS; SOFT TISSUE AS NEEDED
Status: DISCONTINUED | OUTPATIENT
Start: 2021-07-26 | End: 2021-07-26 | Stop reason: HOSPADM

## 2021-07-26 RX ORDER — ROCURONIUM BROMIDE 10 MG/ML
INJECTION, SOLUTION INTRAVENOUS AS NEEDED
Status: DISCONTINUED | OUTPATIENT
Start: 2021-07-26 | End: 2021-07-26 | Stop reason: HOSPADM

## 2021-07-26 RX ORDER — DIPHENHYDRAMINE HYDROCHLORIDE 50 MG/ML
12.5 INJECTION, SOLUTION INTRAMUSCULAR; INTRAVENOUS AS NEEDED
Status: DISCONTINUED | OUTPATIENT
Start: 2021-07-26 | End: 2021-07-26 | Stop reason: HOSPADM

## 2021-07-26 RX ORDER — HYDROMORPHONE HYDROCHLORIDE 2 MG/ML
INJECTION, SOLUTION INTRAMUSCULAR; INTRAVENOUS; SUBCUTANEOUS AS NEEDED
Status: DISCONTINUED | OUTPATIENT
Start: 2021-07-26 | End: 2021-07-26 | Stop reason: HOSPADM

## 2021-07-26 RX ORDER — ACETAMINOPHEN 325 MG/1
650 TABLET ORAL ONCE
Status: DISCONTINUED | OUTPATIENT
Start: 2021-07-26 | End: 2021-07-26

## 2021-07-26 RX ORDER — ONDANSETRON 4 MG/1
4 TABLET, ORALLY DISINTEGRATING ORAL
Status: DISCONTINUED | OUTPATIENT
Start: 2021-07-26 | End: 2021-07-28 | Stop reason: HOSPADM

## 2021-07-26 RX ORDER — INSULIN LISPRO 100 [IU]/ML
INJECTION, SOLUTION INTRAVENOUS; SUBCUTANEOUS
Status: DISCONTINUED | OUTPATIENT
Start: 2021-07-26 | End: 2021-07-28 | Stop reason: HOSPADM

## 2021-07-26 RX ORDER — KETAMINE HYDROCHLORIDE 10 MG/ML
INJECTION, SOLUTION INTRAMUSCULAR; INTRAVENOUS AS NEEDED
Status: DISCONTINUED | OUTPATIENT
Start: 2021-07-26 | End: 2021-07-26 | Stop reason: HOSPADM

## 2021-07-26 RX ORDER — MIDAZOLAM HYDROCHLORIDE 1 MG/ML
INJECTION, SOLUTION INTRAMUSCULAR; INTRAVENOUS AS NEEDED
Status: DISCONTINUED | OUTPATIENT
Start: 2021-07-26 | End: 2021-07-26 | Stop reason: HOSPADM

## 2021-07-26 RX ORDER — PHENYLEPHRINE HCL IN 0.9% NACL 0.4MG/10ML
SYRINGE (ML) INTRAVENOUS AS NEEDED
Status: DISCONTINUED | OUTPATIENT
Start: 2021-07-26 | End: 2021-07-26 | Stop reason: HOSPADM

## 2021-07-26 RX ORDER — FENTANYL CITRATE 50 UG/ML
25 INJECTION, SOLUTION INTRAMUSCULAR; INTRAVENOUS
Status: DISCONTINUED | OUTPATIENT
Start: 2021-07-26 | End: 2021-07-26 | Stop reason: HOSPADM

## 2021-07-26 RX ORDER — ROSUVASTATIN CALCIUM 40 MG/1
40 TABLET, COATED ORAL
Status: DISCONTINUED | OUTPATIENT
Start: 2021-07-26 | End: 2021-07-28 | Stop reason: HOSPADM

## 2021-07-26 RX ORDER — INSULIN GLARGINE 100 [IU]/ML
0.2 INJECTION, SOLUTION SUBCUTANEOUS DAILY
Status: DISCONTINUED | OUTPATIENT
Start: 2021-07-27 | End: 2021-07-26

## 2021-07-26 RX ORDER — HYDROCODONE BITARTRATE AND ACETAMINOPHEN 5; 325 MG/1; MG/1
1 TABLET ORAL AS NEEDED
Status: DISCONTINUED | OUTPATIENT
Start: 2021-07-26 | End: 2021-07-26 | Stop reason: HOSPADM

## 2021-07-26 RX ORDER — FENTANYL CITRATE 50 UG/ML
50 INJECTION, SOLUTION INTRAMUSCULAR; INTRAVENOUS AS NEEDED
Status: DISCONTINUED | OUTPATIENT
Start: 2021-07-26 | End: 2021-07-26 | Stop reason: HOSPADM

## 2021-07-26 RX ORDER — PREGABALIN 75 MG/1
75 CAPSULE ORAL ONCE
Status: DISCONTINUED | OUTPATIENT
Start: 2021-07-26 | End: 2021-07-26 | Stop reason: HOSPADM

## 2021-07-26 RX ORDER — ONDANSETRON 2 MG/ML
INJECTION INTRAMUSCULAR; INTRAVENOUS AS NEEDED
Status: DISCONTINUED | OUTPATIENT
Start: 2021-07-26 | End: 2021-07-26 | Stop reason: HOSPADM

## 2021-07-26 RX ORDER — PROPOFOL 10 MG/ML
INJECTION, EMULSION INTRAVENOUS AS NEEDED
Status: DISCONTINUED | OUTPATIENT
Start: 2021-07-26 | End: 2021-07-26 | Stop reason: HOSPADM

## 2021-07-26 RX ORDER — MAGNESIUM SULFATE 100 %
4 CRYSTALS MISCELLANEOUS AS NEEDED
Status: DISCONTINUED | OUTPATIENT
Start: 2021-07-26 | End: 2021-07-28 | Stop reason: HOSPADM

## 2021-07-26 RX ORDER — ASPIRIN 81 MG/1
81 TABLET ORAL DAILY
Status: DISCONTINUED | OUTPATIENT
Start: 2021-07-27 | End: 2021-07-28 | Stop reason: HOSPADM

## 2021-07-26 RX ORDER — OXYCODONE HYDROCHLORIDE 5 MG/1
5 TABLET ORAL
Status: DISCONTINUED | OUTPATIENT
Start: 2021-07-26 | End: 2021-07-28 | Stop reason: HOSPADM

## 2021-07-26 RX ORDER — SODIUM CHLORIDE 0.9 % (FLUSH) 0.9 %
5-40 SYRINGE (ML) INJECTION EVERY 8 HOURS
Status: DISCONTINUED | OUTPATIENT
Start: 2021-07-26 | End: 2021-07-28 | Stop reason: HOSPADM

## 2021-07-26 RX ORDER — HYDROMORPHONE HYDROCHLORIDE 1 MG/ML
0.5 INJECTION, SOLUTION INTRAMUSCULAR; INTRAVENOUS; SUBCUTANEOUS
Status: ACTIVE | OUTPATIENT
Start: 2021-07-26 | End: 2021-07-27

## 2021-07-26 RX ORDER — VANCOMYCIN/0.9 % SOD CHLORIDE 1.5G/250ML
1500 PLASTIC BAG, INJECTION (ML) INTRAVENOUS ONCE
Status: COMPLETED | OUTPATIENT
Start: 2021-07-26 | End: 2021-07-26

## 2021-07-26 RX ORDER — POLYETHYLENE GLYCOL 3350 17 G/17G
17 POWDER, FOR SOLUTION ORAL DAILY
Status: DISCONTINUED | OUTPATIENT
Start: 2021-07-27 | End: 2021-07-28 | Stop reason: HOSPADM

## 2021-07-26 RX ORDER — SODIUM CHLORIDE 0.9 % (FLUSH) 0.9 %
5-40 SYRINGE (ML) INJECTION AS NEEDED
Status: DISCONTINUED | OUTPATIENT
Start: 2021-07-26 | End: 2021-07-28 | Stop reason: HOSPADM

## 2021-07-26 RX ORDER — SODIUM CHLORIDE, SODIUM LACTATE, POTASSIUM CHLORIDE, CALCIUM CHLORIDE 600; 310; 30; 20 MG/100ML; MG/100ML; MG/100ML; MG/100ML
1000 INJECTION, SOLUTION INTRAVENOUS CONTINUOUS
Status: DISCONTINUED | OUTPATIENT
Start: 2021-07-26 | End: 2021-07-26 | Stop reason: HOSPADM

## 2021-07-26 RX ORDER — CELECOXIB 200 MG/1
200 CAPSULE ORAL ONCE
Status: COMPLETED | OUTPATIENT
Start: 2021-07-26 | End: 2021-07-26

## 2021-07-26 RX ORDER — OXYCODONE HYDROCHLORIDE 5 MG/1
10 TABLET ORAL
Status: DISCONTINUED | OUTPATIENT
Start: 2021-07-26 | End: 2021-07-28 | Stop reason: HOSPADM

## 2021-07-26 RX ORDER — FAMOTIDINE 20 MG/1
20 TABLET, FILM COATED ORAL
Status: DISCONTINUED | OUTPATIENT
Start: 2021-07-26 | End: 2021-07-28 | Stop reason: HOSPADM

## 2021-07-26 RX ORDER — TRAZODONE HYDROCHLORIDE 50 MG/1
50 TABLET ORAL
COMMUNITY
End: 2022-04-28

## 2021-07-26 RX ORDER — DEXTROSE 50 % IN WATER (D50W) INTRAVENOUS SYRINGE
25-50 AS NEEDED
Status: DISCONTINUED | OUTPATIENT
Start: 2021-07-26 | End: 2021-07-28 | Stop reason: HOSPADM

## 2021-07-26 RX ORDER — ALBUTEROL SULFATE 0.83 MG/ML
2.5 SOLUTION RESPIRATORY (INHALATION)
Status: DISCONTINUED | OUTPATIENT
Start: 2021-07-26 | End: 2021-07-28 | Stop reason: HOSPADM

## 2021-07-26 RX ORDER — MIDAZOLAM HYDROCHLORIDE 1 MG/ML
0.5 INJECTION, SOLUTION INTRAMUSCULAR; INTRAVENOUS
Status: DISCONTINUED | OUTPATIENT
Start: 2021-07-26 | End: 2021-07-26 | Stop reason: HOSPADM

## 2021-07-26 RX ORDER — LIDOCAINE HYDROCHLORIDE 20 MG/ML
INJECTION, SOLUTION EPIDURAL; INFILTRATION; INTRACAUDAL; PERINEURAL AS NEEDED
Status: DISCONTINUED | OUTPATIENT
Start: 2021-07-26 | End: 2021-07-26 | Stop reason: HOSPADM

## 2021-07-26 RX ORDER — ACETAMINOPHEN 500 MG
1000 TABLET ORAL ONCE
Status: COMPLETED | OUTPATIENT
Start: 2021-07-26 | End: 2021-07-26

## 2021-07-26 RX ORDER — PREGABALIN 100 MG/1
200 CAPSULE ORAL 2 TIMES DAILY
Status: DISCONTINUED | OUTPATIENT
Start: 2021-07-26 | End: 2021-07-28 | Stop reason: HOSPADM

## 2021-07-26 RX ORDER — SODIUM CHLORIDE 9 MG/ML
125 INJECTION, SOLUTION INTRAVENOUS CONTINUOUS
Status: DISPENSED | OUTPATIENT
Start: 2021-07-26 | End: 2021-07-27

## 2021-07-26 RX ADMIN — PHENYLEPHRINE HYDROCHLORIDE 20 MCG/MIN: 10 INJECTION INTRAVENOUS at 09:41

## 2021-07-26 RX ADMIN — OXYCODONE 5 MG: 5 TABLET ORAL at 19:36

## 2021-07-26 RX ADMIN — SUGAMMADEX 174 MG: 100 INJECTION, SOLUTION INTRAVENOUS at 10:57

## 2021-07-26 RX ADMIN — PREGABALIN 200 MG: 100 CAPSULE ORAL at 19:23

## 2021-07-26 RX ADMIN — KETAMINE HYDROCHLORIDE 10 MG: 10 INJECTION, SOLUTION INTRAMUSCULAR; INTRAVENOUS at 10:17

## 2021-07-26 RX ADMIN — HYDROMORPHONE HYDROCHLORIDE 0.1 MG: 2 INJECTION, SOLUTION INTRAMUSCULAR; INTRAVENOUS; SUBCUTANEOUS at 10:19

## 2021-07-26 RX ADMIN — SODIUM CHLORIDE 125 ML/HR: 9 INJECTION, SOLUTION INTRAVENOUS at 19:23

## 2021-07-26 RX ADMIN — DOCUSATE SODIUM 50 MG AND SENNOSIDES 8.6 MG 1 TABLET: 8.6; 5 TABLET, FILM COATED ORAL at 19:23

## 2021-07-26 RX ADMIN — MIDAZOLAM 1 MG: 1 INJECTION INTRAMUSCULAR; INTRAVENOUS at 09:26

## 2021-07-26 RX ADMIN — Medication 80 MCG: at 09:41

## 2021-07-26 RX ADMIN — ACETAMINOPHEN 650 MG: 325 TABLET ORAL at 19:23

## 2021-07-26 RX ADMIN — VANCOMYCIN HYDROCHLORIDE 1500 MG: 10 INJECTION, POWDER, LYOPHILIZED, FOR SOLUTION INTRAVENOUS at 08:52

## 2021-07-26 RX ADMIN — ROCURONIUM BROMIDE 10 MG: 10 SOLUTION INTRAVENOUS at 09:32

## 2021-07-26 RX ADMIN — KETAMINE HYDROCHLORIDE 10 MG: 10 INJECTION, SOLUTION INTRAMUSCULAR; INTRAVENOUS at 09:32

## 2021-07-26 RX ADMIN — Medication 10 ML: at 22:10

## 2021-07-26 RX ADMIN — TRANEXAMIC ACID 1 G: 100 INJECTION, SOLUTION INTRAVENOUS at 09:50

## 2021-07-26 RX ADMIN — HYDROMORPHONE HYDROCHLORIDE 0.1 MG: 2 INJECTION, SOLUTION INTRAMUSCULAR; INTRAVENOUS; SUBCUTANEOUS at 09:32

## 2021-07-26 RX ADMIN — PROPOFOL 150 MG: 10 INJECTION, EMULSION INTRAVENOUS at 09:33

## 2021-07-26 RX ADMIN — DEXAMETHASONE SODIUM PHOSPHATE 4 MG: 4 INJECTION, SOLUTION INTRAMUSCULAR; INTRAVENOUS at 09:40

## 2021-07-26 RX ADMIN — SODIUM CHLORIDE: 900 INJECTION, SOLUTION INTRAVENOUS at 11:05

## 2021-07-26 RX ADMIN — CELECOXIB 200 MG: 200 CAPSULE ORAL at 08:21

## 2021-07-26 RX ADMIN — PYRIDOSTIGMINE BROMIDE 180 MG: 180 TABLET, EXTENDED RELEASE ORAL at 20:45

## 2021-07-26 RX ADMIN — SODIUM CHLORIDE, POTASSIUM CHLORIDE, SODIUM LACTATE AND CALCIUM CHLORIDE 1000 ML: 600; 310; 30; 20 INJECTION, SOLUTION INTRAVENOUS at 08:12

## 2021-07-26 RX ADMIN — TOPIRAMATE 200 MG: 100 TABLET, FILM COATED ORAL at 19:23

## 2021-07-26 RX ADMIN — FENTANYL CITRATE 25 MCG: 50 INJECTION, SOLUTION INTRAMUSCULAR; INTRAVENOUS at 12:45

## 2021-07-26 RX ADMIN — DEXMEDETOMIDINE HYDROCHLORIDE 6 MCG: 100 INJECTION, SOLUTION, CONCENTRATE INTRAVENOUS at 10:16

## 2021-07-26 RX ADMIN — KETAMINE HYDROCHLORIDE 20 MG: 10 INJECTION, SOLUTION INTRAMUSCULAR; INTRAVENOUS at 10:00

## 2021-07-26 RX ADMIN — DEXMEDETOMIDINE HYDROCHLORIDE 6 MCG: 100 INJECTION, SOLUTION, CONCENTRATE INTRAVENOUS at 10:19

## 2021-07-26 RX ADMIN — GLYCOPYRROLATE 0.2 MG: 0.2 INJECTION, SOLUTION INTRAMUSCULAR; INTRAVENOUS at 08:53

## 2021-07-26 RX ADMIN — FENTANYL CITRATE 25 MCG: 50 INJECTION, SOLUTION INTRAMUSCULAR; INTRAVENOUS at 12:35

## 2021-07-26 RX ADMIN — VANCOMYCIN HYDROCHLORIDE 1500 MG: 10 INJECTION, POWDER, LYOPHILIZED, FOR SOLUTION INTRAVENOUS at 22:18

## 2021-07-26 RX ADMIN — SODIUM CHLORIDE 125 ML/HR: 9 INJECTION, SOLUTION INTRAVENOUS at 11:38

## 2021-07-26 RX ADMIN — INSULIN LISPRO 2 UNITS: 100 INJECTION, SOLUTION INTRAVENOUS; SUBCUTANEOUS at 12:00

## 2021-07-26 RX ADMIN — Medication 80 MCG: at 09:43

## 2021-07-26 RX ADMIN — SUCCINYLCHOLINE CHLORIDE 140 MG: 20 INJECTION, SOLUTION INTRAMUSCULAR; INTRAVENOUS at 09:33

## 2021-07-26 RX ADMIN — ACETAMINOPHEN 1000 MG: 500 TABLET ORAL at 08:21

## 2021-07-26 RX ADMIN — Medication 80 MCG: at 10:47

## 2021-07-26 RX ADMIN — FENTANYL CITRATE 50 MCG: 50 INJECTION, SOLUTION INTRAMUSCULAR; INTRAVENOUS at 08:40

## 2021-07-26 RX ADMIN — ONDANSETRON HYDROCHLORIDE 4 MG: 2 INJECTION, SOLUTION INTRAMUSCULAR; INTRAVENOUS at 08:53

## 2021-07-26 RX ADMIN — HYDROMORPHONE HYDROCHLORIDE 0.3 MG: 2 INJECTION, SOLUTION INTRAMUSCULAR; INTRAVENOUS; SUBCUTANEOUS at 10:02

## 2021-07-26 RX ADMIN — MIDAZOLAM HYDROCHLORIDE 2 MG: 1 INJECTION, SOLUTION INTRAMUSCULAR; INTRAVENOUS at 08:38

## 2021-07-26 RX ADMIN — SODIUM CHLORIDE 900 MG: 9 INJECTION, SOLUTION INTRAVENOUS at 10:00

## 2021-07-26 RX ADMIN — Medication 80 MCG: at 09:32

## 2021-07-26 RX ADMIN — DEXMEDETOMIDINE HYDROCHLORIDE 6 MCG: 100 INJECTION, SOLUTION, CONCENTRATE INTRAVENOUS at 10:30

## 2021-07-26 RX ADMIN — LIDOCAINE HYDROCHLORIDE 80 MG: 20 INJECTION, SOLUTION EPIDURAL; INFILTRATION; INTRACAUDAL; PERINEURAL at 09:32

## 2021-07-26 RX ADMIN — PAROXETINE 60 MG: 20 TABLET, FILM COATED ORAL at 22:09

## 2021-07-26 RX ADMIN — ROSUVASTATIN 40 MG: 40 TABLET, FILM COATED ORAL at 22:09

## 2021-07-26 RX ADMIN — MIDAZOLAM 1 MG: 1 INJECTION INTRAMUSCULAR; INTRAVENOUS at 09:32

## 2021-07-26 NOTE — ANESTHESIA PREPROCEDURE EVALUATION
Relevant Problems   No relevant active problems       Anesthetic History               Review of Systems / Medical History      Pulmonary        Sleep apnea           Neuro/Psych         Psychiatric history    Comments: MS  Chronic pain (on suboxide)  Myasthenia on mestinon took this am  Chronic inflammatory demyelinating polyneuropathy Cardiovascular    Hypertension                Comments:  Mod pulm htn   GI/Hepatic/Renal                Endo/Other    Diabetes  Hypothyroidism  Arthritis     Other Findings            Physical Exam    Airway  Mallampati: I  TM Distance: > 6 cm  Neck ROM: normal range of motion   Mouth opening: Normal     Cardiovascular    Rhythm: regular  Rate: normal         Dental  No notable dental hx       Pulmonary  Breath sounds clear to auscultation               Abdominal         Other Findings            Anesthetic Plan    ASA: 3  Anesthesia type: general      Post-op pain plan if not by surgeon: peripheral nerve block single    Induction: Intravenous  Anesthetic plan and risks discussed with: Patient

## 2021-07-26 NOTE — PROGRESS NOTES
Problem: Mobility Impaired (Adult and Pediatric)  Goal: *Acute Goals and Plan of Care (Insert Text)  Description: FUNCTIONAL STATUS PRIOR TO ADMISSION: Patient was modified independent using a rollator for functional mobility. Pt has MS and at baseline walks with tremulous movements. Pt has ramp at home. HOME SUPPORT PRIOR TO ADMISSION: The patient lived alone with son to provide assistance with cooking, cleaning, laundry and shopping. Pt was independent with dressing and bathing - mostly washed up at sink. Physical Therapy Goals  Initiated 7/26/2021    1. Patient will move from supine to sit and sit to supine , scoot up and down, and roll side to side in bed with modified independence within 4 days. 2. Patient will perform sit to stand with modified independence within 4 days. 3. Patient will ambulate with modified independence for > 150 feet with the least restrictive device within 4 days. 4. Patient will ascend/descend 4 stairs with one handrail(s) with minimal assistance/contact guard assist within 4 days. 5. Patient will perform home exercise program per protocol with supervision/set-up within 4 days. 6. Patient will demonstrate AROM 0-90 degrees in operative joint within 4 days. PHYSICAL THERAPY EVALUATION  Patient: Tariq Galvin (64 y.o. female)  Date: 7/26/2021  Primary Diagnosis: Osteoarthritis of right knee [M17.11]  Procedure(s) (LRB):  RIGHT TOTAL KNEE REPLACEMENT (Right) Day of Surgery   Precautions:   WBAT, Fall    ASSESSMENT  Based on the objective data described below, the patient presents POD# 0 Right TKR with right knee pain, generalized weakness/debility secondary to MS - used rollator at baseline, decreased activity tolerance, decreased AROM/strength and function of right leg, and impaired standing balance/gait with assistive device. Pt with history of bilateral JITENDRA.   Pt reports her son works out of home during the day and has been unable to arrange for additional assist at discharge. Pt states son can leave breakfast, lunch and snacks in cooler. Anticipate pt will need 2 - 3 additional PT sessions to become safe and ready for discharge from PT standpoint. Current Level of Function Impacting Discharge (mobility/balance): Min assist for bed mobility and sit to stand; CGA for amb with RW    Functional Outcome Measure: The patient scored 45/100 on the Barthel Index outcome measure. Other factors to consider for discharge: Pt will be alone during day at discharge     Patient will benefit from skilled therapy intervention to address the above noted impairments. PLAN :  Recommendations and Planned Interventions: bed mobility training, transfer training, gait training, therapeutic exercises, patient and family training/education, and therapeutic activities      Frequency/Duration: Patient will be followed by physical therapy:  twice daily to address goals. Recommendation for discharge: (in order for the patient to meet his/her long term goals)  Physical therapy at least 2 days/week in the home and increased support initially of family and friends     This discharge recommendation:  Has been made in collaboration with the attending provider and/or case management    IF patient discharges home will need the following DME: patient owns DME required for discharge         SUBJECTIVE:   Patient stated I like my rollator better.     OBJECTIVE DATA SUMMARY:   HISTORY:    Past Medical History:   Diagnosis Date    Arthritis     hip, hands    Benign cyst of left breast 3/21/2016    Blindness and low vision 2004    legally blind from [address]    Cervical spinal stenosis 12/2/2016    Moderate C6-7    Chronic pain     COVID-19 vaccine series completed     PFIZER 3/19/21, 4/9/21    Depression     Diabetes mellitus type 2, controlled ([address] 9/13/2016    Diet-controlled diabetes mellitus ([address] 1/5/2018    Endometriosis 6/25/2010    FH: breast cancer 6/25/2010    Chart states FH breast/ovary Ca, CAD,DM     History of CVA (cerebrovascular accident) 2018    HTN, goal below 140/90 2010    CURRENTLY NO MEDICATIONS (21)    Hypercholesterolemia     Hypothyroid 2010    Incontinence     Lumbosacral plexopathy 2010    Migraine     H/O MIGRAINE    MS [facility]     Myasthenia gravis ([facility]     Sleep apnea 2010    RESOLVED 2019    Stroke ([facility] 2018    RIGHT SIDE WEAKNESS    Ureteral calculus 2010    Vitamin D deficiency 3/17/2016     Past Surgical History:   Procedure Laterality Date    HX CARPAL TUNNEL RELEASE Left     HX  SECTION  1986    HX CYST INCISION AND DRAINAGE      HX GI      COLONOSCOPY    HX GYN      ovarian cyst    HX HEENT      removal of thyroglossal duct cyst    HX HIP REPLACEMENT Right 2017    anterior approach    HX KNEE ARTHROSCOPY Right     HX ORTHOPAEDIC  1997    right thumb tendon repair x 2    HX OTHER SURGICAL      Janee Cath x2    HX SMALL BOWEL RESECTION      HX THYROIDECTOMY  1974    HX VASCULAR ACCESS  2006    PORT -A- CATH X 2    MO ABDOMEN SURGERY PROC UNLISTED      bowel resection    MO BREAST SURGERY PROCEDURE UNLISTED      breast cyst-both breasts at different times    MO TOTAL HIP ARTHROPLASTY Left        Personal factors and/or comorbidities impacting plan of care: as above    Home Situation  Home Environment: Private residence  Wheelchair Ramp: Yes  One/Two Story Residence: One story  Living Alone: No  Support Systems: Child(jose m) (son works outside home during day )  Patient Expects to be Discharged to[de-identified] [place]  Current DME Used/Available at [facility]: Glucometer, [place], Walker, rolling, Walker, rollator, Wheelchair, power, Shower chair, Cane, straight    EXAMINATION/PRESENTATION/DECISION MAKING:   Critical Behavior:  Neurologic State: Alert  Orientation Level: Oriented X4  Cognition: Appropriate decision making, Appropriate safety awareness  Safety/Judgement: Awareness of environment, Good awareness of safety precautions  Skin:  right leg ace wrap in place, no drainage noted    Range Of Motion:  AROM: Generally decreased, functional                       Strength:    Strength: Generally decreased, functional                    Tone & Sensation:   Tone: Normal              Sensation: Intact               Coordination:  Coordination: Generally decreased, functional  Vision:    Legally blind, MS  Functional Mobility:  Bed Mobility:     Supine to Sit: Minimum assistance  Sit to Supine: Minimum assistance  Scooting: Stand-by assistance  Transfers:  Sit to Stand: Minimum assistance  Stand to Sit: Minimum assistance                       Balance:   Sitting: Intact; Without support  Standing: Impaired; With support  Standing - Static: Good;Constant support  Standing - Dynamic : Fair;Constant support  Ambulation/Gait Training:  Distance (ft): 40 Feet (ft)  Assistive Device: Walker, rolling;Gait belt  Ambulation - Level of Assistance: Contact guard assistance        Gait Abnormalities: Decreased step clearance;Trunk sway increased (tremor)  Right Side Weight Bearing: As tolerated  Left Side Weight Bearing: Full  Base of Support: Center of gravity altered;Shift to left  Stance: Right decreased  Speed/Gerda: Slow  Step Length: Right shortened;Left shortened  Swing Pattern: Right asymmetrical     Therapeutic Exercises:   Pt instructed and performed ankle pumps x 10 and demonstrated proper use of incentive spirometer; instructed to perform x 10 once hr when awake. Patient also instructed to perform as tolerated quad sets, hamstring sets and heel slides. Functional Measure:  Barthel Index:    Bathin  Bladder: 0  Bowels: 10  Groomin  Dressin  Feeding: 10  Mobility: 0  Stairs: 0  Toilet Use: 5  Transfer (Bed to Chair and Back): 10  Total: 45/100       The Barthel ADL Index: Guidelines  1.  The index should be used as a record of what a patient does, not as a record of what a patient could do. 2. The main aim is to establish degree of independence from any help, physical or verbal, however minor and for whatever reason. 3. The need for supervision renders the patient not independent. 4. A patient's performance should be established using the best available evidence. Asking the patient, friends/relatives and nurses are the usual sources, but direct observation and common sense are also important. However direct testing is not needed. 5. Usually the patient's performance over the preceding 24-48 hours is important, but occasionally longer periods will be relevant. 6. Middle categories imply that the patient supplies over 50 per cent of the effort. 7. Use of aids to be independent is allowed. Blaine Bailey., Barthel, D.W. (6755). Functional evaluation: the Barthel Index. [facility] (14)2. Tee Miller johana BETTY Hair, Gus Rolon., [facility] [place] Scot., [place], [facility] (1999). Measuring the change indisability after inpatient rehabilitation; comparison of the responsiveness of the Barthel Index and Functional Smith Measure. Journal of Neurology, Neurosurgery, and Psychiatry, 66(4), 210-260. Fermin Christensen, N.J.A, Sunday Adhikari,  DYLLAN.J.M, & Dacia Sr M.A. (2004.) Assessment of post-stroke quality of life in cost-effectiveness studies: The usefulness of the Barthel Index and the EuroQoL-5D.  Quality of Life Research, 15, 915-12           Physical Therapy Evaluation Charge Determination   History Examination Presentation Decision-Making   LOW Complexity : Zero comorbidities / personal factors that will impact the outcome / POC LOW Complexity : 1-2 Standardized tests and measures addressing body structure, function, activity limitation and / or participation in recreation  LOW Complexity : Stable, uncomplicated  LOW Complexity : FOTO score of       Based on the above components, the patient evaluation is determined to be of the following complexity level: LOW     Pain Rating:  Pre-treatment right knee 7/10; after amb 5/10    Activity Tolerance:   Good - POD# 0 0 no c/o dizziness - amb short distance in hallway, up to [facility]    After treatment patient left in no apparent distress:   Supine in bed, Call bell within reach, and Ice applied right knee    COMMUNICATION/EDUCATION:   The patients plan of care was discussed with: Registered nurse. Fall prevention education was provided and the patient/caregiver indicated understanding., Patient/family have participated as able in goal setting and plan of care. , and Patient/family agree to work toward stated goals and plan of care.     Thank you for this referral.  Catalino Service, PT   Time Calculation: 35 mins

## 2021-07-26 NOTE — PROGRESS NOTES
TRANSFER - IN REPORT:    Verbal report received from Sim Morse RN(name) on Hiwot GIBBONS Minor  being received from PACU(unit) for routine post - op      Report consisted of patients Situation, Background, Assessment and   Recommendations(SBAR). Information from the following report(s) SBAR, OR Summary, Procedure Summary, Intake/Output, MAR and Recent Results was reviewed with the receiving nurse. Opportunity for questions and clarification was provided. Assessment completed upon patients arrival to unit and care assumed.

## 2021-07-26 NOTE — PROGRESS NOTES
1428: TRANSFER - OUT REPORT:    Verbal report given to [address] [place] [address] Atul Rn(name) on Hiwot GIBBONS Minor  being transferred to [facility](unit) for routine post - op       Report consisted of patients Situation, Background, Assessment and   Recommendations(SBAR). Time Pre op antibiotic given:0852/1000  Anesthesia Stop time: 4572  Og Present on Transfer to floor:no  Order for Og on Chart:no  Discharge Prescriptions with Chart:no    Information from the following report(s) SBAR, Kardex, OR Summary, Intake/Output, MAR and Cardiac Rhythm SR was reviewed with the receiving nurse. Opportunity for questions and clarification was provided. Is the patient on 02? YES       L/Min 2         Is the patient on a monitor? NO    Is the nurse transporting with the patient? NO    Surgical Waiting Area notified of patient's transfer from PACU? YES      The following personal items collected during your admission accompanied patient upon transfer:   Dental Appliance: Dental Appliances: None  Vision: Visual Aid: Glasses  Hearing Aid:    Jewelry: Jewelry: With patient  Clothing: Clothing: With patient  Other Valuables:  Other Valuables: Eyeglasses, With patient, Neri Acosta  Valuables sent to safe:

## 2021-07-26 NOTE — ANESTHESIA POSTPROCEDURE EVALUATION
Post-Anesthesia Evaluation and Assessment    Patient: Trae Renae MRN: [id number]  SSN: [id number]    YOB: 1955  Age: 61 y.o. Sex: female      I have evaluated the patient and they are stable and ready for discharge from the PACU. Cardiovascular Function/Vital Signs  Visit Vitals  /63   Pulse 87   Temp (!) 35.7 °C (96.2 °F)   Resp 17   Ht 6' (1.829 m)   Wt 87 kg (191 lb 12.8 oz)   SpO2 98%   BMI 26.01 kg/m²       Patient is status post General anesthesia for Procedure(s):  RIGHT TOTAL KNEE REPLACEMENT. Nausea/Vomiting: None    Postoperative hydration reviewed and adequate. Pain:  Pain Scale 1: Visual (07/26/21 1134)  Pain Intensity 1: 0 (07/26/21 1134)   Managed    Neurological Status:   Neuro (WDL): Exceptions to WDL (07/26/21 1134)  Neuro  Neurologic State: Sleeping (07/26/21 1134)   At baseline    Mental Status, Level of Consciousness: Alert and  oriented to person, place, and time    Pulmonary Status:   O2 Device: Nasal cannula (07/26/21 1200)   Adequate oxygenation and airway patent    Complications related to anesthesia: None    Post-anesthesia assessment completed. No concerns    Signed By: Cee Crews MD     July 26, 2021              Procedure(s):  RIGHT TOTAL KNEE REPLACEMENT. general    <BSHSIANPOST>    INITIAL Post-op Vital signs:   Vitals Value Taken Time   /63 07/26/21 1215   Temp 35.7 °C (96.2 °F) 07/26/21 1134   Pulse 83 07/26/21 1228   Resp 22 07/26/21 1228   SpO2 99 % 07/26/21 1228   Vitals shown include unvalidated device data.

## 2021-07-26 NOTE — OP NOTES
2021  OPERATIVE REPORT      PREOPERATIVE DIAGNOSIS: Osteoarthritis, right knee. POSTOPERATIVE DIAGNOSIS: Osteoarthritis, right knee. OPERATIVE PROCEDURE: right total knee replacement. SURGEON: Brayden Thomas MD    ASSISTANT SURGEON: Eleazar Quinn, [facility]    ANESTHESIA: Spinal.    Antibiotic:Vancomycin    INDICATIONS: : A 61 y.o.  female  with end stage osteoarthritis to the right knee, not responsive to conservative management. COMPLICATIONS:None    PROCEDURE: The patient was taken to the operating room, underwent  placement of spinal anesthesia. The knee and leg were prepped and  draped in the usual fashion. The leg was exsanguinated with the Esmarch  bandage and tourniquet inflated to 350 mmHg. A longitudinal midline  incision made down through skin and subcutaneous tissue. A medial  parapatellar arthrotomy was performed, and extended proximally along the  medial border of the quadriceps tendon, distally along the medial border of  the insertion of the patella tendon. Medial release was performed. Retropatellar fat pad was sharply excised. The patella was subluxated  laterally, the knee was flexed to 90 degrees. Drill was used to enter the  intramedullary canal of the distal femur. The 5 degree intramedullary guide  was applied and the distal femoral cut was performed. The femur was sized  to a 8. A 8 cutting block was applied, and the anterior, posterior,  chamfer cuts were performed. The extramedullary tibial guide was applied, and the tibia was cut in  neutral alignment. The tibia was sized to a E. Knee was balanced to varus  and valgus stress. Flexion and extension gaps were equal. Trial reduction  was performed, using 10 mm insert. Excellent range of motion and stability  were noted. The patella was everted, and the patella was cut using freehand  technique. Patella was sized to a 35. Drill holes were placed, and patella  button applied.  The patella tracked normally. All trial components were  removed, and surfaces were prepared using drill and punch. All residual  meniscal tissue was sharply excised. Hemostasis was obtained using Bovie  apparatus. All components were cemented in place, taking care to remove all  excess cement. The knee was maintained in full extension while the cement  hardened. The tourniquet was let down after a total tourniquet time of 42  minutes. Hemostasis was obtained using the Bovie apparatus. The joint was  extensively irrigated with antibiotic solution. The arthrotomy was repaired  using #2 Vicryl in interrupted figure-of-eight fashion. Subcutaneous tissue  was approximated using 2-0 Vicryl in interrupted fashion. Skin edges were  approximated using stapling apparatus. Joint was infiltrated with 30 mL of  0.5% Marcaine with epinephrine. Bulky sterile dressing was applied, and the  patient was transferred to recovery room in stable condition. There were no  Complications. The Physician Assistant was critical during the procedure by assisting with positioning of the leg, retraction, hemostasis, and wound closure. ESTIMATED BLOOD LOSS: 100 cc.     SPECIMEN: Jodie Albrecht M.D.  7/26/2021  11:02 AM

## 2021-07-26 NOTE — BRIEF OP NOTE
Brief Postoperative Note    Patient: Tariq Galvin  YOB: 1955  MRN: [id number]    Date of Procedure: 7/26/2021     Pre-Op Diagnosis: DJD RIGHT KNEE    Post-Op Diagnosis: Same as preoperative diagnosis. Procedure(s):  RIGHT TOTAL KNEE REPLACEMENT    Surgeon(s):  Christofer Lewis MD    Surgical Assistant: Physician Assistant: Jose Manuel Palomo PA-C  Surg Asst-1: Nita Ochoa    Anesthesia: General     Estimated Blood Loss (mL): less than 998     Complications: None    Specimens: * No specimens in log *     Implants:   Implant Name Type Inv.  Item Serial No.  Lot No. LRB No. Used Action   COMPONENT PAT [id number] STD KNEE VIVACIT-E TORSTEN PERSONA - SN/A  COMPONENT PAT [id number] STD KNEE VIVACIT-E TORSTEN PERSONA N/A [employer] [id number] Right 1 Implanted   PSN MC VE ASF R 10MM 8-11 EF - SN/A  PSN MC VE ASF R 10MM 8-11 EF N/A [employer] [id number] Right 1 Implanted   TIB PRN NP STM 5 DEG SZ ER -- PERSONA - SN/A  TIB PRN NP STM 5 DEG SZ ER -- PERSONA N/A [employer] [id number] Right 1 Implanted   FEM CR TORSTEN CCR NRW SZ 8 RT -- PERSONA - SN/A  FEM CR TORSTEN CCR NRW SZ 8 RT -- PERSONA N/A [employer] [id number] Right 1 Implanted   CEMENT BNE 40GM FULL DOSE PMMA W/ GENT HI VISC RADPQ LNG - SN/A  CEMENT BNE 40GM FULL DOSE PMMA W/ GENT HI VISC RADPQ LNG N/A [facility] [employer] [id number] Right 2 Implanted       Drains: * No LDAs found *    Findings: DJD    Electronically Signed by Carol Sevilla MD on 7/26/2021 at 11:01 AM

## 2021-07-27 LAB
ANION GAP SERPL CALC-SCNC: 7 MMOL/L (ref 5–15)
BUN SERPL-MCNC: 17 MG/DL (ref 6–20)
BUN/CREAT SERPL: 20 (ref 12–20)
CALCIUM SERPL-MCNC: 8.2 MG/DL (ref 8.5–10.1)
CHLORIDE SERPL-SCNC: 117 MMOL/L (ref 97–108)
CO2 SERPL-SCNC: 19 MMOL/L (ref 21–32)
CREAT SERPL-MCNC: 0.83 MG/DL (ref 0.55–1.02)
GLUCOSE BLD STRIP.AUTO-MCNC: 108 MG/DL (ref 65–117)
GLUCOSE BLD STRIP.AUTO-MCNC: 130 MG/DL (ref 65–117)
GLUCOSE BLD STRIP.AUTO-MCNC: 143 MG/DL (ref 65–117)
GLUCOSE BLD STRIP.AUTO-MCNC: 240 MG/DL (ref 65–117)
GLUCOSE SERPL-MCNC: 87 MG/DL (ref 65–100)
HGB BLD-MCNC: 10.5 G/DL (ref 11.5–16)
POTASSIUM SERPL-SCNC: 4.4 MMOL/L (ref 3.5–5.1)
SERVICE CMNT-IMP: ABNORMAL
SERVICE CMNT-IMP: NORMAL
SODIUM SERPL-SCNC: 143 MMOL/L (ref 136–145)

## 2021-07-27 PROCEDURE — 80048 BASIC METABOLIC PNL TOTAL CA: CPT

## 2021-07-27 PROCEDURE — 97166 OT EVAL MOD COMPLEX 45 MIN: CPT

## 2021-07-27 PROCEDURE — 85018 HEMOGLOBIN: CPT

## 2021-07-27 PROCEDURE — 97116 GAIT TRAINING THERAPY: CPT

## 2021-07-27 PROCEDURE — 36415 COLL VENOUS BLD VENIPUNCTURE: CPT

## 2021-07-27 PROCEDURE — 97535 SELF CARE MNGMENT TRAINING: CPT

## 2021-07-27 PROCEDURE — 82962 GLUCOSE BLOOD TEST: CPT

## 2021-07-27 PROCEDURE — 97110 THERAPEUTIC EXERCISES: CPT

## 2021-07-27 PROCEDURE — 74011250637 HC RX REV CODE- 250/637: Performed by: PHYSICIAN ASSISTANT

## 2021-07-27 PROCEDURE — 97530 THERAPEUTIC ACTIVITIES: CPT

## 2021-07-27 PROCEDURE — 74011636637 HC RX REV CODE- 636/637: Performed by: PHYSICIAN ASSISTANT

## 2021-07-27 RX ORDER — METFORMIN HYDROCHLORIDE 500 MG/1
1500 TABLET, EXTENDED RELEASE ORAL
Status: DISCONTINUED | OUTPATIENT
Start: 2021-07-27 | End: 2021-07-28 | Stop reason: HOSPADM

## 2021-07-27 RX ADMIN — ACETAMINOPHEN 650 MG: 325 TABLET ORAL at 03:36

## 2021-07-27 RX ADMIN — ACETAMINOPHEN 650 MG: 325 TABLET ORAL at 18:10

## 2021-07-27 RX ADMIN — LEVOTHYROXINE SODIUM 137 MCG: 0.03 TABLET ORAL at 06:47

## 2021-07-27 RX ADMIN — PYRIDOSTIGMINE BROMIDE 180 MG: 180 TABLET, EXTENDED RELEASE ORAL at 09:05

## 2021-07-27 RX ADMIN — PYRIDOSTIGMINE BROMIDE 180 MG: 180 TABLET, EXTENDED RELEASE ORAL at 18:11

## 2021-07-27 RX ADMIN — DOCUSATE SODIUM 50 MG AND SENNOSIDES 8.6 MG 1 TABLET: 8.6; 5 TABLET, FILM COATED ORAL at 09:04

## 2021-07-27 RX ADMIN — TOPIRAMATE 200 MG: 100 TABLET, FILM COATED ORAL at 09:04

## 2021-07-27 RX ADMIN — ROSUVASTATIN 40 MG: 40 TABLET, FILM COATED ORAL at 22:17

## 2021-07-27 RX ADMIN — ASPIRIN 81 MG: 81 TABLET, COATED ORAL at 09:04

## 2021-07-27 RX ADMIN — PREGABALIN 200 MG: 100 CAPSULE ORAL at 09:04

## 2021-07-27 RX ADMIN — METFORMIN HYDROCHLORIDE 1500 MG: 500 TABLET, EXTENDED RELEASE ORAL at 22:18

## 2021-07-27 RX ADMIN — TOPIRAMATE 200 MG: 100 TABLET, FILM COATED ORAL at 18:11

## 2021-07-27 RX ADMIN — PREGABALIN 200 MG: 100 CAPSULE ORAL at 18:11

## 2021-07-27 RX ADMIN — INSULIN LISPRO 2 UNITS: 100 INJECTION, SOLUTION INTRAVENOUS; SUBCUTANEOUS at 12:55

## 2021-07-27 RX ADMIN — APIXABAN 2.5 MG: 2.5 TABLET, FILM COATED ORAL at 03:36

## 2021-07-27 RX ADMIN — ACETAMINOPHEN 650 MG: 325 TABLET ORAL at 09:04

## 2021-07-27 RX ADMIN — POLYETHYLENE GLYCOL 3350 17 G: 17 POWDER, FOR SOLUTION ORAL at 09:04

## 2021-07-27 RX ADMIN — PAROXETINE 60 MG: 20 TABLET, FILM COATED ORAL at 22:28

## 2021-07-27 RX ADMIN — APIXABAN 2.5 MG: 2.5 TABLET, FILM COATED ORAL at 22:18

## 2021-07-27 RX ADMIN — DOCUSATE SODIUM 50 MG AND SENNOSIDES 8.6 MG 1 TABLET: 8.6; 5 TABLET, FILM COATED ORAL at 18:11

## 2021-07-27 RX ADMIN — INSULIN LISPRO 2 UNITS: 100 INJECTION, SOLUTION INTRAVENOUS; SUBCUTANEOUS at 22:17

## 2021-07-27 NOTE — PROGRESS NOTES
Problem: Pressure Injury - Risk of  Goal: *Prevention of pressure injury  Description: Document Esteban Scale and appropriate interventions in the flowsheet. Outcome: Progressing Towards Goal  Note: Pressure Injury Interventions:  Sensory Interventions: Assess changes in LOC, Assess need for specialty bed, Discuss PT/OT consult with provider, Maintain/enhance activity level, Minimize linen layers, Pad between skin to skin    Moisture Interventions: Apply protective barrier, creams and emollients, Assess need for specialty bed, Internal/External fecal devices, Maintain skin hydration (lotion/cream), Limit adult briefs    Activity Interventions: Increase time out of bed, Pressure redistribution bed/mattress(bed type), PT/OT evaluation    Mobility Interventions: HOB 30 degrees or less, Pressure redistribution bed/mattress (bed type), PT/OT evaluation    Nutrition Interventions: Document food/fluid/supplement intake, Offer support with meals,snacks and hydration    Friction and Shear Interventions: HOB 30 degrees or less, Lift sheet, Minimize layers, Apply protective barrier, creams and emollients, Feet elevated on foot rest                Problem: Falls - Risk of  Goal: *Absence of Falls  Description: Document Willy Fall Risk and appropriate interventions in the flowsheet.   Outcome: Progressing Towards Goal  Note: Fall Risk Interventions:  Mobility Interventions: Communicate number of staff needed for ambulation/transfer, PT Consult for mobility concerns, PT Consult for assist device competence, Patient to call before getting OOB         Medication Interventions: Evaluate medications/consider consulting pharmacy, Patient to call before getting OOB, Teach patient to arise slowly    Elimination Interventions: Call light in reach, Patient to call for help with toileting needs, Stay With Me (per policy), Toilet paper/wipes in reach    History of Falls Interventions: Evaluate medications/consider consulting pharmacy, Investigate reason for fall, Door open when patient unattended

## 2021-07-27 NOTE — PROGRESS NOTES
Bedside and Verbal shift change report given to Rusty Ghosh (oncoming nurse) by Orion Lagunas (offgoing nurse). Report included the following information SBAR, Kardex, Intake/Output, MAR and Recent Results.

## 2021-07-27 NOTE — PROGRESS NOTES
Problem: Mobility Impaired (Adult and Pediatric)  Goal: *Acute Goals and Plan of Care (Insert Text)  Description: FUNCTIONAL STATUS PRIOR TO ADMISSION: Patient was modified independent using a rollator for functional mobility. Pt has MS and at baseline walks with tremulous movements. Pt has ramp at home. HOME SUPPORT PRIOR TO ADMISSION: The patient lived alone with son to provide assistance with cooking, cleaning, laundry and shopping. Pt was independent with dressing and bathing - mostly washed up at sink. Physical Therapy Goals  Initiated 7/26/2021    1. Patient will move from supine to sit and sit to supine , scoot up and down, and roll side to side in bed with modified independence within 4 days. 2. Patient will perform sit to stand with modified independence within 4 days. 3. Patient will ambulate with modified independence for > 150 feet with the least restrictive device within 4 days. 4. Patient will ascend/descend 4 stairs with one handrail(s) with minimal assistance/contact guard assist within 4 days. 5. Patient will perform home exercise program per protocol with supervision/set-up within 4 days. 6. Patient will demonstrate AROM 0-90 degrees in operative joint within 4 days. 7/27/2021 1209 by Pradeep Sanchez PT  Outcome: Progressing Towards Goal   PHYSICAL THERAPY TREATMENT  Patient: Nathanael Jolly (64 y.o. female)  Date: 7/27/2021  Diagnosis: Osteoarthritis of right knee [M17.11] Osteoarthritis of right knee  Procedure(s) (LRB):  RIGHT TOTAL KNEE REPLACEMENT (Right) 1 Day Post-Op  Precautions: WBAT, Fall  Chart, physical therapy assessment, plan of care and goals were reviewed. ASSESSMENT  Patient continues with skilled PT services and is progressing towards goals. Pt reports not taking any pain meds other than Tylenol - since yesterday - pain at 6/10 when attempting knee flexion stretch in sitting.   Instructed patient in importance of aggressive knee flexion and extension ROM exercises and that pt may benefit from taking pain meds if needed to complete exercise. Pt also reports increased \"shaking\" in standing from [address] 89 later in pm, however patient able to amb safely with RW. Current Level of Function Impacting Discharge (mobility/balance): Standby assist to CGA for transfers and amb    Other factors to consider for discharge:          PLAN :  Patient continues to benefit from skilled intervention to address the above impairments. Continue treatment per established plan of care. to address goals. Recommendation for discharge: (in order for the patient to meet his/her long term goals)  Physical therapy at least 2 days/week in the home     This discharge recommendation:  Has been made in collaboration with the attending provider and/or case management    IF patient discharges home will need the following DME: patient owns DME required for discharge       SUBJECTIVE:   Patient stated I try to get all my chores done in am - because of the MS I usually get shaky in the late afternoon.     OBJECTIVE DATA SUMMARY:   Critical Behavior:  Neurologic State: Alert  Orientation Level: Oriented X4  Cognition: Appropriate decision making  Safety/Judgement: Awareness of environment, Good awareness of safety precautions    Range of Motion:  AROM: Generally decreased, functional   Right knee 70 flexion; (-20) extension                      Functional Mobility Training:  Bed Mobility:     Supine to Sit: Stand-by assistance  Sit to Supine: Stand-by assistance  Scooting: Stand-by assistance        Transfers:  Sit to Stand: Contact guard assistance  Stand to Sit: Contact guard assistance                             Balance:  Sitting: Intact; Without support  Standing: Impaired; With support  Standing - Static: Good;Constant support  Standing - Dynamic : Good;Constant support  Ambulation/Gait Training:  Distance (ft): 140 Feet (ft)  Assistive Device: Walker, rolling;Gait belt  Ambulation - Level of Assistance: Contact guard assistance        Gait Abnormalities: Decreased step clearance (shaking)  Right Side Weight Bearing: As tolerated  Left Side Weight Bearing: Full  Base of Support: Center of gravity altered;Shift to left  Stance: Right decreased  Speed/Gerda: Slow  Step Length: Right shortened;Left shortened  Swing Pattern: Right asymmetrical           Therapeutic Exercises:     EXERCISE   Sets   Reps   Active Active Assist   Passive Self ROM   Comments   Ankle Pumps  5 [x]                                        []                                        []                                        []                                           Knee Extension Stretch     []                                          []                                          [x]                                          []                                        Pillow under calf to provide extension stretch   Heel Slides  3 [x]                                        []                                        []                                        []                                           Long Arc Quads  5 [x]                                        []                                        []                                        []                                           Knee Flexion Stretch  3 [x]                                        []                                        []                                        []                                        sitting   Straight Leg Raises   []                                        []                                        []                                        []                                               Pain Rating:  Right knee 6/10    Activity Tolerance:   Fair - per pt her baseline is increased \"shaking\" later in pm    After treatment patient left in no apparent distress:   Call bell within reach and Recliner with legs elevated, ice applied    COMMUNICATION/COLLABORATION:   The patients plan of care was discussed with: Registered nurse.      Keshawn Engle, PT   Time Calculation: 30 mins

## 2021-07-27 NOTE — PROGRESS NOTES
Ortho Daily Progress Note      Patient: Eulogio Phillips                   MRN: [id number]  Sex: female  YOB: 1955           Age: 61 y.o.     1 Day Post-Op     Procedure(s):  RIGHT TOTAL KNEE REPLACEMENT    Subjective:    Pain:  pt in mild pain, last opioid last evening  Noting numbness in right anterior both medially and laterally. No complaints overnight. Pt ambulated with PT yesterday and anticipated d/c tomorrow after a few more sessions d/t patient's MS/myasthenia gravis. Patient lives with son who works out of the house. Pt. tolerating PO diet, no nausea. Pain meds tolerated. Pt. voiding appropriately. Vitals stable, HR usually in the 60s    Visit Vitals  /63   Pulse (!) 58   Temp 98.7 °F (37.1 °C)   Resp 16   Ht 6' (1.829 m)   Wt 87 kg (191 lb 12.8 oz)   SpO2 95%   BMI 26.01 kg/m²        Recent Labs     07/27/21  0342 07/12/21  1604 07/12/21  1604 05/13/21  1050 02/08/21  1622 02/08/21  1622 11/11/20  1000 11/11/20  1000   HGB 10.5*   < > 13.6  --    < > 14.0  --   --    CREA 0.83   < > 0.97 0.87   < > 0.80   < > 0.90   BUN 17  --  12 9  --  11  --  15    < > = values in this interval not displayed. Physical Exam:    GENERAL: alert, no acute distress  NEURO:  Sensation to light touch diminished on right anterior shin both medially and laterally. Extends from around the knee to just proximal to the malleoli. VASCULAR: DP/TP pulses 2+. Capillary refill <2 seconds. Extremity warm. mild edema of RLE  DRESSING:  ABD pad in place, mild breakthru drainage. Was changed this am.  MSK:  4/5 plantar and dorsiflexion bilaterally. Moving lower extremities well. DVT EXAM: No evidence of DVT seen on physical exam.  No posterior calf tenderness, tightness, erythema, palpable cords, or pain upon active dorsi/plantar flexion of the foot.       Plan:    DVT ppx: Apixaban 2.5mg BID x14 days upon discharge  Activity: WBAT with PT-mobilization  Pain Control: no current joint or muscle symptoms, essentially pain-free  Dressing: Change dressing daily with ABD pads. Hgb:  Acute blood loss anemia: expected post-op finding, pt stable, repeat labs in AM  DM: Well controlled with diet. Will restart patient's metformin for this evening. Nursing may hold if patient's oral intake minimal today. Discharge: Hopeful d/c tomorrow after further PT sessions.      Genoveva Liriano PA-C  7/27/2021   10:29 AM

## 2021-07-27 NOTE — PROGRESS NOTES
Problem: Mobility Impaired (Adult and Pediatric)  Goal: *Acute Goals and Plan of Care (Insert Text)  Description: FUNCTIONAL STATUS PRIOR TO ADMISSION: Patient was modified independent using a rollator for functional mobility. Pt has MS and at baseline walks with tremulous movements. Pt has ramp at home. HOME SUPPORT PRIOR TO ADMISSION: The patient lived alone with son to provide assistance with cooking, cleaning, laundry and shopping. Pt was independent with dressing and bathing - mostly washed up at sink. Physical Therapy Goals  Initiated 7/26/2021    1. Patient will move from supine to sit and sit to supine , scoot up and down, and roll side to side in bed with modified independence within 4 days. 2. Patient will perform sit to stand with modified independence within 4 days. 3. Patient will ambulate with modified independence for > 150 feet with the least restrictive device within 4 days. 4. Patient will ascend/descend 4 stairs with one handrail(s) with minimal assistance/contact guard assist within 4 days. 5. Patient will perform home exercise program per protocol with supervision/set-up within 4 days. 6. Patient will demonstrate AROM 0-90 degrees in operative joint within 4 days. Outcome: Progressing Towards Goal   PHYSICAL THERAPY TREATMENT  Patient: Raymundo Friday Minor (64 y.o. female)  Date: 7/27/2021  Diagnosis: Osteoarthritis of right knee [M17.11] Osteoarthritis of right knee  Procedure(s) (LRB):  RIGHT TOTAL KNEE REPLACEMENT (Right) 1 Day Post-Op  Precautions: WBAT, Fall  Chart, physical therapy assessment, plan of care and goals were reviewed. ASSESSMENT  Patient continues with skilled PT services and is progressing towards goals. Pt progressing with amb distance and able to perform stairs with use of bilateral rails. Pt fatigues easily and needs to increase endurance to be safe to return home. Pt with hx of generalized weakness, decreased activity tolerance secondary to MS. Current Level of Function Impacting Discharge (mobility/balance): Needs to advance TKR exercise, CGA for amb/transfers with RW    Other factors to consider for discharge: Son works outside of home during the day         PLAN :  Patient continues to benefit from skilled intervention to address the above impairments. Continue treatment per established plan of care. to address goals. Recommendation for discharge: (in order for the patient to meet his/her long term goals)  Physical therapy at least 2 days/week in the home     This discharge recommendation:  Has been made in collaboration with the attending provider and/or case management    IF patient discharges home will need the following DME: patient owns DME required for discharge       SUBJECTIVE:   Patient stated Diana Santoyo I ride back in the wheelchair - that wore me out.     OBJECTIVE DATA SUMMARY:   Critical Behavior:  Neurologic State: Alert  Orientation Level: Oriented X4  Cognition: Appropriate decision making, Appropriate for age attention/concentration, Appropriate safety awareness, Follows commands  Safety/Judgement: Awareness of environment, Good awareness of safety precautions    Range of Motion:   Right knee flexion 65 seated                         Functional Mobility Training:  Bed Mobility:        Sit to Supine: Stand-by assistance  Scooting: Stand-by assistance (scooting forward in sitting)        Transfers:  Sit to Stand: Contact guard assistance  Stand to Sit: Contact guard assistance                             Balance:  Sitting: Intact; Without support  Standing: Impaired; With support  Standing - Static: Good;Constant support  Standing - Dynamic : Good;Constant support  Ambulation/Gait Training:  Distance (ft): 120 Feet (ft)  Assistive Device: Walker, rolling;Gait belt  Ambulation - Level of Assistance: Stand-by assistance        Gait Abnormalities: Decreased step clearance (tremor)  Right Side Weight Bearing: As tolerated  Left Side Weight Bearing: Full  Base of Support: Center of gravity altered;Shift to left  Stance: Right decreased  Speed/Gerda: Slow  Step Length: Right shortened;Left shortened  Swing Pattern: Right asymmetrical        Stairs:  Number of Stairs Trained: 4  Stairs - Level of Assistance: Contact guard assistance   Rail Use: Both    Therapeutic Exercises:     EXERCISE   Sets   Reps   Active Active Assist   Passive Self ROM   Comments   Ankle Pumps   []                                        []                                        []                                        []                                           Quad Sets   []                                        []                                        []                                        []                                           Hamstring Sets   []                                        []                                        []                                        []                                           Short Arc Quads   []                                        []                                        []                                        []                                           Knee Extension Stretch     []                                          []                                          []                                          []                                           Heel Slides   []                                        []                                        []                                        []                                           Long Arc Quads  3 [x]                                        []                                        []                                        []                                           Knee Flexion Stretch  3 [x]                                        []                                        []                                        []                                        seated Straight Leg Raises   []                                        []                                        []                                        []                                               Pain Rating:  Pre-treatment 3/10; following amb and stairs 4/10    Activity Tolerance:   Fair and requires rest breaks    After treatment patient left in no apparent distress:   Supine in bed and Call bell within reach    COMMUNICATION/COLLABORATION:   The patients plan of care was discussed with: Registered nurse.      Catalino Service, PT   Time Calculation: 28 mins

## 2021-07-27 NOTE — PROGRESS NOTES
Primary Nurse Gregory Hammond and Jaun Younger RN performed a dual skin assessment on this patient No impairment noted  Esteban score is 18      Bedside and Verbal shift change report given to Jaun Younger RN (oncoming nurse) by Jefferson Garcia RN (offgoing nurse). Report included the following information SBAR, OR Summary, Procedure Summary, Intake/Output, MAR and Recent Results.

## 2021-07-27 NOTE — PROGRESS NOTES
Bedside and Verbal shift change report given to Shannon Rock RN (oncoming nurse) by Sneha Hoffman RN (offgoing nurse). Report included the following information SBAR and OR Summary.

## 2021-07-27 NOTE — PROGRESS NOTES
Problem: Self Care Deficits Care Plan (Adult)  Goal: *Therapy Goal (Edit Goal, Insert Text)  Description: FUNCTIONAL STATUS PRIOR TO ADMISSION: Patient was modified independent with basic self care tasks, though patient reports increased need for assist with showers recently due to fear of falling. Patient was using a walker or rollator for functional mobility. HOME SUPPORT: The patient lived with son who is able to assist though he works during the day. Occupational Therapy Goals  Initiated 7/27/2021  1. Patient will perform lower body ADLs with supervision/set-up using AE prn within 4 day(s). 2.  Patient will perform grooming while standing 2 mins without fatigue or LOB with supervision/set-up within 4 day(s). 3.  Patient will perform all aspects of toileting with supervision/set-up within 4 day(s). 4.  Patient will participate in upper extremity therapeutic exercise/activities with supervision/set-up for 10 minutes within 4 day(s). 5.  Patient will utilize energy conservation techniques during functional activities without cues within 4 day(s). Outcome: Not Met   OCCUPATIONAL THERAPY EVALUATION  Patient: Lucy Galvin (64 y.o. female)  Date: 7/27/2021  Primary Diagnosis: Osteoarthritis of right knee [M17.11]  Procedure(s) (LRB):  RIGHT TOTAL KNEE REPLACEMENT (Right) 1 Day Post-Op   Precautions:   WBAT, Fall    ASSESSMENT  Based on the objective data described below, the patient presents with decline in ADL and mobility performance due to R knee pain, decreased ROM, and weakness, impaired standing tolerance, impaired standing balance. Additional impact related comorbid diagnoses including MS and myasthenia gravis. Patient lives with supportive son, and anticipate safe return home.  Patient will benefit from home health services to ensure safe transition and address further home modification needs    Current Level of Function Impacting Discharge (ADLs/self-care): up to moderate assistance for LB ADL     Functional Outcome Measure: The patient scored Total: 50/100 on the Barthel Index outcome measure which is indicative of 50% impaired ability to care for basic self needs/dependency on others; inferred 100% dependency on others for instrumental ADLs. Other factors to consider for discharge: patient will be home alone during the day     Patient will benefit from skilled therapy intervention to address the above noted impairments. PLAN :  Recommendations and Planned Interventions: self care training, functional mobility training, therapeutic exercise, balance training, therapeutic activities, endurance activities, patient education, home safety training, and family training/education    Frequency/Duration: Patient will be followed by occupational therapy 5 times a week to address goals.     Recommendation for discharge: (in order for the patient to meet his/her long term goals)  Occupational therapy at least 2 days/week in the home     This discharge recommendation:  Has been made in collaboration with the attending provider and/or case management    IF patient discharges home will need the following DME: patient owns DME required for discharge       SUBJECTIVE:   Patient pleasant and cooperative     OBJECTIVE DATA SUMMARY:   HISTORY:   Past Medical History:   Diagnosis Date    Arthritis     hip, hands    Benign cyst of left breast 3/21/2016    Blindness and low vision 2004    legally blind from [facility] [address] Cervical spinal stenosis 12/2/2016    Moderate C6-7    Chronic pain     COVID-19 vaccine series completed     PFIZER 3/19/21, 4/9/21    Depression     Diabetes mellitus type 2, controlled ([facility] [address] 9/13/2016    Diet-controlled diabetes mellitus ([facility] 1/5/2018    Endometriosis 6/25/2010    FH: breast cancer 6/25/2010    Chart states FH breast/ovary Ca, CAD,DM     History of CVA (cerebrovascular accident) 6/5/2018    HTN, goal below 140/90 6/25/2010    CURRENTLY NO MEDICATIONS (7-12-21)    Hypercholesterolemia     Hypothyroid 2010    Incontinence     Lumbosacral plexopathy 2010    Migraine     H/O MIGRAINE    MS [facility]multiple sclerosis) [facility]     Myasthenia gravis ([facility]     Sleep apnea 2010    RESOLVED 2019    Stroke ([facility] 2018    RIGHT SIDE WEAKNESS    Ureteral calculus 2010    Vitamin D deficiency 3/17/2016     Past Surgical History:   Procedure Laterality Date    HX CARPAL TUNNEL RELEASE Left     HX  SECTION  1986    HX CYST INCISION AND DRAINAGE      HX GI      COLONOSCOPY    HX GYN      ovarian cyst    HX HEENT      removal of thyroglossal duct cyst    HX HIP REPLACEMENT Right 2017    anterior approach    HX KNEE ARTHROSCOPY Right     HX ORTHOPAEDIC  1997    right thumb tendon repair x 2    HX OTHER SURGICAL      Janee Cath x2    HX SMALL BOWEL RESECTION      HX THYROIDECTOMY  1974    HX VASCULAR ACCESS  2006    PORT -A- CATH X 2    FL ABDOMEN SURGERY PROC UNLISTED      bowel resection    FL BREAST SURGERY PROCEDURE UNLISTED      breast cyst-both breasts at different times    FL TOTAL HIP ARTHROPLASTY Left 2019       Expanded or extensive additional review of patient history:     Home Situation  Home Environment: Private residence  Wheelchair Ramp: Yes  One/Two Story Residence: One story  Living Alone: No  Support Systems: Child(jose m) (son works outside home during day )  Patient Expects to be Discharged to[de-identified] [employer]  Current DME Used/Available at [facility]: Ksenia [address], Robert, Walker, rolling, Ruba Lucio, rollator, Wheelchair, power, Shower chair, Cane, straight  Tub or Shower Type: Shower    Hand dominance: Right    EXAMINATION OF PERFORMANCE DEFICITS:  Cognitive/Behavioral Status:  Neurologic State: Alert  Orientation Level: Oriented X4  Cognition: Appropriate decision making           Vision/Perceptual:                                Corrective Lenses: Glasses    Range of Motion:    AROM: Generally decreased, functional              Strength:    Strength: Generally decreased, functional                Coordination:  Coordination: Grossly decreased, non-functional  Fine Motor Skills-Upper: Left Intact; Right Intact    Gross Motor Skills-Upper: Left Intact; Right Intact    Tone & Sensation:    Tone: Normal  Sensation: Intact                      Balance:  Sitting: Intact; Without support  Standing: Impaired; With support  Standing - Static: Good;Constant support  Standing - Dynamic : Good;Constant support    Functional Mobility and Transfers for ADLs:  Bed Mobility:  Sit to Supine: Stand-by assistance  Scooting: Stand-by assistance (scooting forward in sitting)    Transfers:  Sit to Stand: Contact guard assistance  Stand to Sit: Contact guard assistance    ADL Assessment:  Feeding: Independent    Oral Facial Hygiene/Grooming: Stand-by assistance    Bathing: Minimum assistance    Upper Body Dressing: Setup    Lower Body Dressing: Minimum assistance    Toileting: Minimum assistance                ADL Intervention and task modifications:   Patient educated on home safety and verbal education on use of AE for LB ADL              Functional Measure:  Barthel Index:    Bathin  Bladder: 5  Bowels: 10  Groomin  Dressin  Feeding: 10  Mobility: 0  Stairs: 0  Toilet Use: 5  Transfer (Bed to Chair and Back): 10  Total: 50/100        The Barthel ADL Index: Guidelines  1. The index should be used as a record of what a patient does, not as a record of what a patient could do. 2. The main aim is to establish degree of independence from any help, physical or verbal, however minor and for whatever reason. 3. The need for supervision renders the patient not independent. 4. A patient's performance should be established using the best available evidence. Asking the patient, friends/relatives and nurses are the usual sources, but direct observation and common sense are also important. However direct testing is not needed.   5. Usually the patient's performance over the preceding 24-48 hours is important, but occasionally longer periods will be relevant. 6. Middle categories imply that the patient supplies over 50 per cent of the effort. 7. Use of aids to be independent is allowed. Adela Chatterjee., Barthel, D.W. (4228). Functional evaluation: the Barthel Index. [facility] (14)2. BETTY Claros, Caleb Deluca., Viv Novoa., [place], [address] [place] [address] (1999). Measuring the change indisability after inpatient rehabilitation; comparison of the responsiveness of the Barthel Index and Functional [place] Measure. Journal of Neurology, Neurosurgery, and Psychiatry, 66(4), 061-140. [place] Sim, N.J.A, CHEN Estevez, & Wing Will M.A. (2004.) Assessment of post-stroke quality of life in cost-effectiveness studies: The usefulness of the Barthel Index and the EuroQoL-5D. Quality of Life Research, 15, 826-11         Occupational Therapy Evaluation Charge Determination   History Examination Decision-Making   MEDIUM Complexity : Expanded review of history including physical, cognitive and psychosocial  history  MEDIUM Complexity : 3-5 performance deficits relating to physical, cognitive , or psychosocial skils that result in activity limitations and / or participation restrictions MEDIUM Complexity : Patient may present with comorbidities that affect occupational performnce.  Miniml to moderate modification of tasks or assistance (eg, physical or verbal ) with assesment(s) is necessary to enable patient to complete evaluation       Based on the above components, the patient evaluation is determined to be of the following complexity level: MEDIUM  Pain Rating:  R knee- 4/10    Activity Tolerance:   Fair    After treatment patient left in no apparent distress:    Supine in bed, Call bell within reach, and Side rails x 3    COMMUNICATION/EDUCATION:   The patients plan of care was discussed with: Physical therapist.     Home safety education was provided and the patient/caregiver indicated understanding. and Patient/family have participated as able in goal setting and plan of care. This patients plan of care is appropriate for delegation to ZACK.     Thank you for this referral.  Rachael Shrestha OT  Time Calculation: 26 mins

## 2021-07-27 NOTE — PROGRESS NOTES
STANLEY: The patient plans to discharge home with [facility] home health and son, Memo Monday Kamar ([phone]) to transport when stable for discharge. RUR: N/A    1. The patient is from home and her son, Memo Monday lives with her. 2. Orthopedics, PT/OT following. 3. The patient plans to discharge home with home health and son to transport. Observation notice provided in writing to patient and/or caregiver as well as verbal explanation of the policy. Patients who are in outpatient status also receive the Observation notice. Patient has received notice and or patient representative has received via secure email, fax, or certified mail based on patient representative's preference. Reason for Admission:  Right Total Knee                     RUR Score:     N/A                Plan for utilizing home health: The Plan for Transition of Care is related to the following treatment goals: Home Health    The Patient and/or patient representative Hiwot Galvin was provided with a choice of provider and agrees   with the discharge plan. [x] Yes [] No    Freedom of choice list was provided with basic dialogue that supports the patient's individualized plan of care/goals, treatment preferences and shares the quality data associated with the providers. [x] Yes [] No       PCP: First and Last name:  Sergo Domínguez MD, phone: [phone]     Name of Practice:    Are you a current patient: Yes/No: Yes   Approximate date of last visit: July, 2021   Can you participate in a virtual visit with your PCP: Yes                    Current Advanced Directive/Advance Care Plan: Prior      Healthcare Decision Maker:   Click here to complete [address] including selection of the Healthcare Decision Maker Relationship (ie \"Primary\")             Primary Decision Maker: Wilmer Galvin III - [phone]                  Transition of Care Plan:      CM met with the patient in room 557.  The patient is alert and oriented x4. Confirmed demographics. The patient is independent at home with ADL's/IADL's, own her own walker and uses [facility] pharmacy on [facility] and [address] [place] [address]. The patient's son, Warren Bui lives with her and will be helping her at home and will transport her home with stable for discharge. CM following for discharge needs.     Alexia Zurita RN/CRM

## 2021-07-28 VITALS
HEART RATE: 78 BPM | RESPIRATION RATE: 16 BRPM | SYSTOLIC BLOOD PRESSURE: 151 MMHG | DIASTOLIC BLOOD PRESSURE: 72 MMHG | BODY MASS INDEX: 25.98 KG/M2 | TEMPERATURE: 98.2 F | OXYGEN SATURATION: 98 % | HEIGHT: 72 IN | WEIGHT: 191.8 LBS

## 2021-07-28 LAB
ANION GAP SERPL CALC-SCNC: 3 MMOL/L (ref 5–15)
BUN SERPL-MCNC: 16 MG/DL (ref 6–20)
BUN/CREAT SERPL: 20 (ref 12–20)
CALCIUM SERPL-MCNC: 8.7 MG/DL (ref 8.5–10.1)
CHLORIDE SERPL-SCNC: 118 MMOL/L (ref 97–108)
CO2 SERPL-SCNC: 21 MMOL/L (ref 21–32)
CREAT SERPL-MCNC: 0.82 MG/DL (ref 0.55–1.02)
GLUCOSE BLD STRIP.AUTO-MCNC: 136 MG/DL (ref 65–117)
GLUCOSE BLD STRIP.AUTO-MCNC: 218 MG/DL (ref 65–117)
GLUCOSE SERPL-MCNC: 99 MG/DL (ref 65–100)
HGB BLD-MCNC: 11 G/DL (ref 11.5–16)
POTASSIUM SERPL-SCNC: 4.3 MMOL/L (ref 3.5–5.1)
SERVICE CMNT-IMP: ABNORMAL
SERVICE CMNT-IMP: ABNORMAL
SODIUM SERPL-SCNC: 142 MMOL/L (ref 136–145)

## 2021-07-28 PROCEDURE — 36415 COLL VENOUS BLD VENIPUNCTURE: CPT

## 2021-07-28 PROCEDURE — 74011250637 HC RX REV CODE- 250/637: Performed by: PHYSICIAN ASSISTANT

## 2021-07-28 PROCEDURE — 97110 THERAPEUTIC EXERCISES: CPT

## 2021-07-28 PROCEDURE — 85018 HEMOGLOBIN: CPT

## 2021-07-28 PROCEDURE — 82962 GLUCOSE BLOOD TEST: CPT

## 2021-07-28 PROCEDURE — 74011636637 HC RX REV CODE- 636/637: Performed by: PHYSICIAN ASSISTANT

## 2021-07-28 PROCEDURE — 97116 GAIT TRAINING THERAPY: CPT

## 2021-07-28 PROCEDURE — 97535 SELF CARE MNGMENT TRAINING: CPT

## 2021-07-28 PROCEDURE — 97530 THERAPEUTIC ACTIVITIES: CPT

## 2021-07-28 PROCEDURE — 80048 BASIC METABOLIC PNL TOTAL CA: CPT

## 2021-07-28 RX ORDER — OXYCODONE HYDROCHLORIDE 5 MG/1
5 TABLET ORAL
Qty: 40 TABLET | Refills: 0 | Status: SHIPPED | OUTPATIENT
Start: 2021-07-28 | End: 2021-08-04

## 2021-07-28 RX ORDER — AMOXICILLIN 250 MG
1 CAPSULE ORAL
Qty: 30 TABLET | Refills: 0 | Status: SHIPPED | OUTPATIENT
Start: 2021-07-28 | End: 2021-10-26

## 2021-07-28 RX ADMIN — PREGABALIN 200 MG: 100 CAPSULE ORAL at 08:37

## 2021-07-28 RX ADMIN — ACETAMINOPHEN 650 MG: 325 TABLET ORAL at 07:00

## 2021-07-28 RX ADMIN — APIXABAN 2.5 MG: 2.5 TABLET, FILM COATED ORAL at 08:37

## 2021-07-28 RX ADMIN — OXYCODONE 5 MG: 5 TABLET ORAL at 12:49

## 2021-07-28 RX ADMIN — ACETAMINOPHEN 650 MG: 325 TABLET ORAL at 02:27

## 2021-07-28 RX ADMIN — INSULIN LISPRO 3 UNITS: 100 INJECTION, SOLUTION INTRAVENOUS; SUBCUTANEOUS at 12:51

## 2021-07-28 RX ADMIN — TOPIRAMATE 200 MG: 100 TABLET, FILM COATED ORAL at 08:37

## 2021-07-28 RX ADMIN — ACETAMINOPHEN 650 MG: 325 TABLET ORAL at 16:00

## 2021-07-28 RX ADMIN — DOCUSATE SODIUM 50 MG AND SENNOSIDES 8.6 MG 1 TABLET: 8.6; 5 TABLET, FILM COATED ORAL at 08:37

## 2021-07-28 RX ADMIN — OXYCODONE 5 MG: 5 TABLET ORAL at 16:00

## 2021-07-28 RX ADMIN — ASPIRIN 81 MG: 81 TABLET, COATED ORAL at 08:37

## 2021-07-28 RX ADMIN — LEVOTHYROXINE SODIUM 137 MCG: 0.03 TABLET ORAL at 07:00

## 2021-07-28 RX ADMIN — PYRIDOSTIGMINE BROMIDE 180 MG: 180 TABLET, EXTENDED RELEASE ORAL at 08:38

## 2021-07-28 NOTE — PROGRESS NOTES
Problem: Mobility Impaired (Adult and Pediatric)  Goal: *Acute Goals and Plan of Care (Insert Text)  Description: FUNCTIONAL STATUS PRIOR TO ADMISSION: Patient was modified independent using a rollator for functional mobility. Pt has MS and at baseline walks with tremulous movements. Pt has ramp at home. HOME SUPPORT PRIOR TO ADMISSION: The patient lived alone with son to provide assistance with cooking, cleaning, laundry and shopping. Pt was independent with dressing and bathing - mostly washed up at sink. Physical Therapy Goals  Initiated 7/26/2021    1. Patient will move from supine to sit and sit to supine , scoot up and down, and roll side to side in bed with modified independence within 4 days. 2. Patient will perform sit to stand with modified independence within 4 days. 3. Patient will ambulate with modified independence for > 150 feet with the least restrictive device within 4 days. 4. Patient will ascend/descend 4 stairs with one handrail(s) with minimal assistance/contact guard assist within 4 days. 5. Patient will perform home exercise program per protocol with supervision/set-up within 4 days. 6. Patient will demonstrate AROM 0-90 degrees in operative joint within 4 days. Outcome: Progressing Towards Goal   PHYSICAL THERAPY TREATMENT  Patient: Isidoro Galvin (64 y.o. female)  Date: 7/28/2021  Diagnosis: Osteoarthritis of right knee [M17.11] Osteoarthritis of right knee  Procedure(s) (LRB):  RIGHT TOTAL KNEE REPLACEMENT (Right) 2 Days Post-Op  Precautions: WBAT, Fall  Chart, physical therapy assessment, plan of care and goals were reviewed. ASSESSMENT  Patient continues with skilled PT services and is progressing towards goals. Pt continues to take Tylenol only for pain - unable to tolerate ROM exercise and limited amb endurance. Patient reporting on day of surgery in the evening took oxycodone which made her very drowsy.   Patient agreeable to try increasing pain meds to improve activity tolerance. Anticipate discharge after pm session - pt has ramp and wheelchair to increase ease in/out of home. Current Level of Function Impacting Discharge (mobility/balance): Standby guard to CGA for ambulation/transfers with RW    Other factors to consider for discharge: Son will be assisting at discharge         PLAN :  Patient continues to benefit from skilled intervention to address the above impairments. Continue treatment per established plan of care. to address goals. Recommendation for discharge: (in order for the patient to meet his/her long term goals)  Physical therapy at least 2 days/week in the home     This discharge recommendation:  Has been made in collaboration with the attending provider and/or case management    IF patient discharges home will need the following DME: patient owns DME required for discharge       SUBJECTIVE:   Patient stated I just don't want the pain medicine to put me out - where I sleep all the time.     OBJECTIVE DATA SUMMARY:   Critical Behavior:  Neurologic State: Alert  Orientation Level: Oriented X4  Cognition: Appropriate decision making, Appropriate for age attention/concentration, Appropriate safety awareness, Follows commands  Safety/Judgement: Awareness of environment, Home safety, Insight into deficits    Range of Motion:   Attempted knee flexion in sitting  - unable to tolerate secondary to pain                         Functional Mobility Training:  Bed Mobility:        Sit to Supine: Stand-by assistance           Transfers:  Sit to Stand: Stand-by assistance  Stand to Sit: Stand-by assistance                             Balance:  Sitting: Intact; Without support  Standing: Impaired; With support  Standing - Static: Good;Constant support  Standing - Dynamic : Good;Constant support  Ambulation/Gait Training:  Distance (ft): 60 Feet (ft)  Assistive Device: Walker, rolling;Gait belt  Ambulation - Level of Assistance: Stand-by assistance        Gait Abnormalities: Decreased step clearance (shaking)  Right Side Weight Bearing: As tolerated  Left Side Weight Bearing: Full  Base of Support: Center of gravity altered  Stance: Right decreased  Speed/Gerda: Slow  Step Length: Right shortened;Left shortened  Swing Pattern: Right asymmetrical             Pain Rating:  Right knee pain pre-treatment 6/10; after amb - transferring to [facility] 8/10    Activity Tolerance:   Fair - limited by pain; attempted to perform seated ROM exercise - pt unable to tolerate; pt continues on Tylenol only    After treatment patient left in no apparent distress:   Supine in bed and Call bell within reach    COMMUNICATION/COLLABORATION:   The patients plan of care was discussed with: Registered nurse.      Catalino Service, PT   Time Calculation: 25 mins

## 2021-07-28 NOTE — NURSE NAVIGATOR
[facility]  SBAR [facility] Bundle Handoff     FROM:                                TO: At [address]                                                      ([address] [place] [address] or Facility name)  Maciej Webb 57  [address] [place] [address]  Dept: [address]: [phone]                                      Room#:  557/01                                                       Nurse Navigator:  Diamond Santos RN         SITUATION      ASAScore: ASA 3 - Patient with moderate systemic disease with functional limitations    Admitted:  7/26/2021  Hospital Day: 3      Attending Provider:  Osiel Schroeder MD     Consultations:  None    PCP:  Jazmine Dominguez MD   [phone]     Admitting Dx:  Osteoarthritis of right knee [M17.11]       Principal Problem:    Osteoarthritis of right knee (7/26/2021)      2 Days Post-Op of   Procedure(s):  RIGHT TOTAL KNEE REPLACEMENT   BY: Osiel Schroeder MD             ON: 7/26/2021                  Code Status: Prior             Advance Directive? Not Received (Send w/patient)     Isolation:  There are currently no Active Isolations       MDRO: No current active infections    BACKGROUND     Allergies:   Allergies   Allergen Reactions    Bee Sting [Sting, Bee] Anaphylaxis     Also allergic to egg products    Penicillins Anaphylaxis    Betadine [Povidone-Iodine] Rash    Egg Itching       Past Medical History:   Diagnosis Date    Arthritis     hip, hands    Benign cyst of left breast 3/21/2016    Blindness and low vision 2004    legally blind from [address]    Cervical spinal stenosis 12/2/2016    Moderate C6-7    Chronic pain     COVID-19 vaccine series completed     PFIZER 3/19/21, 4/9/21    Depression     Diabetes mellitus type 2, controlled ([address] 9/13/2016    Diet-controlled diabetes mellitus ([address] 1/5/2018    Endometriosis 6/25/2010    FH: breast cancer 6/25/2010    Chart states FH breast/ovary Ca, CAD,DM     History of CVA (cerebrovascular accident) 2018    HTN, goal below 140/90 2010    CURRENTLY NO MEDICATIONS (21)    Hypercholesterolemia     Hypothyroid 2010    Incontinence     Lumbosacral plexopathy 2010    Migraine     H/O MIGRAINE    MS (multiple sclerosis) [facility]     Myasthenia gravis ([facility]     Sleep apnea 2010    RESOLVED 2019    Stroke ([facility] 2018    RIGHT SIDE WEAKNESS    Ureteral calculus 2010    Vitamin D deficiency 3/17/2016       Past Surgical History:   Procedure Laterality Date    HX CARPAL TUNNEL RELEASE Left     HX  SECTION  1986    HX CYST INCISION AND DRAINAGE      HX GI      COLONOSCOPY    HX GYN      ovarian cyst    HX HEENT      removal of thyroglossal duct cyst    HX HIP REPLACEMENT Right 2017    anterior approach    HX KNEE ARTHROSCOPY Right     HX ORTHOPAEDIC  1997    right thumb tendon repair x 2    HX OTHER SURGICAL      Janee Cath x2    HX SMALL BOWEL RESECTION      HX THYROIDECTOMY  1974    HX VASCULAR ACCESS  2006    PORT -A- CATH X 2    DE ABDOMEN SURGERY PROC UNLISTED      bowel resection    DE BREAST SURGERY PROCEDURE UNLISTED      breast cyst-both breasts at different times    DE TOTAL HIP ARTHROPLASTY Left        Prior to Admission Medications   Prescriptions Last Dose Informant Patient Reported? Taking? Aimovig Autoinjector 140 mg/mL injection 2021  No No   Sig: ADMINISTER 1 ML(140MG) UNDER THE SKIN EVERY 30 DAYS   Gilenya 0.5 mg cap 2021 at Unknown time  No Yes   Sig: Take 1 Cap by mouth daily. Patient taking differently: Take 1 Capsule by mouth nightly. Menthol-Zinc Oxide (CALMOSEPTINE) 0.44-20.6 % oint 2021  Yes No   Sig: Apply 1 Each to affected area daily as needed for Other (thin layer to buttocks for skin irritation). OTHER   Yes No   Si.25 mL by SubLINGual route daily.  CBD OIL   OTHER   No No   Sig: Electric scooter  Diagnosis: Multiple sclerosis  Frequent fall  Gait disorder   PARoxetine (PAXIL) 40 mg tablet 2021 at Unknown time  No Yes   Sig: TAKE 1 AND 1/2 TABLETS BY MOUTH EVERY NIGHT   Patient taking differently: Take 60 mg by mouth nightly. TENS UNIT ELECTRODES Not Taking at Unknown time  Yes No   Sig: by Does Not Apply route. prn   Patient not taking: Reported on 2021   acetaminophen (TYLENOL) 500 mg tablet Not Taking at Unknown time  Yes No   Sig: Take 500 mg by mouth every six (6) hours as needed for Pain. Patient not taking: Reported on 2021   albuterol (PROVENTIL VENTOLIN) 2.5 mg /3 mL (0.083 %) nebulizer solution Not Taking at Unknown time  No No   Sig: 3 mL by Nebulization route every six (6) hours as needed for Wheezing. Patient not taking: Reported on 2021   aspirin delayed-release 81 mg tablet   No No   Sig: Take 1 Tab by mouth every twelve (12) hours. Patient taking differently: Take 1 Tab by mouth daily. bromocriptine (PARLODEL) 5 mg capsule Not Taking at Unknown time  Yes No   Sig: Take 5 mg by mouth nightly. Patient not taking: Reported on 2021   clindamycin (CLEOCIN) 300 mg capsule Not Taking at Unknown time  Yes No   Sig: Take 300 mg by mouth as needed for Other (prior to dental procedures ). Prior to dental procedures   Patient not taking: Reported on 2021   corticotropin (Adolfo Re H.P.) 80 unit/mL injectable gel Not Taking at Unknown time  No No   Si mL by SubCUTAneous route daily. 80 units subq q day for 10 days   Patient not taking: Reported on 2021   cyanocobalamin 1,000 mcg tablet 2021  No No   Sig: TAKE 1 TABLET BY MOUTH EVERY DAY   dantrolene (DANTRIUM) 100 mg capsule Not Taking at Unknown time  No No   Sig: Take 1 Cap by mouth three (3) times daily. Patient not taking: Reported on 2021   glucose blood VI test strips (BLOOD GLUCOSE TEST) strip   No No   Si Each by Does Not Apply route See Admin Instructions.  One Touch Ultra Test Strips=Check blood sugar daily dx E11.8   levothyroxine (SYNTHROID) 137 mcg tablet 7/25/2021 at Unknown time  No Yes   Sig: TAKE 1 TABLET BY MOUTH DAILY BEFORE BREAKFAST; dose change   Patient taking differently: TAKE 1 TABLET BY MOUTH DAILY BEFORE BREAKFAST; dose change    taking at bedtime    lidocaine (LIDODERM) 5 % Not Taking at Unknown time  No No   Sig: Apply patch to the affected area for 12 hours a day and remove for 12 hours a day. Patient not taking: Reported on 7/26/2021   metFORMIN ER (GLUCOPHAGE XR) 500 mg tablet 7/24/2021  No No   Sig: TAKE 3 TABLETS BY MOUTH DAILY; dose change   Patient taking differently: Take 1,500 mg by mouth nightly. TAKE 3 TABLETS BY MOUTH DAILY; dose change   nystatin (MYCOSTATIN) powder Not Taking at Unknown time  No No   Sig: Apply to candidal lesions TOPICALLY 2 to 3 times daily until healing complete   Patient not taking: Reported on 7/26/2021   nystatin (MYCOSTATIN) topical cream Not Taking at Unknown time  No No   Sig: Apply  to affected area two (2) times a day. Patient not taking: Reported on 7/26/2021   ondansetron (ZOFRAN ODT) 4 mg disintegrating tablet Not Taking at Unknown time  No No   Sig: DISSOLVE 1 TABLET ON THE TONGUE EVERY 8 HOURS AS NEEDED FOR NAUSEA   Patient not taking: Reported on 7/26/2021   pregabalin (Lyrica) 200 mg capsule 7/26/2021 at 5am  No Yes   Sig: Take 1 Cap by mouth two (2) times a day. Max Daily Amount: 400 mg.   pyridostigmine bromide (MESTINON SR) 180 mg SR tablet 7/26/2021 at Unknown time  No Yes   Sig: TAKE 1 TABLET BY MOUTH TWICE DAILY   rosuvastatin (CRESTOR) 40 mg tablet 7/25/2021 at Unknown time  No Yes   Sig: TAKE 1 TABLET BY MOUTH EVERY DAY   Patient taking differently: Take 40 mg by mouth nightly. topiramate (TOPAMAX) 200 mg tablet 7/26/2021 at 5am  No Yes   Sig: Take 1 Tab by mouth two (2) times a day. traZODone (DESYREL) 50 mg tablet 7/23/2021  Yes Yes   Sig: Take 50 mg by mouth nightly.       Facility-Administered Medications: None Vaccinations:    Immunization History   Administered Date(s) Administered    COVID-19, PFIZER, MRNA, LNP-S, PF, 30MCG/0.3ML DOSE 03/19/2021, 04/09/2021    Influenza Vaccine [employer]) PF (>6 Mo Flulaval, Fluarix, and >3 Maira Boers [address]) 10/21/2019    Tdap 06/16/2014         ASSESSMENT   Age: 61 y.o. Gender: female        Height: Height: 6' (182.9 cm)                    Weight:Weight: 87 kg (191 lb 12.8 oz)     Patient Vitals for the past 8 hrs:   Temp Pulse Resp BP SpO2   07/28/21 0834 98.3 °F (36.8 °C) 69 16 137/65 98 %   07/28/21 0231 99 °F (37.2 °C) 80 16 (!) 130/59 98 %            Active Orders   There are no active orders of the following types: Diet. Orientation: Orientation Level: Oriented X4    Active Lines/Drains:  (Peg Tube / Og / CL or S/L?):no    Urinary Status: Voiding      Last BM: Last Bowel Movement Date: 07/25/21     Skin Integrity: Incision (comment) (R knee)             Mobility: Slightly limited   Weight Bearing Status: WBAT (Weight Bearing as Tolerated)      Gait Training  Assistive Device: Walker, rolling, Gait belt  Ambulation - Level of Assistance: Contact guard assistance  Distance (ft): 140 Feet (ft)  Stairs - Level of Assistance: Contact guard assistance  Number of Stairs Trained: 4  Rail Use: Both     On Anticoagulation?  YES  Eliquis                                         Pain Medications given:  oxycodone                                   Lab Results   Component Value Date/Time    Glucose 99 07/28/2021 02:30 AM    Hemoglobin A1c 7.0 (H) 07/12/2021 04:04 PM    INR 1.1 07/12/2021 04:04 PM    INR 1.0 04/18/2019 11:50 AM    HGB 11.0 (L) 07/28/2021 02:30 AM    HGB 10.5 (L) 07/27/2021 03:42 AM    HGB 13.6 07/12/2021 04:04 PM    HGB 14.0 02/08/2021 04:22 PM       Readmission Risks:  Score:         RECOMMENDATION     See After Visit Summary (AVS) for:  · Discharge instructions  · After [address]   · Medication Reconciliation          [facility] [place] [facility] Nurse Navigator  KAMILLA Sandy, RN-BC       Office  [phone]  Cell      [phone]  Fax      [phone]  [email or web address]             . Rehany

## 2021-07-28 NOTE — PROGRESS NOTES
Bedside and Verbal shift change report given to Cuco Palmer RN (oncoming nurse) by Godwin Cornell RN (offgoing nurse). Report included the following information SBAR.

## 2021-07-28 NOTE — DISCHARGE INSTRUCTIONS
Discharge Instructions Knee Replacement  Dr. Dc Light      Patient Name  Ganesh Goss  Date of procedure  7/26/2021    Procedure  Procedure(s):  RIGHT TOTAL KNEE REPLACEMENT  Surgeon  Surgeon(s) and Role:     * Morgan Coats MD - Primary  Date of discharge: No discharge date for patient encounter. PCP: Jim Kat MD    Follow up care   Follow up visit with Dr. Dc Light in 2 weeks. Call [phone] to make an appointment   The staples in your knee incision will be taken out at this follow up appointment    Activity at home   Take a short walk every hour; except at night when sleeping   Do your Home Exercise Program 3 times every day    After exercising lie down and elevate your leg on pillows for 15-30 minutes to decrease swelling   Refer to your patient notebook for more information  Bathing and caring for your incision   Change your knee dressing daily for one week. You may then leave your incision open to air unless you see drainage from your knee.  You may take a shower and wash your incision with soap and water starting on the 3rd day after surgery.  Do not submerge the incision under water until your incision is completely healed (bath tub, hot tub, swimming pool, etc.)     Preventing blood clots   Take Eliquis 2.5mg twice a day as prescribed for 14 days   Call Dr. Dc Light if you have side effects of blood thinning medication: bleeding, bruising, upset stomach, or diarrhea.  Call Dr. Dc Light for signs of a blood clot in your leg: calf pain, tenderness, redness, swelling of lower leg   Preventing lung congestion   Use your incentive spirometer 4 times a day; do 10 repetitions each time   Remember to keep the small blue ball between the two arrows when taking a slow, deep breath   Pain Management   Get up and walk a short distance to relieve pain and stiffness.  Place ice wrap on your knee except when you are walking.  The gel ice packs should be changed about every 4 hours   Elevate your leg on pillows for 15-30 minutes    Take Tylenol 1000mg (take two 500mg tablets) every 6 hours for the next 2 weeks   Do not take any anti-inflammatories (Aleve, Advil) until you are off the blood thinner   If needed, take a narcotic pain pill every 4-6 hours as prescribed. Take with a small amount of food.  As your pain decreases, take the narcotics less often or take ½ of a pill   Call Dr. Mathew Colon if you have side effects from your narcotic pain medication: itching, drowsiness, dizziness, upset stomach, dry mouth, constipation or if you medication is not relieving your pain. Diet after surgery   You may resume your normal diet. Include vegetables, fruit, whole grains, lean meats, and low-fat dairy products. Eat food high in fiber    Drink plenty of fluids, including 8 cups of water daily   Take over-the-counter stool softeners and laxatives to prevent constipation    Avoid after surgery   Do not take any over-the-counter medication for pain except Tylenol     Do not take more than 4000 mg (4 Grams) of Tylenol in 24 hours     Do not drink alcoholic beverages   Do not smoke   Do not drive until seen for follow up appointment   Do not place frozen gel pack directly on your skin. It can cause frostbite. Prevention of falls and safety at home   Set up an area where you can rest comfortably leaving space around furniture to allow you to walk with your walker   Keep stairs, hallways and bathrooms well lit; especially at night   Arrange for care for your pets   Keep your home free of clutter   Call Dr. Mathew Colon at [phone] for:   Pain that is not relieved by pain medication, ice and activity   Side effects of medications   Increased/spread of bruising   Warning signs of infection:  ? persistent fever greater than 100 degrees  ?  shaking or chills  ? increased redness, tenderness, swelling or drainage from incision  ? increased pain during activity or rest   Warning signs of a blood clot in your leg:  ? increased pain in your calf  ? tenderness or redness  ? increased swelling of knee, calf, ankle, or foot    If you call after 5pm or on a weekend, the on call physician will return your phone call  Call your Primary Care Doctor for:    Concerns about your medical conditions such as diabetes, high blood pressure, asthma, congestive heart failure.    Blood sugars greater than 180   Persistent headache or dizziness   Coughing or congestion   Constipation or diarrhea   Burning when you go to the bathroom   Abnormal heart rate (fast or slow)               Call 911 and go to the nearest hospital for:    Sudden increased shortness of breath   Sudden onset of chest pain   Difficulty breathing   Localized chest pain with coughing or taking a deep breath

## 2021-07-28 NOTE — PROGRESS NOTES
Ortho Daily Progress Note      Patient: Marci Pate                   MRN: [id number]  Sex: female  YOB: 1955           Age: 61 y.o.     2 Days Post-Op     Procedure(s):  RIGHT TOTAL KNEE REPLACEMENT    Subjective:    Pain:  pt in moderate pain and has only been taking tylenol and doing ice packs  No complaints overnight. Pt ambulating with PT  Pt. tolerating PO diet, no nausea. Pain meds tolerated. Pt. voiding appropriately. Vitals stable    Visit Vitals  BP (!) 130/59 (BP 1 Location: Left upper arm, BP Patient Position: At rest)   Pulse 80   Temp 99 °F (37.2 °C)   Resp 16   Ht 6' (1.829 m)   Wt 87 kg (191 lb 12.8 oz)   SpO2 98%   BMI 26.01 kg/m²        Recent Labs     07/28/21  0230 07/27/21  0342 07/27/21  0342 07/12/21  1604 07/12/21  1604 05/13/21  1050 02/08/21  1622 02/08/21  1622 11/11/20  1000 11/11/20  1000   HGB 11.0*   < > 10.5*   < > 13.6  --    < > 14.0  --   --    CREA 0.82   < > 0.83   < > 0.97 0.87   < > 0.80   < > 0.90   BUN 16  --  17  --  12 9  --  11  --  15    < > = values in this interval not displayed. Physical Exam:    GENERAL: alert, no acute distress  NEURO:  Sensation grossly intact,   VASCULAR: DP/TP pulses 2+. Capillary refill <2 seconds. Extremity warm. mild edema of RLE  DRESSING:  ABD pad in place, C/D/I  MSK:   5/5 plantar and dorsiflexion bilaterally. Mild decreased ROM of right knee due to pain. DVT EXAM: No evidence of DVT seen on physical exam.  No posterior calf tenderness, tightness, erythema, palpable cords, or pain upon active dorsi/plantar flexion of the foot. Plan:    DVT ppx: Apixaban  Activity: WBAT with PT-mobilization  Pain Control: In moderate pain and only taking APAP. Discussed with patient about the benefits of limited use of the opioids. Patient agrees and will take oxycodone as needed for pain relief and prior to PT. Dressing: Change dressing daily with ABD pads.   Hgb:  Stable, repeat in AM if still here  DM: Well controlled on current regimen  Discharge: Home this afternoon when her son gets off work and is free to take her home.      Migue Oh PA-C  7/28/2021   8:05 AM

## 2021-07-28 NOTE — PROGRESS NOTES
Problem: Self Care Deficits Care Plan (Adult)  Goal: *Therapy Goal (Edit Goal, Insert Text)  Description: FUNCTIONAL STATUS PRIOR TO ADMISSION: Patient was modified independent with basic self care tasks, though patient reports increased need for assist with showers recently due to fear of falling. Patient was using a walker or rollator for functional mobility. HOME SUPPORT: The patient lived with son who is able to assist though he works during the day. Occupational Therapy Goals  Initiated 7/27/2021  1. Patient will perform lower body ADLs with supervision/set-up using AE prn within 4 day(s). 2.  Patient will perform grooming while standing 2 mins without fatigue or LOB with supervision/set-up within 4 day(s). 3.  Patient will perform all aspects of toileting with supervision/set-up within 4 day(s). 4.  Patient will participate in upper extremity therapeutic exercise/activities with supervision/set-up for 10 minutes within 4 day(s). 5.  Patient will utilize energy conservation techniques during functional activities without cues within 4 day(s). Outcome: Progressing Towards Goal    OCCUPATIONAL THERAPY TREATMENT  Patient: Priyanka Galvin (64 y.o. female)  Date: 7/28/2021  Diagnosis: Osteoarthritis of right knee [M17.11] Osteoarthritis of right knee  Procedure(s) (LRB):  RIGHT TOTAL KNEE REPLACEMENT (Right) 2 Days Post-Op  Precautions: WBAT, Fall  Chart, occupational therapy assessment, plan of care, and goals were reviewed. ASSESSMENT  Patient continues with skilled OT services and is progressing towards goals. Pt progressing with activity tolerance and LE ADL independence, functionally evidenced by completing at EOB/RW with Min A and use of reacher. Pt verbalizes good in-home assist from son and is safe for discharge from a self-care standpoint. Acute OT to continue to follow during acute hospitalization in the event of delay in discharge.      Current Level of Function Impacting Discharge (ADLs): Min A    Other factors to consider for discharge: none         PLAN :  Patient continues to benefit from skilled intervention to address the above impairments. Continue treatment per established plan of care to address goals. Recommend with staff: OOB meals, Active ADL engagement, Assist x1 to/from [address] [facility] [address] next OT session: POC progression    Recommendation for discharge: (in order for the patient to meet his/her long term goals)  No skilled occupational therapy/ follow up rehabilitation needs identified at this time. This discharge recommendation:  Has been made in collaboration with the attending provider and/or case management    IF patient discharges home will need the following DME: patient owns DME required for discharge       SUBJECTIVE:   Patient stated Thanks for your help.     OBJECTIVE DATA SUMMARY:   Cognitive/Behavioral Status:  Neurologic State: Alert  Orientation Level: Oriented X4  Cognition: Appropriate decision making; Appropriate for age attention/concentration; Appropriate safety awareness; Follows commands  Perception: Appears intact  Perseveration: No perseveration noted  Safety/Judgement: Awareness of environment;Home safety; Insight into deficits    Functional Mobility and Transfers for ADLs:  Bed Mobility:  Sit to Supine: Stand-by assistance    Transfers:  Sit to Stand: Stand-by assistance          Balance:  Sitting: Intact; Without support  Standing: Impaired; With support  Standing - Static: Good;Constant support  Standing - Dynamic : Good;Constant support    ADL Intervention:            Upper Body Bathing  Bathing Assistance: Set-up  Position Performed: Seated edge of bed    Lower Body Bathing  Lower Body : Minimum assistance  Position Performed: Seated edge of bed;Standing (at RW)    Upper Body Dressing Assistance  Pullover Shirt: Set-up    Lower Body Dressing Assistance  Dressing Assistance: Minimum assistance  Underpants: Minimum assistance  Pants With Elastic Waist: Minimum assistance  Position Performed: Seated edge of bed  Cues: Verbal cues provided;Physical assistance (for AE technique)  Adaptive Equipment Used: Reacher;Walker         Cognitive Retraining  Safety/Judgement: Awareness of environment;Home safety; Insight into deficits    Patient instructed and indicated understanding the benefits of maintaining activity tolerance, functional mobility, and independence with self care tasks during acute stay  to ensure safe return home and to baseline. Encouraged patient to increase frequency and duration OOB, be out of bed for all meals, perform daily ADLs (as approved by RN/MD regarding bathing etc), and performing functional mobility to/from bathroom. Pt educated on safe transfer techniques, with specific emphasis on proper hand placement to push up from seated surface rather than attempt to pull self up, fully positioning self in-front of desired seated location, feeling chair on back of legs and reaching back with 1-2 UE to slowly lower self to seated position. Pain:  No c/o pain    Activity Tolerance:   Good, Fair, and requires rest breaks    After treatment patient left in no apparent distress:   Sitting in chair, Call bell within reach, and PCT present    COMMUNICATION/COLLABORATION:   The patients plan of care was discussed with: Physical therapist, Registered nurse, and Case management.      Hakan Waldron, MOMO  Time Calculation: 25 mins

## 2021-07-28 NOTE — PROGRESS NOTES
Problem: Mobility Impaired (Adult and Pediatric)  Goal: *Acute Goals and Plan of Care (Insert Text)  Description: FUNCTIONAL STATUS PRIOR TO ADMISSION: Patient was modified independent using a rollator for functional mobility. Pt has MS and at baseline walks with tremulous movements. Pt has ramp at home. HOME SUPPORT PRIOR TO ADMISSION: The patient lived alone with son to provide assistance with cooking, cleaning, laundry and shopping. Pt was independent with dressing and bathing - mostly washed up at sink. Physical Therapy Goals  Initiated 7/26/2021    1. Patient will move from supine to sit and sit to supine , scoot up and down, and roll side to side in bed with modified independence within 4 days. 2. Patient will perform sit to stand with modified independence within 4 days. 3. Patient will ambulate with modified independence for > 150 feet with the least restrictive device within 4 days. 4. Patient will ascend/descend 4 stairs with one handrail(s) with minimal assistance/contact guard assist within 4 days. 5. Patient will perform home exercise program per protocol with supervision/set-up within 4 days. 6. Patient will demonstrate AROM 0-90 degrees in operative joint within 4 days. 7/28/2021 1601 by Lyndia Lefort, PT  Outcome: Progressing Towards Goal   PHYSICAL THERAPY TREATMENT  Patient: Yordan Montiel (64 y.o. female)  Date: 7/28/2021  Diagnosis: Osteoarthritis of right knee [M17.11] Osteoarthritis of right knee  Procedure(s) (LRB):  RIGHT TOTAL KNEE REPLACEMENT (Right) 2 Days Post-Op  Precautions: WBAT, Fall  Chart, physical therapy assessment, plan of care and goals were reviewed. ASSESSMENT  Patient continues with skilled PT services and is progressing towards goals. No change in reported pain following pain meds, however tolerated TKR exercises in supine and seated position.   Patient has difficulty initiating quadriceps - weak quad set, unable to perform SAQ or LAQ without assist. Discussed safety issues in home when son not available - able to use gait belt to get right leg in/out of bed, has BSC pt can place by recliner or bed, and using cooler at bedside to store ice packs. Pt cleared for discharge from PT standpoint. Current Level of Function Impacting Discharge (mobility/balance): Mod indep from flat bed surface with use of gait belt; supervision for transfers/amb and TKR exercise    Other factors to consider for discharge: pt has DME - has w/c and ramp - secondary to MS         PLAN :  Patient continues to benefit from skilled intervention to address the above impairments. Continue treatment per established plan of care. to address goals. Recommendation for discharge: (in order for the patient to meet his/her long term goals)  Physical therapy at least 2 days/week in the home     This discharge recommendation:  Has been made in collaboration with the attending provider and/or case management    IF patient discharges home will need the following DME: patient owns DME required for discharge       SUBJECTIVE:   Patient stated my son can help me get up in am before he leaves for work.     OBJECTIVE DATA SUMMARY:   Critical Behavior:  Neurologic State: Alert  Orientation Level: Oriented X4  Cognition: Appropriate decision making, Appropriate for age attention/concentration, Appropriate safety awareness, Follows commands  Safety/Judgement: Awareness of environment, Home safety, Insight into deficits    Range of Motion:      Right knee flexion seated 75                      Functional Mobility Training:  Bed Mobility:     Supine to Sit: Supervision (using gait belt to move right leg)  Sit to Supine: Supervision  Scooting: Supervision        Transfers:  Sit to Stand: Supervision  Stand to Sit: Supervision                             Balance:  Sitting: Intact; Without support  Standing: Intact; With support  Standing - Static: Good;Constant support  Standing - Dynamic : Good;Constant support  Ambulation/Gait Training:  Distance (ft): 80 Feet (ft)  Assistive Device: Walker, rolling;Gait belt  Ambulation - Level of Assistance: Stand-by assistance        Gait Abnormalities: Antalgic;Decreased step clearance (right knee flexion during stance phase)  Right Side Weight Bearing: As tolerated  Left Side Weight Bearing: Full  Base of Support: Center of gravity altered;Shift to right  Stance: Right decreased  Speed/Gerda: Slow  Step Length: Right shortened;Left shortened  Swing Pattern: Right asymmetrical                 Therapeutic Exercises:     EXERCISE   Sets   Reps   Active Active Assist   Passive Self ROM   Comments   Ankle Pumps  5 [x]                                        []                                        []                                        []                                           Quad Sets  5 [x]                                        []                                        []                                        []                                           Hamstring Sets  5 [x]                                        []                                        []                                        []                                           Short Arc Quads   []                                        [x]                                        []                                        []                                           Knee Extension Stretch     []                                          []                                          [x]                                          []                                        Towel roll placed under right ankle   Heel Slides  5 [x]                                        []                                        []                                        []                                           Long Arc Quads   []                                        []                                        [] []                                        Unable   Knee Flexion Stretch  3 [x]                                        []                                        []                                        []                                           Straight Leg Raises   []                                        []                                        []                                        []                                               Pain Rating:  Right knee pain 6-8/10 after 5 mg oxycodone     Activity Tolerance:   Fair and requires rest breaks during exercise    After treatment patient left in no apparent distress:   Supine in bed, Call bell within reach, and Ice applied to right knee    COMMUNICATION/COLLABORATION:   The patients plan of care was discussed with: Registered nurse.      Je Pi, PT   Time Calculation: 45 mins

## 2021-07-28 NOTE — DISCHARGE SUMMARY
Orthopedic Service Discharge Summary    Patient ID:  Cathy Romero  [id number]  female  61 y.o.  1955    Admit date: 7/26/2021    Discharge date and time: 7/28/2021     Admitting Physician: Isha Crawley MD     Discharge Physician: Isha Crawley MD    Consulting Physician(s): Treatment Team: Attending Provider: Erlin Ruano MD; Utilization Review: Blanco Chamberlain; Care Manager: Lan Van RN    Date of Surgery: 7/26/2021     Preoperative Diagnosis:  DJD RIGHT KNEE    Postoperative Diagnosis: DJD RIGHT KNEE    Procedure(s): Procedure(s):  RIGHT TOTAL KNEE REPLACEMENT    Surgeon: Williams Mcdowell) and Role:     Regina Dunn MD - Primary      Anesthesia:  General    Preoperative Medical Clearance:                           HPI:  Pt is a 61 y.o. female who has a history of Osteoarthritis of right knee [M17.11]  with pain and limitations of activities of daily living who presents at this time for a right TKA following the failure of conservative management.     PMH:   Past Medical History:   Diagnosis Date    Arthritis     hip, hands    Benign cyst of left breast 3/21/2016    Blindness and low vision 2004    legally blind from [address]    Cervical spinal stenosis 12/2/2016    Moderate C6-7    Chronic pain     COVID-19 vaccine series completed     PFIZER 3/19/21, 4/9/21    Depression     Diabetes mellitus type 2, controlled ([address] 9/13/2016    Diet-controlled diabetes mellitus ([address] 1/5/2018    Endometriosis 6/25/2010    FH: breast cancer 6/25/2010    Chart states FH breast/ovary Ca, CAD,DM     History of CVA (cerebrovascular accident) 6/5/2018    HTN, goal below 140/90 6/25/2010    CURRENTLY NO MEDICATIONS (7-12-21)    Hypercholesterolemia     Hypothyroid 6/25/2010    Incontinence     Lumbosacral plexopathy 6/25/2010    Migraine     H/O MIGRAINE    MS (multiple sclerosis) ([address] 2004    Myasthenia gravis ([address] 2011    Sleep apnea 6/25/2010    RESOLVED 5/2019    Stroke ([address] 2018 RIGHT SIDE WEAKNESS    Ureteral calculus 2010    Vitamin D deficiency 3/17/2016       Medications upon admission :   Prior to Admission Medications   Prescriptions Last Dose Informant Patient Reported? Taking? Aimovig Autoinjector 140 mg/mL injection 2021  No No   Sig: ADMINISTER 1 ML(140MG) UNDER THE SKIN EVERY 30 DAYS   Gilenya 0.5 mg cap 2021 at Unknown time  No Yes   Sig: Take 1 Cap by mouth daily. Patient taking differently: Take 1 Capsule by mouth nightly. Menthol-Zinc Oxide (CALMOSEPTINE) 0.44-20.6 % oint 2021  Yes No   Sig: Apply 1 Each to affected area daily as needed for Other (thin layer to buttocks for skin irritation). OTHER   Yes No   Si.25 mL by SubLINGual route daily. CBD OIL   OTHER   No No   Sig: Electric scooter  Diagnosis: Multiple sclerosis  Frequent fall  Gait disorder   PARoxetine (PAXIL) 40 mg tablet 2021 at Unknown time  No Yes   Sig: TAKE 1 AND 1/2 TABLETS BY MOUTH EVERY NIGHT   Patient taking differently: Take 60 mg by mouth nightly. TENS UNIT ELECTRODES Not Taking at Unknown time  Yes No   Sig: by Does Not Apply route. prn   Patient not taking: Reported on 2021   acetaminophen (TYLENOL) 500 mg tablet Not Taking at Unknown time  Yes No   Sig: Take 500 mg by mouth every six (6) hours as needed for Pain. Patient not taking: Reported on 2021   albuterol (PROVENTIL VENTOLIN) 2.5 mg /3 mL (0.083 %) nebulizer solution Not Taking at Unknown time  No No   Sig: 3 mL by Nebulization route every six (6) hours as needed for Wheezing. Patient not taking: Reported on 2021   aspirin delayed-release 81 mg tablet   No No   Sig: Take 1 Tab by mouth every twelve (12) hours. Patient taking differently: Take 1 Tab by mouth daily. bromocriptine (PARLODEL) 5 mg capsule Not Taking at Unknown time  Yes No   Sig: Take 5 mg by mouth nightly.    Patient not taking: Reported on 2021   clindamycin (CLEOCIN) 300 mg capsule Not Taking at Unknown time Yes No   Sig: Take 300 mg by mouth as needed for Other (prior to dental procedures ). Prior to dental procedures   Patient not taking: Reported on 2021   corticotropin (Jessiviki Lynnner H.P.) 80 unit/mL injectable gel Not Taking at Unknown time  No No   Si mL by SubCUTAneous route daily. 80 units subq q day for 10 days   Patient not taking: Reported on 2021   cyanocobalamin 1,000 mcg tablet 2021  No No   Sig: TAKE 1 TABLET BY MOUTH EVERY DAY   dantrolene (DANTRIUM) 100 mg capsule Not Taking at Unknown time  No No   Sig: Take 1 Cap by mouth three (3) times daily. Patient not taking: Reported on 2021   glucose blood VI test strips (BLOOD GLUCOSE TEST) strip   No No   Si Each by Does Not Apply route See Admin Instructions. One Touch Ultra Test Strips=Check blood sugar daily dx E11.8   levothyroxine (SYNTHROID) 137 mcg tablet 2021 at Unknown time  No Yes   Sig: TAKE 1 TABLET BY MOUTH DAILY BEFORE BREAKFAST; dose change   Patient taking differently: TAKE 1 TABLET BY MOUTH DAILY BEFORE BREAKFAST; dose change    taking at bedtime    lidocaine (LIDODERM) 5 % Not Taking at Unknown time  No No   Sig: Apply patch to the affected area for 12 hours a day and remove for 12 hours a day. Patient not taking: Reported on 2021   metFORMIN ER (GLUCOPHAGE XR) 500 mg tablet 2021  No No   Sig: TAKE 3 TABLETS BY MOUTH DAILY; dose change   Patient taking differently: Take 1,500 mg by mouth nightly. TAKE 3 TABLETS BY MOUTH DAILY; dose change   nystatin (MYCOSTATIN) powder Not Taking at Unknown time  No No   Sig: Apply to candidal lesions TOPICALLY 2 to 3 times daily until healing complete   Patient not taking: Reported on 2021   nystatin (MYCOSTATIN) topical cream Not Taking at Unknown time  No No   Sig: Apply  to affected area two (2) times a day.    Patient not taking: Reported on 2021   ondansetron (ZOFRAN ODT) 4 mg disintegrating tablet Not Taking at Unknown time  No No   Sig: DISSOLVE 1 TABLET ON THE TONGUE EVERY 8 HOURS AS NEEDED FOR NAUSEA   Patient not taking: Reported on 7/26/2021   pregabalin (Lyrica) 200 mg capsule 7/26/2021 at 5am  No Yes   Sig: Take 1 Cap by mouth two (2) times a day. Max Daily Amount: 400 mg.   pyridostigmine bromide (MESTINON SR) 180 mg SR tablet 7/26/2021 at Unknown time  No Yes   Sig: TAKE 1 TABLET BY MOUTH TWICE DAILY   rosuvastatin (CRESTOR) 40 mg tablet 7/25/2021 at Unknown time  No Yes   Sig: TAKE 1 TABLET BY MOUTH EVERY DAY   Patient taking differently: Take 40 mg by mouth nightly. topiramate (TOPAMAX) 200 mg tablet 7/26/2021 at 5am  No Yes   Sig: Take 1 Tab by mouth two (2) times a day. traZODone (DESYREL) 50 mg tablet 7/23/2021  Yes Yes   Sig: Take 50 mg by mouth nightly. Facility-Administered Medications: None        Allergies: Allergies   Allergen Reactions    Bee Sting [Sting, Bee] Anaphylaxis     Also allergic to egg products    Penicillins Anaphylaxis    Betadine [Povidone-Iodine] Rash    Egg Itching        Hospital Course: The patient underwent surgery. Complications:  None; patient tolerated the procedure well. Was taken to the PACU in stable condition and then transferred to the Orthopedic floor. She was cleared by PT on post-op day 1 but there was hesitancy from the patient and provider on discharge post-op day 1 due to her myasthenia gravis and MS. Condition on discharge:  Stable    Perioperative Antibiotics:  Vancomycin, clindamycin    Postoperative Pain Management:  Oxycodone, Acetaminophen and Ice Packs    DVT Prophylaxis: Apixaban    Postoperative transfusions: none Banked PRBCs     Post Op complications: none    Hemoglobin at discharge:    Lab Results   Component Value Date/Time    HGB 11.0 (L) 07/28/2021 02:30 AM    INR 1.1 07/12/2021 04:04 PM       Incision - clean, dry and intact. No significant erythema or swelling. Neurovascular exam within normal limits. Wound appears to be healing without any evidence of infection. Physical Therapy started on the day of surgery and progressed to ambulation with the aid of a rolling walker w/ minimal assistance. Range of motion limited by pain. At the time of discharge, able to go up and down stairs and had understanding of precautions needed following surgery. Discharged to: Home. Discharge instructions:  -See Full Summary of discharge instructions attached  -Anticoagulation: Apixaban  -Diet:   Resume pre hospital diet            -Medications:  Resume home medications   Current Discharge Medication List      START taking these medications    Details   apixaban (ELIQUIS) 2.5 mg tablet Take 1 Tablet by mouth every twelve (12) hours. Qty: 28 Tablet, Refills: 0  Start date: 7/28/2021      oxyCODONE IR (ROXICODONE) 5 mg immediate release tablet Take 1 Tablet by mouth every four (4) hours as needed for Pain for up to 7 days. Max Daily Amount: 30 mg.  Qty: 40 Tablet, Refills: 0  Start date: 7/28/2021, End date: 8/4/2021    Associated Diagnoses: Pain due to total right knee replacement, initial encounter ([facility]      senna-docusate (PERICOLACE) 8.6-50 mg per tablet Take 1 Tablet by mouth two (2) times daily as needed for Constipation. Qty: 30 Tablet, Refills: 0  Start date: 7/28/2021         CONTINUE these medications which have NOT CHANGED    Details   traZODone (DESYREL) 50 mg tablet Take 50 mg by mouth nightly. PARoxetine (PAXIL) 40 mg tablet TAKE 1 AND 1/2 TABLETS BY MOUTH EVERY NIGHT  Qty: 135 Tab, Refills: 3    Comments: **Patient requests 90 days supply**      pyridostigmine bromide (MESTINON SR) 180 mg SR tablet TAKE 1 TABLET BY MOUTH TWICE DAILY  Qty: 180 Tab, Refills: 0    Associated Diagnoses: MS (multiple sclerosis) (HCC)      pregabalin (Lyrica) 200 mg capsule Take 1 Cap by mouth two (2) times a day. Max Daily Amount: 400 mg.   Qty: 180 Cap, Refills: 4    Comments: Refill [id number]  Associated Diagnoses: MS (multiple sclerosis) (HCC)      Gilenya 0.5 mg cap Take 1 Cap by mouth daily. Qty: 90 Cap, Refills: 3    Comments: Dx. G35  Associated Diagnoses: MS (multiple sclerosis) (HCC)      levothyroxine (SYNTHROID) 137 mcg tablet TAKE 1 TABLET BY MOUTH DAILY BEFORE BREAKFAST; dose change  Qty: 90 Tab, Refills: 1    Associated Diagnoses: Hypothyroidism, unspecified type      rosuvastatin (CRESTOR) 40 mg tablet TAKE 1 TABLET BY MOUTH EVERY DAY  Qty: 90 Tab, Refills: 2    Associated Diagnoses: Hyperlipidemia LDL goal <100      topiramate (TOPAMAX) 200 mg tablet Take 1 Tab by mouth two (2) times a day. Qty: 180 Tab, Refills: 2      cyanocobalamin 1,000 mcg tablet TAKE 1 TABLET BY MOUTH EVERY DAY  Qty: 30 Tablet, Refills: 2      metFORMIN ER (GLUCOPHAGE XR) 500 mg tablet TAKE 3 TABLETS BY MOUTH DAILY; dose change  Qty: 270 Tablet, Refills: 1    Comments: Dose change  Associated Diagnoses: Type 2 diabetes mellitus without complication, without long-term current use of insulin ([facility])      acetaminophen (TYLENOL) 500 mg tablet Take 500 mg by mouth every six (6) hours as needed for Pain.      !! OTHER Electric scooter  Diagnosis: Multiple sclerosis  Frequent fall  Gait disorder  Qty: 1 Units, Refills: 0      Aimovig Autoinjector 140 mg/mL injection ADMINISTER 1 ML(140MG) UNDER THE SKIN EVERY 30 DAYS  Qty: 3 mL, Refills: 3    Comments: **Patient requests 90 days supply**      ondansetron (ZOFRAN ODT) 4 mg disintegrating tablet DISSOLVE 1 TABLET ON THE TONGUE EVERY 8 HOURS AS NEEDED FOR NAUSEA  Qty: 15 Tab, Refills: 0    Associated Diagnoses: Nausea      glucose blood VI test strips (BLOOD GLUCOSE TEST) strip 100 Each by Does Not Apply route See Admin Instructions. One Touch Ultra Test Strips=Check blood sugar daily dx E11.8  Qty: 100 Strip, Refills: 1      aspirin delayed-release 81 mg tablet Take 1 Tab by mouth every twelve (12) hours. Qty: 60 Tab, Refills: 0      !! OTHER 0.25 mL by SubLINGual route daily.  CBD OIL      dantrolene (DANTRIUM) 100 mg capsule Take 1 Cap by mouth three (3) times daily.  Qty: 90 Cap, Refills: 3      albuterol (PROVENTIL VENTOLIN) 2.5 mg /3 mL (0.083 %) nebulizer solution 3 mL by Nebulization route every six (6) hours as needed for Wheezing. Qty: 30 Each, Refills: 0    Associated Diagnoses: Shortness of breath      Menthol-Zinc Oxide (CALMOSEPTINE) 0.44-20.6 % oint Apply 1 Each to affected area daily as needed for Other (thin layer to buttocks for skin irritation). lidocaine (LIDODERM) 5 % Apply patch to the affected area for 12 hours a day and remove for 12 hours a day. Qty: 30 Each, Refills: 3      clindamycin (CLEOCIN) 300 mg capsule Take 300 mg by mouth as needed for Other (prior to dental procedures ). Prior to dental procedures      TENS UNIT ELECTRODES by Does Not Apply route. prn       !! - Potential duplicate medications found. Please discuss with provider. STOP taking these medications       bromocriptine (PARLODEL) 5 mg capsule Comments:   Reason for Stopping:         nystatin (MYCOSTATIN) powder Comments:   Reason for Stopping:         nystatin (MYCOSTATIN) topical cream Comments:   Reason for Stopping:         corticotropin (Ray Price H.P.) 80 unit/mL injectable gel Comments:   Reason for Stopping:            per medical continuation form  -Discharge activity:  Activity as tolerated  -Ambulation:  With walker or cane as tolerated, appropriate total joint protocol  -Wound Care: Keep wound clean and dry. Reinforce dressing as needed. Ice to area for comfort and as directed  -Follow up:    In office in 2 weeks      Signed:  Yaneli Otero  7/28/2021  8:20 AM        Hugo Holman MD

## 2021-07-28 NOTE — PROGRESS NOTES
I have reviewed discharge instructions with the patient and caregiver. The patient and caregiver verbalized understanding. Signs, symptoms and side effects discussed. Opportunity for questions and clarification provided. Patient and son watched discharge video. Patient leaving via private vehicle with son. Patient discharging home with home health.

## 2021-07-29 ENCOUNTER — PATIENT OUTREACH (OUTPATIENT)
Dept: CASE MANAGEMENT | Age: 66
End: 2021-07-29

## 2021-07-29 NOTE — PROGRESS NOTES
Post Discharge Follow-up contact after Joint Replacement    Patient discharged on 7/28/21  By  Dayanara Magdaleno   following  right knee Arthroplasty. Spoke with patient today, who reports they are \" feeling pretty good today. \"  Denies Fever, Shortness of Breath or Chest Pain. Home Health has not visited; but the initial visit is scheduled for tomorrow. Patient also reports:  Incision  clean, dry, intact. The patient has dressing change supplies and is changing daily. Calf is non-tender, operative extremity has moderate swelling. Pain is well managed. Discussed use of ice & elevation. Patient is exercising independently. Taking Eliquis for anticoagulation, oxycodone for pain. Patient is not experiencing symptoms of constipation & urinating without difficulty. Discussed side effects of anticoagulants & pain medications (bleeding/bruising, constipation, lightheaded/dizziness)  Follow up appointment is scheduled for 2 weeks. Discussed calling surgeon Dr Carlos Roberts  for drainage, bleeding, swelling in operative extremity, fever or pain. Discussed calling PCP Dr Garyr Garcia with other medical issues.

## 2021-07-30 RX ORDER — TOPIRAMATE 200 MG/1
200 TABLET ORAL 2 TIMES DAILY
Qty: 180 TABLET | Refills: 2 | Status: SHIPPED | OUTPATIENT
Start: 2021-07-30 | End: 2022-10-18 | Stop reason: SDUPTHER

## 2021-08-08 DIAGNOSIS — E03.9 HYPOTHYROIDISM, UNSPECIFIED TYPE: ICD-10-CM

## 2021-08-09 RX ORDER — LEVOTHYROXINE SODIUM 137 UG/1
TABLET ORAL
Qty: 90 TABLET | Refills: 1 | Status: SHIPPED | OUTPATIENT
Start: 2021-08-09 | End: 2022-02-07

## 2021-09-03 NOTE — TELEPHONE ENCOUNTER
Last office visit 2/12/20, next office visit 8/3/20.  Last filled in [facility] care by another provider 5/14/19 by [facility] last filled by Dr. Bushra Reaves 12/18/18 room air

## 2021-09-17 ENCOUNTER — TELEPHONE (OUTPATIENT)
Dept: FAMILY MEDICINE CLINIC | Age: 66
End: 2021-09-17

## 2021-09-17 NOTE — TELEPHONE ENCOUNTER
Called pt and confirmed two identifiers.  Have pre-op scheduled with Mirta Morrison NP 09/29/21 at 3:45PM.

## 2021-09-17 NOTE — TELEPHONE ENCOUNTER
Patient called to set up pre-op having surgery on 10/07/2021 no opening for pre-op patient wants the nurse to give her a call.     Best call back #[phone] or [phone]

## 2021-09-29 ENCOUNTER — OFFICE VISIT (OUTPATIENT)
Dept: FAMILY MEDICINE CLINIC | Age: 66
End: 2021-09-29
Payer: MEDICARE

## 2021-09-29 VITALS
TEMPERATURE: 98.5 F | OXYGEN SATURATION: 97 % | HEIGHT: 72 IN | WEIGHT: 186.2 LBS | DIASTOLIC BLOOD PRESSURE: 77 MMHG | HEART RATE: 85 BPM | BODY MASS INDEX: 25.22 KG/M2 | SYSTOLIC BLOOD PRESSURE: 118 MMHG

## 2021-09-29 DIAGNOSIS — G70.00 MYASTHENIA GRAVIS (HCC): ICD-10-CM

## 2021-09-29 DIAGNOSIS — G35 MS (MULTIPLE SCLEROSIS) (HCC): ICD-10-CM

## 2021-09-29 DIAGNOSIS — E11.9 TYPE 2 DIABETES MELLITUS WITHOUT COMPLICATION, WITHOUT LONG-TERM CURRENT USE OF INSULIN (HCC): ICD-10-CM

## 2021-09-29 DIAGNOSIS — Z01.818 PRE-OP EVALUATION: Primary | ICD-10-CM

## 2021-09-29 DIAGNOSIS — E03.9 HYPOTHYROIDISM, UNSPECIFIED TYPE: ICD-10-CM

## 2021-09-29 DIAGNOSIS — F17.200 SMOKER: ICD-10-CM

## 2021-09-29 DIAGNOSIS — E78.5 HYPERLIPIDEMIA LDL GOAL <100: ICD-10-CM

## 2021-09-29 DIAGNOSIS — I10 HTN, GOAL BELOW 140/90: ICD-10-CM

## 2021-09-29 PROCEDURE — G8754 DIAS BP LESS 90: HCPCS | Performed by: NURSE PRACTITIONER

## 2021-09-29 PROCEDURE — G8419 CALC BMI OUT NRM PARAM NOF/U: HCPCS | Performed by: NURSE PRACTITIONER

## 2021-09-29 PROCEDURE — 99212 OFFICE O/P EST SF 10 MIN: CPT | Performed by: NURSE PRACTITIONER

## 2021-09-29 PROCEDURE — G9899 SCRN MAM PERF RSLTS DOC: HCPCS | Performed by: NURSE PRACTITIONER

## 2021-09-29 PROCEDURE — 3017F COLORECTAL CA SCREEN DOC REV: CPT | Performed by: NURSE PRACTITIONER

## 2021-09-29 PROCEDURE — G8427 DOCREV CUR MEDS BY ELIG CLIN: HCPCS | Performed by: NURSE PRACTITIONER

## 2021-09-29 PROCEDURE — G0463 HOSPITAL OUTPT CLINIC VISIT: HCPCS | Performed by: NURSE PRACTITIONER

## 2021-09-29 PROCEDURE — G8752 SYS BP LESS 140: HCPCS | Performed by: NURSE PRACTITIONER

## 2021-09-29 PROCEDURE — G9717 DOC PT DX DEP/BP F/U NT REQ: HCPCS | Performed by: NURSE PRACTITIONER

## 2021-09-29 PROCEDURE — 3051F HG A1C>EQUAL 7.0%<8.0%: CPT | Performed by: NURSE PRACTITIONER

## 2021-09-29 PROCEDURE — 2022F DILAT RTA XM EVC RTNOPTHY: CPT | Performed by: NURSE PRACTITIONER

## 2021-09-29 RX ORDER — DUREZOL 0.5 MG/ML
EMULSION OPHTHALMIC
COMMUNITY
Start: 2021-09-28 | End: 2022-04-28

## 2021-09-29 RX ORDER — OFLOXACIN 3 MG/ML
SOLUTION/ DROPS OPHTHALMIC
COMMUNITY
Start: 2021-09-28 | End: 2021-10-26

## 2021-09-29 NOTE — PROGRESS NOTES
Preoperative Evaluation    Date of Exam: 2021    Samuel Galvin is a 61 y.o. female (:1955) who presents for preoperative evaluation.    Procedure/Surgery:cataract extraction with Vernestine Barling implant in right eye  Date of Procedure/Surgery: 10/8/2021  Surgeon: Dr Oswaldo Garcia: office  Primary Physician: Eddie Berger MD  Latex Allergy: no    Problem List:     Patient Active Problem List    Diagnosis Date Noted    Osteoarthritis of right knee 2021    Thrombocytopenia ([address] 2019    Anemia 2019    Arthritis of left hip 2019    Thyroglossal duct cyst 2018    History of CVA (cerebrovascular accident) 2018    Type 2 diabetes mellitus ([address] 2018    Chronic, continuous use of opioids 2017    Degenerative joint disease (DJD) of hip 2017    Depression     Cervical spinal stenosis 2016    Benign cyst of left breast 2016    Vitamin D deficiency 2016    Myasthenia gravis ([address]     MS (multiple sclerosis) ([address]     Sleep apnea 2010    Endometriosis 2010    Lumbosacral plexopathy 2010    HTN, goal below 140/90 2010    FH: breast cancer 2010    Hyperlipemia 2010    Hypothyroid 2010    Ureteral calculus 2010     Medical History:     Past Medical History:   Diagnosis Date    Arthritis     hip, hands    Benign cyst of left breast 3/21/2016    Blindness and low vision 2004    legally blind from [address]     right eye    Cervical spinal stenosis 2016    Moderate C6-7    Chronic pain     COVID-19 vaccine series completed     PFIZER 3/19/21, 21    Depression     Diabetes mellitus type 2, controlled ([address] 2016    Diet-controlled diabetes mellitus ([address] 2018    Endometriosis 2010    FH: breast cancer 2010    Chart states FH breast/ovary Ca, CAD,DM     History of CVA (cerebrovascular accident) 2018    HTN, goal below 140/90 6/25/2010    CURRENTLY NO MEDICATIONS (7-12-21)    Hypercholesterolemia     Hypothyroid 6/25/2010    Incontinence     Lumbosacral plexopathy 6/25/2010    Migraine     H/O MIGRAINE    MS (multiple sclerosis) [facility] 2004    Myasthenia gravis ([facility] 2011    Sleep apnea 6/25/2010    RESOLVED 5/2019    Stroke ([facility] 2018    RIGHT SIDE WEAKNESS    Ureteral calculus 6/25/2010    Vitamin D deficiency 3/17/2016     Allergies: Allergies   Allergen Reactions    Bee Sting [Sting, Bee] Anaphylaxis     Also allergic to egg products    Penicillins Anaphylaxis    Betadine [Povidone-Iodine] Rash    Egg Itching      Medications:     Current Outpatient Medications   Medication Sig    levothyroxine (SYNTHROID) 137 mcg tablet TAKE 1 TABLET BY MOUTH DAILY BEFORE BREAKFAST    rosuvastatin (CRESTOR) 40 mg tablet TAKE 1 TABLET BY MOUTH EVERY DAY    topiramate (TOPAMAX) 200 mg tablet Take 1 Tablet by mouth two (2) times a day.  traZODone (DESYREL) 50 mg tablet Take 50 mg by mouth nightly.  cyanocobalamin 1,000 mcg tablet TAKE 1 TABLET BY MOUTH EVERY DAY    metFORMIN ER (GLUCOPHAGE XR) 500 mg tablet TAKE 3 TABLETS BY MOUTH DAILY; dose change (Patient taking differently: Take 1,500 mg by mouth nightly. TAKE 3 TABLETS BY MOUTH DAILY; dose change)    PARoxetine (PAXIL) 40 mg tablet TAKE 1 AND 1/2 TABLETS BY MOUTH EVERY NIGHT (Patient taking differently: Take 60 mg by mouth nightly.)    pyridostigmine bromide (MESTINON SR) 180 mg SR tablet TAKE 1 TABLET BY MOUTH TWICE DAILY    pregabalin (Lyrica) 200 mg capsule Take 1 Cap by mouth two (2) times a day. Max Daily Amount: 400 mg.    Gilenya 0.5 mg cap Take 1 Cap by mouth daily. (Patient taking differently: Take 1 Capsule by mouth nightly.)    acetaminophen (TYLENOL) 500 mg tablet Take 500 mg by mouth every six (6) hours as needed for Pain.     OTHER Electric scooter  Diagnosis: Multiple sclerosis  Frequent fall  Gait disorder    Aimovig Autoinjector 140 mg/mL injection ADMINISTER 1 ML(140MG) UNDER THE SKIN EVERY 30 DAYS    ondansetron (ZOFRAN ODT) 4 mg disintegrating tablet DISSOLVE 1 TABLET ON THE TONGUE EVERY 8 HOURS AS NEEDED FOR NAUSEA    glucose blood VI test strips (BLOOD GLUCOSE TEST) strip 100 Each by Does Not Apply route See Admin Instructions. One Touch Ultra Test Strips=Check blood sugar daily dx E11.8    aspirin delayed-release 81 mg tablet Take 1 Tab by mouth every twelve (12) hours. (Patient taking differently: Take 1 Tab by mouth daily.)    dantrolene (DANTRIUM) 100 mg capsule Take 1 Cap by mouth three (3) times daily.  albuterol (PROVENTIL VENTOLIN) 2.5 mg /3 mL (0.083 %) nebulizer solution 3 mL by Nebulization route every six (6) hours as needed for Wheezing.  Menthol-Zinc Oxide (CALMOSEPTINE) 0.44-20.6 % oint Apply 1 Each to affected area daily as needed for Other (thin layer to buttocks for skin irritation).  lidocaine (LIDODERM) 5 % Apply patch to the affected area for 12 hours a day and remove for 12 hours a day.  clindamycin (CLEOCIN) 300 mg capsule Take 300 mg by mouth as needed for PRN Indication (Other) (prior to dental procedures ). Prior to dental procedures    TENS UNIT ELECTRODES by Does Not Apply route. prn     DurezoL 0.05 % ophthalmic emulsion  (Patient not taking: Reported on 9/29/2021)    ofloxacin (FLOXIN) 0.3 % ophthalmic solution  (Patient not taking: Reported on 9/29/2021)    apixaban (ELIQUIS) 2.5 mg tablet Take 1 Tablet by mouth every twelve (12) hours. (Patient not taking: Reported on 9/29/2021)    senna-docusate (PERICOLACE) 8.6-50 mg per tablet Take 1 Tablet by mouth two (2) times daily as needed for Constipation. (Patient not taking: Reported on 9/29/2021)    OTHER 0.25 mL by SubLINGual route daily. CBD OIL (Patient not taking: Reported on 9/29/2021)     No current facility-administered medications for this visit.      Surgical History:     Past Surgical History:   Procedure Laterality Date    HX CARPAL TUNNEL RELEASE Left     HX  SECTION  1986    HX CYST INCISION AND DRAINAGE      HX GI      COLONOSCOPY    HX GYN      ovarian cyst    HX HEENT      removal of thyroglossal duct cyst    HX HIP REPLACEMENT Right 2017    anterior approach    HX KNEE ARTHROSCOPY Right     HX ORTHOPAEDIC  1997    right thumb tendon repair x 2    HX OTHER SURGICAL      Janee Cath x2    HX SMALL BOWEL RESECTION      HX THYROIDECTOMY  1974    HX VASCULAR ACCESS  2006    PORT -A- CATH X 2    NV ABDOMEN SURGERY PROC UNLISTED      bowel resection    NV BREAST SURGERY PROCEDURE UNLISTED      breast cyst-both breasts at different times    NV TOTAL HIP ARTHROPLASTY Left 2019     Social History:     Social History     Socioeconomic History    Marital status:      Spouse name: Not on file    Number of children: Not on file    Years of education: Not on file    Highest education level: Not on file   Tobacco Use    Smoking status: Current Every Day Smoker     Packs/day: 0.50     Years: 30.00     Pack years: 15.00     Types: Cigarettes    Smokeless tobacco: Never Used   Vaping Use    Vaping Use: Never used   Substance and Sexual Activity    Alcohol use: No    Drug use: No    Sexual activity: Not Currently     Social Determinants of Health     Financial Resource Strain:     Difficulty of Paying Living Expenses:    Food Insecurity:     Worried About Running Out of Food in the Last Year:     Ran Out of Food in the Last Year:    Transportation Needs:     Lack of Transportation (Medical):      Lack of Transportation (Non-Medical):    Physical Activity:     Days of Exercise per Week:     Minutes of Exercise per Session:    Stress:     Feeling of Stress :    Social Connections:     Frequency of Communication with Friends and Family:     Frequency of Social Gatherings with Friends and Family:     Attends Voodoo Services:     Active Member of Clubs or Organizations:     Attends Club or Organization Meetings:     Marital Status:        Recent use of: No recent use of aspirin (ASA), NSAIDS or steroids    Tetanus up to date: last tetanus booster within 10 years      Anesthesia Complications: None  History of abnormal bleeding : None  History of Blood Transfusions: no  Health Care Directive or Living Will: no    REVIEW OF SYSTEMS:  Respiratory: positive for COPD, MS    EXAM:   Visit Vitals  /77 (BP 1 Location: Left arm, BP Patient Position: Sitting, BP Cuff Size: Small adult)   Pulse 85   Temp 98.5 °F (36.9 °C) (Oral)   Ht 6' (1.829 m)   Wt 186 lb 3.2 oz (84.5 kg)   LMP 09/01/2010   SpO2 97%   BMI 25.25 kg/m²     General appearance - oriented to person, place, and time  Mental status - alert, oriented to person, place, and time  Eyes - pupils equal and reactive, extraocular eye movements intact  Ears - bilateral TM's and external ear canals normal  Nose - normal and patent, no erythema, discharge or polyps  Mouth - mucous membranes moist, pharynx normal without lesions  Neck - supple, no significant adenopathy  Lymphatics - no palpable lymphadenopathy, no hepatosplenomegaly  Chest - distant breath sounds, with shalow breathingsmokes 1/2 pack of cigarettes per day  Heart - normal rate, regular rhythm, normal S1, S2, no murmurs, rubs, clicks or gallops  Abdomen - soft, nontender, nondistended, no masses or organomegaly  Neurological - screening mental status exam normal  Musculoskeletal, not examined  Extremities - peripheral pulses normal, no pedal edema, no clubbing or cyanosis  Skin - normal coloration and turgor, no rashes, no suspicious skin lesions noted      DIAGNOSTICS:   1. EKG: EKG FINDINGS - normal EKG, normal sinus rhythm  2. CXR: not done  3.  Labs: done 2 months ago    Lab Results   Component Value Date/Time    WBC 4.8 07/12/2021 04:04 PM    HGB 11.0 (L) 07/28/2021 02:30 AM    HCT 42.7 07/12/2021 04:04 PM    PLATELET 415 (L) 02/92/3022 04:04 PM    MCV 93.2 07/12/2021 04:04 PM     Lab Results   Component Value Date/Time    Sodium 142 07/28/2021 02:30 AM    Potassium 4.3 07/28/2021 02:30 AM    Chloride 118 (H) 07/28/2021 02:30 AM    CO2 21 07/28/2021 02:30 AM    Anion gap 3 (L) 07/28/2021 02:30 AM    Glucose 99 07/28/2021 02:30 AM    BUN 16 07/28/2021 02:30 AM    Creatinine 0.82 07/28/2021 02:30 AM    BUN/Creatinine ratio 20 07/28/2021 02:30 AM    GFR est AA >60 07/28/2021 02:30 AM    GFR est non-AA >60 07/28/2021 02:30 AM    Calcium 8.7 07/28/2021 02:30 AM    Bilirubin, total <0.2 02/08/2021 04:22 PM    ALT (SGPT) 8 02/08/2021 04:22 PM    Alk. phosphatase 56 02/08/2021 04:22 PM    Protein, total 6.3 02/08/2021 04:22 PM    Albumin 3.9 02/08/2021 04:22 PM    Globulin 3.3 05/17/2016 04:53 PM    A-G Ratio 1.6 02/08/2021 04:22 PM        IMPRESSION:   MS, myasthenia gravis.  Smoker   No contraindications to planned surgery,     Nakul Cea, NP   9/29/2021

## 2021-09-29 NOTE — PROGRESS NOTES
Chief Complaint   Patient presents with    Pre-op Exam     Cataract 10/07/21     3 most recent PHQ Screens 9/29/2021   PHQ Not Done -   Little interest or pleasure in doing things Several days   Feeling down, depressed, irritable, or hopeless Several days   Total Score PHQ 2 2   Trouble falling or staying asleep, or sleeping too much -   Feeling tired or having little energy -   Poor appetite, weight loss, or overeating -   Feeling bad about yourself - or that you are a failure or have let yourself or your family down -   Trouble concentrating on things such as school, work, reading, or watching TV -   Moving or speaking so slowly that other people could have noticed; or the opposite being so fidgety that others notice -   Thoughts of being better off dead, or hurting yourself in some way -   PHQ 9 Score -   How difficult have these problems made it for you to do your work, take care of your home and get along with others -     Abuse Screening Questionnaire 9/29/2021   Do you ever feel afraid of your partner? N   Are you in a relationship with someone who physically or mentally threatens you? N   Is it safe for you to go home? Y     Visit Vitals  /77 (BP 1 Location: Left arm, BP Patient Position: Sitting, BP Cuff Size: Small adult)   Pulse 85   Temp 98.5 °F (36.9 °C) (Oral)   Ht 6' (1.829 m)   Wt 186 lb 3.2 oz (84.5 kg)   SpO2 97%   BMI 25.25 kg/m²     1. Have you been to the ER, urgent care clinic since your last visit? Hospitalized since your last visit?no    2. Have you seen or consulted any other health care providers outside of the [address] [place] [address] since your last visit? Include any pap smears or colon screening.  Yes EYE

## 2021-10-01 ENCOUNTER — TELEPHONE (OUTPATIENT)
Dept: NEUROLOGY | Age: 66
End: 2021-10-01

## 2021-10-01 NOTE — TELEPHONE ENCOUNTER
Re: Mary ibarra PA request for Aimovig, submitted and awaiting update.  formerly [facility] Key# [id number]

## 2021-10-14 ENCOUNTER — TELEPHONE (OUTPATIENT)
Dept: FAMILY MEDICINE CLINIC | Age: 66
End: 2021-10-14

## 2021-10-14 NOTE — TELEPHONE ENCOUNTER
Called Pt to make Lab appt asked for lab orders to be sent to her address so that she can go to lab [facility] to romy blood work done.

## 2021-10-15 ENCOUNTER — TELEPHONE (OUTPATIENT)
Dept: NEUROLOGY | Age: 66
End: 2021-10-15

## 2021-10-15 NOTE — TELEPHONE ENCOUNTER
----- Message from Kacie Alves sent at 10/15/2021 10:04 AM EDT -----  Regarding: /telephone  General Message/Vendor Calls    Caller's first and last name:self      Reason for call: jury duty      Renay Yancey required yes/no and why:yes      Best contact number(s):[phone]      Details to clarify the request:Pt will need a letter explaining why she can not do jury duty due to her health. The letter is needed by 10/20/2021 and pt will like a call back.       Kacie Alves

## 2021-10-20 NOTE — TELEPHONE ENCOUNTER
Called and spoke with patient. Verified name/. Notified patient that Dr. Lisa Tay has written her letter for jury duty. Patient stated she will come to the office and  the letter today. Notified patient I will have letter waiting at the . Patient verbalized understanding.

## 2021-10-21 LAB
25(OH)D3+25(OH)D2 SERPL-MCNC: 21.3 NG/ML (ref 30–100)
ALBUMIN SERPL-MCNC: 4.5 G/DL (ref 3.8–4.8)
ALBUMIN/CREAT UR: 31 MG/G CREAT (ref 0–29)
ALBUMIN/GLOB SERPL: 1.8 {RATIO} (ref 1.2–2.2)
ALP SERPL-CCNC: 67 IU/L (ref 44–121)
ALT SERPL-CCNC: 7 IU/L (ref 0–32)
AST SERPL-CCNC: 13 IU/L (ref 0–40)
BILIRUB SERPL-MCNC: <0.2 MG/DL (ref 0–1.2)
BUN SERPL-MCNC: 11 MG/DL (ref 8–27)
BUN/CREAT SERPL: 13 (ref 12–28)
CALCIUM SERPL-MCNC: 9.9 MG/DL (ref 8.7–10.3)
CHLORIDE SERPL-SCNC: 108 MMOL/L (ref 96–106)
CHOLEST SERPL-MCNC: 157 MG/DL (ref 100–199)
CO2 SERPL-SCNC: 19 MMOL/L (ref 20–29)
CREAT SERPL-MCNC: 0.87 MG/DL (ref 0.57–1)
CREAT UR-MCNC: 285.2 MG/DL
ERYTHROCYTE [DISTWIDTH] IN BLOOD BY AUTOMATED COUNT: 14.1 % (ref 11.7–15.4)
GLOBULIN SER CALC-MCNC: 2.5 G/DL (ref 1.5–4.5)
GLUCOSE SERPL-MCNC: 106 MG/DL (ref 65–99)
HCT VFR BLD AUTO: 42.6 % (ref 34–46.6)
HDLC SERPL-MCNC: 52 MG/DL
HGB BLD-MCNC: 14.1 G/DL (ref 11.1–15.9)
IMP & REVIEW OF LAB RESULTS: NORMAL
LDLC SERPL CALC-MCNC: 77 MG/DL (ref 0–99)
MCH RBC QN AUTO: 29.7 PG (ref 26.6–33)
MCHC RBC AUTO-ENTMCNC: 33.1 G/DL (ref 31.5–35.7)
MCV RBC AUTO: 90 FL (ref 79–97)
MICROALBUMIN UR-MCNC: 88.2 UG/ML
PLATELET # BLD AUTO: 112 X10E3/UL (ref 150–450)
POTASSIUM SERPL-SCNC: 4.2 MMOL/L (ref 3.5–5.2)
PROT SERPL-MCNC: 7 G/DL (ref 6–8.5)
RBC # BLD AUTO: 4.74 X10E6/UL (ref 3.77–5.28)
SODIUM SERPL-SCNC: 143 MMOL/L (ref 134–144)
T4 SERPL-MCNC: 9.1 UG/DL (ref 4.5–12)
TRIGL SERPL-MCNC: 168 MG/DL (ref 0–149)
TSH SERPL DL<=0.005 MIU/L-ACNC: 1.96 UIU/ML (ref 0.45–4.5)
VLDLC SERPL CALC-MCNC: 28 MG/DL (ref 5–40)
WBC # BLD AUTO: 4.9 X10E3/UL (ref 3.4–10.8)

## 2021-10-26 ENCOUNTER — OFFICE VISIT (OUTPATIENT)
Dept: FAMILY MEDICINE CLINIC | Age: 66
End: 2021-10-26
Payer: MEDICARE

## 2021-10-26 ENCOUNTER — OFFICE VISIT (OUTPATIENT)
Dept: NEUROLOGY | Age: 66
End: 2021-10-26
Payer: MEDICARE

## 2021-10-26 VITALS
BODY MASS INDEX: 25.32 KG/M2 | OXYGEN SATURATION: 98 % | RESPIRATION RATE: 18 BRPM | SYSTOLIC BLOOD PRESSURE: 109 MMHG | HEART RATE: 72 BPM | TEMPERATURE: 97.6 F | DIASTOLIC BLOOD PRESSURE: 67 MMHG | HEIGHT: 72 IN | WEIGHT: 186.9 LBS

## 2021-10-26 VITALS
SYSTOLIC BLOOD PRESSURE: 140 MMHG | HEART RATE: 83 BPM | DIASTOLIC BLOOD PRESSURE: 72 MMHG | WEIGHT: 186 LBS | RESPIRATION RATE: 16 BRPM | BODY MASS INDEX: 25.23 KG/M2 | OXYGEN SATURATION: 98 %

## 2021-10-26 DIAGNOSIS — G43.019 INTRACTABLE MIGRAINE WITHOUT AURA AND WITHOUT STATUS MIGRAINOSUS: ICD-10-CM

## 2021-10-26 DIAGNOSIS — E55.9 VITAMIN D DEFICIENCY: ICD-10-CM

## 2021-10-26 DIAGNOSIS — R26.9 GAIT DISORDER: ICD-10-CM

## 2021-10-26 DIAGNOSIS — R25.1 TREMOR: ICD-10-CM

## 2021-10-26 DIAGNOSIS — G43.009 MIGRAINE WITHOUT AURA AND WITHOUT STATUS MIGRAINOSUS, NOT INTRACTABLE: ICD-10-CM

## 2021-10-26 DIAGNOSIS — E11.9 TYPE 2 DIABETES MELLITUS WITHOUT COMPLICATION, WITHOUT LONG-TERM CURRENT USE OF INSULIN (HCC): ICD-10-CM

## 2021-10-26 DIAGNOSIS — G35 MS (MULTIPLE SCLEROSIS) (HCC): Primary | ICD-10-CM

## 2021-10-26 DIAGNOSIS — F98.8 ATTENTION DEFICIT DISORDER (ADD) WITHOUT HYPERACTIVITY: ICD-10-CM

## 2021-10-26 DIAGNOSIS — L84 CORNS AND CALLUS: ICD-10-CM

## 2021-10-26 DIAGNOSIS — I10 HTN, GOAL BELOW 140/90: ICD-10-CM

## 2021-10-26 DIAGNOSIS — G61.81 CIDP (CHRONIC INFLAMMATORY DEMYELINATING POLYNEUROPATHY) (HCC): ICD-10-CM

## 2021-10-26 DIAGNOSIS — G70.00 MYASTHENIA GRAVIS (HCC): ICD-10-CM

## 2021-10-26 DIAGNOSIS — R29.898 WEAKNESS OF BOTH HANDS: ICD-10-CM

## 2021-10-26 DIAGNOSIS — Z12.4 CERVICAL CANCER SCREENING: ICD-10-CM

## 2021-10-26 DIAGNOSIS — G89.4 CHRONIC PAIN SYNDROME: ICD-10-CM

## 2021-10-26 DIAGNOSIS — R29.6 FREQUENT FALLS: ICD-10-CM

## 2021-10-26 DIAGNOSIS — Z00.00 ENCOUNTER FOR MEDICARE ANNUAL WELLNESS EXAM: Primary | ICD-10-CM

## 2021-10-26 LAB — HBA1C MFR BLD HPLC: 7 %

## 2021-10-26 PROCEDURE — 99214 OFFICE O/P EST MOD 30 MIN: CPT | Performed by: PSYCHIATRY & NEUROLOGY

## 2021-10-26 PROCEDURE — G8419 CALC BMI OUT NRM PARAM NOF/U: HCPCS | Performed by: PSYCHIATRY & NEUROLOGY

## 2021-10-26 PROCEDURE — G8752 SYS BP LESS 140: HCPCS | Performed by: FAMILY MEDICINE

## 2021-10-26 PROCEDURE — G8427 DOCREV CUR MEDS BY ELIG CLIN: HCPCS | Performed by: FAMILY MEDICINE

## 2021-10-26 PROCEDURE — G8754 DIAS BP LESS 90: HCPCS | Performed by: FAMILY MEDICINE

## 2021-10-26 PROCEDURE — G8754 DIAS BP LESS 90: HCPCS | Performed by: PSYCHIATRY & NEUROLOGY

## 2021-10-26 PROCEDURE — 3017F COLORECTAL CA SCREEN DOC REV: CPT | Performed by: FAMILY MEDICINE

## 2021-10-26 PROCEDURE — G9717 DOC PT DX DEP/BP F/U NT REQ: HCPCS | Performed by: FAMILY MEDICINE

## 2021-10-26 PROCEDURE — G8419 CALC BMI OUT NRM PARAM NOF/U: HCPCS | Performed by: FAMILY MEDICINE

## 2021-10-26 PROCEDURE — G8427 DOCREV CUR MEDS BY ELIG CLIN: HCPCS | Performed by: PSYCHIATRY & NEUROLOGY

## 2021-10-26 PROCEDURE — 99214 OFFICE O/P EST MOD 30 MIN: CPT | Performed by: FAMILY MEDICINE

## 2021-10-26 PROCEDURE — 83036 HEMOGLOBIN GLYCOSYLATED A1C: CPT | Performed by: FAMILY MEDICINE

## 2021-10-26 PROCEDURE — G9899 SCRN MAM PERF RSLTS DOC: HCPCS | Performed by: PSYCHIATRY & NEUROLOGY

## 2021-10-26 PROCEDURE — 3017F COLORECTAL CA SCREEN DOC REV: CPT | Performed by: PSYCHIATRY & NEUROLOGY

## 2021-10-26 PROCEDURE — G8752 SYS BP LESS 140: HCPCS | Performed by: PSYCHIATRY & NEUROLOGY

## 2021-10-26 PROCEDURE — G0439 PPPS, SUBSEQ VISIT: HCPCS | Performed by: FAMILY MEDICINE

## 2021-10-26 PROCEDURE — G9899 SCRN MAM PERF RSLTS DOC: HCPCS | Performed by: FAMILY MEDICINE

## 2021-10-26 PROCEDURE — G9717 DOC PT DX DEP/BP F/U NT REQ: HCPCS | Performed by: PSYCHIATRY & NEUROLOGY

## 2021-10-26 PROCEDURE — G0463 HOSPITAL OUTPT CLINIC VISIT: HCPCS | Performed by: FAMILY MEDICINE

## 2021-10-26 PROCEDURE — 2022F DILAT RTA XM EVC RTNOPTHY: CPT | Performed by: FAMILY MEDICINE

## 2021-10-26 PROCEDURE — 3051F HG A1C>EQUAL 7.0%<8.0%: CPT | Performed by: FAMILY MEDICINE

## 2021-10-26 RX ORDER — ASPIRIN 325 MG
TABLET, DELAYED RELEASE (ENTERIC COATED) ORAL
Qty: 8 CAPSULE | Refills: 0 | Status: SHIPPED | OUTPATIENT
Start: 2021-10-26 | End: 2022-04-28

## 2021-10-26 RX ORDER — ERENUMAB-AOOE 140 MG/ML
INJECTION, SOLUTION SUBCUTANEOUS
Qty: 3 ML | Refills: 5 | Status: SHIPPED | OUTPATIENT
Start: 2021-10-26 | End: 2021-11-30 | Stop reason: SDUPTHER

## 2021-10-26 NOTE — PROGRESS NOTES
This is the Subsequent Medicare Annual Wellness Exam, performed 12 months or more after the Initial AWV or the last Subsequent AWV    I have reviewed the patient's medical history in detail and updated the computerized patient record. Assessment/Plan   Education and counseling provided:  Are appropriate based on today's review and evaluation    1. Encounter for Medicare annual wellness exam  2. HTN, goal below 140/90  3. Type 2 diabetes mellitus without complication, without long-term current use of insulin (HCC)  -     AMB POC HEMOGLOBIN A1C  4. Vitamin D deficiency  -     cholecalciferol (VITAMIN D3) (50,000 UNITS /1250 MCG) capsule; Take 1 capsule once a week for the next 8 weeks then switch to OTC vitamin D3 2000 units daily, Normal, Disp-8 Capsule, R-0  5. Corns and callus  -     REFERRAL TO PODIATRY  6. Cervical cancer screening  -     PAP IG, APTIMA HPV AND RFX 16/18,45 ([id number]);  Future       Depression Risk Factor Screening     3 most recent PHQ Screens 10/26/2021   PHQ Not Done -   Little interest or pleasure in doing things Not at all   Feeling down, depressed, irritable, or hopeless Several days   Total Score PHQ 2 1   Trouble falling or staying asleep, or sleeping too much -   Feeling tired or having little energy -   Poor appetite, weight loss, or overeating -   Feeling bad about yourself - or that you are a failure or have let yourself or your family down -   Trouble concentrating on things such as school, work, reading, or watching TV -   Moving or speaking so slowly that other people could have noticed; or the opposite being so fidgety that others notice -   Thoughts of being better off dead, or hurting yourself in some way -   PHQ 9 Score -   How difficult have these problems made it for you to do your work, take care of your home and get along with others -       Alcohol Risk Screen    Do you average more than 1 drink per night or more than 7 drinks a week:  No    On any one occasion in the past three months have you have had more than 3 drinks containing alcohol:  No        Functional Ability and Level of Safety    Hearing: Hearing is good. Activities of Daily Living: The home contains: no safety equipment. Patient needs help with:  transportation and walking      Ambulation: with difficulty, uses a cane     Fall Risk:  Fall Risk Assessment, last 12 mths 9/29/2021   Able to walk? Yes   Fall in past 12 months? 1   Number of falls in past 12 months 2   Fall with injury?  -      Abuse Screen:  Patient is not abused       Cognitive Screening    Has your family/caregiver stated any concerns about your memory: no       Health Maintenance Due     Health Maintenance Due   Topic Date Due    Pneumococcal 0-64 years (1 of 2 - PPSV23) Never done    Shingrix Vaccine Age 50> (1 of 2) Never done    Cervical cancer screen  01/24/2019    Foot Exam Q1  07/05/2020    Eye Exam Retinal or Dilated  03/28/2021    Medicare Yearly Exam  07/22/2021    Flu Vaccine (1) 09/01/2021       Patient Care Team   Patient Care Team:  Krish Luque MD as PCP - General (Family Medicine)  Krish Luque MD as PCP - [facility] Empaneled Provider  Robin Orozco RN as Nurse Navigator (Family Medicine)  Yolande Nye MD (Orthopedic Surgery)  Rehan Rodriguez MD as Physician (Neurology)  Yazmin Barfield OD as Physician (Optometry)  Alma Castro MD as Physician (Otolaryngology)  Tatum Erickson MD as Physician (Orthopedic Surgery)  Enmanuel Gunter MD as Physician (Cardiology)  Ifeanyi Catherine MD (Pain Management)  Genie Mcnally DPM (imagine)  Alan Bowden MD (Orthopedic Surgery)    History     Patient Active Problem List   Diagnosis Code    Sleep apnea G47.30    Endometriosis N80.9    Lumbosacral plexopathy G54.1    HTN, goal below 140/90 I10    FH: breast cancer Z80.3    Hyperlipemia E78.5    Hypothyroid E03.9    Ureteral calculus N20.1    MS (multiple sclerosis) [facility] G35    Myasthenia gravis ([address] G70.00    Vitamin D deficiency E55.9    Benign cyst of left breast N60.02    Cervical spinal stenosis M48.02    Depression F32. A    Degenerative joint disease (DJD) of hip M16.9    Chronic, continuous use of opioids F11.90    Type 2 diabetes mellitus (HCC) E11.9    History of CVA (cerebrovascular accident) Z80.78    Thyroglossal duct cyst Q89.2    Arthritis of left hip M16.12    Thrombocytopenia (HCC) D69.6    Anemia D64.9    Osteoarthritis of right knee M17.11     Past Medical History:   Diagnosis Date    Arthritis     hip, hands    Benign cyst of left breast 3/21/2016    Blindness and low vision     legally blind from [address]    Cataract     right eye    Cervical spinal stenosis 2016    Moderate C6-7    Chronic pain     COVID-19 vaccine series completed     PFIZER 3/19/21, 21    Depression     Diabetes mellitus type 2, controlled ([address] 2016    Diet-controlled diabetes mellitus ([address] 2018    Endometriosis 2010    FH: breast cancer 2010    Chart states FH breast/ovary Ca, CAD,DM     History of CVA (cerebrovascular accident) 2018    HTN, goal below 140/90 2010    CURRENTLY NO MEDICATIONS (21)    Hypercholesterolemia     Hypothyroid 2010    Incontinence     Lumbosacral plexopathy 2010    Migraine     H/O MIGRAINE    MS (multiple sclerosis) ([address]     Myasthenia gravis ([address]     Sleep apnea 2010    RESOLVED 2019    Stroke ([address]     RIGHT SIDE WEAKNESS    Ureteral calculus 2010    Vitamin D deficiency 3/17/2016      Past Surgical History:   Procedure Laterality Date    HX CARPAL TUNNEL RELEASE Left     HX CATARACT REMOVAL Right     HX  SECTION  1986    HX CYST INCISION AND DRAINAGE      HX GI      COLONOSCOPY    HX GYN      ovarian cyst    HX HEENT      removal of thyroglossal duct cyst    HX HIP REPLACEMENT Right 2017    anterior approach    HX KNEE ARTHROSCOPY Right 2/98    HX ORTHOPAEDIC  1997    right thumb tendon repair x 2    HX OTHER SURGICAL      Janee Cath x2    HX SMALL BOWEL RESECTION      HX THYROIDECTOMY  1974    HX VASCULAR ACCESS  2006    PORT -A- CATH X 2    DC ABDOMEN SURGERY PROC UNLISTED  9/01    bowel resection    DC BREAST SURGERY PROCEDURE UNLISTED  2006    breast cyst-both breasts at different times    DC TOTAL HIP ARTHROPLASTY Left 2019     Current Outpatient Medications   Medication Sig Dispense Refill    cholecalciferol (VITAMIN D3) (50,000 UNITS /1250 MCG) capsule Take 1 capsule once a week for the next 8 weeks then switch to OTC vitamin D3 2000 units daily 8 Capsule 0    DurezoL 0.05 % ophthalmic emulsion       levothyroxine (SYNTHROID) 137 mcg tablet TAKE 1 TABLET BY MOUTH DAILY BEFORE BREAKFAST 90 Tablet 1    rosuvastatin (CRESTOR) 40 mg tablet TAKE 1 TABLET BY MOUTH EVERY DAY 90 Tablet 1    topiramate (TOPAMAX) 200 mg tablet Take 1 Tablet by mouth two (2) times a day. 180 Tablet 2    traZODone (DESYREL) 50 mg tablet Take 50 mg by mouth nightly.  cyanocobalamin 1,000 mcg tablet TAKE 1 TABLET BY MOUTH EVERY DAY 30 Tablet 2    metFORMIN ER (GLUCOPHAGE XR) 500 mg tablet TAKE 3 TABLETS BY MOUTH DAILY; dose change (Patient taking differently: Take 1,500 mg by mouth nightly. TAKE 3 TABLETS BY MOUTH DAILY; dose change) 270 Tablet 1    PARoxetine (PAXIL) 40 mg tablet TAKE 1 AND 1/2 TABLETS BY MOUTH EVERY NIGHT (Patient taking differently: Take 60 mg by mouth nightly.) 135 Tab 3    pyridostigmine bromide (MESTINON SR) 180 mg SR tablet TAKE 1 TABLET BY MOUTH TWICE DAILY 180 Tab 0    pregabalin (Lyrica) 200 mg capsule Take 1 Cap by mouth two (2) times a day. Max Daily Amount: 400 mg. 180 Cap 4    Gilenya 0.5 mg cap Take 1 Cap by mouth daily. (Patient taking differently: Take 1 Capsule by mouth nightly.) 90 Cap 3    acetaminophen (TYLENOL) 500 mg tablet Take 500 mg by mouth every six (6) hours as needed for Pain.       OTHER Electric scooter  Diagnosis: Multiple sclerosis  Frequent fall  Gait disorder 1 Units 0    Aimovig Autoinjector 140 mg/mL injection ADMINISTER 1 ML(140MG) UNDER THE SKIN EVERY 30 DAYS 3 mL 3    ondansetron (ZOFRAN ODT) 4 mg disintegrating tablet DISSOLVE 1 TABLET ON THE TONGUE EVERY 8 HOURS AS NEEDED FOR NAUSEA 15 Tab 0    glucose blood VI test strips (BLOOD GLUCOSE TEST) strip 100 Each by Does Not Apply route See Admin Instructions. One Touch Ultra Test Strips=Check blood sugar daily dx E11.8 100 Strip 1    aspirin delayed-release 81 mg tablet Take 1 Tab by mouth every twelve (12) hours. (Patient taking differently: Take 1 Tab by mouth daily.) 60 Tab 0    OTHER 0.25 mL by SubLINGual route daily. CBD OIL       dantrolene (DANTRIUM) 100 mg capsule Take 1 Cap by mouth three (3) times daily. 90 Cap 3    albuterol (PROVENTIL VENTOLIN) 2.5 mg /3 mL (0.083 %) nebulizer solution 3 mL by Nebulization route every six (6) hours as needed for Wheezing. 30 Each 0    Menthol-Zinc Oxide (CALMOSEPTINE) 0.44-20.6 % oint Apply 1 Each to affected area daily as needed for Other (thin layer to buttocks for skin irritation).  lidocaine (LIDODERM) 5 % Apply patch to the affected area for 12 hours a day and remove for 12 hours a day. 30 Each 3    TENS UNIT ELECTRODES by Does Not Apply route.  prn        Allergies   Allergen Reactions    Bee Sting [Sting, Bee] Anaphylaxis     Also allergic to egg products    Penicillins Anaphylaxis    Betadine [Povidone-Iodine] Rash    Egg Itching       Family History   Problem Relation Age of Onset    Arthritis-osteo Mother     Diabetes Mother     Elevated Lipids Mother     Headache Mother     Heart Disease Mother     Hypertension Mother     Stroke Mother     Neuropathy Mother     Colon Cancer Mother     Diabetes Father     Elevated Lipids Father     Heart Disease Father     Hypertension Father     Diabetes Sister     Elevated Lipids Sister     Headache Sister    [place] Hypertension Sister     Migraines Sister     Cancer Sister 62        breast ca W/METS TO BRAIN    Breast Cancer Sister 62    Diabetes Brother     Elevated Lipids Brother     Hypertension Brother     Asthma Son     Thyroid Disease Son         Hermila Remak    Sleep Apnea Son     Breast Cancer Maternal Grandmother 54    Diabetes Sister     Neuropathy Sister     Anesth Problems Neg Hx      Social History     Tobacco Use    Smoking status: Current Every Day Smoker     Packs/day: 0.50     Years: 30.00     Pack years: 15.00     Types: Cigarettes    Smokeless tobacco: Never Used   Substance Use Topics    Alcohol use: No         Nickie Eddy MD

## 2021-10-26 NOTE — PROGRESS NOTES
Chief Complaint   Patient presents with   Otis [facility] Exam       1. \"Have you been to the ER, urgent care clinic since your last visit? Hospitalized since your last visit? \" No    2. \"Have you seen or consulted any other health care providers outside of the [address] [place] [address] since your last visit? \" Yes When: 10/21 VIE - cataract removal    3. For patients over 45: Has the patient had a colonoscopy? Yes, but HM not satisfied. Rooming MA/LPN to request most recent results     If the patient is female:    4. For patients over 40: Has the patient had a mammogram? Yes, but HM not satisfied. Rooming MA/LPN to request most recent results    5. For patients over 21: Has the patient had a pap smear?  No    3 most recent PHQ Screens 10/26/2021   PHQ Not Done -   Little interest or pleasure in doing things Not at all   Feeling down, depressed, irritable, or hopeless Several days   Total Score PHQ 2 1   Trouble falling or staying asleep, or sleeping too much -   Feeling tired or having little energy -   Poor appetite, weight loss, or overeating -   Feeling bad about yourself - or that you are a failure or have let yourself or your family down -   Trouble concentrating on things such as school, work, reading, or watching TV -   Moving or speaking so slowly that other people could have noticed; or the opposite being so fidgety that others notice -   Thoughts of being better off dead, or hurting yourself in some way -   PHQ 9 Score -   How difficult have these problems made it for you to do your work, take care of your home and get along with others -

## 2021-10-26 NOTE — PROGRESS NOTES
Patient Name: Aidan Myers   MRN: [id number]    Elli Shrestha is a 61 y.o. female who presents with the following:     A1c remains stable at 7.0. Recent labs notable for low vitamin D. Patient would like a second opinion of a different podiatrist that she has ongoing corns and calluses which are painful. Was told not to soak them and instead follow-up every 3 months with her podiatrist for scraping them off. States that they are painful. Due for Pap smear today. Lab Results   Component Value Date/Time    Hemoglobin A1c 7.0 (H) 07/12/2021 04:04 PM    Hemoglobin A1c (POC) 7.0 10/26/2021 10:25 AM     Review of Systems   Constitutional: Negative for fever, malaise/fatigue and weight loss. Respiratory: Negative for cough, hemoptysis, shortness of breath and wheezing. Cardiovascular: Negative for chest pain, palpitations, leg swelling and PND. Gastrointestinal: Negative for abdominal pain, constipation, diarrhea, nausea and vomiting. The patient's medications, allergies, past medical history, surgical history, family history and social history were reviewed and updated where appropriate. Current Outpatient Medications:     DurezoL 0.05 % ophthalmic emulsion, , Disp: , Rfl:     levothyroxine (SYNTHROID) 137 mcg tablet, TAKE 1 TABLET BY MOUTH DAILY BEFORE BREAKFAST, Disp: 90 Tablet, Rfl: 1    rosuvastatin (CRESTOR) 40 mg tablet, TAKE 1 TABLET BY MOUTH EVERY DAY, Disp: 90 Tablet, Rfl: 1    topiramate (TOPAMAX) 200 mg tablet, Take 1 Tablet by mouth two (2) times a day., Disp: 180 Tablet, Rfl: 2    traZODone (DESYREL) 50 mg tablet, Take 50 mg by mouth nightly., Disp: , Rfl:     cyanocobalamin 1,000 mcg tablet, TAKE 1 TABLET BY MOUTH EVERY DAY, Disp: 30 Tablet, Rfl: 2    metFORMIN ER (GLUCOPHAGE XR) 500 mg tablet, TAKE 3 TABLETS BY MOUTH DAILY; dose change (Patient taking differently: Take 1,500 mg by mouth nightly.  TAKE 3 TABLETS BY MOUTH DAILY; dose change), Disp: 270 Tablet, Rfl: 1    PARoxetine (PAXIL) 40 mg tablet, TAKE 1 AND 1/2 TABLETS BY MOUTH EVERY NIGHT (Patient taking differently: Take 60 mg by mouth nightly.), Disp: 135 Tab, Rfl: 3    pyridostigmine bromide (MESTINON SR) 180 mg SR tablet, TAKE 1 TABLET BY MOUTH TWICE DAILY, Disp: 180 Tab, Rfl: 0    pregabalin (Lyrica) 200 mg capsule, Take 1 Cap by mouth two (2) times a day. Max Daily Amount: 400 mg., Disp: 180 Cap, Rfl: 4    Gilenya 0.5 mg cap, Take 1 Cap by mouth daily. (Patient taking differently: Take 1 Capsule by mouth nightly.), Disp: 90 Cap, Rfl: 3    acetaminophen (TYLENOL) 500 mg tablet, Take 500 mg by mouth every six (6) hours as needed for Pain., Disp: , Rfl:     OTHER, Electric scooter Diagnosis: Multiple sclerosis Frequent fall Gait disorder, Disp: 1 Units, Rfl: 0    Aimovig Autoinjector 140 mg/mL injection, ADMINISTER 1 ML(140MG) UNDER THE SKIN EVERY 30 DAYS, Disp: 3 mL, Rfl: 3    ondansetron (ZOFRAN ODT) 4 mg disintegrating tablet, DISSOLVE 1 TABLET ON THE TONGUE EVERY 8 HOURS AS NEEDED FOR NAUSEA, Disp: 15 Tab, Rfl: 0    glucose blood VI test strips (BLOOD GLUCOSE TEST) strip, 100 Each by Does Not Apply route See Admin Instructions. One Touch Ultra Test Strips=Check blood sugar daily dx E11.8, Disp: 100 Strip, Rfl: 1    aspirin delayed-release 81 mg tablet, Take 1 Tab by mouth every twelve (12) hours. (Patient taking differently: Take 1 Tab by mouth daily.), Disp: 60 Tab, Rfl: 0    OTHER, 0.25 mL by SubLINGual route daily. CBD OIL , Disp: , Rfl:     dantrolene (DANTRIUM) 100 mg capsule, Take 1 Cap by mouth three (3) times daily. , Disp: 90 Cap, Rfl: 3    albuterol (PROVENTIL VENTOLIN) 2.5 mg /3 mL (0.083 %) nebulizer solution, 3 mL by Nebulization route every six (6) hours as needed for Wheezing., Disp: 30 Each, Rfl: 0    Menthol-Zinc Oxide (CALMOSEPTINE) 0.44-20.6 % oint, Apply 1 Each to affected area daily as needed for Other (thin layer to buttocks for skin irritation). , Disp: , Rfl:     lidocaine (LIDODERM) 5 %, Apply patch to the affected area for 12 hours a day and remove for 12 hours a day., Disp: 30 Each, Rfl: 3    TENS UNIT ELECTRODES, by Does Not Apply route. prn , Disp: , Rfl:     Allergies   Allergen Reactions    Bee Sting [Sting, Bee] Anaphylaxis     Also allergic to egg products    Penicillins Anaphylaxis    Betadine [Povidone-Iodine] Rash    Egg Itching         OBJECTIVE    Visit Vitals  /67 (BP 1 Location: Right arm, BP Patient Position: Sitting, BP Cuff Size: Adult)   Pulse 72   Temp 97.6 °F (36.4 °C) (Temporal)   Resp 18   Ht 6' (1.829 m)   Wt 186 lb 14.4 oz (84.8 kg)   LMP 09/01/2010   SpO2 98%   BMI 25.35 kg/m²       Physical Exam  Vitals and nursing note reviewed. Constitutional:       General: She is not in acute distress. Appearance: Normal appearance. She is not toxic-appearing. HENT:      Head: Normocephalic and atraumatic. Eyes:      Pupils: Pupils are equal, round, and reactive to light. Pulmonary:      Effort: Pulmonary effort is normal.   Musculoskeletal:         General: Normal range of motion. Skin:     General: Skin is warm and dry. Comments: Notable corns/calluses on bottom of both feet. Neurological:      Mental Status: She is alert. Mental status is at baseline. Psychiatric:         Mood and Affect: Mood normal.         Behavior: Behavior normal.     Pelvic exam: VULVA: normal appearing vulva with no masses, tenderness or lesions, VAGINA: normal appearing vagina with normal color and discharge, no lesions, CERVIX: normal appearing cervix without discharge or lesions, UTERUS: uterus is normal size, shape, consistency and nontender, ADNEXA: normal adnexa in size, nontender and no masses, PAP: Pap smear done today, HPV test.        ASSESSMENT AND PLAN  Goldy Galvin is a 61 y.o. female who presents today for:    1. Encounter for Medicare annual wellness exam    2. HTN, goal below 140/90  Stable, continue current treatment.     3. Type 2 diabetes mellitus without complication, without long-term current use of insulin (HCC)  Stable, continue current treatment. - AMB POC HEMOGLOBIN A1C    4. Vitamin D deficiency  - cholecalciferol (VITAMIN D3) (50,000 UNITS /1250 MCG) capsule; Take 1 capsule once a week for the next 8 weeks then switch to OTC vitamin D3 2000 units daily  Dispense: 8 Capsule; Refill: 0    5. Corns and callus  Recommend 2nd opinion with another podiatrist.  - REFERRAL TO PODIATRY    6. Cervical cancer screening  If normal, this would be her last pap smear.  - PAP IG, APTIMA HPV AND RFX 16/18,45 ([id number]); Future       Medications Discontinued During This Encounter   Medication Reason    clindamycin (CLEOCIN) 300 mg capsule LIST CLEANUP    ofloxacin (FLOXIN) 0.3 % ophthalmic solution LIST CLEANUP    senna-docusate (PERICOLACE) 8.6-50 mg per tablet LIST CLEANUP    apixaban (ELIQUIS) 2.5 mg tablet LIST CLEANUP           Treatment risks/benefits/costs/interactions/alternatives discussed with patient. Advised patient to call back or return to office if symptoms worsen/change/persist. If patient cannot reach us or should anything more severe/urgent arise he/she should proceed directly to the nearest emergency department. Discussed expected course/resolution/complications of diagnosis in detail with patient. Patient expressed understanding with the diagnosis and plan. This dictation may have been completed with Dragon, the computer voice recognition software. Unanticipated grammatical, syntax, homophones, and other interpretive errors are sometimes inadvertently transcribed by the computer software. Please disregard any errors that have escaped final proofreading. Brandon Jenkins M.D.

## 2021-10-26 NOTE — PROGRESS NOTES
Neurology Progress Note    NAME:  Raimundo Galvin   :   1955   MRN:   [id number]     Date/Time:  10/26/2021  Subjective:    Raimundo Galvin is a 61 y.o. female here today for follow-up for MS, MG, chronic pain syndrome, headaches, CVA, difficulty walking, drowsiness, fall. Patient was accompanied by her son to the visit  Patient says she had cataract surgery on right eye and also right knee surgery since the last visit. Both surgeries went well according to patient  She says she had 1 fall since the last visit. She says she experiences intermittent weakness. Fine feeling in her finger has gone. She noted that she is still having occasional dizziness with vertigo, she says that has decreased hearing, with nausea. She says her vertigo and nausea have improved. As previously stated, patient will need intermittent physical and speech therapy because of her condition also occupational  She says she is still having increasing left arm pain and difficulty ambulating. She also having thigh numbness of the extremities. .  Patient said that her eyes tend to be weak and she sees double, double vision usually associated with fatigue. She continues to have a lot of pain but not as much as before, pain is sharp in nature, diffuse, burning sensation that goes up to the arms causing numbness and tingling sensation and weakness of the hands, down to the legs causing  numbness and tingling sensation of the legs with weakness of the legs. Patient says headache waxes and wanes,it is mostly frontal, throbbing in nature, with occasional sharp pain from the back of the head, headache associated with dizziness, nausea, blurry vision, double vision, photophobia, phonophobia. She says she has a lot of muscle spasms mostly in the back. She denies loss of consciousness. Says dysphagia has improved. She  continues to smoke cigarettes.   Review of Systems - General ROS: positive for  - fatigue, night sweats and sleep disturbance  Psychological ROS: positive for - anxiety, concentration difficulties, depression, mood swings and sleep disturbances  Ophthalmic ROS: positive for - blurry vision, decreased vision, double vision and photophobia  ENT ROS: positive for - headaches, tinnitus, vertigo and visual changes  Allergy and Immunology ROS: negative  Hematological and Lymphatic ROS: negative  Endocrine ROS: negative  Respiratory ROS: no cough, shortness of breath, or wheezing  Cardiovascular ROS: no chest pain or dyspnea on exertion  Gastrointestinal ROS: no abdominal pain, change in bowel habits, or black or bloody stools  Genito-Urinary ROS: no dysuria, trouble voiding, or hematuria  Musculoskeletal ROS: positive for - gait disturbance, joint pain, joint stiffness, joint swelling and muscle pain  Neurological ROS: positive for - dizziness, gait disturbance, headaches, impaired coordination/balance, numbness/tingling, speech problems, tremors, visual changes and weakness  Dermatological ROS: negative           Medications reviewed:  Current Outpatient Medications   Medication Sig Dispense Refill    cholecalciferol (VITAMIN D3) (50,000 UNITS /1250 MCG) capsule Take 1 capsule once a week for the next 8 weeks then switch to OTC vitamin D3 2000 units daily 8 Capsule 0    erenumab-aooe (Aimovig Autoinjector) 140 mg/mL injection ADMINISTER 1 ML(140MG) UNDER THE SKIN EVERY 30 DAYS 3 mL 5    DurezoL 0.05 % ophthalmic emulsion       levothyroxine (SYNTHROID) 137 mcg tablet TAKE 1 TABLET BY MOUTH DAILY BEFORE BREAKFAST 90 Tablet 1    rosuvastatin (CRESTOR) 40 mg tablet TAKE 1 TABLET BY MOUTH EVERY DAY 90 Tablet 1    topiramate (TOPAMAX) 200 mg tablet Take 1 Tablet by mouth two (2) times a day. 180 Tablet 2    traZODone (DESYREL) 50 mg tablet Take 50 mg by mouth nightly.       cyanocobalamin 1,000 mcg tablet TAKE 1 TABLET BY MOUTH EVERY DAY 30 Tablet 2    metFORMIN ER (GLUCOPHAGE XR) 500 mg tablet TAKE 3 TABLETS BY MOUTH DAILY; dose change (Patient taking differently: Take 1,500 mg by mouth nightly. TAKE 3 TABLETS BY MOUTH DAILY; dose change) 270 Tablet 1    PARoxetine (PAXIL) 40 mg tablet TAKE 1 AND 1/2 TABLETS BY MOUTH EVERY NIGHT (Patient taking differently: Take 60 mg by mouth nightly.) 135 Tab 3    pyridostigmine bromide (MESTINON SR) 180 mg SR tablet TAKE 1 TABLET BY MOUTH TWICE DAILY 180 Tab 0    pregabalin (Lyrica) 200 mg capsule Take 1 Cap by mouth two (2) times a day. Max Daily Amount: 400 mg. 180 Cap 4    Gilenya 0.5 mg cap Take 1 Cap by mouth daily. (Patient taking differently: Take 1 Capsule by mouth nightly.) 90 Cap 3    acetaminophen (TYLENOL) 500 mg tablet Take 500 mg by mouth every six (6) hours as needed for Pain.  ondansetron (ZOFRAN ODT) 4 mg disintegrating tablet DISSOLVE 1 TABLET ON THE TONGUE EVERY 8 HOURS AS NEEDED FOR NAUSEA 15 Tab 0    glucose blood VI test strips (BLOOD GLUCOSE TEST) strip 100 Each by Does Not Apply route See Admin Instructions. One Touch Ultra Test Strips=Check blood sugar daily dx E11.8 100 Strip 1    aspirin delayed-release 81 mg tablet Take 1 Tab by mouth every twelve (12) hours. (Patient taking differently: Take 1 Tab by mouth daily.) 60 Tab 0    OTHER 0.25 mL by SubLINGual route daily. CBD OIL       dantrolene (DANTRIUM) 100 mg capsule Take 1 Cap by mouth three (3) times daily. 90 Cap 3    albuterol (PROVENTIL VENTOLIN) 2.5 mg /3 mL (0.083 %) nebulizer solution 3 mL by Nebulization route every six (6) hours as needed for Wheezing. 30 Each 0    Menthol-Zinc Oxide (CALMOSEPTINE) 0.44-20.6 % oint Apply 1 Each to affected area daily as needed for Other (thin layer to buttocks for skin irritation).  lidocaine (LIDODERM) 5 % Apply patch to the affected area for 12 hours a day and remove for 12 hours a day. 30 Each 3    TENS UNIT ELECTRODES by Does Not Apply route.  prn       OTHER Electric scooter  Diagnosis: Multiple sclerosis  Frequent fall  Gait disorder (Patient not taking: Reported on 10/26/2021) 1 Units 0        Objective:   Vitals:  Vitals:    10/26/21 1407   BP: (!) 140/72   Pulse: 83   Resp: 16   SpO2: 98%   Weight: 186 lb (84.4 kg)   PainSc:   5   LMP: 09/01/2010               Lab Data Reviewed:  Lab Results   Component Value Date/Time    WBC 4.9 10/20/2021 02:48 PM    HCT 42.6 10/20/2021 02:48 PM    HGB 14.1 10/20/2021 02:48 PM    PLATELET 955 (L) 32/80/3815 02:48 PM       Lab Results   Component Value Date/Time    Sodium 143 10/20/2021 02:48 PM    Potassium 4.2 10/20/2021 02:48 PM    Chloride 108 (H) 10/20/2021 02:48 PM    CO2 19 (L) 10/20/2021 02:48 PM    Glucose 106 (H) 10/20/2021 02:48 PM    BUN 11 10/20/2021 02:48 PM    Creatinine 0.87 10/20/2021 02:48 PM    Calcium 9.9 10/20/2021 02:48 PM       No components found for: TROPQUANT    No results found for: SHAHIDA      Lab Results   Component Value Date/Time    Hemoglobin A1c 7.0 (H) 07/12/2021 04:04 PM    Hemoglobin A1c (POC) 7.0 10/26/2021 10:25 AM        Lab Results   Component Value Date/Time    Vitamin B12 436 06/10/2019 10:41 AM    Folate 12.0 02/10/2016 12:00 AM       No results found for: SHAHIDA, Joe Distel, XBANA    Lab Results   Component Value Date/Time    Cholesterol, total 157 10/20/2021 02:48 PM    HDL Cholesterol 52 10/20/2021 02:48 PM    LDL-CHOLESTEROL 91 12/26/2018 12:00 AM    LDL, calculated 77 10/20/2021 02:48 PM    LDL, calculated 208 (H) 08/03/2020 08:05 AM    VLDL, calculated 28 10/20/2021 02:48 PM    VLDL, calculated 45 (H) 08/03/2020 08:05 AM    Triglyceride 168 (H) 10/20/2021 02:48 PM    Triglyceride 126 12/26/2018 12:00 AM    CHOL/HDL Ratio 4.3 08/23/2010 12:19 PM         CT Results (recent):  Results from Hospital Encounter encounter on 11/23/19    CT NECK SOFT TISSUE W CONT    Narrative  EXAM:  CT NECK SOFT TISSUE W CONT    INDICATION:  Dysphasia. Status post thyroidectomy. COMPARISON: CT neck 5/14/2018. CONTRAST: 100 mL of Isovue-300.     TECHNIQUE: Multislice helical CT was performed from the mid calvarium to the  aortic arch during uneventful rapid bolus intravenous contrast administration. Contiguous 2.5 mm axial images were reconstructed and lung and soft tissue  windows were generated. Coronal and sagittal reformations were generated. CT  dose reduction was achieved through use of a standardized protocol tailored for  this examination and automatic exposure control for dose modulation. 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 No evidence of recurrent tissue in this region. No cervical  lymphadenopathy. Visualized brain parenchyma is normal in appearance. Paranasal sinuses and  mastoid air cells are clear. Intraorbital contents are unremarkable. The  cervical vasculature is patent. No acute fracture or aggressive osseous lesion. Multilevel degenerative disc disease most advanced at C3-C4, C5-C6 and C6-C7 is  unchanged. Visualized portions of the lung apices are normal. Right chest port  is noted. Impression  IMPRESSION:  1. 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 No evidence of local recurrence. No cervical lymphadenopathy or other soft  tissue abnormality in the neck. MRI Results (recent):  Results from [place] encounter on 08/08/20    MRI BRAIN W WO CONT    Narrative  Clinical history: Fatigue, drowsiness  INDICATION:   Fatigue, drowsiness, multiple sclerosis, prior CVA    COMPARISON: 12/20/2018  TECHNIQUE: MR examination of the brain includes axial and sagittal T1 , axial  T2, axial FLAIR, axial gradient echo, axial DWI, coronal T1 . Pre and post  contrast axial T1-weighted imaging. Postcontrast T1-weighted imaging coronal  plane. CONTRAST: The patient was administered gadolinium-based contrast material,  subsequently axial and sagittal T1-weighted postcontrast imaging was obtained. FINDINGS:  There is no intracranial mass, hemorrhage or acute infarction.   Scattered foci of abnormal increased T2 signal intensity predominantly  superiorly, subcortical left parietal lobe but also noted in the frontal lobes  on the right and on the left. No associated abnormal postcontrast enhancement. No new diffusion restriction. Air-fluid level in the right maxillary sinus. IACs are symmetric in size. . There  is no abnormal parenchymal enhancement. There is no abnormal meningeal  enhancement demonstrated. The brain architecture is normal. There is no evidence  of midline shift or mass-effect. The ventricles are normal in size, position and  configuration. There are no extra-axial fluid collections. Major intracranial  vascular flow-voids are unremarkable. The orbits are grossly symmetric. Dural  venous sinuses are grossly unremarkable. There is no Chiari or syrinx. Pituitary  and infundibulum grossly unremarkable. Cerebellopontine angles are unremarkable. No skull base mass is identified. Cavernous sinuses are symmetric. The mastoid  air cells and are well pneumatized and clear. Impression  IMPRESSION:  Scattered foci of abnormal increased T2 signal intensity predominantly  subcortical and superior adjacent to the vertex. Stable overall appearance  compared to the 12/26/2018 examination. No postcontrast enhancement or diffusion  restriction to suggest active demyelination. Right maxillary sinus disease. No intracranial mass, hemorrhage or evidence of acute infarction. IR Results (recent):  No results found for this or any previous visit. VAS/US Results (recent): PHYSICAL EXAM:  General:    Alert, cooperative, no distress, appears stated age. Head:   Normocephalic, without obvious abnormality, atraumatic. Eyes:   Conjunctivae/corneas clear. PERRLA  Nose:  Nares normal. No drainage or sinus tenderness. Throat:    Lips, mucosa, and tongue normal.  No Thrush  Neck:  Supple, symmetrical,  no adenopathy, thyroid: non tender    no carotid bruit and no JVD. Paraspinal tenderness  Back:    Symmetric, diffuse  tenderness. Lungs:   Clear to auscultation bilaterally. No Wheezing or Rhonchi. No rales. Chest wall:  No tenderness or deformity. No Accessory muscle use. Heart:   Regular rate and rhythm,  no murmur, rub or gallop. Abdomen:   Soft, non-tender. Not distended. Bowel sounds normal. No masses  Extremities: Extremities normal, atraumatic, No cyanosis. No edema. No clubbing  Skin:     Texture, turgor normal. No rashes or lesions. Not Jaundiced  Lymph nodes: Cervical, supraclavicular normal.  Psych:  Good insight. Depressed. Anxious. NEUROLOGICAL EXAM:  Appearance: The patient is well developed, well nourished, provides a coherent history and is in no acute distress. Mental Status: Oriented to time, place and person. Mood and affect appropriate. Cranial Nerves:   Intact visual fields. Fundi are benign. RACHEL, EOM's full, no nystagmus, no ptosis. Facial sensation is normal. Corneal reflexes are intact. Facial movement is symmetric. Hearing is normal bilaterally. Palate is midline with normal sternocleidomastoid and trapezius muscles are normal. Tongue is midline. Motor:  5-/5 strength in upper extremity and 4+/5 lower extremity proximal and distal muscles. Normal bulk and tone. No fasciculations. Reflexes:   Deep tendon reflexes 2+/4 and symmetrical.   Sensory:    Dizzy to touch, pinprick and vibration. Gait:   Unsteady gait. Ambulates with cane   Tremor:    Mild tremor noted. Cerebellar:  No cerebellar signs present. Neurovascular:  Normal heart sounds and regular rhythm, peripheral pulses intact, and no carotid bruits. Assesment  1. MS (multiple sclerosis) ([facility]  Gilenya    2. CIDP (chronic inflammatory demyelinating polyneuropathy) ([facility])  Continue management    3. Myasthenia gravis ([facility]  Stable    4. Chronic pain syndrome  Following pain management    5.  Frequent falls    - REFERRAL TO PHYSICAL THERAPY  - REFERRAL TO OCCUPATIONAL THERAPY  - REFERRAL TO HOME HEALTH    6. Tremor    - REFERRAL TO PHYSICAL THERAPY  - REFERRAL TO OCCUPATIONAL THERAPY  - REFERRAL TO HOME HEALTH    7. Attention deficit disorder (ADD) without hyperactivity  There are    8. Intractable migraine without aura and without status migrainosus  Aimovig    9. Gait disorder    - REFERRAL TO PHYSICAL THERAPY  - REFERRAL TO OCCUPATIONAL THERAPY  - REFERRAL TO HOME HEALTH    10. Weakness of both hands    - REFERRAL TO PHYSICAL THERAPY  - REFERRAL TO OCCUPATIONAL THERAPY  - REFERRAL TO HOME HEALTH    11. Migraine without aura and without status migrainosus, not intractable  Emgality  Topamax  ___________________________________________________  PLAN: Medication and plan discussed with patient  Advised on the dangers of tobacco abuse  Advised on the benefits of discontinuation   Advised on available treatments. 10 minutes spent on counseling. ICD-10-CM ICD-9-CM    1. MS (multiple sclerosis) ([facility]  G35 340    2. CIDP (chronic inflammatory demyelinating polyneuropathy) ([facility])  G61.81 357.81    3. Myasthenia gravis ([facility]  G70.00 358.00    4. Chronic pain syndrome  G89.4 338.4    5. Frequent falls  R29.6 V15.88 REFERRAL TO PHYSICAL THERAPY      REFERRAL TO OCCUPATIONAL THERAPY      REFERRAL TO HOME HEALTH   6. Tremor  R25.1 781.0 REFERRAL TO PHYSICAL THERAPY      REFERRAL TO OCCUPATIONAL THERAPY      REFERRAL TO HOME HEALTH   7. Attention deficit disorder (ADD) without hyperactivity  F98.8 314.00    8. Intractable migraine without aura and without status migrainosus  G43.019 346.11    9. Gait disorder  R26.9 781.2 REFERRAL TO PHYSICAL THERAPY      REFERRAL TO OCCUPATIONAL THERAPY      REFERRAL TO HOME HEALTH   10. Weakness of both hands  R29.898 729.89 REFERRAL TO PHYSICAL THERAPY      REFERRAL TO OCCUPATIONAL THERAPY      REFERRAL TO HOME HEALTH   11.  Migraine without aura and without status migrainosus, not intractable  G43.009 346.10      Follow-up and Dispositions · Return in about 3 months (around 1/26/2022).                ___________________________________________________    Attending Physician: Charity Hall MD

## 2021-10-27 ENCOUNTER — HOSPITAL ENCOUNTER (OUTPATIENT)
Dept: LAB | Age: 66
Discharge: HOME | End: 2021-10-27
Payer: MEDICARE

## 2021-10-27 PROCEDURE — 88175 CYTOPATH C/V AUTO FLUID REDO: CPT

## 2021-10-27 PROCEDURE — 87624 HPV HI-RISK TYP POOLED RSLT: CPT

## 2021-11-01 NOTE — PROGRESS NOTES
Please send a LETTER with the following: Your pap test was normal. Based on your age, you no longer need pap smears for cervical cancer screening.

## 2021-11-03 ENCOUNTER — TELEPHONE (OUTPATIENT)
Dept: FAMILY MEDICINE CLINIC | Age: 66
End: 2021-11-03

## 2021-11-03 ENCOUNTER — TELEPHONE (OUTPATIENT)
Dept: NEUROLOGY | Age: 66
End: 2021-11-03

## 2021-11-03 NOTE — TELEPHONE ENCOUNTER
Augusta Galvin (Self) [phone] (H)     Pt had called and requested that her lab orders be mailed on 10/14. Lab orders were mailed. Pt called again and asked for lab orders to be sent to [address] [place] [address] would like to know if the orders she just received in the mail need to be completed as well. Please call back and advise.

## 2021-11-04 NOTE — TELEPHONE ENCOUNTER
Called patient and confirmed two identifiers. Patient wanted to know if the lab orders she received were redundant, not to have labs reordered. Advised patient that she did not need more labs done until the Spring at her next appt with Dr. Nhan Chadwick. Pt verbalized understanding.

## 2021-11-05 ENCOUNTER — TELEPHONE (OUTPATIENT)
Dept: NEUROLOGY | Age: 66
End: 2021-11-05

## 2021-11-05 NOTE — TELEPHONE ENCOUNTER
Referrals for HH/PT/OT faxed to [facility] @ [phone] with successful confirmation. Spoke with patient, confirmed x2 identifiers and communicated as above.

## 2021-11-05 NOTE — TELEPHONE ENCOUNTER
Called patient in regards to this concern. Confirmed patient x2 identifiers. Nurse Ingrid Koch forwarded previous message to  for prescription.

## 2021-11-05 NOTE — TELEPHONE ENCOUNTER
----- Message from Logan Sandra sent at 11/5/2021 12:45 PM EDT -----  Regarding: /telephone  Contact: [phone]  General Message/Vendor Calls    Caller's first and last name: n/a       Reason for call: Needs imaging faxed to [facility] for medication for MS headaches       Callback required yes/no and why: Yes to confirm      Best contact number(s): [phone]      Details to clarify the request: Number: [phone] Fax: [phone]      Logan Sandra

## 2021-11-05 NOTE — TELEPHONE ENCOUNTER
----- Message from Jed Machuca sent at 11/5/2021 12:45 PM EDT -----  Regarding: /telephone  General Message/Vendor Calls    Caller's first and last name: Justo Vaughan ([address] [place] [address])      Reason for call:referral for home health service and starter care for the Date of tuesday       Callback required yes/no and why:yes to speak with       Best contact number(s):[phone]      Details to clarify the request:OMAR Minaya [address] [place] [address]) called to speak with Dr. Tom Her, regarding on faxing over the referral for the pt's home health service and starter care for tuesday.        Jed Machuca

## 2021-11-08 ENCOUNTER — TELEPHONE (OUTPATIENT)
Dept: NEUROLOGY | Age: 66
End: 2021-11-08

## 2021-11-09 ENCOUNTER — TELEPHONE (OUTPATIENT)
Dept: NEUROLOGY | Age: 66
End: 2021-11-09

## 2021-11-11 ENCOUNTER — TELEPHONE (OUTPATIENT)
Dept: NEUROLOGY | Age: 66
End: 2021-11-11

## 2021-11-11 ENCOUNTER — HOME HEALTH ADMISSION (OUTPATIENT)
Dept: HOME HEALTH SERVICES | Facility: HOME HEALTH | Age: 66
End: 2021-11-11
Payer: MEDICARE

## 2021-11-11 DIAGNOSIS — G35 MULTIPLE SCLEROSIS, RELAPSING-REMITTING (HCC): Primary | ICD-10-CM

## 2021-11-11 DIAGNOSIS — R26.9 GAIT DISORDER: ICD-10-CM

## 2021-11-11 DIAGNOSIS — R29.6 FREQUENT FALLS: ICD-10-CM

## 2021-11-12 ENCOUNTER — HOME CARE VISIT (OUTPATIENT)
Dept: SCHEDULING | Facility: HOME HEALTH | Age: 66
End: 2021-11-12
Payer: MEDICARE

## 2021-11-12 VITALS
RESPIRATION RATE: 17 BRPM | DIASTOLIC BLOOD PRESSURE: 80 MMHG | OXYGEN SATURATION: 98 % | TEMPERATURE: 97.6 F | HEART RATE: 76 BPM | SYSTOLIC BLOOD PRESSURE: 130 MMHG

## 2021-11-12 PROCEDURE — G0151 HHCP-SERV OF PT,EA 15 MIN: HCPCS

## 2021-11-12 PROCEDURE — 400013 HH SOC

## 2021-11-12 PROCEDURE — 3331090001 HH PPS REVENUE CREDIT

## 2021-11-12 PROCEDURE — 400018 HH-NO PAY CLAIM PROCEDURE

## 2021-11-12 PROCEDURE — 3331090002 HH PPS REVENUE DEBIT

## 2021-11-13 PROCEDURE — 3331090001 HH PPS REVENUE CREDIT

## 2021-11-13 PROCEDURE — 3331090002 HH PPS REVENUE DEBIT

## 2021-11-14 PROCEDURE — 3331090001 HH PPS REVENUE CREDIT

## 2021-11-14 PROCEDURE — 3331090002 HH PPS REVENUE DEBIT

## 2021-11-15 ENCOUNTER — HOME CARE VISIT (OUTPATIENT)
Dept: SCHEDULING | Facility: HOME HEALTH | Age: 66
End: 2021-11-15
Payer: MEDICARE

## 2021-11-15 VITALS
TEMPERATURE: 97.4 F | SYSTOLIC BLOOD PRESSURE: 130 MMHG | DIASTOLIC BLOOD PRESSURE: 80 MMHG | OXYGEN SATURATION: 96 % | RESPIRATION RATE: 18 BRPM | HEART RATE: 69 BPM

## 2021-11-15 PROCEDURE — 3331090001 HH PPS REVENUE CREDIT

## 2021-11-15 PROCEDURE — G0151 HHCP-SERV OF PT,EA 15 MIN: HCPCS

## 2021-11-15 PROCEDURE — 3331090002 HH PPS REVENUE DEBIT

## 2021-11-16 ENCOUNTER — HOME CARE VISIT (OUTPATIENT)
Dept: SCHEDULING | Facility: HOME HEALTH | Age: 66
End: 2021-11-16
Payer: MEDICARE

## 2021-11-16 VITALS
HEART RATE: 67 BPM | SYSTOLIC BLOOD PRESSURE: 110 MMHG | DIASTOLIC BLOOD PRESSURE: 65 MMHG | OXYGEN SATURATION: 98 % | RESPIRATION RATE: 17 BRPM | TEMPERATURE: 96 F

## 2021-11-16 PROCEDURE — G0152 HHCP-SERV OF OT,EA 15 MIN: HCPCS

## 2021-11-16 PROCEDURE — 3331090002 HH PPS REVENUE DEBIT

## 2021-11-16 PROCEDURE — 3331090001 HH PPS REVENUE CREDIT

## 2021-11-17 ENCOUNTER — HOME CARE VISIT (OUTPATIENT)
Dept: SCHEDULING | Facility: HOME HEALTH | Age: 66
End: 2021-11-17
Payer: MEDICARE

## 2021-11-17 VITALS
TEMPERATURE: 97 F | OXYGEN SATURATION: 95 % | SYSTOLIC BLOOD PRESSURE: 120 MMHG | RESPIRATION RATE: 17 BRPM | HEART RATE: 68 BPM | DIASTOLIC BLOOD PRESSURE: 70 MMHG

## 2021-11-17 VITALS
TEMPERATURE: 97.6 F | RESPIRATION RATE: 17 BRPM | HEART RATE: 77 BPM | DIASTOLIC BLOOD PRESSURE: 80 MMHG | SYSTOLIC BLOOD PRESSURE: 125 MMHG | OXYGEN SATURATION: 95 %

## 2021-11-17 PROCEDURE — G0152 HHCP-SERV OF OT,EA 15 MIN: HCPCS

## 2021-11-17 PROCEDURE — 3331090002 HH PPS REVENUE DEBIT

## 2021-11-17 PROCEDURE — G0151 HHCP-SERV OF PT,EA 15 MIN: HCPCS

## 2021-11-17 PROCEDURE — 3331090001 HH PPS REVENUE CREDIT

## 2021-11-18 PROCEDURE — 3331090001 HH PPS REVENUE CREDIT

## 2021-11-18 PROCEDURE — 3331090002 HH PPS REVENUE DEBIT

## 2021-11-19 PROCEDURE — 3331090002 HH PPS REVENUE DEBIT

## 2021-11-19 PROCEDURE — 3331090001 HH PPS REVENUE CREDIT

## 2021-11-20 PROCEDURE — 3331090001 HH PPS REVENUE CREDIT

## 2021-11-20 PROCEDURE — 3331090002 HH PPS REVENUE DEBIT

## 2021-11-21 PROCEDURE — 3331090002 HH PPS REVENUE DEBIT

## 2021-11-21 PROCEDURE — 3331090001 HH PPS REVENUE CREDIT

## 2021-11-22 ENCOUNTER — HOME CARE VISIT (OUTPATIENT)
Dept: SCHEDULING | Facility: HOME HEALTH | Age: 66
End: 2021-11-22
Payer: MEDICARE

## 2021-11-22 VITALS
DIASTOLIC BLOOD PRESSURE: 60 MMHG | RESPIRATION RATE: 17 BRPM | OXYGEN SATURATION: 99 % | TEMPERATURE: 98.3 F | HEART RATE: 79 BPM | SYSTOLIC BLOOD PRESSURE: 110 MMHG

## 2021-11-22 PROCEDURE — 3331090002 HH PPS REVENUE DEBIT

## 2021-11-22 PROCEDURE — 3331090001 HH PPS REVENUE CREDIT

## 2021-11-22 PROCEDURE — G0152 HHCP-SERV OF OT,EA 15 MIN: HCPCS

## 2021-11-23 ENCOUNTER — HOME CARE VISIT (OUTPATIENT)
Dept: SCHEDULING | Facility: HOME HEALTH | Age: 66
End: 2021-11-23
Payer: MEDICARE

## 2021-11-23 VITALS
DIASTOLIC BLOOD PRESSURE: 80 MMHG | RESPIRATION RATE: 18 BRPM | TEMPERATURE: 98.5 F | OXYGEN SATURATION: 99 % | SYSTOLIC BLOOD PRESSURE: 122 MMHG

## 2021-11-23 VITALS
SYSTOLIC BLOOD PRESSURE: 125 MMHG | RESPIRATION RATE: 17 BRPM | DIASTOLIC BLOOD PRESSURE: 80 MMHG | HEART RATE: 77 BPM | OXYGEN SATURATION: 97 % | TEMPERATURE: 97.8 F

## 2021-11-23 PROCEDURE — 3331090002 HH PPS REVENUE DEBIT

## 2021-11-23 PROCEDURE — G0151 HHCP-SERV OF PT,EA 15 MIN: HCPCS

## 2021-11-23 PROCEDURE — 3331090001 HH PPS REVENUE CREDIT

## 2021-11-23 PROCEDURE — G0152 HHCP-SERV OF OT,EA 15 MIN: HCPCS

## 2021-11-24 ENCOUNTER — HOME CARE VISIT (OUTPATIENT)
Dept: SCHEDULING | Facility: HOME HEALTH | Age: 66
End: 2021-11-24
Payer: MEDICARE

## 2021-11-24 VITALS
RESPIRATION RATE: 17 BRPM | TEMPERATURE: 97.7 F | SYSTOLIC BLOOD PRESSURE: 130 MMHG | HEART RATE: 95 BPM | DIASTOLIC BLOOD PRESSURE: 80 MMHG | OXYGEN SATURATION: 98 %

## 2021-11-24 PROCEDURE — 3331090002 HH PPS REVENUE DEBIT

## 2021-11-24 PROCEDURE — 3331090001 HH PPS REVENUE CREDIT

## 2021-11-24 PROCEDURE — G0151 HHCP-SERV OF PT,EA 15 MIN: HCPCS

## 2021-11-25 PROCEDURE — 3331090001 HH PPS REVENUE CREDIT

## 2021-11-25 PROCEDURE — 3331090002 HH PPS REVENUE DEBIT

## 2021-11-26 PROCEDURE — 3331090002 HH PPS REVENUE DEBIT

## 2021-11-26 PROCEDURE — 3331090001 HH PPS REVENUE CREDIT

## 2021-11-27 PROCEDURE — 3331090001 HH PPS REVENUE CREDIT

## 2021-11-27 PROCEDURE — 3331090002 HH PPS REVENUE DEBIT

## 2021-11-28 PROCEDURE — 3331090001 HH PPS REVENUE CREDIT

## 2021-11-28 PROCEDURE — 3331090002 HH PPS REVENUE DEBIT

## 2021-11-29 ENCOUNTER — HOME CARE VISIT (OUTPATIENT)
Dept: SCHEDULING | Facility: HOME HEALTH | Age: 66
End: 2021-11-29
Payer: MEDICARE

## 2021-11-29 ENCOUNTER — TELEPHONE (OUTPATIENT)
Dept: NEUROLOGY | Age: 66
End: 2021-11-29

## 2021-11-29 VITALS
RESPIRATION RATE: 17 BRPM | TEMPERATURE: 98.1 F | DIASTOLIC BLOOD PRESSURE: 60 MMHG | HEART RATE: 78 BPM | OXYGEN SATURATION: 96 % | SYSTOLIC BLOOD PRESSURE: 100 MMHG

## 2021-11-29 PROCEDURE — 3331090002 HH PPS REVENUE DEBIT

## 2021-11-29 PROCEDURE — G0152 HHCP-SERV OF OT,EA 15 MIN: HCPCS

## 2021-11-29 PROCEDURE — 3331090001 HH PPS REVENUE CREDIT

## 2021-11-30 ENCOUNTER — TELEPHONE (OUTPATIENT)
Dept: NEUROLOGY | Age: 66
End: 2021-11-30

## 2021-11-30 ENCOUNTER — HOME CARE VISIT (OUTPATIENT)
Dept: HOME HEALTH SERVICES | Facility: HOME HEALTH | Age: 66
End: 2021-11-30
Payer: MEDICARE

## 2021-11-30 DIAGNOSIS — G43.009 MIGRAINE WITHOUT AURA AND WITHOUT STATUS MIGRAINOSUS, NOT INTRACTABLE: Primary | ICD-10-CM

## 2021-11-30 PROCEDURE — 3331090001 HH PPS REVENUE CREDIT

## 2021-11-30 PROCEDURE — 3331090002 HH PPS REVENUE DEBIT

## 2021-11-30 RX ORDER — ERENUMAB-AOOE 140 MG/ML
INJECTION, SOLUTION SUBCUTANEOUS
Qty: 3 ML | Refills: 5 | Status: SHIPPED | OUTPATIENT
Start: 2021-11-30 | End: 2022-10-18 | Stop reason: SDUPTHER

## 2021-11-30 RX ORDER — ERENUMAB-AOOE 140 MG/ML
INJECTION, SOLUTION SUBCUTANEOUS
Qty: 3 ML | Refills: 5 | Status: SHIPPED | OUTPATIENT
Start: 2021-11-30 | End: 2021-11-30 | Stop reason: SDUPTHER

## 2021-11-30 NOTE — TELEPHONE ENCOUNTER
----- Message from Zoila Zuluaga sent at 11/30/2021  1:25 PM EST -----  Regarding: /Refill  Medication Refill    Caller (if not patient): n/a      Relationship of caller (if not patient): n/a      Best contact number(s): [phone]       Name of medication and dosage if known: \"Aimobig 140mg\" prescription needs to be faxed for refill      Is patient out of this medication (yes/no): yes      Pharmacy name: [facility] listed in chart? (yes/no): n/a  Pharmacy phone number: [phone] & [phone]      Details to clarify the request: Patient has informed that she has been calling since October 2021      Zoila Zuluaga

## 2021-11-30 NOTE — TELEPHONE ENCOUNTER
Lucien Limon will need to be a printed script to send to [facility] for financial assistance.

## 2021-12-01 ENCOUNTER — HOME CARE VISIT (OUTPATIENT)
Dept: SCHEDULING | Facility: HOME HEALTH | Age: 66
End: 2021-12-01
Payer: MEDICARE

## 2021-12-01 VITALS
HEART RATE: 82 BPM | OXYGEN SATURATION: 99 % | DIASTOLIC BLOOD PRESSURE: 70 MMHG | RESPIRATION RATE: 17 BRPM | TEMPERATURE: 97.6 F | SYSTOLIC BLOOD PRESSURE: 100 MMHG

## 2021-12-01 PROCEDURE — G0152 HHCP-SERV OF OT,EA 15 MIN: HCPCS

## 2021-12-01 PROCEDURE — 3331090002 HH PPS REVENUE DEBIT

## 2021-12-01 PROCEDURE — 3331090001 HH PPS REVENUE CREDIT

## 2021-12-02 ENCOUNTER — HOME CARE VISIT (OUTPATIENT)
Dept: HOME HEALTH SERVICES | Facility: HOME HEALTH | Age: 66
End: 2021-12-02
Payer: MEDICARE

## 2021-12-02 PROCEDURE — 3331090001 HH PPS REVENUE CREDIT

## 2021-12-02 PROCEDURE — 3331090002 HH PPS REVENUE DEBIT

## 2021-12-03 PROCEDURE — 3331090001 HH PPS REVENUE CREDIT

## 2021-12-03 PROCEDURE — 3331090002 HH PPS REVENUE DEBIT

## 2021-12-04 PROCEDURE — 3331090001 HH PPS REVENUE CREDIT

## 2021-12-04 PROCEDURE — 3331090002 HH PPS REVENUE DEBIT

## 2021-12-05 PROCEDURE — 3331090002 HH PPS REVENUE DEBIT

## 2021-12-05 PROCEDURE — 3331090001 HH PPS REVENUE CREDIT

## 2021-12-06 ENCOUNTER — HOME CARE VISIT (OUTPATIENT)
Dept: SCHEDULING | Facility: HOME HEALTH | Age: 66
End: 2021-12-06
Payer: MEDICARE

## 2021-12-06 VITALS
HEART RATE: 77 BPM | SYSTOLIC BLOOD PRESSURE: 120 MMHG | DIASTOLIC BLOOD PRESSURE: 80 MMHG | RESPIRATION RATE: 17 BRPM | OXYGEN SATURATION: 98 % | TEMPERATURE: 97.6 F

## 2021-12-06 PROCEDURE — 3331090001 HH PPS REVENUE CREDIT

## 2021-12-06 PROCEDURE — G0151 HHCP-SERV OF PT,EA 15 MIN: HCPCS

## 2021-12-06 PROCEDURE — 3331090002 HH PPS REVENUE DEBIT

## 2021-12-07 PROCEDURE — 3331090001 HH PPS REVENUE CREDIT

## 2021-12-07 PROCEDURE — 3331090002 HH PPS REVENUE DEBIT

## 2021-12-08 ENCOUNTER — HOME CARE VISIT (OUTPATIENT)
Dept: SCHEDULING | Facility: HOME HEALTH | Age: 66
End: 2021-12-08
Payer: MEDICARE

## 2021-12-08 VITALS
RESPIRATION RATE: 17 BRPM | OXYGEN SATURATION: 99 % | TEMPERATURE: 97.4 F | DIASTOLIC BLOOD PRESSURE: 85 MMHG | HEART RATE: 55 BPM | SYSTOLIC BLOOD PRESSURE: 130 MMHG

## 2021-12-08 PROCEDURE — 3331090002 HH PPS REVENUE DEBIT

## 2021-12-08 PROCEDURE — G0151 HHCP-SERV OF PT,EA 15 MIN: HCPCS

## 2021-12-08 PROCEDURE — 3331090001 HH PPS REVENUE CREDIT

## 2021-12-14 NOTE — CASE COMMUNICATION
PT visit canceled as per patients request. Per patient she want no coverage/dont want to see any other PT while this writer is on time off.

## 2021-12-15 ENCOUNTER — OFFICE VISIT (OUTPATIENT)
Dept: CARDIOLOGY CLINIC | Age: 66
End: 2021-12-15
Payer: MEDICARE

## 2021-12-15 VITALS
HEART RATE: 80 BPM | DIASTOLIC BLOOD PRESSURE: 60 MMHG | OXYGEN SATURATION: 98 % | BODY MASS INDEX: 25.19 KG/M2 | SYSTOLIC BLOOD PRESSURE: 98 MMHG | WEIGHT: 186 LBS | HEIGHT: 72 IN | RESPIRATION RATE: 16 BRPM

## 2021-12-15 DIAGNOSIS — I35.1 AORTIC VALVE INSUFFICIENCY, ETIOLOGY OF CARDIAC VALVE DISEASE UNSPECIFIED: Primary | ICD-10-CM

## 2021-12-15 DIAGNOSIS — E11.8 DM TYPE 2, CONTROLLED, WITH COMPLICATION (HCC): ICD-10-CM

## 2021-12-15 DIAGNOSIS — Z71.6 ENCOUNTER FOR COUNSELING FOR TOBACCO USE DISORDER: ICD-10-CM

## 2021-12-15 DIAGNOSIS — I49.3 PVC'S (PREMATURE VENTRICULAR CONTRACTIONS): ICD-10-CM

## 2021-12-15 DIAGNOSIS — Z72.0 TOBACCO USE: ICD-10-CM

## 2021-12-15 PROCEDURE — 3017F COLORECTAL CA SCREEN DOC REV: CPT | Performed by: INTERNAL MEDICINE

## 2021-12-15 PROCEDURE — G8419 CALC BMI OUT NRM PARAM NOF/U: HCPCS | Performed by: INTERNAL MEDICINE

## 2021-12-15 PROCEDURE — G9717 DOC PT DX DEP/BP F/U NT REQ: HCPCS | Performed by: INTERNAL MEDICINE

## 2021-12-15 PROCEDURE — G0463 HOSPITAL OUTPT CLINIC VISIT: HCPCS | Performed by: INTERNAL MEDICINE

## 2021-12-15 PROCEDURE — 2022F DILAT RTA XM EVC RTNOPTHY: CPT | Performed by: INTERNAL MEDICINE

## 2021-12-15 PROCEDURE — G9899 SCRN MAM PERF RSLTS DOC: HCPCS | Performed by: INTERNAL MEDICINE

## 2021-12-15 PROCEDURE — 3051F HG A1C>EQUAL 7.0%<8.0%: CPT | Performed by: INTERNAL MEDICINE

## 2021-12-15 PROCEDURE — G8752 SYS BP LESS 140: HCPCS | Performed by: INTERNAL MEDICINE

## 2021-12-15 PROCEDURE — G8427 DOCREV CUR MEDS BY ELIG CLIN: HCPCS | Performed by: INTERNAL MEDICINE

## 2021-12-15 PROCEDURE — 99214 OFFICE O/P EST MOD 30 MIN: CPT | Performed by: INTERNAL MEDICINE

## 2021-12-15 PROCEDURE — G8754 DIAS BP LESS 90: HCPCS | Performed by: INTERNAL MEDICINE

## 2021-12-15 NOTE — PROGRESS NOTES
[place]: Stacy Norton  [phone]    History of Present Illness:    Ms. Galvin is a 60 yo F with multiple sclerosis, myasthenia gravis, type 2 diabetes, mixed hyperlipidemia, hypothyroid, seen in the past for chest pain. Echo in the past with normal EF and moderate aortic regurgitation, mild aortic stenosis. She saw Dr. Gilles Larry a few months ago prior to knee surgery. The surgery on her right knee did go well. Overall since then, she has been recovering okay. She denies any exertional chest pain. She does have some baseline shortness of breath, but this is unchanged. She is still working on tobacco cessation and on stressful days she is still smoking 1 ppd. No significant palpitations, lightheadedness or dizziness. No exertional chest pain. She is compensated on exam with clear lungs. She does have I-II/VI systolic murmur at the left sternal border. Soc hx. Tobacco 1 pack 2-3 days. Have stopped in past.  Fam hx. Younger sister with CAD. Assessment and Plan:   1. Aortic regurgitation. Has been in the moderate range and will repeat an echocardiogram now. Otherwise, will have her follow back in one year for continued surveillance. 2. PVCs, PACs. Benign and she is asymptomatic from these. 3. Tobacco use. Encouraged cessation. 4. Mixed hyperlipidemia. Tolerating statin. 5. Type 2 diabetes. 6. Multiple sclerosis. 7. Myasthenia gravis.         She  has a past medical history of Arthritis, Benign cyst of left breast (3/21/2016), Blindness and low vision (2004), Cataract, Cervical spinal stenosis (12/2/2016), Chronic pain, COVID-19 vaccine series completed, Depression, Diabetes mellitus type 2, controlled ([address] (9/13/2016), Diet-controlled diabetes mellitus ([address] (1/5/2018), Endometriosis (6/25/2010), FH: breast cancer (6/25/2010), History of CVA (cerebrovascular accident) (6/5/2018), HTN, goal below 140/90 (6/25/2010), Hypercholesterolemia, Hypothyroid (6/25/2010), Incontinence, Lumbosacral plexopathy (6/25/2010), Migraine, MS (multiple sclerosis) ([facility] (2004), Myasthenia gravis ([facility] (2011), Sleep apnea (6/25/2010), Stroke ([facility] (2018), Ureteral calculus (6/25/2010), and Vitamin D deficiency (3/17/2016). All other systems negative except as above. PE  Vitals:    12/15/21 1049   BP: 98/60   Pulse: 80   Resp: 16   SpO2: 98%   Weight: 186 lb (84.4 kg)   Height: 6' (1.829 m)    Body mass index is 25.23 kg/m².    General appearance - alert, well appearing, and in no distress  Mental status - affect appropriate to mood  Eyes - sclera anicteric, moist mucous membranes  Neck - supple, no JVD  Chest - clear to auscultation, no wheezes, rales or rhonchi  Heart - normal rate, regular rhythm, normal S1, S2, I-II/VI systolic murmur LUSB  Abdomen - soft, nontender, nondistended, no masses or organomegaly  Neurological - no focal deficit  Extremities - peripheral pulses normal, trace pedal edema      Recent Labs:  Lab Results   Component Value Date/Time    Cholesterol, total 157 10/20/2021 02:48 PM    HDL Cholesterol 52 10/20/2021 02:48 PM    LDL-CHOLESTEROL 91 12/26/2018 12:00 AM    LDL, calculated 77 10/20/2021 02:48 PM    LDL, calculated 208 (H) 08/03/2020 08:05 AM    Triglyceride 168 (H) 10/20/2021 02:48 PM    Triglyceride 126 12/26/2018 12:00 AM    CHOL/HDL Ratio 4.3 08/23/2010 12:19 PM     Lab Results   Component Value Date/Time    Creatinine 0.87 10/20/2021 02:48 PM     Lab Results   Component Value Date/Time    BUN 11 10/20/2021 02:48 PM     Lab Results   Component Value Date/Time    Potassium 4.2 10/20/2021 02:48 PM     Lab Results   Component Value Date/Time    Hemoglobin A1c 7.0 (H) 07/12/2021 04:04 PM     Lab Results   Component Value Date/Time    HGB 14.1 10/20/2021 02:48 PM     Lab Results   Component Value Date/Time    PLATELET 108 (L) 48/34/8866 02:48 PM       Reviewed:  Past Medical History:   Diagnosis Date    Arthritis     hip, hands    Benign cyst of left breast 3/21/2016    Blindness and low vision 2004    legally blind from Kaitlin Huff     right eye    Cervical spinal stenosis 12/2/2016    Moderate C6-7    Chronic pain     COVID-19 vaccine series completed     PFIZER 3/19/21, 4/9/21    Depression     Diabetes mellitus type 2, controlled ([address] 9/13/2016    Diet-controlled diabetes mellitus ([facility] 1/5/2018    Endometriosis 6/25/2010    FH: breast cancer 6/25/2010    Chart states FH breast/ovary Ca, CAD,DM     History of CVA (cerebrovascular accident) 6/5/2018    HTN, goal below 140/90 6/25/2010    CURRENTLY NO MEDICATIONS (7-12-21)    Hypercholesterolemia     Hypothyroid 6/25/2010    Incontinence     Lumbosacral plexopathy 6/25/2010    Migraine     H/O MIGRAINE    MS (multiple sclerosis) ([facility] 2004    Myasthenia gravis ([facility] 2011    Sleep apnea 6/25/2010    RESOLVED 5/2019    Stroke ([facility] 2018    RIGHT SIDE WEAKNESS    Ureteral calculus 6/25/2010    Vitamin D deficiency 3/17/2016     Social History     Tobacco Use   Smoking Status Current Every Day Smoker    Packs/day: 0.50    Years: 30.00    Pack years: 15.00    Types: Cigarettes   Smokeless Tobacco Never Used     Social History     Substance and Sexual Activity   Alcohol Use No     Allergies   Allergen Reactions    Bee Sting [Sting, Bee] Anaphylaxis     Also allergic to egg products    Penicillins Anaphylaxis    Betadine [Povidone-Iodine] Rash    Egg Itching       Current Outpatient Medications   Medication Sig    erenumab-aooe (Aimovig Autoinjector) 140 mg/mL injection ADMINISTER 1 ML(140MG) UNDER THE SKIN EVERY 30 DAYS    cholecalciferol (VITAMIN D3) (50,000 UNITS /1250 MCG) capsule Take 1 capsule once a week for the next 8 weeks then switch to OTC vitamin D3 2000 units daily (Patient taking differently: Take 1 capsule once a week for the next 8 weeks then switch to OTC vitamin D3 2000 units daily- by mouth)    levothyroxine (SYNTHROID) 137 mcg tablet TAKE 1 TABLET BY MOUTH DAILY BEFORE BREAKFAST  rosuvastatin (CRESTOR) 40 mg tablet TAKE 1 TABLET BY MOUTH EVERY DAY    topiramate (TOPAMAX) 200 mg tablet Take 1 Tablet by mouth two (2) times a day.  traZODone (DESYREL) 50 mg tablet Take 50 mg by mouth nightly.  cyanocobalamin 1,000 mcg tablet TAKE 1 TABLET BY MOUTH EVERY DAY    metFORMIN ER (GLUCOPHAGE XR) 500 mg tablet TAKE 3 TABLETS BY MOUTH DAILY; dose change (Patient taking differently: Take 1,500 mg by mouth nightly. TAKE 3 TABLETS BY MOUTH DAILY; dose change)    PARoxetine (PAXIL) 40 mg tablet TAKE 1 AND 1/2 TABLETS BY MOUTH EVERY NIGHT (Patient taking differently: Take 60 mg by mouth nightly.)    pyridostigmine bromide (MESTINON SR) 180 mg SR tablet TAKE 1 TABLET BY MOUTH TWICE DAILY    pregabalin (Lyrica) 200 mg capsule Take 1 Cap by mouth two (2) times a day. Max Daily Amount: 400 mg.    Gilenya 0.5 mg cap Take 1 Cap by mouth daily. (Patient taking differently: Take 1 Capsule by mouth nightly.)    acetaminophen (TYLENOL) 500 mg tablet Take 500 mg by mouth every six (6) hours as needed for Pain. for mild to moderate pain on a scclae of  3/10-6/10.  ondansetron (ZOFRAN ODT) 4 mg disintegrating tablet DISSOLVE 1 TABLET ON THE TONGUE EVERY 8 HOURS AS NEEDED FOR NAUSEA    glucose blood VI test strips (BLOOD GLUCOSE TEST) strip 100 Each by Does Not Apply route See Admin Instructions. One Touch Ultra Test Strips=Check blood sugar daily dx E11.8    aspirin delayed-release 81 mg tablet Take 1 Tab by mouth every twelve (12) hours. (Patient taking differently: Take 1 Tab by mouth daily.)    albuterol (PROVENTIL VENTOLIN) 2.5 mg /3 mL (0.083 %) nebulizer solution 3 mL by Nebulization route every six (6) hours as needed for Wheezing.  lidocaine (LIDODERM) 5 % Apply patch to the affected area for 12 hours a day and remove for 12 hours a day.     DurezoL 0.05 % ophthalmic emulsion  (Patient not taking: Reported on 12/15/2021)    OTHER Electric scooter  Diagnosis: Multiple sclerosis  Frequent fall  Gait disorder (Patient not taking: Reported on 10/26/2021)    OTHER 0.25 mL by SubLINGual route daily. CBD OIL  (Patient not taking: Reported on 12/15/2021)    dantrolene (DANTRIUM) 100 mg capsule Take 1 Cap by mouth three (3) times daily.  Menthol-Zinc Oxide (CALMOSEPTINE) 0.44-20.6 % oint Apply 1 Each to affected area daily as needed for Other (thin layer to buttocks for skin irritation).  TENS UNIT ELECTRODES by Does Not Apply route. prn      No current facility-administered medications for this visit.        Janeth Sweet MD  [facility] heart [facility] [place]  [address], [address] [facility] [address]  [facility], [address]

## 2021-12-21 ENCOUNTER — ANCILLARY PROCEDURE (OUTPATIENT)
Dept: CARDIOLOGY CLINIC | Age: 66
End: 2021-12-21
Payer: MEDICARE

## 2021-12-21 VITALS
HEIGHT: 72 IN | BODY MASS INDEX: 25.19 KG/M2 | WEIGHT: 186 LBS | SYSTOLIC BLOOD PRESSURE: 98 MMHG | DIASTOLIC BLOOD PRESSURE: 60 MMHG

## 2021-12-21 DIAGNOSIS — I49.3 PVC'S (PREMATURE VENTRICULAR CONTRACTIONS): ICD-10-CM

## 2021-12-21 DIAGNOSIS — Z72.0 TOBACCO USE: ICD-10-CM

## 2021-12-21 DIAGNOSIS — I35.1 AORTIC VALVE INSUFFICIENCY, ETIOLOGY OF CARDIAC VALVE DISEASE UNSPECIFIED: ICD-10-CM

## 2021-12-21 DIAGNOSIS — Z71.6 ENCOUNTER FOR COUNSELING FOR TOBACCO USE DISORDER: ICD-10-CM

## 2021-12-21 DIAGNOSIS — E11.8 DM TYPE 2, CONTROLLED, WITH COMPLICATION (HCC): ICD-10-CM

## 2021-12-21 LAB
ECHO AO ROOT DIAM: 3.1 CM
ECHO AO ROOT INDEX: 1.5 CM/M2
ECHO AV AREA PEAK VELOCITY: 3 CM2
ECHO AV AREA PEAK VELOCITY: 3 CM2
ECHO AV AREA VTI: 3.3 CM2
ECHO AV AREA VTI: 3.3 CM2
ECHO AV MEAN GRADIENT: 2 MMHG
ECHO AV MEAN VELOCITY: 0.7 M/S
ECHO AV PEAK GRADIENT: 5 MMHG
ECHO AV PEAK VELOCITY: 1.1 M/S
ECHO AV VELOCITY RATIO: 0.82
ECHO AV VTI: 23.3 CM
ECHO EST RA PRESSURE: 3 MMHG
ECHO LA DIAMETER INDEX: 1.55 CM/M2
ECHO LA DIAMETER: 3.2 CM
ECHO LA TO AORTIC ROOT RATIO: 1.03
ECHO LA VOL 2C: 47 ML (ref 22–52)
ECHO LA VOL 4C: 42 ML (ref 22–52)
ECHO LA VOL BP: 47 ML (ref 22–52)
ECHO LA VOL/BSA BIPLANE: 23 ML/M2 (ref 16–34)
ECHO LA VOLUME AREA LENGTH: 50 ML
ECHO LA VOLUME INDEX A2C: 23 ML/M2 (ref 16–34)
ECHO LA VOLUME INDEX A4C: 20 ML/M2 (ref 16–34)
ECHO LA VOLUME INDEX AREA LENGTH: 24 ML/M2 (ref 16–34)
ECHO LV E' LATERAL VELOCITY: 7 CM/S
ECHO LV E' SEPTAL VELOCITY: 4 CM/S
ECHO LV EDV A2C: 67 ML
ECHO LV EDV A4C: 112 ML
ECHO LV EDV BP: 93 ML (ref 56–104)
ECHO LV EDV INDEX A4C: 54 ML/M2
ECHO LV EDV INDEX BP: 45 ML/M2
ECHO LV EDV NDEX A2C: 32 ML/M2
ECHO LV EJECTION FRACTION A2C: 68 %
ECHO LV EJECTION FRACTION A4C: 49 %
ECHO LV EJECTION FRACTION BIPLANE: 58 % (ref 55–100)
ECHO LV ESV A2C: 22 ML
ECHO LV ESV A4C: 57 ML
ECHO LV ESV BP: 39 ML (ref 19–49)
ECHO LV ESV INDEX A2C: 11 ML/M2
ECHO LV ESV INDEX A4C: 28 ML/M2
ECHO LV ESV INDEX BP: 19 ML/M2
ECHO LV FRACTIONAL SHORTENING: 35 % (ref 28–44)
ECHO LV INTERNAL DIMENSION DIASTOLE INDEX: 1.93 CM/M2
ECHO LV INTERNAL DIMENSION DIASTOLIC: 4 CM (ref 3.9–5.3)
ECHO LV INTERNAL DIMENSION SYSTOLIC INDEX: 1.26 CM/M2
ECHO LV INTERNAL DIMENSION SYSTOLIC: 2.6 CM
ECHO LV IVSD: 1.2 CM (ref 0.6–0.9)
ECHO LV MASS 2D: 155.4 G (ref 67–162)
ECHO LV MASS INDEX 2D: 75.1 G/M2 (ref 43–95)
ECHO LV POSTERIOR WALL DIASTOLIC: 1.1 CM (ref 0.6–0.9)
ECHO LV RELATIVE WALL THICKNESS RATIO: 0.55
ECHO LVOT AREA: 3.8 CM2
ECHO LVOT AV VTI INDEX: 0.85
ECHO LVOT DIAM: 2.2 CM
ECHO LVOT MEAN GRADIENT: 2 MMHG
ECHO LVOT PEAK GRADIENT: 3 MMHG
ECHO LVOT PEAK VELOCITY: 0.9 M/S
ECHO LVOT STROKE VOLUME INDEX: 36.2 ML/M2
ECHO LVOT SV: 74.8 ML
ECHO LVOT VTI: 19.7 CM
ECHO MV A VELOCITY: 0.71 M/S
ECHO MV AREA PHT: 3.9 CM2
ECHO MV E DECELERATION TIME (DT): 195.2 MS
ECHO MV E VELOCITY: 0.54 M/S
ECHO MV E/A RATIO: 0.76
ECHO MV E/E' LATERAL: 7.71
ECHO MV E/E' RATIO (AVERAGED): 10.61
ECHO MV E/E' SEPTAL: 13.5
ECHO MV PRESSURE HALF TIME (PHT): 56.6 MS
ECHO RA MAJOR AXIS INDEX: 1.79 CM/M2
ECHO RA MAJOR AXIS: 3.7 CM
ECHO RV FREE WALL PEAK S': 12 CM/S
ECHO RV INTERNAL DIMENSION: 3.3 CM
ECHO RV TAPSE: 2 CM (ref 1.5–2)

## 2021-12-21 PROCEDURE — 93306 TTE W/DOPPLER COMPLETE: CPT | Performed by: SPECIALIST

## 2021-12-22 ENCOUNTER — TELEPHONE (OUTPATIENT)
Dept: CARDIOLOGY CLINIC | Age: 66
End: 2021-12-22

## 2021-12-22 NOTE — TELEPHONE ENCOUNTER
Verified patient with two types of identifiers. Notified of results. Patient verbalized understanding and will call with any other questions.       Future Appointments   Date Time Provider Roel Karina   1/27/2022  2:40 PM Anika Kahn MD [facility] BS AMB   4/28/2022  1:30 PM MD RUI Vargas BS AMB   12/15/2022 10:00 AM DANA HERNANDEZ BS AMB   12/15/2022 10:40 AM MD BRIDGET Dougherty BS AMB

## 2021-12-22 NOTE — TELEPHONE ENCOUNTER
----- Message from Jane Antunez RN sent at 12/22/2021  9:58 AM EST -----    ----- Message -----  From: Luigi Irene MD  Sent: 12/21/2021   4:56 PM EST  To: Mariana Patricia RN, Jane Antunez RN    Normal LV, mild to  mod AI

## 2022-01-10 ENCOUNTER — TELEPHONE (OUTPATIENT)
Dept: FAMILY MEDICINE CLINIC | Age: 67
End: 2022-01-10

## 2022-01-10 DIAGNOSIS — Z12.31 SCREENING MAMMOGRAM FOR BREAST CANCER: Primary | ICD-10-CM

## 2022-01-10 NOTE — TELEPHONE ENCOUNTER
----- Message from Evelinajailene Gifford sent at 1/10/2022  1:26 PM EST -----  Subject: Message to Provider    QUESTIONS  Information for Provider? pt wants nurse to call her back regarding her   yearly mammogram she needs to get done. Said she has a lump and wants to   know if the diagnostic mammogram can be ordered instead  ---------------------------------------------------------------------------  --------------  CALL BACK INFO  What is the best way for the office to contact you? OK to leave message on   voicemail  Preferred Call Back Phone Number? [phone]  ---------------------------------------------------------------------------  --------------  SCRIPT ANSWERS  Relationship to Patient?  Self

## 2022-01-10 NOTE — TELEPHONE ENCOUNTER
Chief Complaint   Patient presents with    New Order     Mammogram .  Patient states she has an lump,  left breast when touched. Patient noticed lump for two weeks with no pain. Patient requesting a diagnostic mammogram.       Completed last mammogram in 12/2020, last seen 10/26/2021.   Benoit Mcnally LPN

## 2022-01-12 NOTE — TELEPHONE ENCOUNTER
Called patient and confirmed two identifiers. Patient said it is hard for her to come to the office due to not being able to transport herself and her son will have to take off the entire day of work. Patient recently had AWV in 10/26/21 that included pap and breast examination. Patient has family history of breast cancer and has history of cysts in breast, per patient. Patient would like to have order and not make an appointment. Patient would like call back from SWATI Laughlin if possible to discuss.

## 2022-01-12 NOTE — TELEPHONE ENCOUNTER
Patient does not want to come in office for appointment would like to receive a call back from [employer] or Nurse.

## 2022-01-13 NOTE — TELEPHONE ENCOUNTER
Called patient and confirmed two identifiers. Informed patient that provider can only order screening mammogram without evaluation.  Patient was agreeable and would like to move forward with screening mammogram.

## 2022-01-27 ENCOUNTER — OFFICE VISIT (OUTPATIENT)
Dept: NEUROLOGY | Age: 67
End: 2022-01-27
Payer: MEDICARE

## 2022-01-27 VITALS
SYSTOLIC BLOOD PRESSURE: 117 MMHG | HEART RATE: 76 BPM | BODY MASS INDEX: 25.82 KG/M2 | WEIGHT: 190.4 LBS | TEMPERATURE: 97.3 F | OXYGEN SATURATION: 90 % | DIASTOLIC BLOOD PRESSURE: 70 MMHG | RESPIRATION RATE: 14 BRPM

## 2022-01-27 DIAGNOSIS — R29.6 FREQUENT FALLS: ICD-10-CM

## 2022-01-27 DIAGNOSIS — M79.641 PAIN IN BOTH HANDS: ICD-10-CM

## 2022-01-27 DIAGNOSIS — G89.4 CHRONIC PAIN SYNDROME: ICD-10-CM

## 2022-01-27 DIAGNOSIS — G43.019 INTRACTABLE MIGRAINE WITHOUT AURA AND WITHOUT STATUS MIGRAINOSUS: ICD-10-CM

## 2022-01-27 DIAGNOSIS — R29.898 WEAKNESS OF BOTH HANDS: ICD-10-CM

## 2022-01-27 DIAGNOSIS — R26.9 GAIT DISORDER: ICD-10-CM

## 2022-01-27 DIAGNOSIS — G35 MS (MULTIPLE SCLEROSIS) (HCC): Primary | ICD-10-CM

## 2022-01-27 DIAGNOSIS — F41.1 GAD (GENERALIZED ANXIETY DISORDER): ICD-10-CM

## 2022-01-27 DIAGNOSIS — R25.1 TREMOR: ICD-10-CM

## 2022-01-27 DIAGNOSIS — M79.642 PAIN IN BOTH HANDS: ICD-10-CM

## 2022-01-27 DIAGNOSIS — G70.00 MYASTHENIA GRAVIS (HCC): ICD-10-CM

## 2022-01-27 DIAGNOSIS — M79.18 MYOFASCIAL MUSCLE PAIN: ICD-10-CM

## 2022-01-27 DIAGNOSIS — G61.81 CIDP (CHRONIC INFLAMMATORY DEMYELINATING POLYNEUROPATHY) (HCC): ICD-10-CM

## 2022-01-27 PROCEDURE — 3017F COLORECTAL CA SCREEN DOC REV: CPT | Performed by: PSYCHIATRY & NEUROLOGY

## 2022-01-27 PROCEDURE — G8427 DOCREV CUR MEDS BY ELIG CLIN: HCPCS | Performed by: PSYCHIATRY & NEUROLOGY

## 2022-01-27 PROCEDURE — G8752 SYS BP LESS 140: HCPCS | Performed by: PSYCHIATRY & NEUROLOGY

## 2022-01-27 PROCEDURE — G9899 SCRN MAM PERF RSLTS DOC: HCPCS | Performed by: PSYCHIATRY & NEUROLOGY

## 2022-01-27 PROCEDURE — G8754 DIAS BP LESS 90: HCPCS | Performed by: PSYCHIATRY & NEUROLOGY

## 2022-01-27 PROCEDURE — G8419 CALC BMI OUT NRM PARAM NOF/U: HCPCS | Performed by: PSYCHIATRY & NEUROLOGY

## 2022-01-27 PROCEDURE — G9717 DOC PT DX DEP/BP F/U NT REQ: HCPCS | Performed by: PSYCHIATRY & NEUROLOGY

## 2022-01-27 PROCEDURE — 99214 OFFICE O/P EST MOD 30 MIN: CPT | Performed by: PSYCHIATRY & NEUROLOGY

## 2022-01-27 RX ORDER — PYRIDOSTIGMINE BROMIDE 180 MG/1
TABLET, EXTENDED RELEASE ORAL
Qty: 180 TABLET | Refills: 0 | Status: SHIPPED | OUTPATIENT
Start: 2022-01-27 | End: 2022-08-30

## 2022-01-27 RX ORDER — TIZANIDINE 2 MG/1
2 TABLET ORAL 2 TIMES DAILY
Qty: 60 TABLET | Refills: 1 | Status: SHIPPED | OUTPATIENT
Start: 2022-01-27 | End: 2022-02-24

## 2022-01-27 NOTE — PROGRESS NOTES
Chief Complaint   Patient presents with    Follow-up    Multiple Sclerosis     Visit Vitals  /70   Pulse 76   Temp 97.3 °F (36.3 °C)   Resp 14   Wt 190 lb 6.4 oz (86.4 kg)   SpO2 90%   BMI 25.82 kg/m²

## 2022-01-27 NOTE — PROGRESS NOTES
Neurology Progress Note    NAME:  Devon Galvin   :   1955   MRN:   [id number]     Date/Time:  2022  Subjective:    Devon Galvin is a 61 y.o. female here today for follow-up for MS, MG, chronic pain syndrome, headaches, CVA, difficulty walking, drowsiness, fall. Patient was accompanied by her son to the visit  No falls in the last visit  She says she still experiences intermittent weakness. Fine feeling in her finger has gone. She says she experiences occasional spasm. She noted that she is still having occasional dizziness with vertigo, she says that has decreased hearing, with nausea. She says her vertigo and nausea have improved. As previously stated, patient will need intermittent physical and speech therapy because of her condition also occupational  She says she is still having increasing left arm pain and difficulty ambulating. She also having thigh numbness of the extremities. .  Patient said that her eyes tend to be weak and she sees double, double vision usually associated with fatigue. She continues to have a lot of pain but not as much as before, pain is sharp in nature, diffuse, burning sensation that goes up to the arms causing numbness and tingling sensation and weakness of the hands, down to the legs causing  numbness and tingling sensation of the legs with weakness of the legs. Patient says headache waxes and wanes,it is mostly frontal, throbbing in nature, with occasional sharp pain from the back of the head, headache associated with dizziness, nausea, blurry vision, double vision, photophobia, phonophobia. She says she has a lot of muscle spasms mostly in the back. She denies loss of consciousness. Says dysphagia has improved. She  continues to smoke cigarettes.   I will continue patient on her current medications  Review of Systems - General ROS: positive for  - fatigue, night sweats and sleep disturbance  Psychological ROS: positive for - anxiety, concentration difficulties, depression, mood swings and sleep disturbances  Ophthalmic ROS: positive for - blurry vision, decreased vision, double vision and photophobia  ENT ROS: positive for - headaches, tinnitus, vertigo and visual changes  Allergy and Immunology ROS: negative  Hematological and Lymphatic ROS: negative  Endocrine ROS: negative  Respiratory ROS: no cough, shortness of breath, or wheezing  Cardiovascular ROS: no chest pain or dyspnea on exertion  Gastrointestinal ROS: no abdominal pain, change in bowel habits, or black or bloody stools  Genito-Urinary ROS: no dysuria, trouble voiding, or hematuria  Musculoskeletal ROS: positive for - gait disturbance, joint pain, joint stiffness, joint swelling and muscle pain  Neurological ROS: positive for - dizziness, gait disturbance, headaches, impaired coordination/balance, numbness/tingling, speech problems, tremors, visual changes and weakness  Dermatological ROS: negative             Medications reviewed:  Current Outpatient Medications   Medication Sig Dispense Refill    pyridostigmine bromide (MESTINON SR) 180 mg SR tablet TAKE 1 TABLET BY MOUTH TWICE DAILY 180 Tablet 0    tiZANidine (ZANAFLEX) 2 mg tablet Take 1 Tablet by mouth two (2) times a day. 60 Tablet 1    erenumab-aooe (Aimovig Autoinjector) 140 mg/mL injection ADMINISTER 1 ML(140MG) UNDER THE SKIN EVERY 30 DAYS 3 mL 5    cholecalciferol (VITAMIN D3) (50,000 UNITS /1250 MCG) capsule Take 1 capsule once a week for the next 8 weeks then switch to OTC vitamin D3 2000 units daily (Patient taking differently: Take 1 capsule once a week for the next 8 weeks then switch to OTC vitamin D3 2000 units daily- by mouth) 8 Capsule 0    DurezoL 0.05 % ophthalmic emulsion       rosuvastatin (CRESTOR) 40 mg tablet TAKE 1 TABLET BY MOUTH EVERY DAY 90 Tablet 1    topiramate (TOPAMAX) 200 mg tablet Take 1 Tablet by mouth two (2) times a day. 180 Tablet 2    traZODone (DESYREL) 50 mg tablet Take 50 mg by mouth nightly.  cyanocobalamin 1,000 mcg tablet TAKE 1 TABLET BY MOUTH EVERY DAY 30 Tablet 2    metFORMIN ER (GLUCOPHAGE XR) 500 mg tablet TAKE 3 TABLETS BY MOUTH DAILY; dose change (Patient taking differently: Take 1,500 mg by mouth nightly. TAKE 3 TABLETS BY MOUTH DAILY; dose change) 270 Tablet 1    PARoxetine (PAXIL) 40 mg tablet TAKE 1 AND 1/2 TABLETS BY MOUTH EVERY NIGHT (Patient taking differently: Take 60 mg by mouth nightly.) 135 Tab 3    pregabalin (Lyrica) 200 mg capsule Take 1 Cap by mouth two (2) times a day. Max Daily Amount: 400 mg. 180 Cap 4    Gilenya 0.5 mg cap Take 1 Cap by mouth daily. (Patient taking differently: Take 1 Capsule by mouth nightly.) 90 Cap 3    acetaminophen (TYLENOL) 500 mg tablet Take 500 mg by mouth every six (6) hours as needed for Pain. for mild to moderate pain on a scclae of  3/10-6/10.  glucose blood VI test strips (BLOOD GLUCOSE TEST) strip 100 Each by Does Not Apply route See Admin Instructions. One Touch Ultra Test Strips=Check blood sugar daily dx E11.8 100 Strip 1    aspirin delayed-release 81 mg tablet Take 1 Tab by mouth every twelve (12) hours. (Patient taking differently: Take 1 Tab by mouth daily.) 60 Tab 0    dantrolene (DANTRIUM) 100 mg capsule Take 1 Cap by mouth three (3) times daily. 90 Cap 3    albuterol (PROVENTIL VENTOLIN) 2.5 mg /3 mL (0.083 %) nebulizer solution 3 mL by Nebulization route every six (6) hours as needed for Wheezing. 30 Each 0    lidocaine (LIDODERM) 5 % Apply patch to the affected area for 12 hours a day and remove for 12 hours a day.  30 Each 3    levothyroxine (SYNTHROID) 137 mcg tablet TAKE 1 TABLET BY MOUTH DAILY BEFORE BREAKFAST 90 Tablet 1    OTHER Electric scooter  Diagnosis: Multiple sclerosis  Frequent fall  Gait disorder (Patient not taking: Reported on 10/26/2021) 1 Units 0    ondansetron (ZOFRAN ODT) 4 mg disintegrating tablet DISSOLVE 1 TABLET ON THE TONGUE EVERY 8 HOURS AS NEEDED FOR NAUSEA (Patient not taking: Reported on 1/27/2022) 15 Tab 0    OTHER 0.25 mL by SubLINGual route daily. CBD OIL  (Patient not taking: Reported on 12/15/2021)      Menthol-Zinc Oxide (CALMOSEPTINE) 0.44-20.6 % oint Apply 1 Each to affected area daily as needed for Other (thin layer to buttocks for skin irritation). (Patient not taking: Reported on 1/27/2022)      TENS UNIT ELECTRODES by Does Not Apply route.  prn  (Patient not taking: Reported on 1/27/2022)          Objective:   Vitals:  Vitals:    01/27/22 1434   BP: 117/70   Pulse: 76   Resp: 14   Temp: 97.3 °F (36.3 °C)   SpO2: 90%   Weight: 190 lb 6.4 oz (86.4 kg)   LMP: 09/01/2010               Lab Data Reviewed:  Lab Results   Component Value Date/Time    WBC 4.9 10/20/2021 02:48 PM    HCT 42.6 10/20/2021 02:48 PM    HGB 14.1 10/20/2021 02:48 PM    PLATELET 326 (L) 26/31/4266 02:48 PM       Lab Results   Component Value Date/Time    Sodium 143 10/20/2021 02:48 PM    Potassium 4.2 10/20/2021 02:48 PM    Chloride 108 (H) 10/20/2021 02:48 PM    CO2 19 (L) 10/20/2021 02:48 PM    Glucose 106 (H) 10/20/2021 02:48 PM    BUN 11 10/20/2021 02:48 PM    Creatinine 0.87 10/20/2021 02:48 PM    Calcium 9.9 10/20/2021 02:48 PM       No components found for: TROPQUANT    No results found for: SHAHIDA      Lab Results   Component Value Date/Time    Hemoglobin A1c 7.0 (H) 07/12/2021 04:04 PM    Hemoglobin A1c (POC) 7.0 10/26/2021 10:25 AM        Lab Results   Component Value Date/Time    Vitamin B12 436 06/10/2019 10:41 AM    Folate 12.0 02/10/2016 12:00 AM       No results found for: SHAHIDA, Marine Murtaza, XBANA    Lab Results   Component Value Date/Time    Cholesterol, total 157 10/20/2021 02:48 PM    HDL Cholesterol 52 10/20/2021 02:48 PM    LDL-CHOLESTEROL 91 12/26/2018 12:00 AM    LDL, calculated 77 10/20/2021 02:48 PM    LDL, calculated 208 (H) 08/03/2020 08:05 AM    VLDL, calculated 28 10/20/2021 02:48 PM    VLDL, calculated 45 (H) 08/03/2020 08:05 AM    Triglyceride 168 (H) 10/20/2021 02:48 PM    Triglyceride 126 12/26/2018 12:00 AM    CHOL/HDL Ratio 4.3 08/23/2010 12:19 PM         CT Results (recent):  Results from [place] encounter on 11/23/19    CT NECK SOFT TISSUE W CONT    Narrative  EXAM:  CT NECK SOFT TISSUE W CONT    INDICATION:  Dysphasia. Status post thyroidectomy. COMPARISON: CT neck 5/14/2018. CONTRAST: 100 mL of Isovue-300. TECHNIQUE: Multislice helical CT was performed from the mid calvarium to the  aortic arch during uneventful rapid bolus intravenous contrast administration. Contiguous 2.5 mm axial images were reconstructed and lung and soft tissue  windows were generated. Coronal and sagittal reformations were generated. CT  dose reduction was achieved through use of a standardized protocol tailored for  this examination and automatic exposure control for dose modulation. 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 No evidence of recurrent tissue in this region. No cervical  lymphadenopathy. Visualized brain parenchyma is normal in appearance. Paranasal sinuses and  mastoid air cells are clear. Intraorbital contents are unremarkable. The  cervical vasculature is patent. No acute fracture or aggressive osseous lesion. Multilevel degenerative disc disease most advanced at C3-C4, C5-C6 and C6-C7 is  unchanged. Visualized portions of the lung apices are normal. Right chest port  is noted. Impression  IMPRESSION:  1. 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 No evidence of local recurrence. No cervical lymphadenopathy or other soft  tissue abnormality in the neck.       MRI Results (recent):  Results from [place] encounter on 08/08/20    MRI BRAIN W WO CONT    Narrative  Clinical history: Fatigue, drowsiness  INDICATION:   Fatigue, drowsiness, multiple sclerosis, prior CVA    COMPARISON: 12/20/2018  TECHNIQUE: MR examination of the brain includes axial and sagittal T1 , axial  T2, axial FLAIR, axial gradient echo, axial DWI, coronal T1 . Pre and post  contrast axial T1-weighted imaging. Postcontrast T1-weighted imaging coronal  plane. CONTRAST: The patient was administered gadolinium-based contrast material,  subsequently axial and sagittal T1-weighted postcontrast imaging was obtained. FINDINGS:  There is no intracranial mass, hemorrhage or acute infarction. Scattered foci of abnormal increased T2 signal intensity predominantly  superiorly, subcortical left parietal lobe but also noted in the frontal lobes  on the right and on the left. No associated abnormal postcontrast enhancement. No new diffusion restriction. Air-fluid level in the right maxillary sinus. IACs are symmetric in size. . There  is no abnormal parenchymal enhancement. There is no abnormal meningeal  enhancement demonstrated. The brain architecture is normal. There is no evidence  of midline shift or mass-effect. The ventricles are normal in size, position and  configuration. There are no extra-axial fluid collections. Major intracranial  vascular flow-voids are unremarkable. The orbits are grossly symmetric. Dural  venous sinuses are grossly unremarkable. There is no Chiari or syrinx. Pituitary  and infundibulum grossly unremarkable. Cerebellopontine angles are unremarkable. No skull base mass is identified. Cavernous sinuses are symmetric. The mastoid  air cells and are well pneumatized and clear. Impression  IMPRESSION:  Scattered foci of abnormal increased T2 signal intensity predominantly  subcortical and superior adjacent to the vertex. Stable overall appearance  compared to the 12/26/2018 examination. No postcontrast enhancement or diffusion  restriction to suggest active demyelination. Right maxillary sinus disease. No intracranial mass, hemorrhage or evidence of acute infarction. IR Results (recent):  No results found for this or any previous visit.       VAS/US Results (recent): PHYSICAL EXAM:  General:    Alert, cooperative, no distress, appears stated age. Head:   Normocephalic, without obvious abnormality, atraumatic. Eyes:   Conjunctivae/corneas clear. PERRLA  Nose:  Nares normal. No drainage or sinus tenderness. Throat:    Lips, mucosa, and tongue normal.  No Thrush  Neck:  Supple, symmetrical,  no adenopathy, thyroid: non tender    no carotid bruit and no JVD. Paraspinal tenderness  Back:    Symmetric, diffuse  tenderness. Lungs:   Clear to auscultation bilaterally. No Wheezing or Rhonchi. No rales. Chest wall:  No tenderness or deformity. No Accessory muscle use. Heart:   Regular rate and rhythm,  no murmur, rub or gallop. Abdomen:   Soft, non-tender. Not distended. Bowel sounds normal. No masses  Extremities: Extremities normal, atraumatic, No cyanosis. No edema. No clubbing  Skin:     Texture, turgor normal. No rashes or lesions. Not Jaundiced  Lymph nodes: Cervical, supraclavicular normal.  Psych:  Good insight. Depressed. Anxious. NEUROLOGICAL EXAM:  Appearance: The patient is well developed, well nourished, provides a coherent history and is in no acute distress. Mental Status: Oriented to time, place and person. Mood and affect appropriate. Cranial Nerves:   Intact visual fields. Fundi are benign. RACHEL, EOM's full, no nystagmus, no ptosis. Facial sensation is normal. Corneal reflexes are intact. Facial movement is symmetric. Hearing is normal bilaterally. Palate is midline with normal sternocleidomastoid and trapezius muscles are normal. Tongue is midline. Motor:  5-/5 strength in upper extremity and 4+/5 lower extremity proximal and distal muscles. Normal bulk and tone. No fasciculations. Reflexes:   Deep tendon reflexes 2+/4 and symmetrical.   Sensory:    Dizzy to touch, pinprick and vibration. Gait:   Unsteady gait. Ambulates with cane   Tremor:    Mild tremor noted. Cerebellar:  No cerebellar signs present. Neurovascular:  Normal heart sounds and regular rhythm, peripheral pulses intact, and no carotid bruits. Assesment  1. MS (multiple sclerosis) ([facility])    - pyridostigmine bromide (MESTINON SR) 180 mg SR tablet; TAKE 1 TABLET BY MOUTH TWICE DAILY  Dispense: 180 Tablet; Refill: 0    2. CIDP (chronic inflammatory demyelinating polyneuropathy) ([facility])  Continue IVIG    3. Myasthenia gravis ([facility]  Stable    4. Frequent falls  Intermittent physical therapy    5. Chronic pain syndrome    Following pain management    6. Gait disorder  Intermittent physical therapy    7. Intractable migraine without aura and without status migrainosus  Continue management    8. Weakness of both hands  Intermittent physical therapy    9. Tremor  Stable    10. Myofascial muscle pain  Intermittent physical therapy    11. Pain in both hands    - REFERRAL TO HAND SURGERY    12. DAMEON (generalized anxiety disorder)  Referred to psychiatry    ___________________________________________________  PLAN: Medication and plan discussed with patient      ICD-10-CM ICD-9-CM    1. MS (multiple sclerosis) ([facility]  G35 340 pyridostigmine bromide (MESTINON SR) 180 mg SR tablet   2. CIDP (chronic inflammatory demyelinating polyneuropathy) ([facility])  G61.81 357.81    3. Myasthenia gravis ([facility]  G70.00 358.00    4. Frequent falls  R29.6 V15.88    5. Chronic pain syndrome  G89.4 338.4    6. Gait disorder  R26.9 781.2    7. Intractable migraine without aura and without status migrainosus  G43.019 346.11    8. Weakness of both hands  R29.898 729.89    9. Tremor  R25.1 781.0    10. Myofascial muscle pain  M79.18 729.1    11. Pain in both hands  M79.641 729.5 REFERRAL TO HAND SURGERY    M79.642     12. DAMEON (generalized anxiety disorder)  F41.1 300.02      Follow-up and Dispositions    · Return in about 3 months (around 4/27/2022).              ___________________________________________________    Attending Physician: Agata Corral MD

## 2022-02-07 DIAGNOSIS — E03.9 HYPOTHYROIDISM, UNSPECIFIED TYPE: ICD-10-CM

## 2022-02-07 RX ORDER — LEVOTHYROXINE SODIUM 137 UG/1
TABLET ORAL
Qty: 90 TABLET | Refills: 0 | Status: SHIPPED | OUTPATIENT
Start: 2022-02-07 | End: 2022-05-20

## 2022-02-07 NOTE — TELEPHONE ENCOUNTER
Requested Prescriptions     Pending Prescriptions Disp Refills    levothyroxine (SYNTHROID) 137 mcg tablet [Pharmacy Med Name: LEVOTHYROXINE 137 MCG TABLET] 90 Tablet 1     Sig: TAKE 1 TABLET BY MOUTH EVERY DAY BEFORE BREAKFAST

## 2022-02-24 RX ORDER — TIZANIDINE 2 MG/1
TABLET ORAL
Qty: 60 TABLET | Refills: 1 | Status: SHIPPED | OUTPATIENT
Start: 2022-02-24 | End: 2022-03-30

## 2022-02-28 ENCOUNTER — TELEPHONE (OUTPATIENT)
Dept: FAMILY MEDICINE CLINIC | Age: 67
End: 2022-02-28

## 2022-02-28 ENCOUNTER — HOSPITAL ENCOUNTER (OUTPATIENT)
Dept: MAMMOGRAPHY | Age: 67
Discharge: HOME | End: 2022-02-28
Attending: NURSE PRACTITIONER

## 2022-02-28 DIAGNOSIS — Z12.31 SCREENING MAMMOGRAM FOR BREAST CANCER: ICD-10-CM

## 2022-02-28 NOTE — TELEPHONE ENCOUNTER
Spoke with radiology vandana at [facility] for routine mammogram, they were unable to do it because patient has lump in the breast and needs diagnostic. Writer informed tech to advise patient to come in for office visit as recommended by SWATI Laughlin. Patient will be contacted by the office to offer and schedule her an appointment.

## 2022-02-28 NOTE — TELEPHONE ENCOUNTER
Left a voicemail for the patient asking her to please give the office a call back in reference to an appointment I have scheduled for her for a follow up visit on 3/3/2022 at 1:15 with Qian

## 2022-02-28 NOTE — PROGRESS NOTES
I called Dr. Arce Heading office for a diagnostic mammogram for pt's left breast lump. They need to see the patient in the office first. I informed her of this. She was not happy but she will make an appt. with them and reschedule here.

## 2022-03-01 NOTE — TELEPHONE ENCOUNTER
Left a voicemail for the patient to give the office a call back in reference to a follow appointment.

## 2022-03-08 DIAGNOSIS — G35 MS (MULTIPLE SCLEROSIS) (HCC): ICD-10-CM

## 2022-03-08 NOTE — TELEPHONE ENCOUNTER
Emily Carlos with [facility] called to request a verbal for micha.  Pt has been without the medication since 2/27. [phone]

## 2022-03-09 RX ORDER — FINGOLIMOD HCL 0.5 MG/1
1 CAPSULE ORAL
Qty: 30 CAPSULE | Refills: 11 | Status: SHIPPED | OUTPATIENT
Start: 2022-03-09 | End: 2022-05-03 | Stop reason: SDUPTHER

## 2022-03-09 NOTE — TELEPHONE ENCOUNTER
Last office visit  Last med refill 3/5/2021  Elodia Castaneda with [facility] Is requesting a verbal,   need to know how many pills and is there any changes to script for the patient and yes from you to do the verbal

## 2022-03-10 ENCOUNTER — DOCUMENTATION ONLY (OUTPATIENT)
Dept: NEUROLOGY | Age: 67
End: 2022-03-10

## 2022-03-14 ENCOUNTER — DOCUMENTATION ONLY (OUTPATIENT)
Dept: NEUROLOGY | Age: 67
End: 2022-03-14

## 2022-03-14 RX ORDER — PAROXETINE HYDROCHLORIDE 40 MG/1
TABLET, FILM COATED ORAL
Qty: 135 TABLET | Refills: 3 | Status: SHIPPED | OUTPATIENT
Start: 2022-03-14

## 2022-03-17 ENCOUNTER — TELEPHONE (OUTPATIENT)
Dept: FAMILY MEDICINE CLINIC | Age: 67
End: 2022-03-17

## 2022-03-17 ENCOUNTER — TRANSCRIBE ORDER (OUTPATIENT)
Dept: SCHEDULING | Age: 67
End: 2022-03-17

## 2022-03-17 ENCOUNTER — TELEPHONE (OUTPATIENT)
Dept: NEUROLOGY | Age: 67
End: 2022-03-17

## 2022-03-17 DIAGNOSIS — Z12.31 SCREENING MAMMOGRAM FOR HIGH-RISK PATIENT: Primary | ICD-10-CM

## 2022-03-17 DIAGNOSIS — N63.20 LEFT BREAST MASS: Primary | ICD-10-CM

## 2022-03-17 RX ORDER — PYRIDOSTIGMINE BROMIDE 60 MG/1
120 TABLET ORAL 3 TIMES DAILY
Qty: 180 TABLET | Refills: 2 | Status: SHIPPED | OUTPATIENT
Start: 2022-03-17 | End: 2022-05-16

## 2022-03-17 NOTE — TELEPHONE ENCOUNTER
We can just do POC A1c at visit; she had full blood work done in Oct and things were stable then.  I put in an order for diagnostic mammogram.

## 2022-03-17 NOTE — TELEPHONE ENCOUNTER
----- Message from Shiv Denny sent at 3/17/2022  9:29 AM EDT -----  Subject: Message to Provider    QUESTIONS  Information for Provider? Pt called wants to know if she needs blood work   before appt 4/28/22. She also wants PCP to know she had a mammogram done   and technician wants her to have a diagnostic mammogram done for cyst in   lft breast but she cannot get an order for a diagnostic mammogram. Please   call pt back she is upset.   ---------------------------------------------------------------------------  --------------  CALL BACK INFO  What is the best way for the office to contact you? OK to leave message on   voicemail  Preferred Call Back Phone Number? [phone]  ---------------------------------------------------------------------------  --------------  SCRIPT ANSWERS  Relationship to Patient?  Self

## 2022-03-17 NOTE — TELEPHONE ENCOUNTER
Patient called stating that she would need a new rx for paroxetine and she would need an alternative for mestinon.  insr is stating that its not on her formalary

## 2022-03-19 PROBLEM — Z86.73 HISTORY OF CVA (CEREBROVASCULAR ACCIDENT): Status: ACTIVE | Noted: 2018-06-05

## 2022-03-19 PROBLEM — M16.9 DEGENERATIVE JOINT DISEASE (DJD) OF HIP: Status: ACTIVE | Noted: 2017-06-04

## 2022-03-19 PROBLEM — D69.6 THROMBOCYTOPENIA (HCC): Status: ACTIVE | Noted: 2019-06-06

## 2022-03-19 PROBLEM — Q89.2 THYROGLOSSAL DUCT CYST: Status: ACTIVE | Noted: 2018-06-20

## 2022-03-19 PROBLEM — E11.9 TYPE 2 DIABETES MELLITUS (HCC): Status: ACTIVE | Noted: 2018-01-05

## 2022-03-19 PROBLEM — D64.9 ANEMIA: Status: ACTIVE | Noted: 2019-06-06

## 2022-03-19 PROBLEM — M16.12 ARTHRITIS OF LEFT HIP: Status: ACTIVE | Noted: 2019-05-13

## 2022-03-20 PROBLEM — M17.11 OSTEOARTHRITIS OF RIGHT KNEE: Status: ACTIVE | Noted: 2021-07-26

## 2022-03-20 PROBLEM — F11.90 CHRONIC, CONTINUOUS USE OF OPIOIDS: Status: ACTIVE | Noted: 2017-06-05

## 2022-03-30 RX ORDER — TIZANIDINE 2 MG/1
TABLET ORAL
Qty: 60 TABLET | Refills: 1 | Status: SHIPPED | OUTPATIENT
Start: 2022-03-30 | End: 2022-04-28

## 2022-04-19 ENCOUNTER — HOSPITAL ENCOUNTER (OUTPATIENT)
Dept: ULTRASOUND IMAGING | Age: 67
Discharge: HOME | End: 2022-04-19
Attending: FAMILY MEDICINE
Payer: MEDICARE

## 2022-04-19 ENCOUNTER — OFFICE VISIT (OUTPATIENT)
Dept: NEUROLOGY | Age: 67
End: 2022-04-19
Payer: MEDICARE

## 2022-04-19 ENCOUNTER — HOSPITAL ENCOUNTER (OUTPATIENT)
Dept: MAMMOGRAPHY | Age: 67
Discharge: HOME | End: 2022-04-19
Attending: FAMILY MEDICINE
Payer: MEDICARE

## 2022-04-19 DIAGNOSIS — G56.22 ULNAR NEUROPATHY OF LEFT UPPER EXTREMITY: ICD-10-CM

## 2022-04-19 DIAGNOSIS — G62.9 NEUROPATHY: Primary | ICD-10-CM

## 2022-04-19 DIAGNOSIS — G56.03 BILATERAL CARPAL TUNNEL SYNDROME: ICD-10-CM

## 2022-04-19 DIAGNOSIS — Z12.31 SCREENING MAMMOGRAM FOR HIGH-RISK PATIENT: ICD-10-CM

## 2022-04-19 DIAGNOSIS — N63.20 LEFT BREAST MASS: ICD-10-CM

## 2022-04-19 PROCEDURE — 77062 BREAST TOMOSYNTHESIS BI: CPT

## 2022-04-19 PROCEDURE — 95886 MUSC TEST DONE W/N TEST COMP: CPT | Performed by: PSYCHIATRY & NEUROLOGY

## 2022-04-19 PROCEDURE — 95910 NRV CNDJ TEST 7-8 STUDIES: CPT | Performed by: PSYCHIATRY & NEUROLOGY

## 2022-04-19 PROCEDURE — 76642 ULTRASOUND BREAST LIMITED: CPT

## 2022-04-19 PROCEDURE — 76641 ULTRASOUND BREAST COMPLETE: CPT

## 2022-04-19 NOTE — PROCEDURES
[address], [address], [address]  p: [phone]  f: [phone]    Test Date:  2022    Patient: Jackson Galvin : 1955 Physician: Dr. Nino Javed   Sex: Female Height:  cm Ref Phys: Dr. Nnio Javed   ID#: [id number] Weight: 190 lbs. Technician: Tonia Shannon, EMG Tech     Patient Complaints: Numbness and tingling sensation of the hands, weakness      Medications: See chart      Patient History / Exam: This is a 44-year-old left-handed black female who is being evaluated for numbness and tingling sensation of the upper extremity, weakness and pain, worse in the left and right      NCV & EMG Findings:  Evaluation of the left ulnar motor nerve showed reduced amplitude (Wrist, 6.0 mV), reduced amplitude (B Elbow, 5.6 mV), reduced amplitude (A Elbow, 5.5 mV), decreased conduction velocity (B Elbow-Wrist, 46 m/s), and decreased conduction velocity (A Elbow-B Elbow, 40 m/s). The left median sensory and the right median sensory nerves showed prolonged distal peak latency (L4.3, R4.3 ms). The left ulnar sensory nerve showed reduced amplitude (7.1 µV). All remaining nerves (as indicated in the following tables) were within normal limits. All examined muscles (as indicated in the following table) showed no evidence of electrical instability. Impression: This is an abnormal study. There is electrodiagnostic evidence of bilateral sensory neuropathy median nerve, sensorimotor neuropathy left ulnar nerve. This is consistent with bilateral carpal tunnel syndrome left worse than the right, left compressive ulnar neuropathy.       Recommendations: Carpal tunnel release suggested.      ___________________________  Dr. Ramesh Kahn        Nerve Conduction Studies  Anti Sensory Summary Table     Stim Site NR Peak (ms) Norm Peak (ms) O-P Amp (µV) Norm O-P Amp Site1 Site2 Delta-0 (ms) Dist (cm) Luis (m/s) Norm Luis (m/s)   Left Median Anti Sensory (2nd Digit)  33°C   Wrist    4.3 <4.0 15.9 >11 Wrist 2nd Digit 3.5 14.0 40    Right Median Anti Sensory (2nd Digit)  33°C   Wrist    4.3 <4.0 17.1 >11 Wrist 2nd Digit 3.3 14.0 42    Left Ulnar Anti Sensory (5th Digit)  33°C   Wrist    3.5 <4.0 7.1 >10.0 Wrist 5th Digit 2.9 14.0 48    Right Ulnar Anti Sensory (5th Digit)  33°C   Wrist    4.0 <4.0 12.0 >10.0 Wrist 5th Digit 2.9 14.0 48      Motor Summary Table     Stim Site NR Onset (ms) Norm Onset (ms) O-P Amp (mV) Norm O-P Amp Site1 Site2 Delta-0 (ms) Dist (cm) Luis (m/s) Norm Luis (m/s)   Left Median Motor (Abd Poll Brev)  33°C   Wrist    3.6 <4.5 10.9 >4.1 Wrist Abd Poll Brev 3.6 8.0 22    Elbow    8.6  10.3  Elbow Wrist 5.0 27.0 54 >49   Right Median Motor (Abd Poll Brev)  33°C   Wrist    3.8 <4.5 5.7 >4.1 Wrist Abd Poll Brev 3.8 8.0 21    Elbow    8.2  5.6  Elbow Wrist 4.4 25.0 57 >49   Left Ulnar Motor (Abd Dig Min)  33°C   Wrist    3.2 <3.7 6.0 >7.9 Wrist Abd Dig Min 3.2 8.0 25    B Elbow    7.8  5.6 >6.0 B Elbow Wrist 4.6 21.0 46 >52   A Elbow    10.3  5.5 >6.0 A Elbow B Elbow 2.5 10.0 40 >43   Right Ulnar Motor (Abd Dig Min)  33°C   Wrist    3.1 <3.7 8.3 >7.9 Wrist Abd Dig Min 3.1 8.0 26    B Elbow    7.7  7.2 >6.0 B Elbow Wrist 4.6 25.0 54 >52   A Elbow    9.4  6.3 >6.0 A Elbow B Elbow 1.7 10.0 59 >43     EMG+     Side Muscle Nerve Root Ins Act Fibs Psw Amp Dur Poly Recrt Int Emma Racer Comment   Right Abd Dig Min Ulnar C8-T1 Nml Nml Nml Nml Nml 0 Nml Nml    Right Deltoid Axillary C5-6 Nml Nml Nml Nml Nml 0 Nml Nml    Right Biceps Musculocut C5-6 Nml Nml Nml Nml Nml 0 Nml Nml    Right Triceps Radial C6-7-8 Nml Nml Nml Nml Nml 0 Nml Nml    Right BrachioRad Radial C5-6 Nml Nml Nml Nml Nml 0 Nml Nml    Right Abd Poll Brev Median C8-T1 Nml Nml Nml Nml Nml 0 Nml Nml        Nerve Conduction Studies  Anti Sensory Left/Right Comparison     Stim Site L Lat (ms) R Lat (ms) L-R Lat (ms) L Amp (µV) R Amp (µV) L-R Amp (%) Site1 Site2 L Luis (m/s) R Luis (m/s) L-R Luis (m/s)   Median Anti Sensory (2nd Digit)  33°C   Wrist 4.3 4.3 0.0 15.9 17.1 7.0 Wrist 2nd Digit 40 42 2   Ulnar Anti Sensory (5th Digit)  33°C   Wrist 3.5 4.0 0.5 7.1 12.0 40.8 Wrist 5th Digit 48 48 0     Motor Left/Right Comparison     Stim Site L Lat (ms) R Lat (ms) L-R Lat (ms) L Amp (mV) R Amp (mV) L-R Amp (%) Site1 Site2 L Luis (m/s) R Luis (m/s) L-R Luis (m/s)   Median Motor (Abd Poll Brev)  33°C   Wrist 3.6 3.8 0.2 10.9 5.7 47.7 Wrist Abd Poll Brev 22 21 1   Elbow 8.6 8.2 0.4 10.3 5.6 45.6 Elbow Wrist 54 57 3   Ulnar Motor (Abd Dig Min)  33°C   Wrist 3.2 3.1 0.1 6.0 8.3 27.7 Wrist Abd Dig Min 25 26 1   B Elbow 7.8 7.7 0.1 5.6 7.2 22.2 B Elbow Wrist 46 54 8   A Elbow 10.3 9.4 0.9 5.5 6.3 12.7 A Elbow B Elbow 40 59 19         Waveforms:

## 2022-04-21 DIAGNOSIS — E11.9 TYPE 2 DIABETES MELLITUS WITHOUT COMPLICATION, WITHOUT LONG-TERM CURRENT USE OF INSULIN (HCC): ICD-10-CM

## 2022-04-21 RX ORDER — METFORMIN HYDROCHLORIDE 500 MG/1
TABLET, EXTENDED RELEASE ORAL
Qty: 270 TABLET | Refills: 1 | Status: SHIPPED | OUTPATIENT
Start: 2022-04-21 | End: 2022-10-17

## 2022-04-28 ENCOUNTER — OFFICE VISIT (OUTPATIENT)
Dept: FAMILY MEDICINE CLINIC | Age: 67
End: 2022-04-28
Payer: MEDICARE

## 2022-04-28 VITALS
RESPIRATION RATE: 16 BRPM | HEIGHT: 72 IN | HEART RATE: 75 BPM | WEIGHT: 180 LBS | TEMPERATURE: 97.5 F | BODY MASS INDEX: 24.38 KG/M2 | DIASTOLIC BLOOD PRESSURE: 72 MMHG | SYSTOLIC BLOOD PRESSURE: 142 MMHG | OXYGEN SATURATION: 95 %

## 2022-04-28 DIAGNOSIS — I10 HTN, GOAL BELOW 140/90: ICD-10-CM

## 2022-04-28 DIAGNOSIS — D69.6 THROMBOCYTOPENIA (HCC): Primary | ICD-10-CM

## 2022-04-28 DIAGNOSIS — F32.A DEPRESSION, UNSPECIFIED DEPRESSION TYPE: ICD-10-CM

## 2022-04-28 DIAGNOSIS — E55.9 VITAMIN D DEFICIENCY: ICD-10-CM

## 2022-04-28 DIAGNOSIS — Z78.0 POSTMENOPAUSAL: ICD-10-CM

## 2022-04-28 DIAGNOSIS — E03.9 HYPOTHYROIDISM, UNSPECIFIED TYPE: ICD-10-CM

## 2022-04-28 DIAGNOSIS — E11.8 DM TYPE 2, CONTROLLED, WITH COMPLICATION (HCC): ICD-10-CM

## 2022-04-28 DIAGNOSIS — Z13.820 SCREENING FOR OSTEOPOROSIS: ICD-10-CM

## 2022-04-28 PROCEDURE — G8420 CALC BMI NORM PARAMETERS: HCPCS | Performed by: FAMILY MEDICINE

## 2022-04-28 PROCEDURE — 1090F PRES/ABSN URINE INCON ASSESS: CPT | Performed by: FAMILY MEDICINE

## 2022-04-28 PROCEDURE — G9717 DOC PT DX DEP/BP F/U NT REQ: HCPCS | Performed by: FAMILY MEDICINE

## 2022-04-28 PROCEDURE — G8753 SYS BP > OR = 140: HCPCS | Performed by: FAMILY MEDICINE

## 2022-04-28 PROCEDURE — 99214 OFFICE O/P EST MOD 30 MIN: CPT | Performed by: FAMILY MEDICINE

## 2022-04-28 PROCEDURE — G8754 DIAS BP LESS 90: HCPCS | Performed by: FAMILY MEDICINE

## 2022-04-28 PROCEDURE — G8399 PT W/DXA RESULTS DOCUMENT: HCPCS | Performed by: FAMILY MEDICINE

## 2022-04-28 PROCEDURE — 3046F HEMOGLOBIN A1C LEVEL >9.0%: CPT | Performed by: FAMILY MEDICINE

## 2022-04-28 PROCEDURE — G9899 SCRN MAM PERF RSLTS DOC: HCPCS | Performed by: FAMILY MEDICINE

## 2022-04-28 PROCEDURE — G8427 DOCREV CUR MEDS BY ELIG CLIN: HCPCS | Performed by: FAMILY MEDICINE

## 2022-04-28 PROCEDURE — G8536 NO DOC ELDER MAL SCRN: HCPCS | Performed by: FAMILY MEDICINE

## 2022-04-28 PROCEDURE — 3017F COLORECTAL CA SCREEN DOC REV: CPT | Performed by: FAMILY MEDICINE

## 2022-04-28 PROCEDURE — 1101F PT FALLS ASSESS-DOCD LE1/YR: CPT | Performed by: FAMILY MEDICINE

## 2022-04-28 PROCEDURE — 2022F DILAT RTA XM EVC RTNOPTHY: CPT | Performed by: FAMILY MEDICINE

## 2022-04-28 NOTE — PROGRESS NOTES
Patient Name: Arnoldo Calabrese   MRN: [id number]    Davisdon Hernandez is a 74 y.o. female who presents with the following:     Reports increased depression with SI. Due to not being able to go outside and social due to [place]. Neurology manages her Prozac. Offered therapy services; pt declines. Unsure if she is taking vitamin D. States she had a colonoscopy a few years ago; her mother had colon cancer. BP Readings from Last 3 Encounters:   04/28/22 (!) 142/72   01/27/22 117/70   12/21/21 98/60     Wt Readings from Last 3 Encounters:   04/28/22 180 lb (81.6 kg)   01/27/22 190 lb 6.4 oz (86.4 kg)   12/21/21 186 lb (84.4 kg)     Lab Results   Component Value Date/Time    Hemoglobin A1c 7.0 (H) 07/12/2021 04:04 PM    Hemoglobin A1c (POC) 7.0 10/26/2021 10:25 AM     Review of Systems   Constitutional: Negative for fever, malaise/fatigue and weight loss. Respiratory: Negative for cough, hemoptysis, shortness of breath and wheezing. Cardiovascular: Negative for chest pain, palpitations, leg swelling and PND. Gastrointestinal: Negative for abdominal pain, constipation, diarrhea, nausea and vomiting. Psychiatric/Behavioral: Positive for depression. Negative for suicidal ideas. The patient's medications, allergies, past medical history, surgical history, family history and social history were reviewed and updated where appropriate. Current Outpatient Medications:     metFORMIN ER (GLUCOPHAGE XR) 500 mg tablet, TAKE 3 TABLETS BY MOUTH DAILY, Disp: 270 Tablet, Rfl: 1    pyridostigmine (MESTINON) 60 mg tablet, Take 2 Tablets by mouth three (3) times daily. , Disp: 180 Tablet, Rfl: 2    PARoxetine (PAXIL) 40 mg tablet, TAKE 1 AND 1/2 TABLETS BY MOUTH EVERY NIGHT, Disp: 135 Tablet, Rfl: 3    Gilenya 0.5 mg cap, Take 1 Capsule by mouth nightly., Disp: 30 Capsule, Rfl: 11    levothyroxine (SYNTHROID) 137 mcg tablet, TAKE 1 TABLET BY MOUTH EVERY DAY BEFORE BREAKFAST, Disp: 90 Tablet, Rfl: 0   pyridostigmine bromide (MESTINON SR) 180 mg SR tablet, TAKE 1 TABLET BY MOUTH TWICE DAILY, Disp: 180 Tablet, Rfl: 0    erenumab-aooe (Aimovig Autoinjector) 140 mg/mL injection, ADMINISTER 1 ML(140MG) UNDER THE SKIN EVERY 30 DAYS, Disp: 3 mL, Rfl: 5    rosuvastatin (CRESTOR) 40 mg tablet, TAKE 1 TABLET BY MOUTH EVERY DAY, Disp: 90 Tablet, Rfl: 1    topiramate (TOPAMAX) 200 mg tablet, Take 1 Tablet by mouth two (2) times a day., Disp: 180 Tablet, Rfl: 2    pregabalin (Lyrica) 200 mg capsule, Take 1 Cap by mouth two (2) times a day. Max Daily Amount: 400 mg., Disp: 180 Cap, Rfl: 4    acetaminophen (TYLENOL) 500 mg tablet, Take 500 mg by mouth every six (6) hours as needed for Pain. for mild to moderate pain on a scclae of  3/10-6/10., Disp: , Rfl:     glucose blood VI test strips (BLOOD GLUCOSE TEST) strip, 100 Each by Does Not Apply route See Admin Instructions. One Touch Ultra Test Strips=Check blood sugar daily dx E11.8, Disp: 100 Strip, Rfl: 1    aspirin delayed-release 81 mg tablet, Take 1 Tab by mouth every twelve (12) hours. (Patient taking differently: Take 1 Tab by mouth daily.), Disp: 60 Tab, Rfl: 0    dantrolene (DANTRIUM) 100 mg capsule, Take 1 Cap by mouth three (3) times daily. , Disp: 90 Cap, Rfl: 3    albuterol (PROVENTIL VENTOLIN) 2.5 mg /3 mL (0.083 %) nebulizer solution, 3 mL by Nebulization route every six (6) hours as needed for Wheezing., Disp: 30 Each, Rfl: 0    Menthol-Zinc Oxide (CALMOSEPTINE) 0.44-20.6 % oint, Apply 1 Each to affected area daily as needed for PRN Reason (Other) (thin layer to buttocks for skin irritation). , Disp: , Rfl:     lidocaine (LIDODERM) 5 %, Apply patch to the affected area for 12 hours a day and remove for 12 hours a day., Disp: 30 Each, Rfl: 3    tiZANidine (ZANAFLEX) 2 mg tablet, TAKE 1 TABLET BY MOUTH TWICE A DAY (Patient not taking: Reported on 4/28/2022), Disp: 60 Tablet, Rfl: 1    cholecalciferol (VITAMIN D3) (50,000 UNITS /1250 MCG) capsule, Take 1 capsule once a week for the next 8 weeks then switch to OTC vitamin D3 2000 units daily (Patient not taking: Reported on 4/28/2022), Disp: 8 Capsule, Rfl: 0    DurezoL 0.05 % ophthalmic emulsion, , Disp: , Rfl:     traZODone (DESYREL) 50 mg tablet, Take 50 mg by mouth nightly. (Patient not taking: Reported on 4/28/2022), Disp: , Rfl:     cyanocobalamin 1,000 mcg tablet, TAKE 1 TABLET BY MOUTH EVERY DAY (Patient not taking: Reported on 4/28/2022), Disp: 30 Tablet, Rfl: 2    OTHER, Electric scooter Diagnosis: Multiple sclerosis Frequent fall Gait disorder (Patient not taking: Reported on 10/26/2021), Disp: 1 Units, Rfl: 0    ondansetron (ZOFRAN ODT) 4 mg disintegrating tablet, DISSOLVE 1 TABLET ON THE TONGUE EVERY 8 HOURS AS NEEDED FOR NAUSEA (Patient not taking: Reported on 1/27/2022), Disp: 15 Tab, Rfl: 0    OTHER, 0.25 mL by SubLINGual route daily. CBD OIL  (Patient not taking: Reported on 12/15/2021), Disp: , Rfl:     TENS UNIT ELECTRODES, by Does Not Apply route. prn  (Patient not taking: Reported on 1/27/2022), Disp: , Rfl:     Allergies   Allergen Reactions    Bee Sting [Sting, Bee] Anaphylaxis     Also allergic to egg products    Penicillins Anaphylaxis    Betadine [Povidone-Iodine] Rash    Egg Itching     OBJECTIVE    Visit Vitals  BP (!) 142/72 (BP 1 Location: Left upper arm, BP Patient Position: Sitting, BP Cuff Size: Adult)   Pulse 75   Temp 97.5 °F (36.4 °C) (Temporal)   Resp 16   Ht 6' (1.829 m)   Wt 180 lb (81.6 kg)   LMP 09/01/2010   SpO2 95%   BMI 24.41 kg/m²       Physical Exam  Vitals and nursing note reviewed. Constitutional:       General: She is not in acute distress. Appearance: Normal appearance. She is not toxic-appearing. HENT:      Head: Normocephalic and atraumatic. Eyes:      Pupils: Pupils are equal, round, and reactive to light. Pulmonary:      Effort: Pulmonary effort is normal.   Musculoskeletal:         General: Normal range of motion.    Skin:     General: Skin is warm and dry. Neurological:      Mental Status: She is alert. Mental status is at baseline. Psychiatric:         Mood and Affect: Mood normal.         Behavior: Behavior normal.           ASSESSMENT AND PLAN  Soledad Galvin is a 74 y.o. female who presents today for:    1. Thrombocytopenia ([address]  Stable, continue current treatment pending review of labs. 2. DM type 2, controlled, with complication ([address]  Stable, continue current treatment pending review of labs. - HEMOGLOBIN A1C WITH EAG; Future  - METABOLIC PANEL, BASIC; Future  - METABOLIC PANEL, BASIC  - HEMOGLOBIN A1C WITH EAG    3. HTN, goal below 140/90  Slightly elevated; continue to monitor. 4. Vitamin D deficiency  - VITAMIN D, 25 HYDROXY; Future  - VITAMIN D, 25 HYDROXY    5. Hypothyroidism, unspecified type  - TSH 3RD GENERATION; Future  - T4 (THYROXINE); Future  - T4 (THYROXINE)  - TSH 3RD GENERATION    6. Screening for osteoporosis  - DEXA BONE DENSITY STUDY AXIAL; Future    7. Postmenopausal  - DEXA BONE DENSITY STUDY AXIAL; Future    8. Depression, unspecified depression type  Pt to f/u with neurology regarding med management. Consider therapy. Medications Discontinued During This Encounter   Medication Reason    cholecalciferol (VITAMIN D3) (50,000 UNITS /1250 MCG) capsule LIST CLEANUP    cyanocobalamin 1,000 mcg tablet LIST CLEANUP    DurezoL 0.05 % ophthalmic emulsion LIST CLEANUP    ondansetron (ZOFRAN ODT) 4 mg disintegrating tablet LIST CLEANUP    OTHER LIST CLEANUP    OTHER LIST CLEANUP    traZODone (DESYREL) 50 mg tablet LIST CLEANUP    tiZANidine (ZANAFLEX) 2 mg tablet LIST CLEANUP    TENS UNIT ELECTRODES LIST CLEANUP           Treatment risks/benefits/costs/interactions/alternatives discussed with patient.   Advised patient to call back or return to office if symptoms worsen/change/persist. If patient cannot reach us or should anything more severe/urgent arise he/she should proceed directly to the nearest emergency department. Discussed expected course/resolution/complications of diagnosis in detail with patient. Patient expressed understanding with the diagnosis and plan. This dictation may have been completed with Dragon, the computer voice recognition software. Unanticipated grammatical, syntax, homophones, and other interpretive errors are sometimes inadvertently transcribed by the computer software. Please disregard any errors that have escaped final proofreading. Brandon Chopra M.D.

## 2022-04-28 NOTE — PROGRESS NOTES
Chief Complaint   Patient presents with    Hypertension    Diabetes    Medication Evaluation     81mg aspirin       1. \"Have you been to the ER, urgent care clinic since your last visit? Hospitalized since your last visit? \" No    2. \"Have you seen or consulted any other health care providers outside of the [address] [place] [address] since your last visit? \" No     3. For patients aged 39-70: Has the patient had a colonoscopy / FIT/ Cologuard? Yes - Care Gap present. Most recent result on file      If the patient is female:    4. For patients aged 41-77: Has the patient had a mammogram within the past 2 years? Yes - no Care Gap present      5. For patients aged 21-65: Has the patient had a pap smear?  Yes - no Care Gap present    3 most recent PHQ Screens 4/28/2022   PHQ Not Done -   Little interest or pleasure in doing things Not at all   Feeling down, depressed, irritable, or hopeless Several days   Total Score PHQ 2 1   Trouble falling or staying asleep, or sleeping too much -   Feeling tired or having little energy -   Poor appetite, weight loss, or overeating -   Feeling bad about yourself - or that you are a failure or have let yourself or your family down -   Trouble concentrating on things such as school, work, reading, or watching TV -   Moving or speaking so slowly that other people could have noticed; or the opposite being so fidgety that others notice -   Thoughts of being better off dead, or hurting yourself in some way -   PHQ 9 Score -   How difficult have these problems made it for you to do your work, take care of your home and get along with others -

## 2022-04-29 LAB
25(OH)D3 SERPL-MCNC: 32.5 NG/ML (ref 30–100)
ANION GAP SERPL CALC-SCNC: 5 MMOL/L (ref 5–15)
BUN SERPL-MCNC: 8 MG/DL (ref 6–20)
BUN/CREAT SERPL: 9 (ref 12–20)
CALCIUM SERPL-MCNC: 9.4 MG/DL (ref 8.5–10.1)
CHLORIDE SERPL-SCNC: 110 MMOL/L (ref 97–108)
CO2 SERPL-SCNC: 28 MMOL/L (ref 21–32)
CREAT SERPL-MCNC: 0.92 MG/DL (ref 0.55–1.02)
EST. AVERAGE GLUCOSE BLD GHB EST-MCNC: 146 MG/DL
GLUCOSE SERPL-MCNC: 93 MG/DL (ref 65–100)
HBA1C MFR BLD: 6.7 % (ref 4–5.6)
POTASSIUM SERPL-SCNC: 4.4 MMOL/L (ref 3.5–5.1)
SODIUM SERPL-SCNC: 143 MMOL/L (ref 136–145)
T4 SERPL-MCNC: 9.8 UG/DL (ref 4.8–13.9)
TSH SERPL DL<=0.05 MIU/L-ACNC: 3.68 UIU/ML (ref 0.36–3.74)

## 2022-05-02 NOTE — PROGRESS NOTES
Dear Ms. Galvin,    I wanted to follow up on your recent test results: Your A1c is better at 6.7. Thyroid levels are at goal. Continue your current medications. We can follow up in 3 months.

## 2022-05-03 DIAGNOSIS — G35 MS (MULTIPLE SCLEROSIS) (HCC): ICD-10-CM

## 2022-05-04 RX ORDER — FINGOLIMOD HCL 0.5 MG/1
1 CAPSULE ORAL
Qty: 30 CAPSULE | Refills: 11 | Status: SHIPPED | OUTPATIENT
Start: 2022-05-04 | End: 2022-10-18 | Stop reason: SDUPTHER

## 2022-05-10 ENCOUNTER — OFFICE VISIT (OUTPATIENT)
Dept: FAMILY MEDICINE CLINIC | Age: 67
End: 2022-05-10
Payer: MEDICARE

## 2022-05-10 VITALS
RESPIRATION RATE: 16 BRPM | OXYGEN SATURATION: 97 % | TEMPERATURE: 97.7 F | BODY MASS INDEX: 25.06 KG/M2 | SYSTOLIC BLOOD PRESSURE: 152 MMHG | WEIGHT: 185 LBS | HEIGHT: 72 IN | DIASTOLIC BLOOD PRESSURE: 82 MMHG | HEART RATE: 92 BPM

## 2022-05-10 DIAGNOSIS — R03.0 ELEVATED BP WITHOUT DIAGNOSIS OF HYPERTENSION: ICD-10-CM

## 2022-05-10 DIAGNOSIS — N63.10 MASS OF RIGHT BREAST, UNSPECIFIED QUADRANT: Primary | ICD-10-CM

## 2022-05-10 PROCEDURE — G8754 DIAS BP LESS 90: HCPCS | Performed by: FAMILY MEDICINE

## 2022-05-10 PROCEDURE — G8536 NO DOC ELDER MAL SCRN: HCPCS | Performed by: FAMILY MEDICINE

## 2022-05-10 PROCEDURE — G8419 CALC BMI OUT NRM PARAM NOF/U: HCPCS | Performed by: FAMILY MEDICINE

## 2022-05-10 PROCEDURE — G8753 SYS BP > OR = 140: HCPCS | Performed by: FAMILY MEDICINE

## 2022-05-10 PROCEDURE — 3017F COLORECTAL CA SCREEN DOC REV: CPT | Performed by: FAMILY MEDICINE

## 2022-05-10 PROCEDURE — G8427 DOCREV CUR MEDS BY ELIG CLIN: HCPCS | Performed by: FAMILY MEDICINE

## 2022-05-10 PROCEDURE — G9717 DOC PT DX DEP/BP F/U NT REQ: HCPCS | Performed by: FAMILY MEDICINE

## 2022-05-10 PROCEDURE — 1101F PT FALLS ASSESS-DOCD LE1/YR: CPT | Performed by: FAMILY MEDICINE

## 2022-05-10 PROCEDURE — 1090F PRES/ABSN URINE INCON ASSESS: CPT | Performed by: FAMILY MEDICINE

## 2022-05-10 PROCEDURE — G8399 PT W/DXA RESULTS DOCUMENT: HCPCS | Performed by: FAMILY MEDICINE

## 2022-05-10 PROCEDURE — G9899 SCRN MAM PERF RSLTS DOC: HCPCS | Performed by: FAMILY MEDICINE

## 2022-05-10 PROCEDURE — 99214 OFFICE O/P EST MOD 30 MIN: CPT | Performed by: FAMILY MEDICINE

## 2022-05-10 NOTE — PROGRESS NOTES
Chief Complaint   Patient presents with    Breast Mass     right side; noticed yesterday        1. \"Have you been to the ER, urgent care clinic since your last visit? Hospitalized since your last visit? \" No    2. \"Have you seen or consulted any other health care providers outside of the [address] [place] [address] since your last visit? \" No     3. For patients aged 39-70: Has the patient had a colonoscopy / FIT/ Cologuard? Yes - no Care Gap present      If the patient is female:    4. For patients aged 41-77: Has the patient had a mammogram within the past 2 years? Yes - no Care Gap present      5. For patients aged 21-65: Has the patient had a pap smear?  Yes - no Care Gap present    3 most recent PHQ Screens 4/28/2022   PHQ Not Done -   Little interest or pleasure in doing things Not at all   Feeling down, depressed, irritable, or hopeless Several days   Total Score PHQ 2 1   Trouble falling or staying asleep, or sleeping too much -   Feeling tired or having little energy -   Poor appetite, weight loss, or overeating -   Feeling bad about yourself - or that you are a failure or have let yourself or your family down -   Trouble concentrating on things such as school, work, reading, or watching TV -   Moving or speaking so slowly that other people could have noticed; or the opposite being so fidgety that others notice -   Thoughts of being better off dead, or hurting yourself in some way -   PHQ 9 Score -   How difficult have these problems made it for you to do your work, take care of your home and get along with others -

## 2022-05-10 NOTE — PROGRESS NOTES
Patient Name: Michael Golden   MRN: [id number]    Mary Ingram is a 74 y.o. female who presents with the following:     Noticed a lump in her right breast yesterday. Had a mammogram and ultrasound of the left breast due to a palpable bump in April with benign calcifications. Breast cancer runs strongly in her family including her sister, aunts, and cousins. No prior history of breast MRI. Was on HCTZ in the past for HTN. Stopped as BP normalized but now it has been a little higher. BP Readings from Last 3 Encounters:   05/10/22 (!) 152/82   04/28/22 (!) 142/72   01/27/22 117/70     Wt Readings from Last 3 Encounters:   05/10/22 185 lb (83.9 kg)   04/28/22 180 lb (81.6 kg)   01/27/22 190 lb 6.4 oz (86.4 kg)       Review of Systems   Constitutional: Negative for fever, malaise/fatigue and weight loss. Respiratory: Negative for cough, hemoptysis, shortness of breath and wheezing. Cardiovascular: Negative for chest pain, palpitations, leg swelling and PND. Gastrointestinal: Negative for abdominal pain, constipation, diarrhea, nausea and vomiting. The patient's medications, allergies, past medical history, surgical history, family history and social history were reviewed and updated where appropriate. Current Outpatient Medications:     Gilenya 0.5 mg cap, Take 1 Capsule by mouth nightly., Disp: 30 Capsule, Rfl: 11    metFORMIN ER (GLUCOPHAGE XR) 500 mg tablet, TAKE 3 TABLETS BY MOUTH DAILY, Disp: 270 Tablet, Rfl: 1    pyridostigmine (MESTINON) 60 mg tablet, Take 2 Tablets by mouth three (3) times daily. , Disp: 180 Tablet, Rfl: 2    PARoxetine (PAXIL) 40 mg tablet, TAKE 1 AND 1/2 TABLETS BY MOUTH EVERY NIGHT, Disp: 135 Tablet, Rfl: 3    levothyroxine (SYNTHROID) 137 mcg tablet, TAKE 1 TABLET BY MOUTH EVERY DAY BEFORE BREAKFAST, Disp: 90 Tablet, Rfl: 0    pyridostigmine bromide (MESTINON SR) 180 mg SR tablet, TAKE 1 TABLET BY MOUTH TWICE DAILY, Disp: 180 Tablet, Rfl: 0   erenumab-aooe (Aimovig Autoinjector) 140 mg/mL injection, ADMINISTER 1 ML(140MG) UNDER THE SKIN EVERY 30 DAYS, Disp: 3 mL, Rfl: 5    rosuvastatin (CRESTOR) 40 mg tablet, TAKE 1 TABLET BY MOUTH EVERY DAY, Disp: 90 Tablet, Rfl: 1    topiramate (TOPAMAX) 200 mg tablet, Take 1 Tablet by mouth two (2) times a day., Disp: 180 Tablet, Rfl: 2    pregabalin (Lyrica) 200 mg capsule, Take 1 Cap by mouth two (2) times a day. Max Daily Amount: 400 mg., Disp: 180 Cap, Rfl: 4    acetaminophen (TYLENOL) 500 mg tablet, Take 500 mg by mouth every six (6) hours as needed for Pain. for mild to moderate pain on a scclae of  3/10-6/10., Disp: , Rfl:     glucose blood VI test strips (BLOOD GLUCOSE TEST) strip, 100 Each by Does Not Apply route See Admin Instructions. One Touch Ultra Test Strips=Check blood sugar daily dx E11.8, Disp: 100 Strip, Rfl: 1    aspirin delayed-release 81 mg tablet, Take 1 Tab by mouth every twelve (12) hours. (Patient taking differently: Take 1 Tab by mouth daily.), Disp: 60 Tab, Rfl: 0    dantrolene (DANTRIUM) 100 mg capsule, Take 1 Cap by mouth three (3) times daily. , Disp: 90 Cap, Rfl: 3    albuterol (PROVENTIL VENTOLIN) 2.5 mg /3 mL (0.083 %) nebulizer solution, 3 mL by Nebulization route every six (6) hours as needed for Wheezing., Disp: 30 Each, Rfl: 0    Menthol-Zinc Oxide (CALMOSEPTINE) 0.44-20.6 % oint, Apply 1 Each to affected area daily as needed for PRN Reason (Other) (thin layer to buttocks for skin irritation). , Disp: , Rfl:     lidocaine (LIDODERM) 5 %, Apply patch to the affected area for 12 hours a day and remove for 12 hours a day., Disp: 30 Each, Rfl: 3    Allergies   Allergen Reactions    Bee Sting [Sting, Bee] Anaphylaxis     Also allergic to egg products    Penicillins Anaphylaxis    Betadine [Povidone-Iodine] Rash    Egg Itching       OBJECTIVE    Visit Vitals  BP (!) 152/82 (BP 1 Location: Right arm, BP Patient Position: Sitting, BP Cuff Size: Adult)   Pulse 92   Temp 97.7 °F (36.5 °C) (Temporal)   Resp 16   Ht 6' (1.829 m)   Wt 185 lb (83.9 kg)   LMP 09/01/2010   SpO2 97%   BMI 25.09 kg/m²       Physical Exam  Vitals and nursing note reviewed. Constitutional:       General: She is not in acute distress. Appearance: Normal appearance. She is not toxic-appearing. HENT:      Head: Normocephalic and atraumatic. Eyes:      Pupils: Pupils are equal, round, and reactive to light. Pulmonary:      Effort: Pulmonary effort is normal.   Musculoskeletal:         General: Normal range of motion. Skin:     General: Skin is warm and dry. Neurological:      Mental Status: She is alert. Mental status is at baseline. Psychiatric:         Mood and Affect: Mood normal.         Behavior: Behavior normal.     Breasts: right abnormal mass palpable to the left of nipple, about 2 cm x 2 cm. ASSESSMENT AND PLAN  Rachel Galvin is a 74 y.o. female who presents today for:    1. Mass of right breast, unspecified quadrant  Will obtain US. Consider breast MRI given high risk from family history of breast cancer.  - US BREAST RT LIMITED=<3 QUAD; Future    2. Elevated BP without diagnosis of hypertension  Continue to monitor. If elevated at follow up, consider lisinopril. There are no discontinued medications. Follow-up and Dispositions    · Return in about 3 months (around 8/10/2022) for HTN follow up. Treatment risks/benefits/costs/interactions/alternatives discussed with patient. Advised patient to call back or return to office if symptoms worsen/change/persist. If patient cannot reach us or should anything more severe/urgent arise he/she should proceed directly to the nearest emergency department. Discussed expected course/resolution/complications of diagnosis in detail with patient. Patient expressed understanding with the diagnosis and plan. This dictation may have been completed with Dragon, the [employer] voice recognition software.   Unanticipated grammatical, syntax, homophones, and other interpretive errors are sometimes inadvertently transcribed by the computer software. Please disregard any errors that have escaped final proofreading. Brandon Zimmerman M.D.

## 2022-05-11 ENCOUNTER — TELEPHONE (OUTPATIENT)
Dept: FAMILY MEDICINE CLINIC | Age: 67
End: 2022-05-11

## 2022-05-11 DIAGNOSIS — N63.10 MASS OF RIGHT BREAST, UNSPECIFIED QUADRANT: Primary | ICD-10-CM

## 2022-05-16 RX ORDER — PYRIDOSTIGMINE BROMIDE 60 MG/1
120 TABLET ORAL 3 TIMES DAILY
Qty: 180 TABLET | Refills: 2 | Status: SHIPPED | OUTPATIENT
Start: 2022-05-16 | End: 2022-07-19

## 2022-05-17 ENCOUNTER — DOCUMENTATION ONLY (OUTPATIENT)
Dept: NEUROLOGY | Age: 67
End: 2022-05-17

## 2022-05-18 ENCOUNTER — HOSPITAL ENCOUNTER (OUTPATIENT)
Dept: MAMMOGRAPHY | Age: 67
Discharge: HOME | End: 2022-05-18
Attending: NURSE PRACTITIONER
Payer: MEDICARE

## 2022-05-18 ENCOUNTER — HOSPITAL ENCOUNTER (OUTPATIENT)
Dept: MAMMOGRAPHY | Age: 67
Discharge: HOME | End: 2022-05-18
Attending: FAMILY MEDICINE
Payer: MEDICARE

## 2022-05-18 DIAGNOSIS — Z13.820 SCREENING FOR OSTEOPOROSIS: ICD-10-CM

## 2022-05-18 DIAGNOSIS — Z78.0 POSTMENOPAUSAL: ICD-10-CM

## 2022-05-18 DIAGNOSIS — N63.10 MASS OF RIGHT BREAST, UNSPECIFIED QUADRANT: ICD-10-CM

## 2022-05-18 DIAGNOSIS — N63.11 MASS OF UPPER OUTER QUADRANT OF RIGHT BREAST: ICD-10-CM

## 2022-05-18 PROCEDURE — 77061 BREAST TOMOSYNTHESIS UNI: CPT

## 2022-05-18 PROCEDURE — 77080 DXA BONE DENSITY AXIAL: CPT

## 2022-05-18 PROCEDURE — 76642 ULTRASOUND BREAST LIMITED: CPT

## 2022-05-19 ENCOUNTER — VIRTUAL VISIT (OUTPATIENT)
Dept: NEUROLOGY | Age: 67
End: 2022-05-19

## 2022-06-04 ENCOUNTER — TELEPHONE (OUTPATIENT)
Dept: NEUROLOGY | Age: 67
End: 2022-06-04

## 2022-06-05 ENCOUNTER — TELEPHONE (OUTPATIENT)
Dept: NEUROLOGY | Age: 67
End: 2022-06-05

## 2022-06-06 NOTE — TELEPHONE ENCOUNTER
Tamara,     Can you call [employer] and let them know of the approval of Gilenya? There are quite a few pharmacies listed in her demographics. Can you find out from the patient who ships it to her to make sure they are getting it to her. Thanks!

## 2022-06-07 NOTE — TELEPHONE ENCOUNTER
Hanh and spoke to Zaki Geiger, advised of david on [address] for pt from 5/3/22-12/31/22. They need a PA or reference # for the auth. I advised I will need to get the auth number and fax to them. She advised once fax received they can get pt's medication ready and send to her. They use their own pharmacy. Zaki Geiger verbalized understanding.

## 2022-06-09 ENCOUNTER — TELEPHONE (OUTPATIENT)
Dept: NEUROLOGY | Age: 67
End: 2022-06-09

## 2022-06-09 NOTE — TELEPHONE ENCOUNTER
Faxed info below to [employer] at [phone] stating see attached sheet for auth # for pt's [place]. Received fax confirmation.

## 2022-06-09 NOTE — TELEPHONE ENCOUNTER
Amanda Castaneda   [phone]  at [address] [facility] Auth# is [id number]   Start date she had was 5/13/22 - 12/31/22 (not 5/3/22 as CMM showed online). If Radha needs anything further, call Gia at [phone]. (I think John Shelton. May have started the PA when the start form was done as I did not handle it).

## 2022-06-27 ENCOUNTER — OFFICE VISIT (OUTPATIENT)
Dept: FAMILY MEDICINE CLINIC | Age: 67
End: 2022-06-27
Payer: MEDICARE

## 2022-06-27 VITALS
HEIGHT: 72 IN | DIASTOLIC BLOOD PRESSURE: 66 MMHG | SYSTOLIC BLOOD PRESSURE: 122 MMHG | OXYGEN SATURATION: 97 % | HEART RATE: 81 BPM | BODY MASS INDEX: 23.84 KG/M2 | RESPIRATION RATE: 16 BRPM | TEMPERATURE: 97.8 F | WEIGHT: 176 LBS

## 2022-06-27 DIAGNOSIS — R63.4 WEIGHT LOSS: ICD-10-CM

## 2022-06-27 DIAGNOSIS — R10.31 RIGHT LOWER QUADRANT ABDOMINAL PAIN: Primary | ICD-10-CM

## 2022-06-27 LAB
ALBUMIN SERPL-MCNC: 3.7 G/DL (ref 3.5–5)
ALBUMIN/GLOB SERPL: 1.2 {RATIO} (ref 1.1–2.2)
ALP SERPL-CCNC: 58 U/L (ref 45–117)
ALT SERPL-CCNC: 12 U/L (ref 12–78)
ANION GAP SERPL CALC-SCNC: 5 MMOL/L (ref 5–15)
AST SERPL-CCNC: 9 U/L (ref 15–37)
BASOPHILS # BLD: 0 K/UL (ref 0–0.1)
BASOPHILS NFR BLD: 1 % (ref 0–1)
BILIRUB SERPL-MCNC: 0.3 MG/DL (ref 0.2–1)
BUN SERPL-MCNC: 18 MG/DL (ref 6–20)
BUN/CREAT SERPL: 16 (ref 12–20)
CALCIUM SERPL-MCNC: 9.6 MG/DL (ref 8.5–10.1)
CHLORIDE SERPL-SCNC: 114 MMOL/L (ref 97–108)
CO2 SERPL-SCNC: 25 MMOL/L (ref 21–32)
CREAT SERPL-MCNC: 1.1 MG/DL (ref 0.55–1.02)
DIFFERENTIAL METHOD BLD: ABNORMAL
EOSINOPHIL # BLD: 0.1 K/UL (ref 0–0.4)
EOSINOPHIL NFR BLD: 2 % (ref 0–7)
ERYTHROCYTE [DISTWIDTH] IN BLOOD BY AUTOMATED COUNT: 14.5 % (ref 11.5–14.5)
GLOBULIN SER CALC-MCNC: 3.1 G/DL (ref 2–4)
GLUCOSE SERPL-MCNC: 93 MG/DL (ref 65–100)
HCT VFR BLD AUTO: 45.9 % (ref 35–47)
HGB BLD-MCNC: 14.4 G/DL (ref 11.5–16)
IMM GRANULOCYTES # BLD AUTO: 0 K/UL (ref 0–0.04)
IMM GRANULOCYTES NFR BLD AUTO: 0 % (ref 0–0.5)
LIPASE SERPL-CCNC: 107 U/L (ref 73–393)
LYMPHOCYTES # BLD: 1 K/UL (ref 0.8–3.5)
LYMPHOCYTES NFR BLD: 20 % (ref 12–49)
MCH RBC QN AUTO: 29.9 PG (ref 26–34)
MCHC RBC AUTO-ENTMCNC: 31.4 G/DL (ref 30–36.5)
MCV RBC AUTO: 95.2 FL (ref 80–99)
MONOCYTES # BLD: 0.5 K/UL (ref 0–1)
MONOCYTES NFR BLD: 10 % (ref 5–13)
NEUTS SEG # BLD: 3.5 K/UL (ref 1.8–8)
NEUTS SEG NFR BLD: 67 % (ref 32–75)
NRBC # BLD: 0 K/UL (ref 0–0.01)
NRBC BLD-RTO: 0 PER 100 WBC
PLATELET # BLD AUTO: 128 K/UL (ref 150–400)
PMV BLD AUTO: 11.2 FL (ref 8.9–12.9)
POTASSIUM SERPL-SCNC: 5 MMOL/L (ref 3.5–5.1)
PROT SERPL-MCNC: 6.8 G/DL (ref 6.4–8.2)
RBC # BLD AUTO: 4.82 M/UL (ref 3.8–5.2)
SODIUM SERPL-SCNC: 144 MMOL/L (ref 136–145)
T4 SERPL-MCNC: 9.7 UG/DL (ref 4.8–13.9)
TSH SERPL DL<=0.05 MIU/L-ACNC: 1.29 UIU/ML (ref 0.36–3.74)
WBC # BLD AUTO: 5.2 K/UL (ref 3.6–11)

## 2022-06-27 PROCEDURE — G9717 DOC PT DX DEP/BP F/U NT REQ: HCPCS | Performed by: FAMILY MEDICINE

## 2022-06-27 PROCEDURE — G8399 PT W/DXA RESULTS DOCUMENT: HCPCS | Performed by: FAMILY MEDICINE

## 2022-06-27 PROCEDURE — 1123F ACP DISCUSS/DSCN MKR DOCD: CPT | Performed by: FAMILY MEDICINE

## 2022-06-27 PROCEDURE — G9899 SCRN MAM PERF RSLTS DOC: HCPCS | Performed by: FAMILY MEDICINE

## 2022-06-27 PROCEDURE — G8752 SYS BP LESS 140: HCPCS | Performed by: FAMILY MEDICINE

## 2022-06-27 PROCEDURE — 1090F PRES/ABSN URINE INCON ASSESS: CPT | Performed by: FAMILY MEDICINE

## 2022-06-27 PROCEDURE — 99214 OFFICE O/P EST MOD 30 MIN: CPT | Performed by: FAMILY MEDICINE

## 2022-06-27 PROCEDURE — G8754 DIAS BP LESS 90: HCPCS | Performed by: FAMILY MEDICINE

## 2022-06-27 PROCEDURE — G8427 DOCREV CUR MEDS BY ELIG CLIN: HCPCS | Performed by: FAMILY MEDICINE

## 2022-06-27 PROCEDURE — 3017F COLORECTAL CA SCREEN DOC REV: CPT | Performed by: FAMILY MEDICINE

## 2022-06-27 PROCEDURE — G8536 NO DOC ELDER MAL SCRN: HCPCS | Performed by: FAMILY MEDICINE

## 2022-06-27 PROCEDURE — G8420 CALC BMI NORM PARAMETERS: HCPCS | Performed by: FAMILY MEDICINE

## 2022-06-27 PROCEDURE — 1101F PT FALLS ASSESS-DOCD LE1/YR: CPT | Performed by: FAMILY MEDICINE

## 2022-06-27 NOTE — PROGRESS NOTES
Patient Name: Mordecai Bloch   MRN: [id number]    Lowell Rubio is a 74 y.o. female who presents with the following:     Reports several month history of persistent lower right abdominal pain with weight loss. She states that she does have a history of a bowel resection. Last colonoscopy was 2 years ago with polyps; repeat every 5 years due to parental hx of colon cancer. Reports weight loss, decreased appetite, and fatigue. Denies any changes in her bowel movement patterns including diarrhea, constipation, and blood in stool. Denies any heartburn. Wt Readings from Last 3 Encounters:   06/27/22 176 lb (79.8 kg)   05/10/22 185 lb (83.9 kg)   04/28/22 180 lb (81.6 kg)     Review of Systems   Constitutional: Positive for malaise/fatigue and weight loss. Negative for fever. Respiratory: Negative for cough, hemoptysis, shortness of breath and wheezing. Cardiovascular: Negative for chest pain, palpitations, leg swelling and PND. Gastrointestinal: Positive for abdominal pain. Negative for blood in stool, constipation, diarrhea, melena, nausea and vomiting. The patient's medications, allergies, past medical history, surgical history, family history and social history were reviewed and updated where appropriate. Current Outpatient Medications:     levothyroxine (SYNTHROID) 137 mcg tablet, TAKE 1 TABLET BY MOUTH EVERY DAY BEFORE BREAKFAST, Disp: 90 Tablet, Rfl: 3    pyridostigmine (MESTINON) 60 mg tablet, TAKE 2 TABLETS BY MOUTH THREE (3) TIMES DAILY. , Disp: 180 Tablet, Rfl: 2    Gilenya 0.5 mg cap, Take 1 Capsule by mouth nightly., Disp: 30 Capsule, Rfl: 11    metFORMIN ER (GLUCOPHAGE XR) 500 mg tablet, TAKE 3 TABLETS BY MOUTH DAILY, Disp: 270 Tablet, Rfl: 1    PARoxetine (PAXIL) 40 mg tablet, TAKE 1 AND 1/2 TABLETS BY MOUTH EVERY NIGHT, Disp: 135 Tablet, Rfl: 3    pyridostigmine bromide (MESTINON SR) 180 mg SR tablet, TAKE 1 TABLET BY MOUTH TWICE DAILY, Disp: 180 Tablet, Rfl: 0   erenumab-aooe (Aimovig Autoinjector) 140 mg/mL injection, ADMINISTER 1 ML(140MG) UNDER THE SKIN EVERY 30 DAYS, Disp: 3 mL, Rfl: 5    rosuvastatin (CRESTOR) 40 mg tablet, TAKE 1 TABLET BY MOUTH EVERY DAY, Disp: 90 Tablet, Rfl: 1    topiramate (TOPAMAX) 200 mg tablet, Take 1 Tablet by mouth two (2) times a day., Disp: 180 Tablet, Rfl: 2    pregabalin (Lyrica) 200 mg capsule, Take 1 Cap by mouth two (2) times a day. Max Daily Amount: 400 mg., Disp: 180 Cap, Rfl: 4    acetaminophen (TYLENOL) 500 mg tablet, Take 500 mg by mouth every six (6) hours as needed for Pain. for mild to moderate pain on a scclae of  3/10-6/10., Disp: , Rfl:     glucose blood VI test strips (BLOOD GLUCOSE TEST) strip, 100 Each by Does Not Apply route See Admin Instructions. One Touch Ultra Test Strips=Check blood sugar daily dx E11.8, Disp: 100 Strip, Rfl: 1    aspirin delayed-release 81 mg tablet, Take 1 Tab by mouth every twelve (12) hours. (Patient taking differently: Take 1 Tab by mouth daily.), Disp: 60 Tab, Rfl: 0    dantrolene (DANTRIUM) 100 mg capsule, Take 1 Cap by mouth three (3) times daily. , Disp: 90 Cap, Rfl: 3    albuterol (PROVENTIL VENTOLIN) 2.5 mg /3 mL (0.083 %) nebulizer solution, 3 mL by Nebulization route every six (6) hours as needed for Wheezing., Disp: 30 Each, Rfl: 0    Menthol-Zinc Oxide (CALMOSEPTINE) 0.44-20.6 % oint, Apply 1 Each to affected area daily as needed for PRN Reason (Other) (thin layer to buttocks for skin irritation). , Disp: , Rfl:     lidocaine (LIDODERM) 5 %, Apply patch to the affected area for 12 hours a day and remove for 12 hours a day., Disp: 30 Each, Rfl: 3    Allergies   Allergen Reactions    Bee Sting [Sting, Bee] Anaphylaxis     Also allergic to egg products    Penicillins Anaphylaxis    Betadine [Povidone-Iodine] Rash    Egg Itching         OBJECTIVE    Visit Vitals  /66 (BP 1 Location: Right arm, BP Patient Position: Sitting, BP Cuff Size: Adult)   Pulse 81   Temp 97.8 °F (36.6 °C) (Temporal)   Resp 16   Ht 6' (1.829 m)   Wt 176 lb (79.8 kg)   LMP 09/01/2010   SpO2 97%   BMI 23.87 kg/m²       Physical Exam  Constitutional:       General: She is not in acute distress. Appearance: She is not diaphoretic. HENT:      Head: Normocephalic. Right Ear: External ear normal.      Left Ear: External ear normal.   Eyes:      Conjunctiva/sclera: Conjunctivae normal.      Pupils: Pupils are equal, round, and reactive to light. Cardiovascular:      Rate and Rhythm: Normal rate and regular rhythm. Heart sounds: Normal heart sounds. No murmur heard. No friction rub. No gallop. Pulmonary:      Effort: Pulmonary effort is normal. No respiratory distress. Breath sounds: Normal breath sounds. No wheezing or rales. Abdominal:      General: Bowel sounds are normal. There is no distension. Palpations: Abdomen is soft. There is no mass. Tenderness: There is abdominal tenderness (TTP throughout all quadrants). There is no guarding or rebound. Skin:     General: Skin is warm and dry. Neurological:      Mental Status: She is alert and oriented to person, place, and time. Psychiatric:         Mood and Affect: Affect normal.         Cognition and Memory: Memory normal.         Judgment: Judgment normal.           ASSESSMENT AND PLAN  [facility] a 74 y.[facility]. female who presents today for:    1. Right lower quadrant abdominal pain  Will obtain labs and imaging. Pt to make an appt with GI. Continue with bland diet and monitor weight. Reviewed signs and symptoms that would indicate a worsening medical condition which would require immediate evaluation and treatment; patient expressed understanding of plan. - CBC WITH AUTOMATED DIFF; Future  - METABOLIC PANEL, COMPREHENSIVE; Future  - LIPASE; Future  - CULTURE, URINE; Future  - CT ABD PELV W CONT; Future  - CULTURE, URINE  - LIPASE  - METABOLIC PANEL, COMPREHENSIVE  - CBC WITH AUTOMATED DIFF    2.  Weight loss  As above.  - TSH 3RD GENERATION; Future  - T4 (THYROXINE); Future  - CT ABD PELV W CONT; Future  - T4 (THYROXINE)  - TSH 3RD GENERATION       There are no discontinued medications. Treatment risks/benefits/costs/interactions/alternatives discussed with patient. Advised patient to call back or return to office if symptoms worsen/change/persist. If patient cannot reach us or should anything more severe/urgent arise he/she should proceed directly to the nearest emergency department. Discussed expected course/resolution/complications of diagnosis in detail with patient. Patient expressed understanding with the diagnosis and plan. This dictation may have been completed with Dragon, the Reactivity voice recognition software. Unanticipated grammatical, syntax, homophones, and other interpretive errors are sometimes inadvertently transcribed by the computer software. Please disregard any errors that have escaped final proofreading. Brandon Floyd M.D.

## 2022-06-27 NOTE — PROGRESS NOTES
No chief complaint on file. 1. \"Have you been to the ER, urgent care clinic since your last visit? Hospitalized since your last visit? \" No    2. \"Have you seen or consulted any other health care providers outside of the [address] [place] [address] since your last visit? \" No     3. For patients aged 39-70: Has the patient had a colonoscopy / FIT/ Cologuard? Yes - no Care Gap present      If the patient is female:    4. For patients aged 41-77: Has the patient had a mammogram within the past 2 years? Yes - no Care Gap present      5. For patients aged 21-65: Has the patient had a pap smear?  Yes - no Care Gap present    3 most recent PHQ Screens 4/28/2022   PHQ Not Done -   Little interest or pleasure in doing things Not at all   Feeling down, depressed, irritable, or hopeless Several days   Total Score PHQ 2 1   Trouble falling or staying asleep, or sleeping too much -   Feeling tired or having little energy -   Poor appetite, weight loss, or overeating -   Feeling bad about yourself - or that you are a failure or have let yourself or your family down -   Trouble concentrating on things such as school, work, reading, or watching TV -   Moving or speaking so slowly that other people could have noticed; or the opposite being so fidgety that others notice -   Thoughts of being better off dead, or hurting yourself in some way -   PHQ 9 Score -   How difficult have these problems made it for you to do your work, take care of your home and get along with others -

## 2022-06-28 LAB
BACTERIA SPEC CULT: NORMAL
SERVICE CMNT-IMP: NORMAL

## 2022-06-29 NOTE — PROGRESS NOTES
Called patient. Two patient identifiers verified. Discussed lab results per provider's note. Verbalized understanding. Patient has appt schedule for GI for September 2022. Patient requested referral to different office to see if there is a sooner appointment.

## 2022-06-29 NOTE — PROGRESS NOTES
Please call patient:    All labs are normal. Will wait for CT results. Pt should schedule appt with her GI doctor.

## 2022-06-30 NOTE — PROGRESS NOTES
Called patient and confirmed two identifiers. Patient could not be seen sooner with nurse practitioner. Patient will call [facility] to see if there is a sooner appointment.

## 2022-06-30 NOTE — PROGRESS NOTES
Called patient and confirmed two identifiers. Relayed providers message. Patient verbalized understanding.

## 2022-07-08 ENCOUNTER — TELEPHONE (OUTPATIENT)
Dept: NEUROLOGY | Age: 67
End: 2022-07-08

## 2022-07-08 NOTE — TELEPHONE ENCOUNTER
Voicemail:    [facility] program called to get a status update on the PA.   [phone]

## 2022-07-08 NOTE — TELEPHONE ENCOUNTER
Called Dandelion with [email or web address]. Advised the PA has already been done on this and I am wondering what is going on. She looked in system and pt has in fact received a 90 day of this medication. She advised it must have just crossed over but pt has medication and nothing is needed from us.

## 2022-07-09 ENCOUNTER — HOSPITAL ENCOUNTER (OUTPATIENT)
Dept: CT IMAGING | Age: 67
Discharge: HOME | End: 2022-07-09
Attending: FAMILY MEDICINE
Payer: MEDICARE

## 2022-07-09 DIAGNOSIS — R10.31 RIGHT LOWER QUADRANT ABDOMINAL PAIN: ICD-10-CM

## 2022-07-09 DIAGNOSIS — R63.4 WEIGHT LOSS: ICD-10-CM

## 2022-07-09 PROCEDURE — 74011000636 HC RX REV CODE- 636: Performed by: FAMILY MEDICINE

## 2022-07-09 PROCEDURE — 74011250636 HC RX REV CODE- 250/636: Performed by: FAMILY MEDICINE

## 2022-07-09 PROCEDURE — 74011000250 HC RX REV CODE- 250: Performed by: FAMILY MEDICINE

## 2022-07-09 PROCEDURE — 74177 CT ABD & PELVIS W/CONTRAST: CPT

## 2022-07-09 RX ORDER — HEPARIN 100 UNIT/ML
500 SYRINGE INTRAVENOUS
Status: COMPLETED | OUTPATIENT
Start: 2022-07-09 | End: 2022-07-09

## 2022-07-09 RX ORDER — BARIUM SULFATE 20 MG/ML
900 SUSPENSION ORAL
Status: COMPLETED | OUTPATIENT
Start: 2022-07-09 | End: 2022-07-09

## 2022-07-09 RX ADMIN — SODIUM CHLORIDE, PRESERVATIVE FREE 500 UNITS: 5 INJECTION INTRAVENOUS at 10:29

## 2022-07-09 RX ADMIN — IOPAMIDOL 100 ML: 755 INJECTION, SOLUTION INTRAVENOUS at 10:28

## 2022-07-09 RX ADMIN — BARIUM SULFATE 900 ML: 21 SUSPENSION ORAL at 10:28

## 2022-07-12 ENCOUNTER — TRANSCRIBE ORDER (OUTPATIENT)
Dept: SCHEDULING | Age: 67
End: 2022-07-12

## 2022-07-12 ENCOUNTER — HOSPITAL ENCOUNTER (OUTPATIENT)
Dept: CT IMAGING | Age: 67
Discharge: HOME | End: 2022-07-12
Attending: NURSE PRACTITIONER
Payer: MEDICARE

## 2022-07-12 ENCOUNTER — OFFICE VISIT (OUTPATIENT)
Dept: NEUROLOGY | Age: 67
End: 2022-07-12
Payer: MEDICARE

## 2022-07-12 VITALS
BODY MASS INDEX: 24.09 KG/M2 | WEIGHT: 177.6 LBS | SYSTOLIC BLOOD PRESSURE: 104 MMHG | DIASTOLIC BLOOD PRESSURE: 81 MMHG | OXYGEN SATURATION: 94 % | HEART RATE: 102 BPM

## 2022-07-12 DIAGNOSIS — R93.3 ABNORMAL CT SCAN, SMALL BOWEL: ICD-10-CM

## 2022-07-12 DIAGNOSIS — R93.3 ABNORMAL CT SCAN, SMALL BOWEL: Primary | ICD-10-CM

## 2022-07-12 DIAGNOSIS — R29.6 FREQUENT FALLS: ICD-10-CM

## 2022-07-12 DIAGNOSIS — G90.1 DYSAUTONOMIA (HCC): ICD-10-CM

## 2022-07-12 DIAGNOSIS — G43.719 CHRONIC MIGRAINE WITHOUT AURA, INTRACTABLE, WITHOUT STATUS MIGRAINOSUS: ICD-10-CM

## 2022-07-12 DIAGNOSIS — G43.019 INTRACTABLE MIGRAINE WITHOUT AURA AND WITHOUT STATUS MIGRAINOSUS: ICD-10-CM

## 2022-07-12 DIAGNOSIS — R42 DIZZINESS: Primary | ICD-10-CM

## 2022-07-12 DIAGNOSIS — R26.9 GAIT DISORDER: ICD-10-CM

## 2022-07-12 DIAGNOSIS — G89.4 CHRONIC PAIN SYNDROME: ICD-10-CM

## 2022-07-12 DIAGNOSIS — G61.81 CIDP (CHRONIC INFLAMMATORY DEMYELINATING POLYNEUROPATHY) (HCC): ICD-10-CM

## 2022-07-12 DIAGNOSIS — G35 MS (MULTIPLE SCLEROSIS) (HCC): ICD-10-CM

## 2022-07-12 PROCEDURE — 3017F COLORECTAL CA SCREEN DOC REV: CPT | Performed by: PSYCHIATRY & NEUROLOGY

## 2022-07-12 PROCEDURE — G8420 CALC BMI NORM PARAMETERS: HCPCS | Performed by: PSYCHIATRY & NEUROLOGY

## 2022-07-12 PROCEDURE — G8427 DOCREV CUR MEDS BY ELIG CLIN: HCPCS | Performed by: PSYCHIATRY & NEUROLOGY

## 2022-07-12 PROCEDURE — 1090F PRES/ABSN URINE INCON ASSESS: CPT | Performed by: PSYCHIATRY & NEUROLOGY

## 2022-07-12 PROCEDURE — G8536 NO DOC ELDER MAL SCRN: HCPCS | Performed by: PSYCHIATRY & NEUROLOGY

## 2022-07-12 PROCEDURE — 74011000636 HC RX REV CODE- 636: Performed by: NURSE PRACTITIONER

## 2022-07-12 PROCEDURE — G8754 DIAS BP LESS 90: HCPCS | Performed by: PSYCHIATRY & NEUROLOGY

## 2022-07-12 PROCEDURE — 99214 OFFICE O/P EST MOD 30 MIN: CPT | Performed by: PSYCHIATRY & NEUROLOGY

## 2022-07-12 PROCEDURE — 1123F ACP DISCUSS/DSCN MKR DOCD: CPT | Performed by: PSYCHIATRY & NEUROLOGY

## 2022-07-12 PROCEDURE — 74011000250 HC RX REV CODE- 250: Performed by: NURSE PRACTITIONER

## 2022-07-12 PROCEDURE — G9899 SCRN MAM PERF RSLTS DOC: HCPCS | Performed by: PSYCHIATRY & NEUROLOGY

## 2022-07-12 PROCEDURE — G9717 DOC PT DX DEP/BP F/U NT REQ: HCPCS | Performed by: PSYCHIATRY & NEUROLOGY

## 2022-07-12 PROCEDURE — 1101F PT FALLS ASSESS-DOCD LE1/YR: CPT | Performed by: PSYCHIATRY & NEUROLOGY

## 2022-07-12 PROCEDURE — G8752 SYS BP LESS 140: HCPCS | Performed by: PSYCHIATRY & NEUROLOGY

## 2022-07-12 PROCEDURE — G8399 PT W/DXA RESULTS DOCUMENT: HCPCS | Performed by: PSYCHIATRY & NEUROLOGY

## 2022-07-12 PROCEDURE — 74177 CT ABD & PELVIS W/CONTRAST: CPT

## 2022-07-12 RX ORDER — BARIUM SULFATE 20 MG/ML
450 SUSPENSION ORAL
Status: COMPLETED | OUTPATIENT
Start: 2022-07-12 | End: 2022-07-12

## 2022-07-12 RX ORDER — PREDNISONE 10 MG/1
TABLET ORAL
Qty: 20 TABLET | Refills: 0 | Status: ON HOLD | OUTPATIENT
Start: 2022-07-12 | End: 2022-08-31

## 2022-07-12 RX ADMIN — BARIUM SULFATE 450 ML: 20 SUSPENSION ORAL at 16:00

## 2022-07-12 NOTE — PROGRESS NOTES
Tech, Radha Song was unable to get an IV administered, tried twice. Called ER to assist, will help after pt. Gets fresh air.

## 2022-07-12 NOTE — PROGRESS NOTES
Chief Complaint   Patient presents with    Follow-up    Pain (Chronic)     Visit Vitals  /81   Pulse (!) 102   Wt 177 lb 9.6 oz (80.6 kg)   SpO2 94%   BMI 24.09 kg/m²

## 2022-07-12 NOTE — PROGRESS NOTES
Neurology Progress Note    NAME:  Germaine Galvin   :   1955   MRN:   [id number]     Date/Time:  2022  Subjective:    Germaine Galvin is a 74 y.o. female here today for follow-up for MS, MG, chronic pain syndrome, headaches, CVA, difficulty walking, drowsiness, fall, test results. Patient was accompanied by a friend to the visit  Patient says she has been having frequent falls since the last visit, the falls happen mostly when patient gets up from sitting position to standing position, according to patient, she will feel dizzy and her legs will give out. She says she has not lost consciousness, however she tends to get confused. Patient says these have been getting worse since the last visit. She says she still experiences intermittent weakness. She is still not having fine feeling in her fingers . She also experiences occasional spasm of the extremities, last fall was about 2 weeks ago. Patient noted  she is still having occasional dizziness with vertigo, she says that has decreased hearing, with nausea. EMG/nerve conduction reviewed showed electrodiagnostic evidence of bilateral sensory neuropathy median nerve, sensorimotor neuropathy left ulnar nerve. This is consistent with bilateral carpal tunnel syndrome left worse than the right, left compressive ulnar neuropathy  We will refer patient for carpal tunnel release if patient agrees. Says she has been having difficulty ambulating. I will refer patient to physical therapy, home health physical therapy as patient has difficulty getting out of the house because of transportation. Patient definitely needs intermittent physical therapy, with occupational and speech  Patient said that her eyes tend to be weak and she sees double, double vision usually associated with fatigue.   She continues to have a lot of pain but not as much as before, pain is sharp in nature, diffuse, burning sensation that goes up to the arms causing numbness and tingling sensation and weakness of the hands, down to the legs causing  numbness and tingling sensation of the legs with weakness of the legs. Patient says headache waxes and wanes,it is mostly frontal, throbbing in nature, with occasional sharp pain from the back of the head, headache associated with dizziness, nausea, blurry vision, double vision, photophobia, phonophobia. She says she has a lot of muscle spasms mostly in the back. She denies loss of consciousness. Says dysphagia has improved. She  continues to smoke cigarettes.   I will continue patient on her current medications  Review of Systems - General ROS: positive for  - fatigue, night sweats and sleep disturbance  Psychological ROS: positive for - anxiety, concentration difficulties, depression, mood swings and sleep disturbances  Ophthalmic ROS: positive for - blurry vision, decreased vision, double vision and photophobia  ENT ROS: positive for - headaches, tinnitus, vertigo and visual changes  Allergy and Immunology ROS: negative  Hematological and Lymphatic ROS: negative  Endocrine ROS: negative  Respiratory ROS: no cough, shortness of breath, or wheezing  Cardiovascular ROS: no chest pain or dyspnea on exertion  Gastrointestinal ROS: no abdominal pain, change in bowel habits, or black or bloody stools  Genito-Urinary ROS: no dysuria, trouble voiding, or hematuria  Musculoskeletal ROS: positive for - gait disturbance, joint pain, joint stiffness, joint swelling and muscle pain  Neurological ROS: positive for - dizziness, gait disturbance, headaches, impaired coordination/balance, numbness/tingling, speech problems, tremors, visual changes and weakness  Dermatological ROS: negative               Medications reviewed:  Current Outpatient Medications   Medication Sig Dispense Refill    predniSONE (DELTASONE) 10 mg tablet Take 1 tab p.o. tid x3 days, then 1 tab p.o. bid x4 days, then  1 tab p.o. every day x3 days 20 Tablet 0    levothyroxine (SYNTHROID) 137 mcg tablet TAKE 1 TABLET BY MOUTH EVERY DAY BEFORE BREAKFAST 90 Tablet 3    pyridostigmine (MESTINON) 60 mg tablet TAKE 2 TABLETS BY MOUTH THREE (3) TIMES DAILY. (Patient not taking: Reported on 7/15/2022) 180 Tablet 2    Gilenya 0.5 mg cap Take 1 Capsule by mouth nightly. 30 Capsule 11    metFORMIN ER (GLUCOPHAGE XR) 500 mg tablet TAKE 3 TABLETS BY MOUTH DAILY 270 Tablet 1    PARoxetine (PAXIL) 40 mg tablet TAKE 1 AND 1/2 TABLETS BY MOUTH EVERY NIGHT 135 Tablet 3    pyridostigmine bromide (MESTINON SR) 180 mg SR tablet TAKE 1 TABLET BY MOUTH TWICE DAILY 180 Tablet 0    erenumab-aooe (Aimovig Autoinjector) 140 mg/mL injection ADMINISTER 1 ML(140MG) UNDER THE SKIN EVERY 30 DAYS 3 mL 5    rosuvastatin (CRESTOR) 40 mg tablet TAKE 1 TABLET BY MOUTH EVERY DAY 90 Tablet 1    topiramate (TOPAMAX) 200 mg tablet Take 1 Tablet by mouth two (2) times a day. 180 Tablet 2    pregabalin (Lyrica) 200 mg capsule Take 1 Cap by mouth two (2) times a day. Max Daily Amount: 400 mg. 180 Cap 4    acetaminophen (TYLENOL) 500 mg tablet Take 500 mg by mouth every six (6) hours as needed for Pain. for mild to moderate pain on a scclae of  3/10-6/10.  glucose blood VI test strips (BLOOD GLUCOSE TEST) strip 100 Each by Does Not Apply route See Admin Instructions. One Touch Ultra Test Strips=Check blood sugar daily dx E11.8 100 Strip 1    aspirin delayed-release 81 mg tablet Take 1 Tab by mouth every twelve (12) hours. (Patient taking differently: Take 1 Tab by mouth daily.) 60 Tab 0    dantrolene (DANTRIUM) 100 mg capsule Take 1 Cap by mouth three (3) times daily. 90 Cap 3    albuterol (PROVENTIL VENTOLIN) 2.5 mg /3 mL (0.083 %) nebulizer solution 3 mL by Nebulization route every six (6) hours as needed for Wheezing. 30 Each 0    Menthol-Zinc Oxide (CALMOSEPTINE) 0.44-20.6 % oint Apply 1 Each to affected area daily as needed for PRN Reason (Other) (thin layer to buttocks for skin irritation).       lidocaine (LIDODERM) 5 % Apply patch to the affected area for 12 hours a day and remove for 12 hours a day. 30 Each 3    rimegepant (Nurtec ODT) 75 mg disintegrating tablet Take 75 mg by mouth once as needed for Migraine.           Objective:   Vitals:  Vitals:    07/12/22 0812   BP: 104/81   Pulse: (!) 102   SpO2: 94%   Weight: 177 lb 9.6 oz (80.6 kg)   LMP: 09/01/2010         Lab Data Reviewed:  Lab Results   Component Value Date/Time    WBC 5.2 06/27/2022 11:51 AM    HCT 45.9 06/27/2022 11:51 AM    HGB 14.4 06/27/2022 11:51 AM    PLATELET 899 (L) 32/68/2011 11:51 AM       Lab Results   Component Value Date/Time    Sodium 144 06/27/2022 11:51 AM    Potassium 5.0 06/27/2022 11:51 AM    Chloride 114 (H) 06/27/2022 11:51 AM    CO2 25 06/27/2022 11:51 AM    Glucose 93 06/27/2022 11:51 AM    BUN 18 06/27/2022 11:51 AM    Creatinine 1.10 (H) 06/27/2022 11:51 AM    Calcium 9.6 06/27/2022 11:51 AM       No components found for: TROPQUANT    No results found for: SHAHIDA      Lab Results   Component Value Date/Time    Hemoglobin A1c 6.7 (H) 04/28/2022 01:48 PM    Hemoglobin A1c (POC) 7.0 10/26/2021 10:25 AM        Lab Results   Component Value Date/Time    Vitamin B12 436 06/10/2019 10:41 AM    Folate 12.0 02/10/2016 12:00 AM       No results found for: SHAHIDA, ANARX, Janae Sicard, XBANA    Lab Results   Component Value Date/Time    Cholesterol, total 157 10/20/2021 02:48 PM    HDL Cholesterol 52 10/20/2021 02:48 PM    LDL-CHOLESTEROL 91 12/26/2018 12:00 AM    LDL, calculated 77 10/20/2021 02:48 PM    LDL, calculated 208 (H) 08/03/2020 08:05 AM    VLDL, calculated 28 10/20/2021 02:48 PM    VLDL, calculated 45 (H) 08/03/2020 08:05 AM    Triglyceride 168 (H) 10/20/2021 02:48 PM    Triglyceride 126 12/26/2018 12:00 AM    CHOL/HDL Ratio 4.3 08/23/2010 12:19 PM         CT Results (recent):  Results from Hospital Encounter encounter on 07/12/22    CT ABD PELV W CONT    Narrative  EXAM: CT ABD PELV W CONT    INDICATION: Abnormal CT scan, small bowel    COMPARISON: CT abdomen pelvis July 9, 2022    CONTRAST: 100 mL of Isovue-370. ORAL CONTRAST:    Oral contrast was administered to improve visualization of the bowel lumen. TECHNIQUE:  Following the uneventful intravenous administration of contrast, thin axial  images were obtained through the abdomen and pelvis. Coronal and sagittal  reconstructions were generated. CT dose reduction was achieved through use of a  standardized protocol tailored for this examination and automatic exposure  control for dose modulation. FINDINGS:  LOWER THORAX: No significant abnormality in the incidentally imaged lower chest.  LIVER: No mass. BILIARY TREE: Gallbladder is within normal limits. CBD is not dilated. SPLEEN: within normal limits. PANCREAS: No mass or ductal dilatation. ADRENALS: Unremarkable. KIDNEYS: No mass, calculus, or hydronephrosis. STOMACH: Unremarkable. SMALL BOWEL: Masslike clumping of small bowel in the right lower quadrant with  numerous foci of fat. No associated obstruction. This is favored to reflect  clumping related to adhesions, postoperative versus neoplastic. Previously  identified hypodensity most likely reflects fat which is better defined on the  current examination. COLON: Mobile cecum, which is located in the mid abdomen. APPENDIX: Unremarkable. PERITONEUM: In the posterior right peritoneum (3-48) there is a 1.9 x 1.5 cm  nodule favored to reflect a borderline enlarged lymph node. RETROPERITONEUM: No lymphadenopathy or aortic aneurysm. REPRODUCTIVE ORGANS: Normal uterus. URINARY BLADDER: Mostly obscured due to artifact from bilateral total hip  arthroplasty. BONES: Bilateral total hip arthroplasty. ABDOMINAL WALL: No mass or hernia. ADDITIONAL COMMENTS: N/A    Impression  1. Masslike clumping of small bowel in the right pelvis.  This is favored to  reflect adhesions, postoperative versus neoplastic.  2. 1.9 x 1.5 cm lymph node in the posterior right peritoneum, again raising the  possibility of neoplasm although this could also reflect postoperative change. 3. Consider surgical consultation. 23X      MRI Results (recent):  Results from Hospital Encounter encounter on 08/08/20    MRI BRAIN W WO CONT    Narrative  Clinical history: Fatigue, drowsiness  INDICATION:   Fatigue, drowsiness, multiple sclerosis, prior CVA    COMPARISON: 12/20/2018  TECHNIQUE: MR examination of the brain includes axial and sagittal T1 , axial  T2, axial FLAIR, axial gradient echo, axial DWI, coronal T1 . Pre and post  contrast axial T1-weighted imaging. Postcontrast T1-weighted imaging coronal  plane. CONTRAST: The patient was administered gadolinium-based contrast material,  subsequently axial and sagittal T1-weighted postcontrast imaging was obtained. FINDINGS:  There is no intracranial mass, hemorrhage or acute infarction. Scattered foci of abnormal increased T2 signal intensity predominantly  superiorly, subcortical left parietal lobe but also noted in the frontal lobes  on the right and on the left. No associated abnormal postcontrast enhancement. No new diffusion restriction. Air-fluid level in the right maxillary sinus. IACs are symmetric in size. . There  is no abnormal parenchymal enhancement. There is no abnormal meningeal  enhancement demonstrated. The brain architecture is normal. There is no evidence  of midline shift or mass-effect. The ventricles are normal in size, position and  configuration. There are no extra-axial fluid collections. Major intracranial  vascular flow-voids are unremarkable. The orbits are grossly symmetric. Dural  venous sinuses are grossly unremarkable. There is no Chiari or syrinx. Pituitary  and infundibulum grossly unremarkable. Cerebellopontine angles are unremarkable. No skull base mass is identified. Cavernous sinuses are symmetric. The mastoid  air cells and are well pneumatized and clear.     Impression  IMPRESSION:  Scattered foci of abnormal increased T2 signal intensity predominantly  subcortical and superior adjacent to the vertex. Stable overall appearance  compared to the 12/26/2018 examination. No postcontrast enhancement or diffusion  restriction to suggest active demyelination. Right maxillary sinus disease. No intracranial mass, hemorrhage or evidence of acute infarction. IR Results (recent):  No results found for this or any previous visit. VAS/US Results (recent): PHYSICAL EXAM:  General:    Alert, cooperative, no distress, appears stated age. Head:   Normocephalic, without obvious abnormality, atraumatic. Eyes:   Conjunctivae/corneas clear. PERRLA  Nose:  Nares normal. No drainage or sinus tenderness. Throat:    Lips, mucosa, and tongue normal.  No Thrush  Neck:  Supple, symmetrical,  no adenopathy, thyroid: non tender    no carotid bruit and no JVD. Paraspinal tenderness  Back:    Symmetric, diffuse  tenderness. Lungs:   Clear to auscultation bilaterally. No Wheezing or Rhonchi. No rales. Chest wall:  No tenderness or deformity. No Accessory muscle use. Heart:   Regular rate and rhythm,  no murmur, rub or gallop. Abdomen:   Soft, non-tender. Not distended. Bowel sounds normal. No masses  Extremities: Extremities normal, atraumatic, No cyanosis. No edema. No clubbing  Skin:     Texture, turgor normal. No rashes or lesions. Not Jaundiced  Lymph nodes: Cervical, supraclavicular normal.  Psych:  Good insight. Depressed. Anxious. NEUROLOGICAL EXAM:  Appearance: The patient is well developed, well nourished, provides a coherent history and is in no acute distress. Mental Status: Oriented to time, place and person. Mood and affect appropriate. Cranial Nerves:   Intact visual fields. Fundi are benign. RACHEL, EOM's full, no nystagmus, no ptosis. Facial sensation is normal. Corneal reflexes are intact. Facial movement is symmetric. Hearing is normal bilaterally.  Palate is midline with normal sternocleidomastoid and trapezius muscles are normal. Tongue is midline. Motor:  5-/5 strength in upper extremity and 4/5 lower extremity proximal and distal muscles. Normal bulk and tone. No fasciculations. Reflexes:   Deep tendon reflexes 2+/4 and symmetrical.   Sensory:    Dysesthesia to touch, pinprick and vibration. Gait:   Unsteady gait. Ambulates with cane   Tremor:    Mild tremor noted. Cerebellar:  cerebellar signs present. Dysmetria. Positive Romberg sign   Neurovascular:  Normal heart sounds and regular rhythm, peripheral pulses intact, and no carotid bruits. Assesment  1. Dizziness    [facility]    2. Frequent falls    - Zane [facility] [place] [facility]    3. Gait disorder    - REFERRAL TO HOME HEALTH    4. Dysautonomia ([facility] [place] [facility]    - REFERRAL TO NEUROLOGY    5. MS (multiple sclerosis) ([facility] [place] [facility]  Feliciano Kimya    6. CIDP (chronic inflammatory demyelinating polyneuropathy) ([facility])  IVIG    7. Chronic pain syndrome  Following pain management    8. Intractable migraine without aura and without status migrainosus  Emgality    9. Chronic migraine without aura, intractable, without status migrainosus  Emgality  ___________________________________________________  PLAN: Medication and plan discussed with patient      ICD-10-CM ICD-9-CM    1. Dizziness  R42 780.4 REFERRAL TO HOME HEALTH      REFERRAL TO NEUROLOGY   2. Frequent falls  R29.6 V15.88 REFERRAL TO HOME HEALTH      REFERRAL TO NEUROLOGY   3. Gait disorder  R26.9 781.2 REFERRAL TO HOME HEALTH   4. Dysautonomia ([facility] [place] [facility]  G90.1 337.9 REFERRAL TO NEUROLOGY   5. MS (multiple sclerosis) ([facility] [place] [facility]  G35 340    6. CIDP (chronic inflammatory demyelinating polyneuropathy) ([facility])  G61.81 357.81    7. Chronic pain syndrome  G89.4 338.4    8. Intractable migraine without aura and without status migrainosus  G43.019 346.11    9.  Chronic migraine without aura, intractable, without status migrainosus  G43.719 346.71 ___________________________________________________    Attending Physician: Artemio Piedra MD

## 2022-07-12 NOTE — PROGRESS NOTES
Nursing supervisor, Tran Davis RN, came to access port for CT, she then flushed and removed the access site post CT scan.

## 2022-07-12 NOTE — PROGRESS NOTES
Spoke with Lisa Joseph at [facility]. I informed her that patient is in need of a STAT CT per Qian. She said she would reach out to the patient to see which facility patient is open to going to that has availability.

## 2022-07-13 ENCOUNTER — TELEPHONE (OUTPATIENT)
Dept: FAMILY MEDICINE CLINIC | Age: 67
End: 2022-07-13

## 2022-07-13 DIAGNOSIS — K63.89 SMALL BOWEL MASS: Primary | ICD-10-CM

## 2022-07-13 NOTE — TELEPHONE ENCOUNTER
----- Message from [facility] AT [facility] sent at 7/13/2022  3:50 PM EDT -----  Subject: Message to Provider    QUESTIONS  Information for Provider? pt returned call regarding appt with other   provider   ---------------------------------------------------------------------------  --------------  [id number] [employer]  [phone]; OK to leave message on voicemail  ---------------------------------------------------------------------------  --------------  SCRIPT ANSWERS  Relationship to Patient?  Self

## 2022-07-13 NOTE — PROGRESS NOTES
Patient notified of updated CT. Likely adhesions from prior bowel resection however neoplasm cannot be excluded. Proceed with evaluation with Dr. Sherin Christine as soon as possible. Please process referral urgently.

## 2022-07-13 NOTE — TELEPHONE ENCOUNTER
See prior notes about CT. Urgent referral to Dr. Yuliya Aguilar. Can we get her an appointment for evaluation of small bowel mass?

## 2022-07-13 NOTE — PROGRESS NOTES
Updated patient's referral order and ready for Gen Surg to contact the patient for scheduling    Close Enc    Thanks  Cassi LAO   Referral Specialist  [facility]

## 2022-07-13 NOTE — TELEPHONE ENCOUNTER
Called patient and confirmed identifiers. Patient stated she was told she would have an appointment scheduled for her with surgeon. Called Dr. Arthea Ahumada office and got patient scheduled for 07/15/22 with Dr. Clark January at 11:45am with a 20 min prior arrival time. Called patient and confirmed two identifiers and informed patient of appointment. Patient verbalized understanding.

## 2022-07-13 NOTE — TELEPHONE ENCOUNTER
Called want to make sure that the CT of abdomen and pelvis that was done did the provider receive results.     Best call back #[phone]

## 2022-07-15 ENCOUNTER — OFFICE VISIT (OUTPATIENT)
Dept: SURGERY | Age: 67
End: 2022-07-15
Payer: MEDICARE

## 2022-07-15 VITALS
RESPIRATION RATE: 16 BRPM | HEART RATE: 82 BPM | DIASTOLIC BLOOD PRESSURE: 75 MMHG | WEIGHT: 180 LBS | TEMPERATURE: 98.6 F | HEIGHT: 72 IN | OXYGEN SATURATION: 94 % | BODY MASS INDEX: 24.38 KG/M2 | SYSTOLIC BLOOD PRESSURE: 131 MMHG

## 2022-07-15 DIAGNOSIS — G70.00 MG (MYASTHENIA GRAVIS) (HCC): Primary | ICD-10-CM

## 2022-07-15 DIAGNOSIS — N18.30 STAGE 3 CHRONIC KIDNEY DISEASE, UNSPECIFIED WHETHER STAGE 3A OR 3B CKD (HCC): ICD-10-CM

## 2022-07-15 DIAGNOSIS — R10.31 RIGHT LOWER QUADRANT ABDOMINAL PAIN: ICD-10-CM

## 2022-07-15 PROCEDURE — G8399 PT W/DXA RESULTS DOCUMENT: HCPCS | Performed by: SURGERY

## 2022-07-15 PROCEDURE — G9899 SCRN MAM PERF RSLTS DOC: HCPCS | Performed by: SURGERY

## 2022-07-15 PROCEDURE — 99204 OFFICE O/P NEW MOD 45 MIN: CPT | Performed by: SURGERY

## 2022-07-15 PROCEDURE — 1090F PRES/ABSN URINE INCON ASSESS: CPT | Performed by: SURGERY

## 2022-07-15 PROCEDURE — G9717 DOC PT DX DEP/BP F/U NT REQ: HCPCS | Performed by: SURGERY

## 2022-07-15 PROCEDURE — 1101F PT FALLS ASSESS-DOCD LE1/YR: CPT | Performed by: SURGERY

## 2022-07-15 PROCEDURE — 1123F ACP DISCUSS/DSCN MKR DOCD: CPT | Performed by: SURGERY

## 2022-07-15 PROCEDURE — G8536 NO DOC ELDER MAL SCRN: HCPCS | Performed by: SURGERY

## 2022-07-15 PROCEDURE — G8427 DOCREV CUR MEDS BY ELIG CLIN: HCPCS | Performed by: SURGERY

## 2022-07-15 PROCEDURE — G8420 CALC BMI NORM PARAMETERS: HCPCS | Performed by: SURGERY

## 2022-07-15 PROCEDURE — 3017F COLORECTAL CA SCREEN DOC REV: CPT | Performed by: SURGERY

## 2022-07-15 RX ORDER — RIMEGEPANT SULFATE 75 MG/75MG
75 TABLET, ORALLY DISINTEGRATING ORAL
COMMUNITY
End: 2022-10-18 | Stop reason: SDUPTHER

## 2022-07-15 NOTE — PROGRESS NOTES
General Surgery Office Consultation / H & P    CC: Abdominal pain  History of Present Illness:      Nneka Galvin is a 74 y.o. female who presents with abdominal pain. Patient reports a dull abdominal pain that is been a 7 or 8 out of 10 for the last few weeks. She has been to the ER and also to her PCP. She has had repeated CT scans in this timeframe showing questionable mass with a partial obstruction in the right lower quadrant. Family history of colon cancer in her mother and another cancer in her sibling. She reports a 10 pound weight loss in the last month or so. No radiation of pain. Unknown provoking factors. Pain medication helps. Denies any blood in her stool. She has a history of  and an bowel resection as well for scar tissue she said. She has had multiple joint replacements. She has myasthenia gravis and takes medication for this. She reports a colonoscopy 2 years ago without any significant findings. No longer on steroids for her myasthenia.       Past Medical History:   Diagnosis Date    Arthritis     Bilateral hip replacements, right knee replacement    Benign cyst of left breast 3/21/2016    Blindness and low vision     legally blind from [address] [place] [address]     right eye    Cervical spinal stenosis 2016    Moderate C6-7    Chronic pain     COVID-19 vaccine series completed     PFIZER 3/19/21, 21    Depression     Diabetes mellitus type 2, controlled ([address] 2016    Diet-controlled diabetes mellitus ([address] 2018    Endometriosis 2010    FH: breast cancer 2010    Chart states FH breast/ovary Ca, CAD,DM     History of CVA (cerebrovascular accident) 2018    HTN, goal below 140/90 2010    CURRENTLY NO MEDICATIONS (21)    Hypercholesterolemia     Hypothyroid 2010    Incontinence     Lumbosacral plexopathy 2010    Migraine     H/O MIGRAINE    MS (multiple sclerosis) ([address]     Myasthenia gravis ([address]     Sleep apnea 2010    RESOLVED 2019    Stroke ([address] 2018    RIGHT SIDE WEAKNESS    Ureteral calculus 2010    Vitamin D deficiency 3/17/2016     Past Surgical History:   Procedure Laterality Date    HX CARPAL TUNNEL RELEASE Left     HX CATARACT REMOVAL Right     HX  SECTION  1986    HX CYST INCISION AND DRAINAGE      HX GI      COLONOSCOPY    HX GYN      ovarian cyst    HX HEENT      removal of thyroglossal duct cyst    HX HIP REPLACEMENT Right 2017    anterior approach    HX KNEE ARTHROSCOPY Right     HX ORTHOPAEDIC  1997    right thumb tendon repair x 2    HX OTHER SURGICAL      Janee Cath x2    HX SMALL BOWEL RESECTION      HX THYROIDECTOMY  1974    HX VASCULAR ACCESS  2006    PORT -A- CATH X 2    [place] ABDOMEN SURGERY PROC UNLISTED      bowel resection    [place] BREAST SURGERY PROCEDURE UNLISTED      breast cyst-both breasts at different times    [place] TOTAL HIP ARTHROPLASTY Left 2019      Family History   Problem Relation Age of Onset    OSTEOARTHRITIS Mother     Diabetes Mother     Elevated Lipids Mother     Headache Mother     Heart Disease Mother     Hypertension Mother     Stroke Mother     Neuropathy Mother     Colon Cancer Mother     Diabetes Father     Elevated Lipids Father     Heart Disease Father     Hypertension Father     Diabetes Sister     Elevated Lipids Sister     Headache Sister     Hypertension Sister     Migraines Sister     Cancer Sister 62        breast ca W/METS TO BRAIN    Breast Cancer Sister 62    Diabetes Brother     Elevated Lipids Brother     Hypertension Brother     Asthma Son     Thyroid Disease Son         GRAVES    Sleep Apnea Son     Breast Cancer Maternal Grandmother 54    Diabetes Sister     Neuropathy Sister     Anesth Problems Neg Hx      Social History     Socioeconomic History    Marital status:    Tobacco Use    Smoking status: Current Every Day Smoker     Packs/day: 0.50 Years: 30.00     Pack years: 15.00     Types: Cigarettes    Smokeless tobacco: Never Used   Vaping Use    Vaping Use: Never used   Substance and Sexual Activity    Alcohol use: No    Drug use: No    Sexual activity: Not Currently      Prior to Admission medications    Medication Sig Start Date End Date Taking? Authorizing Provider   rimegepant (Nurtec ODT) 75 mg disintegrating tablet Take 75 mg by mouth once as needed for Migraine. Yes Provider, Sarah   predniSONE (DELTASONE) 10 mg tablet Take 1 tab p.o. tid x3 days, then 1 tab p.o. bid x4 days, then  1 tab p.o. every day x3 days 7/12/22  Yes Los Kahn MD   levothyroxine (SYNTHROID) 137 mcg tablet TAKE 1 TABLET BY MOUTH EVERY DAY BEFORE BREAKFAST 5/20/22  Yes Karen Jorgensen MD   Gilenya 0.5 mg cap Take 1 Capsule by mouth nightly. 5/4/22  Yes Los Kahn MD   metFORMIN ER (GLUCOPHAGE XR) 500 mg tablet TAKE 3 TABLETS BY MOUTH DAILY 4/21/22  Yes Deedee Sanchez MD   PARoxetine (PAXIL) 40 mg tablet TAKE 1 AND 1/2 TABLETS BY MOUTH EVERY NIGHT 3/14/22  Yes Los Kahn MD   pyridostigmine bromide (MESTINON SR) 180 mg SR tablet TAKE 1 TABLET BY MOUTH TWICE DAILY 1/27/22  Yes Los Kahn MD   erenumab-aooe (Aimovig Autoinjector) 140 mg/mL injection ADMINISTER 1 ML(140MG) UNDER THE SKIN EVERY 30 DAYS 11/30/21  Yes Juan Sierra MD   rosuvastatin (CRESTOR) 40 mg tablet TAKE 1 TABLET BY MOUTH EVERY DAY 7/30/21  Yes Deedee Sanchez MD   topiramate (TOPAMAX) 200 mg tablet Take 1 Tablet by mouth two (2) times a day. 7/30/21  Yes Los Kahn MD   pregabalin (Lyrica) 200 mg capsule Take 1 Cap by mouth two (2) times a day. Max Daily Amount: 400 mg. 3/5/21  Yes Los Kahn MD   acetaminophen (TYLENOL) 500 mg tablet Take 500 mg by mouth every six (6) hours as needed for Pain. for mild to moderate pain on a scclae of  3/10-6/10.    Yes Provider, Historical   glucose blood VI test strips (BLOOD GLUCOSE TEST) strip 100 Each by Does Not Apply route See Admin Instructions. One Touch Ultra Test Strips=Check blood sugar daily dx E11.8 8/11/19  Yes Jsesee Laughlin NP   aspirin delayed-release 81 mg tablet Take 1 Tab by mouth every twelve (12) hours. Patient taking differently: Take 1 Tab by mouth daily. 5/14/19  Yes KATIANA Cruz   dantrolene (DANTRIUM) 100 mg capsule Take 1 Cap by mouth three (3) times daily. 3/19/19  Yes Los Kahn MD   albuterol (PROVENTIL VENTOLIN) 2.5 mg /3 mL (0.083 %) nebulizer solution 3 mL by Nebulization route every six (6) hours as needed for Wheezing. 12/31/18  Yes Lo White MD   Menthol-Zinc Oxide (CALMOSEPTINE) 0.44-20.6 % oint Apply 1 Each to affected area daily as needed for PRN Reason (Other) (thin layer to buttocks for skin irritation). Yes Provider, Historical   lidocaine (LIDODERM) 5 % Apply patch to the affected area for 12 hours a day and remove for 12 hours a day. 8/15/18  Yes Los Kahn MD   pyridostigmine (MESTINON) 60 mg tablet TAKE 2 TABLETS BY MOUTH THREE (3) TIMES DAILY.   Patient not taking: Reported on 7/15/2022 5/16/22   Los Kahn MD     Allergies   Allergen Reactions    Bee Sting [Sting, Bee] Anaphylaxis     Also allergic to egg products    Penicillins Anaphylaxis    Betadine [Povidone-Iodine] Rash    Egg Itching       Review of Systems:  Constitutional: No fever or chills  Neurologic: No headache  Eyes: No scleral icterus or irritated eyes  Nose: No nasal pain or drainage  Mouth: No oral lesions or sore throat  Cardiac: No palpations or chest pain  Pulmonary: No cough or shortness of breath  Gastrointestinal: Abdominal pain and weight loss, no nausea, emesis, diarrhea, or constipation  Genitourinary: No dysuria  Musculoskeletal: No muscle or joint tenderness  Skin: No rashes or lesions  Psychiatric: No anxiety or depressed mood    Physical Exam:     Visit Vitals  /75 (BP 1 Location: Left upper arm, BP Patient Position: Sitting, BP Cuff Size: Adult)   Pulse 82   Temp 98.6 °F (37 °C)   Resp 16   Ht 6' (1.829 m)   Wt 180 lb (81.6 kg)   SpO2 94%   BMI 24.41 kg/m²     General: No acute distress, conversant  Eyes: PERRLA, no scleral icterus  HENT: Normocephalic without oral lesions  Neck: Trachea midline without LAD  Cardiac: Normal pulse rate and rhythm  Pulmonary: Symmetric chest rise with normal effort  GI: Soft, mild tenderness in right lower quadrant, ND, no hernia, no splenomegaly  Skin: Warm without rash  Extremities: No edema or joint stiffness  Psych: Appropriate mood and affect    Assessment:     72-year-old female with weight loss and abdominal pain with concerning mass in the right lower quadrant that is nonobstructive    Plan:     CT scan personally reviewed. Definitely appears to be a transition point in the right lower quadrant. Not majorly obstructed at this point. Still able to eat and have bowel function although it is loose. Given her weight loss, pain, and questionable mass I feel if we have to urgently evaluate this. Plan for diagnostic laparoscopy possible open laparotomy and bowel resection next week. Is also an enlarged lymph node which could be cancerous. No other masses on the scan really apparent. We discussed the risk of bleeding, infection, bowel injury, bowel leak, and the risk of anesthesia. The patient and her son understand the risks and elect to proceed. We will call her to schedule for next week.     Signed By: Abrahan Shay MD  Bariatric and General Surgeon  [facility]    July 15, 2022

## 2022-07-15 NOTE — H&P (VIEW-ONLY)
General Surgery Office Consultation / H & P    CC: Abdominal pain  History of Present Illness:      Sarbjit Galvin is a 74 y.o. female who presents with abdominal pain. Patient reports a dull abdominal pain that is been a 7 or 8 out of 10 for the last few weeks. She has been to the ER and also to her PCP. She has had repeated CT scans in this timeframe showing questionable mass with a partial obstruction in the right lower quadrant. Family history of colon cancer in her mother and another cancer in her sibling. She reports a 10 pound weight loss in the last month or so. No radiation of pain. Unknown provoking factors. Pain medication helps. Denies any blood in her stool. She has a history of  and an bowel resection as well for scar tissue she said. She has had multiple joint replacements. She has myasthenia gravis and takes medication for this. She reports a colonoscopy 2 years ago without any significant findings. No longer on steroids for her myasthenia.       Past Medical History:   Diagnosis Date    Arthritis     Bilateral hip replacements, right knee replacement    Benign cyst of left breast 3/21/2016    Blindness and low vision     legally blind from [address]     right eye    Cervical spinal stenosis 2016    Moderate C6-7    Chronic pain     COVID-19 vaccine series completed     PFIZER 3/19/21, 21    Depression     Diabetes mellitus type 2, controlled ([address] 2016    Diet-controlled diabetes mellitus ([address] 2018    Endometriosis 2010    FH: breast cancer 2010    Chart states FH breast/ovary Ca, CAD,DM     History of CVA (cerebrovascular accident) 2018    HTN, goal below 140/90 2010    CURRENTLY NO MEDICATIONS (21)    Hypercholesterolemia     Hypothyroid 2010    Incontinence     Lumbosacral plexopathy 2010    Migraine     H/O MIGRAINE    MS (multiple sclerosis) ([address]     Myasthenia gravis ([address]     Sleep apnea 2010    RESOLVED 2019    Stroke ([address] 2018    RIGHT SIDE WEAKNESS    Ureteral calculus 2010    Vitamin D deficiency 3/17/2016     Past Surgical History:   Procedure Laterality Date    HX CARPAL TUNNEL RELEASE Left     HX CATARACT REMOVAL Right     HX  SECTION  1986    HX CYST INCISION AND DRAINAGE      HX GI      COLONOSCOPY    HX GYN      ovarian cyst    HX HEENT      removal of thyroglossal duct cyst    HX HIP REPLACEMENT Right 2017    anterior approach    HX KNEE ARTHROSCOPY Right     HX ORTHOPAEDIC  1997    right thumb tendon repair x 2    HX OTHER SURGICAL      Janee Cath x2    HX SMALL BOWEL RESECTION      HX THYROIDECTOMY  1974    HX VASCULAR ACCESS  2006    PORT -A- CATH X 2    [place] ABDOMEN SURGERY PROC UNLISTED      bowel resection    [place] BREAST SURGERY PROCEDURE UNLISTED      breast cyst-both breasts at different times    SC TOTAL HIP ARTHROPLASTY Left 2019      Family History   Problem Relation Age of Onset    OSTEOARTHRITIS Mother     Diabetes Mother     Elevated Lipids Mother     Headache Mother     Heart Disease Mother     Hypertension Mother     Stroke Mother     Neuropathy Mother     Colon Cancer Mother     Diabetes Father     Elevated Lipids Father     Heart Disease Father     Hypertension Father     Diabetes Sister     Elevated Lipids Sister     Headache Sister     Hypertension Sister     Migraines Sister     Cancer Sister 62        breast ca W/METS TO BRAIN    Breast Cancer Sister 62    Diabetes Brother     Elevated Lipids Brother     Hypertension Brother     Asthma Son     Thyroid Disease Son         GRAVES    Sleep Apnea Son     Breast Cancer Maternal Grandmother 54    Diabetes Sister     Neuropathy Sister     Anesth Problems Neg Hx      Social History     Socioeconomic History    Marital status:    Tobacco Use    Smoking status: Current Every Day Smoker     Packs/day: 0.50 Years: 30.00     Pack years: 15.00     Types: Cigarettes    Smokeless tobacco: Never Used   Vaping Use    Vaping Use: Never used   Substance and Sexual Activity    Alcohol use: No    Drug use: No    Sexual activity: Not Currently      Prior to Admission medications    Medication Sig Start Date End Date Taking? Authorizing Provider   rimegepant (Nurtec ODT) 75 mg disintegrating tablet Take 75 mg by mouth once as needed for Migraine. Yes Provider, Sarah   predniSONE (DELTASONE) 10 mg tablet Take 1 tab p.o. tid x3 days, then 1 tab p.o. bid x4 days, then  1 tab p.o. every day x3 days 7/12/22  Yes Los Kahn MD   levothyroxine (SYNTHROID) 137 mcg tablet TAKE 1 TABLET BY MOUTH EVERY DAY BEFORE BREAKFAST 5/20/22  Yes Racheal Jorgensen MD   Gilenya 0.5 mg cap Take 1 Capsule by mouth nightly. 5/4/22  Yes Los Kahn MD   metFORMIN ER (GLUCOPHAGE XR) 500 mg tablet TAKE 3 TABLETS BY MOUTH DAILY 4/21/22  Yes Heaven Mays MD   PARoxetine (PAXIL) 40 mg tablet TAKE 1 AND 1/2 TABLETS BY MOUTH EVERY NIGHT 3/14/22  Yes Los Kahn MD   pyridostigmine bromide (MESTINON SR) 180 mg SR tablet TAKE 1 TABLET BY MOUTH TWICE DAILY 1/27/22  Yes Los Kahn MD   erenumab-aooe (Aimovig Autoinjector) 140 mg/mL injection ADMINISTER 1 ML(140MG) UNDER THE SKIN EVERY 30 DAYS 11/30/21  Yes Bronwyn Hernandez MD   rosuvastatin (CRESTOR) 40 mg tablet TAKE 1 TABLET BY MOUTH EVERY DAY 7/30/21  Yes Heaven Mays MD   topiramate (TOPAMAX) 200 mg tablet Take 1 Tablet by mouth two (2) times a day. 7/30/21  Yes Los Kahn MD   pregabalin (Lyrica) 200 mg capsule Take 1 Cap by mouth two (2) times a day. Max Daily Amount: 400 mg. 3/5/21  Yes Los Kahn MD   acetaminophen (TYLENOL) 500 mg tablet Take 500 mg by mouth every six (6) hours as needed for Pain. for mild to moderate pain on a scclae of  3/10-6/10.    Yes Provider, Historical   glucose blood VI test strips (BLOOD GLUCOSE TEST) strip 100 Each by Does Not Apply route See Admin Instructions. One Touch Ultra Test Strips=Check blood sugar daily dx E11.8 8/11/19  Yes Luis Laughlin NP   aspirin delayed-release 81 mg tablet Take 1 Tab by mouth every twelve (12) hours. Patient taking differently: Take 1 Tab by mouth daily. 5/14/19  Yes KTAIANA Quinonez   dantrolene (DANTRIUM) 100 mg capsule Take 1 Cap by mouth three (3) times daily. 3/19/19  Yes Los Kahn MD   albuterol (PROVENTIL VENTOLIN) 2.5 mg /3 mL (0.083 %) nebulizer solution 3 mL by Nebulization route every six (6) hours as needed for Wheezing. 12/31/18  Yes Darryn Garcia MD   Menthol-Zinc Oxide (CALMOSEPTINE) 0.44-20.6 % oint Apply 1 Each to affected area daily as needed for PRN Reason (Other) (thin layer to buttocks for skin irritation). Yes Provider, Historical   lidocaine (LIDODERM) 5 % Apply patch to the affected area for 12 hours a day and remove for 12 hours a day. 8/15/18  Yes Los Kahn MD   pyridostigmine (MESTINON) 60 mg tablet TAKE 2 TABLETS BY MOUTH THREE (3) TIMES DAILY.   Patient not taking: Reported on 7/15/2022 5/16/22   Los Kahn MD     Allergies   Allergen Reactions    Bee Sting [Sting, Bee] Anaphylaxis     Also allergic to egg products    Penicillins Anaphylaxis    Betadine [Povidone-Iodine] Rash    Egg Itching       Review of Systems:  Constitutional: No fever or chills  Neurologic: No headache  Eyes: No scleral icterus or irritated eyes  Nose: No nasal pain or drainage  Mouth: No oral lesions or sore throat  Cardiac: No palpations or chest pain  Pulmonary: No cough or shortness of breath  Gastrointestinal: Abdominal pain and weight loss, no nausea, emesis, diarrhea, or constipation  Genitourinary: No dysuria  Musculoskeletal: No muscle or joint tenderness  Skin: No rashes or lesions  Psychiatric: No anxiety or depressed mood    Physical Exam:     Visit Vitals  /75 (BP 1 Location: Left upper arm, BP Patient Position: Sitting, BP Cuff Size: Adult)   Pulse 82   Temp 98.6 °F (37 °C)   Resp 16   Ht 6' (1.829 m)   Wt 180 lb (81.6 kg)   SpO2 94%   BMI 24.41 kg/m²     General: No acute distress, conversant  Eyes: PERRLA, no scleral icterus  HENT: Normocephalic without oral lesions  Neck: Trachea midline without LAD  Cardiac: Normal pulse rate and rhythm  Pulmonary: Symmetric chest rise with normal effort  GI: Soft, mild tenderness in right lower quadrant, ND, no hernia, no splenomegaly  Skin: Warm without rash  Extremities: No edema or joint stiffness  Psych: Appropriate mood and affect    Assessment:     72-year-old female with weight loss and abdominal pain with concerning mass in the right lower quadrant that is nonobstructive    Plan:     CT scan personally reviewed. Definitely appears to be a transition point in the right lower quadrant. Not majorly obstructed at this point. Still able to eat and have bowel function although it is loose. Given her weight loss, pain, and questionable mass I feel if we have to urgently evaluate this. Plan for diagnostic laparoscopy possible open laparotomy and bowel resection next week. Is also an enlarged lymph node which could be cancerous. No other masses on the scan really apparent. We discussed the risk of bleeding, infection, bowel injury, bowel leak, and the risk of anesthesia. The patient and her son understand the risks and elect to proceed. We will call her to schedule for next week.     Signed By: Yolanda Schilling MD  Bariatric and General Surgeon  [facility]    July 15, 2022

## 2022-07-19 ENCOUNTER — HOSPITAL ENCOUNTER (OUTPATIENT)
Dept: PREADMISSION TESTING | Age: 67
Discharge: HOME | End: 2022-07-19
Payer: MEDICARE

## 2022-07-19 ENCOUNTER — HOSPITAL ENCOUNTER (OUTPATIENT)
Dept: GENERAL RADIOLOGY | Age: 67
Discharge: HOME | End: 2022-07-19
Attending: SURGERY
Payer: MEDICARE

## 2022-07-19 VITALS
WEIGHT: 177.47 LBS | SYSTOLIC BLOOD PRESSURE: 137 MMHG | HEART RATE: 96 BPM | DIASTOLIC BLOOD PRESSURE: 73 MMHG | HEIGHT: 72 IN | RESPIRATION RATE: 18 BRPM | TEMPERATURE: 99 F | BODY MASS INDEX: 24.04 KG/M2

## 2022-07-19 LAB
APPEARANCE UR: CLEAR
BACTERIA URNS QL MICRO: NEGATIVE /HPF
BILIRUB UR QL: NEGATIVE
COLOR UR: NORMAL
EPITH CASTS URNS QL MICRO: NORMAL /LPF
GLUCOSE UR STRIP.AUTO-MCNC: NEGATIVE MG/DL
HGB UR QL STRIP: NEGATIVE
HYALINE CASTS URNS QL MICRO: NORMAL /LPF (ref 0–5)
KETONES UR QL STRIP.AUTO: NEGATIVE MG/DL
LEUKOCYTE ESTERASE UR QL STRIP.AUTO: NEGATIVE
NITRITE UR QL STRIP.AUTO: NEGATIVE
PH UR STRIP: 5.5 [PH] (ref 5–8)
PROT UR STRIP-MCNC: NEGATIVE MG/DL
RBC #/AREA URNS HPF: NORMAL /HPF (ref 0–5)
SP GR UR REFRACTOMETRY: 1.01 (ref 1–1.03)
UA: UC IF INDICATED,UAUC: NORMAL
UROBILINOGEN UR QL STRIP.AUTO: 0.2 EU/DL (ref 0.2–1)
WBC URNS QL MICRO: NORMAL /HPF (ref 0–4)

## 2022-07-19 PROCEDURE — 81001 URINALYSIS AUTO W/SCOPE: CPT

## 2022-07-19 PROCEDURE — 93005 ELECTROCARDIOGRAM TRACING: CPT

## 2022-07-19 PROCEDURE — 71046 X-RAY EXAM CHEST 2 VIEWS: CPT

## 2022-07-19 NOTE — PERIOP NOTES
PT DID NOT BRING COVID VACCINE CARD TO APPOINTMENT. PT INSTRUCTED TO GET CHEST X-RAY AFTER PAT APPOINTMENT TODAY.

## 2022-07-19 NOTE — PERIOP NOTES
[address] INSTRUCTIONS    Surgery Date:   7/21/22    Your surgeon's office or [facility] staff will call you between 4 PM- 8 PM the day before surgery with your arrival time. If your surgery is on a Monday, you will receive a call the preceding Friday. 1. Please report to [facility]mitting on the 1st floor. Bring your insurance card, photo identification, and any copayment ( if applicable). 2. If you are going home the same day of your surgery, you must have a responsible adult to drive you home. You need to have a responsible adult to stay with you the first 24 hours after surgery and you should not drive a car for 24 hours following your surgery. 3. Nothing to eat or drink after midnight the night before surgery. This includes no water, gum, mints, coffee, juice, etc.  Please note special instructions, if applicable, below for medications. 4. Do NOT drink alcohol or smoke 24 hours before surgery. STOP smoking for 14 days prior as it helps with breathing and healing after surgery. 5. If you are being admitted to the hospital, please leave personal belongings/luggage in your car until you have an assigned hospital room number. 6. Please wear comfortable clothes. Wear your glasses instead of contacts. We ask that all money, jewelry and valuables be left at home. Wear no make up, particularly mascara, the day of surgery. 7.  All body piercings, rings, and jewelry need to be removed and left at home. Please remove any nail polish or artificial nails from your fingernails. Please wear your hair loose or down. Please no pony-tails, buns, or any metal hair accessories. If you shower the morning of surgery, please do not apply any lotions or powders afterwards. You may wear deodorant, unless having breast surgery. Do not shave any body area within 24 hours of your surgery. 8. Please follow all instructions to avoid any potential surgical cancellation.   9. Should your physical condition change, (i.e. fever, cold, flu, etc.) please notify your surgeon as soon as possible. 10. It is important to be on time. If a situation occurs where you may be delayed, please call:  [phone] / [phone] on the day of surgery. 11. The Preadmission Testing staff can be reached at [phone]. 12. Special instructions: [address] [facility] [address] DAY OF SURGERY      Current Outpatient Medications   Medication Sig    rimegepant (Nurtec ODT) 75 mg disintegrating tablet Take 75 mg by mouth once as needed for Migraine.  predniSONE (DELTASONE) 10 mg tablet Take 1 tab p.o. tid x3 days, then 1 tab p.o. bid x4 days, then  1 tab p.o. every day x3 days    levothyroxine (SYNTHROID) 137 mcg tablet TAKE 1 TABLET BY MOUTH EVERY DAY BEFORE BREAKFAST    Gilenya 0.5 mg cap Take 1 Capsule by mouth nightly.  metFORMIN ER (GLUCOPHAGE XR) 500 mg tablet TAKE 3 TABLETS BY MOUTH DAILY (Patient taking differently: Take 750 mg by mouth daily (with dinner). TAKE 3 TABLETS BY MOUTH DAILY)    PARoxetine (PAXIL) 40 mg tablet TAKE 1 AND 1/2 TABLETS BY MOUTH EVERY NIGHT    pyridostigmine bromide (MESTINON SR) 180 mg SR tablet TAKE 1 TABLET BY MOUTH TWICE DAILY    erenumab-aooe (Aimovig Autoinjector) 140 mg/mL injection ADMINISTER 1 ML(140MG) UNDER THE SKIN EVERY 30 DAYS    rosuvastatin (CRESTOR) 40 mg tablet TAKE 1 TABLET BY MOUTH EVERY DAY (Patient taking differently: Take 40 mg by mouth nightly.)    topiramate (TOPAMAX) 200 mg tablet Take 1 Tablet by mouth two (2) times a day.  pregabalin (Lyrica) 200 mg capsule Take 1 Cap by mouth two (2) times a day. Max Daily Amount: 400 mg.  acetaminophen (TYLENOL) 500 mg tablet Take 500 mg by mouth every six (6) hours as needed for Pain. for mild to moderate pain on a scclae of  3/10-6/10.  glucose blood VI test strips (BLOOD GLUCOSE TEST) strip 100 Each by Does Not Apply route See Admin Instructions.  One Touch Ultra Test Strips=Check blood sugar daily dx E11.8    aspirin delayed-release 81 mg tablet Take 1 Tab by mouth every twelve (12) hours. (Patient taking differently: Take 1 Tab by mouth daily.)    dantrolene (DANTRIUM) 100 mg capsule Take 1 Cap by mouth three (3) times daily. (Patient taking differently: Take 100 mg by mouth as needed.)    albuterol (PROVENTIL VENTOLIN) 2.5 mg /3 mL (0.083 %) nebulizer solution 3 mL by Nebulization route every six (6) hours as needed for Wheezing.  Menthol-Zinc Oxide (CALMOSEPTINE) 0.44-20.6 % oint Apply 1 Each to affected area daily as needed for PRN Reason (Other) (thin layer to buttocks for skin irritation).  lidocaine (LIDODERM) 5 % Apply patch to the affected area for 12 hours a day and remove for 12 hours a day. No current facility-administered medications for this encounter. 1. YOU MUST ONLY TAKE THESE MEDICATIONS THE MORNING OF SURGERY WITH A SIP OF WATER: LYRICA, TOPAMAX, PREDNISONE, SYNTHROID, MESTINON  2. MEDICATIONS TO TAKE THE MORNING OF SURGERY ONLY IF NEEDED: ALBUTEROL, TYLENOL, DANTRIUM  3. HOLD these prescription medications BEFORE Surgery: DO NOT TAKE METFORMIN MORNING OF SURGERY  4. Ask your surgeon/prescribing physician about when/if to STOP taking these medications: NONE  5. Stop all vitamins, herbal medicines and Aspirin containing products 7 days prior to surgery. Stop any non-steroidal anti-inflammatory drugs (i.e. Ibuprofen, Naproxen, Advil, Aleve) 3 days before surgery. You may take Tylenol. 6. If you are currently taking Plavix, Coumadin, or any other blood-thinning/anticoagulant medication contact your prescribing physician for instructions. Preventing Infections Before and After - Your Surgery    IMPORTANT INSTRUCTIONS      You play an important role in your health and preparation for surgery. To reduce the germs on your skin you will need to shower with CHG soap (Chorhexidine gluconate 4%) two times before surgery.     CHG soap (Hibiclens, Hex-A-Clens or store brand)   CHG soap will be provided at your Preadmission Testing (PAT) appointment.  If you do not have a PAT appointment before surgery, you may arrange to  CHG soap from our office or purchase CHG soap at a pharmacy, grocery or department store.  You need to purchase TWO 4 ounce bottles to use for your 2 showers. Steps to follow:  1. Wash your hair with your normal shampoo and your body with regular soap and rinse well to remove shampoo and soap from your skin. 2. Wet a clean washcloth and turn off the shower. 3. Put CHG soap on washcloth and apply to your entire body from the neck down. Do not use on your head, face or private parts(genitals). Do not use CHG soap on open sores, wounds or areas of skin irritation. 4. Wash you body gently for 5 minutes. Do not wash your skin too hard. This soap does not create lather. Pay special attention to your underarms and from your belly button to your feet. 5. Turn the shower back on and rinse well to get CHG soap off your body. 6. Pat your skin dry with a clean, dry towel. Do not apply lotions or moisturizer. 7. Put on clean clothes and sleep on fresh bed sheets and do not allow pets to sleep with you. Shower with CHG soap 2 times before your surgery   The evening before your surgery   The morning of your surgery      Tips to help prevent infections after your surgery:  1. Protect your surgical wound from germs:  ? Hand washing is the most important thing you and your caregivers can do to prevent infections. ? Keep your bandage clean and dry! ? Do not touch your surgical wound. 2. Use clean, freshly washed towels and washcloths every time you shower; do not share bath linens with others. 3. Until your surgical wound is healed, wear clothing and sleep on bed linens each day that are clean and freshly washed. 4. Do not allow pets to sleep in your bed with you or touch your surgical wound. 5. Do not smoke - smoking delays wound healing.  This may be a good time to stop smoking. 6. If you have diabetes, it is important for you to manage your blood sugar levels properly before your surgery as well as after your surgery. Poorly managed blood sugar levels slow down wound healing and prevent you from healing completely. Patient Information Regarding COVID Restrictions    Day of Procedure     Please park in the parking deck or any designated visitor parking lot.  Enter the facility through the [facility] of the [facility].   On the day of surgery, please provide the cell phone number of the person who will be waiting for you to the Patient Access representative at the time of registration.  Please wear a mask on the day of your procedure.  We are now allowing two designated visitors per stay. Pediatric patients may have 2 designated visitors. These two people may come in with you on the day of your procedure.  The designated visitor must also wear a mask.  Once your procedure and the immediate recovery period is completed, a nurse in the recovery area will contact your designated visitor to inform them of your room number or to otherwise review other pertinent information regarding your care.  Social distancing practices are to be adhered to in waiting areas and the cafeteria. The patient was contacted  in person. She verbalized understanding of all instructions does not  need reinforcement.

## 2022-07-21 ENCOUNTER — ANESTHESIA (OUTPATIENT)
Dept: SURGERY | Age: 67
End: 2022-07-21
Payer: MEDICARE

## 2022-07-21 ENCOUNTER — HOSPITAL ENCOUNTER (OUTPATIENT)
Age: 67
Setting detail: OUTPATIENT SURGERY
Discharge: HOME | End: 2022-07-21
Attending: SURGERY | Admitting: SURGERY
Payer: MEDICARE

## 2022-07-21 ENCOUNTER — ANESTHESIA EVENT (OUTPATIENT)
Dept: SURGERY | Age: 67
End: 2022-07-21
Payer: MEDICARE

## 2022-07-21 VITALS
OXYGEN SATURATION: 97 % | TEMPERATURE: 98.1 F | WEIGHT: 177 LBS | HEIGHT: 72 IN | SYSTOLIC BLOOD PRESSURE: 149 MMHG | RESPIRATION RATE: 16 BRPM | DIASTOLIC BLOOD PRESSURE: 78 MMHG | HEART RATE: 82 BPM | BODY MASS INDEX: 23.98 KG/M2

## 2022-07-21 DIAGNOSIS — R10.31 RIGHT LOWER QUADRANT ABDOMINAL PAIN: Primary | ICD-10-CM

## 2022-07-21 LAB
ATRIAL RATE: 70 BPM
CALCULATED P AXIS, ECG09: 33 DEGREES
CALCULATED R AXIS, ECG10: -27 DEGREES
CALCULATED T AXIS, ECG11: 20 DEGREES
DIAGNOSIS, 93000: NORMAL
GLUCOSE BLD STRIP.AUTO-MCNC: 135 MG/DL (ref 65–117)
GLUCOSE BLD STRIP.AUTO-MCNC: 169 MG/DL (ref 65–117)
P-R INTERVAL, ECG05: 152 MS
Q-T INTERVAL, ECG07: 396 MS
QRS DURATION, ECG06: 78 MS
QTC CALCULATION (BEZET), ECG08: 427 MS
SERVICE CMNT-IMP: ABNORMAL
SERVICE CMNT-IMP: ABNORMAL
VENTRICULAR RATE, ECG03: 70 BPM

## 2022-07-21 PROCEDURE — 76210000006 HC OR PH I REC 0.5 TO 1 HR: Performed by: SURGERY

## 2022-07-21 PROCEDURE — 76210000020 HC REC RM PH II FIRST 0.5 HR: Performed by: SURGERY

## 2022-07-21 PROCEDURE — 74011250636 HC RX REV CODE- 250/636: Performed by: SURGERY

## 2022-07-21 PROCEDURE — 2709999900 HC NON-CHARGEABLE SUPPLY: Performed by: SURGERY

## 2022-07-21 PROCEDURE — 77030008771 HC TU NG SALEM SUMP -A: Performed by: ANESTHESIOLOGY

## 2022-07-21 PROCEDURE — 77030022474 HC RELD STPLR ENDO GIA COVD -C: Performed by: SURGERY

## 2022-07-21 PROCEDURE — 77030040361 HC SLV COMPR DVT MDII -B: Performed by: SURGERY

## 2022-07-21 PROCEDURE — 74011250637 HC RX REV CODE- 250/637: Performed by: ANESTHESIOLOGY

## 2022-07-21 PROCEDURE — 77030002966 HC SUT PDS J&J -A: Performed by: SURGERY

## 2022-07-21 PROCEDURE — 77030002933 HC SUT MCRYL J&J -A: Performed by: SURGERY

## 2022-07-21 PROCEDURE — 77030039895 HC SYST SMK EVAC LAP COVD -B: Performed by: SURGERY

## 2022-07-21 PROCEDURE — 77030040922 HC BLNKT HYPOTHRM STRY -A

## 2022-07-21 PROCEDURE — 77030040830 HC CATH URETH FOL MDII -A: Performed by: SURGERY

## 2022-07-21 PROCEDURE — 88304 TISSUE EXAM BY PATHOLOGIST: CPT

## 2022-07-21 PROCEDURE — 77030008603 HC TRCR ENDOSC EPATH J&J -C: Performed by: SURGERY

## 2022-07-21 PROCEDURE — 77030003666 HC NDL SPINAL BD -A: Performed by: SURGERY

## 2022-07-21 PROCEDURE — 77030008602 HC TRCR ENDOSC EPATH J&J -B: Performed by: SURGERY

## 2022-07-21 PROCEDURE — 77030026438 HC STYL ET INTUB CARD -A: Performed by: ANESTHESIOLOGY

## 2022-07-21 PROCEDURE — 77030038157 HC DEV PWR CNTR DISP SIGNIA COVD -C: Performed by: SURGERY

## 2022-07-21 PROCEDURE — 76060000033 HC ANESTHESIA 1 TO 1.5 HR: Performed by: SURGERY

## 2022-07-21 PROCEDURE — 77030016151 HC PROTCTR LNS DFOG COVD -B: Performed by: SURGERY

## 2022-07-21 PROCEDURE — 77030010507 HC ADH SKN DERMBND J&J -B: Performed by: SURGERY

## 2022-07-21 PROCEDURE — 82962 GLUCOSE BLOOD TEST: CPT

## 2022-07-21 PROCEDURE — 74011250636 HC RX REV CODE- 250/636: Performed by: NURSE PRACTITIONER

## 2022-07-21 PROCEDURE — 77030008684 HC TU ET CUF COVD -B: Performed by: ANESTHESIOLOGY

## 2022-07-21 PROCEDURE — 76010000149 HC OR TIME 1 TO 1.5 HR: Performed by: SURGERY

## 2022-07-21 PROCEDURE — 44180 LAP ENTEROLYSIS: CPT | Performed by: SURGERY

## 2022-07-21 PROCEDURE — 74011000250 HC RX REV CODE- 250: Performed by: SURGERY

## 2022-07-21 PROCEDURE — 77030013079 HC BLNKT BAIR HGGR 3M -A: Performed by: ANESTHESIOLOGY

## 2022-07-21 PROCEDURE — 74011000250 HC RX REV CODE- 250: Performed by: NURSE PRACTITIONER

## 2022-07-21 PROCEDURE — 74011250636 HC RX REV CODE- 250/636: Performed by: ANESTHESIOLOGY

## 2022-07-21 PROCEDURE — 77030010031 HC SCIS ENDOSC MPLR J&J -C: Performed by: SURGERY

## 2022-07-21 RX ORDER — SODIUM CHLORIDE, SODIUM LACTATE, POTASSIUM CHLORIDE, CALCIUM CHLORIDE 600; 310; 30; 20 MG/100ML; MG/100ML; MG/100ML; MG/100ML
100 INJECTION, SOLUTION INTRAVENOUS CONTINUOUS
Status: DISCONTINUED | OUTPATIENT
Start: 2022-07-21 | End: 2022-07-21 | Stop reason: HOSPADM

## 2022-07-21 RX ORDER — MIDAZOLAM HYDROCHLORIDE 1 MG/ML
INJECTION, SOLUTION INTRAMUSCULAR; INTRAVENOUS AS NEEDED
Status: DISCONTINUED | OUTPATIENT
Start: 2022-07-21 | End: 2022-07-21 | Stop reason: HOSPADM

## 2022-07-21 RX ORDER — DEXAMETHASONE SODIUM PHOSPHATE 4 MG/ML
INJECTION, SOLUTION INTRA-ARTICULAR; INTRALESIONAL; INTRAMUSCULAR; INTRAVENOUS; SOFT TISSUE AS NEEDED
Status: DISCONTINUED | OUTPATIENT
Start: 2022-07-21 | End: 2022-07-21 | Stop reason: HOSPADM

## 2022-07-21 RX ORDER — DANTROLENE SODIUM 100 MG/1
100 CAPSULE ORAL AS NEEDED
Qty: 30 CAPSULE | Refills: 0 | Status: SHIPPED | OUTPATIENT
Start: 2022-07-21 | End: 2022-08-30

## 2022-07-21 RX ORDER — FENTANYL CITRATE 50 UG/ML
50 INJECTION, SOLUTION INTRAMUSCULAR; INTRAVENOUS AS NEEDED
Status: DISCONTINUED | OUTPATIENT
Start: 2022-07-21 | End: 2022-07-21 | Stop reason: HOSPADM

## 2022-07-21 RX ORDER — SODIUM CHLORIDE 9 MG/ML
25 INJECTION, SOLUTION INTRAVENOUS CONTINUOUS
Status: DISCONTINUED | OUTPATIENT
Start: 2022-07-21 | End: 2022-07-21 | Stop reason: HOSPADM

## 2022-07-21 RX ORDER — SODIUM CHLORIDE 0.9 % (FLUSH) 0.9 %
5-40 SYRINGE (ML) INJECTION AS NEEDED
Status: DISCONTINUED | OUTPATIENT
Start: 2022-07-21 | End: 2022-07-21 | Stop reason: HOSPADM

## 2022-07-21 RX ORDER — OXYCODONE HYDROCHLORIDE 5 MG/1
5 TABLET ORAL AS NEEDED
Status: DISCONTINUED | OUTPATIENT
Start: 2022-07-21 | End: 2022-07-21 | Stop reason: HOSPADM

## 2022-07-21 RX ORDER — HYDROMORPHONE HYDROCHLORIDE 1 MG/ML
0.2 INJECTION, SOLUTION INTRAMUSCULAR; INTRAVENOUS; SUBCUTANEOUS
Status: DISCONTINUED | OUTPATIENT
Start: 2022-07-21 | End: 2022-07-21 | Stop reason: HOSPADM

## 2022-07-21 RX ORDER — LIDOCAINE HYDROCHLORIDE 10 MG/ML
0.1 INJECTION, SOLUTION EPIDURAL; INFILTRATION; INTRACAUDAL; PERINEURAL AS NEEDED
Status: DISCONTINUED | OUTPATIENT
Start: 2022-07-21 | End: 2022-07-21 | Stop reason: HOSPADM

## 2022-07-21 RX ORDER — ROCURONIUM BROMIDE 10 MG/ML
INJECTION, SOLUTION INTRAVENOUS AS NEEDED
Status: DISCONTINUED | OUTPATIENT
Start: 2022-07-21 | End: 2022-07-21 | Stop reason: HOSPADM

## 2022-07-21 RX ORDER — MORPHINE SULFATE 2 MG/ML
2 INJECTION, SOLUTION INTRAMUSCULAR; INTRAVENOUS
Status: DISCONTINUED | OUTPATIENT
Start: 2022-07-21 | End: 2022-07-21 | Stop reason: HOSPADM

## 2022-07-21 RX ORDER — BUPIVACAINE HYDROCHLORIDE AND EPINEPHRINE 5; 5 MG/ML; UG/ML
INJECTION, SOLUTION EPIDURAL; INTRACAUDAL; PERINEURAL AS NEEDED
Status: DISCONTINUED | OUTPATIENT
Start: 2022-07-21 | End: 2022-07-21 | Stop reason: HOSPADM

## 2022-07-21 RX ORDER — SODIUM CHLORIDE 0.9 % (FLUSH) 0.9 %
5-40 SYRINGE (ML) INJECTION EVERY 8 HOURS
Status: DISCONTINUED | OUTPATIENT
Start: 2022-07-21 | End: 2022-07-21 | Stop reason: HOSPADM

## 2022-07-21 RX ORDER — KETOROLAC TROMETHAMINE 30 MG/ML
INJECTION, SOLUTION INTRAMUSCULAR; INTRAVENOUS AS NEEDED
Status: DISCONTINUED | OUTPATIENT
Start: 2022-07-21 | End: 2022-07-21 | Stop reason: HOSPADM

## 2022-07-21 RX ORDER — MIDAZOLAM HYDROCHLORIDE 1 MG/ML
0.5 INJECTION, SOLUTION INTRAMUSCULAR; INTRAVENOUS
Status: DISCONTINUED | OUTPATIENT
Start: 2022-07-21 | End: 2022-07-21 | Stop reason: HOSPADM

## 2022-07-21 RX ORDER — FENTANYL CITRATE 50 UG/ML
25 INJECTION, SOLUTION INTRAMUSCULAR; INTRAVENOUS
Status: DISCONTINUED | OUTPATIENT
Start: 2022-07-21 | End: 2022-07-21 | Stop reason: HOSPADM

## 2022-07-21 RX ORDER — ACETAMINOPHEN 325 MG/1
650 TABLET ORAL ONCE
Status: COMPLETED | OUTPATIENT
Start: 2022-07-21 | End: 2022-07-21

## 2022-07-21 RX ORDER — DIPHENHYDRAMINE HYDROCHLORIDE 50 MG/ML
12.5 INJECTION, SOLUTION INTRAMUSCULAR; INTRAVENOUS AS NEEDED
Status: DISCONTINUED | OUTPATIENT
Start: 2022-07-21 | End: 2022-07-21 | Stop reason: HOSPADM

## 2022-07-21 RX ORDER — MIDAZOLAM HYDROCHLORIDE 1 MG/ML
1 INJECTION, SOLUTION INTRAMUSCULAR; INTRAVENOUS AS NEEDED
Status: DISCONTINUED | OUTPATIENT
Start: 2022-07-21 | End: 2022-07-21 | Stop reason: HOSPADM

## 2022-07-21 RX ORDER — ROPIVACAINE HYDROCHLORIDE 5 MG/ML
30 INJECTION, SOLUTION EPIDURAL; INFILTRATION; PERINEURAL AS NEEDED
Status: DISCONTINUED | OUTPATIENT
Start: 2022-07-21 | End: 2022-07-21 | Stop reason: HOSPADM

## 2022-07-21 RX ORDER — VANCOMYCIN/0.9 % SOD CHLORIDE 1.5G/250ML
1500 PLASTIC BAG, INJECTION (ML) INTRAVENOUS ONCE
Status: COMPLETED | OUTPATIENT
Start: 2022-07-21 | End: 2022-07-21

## 2022-07-21 RX ORDER — KETAMINE HYDROCHLORIDE 10 MG/ML
INJECTION, SOLUTION INTRAMUSCULAR; INTRAVENOUS AS NEEDED
Status: DISCONTINUED | OUTPATIENT
Start: 2022-07-21 | End: 2022-07-21 | Stop reason: HOSPADM

## 2022-07-21 RX ORDER — FENTANYL CITRATE 50 UG/ML
INJECTION, SOLUTION INTRAMUSCULAR; INTRAVENOUS AS NEEDED
Status: DISCONTINUED | OUTPATIENT
Start: 2022-07-21 | End: 2022-07-21 | Stop reason: HOSPADM

## 2022-07-21 RX ORDER — OXYCODONE AND ACETAMINOPHEN 5; 325 MG/1; MG/1
1 TABLET ORAL
Qty: 18 TABLET | Refills: 0 | Status: SHIPPED | OUTPATIENT
Start: 2022-07-21 | End: 2022-07-24

## 2022-07-21 RX ORDER — ASPIRIN 81 MG/1
81 TABLET ORAL DAILY
Qty: 30 TABLET | Refills: 0 | Status: SHIPPED | OUTPATIENT
Start: 2022-07-21 | End: 2022-10-31 | Stop reason: SDUPTHER

## 2022-07-21 RX ORDER — SODIUM CHLORIDE, SODIUM LACTATE, POTASSIUM CHLORIDE, CALCIUM CHLORIDE 600; 310; 30; 20 MG/100ML; MG/100ML; MG/100ML; MG/100ML
25 INJECTION, SOLUTION INTRAVENOUS CONTINUOUS
Status: DISCONTINUED | OUTPATIENT
Start: 2022-07-21 | End: 2022-07-21 | Stop reason: HOSPADM

## 2022-07-21 RX ORDER — PROPOFOL 10 MG/ML
INJECTION, EMULSION INTRAVENOUS AS NEEDED
Status: DISCONTINUED | OUTPATIENT
Start: 2022-07-21 | End: 2022-07-21 | Stop reason: HOSPADM

## 2022-07-21 RX ORDER — ONDANSETRON 2 MG/ML
4 INJECTION INTRAMUSCULAR; INTRAVENOUS AS NEEDED
Status: DISCONTINUED | OUTPATIENT
Start: 2022-07-21 | End: 2022-07-21 | Stop reason: HOSPADM

## 2022-07-21 RX ORDER — POLYETHYLENE GLYCOL 3350 17 G/17G
17 POWDER, FOR SOLUTION ORAL DAILY
Qty: 14 PACKET | Refills: 1 | Status: SHIPPED | OUTPATIENT
Start: 2022-07-21 | End: 2022-08-25

## 2022-07-21 RX ORDER — PHENYLEPHRINE HCL IN 0.9% NACL 0.4MG/10ML
SYRINGE (ML) INTRAVENOUS AS NEEDED
Status: DISCONTINUED | OUTPATIENT
Start: 2022-07-21 | End: 2022-07-21 | Stop reason: HOSPADM

## 2022-07-21 RX ORDER — SODIUM CHLORIDE, SODIUM LACTATE, POTASSIUM CHLORIDE, CALCIUM CHLORIDE 600; 310; 30; 20 MG/100ML; MG/100ML; MG/100ML; MG/100ML
INJECTION, SOLUTION INTRAVENOUS
Status: DISCONTINUED | OUTPATIENT
Start: 2022-07-21 | End: 2022-07-21 | Stop reason: HOSPADM

## 2022-07-21 RX ORDER — ONDANSETRON 2 MG/ML
INJECTION INTRAMUSCULAR; INTRAVENOUS AS NEEDED
Status: DISCONTINUED | OUTPATIENT
Start: 2022-07-21 | End: 2022-07-21 | Stop reason: HOSPADM

## 2022-07-21 RX ORDER — LIDOCAINE HYDROCHLORIDE 20 MG/ML
INJECTION, SOLUTION EPIDURAL; INFILTRATION; INTRACAUDAL; PERINEURAL AS NEEDED
Status: DISCONTINUED | OUTPATIENT
Start: 2022-07-21 | End: 2022-07-21 | Stop reason: HOSPADM

## 2022-07-21 RX ADMIN — PHENYLEPHRINE HYDROCHLORIDE 80 MCG/MIN: 10 INJECTION INTRAVENOUS at 08:16

## 2022-07-21 RX ADMIN — ONDANSETRON HYDROCHLORIDE 4 MG: 2 INJECTION, SOLUTION INTRAMUSCULAR; INTRAVENOUS at 08:09

## 2022-07-21 RX ADMIN — PROPOFOL 150 MG: 10 INJECTION, EMULSION INTRAVENOUS at 08:05

## 2022-07-21 RX ADMIN — LIDOCAINE HYDROCHLORIDE 80 MG: 20 INJECTION, SOLUTION EPIDURAL; INFILTRATION; INTRACAUDAL; PERINEURAL at 08:05

## 2022-07-21 RX ADMIN — SUGAMMADEX 200 MG: 100 INJECTION, SOLUTION INTRAVENOUS at 08:47

## 2022-07-21 RX ADMIN — FENTANYL CITRATE 50 MCG: 50 INJECTION, SOLUTION INTRAMUSCULAR; INTRAVENOUS at 08:05

## 2022-07-21 RX ADMIN — VANCOMYCIN HYDROCHLORIDE 1500 MG: 10 INJECTION, POWDER, LYOPHILIZED, FOR SOLUTION INTRAVENOUS at 07:10

## 2022-07-21 RX ADMIN — DEXAMETHASONE SODIUM PHOSPHATE 4 MG: 4 INJECTION, SOLUTION INTRAMUSCULAR; INTRAVENOUS at 08:09

## 2022-07-21 RX ADMIN — KETOROLAC TROMETHAMINE 30 MG: 30 INJECTION, SOLUTION INTRAMUSCULAR; INTRAVENOUS at 08:47

## 2022-07-21 RX ADMIN — SODIUM CHLORIDE, POTASSIUM CHLORIDE, SODIUM LACTATE AND CALCIUM CHLORIDE: 600; 310; 30; 20 INJECTION, SOLUTION INTRAVENOUS at 07:58

## 2022-07-21 RX ADMIN — ROCURONIUM BROMIDE 30 MG: 10 SOLUTION INTRAVENOUS at 08:05

## 2022-07-21 RX ADMIN — Medication 10 MG: at 08:26

## 2022-07-21 RX ADMIN — Medication 20 MG: at 08:38

## 2022-07-21 RX ADMIN — MIDAZOLAM HYDROCHLORIDE 2 MG: 1 INJECTION, SOLUTION INTRAMUSCULAR; INTRAVENOUS at 08:00

## 2022-07-21 RX ADMIN — Medication 80 MCG: at 08:15

## 2022-07-21 RX ADMIN — SODIUM CHLORIDE, POTASSIUM CHLORIDE, SODIUM LACTATE AND CALCIUM CHLORIDE 25 ML/HR: 600; 310; 30; 20 INJECTION, SOLUTION INTRAVENOUS at 07:10

## 2022-07-21 RX ADMIN — ACETAMINOPHEN 650 MG: 325 TABLET ORAL at 06:55

## 2022-07-21 RX ADMIN — MIDAZOLAM HYDROCHLORIDE 3 MG: 1 INJECTION, SOLUTION INTRAMUSCULAR; INTRAVENOUS at 07:55

## 2022-07-21 RX ADMIN — Medication 20 MG: at 08:20

## 2022-07-21 NOTE — ANESTHESIA PREPROCEDURE EVALUATION
Relevant Problems   No relevant active problems       Anesthetic History   No history of anesthetic complications            Review of Systems / Medical History  Patient summary reviewed, nursing notes reviewed and pertinent labs reviewed    Pulmonary        Sleep apnea           Neuro/Psych         Psychiatric history    Comments: MS  Chronic pain (on suboxide)  Myasthenia on mestinon took this am  Chronic inflammatory demyelinating polyneuropathy Cardiovascular    Hypertension                Comments:  Mod pulm htn   GI/Hepatic/Renal               Comments: Small bowel mass Endo/Other    Diabetes  Hypothyroidism  Arthritis     Other Findings              Physical Exam    Airway  Mallampati: II  TM Distance: 4 - 6 cm  Neck ROM: normal range of motion   Mouth opening: Normal     Cardiovascular    Rhythm: regular  Rate: normal         Dental  No notable dental hx       Pulmonary  Breath sounds clear to auscultation               Abdominal         Other Findings            Anesthetic Plan    ASA: 3  Anesthesia type: general          Induction: Intravenous  Anesthetic plan and risks discussed with: Patient      Suggamadex for reversal

## 2022-07-21 NOTE — INTERVAL H&P NOTE
Update History & Physical    The Patient's History and Physical of 7/15/22 was reviewed with the patient and I examined the patient. There was no change. The surgical site was confirmed by the patient and me. Plan:  The risk, benefits, expected outcome, and alternative to the recommended procedure have been discussed with the patient. Patient understands and wants to proceed with the procedure.     Electronically signed by Alisha Zamora MD on 7/21/2022 at 7:09 AM

## 2022-07-21 NOTE — PERIOP NOTES
I have reviewed discharge instructions in person with the patient and son using teach back method. The patient and son verbalized understanding. Medications, signs, symptoms, and side effects reviewed. Opportunity for questions and clarification allowed. Patient discharging home via private vehicle. Hard-copy of discharge instructions given to patient. Copy of discharge instructions signed and placed on chart.

## 2022-07-21 NOTE — OP NOTES
OPERATIVE NOTE    Date of Procedure: 7/21/2022     Preoperative Diagnosis: SMALL BOWEL MASS  Postoperative Diagnosis: Small bowel volvulus, mobile right colon    Procedure: Procedure(s):  DIAGNOSTIC LAPAROSCOPY, LYSIS OF ADHESIONS, APPENDECTOMY    Surgeon: Yolanda Schilling MD    Surgical Staff: Circ-1: Loli Back RN  Scrub Tech-1: Cisco Gross  Surg Asst-1: Giovana Sensor    Anesthesia: General   Indications: 70-year-old female who presents with weight loss and abdominal pain with possible mass on CT scan. Taken to the OR for exploration. Findings: Small bowel volvulus involving adhesive band between the mid small intestine to her prior incision closure site caudad to the umbilicus, extremely mobile cecum with appendix in left upper quadrant without signs of malrotation. Description of Operation: Jose D Gifford Minor was identified in the pre-operative holding area. Informed consent was obtained after a complete discussion of risks, benefits and alternatives to surgery were had with the patient. The patient was brought back to the operating room and placed under general endotracheal anesthesia on the operating room table in supine position. The patient was then prepped and draped in the usual sterile fashion. A timeout was performed. I began with a 5 mm Optiview trocar in the left upper quadrant. I safely entered the abdomen. I achieved insufflation. Upon entering, I noticed a small bowel volvulus involving an adhesive band just caudad to the umbilicus that was causing severe twisting of a loop of small intestine. I then placed two more 5 mm trochars along the left anterior axillary line equally space. I used scissors to take down the adhesive band. This exposed some permanent Prolene suture in the abdominal wall. There was no obvious injury to the bowel and the volvulus was reduced. I then attempted to identify the cecum and worked proximally.   I was unable to clearly identify the cecum after multiple minutes of looking. I instead started to run a random piece of small intestine and identified the cecum to be in the left upper quadrant with the appendix. This made me concerned for malrotation. I then ran the small intestine in its entirety from the terminal ileum up to the ligament of Treitz. I identified the small bowel resection site from years ago which appeared to be patent without any mesenteric defect. The takedown of adhesion site once again did not show any serosal injury to the bowel. I worked back all the way to the ligament of Treitz. I then identified the rectum and worked proximally identifying the sigmoid colon, descending colon, transverse colon and upon working this back I was able to reduce the cecum back into the right lower quadrant. This appeared to just be a very mobile mesentery. No signs of malrotation. In light of how mobile this area was, I elected to perform a laparoscopic appendectomy to reduce confusion in the future if she has any further abdominal pain in atypical locations. I upsized the mid abdominal trocar site to a 12 mm trocar. I used a laparoscopic [place] to create a mesenteric window and then fired a 45 mm erazo load across the base of the appendix followed by 45 mm gray load across the mesoappendix. I removed the appendix via the 12 mm trocar site. Further examined of the abdomen did not show any other lesions or any signs of malignancy including examination of ovaries and uterus. We then elected to close the 12 mm trocar site with a 0 PDS suture and a transfascial suture passer. We tied down the suture. We removed the gas and removed the trochars under direct visualization. The dermis was closed with 4-0 Monocryl followed by Dermabond for the skin. At the end of the procedure all instrument, needle, and sponge counts were correct. I was present and scrubbed throughout the entirety of the case.  The patient awoke from anesthesia and was extubated without complication and sent to PACU in stable condition.       Estimated Blood Loss: 2 mL    Specimens:   ID Type Source Tests Collected by Time Destination   1 : APPENDIX Fresh Appendix  Luis F Perez MD 7/21/2022 [address] Pathology        Complications: None    Implants: * No implants in log *      Samantha Logan MD  Bariatric and General Surgeon  [facility]  7/21/2022

## 2022-07-21 NOTE — ROUTINE PROCESS
Patient: Polo Vila MRN: [id number]  SSN: [id number]   YOB: 1955  Age: 74 y.o. Sex: female     Patient is status post Procedure(s):  DIAGNOSTIC LAPAROSCOPY, LYSIS OF ADHESIONS, APPENDECTOMY. Surgeon(s) and Role:     * Aruna Dunham MD - Primary    Local/Dose/Irrigation:  See [facility]                  Peripheral IV 07/21/22 Posterior;Right Forearm (Active)   Site Assessment Clean, dry, & intact 07/21/22 0709   Phlebitis Assessment 0 07/21/22 0709   Infiltration Assessment 0 07/21/22 0709   Dressing Status Clean, dry, & intact 07/21/22 0709   Dressing Type Transparent 07/21/22 0709   Hub Color/Line Status Green; Infusing 07/21/22 0709            Airway - Endotracheal Tube 07/21/22 Oral (Active)                   Dressing/Packing:  Incision 07/21/22 Abdomen-Dressing/Treatment: Surgical glue (07/21/22 0700)    Splint/Cast:  ]

## 2022-07-21 NOTE — DISCHARGE INSTRUCTIONS
**Tylenol given at 7am.  **Ibuprofen given at 8:47am.      Discharge Instructions for General Surgery Patients       Do not lift any objects weighing more than 15 pounds for 2 weeks. Do not do any housework including vacuuming, scrubbing or yardwork for 4 weeks. Do not drive or operate machinery while taking sedating or narcotic medications. Post operative pain is expected. Try to wean off narcotics as soon as able and take tylenol or NSAIDS. Do not take tylenol with Norco or Percocet as this may harm your liver. No NSAIDS if you are bariatric surgery patient. You may walk as desired and go up and down stairs as needed. Walking is encouraged. You may shower the day after surgery. Do not take tub baths, swim or use hot tubs for 2 weeks. Pat dry wounds after with a towel. Leave glue on wounds. It will fall off with time. Do not scrub around incisions. If redness develops around the glue okay to peel off in hot shower. Regular diet. Take Miralax once or twice a day as needed for constipation. Follow up with provider as scheduled. If you experience fever (greater than 101.5), chills, vomiting or redness or drainage at surgical site, please contact your surgeons office. If you have further questions or concerns, please call your surgeons office at [phone].      ______________________________________________________________________    Anesthesia Discharge Instructions    After general anesthesia or intervenous sedation, for 24 hours or while taking prescription Narcotics:  Limit your activities  Do not drive or operate hazardous machinery  If you have not urinated within 8 hours after discharge, please contact your surgeon on call.   Do not make important personal or business decisions  Do not drink alcoholic beverages    Report the following to your surgeon:  Excessive pain, swelling, redness or odor of or around the surgical area  Temperature over 100.5 degrees  Nausea and vomiting lasting longer than 4 hours or if unable to take medication  Any signs of decreased circulation or nerve impairment to extremity:  Change in color, persistent numbness, tingling, coldness or increased pain.   Any questions

## 2022-07-25 ENCOUNTER — TELEPHONE (OUTPATIENT)
Dept: FAMILY MEDICINE CLINIC | Age: 67
End: 2022-07-25

## 2022-07-25 NOTE — TELEPHONE ENCOUNTER
----- Message from Tamara Chaves sent at 7/22/2022  4:08 PM EDT -----  Subject: Message to Provider    QUESTIONS  Information for Provider? Patients son Tawny Vila called to make a surgery   follow up appt she is scheduled with the surgeon on August 5th already and   she was just discharged yesterday. I wanted to check and see before   scheduling if his mom was needing to follow up with both providers. I told   him I would check into it and the office would reach out to him. thank   you.   ---------------------------------------------------------------------------  --------------  Shania Castaneda INFO  [phone]; OK to leave message on voicemail  ---------------------------------------------------------------------------  --------------  SCRIPT ANSWERS  Relationship to Patient? Other  Representative Name? Tawny Vila  Is the [facility] on the appropriate HIPAA document in Epic?  Yes

## 2022-07-28 ENCOUNTER — TELEPHONE (OUTPATIENT)
Dept: NEUROLOGY | Age: 67
End: 2022-07-28

## 2022-07-28 NOTE — TELEPHONE ENCOUNTER
Jose Manuel Corbett states that order for home health that was recvd on  is .  Please send new order over to [facility] first to [place] [facility] [phone]  cell# [phone] fax# [phone] Yanira Cox will be close the  order

## 2022-08-03 DIAGNOSIS — G37.9 DEMYELINATING DISEASE (HCC): Primary | ICD-10-CM

## 2022-08-03 DIAGNOSIS — R47.9 SPEECH DISORDER: ICD-10-CM

## 2022-08-03 DIAGNOSIS — R26.9 GAIT DISORDER: ICD-10-CM

## 2022-08-03 DIAGNOSIS — R29.6 FREQUENT FALLS: ICD-10-CM

## 2022-08-03 NOTE — TELEPHONE ENCOUNTER
Message  Received: Today  Pavel Mccabe MD sent to Nate Hannah LPN  Caller: Unspecified (6 days ago,  3:11 PM)  Done         Faxed new home health order to Berniece Bamberger at [phone] and received fax confirmation.

## 2022-08-04 ENCOUNTER — HOME HEALTH ADMISSION (OUTPATIENT)
Dept: HOME HEALTH SERVICES | Facility: HOME HEALTH | Age: 67
End: 2022-08-04
Payer: MEDICARE

## 2022-08-04 ENCOUNTER — TELEPHONE (OUTPATIENT)
Dept: NEUROLOGY | Age: 67
End: 2022-08-04

## 2022-08-04 NOTE — TELEPHONE ENCOUNTER
Agency #[phone]  The order received for physical therapy, occupational therapy and speech has been accepted.

## 2022-08-05 ENCOUNTER — OFFICE VISIT (OUTPATIENT)
Dept: SURGERY | Age: 67
End: 2022-08-05
Payer: MEDICARE

## 2022-08-05 VITALS
BODY MASS INDEX: 24.11 KG/M2 | HEART RATE: 86 BPM | DIASTOLIC BLOOD PRESSURE: 76 MMHG | WEIGHT: 178 LBS | TEMPERATURE: 98.3 F | HEIGHT: 72 IN | RESPIRATION RATE: 18 BRPM | OXYGEN SATURATION: 91 % | SYSTOLIC BLOOD PRESSURE: 136 MMHG

## 2022-08-05 DIAGNOSIS — K56.2 SMALL BOWEL VOLVULUS (HCC): Primary | ICD-10-CM

## 2022-08-05 PROCEDURE — 99024 POSTOP FOLLOW-UP VISIT: CPT | Performed by: SURGERY

## 2022-08-05 NOTE — PROGRESS NOTES
Surgery Progress Note    8/5/2022    CC: Postoperative state    Subjective:     Doing well after diagnostic laparoscopy, excision of adhesions, appendectomy. It appears that reduction of her small bowel volvulus from adhesive disease has solved her abdominal pain. Tolerating a variety of food. Does not do well with spaghetti and red sauce. Constitutional: No fever or chills  Neurologic: No headache  Eyes: No scleral icterus or irritated eyes  Nose: No nasal pain or drainage  Mouth: No oral lesions or sore throat  Cardiac: No palpations or chest pain  Pulmonary: No cough or shortness of breath  Gastrointestinal: No nausea, emesis, diarrhea, or constipation  Genitourinary: No dysuria  Musculoskeletal: No muscle or joint tenderness  Skin: No rashes or lesions  Psychiatric: No anxiety or depressed mood    Objective:   Visit Vitals  /76 (BP 1 Location: Right upper arm, BP Patient Position: Sitting, BP Cuff Size: Adult)   Pulse 86   Temp 98.3 °F (36.8 °C) (Oral)   Resp 18   Ht 6' (1.829 m)   Wt 178 lb (80.7 kg)   SpO2 91%   BMI 24.14 kg/m²       General: No acute distress, conversant  Eyes: PERRLA, no scleral icterus  HENT: Normocephalic without oral lesions  Neck: Trachea midline without LAD  Cardiac: Normal pulse rate and rhythm  Pulmonary: Symmetric chest rise with normal effort  GI: Soft, wounds clean dry and intact  Skin: Warm without rash  Extremities: No edema or joint stiffness  Psych: Appropriate mood and affect    Assessment:     46-year-old female doing well after reduction of small bowel volvulus and lysis of adhesion    Plan:     Pathology reviewed-benign, okay for regular diet, okay for full activity.   Follow-up as needed      Dinesh Aleman MD  Bariatric and General Surgeon  [facility]

## 2022-08-05 NOTE — PROGRESS NOTES
1. Have you been to the ER, urgent care clinic since your last visit? Hospitalized since your last visit? No    2. Have you seen or consulted any other health care providers outside of the [address] [place] [address] since your last visit? Include any pap smears or colon screening.  No

## 2022-08-06 ENCOUNTER — HOME CARE VISIT (OUTPATIENT)
Dept: SCHEDULING | Facility: HOME HEALTH | Age: 67
End: 2022-08-06
Payer: MEDICARE

## 2022-08-06 PROCEDURE — G0151 HHCP-SERV OF PT,EA 15 MIN: HCPCS

## 2022-08-06 PROCEDURE — 400013 HH SOC

## 2022-08-06 PROCEDURE — 3331090002 HH PPS REVENUE DEBIT

## 2022-08-06 PROCEDURE — 400018 HH-NO PAY CLAIM PROCEDURE

## 2022-08-06 PROCEDURE — 3331090001 HH PPS REVENUE CREDIT

## 2022-08-07 VITALS
TEMPERATURE: 97.1 F | OXYGEN SATURATION: 95 % | HEART RATE: 85 BPM | WEIGHT: 178 LBS | DIASTOLIC BLOOD PRESSURE: 70 MMHG | RESPIRATION RATE: 16 BRPM | HEIGHT: 72 IN | SYSTOLIC BLOOD PRESSURE: 125 MMHG | BODY MASS INDEX: 24.11 KG/M2

## 2022-08-07 PROCEDURE — 3331090002 HH PPS REVENUE DEBIT

## 2022-08-07 PROCEDURE — 3331090001 HH PPS REVENUE CREDIT

## 2022-08-08 ENCOUNTER — HOME CARE VISIT (OUTPATIENT)
Dept: SCHEDULING | Facility: HOME HEALTH | Age: 67
End: 2022-08-08
Payer: MEDICARE

## 2022-08-08 PROCEDURE — 3331090001 HH PPS REVENUE CREDIT

## 2022-08-08 PROCEDURE — 3331090002 HH PPS REVENUE DEBIT

## 2022-08-08 PROCEDURE — G0153 HHCP-SVS OF S/L PATH,EA 15MN: HCPCS

## 2022-08-09 ENCOUNTER — HOME CARE VISIT (OUTPATIENT)
Dept: SCHEDULING | Facility: HOME HEALTH | Age: 67
End: 2022-08-09
Payer: MEDICARE

## 2022-08-09 VITALS
TEMPERATURE: 97 F | RESPIRATION RATE: 18 BRPM | HEART RATE: 77 BPM | DIASTOLIC BLOOD PRESSURE: 70 MMHG | OXYGEN SATURATION: 99 % | SYSTOLIC BLOOD PRESSURE: 138 MMHG

## 2022-08-09 PROCEDURE — 3331090002 HH PPS REVENUE DEBIT

## 2022-08-09 PROCEDURE — 3331090001 HH PPS REVENUE CREDIT

## 2022-08-09 PROCEDURE — G0151 HHCP-SERV OF PT,EA 15 MIN: HCPCS

## 2022-08-10 ENCOUNTER — HOME CARE VISIT (OUTPATIENT)
Dept: SCHEDULING | Facility: HOME HEALTH | Age: 67
End: 2022-08-10
Payer: MEDICARE

## 2022-08-10 VITALS
TEMPERATURE: 96.8 F | DIASTOLIC BLOOD PRESSURE: 62 MMHG | HEART RATE: 92 BPM | SYSTOLIC BLOOD PRESSURE: 108 MMHG | RESPIRATION RATE: 16 BRPM | OXYGEN SATURATION: 98 %

## 2022-08-10 VITALS
HEART RATE: 75 BPM | DIASTOLIC BLOOD PRESSURE: 70 MMHG | OXYGEN SATURATION: 100 % | TEMPERATURE: 98.2 F | SYSTOLIC BLOOD PRESSURE: 130 MMHG

## 2022-08-10 PROCEDURE — 3331090001 HH PPS REVENUE CREDIT

## 2022-08-10 PROCEDURE — 3331090002 HH PPS REVENUE DEBIT

## 2022-08-10 PROCEDURE — G0152 HHCP-SERV OF OT,EA 15 MIN: HCPCS

## 2022-08-11 ENCOUNTER — HOME CARE VISIT (OUTPATIENT)
Dept: SCHEDULING | Facility: HOME HEALTH | Age: 67
End: 2022-08-11
Payer: MEDICARE

## 2022-08-11 VITALS
HEART RATE: 80 BPM | DIASTOLIC BLOOD PRESSURE: 68 MMHG | RESPIRATION RATE: 18 BRPM | TEMPERATURE: 97 F | OXYGEN SATURATION: 100 % | SYSTOLIC BLOOD PRESSURE: 140 MMHG

## 2022-08-11 PROCEDURE — 3331090002 HH PPS REVENUE DEBIT

## 2022-08-11 PROCEDURE — G0153 HHCP-SVS OF S/L PATH,EA 15MN: HCPCS

## 2022-08-11 PROCEDURE — G0151 HHCP-SERV OF PT,EA 15 MIN: HCPCS

## 2022-08-11 PROCEDURE — 3331090001 HH PPS REVENUE CREDIT

## 2022-08-12 VITALS
DIASTOLIC BLOOD PRESSURE: 68 MMHG | OXYGEN SATURATION: 98 % | HEART RATE: 75 BPM | RESPIRATION RATE: 16 BRPM | TEMPERATURE: 97.8 F | SYSTOLIC BLOOD PRESSURE: 125 MMHG

## 2022-08-12 PROCEDURE — 3331090002 HH PPS REVENUE DEBIT

## 2022-08-12 PROCEDURE — 3331090001 HH PPS REVENUE CREDIT

## 2022-08-13 PROCEDURE — 3331090001 HH PPS REVENUE CREDIT

## 2022-08-13 PROCEDURE — 3331090002 HH PPS REVENUE DEBIT

## 2022-08-14 PROCEDURE — 3331090002 HH PPS REVENUE DEBIT

## 2022-08-14 PROCEDURE — 3331090001 HH PPS REVENUE CREDIT

## 2022-08-15 ENCOUNTER — HOME CARE VISIT (OUTPATIENT)
Dept: SCHEDULING | Facility: HOME HEALTH | Age: 67
End: 2022-08-15
Payer: MEDICARE

## 2022-08-15 VITALS
DIASTOLIC BLOOD PRESSURE: 64 MMHG | SYSTOLIC BLOOD PRESSURE: 128 MMHG | OXYGEN SATURATION: 98 % | HEART RATE: 81 BPM | TEMPERATURE: 97.2 F

## 2022-08-15 PROCEDURE — 3331090001 HH PPS REVENUE CREDIT

## 2022-08-15 PROCEDURE — 3331090002 HH PPS REVENUE DEBIT

## 2022-08-15 PROCEDURE — G0158 HHC OT ASSISTANT EA 15: HCPCS

## 2022-08-16 ENCOUNTER — HOME CARE VISIT (OUTPATIENT)
Dept: SCHEDULING | Facility: HOME HEALTH | Age: 67
End: 2022-08-16
Payer: MEDICARE

## 2022-08-16 PROCEDURE — 3331090002 HH PPS REVENUE DEBIT

## 2022-08-16 PROCEDURE — 3331090001 HH PPS REVENUE CREDIT

## 2022-08-16 PROCEDURE — G0153 HHCP-SVS OF S/L PATH,EA 15MN: HCPCS

## 2022-08-16 PROCEDURE — G0151 HHCP-SERV OF PT,EA 15 MIN: HCPCS

## 2022-08-17 ENCOUNTER — HOME CARE VISIT (OUTPATIENT)
Dept: SCHEDULING | Facility: HOME HEALTH | Age: 67
End: 2022-08-17
Payer: MEDICARE

## 2022-08-17 ENCOUNTER — TELEPHONE (OUTPATIENT)
Dept: NEUROLOGY | Age: 67
End: 2022-08-17

## 2022-08-17 VITALS
OXYGEN SATURATION: 98 % | HEART RATE: 80 BPM | RESPIRATION RATE: 18 BRPM | TEMPERATURE: 97.4 F | DIASTOLIC BLOOD PRESSURE: 70 MMHG | SYSTOLIC BLOOD PRESSURE: 138 MMHG

## 2022-08-17 VITALS
OXYGEN SATURATION: 98 % | DIASTOLIC BLOOD PRESSURE: 64 MMHG | TEMPERATURE: 98.1 F | HEART RATE: 69 BPM | SYSTOLIC BLOOD PRESSURE: 140 MMHG

## 2022-08-17 VITALS
RESPIRATION RATE: 16 BRPM | TEMPERATURE: 97.8 F | OXYGEN SATURATION: 98 % | SYSTOLIC BLOOD PRESSURE: 121 MMHG | HEART RATE: 75 BPM | DIASTOLIC BLOOD PRESSURE: 68 MMHG

## 2022-08-17 PROCEDURE — G0158 HHC OT ASSISTANT EA 15: HCPCS

## 2022-08-17 PROCEDURE — 3331090001 HH PPS REVENUE CREDIT

## 2022-08-17 PROCEDURE — 3331090002 HH PPS REVENUE DEBIT

## 2022-08-17 NOTE — TELEPHONE ENCOUNTER
Received Hello message, \"Should she cancel a referral, has called 2-3 times, URGENT. \"      [phone]

## 2022-08-18 ENCOUNTER — HOME CARE VISIT (OUTPATIENT)
Dept: HOME HEALTH SERVICES | Facility: HOME HEALTH | Age: 67
End: 2022-08-18
Payer: MEDICARE

## 2022-08-18 PROCEDURE — 3331090002 HH PPS REVENUE DEBIT

## 2022-08-18 PROCEDURE — 3331090001 HH PPS REVENUE CREDIT

## 2022-08-19 ENCOUNTER — HOME CARE VISIT (OUTPATIENT)
Dept: HOME HEALTH SERVICES | Facility: HOME HEALTH | Age: 67
End: 2022-08-19
Payer: MEDICARE

## 2022-08-19 PROCEDURE — 3331090002 HH PPS REVENUE DEBIT

## 2022-08-19 PROCEDURE — 3331090001 HH PPS REVENUE CREDIT

## 2022-08-20 PROCEDURE — 3331090002 HH PPS REVENUE DEBIT

## 2022-08-20 PROCEDURE — 3331090001 HH PPS REVENUE CREDIT

## 2022-08-21 PROCEDURE — 3331090001 HH PPS REVENUE CREDIT

## 2022-08-21 PROCEDURE — 3331090002 HH PPS REVENUE DEBIT

## 2022-08-22 ENCOUNTER — HOME CARE VISIT (OUTPATIENT)
Dept: SCHEDULING | Facility: HOME HEALTH | Age: 67
End: 2022-08-22
Payer: MEDICARE

## 2022-08-22 VITALS
TEMPERATURE: 97.4 F | OXYGEN SATURATION: 98 % | HEART RATE: 81 BPM | DIASTOLIC BLOOD PRESSURE: 60 MMHG | SYSTOLIC BLOOD PRESSURE: 110 MMHG

## 2022-08-22 PROCEDURE — 3331090002 HH PPS REVENUE DEBIT

## 2022-08-22 PROCEDURE — G0158 HHC OT ASSISTANT EA 15: HCPCS

## 2022-08-22 PROCEDURE — 3331090001 HH PPS REVENUE CREDIT

## 2022-08-23 ENCOUNTER — HOME CARE VISIT (OUTPATIENT)
Dept: SCHEDULING | Facility: HOME HEALTH | Age: 67
End: 2022-08-23
Payer: MEDICARE

## 2022-08-23 PROCEDURE — 3331090002 HH PPS REVENUE DEBIT

## 2022-08-23 PROCEDURE — G0151 HHCP-SERV OF PT,EA 15 MIN: HCPCS

## 2022-08-23 PROCEDURE — 3331090001 HH PPS REVENUE CREDIT

## 2022-08-24 ENCOUNTER — HOME CARE VISIT (OUTPATIENT)
Dept: SCHEDULING | Facility: HOME HEALTH | Age: 67
End: 2022-08-24
Payer: MEDICARE

## 2022-08-24 VITALS
HEART RATE: 71 BPM | DIASTOLIC BLOOD PRESSURE: 68 MMHG | TEMPERATURE: 98.1 F | OXYGEN SATURATION: 98 % | SYSTOLIC BLOOD PRESSURE: 130 MMHG

## 2022-08-24 PROCEDURE — G0158 HHC OT ASSISTANT EA 15: HCPCS

## 2022-08-24 PROCEDURE — 3331090001 HH PPS REVENUE CREDIT

## 2022-08-24 PROCEDURE — G0153 HHCP-SVS OF S/L PATH,EA 15MN: HCPCS

## 2022-08-24 PROCEDURE — 3331090002 HH PPS REVENUE DEBIT

## 2022-08-24 NOTE — TELEPHONE ENCOUNTER
Dena Dover needs the face to face or last office note. Make sure Dr. Gilles Moran signs note. Also needs updated orders since it's been sitting for a while. She asked for message to me marked urgent in case nursing is needed.  Please call with questions [phone]    Please fax to [phone]

## 2022-08-25 ENCOUNTER — OFFICE VISIT (OUTPATIENT)
Dept: FAMILY MEDICINE CLINIC | Age: 67
End: 2022-08-25
Payer: MEDICARE

## 2022-08-25 VITALS
HEART RATE: 78 BPM | OXYGEN SATURATION: 97 % | TEMPERATURE: 97.8 F | DIASTOLIC BLOOD PRESSURE: 70 MMHG | RESPIRATION RATE: 16 BRPM | SYSTOLIC BLOOD PRESSURE: 128 MMHG

## 2022-08-25 VITALS
TEMPERATURE: 97.2 F | SYSTOLIC BLOOD PRESSURE: 130 MMHG | BODY MASS INDEX: 24.16 KG/M2 | RESPIRATION RATE: 16 BRPM | WEIGHT: 178.4 LBS | HEIGHT: 72 IN | OXYGEN SATURATION: 98 % | HEART RATE: 80 BPM | DIASTOLIC BLOOD PRESSURE: 62 MMHG

## 2022-08-25 VITALS
RESPIRATION RATE: 18 BRPM | DIASTOLIC BLOOD PRESSURE: 60 MMHG | HEART RATE: 79 BPM | OXYGEN SATURATION: 95 % | SYSTOLIC BLOOD PRESSURE: 130 MMHG | TEMPERATURE: 97.2 F

## 2022-08-25 DIAGNOSIS — Z90.49 S/P APPENDECTOMY: ICD-10-CM

## 2022-08-25 DIAGNOSIS — E11.8 DM TYPE 2, CONTROLLED, WITH COMPLICATION (HCC): Primary | ICD-10-CM

## 2022-08-25 LAB — HBA1C MFR BLD HPLC: 6.9 %

## 2022-08-25 PROCEDURE — G8427 DOCREV CUR MEDS BY ELIG CLIN: HCPCS | Performed by: FAMILY MEDICINE

## 2022-08-25 PROCEDURE — G9899 SCRN MAM PERF RSLTS DOC: HCPCS | Performed by: FAMILY MEDICINE

## 2022-08-25 PROCEDURE — 1101F PT FALLS ASSESS-DOCD LE1/YR: CPT | Performed by: FAMILY MEDICINE

## 2022-08-25 PROCEDURE — 2022F DILAT RTA XM EVC RTNOPTHY: CPT | Performed by: FAMILY MEDICINE

## 2022-08-25 PROCEDURE — G8754 DIAS BP LESS 90: HCPCS | Performed by: FAMILY MEDICINE

## 2022-08-25 PROCEDURE — 3331090002 HH PPS REVENUE DEBIT

## 2022-08-25 PROCEDURE — 3044F HG A1C LEVEL LT 7.0%: CPT | Performed by: FAMILY MEDICINE

## 2022-08-25 PROCEDURE — G8420 CALC BMI NORM PARAMETERS: HCPCS | Performed by: FAMILY MEDICINE

## 2022-08-25 PROCEDURE — 83036 HEMOGLOBIN GLYCOSYLATED A1C: CPT | Performed by: FAMILY MEDICINE

## 2022-08-25 PROCEDURE — 1090F PRES/ABSN URINE INCON ASSESS: CPT | Performed by: FAMILY MEDICINE

## 2022-08-25 PROCEDURE — 99213 OFFICE O/P EST LOW 20 MIN: CPT | Performed by: FAMILY MEDICINE

## 2022-08-25 PROCEDURE — G8752 SYS BP LESS 140: HCPCS | Performed by: FAMILY MEDICINE

## 2022-08-25 PROCEDURE — G9717 DOC PT DX DEP/BP F/U NT REQ: HCPCS | Performed by: FAMILY MEDICINE

## 2022-08-25 PROCEDURE — 1123F ACP DISCUSS/DSCN MKR DOCD: CPT | Performed by: FAMILY MEDICINE

## 2022-08-25 PROCEDURE — G8536 NO DOC ELDER MAL SCRN: HCPCS | Performed by: FAMILY MEDICINE

## 2022-08-25 PROCEDURE — G8399 PT W/DXA RESULTS DOCUMENT: HCPCS | Performed by: FAMILY MEDICINE

## 2022-08-25 PROCEDURE — 3331090001 HH PPS REVENUE CREDIT

## 2022-08-25 PROCEDURE — 3017F COLORECTAL CA SCREEN DOC REV: CPT | Performed by: FAMILY MEDICINE

## 2022-08-25 NOTE — PROGRESS NOTES
Chief Complaint   Patient presents with    Diabetes       1. \"Have you been to the ER, urgent care clinic since your last visit? Hospitalized since your last visit? \" No    2. \"Have you seen or consulted any other health care providers outside of the [address] [place] [address] since your last visit? \" No     3. For patients aged 39-70: Has the patient had a colonoscopy / FIT/ Cologuard? Yes - no Care Gap present      If the patient is female:    4. For patients aged 41-77: Has the patient had a mammogram within the past 2 years? Yes - no Care Gap present      5. For patients aged 21-65: Has the patient had a pap smear?  Yes - no Care Gap present    3 most recent PHQ Screens 8/5/2022   PHQ Not Done -   Little interest or pleasure in doing things Not at all   Feeling down, depressed, irritable, or hopeless Not at all   Total Score PHQ 2 0   Trouble falling or staying asleep, or sleeping too much -   Feeling tired or having little energy -   Poor appetite, weight loss, or overeating -   Feeling bad about yourself - or that you are a failure or have let yourself or your family down -   Trouble concentrating on things such as school, work, reading, or watching TV -   Moving or speaking so slowly that other people could have noticed; or the opposite being so fidgety that others notice -   Thoughts of being better off dead, or hurting yourself in some way -   PHQ 9 Score -   How difficult have these problems made it for you to do your work, take care of your home and get along with others -

## 2022-08-25 NOTE — PROGRESS NOTES
Patient Name: Justina Fermin   MRN: [id number]    Mendel Bending is a 74 y.o. female who presents with the following:     Underwent a diagnostic laparoscopy, DANIEL, jaci appendectomy due to small bowel volvulus and adhesive disease. Doing well. A1c is 6.9 today. Lab Results   Component Value Date/Time    Hemoglobin A1c 6.7 (H) 04/28/2022 01:48 PM    Hemoglobin A1c (POC) 6.9 08/25/2022 09:25 AM     Wt Readings from Last 3 Encounters:   08/25/22 178 lb 6.4 oz (80.9 kg)   08/06/22 178 lb (80.7 kg)   08/05/22 178 lb (80.7 kg)       Review of Systems   Constitutional:  Negative for fever, malaise/fatigue and weight loss. Respiratory:  Negative for cough, hemoptysis, shortness of breath and wheezing. Cardiovascular:  Negative for chest pain, palpitations, leg swelling and PND. Gastrointestinal:  Negative for abdominal pain, constipation, diarrhea, nausea and vomiting. The patient's medications, allergies, past medical history, surgical history, family history and social history were reviewed and updated where appropriate. Current Outpatient Medications:     aspirin delayed-release 81 mg tablet, Take 1 Tablet by mouth in the morning., Disp: 30 Tablet, Rfl: 0    dantrolene (DANTRIUM) 100 mg capsule, Take 1 Capsule by mouth as needed for PRN Reason (Other) (spasiums). Indications: muscle spasms due to a brain disease (Patient taking differently: Take 100 mg by mouth three (3) times daily as needed for PRN Reason (Other) (spasms). Indications: muscle spasms due to a brain disease), Disp: 30 Capsule, Rfl: 0    rimegepant (Nurtec ODT) 75 mg disintegrating tablet, Take 75 mg by mouth once as needed for Migraine. , Disp: , Rfl:     levothyroxine (SYNTHROID) 137 mcg tablet, TAKE 1 TABLET BY MOUTH EVERY DAY BEFORE BREAKFAST, Disp: 90 Tablet, Rfl: 3    Gilenya 0.5 mg cap, Take 1 Capsule by mouth nightly., Disp: 30 Capsule, Rfl: 11    metFORMIN ER (GLUCOPHAGE XR) 500 mg tablet, TAKE 3 TABLETS BY MOUTH DAILY, Disp: 270 Tablet, Rfl: 1    PARoxetine (PAXIL) 40 mg tablet, TAKE 1 AND 1/2 TABLETS BY MOUTH EVERY NIGHT, Disp: 135 Tablet, Rfl: 3    pyridostigmine bromide (MESTINON SR) 180 mg SR tablet, TAKE 1 TABLET BY MOUTH TWICE DAILY, Disp: 180 Tablet, Rfl: 0    erenumab-aooe (Aimovig Autoinjector) 140 mg/mL injection, ADMINISTER 1 ML(140MG) UNDER THE SKIN EVERY 30 DAYS, Disp: 3 mL, Rfl: 5    rosuvastatin (CRESTOR) 40 mg tablet, TAKE 1 TABLET BY MOUTH EVERY DAY (Patient taking differently: Take 40 mg by mouth nightly.), Disp: 90 Tablet, Rfl: 1    topiramate (TOPAMAX) 200 mg tablet, Take 1 Tablet by mouth two (2) times a day., Disp: 180 Tablet, Rfl: 2    pregabalin (Lyrica) 200 mg capsule, Take 1 Cap by mouth two (2) times a day. Max Daily Amount: 400 mg., Disp: 180 Cap, Rfl: 4    acetaminophen (TYLENOL) 500 mg tablet, Take 500 mg by mouth every six (6) hours as needed for Pain. for mild to moderate pain on a scclae of  3/10-6/10., Disp: , Rfl:     glucose blood VI test strips (BLOOD GLUCOSE TEST) strip, 100 Each by Does Not Apply route See Admin Instructions. One Touch Ultra Test Strips=Check blood sugar daily dx E11.8, Disp: 100 Strip, Rfl: 1    albuterol (PROVENTIL VENTOLIN) 2.5 mg /3 mL (0.083 %) nebulizer solution, 3 mL by Nebulization route every six (6) hours as needed for Wheezing., Disp: 30 Each, Rfl: 0    menthol-zinc oxide (CALMOSEPTINE) 0.44-20.6 % oint, Apply 1 Each to affected area daily as needed for PRN Reason (Other) (thin layer to buttocks for skin irritation). , Disp: , Rfl:     lidocaine (LIDODERM) 5 %, Apply patch to the affected area for 12 hours a day and remove for 12 hours a day., Disp: 30 Each, Rfl: 3    predniSONE (DELTASONE) 10 mg tablet, Take 1 tab p.o. tid x3 days, then 1 tab p.o. bid x4 days, then  1 tab p.o. every day x3 days, Disp: 20 Tablet, Rfl: 0    Allergies   Allergen Reactions    Bee Sting [Sting, Bee] Anaphylaxis     Also allergic to egg products    Penicillins Anaphylaxis Patient screened for any delayed non-IgE-mediated reaction to PCN. Patient notes the following:    No delayed non-IgE-mediated reaction to PCN            Betadine [Povidone-Iodine] Rash    Egg Itching         OBJECTIVE    Visit Vitals  /62 (BP 1 Location: Left upper arm, BP Patient Position: Sitting, BP Cuff Size: Adult)   Pulse 80   Temp 97.2 °F (36.2 °C) (Temporal)   Resp 16   Ht 6' (1.829 m)   Wt 178 lb 6.4 oz (80.9 kg)   LMP 09/01/2010   SpO2 98%   BMI 24.20 kg/m²       Physical Exam  Vitals and nursing note reviewed. Constitutional:       General: She is not in acute distress. Appearance: Normal appearance. She is not toxic-appearing. HENT:      Head: Normocephalic and atraumatic. Eyes:      Pupils: Pupils are equal, round, and reactive to light. Pulmonary:      Effort: Pulmonary effort is normal.   Musculoskeletal:         General: Normal range of motion. Skin:     General: Skin is warm and dry. Neurological:      Mental Status: She is alert. Mental status is at baseline. Psychiatric:         Mood and Affect: Mood normal.         Behavior: Behavior normal.         ASSESSMENT AND PLAN  Anne-Marie Galvin is a 74 y.o. female who presents today for:    1. DM type 2, controlled, with complication ([address]  Stable, continue current treatment. - AMB POC HEMOGLOBIN A1C    2. S/P appendectomy  Clinically doing well. Can cancel her upcoming GI appt as her abdominal symptoms have resolved s/p surgery. Medications Discontinued During This Encounter   Medication Reason    polyethylene glycol (MIRALAX) 17 gram packet LIST CLEANUP           Treatment risks/benefits/costs/interactions/alternatives discussed with patient. Advised patient to call back or return to office if symptoms worsen/change/persist. If patient cannot reach us or should anything more severe/urgent arise he/she should proceed directly to the nearest emergency department.   Discussed expected course/resolution/complications of diagnosis in detail with patient. Patient expressed understanding with the diagnosis and plan. This dictation may have been completed with Dragon, the computer voice recognition software. Unanticipated grammatical, syntax, homophones, and other interpretive errors are sometimes inadvertently transcribed by the computer software. Please disregard any errors that have escaped final proofreading. Brandon Garcia M.D.

## 2022-08-26 ENCOUNTER — TELEPHONE (OUTPATIENT)
Dept: FAMILY MEDICINE CLINIC | Age: 67
End: 2022-08-26

## 2022-08-26 ENCOUNTER — HOME CARE VISIT (OUTPATIENT)
Dept: SCHEDULING | Facility: HOME HEALTH | Age: 67
End: 2022-08-26
Payer: MEDICARE

## 2022-08-26 DIAGNOSIS — R79.89 ELEVATED SERUM CREATININE: Primary | ICD-10-CM

## 2022-08-26 PROCEDURE — 3331090001 HH PPS REVENUE CREDIT

## 2022-08-26 PROCEDURE — 3331090002 HH PPS REVENUE DEBIT

## 2022-08-26 PROCEDURE — G0153 HHCP-SVS OF S/L PATH,EA 15MN: HCPCS

## 2022-08-26 PROCEDURE — G0151 HHCP-SERV OF PT,EA 15 MIN: HCPCS

## 2022-08-26 NOTE — TELEPHONE ENCOUNTER
I can order a BMP but please clarify if pt had specific concerns re: her kidney. Her creatinine was a little high last time so would recommend that she schedule a non fasting lab and drink lots of water prior to appt.

## 2022-08-26 NOTE — TELEPHONE ENCOUNTER
----- Message from Mandi Valentine sent at 8/26/2022  2:02 PM EDT -----  Subject: Message to Provider    QUESTIONS  Information for Provider? Patient would like for the PCP to order blood   work to check kidney levels. Please call pt to advise.  ---------------------------------------------------------------------------  --------------  Shania Castaneda INFO  [phone]; OK to leave message on voicemail  ---------------------------------------------------------------------------  --------------  SCRIPT ANSWERS  Relationship to Patient?  Self

## 2022-08-27 PROCEDURE — 3331090001 HH PPS REVENUE CREDIT

## 2022-08-27 PROCEDURE — 3331090002 HH PPS REVENUE DEBIT

## 2022-08-28 PROCEDURE — 3331090001 HH PPS REVENUE CREDIT

## 2022-08-28 PROCEDURE — 3331090002 HH PPS REVENUE DEBIT

## 2022-08-29 ENCOUNTER — TELEPHONE (OUTPATIENT)
Dept: NEUROLOGY | Age: 67
End: 2022-08-29

## 2022-08-29 VITALS
SYSTOLIC BLOOD PRESSURE: 138 MMHG | RESPIRATION RATE: 16 BRPM | DIASTOLIC BLOOD PRESSURE: 75 MMHG | OXYGEN SATURATION: 98 % | TEMPERATURE: 97.7 F | HEART RATE: 80 BPM

## 2022-08-29 PROCEDURE — 3331090002 HH PPS REVENUE DEBIT

## 2022-08-29 PROCEDURE — 3331090001 HH PPS REVENUE CREDIT

## 2022-08-29 NOTE — TELEPHONE ENCOUNTER
Vega Villalobos states she has left multiple messages. She needs new orders and face to face to start home health. Face to face needs to be dated 8/4/22 or after to be valid.  Please call with questions [phone]

## 2022-08-30 ENCOUNTER — APPOINTMENT (OUTPATIENT)
Dept: CT IMAGING | Age: 67
DRG: 062 | End: 2022-08-30
Attending: EMERGENCY MEDICINE
Payer: MEDICARE

## 2022-08-30 ENCOUNTER — HOME CARE VISIT (OUTPATIENT)
Dept: SCHEDULING | Facility: HOME HEALTH | Age: 67
End: 2022-08-30
Payer: MEDICARE

## 2022-08-30 ENCOUNTER — HOSPITAL ENCOUNTER (INPATIENT)
Age: 67
LOS: 3 days | Discharge: HOME HEALTH | DRG: 062 | End: 2022-09-02
Attending: EMERGENCY MEDICINE | Admitting: INTERNAL MEDICINE
Payer: MEDICARE

## 2022-08-30 ENCOUNTER — APPOINTMENT (OUTPATIENT)
Dept: GENERAL RADIOLOGY | Age: 67
DRG: 062 | End: 2022-08-30
Attending: EMERGENCY MEDICINE
Payer: MEDICARE

## 2022-08-30 VITALS
TEMPERATURE: 97.8 F | DIASTOLIC BLOOD PRESSURE: 62 MMHG | OXYGEN SATURATION: 98 % | HEART RATE: 83 BPM | SYSTOLIC BLOOD PRESSURE: 124 MMHG

## 2022-08-30 DIAGNOSIS — I63.9 CEREBROVASCULAR ACCIDENT (CVA), UNSPECIFIED MECHANISM (HCC): Primary | ICD-10-CM

## 2022-08-30 LAB
ALBUMIN SERPL-MCNC: 3.4 G/DL (ref 3.5–5)
ALBUMIN/GLOB SERPL: 1 {RATIO} (ref 1.1–2.2)
ALP SERPL-CCNC: 56 U/L (ref 45–117)
ALT SERPL-CCNC: 13 U/L (ref 12–78)
ANION GAP SERPL CALC-SCNC: 4 MMOL/L (ref 5–15)
AST SERPL-CCNC: 12 U/L (ref 15–37)
ATRIAL RATE: 66 BPM
BASOPHILS # BLD: 0 K/UL (ref 0–0.1)
BASOPHILS NFR BLD: 0 % (ref 0–1)
BILIRUB SERPL-MCNC: 0.3 MG/DL (ref 0.2–1)
BUN SERPL-MCNC: 15 MG/DL (ref 6–20)
BUN/CREAT SERPL: 16 (ref 12–20)
CALCIUM SERPL-MCNC: 9.6 MG/DL (ref 8.5–10.1)
CALCULATED P AXIS, ECG09: 61 DEGREES
CALCULATED R AXIS, ECG10: -24 DEGREES
CALCULATED T AXIS, ECG11: 18 DEGREES
CHLORIDE SERPL-SCNC: 113 MMOL/L (ref 97–108)
CO2 SERPL-SCNC: 25 MMOL/L (ref 21–32)
COMMENT, HOLDF: NORMAL
CREAT SERPL-MCNC: 0.96 MG/DL (ref 0.55–1.02)
DIAGNOSIS, 93000: NORMAL
DIFFERENTIAL METHOD BLD: ABNORMAL
EOSINOPHIL # BLD: 0.1 K/UL (ref 0–0.4)
EOSINOPHIL NFR BLD: 2 % (ref 0–7)
ERYTHROCYTE [DISTWIDTH] IN BLOOD BY AUTOMATED COUNT: 14.6 % (ref 11.5–14.5)
GLOBULIN SER CALC-MCNC: 3.3 G/DL (ref 2–4)
GLUCOSE BLD STRIP.AUTO-MCNC: 94 MG/DL (ref 65–117)
GLUCOSE SERPL-MCNC: 121 MG/DL (ref 65–100)
HCT VFR BLD AUTO: 44 % (ref 35–47)
HGB BLD-MCNC: 14 G/DL (ref 11.5–16)
IMM GRANULOCYTES # BLD AUTO: 0 K/UL (ref 0–0.04)
IMM GRANULOCYTES NFR BLD AUTO: 0 % (ref 0–0.5)
INR PPP: 1.1 (ref 0.9–1.1)
LYMPHOCYTES # BLD: 1 K/UL (ref 0.8–3.5)
LYMPHOCYTES NFR BLD: 19 % (ref 12–49)
MCH RBC QN AUTO: 29.5 PG (ref 26–34)
MCHC RBC AUTO-ENTMCNC: 31.8 G/DL (ref 30–36.5)
MCV RBC AUTO: 92.6 FL (ref 80–99)
MONOCYTES # BLD: 0.5 K/UL (ref 0–1)
MONOCYTES NFR BLD: 11 % (ref 5–13)
NEUTS SEG # BLD: 3.5 K/UL (ref 1.8–8)
NEUTS SEG NFR BLD: 68 % (ref 32–75)
NRBC # BLD: 0 K/UL (ref 0–0.01)
NRBC BLD-RTO: 0 PER 100 WBC
P-R INTERVAL, ECG05: 164 MS
PLATELET # BLD AUTO: 115 K/UL (ref 150–400)
PMV BLD AUTO: 10.6 FL (ref 8.9–12.9)
POTASSIUM SERPL-SCNC: 3.6 MMOL/L (ref 3.5–5.1)
PROT SERPL-MCNC: 6.7 G/DL (ref 6.4–8.2)
PROTHROMBIN TIME: 11 SEC (ref 9–11.1)
Q-T INTERVAL, ECG07: 404 MS
QRS DURATION, ECG06: 78 MS
QTC CALCULATION (BEZET), ECG08: 423 MS
RBC # BLD AUTO: 4.75 M/UL (ref 3.8–5.2)
SAMPLES BEING HELD,HOLD: NORMAL
SERVICE CMNT-IMP: NORMAL
SODIUM SERPL-SCNC: 142 MMOL/L (ref 136–145)
VENTRICULAR RATE, ECG03: 66 BPM
WBC # BLD AUTO: 5.1 K/UL (ref 3.6–11)

## 2022-08-30 PROCEDURE — 85610 PROTHROMBIN TIME: CPT

## 2022-08-30 PROCEDURE — 74011250636 HC RX REV CODE- 250/636: Performed by: EMERGENCY MEDICINE

## 2022-08-30 PROCEDURE — 74011000250 HC RX REV CODE- 250: Performed by: EMERGENCY MEDICINE

## 2022-08-30 PROCEDURE — 70450 CT HEAD/BRAIN W/O DYE: CPT

## 2022-08-30 PROCEDURE — 65610000006 HC RM INTENSIVE CARE

## 2022-08-30 PROCEDURE — 74011000636 HC RX REV CODE- 636: Performed by: EMERGENCY MEDICINE

## 2022-08-30 PROCEDURE — 36415 COLL VENOUS BLD VENIPUNCTURE: CPT

## 2022-08-30 PROCEDURE — G0151 HHCP-SERV OF PT,EA 15 MIN: HCPCS

## 2022-08-30 PROCEDURE — 37195 THROMBOLYTIC THERAPY STROKE: CPT

## 2022-08-30 PROCEDURE — G0153 HHCP-SVS OF S/L PATH,EA 15MN: HCPCS

## 2022-08-30 PROCEDURE — 99285 EMERGENCY DEPT VISIT HI MDM: CPT

## 2022-08-30 PROCEDURE — 3331090001 HH PPS REVENUE CREDIT

## 2022-08-30 PROCEDURE — 80053 COMPREHEN METABOLIC PANEL: CPT

## 2022-08-30 PROCEDURE — 3E03317 INTRODUCTION OF OTHER THROMBOLYTIC INTO PERIPHERAL VEIN, PERCUTANEOUS APPROACH: ICD-10-PCS | Performed by: EMERGENCY MEDICINE

## 2022-08-30 PROCEDURE — 82962 GLUCOSE BLOOD TEST: CPT

## 2022-08-30 PROCEDURE — G0158 HHC OT ASSISTANT EA 15: HCPCS

## 2022-08-30 PROCEDURE — 3331090002 HH PPS REVENUE DEBIT

## 2022-08-30 PROCEDURE — 0042T CT CODE NEURO PERF W CBF: CPT

## 2022-08-30 PROCEDURE — 93005 ELECTROCARDIOGRAM TRACING: CPT

## 2022-08-30 PROCEDURE — 74011250637 HC RX REV CODE- 250/637: Performed by: INTERNAL MEDICINE

## 2022-08-30 PROCEDURE — 85025 COMPLETE CBC W/AUTO DIFF WBC: CPT

## 2022-08-30 PROCEDURE — 4A03X5D MEASUREMENT OF ARTERIAL FLOW, INTRACRANIAL, EXTERNAL APPROACH: ICD-10-PCS | Performed by: INTERNAL MEDICINE

## 2022-08-30 PROCEDURE — 70496 CT ANGIOGRAPHY HEAD: CPT

## 2022-08-30 PROCEDURE — 71045 X-RAY EXAM CHEST 1 VIEW: CPT

## 2022-08-30 RX ORDER — INSULIN LISPRO 100 [IU]/ML
INJECTION, SOLUTION INTRAVENOUS; SUBCUTANEOUS EVERY 6 HOURS
Status: DISCONTINUED | OUTPATIENT
Start: 2022-08-30 | End: 2022-08-31

## 2022-08-30 RX ORDER — DANTROLENE SODIUM 100 MG/1
100 CAPSULE ORAL
COMMUNITY

## 2022-08-30 RX ORDER — SODIUM CHLORIDE 0.9 % (FLUSH) 0.9 %
10 SYRINGE (ML) INJECTION ONCE
Status: COMPLETED | OUTPATIENT
Start: 2022-08-30 | End: 2022-08-30

## 2022-08-30 RX ORDER — PYRIDOSTIGMINE BROMIDE 60 MG/1
180 TABLET ORAL 2 TIMES DAILY
COMMUNITY
End: 2022-09-14

## 2022-08-30 RX ORDER — LABETALOL HYDROCHLORIDE 5 MG/ML
5 INJECTION, SOLUTION INTRAVENOUS
Status: DISCONTINUED | OUTPATIENT
Start: 2022-08-30 | End: 2022-08-31

## 2022-08-30 RX ORDER — ACETAMINOPHEN 650 MG/1
650 SUPPOSITORY RECTAL
Status: DISCONTINUED | OUTPATIENT
Start: 2022-08-30 | End: 2022-09-02 | Stop reason: HOSPADM

## 2022-08-30 RX ORDER — ACETAMINOPHEN 325 MG/1
650 TABLET ORAL
Status: DISCONTINUED | OUTPATIENT
Start: 2022-08-30 | End: 2022-09-02 | Stop reason: HOSPADM

## 2022-08-30 RX ORDER — MAGNESIUM SULFATE 100 %
4 CRYSTALS MISCELLANEOUS AS NEEDED
Status: DISCONTINUED | OUTPATIENT
Start: 2022-08-30 | End: 2022-09-02 | Stop reason: HOSPADM

## 2022-08-30 RX ORDER — DEXTROSE MONOHYDRATE 100 MG/ML
0-250 INJECTION, SOLUTION INTRAVENOUS AS NEEDED
Status: DISCONTINUED | OUTPATIENT
Start: 2022-08-30 | End: 2022-09-02 | Stop reason: HOSPADM

## 2022-08-30 RX ADMIN — Medication 20 MG: at 15:07

## 2022-08-30 RX ADMIN — ACETAMINOPHEN 650 MG: 325 TABLET ORAL at 20:16

## 2022-08-30 RX ADMIN — Medication 1 AMPULE: at 21:10

## 2022-08-30 RX ADMIN — SODIUM CHLORIDE, PRESERVATIVE FREE 10 ML: 5 INJECTION INTRAVENOUS at 15:05

## 2022-08-30 RX ADMIN — IOPAMIDOL 100 ML: 755 INJECTION, SOLUTION INTRAVENOUS at 15:26

## 2022-08-30 NOTE — ED PROVIDER NOTES
EMERGENCY DEPARTMENT HISTORY AND PHYSICAL EXAM      Date: 8/30/2022  Patient Name: Raimundo Galvin    History of Presenting Illness     Chief Complaint   Patient presents with    Dysarthria     Pt arrives via EMS from home d/t worse slurred speech noticed by speech therapist. Pt was seen by home health nurse earlier today and was at baseline - therefore LKW 1300. Pt has hx of cva w/ speech deficits and R sided weakness but per speech therapist pt current aphasia worse than normal. Pt does not take daily blood thinner,          HPI: Hiwot aGlvin, 74 y.o. female w hx previous CVA w apparent residual R side deficits and slurred speech presenting to ED w neuro deficits. LKN 1 PM today from at home nurse then found w deficits by speech therapy. EMS states that pt is essentially nonverbal though she can answer her name. She follows some basic commands. They report R sided facial droop and weakness that speech therapy report are worse than usual.    Old records reviewed -> pt had abd surgery 7/21. No blood thinners noted on file. There are no other complaints, changes, or physical findings at this time. PCP: Deedee Sanchez MD    No current facility-administered medications on file prior to encounter. Current Outpatient Medications on File Prior to Encounter   Medication Sig Dispense Refill    aspirin delayed-release 81 mg tablet Take 1 Tablet by mouth in the morning. 30 Tablet 0    dantrolene (DANTRIUM) 100 mg capsule Take 1 Capsule by mouth as needed for PRN Reason (Other) (spasiums). Indications: muscle spasms due to a brain disease (Patient taking differently: Take 100 mg by mouth three (3) times daily as needed for PRN Reason (Other) (spasms). Indications: muscle spasms due to a brain disease) 30 Capsule 0    rimegepant (Nurtec ODT) 75 mg disintegrating tablet Take 75 mg by mouth once as needed for Migraine.       predniSONE (DELTASONE) 10 mg tablet Take 1 tab p.o. tid x3 days, then 1 tab p.o. bid x4 days, then  1 tab p.o. every day x3 days 20 Tablet 0    levothyroxine (SYNTHROID) 137 mcg tablet TAKE 1 TABLET BY MOUTH EVERY DAY BEFORE BREAKFAST 90 Tablet 3    Gilenya 0.5 mg cap Take 1 Capsule by mouth nightly. 30 Capsule 11    metFORMIN ER (GLUCOPHAGE XR) 500 mg tablet TAKE 3 TABLETS BY MOUTH DAILY 270 Tablet 1    PARoxetine (PAXIL) 40 mg tablet TAKE 1 AND 1/2 TABLETS BY MOUTH EVERY NIGHT 135 Tablet 3    pyridostigmine bromide (MESTINON SR) 180 mg SR tablet TAKE 1 TABLET BY MOUTH TWICE DAILY 180 Tablet 0    erenumab-aooe (Aimovig Autoinjector) 140 mg/mL injection ADMINISTER 1 ML(140MG) UNDER THE SKIN EVERY 30 DAYS 3 mL 5    rosuvastatin (CRESTOR) 40 mg tablet TAKE 1 TABLET BY MOUTH EVERY DAY (Patient taking differently: Take 40 mg by mouth nightly.) 90 Tablet 1    topiramate (TOPAMAX) 200 mg tablet Take 1 Tablet by mouth two (2) times a day. 180 Tablet 2    pregabalin (Lyrica) 200 mg capsule Take 1 Cap by mouth two (2) times a day. Max Daily Amount: 400 mg. 180 Cap 4    acetaminophen (TYLENOL) 500 mg tablet Take 500 mg by mouth every six (6) hours as needed for Pain. for mild to moderate pain on a scclae of  3/10-6/10.  glucose blood VI test strips (BLOOD GLUCOSE TEST) strip 100 Each by Does Not Apply route See Admin Instructions. One Touch Ultra Test Strips=Check blood sugar daily dx E11.8 100 Strip 1    albuterol (PROVENTIL VENTOLIN) 2.5 mg /3 mL (0.083 %) nebulizer solution 3 mL by Nebulization route every six (6) hours as needed for Wheezing. 30 Each 0    menthol-zinc oxide (CALMOSEPTINE) 0.44-20.6 % oint Apply 1 Each to affected area daily as needed for PRN Reason (Other) (thin layer to buttocks for skin irritation).  lidocaine (LIDODERM) 5 % Apply patch to the affected area for 12 hours a day and remove for 12 hours a day.  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    Cervical spinal stenosis 2016    Moderate C6-7    Chronic pain     COVID-19 vaccine series completed     PFIZER 3/19/21, 21    Depression     Diabetes mellitus type 2, controlled ([address] 2016    Diet-controlled diabetes mellitus ([address] 2018    Endometriosis 2010    FH: breast cancer 2010    Chart states FH breast/ovary Ca, CAD,DM     History of CVA (cerebrovascular accident) 2018    HTN, goal below 140/90 2010    CURRENTLY NO MEDICATIONS (21)    Hypercholesterolemia     Hypothyroid 2010    Incontinence     Lumbosacral plexopathy 2010    Migraine     H/O MIGRAINE    MS (multiple sclerosis) ([address]     Myasthenia gravis ([address]     Sleep apnea 2010    RESOLVED 2019    Stroke ([address] 2018    RIGHT SIDE WEAKNESS    Ureteral calculus 2010    Vitamin D deficiency 3/17/2016       Past Surgical History:  Past Surgical History:   Procedure Laterality Date    HX APPENDECTOMY  2022    HX CARPAL TUNNEL RELEASE Left     HX CATARACT REMOVAL Bilateral     HX  SECTION  1986    HX CYST INCISION AND DRAINAGE      HX GI      COLONOSCOPY    HX GYN  2001    ovarian cyst    HX HEENT      removal of thyroglossal duct cyst    HX HIP REPLACEMENT Right 2017    anterior approach    HX KNEE ARTHROSCOPY Right 1998    HX ORTHOPAEDIC  1997    right thumb tendon repair x 2    HX ORTHOPAEDIC      RIGHT KNEE REPLACEMENT    HX OTHER SURGICAL      Janee Cath x2    HX SMALL BOWEL RESECTION      HX THYROIDECTOMY  1974    HX VASCULAR ACCESS  2006    PORT -A- CATH X 2    [place] ABDOMEN SURGERY PROC UNLISTED  2001    bowel resection    [place] BREAST SURGERY PROCEDURE UNLISTED      breast cyst-both breasts at different times    [place] TOTAL HIP ARTHROPLASTY Bilateral 2019    BOTH HIPS REPLACED       Family History:  Family History   Problem Relation Age of Onset    OSTEOARTHRITIS Mother     Diabetes Mother     Elevated Lipids Mother     Headache Mother     Heart Disease Mother     Hypertension Mother     Stroke Mother     Neuropathy Mother     Colon Cancer Mother     Diabetes Father     Elevated Lipids Father     Heart Disease Father     Hypertension Father     Diabetes Sister     Elevated Lipids Sister     Headache Sister     Hypertension Sister     Migraines Sister     Cancer Sister 62        breast ca W/METS TO BRAIN    Breast Cancer Sister 62    Diabetes Brother     Elevated Lipids Brother     Hypertension Brother     Asthma Son     Thyroid Disease Son         GRAVES    Sleep Apnea Son     Breast Cancer Maternal Grandmother 54    Diabetes Sister     Neuropathy Sister     Anesth Problems Neg Hx        Social History:  Social History     Tobacco Use    Smoking status: Every Day     Packs/day: 0.50     Years: 30.00     Pack years: 15.00     Types: Cigarettes    Smokeless tobacco: Never   Vaping Use    Vaping Use: Never used   Substance Use Topics    Alcohol use: No     Comment: TWICE A YEAR PER PT    Drug use: No       Allergies: Allergies   Allergen Reactions    Bee Sting [Sting, Bee] Anaphylaxis     Also allergic to egg products    Penicillins Anaphylaxis     Patient screened for any delayed non-IgE-mediated reaction to PCN. Patient notes the following:    No delayed non-IgE-mediated reaction to PCN            Betadine [Povidone-Iodine] Rash    Egg Itching         Review of Systems   Review of Systems   Unable to perform ROS: Mental status change     Physical Exam   Physical Exam  Vitals and nursing note reviewed. Constitutional:       Comments: Interactive but does not speak   HENT:      Head: Normocephalic and atraumatic.       Mouth/Throat:      Mouth: Mucous membranes are moist.   Eyes:      Extraocular Movements: Extraocular movements intact. Cardiovascular:      Rate and Rhythm: Normal rate and regular rhythm. Pulmonary:      Effort: Pulmonary effort is normal.   Abdominal:      Palpations: Abdomen is soft. Tenderness: There is no abdominal tenderness. Musculoskeletal:         General: No swelling. Cervical back: Neck supple. Skin:     General: Skin is warm and dry. Neurological:      Mental Status: She is alert. Comments: R sided facial droop noted, R sided weakness when compared to L, able to say name w slurred speech but does not say anything else       Diagnostic Study Results     Labs -     Recent Results (from the past 24 hour(s))   CBC WITH AUTOMATED DIFF    Collection Time: 08/30/22  3:02 PM   Result Value Ref Range    WBC 5.1 3.6 - 11.0 K/uL    RBC 4.75 3.80 - 5.20 M/uL    HGB 14.0 11.5 - 16.0 g/dL    HCT 44.0 35.0 - 47.0 %    MCV 92.6 80.0 - 99.0 FL    MCH 29.5 26.0 - 34.0 PG    MCHC 31.8 30.0 - 36.5 g/dL    RDW 14.6 (H) 11.5 - 14.5 %    PLATELET 350 (L) 363 - 400 K/uL    MPV 10.6 8.9 - 12.9 FL    NRBC 0.0 0  WBC    ABSOLUTE NRBC 0.00 0.00 - 0.01 K/uL    NEUTROPHILS 68 32 - 75 %    LYMPHOCYTES 19 12 - 49 %    MONOCYTES 11 5 - 13 %    EOSINOPHILS 2 0 - 7 %    BASOPHILS 0 0 - 1 %    IMMATURE GRANULOCYTES 0 0.0 - 0.5 %    ABS. NEUTROPHILS 3.5 1.8 - 8.0 K/UL    ABS. LYMPHOCYTES 1.0 0.8 - 3.5 K/UL    ABS. MONOCYTES 0.5 0.0 - 1.0 K/UL    ABS. EOSINOPHILS 0.1 0.0 - 0.4 K/UL    ABS. BASOPHILS 0.0 0.0 - 0.1 K/UL    ABS. IMM.  GRANS. 0.0 0.00 - 0.04 K/UL    DF AUTOMATED     METABOLIC PANEL, COMPREHENSIVE    Collection Time: 08/30/22  3:02 PM   Result Value Ref Range    Sodium 142 136 - 145 mmol/L    Potassium 3.6 3.5 - 5.1 mmol/L    Chloride 113 (H) 97 - 108 mmol/L    CO2 25 21 - 32 mmol/L    Anion gap 4 (L) 5 - 15 mmol/L    Glucose 121 (H) 65 - 100 mg/dL    BUN 15 6 - 20 MG/DL    Creatinine 0.96 0.55 - 1.02 MG/DL    BUN/Creatinine ratio 16 12 - 20      GFR est AA >60 >60 ml/min/1.73m2    GFR est non-AA 58 (L) >60 ml/min/1.73m2    Calcium 9.6 8.5 - 10.1 MG/DL    Bilirubin, total 0.3 0.2 - 1.0 MG/DL    ALT (SGPT) 13 12 - 78 U/L    AST (SGOT) 12 (L) 15 - 37 U/L    Alk. phosphatase 56 45 - 117 U/L    Protein, total 6.7 6.4 - 8.2 g/dL    Albumin 3.4 (L) 3.5 - 5.0 g/dL    Globulin 3.3 2.0 - 4.0 g/dL    A-G Ratio 1.0 (L) 1.1 - 2.2     PROTHROMBIN TIME + INR    Collection Time: 08/30/22  3:02 PM   Result Value Ref Range    INR 1.1 0.9 - 1.1      Prothrombin time 11.0 9.0 - 11.1 sec   SAMPLES BEING HELD    Collection Time: 08/30/22  3:02 PM   Result Value Ref Range    SAMPLES BEING HELD RED, PST, SST     COMMENT        Add-on orders for these samples will be processed based on acceptable specimen integrity and analyte stability, which may vary by analyte. EKG, 12 LEAD, INITIAL    Collection Time: 08/30/22  3:32 PM   Result Value Ref Range    Ventricular Rate 66 BPM    Atrial Rate 66 BPM    P-R Interval 164 ms    QRS Duration 78 ms    Q-T Interval 404 ms    QTC Calculation (Bezet) 423 ms    Calculated P Axis 61 degrees    Calculated R Axis -24 degrees    Calculated T Axis 18 degrees    Diagnosis       ** Poor data quality, interpretation may be adversely affected  Normal sinus rhythm  Anterior infarct , age undetermined  When compared with ECG of 19-JUL-2022 15:45,  No significant change was found         Radiologic Studies -   CTA CODE NEURO HEAD AND NECK W CONT   Final Result   CTA Head:   1. No evidence of significant stenosis or aneurysm. 2. Redemonstrated small age-indeterminate infarct in the left ventral thalamus. Consider MRI for further evaluation. CTA Neck:   1. No evidence of significant stenosis. CT Brain Perfusion:   1. No acute abnormality. CT CODE NEURO PERF W CBF   Final Result   CTA Head:   1. No evidence of significant stenosis or aneurysm. 2. Redemonstrated small age-indeterminate infarct in the left ventral thalamus. Consider MRI for further evaluation.       CTA Neck:   1. No evidence of significant stenosis. CT Brain Perfusion:   1. No acute abnormality. CT CODE NEURO HEAD WO CONTRAST   Final Result   1. Small age-indeterminate infarct in the left ventral thalamus is new since   8/8/2020, and may be acute to subacute given clinical history. Consider MRI for   further evaluation. 2. No evidence of acute hemorrhage. XR CHEST PORT    (Results Pending)     CT Results  (Last 48 hours)                 08/30/22 1522  CTA CODE NEURO HEAD AND NECK W CONT Final result    Impression:  CTA Head:   1. No evidence of significant stenosis or aneurysm. 2. Redemonstrated small age-indeterminate infarct in the left ventral thalamus. Consider MRI for further evaluation. CTA Neck:   1. No evidence of significant stenosis. CT Brain Perfusion:   1. No acute abnormality. Narrative:  EXAM:  CTA CODE NEURO HEAD AND NECK W CONT, CT CODE NEURO PERF W CBF       INDICATION:   Code Stroke       COMPARISON:  CT head 8/30/2022. CONTRAST:  100 mL of Isovue-370. TECHNIQUE:  Unenhanced  images were obtained to localize the volume for   acquisition. Multislice helical axial CT angiography was performed from the   aortic arch to the top of the head during uneventful rapid bolus intravenous   contrast administration. Coronal and sagittal reformations and 3D post   processing was performed. CT dose reduction was achieved through use of a   standardized protocol tailored for this examination and automatic exposure   control for dose modulation. CT brain perfusion was performed with generation of hemodynamic maps of multiple   parameters, including cerebral blood flow, cerebral blood volume, and MTT (mean   transit time). CT dose reduction was achieved through use of a standardized   protocol tailored for this examination and automatic exposure control for dose   modulation. This study was analyzed by the [address]  algorithm. FINDINGS:       CTA Head:   There is no evidence of large vessel occlusion or flow-limiting stenosis of the   intracranial internal carotid, anterior cerebral, and middle cerebral arteries. Calcification of the bilateral carotid siphons without significant stenosis. The   anterior communicating artery is patent. There is no evidence of large vessel occlusion or flow-limiting stenosis of the   intracranial vertebral arteries, basilar artery, or posterior cerebral arteries. The posterior communicating arteries are patent. There is no evidence of aneurysm or vascular malformation. The dural venous   sinuses and deep cerebral venous system are patent. No evidence of abnormal   enhancement on delayed phase images. Redemonstrated small age-indeterminate   infarct in the left ventral thalamus. CTA NECK:   NASCET method was utilized for calculating stenosis. The aortic arch is unremarkable. The common carotid arteries demonstrate no   significant stenosis. There is no evidence of significant stenosis in the   cervical right internal carotid artery. There is no evidence of significant   stenosis in the cervical left internal carotid artery. There is a left dominant vertebrobasilar arterial system. The cervical vertebral   arteries are normal in course, size and contour without significant stenosis. Visualized soft tissues of the neck are unremarkable. Partially visualized right   chest port. Mild centrilobular emphysema in the upper lobes. No acute fracture   or aggressive osseous lesion. Multilevel degenerative disc disease most advanced   at C5-C6 and C6-C7. CT Brain Perfusion:   There are no regional areas of elevated Tmax, decreased cerebral blood flow or   blood volume. rCBF < 30% = 0 cc. Tmax > 6 seconds = 0 cc.           08/30/22 1522  CT CODE NEURO PERF W CBF Final result    Impression:  CTA Head:   1. No evidence of significant stenosis or aneurysm.     2. Redemonstrated small age-indeterminate infarct in the left ventral thalamus. Consider MRI for further evaluation. CTA Neck:   1. No evidence of significant stenosis. CT Brain Perfusion:   1. No acute abnormality. Narrative:  EXAM:  CTA CODE NEURO HEAD AND NECK W CONT, CT CODE NEURO PERF W CBF       INDICATION:   Code Stroke       COMPARISON:  CT head 8/30/2022. CONTRAST:  100 mL of Isovue-370. TECHNIQUE:  Unenhanced  images were obtained to localize the volume for   acquisition. Multislice helical axial CT angiography was performed from the   aortic arch to the top of the head during uneventful rapid bolus intravenous   contrast administration. Coronal and sagittal reformations and 3D post   processing was performed. CT dose reduction was achieved through use of a   standardized protocol tailored for this examination and automatic exposure   control for dose modulation. CT brain perfusion was performed with generation of hemodynamic maps of multiple   parameters, including cerebral blood flow, cerebral blood volume, and MTT (mean   transit time). CT dose reduction was achieved through use of a standardized   protocol tailored for this examination and automatic exposure control for dose   modulation. This study was analyzed by the [address]  algorithm. FINDINGS:       CTA Head:   There is no evidence of large vessel occlusion or flow-limiting stenosis of the   intracranial internal carotid, anterior cerebral, and middle cerebral arteries. Calcification of the bilateral carotid siphons without significant stenosis. The   anterior communicating artery is patent. There is no evidence of large vessel occlusion or flow-limiting stenosis of the   intracranial vertebral arteries, basilar artery, or posterior cerebral arteries. The posterior communicating arteries are patent. There is no evidence of aneurysm or vascular malformation.  The dural venous   sinuses and deep cerebral venous system are patent. No evidence of abnormal   enhancement on delayed phase images. Redemonstrated small age-indeterminate   infarct in the left ventral thalamus. CTA NECK:   NASCET method was utilized for calculating stenosis. The aortic arch is unremarkable. The common carotid arteries demonstrate no   significant stenosis. There is no evidence of significant stenosis in the   cervical right internal carotid artery. There is no evidence of significant   stenosis in the cervical left internal carotid artery. There is a left dominant vertebrobasilar arterial system. The cervical vertebral   arteries are normal in course, size and contour without significant stenosis. Visualized soft tissues of the neck are unremarkable. Partially visualized right   chest port. Mild centrilobular emphysema in the upper lobes. No acute fracture   or aggressive osseous lesion. Multilevel degenerative disc disease most advanced   at C5-C6 and C6-C7. CT Brain Perfusion:   There are no regional areas of elevated Tmax, decreased cerebral blood flow or   blood volume. rCBF < 30% = 0 cc. Tmax > 6 seconds = 0 cc.           08/30/22 1422  CT CODE NEURO HEAD WO CONTRAST Final result    Impression:  1. Small age-indeterminate infarct in the left ventral thalamus is new since   8/8/2020, and may be acute to subacute given clinical history. Consider MRI for   further evaluation. 2. No evidence of acute hemorrhage. Narrative:  EXAM:  CT CODE NEURO HEAD WO CONTRAST       INDICATION:   Right facial droop and right arm weakness. COMPARISON: MRI brain 8/8/2020. TECHNIQUE: Unenhanced CT of the head was performed using 5 mm images. Brain and   bone windows were generated. CT dose reduction was achieved through use of a   standardized protocol tailored for this examination and automatic exposure   control for dose modulation.         FINDINGS:   The ventricles are normal in size and position. Basilar cisterns are patent. No   midline shift. Small age-indeterminate infarct in the left ventral thalamus, new   since prior MRI. No evidence of acute hemorrhage. Mild mucosal thickening in the bilateral maxillary sinuses without air-fluid   level. The mastoid air cells and middle ears are clear. The orbital contents are   within normal limits with bilateral lens implants. There are no significant   osseous or extracranial soft tissue lesions. CXR Results  (Last 48 hours)      None              Medical Decision Making   I am the first provider for this patient. I reviewed the vital signs, available nursing notes, past medical history, past surgical history, family history and social history. Vital Signs-Reviewed the patient's vital signs. Patient Vitals for the past 24 hrs:   Pulse Resp BP SpO2   08/30/22 1530 69 16 (!) 133/99 96 %         Provider Notes (Medical Decision Making):   74-year-old presenting to ED with altered mental status. Code neuro called prior to patient's arrival.  She was evaluated in CT scan by Dr. Lopez Abbasi who recommends tPA. This was given with patient's consent. Discussed with Dr. Tatum Cook who agrees to admit to ICU. EKG sinus, rate normal, nonspecific ST T changes, normal QT    ED Course:     Initial assessment performed. The patients presenting problems have been discussed, and they are in agreement with the care plan formulated and outlined with them. I have encouraged them to ask questions as they arise throughout their visit. Disposition:  admit    PLAN:  1. Current Discharge Medication List        2.    Follow-up Information    None       Return to ED if worse     Diagnosis     Clinical Impression: neuro deficits

## 2022-08-30 NOTE — H&P
Critical Care Consult  Osvaldo Charels MD          Date of Service:  2022  NAME:  Arta Holstein Minor  :  1955  MRN:  [id number]      HPI:      History is limited due to clinical status  Patient is a 74year old female with h/o MG, MS, CDIP, and frequent falls who presented with difficulty speaking. Per ER notes \"EMS states that pt is essentially nonverbal though she can answer her name. She follows some basic commands. They report R sided facial droop and weakness that speech therapy report are worse than usual.\"  She was gived TPA in the ER.   She now reports improvement but continues to have word finding difficulty, right sided weakness and problems \"remembering things\"     CCU admit:         Problem list:     Problem list:  Hospital Problems  Date Reviewed: 2022            Codes Class Noted POA    CVA (cerebral vascular accident) [facility]) ICD-10-CM: I63.9  ICD-9-CM: 434.91  2022 Unknown           Assessment/Plan:     Stroke like sx s/p TPA  - admit to ICU for monitoring  - consult Neuro  - PRN labetolol for BP control  - bedside speech eval   - statin / asprin tomorrow    DM  - well controlled at home  - SSI while inpatient     H/o MG and MS  - restart home meds when able to take PO     Past Medical History:      has a past medical history of Arrhythmia, Arthritis, Benign cyst of left breast (3/21/2016), Blindness and low vision (), Cataract, Cervical spinal stenosis (2016), Chronic pain, COVID-19 vaccine series completed, Depression, Diabetes mellitus type 2, controlled ([address] (2016), Diet-controlled diabetes mellitus ([address] (2018), Endometriosis (2010), FH: breast cancer (2010), History of CVA (cerebrovascular accident) (2018), HTN, goal below 140/90 (2010), Hypercholesterolemia, Hypothyroid (2010), Incontinence, Lumbosacral plexopathy (2010), Migraine, MS (multiple sclerosis) ([facility] (), Myasthenia gravis ([facility] (2011), Sleep apnea (2010), Stroke ([facility] (2018), Ureteral calculus (2010), and Vitamin D deficiency (3/17/2016). Past Surgical History:      has a past surgical history that includes hx thyroidectomy (); hx hip replacement (Right, 2017); hx vascular access (); pr abdomen surgery proc unlisted (2001); hx gyn (2001); hx  section (1986); hx other surgical; pr breast surgery procedure unlisted (); hx cyst incision and drainage; hx gi; hx small bowel resection; hx carpal tunnel release (Left); hx heent; hx cataract removal (Bilateral); hx knee arthroscopy (Right, 1998); hx orthopaedic (); pr total hip arthroplasty (Bilateral, ); hx orthopaedic; and hx appendectomy (2022). Home Medications:     Prior to Admission medications    Medication Sig Start Date End Date Taking? Authorizing Provider   dantrolene (DANTRIUM) 100 mg capsule Take 100 mg by mouth daily as needed (spasms). Yes Provider, Historical   pyridostigmine (MESTINON) 60 mg tablet Take 120 mg by mouth three (3) times daily. Yes Provider, Historical   aspirin delayed-release 81 mg tablet Take 1 Tablet by mouth in the morning. 22  Yes Elin Leung MD   levothyroxine (SYNTHROID) 137 mcg tablet TAKE 1 TABLET BY MOUTH EVERY DAY BEFORE BREAKFAST 22  Yes Stalin Baker MD   metFORMIN ER (GLUCOPHAGE XR) 500 mg tablet TAKE 3 TABLETS BY MOUTH DAILY 22  Yes Stalin Baker MD   PARoxetine (PAXIL) 40 mg tablet TAKE 1 AND 1/2 TABLETS BY MOUTH EVERY NIGHT 3/14/22  Yes Los Kahn MD   rimegepant (Nurtec ODT) 75 mg disintegrating tablet Take 75 mg by mouth once as needed for Migraine. Provider, Historical   predniSONE (DELTASONE) 10 mg tablet Take 1 tab p.o. tid x3 days, then 1 tab p.o. bid x4 days, then  1 tab p.o. every day x3 days 22   Los Kahn MD   Gilenya 0.5 mg cap Take 1 Capsule by mouth nightly.  22   Los Kahn MD   erenumab-aooe (Aimovig Autoinjector) 140 mg/mL injection ADMINISTER 1 ML(140MG) UNDER THE SKIN EVERY 30 DAYS 11/30/21   Veronika Hanna MD   rosuvastatin (CRESTOR) 40 mg tablet TAKE 1 TABLET BY MOUTH EVERY DAY  Patient taking differently: Take 40 mg by mouth nightly. 7/30/21   Edgar Dunn MD   topiramate (TOPAMAX) 200 mg tablet Take 1 Tablet by mouth two (2) times a day. 7/30/21   Los Kahn MD   pregabalin (Lyrica) 200 mg capsule Take 1 Cap by mouth two (2) times a day. Max Daily Amount: 400 mg. 3/5/21   Los Kahn MD   acetaminophen (TYLENOL) 500 mg tablet Take 500 mg by mouth every six (6) hours as needed for Pain. for mild to moderate pain on a scclae of  3/10-6/10. Provider, Historical   glucose blood VI test strips (BLOOD GLUCOSE TEST) strip 100 Each by Does Not Apply route See Admin Instructions. One Touch Ultra Test Strips=Check blood sugar daily dx E11.8 8/11/19   Sienna Laughlin, NP   albuterol (PROVENTIL VENTOLIN) 2.5 mg /3 mL (0.083 %) nebulizer solution 3 mL by Nebulization route every six (6) hours as needed for Wheezing. 12/31/18   Edgar Dunn MD   menthol-zinc oxide (CALMOSEPTINE) 0.44-20.6 % oint Apply 1 Each to affected area daily as needed for PRN Reason (Other) (thin layer to buttocks for skin irritation). Provider, Historical   lidocaine (LIDODERM) 5 % Apply patch to the affected area for 12 hours a day and remove for 12 hours a day. 8/15/18   Juan Francisco Kahn MD       Allergies/Social/Family History: Allergies   Allergen Reactions    Bee Sting [Sting, Bee] Anaphylaxis     Also allergic to egg products    Penicillins Anaphylaxis     Patient screened for any delayed non-IgE-mediated reaction to PCN.         Patient notes the following:    No delayed non-IgE-mediated reaction to PCN            Betadine [Povidone-Iodine] Rash    Egg Itching      Social History     Tobacco Use    Smoking status: Every Day     Packs/day: 0.50     Years: 30.00     Pack years: 15.00     Types: Cigarettes    Smokeless tobacco: Never   Substance Use Topics    Alcohol use: No     Comment: TWICE A YEAR PER PT      Family History   Problem Relation Age of Onset    OSTEOARTHRITIS Mother     Diabetes Mother     Elevated Lipids Mother     Headache Mother     Heart Disease Mother     Hypertension Mother     Stroke Mother     Neuropathy Mother     Colon Cancer Mother     Diabetes Father     Elevated Lipids Father     Heart Disease Father     Hypertension Father     Diabetes Sister     Elevated Lipids Sister     Headache Sister     Hypertension Sister     Migraines Sister     Cancer Sister 62        breast ca W/METS TO BRAIN    Breast Cancer Sister 62    Diabetes Brother     Elevated Lipids Brother     Hypertension Brother     Asthma Son     Thyroid Disease Son         GRAVES    Sleep Apnea Son     Breast Cancer Maternal Grandmother 54    Diabetes Sister     Neuropathy Sister     Anesth Problems Neg Hx        Review of Systems:   Review of Systems   All other systems reviewed and are negative. Objective:   Vital Signs:  Visit Vitals  BP (!) 142/99 (BP 1 Location: Right upper arm)   Pulse 61   Temp 98.7 °F (37.1 °C)   Resp 15   Ht 6' (1.829 m)   Wt 81.2 kg (179 lb 0.2 oz)   LMP 2010   SpO2 97%   BMI 24.28 kg/m²      O2 Device: None (Room air) Temp (24hrs), Av.7 °F (37.1 °C), Min:98.7 °F (37.1 °C), Max:98.7 °F (37.1 °C)           Intake/Output:   No intake or output data in the 24 hours ending 22 1707      Physical Examination:    Physical Exam  Constitutional:       General: She is not in acute distress. HENT:      Mouth/Throat:      Mouth: Mucous membranes are moist.   Cardiovascular:      Rate and Rhythm: Normal rate. Pulses: Normal pulses. Pulmonary:      Effort: Pulmonary effort is normal.      Breath sounds: Normal breath sounds. Abdominal:      General: Abdomen is flat. Palpations: Abdomen is soft. Skin:     General: Skin is warm. Neurological:      Mental Status: She is alert.       Comments: 4/5 RUE and RLE  5/5 LUE and LLE  Speech slow but not slurred    Psychiatric:         Mood and Affect: Mood normal.     I have examined the patient on this day 8/30/2022 and the above documented exam is accurate including the components that have been copied forward  Labs:   Labs and imaging reviewed. Medications:     Current Facility-Administered Medications   Medication Dose Route Frequency    acetaminophen (TYLENOL) tablet 650 mg  650 mg Oral Q4H PRN    Or    acetaminophen (TYLENOL) solution 650 mg  650 mg Per NG tube Q4H PRN    Or    acetaminophen (TYLENOL) suppository 650 mg  650 mg Rectal Q4H PRN    labetaloL (NORMODYNE;TRANDATE) injection 5 mg  5 mg IntraVENous Q10MIN PRN    glucose chewable tablet 16 g  4 Tablet Oral PRN    glucagon (GLUCAGEN) injection 1 mg  1 mg IntraMUSCular PRN    dextrose 10% infusion 0-250 mL  0-250 mL IntraVENous PRN    insulin lispro (HUMALOG) injection   SubCUTAneous Q6H    alcohol 62% (NOZIN) nasal  1 Ampule  1 Ampule Topical Q12H     Current Outpatient Medications   Medication Sig    dantrolene (DANTRIUM) 100 mg capsule Take 100 mg by mouth daily as needed (spasms). pyridostigmine (MESTINON) 60 mg tablet Take 120 mg by mouth three (3) times daily. aspirin delayed-release 81 mg tablet Take 1 Tablet by mouth in the morning. levothyroxine (SYNTHROID) 137 mcg tablet TAKE 1 TABLET BY MOUTH EVERY DAY BEFORE BREAKFAST    metFORMIN ER (GLUCOPHAGE XR) 500 mg tablet TAKE 3 TABLETS BY MOUTH DAILY    PARoxetine (PAXIL) 40 mg tablet TAKE 1 AND 1/2 TABLETS BY MOUTH EVERY NIGHT    rimegepant (Nurtec ODT) 75 mg disintegrating tablet Take 75 mg by mouth once as needed for Migraine. predniSONE (DELTASONE) 10 mg tablet Take 1 tab p.o. tid x3 days, then 1 tab p.o. bid x4 days, then  1 tab p.o. every day x3 days    Gilenya 0.5 mg cap Take 1 Capsule by mouth nightly.     erenumab-aooe (Aimovig Autoinjector) 140 mg/mL injection ADMINISTER 1 ML(140MG) UNDER THE SKIN EVERY 30 DAYS rosuvastatin (CRESTOR) 40 mg tablet TAKE 1 TABLET BY MOUTH EVERY DAY (Patient taking differently: Take 40 mg by mouth nightly.)    topiramate (TOPAMAX) 200 mg tablet Take 1 Tablet by mouth two (2) times a day. pregabalin (Lyrica) 200 mg capsule Take 1 Cap by mouth two (2) times a day. Max Daily Amount: 400 mg.    acetaminophen (TYLENOL) 500 mg tablet Take 500 mg by mouth every six (6) hours as needed for Pain. for mild to moderate pain on a scclae of  3/10-6/10.    glucose blood VI test strips (BLOOD GLUCOSE TEST) strip 100 Each by Does Not Apply route See Admin Instructions. One Touch Ultra Test Strips=Check blood sugar daily dx E11.8    albuterol (PROVENTIL VENTOLIN) 2.5 mg /3 mL (0.083 %) nebulizer solution 3 mL by Nebulization route every six (6) hours as needed for Wheezing. menthol-zinc oxide (CALMOSEPTINE) 0.44-20.6 % oint Apply 1 Each to affected area daily as needed for PRN Reason (Other) (thin layer to buttocks for skin irritation). lidocaine (LIDODERM) 5 % Apply patch to the affected area for 12 hours a day and remove for 12 hours a day. LABS AND  DATA: Personally reviewed  Recent Labs     08/30/22  1502   WBC 5.1   HGB 14.0   HCT 44.0   *     Recent Labs     08/30/22  1502      K 3.6   *   CO2 25   BUN 15   CREA 0.96   *   CA 9.6     Recent Labs     08/30/22  1502   AP 56   TP 6.7   ALB 3.4*   GLOB 3.3     Recent Labs     08/30/22  1502   INR 1.1   PTP 11.0      No results for input(s): PHI, PCO2I, PO2I, FIO2I in the last 72 hours. No results for input(s): CPK, CKMB, TROIQ, BNPP in the last 72 hours.     Chest X-Ray:  CXR Results  (Last 48 hours)      None            I have reviewed the above films and agree with official interpretation       Multidisciplinary Rounds Completed:  No      SPECIAL EQUIPMENT  None    DISPOSITION  Stay in ICU    CRITICAL CARE CONSULTANT NOTE  I had a in-person encounter with Hiwot Galvin, reviewed and interpreted patient data including events, labs, images, vital signs, I/O's, and examined patient. I have discussed the case and the plan and management of the patient's care with the consulting services, the bedside nurses and the respiratory therapist.      NOTE OF PERSONAL INVOLVEMENT IN CARE   This patient is at high risk for sudden and clinically significant deterioration, which requires the highest level of preparedness to intervene urgently. I participated in the decision-making and personally managed or directed the management of the following life and organ supporting interventions that required my frequent assessment to treat or prevent imminent deterioration. I personally spent 35 minutes of critical care time. This is time spent at patient's bedside actively involved in patient care as well as the coordination of care and discussions with the patient's family. This does not include any procedural time which has been billed separately.              Bartolo Blankenship MD   Pulmonary/[facility] Critical [facility]  [phone]  8/30/2022

## 2022-08-30 NOTE — ED NOTES
Pt arrives via EMS from home d/t worse slurred speech noticed by speech therapist. Pt was seen by home health nurse earlier today and was at baseline - therefore LKW 1300. Pt has hx of cva w/ speech deficits and R sided weakness but per speech therapist pt current aphasia worse than normal. Pt does not take daily blood thinner, . Pt aphasic at this time    1416 MD evaluating pt - code s level 1 called     1418 Pt arrived to CT     1423 CT complete     1440 Teleneuro, Dr Wilberto Sim, evaluatingpt     [phone] Pt verbally consenting to TNK     1507 Pt received TNK     2212 Pt arrived to ED room from CT and placed on monitor x 3     1652 MD notified of sudden onset of headache, orders received     [phone] Pt returned from [address] Patient is being transferred to [facility] 2 Critical Care 2, Room # [phone]. Report given to RN on Hiwot Galvin for routine progression of care. Report consisted of the following information SBAR, Kardex, ED Summary, MAR, and Recent Results. Patient to be transferred to receiving unit by: RN (RN or tech name). Outstanding consults needed: No     Next labs due: urine and 0400 am labs     The following personal items will be sent with the patient during transfer to the floor:     All valuables:    Cardiac monitoring ordered: Yes    The following CURRENT information was reported to the receiving RN:    Code status: Full Code at time of transfer    Last set of vital signs:  Vital Signs  Level of Consciousness: Alert (0) (08/30/22 1715)  Temp: 98.3 °F (36.8 °C) (08/30/22 1700)  Temp Source: Oral (08/30/22 1700)  Pulse (Heart Rate): 63 (08/30/22 1730)  Heart Rate Source: Monitor (08/30/22 1730)  Resp Rate: 17 (08/30/22 1730)  BP: (!) 147/59 (08/30/22 1730)  MAP (Monitor): 96 (08/30/22 1546)  MAP (Calculated): 88 (08/30/22 1730)  BP 1 Location: Right upper arm (08/30/22 1730)  BP 1 Method: Automatic (08/30/22 1730)  MEWS Score: 0 (08/30/22 1700)         Oxygen Therapy  O2 Sat (%): 96 % (08/30/22 1730)  Pulse via Oximetry: 64 beats per minute (08/30/22 1546)  O2 Device: None (Room air) (08/30/22 1606)      Last pain assessment:  Pain 1  Pain Scale 1: Numeric (0 - 10)  Pain Intensity 1: 0      Wounds: No     Urinary catheter: voiding  Is there a grande order: No     LDAs:       Peripheral IV 08/30/22 Left Antecubital (Active)       Peripheral IV 08/30/22 Right Forearm (Active)         Opportunity for questions and clarification was provided.     Vero Renteria RN

## 2022-08-30 NOTE — TELEPHONE ENCOUNTER
Lyndon Maria with home health and she advised that they are needing a new referral for home health that states that pt has skilled nursing care for MS or whatever the physical issue is. To be evaluated for PT and OT therapy. And also to have the July 12.22 office note signed by  and faxed with the new referral order. Advised I will have  address this matter. She asked I fax everything to her at [phone]. Monica Dawson verbalized understanding.

## 2022-08-30 NOTE — PROGRESS NOTES
Transition of Care- TBD pending PT/OT/SLP eval  Patient is open to [facility] if discharged home would need Resumption of Care order home health PT/OT /SLP  Will continue to monitor patients discharge planning and clinical progress  RUR 9%  Transportation at Discharge- Son Stefanie Chilel    Patient lives with her son Stefanie Chilel and receives home health services      DME- Chiquis Finney       Reason for Admission:  CVA                     RUR Score:   9%                  Plan for utilizing home health: [address]- open to services PT/OT Steven Hernandez and        PCP: First and Last name:  Dorcas Trevino MD   Are you a current patient: Yes/No: Yes   Approximate date of last visit: 3 mths   Can you participate in a virtual visit with your PCP: No                    Current Advanced Directive/Advance Care Plan: Full Code      Healthcare Decision Maker:   Click here to complete [address] including selection of the Healthcare Decision Maker Relationship (ie \"Primary\")             Primary Decision Maker: Minor Luis Mcpherson - Son - [phone]                      Care Management Interventions  PCP Verified by CM:  Yes  Mode of Transport at Discharge: Self  Transition of Care Consult (CM Consult): [address]: Yes  Physical Therapy Consult: Yes  Occupational Therapy Consult: Yes  Speech Therapy Consult: Yes  Support Systems: Child(jose m), Parent(s)  Confirm Follow Up Transport: Family  Discharge Location  Patient Expects to be Discharged to[de-identified] Home with home health

## 2022-08-31 ENCOUNTER — APPOINTMENT (OUTPATIENT)
Dept: MRI IMAGING | Age: 67
DRG: 062 | End: 2022-08-31
Attending: PSYCHIATRY & NEUROLOGY
Payer: MEDICARE

## 2022-08-31 ENCOUNTER — HOME CARE VISIT (OUTPATIENT)
Dept: HOME HEALTH SERVICES | Facility: HOME HEALTH | Age: 67
End: 2022-08-31
Payer: MEDICARE

## 2022-08-31 VITALS
HEART RATE: 79 BPM | TEMPERATURE: 97 F | SYSTOLIC BLOOD PRESSURE: 130 MMHG | OXYGEN SATURATION: 99 % | DIASTOLIC BLOOD PRESSURE: 60 MMHG | RESPIRATION RATE: 18 BRPM

## 2022-08-31 VITALS
SYSTOLIC BLOOD PRESSURE: 130 MMHG | RESPIRATION RATE: 16 BRPM | TEMPERATURE: 97.7 F | DIASTOLIC BLOOD PRESSURE: 75 MMHG | OXYGEN SATURATION: 95 % | HEART RATE: 78 BPM

## 2022-08-31 LAB
ANION GAP SERPL CALC-SCNC: 4 MMOL/L (ref 5–15)
BUN SERPL-MCNC: 13 MG/DL (ref 6–20)
BUN/CREAT SERPL: 13 (ref 12–20)
CALCIUM SERPL-MCNC: 9.3 MG/DL (ref 8.5–10.1)
CHLORIDE SERPL-SCNC: 115 MMOL/L (ref 97–108)
CHOLEST SERPL-MCNC: 140 MG/DL
CO2 SERPL-SCNC: 25 MMOL/L (ref 21–32)
CREAT SERPL-MCNC: 0.98 MG/DL (ref 0.55–1.02)
ERYTHROCYTE [DISTWIDTH] IN BLOOD BY AUTOMATED COUNT: 14.4 % (ref 11.5–14.5)
EST. AVERAGE GLUCOSE BLD GHB EST-MCNC: 146 MG/DL
GLUCOSE BLD STRIP.AUTO-MCNC: 113 MG/DL (ref 65–117)
GLUCOSE BLD STRIP.AUTO-MCNC: 116 MG/DL (ref 65–117)
GLUCOSE BLD STRIP.AUTO-MCNC: 120 MG/DL (ref 65–117)
GLUCOSE BLD STRIP.AUTO-MCNC: 169 MG/DL (ref 65–117)
GLUCOSE BLD STRIP.AUTO-MCNC: 198 MG/DL (ref 65–117)
GLUCOSE SERPL-MCNC: 111 MG/DL (ref 65–100)
HBA1C MFR BLD: 6.7 % (ref 4–5.6)
HCT VFR BLD AUTO: 44.7 % (ref 35–47)
HDLC SERPL-MCNC: 46 MG/DL
HDLC SERPL: 3 {RATIO} (ref 0–5)
HGB BLD-MCNC: 14.6 G/DL (ref 11.5–16)
LDLC SERPL CALC-MCNC: 70.8 MG/DL (ref 0–100)
MAGNESIUM SERPL-MCNC: 2.3 MG/DL (ref 1.6–2.4)
MCH RBC QN AUTO: 30 PG (ref 26–34)
MCHC RBC AUTO-ENTMCNC: 32.7 G/DL (ref 30–36.5)
MCV RBC AUTO: 91.8 FL (ref 80–99)
NRBC # BLD: 0 K/UL (ref 0–0.01)
NRBC BLD-RTO: 0 PER 100 WBC
PHOSPHATE SERPL-MCNC: 3.6 MG/DL (ref 2.6–4.7)
PLATELET # BLD AUTO: 95 K/UL (ref 150–400)
PMV BLD AUTO: 10.6 FL (ref 8.9–12.9)
POTASSIUM SERPL-SCNC: 4.1 MMOL/L (ref 3.5–5.1)
RBC # BLD AUTO: 4.87 M/UL (ref 3.8–5.2)
SERVICE CMNT-IMP: ABNORMAL
SERVICE CMNT-IMP: NORMAL
SERVICE CMNT-IMP: NORMAL
SODIUM SERPL-SCNC: 144 MMOL/L (ref 136–145)
TRIGL SERPL-MCNC: 116 MG/DL (ref ?–150)
VLDLC SERPL CALC-MCNC: 23.2 MG/DL
WBC # BLD AUTO: 3.5 K/UL (ref 3.6–11)

## 2022-08-31 PROCEDURE — 74011636637 HC RX REV CODE- 636/637: Performed by: INTERNAL MEDICINE

## 2022-08-31 PROCEDURE — 85027 COMPLETE CBC AUTOMATED: CPT

## 2022-08-31 PROCEDURE — 74011000250 HC RX REV CODE- 250: Performed by: INTERNAL MEDICINE

## 2022-08-31 PROCEDURE — 97166 OT EVAL MOD COMPLEX 45 MIN: CPT

## 2022-08-31 PROCEDURE — 36415 COLL VENOUS BLD VENIPUNCTURE: CPT

## 2022-08-31 PROCEDURE — 74011250636 HC RX REV CODE- 250/636: Performed by: INTERNAL MEDICINE

## 2022-08-31 PROCEDURE — 70553 MRI BRAIN STEM W/O & W/DYE: CPT

## 2022-08-31 PROCEDURE — 84100 ASSAY OF PHOSPHORUS: CPT

## 2022-08-31 PROCEDURE — 83735 ASSAY OF MAGNESIUM: CPT

## 2022-08-31 PROCEDURE — 74011250637 HC RX REV CODE- 250/637: Performed by: INTERNAL MEDICINE

## 2022-08-31 PROCEDURE — 82962 GLUCOSE BLOOD TEST: CPT

## 2022-08-31 PROCEDURE — 80048 BASIC METABOLIC PNL TOTAL CA: CPT

## 2022-08-31 PROCEDURE — 97116 GAIT TRAINING THERAPY: CPT

## 2022-08-31 PROCEDURE — 97162 PT EVAL MOD COMPLEX 30 MIN: CPT

## 2022-08-31 PROCEDURE — 3331090001 HH PPS REVENUE CREDIT

## 2022-08-31 PROCEDURE — 80061 LIPID PANEL: CPT

## 2022-08-31 PROCEDURE — A9576 INJ PROHANCE MULTIPACK: HCPCS | Performed by: INTERNAL MEDICINE

## 2022-08-31 PROCEDURE — 92610 EVALUATE SWALLOWING FUNCTION: CPT

## 2022-08-31 PROCEDURE — 65270000046 HC RM TELEMETRY

## 2022-08-31 PROCEDURE — 3331090002 HH PPS REVENUE DEBIT

## 2022-08-31 PROCEDURE — 97535 SELF CARE MNGMENT TRAINING: CPT

## 2022-08-31 PROCEDURE — 83036 HEMOGLOBIN GLYCOSYLATED A1C: CPT

## 2022-08-31 RX ORDER — TOPIRAMATE 100 MG/1
200 TABLET, FILM COATED ORAL 2 TIMES DAILY
Status: DISCONTINUED | OUTPATIENT
Start: 2022-08-31 | End: 2022-09-02 | Stop reason: HOSPADM

## 2022-08-31 RX ORDER — HYDRALAZINE HYDROCHLORIDE 20 MG/ML
10 INJECTION INTRAMUSCULAR; INTRAVENOUS
Status: DISCONTINUED | OUTPATIENT
Start: 2022-08-31 | End: 2022-09-02 | Stop reason: HOSPADM

## 2022-08-31 RX ORDER — INSULIN LISPRO 100 [IU]/ML
INJECTION, SOLUTION INTRAVENOUS; SUBCUTANEOUS
Status: DISCONTINUED | OUTPATIENT
Start: 2022-08-31 | End: 2022-09-02 | Stop reason: HOSPADM

## 2022-08-31 RX ORDER — PYRIDOSTIGMINE BROMIDE 60 MG/1
120 TABLET ORAL 3 TIMES DAILY
Status: DISCONTINUED | OUTPATIENT
Start: 2022-08-31 | End: 2022-09-02 | Stop reason: HOSPADM

## 2022-08-31 RX ORDER — ROSUVASTATIN CALCIUM 20 MG/1
40 TABLET, COATED ORAL DAILY
Status: DISCONTINUED | OUTPATIENT
Start: 2022-08-31 | End: 2022-09-02 | Stop reason: HOSPADM

## 2022-08-31 RX ORDER — DANTROLENE SODIUM 25 MG/1
100 CAPSULE ORAL
Status: DISCONTINUED | OUTPATIENT
Start: 2022-08-31 | End: 2022-09-02 | Stop reason: HOSPADM

## 2022-08-31 RX ORDER — LIDOCAINE 4 G/100G
1 PATCH TOPICAL EVERY 24 HOURS
Refills: 3 | Status: DISCONTINUED | OUTPATIENT
Start: 2022-08-31 | End: 2022-09-02 | Stop reason: HOSPADM

## 2022-08-31 RX ADMIN — LEVOTHYROXINE SODIUM 137 MCG: 0.11 TABLET ORAL at 11:39

## 2022-08-31 RX ADMIN — Medication 3 UNITS: at 11:47

## 2022-08-31 RX ADMIN — PREGABALIN 200 MG: 25 CAPSULE ORAL at 17:47

## 2022-08-31 RX ADMIN — TOPIRAMATE 200 MG: 100 TABLET, FILM COATED ORAL at 11:41

## 2022-08-31 RX ADMIN — Medication 3 UNITS: at 17:47

## 2022-08-31 RX ADMIN — PYRIDOSTIGMINE BROMIDE 120 MG: 60 TABLET ORAL at 17:47

## 2022-08-31 RX ADMIN — PYRIDOSTIGMINE BROMIDE 120 MG: 60 TABLET ORAL at 22:05

## 2022-08-31 RX ADMIN — TOPIRAMATE 200 MG: 100 TABLET, FILM COATED ORAL at 22:05

## 2022-08-31 RX ADMIN — PREGABALIN 200 MG: 25 CAPSULE ORAL at 11:39

## 2022-08-31 RX ADMIN — ACETAMINOPHEN 650 MG: 325 TABLET ORAL at 13:22

## 2022-08-31 RX ADMIN — Medication 1 AMPULE: at 11:38

## 2022-08-31 RX ADMIN — ROSUVASTATIN 40 MG: 20 TABLET, FILM COATED ORAL at 11:38

## 2022-08-31 RX ADMIN — GADOTERIDOL 16 ML: 279.3 INJECTION, SOLUTION INTRAVENOUS at 14:47

## 2022-08-31 NOTE — ROUTINE PROCESS
0700-Bedside shift change report given to Silvestre Mortensen (oncoming nurse) by Paty Aparicio (offgoing nurse). Report included the following information SBAR, Kardex, and MAR.      6934- Patient's mother updated on the patient's status and plan of care. 46- Patient assessed. See flowsheet. 1030- Rounds completed. Plan today: MRI. 078 1188- Patient's son called to bring in non-formulatory meds. Voice message left encouraging a phone call back. PT at the bedside working with the patient. 1115- MRI screening form faxed. MRI staff state they will send for the patient about 1430.    1210- Reassessed. No changes. 1325- Tylenol given for a mild headache. 1420- Patient to MRI with nurse and monitor. 1445- MRI in progress. 1510- Patient transported back to her room. 1730-Report called to Yi Blackwell. 1745- Patient transported to room [id number] with nurse and monitor.

## 2022-08-31 NOTE — PROGRESS NOTES
1815  Report received from ED nurse [address]  Arrived to floor via stretcher on monitor. CHG bath completed gown changes. No skin issues noted other than sacrum area is darker skin unable to tell if blanchable. Small stool cleaned. Patient able to urinate on bedpan 200 cc yellow clear urine. BP right arm 141/120  left arm 152/69. NIH scale completed see flow sheet. 2 PIV in place left and right FA #20 both flushed and capped. BS completed no coverage needed. Son brought back from waiting room.       1900  Report to [facility]

## 2022-08-31 NOTE — PROGRESS NOTES
Problem: Self Care Deficits Care Plan (Adult)  Goal: *Acute Goals and Plan of Care (Insert Text)  Description: FUNCTIONAL STATUS PRIOR TO ADMISSION: Patient was modified independent using a rollator for community mobility and reported furniture walking for household distances. Patient was modified independent for basic and instrumental ADLs. Patient has hx of CVA with R sided weakness. HOME SUPPORT: The patient lived with son. Occupational Therapy Goals  Initiated 8/31/2022  1. Patient will perform grooming standing at sink with modified independence within 7 day(s). 2.  Patient will perform upper body dressing with modified independence within 7 day(s). 3.  Patient will perform lower body dressing with modified independence within 7 day(s). 4.  Patient will perform toilet transfers with modified independence within 7 day(s). 5.  Patient will perform all aspects of toileting with modified independence within 7 day(s). Outcome: Not Met   OCCUPATIONAL THERAPY EVALUATION  Patient: Silvia Galvin (68 y.o. female)  Date: 8/31/2022  Primary Diagnosis: CVA (cerebral vascular accident) [facility]) [I63.9]       Precautions: Fall    ASSESSMENT  Based on the objective data described below, the patient presents with decreased independence in self-care and functional mobility secondary to general weakness, impaired balance, dysarthric speech, and decreased activity tolerance. Patient currently waiting on MRI but has hx of CVA with R sided weakness and dysarthric speech. Patient is functioning below her baseline for ADLs and functional mobility, now completing ADLs with set-up and min assist and functional mobility with contact guard assist using HHA. Patient received semisupine in bed and cleared for therapy by nursing. Patient reported feeling close to her baseline in BUE and demonstrated 4/5 strength in RUE. Patient completed supine > sit with contact guard assist and donned socks while bending forward.  Patient completed sit > stand and walked to toilet in room with HHA for balance. Patient completed toileting with contact guard to min assist and washed hands at sink with contact guard. Cues provided for hand placement and safety awareness. Patient agreed to end session sitting in bedside chair and completed transfers with contact guard assist. Patient was left sitting in chair with all needs met, VSS. Patient would benefit from skilled OT services during acute hospital stay. Anticipate patient can return home with HHOT/PT and assist from family as needed, pending progress. Current Level of Function Impacting Discharge (ADLs/self-care): set-up to min assist for self-care, contact guard assist for functional mobility     Functional Outcome Measure: The patient scored 40/100 on the Barthel Index outcome measure which is indicative of being partially dependent in ADLss. Other factors to consider for discharge: fall risk, R sided weakness      Patient will benefit from skilled therapy intervention to address the above noted impairments. PLAN :  Recommendations and Planned Interventions: self care training, functional mobility training, therapeutic exercise, balance training, therapeutic activities, endurance activities, patient education, home safety training, and family training/education    Frequency/Duration: Patient will be followed by occupational therapy 4 times a week to address goals. Recommendation for discharge: (in order for the patient to meet his/her long term goals)  Occupational therapy at least 2 days/week in the home AND ensure assist and/or supervision for safety with ADLs and functional mobility    This discharge recommendation:  Has not yet been discussed the attending provider and/or case management    IF patient discharges home will need the following DME: TBD pending progress       SUBJECTIVE:   Patient stated I want to get out of this bed.     OBJECTIVE DATA SUMMARY:   HISTORY: Past Medical History:   Diagnosis Date    Arrhythmia     Arthritis     Bilateral hip replacements, right knee replacement    Benign cyst of left breast 3/21/2016    Blindness and low vision 2004    legally blind from [place]    Cataract     right eye    Cervical spinal stenosis 2016    Moderate C6-7    Chronic pain     COVID-19 vaccine series completed     PFIZER 3/19/21, 21    Depression     Diabetes mellitus type 2, controlled ([address] 2016    Diet-controlled diabetes mellitus ([address] 2018    Endometriosis 2010    FH: breast cancer 2010    Chart states FH breast/ovary Ca, CAD,DM     History of CVA (cerebrovascular accident) 2018    HTN, goal below 140/90 2010    CURRENTLY NO MEDICATIONS (21)    Hypercholesterolemia     Hypothyroid 2010    Incontinence     Lumbosacral plexopathy 2010    Migraine     H/O MIGRAINE    MS (multiple sclerosis) ([address]     Myasthenia gravis ([address]     Sleep apnea 2010    RESOLVED 2019    Stroke ([address] 2018    RIGHT SIDE WEAKNESS    Ureteral calculus 2010    Vitamin D deficiency 3/17/2016     Past Surgical History:   Procedure Laterality Date    HX APPENDECTOMY  2022    HX CARPAL TUNNEL RELEASE Left     HX CATARACT REMOVAL Bilateral     HX  SECTION  1986    HX CYST INCISION AND DRAINAGE      HX GI      COLONOSCOPY    HX GYN  2001    ovarian cyst    HX HEENT      removal of thyroglossal duct cyst    HX HIP REPLACEMENT Right 2017    anterior approach    HX KNEE ARTHROSCOPY Right 1998    HX ORTHOPAEDIC  1997    right thumb tendon repair x 2    HX ORTHOPAEDIC      RIGHT KNEE REPLACEMENT    HX OTHER SURGICAL      Janee Cath x2    HX SMALL BOWEL RESECTION      HX THYROIDECTOMY  1974    HX VASCULAR ACCESS  2006    PORT -A- CATH X 2    [place] ABDOMEN SURGERY PROC UNLISTED  2001    bowel resection    [place] BREAST SURGERY PROCEDURE UNLISTED      breast cyst-both breasts at different times    [place] TOTAL HIP ARTHROPLASTY Bilateral 2019    BOTH HIPS REPLACED       Expanded or extensive additional review of patient history:     Home Situation  Home Environment: Private residence  Wheelchair Ramp: Yes  One/Two Story Residence: One story  Living Alone: No  Support Systems: Child(jose m)  Patient Expects to be Discharged to[de-identified] Home with home health  Current DME Used/Available at Home: [employer], rollator, Wheelchair, power, Cane, straight, Commode, bedside, Grab bars (scooter)  Tub or Shower Type: Shower    Hand dominance: Left    EXAMINATION OF PERFORMANCE DEFICITS:  Cognitive/Behavioral Status:  Neurologic State: Alert  Orientation Level: Oriented X4  Cognition: Follows commands  Perception: Appears intact  Perseveration: No perseveration noted  Safety/Judgement: Awareness of environment; Fall prevention    Hearing: Auditory  Auditory Impairment: None    Vision/Perceptual:    Acuity: Within Defined Limits    Corrective Lenses: Glasses    Range of Motion:  AROM: Generally decreased, functional (hx of R sided weakness from CVA)    Strength:  Strength: Generally decreased, functional (hx of R sided weakness from CVA)    Coordination:  Coordination: Generally decreased, functional  Fine Motor Skills-Upper: Left Intact; Right Intact    Gross Motor Skills-Upper: Left Intact; Right Intact    Tone & Sensation:  Tone: Normal  Sensation: Impaired (hx of RLE numbness)    Balance:  Sitting: Intact  Standing: Impaired  Standing - Static: Fair;Constant support  Standing - Dynamic : Fair;Constant support    Functional Mobility and Transfers for ADLs:  Bed Mobility:  Rolling: Contact guard assistance  Supine to Sit: Contact guard assistance  Scooting: Contact guard assistance    Transfers:  Sit to Stand: Contact guard assistance  Stand to Sit: Contact guard assistance  Bed to Chair: Contact guard assistance  Toilet Transfer : Contact guard assistance    ADL Assessment:  Feeding: Setup  Oral Facial Hygiene/Grooming: Contact guard assistance (in standing)  Bathing: Minimum assistance  Upper Body Dressing: Setup;Stand-by assistance  Lower Body Dressing: Contact guard assistance  Toileting: Minimum assistance    ADL Intervention and task modifications:    Grooming  Position Performed: Standing (at sink)  Washing Hands: Contact guard assistance  Cues: Tactile cues provided;Verbal cues provided    Lower Body Dressing Assistance  Socks: Contact guard assistance  Leg Crossed Method Used: No  Position Performed: Bending forward method  Cues: Don;Tactile cues provided;Verbal cues provided    Toileting  Bladder Hygiene: Contact guard assistance  Bowel Hygiene: Contact guard assistance  Clothing Management: Minimum assistance  Cues: Tactile cues provided;Verbal cues provided    Cognitive Retraining  Safety/Judgement: Awareness of environment; Fall prevention    Functional Measure:    Barthel Index:  Bathin  Bladder: 5  Bowels: 10  Groomin  Dressin  Feedin  Mobility: 0  Stairs: 0  Toilet Use: 5  Transfer (Bed to Chair and Back): 10  Total: 40/100      The Barthel ADL Index: Guidelines  1. The index should be used as a record of what a patient does, not as a record of what a patient could do. 2. The main aim is to establish degree of independence from any help, physical or verbal, however minor and for whatever reason. 3. The need for supervision renders the patient not independent. 4. A patient's performance should be established using the best available evidence. Asking the patient, friends/relatives and nurses are the usual sources, but direct observation and common sense are also important. However direct testing is not needed. 5. Usually the patient's performance over the preceding 24-48 hours is important, but occasionally longer periods will be relevant. 6. Middle categories imply that the patient supplies over 50 per cent of the effort. 7. Use of aids to be independent is allowed.     Score Interpretation (from [address] [place] [address])    Independent   60-79 Minimally independent   40-59 Partially dependent   20-39 Very dependent   <20 Totally dependent     -Zane Baldwin., BarthelNIKHIL. (1965). Functional evaluation: the Barthel Index. [address] ([address] (1997). The Barthel activities of daily living index: self-reporting versus actual performance in the old (> or = 75 years). Journal of [address] [place] [address] 45(7), [facility], JNICKF, Sarah Avilez., [facility], [place]. (1999). Measuring the change in disability after inpatient rehabilitation; comparison of the responsiveness of the Barthel Index and Functional [place] Measure. Journal of Neurology, Neurosurgery, and Psychiatry, 66(4), 372-328. Maribel Deal, ARABELLA, CHEN Estevez, & Kandis Montana M.A. (2004) Assessment of post-stroke quality of life in cost-effectiveness studies: The usefulness of the Barthel Index and the EuroQoL-5D. Quality of Life Research, 13, 947-91      Based on the above components, the patient evaluation is determined to be of the following complexity level: MEDIUM  Pain Rating:  Patient c/o 4/10 headache. Activity Tolerance:   Fair and requires rest breaks    After treatment patient left in no apparent distress:    Sitting in chair and Call bell within reach    COMMUNICATION/EDUCATION:   The patients plan of care was discussed with: Physical therapist and Registered nurse. Home safety education was provided and the patient/caregiver indicated understanding., Patient/family have participated as able in goal setting and plan of care. , and Patient/family agree to work toward stated goals and plan of care. This patients plan of care is appropriate for delegation to [facility].     Thank you for this referral.  Micheline Georges, OTR/L  Time Calculation: 15 mins

## 2022-08-31 NOTE — PROGRESS NOTES

## 2022-08-31 NOTE — ROUTINE PROCESS
Stroke Education provided to patient and the following topics were discussed    1. Patients personal risk factors for stroke are hypertension, hyperlipidemia, diabetes mellitus, and obesity    2. Warning signs of Stroke:        * Sudden numbness or weakness of the face, arm or leg, especially on one side of          The body            * Sudden confusion, trouble speaking or understanding        * Sudden trouble seeing in one or both eyes        * Sudden trouble walking, dizziness, loss of balance or coordination        * Sudden severe headache with no known cause      3. Importance of activation Emergency Medical Services ( 9-1-1 ) immediately if experience any warning signs of stroke. 4. Be sure and schedule a follow-up appointment with your primary care doctor or any specialists as instructed. 5. You must take medicine every day to treat your risk factors for stroke. Be sure to take your medicines exactly as your doctor tells you: no more, no less. Know what your medicines are for , what they do. Anti-thrombotics /anticoagulants can help prevent strokes. You are taking the following medicine(s)  none at this time    6. Smoking and second-hand smoke greatly increase your risk of stroke, cardiovascular disease and death. Smoking history never    7. Information provided was [facility] Stroke Education Binder    8. Documentation of teaching completed in Patient Education Activity and on Care Plan with teaching response noted?   yes

## 2022-08-31 NOTE — PROGRESS NOTES
SPEECH PATHOLOGY BEDSIDE SWALLOW EVALUATION/DISCHARGE  Patient: Viri Galvin (68 y.o. female)  Date: 8/31/2022  Primary Diagnosis: CVA (cerebral vascular accident) [facility]) [I63.9]       Precautions:        ASSESSMENT :  Based on the objective data described below, the patient presents with functional oropharyngeal swallow. Timely and complete mastication of solids. Strong cough x1 following a cracker; however, this was no replicated on more trials. No s/s aspiration with successive straw sips of thin or puree. Patient with non-fluent speech and occasional word retrieval deficits in informal conversation; however, able to effectively communicate wants/needs. She was carrying on conversation. She was receiving home health speech services pta and the SLP is the one who contact 911 due to concern for slurred speech and increased R weakness. Patient feels as though she isn't quite back to baseline; however, reporting word retrieval deficits are not new. The following is the clinical assessment from home health SLP on 8/8/22 which can be seen in the chart. Based on informal conversation with patient, consistent with prior slp eval.   \"CLINICAL ASSESSMENT (What this means for the patient overall and need for ongoing skilled care):     Evaluation/Impressions: Patient is AAOx3 with some forgetfulness, relies on calendars to assist in recall of dates and appointments. Main concerns are speech which is c/b inconsistent mild-mod dysfluency which SLP suspects may be exacerbated by reduced coordination of phonatory and respiratory systems for sustained phonation and possibly some orofacial and pharyngeal tension.  Further assessment appears to support this pt presents with significantly reduced MPT for sustained /ah/ with poor pitch and phonation control and at times has difficulty producing voice at all for isolated sustained voicing tasks without some coaching to implement relaxation techniques and use continuous airflow for voicing. Pt appears quite stimulable based on response to minimal coaching required to produce voicing during some of the exercises in assessment today. Dysfluency may also be exacerbated by mild word-finding deficits noted during assessment. SLP is hopeful that addressing speech, voice, and verbal expression will improve pt's speech fluency and reduce frustration with communication. Recommend skilled ST tx to address above deficits via therapeutic exercises, compensatory strategy teaching/implementation, pt/CG education, HEP development\". .   Skilled acute therapy provided by a speech-language pathologist is not indicated at this time. PLAN :  Recommendations:  -- Regular diet/ thin liquids. Straws ok  -- meds 1 at a time. Place in applesauce or crush if needed  -- SLP to follow for language eval pending MRI results; however, suspect at baseline given [facility] SLP eval from 8/8/22. Discharge Recommendations: resuming home health ST with established SLP     SUBJECTIVE:   Patient stated my son wanted to take me away this weekend but now i'm sure he will not want to take me.     OBJECTIVE:     Past Medical History:   Diagnosis Date    Arrhythmia     Arthritis     Bilateral hip replacements, right knee replacement    Benign cyst of left breast 3/21/2016    Blindness and low vision 2004    legally blind from [address]    Cataract     right eye    Cervical spinal stenosis 12/2/2016    Moderate C6-7    Chronic pain     COVID-19 vaccine series completed     PFIZER 3/19/21, 4/9/21    Depression     Diabetes mellitus type 2, controlled ([address] 9/13/2016    Diet-controlled diabetes mellitus ([address] 1/5/2018    Endometriosis 6/25/2010    FH: breast cancer 6/25/2010    Chart states FH breast/ovary Ca, CAD,DM     History of CVA (cerebrovascular accident) 6/5/2018    HTN, goal below 140/90 6/25/2010    CURRENTLY NO MEDICATIONS (7-12-21)    Hypercholesterolemia     Hypothyroid 6/25/2010    Incontinence     Lumbosacral plexopathy 2010    Migraine     H/O MIGRAINE    MS (multiple sclerosis) [facility]     Myasthenia gravis ([facility]     Sleep apnea 2010    RESOLVED 2019    Stroke ([facility] 2018    RIGHT SIDE WEAKNESS    Ureteral calculus 2010    Vitamin D deficiency 3/17/2016     Past Surgical History:   Procedure Laterality Date    HX APPENDECTOMY  2022    HX CARPAL TUNNEL RELEASE Left     HX CATARACT REMOVAL Bilateral     HX  SECTION  1986    HX CYST INCISION AND DRAINAGE      HX GI      COLONOSCOPY    HX GYN  2001    ovarian cyst    HX HEENT      removal of thyroglossal duct cyst    HX HIP REPLACEMENT Right 2017    anterior approach    HX KNEE ARTHROSCOPY Right 1998    HX ORTHOPAEDIC  1997    right thumb tendon repair x 2    HX ORTHOPAEDIC      RIGHT KNEE REPLACEMENT    HX OTHER SURGICAL      Janee Cath x2    HX SMALL BOWEL RESECTION      HX THYROIDECTOMY  1974    HX VASCULAR ACCESS  2006    PORT -A- CATH X 2    [place] ABDOMEN SURGERY PROC UNLISTED  2001    bowel resection    ME BREAST SURGERY PROCEDURE UNLISTED      breast cyst-both breasts at different times    ME TOTAL HIP ARTHROPLASTY Bilateral 2019    BOTH HIPS REPLACED     Prior Level of Function/Home Situation:   Home Situation  Support Systems: Child(jose m), Parent(s)  Patient Expects to be Discharged to[de-identified] Home with home health  Diet prior to admission: reg/thin  Current Diet:  reg/thin   Cognitive and Communication Status:  Neurologic State: Alert  Orientation Level: Oriented X4  Cognition: Follows commands     Perseveration: No perseveration noted     Oral Assessment:     P.O. Trials:  Patient Position: up in bed  Vocal quality prior to P.O.: No impairment  Consistency Presented: Thin liquid; Solid;Puree  How Presented: Successive swallows;Straw;Self-fed/presented     Bolus Acceptance: No impairment  Bolus Formation/Control: No impairment     Propulsion: No impairment  Oral Residue: None  Initiation of Swallow: No impairment  Laryngeal Elevation: Functional  Aspiration Signs/Symptoms: None (cough x1 after cracker)  Pharyngeal Phase Characteristics: No impairment, issues, or problems   Effective Modifications: None  Cues for Modifications: None             NOMS:   The NOMS functional outcome measure was used to quantify this patient's level of swallowing impairment. Based on the NOMS, the patient was determined to be at level 7 for swallow function     NOMS Swallowing Levels:  Level 1 (CN): NPO  Level 2 (CM): NPO but takes consistency in therapy  Level 3 (CL): Takes less than 50% of nutrition p.o. and continues with nonoral feedings; and/or safe with mod cues; and/or max diet restriction  Level 4 (CK): Safe swallow but needs mod cues; and/or mod diet restriction; and/or still requires some nonoral feeding/supplements  Level 5 (CJ): Safe swallow with min diet restriction; and/or needs min cues  Level 6 (CI): Independent with p.o.; rare cues; usually self cues; may need to avoid some foods or needs extra time  Level 7 ([address] [place] [address]): Independent for all p.o.  ASHLEY. (2003). National Outcomes Measurement System (NOMS): Adult Speech-Language Pathology User's Guide. Pain:  Pain Scale 1: Numeric (0 - 10)  Pain Intensity 1: 0     After treatment:   Patient left in no apparent distress in bed, Call bell within reach, and Nursing notified    COMMUNICATION/EDUCATION:       The patient's plan of care including recommendations, planned interventions, and recommended diet changes were discussed with: Registered nurse.      Thank you for this referral.  CHIKIS Marr  Time Calculation: 20 mins

## 2022-08-31 NOTE — PROGRESS NOTES
Problem: Mobility Impaired (Adult and Pediatric)  Goal: *Acute Goals and Plan of Care (Insert Text)  Description: FUNCTIONAL STATUS PRIOR TO ADMISSION: Patient was modified independent using a rollator and power wheelchair for functional mobility. HOME SUPPORT PRIOR TO ADMISSION: The patient lived with son. Physical Therapy Goals  Initiated 8/31/2022  1. Patient will move from supine to sit and sit to supine  in bed with independence within 7 day(s). 2.  Patient will transfer from bed to chair and chair to bed with modified independence using the least restrictive device within 7 day(s). 3.  Patient will perform sit to stand with modified independence within 7 day(s). 4.  Patient will ambulate with modified independence for 75 feet with the least restrictive device within 7 day(s). Outcome: Not Met     PHYSICAL THERAPY EVALUATION- NEURO POPULATION  Patient: Philomena Galvin (68 y.o. female)  Date: 8/31/2022  Primary Diagnosis: CVA (cerebral vascular accident) [facility]) [I63.9]       Precautions: fall         ASSESSMENT  Based on the objective data described below, the patient presents with close to baseline mobility. Pt was received in supine and cleared by nursing to mobilize. Vitals stable and order placed by intensivist. She was able to come to the EOB without assistance. Performed MMT of LEs 5/5 strength and reported sensation is the same as baseline. She was able to stand with 1 person A. Not appropriate for REIS due to baseline use of rollator. She was able to ambulate around the room with 1 person A with and without HHA. No overt loss of balance during the session. She was left sitting up in the chair at the end of the session. [facility] and further neuro education pending MRI next session.      Current Level of Function Impacting Discharge (mobility/balance): CGA    Other factors to consider for discharge:      Patient will benefit from skilled therapy intervention to address the above noted impairments. PLAN :  Recommendations and Planned Interventions: bed mobility training, transfer training, gait training, therapeutic exercises, patient and family training/education, and therapeutic activities      Frequency/Duration: Patient will be followed by physical therapy:  4 times a week to address goals. Recommendation for discharge: (in order for the patient to meet his/her long term goals)  Physical therapy at least 2 days/week in the home     This discharge recommendation:  Has not yet been discussed the attending provider and/or case management    IF patient discharges home will need the following DME: patient owns DME required for discharge         SUBJECTIVE:   Patient stated It feels pretty good to be up.     OBJECTIVE DATA SUMMARY:   HISTORY:    Past Medical History:   Diagnosis Date    Arrhythmia     Arthritis     Bilateral hip replacements, right knee replacement    Benign cyst of left breast 3/21/2016    Blindness and low vision 2004    [facility] [address] [facility]     [facility]    Cervical spinal stenosis 12/2/2016    Moderate C6-7    Chronic pain     COVID-19 vaccine series completed     PFIZER 3/19/21, 4/9/21    Depression     Diabetes mellitus type 2, controlled ([address] 9/13/2016    Diet-controlled diabetes mellitus ([address] 1/5/2018    Endometriosis 6/25/2010    FH: breast cancer 6/25/2010    Chart states FH breast/ovary Ca, CAD,DM     History of CVA (cerebrovascular accident) 6/5/2018    HTN, goal below 140/90 6/25/2010    CURRENTLY NO MEDICATIONS (7-12-21)    Hypercholesterolemia     Hypothyroid 6/25/2010    Incontinence     Lumbosacral plexopathy 6/25/2010    Migraine     H/O MIGRAINE    MS (multiple sclerosis) ([address] 2004    Myasthenia gravis ([address] 2011    Sleep apnea 6/25/2010    RESOLVED 5/2019    Stroke ([address] 2018    RIGHT SIDE WEAKNESS    Ureteral calculus 6/25/2010    Vitamin D deficiency 3/17/2016     Past Surgical History:   Procedure Laterality Date    HX APPENDECTOMY  2022    HX CARPAL TUNNEL RELEASE Left     HX CATARACT REMOVAL Bilateral     HX  SECTION  1986    HX CYST INCISION AND DRAINAGE      HX GI      COLONOSCOPY    HX GYN  2001    ovarian cyst    HX HEENT      removal of thyroglossal duct cyst    HX HIP REPLACEMENT Right 2017    anterior approach    HX KNEE ARTHROSCOPY Right 1998    HX ORTHOPAEDIC  1997    right thumb tendon repair x 2    HX ORTHOPAEDIC      RIGHT KNEE REPLACEMENT    HX OTHER SURGICAL      Janee Cath x2    HX SMALL BOWEL RESECTION      HX THYROIDECTOMY  1974    HX VASCULAR ACCESS  2006    PORT -A- CATH X 2    OK ABDOMEN SURGERY PROC UNLISTED  2001    bowel resection    OK BREAST SURGERY PROCEDURE UNLISTED      breast cyst-both breasts at different times    OK TOTAL HIP ARTHROPLASTY Bilateral 2019    BOTH HIPS REPLACED       Personal factors and/or comorbidities impacting plan of care:     Home Situation  Home Environment: Private residence  Wheelchair Ramp: Yes  One/Two Story Residence: One story  Living Alone: No  Support Systems: Child(jose m)  Patient Expects to be Discharged to[de-identified] Home with home health  Current DME Used/Available at Home: Walker, rollator, Wheelchair, power, Cane, straight, Commode, bedside, Grab bars (scooter)    EXAMINATION/PRESENTATION/DECISION MAKING:   Critical Behavior:  Neurologic State: Alert  Orientation Level: Oriented X4  Cognition: Follows commands     Hearing:   Auditory  Auditory Impairment: None  Skin:  intact  Edema: none       Functional Mobility:  Bed Mobility:  Rolling: Contact guard assistance  Supine to Sit: Contact guard assistance     Scooting: Contact guard assistance  Transfers:  Sit to Stand: Contact guard assistance  Stand to Sit: Contact guard assistance                       Balance:   Sitting: Intact  Standing: Impaired  Standing - Static: Fair;Constant support  Standing - Dynamic : Fair;Constant support  Ambulation/Gait Training:  Distance (ft): 30 Feet (ft)  Assistive Device: Gait belt (HHA)  Ambulation - Level of Assistance: Minimal assistance        Gait Abnormalities: Trunk sway increased;Decreased step clearance        Base of Support: Widened     Speed/Gerda: Slow  Step Length: Left shortened;Right shortened                  Functional Measure  Barthel Index:    Bathin  Bladder: 5  Bowels: 10  Groomin  Dressin  Feedin  Mobility: 0  Stairs: 0  Toilet Use: 5  Transfer (Bed to Chair and Back): 10  Total: 40/100       The Barthel ADL Index: Guidelines  1. The index should be used as a record of what a patient does, not as a record of what a patient could do. 2. The main aim is to establish degree of independence from any help, physical or verbal, however minor and for whatever reason. 3. The need for supervision renders the patient not independent. 4. A patient's performance should be established using the best available evidence. Asking the patient, friends/relatives and nurses are the usual sources, but direct observation and common sense are also important. However direct testing is not needed. 5. Usually the patient's performance over the preceding 24-48 hours is important, but occasionally longer periods will be relevant. 6. Middle categories imply that the patient supplies over 50 per cent of the effort. 7. Use of aids to be independent is allowed. Score Interpretation (from [address] [address])    Independent   60-79 Minimally independent   40-59 Partially dependent   20-39 Very dependent   <20 Totally dependent     -Zane Baldwin., Barthel, D.W. (1965). Functional evaluation: the Barthel Index. [facility] ([address] (1997). The Barthel activities of daily living index: self-reporting versus actual performance in the old (> or = 75 years).  Journal of [address] [place] [address] 45(7), [facility], J.J.SUSU.DAYA, Jorge Luis Masonies., Elizabeth Stone. (1999). Measuring the change in disability after inpatient rehabilitation; comparison of the responsiveness of the Barthel Index and Functional [place] Measure. Journal of Neurology, Neurosurgery, and Psychiatry, 66(4), 449-095. ARABELLA Del Valle, CHEN Estevez, & Sebastian Lee M.A. (2004) Assessment of post-stroke quality of life in cost-effectiveness studies: The usefulness of the Barthel Index and the EuroQoL-5D. Quality of Life Research, 15, 199-39            Physical Therapy Evaluation Charge Determination   History Examination Presentation Decision-Making   HIGH Complexity :3+ comorbidities / personal factors will impact the outcome/ POC  MEDIUM Complexity : 3 Standardized tests and measures addressing body structure, function, activity limitation and / or participation in recreation  MEDIUM Complexity : Evolving with changing characteristics  Other outcome measures barthel  MEDIUM      Based on the above components, the patient evaluation is determined to be of the following complexity level: MEDIUM    Pain Ratin/10 HA    Activity Tolerance:   Good      After treatment patient left in no apparent distress:   Sitting in chair and Call bell within reach    COMMUNICATION/EDUCATION:   The patients plan of care was discussed with: Occupational therapist and Registered nurse. Fall prevention education was provided and the patient/caregiver indicated understanding. and Patient/family agree to work toward stated goals and plan of care.     Thank you for this referral.  Kirill Rodriguez, PT, DPT   Time Calculation: 27 mins

## 2022-08-31 NOTE — PROGRESS NOTES
Critical Care Progress Note  Mayo Freitas MD          Date of Service:  2022  NAME:  Addy Mehta Minor  :  1955  MRN:  [id number]      Subjective/Hospital course:      Patient is a 74year old female with h/o MG, MS, CDIP, and frequent falls who presented with difficulty speaking. Per ER notes \"EMS states that pt is essentially nonverbal though she can answer her name. She follows some basic commands. They report R sided facial droop and weakness that speech therapy report are worse than usual.\"  She was gived TPA in the ER. She now reports improvement but continues to have word finding difficulty, right sided weakness and problems \"remembering things\"     - no acute change overnight. Problem list:     Problem list:  Hospital Problems  Date Reviewed: 2022            Codes Class Noted POA    CVA (cerebral vascular accident) [facility]) ICD-10-CM: I63.9  ICD-9-CM: 434.91  2022 Unknown           Assessment/Plan:   Stroke like sx s/p TPA  - Follow up Neuro recommendations  - PRN labetolol for BP control  - Speech consult  - statin / asprin if 24 hours CT negative for bleed   - PT/OT      DM  - well controlled at home  - SSI while inpatient      H/o MG and MS  - restart home meds      Review of Systems:   Review of Systems   All other systems reviewed and are negative. Vital Signs:   Patient Vitals for the past 4 hrs:   BP Temp Pulse Resp SpO2   22 0845 (!) 124/90 98.2 °F (36.8 °C) 63 9 97 %   22 0842 (!) 159/69 -- 69 15 97 %   22 0800 (!) 142/68 -- 68 14 98 %   22 0715 (!) 150/64 -- 65 14 97 %   22 0700 (!) 153/79 -- 65 13 97 %        Intake/Output Summary (Last 24 hours) at 2022 1019  Last data filed at 2022 1903  Gross per 24 hour   Intake --   Output 200 ml   Net -200 ml        Physical Examination:    Physical Exam  Deferred patient with speech eval   Labs:   Labs and imaging reviewed.       Medications:     Current Facility-Administered Medications   Medication Dose Route Frequency    hydrALAZINE (APRESOLINE) 20 mg/mL injection 10 mg  10 mg IntraVENous Q6H PRN    acetaminophen (TYLENOL) tablet 650 mg  650 mg Oral Q4H PRN    Or    acetaminophen (TYLENOL) solution 650 mg  650 mg Per NG tube Q4H PRN    Or    acetaminophen (TYLENOL) suppository 650 mg  650 mg Rectal Q4H PRN    glucose chewable tablet 16 g  4 Tablet Oral PRN    glucagon (GLUCAGEN) injection 1 mg  1 mg IntraMUSCular PRN    dextrose 10% infusion 0-250 mL  0-250 mL IntraVENous PRN    insulin lispro (HUMALOG) injection   SubCUTAneous Q6H    alcohol 62% (NOZIN) nasal  1 Ampule  1 Ampule Topical Q12H     ______________________________________________________________________      Multidisciplinary Rounds Completed:  Pending      SPECIAL EQUIPMENT  None    DISPOSITION  Stay in ICU    CRITICAL CARE CONSULTANT NOTE  I had a in-person encounter with Hiwot Galvin, reviewed and interpreted patient data including events, labs, images, vital signs, I/O's, and examined patient. I have discussed the case and the plan and management of the patient's care with the consulting services, the bedside nurses and the respiratory therapist.      NOTE OF PERSONAL INVOLVEMENT IN CARE   This patient is at high risk for sudden and clinically significant deterioration, which requires the highest level of preparedness to intervene urgently. I participated in the decision-making and personally managed or directed the management of the following life and organ supporting interventions that required my frequent assessment to treat or prevent imminent deterioration. I personally spent na minutes of critical care time. This is time spent at patient's bedside actively involved in patient care as well as the coordination of care and discussions with the patient's family. This does not include any procedural time which has been billed separately.              Jyoti Gamez MD Pulmonary/CCM  [facility]  [phone]  8/31/2022

## 2022-08-31 NOTE — HOME CARE
Please note that this patient was open to [address] [place] [address] services at the time of hospital admission. If services will be needed at discharge, please order to resume them.  Thank you, Britany Luque RN, [phone]

## 2022-08-31 NOTE — CONSULTS
Date of Consultation:  August 31, 2022    Referring Physician: Kartik Kumar MD     Reason for Consultation:  Difficulty with speech     Chief Complaint   Patient presents with    Dysarthria     Pt arrives via EMS from home d/t worse slurred speech noticed by speech therapist. Pt was seen by home health nurse earlier today and was at baseline - therefore LKW 1300. Pt has hx of cva w/ speech deficits and R sided weakness but per speech therapist pt current aphasia worse than normal. Pt does not take daily blood thinner,        History of Present Illness:   Ary Peterson is a 74 y.o. female with a history of myasthenia gravis, multiple sclerosis and CIDP who is currently admitted to the hospital with difficulty with speech. History is obtained from chart review as patient was unable provide any significant history. She reports that she was last seen by her home health nurse and was at baseline however her speech therapist noted that her aphasia was worse than normal.  For this reason, EMS was called and she was brought to the hospital for further evaluation. She underwent a noncontrast head CT which showed no acute abnormalities and a CTA head and neck which showed no large vessel occlusion. She did receive TNKase. Shortly after the infusion she developed frontal headache and underwent a repeat CT scan. This showed no evidence of hemorrhage. She was admitted to the hospital for further evaluation  This morning on my evaluation patient reports that she is doing well. She still is having some difficulty with her speech however it does appear to be improved from previous. He she otherwise denies any new weakness. Of note patient is followed in neurology clinic for multiple sclerosis myasthenia gravis and CIDP. She is only on Gilenya for her multiple sclerosis.   Past Medical History:   Diagnosis Date    Arrhythmia     Arthritis     Bilateral hip replacements, right knee replacement    Benign cyst of left breast 3/21/2016    Blindness and low vision 2004    legally blind from [address]    Cataract     right eye    Cervical spinal stenosis 2016    Moderate C6-7    Chronic pain     COVID-19 vaccine series completed     PFIZER 3/19/21, 21    Depression     Diabetes mellitus type 2, controlled ([address] 2016    Diet-controlled diabetes mellitus ([address] 2018    Endometriosis 2010    FH: breast cancer 2010    Chart states FH breast/ovary Ca, CAD,DM     History of CVA (cerebrovascular accident) 2018    HTN, goal below 140/90 2010    CURRENTLY NO MEDICATIONS (21)    Hypercholesterolemia     Hypothyroid 2010    Incontinence     Lumbosacral plexopathy 2010    Migraine     H/O MIGRAINE    MS (multiple sclerosis) ([address]     Myasthenia gravis ([address]     Sleep apnea 2010    RESOLVED 2019    Stroke ([address] 2018    RIGHT SIDE WEAKNESS    Ureteral calculus 2010    Vitamin D deficiency 3/17/2016        Past Surgical History:   Procedure Laterality Date    HX APPENDECTOMY  2022    HX CARPAL TUNNEL RELEASE Left     HX CATARACT REMOVAL Bilateral     HX  SECTION  1986    HX CYST INCISION AND DRAINAGE      HX GI      COLONOSCOPY    HX GYN  2001    ovarian cyst    HX HEENT      removal of thyroglossal duct cyst    HX HIP REPLACEMENT Right 2017    anterior approach    HX KNEE ARTHROSCOPY Right 1998    HX ORTHOPAEDIC  1997    right thumb tendon repair x 2    HX ORTHOPAEDIC      RIGHT KNEE REPLACEMENT    HX OTHER SURGICAL      Janee Cath x2    HX SMALL BOWEL RESECTION      HX THYROIDECTOMY  1974    HX VASCULAR ACCESS  2006    PORT -A- CATH X 2    SC ABDOMEN SURGERY PROC UNLISTED  2001    bowel resection    SC BREAST SURGERY PROCEDURE UNLISTED      breast cyst-both breasts at different times    SC TOTAL HIP ARTHROPLASTY Bilateral 2019    BOTH HIPS REPLACED        Family History   Problem Relation Age of Onset    OSTEOARTHRITIS Mother     Diabetes Mother     Elevated Lipids Mother     Headache Mother     Heart Disease Mother     Hypertension Mother     Stroke Mother     Neuropathy Mother     Colon Cancer Mother     Diabetes Father     Elevated Lipids Father     Heart Disease Father     Hypertension Father     Diabetes Sister     Elevated Lipids Sister     Headache Sister     Hypertension Sister     Migraines Sister     Cancer Sister 62        breast ca W/METS TO BRAIN    Breast Cancer Sister 62    Diabetes Brother     Elevated Lipids Brother     Hypertension Brother     Asthma Son     Thyroid Disease Son         GRAVES    Sleep Apnea Son     Breast Cancer Maternal Grandmother 54    Diabetes Sister     Neuropathy Sister     Anesth Problems Neg Hx         Social History     Tobacco Use    Smoking status: Every Day     Packs/day: 0.50     Years: 30.00     Pack years: 15.00     Types: Cigarettes    Smokeless tobacco: Never   Substance Use Topics    Alcohol use: No     Comment: TWICE A YEAR PER PT        Allergies   Allergen Reactions    Bee Sting [Sting, Bee] Anaphylaxis     Also allergic to egg products    Penicillins Anaphylaxis     Patient screened for any delayed non-IgE-mediated reaction to PCN. Patient notes the following:    No delayed non-IgE-mediated reaction to PCN            Betadine [Povidone-Iodine] Rash    Egg Itching        Prior to Admission medications    Medication Sig Start Date End Date Taking? Authorizing Provider   dantrolene (DANTRIUM) 100 mg capsule Take 100 mg by mouth daily as needed (spasms). Yes Provider, Historical   pyridostigmine (MESTINON) 60 mg tablet Take 180 mg by mouth two (2) times a day. Yes Provider, Historical   aspirin delayed-release 81 mg tablet Take 1 Tablet by mouth in the morning. 7/21/22  Yes Shay Yates MD   rimegepant (Nurtec ODT) 75 mg disintegrating tablet Take 75 mg by mouth once as needed for Migraine.    Yes Provider, Historical   levothyroxine (SYNTHROID) 137 mcg tablet TAKE 1 TABLET BY MOUTH EVERY DAY BEFORE BREAKFAST 5/20/22  Yes Ada Lopez MD   Gilenya 0.5 mg cap Take 1 Capsule by mouth nightly. 5/4/22  Yes Los Kahn MD   metFORMIN ER (GLUCOPHAGE XR) 500 mg tablet TAKE 3 TABLETS BY MOUTH DAILY 4/21/22  Yes Ada Lopez MD   PARoxetine (PAXIL) 40 mg tablet TAKE 1 AND 1/2 TABLETS BY MOUTH EVERY NIGHT 3/14/22  Yes Los Kahn MD   erenumab-aooe (Aimovig Autoinjector) 140 mg/mL injection ADMINISTER 1 ML(140MG) UNDER THE SKIN EVERY 30 DAYS 11/30/21  Yes Vazquez Horn MD   rosuvastatin (CRESTOR) 40 mg tablet TAKE 1 TABLET BY MOUTH EVERY DAY  Patient taking differently: Take 40 mg by mouth nightly. 7/30/21  Yes Ada Lopez MD   topiramate (TOPAMAX) 200 mg tablet Take 1 Tablet by mouth two (2) times a day. 7/30/21  Yes Los Kahn MD   pregabalin (Lyrica) 200 mg capsule Take 1 Cap by mouth two (2) times a day. Max Daily Amount: 400 mg. 3/5/21  Yes Los Kahn MD   acetaminophen (TYLENOL) 500 mg tablet Take 500 mg by mouth every six (6) hours as needed for Pain. for mild to moderate pain on a scclae of  3/10-6/10. Yes Provider, Historical   glucose blood VI test strips (BLOOD GLUCOSE TEST) strip 100 Each by Does Not Apply route See Admin Instructions. One Touch Ultra Test Strips=Check blood sugar daily dx E11.8 8/11/19  Yes Hudson Laughlin NP   albuterol (PROVENTIL VENTOLIN) 2.5 mg /3 mL (0.083 %) nebulizer solution 3 mL by Nebulization route every six (6) hours as needed for Wheezing. 12/31/18  Yes Ada Lopez MD   menthol-zinc oxide (CALMOSEPTINE) 0.44-20.6 % oint Apply 1 Each to affected area daily as needed for PRN Reason (Other) (thin layer to buttocks for skin irritation). Yes Provider, Historical   lidocaine (LIDODERM) 5 % Apply patch to the affected area for 12 hours a day and remove for 12 hours a day.  8/15/18  Yes Johanna Rothman MD       Review of Systems:    General, constitutional: negative  Eyes, vision: negative  Ears, nose, throat: negative  Cardiovascular, heart: negative  Respiratory: negative  Gastrointestinal: negative  Genitourinary: negative  Musculoskeletal: negative  Skin and integumentary: negative  Psychiatric: negative  Endocrine: negative  Neurological: negative, except for HPI  Hematologic/lymphatic: negative  Allergy/immunology: negative      PHYSICAL EXAMINATION:   Visit Vitals  /63   Pulse 73   Temp 98.2 °F (36.8 °C)   Resp 15   Ht 6' (1.829 m)   Wt 179 lb 0.2 oz (81.2 kg)   LMP 09/01/2010   SpO2 96%   Breastfeeding No   BMI 24.28 kg/m²       Physical Exam:  General:  no acute distress  Neck: no carotid bruits  Lungs: clear to auscultation  Heart:  no murmurs, regular rate and rhythm   Lower extremity: no edema    Neurological exam:  Mental Status: Awake, alert, oriented to person, place and time  Attention and Concentration: able to state the days of the week backwards   Speech and Language: Mild dysarthria. Able to name, repeat and follow commands. She did have some hesitancy of speech and some word finding difficulty. She was slow to answer questions  Fund of knowledge was preserved    Cranial nerves: II-XII  Pupils equal and reactive, visual fields intact by confrontation   Extraocular movements intact, no evidence of nystagmus or ptosis   Facial sensation intact   Facial movements symmetric   Hearing intact to soft rub bilaterally   Shoulder shrug symmetric and strong   Tongue protrusion full and midline without fasciculation or atrophy    Motor:   Normal tone and Bulk   Drift: No evidence of pronator drift     Strength testing:   deltoid triceps biceps Wrist ext. Wrist flex. intrinsics   Right 4+ 4+ 4+ 4+ 4+ 4+   Left 5 5 5 5 5 5      Hip flex. Hip ext. Knee ext. Knee flex Dorsi flex Plantar flex   Right  4 4+ 4+ 4+ 4+ 5   Left  5 5 5 5 5 5       Sensory:  Sensation was intact to light touch and pinprick.   She did have a length dependent loss of temperature sensation in the bilateral lower extremities. Reflexes:   Biceps Triceps  Brachiorad Patellar Achilles Plantar Hoffmans   Right  1 1 1 - - Down Neg   Left  1 1 1 - - Down Neg      Cerebellar testing: Finger-nose-finger was intact    Gait was deferred due to concerns over patient safety and she was in the ICU    LAB DATA REVIEWED:    Lab Results   Component Value Date/Time    Hemoglobin A1c 6.7 (H) 08/31/2022 04:20 AM    Sodium 144 08/31/2022 04:20 AM    Potassium 4.1 08/31/2022 04:20 AM    Chloride 115 (H) 08/31/2022 04:20 AM    Glucose 111 (H) 08/31/2022 04:20 AM    BUN 13 08/31/2022 04:20 AM    Creatinine 0.98 08/31/2022 04:20 AM    Calcium 9.3 08/31/2022 04:20 AM    WBC 3.5 (L) 08/31/2022 04:20 AM    HCT 44.7 08/31/2022 04:20 AM    HGB 14.6 08/31/2022 04:20 AM    PLATELET 95 (L) 11/80/5327 04:20 AM    LDL-CHOLESTEROL 91 12/26/2018 12:00 AM       Stroke workup    MRI Brain  No acute intracranial abnormality. 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 Vasculitis is in  the differential diagnostic consideration. Patchy old cortical infarcts, unchanged. CTA head  IMPRESSION  CTA Head:  1. No evidence of significant stenosis or aneurysm. 2. Redemonstrated small age-indeterminate infarct in the left ventral thalamus. Consider MRI for further evaluation. CTA Neck:  1. No evidence of significant stenosis. CT Brain Perfusion:  1. No acute abnormality.     TTE:   Pending    Stroke labs:    HgBA1c    Lab Results   Component Value Date/Time    Hemoglobin A1c 6.7 (H) 08/31/2022 04:20 AM       LDL   Lab Results   Component Value Date/Time    LDL-CHOLESTEROL 91 12/26/2018 12:00 AM    LDL, calculated 70.8 08/31/2022 04:20 AM       EKG: Normal sinus rhythm    Assessment and Plan:   Racheal Moreno is a 74 y.o. female with a history of myasthenia gravis, multiple sclerosis and CIDP who is currently admitted to the hospital with difficulty with speech which appears slightly worse than her baseline. She is status post TNKase. MRI of the brain did not reveal any evidence of a stroke. Difficulty with speech: Etiology is unclear however may be due to recrudescence of old stroke symptoms as her speech was to be slightly worse than her baseline. MRI of the brain did not show any evidence of a stroke. This could also have been due to an aborted stroke. - Recommend maintaining BP goal is less than 140/90 (this is the long term goal)   -As she has no evidence of hemorrhage on her MRI, she can be started on aspirin 81 mg daily for secondary stroke prevention. Would additionally start Plavix 75 mg daily for total of 21 days.  - Risk factor modification: Hemoglobin A1c is 6.7 and LDL is 70  - please obtain TTE to rule out intracardiac thrombus   - would monitor on telemetry to rule out arrhythmias    - please obtain PT/OT and speech consultations   -Would also rule out any possible cause of infection which could have caused a change in her baseline stroke deficits    Multiple sclerosis: MRI of the brain did not show any evidence of demyelination. There was question of possible vasculitis in the MRI however her CTA head and neck did not reveal any evidence of irregularity in the intracranial vessels.  -We will defer to her primary neurology for ongoing treatment of her MS.  -We will continue with Jayro. We discussed extensively the importance of lifestyle modification including smoking cessation, diet, and incorporating exercise into daily routine. Thank you for the consult, will sign off. Please call with any additional questions. Dorothy Jim MD       40 minutes was spent providing medical care of this critically ill patient reviewing records, obtaining additional history from family, examining patient , discussing with collaborating physicians and nursing, and discussing treatment plans.

## 2022-08-31 NOTE — PROGRESS NOTES
1900: Bedside and Verbal shift change report given to Clara Mcdonald RN (oncoming nurse) by Rochelle Rebolledo RN (offgoing nurse). Report included the following information SBAR, Kardex, ED Summary, Procedure Summary, Intake/Output, MAR, Recent Results, and Cardiac Rhythm NSR .     1930: Change of shift NIH performed by Ever uMro and Rochelle Rebolledo RN. NIH score 5.     2000: shift assessment complete. Pt is A+O to person and place disoriented to time and situation. Pt is in NSR; afebrile VSS. Lung sounds are diminished; skin is dry warm and intact. No edema present; pulses palpable in all four extrem. See flow sheet for further details. 0000: reassessment complete; no acute changes noted    0400: reassessment complete; no acute changes noted. 0700: Bedside and Verbal shift change report given to SUMIT VELEZ (oncoming nurse) by Clara Mcdonald RN (offgoing nurse). Report included the following information SBAR, Kardex, ED Summary, Intake/Output, MAR, Recent Results, and Cardiac Rhythm NSR .

## 2022-08-31 NOTE — PROGRESS NOTES
Patient admitted with strokelike symptoms yesterday, tPA was given. Patient monitored in ICU for 24 hours. Repeat CT head showed no acute abnormality. MRI brain and echo pending. Neurology evaluation pending. Patient was deemed stable for transfer out of ICU by intensivist today. Once patient moves out of ICU hospitalist team will follow closely. Await neurology evaluation and recommendations. Continue PT/OT/SLP.     Nonbillable note

## 2022-09-01 ENCOUNTER — APPOINTMENT (OUTPATIENT)
Dept: NON INVASIVE DIAGNOSTICS | Age: 67
DRG: 062 | End: 2022-09-01
Attending: STUDENT IN AN ORGANIZED HEALTH CARE EDUCATION/TRAINING PROGRAM
Payer: MEDICARE

## 2022-09-01 LAB
ECHO AR MAX VEL PISA: 4 M/S
ECHO AV AREA PEAK VELOCITY: 2.8 CM2
ECHO AV AREA VTI: 3 CM2
ECHO AV AREA/BSA PEAK VELOCITY: 1.4 CM2/M2
ECHO AV AREA/BSA VTI: 1.5 CM2/M2
ECHO AV MEAN GRADIENT: 4 MMHG
ECHO AV MEAN VELOCITY: 0.9 M/S
ECHO AV PEAK GRADIENT: 9 MMHG
ECHO AV PEAK VELOCITY: 1.5 M/S
ECHO AV REGURGITANT PHT: 443.4 MILLISECOND
ECHO AV VELOCITY RATIO: 0.87
ECHO AV VTI: 30.5 CM
ECHO LA DIAMETER INDEX: 1.43 CM/M2
ECHO LA DIAMETER: 2.9 CM
ECHO LV E' LATERAL VELOCITY: 7 CM/S
ECHO LV E' SEPTAL VELOCITY: 3 CM/S
ECHO LV EDV A4C: 148 ML
ECHO LV EDV INDEX A4C: 73 ML/M2
ECHO LV EJECTION FRACTION A4C: 60 %
ECHO LV ESV A4C: 60 ML
ECHO LV ESV INDEX A4C: 30 ML/M2
ECHO LV FRACTIONAL SHORTENING: 20 % (ref 28–44)
ECHO LV INTERNAL DIMENSION DIASTOLE INDEX: 2.27 CM/M2
ECHO LV INTERNAL DIMENSION DIASTOLIC MMODE: 4.8 CM (ref 3.9–5.3)
ECHO LV INTERNAL DIMENSION DIASTOLIC: 4.6 CM (ref 3.9–5.3)
ECHO LV INTERNAL DIMENSION SYSTOLIC INDEX: 1.82 CM/M2
ECHO LV INTERNAL DIMENSION SYSTOLIC MMODE: 3.8 CM
ECHO LV INTERNAL DIMENSION SYSTOLIC: 3.7 CM
ECHO LV IVSD: 1 CM (ref 0.6–0.9)
ECHO LV MASS 2D: 217.4 G (ref 67–162)
ECHO LV MASS INDEX 2D: 107.1 G/M2 (ref 43–95)
ECHO LV POSTERIOR WALL DIASTOLIC: 1.5 CM (ref 0.6–0.9)
ECHO LV RELATIVE WALL THICKNESS RATIO: 0.65
ECHO LVOT AREA: 3.1 CM2
ECHO LVOT AV VTI INDEX: 0.95
ECHO LVOT DIAM: 2 CM
ECHO LVOT MEAN GRADIENT: 4 MMHG
ECHO LVOT PEAK GRADIENT: 7 MMHG
ECHO LVOT PEAK VELOCITY: 1.3 M/S
ECHO LVOT STROKE VOLUME INDEX: 44.9 ML/M2
ECHO LVOT SV: 91.1 ML
ECHO LVOT VTI: 29 CM
ECHO MV A VELOCITY: 0.92 M/S
ECHO MV AREA VTI: 4.5 CM2
ECHO MV E DECELERATION TIME (DT): 278.8 MS
ECHO MV E VELOCITY: 0.48 M/S
ECHO MV E/A RATIO: 0.52
ECHO MV E/E' LATERAL: 6.86
ECHO MV E/E' RATIO (AVERAGED): 11.43
ECHO MV E/E' SEPTAL: 16
ECHO MV LVOT VTI INDEX: 0.7
ECHO MV MAX VELOCITY: 0.7 M/S
ECHO MV MEAN GRADIENT: 1 MMHG
ECHO MV MEAN VELOCITY: 0.4 M/S
ECHO MV PEAK GRADIENT: 2 MMHG
ECHO MV VTI: 20.4 CM
ECHO PV MAX VELOCITY: 0.9 M/S
ECHO PV PEAK GRADIENT: 3 MMHG
GLUCOSE BLD STRIP.AUTO-MCNC: 103 MG/DL (ref 65–117)
GLUCOSE BLD STRIP.AUTO-MCNC: 114 MG/DL (ref 65–117)
GLUCOSE BLD STRIP.AUTO-MCNC: 191 MG/DL (ref 65–117)
GLUCOSE BLD STRIP.AUTO-MCNC: 83 MG/DL (ref 65–117)
SERVICE CMNT-IMP: ABNORMAL
SERVICE CMNT-IMP: NORMAL

## 2022-09-01 PROCEDURE — 97116 GAIT TRAINING THERAPY: CPT

## 2022-09-01 PROCEDURE — 65270000046 HC RM TELEMETRY

## 2022-09-01 PROCEDURE — 74011250637 HC RX REV CODE- 250/637: Performed by: INTERNAL MEDICINE

## 2022-09-01 PROCEDURE — 93306 TTE W/DOPPLER COMPLETE: CPT

## 2022-09-01 PROCEDURE — 97530 THERAPEUTIC ACTIVITIES: CPT

## 2022-09-01 PROCEDURE — 3331090001 HH PPS REVENUE CREDIT

## 2022-09-01 PROCEDURE — 82962 GLUCOSE BLOOD TEST: CPT

## 2022-09-01 PROCEDURE — 97535 SELF CARE MNGMENT TRAINING: CPT

## 2022-09-01 PROCEDURE — 74011636637 HC RX REV CODE- 636/637: Performed by: INTERNAL MEDICINE

## 2022-09-01 PROCEDURE — 3331090002 HH PPS REVENUE DEBIT

## 2022-09-01 PROCEDURE — 74011000250 HC RX REV CODE- 250: Performed by: INTERNAL MEDICINE

## 2022-09-01 PROCEDURE — 74011250637 HC RX REV CODE- 250/637: Performed by: HOSPITALIST

## 2022-09-01 RX ORDER — CLOPIDOGREL BISULFATE 75 MG/1
75 TABLET ORAL DAILY
Status: DISCONTINUED | OUTPATIENT
Start: 2022-09-02 | End: 2022-09-02 | Stop reason: HOSPADM

## 2022-09-01 RX ORDER — GUAIFENESIN 100 MG/5ML
81 LIQUID (ML) ORAL DAILY
Status: DISCONTINUED | OUTPATIENT
Start: 2022-09-02 | End: 2022-09-02 | Stop reason: HOSPADM

## 2022-09-01 RX ORDER — ACETAMINOPHEN 500 MG
1000 TABLET ORAL ONCE
Status: COMPLETED | OUTPATIENT
Start: 2022-09-01 | End: 2022-09-01

## 2022-09-01 RX ADMIN — LEVOTHYROXINE SODIUM 137 MCG: 0.11 TABLET ORAL at 06:18

## 2022-09-01 RX ADMIN — PREGABALIN 200 MG: 25 CAPSULE ORAL at 17:32

## 2022-09-01 RX ADMIN — TOPIRAMATE 200 MG: 100 TABLET, FILM COATED ORAL at 09:09

## 2022-09-01 RX ADMIN — PREGABALIN 200 MG: 25 CAPSULE ORAL at 09:08

## 2022-09-01 RX ADMIN — ACETAMINOPHEN 1000 MG: 500 TABLET ORAL at 20:47

## 2022-09-01 RX ADMIN — TOPIRAMATE 200 MG: 100 TABLET, FILM COATED ORAL at 22:31

## 2022-09-01 RX ADMIN — PYRIDOSTIGMINE BROMIDE 120 MG: 60 TABLET ORAL at 17:31

## 2022-09-01 RX ADMIN — PYRIDOSTIGMINE BROMIDE 120 MG: 60 TABLET ORAL at 22:30

## 2022-09-01 RX ADMIN — Medication 3 UNITS: at 12:23

## 2022-09-01 RX ADMIN — PYRIDOSTIGMINE BROMIDE 120 MG: 60 TABLET ORAL at 09:09

## 2022-09-01 RX ADMIN — ROSUVASTATIN 40 MG: 20 TABLET, FILM COATED ORAL at 09:09

## 2022-09-01 NOTE — PROGRESS NOTES
Problem: Mobility Impaired (Adult and Pediatric)  Goal: *Acute Goals and Plan of Care (Insert Text)  Description: FUNCTIONAL STATUS PRIOR TO ADMISSION: Patient was modified independent using a rollator and power wheelchair for functional mobility. HOME SUPPORT PRIOR TO ADMISSION: The patient lived with son. Physical Therapy Goals  Initiated 8/31/2022  1. Patient will move from supine to sit and sit to supine  in bed with independence within 7 day(s). 2.  Patient will transfer from bed to chair and chair to bed with modified independence using the least restrictive device within 7 day(s). 3.  Patient will perform sit to stand with modified independence within 7 day(s). 4.  Patient will ambulate with modified independence for 75 feet with the least restrictive device within 7 day(s). Outcome: Progressing Towards Goal     PHYSICAL THERAPY TREATMENT  Patient: Yajaira Galvin (68 y.o. female)  Date: 9/1/2022  Diagnosis: CVA (cerebral vascular accident) ([facility] [I63.9] <principal problem not specified>      Precautions: Fall  Chart, physical therapy assessment, plan of care and goals were reviewed. ASSESSMENT  Patient continues with skilled PT services and is progressing towards goals. Pt was received in the chair and cleared by nursing to mobilize. She was able to come to stand with RW and ambulated into the francis at an extended distance. No overt loss of balance noted during the session. She reports that she is close to her baseline. Returned to the room and left working with OT. Current Level of Function Impacting Discharge (mobility/balance): CGA    Other factors to consider for discharge:          PLAN :  Patient continues to benefit from skilled intervention to address the above impairments. Continue treatment per established plan of care. to address goals.     Recommendation for discharge: (in order for the patient to meet his/her long term goals)  Physical therapy at least 2 days/week in the home     This discharge recommendation:  Has been made in collaboration with the attending provider and/or case management    IF patient discharges home will need the following DME: patient owns DME required for discharge       SUBJECTIVE:   Patient stated I feel pretty good.     OBJECTIVE DATA SUMMARY:   Critical Behavior:  Neurologic State: Alert  Orientation Level: Oriented X4  Cognition: Appropriate decision making, Appropriate for age attention/concentration, Appropriate safety awareness, Follows commands  Safety/Judgement: Awareness of environment, Good awareness of safety precautions  Functional Mobility Training:  Bed Mobility:         Session began and ended in sitting           Transfers:  Sit to Stand: Contact guard assistance  Stand to Sit: Contact guard assistance                             Balance:  Sitting: Intact  Standing: Impaired  Standing - Static: Good;Constant support  Standing - Dynamic : Fair;Constant support  Ambulation/Gait Training:  Distance (ft): 175 Feet (ft)  Assistive Device: Gait belt;Walker, rolling  Ambulation - Level of Assistance: Contact guard assistance        Gait Abnormalities: Trunk sway increased;Decreased step clearance              Speed/Gerda: Slow  Step Length: Left shortened;Right shortened                  Activity Tolerance:   Good    After treatment patient left in no apparent distress: Working with OT in the bathroom    COMMUNICATION/COLLABORATION:   The patients plan of care was discussed with: Occupational therapist and Registered nurse.      Terence Tapia, PT, DPT   Time Calculation: 14 mins

## 2022-09-01 NOTE — PROGRESS NOTES
Comprehensive Nutrition Assessment    Type and Reason for Visit: Initial, Positive nutrition screen    Nutrition Recommendations/Plan:   Continue Carb Controlled Diet/60 g CHO/meal.  RD discontinued Glucerna Shake; ordered Ensure High Protein Chocolate once daily. Please document % meals and supplements consumed in flowsheet I/O's under intake. Malnutrition Assessment:  Malnutrition Status:  No malnutrition (09/01/22 1610)      Nutrition Assessment:    Chart reviewed; med noted for CVA on admission. RD visited with pt at bedside, reports good appetite and consuming most of meals. RD briefly explained to pt that she is currently on a Carb Controlled Diet for BG management. Pt has Glucerna ordered but prefers chocolate flavor so changed supplement to Ensure High Protein which is actually lower in CHO and higher in protein. Patient Vitals for the past 168 hrs:   % Diet Eaten   09/01/22 0800 51 - 75%   08/31/22 1629 76 - 100%   08/31/22 1229 76 - 100%   08/31/22 0906 76 - 100%     Last Weight Metric  Weight Loss Metrics 9/1/2022 8/30/2022 8/25/2022 8/6/2022 8/5/2022 7/21/2022 7/19/2022   Today's Wt 179 lb - 178 lb 6.4 oz 178 lb 178 lb 177 lb 177 lb 7.5 oz   BMI - 24.28 kg/m2 24.2 kg/m2 24.14 kg/m2 24.14 kg/m2 24.01 kg/m2 24.07 kg/m2      Nutrition Related Findings:    Labs: ; Meds: reviewed      Current Nutrition Intake & Therapies:        ADULT DIET Regular; 4 carb choices (60 gm/meal); Low Fat/Low Chol/High Fiber/KEEGAN; Chocolate flavor Ensure High Protein with dinner meal; no vanilla  ADULT ORAL NUTRITION SUPPLEMENT Dinner; Low Calorie/High Protein    Anthropometric Measures:  Height: 6' (182.9 cm)  Ideal Body Weight (IBW): 160 lbs (73 kg)     Current Body Wt:  81.2 kg (179 lb 0.2 oz), 111.9 % IBW. Current BMI (kg/m2): 24.3                          BMI Category: Normal weight (BMI 18.5-24. 9)    Estimated Daily Nutrient Needs:  Energy Requirements Based On: Formula  Weight Used for Energy Requirements: Current  Energy (kcal/day): 1900 (BMR x 1. 3AF)  Weight Used for Protein Requirements: Current  Protein (g/day): 81-97 (1.0-1.2 g/kg bw)  Method Used for Fluid Requirements: 1 ml/kcal  Fluid (ml/day): 1900 ml/day    Nutrition Diagnosis:   No nutrition diagnosis at this time     Nutrition Interventions:   Coordination of Nutrition Care: Continue to monitor while inpatient    Goals:     Goals: PO intake 50% or greater, by next RD assessment       Nutrition Monitoring and Evaluation:   Behavioral-Environmental Outcomes: None identified  Food/Nutrient Intake Outcomes: Food and nutrient intake, Supplement intake  Physical Signs/Symptoms Outcomes: Biochemical data, Skin, Weight    Discharge Planning:    Continue current diet, Continue oral nutrition supplement    Jacob Warner RD  Contact:

## 2022-09-01 NOTE — PROGRESS NOTES
Attempted to see pt for PT tx. Currently off the floor. Will continues to follow. Recommendation was for HHPT.

## 2022-09-01 NOTE — PROGRESS NOTES
Problem: Self Care Deficits Care Plan (Adult)  Goal: *Acute Goals and Plan of Care (Insert Text)  Description: FUNCTIONAL STATUS PRIOR TO ADMISSION: Patient was modified independent using a rollator for community mobility and reported furniture walking for household distances. Patient was modified independent for basic and instrumental ADLs. Patient has hx of CVA with R sided weakness. HOME SUPPORT: The patient lived with son. Occupational Therapy Goals  Initiated 8/31/2022  1. Patient will perform grooming standing at sink with modified independence within 7 day(s). 2.  Patient will perform upper body dressing with modified independence within 7 day(s). 3.  Patient will perform lower body dressing with modified independence within 7 day(s). 4.  Patient will perform toilet transfers with modified independence within 7 day(s). 5.  Patient will perform all aspects of toileting with modified independence within 7 day(s). Outcome: Progressing Towards Goal   OCCUPATIONAL THERAPY TREATMENT  Patient: Marshall Galvin (68 y.o. female)  Date: 9/1/2022  Diagnosis: CVA (cerebral vascular accident) [facility] [I63.9] <principal problem not specified>      Precautions: Fall  Chart, occupational therapy assessment, plan of care, and goals were reviewed. ASSESSMENT  Patient continues with skilled OT services and is progressing towards goals. Patient received sitting in recliner chair and agreeable for therapy. Patient completed sit > stand with stand-by assist and donned hospital gown around back without assist. Patient walked in hallway with PT present using rolling walker and tolerated task well. Once back in room, patient walked into bathroom to complete toileting/hygiene and grooming at sink. Patient tolerated standing at sink for approx 7 mins while completing all tasks, tolerating well. No c/o headache this session. Patient returned to recliner chair and was left with all needs met.  Patient would continue to benefit from skilled OT services during acute hospital stay. Recommend patient discharge home with HHOT/PT and assist from family as needed. Current Level of Function Impacting Discharge (ADLs): supervision to stand-by assist for self-care, stand-by assist for functional mobility using rolling walker     Other factors to consider for discharge: fall risk, hx of CVA         PLAN :  Patient continues to benefit from skilled intervention to address the above impairments. Continue treatment per established plan of care to address goals. Recommendation for discharge: (in order for the patient to meet his/her long term goals)  Occupational therapy at least 2 days/week in the home AND ensure assist and/or supervision for safety with ADLs and functional mobility    This discharge recommendation:  Has been made in collaboration with the attending provider and/or case management    IF patient discharges home will need the following DME: patient owns DME required for discharge       SUBJECTIVE:   Patient stated I feel like a new woman.     OBJECTIVE DATA SUMMARY:   Cognitive/Behavioral Status:  Neurologic State: Alert  Orientation Level: Oriented X4  Cognition: Follows commands  Perception: Appears intact  Perseveration: No perseveration noted  Safety/Judgement: Awareness of environment;Good awareness of safety precautions    Functional Mobility and Transfers for ADLs:    Transfers:  Sit to Stand: Stand-by assistance  Stand to Sit: Stand-by assistance   Functional Transfers  Bathroom Mobility: Stand-by assistance  Toilet Transfer : Stand-by assistance  Cues: Verbal cues provided    Balance:  Sitting: Intact  Standing: Impaired; With support  Standing - Static: Good;Constant support  Standing - Dynamic : Fair;Constant support    ADL Intervention:    Grooming  Position Performed: Standing (at sink)  Washing Face: Supervision  Washing Hands: Supervision  Brushing Teeth: Supervision  Cues: Verbal cues provided    Upper Body Dressing Assistance  Shirt simulation with hospital gown: Set-up; Supervision (in standing)  Cues: Verbal cues provided    Toileting  Bladder Hygiene: Stand-by assistance  Bowel Hygiene: Stand-by assistance  Clothing Management: Stand-by assistance  Cues: Verbal cues provided    Cognitive Retraining  Safety/Judgement: Awareness of environment;Good awareness of safety precautions    Pain:  Patient did not c/o pain during session. Activity Tolerance:   Good    After treatment patient left in no apparent distress:   Sitting in chair and Call bell within reach    COMMUNICATION/COLLABORATION:   The patients plan of care was discussed with: Physical therapist and Registered nurse.      Mariusz Abraham OTR/L  Time Calculation: 23 mins

## 2022-09-01 NOTE — PROGRESS NOTES
Speech Pathology Note    New orders received. Note patient seen and evaluated by SLP 8/31 which revealed \"a functional oropharyngeal swallow\" and \"non-fluent speech and occasional word retrieval deficits in informal conversation; however, able to effectively communicate wants/needs. \" MRI showed \"no acute intracranial abnormality. \" Per chart review, patient currently receiving SLP treatment through [facility]. Recommend continued [facility] SLP services at discharge given patient's motor speech and language deficits prior to admission. Thank you.     Randee Reed M.S., CCC-SLP

## 2022-09-01 NOTE — PROGRESS NOTES
Care Management:    Transition of Care- Home with resumption of [address]  PT/OT/SLP   Patient is open to [facility] if discharged home would need Resumption of Care order home health PT/OT /SLP  Will continue to monitor patients discharge planning and clinical progress  RUR 9%  Transportation at Discharge- [place]     Patient lives with her son Domenica Last and receives home health services with [facility]. DMECldiony Martinez      Reason for Admission:  CVA             Primary Decision Maker: Minor III,Wilmer - Angelo - [phone]                  Care Management Interventions  PCP Verified by CM: Yes  Mode of Transport at Discharge: Self  Transition of Care Consult (CM Consult): [address]: Yes  Physical Therapy Consult: Yes  Occupational Therapy Consult: Yes  Speech Therapy Consult: Yes  Support Systems: Child(jose m), Parent(s)  Confirm Follow Up Transport: Family  Discharge Location  Patient Expects to be Discharged to[de-identified] Home with home health    Patient working with therapy and able to walk in hallway and do am care. Almost back to baseline. Patient asking about some assist in home a couple days a week . CM gave her a list of Private Duty agencies.      Seth Mora RN ACM [id number]

## 2022-09-01 NOTE — PROGRESS NOTES
Hospitalist Progress Note    NAME: Viri Olivera Minor   :  1955   MRN:  [id number]            Subjective:     Chief Complaint / Reason for Physician Visit  \"Slow speech\". Discussed with RN events overnight. Patient reports that her PT noticed that she was speaking slower than usual at home. She denies recent illness or exposure, fever, chills, cough, diaphoresis, urinary frequency/urgency, dysuria, and hematuria. Today she feels \"almost\" back to baseline. She reports \"my mother and my brother want me to spend 1-2 weeks in a facility\" because they don't want to [facility] home and find me dead or something\". Patient reports she has no depression, SI/HI. She reports she feels safe at home and appreciated the re-initiation of HH PT/OT/SLP by her neurologist.    Review of Systems:  Symptom Y/N Comments  Symptom Y/N Comments   Fever/Chills n   Chest Pain n    Poor Appetite    Edema     Cough    Abdominal Pain n    Sputum    Joint Pain     SOB/PRICE n   Pruritis/Rash     Nausea/vomit n   Tolerating PT/OT y    Diarrhea    Tolerating Diet y    Constipation    Other       Could NOT obtain due to:      Objective:     VITALS:   Last 24hrs VS reviewed since prior progress note. Most recent are:  Patient Vitals for the past 24 hrs:   Temp Pulse Resp BP SpO2   22 1255 98.7 °F (37.1 °C) 67 16 110/60 100 %   22 0827 -- -- -- (!) 147/66 --   22 0825 97.7 °F (36.5 °C) (!) 58 16 (!) 147/66 100 %   22 0223 97.9 °F (36.6 °C) 63 18 (!) 145/62 99 %   22 2223 97.5 °F (36.4 °C) 65 18 137/66 99 %   22 1823 98.1 °F (36.7 °C) 66 12 138/69 99 %   22 1729 -- 74 17 107/78 --   22 1659 -- 73 15 122/63 --   22 1629 -- 69 22 114/63 --   22 1559 98.2 °F (36.8 °C) 66 17 (!) 121/55 --       Intake/Output Summary (Last 24 hours) at 2022 1550  Last data filed at 2022 1629  Gross per 24 hour   Intake 400 ml   Output --   Net 400 ml        PHYSICAL EXAM:  General: WD, WN.  Alert, cooperative, no acute distress    EENT:  EOMI. Anicteric sclerae. MMM  Resp:  CTA bilaterally, no wheezing or rales. No accessory muscle use  CV:  Regular  rhythm,  No edema  GI:  Soft, Non distended, Non tender. +Bowel sounds  Neurologic:  Alert and oriented X 3, halting speech with occasional difficulty with word finding, 4/5 gross RUE and RLE weakness with decreased sensation to light touch on the right. Psych:   Good insight. Not anxious nor agitated  Skin:  No rashes. No jaundice    Procedures: see electronic medical records for all procedures/Xrays and details which were not copied into this note but were reviewed prior to creation of Plan. LABS:  I reviewed today's most current labs and imaging studies. Pertinent labs include:  Recent Labs     08/31/22  0420 08/30/22  1502   WBC 3.5* 5.1   HGB 14.6 14.0   HCT 44.7 44.0   PLT 95* 115*     Recent Labs     08/31/22  0420 08/30/22  1502    142   K 4.1 3.6   * 113*   CO2 25 25   * 121*   BUN 13 15   CREA 0.98 0.96   CA 9.3 9.6   MG 2.3  --    PHOS 3.6  --    ALB  --  3.4*   TBILI  --  0.3   ALT  --  13   INR  --  1.1       Signed: Annmarie Oneill MD        Reviewed most current lab test results and cultures  YES  Reviewed most current radiology test results   YES  Review and summation of old records today    NO  Reviewed patient's current orders and MAR    YES  PMH/SH reviewed - no change compared to H&P      Assessment / Plan:  CVA s/p TPA  8/30 CT head: 1. Small age-indeterminate infarct in the left ventral thalamus is new since 8/8/2020, and may be acute to subacute given clinical history. Consider MRI for further evaluation. 2. No evidence of acute hemorrhage. 8/31 MRI IMPRESSION No acute intracranial abnormality. Nonspecific white matter lesions are inconsistent with demyelinating plaques which likely represents microangiopathic white matter disease. Vasculitis is in the differential diagnostic consideration.  Patchy old cortical infarcts, unchanged. 9/1 echo LVEF 50-55% with increased wall thickness. UA and CXR okay. Neurology consulted, recs appreciated  Labetolol prn for BP  ASA 81 mg daily  Plavix 75mg po x21 days  PT/OT/SLP  Statin/ASA    DM  8/31/22 HbA1C 6.7  SSI    H/o MG and MS  Hypothyroidism  Continue home synthroid, pyridostigmine      18.5 - 24.9 Normal weight / Body mass index is 24.28 kg/m². Code status: Full  Prophylaxis:  s/p TPA, SCDs  Recommended Disposition:  PT, OT, RN, possible dc tomorrow     ________________________________________________________________________  Care Plan discussed with:    Comments   Patient x    Family      RN     Care Manager     Consultant                        Multidiciplinary team rounds were held today with , nursing, pharmacist and clinical coordinator. Patient's plan of care was discussed; medications were reviewed and discharge planning was addressed.      ________________________________________________________________________  Total NON critical care TIME:  35   Minutes    Total CRITICAL CARE TIME Spent:   Minutes non procedure based      Comments   >50% of visit spent in counseling and coordination of care     ________________________________________________________________________  Audi Brito MD

## 2022-09-01 NOTE — PROGRESS NOTES
Occupational Therapy note:    Chart reviewed and patient currently NEYDA when attempting to see for OT tx session. Will defer and continue to follow. Recommend patient discharge home with HHOT/PT and assist from family as needed.     Scar Da Silva OTR/L

## 2022-09-02 VITALS
TEMPERATURE: 98 F | BODY MASS INDEX: 24.24 KG/M2 | WEIGHT: 179 LBS | RESPIRATION RATE: 18 BRPM | OXYGEN SATURATION: 100 % | DIASTOLIC BLOOD PRESSURE: 61 MMHG | HEART RATE: 65 BPM | HEIGHT: 72 IN | SYSTOLIC BLOOD PRESSURE: 122 MMHG

## 2022-09-02 LAB
GLUCOSE BLD STRIP.AUTO-MCNC: 98 MG/DL (ref 65–117)
SERVICE CMNT-IMP: NORMAL

## 2022-09-02 PROCEDURE — 74011250637 HC RX REV CODE- 250/637: Performed by: INTERNAL MEDICINE

## 2022-09-02 PROCEDURE — 74011250637 HC RX REV CODE- 250/637: Performed by: STUDENT IN AN ORGANIZED HEALTH CARE EDUCATION/TRAINING PROGRAM

## 2022-09-02 PROCEDURE — 82962 GLUCOSE BLOOD TEST: CPT

## 2022-09-02 PROCEDURE — 3331090002 HH PPS REVENUE DEBIT

## 2022-09-02 PROCEDURE — 74011000250 HC RX REV CODE- 250: Performed by: INTERNAL MEDICINE

## 2022-09-02 PROCEDURE — 3331090001 HH PPS REVENUE CREDIT

## 2022-09-02 RX ORDER — CLOPIDOGREL BISULFATE 75 MG/1
75 TABLET ORAL DAILY
Qty: 20 TABLET | Refills: 0 | Status: SHIPPED | OUTPATIENT
Start: 2022-09-03 | End: 2022-09-23

## 2022-09-02 RX ADMIN — ROSUVASTATIN 40 MG: 20 TABLET, FILM COATED ORAL at 09:19

## 2022-09-02 RX ADMIN — ASPIRIN 81 MG: 81 TABLET, CHEWABLE ORAL at 09:19

## 2022-09-02 RX ADMIN — TOPIRAMATE 200 MG: 100 TABLET, FILM COATED ORAL at 09:19

## 2022-09-02 RX ADMIN — PYRIDOSTIGMINE BROMIDE 120 MG: 60 TABLET ORAL at 09:19

## 2022-09-02 RX ADMIN — PREGABALIN 200 MG: 25 CAPSULE ORAL at 09:19

## 2022-09-02 RX ADMIN — CLOPIDOGREL BISULFATE 75 MG: 75 TABLET ORAL at 09:19

## 2022-09-02 RX ADMIN — LEVOTHYROXINE SODIUM 137 MCG: 0.11 TABLET ORAL at 05:10

## 2022-09-02 NOTE — PROGRESS NOTES
Pt d/c via wheelchair by PCT. Discharge paperwork with instructions reviewed with pt and pt's son at bedside. All questions answered at this time. Belongings with pt.

## 2022-09-02 NOTE — DISCHARGE INSTRUCTIONS
All of your medications from before your hospitalization are the same EXCEPT:  Your new prescription for you to start is plavix for stroke for 21 days. Please take all of your medications as prescribed. If prescribed any medications, please read all pharmacy instructions and inserts. Inform your doctor and pharmacist about all other medications and alternative therapies. Please follow up with your PCP in 1-2 weeks to be reassessed after your hospital stay. Please also follow up with neurology. If you start feeling any symptoms similar to what brought you into the hospital, please come back to the ED to be re-evaluated.

## 2022-09-02 NOTE — PROGRESS NOTES
Care Management:     Transition of Care- Home with resumption of [facility]  PT/OT/SLP   Discharging today. Resumption order written. [facility] [employer] aware they are discharging today. RUR 10%  Transportation at Discharge- [place]     Patient lives with her son Albino North and receives home health services with [facility]. [facility]      Reason for Admission:  CVA             Primary Decision Maker: Minor III,Wilmer - Son - [phone]                  Care Management Interventions  PCP Verified by CM: Yes  Mode of Transport at Discharge: Self  Transition of Care Consult (CM Consult): [address]: Yes  Physical Therapy Consult: Yes  Occupational Therapy Consult: Yes  Speech Therapy Consult: Yes  Support Systems: Child(jose m), Parent(s)  Confirm Follow Up Transport: Family  Discharge Location  Patient Expects to be Discharged to[de-identified] Home with home health     Patient working with therapy and able to walk in hallway and do am care. Almost back to baseline. Patient asking about some assist in home a couple days a week . CM gave her a list of Private Duty agencies.       David Pandya RN ACM [id number]

## 2022-09-02 NOTE — ROUTINE PROCESS
End of Shift Note    Bedside shift change report given to [facility] (oncoming nurse) by Samuel Bryan RN (offgoing nurse).   Report included the following information SBAR, Kardex, Intake/Output, and MAR    Shift worked:  7p-7a   Shift summary and any significant changes:     None       Concerns for physician to address:  none   Zone phone for oncoming shift:   [phone]     Patient Information  Warren Wilkerson Minor  74 y.o.  8/30/2022  2:16 PM by Keenan Salinas MD. Parris Barr was admitted from Home    Problem List  Patient Active Problem List    Diagnosis Date Noted    CVA (cerebral vascular accident) ([address] 08/30/2022    Chronic renal disease, stage III 07/15/2022    Osteoarthritis of right knee 07/26/2021    Thrombocytopenia ([address] 06/06/2019    Anemia 06/06/2019    Arthritis of left hip 05/13/2019    Thyroglossal duct cyst 06/20/2018    History of CVA (cerebrovascular accident) 06/05/2018    Type 2 diabetes mellitus ([address] 01/05/2018    Chronic, continuous use of opioids 06/05/2017    Degenerative joint disease (DJD) of hip 06/04/2017    Depression     Cervical spinal stenosis 12/02/2016    Benign cyst of left breast 03/21/2016    Vitamin D deficiency 03/17/2016    Myasthenia gravis ([address]     MS (multiple sclerosis) ([address]     Sleep apnea 06/25/2010    DM type 2, controlled, with complication ([address] 11/62/6228    Lumbosacral plexopathy 06/25/2010    HTN, goal below 140/90 06/25/2010    FH: breast cancer 06/25/2010    Hyperlipemia 06/25/2010    Hypothyroid 06/25/2010    Ureteral calculus 06/25/2010     Past Medical History:   Diagnosis Date    Arrhythmia     Arthritis     Bilateral hip replacements, right knee replacement    Benign cyst of left breast 3/21/2016    Blindness and low vision 2004    legally blind from [place]    Cataract     right eye    Cervical spinal stenosis 12/2/2016    Moderate C6-7    Chronic pain     COVID-19 vaccine series completed     PFIZER 3/19/21, 4/9/21    Depression     Diabetes mellitus type 2, controlled ([facility] 9/13/2016    Diet-controlled diabetes mellitus ([facility] 1/5/2018    Endometriosis 6/25/2010    FH: breast cancer 6/25/2010    Chart states FH breast/ovary Ca, CAD,DM     History of CVA (cerebrovascular accident) 6/5/2018    HTN, goal below 140/90 6/25/2010    CURRENTLY NO MEDICATIONS (7-12-21)    Hypercholesterolemia     Hypothyroid 6/25/2010    Incontinence     Lumbosacral plexopathy 6/25/2010    Migraine     H/O MIGRAINE    MS (multiple sclerosis) ([facility] 2004    Myasthenia gravis ([facility] 2011    Sleep apnea 6/25/2010    RESOLVED 5/2019    Stroke ([facility] 2018    RIGHT SIDE WEAKNESS    Ureteral calculus 6/25/2010    Vitamin D deficiency 3/17/2016       Core Measures:  CVA: Yes Yes      Activity:  Activity Level: Up with Assistance    Cardiac:   Cardiac Monitoring: Yes      Cardiac Rhythm: Sinus Rhythm    Access:   Current line(s): PIV     Genitourinary:   Urinary status: voiding and incontinent      Respiratory:   O2 Device: None (Room air)         GI:  Last Bowel Movement Date: 08/30/22  Current diet:  ADULT DIET Regular; 4 carb choices (60 gm/meal); Low Fat/Low Chol/High Fiber/KEEGAN; Chocolate flavor Ensure High Protein with dinner meal; no vanilla  ADULT ORAL NUTRITION SUPPLEMENT Dinner; Low Calorie/High Protein         Pain Management:   Patient states pain is manageable on current regimen: N/A    Skin:  Esteban Score: 19  Interventions: increase time out of bed, PT/OT consult, and nutritional support     Patient Safety:  Fall Score:  Total Score: 4  Interventions: bed/chair alarm, gripper socks, pt to call before getting OOB, and stay with me (per policy)  High Fall Risk: Yes  @Rollbelt  @dexterity to release roll belt  Yes/No ( must document dexterity  here by stating Yes or No here, otherwise this is a restraint and must follow restraint documentation policy.)    DVT prophylaxis:  DVT prophylaxis Med- Not applicable  DVT prophylaxis SCD or BRENDA- Not applicable     Wounds: (If Applicable)  Wounds- No  Location     Active Consults:  IP CONSULT TO HOSPITALIST  IP CONSULT TO NEUROLOGY    Length of Stay:  Expected LOS: 3d 4h  Actual LOS: 3  Discharge Plan: Yes when stable      Mohan Verde RN

## 2022-09-02 NOTE — DISCHARGE SUMMARY
Hospitalist Discharge Summary     Patient ID:  Mary Galvin  [id number]  74 y.o.  1955 8/30/2022    PCP on record: Haylee Cardenas MD    Admit date: 8/30/2022  Discharge date and time: 9/2/2022    DISCHARGE DIAGNOSIS:    CVA    CONSULTATIONS:  IP CONSULT TO HOSPITALIST  IP CONSULT TO NEUROLOGY    Excerpted HPI from H&P of Jacquelyn Lugo MD:  Patient is a 74year old female with h/o MG, MS, CDIP, and frequent falls who presented with difficulty speaking. Per ER notes \"EMS states that pt is essentially nonverbal though she can answer her name. She follows some basic commands. They report R sided facial droop and weakness that speech therapy report are worse than usual.\"  She was gived TPA in the ER. She now reports improvement but continues to have word finding difficulty, right sided weakness and problems \"remembering things\". ______________________________________________________________________  DISCHARGE SUMMARY/HOSPITAL COURSE:  for full details see H&P, daily progress notes, labs, consult notes. CVA s/p TPA  Difficulty with speech: Etiology is unclear however may be due to recrudescence of old stroke symptoms as her speech was to be slightly worse than her baseline. MRI of the brain did not show any evidence of a stroke. This could also have been due to an aborted stroke. 8/30 CT head: 1. Small age-indeterminate infarct in the left ventral thalamus is new since 8/8/2020, and may be acute to subacute given clinical history. Consider MRI for further evaluation. 2. No evidence of acute hemorrhage. 8/31 MRI IMPRESSION No acute intracranial abnormality. Nonspecific white matter lesions are inconsistent with demyelinating plaques which likely represents microangiopathic white matter disease. Vasculitis is in the differential diagnostic consideration. Patchy old cortical infarcts, unchanged.     ASA, statin  Plavix 75mg po x21 days  Continue home PT/OT/SLP  Follow up with primary neurologist     DM  8/31/22 HbA1C 6.7  Resume metformin and diet control at home     H/o MG and MS  Hypothyroidism  Continue home synthroid, pyridostigmine  _______________________________________________________________________  Patient seen and examined by me on discharge day. Pertinent Findings:  Gen:    Not in distress  Chest: Clear lungs  CVS:   Regular rhythm. No edema  Abd:  Soft, not distended, not tender  Neuro:  Alert, oriented  _______________________________________________________________________  DISCHARGE MEDICATIONS:   Current Discharge Medication List        START taking these medications    Details   clopidogreL (PLAVIX) 75 mg tab Take 1 Tablet by mouth daily for 20 doses. Qty: 20 Tablet, Refills: 0  Start date: 9/3/2022, End date: 9/23/2022           CONTINUE these medications which have NOT CHANGED    Details   dantrolene (DANTRIUM) 100 mg capsule Take 100 mg by mouth daily as needed (spasms). pyridostigmine (MESTINON) 60 mg tablet Take 180 mg by mouth two (2) times a day. aspirin delayed-release 81 mg tablet Take 1 Tablet by mouth in the morning. Qty: 30 Tablet, Refills: 0      rimegepant (Nurtec ODT) 75 mg disintegrating tablet Take 75 mg by mouth once as needed for Migraine. levothyroxine (SYNTHROID) 137 mcg tablet TAKE 1 TABLET BY MOUTH EVERY DAY BEFORE BREAKFAST  Qty: 90 Tablet, Refills: 3    Associated Diagnoses: Hypothyroidism, unspecified type      Gilenya 0.5 mg cap Take 1 Capsule by mouth nightly.   Qty: 30 Capsule, Refills: 11    Comments: Dx. G35  Associated Diagnoses: MS (multiple sclerosis) (HCC)      metFORMIN ER (GLUCOPHAGE XR) 500 mg tablet TAKE 3 TABLETS BY MOUTH DAILY  Qty: 270 Tablet, Refills: 1    Associated Diagnoses: Type 2 diabetes mellitus without complication, without long-term current use of insulin (HCC)      PARoxetine (PAXIL) 40 mg tablet TAKE 1 AND 1/2 TABLETS BY MOUTH EVERY NIGHT  Qty: 135 Tablet, Refills: 3      erenumab-aooe (Aimovig Autoinjector) 140 mg/mL injection ADMINISTER 1 ML(140MG) UNDER THE SKIN EVERY 30 DAYS  Qty: 3 mL, Refills: 5    Comments: **Patient requests 90 days supply**  Associated Diagnoses: Migraine without aura and without status migrainosus, not intractable      rosuvastatin (CRESTOR) 40 mg tablet TAKE 1 TABLET BY MOUTH EVERY DAY  Qty: 90 Tablet, Refills: 1    Associated Diagnoses: Hyperlipidemia LDL goal <100      topiramate (TOPAMAX) 200 mg tablet Take 1 Tablet by mouth two (2) times a day. Qty: 180 Tablet, Refills: 2      pregabalin (Lyrica) 200 mg capsule Take 1 Cap by mouth two (2) times a day. Max Daily Amount: 400 mg. Qty: 180 Cap, Refills: 4    Comments: Refill [id number]  Associated Diagnoses: MS (multiple sclerosis) (HCC)      acetaminophen (TYLENOL) 500 mg tablet Take 500 mg by mouth every six (6) hours as needed for Pain. for mild to moderate pain on a scclae of  3/10-6/10.      glucose blood VI test strips (BLOOD GLUCOSE TEST) strip 100 Each by Does Not Apply route See Admin Instructions. One Touch Ultra Test Strips=Check blood sugar daily dx E11.8  Qty: 100 Strip, Refills: 1      albuterol (PROVENTIL VENTOLIN) 2.5 mg /3 mL (0.083 %) nebulizer solution 3 mL by Nebulization route every six (6) hours as needed for Wheezing. Qty: 30 Each, Refills: 0    Associated Diagnoses: Shortness of breath      menthol-zinc oxide (CALMOSEPTINE) 0.44-20.6 % oint Apply 1 Each to affected area daily as needed for PRN Reason (Other) (thin layer to buttocks for skin irritation). lidocaine (LIDODERM) 5 % Apply patch to the affected area for 12 hours a day and remove for 12 hours a day. Qty: 30 Each, Refills: 3               Patient Follow Up Instructions: Activity: Activity as tolerated  Diet: Resume previous diet  Wound Care: None needed    Follow-up with PCP in 7 days.   Follow-up tests/labs BMP  Follow-up Information       Follow up With Specialties Details Why Contact Info    Dulce Kebede MD Family Medicine   [address] [place] [phone]            ________________________________________________________________    Risk of deterioration: Low    Condition at Discharge:  Stable  __________________________________________________________________    Disposition  Home with family and home health services    ____________________________________________________________________    Code Status: Full Code  ___________________________________________________________________      Total time in minutes spent coordinating this discharge (includes going over instructions, follow-up, prescriptions, and preparing report for sign off to her PCP) :  >30 minutes    Signed:  Clement Willard MD

## 2022-09-02 NOTE — PROGRESS NOTES
1342 - Attempted to schedule hospital follow up PCP appointment. Sent a message to PCP office to find patient an appointment. PCP office will contact patient to schedule the appt. Jenise Valenzuela     5775 - [facility] unable to schedule hospital follow up in [facility] due to limited provider availability. PCP does not have an available appt until November 2022. This timeframe does not fit the required timeframe. Jase Menchaca Management Assistant    Attempted to schedule hospital follow up PCP appointment. Unable to reach anyone, unable to leave voicemail. Pending patient discharge.  Jase Menchaca

## 2022-09-03 ENCOUNTER — HOME CARE VISIT (OUTPATIENT)
Dept: SCHEDULING | Facility: HOME HEALTH | Age: 67
End: 2022-09-03
Payer: MEDICARE

## 2022-09-03 VITALS
SYSTOLIC BLOOD PRESSURE: 110 MMHG | HEART RATE: 66 BPM | WEIGHT: 170 LBS | DIASTOLIC BLOOD PRESSURE: 70 MMHG | TEMPERATURE: 98 F | BODY MASS INDEX: 23.03 KG/M2 | HEIGHT: 72 IN

## 2022-09-03 PROCEDURE — 3331090002 HH PPS REVENUE DEBIT

## 2022-09-03 PROCEDURE — 3331090001 HH PPS REVENUE CREDIT

## 2022-09-03 PROCEDURE — G0151 HHCP-SERV OF PT,EA 15 MIN: HCPCS

## 2022-09-04 PROCEDURE — 3331090003 HH PPS REVENUE ADJ

## 2022-09-04 PROCEDURE — 3331090001 HH PPS REVENUE CREDIT

## 2022-09-04 PROCEDURE — 3331090002 HH PPS REVENUE DEBIT

## 2022-09-05 DIAGNOSIS — G37.9 DEMYELINATING DISEASE (HCC): ICD-10-CM

## 2022-09-05 DIAGNOSIS — G35 MULTIPLE SCLEROSIS (HCC): Primary | ICD-10-CM

## 2022-09-05 DIAGNOSIS — R53.1 WEAKNESS GENERALIZED: ICD-10-CM

## 2022-09-05 DIAGNOSIS — R29.6 FREQUENT FALLS: ICD-10-CM

## 2022-09-05 DIAGNOSIS — R26.9 GAIT DISORDER: ICD-10-CM

## 2022-09-05 PROCEDURE — 3331090002 HH PPS REVENUE DEBIT

## 2022-09-05 PROCEDURE — 3331090001 HH PPS REVENUE CREDIT

## 2022-09-06 ENCOUNTER — HOME CARE VISIT (OUTPATIENT)
Dept: SCHEDULING | Facility: HOME HEALTH | Age: 67
End: 2022-09-06
Payer: MEDICARE

## 2022-09-06 ENCOUNTER — PATIENT OUTREACH (OUTPATIENT)
Dept: CASE MANAGEMENT | Age: 67
End: 2022-09-06

## 2022-09-06 ENCOUNTER — OFFICE VISIT (OUTPATIENT)
Dept: FAMILY MEDICINE CLINIC | Age: 67
End: 2022-09-06
Payer: MEDICARE

## 2022-09-06 VITALS
TEMPERATURE: 97.3 F | BODY MASS INDEX: 24.24 KG/M2 | DIASTOLIC BLOOD PRESSURE: 64 MMHG | OXYGEN SATURATION: 97 % | WEIGHT: 179 LBS | SYSTOLIC BLOOD PRESSURE: 128 MMHG | RESPIRATION RATE: 16 BRPM | HEART RATE: 74 BPM | HEIGHT: 72 IN

## 2022-09-06 VITALS
DIASTOLIC BLOOD PRESSURE: 60 MMHG | HEART RATE: 71 BPM | RESPIRATION RATE: 18 BRPM | TEMPERATURE: 97 F | SYSTOLIC BLOOD PRESSURE: 136 MMHG | OXYGEN SATURATION: 98 %

## 2022-09-06 DIAGNOSIS — R29.90 STROKE-LIKE EPISODE: ICD-10-CM

## 2022-09-06 DIAGNOSIS — Z09 HOSPITAL DISCHARGE FOLLOW-UP: Primary | ICD-10-CM

## 2022-09-06 PROCEDURE — 3331090002 HH PPS REVENUE DEBIT

## 2022-09-06 PROCEDURE — 3331090001 HH PPS REVENUE CREDIT

## 2022-09-06 PROCEDURE — 99496 TRANSJ CARE MGMT HIGH F2F 7D: CPT | Performed by: FAMILY MEDICINE

## 2022-09-06 PROCEDURE — G0151 HHCP-SERV OF PT,EA 15 MIN: HCPCS

## 2022-09-06 PROCEDURE — 400013 HH SOC

## 2022-09-06 PROCEDURE — G8427 DOCREV CUR MEDS BY ELIG CLIN: HCPCS | Performed by: FAMILY MEDICINE

## 2022-09-06 NOTE — PROGRESS NOTES
Care Transitions Initial Call    Call within 2 business days of discharge: Yes     Patient: Nneka Duarte Minor Patient : 1955 MRN: [id number]    Last Discharge  Street       Date Complaint Diagnosis Description Type Department Provider    22 Dysarthria Cerebrovascular accident (CVA), unspecified mechanism [facility]) ED to Hosp-Admission (Discharged) (ADMIT) Celso Nieto MD; Marquise Modi. Was this an external facility discharge? No   Challenges to be reviewed by the provider   Additional needs identified to be addressed with provider: no       Method of communication with provider : none    Discussed COVID-19 related testing which was not done at this time. Advance Care Planning:   Does patient have an Advance Directive: yes, reviewed and needs to be updated , will send referral to ACP Facilitator. Inpatient Readmission Risk score: Unplanned Readmit Risk Score: 10.4    Was this a readmission? no   Patient stated reason for the admission: stroke, difficulty speaking, right side weakness    Patients top risk factors for readmission: medical condition-CVA    Interventions to address risk factors: Scheduled appointment with PCP-, Obtained and reviewed discharge summary and/or continuity of care documents, Education of patient/family/caregiver/guardian to support self-management-CVA, and CVA    Care Transition Nurse (CTN) contacted the patient by telephone to perform post hospital discharge assessment. Verified name and  with patient as identifiers. Provided introduction to self, and explanation of the CTN role. CTN reviewed discharge instructions, medical action plan and red flags with patient who verbalized understanding. Were discharge instructions available to patient? yes. Reviewed appropriate site of care based on symptoms and resources available to patient including: PCP, Specialist, [address] [place] [address] Cornelio Miguel, When to call 911, and [facility] .  Patient given an opportunity to ask questions and does not have any further questions or concerns at this time. The patient agrees to contact the PCP office for questions related to their healthcare. Medication reconciliation was performed with patient, who verbalizes understanding of administration of home medications. Advised obtaining a 90-day supply of all daily and as-needed medications. Referral to Pharm D needed: no   START taking these medications     Details   clopidogreL (PLAVIX) 75 mg tab Take 1 Tablet by mouth daily for 20 doses. Qty: 20 Tablet, Refills: 0  Start date: 9/3/2022, End date: 9/23/2022         Home Health/Outpatient orders at discharge: home health care, PT, OT, and [address]: -[facility], [facility],   Date of initial visit: 9/4/22    Durable Medical Equipment ordered at discharge: None    Was patient discharged with a pulse oximeter? no    Discussed follow-up appointments. If no appointment was previously scheduled, appointment scheduling offered: yes. Is follow up appointment scheduled within 7 days of discharge? yes.    [facility] follow up appointment(s):   Future Appointments   Date Time Provider Roel Collins   9/6/2022 10:00 AM Chidi Connell PT [facility]   9/6/2022 11:00 AM Heaven Mays MD PAF[facility]AR BS AMB   9/8/2022 To Be Determined [facility] [address]   9/13/2022 To Be Determined [facility] [address]   9/15/2022 To Be Determined [address]   9/20/2022 To Be Determined [facility]   9/22/2022 To Be Determined [facility]   9/22/2022  3:20 PM Denise Kahn MD [facility] BS AMB   9/27/2022 To Be Determined [address]   9/29/2022 To Be Determined Chidi Connell PT [facility] [address]   11/29/2022  9:00 AM MD RUI Vega BS AMB   12/15/2022 10:00 AM DANA HERNANDEZ BS AMB   12/15/2022 10:40 AM MD BRIDGET Adonro AMB Plan for follow-up call in 5-7 days based on severity of symptoms and risk factors. Plan for next call: self management-CVA , SW referral [facility]), referral to ACP, appts neuro, jeffry, [facility]  CTN provided contact information for future needs. Goals Addressed                   This Visit's Progress     Prevent complications post hospitalization. 9/6   Pt.will attend all recommended follow ups with pcp.  -PCP, Jesusita Echeverria MD 9/6/22  -Neurologist, SANDRO Kahn MD(pt.reports she has upcoming appt. in 2 weeks)   -BS- PT/OT/SLP  -[facility],  explain briefly type of service; contact information provided    (CVA):  Pt.will have someone drive her  to the ED immediately for symptoms:difficulty walking dizziness, loss of balance and coordination, difficulty speaking, numbness or paralysis in the face, leg, or arm, likely on one side of the body, blurred vision, a sudden HA accompanied by nausea, vomiting, or dizziness. Pt.will call 911 or have someone take her to ED for eval.; Pt.verbalizes understanding, f/u with pt.in 5-7 days. -    (SW referral)  CTN contact Gia Dash will contact pt.); request referral to SW.        (ACP referral)--update forms. Advance Care Plan discussed briefly with pt., agree to have information sent via (My Chart). Pt.informed ACP facilitator will contact her to answer question or assist with completing the forms.  -[address]

## 2022-09-06 NOTE — TELEPHONE ENCOUNTER
You  Chely Rodrigez MD 7 days ago       Please see my note for what is needed in the referral. Please rewrite with other notes included. Thanks. Message  Received: Suresh Bergeron MD sent to Michelle Richardson LPN  Caller: Unspecified (1 week ago)  Done            Noted, put everything together. Will have  sign tomorrow when back in office.

## 2022-09-06 NOTE — PROGRESS NOTES
Chief Complaint   Patient presents with    Hospital Follow Up     08/30-09/02; [facility]; CVA         1. \"Have you been to the ER, urgent care clinic since your last visit? Hospitalized since your last visit? \" Yes When: 08/30/22- 09/02/22 Where: [facility] Reason for visit: stroke    2. \"Have you seen or consulted any other health care providers outside of the [address] [place] [address] since your last visit? \" No     3. For patients aged 39-70: Has the patient had a colonoscopy / FIT/ Cologuard? Yes - no Care Gap present      If the patient is female:    4. For patients aged 41-77: Has the patient had a mammogram within the past 2 years? Yes - no Care Gap present      5. For patients aged 21-65: Has the patient had a pap smear?  NA - based on age or sex    3 most recent PHQ Screens 8/31/2022   PHQ Not Done -   Little interest or pleasure in doing things Not at all   Feeling down, depressed, irritable, or hopeless Not at all   Total Score PHQ 2 0   Trouble falling or staying asleep, or sleeping too much -   Feeling tired or having little energy -   Poor appetite, weight loss, or overeating -   Feeling bad about yourself - or that you are a failure or have let yourself or your family down -   Trouble concentrating on things such as school, work, reading, or watching TV -   Moving or speaking so slowly that other people could have noticed; or the opposite being so fidgety that others notice -   Thoughts of being better off dead, or hurting yourself in some way -   PHQ 9 Score -   How difficult have these problems made it for you to do your work, take care of your home and get along with others -

## 2022-09-06 NOTE — PROGRESS NOTES
Patient Name: Dea Fritz   MRN: [id number]    Priyank Villalobos is a 74 y.o. female who presents with the following:     Transition Care Management:    Admission: 8/30  Discharge: 9/22  Location: [facility]  Diagnosis: Dysarthria due to possible CVA/TIA  Nurse Navigator note: reviewed    We reviewed the records. She presented with slurred speech and altered mental status  Received tPA in the ER and neurology was consulted. MRI did not show any evidence of a new stroke. Neurology thought it might of been due to an aborted stroke. For her to start Plavix for the next 3 weeks along with her daily aspirin. They recommend   Currently, she continues to have bilateral leg weakness and some difficulty speaking although it is getting a little bit better. She currently is receiving home health services. She already has a follow-up with neurology in a few weeks. She continues to take her Plavix. MRI Results (most recent):  Results from [place] encounter on 08/30/22    MRI BRAIN W WO CONT    Narrative  EXAM:  MRI BRAIN W WO CONT    INDICATION: 22-year-old female being evaluated for stroke vs. MS lesion    COMPARISON:  8/30/2022 head CT. Lito Carcamo CONTRAST: 16 ml of ProHance. TECHNIQUE:  Multiplanar multisequence acquisition without and with contrast of the brain. FINDINGS:  The ventricles are normal in size and position. Small cortical encephalomalacia  in the left occipital and periatrial lobes, unchanged. Nonspecific scattered  periventricular and subcortical white matter T2/FLAIR hyperintensity, mild  consistent with demyelinating plaques of MS. There is no acute infarct,  hemorrhage, extra-axial fluid collection, or mass effect. There is no cerebellar  tonsillar herniation. Expected arterial flow-voids are present. No evidence of  abnormal enhancement. The paranasal sinuses, mastoid air cells, and middle ears are clear. Status post  bilateral lens replacement.  No significant osseous or scalp lesions are  identified. Impression  No acute intracranial abnormality. 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 Vasculitis is in  the differential diagnostic consideration. Patchy old cortical infarcts, unchanged. Review of Systems   Constitutional:  Negative for fever, malaise/fatigue and weight loss. Respiratory:  Negative for cough, hemoptysis, shortness of breath and wheezing. Cardiovascular:  Negative for chest pain, palpitations, leg swelling and PND. Gastrointestinal:  Negative for abdominal pain, constipation, diarrhea, nausea and vomiting. Neurological:  Positive for weakness. The patient's medications, allergies, past medical history, surgical history, family history and social history were reviewed and updated where appropriate. Current Outpatient Medications:     clopidogreL (PLAVIX) 75 mg tab, Take 1 Tablet by mouth daily for 20 doses. , Disp: 20 Tablet, Rfl: 0    dantrolene (DANTRIUM) 100 mg capsule, Take 100 mg by mouth daily as needed (spasms). , Disp: , Rfl:     pyridostigmine (MESTINON) 60 mg tablet, Take 180 mg by mouth two (2) times a day., Disp: , Rfl:     aspirin delayed-release 81 mg tablet, Take 1 Tablet by mouth in the morning., Disp: 30 Tablet, Rfl: 0    levothyroxine (SYNTHROID) 137 mcg tablet, TAKE 1 TABLET BY MOUTH EVERY DAY BEFORE BREAKFAST, Disp: 90 Tablet, Rfl: 3    Gilenya 0.5 mg cap, Take 1 Capsule by mouth nightly., Disp: 30 Capsule, Rfl: 11    metFORMIN ER (GLUCOPHAGE XR) 500 mg tablet, TAKE 3 TABLETS BY MOUTH DAILY, Disp: 270 Tablet, Rfl: 1    PARoxetine (PAXIL) 40 mg tablet, TAKE 1 AND 1/2 TABLETS BY MOUTH EVERY NIGHT, Disp: 135 Tablet, Rfl: 3    erenumab-aooe (Aimovig Autoinjector) 140 mg/mL injection, ADMINISTER 1 ML(140MG) UNDER THE SKIN EVERY 30 DAYS, Disp: 3 mL, Rfl: 5    rosuvastatin (CRESTOR) 40 mg tablet, TAKE 1 TABLET BY MOUTH EVERY DAY, Disp: 90 Tablet, Rfl: 1    topiramate (TOPAMAX) 200 mg tablet, Take 1 Tablet by mouth two (2) times a day., Disp: 180 Tablet, Rfl: 2    pregabalin (Lyrica) 200 mg capsule, Take 1 Cap by mouth two (2) times a day. Max Daily Amount: 400 mg., Disp: 180 Cap, Rfl: 4    glucose blood VI test strips (BLOOD GLUCOSE TEST) strip, 100 Each by Does Not Apply route See Admin Instructions. One Touch Ultra Test Strips=Check blood sugar daily dx E11.8, Disp: 100 Strip, Rfl: 1    albuterol (PROVENTIL VENTOLIN) 2.5 mg /3 mL (0.083 %) nebulizer solution, 3 mL by Nebulization route every six (6) hours as needed for Wheezing., Disp: 30 Each, Rfl: 0    menthol-zinc oxide (CALMOSEPTINE) 0.44-20.6 % oint, Apply 1 Each to affected area daily as needed for PRN Reason (Other) (thin layer to buttocks for skin irritation). , Disp: , Rfl:     lidocaine (LIDODERM) 5 %, Apply patch to the affected area for 12 hours a day and remove for 12 hours a day., Disp: 30 Each, Rfl: 3    rimegepant (Nurtec ODT) 75 mg disintegrating tablet, Take 75 mg by mouth once as needed for Migraine. (Patient not taking: No sig reported), Disp: , Rfl:     Allergies   Allergen Reactions    Bee Sting [Sting, Bee] Anaphylaxis     Also allergic to egg products    Penicillins Anaphylaxis     Patient screened for any delayed non-IgE-mediated reaction to PCN. Patient notes the following:    No delayed non-IgE-mediated reaction to PCN            Betadine [Povidone-Iodine] Rash    Egg Itching       OBJECTIVE    Visit Vitals  /64 (BP 1 Location: Right arm, BP Patient Position: Sitting, BP Cuff Size: Adult)   Pulse 74   Temp 97.3 °F (36.3 °C) (Temporal)   Resp 16   Ht 6' (1.829 m)   Wt 179 lb (81.2 kg)   LMP 09/01/2010   SpO2 97%   BMI 24.28 kg/m²       Physical Exam  Vitals and nursing note reviewed. Constitutional:       General: She is not in acute distress. Appearance: Normal appearance. She is not toxic-appearing.    HENT:      Head: Normocephalic and atraumatic. Eyes:      Pupils: Pupils are equal, round, and reactive to light. Pulmonary:      Effort: Pulmonary effort is normal.   Musculoskeletal:         General: Normal range of motion. Skin:     General: Skin is warm and dry. Neurological:      Mental Status: She is alert and oriented to person, place, and time. Comments: Slight facial drop along right side; speech is slow but intact. Psychiatric:         Mood and Affect: Mood normal.         Behavior: Behavior normal.         ASSESSMENT AND PLAN  Dacia Galvin is a 74 y.o. female who presents today for:    1. Hospital discharge follow-up  Clinically improved. 2. Stroke-like episode  Continue with ASA/Plavix, neurology follow up, and [facility] services. Medications Discontinued During This Encounter   Medication Reason    acetaminophen (TYLENOL) 500 mg tablet LIST CLEANUP           Treatment risks/benefits/costs/interactions/alternatives discussed with patient. Advised patient to call back or return to office if symptoms worsen/change/persist. If patient cannot reach us or should anything more severe/urgent arise he/she should proceed directly to the nearest emergency department. Discussed expected course/resolution/complications of diagnosis in detail with patient. Patient expressed understanding with the diagnosis and plan. This dictation may have been completed with Dragon, the computer voice recognition software. Unanticipated grammatical, syntax, homophones, and other interpretive errors are sometimes inadvertently transcribed by the computer software. Please disregard any errors that have escaped final proofreading. Brandon Joseph M.D.

## 2022-09-07 ENCOUNTER — DOCUMENTATION ONLY (OUTPATIENT)
Dept: NEUROLOGY | Age: 67
End: 2022-09-07

## 2022-09-07 VITALS
SYSTOLIC BLOOD PRESSURE: 110 MMHG | OXYGEN SATURATION: 98 % | TEMPERATURE: 97.8 F | DIASTOLIC BLOOD PRESSURE: 65 MMHG | RESPIRATION RATE: 16 BRPM | HEART RATE: 78 BPM

## 2022-09-07 PROCEDURE — 3331090001 HH PPS REVENUE CREDIT

## 2022-09-07 PROCEDURE — 3331090002 HH PPS REVENUE DEBIT

## 2022-09-07 NOTE — PROGRESS NOTES
Faxed signed home health notes to [facility] at [phone] and received fax confirmation.

## 2022-09-08 ENCOUNTER — HOME CARE VISIT (OUTPATIENT)
Dept: SCHEDULING | Facility: HOME HEALTH | Age: 67
End: 2022-09-08
Payer: MEDICARE

## 2022-09-08 VITALS
TEMPERATURE: 97.5 F | SYSTOLIC BLOOD PRESSURE: 115 MMHG | OXYGEN SATURATION: 98 % | DIASTOLIC BLOOD PRESSURE: 75 MMHG | RESPIRATION RATE: 16 BRPM | HEART RATE: 72 BPM

## 2022-09-08 PROCEDURE — 3331090001 HH PPS REVENUE CREDIT

## 2022-09-08 PROCEDURE — G0153 HHCP-SVS OF S/L PATH,EA 15MN: HCPCS

## 2022-09-08 PROCEDURE — 3331090002 HH PPS REVENUE DEBIT

## 2022-09-08 PROCEDURE — G0151 HHCP-SERV OF PT,EA 15 MIN: HCPCS

## 2022-09-08 PROCEDURE — G0152 HHCP-SERV OF OT,EA 15 MIN: HCPCS

## 2022-09-09 VITALS
SYSTOLIC BLOOD PRESSURE: 112 MMHG | DIASTOLIC BLOOD PRESSURE: 64 MMHG | TEMPERATURE: 97.4 F | RESPIRATION RATE: 18 BRPM | HEART RATE: 77 BPM | OXYGEN SATURATION: 99 %

## 2022-09-09 VITALS
SYSTOLIC BLOOD PRESSURE: 120 MMHG | OXYGEN SATURATION: 98 % | TEMPERATURE: 97.5 F | DIASTOLIC BLOOD PRESSURE: 60 MMHG | HEART RATE: 70 BPM

## 2022-09-09 PROCEDURE — 3331090001 HH PPS REVENUE CREDIT

## 2022-09-09 PROCEDURE — 3331090002 HH PPS REVENUE DEBIT

## 2022-09-10 PROCEDURE — 3331090001 HH PPS REVENUE CREDIT

## 2022-09-10 PROCEDURE — 3331090002 HH PPS REVENUE DEBIT

## 2022-09-11 PROCEDURE — 3331090002 HH PPS REVENUE DEBIT

## 2022-09-11 PROCEDURE — 3331090001 HH PPS REVENUE CREDIT

## 2022-09-12 ENCOUNTER — HOME CARE VISIT (OUTPATIENT)
Dept: SCHEDULING | Facility: HOME HEALTH | Age: 67
End: 2022-09-12
Payer: MEDICARE

## 2022-09-12 VITALS
TEMPERATURE: 98.1 F | HEART RATE: 78 BPM | OXYGEN SATURATION: 98 % | SYSTOLIC BLOOD PRESSURE: 110 MMHG | DIASTOLIC BLOOD PRESSURE: 64 MMHG

## 2022-09-12 PROCEDURE — 3331090002 HH PPS REVENUE DEBIT

## 2022-09-12 PROCEDURE — G0158 HHC OT ASSISTANT EA 15: HCPCS

## 2022-09-12 PROCEDURE — 3331090001 HH PPS REVENUE CREDIT

## 2022-09-13 ENCOUNTER — PATIENT OUTREACH (OUTPATIENT)
Dept: CASE MANAGEMENT | Age: 67
End: 2022-09-13

## 2022-09-13 ENCOUNTER — HOME CARE VISIT (OUTPATIENT)
Dept: SCHEDULING | Facility: HOME HEALTH | Age: 67
End: 2022-09-13
Payer: MEDICARE

## 2022-09-13 PROCEDURE — 3331090001 HH PPS REVENUE CREDIT

## 2022-09-13 PROCEDURE — 3331090002 HH PPS REVENUE DEBIT

## 2022-09-13 PROCEDURE — G0151 HHCP-SERV OF PT,EA 15 MIN: HCPCS

## 2022-09-13 NOTE — PROGRESS NOTES
Care Transitions Follow Up Call    Challenges to be reviewed by the provider   Additional needs identified to be addressed with provider: no  none           Method of communication with provider : none    Care Transition Nurse (CTN) contacted the patient by telephone to follow up after admission on 22. Verified name and  with patient as identifiers. Addressed changes since last contact: none  Follow up appointment completed? yes. Was follow up appointment scheduled within 7 days of discharge? yes. Advance Care Planning:   Does patient have an Advance Directive:  yes; reviewed and needs to be updated; sent ACP referral 22.       Patients top risk factors for readmission: medical condition-CVA    Interventions to address risk factors:  PT/OT/SLP, attend follow ups with pcp, and specialists    [facility] follow up appointment(s):   Future Appointments   Date Time Provider Roel Collins   2022 10:00 AM Shira Bowden [place] [address] [facility]   2022 11:00 AM CHIKIS Ramírez [place] [address] [facility]   9/15/2022  9:00 AM Ephriam Kayser, PT [place] [address] [facility]   2022 11:00 AM CHIKIS Ramírez [facility]   2022 To Be Determined Charanjit Adrian [facility]   2022 To Be Determined Ephriam Kayser, PT [facility]   2022 To Be Determined Charanjit Adrian [facility]   2022 To Be Determined Jovany Brady [address] [facility]   2022  3:20 PM Stefano Kahn MD [facility] BS AMB   2022 To Be Determined Marchia Maricao [place] [address] [facility]   2022 To Be Determined Jovany Brady [place] [address] [facility]   2022 To Be Determined Marchia Nguyễn [place] [address] [facility]   2022 To Be Determined Jovany Brady [facility]   10/3/2022 To Be Determined Pheba Apgar,  [facility]   2022  9:00 AM MD RUI Shaw BS AMB   12/15/2022 10:00 AM DANA HERNANDEZ BS AMB   12/15/2022 10:40 AM Vee Nieto MD BR[facility]       CTN provided contact information for future needs. Plan for follow-up call in 7-10 days based on severity of symptoms and risk factors. Plan for next call: self management-CVAand SW-[employer] and referrals-ACP          Goals Addressed                   This Visit's Progress     Prevent complications post hospitalization. 9/6   Pt.will attend all recommended follow ups with pcp.  -PCPParamjit MD 9/6/22  -Neurologist, SANDRO Kahn MD(pt.reports she has upcoming appt. in 2 weeks)   -[facility] PT/OT/SLP  -[facility],  explain briefly type of service; contact information provided    (CVA):  Pt.will have someone drive her  to the ED immediately for symptoms:difficulty walking dizziness, loss of balance and coordination, difficulty speaking, numbness or paralysis in the face, leg, or arm, likely on one side of the body, blurred vision, a sudden HA accompanied by nausea, vomiting, or dizziness. Pt.will call 911 or have someone take her to ED for eval.; Pt.verbalizes understanding, f/u with pt.in 5-7 days. -    (SW referral)  CTN contact Gia Jeff Proper will contact pt.); request referral to SW.        (ACP referral)--update forms. Advance Care Plan discussed briefly with pt., agree to have information sent via (My Chart). Pt.informed ACP facilitator will contact her to answer question or assist with completing the forms. -[address]    9/13   Pt. Attend follow ups with pcp.  -PCP, Paramjit Kothari MD 9/6/22  -Upcoming appt. with Neurologist, SANDRO Kahn MD(9/21/22)  -[facility] PT/OT/SLP (for 4 weeks, then will re-eval). -Travis Krishnamurthy, pt.use service as needed.    (CVA):  Pt.reports continue to have speech difficulty, weakness, will continue with PT/OT/SLP.     Pt.will have someone drive her  to the ED immediately for new or worsening symptoms:difficulty walking, dizziness, loss of balance and coordination, difficulty speaking, numbness or paralysis in the face, leg, or arm, likely on one side of the body, blurred vision, a sudden HA accompanied by nausea, vomiting, or dizziness. Pt.will call 911 or have someone take her to ED. Pt.verbalizes understanding, f/u with pt.in 7-10 days. -LAQUITA    (SW referral)  CTN contact Gia 9/6/22 Ghassan Guardado); request referral to [facility] she has not received call from [facility] [employer]. CTN follow up, spoke with Fay, provide CTN with contact information to Shon [facility][note 3[facility] Dept. [phone], [facility] [phone], CTN provided pt.with information. (ACP referral)--update forms. Advance Care Plan discussed briefly with pt., agree to have information sent via (My Chart). ACP referral sent 9/13/22. Pt.informed ACP facilitator will contact her to answer question or assist with completing the forms, will follow up at next out reach in 7-10 days.  -[address]

## 2022-09-14 ENCOUNTER — HOME CARE VISIT (OUTPATIENT)
Dept: SCHEDULING | Facility: HOME HEALTH | Age: 67
End: 2022-09-14
Payer: MEDICARE

## 2022-09-14 VITALS
HEART RATE: 79 BPM | DIASTOLIC BLOOD PRESSURE: 64 MMHG | SYSTOLIC BLOOD PRESSURE: 118 MMHG | OXYGEN SATURATION: 98 % | TEMPERATURE: 97 F

## 2022-09-14 VITALS
DIASTOLIC BLOOD PRESSURE: 65 MMHG | RESPIRATION RATE: 16 BRPM | SYSTOLIC BLOOD PRESSURE: 135 MMHG | OXYGEN SATURATION: 96 % | TEMPERATURE: 97.8 F | HEART RATE: 85 BPM

## 2022-09-14 PROCEDURE — G0153 HHCP-SVS OF S/L PATH,EA 15MN: HCPCS

## 2022-09-14 PROCEDURE — G0158 HHC OT ASSISTANT EA 15: HCPCS

## 2022-09-14 PROCEDURE — 3331090002 HH PPS REVENUE DEBIT

## 2022-09-14 PROCEDURE — 3331090001 HH PPS REVENUE CREDIT

## 2022-09-14 RX ORDER — PYRIDOSTIGMINE BROMIDE 60 MG/1
TABLET ORAL
Qty: 180 TABLET | Refills: 2 | Status: SHIPPED | OUTPATIENT
Start: 2022-09-14 | End: 2022-10-18 | Stop reason: SDUPTHER

## 2022-09-15 ENCOUNTER — HOME CARE VISIT (OUTPATIENT)
Dept: SCHEDULING | Facility: HOME HEALTH | Age: 67
End: 2022-09-15
Payer: MEDICARE

## 2022-09-15 VITALS
SYSTOLIC BLOOD PRESSURE: 138 MMHG | OXYGEN SATURATION: 98 % | DIASTOLIC BLOOD PRESSURE: 74 MMHG | TEMPERATURE: 97.5 F | HEART RATE: 80 BPM | RESPIRATION RATE: 18 BRPM

## 2022-09-15 PROCEDURE — 3331090001 HH PPS REVENUE CREDIT

## 2022-09-15 PROCEDURE — 3331090002 HH PPS REVENUE DEBIT

## 2022-09-15 PROCEDURE — G0151 HHCP-SERV OF PT,EA 15 MIN: HCPCS

## 2022-09-16 ENCOUNTER — PATIENT OUTREACH (OUTPATIENT)
Dept: CASE MANAGEMENT | Age: 67
End: 2022-09-16

## 2022-09-16 ENCOUNTER — HOME CARE VISIT (OUTPATIENT)
Dept: SCHEDULING | Facility: HOME HEALTH | Age: 67
End: 2022-09-16
Payer: MEDICARE

## 2022-09-16 PROCEDURE — G0153 HHCP-SVS OF S/L PATH,EA 15MN: HCPCS

## 2022-09-16 PROCEDURE — 3331090002 HH PPS REVENUE DEBIT

## 2022-09-16 PROCEDURE — 3331090001 HH PPS REVENUE CREDIT

## 2022-09-16 NOTE — ACP (ADVANCE CARE PLANNING)
Initial outbound call made to patient who would like to update current AMD. Follow up scheduled for 9/21/22.

## 2022-09-17 PROCEDURE — 3331090001 HH PPS REVENUE CREDIT

## 2022-09-17 PROCEDURE — 3331090002 HH PPS REVENUE DEBIT

## 2022-09-18 VITALS
TEMPERATURE: 97.7 F | OXYGEN SATURATION: 97 % | DIASTOLIC BLOOD PRESSURE: 62 MMHG | RESPIRATION RATE: 16 BRPM | SYSTOLIC BLOOD PRESSURE: 110 MMHG | HEART RATE: 90 BPM

## 2022-09-18 PROCEDURE — 3331090002 HH PPS REVENUE DEBIT

## 2022-09-18 PROCEDURE — 3331090001 HH PPS REVENUE CREDIT

## 2022-09-19 ENCOUNTER — HOME CARE VISIT (OUTPATIENT)
Dept: SCHEDULING | Facility: HOME HEALTH | Age: 67
End: 2022-09-19
Payer: MEDICARE

## 2022-09-19 VITALS
HEART RATE: 67 BPM | SYSTOLIC BLOOD PRESSURE: 118 MMHG | OXYGEN SATURATION: 98 % | DIASTOLIC BLOOD PRESSURE: 60 MMHG | TEMPERATURE: 97.7 F

## 2022-09-19 VITALS
TEMPERATURE: 97.4 F | OXYGEN SATURATION: 97 % | DIASTOLIC BLOOD PRESSURE: 80 MMHG | RESPIRATION RATE: 18 BRPM | HEART RATE: 85 BPM | SYSTOLIC BLOOD PRESSURE: 120 MMHG

## 2022-09-19 PROCEDURE — G0158 HHC OT ASSISTANT EA 15: HCPCS

## 2022-09-19 PROCEDURE — 3331090002 HH PPS REVENUE DEBIT

## 2022-09-19 PROCEDURE — 3331090001 HH PPS REVENUE CREDIT

## 2022-09-20 ENCOUNTER — HOME CARE VISIT (OUTPATIENT)
Dept: SCHEDULING | Facility: HOME HEALTH | Age: 67
End: 2022-09-20
Payer: MEDICARE

## 2022-09-20 PROCEDURE — 3331090001 HH PPS REVENUE CREDIT

## 2022-09-20 PROCEDURE — 3331090002 HH PPS REVENUE DEBIT

## 2022-09-20 PROCEDURE — G0151 HHCP-SERV OF PT,EA 15 MIN: HCPCS

## 2022-09-20 PROCEDURE — G0153 HHCP-SVS OF S/L PATH,EA 15MN: HCPCS

## 2022-09-20 RX ORDER — IBUPROFEN 200 MG
100 CAPSULE ORAL SEE ADMIN INSTRUCTIONS
Qty: 100 STRIP | Refills: 1 | Status: SHIPPED | OUTPATIENT
Start: 2022-09-20

## 2022-09-21 ENCOUNTER — HOME CARE VISIT (OUTPATIENT)
Dept: SCHEDULING | Facility: HOME HEALTH | Age: 67
End: 2022-09-21
Payer: MEDICARE

## 2022-09-21 ENCOUNTER — TELEPHONE (OUTPATIENT)
Dept: NEUROLOGY | Age: 67
End: 2022-09-21

## 2022-09-21 ENCOUNTER — PATIENT OUTREACH (OUTPATIENT)
Dept: CASE MANAGEMENT | Age: 67
End: 2022-09-21

## 2022-09-21 VITALS
TEMPERATURE: 97.4 F | DIASTOLIC BLOOD PRESSURE: 66 MMHG | OXYGEN SATURATION: 98 % | RESPIRATION RATE: 18 BRPM | HEART RATE: 69 BPM | SYSTOLIC BLOOD PRESSURE: 140 MMHG

## 2022-09-21 VITALS
HEART RATE: 68 BPM | DIASTOLIC BLOOD PRESSURE: 60 MMHG | TEMPERATURE: 97.3 F | SYSTOLIC BLOOD PRESSURE: 104 MMHG | OXYGEN SATURATION: 98 %

## 2022-09-21 PROCEDURE — 3331090001 HH PPS REVENUE CREDIT

## 2022-09-21 PROCEDURE — G0158 HHC OT ASSISTANT EA 15: HCPCS

## 2022-09-21 PROCEDURE — 3331090002 HH PPS REVENUE DEBIT

## 2022-09-22 ENCOUNTER — OFFICE VISIT (OUTPATIENT)
Dept: NEUROLOGY | Age: 67
End: 2022-09-22

## 2022-09-22 ENCOUNTER — HOME CARE VISIT (OUTPATIENT)
Dept: HOME HEALTH SERVICES | Facility: HOME HEALTH | Age: 67
End: 2022-09-22
Payer: MEDICARE

## 2022-09-22 ENCOUNTER — HOME CARE VISIT (OUTPATIENT)
Dept: SCHEDULING | Facility: HOME HEALTH | Age: 67
End: 2022-09-22
Payer: MEDICARE

## 2022-09-22 VITALS
OXYGEN SATURATION: 98 % | RESPIRATION RATE: 18 BRPM | TEMPERATURE: 97.7 F | SYSTOLIC BLOOD PRESSURE: 140 MMHG | DIASTOLIC BLOOD PRESSURE: 66 MMHG | HEART RATE: 69 BPM

## 2022-09-22 VITALS
RESPIRATION RATE: 17 BRPM | BODY MASS INDEX: 24.6 KG/M2 | TEMPERATURE: 97.9 F | WEIGHT: 181.6 LBS | HEIGHT: 72 IN | HEART RATE: 66 BPM | DIASTOLIC BLOOD PRESSURE: 67 MMHG | SYSTOLIC BLOOD PRESSURE: 111 MMHG

## 2022-09-22 DIAGNOSIS — R53.82 CHRONIC FATIGUE: ICD-10-CM

## 2022-09-22 DIAGNOSIS — G62.9 NEUROPATHY: ICD-10-CM

## 2022-09-22 DIAGNOSIS — G89.4 CHRONIC PAIN SYNDROME: ICD-10-CM

## 2022-09-22 DIAGNOSIS — G35 MS (MULTIPLE SCLEROSIS) (HCC): Primary | ICD-10-CM

## 2022-09-22 DIAGNOSIS — R47.89 DYSFLUENCY: ICD-10-CM

## 2022-09-22 DIAGNOSIS — G90.1 DYSAUTONOMIA (HCC): ICD-10-CM

## 2022-09-22 DIAGNOSIS — R42 DIZZINESS: ICD-10-CM

## 2022-09-22 DIAGNOSIS — G70.00 MYASTHENIA GRAVIS (HCC): ICD-10-CM

## 2022-09-22 DIAGNOSIS — G43.019 INTRACTABLE MIGRAINE WITHOUT AURA AND WITHOUT STATUS MIGRAINOSUS: ICD-10-CM

## 2022-09-22 PROCEDURE — 3331090001 HH PPS REVENUE CREDIT

## 2022-09-22 PROCEDURE — G0153 HHCP-SVS OF S/L PATH,EA 15MN: HCPCS

## 2022-09-22 PROCEDURE — 3331090002 HH PPS REVENUE DEBIT

## 2022-09-22 RX ORDER — AMANTADINE HYDROCHLORIDE 100 MG/1
100 CAPSULE, GELATIN COATED ORAL 2 TIMES DAILY
Qty: 60 CAPSULE | Refills: 5 | Status: SHIPPED | OUTPATIENT
Start: 2022-09-22 | End: 2022-10-18 | Stop reason: SDUPTHER

## 2022-09-22 NOTE — PROGRESS NOTES
Chief Complaint   Patient presents with    Follow-up     CVA. Patient reports having CVA in August 2022. Dizziness     Gait problems. Falls    1. Have you been to the ER, urgent care clinic since your last visit? Hospitalized since your last visit? Yes 8/22 CVA    2. Have you seen or consulted any other health care providers outside of the [address] [place] [address] since your last visit? Include any pap smears or colon screening.

## 2022-09-22 NOTE — PROGRESS NOTES
Neurology Progress Note    NAME:  Rambo Galvin   :   1955   MRN:   [id number]     Date/Time:  2022  Subjective:   Rambo Galvin is a 74 y.o. female here today for follow-up for MS, MG, chronic pain syndrome, headaches, CVA, difficulty walking, drowsiness, fall, test results. Patient was accompanied by a friend to the visit  Patient says she has been having frequent falls since the last visit, the falls happen mostly when patient gets up from sitting position to standing position, according to patient, she will feel dizzy and her legs will give out. She says she has not lost consciousness, however she tends to get confused. Patient says these have been getting worse since the last visit. She says she still experiences intermittent weakness. She is still not having fine feeling in her fingers . She also experiences occasional spasm of the extremities, last fall was about 2 weeks ago. Patient noted  she is still having occasional dizziness with vertigo, she says that has decreased hearing, with nausea. EMG/nerve conduction reviewed showed electrodiagnostic evidence of bilateral sensory neuropathy median nerve, sensorimotor neuropathy left ulnar nerve. This is consistent with bilateral carpal tunnel syndrome left worse than the right, left compressive ulnar neuropathy  We will refer patient for carpal tunnel release if patient agrees. Says she has been having difficulty ambulating. I will refer patient to physical therapy, home health physical therapy as patient has difficulty getting out of the house because of transportation. Patient definitely needs intermittent physical therapy, with occupational and speech  Patient said that her eyes tend to be weak and she sees double, double vision usually associated with fatigue.   She continues to have a lot of pain but not as much as before, pain is sharp in nature, diffuse, burning sensation that goes up to the arms causing numbness and tingling sensation and weakness of the hands, down to the legs causing  numbness and tingling sensation of the legs with weakness of the legs. Patient says headache waxes and wanes,it is mostly frontal, throbbing in nature, with occasional sharp pain from the back of the head, headache associated with dizziness, nausea, blurry vision, double vision, photophobia, phonophobia. She says she has a lot of muscle spasms mostly in the back. She denies loss of consciousness. Says dysphagia has improved. She  continues to smoke cigarettes. I will continue patient on her current medications  Review of Systems - General ROS: positive for  - fatigue, night sweats and sleep disturbance  Psychological ROS: positive for - anxiety, concentration difficulties, depression, mood swings and sleep disturbances  Ophthalmic ROS: positive for - blurry vision, decreased vision, double vision and photophobia  ENT ROS: positive for - headaches, tinnitus, vertigo and visual changes  Allergy and Immunology ROS: negative  Hematological and Lymphatic ROS: negative  Endocrine ROS: negative  Respiratory ROS: no cough, shortness of breath, or wheezing  Cardiovascular ROS: no chest pain or dyspnea on exertion  Gastrointestinal ROS: no abdominal pain, change in bowel habits, or black or bloody stools  Genito-Urinary ROS: no dysuria, trouble voiding, or hematuria  Musculoskeletal ROS: positive for - gait disturbance, joint pain, joint stiffness, joint swelling and muscle pain  Neurological ROS: positive for - dizziness, gait disturbance, headaches, impaired coordination/balance, numbness/tingling, speech problems, tremors, visual changes and weakness  Dermatological ROS: negative            Medications reviewed:  Current Outpatient Medications   Medication Sig Dispense Refill    glucose blood VI test strips (blood glucose test) strip 100 Each by Does Not Apply route See Admin Instructions.  One Touch Ultra Test Strips=Check blood sugar daily dx E11.8 100 Strip 1    acetaminophen (TYLENOL 8 HOUR PO) Take 500 mg by mouth two (2) times daily as needed for Pain. For pain scale 1-5/10.      dantrolene (DANTRIUM) 100 mg capsule Take 100 mg by mouth daily as needed (spasms). levothyroxine (SYNTHROID) 137 mcg tablet TAKE 1 TABLET BY MOUTH EVERY DAY BEFORE BREAKFAST 90 Tablet 3    PARoxetine (PAXIL) 40 mg tablet TAKE 1 AND 1/2 TABLETS BY MOUTH EVERY NIGHT 135 Tablet 3    albuterol (PROVENTIL VENTOLIN) 2.5 mg /3 mL (0.083 %) nebulizer solution 3 mL by Nebulization route every six (6) hours as needed for Wheezing. 30 Each 0    menthol-zinc oxide (CALMOSEPTINE) 0.44-20.6 % oint Apply 1 Each to affected area daily as needed for PRN Reason (Other) (thin layer to buttocks for skin irritation). lidocaine (LIDODERM) 5 % Apply patch to the affected area for 12 hours a day and remove for 12 hours a day. 30 Each 3    aspirin delayed-release 81 mg tablet TAKE 1 TABLET BY MOUTH EVERY DAY IN THE MORNING 30 Tablet 4    lisinopriL (PRINIVIL, ZESTRIL) 2.5 mg tablet Take 1 Tablet by mouth daily. 30 Tablet 2    rosuvastatin (CRESTOR) 40 mg tablet TAKE 1 TABLET BY MOUTH EVERY DAY 90 Tablet 1    rimegepant (Nurtec ODT) 75 mg disintegrating tablet Take 75 mg by mouth once as needed for Migraine. oxyCODONE-acetaminophen (PERCOCET) 5-325 mg per tablet Take 1 Tablet by mouth every eight (8) hours as needed for Pain (use for pain rating 6-10/10). erenumab-aooe (Aimovig Autoinjector) 140 mg/mL injection ADMINISTER 1 ML(140MG) UNDER THE SKIN EVERY 30 DAYS 3 mL 5    amantadine HCL (SYMMETREL) 100 mg capsule Take 1 Capsule by mouth two (2) times a day. 60 Capsule 11    pyridostigmine (MESTINON) 60 mg tablet TAKE 2 TABLETS BY MOUTH THREE (3) TIMES DAILY. 180 Tablet 4    topiramate (TOPAMAX) 200 mg tablet Take 1 Tablet by mouth two (2) times a day. 180 Tablet 4    pregabalin (Lyrica) 200 mg capsule Take 1 Capsule by mouth two (2) times a day.  Max Daily Amount: 400 mg. 180 Capsule 4 Gilenya 0.5 mg cap Take 1 Capsule by mouth nightly. 30 Capsule 11    metFORMIN ER (GLUCOPHAGE XR) 500 mg tablet TAKE 3 TABLETS BY MOUTH DAILY 270 Tablet 1    glucose blood VI test strips (True Metrix Glucose Test Strip) strip 1 Each by Does Not Apply route daily.  Use as directed to monitor blood glucose once daily          Objective:   Vitals:  Vitals:    09/22/22 1536   BP: 111/67   Pulse: 66   Resp: 17   Temp: 97.9 °F (36.6 °C)   TempSrc: Temporal   Weight: 181 lb 9.6 oz (82.4 kg)   Height: 6' (1.829 m)   LMP: 09/01/2010               Lab Data Reviewed:  Lab Results   Component Value Date/Time    WBC 3.5 (L) 08/31/2022 04:20 AM    HCT 44.7 08/31/2022 04:20 AM    HGB 14.6 08/31/2022 04:20 AM    PLATELET 95 (L) 13/28/2618 04:20 AM       Lab Results   Component Value Date/Time    Sodium 142 11/15/2022 02:01 PM    Potassium 4.2 11/15/2022 02:01 PM    Chloride 113 (H) 11/15/2022 02:01 PM    CO2 26 11/15/2022 02:01 PM    Glucose 100 11/15/2022 02:01 PM    BUN 12 11/15/2022 02:01 PM    Creatinine 0.95 11/15/2022 02:01 PM    Calcium 9.2 11/15/2022 02:01 PM       No components found for: TROPQUANT    No results found for: SHAHIDA      Lab Results   Component Value Date/Time    Hemoglobin A1c 6.6 (H) 11/15/2022 02:01 PM    Hemoglobin A1c (POC) 6.9 08/25/2022 09:25 AM        Lab Results   Component Value Date/Time    Vitamin B12 436 06/10/2019 10:41 AM    Folate 12.0 02/10/2016 12:00 AM       No results found for: Byron PINEDO XBANA    Lab Results   Component Value Date/Time    Cholesterol, total 140 08/31/2022 04:20 AM    HDL Cholesterol 46 08/31/2022 04:20 AM    LDL-CHOLESTEROL 91 12/26/2018 12:00 AM    LDL, calculated 70.8 08/31/2022 04:20 AM    VLDL, calculated 23.2 08/31/2022 04:20 AM    Triglyceride 116 08/31/2022 04:20 AM    Triglyceride 126 12/26/2018 12:00 AM    CHOL/HDL Ratio 3.0 08/31/2022 04:20 AM         CT Results (recent):  Results from Hospital Encounter encounter on 08/30/22    CT HEAD WO CONT    Narrative  INDICATION: frontal HA post TPA. Dysarthria. Exam: Noncontrast CT of the brain is performed with 5 mm collimation. CT dose reduction was achieved with the use of the standardized protocol  tailored for this examination and automatic exposure control for dose  modulation. Direct comparison is made to prior CT dated August 30, 2020, 2:22 PM.    FINDINGS: Mild diffuse cortical atrophy. There is no acute intracranial  hemorrhage, mass, mass effect or herniation. Ventricular system is normal. There  is a 6 mm hypodensity within the left thalamus, unchanged compared to prior CT  dated August 30, 2022. The gray-white matter differentiation is otherwise  well-preserved. The mastoid air cells are well pneumatized. Impression  No interval change. MRI Results (recent):  Results from [place] encounter on 08/30/22    MRI BRAIN W WO CONT    Narrative  EXAM:  MRI BRAIN W WO CONT    INDICATION: 82-year-old female being evaluated for stroke vs. MS lesion    COMPARISON:  8/30/2022 head CT. Angelica Gull CONTRAST: 16 ml of ProHance. TECHNIQUE:  Multiplanar multisequence acquisition without and with contrast of the brain. FINDINGS:  The ventricles are normal in size and position. Small cortical encephalomalacia  in the left occipital and periatrial lobes, unchanged. Nonspecific scattered  periventricular and subcortical white matter T2/FLAIR hyperintensity, mild  consistent with demyelinating plaques of MS. There is no acute infarct,  hemorrhage, extra-axial fluid collection, or mass effect. There is no cerebellar  tonsillar herniation. Expected arterial flow-voids are present. No evidence of  abnormal enhancement. The paranasal sinuses, mastoid air cells, and middle ears are clear. Status post  bilateral lens replacement. No significant osseous or scalp lesions are  identified. Impression  No acute intracranial abnormality.     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 Vasculitis is in  the differential diagnostic consideration. Patchy old cortical infarcts, unchanged. IR Results (recent):  No results found for this or any previous visit. VAS/US Results (recent):  No results found for this or any previous visit. PHYSICAL EXAM:  General:    Alert, cooperative, no distress, appears stated age. Head:   Normocephalic, without obvious abnormality, atraumatic. Eyes:   Conjunctivae/corneas clear. PERRLA  Nose:  Nares normal. No drainage or sinus tenderness. Throat:    Lips, mucosa, and tongue normal.  No Thrush  Neck:  Supple, symmetrical,  no adenopathy, thyroid: non tender    no carotid bruit and no JVD. Paraspinal tenderness  Back:    Symmetric, diffuse  tenderness. Lungs:   Clear to auscultation bilaterally. No Wheezing or Rhonchi. No rales. Chest wall:  No tenderness or deformity. No Accessory muscle use. Heart:   Regular rate and rhythm,  no murmur, rub or gallop. Abdomen:   Soft, non-tender. Not distended. Bowel sounds normal. No masses  Extremities: Extremities normal, atraumatic, No cyanosis. No edema. No clubbing  Skin:     Texture, turgor normal. No rashes or lesions. Not Jaundiced  Lymph nodes: Cervical, supraclavicular normal.  Psych:  Good insight. Depressed. Anxious. NEUROLOGICAL EXAM:  Appearance: The patient is well developed, well nourished, provides a coherent history and is in no acute distress. Mental Status: Oriented to time, place and person. Mood and affect appropriate. Cranial Nerves:   Intact visual fields. Fundi are benign. RACHEL, EOM's full, no nystagmus, no ptosis. Facial sensation is normal. Corneal reflexes are intact. Facial movement is symmetric. Hearing is normal bilaterally. Palate is midline with normal sternocleidomastoid and trapezius muscles are normal. Tongue is midline.    Motor:  5-/5 strength in upper extremity and 4/5 lower extremity proximal and distal muscles. Normal bulk and tone. No fasciculations. Reflexes:   Deep tendon reflexes 2+/4 and symmetrical.   Sensory:    Dysesthesia to touch, pinprick and vibration. Gait:   Unsteady gait. Ambulates with cane   Tremor:    Mild tremor noted. Cerebellar:  cerebellar signs present. Dysmetria. Positive Romberg sign   Neurovascular:  Normal heart sounds and regular rhythm, peripheral pulses intact, and no carotid bruits. Asses    ICD-10-CM ICD-9-CM    1. MS (multiple sclerosis) ([facility]  G35 340       2. Dysautonomia ([facility]  G90.1 337.9       3. Dizziness  R42 780.4       4. Chronic fatigue  R53.82 780.79       5. Neuropathy  G62.9 355.9       6. Intractable migraine without aura and without status migrainosus  G43.019 346.11       7. Myasthenia gravis ([facility]  G70.00 358.00       8. Chronic pain syndrome  G89.4 338.4       9. Dysfluency  R47.89 784.59       10. ERRONEOUS ENCOUNTER--DISREGARD   04101         Follow-up and Dispositions    Return in about 3 weeks (around 10/13/2022). ___________________________________________________    Attending Physician: Rich Galvin MD                Was in the er b/c of speech therapy got tpa  On home pt/ot/slp  Has not gotten back to baseline  Tired  Speech slow  This encounter was created in error - please disregard.

## 2022-09-23 PROCEDURE — 3331090002 HH PPS REVENUE DEBIT

## 2022-09-23 PROCEDURE — 3331090001 HH PPS REVENUE CREDIT

## 2022-09-24 PROCEDURE — 3331090002 HH PPS REVENUE DEBIT

## 2022-09-24 PROCEDURE — 3331090001 HH PPS REVENUE CREDIT

## 2022-09-25 VITALS
TEMPERATURE: 97.1 F | RESPIRATION RATE: 18 BRPM | DIASTOLIC BLOOD PRESSURE: 70 MMHG | OXYGEN SATURATION: 99 % | SYSTOLIC BLOOD PRESSURE: 130 MMHG | HEART RATE: 82 BPM

## 2022-09-25 PROCEDURE — 3331090002 HH PPS REVENUE DEBIT

## 2022-09-25 PROCEDURE — 3331090001 HH PPS REVENUE CREDIT

## 2022-09-26 ENCOUNTER — HOME CARE VISIT (OUTPATIENT)
Dept: SCHEDULING | Facility: HOME HEALTH | Age: 67
End: 2022-09-26
Payer: MEDICARE

## 2022-09-26 ENCOUNTER — PATIENT OUTREACH (OUTPATIENT)
Dept: CASE MANAGEMENT | Age: 67
End: 2022-09-26

## 2022-09-26 VITALS
TEMPERATURE: 96.5 F | SYSTOLIC BLOOD PRESSURE: 138 MMHG | DIASTOLIC BLOOD PRESSURE: 80 MMHG | HEART RATE: 77 BPM | OXYGEN SATURATION: 99 %

## 2022-09-26 PROCEDURE — 3331090001 HH PPS REVENUE CREDIT

## 2022-09-26 PROCEDURE — G0158 HHC OT ASSISTANT EA 15: HCPCS

## 2022-09-26 PROCEDURE — 3331090002 HH PPS REVENUE DEBIT

## 2022-09-27 ENCOUNTER — PATIENT OUTREACH (OUTPATIENT)
Dept: CASE MANAGEMENT | Age: 67
End: 2022-09-27

## 2022-09-27 ENCOUNTER — HOME CARE VISIT (OUTPATIENT)
Dept: SCHEDULING | Facility: HOME HEALTH | Age: 67
End: 2022-09-27
Payer: MEDICARE

## 2022-09-27 ENCOUNTER — HOME CARE VISIT (OUTPATIENT)
Dept: HOME HEALTH SERVICES | Facility: HOME HEALTH | Age: 67
End: 2022-09-27
Payer: MEDICARE

## 2022-09-27 VITALS
HEART RATE: 73 BPM | OXYGEN SATURATION: 97 % | TEMPERATURE: 97.2 F | DIASTOLIC BLOOD PRESSURE: 76 MMHG | SYSTOLIC BLOOD PRESSURE: 124 MMHG | RESPIRATION RATE: 18 BRPM

## 2022-09-27 PROCEDURE — 3331090001 HH PPS REVENUE CREDIT

## 2022-09-27 PROCEDURE — G0151 HHCP-SERV OF PT,EA 15 MIN: HCPCS

## 2022-09-27 PROCEDURE — G0153 HHCP-SVS OF S/L PATH,EA 15MN: HCPCS

## 2022-09-27 PROCEDURE — 3331090002 HH PPS REVENUE DEBIT

## 2022-09-27 NOTE — PROGRESS NOTES
Care Transitions Follow Up Call    Challenges to be reviewed by the provider   Additional needs identified to be addressed with provider: no         Method of communication with provider : none    Care Transition Nurse (CTN) contacted the patient by telephone to follow up after admission on 22. Verified name and  with patient as identifiers. Addressed changes since last contact: none  Follow up appointment completed? yes. Was follow up appointment scheduled within 7 days of discharge? yes. Patients top risk factors for readmission: medical condition-CVA    Interventions to address risk factors: Scheduled appointment with Specialist-pt. reports Neurologist appt. 22, return in 3 weeks and Education of patient/family/caregiver/guardian to support self-management-CVA    [facility] follow up appointment(s):   Future Appointments   Date Time Provider Roel Collins   2022 To Be Determined Carley Orozco,  [address]   2022 10:00 AM Cale Field, AIDAN [address]   2022 11:00 AM CHIKIS Ross [facility]   10/3/2022 To Be Determined Carley Orozco  [place] [address] Medical [facility]   10/18/2022  8:40 AM Andrey Kahn MD [address] [place] [address] BS AMB   2022  9:00 AM Alfornia Saint, MD [facility] BS AMB   12/15/2022 10:00 AM DANA HERNANDEZ BS AMB   12/15/2022 10:40 AM MD BRIDGET Manuel BS AMB       CTN provided contact information for future needs. Plan for follow-up call in 5-7 days based on severity of symptoms and risk factors. Plan for next call: self management-CVA       Goals Addressed                   This Visit's Progress     Prevent complications post hospitalization.    Pt.will attend all recommended follow ups with pcp.  -PCP, Opal Sorensen MD 22  -NeurologistSANDRO MD(pt.reports she has upcoming appt. in 2 weeks)   -BS- PT/OT/SLP  [facility],  explain briefly type of service; contact information provided    (CVA):  Pt.will have someone drive her  to the ED immediately for symptoms:difficulty walking dizziness, loss of balance and coordination, difficulty speaking, numbness or paralysis in the face, leg, or arm, likely on one side of the body, blurred vision, a sudden HA accompanied by nausea, vomiting, or dizziness. Pt.will call 911 or have someone take her to ED for eval.; Pt.verbalizes understanding, f/u with pt.in 5-7 days. -    ( referral)  CTN contact Gia Mendiola will contact pt.); request referral to .        (ACP referral)--update forms. Advance Care Plan discussed briefly with pt., agree to have information sent via (My Chart). Pt.informed ACP facilitator will contact her to answer question or assist with completing the forms. -[address]    9/13   Pt. Attend follow ups with pcp.  -PCP, Shirley Perez MD 9/6/22  -Upcoming appt. with Neurologist, SANDRO Kahn MD(9/21/22)  -BS- PT/OT/SLP (for 4 weeks, then will re-eval). -Travis Krishnamurthy, pt.use service as needed.    (CVA):  Pt.reports continue to have speech difficulty, weakness, will continue with PT/OT/SLP. Pt.will have someone drive her  to the ED immediately for new or worsening symptoms:difficulty walking, dizziness, loss of balance and coordination, difficulty speaking, numbness or paralysis in the face, leg, or arm, likely on one side of the body, blurred vision, a sudden HA accompanied by nausea, vomiting, or dizziness. Pt.will call 911 or have someone take her to ED. Pt.verbalizes understanding, f/u with pt.in 7-10 days. -    ( referral)  CTN contact Gia 9/6/22 Khadra Mendiola); request referral to [address] [place] [address] she has not received call from [facility]. CTN follow up, spoke with Fay, provide CTN with contact information to Shon Levin [facility] Dept. [phone], [facility] [phone], CTN provided pt.with information. (ACP referral)--update forms. Advance Care Plan discussed briefly with pt., agree to have information sent via (My Chart). ACP referral sent 9/13/22. Pt.informed ACP facilitator will contact her to answer question or assist with completing the forms, will follow up at next out reach in 7-10 days. -[address]    9/27   -Neurologist, SANDRO Kahn MD(9/21/22), return in 3 weeks. -BS- PT/OT/SLP (for 4 weeks, then will re-eval). -Travis Krishnamurthy, pt.use service as needed.    (CVA):  Pt.reports continue to have speech difficulty, weakness has improved, will continue with PT/OT/SLP. Pt.will have someone drive her  to the ED immediately for new or worsening symptoms:difficulty walking, dizziness, loss of balance and coordination, difficulty speaking, numbness or paralysis in the face, leg, or arm, likely on one side of the body, blurred vision, a sudden HA accompanied by nausea, vomiting, or dizziness. Pt.will call 911 or have someone take her to ED. Pt.verbalizes understanding, f/u with pt.in 7-10 days. -LAQUITA    (SW referral)  Pt.reports she has contact information to Shon Levin [facility] Dept. [phone], [facility] [phone], has not contacted the office, will call at her leisure. (ACP referral)--update forms. ACP referral sent 9/13/22. Pt.informed ACP facilitator will contact her to answer question or assist with completing the forms, will follow up at next out reach in 5-7 days.  -[address]

## 2022-09-28 VITALS
SYSTOLIC BLOOD PRESSURE: 122 MMHG | OXYGEN SATURATION: 98 % | RESPIRATION RATE: 16 BRPM | HEART RATE: 71 BPM | DIASTOLIC BLOOD PRESSURE: 75 MMHG | TEMPERATURE: 97.7 F

## 2022-09-28 PROCEDURE — 3331090001 HH PPS REVENUE CREDIT

## 2022-09-28 PROCEDURE — 3331090002 HH PPS REVENUE DEBIT

## 2022-09-29 ENCOUNTER — HOME CARE VISIT (OUTPATIENT)
Dept: SCHEDULING | Facility: HOME HEALTH | Age: 67
End: 2022-09-29
Payer: MEDICARE

## 2022-09-29 PROCEDURE — 3331090001 HH PPS REVENUE CREDIT

## 2022-09-29 PROCEDURE — G0153 HHCP-SVS OF S/L PATH,EA 15MN: HCPCS

## 2022-09-29 PROCEDURE — 3331090002 HH PPS REVENUE DEBIT

## 2022-09-30 ENCOUNTER — DOCUMENTATION ONLY (OUTPATIENT)
Dept: NEUROLOGY | Age: 67
End: 2022-09-30

## 2022-09-30 ENCOUNTER — HOME CARE VISIT (OUTPATIENT)
Dept: SCHEDULING | Facility: HOME HEALTH | Age: 67
End: 2022-09-30
Payer: MEDICARE

## 2022-09-30 ENCOUNTER — HOME CARE VISIT (OUTPATIENT)
Dept: HOME HEALTH SERVICES | Facility: HOME HEALTH | Age: 67
End: 2022-09-30
Payer: MEDICARE

## 2022-09-30 ENCOUNTER — PATIENT OUTREACH (OUTPATIENT)
Dept: CASE MANAGEMENT | Age: 67
End: 2022-09-30

## 2022-09-30 VITALS
RESPIRATION RATE: 18 BRPM | HEART RATE: 72 BPM | SYSTOLIC BLOOD PRESSURE: 156 MMHG | DIASTOLIC BLOOD PRESSURE: 64 MMHG | TEMPERATURE: 97.4 F

## 2022-09-30 PROCEDURE — 3331090001 HH PPS REVENUE CREDIT

## 2022-09-30 PROCEDURE — 3331090002 HH PPS REVENUE DEBIT

## 2022-09-30 PROCEDURE — G0152 HHCP-SERV OF OT,EA 15 MIN: HCPCS

## 2022-10-01 PROCEDURE — 3331090002 HH PPS REVENUE DEBIT

## 2022-10-01 PROCEDURE — 3331090001 HH PPS REVENUE CREDIT

## 2022-10-02 PROCEDURE — 3331090001 HH PPS REVENUE CREDIT

## 2022-10-02 PROCEDURE — 3331090002 HH PPS REVENUE DEBIT

## 2022-10-03 ENCOUNTER — HOME CARE VISIT (OUTPATIENT)
Dept: SCHEDULING | Facility: HOME HEALTH | Age: 67
End: 2022-10-03
Payer: MEDICARE

## 2022-10-03 ENCOUNTER — PATIENT OUTREACH (OUTPATIENT)
Dept: CASE MANAGEMENT | Age: 67
End: 2022-10-03

## 2022-10-03 VITALS
TEMPERATURE: 97.3 F | DIASTOLIC BLOOD PRESSURE: 60 MMHG | OXYGEN SATURATION: 99 % | SYSTOLIC BLOOD PRESSURE: 120 MMHG | HEART RATE: 60 BPM

## 2022-10-03 VITALS
DIASTOLIC BLOOD PRESSURE: 64 MMHG | OXYGEN SATURATION: 96 % | HEART RATE: 67 BPM | TEMPERATURE: 97.5 F | SYSTOLIC BLOOD PRESSURE: 132 MMHG | RESPIRATION RATE: 17 BRPM

## 2022-10-03 PROCEDURE — G0299 HHS/HOSPICE OF RN EA 15 MIN: HCPCS

## 2022-10-03 PROCEDURE — G0152 HHCP-SERV OF OT,EA 15 MIN: HCPCS

## 2022-10-03 PROCEDURE — 3331090002 HH PPS REVENUE DEBIT

## 2022-10-03 PROCEDURE — 3331090001 HH PPS REVENUE CREDIT

## 2022-10-03 NOTE — ACP (ADVANCE CARE PLANNING)
Date of Conversation:    [x]Yes []No  Patient has prior advance directive?   [x]Yes []No  Agent Present (in person or by phone)    Meeting Outcomes:    ACP discussion completed   [x][x] Advance directive form completed   [x] All elements of Docu-sign completed   [x] Copy sent to Central Scanning to be scanned into electronic medical record   [x] Updated Decision Maker info in Epic's ACP Navigator    [x] Recommended to review AMD annually or upon change in health status      Primary Agent's Name: Kacey Romero Minor  Relationship to Patient: son      Secondary Agent's Name:Isidro Rubin Jr  Relationship to Patient:nephew    Agent confirms Willingness to:  [x]Yes []No   Accept role  [x]Yes []No   Talk with individual about his/her goals, values, & preferences  [x]Yes []No   Follow pt's decisions, even if disagrees with these decisions  [x]Yes  []No   Make decisions in difficult moments      CPR Intro for Patients Without Chronic Illness:  [x] N/A  [] Yes  [] No   Educated patient regarding differences between AMD and DDNR  [] Yes  [] No   Provided CPR Fact Sheet for additional information       [] Yes  [x] No   Patient requests attempts at CPR if heart/breathing stops    [x] Yes  [] No   Educated patient regarding differences between AMD and DDNR  [] Yes  [x] No  [] N/A   If patient requested DDNR order, referred to provider for follow-up        [x] This note routed to one or more involved healthcare providers

## 2022-10-04 ENCOUNTER — HOME CARE VISIT (OUTPATIENT)
Dept: SCHEDULING | Facility: HOME HEALTH | Age: 67
End: 2022-10-04
Payer: MEDICARE

## 2022-10-04 PROCEDURE — 3331090001 HH PPS REVENUE CREDIT

## 2022-10-04 PROCEDURE — 3331090002 HH PPS REVENUE DEBIT

## 2022-10-04 PROCEDURE — 3331090003 HH PPS REVENUE ADJ

## 2022-10-04 PROCEDURE — G0153 HHCP-SVS OF S/L PATH,EA 15MN: HCPCS

## 2022-10-05 ENCOUNTER — PATIENT OUTREACH (OUTPATIENT)
Dept: CASE MANAGEMENT | Age: 67
End: 2022-10-05

## 2022-10-05 VITALS
SYSTOLIC BLOOD PRESSURE: 134 MMHG | OXYGEN SATURATION: 97 % | DIASTOLIC BLOOD PRESSURE: 80 MMHG | RESPIRATION RATE: 18 BRPM | TEMPERATURE: 97.3 F | HEART RATE: 81 BPM

## 2022-10-05 VITALS
TEMPERATURE: 97.8 F | DIASTOLIC BLOOD PRESSURE: 64 MMHG | SYSTOLIC BLOOD PRESSURE: 110 MMHG | OXYGEN SATURATION: 98 % | HEART RATE: 75 BPM

## 2022-10-05 PROCEDURE — 3331090001 HH PPS REVENUE CREDIT

## 2022-10-05 PROCEDURE — 3331090002 HH PPS REVENUE DEBIT

## 2022-10-05 NOTE — PROGRESS NOTES
Patient has graduated from the Transitions of Care Coordination  program on 10/5/22. Patient/family has the ability to self-manage at this time Care management goals have been completed. Patient was not referred to the [facility] team for further management. Goals Addressed                   This Visit's Progress     Prevent complications post hospitalization. 9/6   Pt.will attend all recommended follow ups with pcp.  -PCPSnehal MD 9/6/22  -Neurologist, SANDRO Kahn MD(pt.reports she has upcoming appt. in 2 weeks)   -[facility] PT/OT/SLP  -[facility],  explain briefly type of service; contact information provided    (CVA):  Pt.will have someone drive her  to the ED immediately for symptoms:difficulty walking dizziness, loss of balance and coordination, difficulty speaking, numbness or paralysis in the face, leg, or arm, likely on one side of the body, blurred vision, a sudden HA accompanied by nausea, vomiting, or dizziness. Pt.will call 911 or have someone take her to ED for eval.; Pt.verbalizes understanding, f/u with pt.in 5-7 days. -LAQUITA    (SW referral)  CTN contact Gia Horan Memos will contact pt.); request referral to SW.        (ACP referral)--update forms. Advance Care Plan discussed briefly with pt., agree to have information sent via (My Chart). Pt.informed ACP facilitator will contact her to answer question or assist with completing the forms. -[address]    9/13   Pt. Attend follow ups with pcp.  -PCPSnehal MD 9/6/22  -Upcoming appt. with Neurologist, SANDRO Kahn MD(9/21/22)  -[facility] PT/OT/SLP (for 4 weeks, then will re-eval). -Travis Krishnamurthy pt.use service as needed.    (CVA):  Pt.reports continue to have speech difficulty, weakness, will continue with PT/OT/SLP.     Pt.will have someone drive her  to the ED immediately for new or worsening symptoms:difficulty walking, dizziness, loss of balance and coordination, difficulty speaking, numbness or paralysis in the face, leg, or arm, likely on one side of the body, blurred vision, a sudden HA accompanied by nausea, vomiting, or dizziness. Pt.will call 911 or have someone take her to ED. Pt.verbalizes understanding, f/u with pt.in 7-10 days. -    (SW referral)  CTN contact Caroestrellita 9/6/22 Audrey Fernandez); request referral to [address] [place] [address] [place] she has not received call from [facility]. CTN follow up, spoke with Fay, provide CTN with contact information to [facility] Dept. [phone], [facility] [phone], CTN provided pt.with information. (ACP referral)--update forms. Advance Care Plan discussed briefly with pt., agree to have information sent via (My Chart). ACP referral sent 9/13/22. Pt.informed ACP facilitator will contact her to answer question or assist with completing the forms, will follow up at next out reach in 7-10 days. -[address]    9/27   -NeurologistSANDRO MD(9/21/22), return in 3 weeks. -- PT/OT/SLP (for 4 weeks, then will re-eval). -Travis Krishnamurthy pt.use service as needed.    (CVA):  Pt.reports continue to have speech difficulty, weakness has improved, will continue with PT/OT/SLP. Pt.will have someone drive her  to the ED immediately for new or worsening symptoms:difficulty walking, dizziness, loss of balance and coordination, difficulty speaking, numbness or paralysis in the face, leg, or arm, likely on one side of the body, blurred vision, a sudden HA accompanied by nausea, vomiting, or dizziness. Pt.will call 911 or have someone take her to ED. Pt.verbalizes understanding, f/u with pt.in 7-10 days. -    ( referral)  Pt.reports she has contact information to [facility] Dept. [phone], [facility] [phone], has not contacted the office, will call at her leisure. (ACP referral)--update forms. ACP referral sent 9/13/22. Pt.informed ACP facilitator will contact her to answer question or assist with completing the forms, will follow up at next out reach in 5-7 days.  -[address]    10/5 Upcoming appts:  -Neurologist, Rosanne Mohan MD(10/18/22)  -PCP, Edna Benito MD 11/29/22  -[facility] PT/OT/SLP continue with [address], pt.use service as needed.    (CVA):  Pt.reports continue to have speech difficulty, weakness has improved,but unable to do chores around the home,cook, laundry, etc. will continue with PT/OT/SLP. Pt.will have someone drive her  to the ED immediately for new or worsening symptoms:difficulty walking, dizziness, loss of balance and coordination, difficulty speaking, numbness or paralysis in the face, leg, or arm, likely on one side of the body, blurred vision, a sudden HA accompanied by nausea, vomiting, or dizziness. Pt.will call 911 or have someone take her to ED. Pt.verbalizes understanding.    (SW referral)  Contact information to [address] [place] [address]. [phone], [facility] [phone], pt.has contacted information  will call at her leisure. (ACP referral)--Form updated w ACP facilitator. Patient has Care Transition Nurse's contact information for any further questions, concerns, or needs.   Patients upcoming visits:    Future Appointments   Date Time Provider Roel Collins   10/6/2022  2:30 PM [facility] [address]   10/10/2022 To Be Determined [place] [address] Medical [facility]   10/10/2022 To Be Determined Guille Hurtado [facility]   10/10/2022  2:00 PM Nikko Cannon [place] [address] [address] [facility]   10/11/2022 To Be Determined Kolby Persaud RN [facility]   10/12/2022 To Be Determined Melody Quarto [place] [address] [address] [facility]   10/12/2022 [facility] [place] [address] [address] [facility]   10/17/2022 To Be Determined [address]   10/17/2022 [facility]   10/18/2022  8:40 AM Kalee Kahn MD [facility] BS AMB   10/19/2022 [facility]   10/20/2022 To Be Determined Claryce [facility] [address]   10/24/2022 To Be Determined [facility]   10/24/2022 To Be Determined [place] [address] Medical [facility]   10/26/2022 To Be Determined [facility]   10/27/2022 To Be Determined Saul Chavez [facility]   10/31/2022 To Be Determined [place] [address] Medical [facility]   11/2/2022 To Be Determined [place] [address] [facility]   11/7/2022 To Be Determined [place] [address] Medical [facility]   11/9/2022 To Be Determined [address]   11/14/2022 To Be Determined [facility]   11/16/2022 To Be Determined Anne Hayes, [facility]   11/29/2022  9:00 AM MD RUI Hayward BS AMB   12/28/2022 11:00 AM DANA HERNANDEZ BS AMB   12/28/2022 11:40 AM MD BRIDGET Blanco BS AMB

## 2022-10-06 ENCOUNTER — HOME CARE VISIT (OUTPATIENT)
Dept: SCHEDULING | Facility: HOME HEALTH | Age: 67
End: 2022-10-06
Payer: MEDICARE

## 2022-10-06 PROCEDURE — 3331090001 HH PPS REVENUE CREDIT

## 2022-10-06 PROCEDURE — 400014 HH F/U

## 2022-10-06 PROCEDURE — G0158 HHC OT ASSISTANT EA 15: HCPCS

## 2022-10-06 PROCEDURE — 3331090002 HH PPS REVENUE DEBIT

## 2022-10-07 VITALS
DIASTOLIC BLOOD PRESSURE: 80 MMHG | TEMPERATURE: 97.7 F | OXYGEN SATURATION: 97 % | HEART RATE: 88 BPM | SYSTOLIC BLOOD PRESSURE: 122 MMHG

## 2022-10-07 PROCEDURE — 3331090002 HH PPS REVENUE DEBIT

## 2022-10-07 PROCEDURE — 3331090001 HH PPS REVENUE CREDIT

## 2022-10-08 PROCEDURE — 3331090002 HH PPS REVENUE DEBIT

## 2022-10-08 PROCEDURE — 3331090001 HH PPS REVENUE CREDIT

## 2022-10-09 PROCEDURE — 3331090001 HH PPS REVENUE CREDIT

## 2022-10-09 PROCEDURE — 3331090002 HH PPS REVENUE DEBIT

## 2022-10-10 ENCOUNTER — HOME CARE VISIT (OUTPATIENT)
Dept: SCHEDULING | Facility: HOME HEALTH | Age: 67
End: 2022-10-10
Payer: MEDICARE

## 2022-10-10 ENCOUNTER — DOCUMENTATION ONLY (OUTPATIENT)
Dept: NEUROLOGY | Age: 67
End: 2022-10-10

## 2022-10-10 PROCEDURE — G0155 HHCP-SVS OF CSW,EA 15 MIN: HCPCS

## 2022-10-10 PROCEDURE — 3331090001 HH PPS REVENUE CREDIT

## 2022-10-10 PROCEDURE — G0151 HHCP-SERV OF PT,EA 15 MIN: HCPCS

## 2022-10-10 PROCEDURE — 3331090002 HH PPS REVENUE DEBIT

## 2022-10-11 ENCOUNTER — HOME CARE VISIT (OUTPATIENT)
Dept: SCHEDULING | Facility: HOME HEALTH | Age: 67
End: 2022-10-11
Payer: MEDICARE

## 2022-10-11 ENCOUNTER — HOME CARE VISIT (OUTPATIENT)
Dept: HOME HEALTH SERVICES | Facility: HOME HEALTH | Age: 67
End: 2022-10-11
Payer: MEDICARE

## 2022-10-11 VITALS
RESPIRATION RATE: 18 BRPM | DIASTOLIC BLOOD PRESSURE: 76 MMHG | HEART RATE: 67 BPM | TEMPERATURE: 97.2 F | OXYGEN SATURATION: 99 % | SYSTOLIC BLOOD PRESSURE: 138 MMHG

## 2022-10-11 VITALS
SYSTOLIC BLOOD PRESSURE: 128 MMHG | TEMPERATURE: 97.8 F | RESPIRATION RATE: 16 BRPM | HEART RATE: 74 BPM | OXYGEN SATURATION: 98 % | DIASTOLIC BLOOD PRESSURE: 78 MMHG

## 2022-10-11 PROCEDURE — G0153 HHCP-SVS OF S/L PATH,EA 15MN: HCPCS

## 2022-10-11 PROCEDURE — 3331090001 HH PPS REVENUE CREDIT

## 2022-10-11 PROCEDURE — G0299 HHS/HOSPICE OF RN EA 15 MIN: HCPCS

## 2022-10-11 PROCEDURE — 3331090002 HH PPS REVENUE DEBIT

## 2022-10-12 VITALS
DIASTOLIC BLOOD PRESSURE: 76 MMHG | TEMPERATURE: 97.2 F | HEART RATE: 67 BPM | RESPIRATION RATE: 18 BRPM | SYSTOLIC BLOOD PRESSURE: 138 MMHG | OXYGEN SATURATION: 99 %

## 2022-10-12 PROCEDURE — 3331090001 HH PPS REVENUE CREDIT

## 2022-10-12 PROCEDURE — 3331090002 HH PPS REVENUE DEBIT

## 2022-10-13 ENCOUNTER — HOME CARE VISIT (OUTPATIENT)
Dept: SCHEDULING | Facility: HOME HEALTH | Age: 67
End: 2022-10-13
Payer: MEDICARE

## 2022-10-13 VITALS
OXYGEN SATURATION: 98 % | SYSTOLIC BLOOD PRESSURE: 120 MMHG | RESPIRATION RATE: 18 BRPM | HEART RATE: 69 BPM | DIASTOLIC BLOOD PRESSURE: 60 MMHG | TEMPERATURE: 97.2 F

## 2022-10-13 PROCEDURE — G0153 HHCP-SVS OF S/L PATH,EA 15MN: HCPCS

## 2022-10-13 PROCEDURE — 3331090001 HH PPS REVENUE CREDIT

## 2022-10-13 PROCEDURE — 3331090002 HH PPS REVENUE DEBIT

## 2022-10-13 PROCEDURE — G0151 HHCP-SERV OF PT,EA 15 MIN: HCPCS

## 2022-10-14 ENCOUNTER — HOME CARE VISIT (OUTPATIENT)
Dept: SCHEDULING | Facility: HOME HEALTH | Age: 67
End: 2022-10-14
Payer: MEDICARE

## 2022-10-14 VITALS
TEMPERATURE: 97.5 F | HEART RATE: 64 BPM | SYSTOLIC BLOOD PRESSURE: 130 MMHG | DIASTOLIC BLOOD PRESSURE: 76 MMHG | OXYGEN SATURATION: 93 %

## 2022-10-14 VITALS
RESPIRATION RATE: 16 BRPM | HEART RATE: 82 BPM | TEMPERATURE: 97.3 F | SYSTOLIC BLOOD PRESSURE: 126 MMHG | DIASTOLIC BLOOD PRESSURE: 68 MMHG | OXYGEN SATURATION: 98 %

## 2022-10-14 PROCEDURE — 3331090001 HH PPS REVENUE CREDIT

## 2022-10-14 PROCEDURE — 3331090002 HH PPS REVENUE DEBIT

## 2022-10-14 PROCEDURE — G0152 HHCP-SERV OF OT,EA 15 MIN: HCPCS

## 2022-10-15 DIAGNOSIS — E11.9 TYPE 2 DIABETES MELLITUS WITHOUT COMPLICATION, WITHOUT LONG-TERM CURRENT USE OF INSULIN (HCC): ICD-10-CM

## 2022-10-15 PROCEDURE — 3331090001 HH PPS REVENUE CREDIT

## 2022-10-15 PROCEDURE — 3331090002 HH PPS REVENUE DEBIT

## 2022-10-16 PROCEDURE — 3331090002 HH PPS REVENUE DEBIT

## 2022-10-16 PROCEDURE — 3331090001 HH PPS REVENUE CREDIT

## 2022-10-17 ENCOUNTER — HOME CARE VISIT (OUTPATIENT)
Dept: SCHEDULING | Facility: HOME HEALTH | Age: 67
End: 2022-10-17
Payer: MEDICARE

## 2022-10-17 PROCEDURE — G0151 HHCP-SERV OF PT,EA 15 MIN: HCPCS

## 2022-10-17 PROCEDURE — 3331090002 HH PPS REVENUE DEBIT

## 2022-10-17 PROCEDURE — 3331090001 HH PPS REVENUE CREDIT

## 2022-10-17 RX ORDER — METFORMIN HYDROCHLORIDE 500 MG/1
TABLET, EXTENDED RELEASE ORAL
Qty: 270 TABLET | Refills: 1 | Status: SHIPPED | OUTPATIENT
Start: 2022-10-17

## 2022-10-18 ENCOUNTER — HOME CARE VISIT (OUTPATIENT)
Dept: SCHEDULING | Facility: HOME HEALTH | Age: 67
End: 2022-10-18
Payer: MEDICARE

## 2022-10-18 ENCOUNTER — OFFICE VISIT (OUTPATIENT)
Dept: NEUROLOGY | Age: 67
End: 2022-10-18

## 2022-10-18 VITALS
SYSTOLIC BLOOD PRESSURE: 122 MMHG | OXYGEN SATURATION: 98 % | DIASTOLIC BLOOD PRESSURE: 75 MMHG | TEMPERATURE: 97.5 F | RESPIRATION RATE: 16 BRPM | HEART RATE: 85 BPM

## 2022-10-18 VITALS
RESPIRATION RATE: 17 BRPM | BODY MASS INDEX: 24.95 KG/M2 | HEIGHT: 72 IN | DIASTOLIC BLOOD PRESSURE: 71 MMHG | HEART RATE: 65 BPM | SYSTOLIC BLOOD PRESSURE: 133 MMHG | WEIGHT: 184.2 LBS | TEMPERATURE: 98 F

## 2022-10-18 DIAGNOSIS — G43.009 MIGRAINE WITHOUT AURA AND WITHOUT STATUS MIGRAINOSUS, NOT INTRACTABLE: ICD-10-CM

## 2022-10-18 DIAGNOSIS — G70.00 MYASTHENIA GRAVIS (HCC): ICD-10-CM

## 2022-10-18 DIAGNOSIS — G43.719 CHRONIC MIGRAINE WITHOUT AURA, INTRACTABLE, WITHOUT STATUS MIGRAINOSUS: ICD-10-CM

## 2022-10-18 DIAGNOSIS — G43.019 INTRACTABLE MIGRAINE WITHOUT AURA AND WITHOUT STATUS MIGRAINOSUS: ICD-10-CM

## 2022-10-18 DIAGNOSIS — R47.89 DYSFLUENCY: ICD-10-CM

## 2022-10-18 DIAGNOSIS — R29.6 FREQUENT FALLS: ICD-10-CM

## 2022-10-18 DIAGNOSIS — F98.8 ATTENTION DEFICIT DISORDER (ADD) WITHOUT HYPERACTIVITY: ICD-10-CM

## 2022-10-18 DIAGNOSIS — G90.1 DYSAUTONOMIA (HCC): ICD-10-CM

## 2022-10-18 DIAGNOSIS — G35 MS (MULTIPLE SCLEROSIS) (HCC): Primary | ICD-10-CM

## 2022-10-18 DIAGNOSIS — G81.91 RIGHT HEMIPARESIS (HCC): ICD-10-CM

## 2022-10-18 PROCEDURE — 3331090001 HH PPS REVENUE CREDIT

## 2022-10-18 PROCEDURE — 3331090002 HH PPS REVENUE DEBIT

## 2022-10-18 RX ORDER — PYRIDOSTIGMINE BROMIDE 60 MG/1
TABLET ORAL
Qty: 180 TABLET | Refills: 4 | Status: SHIPPED | OUTPATIENT
Start: 2022-10-18

## 2022-10-18 RX ORDER — PREGABALIN 200 MG/1
200 CAPSULE ORAL 2 TIMES DAILY
Qty: 180 CAPSULE | Refills: 4 | Status: SHIPPED | OUTPATIENT
Start: 2022-10-18

## 2022-10-18 RX ORDER — RIMEGEPANT SULFATE 75 MG/75MG
75 TABLET, ORALLY DISINTEGRATING ORAL
Qty: 16 TABLET | Refills: 11 | Status: SHIPPED | OUTPATIENT
Start: 2022-10-18 | End: 2022-10-18

## 2022-10-18 RX ORDER — TOPIRAMATE 200 MG/1
200 TABLET ORAL 2 TIMES DAILY
Qty: 180 TABLET | Refills: 4 | Status: SHIPPED | OUTPATIENT
Start: 2022-10-18

## 2022-10-18 RX ORDER — AMANTADINE HYDROCHLORIDE 100 MG/1
100 CAPSULE, GELATIN COATED ORAL 2 TIMES DAILY
Qty: 60 CAPSULE | Refills: 11 | Status: SHIPPED | OUTPATIENT
Start: 2022-10-18

## 2022-10-18 RX ORDER — FINGOLIMOD HCL 0.5 MG/1
1 CAPSULE ORAL
Qty: 30 CAPSULE | Refills: 11 | Status: SHIPPED | OUTPATIENT
Start: 2022-10-18

## 2022-10-18 RX ORDER — ERENUMAB-AOOE 140 MG/ML
INJECTION, SOLUTION SUBCUTANEOUS
Qty: 3 ML | Refills: 5 | Status: SHIPPED | OUTPATIENT
Start: 2022-10-18

## 2022-10-18 NOTE — PROGRESS NOTES
Neurology Progress Note    NAME:  Richard Galvin   :   1955   MRN:   [id number]     Date/Time:  10/18/2022  Subjective:      Richard Galvin is a 74 y.o. female here today for       Medications reviewed:  Current Outpatient Medications   Medication Sig Dispense Refill    rimegepant (Nurtec ODT) 75 mg disintegrating tablet Take 1 Tablet by mouth once as needed for Migraine for up to 1 dose. 16 Tablet 11    erenumab-aooe (Aimovig Autoinjector) 140 mg/mL injection ADMINISTER 1 ML(140MG) UNDER THE SKIN EVERY 30 DAYS 3 mL 5    amantadine HCL (SYMMETREL) 100 mg capsule Take 1 Capsule by mouth two (2) times a day. 60 Capsule 11    pyridostigmine (MESTINON) 60 mg tablet TAKE 2 TABLETS BY MOUTH THREE (3) TIMES DAILY. 180 Tablet 4    topiramate (TOPAMAX) 200 mg tablet Take 1 Tablet by mouth two (2) times a day. 180 Tablet 4    pregabalin (Lyrica) 200 mg capsule Take 1 Capsule by mouth two (2) times a day. Max Daily Amount: 400 mg. 180 Capsule 4    Gilenya 0.5 mg cap Take 1 Capsule by mouth nightly. 30 Capsule 11    metFORMIN ER (GLUCOPHAGE XR) 500 mg tablet TAKE 3 TABLETS BY MOUTH DAILY 270 Tablet 1    glucose blood VI test strips (True Metrix Glucose Test Strip) strip 1 Each by Does Not Apply route daily. Use as directed to monitor blood glucose once daily      glucose blood VI test strips (blood glucose test) strip 100 Each by Does Not Apply route See Admin Instructions. One Touch Ultra Test Strips=Check blood sugar daily dx E11.8 100 Strip 1    acetaminophen (TYLENOL 8 HOUR PO) Take 500 mg by mouth two (2) times daily as needed for Pain. Do not winston      dantrolene (DANTRIUM) 100 mg capsule Take 100 mg by mouth daily as needed (spasms). aspirin delayed-release 81 mg tablet Take 1 Tablet by mouth in the morning.  30 Tablet 0    levothyroxine (SYNTHROID) 137 mcg tablet TAKE 1 TABLET BY MOUTH EVERY DAY BEFORE BREAKFAST 90 Tablet 3    PARoxetine (PAXIL) 40 mg tablet TAKE 1 AND 1/2 TABLETS BY MOUTH EVERY NIGHT 135 Tablet 3    rosuvastatin (CRESTOR) 40 mg tablet TAKE 1 TABLET BY MOUTH EVERY DAY 90 Tablet 1    albuterol (PROVENTIL VENTOLIN) 2.5 mg /3 mL (0.083 %) nebulizer solution 3 mL by Nebulization route every six (6) hours as needed for Wheezing. 30 Each 0    menthol-zinc oxide (CALMOSEPTINE) 0.44-20.6 % oint Apply 1 Each to affected area daily as needed for PRN Reason (Other) (thin layer to buttocks for skin irritation). lidocaine (LIDODERM) 5 % Apply patch to the affected area for 12 hours a day and remove for 12 hours a day. 30 Each 3    predniSONE (DELTASONE) 10 mg tablet Take  by mouth See Admin Instructions. 1 tab 3x/day x 3 days, 1 tab 2x/day x 4 days, 1 tab/day x 3days (Patient not taking: Reported on 10/18/2022)      clopidogreL (PLAVIX) 75 mg tab Take 75 mg by mouth daily.  CLOPIDOGREL 75 MG TAB (Patient not taking: Reported on 10/18/2022)          Objective:   Vitals:  Vitals:    10/18/22 0845   BP: 133/71   Pulse: 65   Resp: 17   Temp: 98 °F (36.7 °C)   TempSrc: Temporal   Weight: 184 lb 3.2 oz (83.6 kg)   Height: 6' (1.829 m)   PainSc:   4   LMP: 09/01/2010               Lab Data Reviewed:  Lab Results   Component Value Date/Time    WBC 3.5 (L) 08/31/2022 04:20 AM    HCT 44.7 08/31/2022 04:20 AM    HGB 14.6 08/31/2022 04:20 AM    PLATELET 95 (L) 23/09/2291 04:20 AM       Lab Results   Component Value Date/Time    Sodium 144 08/31/2022 04:20 AM    Potassium 4.1 08/31/2022 04:20 AM    Chloride 115 (H) 08/31/2022 04:20 AM    CO2 25 08/31/2022 04:20 AM    Glucose 111 (H) 08/31/2022 04:20 AM    BUN 13 08/31/2022 04:20 AM    Creatinine 0.98 08/31/2022 04:20 AM    Calcium 9.3 08/31/2022 04:20 AM       No components found for: TROPQUANT    No results found for: SHAHIDA      Lab Results   Component Value Date/Time    Hemoglobin A1c 6.7 (H) 08/31/2022 04:20 AM    Hemoglobin A1c (POC) 6.9 08/25/2022 09:25 AM        Lab Results   Component Value Date/Time    Vitamin B12 436 06/10/2019 10:41 AM    Folate 12.0 02/10/2016 12:00 AM No results found for: SHAHIDA, Redia Sherri, CLARIBEL    Lab Results   Component Value Date/Time    Cholesterol, total 140 08/31/2022 04:20 AM    HDL Cholesterol 46 08/31/2022 04:20 AM    LDL-CHOLESTEROL 91 12/26/2018 12:00 AM    LDL, calculated 70.8 08/31/2022 04:20 AM    VLDL, calculated 23.2 08/31/2022 04:20 AM    Triglyceride 116 08/31/2022 04:20 AM    Triglyceride 126 12/26/2018 12:00 AM    CHOL/HDL Ratio 3.0 08/31/2022 04:20 AM         CT Results (recent):  Results from Hospital Encounter encounter on 08/30/22    CT HEAD WO CONT    Narrative  INDICATION: frontal HA post TPA. Dysarthria. Exam: Noncontrast CT of the brain is performed with 5 mm collimation. CT dose reduction was achieved with the use of the standardized protocol  tailored for this examination and automatic exposure control for dose  modulation. Direct comparison is made to prior CT dated August 30, 2020, 2:22 PM.    FINDINGS: Mild diffuse cortical atrophy. There is no acute intracranial  hemorrhage, mass, mass effect or herniation. Ventricular system is normal. There  is a 6 mm hypodensity within the left thalamus, unchanged compared to prior CT  dated August 30, 2022. The gray-white matter differentiation is otherwise  well-preserved. The mastoid air cells are well pneumatized. Impression  No interval change. MRI Results (recent):  Results from [place] encounter on 08/30/22    MRI BRAIN W WO CONT    Narrative  EXAM:  MRI BRAIN W WO CONT    INDICATION: 82-year-old female being evaluated for stroke vs. MS lesion    COMPARISON:  8/30/2022 head CT. Sheila Lewis CONTRAST: 16 ml of ProHance. TECHNIQUE:  Multiplanar multisequence acquisition without and with contrast of the brain. FINDINGS:  The ventricles are normal in size and position. Small cortical encephalomalacia  in the left occipital and periatrial lobes, unchanged.  Nonspecific scattered  periventricular and subcortical white matter T2/FLAIR hyperintensity, mild  consistent with demyelinating plaques of MS. There is no acute infarct,  hemorrhage, extra-axial fluid collection, or mass effect. There is no cerebellar  tonsillar herniation. Expected arterial flow-voids are present. No evidence of  abnormal enhancement. The paranasal sinuses, mastoid air cells, and middle ears are clear. Status post  bilateral lens replacement. No significant osseous or scalp lesions are  identified. Impression  No acute intracranial abnormality. 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 Vasculitis is in  the differential diagnostic consideration. Patchy old cortical infarcts, unchanged. IR Results (recent):  No results found for this or any previous visit. VAS/US Results (recent):  No results found for this or any previous visit. PHYSICAL EXAM:  General:    Alert, cooperative, no distress, appears stated age. Head:   Normocephalic, without obvious abnormality, atraumatic. Eyes:   Conjunctivae/corneas clear. PERRLA  Nose:  Nares normal. No drainage or sinus tenderness. Throat:    Lips, mucosa, and tongue normal.  No Thrush  Neck:  Supple, symmetrical,  no adenopathy, thyroid: non tender    no carotid bruit and no JVD. Back:    Symmetric,  No CVA tenderness. Lungs:   Clear to auscultation bilaterally. No Wheezing or Rhonchi. No rales. Chest wall:  No tenderness or deformity. No Accessory muscle use. Heart:   Regular rate and rhythm,  no murmur, rub or gallop. Abdomen:   Soft, non-tender. Not distended. Bowel sounds normal. No masses  Extremities: Extremities normal, atraumatic, No cyanosis. No edema. No clubbing  Skin:     Texture, turgor normal. No rashes or lesions. Not Jaundiced  Lymph nodes: Cervical, supraclavicular normal.  Psych:  Good insight. Not depressed. Not anxious or agitated. NEUROLOGICAL EXAM:  Appearance:   The patient is well developed, well nourished, provides a coherent history and is in no acute distress. Mental Status: Oriented to time, place and person. Mood and affect appropriate. Cranial Nerves:   Intact visual fields. Fundi are benign. RACHEL, EOM's full, no nystagmus, no ptosis. Facial sensation is normal. Corneal reflexes are intact. Facial movement is symmetric. Hearing is normal bilaterally. Palate is midline with normal sternocleidomastoid and trapezius muscles are normal. Tongue is midline. Motor:  5/5 strength in upper and lower proximal and distal muscles. Normal bulk and tone. No fasciculations. Reflexes:   Deep tendon reflexes 2+/4 and symmetrical.   Sensory:   Normal to touch, pinprick and vibration. Gait:  Normal gait. Tremor:   No tremor noted. Cerebellar:  No cerebellar signs present. Neurovascular:  Normal heart sounds and regular rhythm, peripheral pulses intact, and no carotid bruits. Assesment  1. MS (multiple sclerosis) ([facility])    - pregabalin (Lyrica) 200 mg capsule; Take 1 Capsule by mouth two (2) times a day. Max Daily Amount: 400 mg. Dispense: 180 Capsule; Refill: 4  - Gilenya 0.5 mg cap; Take 1 Capsule by mouth nightly. Dispense: 30 Capsule; Refill: 11    2. Myasthenia gravis ([address]      3. Chronic migraine without aura, intractable, without status migrainosus      4. Dysfluency    5. Intractable migraine without aura and without status migrainosus      6. Dysautonomia ([address]      7. Attention deficit disorder (ADD) without hyperactivity      8. Frequent falls      9. Right hemiparesis ([address]      10.  Migraine without aura and without status migrainosus, not intractable    - erenumab-aooe (Aimovig Autoinjector) 140 mg/mL injection; ADMINISTER 1 ML(140MG) UNDER THE SKIN EVERY 30 DAYS  Dispense: 3 mL; Refill: 5    ___________________________________________________  PLAN:      ICD-10-CM ICD-9-CM    1. MS (multiple sclerosis) ([facility])  G35 340 pregabalin (Lyrica) 200 mg capsule      Gilenya 0.5 mg cap      2. Myasthenia gravis ([address]  G70.00 358.00       3. Chronic migraine without aura, intractable, without status migrainosus  G43.719 346.71       4. Dysfluency  R47.89 784.59       5. Intractable migraine without aura and without status migrainosus  G43.019 346.11       6. Dysautonomia ([facility]  G90.1 337.9       7. Attention deficit disorder (ADD) without hyperactivity  F98.8 314.00       8. Frequent falls  R29.6 V15.88       9. Right hemiparesis (HCC)  G81.91 342.90       10.  Migraine without aura and without status migrainosus, not intractable  G43.009 346.10 erenumab-aooe (Aimovig Autoinjector) 140 mg/mL injection                ___________________________________________________    Attending Physician: Haris Alberts MD                Doing well with pt   No fall  Rt hemiparesis

## 2022-10-18 NOTE — PROGRESS NOTES
Chief Complaint   Patient presents with    Follow-up    Migraine    Multiple Sclerosis    1. Have you been to the ER, urgent care clinic since your last visit?  no  Hospitalized since your last visit?  no    2. Have you seen or consulted any other health care providers outside of the [address] [place] [address] since your last visit? Include any pap smears or colon screening.

## 2022-10-19 ENCOUNTER — TELEPHONE (OUTPATIENT)
Dept: NEUROLOGY | Age: 67
End: 2022-10-19

## 2022-10-19 ENCOUNTER — HOME CARE VISIT (OUTPATIENT)
Dept: HOME HEALTH SERVICES | Facility: HOME HEALTH | Age: 67
End: 2022-10-19
Payer: MEDICARE

## 2022-10-19 PROCEDURE — 3331090001 HH PPS REVENUE CREDIT

## 2022-10-19 PROCEDURE — 3331090002 HH PPS REVENUE DEBIT

## 2022-10-19 NOTE — TELEPHONE ENCOUNTER
Sent Aimovig PA request to [facility] without notes. 9/22 and 10/18 office visits - open/unsigned. Status is pending.   Key: Evelin Can

## 2022-10-19 NOTE — TELEPHONE ENCOUNTER
Voicemail:    Liz Juarez w/ [facility] pharmacy lvm to see if Pt is able to have generic or if her medication must be brand name because the generic is $200 less. I could not understand what medication he said and I tried to call the pharmacy back with the number provided ([phone]) but was unable to reach Liz Juarez back. The phone was answering by someone wanting to sell us Life alert. Please advise.

## 2022-10-20 ENCOUNTER — HOME CARE VISIT (OUTPATIENT)
Dept: SCHEDULING | Facility: HOME HEALTH | Age: 67
End: 2022-10-20
Payer: MEDICARE

## 2022-10-20 VITALS
RESPIRATION RATE: 18 BRPM | HEART RATE: 74 BPM | TEMPERATURE: 97.6 F | SYSTOLIC BLOOD PRESSURE: 126 MMHG | DIASTOLIC BLOOD PRESSURE: 64 MMHG

## 2022-10-20 PROCEDURE — G0153 HHCP-SVS OF S/L PATH,EA 15MN: HCPCS

## 2022-10-20 PROCEDURE — 3331090001 HH PPS REVENUE CREDIT

## 2022-10-20 PROCEDURE — 3331090002 HH PPS REVENUE DEBIT

## 2022-10-20 NOTE — TELEPHONE ENCOUNTER
Called and spoke with [facility] pharmacy regarding voicemail left. Asked if there was some there named Haroldo Luna, he stated no. He stated the only thing seen that was not covered was the [facility]. I notified we just got an approval for the Thomas Angel. He verbalized understanding. Called and spoke with patient. Notified her of this. She stated to fax the Rx over to [facility] as they ship the medication to her. Paperwork given to Cite Barak MARK to have Dr. Diogo Moura sign and then fax.

## 2022-10-21 ENCOUNTER — HOME CARE VISIT (OUTPATIENT)
Dept: SCHEDULING | Facility: HOME HEALTH | Age: 67
End: 2022-10-21
Payer: MEDICARE

## 2022-10-21 PROCEDURE — 3331090001 HH PPS REVENUE CREDIT

## 2022-10-21 PROCEDURE — G0152 HHCP-SERV OF OT,EA 15 MIN: HCPCS

## 2022-10-21 PROCEDURE — 3331090002 HH PPS REVENUE DEBIT

## 2022-10-22 PROCEDURE — 3331090002 HH PPS REVENUE DEBIT

## 2022-10-22 PROCEDURE — 3331090001 HH PPS REVENUE CREDIT

## 2022-10-23 PROCEDURE — 3331090001 HH PPS REVENUE CREDIT

## 2022-10-23 PROCEDURE — 3331090002 HH PPS REVENUE DEBIT

## 2022-10-24 VITALS
SYSTOLIC BLOOD PRESSURE: 114 MMHG | TEMPERATURE: 98.7 F | DIASTOLIC BLOOD PRESSURE: 62 MMHG | HEART RATE: 83 BPM | OXYGEN SATURATION: 99 %

## 2022-10-24 PROCEDURE — 3331090002 HH PPS REVENUE DEBIT

## 2022-10-24 PROCEDURE — 3331090001 HH PPS REVENUE CREDIT

## 2022-10-25 ENCOUNTER — HOME CARE VISIT (OUTPATIENT)
Dept: SCHEDULING | Facility: HOME HEALTH | Age: 67
End: 2022-10-25
Payer: MEDICARE

## 2022-10-25 ENCOUNTER — TELEPHONE (OUTPATIENT)
Dept: NEUROLOGY | Age: 67
End: 2022-10-25

## 2022-10-25 VITALS
TEMPERATURE: 97.4 F | DIASTOLIC BLOOD PRESSURE: 70 MMHG | OXYGEN SATURATION: 97 % | SYSTOLIC BLOOD PRESSURE: 140 MMHG | HEART RATE: 60 BPM

## 2022-10-25 DIAGNOSIS — E78.5 HYPERLIPIDEMIA LDL GOAL <100: ICD-10-CM

## 2022-10-25 PROCEDURE — 3331090002 HH PPS REVENUE DEBIT

## 2022-10-25 PROCEDURE — G0152 HHCP-SERV OF OT,EA 15 MIN: HCPCS

## 2022-10-25 PROCEDURE — 3331090001 HH PPS REVENUE CREDIT

## 2022-10-25 PROCEDURE — G0151 HHCP-SERV OF PT,EA 15 MIN: HCPCS

## 2022-10-25 RX ORDER — ROSUVASTATIN CALCIUM 40 MG/1
TABLET, COATED ORAL
Qty: 90 TABLET | Refills: 1 | Status: SHIPPED | OUTPATIENT
Start: 2022-10-25

## 2022-10-25 NOTE — TELEPHONE ENCOUNTER
[facility] called in regards to prescription written for gilenya stated that both that and the generic were to expensive looking to see if there's another alternative please call harjeet back at [phone] ext [phone]

## 2022-10-26 PROCEDURE — 3331090002 HH PPS REVENUE DEBIT

## 2022-10-26 PROCEDURE — 3331090001 HH PPS REVENUE CREDIT

## 2022-10-27 ENCOUNTER — HOME CARE VISIT (OUTPATIENT)
Dept: SCHEDULING | Facility: HOME HEALTH | Age: 67
End: 2022-10-27
Payer: MEDICARE

## 2022-10-27 VITALS
HEART RATE: 76 BPM | OXYGEN SATURATION: 99 % | TEMPERATURE: 97.7 F | RESPIRATION RATE: 16 BRPM | DIASTOLIC BLOOD PRESSURE: 65 MMHG | SYSTOLIC BLOOD PRESSURE: 115 MMHG

## 2022-10-27 VITALS
HEART RATE: 82 BPM | OXYGEN SATURATION: 98 % | SYSTOLIC BLOOD PRESSURE: 130 MMHG | TEMPERATURE: 98.2 F | DIASTOLIC BLOOD PRESSURE: 74 MMHG | RESPIRATION RATE: 16 BRPM

## 2022-10-27 PROCEDURE — G0299 HHS/HOSPICE OF RN EA 15 MIN: HCPCS

## 2022-10-27 PROCEDURE — 3331090001 HH PPS REVENUE CREDIT

## 2022-10-27 PROCEDURE — 3331090002 HH PPS REVENUE DEBIT

## 2022-10-27 PROCEDURE — G0151 HHCP-SERV OF PT,EA 15 MIN: HCPCS

## 2022-10-28 ENCOUNTER — HOME CARE VISIT (OUTPATIENT)
Dept: SCHEDULING | Facility: HOME HEALTH | Age: 67
End: 2022-10-28
Payer: MEDICARE

## 2022-10-28 PROCEDURE — G0152 HHCP-SERV OF OT,EA 15 MIN: HCPCS

## 2022-10-28 PROCEDURE — 3331090002 HH PPS REVENUE DEBIT

## 2022-10-28 PROCEDURE — 3331090001 HH PPS REVENUE CREDIT

## 2022-10-29 VITALS
DIASTOLIC BLOOD PRESSURE: 65 MMHG | TEMPERATURE: 97.6 F | HEART RATE: 71 BPM | OXYGEN SATURATION: 98 % | SYSTOLIC BLOOD PRESSURE: 122 MMHG | RESPIRATION RATE: 16 BRPM

## 2022-10-29 PROCEDURE — 3331090002 HH PPS REVENUE DEBIT

## 2022-10-29 PROCEDURE — 3331090001 HH PPS REVENUE CREDIT

## 2022-10-30 PROCEDURE — 3331090002 HH PPS REVENUE DEBIT

## 2022-10-30 PROCEDURE — 3331090001 HH PPS REVENUE CREDIT

## 2022-10-31 ENCOUNTER — HOME CARE VISIT (OUTPATIENT)
Dept: SCHEDULING | Facility: HOME HEALTH | Age: 67
End: 2022-10-31
Payer: MEDICARE

## 2022-10-31 VITALS
SYSTOLIC BLOOD PRESSURE: 120 MMHG | DIASTOLIC BLOOD PRESSURE: 60 MMHG | OXYGEN SATURATION: 99 % | TEMPERATURE: 97.4 F | HEART RATE: 66 BPM

## 2022-10-31 VITALS
DIASTOLIC BLOOD PRESSURE: 76 MMHG | OXYGEN SATURATION: 93 % | HEART RATE: 63 BPM | RESPIRATION RATE: 16 BRPM | SYSTOLIC BLOOD PRESSURE: 128 MMHG

## 2022-10-31 PROCEDURE — 3331090002 HH PPS REVENUE DEBIT

## 2022-10-31 PROCEDURE — 3331090001 HH PPS REVENUE CREDIT

## 2022-10-31 PROCEDURE — G0299 HHS/HOSPICE OF RN EA 15 MIN: HCPCS

## 2022-10-31 RX ORDER — ASPIRIN 81 MG/1
81 TABLET ORAL DAILY
Qty: 90 TABLET | Refills: 1 | Status: SHIPPED | OUTPATIENT
Start: 2022-10-31 | End: 2022-11-19

## 2022-10-31 NOTE — TELEPHONE ENCOUNTER
MD Collin Gomez,    [facility] Thanks, Alpa Tyree    Last Visit: 9/6/22 Collin Gomez  Next Appointment: 11/29/22 MD Jorgensen  Previous Refill Encounter(s): 7/21/22 30    Requested Prescriptions     Pending Prescriptions Disp Refills    aspirin delayed-release 81 mg tablet 90 Tablet 1     Sig: Take 1 Tablet by mouth daily. For [facility] in place:   Recommendation Provided To:    Intervention Detail: New Rx: 1, reason: Patient Preference  Gap Closed?:   Intervention Accepted By:   Time Spent (min): 5

## 2022-11-01 ENCOUNTER — HOME CARE VISIT (OUTPATIENT)
Dept: HOME HEALTH SERVICES | Facility: HOME HEALTH | Age: 67
End: 2022-11-01
Payer: MEDICARE

## 2022-11-01 PROCEDURE — 3331090002 HH PPS REVENUE DEBIT

## 2022-11-01 PROCEDURE — 3331090001 HH PPS REVENUE CREDIT

## 2022-11-02 ENCOUNTER — HOME CARE VISIT (OUTPATIENT)
Dept: SCHEDULING | Facility: HOME HEALTH | Age: 67
End: 2022-11-02
Payer: MEDICARE

## 2022-11-02 PROCEDURE — G0151 HHCP-SERV OF PT,EA 15 MIN: HCPCS

## 2022-11-02 PROCEDURE — 3331090001 HH PPS REVENUE CREDIT

## 2022-11-02 PROCEDURE — 3331090002 HH PPS REVENUE DEBIT

## 2022-11-02 NOTE — TELEPHONE ENCOUNTER
Called patient and confirmed two identifiers. Informed patient that mammogram was ordered and no lab work will have to be done. Patient verbalized understanding.  Patient will call central scheduling to schedule mammogram. [FreeTextEntry1] : In summary, Ms. Olvera is a 57-year-old female with a long history of atypical chest pain and a recent COVID infection.  She is currently a little weak, but is no longer having chest pain.  Her exam shows regular rhythm, clear lungs, and a normal cardiac exam.  The EKG in emergency room was reviewed and is within normal limits.  \par \par She recovered from her COVID infection and appears okay.  She states she was told at [facility] she should follow-up with a stress test, so her records will be obtained and she will be scheduled for the test.

## 2022-11-03 ENCOUNTER — HOME CARE VISIT (OUTPATIENT)
Dept: SCHEDULING | Facility: HOME HEALTH | Age: 67
End: 2022-11-03
Payer: MEDICARE

## 2022-11-03 ENCOUNTER — OFFICE VISIT (OUTPATIENT)
Dept: ORTHOPEDIC SURGERY | Age: 67
End: 2022-11-03
Payer: MEDICARE

## 2022-11-03 VITALS — HEIGHT: 72 IN | WEIGHT: 184 LBS | BODY MASS INDEX: 24.92 KG/M2

## 2022-11-03 VITALS
TEMPERATURE: 97.5 F | HEART RATE: 75 BPM | RESPIRATION RATE: 16 BRPM | SYSTOLIC BLOOD PRESSURE: 122 MMHG | OXYGEN SATURATION: 97 % | DIASTOLIC BLOOD PRESSURE: 70 MMHG

## 2022-11-03 DIAGNOSIS — Z96.651 STATUS POST TOTAL RIGHT KNEE REPLACEMENT: Primary | ICD-10-CM

## 2022-11-03 PROCEDURE — 3017F COLORECTAL CA SCREEN DOC REV: CPT | Performed by: ORTHOPAEDIC SURGERY

## 2022-11-03 PROCEDURE — G8399 PT W/DXA RESULTS DOCUMENT: HCPCS | Performed by: ORTHOPAEDIC SURGERY

## 2022-11-03 PROCEDURE — G9717 DOC PT DX DEP/BP F/U NT REQ: HCPCS | Performed by: ORTHOPAEDIC SURGERY

## 2022-11-03 PROCEDURE — 3331090003 HH PPS REVENUE ADJ

## 2022-11-03 PROCEDURE — G8756 NO BP MEASURE DOC: HCPCS | Performed by: ORTHOPAEDIC SURGERY

## 2022-11-03 PROCEDURE — 1101F PT FALLS ASSESS-DOCD LE1/YR: CPT | Performed by: ORTHOPAEDIC SURGERY

## 2022-11-03 PROCEDURE — G8536 NO DOC ELDER MAL SCRN: HCPCS | Performed by: ORTHOPAEDIC SURGERY

## 2022-11-03 PROCEDURE — 3331090002 HH PPS REVENUE DEBIT

## 2022-11-03 PROCEDURE — 1123F ACP DISCUSS/DSCN MKR DOCD: CPT | Performed by: ORTHOPAEDIC SURGERY

## 2022-11-03 PROCEDURE — G8427 DOCREV CUR MEDS BY ELIG CLIN: HCPCS | Performed by: ORTHOPAEDIC SURGERY

## 2022-11-03 PROCEDURE — G9899 SCRN MAM PERF RSLTS DOC: HCPCS | Performed by: ORTHOPAEDIC SURGERY

## 2022-11-03 PROCEDURE — 1090F PRES/ABSN URINE INCON ASSESS: CPT | Performed by: ORTHOPAEDIC SURGERY

## 2022-11-03 PROCEDURE — 99213 OFFICE O/P EST LOW 20 MIN: CPT | Performed by: ORTHOPAEDIC SURGERY

## 2022-11-03 PROCEDURE — G8420 CALC BMI NORM PARAMETERS: HCPCS | Performed by: ORTHOPAEDIC SURGERY

## 2022-11-03 PROCEDURE — 3331090001 HH PPS REVENUE CREDIT

## 2022-11-03 NOTE — PROGRESS NOTES
Hay Galvin (: 1955) is a 74 y.o. female, patient, here for evaluation of the following chief complaint(s):  Knee Pain (Right knee pain - RTKA done 3yrs ago by [facility] )       HPI:    Patient apparently had a right total knee done by [address] [place] [address]. She feels that it still painful and swollen and would like an opinion. Allergies   Allergen Reactions    Bee Sting [Sting, Bee] Anaphylaxis     Also allergic to egg products    Penicillins Anaphylaxis     Patient screened for any delayed non-IgE-mediated reaction to PCN. Patient notes the following:    No delayed non-IgE-mediated reaction to PCN            Betadine [Povidone-Iodine] Rash    Egg Itching       Current Outpatient Medications   Medication Sig    aspirin delayed-release 81 mg tablet Take 1 Tablet by mouth daily. rosuvastatin (CRESTOR) 40 mg tablet TAKE 1 TABLET BY MOUTH EVERY DAY    rimegepant (Nurtec ODT) 75 mg disintegrating tablet Take 75 mg by mouth once as needed for Migraine. oxyCODONE-acetaminophen (PERCOCET) 5-325 mg per tablet Take 1 Tablet by mouth every eight (8) hours as needed for Pain (use for pain rating 6-10/10). erenumab-aooe (Aimovig Autoinjector) 140 mg/mL injection ADMINISTER 1 ML(140MG) UNDER THE SKIN EVERY 30 DAYS    amantadine HCL (SYMMETREL) 100 mg capsule Take 1 Capsule by mouth two (2) times a day. pyridostigmine (MESTINON) 60 mg tablet TAKE 2 TABLETS BY MOUTH THREE (3) TIMES DAILY. topiramate (TOPAMAX) 200 mg tablet Take 1 Tablet by mouth two (2) times a day. pregabalin (Lyrica) 200 mg capsule Take 1 Capsule by mouth two (2) times a day. Max Daily Amount: 400 mg.    Gilenya 0.5 mg cap Take 1 Capsule by mouth nightly. metFORMIN ER (GLUCOPHAGE XR) 500 mg tablet TAKE 3 TABLETS BY MOUTH DAILY    glucose blood VI test strips (True Metrix Glucose Test Strip) strip 1 Each by Does Not Apply route daily.  Use as directed to monitor blood glucose once daily    glucose blood VI test strips (blood glucose test) strip 100 Each by Does Not Apply route See Admin Instructions. One Touch Ultra Test Strips=Check blood sugar daily dx E11.8    acetaminophen (TYLENOL 8 HOUR PO) Take 500 mg by mouth two (2) times daily as needed for Pain. For pain scale 1-5/10.    dantrolene (DANTRIUM) 100 mg capsule Take 100 mg by mouth daily as needed (spasms). levothyroxine (SYNTHROID) 137 mcg tablet TAKE 1 TABLET BY MOUTH EVERY DAY BEFORE BREAKFAST    PARoxetine (PAXIL) 40 mg tablet TAKE 1 AND 1/2 TABLETS BY MOUTH EVERY NIGHT    albuterol (PROVENTIL VENTOLIN) 2.5 mg /3 mL (0.083 %) nebulizer solution 3 mL by Nebulization route every six (6) hours as needed for Wheezing. menthol-zinc oxide (CALMOSEPTINE) 0.44-20.6 % oint Apply 1 Each to affected area daily as needed for PRN Reason (Other) (thin layer to buttocks for skin irritation). lidocaine (LIDODERM) 5 % Apply patch to the affected area for 12 hours a day and remove for 12 hours a day. No current facility-administered medications for this visit.        Past Medical History:   Diagnosis Date    Arrhythmia     Arthritis     Bilateral hip replacements, right knee replacement    Benign cyst of left breast 3/21/2016    Blindness and low vision 2004    legally blind from [address]    Cataract     right eye    Cervical spinal stenosis 12/2/2016    Moderate C6-7    Chronic pain     COVID-19 vaccine series completed     PFIZER 3/19/21, 4/9/21    Depression     Diabetes mellitus type 2, controlled ([facility] 9/13/2016    Diet-controlled diabetes mellitus ([facility] 1/5/2018    Endometriosis 6/25/2010    FH: breast cancer 6/25/2010    Chart states FH breast/ovary Ca, CAD,DM     History of CVA (cerebrovascular accident) 6/5/2018    HTN, goal below 140/90 6/25/2010    CURRENTLY NO MEDICATIONS (7-12-21)    Hypercholesterolemia     Hypothyroid 6/25/2010    Incontinence     Lumbosacral plexopathy 6/25/2010    Migraine     H/O MIGRAINE    MS (multiple sclerosis) ([address] 2004    Myasthenia gravis ([facility]     Sleep apnea 2010    RESOLVED 2019    Stroke ([address] 2018    RIGHT SIDE WEAKNESS    Ureteral calculus 2010    Vitamin D deficiency 3/17/2016        Past Surgical History:   Procedure Laterality Date    HX APPENDECTOMY  2022    HX CARPAL TUNNEL RELEASE Left     HX CATARACT REMOVAL Bilateral     HX  SECTION  1986    HX CYST INCISION AND DRAINAGE      HX GI      COLONOSCOPY    HX GYN  2001    ovarian cyst    HX HEENT      removal of thyroglossal duct cyst    HX HIP REPLACEMENT Right 2017    anterior approach    HX KNEE ARTHROSCOPY Right 1998    HX ORTHOPAEDIC  1997    right thumb tendon repair x 2    HX ORTHOPAEDIC      RIGHT KNEE REPLACEMENT    HX OTHER SURGICAL      Janee Cath x2    HX SMALL BOWEL RESECTION      HX THYROIDECTOMY  1974    HX VASCULAR ACCESS  2006    PORT -A- CATH X 2    KY ABDOMEN SURGERY PROC UNLISTED  2001    bowel resection    [place] BREAST SURGERY PROCEDURE UNLISTED      breast cyst-both breasts at different times    KY TOTAL HIP ARTHROPLASTY Bilateral 2019    BOTH HIPS REPLACED       Family History   Problem Relation Age of Onset    OSTEOARTHRITIS Mother     Diabetes Mother     Elevated Lipids Mother     Headache Mother     Heart Disease Mother     Hypertension Mother     Stroke Mother     Neuropathy Mother     Colon Cancer Mother     Diabetes Father     Elevated Lipids Father     Heart Disease Father     Hypertension Father     Diabetes Sister     Elevated Lipids Sister     Headache Sister     Hypertension Sister     Migraines Sister     Cancer Sister 62        breast ca W/METS TO BRAIN    Breast Cancer Sister 62    Diabetes Brother     Elevated Lipids Brother     Hypertension Brother     Asthma Son     Thyroid Disease Son         GRAVES    Sleep Apnea Son     Breast Cancer Maternal Grandmother 54    Diabetes Sister     Neuropathy Sister     Anesth Problems Neg Hx         Social History     Socioeconomic History    Marital status:  Spouse name: Not on file    Number of children: Not on file    Years of education: Not on file    Highest education level: Not on file   Occupational History    Not on file   Tobacco Use    Smoking status: Every Day     Packs/day: 0.50     Years: 30.00     Pack years: 15.00     Types: Cigarettes    Smokeless tobacco: Never   Vaping Use    Vaping Use: Never used   Substance and Sexual Activity    Alcohol use: No     Comment: TWICE A YEAR PER PT    Drug use: No    Sexual activity: Not Currently   Other Topics Concern    Not on file   Social History Narrative    Not on file     Social Determinants of Health     Financial Resource Strain: Not on file   Food Insecurity: Not on file   Transportation Needs: Not on file   Physical Activity: Not on file   Stress: Not on file   Social Connections: Not on file   Intimate Partner Violence: Not on file   Housing Stability: Not on file       ROS    Positive for: Musculoskeletal  Last edited by Kimberli Stout on 11/3/2022  2:41 PM.            Vitals:  Ht 6' (1.829 m)   Wt 184 lb (83.5 kg)   LMP 09/01/2010   BMI 24.95 kg/m²    Body mass index is 24.95 kg/m². PHYSICAL EXAM:  On exam today right hip is painless. Right knee has no effusion. There is no warmth. There is no knee instability. Does have right leg swelling. IMAGING:  XR Results (most recent):  Results from Appointment encounter on 11/03/22    XR KNEE RT 3 V    Narrative  Total knee x-ray 3 views. Both implants are well-positioned and fixed to the bone. Patella tracks centrally in the trochlear groove. No findings related to her total knee. ASSESSMENT/PLAN:  1. Status post total right knee replacement  -     XR KNEE RT 3 V; Future    I think her knee implant looks fine. Referred back to primary care doctor to for evaluation of leg swelling. An electronic signature was used to authenticate this note.   --Saundra Amor MD

## 2022-11-03 NOTE — LETTER
11/3/2022    Patient: Murphy Galvin   YOB: 1955   Date of Visit: 11/3/2022     Domenico Mcmahan MD  [place] [id number] [facility]    Dear Domenico Mcmahan MD,      Thank you for referring Ms. Hiwot Galvin to [facility] for evaluation. My notes for this consultation are attached. If you have questions, please do not hesitate to call me. I look forward to following your patient along with you.       Sincerely,    Jese Genao MD

## 2022-11-04 ENCOUNTER — HOME CARE VISIT (OUTPATIENT)
Dept: HOME HEALTH SERVICES | Facility: HOME HEALTH | Age: 67
End: 2022-11-04
Payer: MEDICARE

## 2022-11-04 ENCOUNTER — HOME CARE VISIT (OUTPATIENT)
Dept: SCHEDULING | Facility: HOME HEALTH | Age: 67
End: 2022-11-04
Payer: MEDICARE

## 2022-11-04 PROCEDURE — 3331090001 HH PPS REVENUE CREDIT

## 2022-11-04 PROCEDURE — 3331090002 HH PPS REVENUE DEBIT

## 2022-11-05 PROCEDURE — 3331090001 HH PPS REVENUE CREDIT

## 2022-11-05 PROCEDURE — 3331090002 HH PPS REVENUE DEBIT

## 2022-11-06 PROCEDURE — 3331090002 HH PPS REVENUE DEBIT

## 2022-11-06 PROCEDURE — 3331090001 HH PPS REVENUE CREDIT

## 2022-11-07 ENCOUNTER — TELEPHONE (OUTPATIENT)
Dept: FAMILY MEDICINE CLINIC | Age: 67
End: 2022-11-07

## 2022-11-07 ENCOUNTER — HOME CARE VISIT (OUTPATIENT)
Dept: SCHEDULING | Facility: HOME HEALTH | Age: 67
End: 2022-11-07
Payer: MEDICARE

## 2022-11-07 ENCOUNTER — TELEPHONE (OUTPATIENT)
Dept: NEUROLOGY | Age: 67
End: 2022-11-07

## 2022-11-07 VITALS
SYSTOLIC BLOOD PRESSURE: 120 MMHG | HEART RATE: 82 BPM | RESPIRATION RATE: 18 BRPM | OXYGEN SATURATION: 96 % | TEMPERATURE: 97.2 F | DIASTOLIC BLOOD PRESSURE: 58 MMHG

## 2022-11-07 DIAGNOSIS — E11.9 TYPE 2 DIABETES MELLITUS WITHOUT COMPLICATION, WITHOUT LONG-TERM CURRENT USE OF INSULIN (HCC): Primary | ICD-10-CM

## 2022-11-07 PROCEDURE — 3331090001 HH PPS REVENUE CREDIT

## 2022-11-07 PROCEDURE — 400014 HH F/U

## 2022-11-07 PROCEDURE — 3331090002 HH PPS REVENUE DEBIT

## 2022-11-07 PROCEDURE — G0151 HHCP-SERV OF PT,EA 15 MIN: HCPCS

## 2022-11-07 PROCEDURE — G0300 HHS/HOSPICE OF LPN EA 15 MIN: HCPCS

## 2022-11-07 NOTE — TELEPHONE ENCOUNTER
Request for Prior auth approval for most recent plan year faxed to [facility] per request.

## 2022-11-07 NOTE — TELEPHONE ENCOUNTER
----- Message from [facility] sent at 11/7/2022  2:40 PM EST -----  Subject: Message to Provider    QUESTIONS  Information for Provider? Patient has upcoming on 11/15 please give   patient a call or mail her orders of lab work so she can get it done prior   to her appointment.   ---------------------------------------------------------------------------  --------------  [address]  [phone]; OK to leave message on voicemail  ---------------------------------------------------------------------------  --------------  SCRIPT ANSWERS  Relationship to Patient?  Self

## 2022-11-08 VITALS
TEMPERATURE: 97.1 F | HEART RATE: 79 BPM | DIASTOLIC BLOOD PRESSURE: 68 MMHG | RESPIRATION RATE: 16 BRPM | OXYGEN SATURATION: 99 % | SYSTOLIC BLOOD PRESSURE: 125 MMHG

## 2022-11-08 PROCEDURE — 3331090001 HH PPS REVENUE CREDIT

## 2022-11-08 PROCEDURE — 3331090002 HH PPS REVENUE DEBIT

## 2022-11-09 ENCOUNTER — HOME CARE VISIT (OUTPATIENT)
Dept: HOME HEALTH SERVICES | Facility: HOME HEALTH | Age: 67
End: 2022-11-09
Payer: MEDICARE

## 2022-11-09 PROCEDURE — 3331090001 HH PPS REVENUE CREDIT

## 2022-11-09 PROCEDURE — 3331090002 HH PPS REVENUE DEBIT

## 2022-11-10 ENCOUNTER — HOME CARE VISIT (OUTPATIENT)
Dept: SCHEDULING | Facility: HOME HEALTH | Age: 67
End: 2022-11-10
Payer: MEDICARE

## 2022-11-11 ENCOUNTER — HOME CARE VISIT (OUTPATIENT)
Dept: SCHEDULING | Facility: HOME HEALTH | Age: 67
End: 2022-11-11
Payer: MEDICARE

## 2022-11-11 PROCEDURE — G0152 HHCP-SERV OF OT,EA 15 MIN: HCPCS

## 2022-11-13 ENCOUNTER — HOME CARE VISIT (OUTPATIENT)
Dept: SCHEDULING | Facility: HOME HEALTH | Age: 67
End: 2022-11-13
Payer: MEDICARE

## 2022-11-13 VITALS
SYSTOLIC BLOOD PRESSURE: 114 MMHG | DIASTOLIC BLOOD PRESSURE: 62 MMHG | OXYGEN SATURATION: 98 % | RESPIRATION RATE: 16 BRPM | TEMPERATURE: 97.2 F | HEART RATE: 100 BPM

## 2022-11-13 PROCEDURE — G0300 HHS/HOSPICE OF LPN EA 15 MIN: HCPCS

## 2022-11-14 ENCOUNTER — HOME CARE VISIT (OUTPATIENT)
Dept: SCHEDULING | Facility: HOME HEALTH | Age: 67
End: 2022-11-14
Payer: MEDICARE

## 2022-11-14 PROCEDURE — G0151 HHCP-SERV OF PT,EA 15 MIN: HCPCS

## 2022-11-15 ENCOUNTER — OFFICE VISIT (OUTPATIENT)
Dept: FAMILY MEDICINE CLINIC | Age: 67
End: 2022-11-15
Payer: MEDICARE

## 2022-11-15 VITALS
HEART RATE: 75 BPM | OXYGEN SATURATION: 98 % | DIASTOLIC BLOOD PRESSURE: 78 MMHG | SYSTOLIC BLOOD PRESSURE: 135 MMHG | RESPIRATION RATE: 16 BRPM | TEMPERATURE: 98.1 F

## 2022-11-15 VITALS
RESPIRATION RATE: 16 BRPM | HEIGHT: 72 IN | WEIGHT: 180 LBS | OXYGEN SATURATION: 97 % | BODY MASS INDEX: 24.38 KG/M2 | TEMPERATURE: 97.3 F | HEART RATE: 70 BPM | DIASTOLIC BLOOD PRESSURE: 74 MMHG | SYSTOLIC BLOOD PRESSURE: 124 MMHG

## 2022-11-15 VITALS
OXYGEN SATURATION: 98 % | HEART RATE: 78 BPM | SYSTOLIC BLOOD PRESSURE: 110 MMHG | TEMPERATURE: 97.3 F | DIASTOLIC BLOOD PRESSURE: 60 MMHG

## 2022-11-15 DIAGNOSIS — M25.551 RIGHT HIP PAIN: ICD-10-CM

## 2022-11-15 DIAGNOSIS — Z00.00 ENCOUNTER FOR MEDICARE ANNUAL WELLNESS EXAM: Primary | ICD-10-CM

## 2022-11-15 DIAGNOSIS — R07.81 RIB PAIN ON RIGHT SIDE: ICD-10-CM

## 2022-11-15 DIAGNOSIS — R20.0 NUMBNESS IN RIGHT LEG: ICD-10-CM

## 2022-11-15 DIAGNOSIS — E11.9 TYPE 2 DIABETES MELLITUS WITHOUT COMPLICATION, WITHOUT LONG-TERM CURRENT USE OF INSULIN (HCC): ICD-10-CM

## 2022-11-15 DIAGNOSIS — Z23 NEEDS FLU SHOT: ICD-10-CM

## 2022-11-15 PROCEDURE — G0439 PPPS, SUBSEQ VISIT: HCPCS | Performed by: FAMILY MEDICINE

## 2022-11-15 PROCEDURE — 3044F HG A1C LEVEL LT 7.0%: CPT | Performed by: FAMILY MEDICINE

## 2022-11-15 PROCEDURE — 90694 VACC AIIV4 NO PRSRV 0.5ML IM: CPT | Performed by: FAMILY MEDICINE

## 2022-11-15 PROCEDURE — 99214 OFFICE O/P EST MOD 30 MIN: CPT | Performed by: FAMILY MEDICINE

## 2022-11-15 PROCEDURE — G8754 DIAS BP LESS 90: HCPCS | Performed by: FAMILY MEDICINE

## 2022-11-15 PROCEDURE — G9717 DOC PT DX DEP/BP F/U NT REQ: HCPCS | Performed by: FAMILY MEDICINE

## 2022-11-15 PROCEDURE — 3017F COLORECTAL CA SCREEN DOC REV: CPT | Performed by: FAMILY MEDICINE

## 2022-11-15 PROCEDURE — 1123F ACP DISCUSS/DSCN MKR DOCD: CPT | Performed by: FAMILY MEDICINE

## 2022-11-15 PROCEDURE — G9899 SCRN MAM PERF RSLTS DOC: HCPCS | Performed by: FAMILY MEDICINE

## 2022-11-15 PROCEDURE — 1101F PT FALLS ASSESS-DOCD LE1/YR: CPT | Performed by: FAMILY MEDICINE

## 2022-11-15 PROCEDURE — 1090F PRES/ABSN URINE INCON ASSESS: CPT | Performed by: FAMILY MEDICINE

## 2022-11-15 PROCEDURE — G8420 CALC BMI NORM PARAMETERS: HCPCS | Performed by: FAMILY MEDICINE

## 2022-11-15 PROCEDURE — G8536 NO DOC ELDER MAL SCRN: HCPCS | Performed by: FAMILY MEDICINE

## 2022-11-15 PROCEDURE — G0008 ADMIN INFLUENZA VIRUS VAC: HCPCS | Performed by: FAMILY MEDICINE

## 2022-11-15 PROCEDURE — 3078F DIAST BP <80 MM HG: CPT | Performed by: FAMILY MEDICINE

## 2022-11-15 PROCEDURE — 2022F DILAT RTA XM EVC RTNOPTHY: CPT | Performed by: FAMILY MEDICINE

## 2022-11-15 PROCEDURE — G8399 PT W/DXA RESULTS DOCUMENT: HCPCS | Performed by: FAMILY MEDICINE

## 2022-11-15 PROCEDURE — G8427 DOCREV CUR MEDS BY ELIG CLIN: HCPCS | Performed by: FAMILY MEDICINE

## 2022-11-15 PROCEDURE — 3074F SYST BP LT 130 MM HG: CPT | Performed by: FAMILY MEDICINE

## 2022-11-15 PROCEDURE — G8752 SYS BP LESS 140: HCPCS | Performed by: FAMILY MEDICINE

## 2022-11-15 NOTE — Clinical Note
Hi Dr. Radha Ferrari and Dr. Abraham King,  This patient of Dr. Sushant Gomez is reporting worsening numbness of her right leg which is leading to more frequent falls. Please see my note for further details. I was wondering if it would be possible to consider an EMG to evaluate the cause of her neuropathy. She has had EMGs in the past for her upper extremities but I don't believe she has had it for her legs. She has an appointment next year with Dr. Abraham King (transitioning from Dr. Sushant Gomez). Would you all consider seeing her for this or is the EMG a procedure I could order myself and she could schedule it through your clinic? I appreciate your thoughts. Sincerely, Brandon Garcia M.D.

## 2022-11-15 NOTE — PROGRESS NOTES
Patient Name: Lenora Strickland   MRN: [id number]    Chuck Spears is a 74 y.o. female who presents with the following:     Patient states that she fell yesterday and hit the dresser after her right leg gave out. Recalls hitting the right side of her rib cage and her right hip. States that she is in pain today and finds it difficult to walk. States that she has had worsening numbness in her lower right leg ever since she got her right knee replaced. She recently saw her orthopedic doctor who stated that her knee x-ray was normal.  States that her right leg is giving out more often, leading to multiple falls this past year. She has had EMGs before but for carpal tunnel evaluation. Her neurologist is leaving at the end of the year and she is scheduled to see a different neurologist in the spring. BP Readings from Last 3 Encounters:   11/15/22 124/74   11/14/22 135/78   11/13/22 114/62     Wt Readings from Last 3 Encounters:   11/15/22 180 lb (81.6 kg)   11/03/22 184 lb (83.5 kg)   10/18/22 184 lb 3.2 oz (83.6 kg)     Lab Results   Component Value Date/Time    Hemoglobin A1c 6.7 (H) 08/31/2022 04:20 AM    Hemoglobin A1c (POC) 6.9 08/25/2022 09:25 AM     Review of Systems   Constitutional:  Negative for fever, malaise/fatigue and weight loss. Respiratory:  Negative for cough, hemoptysis, shortness of breath and wheezing. Cardiovascular:  Positive for chest pain. Negative for palpitations, leg swelling and PND. Gastrointestinal:  Negative for abdominal pain, constipation, diarrhea, nausea and vomiting. Musculoskeletal:  Positive for falls and joint pain. Neurological:  Positive for tingling. The patient's medications, allergies, past medical history, surgical history, family history and social history were reviewed and updated where appropriate. Current Outpatient Medications:     aspirin delayed-release 81 mg tablet, Take 1 Tablet by mouth daily. , Disp: 90 Tablet, Rfl: 1 rosuvastatin (CRESTOR) 40 mg tablet, TAKE 1 TABLET BY MOUTH EVERY DAY, Disp: 90 Tablet, Rfl: 1    rimegepant (Nurtec ODT) 75 mg disintegrating tablet, Take 75 mg by mouth once as needed for Migraine. , Disp: , Rfl:     oxyCODONE-acetaminophen (PERCOCET) 5-325 mg per tablet, Take 1 Tablet by mouth every eight (8) hours as needed for Pain (use for pain rating 6-10/10). , Disp: , Rfl:     erenumab-aooe (Aimovig Autoinjector) 140 mg/mL injection, ADMINISTER 1 ML(140MG) UNDER THE SKIN EVERY 30 DAYS, Disp: 3 mL, Rfl: 5    amantadine HCL (SYMMETREL) 100 mg capsule, Take 1 Capsule by mouth two (2) times a day., Disp: 60 Capsule, Rfl: 11    pyridostigmine (MESTINON) 60 mg tablet, TAKE 2 TABLETS BY MOUTH THREE (3) TIMES DAILY. , Disp: 180 Tablet, Rfl: 4    topiramate (TOPAMAX) 200 mg tablet, Take 1 Tablet by mouth two (2) times a day., Disp: 180 Tablet, Rfl: 4    pregabalin (Lyrica) 200 mg capsule, Take 1 Capsule by mouth two (2) times a day. Max Daily Amount: 400 mg., Disp: 180 Capsule, Rfl: 4    Gilenya 0.5 mg cap, Take 1 Capsule by mouth nightly., Disp: 30 Capsule, Rfl: 11    metFORMIN ER (GLUCOPHAGE XR) 500 mg tablet, TAKE 3 TABLETS BY MOUTH DAILY, Disp: 270 Tablet, Rfl: 1    glucose blood VI test strips (True Metrix Glucose Test Strip) strip, 1 Each by Does Not Apply route daily. Use as directed to monitor blood glucose once daily, Disp: , Rfl:     glucose blood VI test strips (blood glucose test) strip, 100 Each by Does Not Apply route See Admin Instructions. One Touch Ultra Test Strips=Check blood sugar daily dx E11.8, Disp: 100 Strip, Rfl: 1    acetaminophen (TYLENOL 8 HOUR PO), Take 500 mg by mouth two (2) times daily as needed for Pain. For pain scale 1-5/10., Disp: , Rfl:     dantrolene (DANTRIUM) 100 mg capsule, Take 100 mg by mouth daily as needed (spasms). , Disp: , Rfl:     levothyroxine (SYNTHROID) 137 mcg tablet, TAKE 1 TABLET BY MOUTH EVERY DAY BEFORE BREAKFAST, Disp: 90 Tablet, Rfl: 3    PARoxetine (PAXIL) 40 mg tablet, TAKE 1 AND 1/2 TABLETS BY MOUTH EVERY NIGHT, Disp: 135 Tablet, Rfl: 3    albuterol (PROVENTIL VENTOLIN) 2.5 mg /3 mL (0.083 %) nebulizer solution, 3 mL by Nebulization route every six (6) hours as needed for Wheezing., Disp: 30 Each, Rfl: 0    menthol-zinc oxide (CALMOSEPTINE) 0.44-20.6 % oint, Apply 1 Each to affected area daily as needed for PRN Reason (Other) (thin layer to buttocks for skin irritation). , Disp: , Rfl:     lidocaine (LIDODERM) 5 %, Apply patch to the affected area for 12 hours a day and remove for 12 hours a day., Disp: 30 Each, Rfl: 3    Allergies   Allergen Reactions    Bee Sting [Sting, Bee] Anaphylaxis     Also allergic to egg products    Penicillins Anaphylaxis     Patient screened for any delayed non-IgE-mediated reaction to PCN. Patient notes the following:    No delayed non-IgE-mediated reaction to PCN            Betadine [Povidone-Iodine] Rash    Egg Itching       OBJECTIVE    Visit Vitals  /74 (BP 1 Location: Left upper arm, BP Patient Position: Sitting, BP Cuff Size: Adult)   Pulse 70   Temp 97.3 °F (36.3 °C) (Temporal)   Resp 16   Ht 6' (1.829 m)   Wt 180 lb (81.6 kg)   LMP 09/01/2010   SpO2 97%   BMI 24.41 kg/m²       Physical Exam  Vitals and nursing note reviewed. Constitutional:       General: She is not in acute distress. Appearance: She is not diaphoretic. Eyes:      Conjunctiva/sclera: Conjunctivae normal.      Pupils: Pupils are equal, round, and reactive to light. Cardiovascular:      Rate and Rhythm: Normal rate and regular rhythm. Heart sounds: Normal heart sounds. No murmur heard. No friction rub. No gallop. Pulmonary:      Effort: Pulmonary effort is normal. No respiratory distress. Breath sounds: Normal breath sounds. No wheezing. Chest:      Chest wall: Tenderness (right mid/posterior rib cage) present. Skin:     General: Skin is warm and dry. Findings: No bruising or rash.    Neurological:      Mental Status: She is alert. ASSESSMENT AND PLAN  Zoey Galvin is a 74 y.o. female who presents today for:    1. Encounter for Medicare annual wellness exam    2. Type 2 diabetes mellitus without complication, without long-term current use of insulin (HCC)  Stable, continue current treatment pending review of labs. - MICROALBUMIN, UR, RAND W/ MICROALB/CREAT RATIO  - METABOLIC PANEL, BASIC  - HEMOGLOBIN A1C WITH EAG    3. Right hip pain  Will obtain xray today; recommend prn Tylenol and heating pad. - XR HIP RT W OR WO PELV 2-3 VWS; Future    4. Rib pain on right side  Can try heating pad and lidocaine patches. - XR RIBS RT W PA CXR MIN 3 V; Future    5. Numbness in right leg  Will reach out to neurology to see if they would consider doing an EMG of her ongoing leg weakness which is causing her to have more frequent falls. 6. Needs flu shot  - INFLUENZA, FLUAD, (AGE 72 Y+), IM, PF, 0.5 ML       There are no discontinued medications. Treatment risks/benefits/costs/interactions/alternatives discussed with patient. Advised patient to call back or return to office if symptoms worsen/change/persist. If patient cannot reach us or should anything more severe/urgent arise he/she should proceed directly to the nearest emergency department. Discussed expected course/resolution/complications of diagnosis in detail with patient. Patient expressed understanding with the diagnosis and plan. This dictation may have been completed with Dragon, the computer voice recognition software. Unanticipated grammatical, syntax, homophones, and other interpretive errors are sometimes inadvertently transcribed by the computer software. Please disregard any errors that have escaped final proofreading. Brandon Soni M.D.

## 2022-11-15 NOTE — PROGRESS NOTES
This is the Subsequent Medicare Annual Wellness Exam, performed 12 months or more after the Initial AWV or the last Subsequent AWV    I have reviewed the patient's medical history in detail and updated the computerized patient record. Assessment/Plan   Education and counseling provided:  Are appropriate based on today's review and evaluation    1. Encounter for Medicare annual wellness exam  2. Type 2 diabetes mellitus without complication, without long-term current use of insulin (HCC)  -     MICROALBUMIN, UR, RAND W/ MICROALB/CREAT RATIO  -     METABOLIC PANEL, BASIC  -     HEMOGLOBIN A1C WITH EAG  3. Right hip pain  -     XR HIP RT W OR WO PELV 2-3 VWS; Future  4. Rib pain on right side  -     XR RIBS RT W PA CXR MIN 3 V; Future  5. Numbness in right leg  6.  Needs flu shot  -     INFLUENZA, FLUAD, (AGE 72 Y+), IM, PF, 0.5 ML       Depression Risk Factor Screening     3 most recent PHQ Screens 10/18/2022   PHQ Not Done -   Little interest or pleasure in doing things Not at all   Feeling down, depressed, irritable, or hopeless Not at all   Total Score PHQ 2 0   Trouble falling or staying asleep, or sleeping too much -   Feeling tired or having little energy -   Poor appetite, weight loss, or overeating -   Feeling bad about yourself - or that you are a failure or have let yourself or your family down -   Trouble concentrating on things such as school, work, reading, or watching TV -   Moving or speaking so slowly that other people could have noticed; or the opposite being so fidgety that others notice -   Thoughts of being better off dead, or hurting yourself in some way -   PHQ 9 Score -   How difficult have these problems made it for you to do your work, take care of your home and get along with others -       Alcohol & Drug Abuse Risk Screen    Do you average more than 1 drink per night or more than 7 drinks a week:  No    On any one occasion in the past three months have you have had more than 3 drinks containing alcohol:  No       Opioid Risk: (Low risk score <55, High risk score ?55)  Opioid risk score: 35      Click here to complete the Controlled Substance Monitoring SmartForm    Last PDMP Daniel as Reviewed:  Review User Review Instant Review Result   MCKENZIE LARIOS 2/16/2021  9:44 AM Reviewed PDMP [1]       Functional Ability and Level of Safety    Hearing: Hearing is good. Activities of Daily Living: The home contains: handrails and grab bars  Patient needs help with:  transportation, managing money, bathing, and walking      Ambulation: with mild difficulty     Fall Risk:  Fall Risk Assessment, last 12 mths 10/18/2022   Able to walk? Yes   Fall in past 12 months? 0   Do you feel unsteady? -   Are you worried about falling -   Is TUG test greater than 12 seconds? -   Is the gait abnormal? -   Number of falls in past 12 months -   Fall with injury?  -      Abuse Screen:  Patient is not abused       Cognitive Screening    Has your family/caregiver stated any concerns about your memory: no       Health Maintenance Due     Health Maintenance Due   Topic Date Due    Shingrix Vaccine Age 49> (1 of 2) Never done    Foot Exam Q1  07/05/2020    Eye Exam Retinal or Dilated  03/28/2021    COVID-19 Vaccine (4 - Booster for Pfizer series) 08/19/2022    MICROALBUMIN Q1  10/20/2022    Medicare Yearly Exam  10/27/2022       Patient Care Team   Patient Care Team:  Marquise Thompson MD as PCP - General (Family Medicine)  Marquise Thompson MD as PCP - [address] [place] [address] Provider  Ivan Thomas MD (Orthopedic Surgery)  Blank Markham MD as Physician (Neurology)  Akiko Ledezma, [address] as Physician (Optometry)  Tariq Rodriguez MD as Physician (Otolaryngology)  Angi Sequeira MD as Physician (Orthopedic Surgery)  Gael Carmona MD as Physician (Cardiovascular Disease Physician)  Krystal Schuster MD (Pain Management)  Carrillo Hare DPM (Podiatry)  Orma Runner, MD (Orthopedic Surgery)  Erika Morrison Tom Awad MD (Gastroenterology)    History     Patient Active Problem List   Diagnosis Code    Sleep apnea G47.30    DM type 2, controlled, with complication ([address] R54.2    Lumbosacral plexopathy G54.1    HTN, goal below 140/90 I10    FH: breast cancer Z80.3    Hyperlipemia E78.5    Hypothyroid E03.9    Ureteral calculus N20.1    MS (multiple sclerosis) ([address] G35    Myasthenia gravis ([address] G70.00    Vitamin D deficiency E55.9    Benign cyst of left breast N60.02    Cervical spinal stenosis M48.02    Depression F32. 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    Arthritis of left hip M16.12    Thrombocytopenia (HCC) D69.6    Anemia D64.9    Osteoarthritis of right knee M17.11    Chronic renal disease, stage III N18.30    CVA (cerebral vascular accident) ([address] I63.9     Past Medical History:   Diagnosis Date    Arrhythmia     Arthritis     Bilateral hip replacements, right knee replacement    Benign cyst of left breast 3/21/2016    Blindness and low vision 2004    legally blind from [address]    Cataract     right eye    Cervical spinal stenosis 12/2/2016    Moderate C6-7    Chronic pain     COVID-19 vaccine series completed     PFIZER 3/19/21, 4/9/21    Depression     Diabetes mellitus type 2, controlled ([address] 9/13/2016    Diet-controlled diabetes mellitus ([address] 1/5/2018    Endometriosis 6/25/2010    FH: breast cancer 6/25/2010    Chart states FH breast/ovary Ca, CAD,DM     History of CVA (cerebrovascular accident) 6/5/2018    HTN, goal below 140/90 6/25/2010    CURRENTLY NO MEDICATIONS (7-12-21)    Hypercholesterolemia     Hypothyroid 6/25/2010    Incontinence     Lumbosacral plexopathy 6/25/2010    Migraine     H/O MIGRAINE    MS (multiple sclerosis) ([address] 2004    Myasthenia gravis ([address] 2011    Sleep apnea 6/25/2010    RESOLVED 5/2019    Stroke ([address] 2018    RIGHT SIDE WEAKNESS    Ureteral calculus 2010    Vitamin D deficiency 3/17/2016      Past Surgical History:   Procedure Laterality Date    HX APPENDECTOMY  2022    HX CARPAL TUNNEL RELEASE Left     HX CATARACT REMOVAL Bilateral     HX  SECTION  1986    HX CYST INCISION AND DRAINAGE      HX GI      COLONOSCOPY    HX GYN  2001    ovarian cyst    HX HEENT      removal of thyroglossal duct cyst    HX HIP REPLACEMENT Right 2017    anterior approach    HX KNEE ARTHROSCOPY Right 1998    HX ORTHOPAEDIC  1997    right thumb tendon repair x 2    HX ORTHOPAEDIC      RIGHT KNEE REPLACEMENT    HX OTHER SURGICAL      Janee Cath x2    HX SMALL BOWEL RESECTION      HX THYROIDECTOMY  1974    HX VASCULAR ACCESS  2006    PORT -A- CATH X 2    [place] ABDOMEN SURGERY PROC UNLISTED  2001    bowel resection    [place] BREAST SURGERY PROCEDURE UNLISTED      breast cyst-both breasts at different times    [place] TOTAL HIP ARTHROPLASTY Bilateral 2019    BOTH HIPS REPLACED     Current Outpatient Medications   Medication Sig Dispense Refill    aspirin delayed-release 81 mg tablet Take 1 Tablet by mouth daily. 90 Tablet 1    rosuvastatin (CRESTOR) 40 mg tablet TAKE 1 TABLET BY MOUTH EVERY DAY 90 Tablet 1    rimegepant (Nurtec ODT) 75 mg disintegrating tablet Take 75 mg by mouth once as needed for Migraine. oxyCODONE-acetaminophen (PERCOCET) 5-325 mg per tablet Take 1 Tablet by mouth every eight (8) hours as needed for Pain (use for pain rating 6-10/10). erenumab-aooe (Aimovig Autoinjector) 140 mg/mL injection ADMINISTER 1 ML(140MG) UNDER THE SKIN EVERY 30 DAYS 3 mL 5    amantadine HCL (SYMMETREL) 100 mg capsule Take 1 Capsule by mouth two (2) times a day. 60 Capsule 11    pyridostigmine (MESTINON) 60 mg tablet TAKE 2 TABLETS BY MOUTH THREE (3) TIMES DAILY. 180 Tablet 4    topiramate (TOPAMAX) 200 mg tablet Take 1 Tablet by mouth two (2) times a day.  180 Tablet 4    pregabalin (Lyrica) 200 mg capsule Take 1 Capsule by mouth two (2) times a day. Max Daily Amount: 400 mg. 180 Capsule 4    Gilenya 0.5 mg cap Take 1 Capsule by mouth nightly. 30 Capsule 11    metFORMIN ER (GLUCOPHAGE XR) 500 mg tablet TAKE 3 TABLETS BY MOUTH DAILY 270 Tablet 1    glucose blood VI test strips (True Metrix Glucose Test Strip) strip 1 Each by Does Not Apply route daily. Use as directed to monitor blood glucose once daily      glucose blood VI test strips (blood glucose test) strip 100 Each by Does Not Apply route See Admin Instructions. One Touch Ultra Test Strips=Check blood sugar daily dx E11.8 100 Strip 1    acetaminophen (TYLENOL 8 HOUR PO) Take 500 mg by mouth two (2) times daily as needed for Pain. For pain scale 1-5/10.      dantrolene (DANTRIUM) 100 mg capsule Take 100 mg by mouth daily as needed (spasms). levothyroxine (SYNTHROID) 137 mcg tablet TAKE 1 TABLET BY MOUTH EVERY DAY BEFORE BREAKFAST 90 Tablet 3    PARoxetine (PAXIL) 40 mg tablet TAKE 1 AND 1/2 TABLETS BY MOUTH EVERY NIGHT 135 Tablet 3    albuterol (PROVENTIL VENTOLIN) 2.5 mg /3 mL (0.083 %) nebulizer solution 3 mL by Nebulization route every six (6) hours as needed for Wheezing. 30 Each 0    menthol-zinc oxide (CALMOSEPTINE) 0.44-20.6 % oint Apply 1 Each to affected area daily as needed for PRN Reason (Other) (thin layer to buttocks for skin irritation). lidocaine (LIDODERM) 5 % Apply patch to the affected area for 12 hours a day and remove for 12 hours a day. 30 Each 3     Allergies   Allergen Reactions    Bee Sting [Sting, Bee] Anaphylaxis     Also allergic to egg products    Penicillins Anaphylaxis     Patient screened for any delayed non-IgE-mediated reaction to PCN.         Patient notes the following:    No delayed non-IgE-mediated reaction to PCN            Betadine [Povidone-Iodine] Rash    Egg Itching       Family History   Problem Relation Age of Onset    OSTEOARTHRITIS Mother     Diabetes Mother     Elevated Lipids Mother     Headache Mother     Heart Disease Mother     Hypertension Mother     Stroke Mother     Neuropathy Mother     Colon Cancer Mother     Diabetes Father     Elevated Lipids Father     Heart Disease Father     Hypertension Father     Diabetes Sister     Elevated Lipids Sister     Headache Sister     Hypertension Sister     Migraines Sister     Cancer Sister 62        breast ca W/METS TO BRAIN    Breast Cancer Sister 62    Diabetes Brother     Elevated Lipids Brother     Hypertension Brother     Asthma Son     Thyroid Disease Son         GRAVES    Sleep Apnea Son     Breast Cancer Maternal Grandmother 54    Diabetes Sister     Neuropathy Sister     Anesth Problems Neg Hx      Social History     Tobacco Use    Smoking status: Every Day     Packs/day: 0.50     Years: 30.00     Pack years: 15.00     Types: Cigarettes    Smokeless tobacco: Never   Substance Use Topics    Alcohol use: No     Comment: Marilynn Daley MD

## 2022-11-15 NOTE — PROGRESS NOTES
Chief Complaint   Patient presents with    Diabetes       1. \"Have you been to the ER, urgent care clinic since your last visit? Hospitalized since your last visit? \" No    2. \"Have you seen or consulted any other health care providers outside of the [address] [place] [address] since your last visit? \" No     3. For patients aged 39-70: Has the patient had a colonoscopy / FIT/ Cologuard? Yes - no Care Gap present      If the patient is female:    4. For patients aged 41-77: Has the patient had a mammogram within the past 2 years? Yes - no Care Gap present      5. For patients aged 21-65: Has the patient had a pap smear?  Yes - no Care Gap present    3 most recent PHQ Screens 10/18/2022   PHQ Not Done -   Little interest or pleasure in doing things Not at all   Feeling down, depressed, irritable, or hopeless Not at all   Total Score PHQ 2 0   Trouble falling or staying asleep, or sleeping too much -   Feeling tired or having little energy -   Poor appetite, weight loss, or overeating -   Feeling bad about yourself - or that you are a failure or have let yourself or your family down -   Trouble concentrating on things such as school, work, reading, or watching TV -   Moving or speaking so slowly that other people could have noticed; or the opposite being so fidgety that others notice -   Thoughts of being better off dead, or hurting yourself in some way -   PHQ 9 Score -   How difficult have these problems made it for you to do your work, take care of your home and get along with others -

## 2022-11-16 ENCOUNTER — HOME CARE VISIT (OUTPATIENT)
Dept: SCHEDULING | Facility: HOME HEALTH | Age: 67
End: 2022-11-16
Payer: MEDICARE

## 2022-11-16 LAB
ANION GAP SERPL CALC-SCNC: 3 MMOL/L (ref 5–15)
BUN SERPL-MCNC: 12 MG/DL (ref 6–20)
BUN/CREAT SERPL: 13 (ref 12–20)
CALCIUM SERPL-MCNC: 9.2 MG/DL (ref 8.5–10.1)
CHLORIDE SERPL-SCNC: 113 MMOL/L (ref 97–108)
CO2 SERPL-SCNC: 26 MMOL/L (ref 21–32)
CREAT SERPL-MCNC: 0.95 MG/DL (ref 0.55–1.02)
CREAT UR-MCNC: 317 MG/DL
EST. AVERAGE GLUCOSE BLD GHB EST-MCNC: 143 MG/DL
GLUCOSE SERPL-MCNC: 100 MG/DL (ref 65–100)
HBA1C MFR BLD: 6.6 % (ref 4–5.6)
MICROALBUMIN UR-MCNC: 16.5 MG/DL
MICROALBUMIN/CREAT UR-RTO: 52 MG/G (ref 0–30)
POTASSIUM SERPL-SCNC: 4.2 MMOL/L (ref 3.5–5.1)
SODIUM SERPL-SCNC: 142 MMOL/L (ref 136–145)

## 2022-11-16 PROCEDURE — G0151 HHCP-SERV OF PT,EA 15 MIN: HCPCS

## 2022-11-16 PROCEDURE — G0152 HHCP-SERV OF OT,EA 15 MIN: HCPCS

## 2022-11-17 DIAGNOSIS — Z95.828 PORT-A-CATH IN PLACE: Primary | ICD-10-CM

## 2022-11-17 DIAGNOSIS — R20.0 NUMBNESS IN RIGHT LEG: Primary | ICD-10-CM

## 2022-11-17 DIAGNOSIS — R20.0 NUMBNESS IN RIGHT LEG: ICD-10-CM

## 2022-11-17 RX ORDER — LISINOPRIL 2.5 MG/1
2.5 TABLET ORAL DAILY
Qty: 30 TABLET | Refills: 2 | Status: SHIPPED | OUTPATIENT
Start: 2022-11-17

## 2022-11-17 NOTE — PROGRESS NOTES
Called patient. Two patient identifiers verified. Discussed lab results per provider's note. Verbalized understanding. She stated she does not need her port catheter and is not opposed to having it removed.

## 2022-11-18 ENCOUNTER — HOME CARE VISIT (OUTPATIENT)
Dept: SCHEDULING | Facility: HOME HEALTH | Age: 67
End: 2022-11-18
Payer: MEDICARE

## 2022-11-18 PROCEDURE — G0152 HHCP-SERV OF OT,EA 15 MIN: HCPCS

## 2022-11-18 NOTE — PROGRESS NOTES
Referral placed for interventional radiology to remove cath; they should reach out to pt to schedule.

## 2022-11-19 RX ORDER — ASPIRIN 81 MG/1
TABLET ORAL
Qty: 30 TABLET | Refills: 4 | Status: SHIPPED | OUTPATIENT
Start: 2022-11-19

## 2022-11-21 ENCOUNTER — HOME CARE VISIT (OUTPATIENT)
Dept: SCHEDULING | Facility: HOME HEALTH | Age: 67
End: 2022-11-21
Payer: MEDICARE

## 2022-11-21 ENCOUNTER — HOME CARE VISIT (OUTPATIENT)
Dept: HOME HEALTH SERVICES | Facility: HOME HEALTH | Age: 67
End: 2022-11-21
Payer: MEDICARE

## 2022-11-21 VITALS
SYSTOLIC BLOOD PRESSURE: 120 MMHG | HEART RATE: 76 BPM | TEMPERATURE: 97.6 F | DIASTOLIC BLOOD PRESSURE: 60 MMHG | OXYGEN SATURATION: 97 %

## 2022-11-21 VITALS — TEMPERATURE: 96.6 F | DIASTOLIC BLOOD PRESSURE: 60 MMHG | SYSTOLIC BLOOD PRESSURE: 120 MMHG

## 2022-11-21 VITALS
SYSTOLIC BLOOD PRESSURE: 116 MMHG | DIASTOLIC BLOOD PRESSURE: 64 MMHG | RESPIRATION RATE: 16 BRPM | TEMPERATURE: 98.5 F | HEART RATE: 84 BPM

## 2022-11-21 VITALS
DIASTOLIC BLOOD PRESSURE: 65 MMHG | OXYGEN SATURATION: 98 % | HEART RATE: 80 BPM | TEMPERATURE: 97.5 F | RESPIRATION RATE: 16 BRPM | SYSTOLIC BLOOD PRESSURE: 125 MMHG

## 2022-11-21 PROCEDURE — G0299 HHS/HOSPICE OF RN EA 15 MIN: HCPCS

## 2022-11-23 ENCOUNTER — HOME CARE VISIT (OUTPATIENT)
Dept: HOME HEALTH SERVICES | Facility: HOME HEALTH | Age: 67
End: 2022-11-23
Payer: MEDICARE

## 2022-11-28 ENCOUNTER — HOME CARE VISIT (OUTPATIENT)
Dept: SCHEDULING | Facility: HOME HEALTH | Age: 67
End: 2022-11-28
Payer: MEDICARE

## 2022-11-28 PROCEDURE — G0151 HHCP-SERV OF PT,EA 15 MIN: HCPCS

## 2022-11-29 VITALS
SYSTOLIC BLOOD PRESSURE: 125 MMHG | HEART RATE: 68 BPM | OXYGEN SATURATION: 98 % | DIASTOLIC BLOOD PRESSURE: 65 MMHG | TEMPERATURE: 97.5 F | RESPIRATION RATE: 16 BRPM

## 2022-12-02 ENCOUNTER — HOME CARE VISIT (OUTPATIENT)
Dept: SCHEDULING | Facility: HOME HEALTH | Age: 67
End: 2022-12-02
Payer: MEDICARE

## 2022-12-02 PROCEDURE — G0151 HHCP-SERV OF PT,EA 15 MIN: HCPCS

## 2022-12-03 VITALS
OXYGEN SATURATION: 98 % | HEART RATE: 68 BPM | DIASTOLIC BLOOD PRESSURE: 75 MMHG | SYSTOLIC BLOOD PRESSURE: 125 MMHG | RESPIRATION RATE: 16 BRPM | TEMPERATURE: 97.5 F

## 2022-12-06 ENCOUNTER — HOSPITAL ENCOUNTER (OUTPATIENT)
Dept: INTERVENTIONAL RADIOLOGY/VASCULAR | Age: 67
Discharge: HOME | End: 2022-12-06
Attending: FAMILY MEDICINE
Payer: MEDICARE

## 2022-12-06 VITALS
WEIGHT: 175 LBS | SYSTOLIC BLOOD PRESSURE: 134 MMHG | HEIGHT: 72 IN | TEMPERATURE: 98.1 F | OXYGEN SATURATION: 99 % | DIASTOLIC BLOOD PRESSURE: 60 MMHG | BODY MASS INDEX: 23.7 KG/M2 | HEART RATE: 64 BPM | RESPIRATION RATE: 16 BRPM

## 2022-12-06 DIAGNOSIS — Z95.828 PORT-A-CATH IN PLACE: ICD-10-CM

## 2022-12-06 PROCEDURE — 36589 REMOVAL TUNNELED CV CATH: CPT

## 2022-12-06 PROCEDURE — 77030031139 HC SUT VCRL2 J&J -A

## 2022-12-06 PROCEDURE — 74011000250 HC RX REV CODE- 250: Performed by: STUDENT IN AN ORGANIZED HEALTH CARE EDUCATION/TRAINING PROGRAM

## 2022-12-06 PROCEDURE — 2709999900 HC NON-CHARGEABLE SUPPLY

## 2022-12-06 PROCEDURE — 74011250636 HC RX REV CODE- 250/636: Performed by: STUDENT IN AN ORGANIZED HEALTH CARE EDUCATION/TRAINING PROGRAM

## 2022-12-06 PROCEDURE — 77030010507 HC ADH SKN DERMBND J&J -B

## 2022-12-06 RX ORDER — MIDAZOLAM HYDROCHLORIDE 1 MG/ML
.25-5 INJECTION, SOLUTION INTRAMUSCULAR; INTRAVENOUS
Status: DISCONTINUED | OUTPATIENT
Start: 2022-12-06 | End: 2022-12-06

## 2022-12-06 RX ORDER — HEPARIN SODIUM 200 [USP'U]/100ML
400 INJECTION, SOLUTION INTRAVENOUS ONCE
Status: COMPLETED | OUTPATIENT
Start: 2022-12-06 | End: 2022-12-06

## 2022-12-06 RX ORDER — FENTANYL CITRATE 50 UG/ML
100 INJECTION, SOLUTION INTRAMUSCULAR; INTRAVENOUS
Status: DISCONTINUED | OUTPATIENT
Start: 2022-12-06 | End: 2022-12-06

## 2022-12-06 RX ORDER — SODIUM CHLORIDE 9 MG/ML
25 INJECTION, SOLUTION INTRAVENOUS CONTINUOUS
Status: DISCONTINUED | OUTPATIENT
Start: 2022-12-06 | End: 2022-12-10 | Stop reason: HOSPADM

## 2022-12-06 RX ADMIN — LIDOCAINE HYDROCHLORIDE 10 ML: 10; .005 INJECTION, SOLUTION EPIDURAL; INFILTRATION; INTRACAUDAL; PERINEURAL at 12:00

## 2022-12-06 RX ADMIN — SODIUM CHLORIDE 25 ML/HR: 9 INJECTION, SOLUTION INTRAVENOUS at 12:18

## 2022-12-06 RX ADMIN — MIDAZOLAM 1 MG: 1 INJECTION INTRAMUSCULAR; INTRAVENOUS at 12:15

## 2022-12-06 RX ADMIN — FENTANYL CITRATE 25 MCG: 50 INJECTION, SOLUTION INTRAMUSCULAR; INTRAVENOUS at 12:17

## 2022-12-06 RX ADMIN — HEPARIN SODIUM 60 ML: 200 INJECTION, SOLUTION INTRAVENOUS at 12:00

## 2022-12-06 NOTE — DISCHARGE INSTRUCTIONS
[facility] (DP/SNF)  Special Procedures/Angiography Department    Radiologist: Dr. Tana Modi      Date:  12/06/2022      Portacath Removal Discharge Instructions      Watch for signs of infection:  redness, fever, chills, increased pain, and/or drainage from the site. If this occurs, call your physician at once. Keep your dressing clean and dry. Leave the dressing in place for the next  three days    Resume your previous diet and follow medication reconciliation form. Do not lift anything heavier than 5 pounds with the affected arm for the next week. You may take Tylenol, as directed on the label, for pain. Avoid ibuprofen (Advil, Motrin) and aspirin as they may cause you to bleed. Because you received sedation, you are not to drive or sign any legal documents for the next 24 hours.       If you have any questions or concerns, please call [phone] and ask for the nurse on-call

## 2022-12-06 NOTE — H&P
Radiology History and Physical    Patient: Sammi Medina Minor 74 y.o. female       Chief Complaint: No chief complaint on file. History of Present Illness: 74year old woman with indwelling port, placed 20 years ago, for IVIG infusions. Has not had any treatments for 5 years, now presents for removal. No concerns for infection or malfunction.     History:    Past Medical History:   Diagnosis Date    Arrhythmia     Arthritis     Bilateral hip replacements, right knee replacement    Benign cyst of left breast 3/21/2016    Blindness and low vision 2004    legally blind from [address]    Cataract     right eye    Cervical spinal stenosis 12/2/2016    Moderate C6-7    Chronic pain     COVID-19 vaccine series completed     PFIZER 3/19/21, 4/9/21    Depression     Diabetes mellitus type 2, controlled ([address] 9/13/2016    Diet-controlled diabetes mellitus ([address] 1/5/2018    Endometriosis 6/25/2010    FH: breast cancer 6/25/2010    Chart states FH breast/ovary Ca, CAD,DM     History of CVA (cerebrovascular accident) 6/5/2018    HTN, goal below 140/90 6/25/2010    CURRENTLY NO MEDICATIONS (7-12-21)    Hypercholesterolemia     Hypothyroid 6/25/2010    Incontinence     Lumbosacral plexopathy 6/25/2010    Migraine     H/O MIGRAINE    MS (multiple sclerosis) ([address] 2004    Myasthenia gravis ([address] 2011    Sleep apnea 6/25/2010    RESOLVED 5/2019    Stroke ([address] 2018    RIGHT SIDE WEAKNESS    Ureteral calculus 6/25/2010    Vitamin D deficiency 3/17/2016     Family History   Problem Relation Age of Onset    OSTEOARTHRITIS Mother     Diabetes Mother     Elevated Lipids Mother     Headache Mother     Heart Disease Mother     Hypertension Mother     Stroke Mother     Neuropathy Mother     Colon Cancer Mother     Diabetes Father     Elevated Lipids Father     Heart Disease Father     Hypertension Father     Diabetes Sister     Elevated Lipids Sister     Headache Sister     Hypertension Sister     Migraines Sister     Cancer Sister 62        breast ca W/METS TO BRAIN    Breast Cancer Sister 62    Diabetes Brother     Elevated Lipids Brother     Hypertension Brother     Asthma Son     Thyroid Disease Son         GRAVES    Sleep Apnea Son     Breast Cancer Maternal Grandmother 54    Diabetes Sister     Neuropathy Sister     Anesth Problems Neg Hx      Social History     Socioeconomic History    Marital status:      Spouse name: Not on file    Number of children: Not on file    Years of education: Not on file    Highest education level: Not on file   Occupational History    Not on file   Tobacco Use    Smoking status: Every Day     Packs/day: 0.50     Years: 30.00     Pack years: 15.00     Types: Cigarettes    Smokeless tobacco: Never   Vaping Use    Vaping Use: Never used   Substance and Sexual Activity    Alcohol use: No     Comment: TWICE A YEAR PER PT    Drug use: No    Sexual activity: Not Currently   Other Topics Concern    Not on file   Social History Narrative    Not on file     Social Determinants of Health     Financial Resource Strain: Low Risk     Difficulty of Paying Living Expenses: Not hard at all   Food Insecurity: No Food Insecurity    Worried About Running Out of Food in the Last Year: Never true    Ran Out of Food in the Last Year: Never true   Transportation Needs: Not on file   Physical Activity: Not on file   Stress: Not on file   Social Connections: Not on file   Intimate Partner Violence: Not on file   Housing Stability: Not on file       Allergies: Allergies   Allergen Reactions    Bee Sting [Sting, Bee] Anaphylaxis     Also allergic to egg products    Penicillins Anaphylaxis     Patient screened for any delayed non-IgE-mediated reaction to PCN.         Patient notes the following:    No delayed non-IgE-mediated reaction to PCN            Betadine [Povidone-Iodine] Rash    Egg Itching       Current Medications:  Current Outpatient Medications   Medication Sig    aspirin delayed-release 81 mg tablet TAKE 1 TABLET BY MOUTH EVERY DAY IN THE MORNING    lisinopriL (PRINIVIL, ZESTRIL) 2.5 mg tablet Take 1 Tablet by mouth daily. rosuvastatin (CRESTOR) 40 mg tablet TAKE 1 TABLET BY MOUTH EVERY DAY    rimegepant (Nurtec ODT) 75 mg disintegrating tablet Take 75 mg by mouth once as needed for Migraine. oxyCODONE-acetaminophen (PERCOCET) 5-325 mg per tablet Take 1 Tablet by mouth every eight (8) hours as needed for Pain (use for pain rating 6-10/10). erenumab-aooe (Aimovig Autoinjector) 140 mg/mL injection ADMINISTER 1 ML(140MG) UNDER THE SKIN EVERY 30 DAYS    amantadine HCL (SYMMETREL) 100 mg capsule Take 1 Capsule by mouth two (2) times a day. pyridostigmine (MESTINON) 60 mg tablet TAKE 2 TABLETS BY MOUTH THREE (3) TIMES DAILY. topiramate (TOPAMAX) 200 mg tablet Take 1 Tablet by mouth two (2) times a day. pregabalin (Lyrica) 200 mg capsule Take 1 Capsule by mouth two (2) times a day. Max Daily Amount: 400 mg.    Gilenya 0.5 mg cap Take 1 Capsule by mouth nightly. metFORMIN ER (GLUCOPHAGE XR) 500 mg tablet TAKE 3 TABLETS BY MOUTH DAILY    glucose blood VI test strips (True Metrix Glucose Test Strip) strip 1 Each by Does Not Apply route daily. Use as directed to monitor blood glucose once daily    glucose blood VI test strips (blood glucose test) strip 100 Each by Does Not Apply route See Admin Instructions. One Touch Ultra Test Strips=Check blood sugar daily dx E11.8    acetaminophen (TYLENOL 8 HOUR PO) Take 500 mg by mouth two (2) times daily as needed for Pain. For pain scale 1-5/10.    dantrolene (DANTRIUM) 100 mg capsule Take 100 mg by mouth daily as needed (spasms). levothyroxine (SYNTHROID) 137 mcg tablet TAKE 1 TABLET BY MOUTH EVERY DAY BEFORE BREAKFAST    PARoxetine (PAXIL) 40 mg tablet TAKE 1 AND 1/2 TABLETS BY MOUTH EVERY NIGHT    albuterol (PROVENTIL VENTOLIN) 2.5 mg /3 mL (0.083 %) nebulizer solution 3 mL by Nebulization route every six (6) hours as needed for Wheezing.     menthol-zinc oxide (CALMOSEPTINE) 0.44-20.6 % oint Apply 1 Each to affected area daily as needed for PRN Reason (Other) (thin layer to buttocks for skin irritation). lidocaine (LIDODERM) 5 % Apply patch to the affected area for 12 hours a day and remove for 12 hours a day. Current Facility-Administered Medications   Medication Dose Route Frequency    fentaNYL citrate (PF) injection 100 mcg  100 mcg IntraVENous Multiple    0.9% sodium chloride infusion  25 mL/hr IntraVENous CONTINUOUS    midazolam (VERSED) injection 0.25-5 mg  0.25-5 mg IntraVENous Multiple    heparinized saline 2 units/mL infusion 800 Units  400 mL Irrigation ONCE    lidocaine-EPINEPHrine (PF) (XYLOCAINE) 1 %-1:200,000 injection 15 mg  1.5 mL SubCUTAneous ONCE        Physical Exam:  Blood pressure 135/72, pulse (!) 59, temperature 98.1 °F (36.7 °C), resp. rate 16, height 6' (1.829 m), weight 79.4 kg (175 lb), last menstrual period 09/01/2010, SpO2 99 %, not currently breastfeeding. GENERAL: alert, cooperative, no distress, appears stated age,   LUNG: Nonlabored respiration on room air  HEART: regular rate and rhythm    Alerts:    Hospital Problems  Date Reviewed: 11/3/2022   None      Laboratory:    No results for input(s): HGB, HCT, WBC, PLT, INR, BUN, CREA, K, CRCLT, HGBEXT, HCTEXT, PLTEXT, INREXT in the last 72 hours. No lab exists for component: PTT, PT      Plan of Care/Planned Procedure:  Risks, benefits, and alternatives reviewed with patient and she agrees to proceed with the procedure. Port removal    Deemed appropriate for moderate sedation with versed and fentanyl.     Rachael Yates MD  Interventional Radiology  [facility]  12:09 PM, 12/6/2022

## 2022-12-15 ENCOUNTER — HOSPITAL ENCOUNTER (OUTPATIENT)
Dept: CT IMAGING | Age: 67
Discharge: HOME | End: 2022-12-15
Attending: FAMILY MEDICINE
Payer: MEDICARE

## 2022-12-15 ENCOUNTER — HOME HEALTH ADMISSION (OUTPATIENT)
Dept: HOME HEALTH SERVICES | Facility: HOME HEALTH | Age: 67
End: 2022-12-15
Payer: MEDICARE

## 2022-12-15 ENCOUNTER — OFFICE VISIT (OUTPATIENT)
Dept: FAMILY MEDICINE CLINIC | Age: 67
End: 2022-12-15
Attending: FAMILY MEDICINE
Payer: MEDICARE

## 2022-12-15 VITALS
RESPIRATION RATE: 16 BRPM | DIASTOLIC BLOOD PRESSURE: 66 MMHG | HEART RATE: 71 BPM | WEIGHT: 183.6 LBS | BODY MASS INDEX: 24.87 KG/M2 | TEMPERATURE: 96.9 F | SYSTOLIC BLOOD PRESSURE: 128 MMHG | HEIGHT: 72 IN | OXYGEN SATURATION: 99 %

## 2022-12-15 DIAGNOSIS — G35 MS (MULTIPLE SCLEROSIS) (HCC): ICD-10-CM

## 2022-12-15 DIAGNOSIS — S09.90XA INJURY OF HEAD, INITIAL ENCOUNTER: Primary | ICD-10-CM

## 2022-12-15 DIAGNOSIS — G70.00 MYASTHENIA GRAVIS (HCC): ICD-10-CM

## 2022-12-15 DIAGNOSIS — S20.212A CONTUSION OF LEFT CHEST WALL, INITIAL ENCOUNTER: ICD-10-CM

## 2022-12-15 DIAGNOSIS — R07.81 RIB PAIN ON RIGHT SIDE: ICD-10-CM

## 2022-12-15 DIAGNOSIS — S09.90XA INJURY OF HEAD, INITIAL ENCOUNTER: ICD-10-CM

## 2022-12-15 DIAGNOSIS — R29.6 FREQUENT FALLS: ICD-10-CM

## 2022-12-15 PROCEDURE — 70450 CT HEAD/BRAIN W/O DYE: CPT

## 2022-12-15 NOTE — PROGRESS NOTES
Chief Complaint   Patient presents with    Follow-up     Knee and shoulder pain from fell     1. Have you been to the ER, urgent care clinic since your last visit? Hospitalized since your last visit? No    2. Have you seen or consulted any other health care providers outside of the [address] [place] [address] since your last visit? Include any pap smears or colon screening.  No

## 2022-12-15 NOTE — PROGRESS NOTES
Patient Name: Samantha Garcia   MRN: [id number]    Ruby Orr is a 74 y.o. female who presents with the following:     Patient has a history of frequent falls, likely multifactorial but does have a significant history for multiple sclerosis and myasthenia gravis. States that she felt on Monday flat on her face to the floor. Denies any preceding symptoms. Did not lose consciousness. Recalls hitting her nose and left side of her head. Scraped up her knees as well as hit her left side of her chest.  Currently has a ongoing left-sided headache, nasal congestion, and pain along her left upper chest wall, and right lower rib cage. Review of Systems   Constitutional:  Negative for fever, malaise/fatigue and weight loss. Respiratory:  Negative for cough, hemoptysis, shortness of breath and wheezing. Cardiovascular:  Negative for chest pain, palpitations, leg swelling and PND. Gastrointestinal:  Negative for abdominal pain, constipation, diarrhea, nausea and vomiting. Musculoskeletal:  Positive for falls. Neurological:  Positive for weakness and headaches. The patient's medications, allergies, past medical history, surgical history, family history and social history were reviewed and updated where appropriate. Current Outpatient Medications:     aspirin delayed-release 81 mg tablet, TAKE 1 TABLET BY MOUTH EVERY DAY IN THE MORNING, Disp: 30 Tablet, Rfl: 4    lisinopriL (PRINIVIL, ZESTRIL) 2.5 mg tablet, Take 1 Tablet by mouth daily. , Disp: 30 Tablet, Rfl: 2    rosuvastatin (CRESTOR) 40 mg tablet, TAKE 1 TABLET BY MOUTH EVERY DAY, Disp: 90 Tablet, Rfl: 1    rimegepant (Nurtec ODT) 75 mg disintegrating tablet, Take 75 mg by mouth once as needed for Migraine. , Disp: , Rfl:     oxyCODONE-acetaminophen (PERCOCET) 5-325 mg per tablet, Take 1 Tablet by mouth every eight (8) hours as needed for Pain (use for pain rating 6-10/10). , Disp: , Rfl:     erenumab-aooe (Aimovig Autoinjector) 140 mg/mL injection, ADMINISTER 1 ML(140MG) UNDER THE SKIN EVERY 30 DAYS, Disp: 3 mL, Rfl: 5    amantadine HCL (SYMMETREL) 100 mg capsule, Take 1 Capsule by mouth two (2) times a day., Disp: 60 Capsule, Rfl: 11    pyridostigmine (MESTINON) 60 mg tablet, TAKE 2 TABLETS BY MOUTH THREE (3) TIMES DAILY. , Disp: 180 Tablet, Rfl: 4    topiramate (TOPAMAX) 200 mg tablet, Take 1 Tablet by mouth two (2) times a day., Disp: 180 Tablet, Rfl: 4    pregabalin (Lyrica) 200 mg capsule, Take 1 Capsule by mouth two (2) times a day. Max Daily Amount: 400 mg., Disp: 180 Capsule, Rfl: 4    Gilenya 0.5 mg cap, Take 1 Capsule by mouth nightly., Disp: 30 Capsule, Rfl: 11    metFORMIN ER (GLUCOPHAGE XR) 500 mg tablet, TAKE 3 TABLETS BY MOUTH DAILY, Disp: 270 Tablet, Rfl: 1    glucose blood VI test strips (True Metrix Glucose Test Strip) strip, 1 Each by Does Not Apply route daily. Use as directed to monitor blood glucose once daily, Disp: , Rfl:     glucose blood VI test strips (blood glucose test) strip, 100 Each by Does Not Apply route See Admin Instructions. One Touch Ultra Test Strips=Check blood sugar daily dx E11.8, Disp: 100 Strip, Rfl: 1    acetaminophen (TYLENOL 8 HOUR PO), Take 500 mg by mouth two (2) times daily as needed for Pain. For pain scale 1-5/10., Disp: , Rfl:     dantrolene (DANTRIUM) 100 mg capsule, Take 100 mg by mouth daily as needed (spasms). , Disp: , Rfl:     levothyroxine (SYNTHROID) 137 mcg tablet, TAKE 1 TABLET BY MOUTH EVERY DAY BEFORE BREAKFAST, Disp: 90 Tablet, Rfl: 3    PARoxetine (PAXIL) 40 mg tablet, TAKE 1 AND 1/2 TABLETS BY MOUTH EVERY NIGHT, Disp: 135 Tablet, Rfl: 3    albuterol (PROVENTIL VENTOLIN) 2.5 mg /3 mL (0.083 %) nebulizer solution, 3 mL by Nebulization route every six (6) hours as needed for Wheezing., Disp: 30 Each, Rfl: 0    menthol-zinc oxide (CALMOSEPTINE) 0.44-20.6 % oint, Apply 1 Each to affected area daily as needed for PRN Reason (Other) (thin layer to buttocks for skin irritation). , Disp: , Rfl: lidocaine (LIDODERM) 5 %, Apply patch to the affected area for 12 hours a day and remove for 12 hours a day., Disp: 30 Each, Rfl: 3    Allergies   Allergen Reactions    Bee Sting [Sting, Bee] Anaphylaxis     Also allergic to egg products    Penicillins Anaphylaxis     Patient screened for any delayed non-IgE-mediated reaction to PCN. Patient notes the following:    No delayed non-IgE-mediated reaction to PCN            Betadine [Povidone-Iodine] Rash    Egg Itching       OBJECTIVE    Visit Vitals  /66 (BP 1 Location: Right arm, BP Patient Position: Sitting, BP Cuff Size: Adult long)   Pulse 71   Temp 96.9 °F (36.1 °C) (Temporal)   Resp 16   Ht 6' (1.829 m)   Wt 183 lb 9.6 oz (83.3 kg)   LMP 09/01/2010   SpO2 99%   BMI 24.90 kg/m²       Physical Exam  Vitals and nursing note reviewed. Constitutional:       General: She is not in acute distress. Appearance: Normal appearance. She is not toxic-appearing. HENT:      Head: Normocephalic and atraumatic. No contusion. Comments: Pain along her left temple  Eyes:      Pupils: Pupils are equal, round, and reactive to light. Pulmonary:      Effort: Pulmonary effort is normal.   Chest:      Chest wall: Tenderness (Pain along left upper chest wall with associated bruising. Also has pain along her right lower rib cage) present. Musculoskeletal:         General: Normal range of motion. Skin:     General: Skin is warm and dry. Neurological:      Mental Status: She is alert. Mental status is at baseline. Psychiatric:         Mood and Affect: Mood normal.         Behavior: Behavior normal.         ASSESSMENT AND PLAN  Myrtle Galvin is a 74 y.o. female who presents today for:    1. Injury of head, initial encounter  Will obtain stat CT head to rule out brain bleed. - CT HEAD WO CONT; Future    2. Contusion of left chest wall, initial encounter  Will evaluate for rib fractures. - XR CHEST PA LAT; Future    3. Rib pain on right side  As above. Continue with Tylenol and lidocaine patch.  - XR CHEST PA LAT; Future    4. Frequent falls  Recommend restarting PT to help with her gait due to her weakness and gait instability.  - REFERRAL TO HOME HEALTH    5. MS (multiple sclerosis) ([facility]  - [address] [place]    6. Myasthenia gravis ([facility]  - REFERRAL TO HOME HEALTH       There are no discontinued medications. Treatment risks/benefits/costs/interactions/alternatives discussed with patient. Advised patient to call back or return to office if symptoms worsen/change/persist. If patient cannot reach us or should anything more severe/urgent arise he/she should proceed directly to the nearest emergency department. Discussed expected course/resolution/complications of diagnosis in detail with patient. Patient expressed understanding with the diagnosis and plan. This dictation may have been completed with Dragon, the computer voice recognition software. Unanticipated grammatical, syntax, homophones, and other interpretive errors are sometimes inadvertently transcribed by the computer software. Please disregard any errors that have escaped final proofreading. Aivi N. Gregory Mortimer M.D.

## 2022-12-15 NOTE — PROGRESS NOTES
Please call patient:     Both CT of her head and chest xray were normal. Will place order for pt to continue home health PT.

## 2022-12-16 ENCOUNTER — HOME CARE VISIT (OUTPATIENT)
Dept: SCHEDULING | Facility: HOME HEALTH | Age: 67
End: 2022-12-16
Payer: MEDICARE

## 2022-12-16 VITALS
HEART RATE: 77 BPM | SYSTOLIC BLOOD PRESSURE: 120 MMHG | DIASTOLIC BLOOD PRESSURE: 58 MMHG | OXYGEN SATURATION: 94 % | RESPIRATION RATE: 20 BRPM

## 2022-12-16 PROCEDURE — 400018 HH-NO PAY CLAIM PROCEDURE

## 2022-12-16 PROCEDURE — G0151 HHCP-SERV OF PT,EA 15 MIN: HCPCS

## 2022-12-19 ENCOUNTER — HOME CARE VISIT (OUTPATIENT)
Dept: SCHEDULING | Facility: HOME HEALTH | Age: 67
End: 2022-12-19
Payer: MEDICARE

## 2022-12-19 VITALS
HEART RATE: 76 BPM | DIASTOLIC BLOOD PRESSURE: 76 MMHG | SYSTOLIC BLOOD PRESSURE: 123 MMHG | OXYGEN SATURATION: 97 % | TEMPERATURE: 97.9 F

## 2022-12-19 PROCEDURE — G0152 HHCP-SERV OF OT,EA 15 MIN: HCPCS

## 2022-12-20 ENCOUNTER — HOME CARE VISIT (OUTPATIENT)
Dept: HOME HEALTH SERVICES | Facility: HOME HEALTH | Age: 67
End: 2022-12-20

## 2022-12-20 ENCOUNTER — HOME CARE VISIT (OUTPATIENT)
Dept: HOME HEALTH SERVICES | Facility: HOME HEALTH | Age: 67
End: 2022-12-20
Payer: MEDICARE

## 2022-12-21 ENCOUNTER — HOME CARE VISIT (OUTPATIENT)
Dept: SCHEDULING | Facility: HOME HEALTH | Age: 67
End: 2022-12-21
Payer: MEDICARE

## 2022-12-21 VITALS
DIASTOLIC BLOOD PRESSURE: 70 MMHG | TEMPERATURE: 97.8 F | SYSTOLIC BLOOD PRESSURE: 110 MMHG | HEART RATE: 88 BPM | OXYGEN SATURATION: 100 %

## 2022-12-21 PROCEDURE — G0152 HHCP-SERV OF OT,EA 15 MIN: HCPCS

## 2022-12-22 ENCOUNTER — HOME CARE VISIT (OUTPATIENT)
Dept: HOME HEALTH SERVICES | Facility: HOME HEALTH | Age: 67
End: 2022-12-22
Payer: MEDICARE

## 2022-12-27 ENCOUNTER — HOME CARE VISIT (OUTPATIENT)
Dept: SCHEDULING | Facility: HOME HEALTH | Age: 67
End: 2022-12-27
Payer: MEDICARE

## 2022-12-27 VITALS
TEMPERATURE: 98.1 F | SYSTOLIC BLOOD PRESSURE: 118 MMHG | HEART RATE: 96 BPM | DIASTOLIC BLOOD PRESSURE: 60 MMHG | OXYGEN SATURATION: 98 %

## 2022-12-27 PROCEDURE — G0158 HHC OT ASSISTANT EA 15: HCPCS

## 2022-12-28 ENCOUNTER — ANCILLARY PROCEDURE (OUTPATIENT)
Dept: CARDIOLOGY CLINIC | Age: 67
End: 2022-12-28
Payer: MEDICARE

## 2022-12-28 ENCOUNTER — TELEPHONE (OUTPATIENT)
Dept: CARDIOLOGY CLINIC | Age: 67
End: 2022-12-28

## 2022-12-28 ENCOUNTER — OFFICE VISIT (OUTPATIENT)
Dept: CARDIOLOGY CLINIC | Age: 67
End: 2022-12-28

## 2022-12-28 VITALS
OXYGEN SATURATION: 97 % | BODY MASS INDEX: 24.24 KG/M2 | DIASTOLIC BLOOD PRESSURE: 76 MMHG | WEIGHT: 179 LBS | RESPIRATION RATE: 14 BRPM | HEART RATE: 68 BPM | SYSTOLIC BLOOD PRESSURE: 110 MMHG | HEIGHT: 72 IN

## 2022-12-28 VITALS — HEIGHT: 72 IN | BODY MASS INDEX: 24.24 KG/M2 | WEIGHT: 179 LBS

## 2022-12-28 DIAGNOSIS — I35.1 AORTIC VALVE INSUFFICIENCY, ETIOLOGY OF CARDIAC VALVE DISEASE UNSPECIFIED: ICD-10-CM

## 2022-12-28 DIAGNOSIS — Z86.73 HISTORY OF CVA (CEREBROVASCULAR ACCIDENT): ICD-10-CM

## 2022-12-28 DIAGNOSIS — R00.2 HEART PALPITATIONS: Primary | ICD-10-CM

## 2022-12-28 DIAGNOSIS — Z87.891 HISTORY OF TOBACCO USE: ICD-10-CM

## 2022-12-28 DIAGNOSIS — E11.8 DM TYPE 2, CONTROLLED, WITH COMPLICATION (HCC): ICD-10-CM

## 2022-12-28 LAB
ECHO AR MAX VEL PISA: 3.5 M/S
ECHO AV AREA PEAK VELOCITY: 3 CM2
ECHO AV AREA VTI: 3.6 CM2
ECHO AV AREA/BSA PEAK VELOCITY: 1.5 CM2/M2
ECHO AV AREA/BSA VTI: 1.8 CM2/M2
ECHO AV MEAN GRADIENT: 3 MMHG
ECHO AV MEAN VELOCITY: 0.8 M/S
ECHO AV PEAK GRADIENT: 6 MMHG
ECHO AV PEAK VELOCITY: 1.2 M/S
ECHO AV REGURGITANT PHT: 487.2 MILLISECOND
ECHO AV VELOCITY RATIO: 0.83
ECHO AV VTI: 18.5 CM
ECHO LA DIAMETER INDEX: 1.67 CM/M2
ECHO LA DIAMETER: 3.4 CM
ECHO LA VOL 2C: 60 ML (ref 22–52)
ECHO LA VOL 4C: 36 ML (ref 22–52)
ECHO LA VOL BP: 48 ML (ref 22–52)
ECHO LA VOL/BSA BIPLANE: 24 ML/M2 (ref 16–34)
ECHO LA VOLUME AREA LENGTH: 52 ML
ECHO LA VOLUME INDEX A2C: 30 ML/M2 (ref 16–34)
ECHO LA VOLUME INDEX A4C: 18 ML/M2 (ref 16–34)
ECHO LA VOLUME INDEX AREA LENGTH: 26 ML/M2 (ref 16–34)
ECHO LV E' LATERAL VELOCITY: 9 CM/S
ECHO LV E' SEPTAL VELOCITY: 6 CM/S
ECHO LV EDV A2C: 63 ML
ECHO LV EDV A4C: 64 ML
ECHO LV EDV BP: 64 ML (ref 56–104)
ECHO LV EDV INDEX A4C: 32 ML/M2
ECHO LV EDV INDEX BP: 32 ML/M2
ECHO LV EDV NDEX A2C: 31 ML/M2
ECHO LV EJECTION FRACTION A2C: 58 %
ECHO LV EJECTION FRACTION A4C: 53 %
ECHO LV EJECTION FRACTION BIPLANE: 54 % (ref 55–100)
ECHO LV ESV A2C: 27 ML
ECHO LV ESV A4C: 30 ML
ECHO LV ESV BP: 29 ML (ref 19–49)
ECHO LV ESV INDEX A2C: 13 ML/M2
ECHO LV ESV INDEX A4C: 15 ML/M2
ECHO LV ESV INDEX BP: 14 ML/M2
ECHO LV FRACTIONAL SHORTENING: 15 % (ref 28–44)
ECHO LV INTERNAL DIMENSION DIASTOLE INDEX: 2.02 CM/M2
ECHO LV INTERNAL DIMENSION DIASTOLIC: 4.1 CM (ref 3.9–5.3)
ECHO LV INTERNAL DIMENSION SYSTOLIC INDEX: 1.72 CM/M2
ECHO LV INTERNAL DIMENSION SYSTOLIC: 3.5 CM
ECHO LV IVSD: 1.1 CM (ref 0.6–0.9)
ECHO LV MASS 2D: 161.4 G (ref 67–162)
ECHO LV MASS INDEX 2D: 79.5 G/M2 (ref 43–95)
ECHO LV POSTERIOR WALL DIASTOLIC: 1.2 CM (ref 0.6–0.9)
ECHO LV RELATIVE WALL THICKNESS RATIO: 0.59
ECHO LVOT AREA: 3.8 CM2
ECHO LVOT AV VTI INDEX: 0.95
ECHO LVOT DIAM: 2.2 CM
ECHO LVOT MEAN GRADIENT: 2 MMHG
ECHO LVOT PEAK GRADIENT: 4 MMHG
ECHO LVOT PEAK VELOCITY: 1 M/S
ECHO LVOT STROKE VOLUME INDEX: 32.8 ML/M2
ECHO LVOT SV: 66.5 ML
ECHO LVOT VTI: 17.5 CM
ECHO MV A VELOCITY: 0.76 M/S
ECHO MV E DECELERATION TIME (DT): 119.3 MS
ECHO MV E VELOCITY: 0.37 M/S
ECHO MV E/A RATIO: 0.49
ECHO MV E/E' LATERAL: 4.11
ECHO MV E/E' RATIO (AVERAGED): 5.14
ECHO MV E/E' SEPTAL: 6.17

## 2022-12-28 PROCEDURE — 99214 OFFICE O/P EST MOD 30 MIN: CPT | Performed by: INTERNAL MEDICINE

## 2022-12-28 PROCEDURE — 3017F COLORECTAL CA SCREEN DOC REV: CPT | Performed by: INTERNAL MEDICINE

## 2022-12-28 PROCEDURE — 2022F DILAT RTA XM EVC RTNOPTHY: CPT | Performed by: INTERNAL MEDICINE

## 2022-12-28 PROCEDURE — 3078F DIAST BP <80 MM HG: CPT | Performed by: INTERNAL MEDICINE

## 2022-12-28 PROCEDURE — 93321 DOPPLER ECHO F-UP/LMTD STD: CPT | Performed by: INTERNAL MEDICINE

## 2022-12-28 PROCEDURE — G8427 DOCREV CUR MEDS BY ELIG CLIN: HCPCS | Performed by: INTERNAL MEDICINE

## 2022-12-28 PROCEDURE — G9899 SCRN MAM PERF RSLTS DOC: HCPCS | Performed by: INTERNAL MEDICINE

## 2022-12-28 PROCEDURE — 1123F ACP DISCUSS/DSCN MKR DOCD: CPT | Performed by: INTERNAL MEDICINE

## 2022-12-28 PROCEDURE — 1101F PT FALLS ASSESS-DOCD LE1/YR: CPT | Performed by: INTERNAL MEDICINE

## 2022-12-28 PROCEDURE — 1090F PRES/ABSN URINE INCON ASSESS: CPT | Performed by: INTERNAL MEDICINE

## 2022-12-28 PROCEDURE — G8399 PT W/DXA RESULTS DOCUMENT: HCPCS | Performed by: INTERNAL MEDICINE

## 2022-12-28 PROCEDURE — 3074F SYST BP LT 130 MM HG: CPT | Performed by: INTERNAL MEDICINE

## 2022-12-28 PROCEDURE — 93325 DOPPLER ECHO COLOR FLOW MAPG: CPT | Performed by: INTERNAL MEDICINE

## 2022-12-28 PROCEDURE — G9717 DOC PT DX DEP/BP F/U NT REQ: HCPCS | Performed by: INTERNAL MEDICINE

## 2022-12-28 PROCEDURE — 3044F HG A1C LEVEL LT 7.0%: CPT | Performed by: INTERNAL MEDICINE

## 2022-12-28 PROCEDURE — G8536 NO DOC ELDER MAL SCRN: HCPCS | Performed by: INTERNAL MEDICINE

## 2022-12-28 PROCEDURE — G8420 CALC BMI NORM PARAMETERS: HCPCS | Performed by: INTERNAL MEDICINE

## 2022-12-28 PROCEDURE — 93308 TTE F-UP OR LMTD: CPT | Performed by: INTERNAL MEDICINE

## 2022-12-28 NOTE — PROGRESS NOTES
[place]: Luana Orozco  [phone]    History of Present Illness:    Ms. Galvin is a 73 yo F with multiple sclerosis, myasthenia gravis, type 2 diabetes, mixed hyperlipidemia, hypothyroid, seen in the past for chest pain. Echo in the past with normal EF and moderate aortic regurgitation, mild aortic stenosis. Since her last visit she did have hospitalization in September for acute stroke. She had right-sided deficits as well as issues with her speech, and received tPA. She still does have some residual deficits. Still has been dealing with her multiple sclerosis and myasthenia gravis. From a symptom standpoint her breathing has been okay. Though she does get some shortness of breath at times. No exertional chest pain. She has been having palpitations that can last few minutes at a time a few times a week with no clear triggers or exacerbating factors. She is compensated on exam clear lungs I-II/VI systolic murmur left sternal border. Her echo today was unchanged with preserved LV function and just mild to moderate aortic regurgitation and we discussed the results. Soc hx. Tobacco 1 pack 2-3 days. Have stopped in past.  Fam hx. Younger sister with CAD. Assessment and Plan:   CVA in September, palpitations now. Will obtain a 1 month loop monitor to evaluate for possible arrhythmia   Aortic regurgitation. Has been in the mild to moderate range and her echo today was unchanged. Will have her follow back in 1 year with same-day echo   PVCs, PACs. Benign and she is asymptomatic from these. Tobacco use. Encouraged cessation. Mixed hyperlipidemia. Tolerating statin. Type 2 diabetes. Multiple sclerosis. Myasthenia gravis.         She  has a past medical history of Arrhythmia, Arthritis, Benign cyst of left breast (3/21/2016), Blindness and low vision (2004), Cataract, Cervical spinal stenosis (12/2/2016), Chronic pain, COVID-19 vaccine series completed, Depression, Diabetes mellitus type 2, controlled ([facility] (9/13/2016), Diet-controlled diabetes mellitus ([facility] (1/5/2018), Endometriosis (6/25/2010), FH: breast cancer (6/25/2010), History of CVA (cerebrovascular accident) (6/5/2018), HTN, goal below 140/90 (6/25/2010), Hypercholesterolemia, Hypothyroid (6/25/2010), Incontinence, Lumbosacral plexopathy (6/25/2010), Migraine, MS (multiple sclerosis) ([facility] (2004), Myasthenia gravis ([facility] (2011), Sleep apnea (6/25/2010), Stroke ([facility] (2018), Ureteral calculus (6/25/2010), and Vitamin D deficiency (3/17/2016). All other systems negative except as above. PE  Vitals:    12/28/22 1132   BP: 110/76   Pulse: 68   Resp: 14   SpO2: 97%   Weight: 179 lb (81.2 kg)   Height: 6' (1.829 m)      Body mass index is 24.28 kg/m².    General appearance - alert, well appearing, and in no distress  Mental status - affect appropriate to mood  Eyes - sclera anicteric, moist mucous membranes  Neck - supple, no JVD  Chest - clear to auscultation, no wheezes, rales or rhonchi  Heart - normal rate, regular rhythm, normal S1, S2, I-II/VI systolic murmur LUSB  Abdomen - soft, nontender, nondistended, no masses or organomegaly  Neurological - no focal deficit  Extremities - peripheral pulses normal, trace pedal edema      Recent Labs:  Lab Results   Component Value Date/Time    Cholesterol, total 140 08/31/2022 04:20 AM    HDL Cholesterol 46 08/31/2022 04:20 AM    LDL-CHOLESTEROL 91 12/26/2018 12:00 AM    LDL, calculated 70.8 08/31/2022 04:20 AM    Triglyceride 116 08/31/2022 04:20 AM    Triglyceride 126 12/26/2018 12:00 AM    CHOL/HDL Ratio 3.0 08/31/2022 04:20 AM     Lab Results   Component Value Date/Time    Creatinine 0.95 11/15/2022 02:01 PM     Lab Results   Component Value Date/Time    BUN 12 11/15/2022 02:01 PM     Lab Results   Component Value Date/Time    Potassium 4.2 11/15/2022 02:01 PM     Lab Results   Component Value Date/Time    Hemoglobin A1c 6.6 (H) 11/15/2022 02:01 PM     Lab Results   Component Value Date/Time HGB 14.6 08/31/2022 04:20 AM     Lab Results   Component Value Date/Time    PLATELET 95 (L) 80/71/8420 04:20 AM       Reviewed:  Past Medical History:   Diagnosis Date    Arrhythmia     Arthritis     Bilateral hip replacements, right knee replacement    Benign cyst of left breast 3/21/2016    Blindness and low vision 2004    legally blind from [address]    Cataract     right eye    Cervical spinal stenosis 12/2/2016    Moderate C6-7    Chronic pain     COVID-19 vaccine series completed     PFIZER 3/19/21, 4/9/21    Depression     Diabetes mellitus type 2, controlled ([address] 9/13/2016    Diet-controlled diabetes mellitus ([address] 1/5/2018    Endometriosis 6/25/2010    FH: breast cancer 6/25/2010    Chart states FH breast/ovary Ca, CAD,DM     History of CVA (cerebrovascular accident) 6/5/2018    HTN, goal below 140/90 6/25/2010    CURRENTLY NO MEDICATIONS (7-12-21)    Hypercholesterolemia     Hypothyroid 6/25/2010    Incontinence     Lumbosacral plexopathy 6/25/2010    Migraine     H/O MIGRAINE    MS (multiple sclerosis) ([address] 2004    Myasthenia gravis ([address] 2011    Sleep apnea 6/25/2010    RESOLVED 5/2019    Stroke ([address] 2018    RIGHT SIDE WEAKNESS    Ureteral calculus 6/25/2010    Vitamin D deficiency 3/17/2016     Social History     Tobacco Use   Smoking Status Every Day    Packs/day: 0.50    Years: 30.00    Pack years: 15.00    Types: Cigarettes   Smokeless Tobacco Never     Social History     Substance and Sexual Activity   Alcohol Use No    Comment: TWICE A YEAR PER PT     Allergies   Allergen Reactions    Bee Sting [Sting, Bee] Anaphylaxis     Also allergic to egg products    Penicillins Anaphylaxis     Patient screened for any delayed non-IgE-mediated reaction to PCN.         Patient notes the following:    No delayed non-IgE-mediated reaction to PCN            Betadine [Povidone-Iodine] Rash    Egg Itching       Current Outpatient Medications   Medication Sig    aspirin delayed-release 81 mg tablet TAKE 1 TABLET BY MOUTH EVERY DAY IN THE MORNING    lisinopriL (PRINIVIL, ZESTRIL) 2.5 mg tablet Take 1 Tablet by mouth daily. rosuvastatin (CRESTOR) 40 mg tablet TAKE 1 TABLET BY MOUTH EVERY DAY    rimegepant (Nurtec ODT) 75 mg disintegrating tablet Take 75 mg by mouth once as needed for Migraine. oxyCODONE-acetaminophen (PERCOCET) 5-325 mg per tablet Take 1 Tablet by mouth every eight (8) hours as needed for Pain (use for pain rating 6-10/10). erenumab-aooe (Aimovig Autoinjector) 140 mg/mL injection ADMINISTER 1 ML(140MG) UNDER THE SKIN EVERY 30 DAYS    amantadine HCL (SYMMETREL) 100 mg capsule Take 1 Capsule by mouth two (2) times a day. pyridostigmine (MESTINON) 60 mg tablet TAKE 2 TABLETS BY MOUTH THREE (3) TIMES DAILY. topiramate (TOPAMAX) 200 mg tablet Take 1 Tablet by mouth two (2) times a day. pregabalin (Lyrica) 200 mg capsule Take 1 Capsule by mouth two (2) times a day. Max Daily Amount: 400 mg.    Gilenya 0.5 mg cap Take 1 Capsule by mouth nightly. metFORMIN ER (GLUCOPHAGE XR) 500 mg tablet TAKE 3 TABLETS BY MOUTH DAILY    glucose blood VI test strips (True Metrix Glucose Test Strip) strip 1 Each by Does Not Apply route daily. Use as directed to monitor blood glucose once daily    glucose blood VI test strips (blood glucose test) strip 100 Each by Does Not Apply route See Admin Instructions. One Touch Ultra Test Strips=Check blood sugar daily dx E11.8    acetaminophen (TYLENOL 8 HOUR PO) Take 500 mg by mouth two (2) times daily as needed for Pain. For pain scale 1-5/10.    dantrolene (DANTRIUM) 100 mg capsule Take 100 mg by mouth daily as needed (spasms). levothyroxine (SYNTHROID) 137 mcg tablet TAKE 1 TABLET BY MOUTH EVERY DAY BEFORE BREAKFAST    PARoxetine (PAXIL) 40 mg tablet TAKE 1 AND 1/2 TABLETS BY MOUTH EVERY NIGHT    albuterol (PROVENTIL VENTOLIN) 2.5 mg /3 mL (0.083 %) nebulizer solution 3 mL by Nebulization route every six (6) hours as needed for Wheezing.     menthol-zinc oxide (CALMOSEPTINE) 0.44-20.6 % oint Apply 1 Each to affected area daily as needed for PRN Reason (Other) (thin layer to buttocks for skin irritation). lidocaine (LIDODERM) 5 % Apply patch to the affected area for 12 hours a day and remove for 12 hours a day. No current facility-administered medications for this visit.        Karla Vasquez MD  [facility] [place]  [address] [facility]  [address]

## 2022-12-28 NOTE — TELEPHONE ENCOUNTER
Enrolled with [employer] - Ordered and being shipped to patient's home address on file. ETA within 5-7 business days. Message  Received: Today  Jovanni Barrios ordered a one month Loop monitor for palpitations for this patient. Thank you.

## 2022-12-28 NOTE — LETTER
Patient:  Elio Moss   YOB: 1955  Date of Visit: 12/28/2022      Dear Garry Cobian MD  [address] [facility] [place] [address]  Via In Basket:      Ms. Kaitlny Donahue is a 75 yo F with multiple sclerosis, myasthenia gravis, type 2 diabetes, mixed hyperlipidemia, hypothyroid, seen in the past for chest pain. Echo in the past with normal EF and moderate aortic regurgitation, mild aortic stenosis. Since her last visit she did have hospitalization in September for acute stroke. She had right-sided deficits as well as issues with her speech, and received tPA. She still does have some residual deficits. Still has been dealing with her multiple sclerosis and myasthenia gravis. From a symptom standpoint her breathing has been okay. Though she does get some shortness of breath at times. No exertional chest pain. She has been having palpitations that can last few minutes at a time a few times a week with no clear triggers or exacerbating factors. She is compensated on exam clear lungs I-II/VI systolic murmur left sternal border. Her echo today was unchanged with preserved LV function and just mild to moderate aortic regurgitation and we discussed the results. Soc hx. Tobacco 1 pack 2-3 days. Have stopped in past.  Fam hx. Younger sister with CAD. Assessment and Plan:   1. CVA in September, palpitations now. Will obtain a 1 month loop monitor to evaluate for possible arrhythmia   2. Aortic regurgitation. Has been in the mild to moderate range and her echo today was unchanged. Will have her follow back in 1 year with same-day echo   3. PVCs, PACs. Benign and she is asymptomatic from these. 4. Tobacco use. Encouraged cessation. 5. Mixed hyperlipidemia. Tolerating statin. 6. Type 2 diabetes. 7. Multiple sclerosis. 8. Myasthenia gravis. If you have questions, please do not hesitate to call me.     Sincerely,      Zee Merino MD

## 2022-12-28 NOTE — PATIENT INSTRUCTIONS
Your physician has ordered a  one month Heart Monitor. Also, you will be schedule for an Echocardiogram in the near future.

## 2022-12-29 ENCOUNTER — HOME CARE VISIT (OUTPATIENT)
Dept: HOME HEALTH SERVICES | Facility: HOME HEALTH | Age: 67
End: 2022-12-29
Payer: MEDICARE

## 2022-12-29 ENCOUNTER — HOME CARE VISIT (OUTPATIENT)
Dept: SCHEDULING | Facility: HOME HEALTH | Age: 67
End: 2022-12-29
Payer: MEDICARE

## 2022-12-29 VITALS
OXYGEN SATURATION: 100 % | SYSTOLIC BLOOD PRESSURE: 109 MMHG | DIASTOLIC BLOOD PRESSURE: 63 MMHG | TEMPERATURE: 96.2 F | HEART RATE: 67 BPM

## 2022-12-29 PROCEDURE — G0152 HHCP-SERV OF OT,EA 15 MIN: HCPCS

## 2023-01-02 ENCOUNTER — HOME CARE VISIT (OUTPATIENT)
Dept: HOME HEALTH SERVICES | Facility: HOME HEALTH | Age: 68
End: 2023-01-02
Payer: MEDICARE

## 2023-01-04 ENCOUNTER — HOME CARE VISIT (OUTPATIENT)
Dept: HOME HEALTH SERVICES | Facility: HOME HEALTH | Age: 68
End: 2023-01-04
Payer: MEDICARE

## 2023-01-17 ENCOUNTER — VIRTUAL VISIT (OUTPATIENT)
Dept: FAMILY MEDICINE CLINIC | Age: 68
End: 2023-01-17
Payer: MEDICARE

## 2023-01-17 DIAGNOSIS — E11.8 DM TYPE 2, CONTROLLED, WITH COMPLICATION (HCC): ICD-10-CM

## 2023-01-17 DIAGNOSIS — D69.6 THROMBOCYTOPENIA (HCC): ICD-10-CM

## 2023-01-17 DIAGNOSIS — N18.30 STAGE 3 CHRONIC KIDNEY DISEASE, UNSPECIFIED WHETHER STAGE 3A OR 3B CKD (HCC): ICD-10-CM

## 2023-01-17 DIAGNOSIS — G70.00 MYASTHENIA GRAVIS (HCC): ICD-10-CM

## 2023-01-17 DIAGNOSIS — G35 MS (MULTIPLE SCLEROSIS) (HCC): ICD-10-CM

## 2023-01-17 DIAGNOSIS — R06.02 SOB (SHORTNESS OF BREATH): Primary | ICD-10-CM

## 2023-01-17 DIAGNOSIS — G90.1 DYSAUTONOMIA (HCC): ICD-10-CM

## 2023-01-17 DIAGNOSIS — G81.91 RIGHT HEMIPARESIS (HCC): ICD-10-CM

## 2023-01-17 PROCEDURE — G9899 SCRN MAM PERF RSLTS DOC: HCPCS | Performed by: FAMILY MEDICINE

## 2023-01-17 PROCEDURE — 1090F PRES/ABSN URINE INCON ASSESS: CPT | Performed by: FAMILY MEDICINE

## 2023-01-17 PROCEDURE — 1123F ACP DISCUSS/DSCN MKR DOCD: CPT | Performed by: FAMILY MEDICINE

## 2023-01-17 PROCEDURE — 3046F HEMOGLOBIN A1C LEVEL >9.0%: CPT | Performed by: FAMILY MEDICINE

## 2023-01-17 PROCEDURE — 3017F COLORECTAL CA SCREEN DOC REV: CPT | Performed by: FAMILY MEDICINE

## 2023-01-17 PROCEDURE — 1101F PT FALLS ASSESS-DOCD LE1/YR: CPT | Performed by: FAMILY MEDICINE

## 2023-01-17 PROCEDURE — G9717 DOC PT DX DEP/BP F/U NT REQ: HCPCS | Performed by: FAMILY MEDICINE

## 2023-01-17 PROCEDURE — 2022F DILAT RTA XM EVC RTNOPTHY: CPT | Performed by: FAMILY MEDICINE

## 2023-01-17 PROCEDURE — G8399 PT W/DXA RESULTS DOCUMENT: HCPCS | Performed by: FAMILY MEDICINE

## 2023-01-17 PROCEDURE — 99212 OFFICE O/P EST SF 10 MIN: CPT | Performed by: FAMILY MEDICINE

## 2023-01-17 PROCEDURE — G8428 CUR MEDS NOT DOCUMENT: HCPCS | Performed by: FAMILY MEDICINE

## 2023-01-17 NOTE — PROGRESS NOTES
Hiwot Galvin, was evaluated through a synchronous (real-time) audio-video encounter. The patient (or guardian if applicable) is aware that this is a billable service, which includes applicable co-pays. This Virtual Visit was conducted with patient's (and/or legal guardian's) consent. The visit was conducted pursuant to the emergency declaration under the [address] and the [employer] and [employer] General Act. Patient identification was verified, and a caregiver was present when appropriate. The patient was located at: Home: [address] [facility] [address]  The provider was located at: Facility (Appt Department): [address] [place] [address]       Saturnino Adames MD    Subjective:   Christine Aldridge is a 74 y.o. F who was seen for:    Patient reports several week history of a productive cough that is getting worse over time. Initially started off as a intermittent cough. Now, she feels short of breath and worse with walking within her house. Home COVID test was negative. Denies any fevers. Current Outpatient Medications:     aspirin delayed-release 81 mg tablet, TAKE 1 TABLET BY MOUTH EVERY DAY IN THE MORNING, Disp: 30 Tablet, Rfl: 4    lisinopriL (PRINIVIL, ZESTRIL) 2.5 mg tablet, Take 1 Tablet by mouth daily. , Disp: 30 Tablet, Rfl: 2    rosuvastatin (CRESTOR) 40 mg tablet, TAKE 1 TABLET BY MOUTH EVERY DAY, Disp: 90 Tablet, Rfl: 1    rimegepant (Nurtec ODT) 75 mg disintegrating tablet, Take 75 mg by mouth once as needed for Migraine. , Disp: , Rfl:     oxyCODONE-acetaminophen (PERCOCET) 5-325 mg per tablet, Take 1 Tablet by mouth every eight (8) hours as needed for Pain (use for pain rating 6-10/10). , Disp: , Rfl:     erenumab-aooe (Aimovig Autoinjector) 140 mg/mL injection, ADMINISTER 1 ML(140MG) UNDER THE SKIN EVERY 30 DAYS, Disp: 3 mL, Rfl: 5    amantadine HCL (SYMMETREL) 100 mg capsule, Take 1 Capsule by mouth two (2) times a day., Disp: 60 Capsule, Rfl: 11    pyridostigmine (MESTINON) 60 mg tablet, TAKE 2 TABLETS BY MOUTH THREE (3) TIMES DAILY. , Disp: 180 Tablet, Rfl: 4    topiramate (TOPAMAX) 200 mg tablet, Take 1 Tablet by mouth two (2) times a day., Disp: 180 Tablet, Rfl: 4    pregabalin (Lyrica) 200 mg capsule, Take 1 Capsule by mouth two (2) times a day. Max Daily Amount: 400 mg., Disp: 180 Capsule, Rfl: 4    Gilenya 0.5 mg cap, Take 1 Capsule by mouth nightly., Disp: 30 Capsule, Rfl: 11    metFORMIN ER (GLUCOPHAGE XR) 500 mg tablet, TAKE 3 TABLETS BY MOUTH DAILY, Disp: 270 Tablet, Rfl: 1    glucose blood VI test strips (True Metrix Glucose Test Strip) strip, 1 Each by Does Not Apply route daily. Use as directed to monitor blood glucose once daily, Disp: , Rfl:     glucose blood VI test strips (blood glucose test) strip, 100 Each by Does Not Apply route See Admin Instructions. One Touch Ultra Test Strips=Check blood sugar daily dx E11.8, Disp: 100 Strip, Rfl: 1    acetaminophen (TYLENOL 8 HOUR PO), Take 500 mg by mouth two (2) times daily as needed for Pain. For pain scale 1-5/10., Disp: , Rfl:     dantrolene (DANTRIUM) 100 mg capsule, Take 100 mg by mouth daily as needed (spasms). , Disp: , Rfl:     levothyroxine (SYNTHROID) 137 mcg tablet, TAKE 1 TABLET BY MOUTH EVERY DAY BEFORE BREAKFAST, Disp: 90 Tablet, Rfl: 3    PARoxetine (PAXIL) 40 mg tablet, TAKE 1 AND 1/2 TABLETS BY MOUTH EVERY NIGHT, Disp: 135 Tablet, Rfl: 3    albuterol (PROVENTIL VENTOLIN) 2.5 mg /3 mL (0.083 %) nebulizer solution, 3 mL by Nebulization route every six (6) hours as needed for Wheezing., Disp: 30 Each, Rfl: 0    menthol-zinc oxide (CALMOSEPTINE) 0.44-20.6 % oint, Apply 1 Each to affected area daily as needed for PRN Reason (Other) (thin layer to buttocks for skin irritation). , Disp: , Rfl:     lidocaine (LIDODERM) 5 %, Apply patch to the affected area for 12 hours a day and remove for 12 hours a day., Disp: 30 Each, Rfl: 3    Allergies   Allergen Reactions    Bee Sting [Sting, Bee] Anaphylaxis     Also allergic to egg products    Penicillins Anaphylaxis     Patient screened for any delayed non-IgE-mediated reaction to PCN. Patient notes the following:    No delayed non-IgE-mediated reaction to PCN            Betadine [Povidone-Iodine] Rash    Egg Itching       Review of Systems   Constitutional:  Negative for fever, malaise/fatigue and weight loss. Respiratory:  Positive for cough, sputum production and shortness of breath. Negative for hemoptysis and wheezing. Cardiovascular:  Negative for chest pain, palpitations, leg swelling and PND. Gastrointestinal:  Negative for abdominal pain, constipation, diarrhea, nausea and vomiting. Objective:   No flowsheet data found. Constitutional: [x] Appears well-developed and well-nourished [x] No apparent distress      [] Abnormal -     Mental status: [x] Alert and awake  [x] Oriented to person/place/time [x] Able to follow commands    [] Abnormal -     Eyes:   EOM    [x]  Normal    [] Abnormal -   Sclera  [x]  Normal    [] Abnormal -          Discharge []  None visible   [] Abnormal -     HENT: [x] Normocephalic, atraumatic  [] Abnormal -   [] Mouth/Throat: Mucous membranes are moist    Neck: [x] No visualized mass [] Abnormal -     Pulmonary/Chest: [x] Respiratory effort normal   [x] No visualized signs of difficulty breathing or respiratory distress        [] Abnormal -         Skin:        [x] No significant exanthematous lesions or discoloration noted on facial skin         [] Abnormal -            Psychiatric:       [x] Normal Affect [] Abnormal -        [x] No Hallucinations    Other pertinent observable physical exam findings:    Assessment & Plan:   Galen Galvin is a 74 y.o. female who presents today for:    1. SOB (shortness of breath)  Concern for possible PNA or asthma flare up.  Discussed that I have no objective data available for her such as vital signs or capability to do a physical exam. Recommend in person evaluation with urgent care or [facility]. 2. Dysautonomia ([address]  Stable, continue current treatment. 3. Right hemiparesis (HCC)  Stable, continue current treatment. 4. Myasthenia gravis ([address]  Stable, continue current treatment. 5. MS (multiple sclerosis) ([address]  Stable, continue current treatment. 6. DM type 2, controlled, with complication ([address]  Stable, continue current treatment. 7. Thrombocytopenia (HCC)  Stable, continue current treatment. 8. Stage 3 chronic kidney disease, unspecified whether stage 3a or 3b CKD ([facility] [address]  Stable, continue current treatment. There are no discontinued medications. Treatment risks/benefits/costs/interactions/alternatives discussed with patient. Advised patient to call back or return to office if symptoms worsen/change/persist. If patient cannot reach us or should anything more severe/urgent arise he/she should proceed directly to the nearest emergency department. Discussed expected course/resolution/complications of diagnosis in detail with patient. Patient expressed understanding with the diagnosis and plan. This dictation may have been completed with Dragon, the computer voice recognition software. Unanticipated grammatical, syntax, homophones, and other interpretive errors are sometimes inadvertently transcribed by the computer software. Please disregard any errors that have escaped final proofreading. Brandon Barrios M.D.

## 2023-01-19 ENCOUNTER — TELEPHONE (OUTPATIENT)
Dept: FAMILY MEDICINE CLINIC | Age: 68
End: 2023-01-19

## 2023-01-19 NOTE — TELEPHONE ENCOUNTER
Pt called on 1/19/23 asking about her X-Ray result's that were done on 1/18/23 by Travis Krishnamurthy in her home. [facility] informed her that the result's would be sent to her PCP within an hour. Please call and advise # [phone].

## 2023-01-20 NOTE — TELEPHONE ENCOUNTER
I was able to review the normal chest x-ray from [facility]. No signs of pneumonia or bacterial infection.

## 2023-01-20 NOTE — TELEPHONE ENCOUNTER
Called, spoke to pt. Two pt identifiers confirmed. Writer informed patient of x-ray results. Pt verbalized understanding of information discussed w/ no further questions at this time.    Gisella Collins LPN

## 2023-01-30 ENCOUNTER — OFFICE VISIT (OUTPATIENT)
Dept: NEUROLOGY | Age: 68
End: 2023-01-30
Payer: MEDICARE

## 2023-01-30 DIAGNOSIS — G62.9 PERIPHERAL POLYNEUROPATHY: Primary | ICD-10-CM

## 2023-01-30 PROCEDURE — 95911 NRV CNDJ TEST 9-10 STUDIES: CPT | Performed by: INTERNAL MEDICINE

## 2023-01-30 PROCEDURE — 95885 MUSC TST DONE W/NERV TST LIM: CPT | Performed by: INTERNAL MEDICINE

## 2023-01-30 NOTE — PROGRESS NOTES
ELECTRODIAGNOSTIC REPORT      Test Date:  2023    Patient: Ganesh Wheatley : 1955 Physician: Dr. Tate Shore D.O   ID#: [id number] SEX: Female Ref. Phys:      Patient History / Exam:  79-year-old female presenting with bilateral lower extremity paresthesias without weakness. Test ordered to evaluate for neuropathy. EMG & NCV Findings:  Sensory nerve conduction studies: The bilateral superficial peroneal sensory nerves measured at the lateral malleolus are absent. The bilateral sural sensory nerves measured at the lateral malleolus are absent. The left radial sensory nerve measured at digit 1 was within the reference of normal.    Motor nerve conduction studies:  Motor nerve conduction studies of the bilateral common peroneal nerve measured at the EDB as well as the bilateral left tibial motor nerves measured at the [facility] muscle are within the reference of normal.    Concentric needle EMG of the muscles tested in the table below is normal.    Impression: This is an abnormal study. 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 There is no electrodiagnostic evidence of demyelinating disease or a lumbosacral radiculopathy. The patient had a prior history of CIDP listed as a diagnosis by former neurologist Dr. Katlin Landrum. I did explain that based on the study, there is no evidence of CIDP and the patient would not require treatment with IVIg. The patient understood.   ___________________________  Dr. Tate Shore D.O    Nerve Conduction Studies  Anti Sensory Summary Table     Stim Site NR Onset (ms) Peak (ms) O-P Amp (µV) Norm Peak (ms) Norm O-P Amp Site1 Site2 Dist (cm) Norm Luis (m/s)   Left Radial Anti Sensory (Base 1st Digit)  34.2°C   Wrist    1.7 2.3 43.4 <2.9 >15 Wrist Base 1st Digit 10.0    Left Sup Fibular Anti Sensory (Lat ankle)  34.2°C   Lower leg NR    <4.4 >5.0 Lower leg Lat ankle 10.0 >32   Right Sup Fibular Anti Sensory (Lat ankle)  34.2°C   Lower leg NR    <4.4 >5.0 Lower leg Lat ankle 10.0 >32   Left Sural Anti Sensory (Lat Mall)  34.2°C   Calf NR    <4.5 >4.0 Calf Lat Mall 14.0    Right Sural Anti Sensory (Lat Mall)  34.2°C   Calf NR    <4.5 >4.0 Calf Lat Mall 14.0      Motor Summary Table     Stim Site NR Onset (ms) Norm Onset (ms) O-P Amp (mV) Norm O-P Amp P-T Amp (mV) Site1 Site2 Dist (cm) Luis (m/s)   Left Fibular Motor (Ext Dig Brev)  34.2°C   Ankle    4.2 <6.5 2.9 >1.1  Ankle Ext Dig Brev 8.0 19   B Fib    13.4  2.8   B Fib Ankle 39.0 42   Poplt    15.6  2.8   Poplt B Fib 10.0 45   Right Fibular Motor (Ext Dig Brev)  34.2°C   Ankle    5.0 <6.5 4.1 >1.1  Ankle Ext Dig Brev 8.0 16   B Fib    13.0  4.0   B Fib Ankle 36.0 45   Poplt    15.2  4.0   Poplt B Fib 10.0 45   Left Tibial Motor (Abd Clark Brev)  34.2°C   Ankle    5.2 <6.1 6.6 >4.4  Ankle Abd Clark Brev 8.0 15   Knee    15.8  5.2   Knee Ankle 46.0 43   Right Tibial Motor (Abd Clark Brev)  34.2°C   Ankle    5.4 <6.1 6.4 >4.4  Ankle Abd Clark Brev 8.0 15   Knee    14.9  5.0   Knee Ankle 45.0 47       EMG     Side Muscle Nerve Root Ins Act Fibs Psw Recrt Duration Amp Poly Comment   Left AntTibialis Dp Br Peron L4-5 Nml Nml Nml Nml Nml Nml Nml    Left MedGastroc Tibial S1-2 Nml Nml Nml Nml Nml Nml Nml    Left VastusLat Femoral L2-4 Nml Nml Nml Nml Nml Nml Nml    Right AntTibialis Dp Br Peron L4-5 Nml Nml Nml Nml Nml Nml Nml    Right MedGastroc Tibial S1-2 Nml Nml Nml Nml Nml Nml Nml    Right VastusLat Femoral L2-4 Nml Nml Nml Nml Nml Nml Nml      Waveforms:

## 2023-02-14 ENCOUNTER — TELEPHONE (OUTPATIENT)
Dept: FAMILY MEDICINE CLINIC | Age: 68
End: 2023-02-14

## 2023-02-14 NOTE — TELEPHONE ENCOUNTER
----- Message from Colton Vega sent at 2/13/2023 10:32 AM EST -----  Subject: Message to Provider    QUESTIONS  Information for Provider? Pt called to CONFIRM appt for 02/26/2023 at 2 pm   with Lorna Hearing. ---------------------------------------------------------------------------  --------------  [facility]  [phone]; OK to leave message on voicemail  ---------------------------------------------------------------------------  --------------  SCRIPT ANSWERS  Relationship to Patient?  Self

## 2023-02-14 NOTE — TELEPHONE ENCOUNTER
Pt's message stated that she was confirming a message for 2/26/23,I called the pt to verify that her appt was on 2/16/23.

## 2023-02-16 ENCOUNTER — OFFICE VISIT (OUTPATIENT)
Dept: FAMILY MEDICINE CLINIC | Age: 68
End: 2023-02-16

## 2023-02-16 VITALS
BODY MASS INDEX: 24.06 KG/M2 | SYSTOLIC BLOOD PRESSURE: 130 MMHG | HEART RATE: 85 BPM | OXYGEN SATURATION: 98 % | TEMPERATURE: 97.6 F | DIASTOLIC BLOOD PRESSURE: 62 MMHG | RESPIRATION RATE: 16 BRPM | HEIGHT: 72 IN | WEIGHT: 177.6 LBS

## 2023-02-16 DIAGNOSIS — M79.672 LEFT FOOT PAIN: ICD-10-CM

## 2023-02-16 DIAGNOSIS — E03.9 HYPOTHYROIDISM, UNSPECIFIED TYPE: ICD-10-CM

## 2023-02-16 DIAGNOSIS — E11.9 TYPE 2 DIABETES MELLITUS WITHOUT COMPLICATION, WITHOUT LONG-TERM CURRENT USE OF INSULIN (HCC): Primary | ICD-10-CM

## 2023-02-16 PROCEDURE — 1090F PRES/ABSN URINE INCON ASSESS: CPT | Performed by: FAMILY MEDICINE

## 2023-02-16 PROCEDURE — G8536 NO DOC ELDER MAL SCRN: HCPCS | Performed by: FAMILY MEDICINE

## 2023-02-16 PROCEDURE — 3046F HEMOGLOBIN A1C LEVEL >9.0%: CPT | Performed by: FAMILY MEDICINE

## 2023-02-16 PROCEDURE — G8399 PT W/DXA RESULTS DOCUMENT: HCPCS | Performed by: FAMILY MEDICINE

## 2023-02-16 PROCEDURE — 1123F ACP DISCUSS/DSCN MKR DOCD: CPT | Performed by: FAMILY MEDICINE

## 2023-02-16 PROCEDURE — G8420 CALC BMI NORM PARAMETERS: HCPCS | Performed by: FAMILY MEDICINE

## 2023-02-16 PROCEDURE — G9717 DOC PT DX DEP/BP F/U NT REQ: HCPCS | Performed by: FAMILY MEDICINE

## 2023-02-16 PROCEDURE — 3078F DIAST BP <80 MM HG: CPT | Performed by: FAMILY MEDICINE

## 2023-02-16 PROCEDURE — 2022F DILAT RTA XM EVC RTNOPTHY: CPT | Performed by: FAMILY MEDICINE

## 2023-02-16 PROCEDURE — 1101F PT FALLS ASSESS-DOCD LE1/YR: CPT | Performed by: FAMILY MEDICINE

## 2023-02-16 PROCEDURE — G8427 DOCREV CUR MEDS BY ELIG CLIN: HCPCS | Performed by: FAMILY MEDICINE

## 2023-02-16 PROCEDURE — 3017F COLORECTAL CA SCREEN DOC REV: CPT | Performed by: FAMILY MEDICINE

## 2023-02-16 PROCEDURE — G9899 SCRN MAM PERF RSLTS DOC: HCPCS | Performed by: FAMILY MEDICINE

## 2023-02-16 PROCEDURE — 3075F SYST BP GE 130 - 139MM HG: CPT | Performed by: FAMILY MEDICINE

## 2023-02-16 PROCEDURE — 99214 OFFICE O/P EST MOD 30 MIN: CPT | Performed by: FAMILY MEDICINE

## 2023-02-16 RX ORDER — CALCIUM CITRATE/VITAMIN D3 200MG-6.25
TABLET ORAL
COMMUNITY

## 2023-02-16 NOTE — PROGRESS NOTES
Cassie Sanchez is a 74 y.o. female    Chief Complaint   Patient presents with    Diabetes       Visit Vitals  /62 (BP 1 Location: Left upper arm, BP Patient Position: Sitting, BP Cuff Size: Adult)   Pulse 85   Temp 97.6 °F (36.4 °C) (Temporal)   Resp 16   Ht 6' (1.829 m)   Wt 177 lb 9.6 oz (80.6 kg)   LMP 09/01/2010   SpO2 98%   BMI 24.09 kg/m²           1. Have you been to the ER, urgent care clinic since your last visit? Hospitalized since your last visit? NO    2. Have you seen or consulted any other health care providers outside of the [address] [place] [address] since your last visit? Include any pap smears or colon screening.  NO

## 2023-02-16 NOTE — PROGRESS NOTES
Patient Name: Rukhsana Edge   MRN: [id number]    Nahum Philip is a 74 y.o. female who presents with the following:     Average sugars are mostly low 100s but states that occasionally as high as she eats something that is back for her. Main concern today is pain along the outside of her left foot. States that she has had a callus on the outside of her foot which her podiatrist has removed but still has pain there. Notices when the calluses get worse, skin darkens. Mostly wears house slippers. Using Tylenol for the pain. BP Readings from Last 3 Encounters:   02/16/23 130/62   12/29/22 109/63   12/28/22 110/76     Wt Readings from Last 3 Encounters:   02/16/23 177 lb 9.6 oz (80.6 kg)   12/28/22 179 lb (81.2 kg)   12/28/22 179 lb (81.2 kg)     Lab Results   Component Value Date/Time    Hemoglobin A1c 6.6 (H) 11/15/2022 02:01 PM    Hemoglobin A1c (POC) 6.9 08/25/2022 09:25 AM         Review of Systems   Constitutional:  Negative for fever, malaise/fatigue and weight loss. Respiratory:  Negative for cough, hemoptysis, shortness of breath and wheezing. Cardiovascular:  Negative for chest pain, palpitations, leg swelling and PND. Gastrointestinal:  Negative for abdominal pain, constipation, diarrhea, nausea and vomiting. Musculoskeletal:  Positive for joint pain. The patient's medications, allergies, past medical history, surgical history, family history and social history were reviewed and updated where appropriate. Current Outpatient Medications:     glucose blood VI test strips (True Metrix Glucose Test Strip) strip, True Metrix Glucose Test Strip  USE AS DIRECTED TO MONITOR BLOOD GLUCOSE ONCE DAILY, Disp: , Rfl:     aspirin delayed-release 81 mg tablet, TAKE 1 TABLET BY MOUTH EVERY DAY IN THE MORNING, Disp: 30 Tablet, Rfl: 4    lisinopriL (PRINIVIL, ZESTRIL) 2.5 mg tablet, Take 1 Tablet by mouth daily. , Disp: 30 Tablet, Rfl: 2    rosuvastatin (CRESTOR) 40 mg tablet, TAKE 1 TABLET BY MOUTH EVERY DAY, Disp: 90 Tablet, Rfl: 1    oxyCODONE-acetaminophen (PERCOCET) 5-325 mg per tablet, Take 1 Tablet by mouth every eight (8) hours as needed for Pain (use for pain rating 6-10/10). , Disp: , Rfl:     erenumab-aooe (Aimovig Autoinjector) 140 mg/mL injection, ADMINISTER 1 ML(140MG) UNDER THE SKIN EVERY 30 DAYS, Disp: 3 mL, Rfl: 5    pyridostigmine (MESTINON) 60 mg tablet, TAKE 2 TABLETS BY MOUTH THREE (3) TIMES DAILY. , Disp: 180 Tablet, Rfl: 4    topiramate (TOPAMAX) 200 mg tablet, Take 1 Tablet by mouth two (2) times a day., Disp: 180 Tablet, Rfl: 4    pregabalin (Lyrica) 200 mg capsule, Take 1 Capsule by mouth two (2) times a day. Max Daily Amount: 400 mg., Disp: 180 Capsule, Rfl: 4    Gilenya 0.5 mg cap, Take 1 Capsule by mouth nightly., Disp: 30 Capsule, Rfl: 11    metFORMIN ER (GLUCOPHAGE XR) 500 mg tablet, TAKE 3 TABLETS BY MOUTH DAILY, Disp: 270 Tablet, Rfl: 1    glucose blood VI test strips (True Metrix Glucose Test Strip) strip, 1 Each by Does Not Apply route daily. Use as directed to monitor blood glucose once daily, Disp: , Rfl:     glucose blood VI test strips (blood glucose test) strip, 100 Each by Does Not Apply route See Admin Instructions. One Touch Ultra Test Strips=Check blood sugar daily dx E11.8, Disp: 100 Strip, Rfl: 1    acetaminophen (TYLENOL 8 HOUR PO), Take 500 mg by mouth two (2) times daily as needed for Pain. For pain scale 1-5/10., Disp: , Rfl:     dantrolene (DANTRIUM) 100 mg capsule, Take 100 mg by mouth daily as needed (spasms). , Disp: , Rfl:     levothyroxine (SYNTHROID) 137 mcg tablet, TAKE 1 TABLET BY MOUTH EVERY DAY BEFORE BREAKFAST, Disp: 90 Tablet, Rfl: 3    PARoxetine (PAXIL) 40 mg tablet, TAKE 1 AND 1/2 TABLETS BY MOUTH EVERY NIGHT, Disp: 135 Tablet, Rfl: 3    albuterol (PROVENTIL VENTOLIN) 2.5 mg /3 mL (0.083 %) nebulizer solution, 3 mL by Nebulization route every six (6) hours as needed for Wheezing., Disp: 30 Each, Rfl: 0    menthol-zinc oxide (CALMOSEPTINE) 0.44-20.6 % oint, Apply 1 Each to affected area daily as needed for PRN Reason (Other) (thin layer to buttocks for skin irritation). , Disp: , Rfl:     lidocaine (LIDODERM) 5 %, Apply patch to the affected area for 12 hours a day and remove for 12 hours a day., Disp: 30 Each, Rfl: 3    rimegepant (Nurtec ODT) 75 mg disintegrating tablet, Take 75 mg by mouth once as needed for Migraine. (Patient not taking: Reported on 2/16/2023), Disp: , Rfl:     amantadine HCL (SYMMETREL) 100 mg capsule, Take 1 Capsule by mouth two (2) times a day., Disp: 60 Capsule, Rfl: 11    Allergies   Allergen Reactions    Bee Sting [Sting, Bee] Anaphylaxis     Also allergic to egg products    Penicillins Anaphylaxis     Patient screened for any delayed non-IgE-mediated reaction to PCN. Patient notes the following:    No delayed non-IgE-mediated reaction to PCN            Betadine [Povidone-Iodine] Rash    Egg Itching         OBJECTIVE    Visit Vitals  /62 (BP 1 Location: Left upper arm, BP Patient Position: Sitting, BP Cuff Size: Adult)   Pulse 85   Temp 97.6 °F (36.4 °C) (Temporal)   Resp 16   Ht 6' (1.829 m)   Wt 177 lb 9.6 oz (80.6 kg)   LMP 09/01/2010   SpO2 98%   BMI 24.09 kg/m²       Physical Exam  Vitals and nursing note reviewed. Constitutional:       General: She is not in acute distress. Appearance: Normal appearance. She is not toxic-appearing. HENT:      Head: Normocephalic and atraumatic. Eyes:      Pupils: Pupils are equal, round, and reactive to light. Pulmonary:      Effort: Pulmonary effort is normal.   Musculoskeletal:         General: Normal range of motion. Left foot: Normal range of motion and normal capillary refill. Deformity and tenderness (TTP along lateral aspect of base of 1st MTP, callus is present; also has some bony enlargement along the midfoot.) present. No swelling. Normal pulse. Skin:     General: Skin is warm and dry. Neurological:      Mental Status: She is alert.  Mental status is at baseline. Psychiatric:         Mood and Affect: Mood normal.         Behavior: Behavior normal.         ASSESSMENT AND PLAN  Tate Galvin is a 74 y.o. female who presents today for:    1. Type 2 diabetes mellitus without complication, without long-term current use of insulin (HCC)  Stable, continue current treatment pending review of labs. - HEMOGLOBIN A1C WITH EAG; Future  - METABOLIC PANEL, BASIC; Future  - METABOLIC PANEL, BASIC  - HEMOGLOBIN A1C WITH EAG    2. Hypothyroidism, unspecified type  - TSH 3RD GENERATION; Future  - T4 (THYROXINE); Future  - T4 (THYROXINE)  - TSH 3RD GENERATION    3. Left foot pain  Will obtain xray; consider orthopedic referral or follow up with podiatry depending on result. - XR FOOT LT MIN 3 V; Future       There are no discontinued medications. Follow-up and Dispositions    Return in about 3 months (around 5/16/2023) for DM follow up. Treatment risks/benefits/costs/interactions/alternatives discussed with patient. Advised patient to call back or return to office if symptoms worsen/change/persist. If patient cannot reach us or should anything more severe/urgent arise he/she should proceed directly to the nearest emergency department. Discussed expected course/resolution/complications of diagnosis in detail with patient. Patient expressed understanding with the diagnosis and plan. This dictation may have been completed with Dragon, the computer voice recognition software. Unanticipated grammatical, syntax, homophones, and other interpretive errors are sometimes inadvertently transcribed by the computer software. Please disregard any errors that have escaped final proofreading. Brandon Velez M.D.

## 2023-02-17 LAB
ANION GAP SERPL CALC-SCNC: 6 MMOL/L (ref 5–15)
BUN SERPL-MCNC: 15 MG/DL (ref 6–20)
BUN/CREAT SERPL: 14 (ref 12–20)
CALCIUM SERPL-MCNC: 9.4 MG/DL (ref 8.5–10.1)
CHLORIDE SERPL-SCNC: 111 MMOL/L (ref 97–108)
CO2 SERPL-SCNC: 25 MMOL/L (ref 21–32)
CREAT SERPL-MCNC: 1.11 MG/DL (ref 0.55–1.02)
EST. AVERAGE GLUCOSE BLD GHB EST-MCNC: 143 MG/DL
GLUCOSE SERPL-MCNC: 101 MG/DL (ref 65–100)
HBA1C MFR BLD: 6.6 % (ref 4–5.6)
POTASSIUM SERPL-SCNC: 4.1 MMOL/L (ref 3.5–5.1)
SODIUM SERPL-SCNC: 142 MMOL/L (ref 136–145)
T4 SERPL-MCNC: 7.9 UG/DL (ref 4.8–13.9)
TSH SERPL DL<=0.05 MIU/L-ACNC: 5.97 UIU/ML (ref 0.36–3.74)

## 2023-02-20 DIAGNOSIS — E03.9 HYPOTHYROIDISM, UNSPECIFIED TYPE: Primary | ICD-10-CM

## 2023-02-22 ENCOUNTER — TELEPHONE (OUTPATIENT)
Dept: CARDIOLOGY CLINIC | Age: 68
End: 2023-02-22

## 2023-02-22 NOTE — TELEPHONE ENCOUNTER
----- Message from Gaby Martínez MD sent at 2/22/2023 12:27 AM EST -----  Please let pt know monitor showed PVCs and PACs that are benign and seen on her previous monitors, no specific therapy needed for these.  thx

## 2023-02-28 NOTE — TELEPHONE ENCOUNTER
Christine Fuentes with [employer] scheduling needs an additional order placed for patient, a diagnostic mammogram of the right breast has to be placed too Tranexamic Acid Pregnancy And Lactation Text: It is unknown if this medication is safe during pregnancy or breast feeding.

## 2023-03-21 ENCOUNTER — OFFICE VISIT (OUTPATIENT)
Dept: NEUROLOGY | Age: 68
End: 2023-03-21
Payer: MEDICARE

## 2023-03-21 ENCOUNTER — LAB ONLY (OUTPATIENT)
Dept: FAMILY MEDICINE CLINIC | Age: 68
End: 2023-03-21

## 2023-03-21 VITALS
OXYGEN SATURATION: 97 % | RESPIRATION RATE: 18 BRPM | DIASTOLIC BLOOD PRESSURE: 62 MMHG | TEMPERATURE: 97.8 F | BODY MASS INDEX: 24.52 KG/M2 | WEIGHT: 181 LBS | HEART RATE: 64 BPM | HEIGHT: 72 IN | SYSTOLIC BLOOD PRESSURE: 110 MMHG

## 2023-03-21 DIAGNOSIS — E03.9 HYPOTHYROIDISM, UNSPECIFIED TYPE: ICD-10-CM

## 2023-03-21 DIAGNOSIS — G43.709 CHRONIC MIGRAINE W/O AURA W/O STATUS MIGRAINOSUS, NOT INTRACTABLE: Primary | ICD-10-CM

## 2023-03-21 PROCEDURE — 1101F PT FALLS ASSESS-DOCD LE1/YR: CPT | Performed by: INTERNAL MEDICINE

## 2023-03-21 PROCEDURE — G8428 CUR MEDS NOT DOCUMENT: HCPCS | Performed by: INTERNAL MEDICINE

## 2023-03-21 PROCEDURE — 3017F COLORECTAL CA SCREEN DOC REV: CPT | Performed by: INTERNAL MEDICINE

## 2023-03-21 PROCEDURE — 1090F PRES/ABSN URINE INCON ASSESS: CPT | Performed by: INTERNAL MEDICINE

## 2023-03-21 PROCEDURE — 3074F SYST BP LT 130 MM HG: CPT | Performed by: INTERNAL MEDICINE

## 2023-03-21 PROCEDURE — 3078F DIAST BP <80 MM HG: CPT | Performed by: INTERNAL MEDICINE

## 2023-03-21 PROCEDURE — G8536 NO DOC ELDER MAL SCRN: HCPCS | Performed by: INTERNAL MEDICINE

## 2023-03-21 PROCEDURE — G8399 PT W/DXA RESULTS DOCUMENT: HCPCS | Performed by: INTERNAL MEDICINE

## 2023-03-21 PROCEDURE — G8420 CALC BMI NORM PARAMETERS: HCPCS | Performed by: INTERNAL MEDICINE

## 2023-03-21 PROCEDURE — 99215 OFFICE O/P EST HI 40 MIN: CPT | Performed by: INTERNAL MEDICINE

## 2023-03-21 PROCEDURE — 1123F ACP DISCUSS/DSCN MKR DOCD: CPT | Performed by: INTERNAL MEDICINE

## 2023-03-21 PROCEDURE — G9717 DOC PT DX DEP/BP F/U NT REQ: HCPCS | Performed by: INTERNAL MEDICINE

## 2023-03-21 PROCEDURE — G9899 SCRN MAM PERF RSLTS DOC: HCPCS | Performed by: INTERNAL MEDICINE

## 2023-03-21 NOTE — PROGRESS NOTES
Chief Complaint   Patient presents with    Multiple Sclerosis     Follow up - former patient of Dr Yancy Parker - states she is not doing well     Migraine     1. Have you been to the ER, urgent care clinic since your last visit? Hospitalized since your last visit? No     2. Have you seen or consulted any other health care providers outside of the [address] [place] [address] since your last visit? Include any pap smears or colon screening.   No

## 2023-03-21 NOTE — PROGRESS NOTES
Neurology Note    Patient ID:  Uriah Galvin  [id number]  74 y.o.  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 She does continue to have chronic daily headaches. We discussed that she should be referred to our MS specialist in the office for further management of MS. I did review her previous MRI results and did discuss with her that I did do not feel strongly that she has MS based on radiological imaging. This should be reviewed and discussed further at her next appointment with Panchito Johnson NP. She is to continue Gilenya for now until then. She is stable from an MG perspective. Recommendations:  - Regarding chronic migraine headaches that are daily and constant in nature, the patient has tried and failed many medications and unfortunately is unable to take any triptan class of medications given her history of stroke therefore I will start her on Ubrelvy to take as needed for abortive treatment of chronic migraine headaches. She should continue her Aimovig injections monthly for preventative treatment of migraines. -Regarding her stroke, she should continue on aspirin 81 mg daily.   Her LDL was at goal at 79 and therefore most likely would not require high intensity statin however this should be followed by her PCP to ensure that her LDL goal remains less than 70.  - Continue to manage type 2 diabetes per PCP  - Myasthenia gravis stable, continue Mestinon  - Home health for home PT  - Referral to [facility]  specialist, Panchito Johnson NP, patient to continue Gilenya for now however I did discuss with the patient that based on the MRI findings I do not feel that she needs to stay on Gilenya   -Discontinued IVIG. Problem was discussed at length with the patient. We reviewed the results of the study in detail. We also discussed prognosis and treatment options. The patient had opportunity today to ask all questions, expressed understanding of the instructions provided, and agreed with the plan of treatment. Follow up in 6 to 8 months. I spent 60 minutes providing care to this patient, reviewing the patient's chart, notes, labs, medications and preparing documentation along with >50% of the time spent counseling patient during the encounter. History of Present Illness:   Greg Felty is a 74 y.o. female with history of MS on Gilenya, peripheral neuropathy, and MG on Mestinon and chronic migraine headaches, prior history of stroke with residual right-sided weakness and slurred speech, presenting as a follow-up. The patient was a former patient of Dr. Dayana Kovacs. Patient was last seen in the hospital in August 2022 by Dr. Marquez Yousif for symptoms concerning for slurred speech  Worse than her baseline. She had a head CT which was unremarkable. CTA head and neck showed no large vessel occlusion. She received TNKase and her speech did seem to improve throughout her hospitalization. She did have an MRI with contrast which showed small cortical encephalomalacia in the left occipital and parietal lobes with nonspecific scattered periventricular and subcortical white matter T2/FLAIR hyperintensity which was reported as inconsistent with demyelinating plaques. The patient is taking Gilenya prescribed by her former neurologist for MS. She was also receiving IVIG infusions for diagnosis of CIDP. However there was no evidence of CIDP on prior EMG studies.   EMG was recently repeated on 1/30 and was remarkable for asymmetric distal predominant sensory only axonal neuropathy in the lower extremities. IVIG therapy was discontinued after discussion with the patient. It also appears that she has a diagnosis of acetylcholine receptor antibody positive myasthenia gravis that was made by her former neurologist and is currently taking Mestinon 60 mg twice daily. It does appear that she has had labs tested in 2016 for acetylcholine receptor antibody binding and blocking which were both elevated. I do not see that she has had a CT of her chest in [facility] or Care Everywhere. Interval Events:   Since her hospitalization, she has not had any recurrent symptoms of slurred speech or worsening weakness. She does have diffuse fatigue. She does not feel like Gilenya has helped. She uses a cane and a walker to get around the house. She has had benefit in the past from home health and would like to be in physical therapy for strengthening of her lower extremities. She does still have right-sided weakness from her stroke. No new neurological symptoms today. She does continue to have constant daily headaches for which she states that Nurtec was unfortunately not completely covered by her insurance and was too expensive for out-of-pocket pay. She does take Aimovig which she feels has helped to decrease the severity of the headaches but she is still having frequent daily headaches. Her headaches tend to be on 1 side with a pulsating and throbbing quality. She is sensitive to light and sound with some mild nausea with the headaches. Medications tried and failed: These medications were tried for at least 2 months and were discontinued either due to side effects or medical contraindications or ineffectiveness.   Preventative:  Topiramate-ineffective has been on this medication for several months  Amitriptyline-medically contraindicated given that she is also on paroxetine  Aimovig -currently taking however does not appear to be helping with breakthrough headaches  Nurtec-too costly out of pocket pay Abortive:  Triptan class of medications-medically contraindicated due to history of stroke  Nurtec-too costly out of pocket     Past Medical History:   Diagnosis Date    Arrhythmia     Arthritis     Bilateral hip replacements, right knee replacement    Benign cyst of left breast 3/21/2016    Blindness and low vision 2004    legally blind from [address]    Cataract     right eye    Cervical spinal stenosis 2016    Moderate C6-7    Chronic pain     COVID-19 vaccine series completed     PFIZER 3/19/21, 21    Depression     Diabetes mellitus type 2, controlled ([address] 2016    Diet-controlled diabetes mellitus ([address] 2018    Endometriosis 2010    FH: breast cancer 2010    Chart states FH breast/ovary Ca, CAD,DM     History of CVA (cerebrovascular accident) 2018    HTN, goal below 140/90 2010    CURRENTLY NO MEDICATIONS (21)    Hypercholesterolemia     Hypothyroid 2010    Incontinence     Lumbosacral plexopathy 2010    Migraine     H/O MIGRAINE    MS (multiple sclerosis) ([address]     Myasthenia gravis ([address]     Sleep apnea 2010    RESOLVED 2019    Stroke ([address] 2018    RIGHT SIDE WEAKNESS    Ureteral calculus 2010    Vitamin D deficiency 3/17/2016        Past Surgical History:   Procedure Laterality Date    HX APPENDECTOMY  2022    HX CARPAL TUNNEL RELEASE Left     HX CATARACT REMOVAL Bilateral     HX  SECTION  1986    HX CYST INCISION AND DRAINAGE      HX GI      COLONOSCOPY    HX GYN  2001    ovarian cyst    HX HEENT      removal of thyroglossal duct cyst    HX HIP REPLACEMENT Right 2017    anterior approach    HX KNEE ARTHROSCOPY Right 1998    HX ORTHOPAEDIC  1997    right thumb tendon repair x 2    HX ORTHOPAEDIC      RIGHT KNEE REPLACEMENT    HX OTHER SURGICAL      Janee Cath x2    HX SMALL BOWEL RESECTION      HX THYROIDECTOMY  1974    HX VASCULAR ACCESS  2006    PORT -A- CATH X 2    IR REMOVE TUNL CVAD W/O PORT / PUMP 12/6/2022    [place] ARTHRP ACETBLR/PROX FEM PROSTC AGRFT/ALGRFT Bilateral 2019    BOTH HIPS REPLACED    [place] UNLISTED PROCEDURE ABDOMEN PERITONEUM & OMENTUM  09/2001    bowel resection    [place] UNLISTED PROCEDURE BREAST  2006    breast cyst-both breasts at different times        Family History   Problem Relation Age of Onset    OSTEOARTHRITIS Mother     Diabetes Mother     Elevated Lipids Mother     Headache Mother     Heart Disease Mother     Hypertension Mother     Stroke Mother     Neuropathy Mother     Colon Cancer Mother     Diabetes Father     Elevated Lipids Father     Heart Disease Father     Hypertension Father     Diabetes Sister     Elevated Lipids Sister     Headache Sister     Hypertension Sister     Migraines Sister     Cancer Sister 62        breast ca W/METS TO BRAIN    Breast Cancer Sister 62    Diabetes Brother     Elevated Lipids Brother     Hypertension Brother     Asthma Son     Thyroid Disease Son         GRAVES    Sleep Apnea Son     Breast Cancer Maternal Grandmother 54    Diabetes Sister     Neuropathy Sister     Anesth Problems Neg Hx         Social History     Tobacco Use    Smoking status: Every Day     Packs/day: 0.50     Years: 30.00     Pack years: 15.00     Types: Cigarettes    Smokeless tobacco: Never   Substance Use Topics    Alcohol use: No     Comment: TWICE A YEAR PER PT        Allergies   Allergen Reactions    Bee Sting [Sting, Bee] Anaphylaxis     Also allergic to egg products    Penicillins Anaphylaxis     Patient screened for any delayed non-IgE-mediated reaction to PCN. Patient notes the following:    No delayed non-IgE-mediated reaction to PCN            Betadine [Povidone-Iodine] Rash    Egg Itching        Prior to Admission medications    Medication Sig Start Date End Date Taking?  Authorizing Provider   glucose blood VI test strips (True Metrix Glucose Test Strip) strip True Metrix Glucose Test Strip   USE AS DIRECTED TO MONITOR BLOOD GLUCOSE ONCE DAILY    Provider, Historical   aspirin delayed-release 81 mg tablet TAKE 1 TABLET BY MOUTH EVERY DAY IN THE MORNING 11/19/22   Moni Yoon MD   lisinopriL (PRINIVIL, ZESTRIL) 2.5 mg tablet Take 1 Tablet by mouth daily. 11/17/22   Leola Fraga MD   rosuvastatin (CRESTOR) 40 mg tablet TAKE 1 TABLET BY MOUTH EVERY DAY 10/25/22   Leola Fraga MD   rimegepant (Nurtec ODT) 75 mg disintegrating tablet Take 75 mg by mouth once as needed for Migraine. Patient not taking: Reported on 2/16/2023 10/25/22   Provider, Historical   oxyCODONE-acetaminophen (PERCOCET) 5-325 mg per tablet Take 1 Tablet by mouth every eight (8) hours as needed for Pain (use for pain rating 6-10/10). 10/25/22   Provider, Historical   erenumab-aooe (Aimovig Autoinjector) 140 mg/mL injection ADMINISTER 1 ML(140MG) UNDER THE SKIN EVERY 30 DAYS 10/18/22   Los Kahn MD   amantadine HCL (SYMMETREL) 100 mg capsule Take 1 Capsule by mouth two (2) times a day. 10/18/22   Los Kahn MD   pyridostigmine (MESTINON) 60 mg tablet TAKE 2 TABLETS BY MOUTH THREE (3) TIMES DAILY. 10/18/22   Los Kahn MD   topiramate (TOPAMAX) 200 mg tablet Take 1 Tablet by mouth two (2) times a day. 10/18/22   Los Kahn MD   pregabalin (Lyrica) 200 mg capsule Take 1 Capsule by mouth two (2) times a day. Max Daily Amount: 400 mg. 10/18/22   Los Kahn MD   Gilenya 0.5 mg cap Take 1 Capsule by mouth nightly. 10/18/22   Los Kahn MD   metFORMIN ER (GLUCOPHAGE XR) 500 mg tablet TAKE 3 TABLETS BY MOUTH DAILY 10/17/22   Leola Fraga MD   glucose blood VI test strips (True Metrix Glucose Test Strip) strip 1 Each by Does Not Apply route daily. Use as directed to monitor blood glucose once daily 9/27/22   Los Kahn MD   glucose blood VI test strips (blood glucose test) strip 100 Each by Does Not Apply route See Admin Instructions.  One Touch Ultra Test Strips=Check blood sugar daily dx E11.8 9/20/22   Leola Fraga MD   acetaminophen (TYLENOL 8 HOUR PO) Take 500 mg by mouth two (2) times daily as needed for Pain. For pain scale 1-5/10. 9/8/22   Provider, Historical   dantrolene (DANTRIUM) 100 mg capsule Take 100 mg by mouth daily as needed (spasms). Provider, Historical   levothyroxine (SYNTHROID) 137 mcg tablet TAKE 1 TABLET BY MOUTH EVERY DAY BEFORE BREAKFAST 5/20/22   Mary Tucker MD   PARoxetine (PAXIL) 40 mg tablet TAKE 1 AND 1/2 TABLETS BY MOUTH EVERY NIGHT 3/14/22   Los Kahn MD   albuterol (PROVENTIL VENTOLIN) 2.5 mg /3 mL (0.083 %) nebulizer solution 3 mL by Nebulization route every six (6) hours as needed for Wheezing. 12/31/18   Mary Tucker MD   menthol-zinc oxide (CALMOSEPTINE) 0.44-20.6 % oint Apply 1 Each to affected area daily as needed for PRN Reason (Other) (thin layer to buttocks for skin irritation). Provider, Historical   lidocaine (LIDODERM) 5 % Apply patch to the affected area for 12 hours a day and remove for 12 hours a day. 8/15/18   Titi Arriaga MD       Review of Systems:    General, constitutional: negative  Eyes, vision: negative  Ears, nose, throat: negative  Cardiovascular, heart: negative  Respiratory: negative  Gastrointestinal: negative  Genitourinary: negative  Musculoskeletal: negative  Skin and integumentary: negative  Psychiatric: negative  Endocrine: negative  Neurological: negative, except for HPI  Hematologic/lymphatic: negative  Allergy/immunology: negative    []Unable to obtain  ROS due to  []mental status change  []sedated   []intubated    Objective:     Visit Vitals  LMP 09/01/2010       Physical Exam:  General:  appears well nourished in no acute distress  Neck: no obvious deformity or masses  Lungs: comfortable on room air  Heart:  well-perfused   Lower extremity: no edema  Skin: intact    Neurological exam:  Awake, alert, oriented to person, place and time  Recent and remote memory were normal  Attention and concentration were intact  Language was intact.   There was no aphasia  Speech: Mild dysarthria with delay in speech  Fund of knowledge was preserved    Cranial nerves:   II-XII were tested    PERLA  Visual fields - BTT bilaterally. Has optic apraxia and optic ataxia on exam   Difficulty with extraocular eye movements/optic apraxia  Facial sensation:  normal and symmetric  Facial motor: normal and symmetric  Hearing intact  SCM strength intact  Tongue: midline without fasciculations    Motor: Tone increased in the right upper extremity and right lower extremity. No tremor noted. Strength testing:   deltoid triceps biceps Wrist ext. Wrist flex. intrinsics Hip flex. Hip ext. Knee ext. Knee flex Dorsi flex Plantar flex   Right 3 4+ 5 4 4+ 4 4- NT 5 5 5 5   Left 5 5 5 5 5 5 5 NT 5 5 5 5       Sensory:  Intact to LT throughout     Reflexes:    Right Left  Biceps  2 2  Triceps  2 2  Brachiorad. 2 2  Patella  Absent (TKR) 2  Achilles absent bilaterally     Cerebellar testing:  no tremor apparent. Has difficulty with finger-nose-finger testing bilaterally. Likely due to optic ataxia. Gait: requires a cane, cautious stooped posture, unsteady without cane    Labs:     Lab Results   Component Value Date/Time    Hemoglobin A1c 6.6 (H) 02/16/2023 03:08 PM    Sodium 142 02/16/2023 03:08 PM    Potassium 4.1 02/16/2023 03:08 PM    Chloride 111 (H) 02/16/2023 03:08 PM    Glucose 101 (H) 02/16/2023 03:08 PM    BUN 15 02/16/2023 03:08 PM    Creatinine 1.11 (H) 02/16/2023 03:08 PM    Calcium 9.4 02/16/2023 03:08 PM    WBC 3.5 (L) 08/31/2022 04:20 AM    HCT 44.7 08/31/2022 04:20 AM    HGB 14.6 08/31/2022 04:20 AM    PLATELET 95 (L) 14/30/5875 04:20 AM    LDL-CHOLESTEROL 91 12/26/2018 12:00 AM       Imaging:    Results from Hospital Encounter encounter on 08/30/22    MRI BRAIN W WO CONT    Narrative  EXAM:  MRI BRAIN W WO CONT    INDICATION: 29-year-old female being evaluated for stroke vs. MS lesion    COMPARISON:  8/30/2022 head CT. Manzano Hidden     CONTRAST: 16 ml of ProHance. TECHNIQUE:  Multiplanar multisequence acquisition without and with contrast of the brain. FINDINGS:  The ventricles are normal in size and position. Small cortical encephalomalacia  in the left occipital and periatrial lobes, unchanged. Nonspecific scattered  periventricular and subcortical white matter T2/FLAIR hyperintensity, mild  consistent with demyelinating plaques of MS. There is no acute infarct,  hemorrhage, extra-axial fluid collection, or mass effect. There is no cerebellar  tonsillar herniation. Expected arterial flow-voids are present. No evidence of  abnormal enhancement. The paranasal sinuses, mastoid air cells, and middle ears are clear. Status post  bilateral lens replacement. No significant osseous or scalp lesions are  identified. Impression  No acute intracranial abnormality. 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 Vasculitis is in  the differential diagnostic consideration. Patchy old cortical infarcts, unchanged. Results from [place] encounter on 12/15/22    CT HEAD WO CONT    Narrative  INDICATION:   head injury, now left sided headache    EXAMINATION:  CT HEAD WO CONTRAST    COMPARISON:  August 30, 2020    TECHNIQUE:  Routine noncontrast axial head CT was performed. Sagittal and  coronal reconstructions were generated. CT dose reduction was achieved through  use of a standardized protocol tailored for this examination and automatic  exposure control for dose modulation. FINDINGS:    Ventricles:  Midline, no hydrocephalus. Intracranial Hemorrhage:  None. Brain Parenchyma/Brainstem:  Small hypodensities in the ventral left thalamus  and anterior limb left internal capsule. Basal Cisterns:  Normal.  Paranasal Sinuses:  Mucosal thickening/fluid right maxillary sinus. Minimal  mucosal thickening left maxillary sinus.   Soft Tissues:  No significant soft tissue swelling. Osseous Structures:  No acute fracture. Additional Comments:  N/A. Impression  No acute traumatic injury. Patient Active Problem List   Diagnosis Code    Sleep apnea G47.30    DM type 2, controlled, with complication ([address] F37.7    Lumbosacral plexopathy G54.1    HTN, goal below 140/90 I10    FH: breast cancer Z80.3    Hyperlipemia E78.5    Hypothyroid E03.9    Ureteral calculus N20.1    MS (multiple sclerosis) ([address] G35    Myasthenia gravis ([address] G70.00    Vitamin D deficiency E55.9    Benign cyst of left breast N60.02    Cervical spinal stenosis M48.02    Depression F32. 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    Arthritis of left hip M16.12    Thrombocytopenia (HCC) D69.6    Anemia D64.9    Osteoarthritis of right knee M17.11    Chronic renal disease, stage III N18.30    CVA (cerebral vascular accident) ([address] I63.9    Dysautonomia ([address] G90.1    Right hemiparesis ([address] G81.91                   Signed By:   Yoana Montez DO  Neurophysiology      March 21, 2023

## 2023-03-22 LAB
T4 SERPL-MCNC: 6.7 UG/DL (ref 4.8–13.9)
TSH SERPL DL<=0.05 MIU/L-ACNC: 3.56 UIU/ML (ref 0.36–3.74)

## 2023-03-29 ENCOUNTER — HOME HEALTH ADMISSION (OUTPATIENT)
Dept: HOME HEALTH SERVICES | Facility: HOME HEALTH | Age: 68
End: 2023-03-29
Payer: MEDICARE

## 2023-03-29 ENCOUNTER — TELEPHONE (OUTPATIENT)
Dept: NEUROLOGY | Age: 68
End: 2023-03-29

## 2023-03-29 DIAGNOSIS — E11.9 TYPE 2 DIABETES MELLITUS WITHOUT COMPLICATION, WITHOUT LONG-TERM CURRENT USE OF INSULIN (HCC): ICD-10-CM

## 2023-03-29 NOTE — TELEPHONE ENCOUNTER
Pt states PT needs her new home health  order sent over to them. States she called [facility] on [address] but does not have any contact info on where referral needs to be sent.  Please call with any questions   [phone]

## 2023-03-29 NOTE — TELEPHONE ENCOUNTER
Faxed referral to [facility]. Received confirmation that fax went through successfully. Put in to be scanned.

## 2023-03-30 ENCOUNTER — OFFICE VISIT (OUTPATIENT)
Dept: NEUROLOGY | Age: 68
End: 2023-03-30
Payer: MEDICARE

## 2023-03-30 VITALS
TEMPERATURE: 97.3 F | DIASTOLIC BLOOD PRESSURE: 70 MMHG | WEIGHT: 182.2 LBS | BODY MASS INDEX: 24.68 KG/M2 | HEIGHT: 72 IN | HEART RATE: 60 BPM | SYSTOLIC BLOOD PRESSURE: 140 MMHG | RESPIRATION RATE: 16 BRPM

## 2023-03-30 DIAGNOSIS — G62.9 PERIPHERAL POLYNEUROPATHY: Primary | ICD-10-CM

## 2023-03-30 DIAGNOSIS — G43.709 CHRONIC MIGRAINE W/O AURA W/O STATUS MIGRAINOSUS, NOT INTRACTABLE: ICD-10-CM

## 2023-03-30 PROCEDURE — 1101F PT FALLS ASSESS-DOCD LE1/YR: CPT | Performed by: NURSE PRACTITIONER

## 2023-03-30 PROCEDURE — 3078F DIAST BP <80 MM HG: CPT | Performed by: NURSE PRACTITIONER

## 2023-03-30 PROCEDURE — 1090F PRES/ABSN URINE INCON ASSESS: CPT | Performed by: NURSE PRACTITIONER

## 2023-03-30 PROCEDURE — G8536 NO DOC ELDER MAL SCRN: HCPCS | Performed by: NURSE PRACTITIONER

## 2023-03-30 PROCEDURE — 96372 THER/PROPH/DIAG INJ SC/IM: CPT | Performed by: NURSE PRACTITIONER

## 2023-03-30 PROCEDURE — G9717 DOC PT DX DEP/BP F/U NT REQ: HCPCS | Performed by: NURSE PRACTITIONER

## 2023-03-30 PROCEDURE — G8420 CALC BMI NORM PARAMETERS: HCPCS | Performed by: NURSE PRACTITIONER

## 2023-03-30 PROCEDURE — 3077F SYST BP >= 140 MM HG: CPT | Performed by: NURSE PRACTITIONER

## 2023-03-30 PROCEDURE — 3017F COLORECTAL CA SCREEN DOC REV: CPT | Performed by: NURSE PRACTITIONER

## 2023-03-30 PROCEDURE — 99213 OFFICE O/P EST LOW 20 MIN: CPT | Performed by: NURSE PRACTITIONER

## 2023-03-30 PROCEDURE — G8427 DOCREV CUR MEDS BY ELIG CLIN: HCPCS | Performed by: NURSE PRACTITIONER

## 2023-03-30 PROCEDURE — 1123F ACP DISCUSS/DSCN MKR DOCD: CPT | Performed by: NURSE PRACTITIONER

## 2023-03-30 PROCEDURE — G8399 PT W/DXA RESULTS DOCUMENT: HCPCS | Performed by: NURSE PRACTITIONER

## 2023-03-30 PROCEDURE — G9899 SCRN MAM PERF RSLTS DOC: HCPCS | Performed by: NURSE PRACTITIONER

## 2023-03-30 RX ORDER — KETOROLAC TROMETHAMINE 30 MG/ML
60 INJECTION, SOLUTION INTRAMUSCULAR; INTRAVENOUS ONCE
Qty: 2 ML | Refills: 0
Start: 2023-03-30 | End: 2023-03-30

## 2023-03-30 RX ORDER — METHYLPREDNISOLONE 4 MG/1
TABLET ORAL
Qty: 1 DOSE PACK | Refills: 0 | Status: SHIPPED | OUTPATIENT
Start: 2023-03-30

## 2023-03-30 RX ORDER — PAROXETINE HYDROCHLORIDE 40 MG/1
40 TABLET, FILM COATED ORAL DAILY
Qty: 90 TABLET | Refills: 1 | Status: SHIPPED | OUTPATIENT
Start: 2023-03-30

## 2023-03-30 RX ORDER — METFORMIN HYDROCHLORIDE 500 MG/1
TABLET, EXTENDED RELEASE ORAL
Qty: 270 TABLET | Refills: 1 | Status: SHIPPED | OUTPATIENT
Start: 2023-03-30

## 2023-03-30 RX ORDER — AMANTADINE HYDROCHLORIDE 100 MG/1
100 CAPSULE, GELATIN COATED ORAL 2 TIMES DAILY
Qty: 60 CAPSULE | Refills: 11 | Status: SHIPPED | OUTPATIENT
Start: 2023-03-30

## 2023-03-30 NOTE — PROGRESS NOTES
Chief Complaint   Patient presents with    Follow-up    Migraine     1. Have you been to the ER, urgent care clinic since your last visit? No Hospitalized since your last visit? No    2. Have you seen or consulted any other health care providers outside of the [address] [place] [address] since your last visit? No   Include any pap smears or colon screening.  No     Patient C/O cramping on all extremities has home health scheduled 3/31/23

## 2023-03-30 NOTE — PROGRESS NOTES
[facility] [place], [place] [facility]  [place]   [address] [place]  Tel: [phone]  Fax: [phone]      Date:  23     Name:  Preet Ramirez  :  1955  MRN:  [id number]     PCP:  Carmella Greenberg MD    Chief Complaint   Patient presents with    Follow-up    Migraine       HISTORY OF PRESENT ILLNESS:  Patient presents today for work in appointment, c/o increased inflammation, worsening since last week. Everything is hurting, from her neuropathy to her MS. She feels like meds aren't working anymore. Pt is in the bed most of the time. Patient was last seen 1 week ago for worsening headaches with Dr. Danielito Shine. Homehealth was ordered, starting tomorrow. Fell into the bed, no injuries. Recap from last visit 3/21/2023: Recommendations:  - Regarding chronic migraine headaches that are daily and constant in nature, the patient has tried and failed many medications and unfortunately is unable to take any triptan class of medications given her history of stroke therefore I will start her on Ubrelvy to take as needed for abortive treatment of chronic migraine headaches. She should continue her Aimovig injections monthly for preventative treatment of migraines. -Regarding her stroke, she should continue on aspirin 81 mg daily. Her LDL was at goal at 79 and therefore most likely would not require high intensity statin however this should be followed by her PCP to ensure that her LDL goal remains less than 70.  - Continue to manage type 2 diabetes per PCP  - Myasthenia gravis stable, continue Mestinon  - Home health for home PT  - Referral to [facility]  specialist, Damian Colon NP, patient to continue Gilenya for now however I did discuss with the patient that based on the MRI findings I do not feel that she needs to stay on Gilenya   -Discontinued IVIG.     REVIEW OF SYSTEMS:     Review of Systems   Musculoskeletal:  Positive for falls (no injuries) and myalgias (spasms in her legs, feet and hands). Neurological:  Positive for sensory change (B feet) and headaches (they are terrible). Negative for dizziness. Psychiatric/Behavioral:  Insomnia: sleep varies. Current Outpatient Medications   Medication Sig    amantadine HCL (SYMMETREL) 100 mg capsule Take 1 Capsule by mouth two (2) times a day. PARoxetine (PAXIL) 40 mg tablet Take 1 Tablet by mouth daily. ketorolac tromethamine (TORADOL) 60 mg/2 mL soln 2 mL by IntraMUSCular route once for 1 dose. methylPREDNISolone (MEDROL DOSEPACK) 4 mg tablet Take as directed    glucose blood VI test strips (True Metrix Glucose Test Strip) strip True Metrix Glucose Test Strip   USE AS DIRECTED TO MONITOR BLOOD GLUCOSE ONCE DAILY    aspirin delayed-release 81 mg tablet TAKE 1 TABLET BY MOUTH EVERY DAY IN THE MORNING    rosuvastatin (CRESTOR) 40 mg tablet TAKE 1 TABLET BY MOUTH EVERY DAY    oxyCODONE-acetaminophen (PERCOCET) 5-325 mg per tablet Take 1 Tablet by mouth every eight (8) hours as needed for Pain (use for pain rating 6-10/10). erenumab-aooe (Aimovig Autoinjector) 140 mg/mL injection ADMINISTER 1 ML(140MG) UNDER THE SKIN EVERY 30 DAYS    pyridostigmine (MESTINON) 60 mg tablet TAKE 2 TABLETS BY MOUTH THREE (3) TIMES DAILY. topiramate (TOPAMAX) 200 mg tablet Take 1 Tablet by mouth two (2) times a day. pregabalin (Lyrica) 200 mg capsule Take 1 Capsule by mouth two (2) times a day. Max Daily Amount: 400 mg.    Gilenya 0.5 mg cap Take 1 Capsule by mouth nightly. metFORMIN ER (GLUCOPHAGE XR) 500 mg tablet TAKE 3 TABLETS BY MOUTH DAILY    glucose blood VI test strips (True Metrix Glucose Test Strip) strip 1 Each by Does Not Apply route daily. Use as directed to monitor blood glucose once daily    glucose blood VI test strips (blood glucose test) strip 100 Each by Does Not Apply route See Admin Instructions.  One Touch Ultra Test Strips=Check blood sugar daily dx E11.8    acetaminophen (TYLENOL 8 HOUR PO) Take 500 mg by mouth two (2) times daily as needed for Pain. For pain scale 1-5/10.    dantrolene (DANTRIUM) 100 mg capsule Take 100 mg by mouth daily as needed (spasms). levothyroxine (SYNTHROID) 137 mcg tablet TAKE 1 TABLET BY MOUTH EVERY DAY BEFORE BREAKFAST    albuterol (PROVENTIL VENTOLIN) 2.5 mg /3 mL (0.083 %) nebulizer solution 3 mL by Nebulization route every six (6) hours as needed for Wheezing. menthol-zinc oxide (CALMOSEPTINE) 0.44-20.6 % oint Apply 1 Each to affected area daily as needed for PRN Reason (Other) (thin layer to buttocks for skin irritation). lidocaine (LIDODERM) 5 % Apply patch to the affected area for 12 hours a day and remove for 12 hours a day. ubrogepant [place] Comment) 50 mg tablet 1 PO att onset of headache and can repeat in 2 hours if needed, max dose 2 per day  Indications: a migraine headache (Patient not taking: Reported on 3/30/2023)     No current facility-administered medications for this visit. Allergies   Allergen Reactions    Bee Sting [Sting, Bee] Anaphylaxis     Also allergic to egg products    Penicillins Anaphylaxis     Patient screened for any delayed non-IgE-mediated reaction to PCN.         Patient notes the following:    No delayed non-IgE-mediated reaction to PCN            Betadine [Povidone-Iodine] Rash    Egg Itching     Past Medical History:   Diagnosis Date    Arrhythmia     Arthritis     Bilateral hip replacements, right knee replacement    Benign cyst of left breast 3/21/2016    Blindness and low vision 2004    legally blind from [address]    Cataract     right eye    Cervical spinal stenosis 12/2/2016    Moderate C6-7    Chronic pain     COVID-19 vaccine series completed     PFIZER 3/19/21, 4/9/21    Depression     Diabetes mellitus type 2, controlled ([address] 9/13/2016    Diet-controlled diabetes mellitus ([address] 1/5/2018    Endometriosis 6/25/2010    FH: breast cancer 6/25/2010    Chart states FH breast/ovary Ca, CAD,DM     History of CVA (cerebrovascular accident) 6/5/2018    HTN, goal below 140/90 2010    CURRENTLY NO MEDICATIONS (21)    Hypercholesterolemia     Hypothyroid 2010    Incontinence     Lumbosacral plexopathy 2010    Migraine     H/O MIGRAINE    MS (multiple sclerosis) ([facility]     Myasthenia gravis ([facility]     Sleep apnea 2010    RESOLVED 2019    Stroke [facility] 2018    RIGHT SIDE WEAKNESS    Ureteral calculus 2010    Vitamin D deficiency 3/17/2016     Past Surgical History:   Procedure Laterality Date    HX APPENDECTOMY  2022    HX CARPAL TUNNEL RELEASE Left     HX CATARACT REMOVAL Bilateral     HX  SECTION  1986    HX CYST INCISION AND DRAINAGE      HX GI      COLONOSCOPY    HX GYN  2001    ovarian cyst    HX HEENT      removal of thyroglossal duct cyst    HX HIP REPLACEMENT Right 2017    anterior approach    HX KNEE ARTHROSCOPY Right 1998    HX ORTHOPAEDIC  1997    right thumb tendon repair x 2    HX ORTHOPAEDIC      RIGHT KNEE REPLACEMENT    HX OTHER SURGICAL      Janee Cath x2    HX SMALL BOWEL RESECTION      HX THYROIDECTOMY  1974    HX VASCULAR ACCESS  2006    PORT -A- CATH X 2    IR REMOVE TUNL CVAD W/O PORT / PUMP  2022    PA ARTHRP ACETBLR/PROX FEM PROSTC AGRFT/ALGRFT Bilateral 2019    BOTH HIPS REPLACED    PA UNLISTED PROCEDURE ABDOMEN PERITONEUM & OMENTUM  2001    bowel resection    PA UNLISTED PROCEDURE BREAST  2006    breast cyst-both breasts at different times     Social History     Socioeconomic History    Marital status:      Spouse name: Not on file    Number of children: Not on file    Years of education: Not on file    Highest education level: Not on file   Occupational History    Not on file   Tobacco Use    Smoking status: Every Day     Packs/day: 0.50     Years: 49.00     Pack years: 24.50     Types: Cigarettes    Smokeless tobacco: Never   Vaping Use    Vaping Use: Never used   Substance and Sexual Activity    Alcohol use: No     Comment: TWICE A YEAR PER PT    Drug use:  No Sexual activity: Not Currently   Other Topics Concern    Not on file   Social History Narrative    Not on file     Social Determinants of Health     Financial Resource Strain: Low Risk     Difficulty of Paying Living Expenses: Not hard at all   Food Insecurity: No Food Insecurity    Worried About Running Out of Food in the Last Year: Never true    Ran Out of Food in the Last Year: Never true   Transportation Needs: Not on file   Physical Activity: Not on file   Stress: Not on file   Social Connections: Not on file   Intimate Partner Violence: Not on file   Housing Stability: Not on file     Family History   Problem Relation Age of Onset    OSTEOARTHRITIS Mother     Diabetes Mother     Elevated Lipids Mother     Headache Mother     Heart Disease Mother     Hypertension Mother     Stroke Mother     Neuropathy Mother     Colon Cancer Mother     Diabetes Father     Elevated Lipids Father     Heart Disease Father     Hypertension Father     Diabetes Sister     Elevated Lipids Sister     Headache Sister     Hypertension Sister     Migraines Sister     Cancer Sister 62        breast ca W/METS TO BRAIN    Breast Cancer Sister 62    Diabetes Brother     Elevated Lipids Brother     Hypertension Brother     Asthma Son     Thyroid Disease Son         GRAVES    Sleep Apnea Son     Breast Cancer Maternal Grandmother 54    Diabetes Sister     Neuropathy Sister     Anesth Problems Neg Hx      MRI Results (most recent):  Results from [place] encounter on 08/30/22    MRI BRAIN W WO CONT    Narrative  EXAM:  MRI BRAIN W WO CONT    INDICATION: 62-year-old female being evaluated for stroke vs. MS lesion    COMPARISON:  8/30/2022 head CT. [place] CONTRAST: 16 ml of ProHance. TECHNIQUE:  Multiplanar multisequence acquisition without and with contrast of the brain. FINDINGS:  The ventricles are normal in size and position. Small cortical encephalomalacia  in the left occipital and periatrial lobes, unchanged.  Nonspecific scattered  periventricular and subcortical white matter T2/FLAIR hyperintensity, mild  consistent with demyelinating plaques of MS. There is no acute infarct,  hemorrhage, extra-axial fluid collection, or mass effect. There is no cerebellar  tonsillar herniation. Expected arterial flow-voids are present. No evidence of  abnormal enhancement. The paranasal sinuses, mastoid air cells, and middle ears are clear. Status post  bilateral lens replacement. No significant osseous or scalp lesions are  identified. Impression  No acute intracranial abnormality. 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 Vasculitis is in  the differential diagnostic consideration. Patchy old cortical infarcts, unchanged. PHYSICAL EXAMINATION:    Visit Vitals  BP (!) 140/70 (BP 1 Location: Right upper arm, BP Patient Position: Sitting, BP Cuff Size: Adult)   Pulse 60   Temp 97.3 °F (36.3 °C) (Temporal)   Resp 16   Ht 6' (1.829 m)   Wt 182 lb 3.2 oz (82.6 kg)   BMI 24.71 kg/m²       General:  Well defined, nourished, and well groomed individual in no acute distress. Neck: Supple, nontender, normal range of motion. Musculoskeletal:  Extremities revealed no edema and had full range of motion of joints. Psych:  Good mood and bright affect. NEUROLOGICAL EXAMINATION:     Mental Status:   Alert and oriented to person, place, and time with recent and remote memory intact. Attention span and concentration are normal. Clear speech. Fund of knowledge preserved. Cranial Nerves:   PERRLA. Visual fields were full  EOM: no evidence of nystagmus  Facial sensation:  normal and symmetric  Facial motor: normal and symmetric, no facial droop noted. Hearing intact  SCM strength intact  Tongue: midline without fasciculations    Motor Examination: Normal tone. 4+ /5 muscle strength in bilateral upper and lower extremities.  No cogwheel rigidity. No muscle wasting, no twitching or fasciculation noted. Sensory exam:  Normal throughout to light touch in bilateral upper extremities    Coordination:   Finger to nose and rapid arm movement testing was normal.  No resting or intention tremor. Negative Romberg, negative pronator drift. Gait and Station: Not fully assessed. Normal arm swing. Reflexes:  DTRs 2+ in bilateral biceps, brachioradialis, patella and ankle. No clonus noted. ASSESSMENT AND PLAN      ICD-10-CM ICD-9-CM    1. Peripheral polyneuropathy  G62.9 356.9 amantadine HCL (SYMMETREL) 100 mg capsule      PARoxetine (PAXIL) 40 mg tablet      methylPREDNISolone (MEDROL DOSEPACK) 4 mg tablet      2. Chronic migraine w/o aura w/o status migrainosus, not intractable  G43.709 346.70 ketorolac tromethamine (TORADOL) 60 mg/2 mL soln      KETOROLAC TROMETHAMINE INJ      OR THERAPEUTIC PROPHYLACTIC/DX INJECTION SUBQ/IM        1. Peripheral polyneuropathy: Consider updated EMG/nerve conduction study if worsening pain. Patient is to continue medications as prescribed. I did provide the patient with a Medrol Dosepak to help lessen some of the pain she is experiencing. Patient is to begin home health tomorrow which will be beneficial as patient notes she has been rather inactive lately. -     amantadine HCL (SYMMETREL) 100 mg capsule; Take 1 Capsule by mouth two (2) times a day., Normal, Disp-60 Capsule, R-11  -     PARoxetine (PAXIL) 40 mg tablet; Take 1 Tablet by mouth daily. , Normal, Disp-90 Tablet, R-1  -     methylPREDNISolone (MEDROL DOSEPACK) 4 mg tablet; Take as directed, Normal, Disp-1 Dose Pack, R-0  2. Chronic migraine w/o aura w/o status migrainosus, not intractable: Patient did receive 60 mg Toradol injection today due to the acuity of her migraine. Patient was last seen 1 week ago at which time a [facility] prescription was written, it is unclear whether she is started this or not. Healthy lifestyle encouraged.   - ketorolac tromethamine (TORADOL) 60 mg/2 mL soln; 2 mL by IntraMUSCular route once for 1 dose., No Print, Disp-2 mL, R-0  -     KETOROLAC TROMETHAMINE INJ  -     ND THERAPEUTIC PROPHYLACTIC/DX INJECTION SUBQ/IM    Patient and/or family verbalized understand of all instructions and all questions/concerns were addressed. Safety/side effects of medications discussed. Patient remains a complex patient secondary to polypharmacy, significant comorbid conditions, and use of high-risk medications which complicate the decision making process related to patient's neurologic diagnosis. We will see the patient back in 7   months, sooner if needed.       Star Rowan, ALLY-BC

## 2023-04-01 ENCOUNTER — HOME CARE VISIT (OUTPATIENT)
Dept: SCHEDULING | Facility: HOME HEALTH | Age: 68
End: 2023-04-01
Payer: MEDICARE

## 2023-04-01 VITALS
HEART RATE: 61 BPM | BODY MASS INDEX: 24.52 KG/M2 | SYSTOLIC BLOOD PRESSURE: 148 MMHG | TEMPERATURE: 97.9 F | WEIGHT: 181 LBS | HEIGHT: 72 IN | RESPIRATION RATE: 17 BRPM | DIASTOLIC BLOOD PRESSURE: 60 MMHG

## 2023-04-01 PROCEDURE — G0151 HHCP-SERV OF PT,EA 15 MIN: HCPCS

## 2023-04-01 PROCEDURE — 400018 HH-NO PAY CLAIM PROCEDURE

## 2023-04-02 NOTE — HOME HEALTH
Skilled reason for admission/summary of clinical condition: pt had a stroke several years ago with resultant weakness R UE and R LE but not hemiparesis. The pt also has MS and Myasthenia Gravis, HTN and DM. She presents with weakness, decreased balance and decreased strength  Diagnosis: late effects stroke  Subjective:\" I feel ok but I have noticed I am getting weaker\"  Caregiver: son  Caregiver assists with: Medications, Meals and Transportation Caregiver unable to assist with: Bathing and ADL. Caregiver is available On weekends and In the evenings Caregiver is not present at this visit and did not participate with clinician. Medications reconciled and all medications are available in the home this visit. The following education was provided regarding medications: medication interactions and look alike medications: no issues with look a like medications. Patient/CG able to demonstrate knowledge through teach back with 30 percent accuracy. Medications are effective at this time. The PT left message for MD on call notified of any discrepancies/medication interactions about the following medication severe interactions: methylprednissolone and pyridostigmine, ketorlac and ASA, and methylprednisolone and gilenya. PT received no call back     A list of reconciled medications has been given to the patient/caregiver and a copy has been uploaded to media. Home health supplies by type and quantity ordered/delivered this visit includeNA  : Patient instructed on plan of care and are agreeable to plan of care at this time. Clinician reviewed orientation to home health booklet with patient/caregiver including agency phone number, agency complaint process, state hotline number, as well as joint commission's quality hotline number. Consent forms signed.      Patient at risk for falls Yes:   Recommended requesting PT/OT orders due to fall risk N/A: PT already ordered  Patient response to recommended requesting of PT/OT orders: NA    Discharge planning discussed with patient and caregiver. Discharge planning as follows: Is no longer homebound, Per physician order, Will discharge when the patient has reached their maximum functional potential and maximum safety in their home and When goals are met Patient did verbalize agreement with discharge planning. Clinical Assessment (What this means for the patient overall and need for ongoing skilled care): The Pt has had repeated episodes of therapy in the past but has noted recently that she is getting weaker and falling a lot more. The PT encouraged the pt to consistently use her AD as she also tries to walk around the house without AD using furniture and walls. The Pt's environement is very cluttered and increases her risk for falls. The PT noted some issues on the medication interaction list and tried to call the neruology team but got no call back. PT will attempt to call again on monday and to get order for OT as well to work on UE strength and hand dexterity. The PT is weak and low endurance and would benefit from PT to address gait, transfers, strengthening, and fall prevention.  The pt is a 5 on [address] because she was too unabalnce to walk outside or on the steps  PT performed the following during today's visit: medication reconciliation, gait training, bed mobility training, fall risk education and prevention, transfer training, safe use of AD, deep breathing techniques for SOB, home safety evaluation , review PMH, review s/s the warrent a call to MD vs911, education on high risk medications as below:  High alert medication teaching on oxycodone Opioid medication education Purpose, dose, and frequency, Proper storage Store in the original packaging, Keep in a locked cabinet, lockbox, or other location where they can't easily be accessed, Store medicines out of reach from children, teens, and pets and Do not store in places where medications are usually kept, Proper disposal Return unused medication to the pharmacy, if possible., Locate a medicine take-back option, mail back program or local collection receptable for drug disposal, if possible, May use a medication disposal pouch or container, if provided by the pharmacy. Follow the instructions to deactivate the medication, and discard in the trash.   and May mix the medication in dirt, cat litter, used coffee grounds or dish detergent and place the mixture in a container or plastic bag and seal. Discard the container in the trash. , Staying hydrated, Eating high fiber diet, Potential complications such as seizures, inability or difficulty rousing patient, slow or shallow breathing, slurred speech, blue lips or finger, confusion, inability to urinate and Side effects such as dizziness, sleepiness, constipation, nausea, vomiting, itchiness, and dry mouth  High alert medication teaching on ASA, antiplatelet therapy education;purpose, dose, and frequency, food/drug interactions, risks of co-administration with other medications such as ASA, NSAID, and herbals, bleeding prevention strategies such as the use of a soft toothbrush, gentle flossing, use of electric razor, on the potential for increased bleeding from falls and lacerations, to notify medical providers of antiplatelet therapy, consider carrying an ID card, signs and symptoms of abnormal bleeding such as unexplained bruising, dizziness/lightheadedness, red or tarry looking stool, blood in urine, blood in vomit and to call home care agency and/or physician for any problems or concerns  High alert medication teaching on metformin, Hypoglycemic medication education purpose, dose, and frequency, appearance and preparation of insulin, side effects such as upset stomach, diarrhea, metallic taste in mouth, headaches, feeling tired or weak, potential complications such as loss or change in vision, lightheadedness/dizziness, change in mental status, confusion, jittery, low blood sugar, nausea, vomiting, lethargy, and dark urine and to call home care and/or physician for any problems or concerns      Written Teaching Material Utilized: [facility]    Specific plan for next visit: Instruct caregiver/patient in HEP, Educate on medication interactions and to follow up with MD, Educate in s/s of worsening condition and when to call [facility], MD or 911 and Instruct in safety issues regarding Proper use of assistive device and Tripping hazards    Plan of care and admission to home health status called to attending physician. The following practitioner has agreed to sign the ongoing Hunter Osorio MD, and was notified of the following: visit frequency of 1d1,2w4 and requesting additional services as follows: OT    Interdisciplinary communication with: NA    PCP: Tanja Styles MD  Next scheduled doctor appointment: TBD  Patient/Caregiver instructed to keep follow up appointment because lack of follow through with physician appointments could result in discontinuation of home care services for non-compliance. Patient/Caregiver verbalize knowledge of above through teach back with 30percent accuracy. Emergency Preparedness: Patient/Caregiver instructed in the following:  Have one gallon of water per person for at least 3 days on hand. Have non-perishable food for at least 3 days that do not need to be cooked. Have flashlights and batteries. Charge your cell phones and any back up lithium batteries for your cell phones. Have a phone in your home that is hard wired and does not require power. Have medication for a week in your home. Make sure you have a caregiver in the home to provide care in case your home health nurse cannot get to your house.   Make sure you have all of your paperwork i.e. written emergency preparedness plan, Identification, insurance cards, DME phone number, physician and pharmacy phone number, agency phone number, and your medications in one place for easy access and in a zip lock bag to protect them. Take your Admission Handbook, written emergency preparedness plan, written medication list, necessary supplies and folder if you relocate in the event of an emergency, if possible. Call agency if you relocate so we can contact you. Patient/Caregiver verbalize knowledge of above through teach back with 30 percent accuracy.

## 2023-04-04 ENCOUNTER — HOME CARE VISIT (OUTPATIENT)
Dept: SCHEDULING | Facility: HOME HEALTH | Age: 68
End: 2023-04-04
Payer: MEDICARE

## 2023-04-04 PROCEDURE — G0151 HHCP-SERV OF PT,EA 15 MIN: HCPCS

## 2023-04-06 ENCOUNTER — HOME CARE VISIT (OUTPATIENT)
Dept: SCHEDULING | Facility: HOME HEALTH | Age: 68
End: 2023-04-06
Payer: MEDICARE

## 2023-04-18 ENCOUNTER — HOME CARE VISIT (OUTPATIENT)
Dept: SCHEDULING | Facility: HOME HEALTH | Age: 68
End: 2023-04-18
Payer: MEDICARE

## 2023-04-18 PROCEDURE — G0151 HHCP-SERV OF PT,EA 15 MIN: HCPCS

## 2023-04-19 ENCOUNTER — HOME CARE VISIT (OUTPATIENT)
Dept: SCHEDULING | Facility: HOME HEALTH | Age: 68
End: 2023-04-19
Payer: MEDICARE

## 2023-04-19 VITALS
OXYGEN SATURATION: 97 % | SYSTOLIC BLOOD PRESSURE: 127 MMHG | TEMPERATURE: 98.2 F | HEART RATE: 76 BPM | DIASTOLIC BLOOD PRESSURE: 68 MMHG

## 2023-04-19 PROCEDURE — G0152 HHCP-SERV OF OT,EA 15 MIN: HCPCS

## 2023-04-19 NOTE — HOME HEALTH
Skilled reason for admission/summary of clinical condition: Pt is 74year old female referred for therapy from neurologist. Occupational Therapy needed for ADL training, IADL training, UE strengthening, functional mobility. Diagnosis: other sequale of cerebral infarction  Subjective: I need to get help  Caregiver: Son. Caregiver assists with: Transportation and Housekeeping Caregiver unable to assist with: Medications, Meals and ADL. Caregiver is available On weekends and In the evenings Caregiver is not present at this visit and did not participate with clinician. Medications reconciled and all medications are available in the home this visit. The following education was provided regarding medications: medication interactions and look alike medications. Patient/CG able to demonstrate knowledge through teach back with 80 percent accuracy. Medications are somewhat effective at this time. Home health supplies by type and quantity ordered/delivered this visit include: NA  Patient/caregiver instructed on plan of care and are agreeable to plan of care at this time. Patient at risk for falls Yes:   Recommended requesting PT/OT orders due to fall risk YES:   Patient response to recommended requesting of PT/OT orders: Agreeable to [address]    Discharge planning discussed with patient and caregiver. Discharge planning as follows: Will discharge when the patient has reached their maximum functional potential and maximum safety in their home and When goals are met Patient/caregiver did verbalize agreement with discharge planning. Clinical Assessment (What this means for the patient overall and need for ongoing skilled care): Therapist assessed pt for ADL performance indicating pt requires min assist for bathing, toileting lB dressing, CGA for UB dressing and grooming tasks. Pt exhibits Barthel score of 55/100 indicating moderate dependence.   Functional reach score of 3 inches due to impaired balance and instability. Pt demonstrates 3+/5 MMT in [address] [address]. Pt with significant pain but willing to participate in therapy to improve function. Written Teaching Material Utilized: N/A    Specific plan for next visit: ADL training      Interdisciplinary communication with: Discussed POC with PT    PCP: Analisa Warner  Next scheduled doctor appointment: 4/20  Patient/Caregiver instructed to keep follow up appointment because lack of follow through with physician appointments could result in discontinuation of home care services for non-compliance. Patient/Caregiver verbalize knowledge of above through teach back with 100 percent accuracy. Emergency Preparedness: Patient/Caregiver instructed in the following:  Have one gallon of water per person for at least 3 days on hand. Have non-perishable food for at least 3 days that do not need to be cooked. Have flashlights and batteries. Charge your cell phones and any back up lithium batteries for your cell phones. Have 3+ days of back up oxygen in your home. Have a phone in your home that is hard wired and does not require power. Have medication for a week in your home. Make sure you have a caregiver in the home to provide care in case your home health nurse cannot get to your house. Make sure you have all of your paperwork i.e. written emergency preparedness plan, Identification, insurance cards, DME phone number, physician and pharmacy phone number, agency phone number, and your medications in one place for easy access and in a zip lock bag to protect them. Take your Admission Handbook, written emergency preparedness plan, written medication list, necessary supplies and folder if you relocate in the event of an emergency, if possible. Call agency if you relocate so we can contact you. Patient/Caregiver verbalize knowledge of above through teach back with 100 percent accuracy.

## 2023-04-20 ENCOUNTER — OFFICE VISIT (OUTPATIENT)
Dept: NEUROLOGY | Age: 68
End: 2023-04-20
Payer: MEDICARE

## 2023-04-20 VITALS
TEMPERATURE: 97.8 F | DIASTOLIC BLOOD PRESSURE: 75 MMHG | OXYGEN SATURATION: 97 % | SYSTOLIC BLOOD PRESSURE: 128 MMHG | RESPIRATION RATE: 16 BRPM | HEART RATE: 78 BPM

## 2023-04-20 VITALS
HEART RATE: 75 BPM | HEIGHT: 72 IN | SYSTOLIC BLOOD PRESSURE: 114 MMHG | WEIGHT: 177 LBS | RESPIRATION RATE: 18 BRPM | BODY MASS INDEX: 23.98 KG/M2 | DIASTOLIC BLOOD PRESSURE: 64 MMHG

## 2023-04-20 DIAGNOSIS — G35 MS (MULTIPLE SCLEROSIS) (HCC): ICD-10-CM

## 2023-04-20 DIAGNOSIS — G70.00 MYASTHENIA GRAVIS (HCC): ICD-10-CM

## 2023-04-20 DIAGNOSIS — R53.82 CHRONIC FATIGUE: ICD-10-CM

## 2023-04-20 DIAGNOSIS — M79.10 MYALGIA: ICD-10-CM

## 2023-04-20 DIAGNOSIS — E55.9 VITAMIN D DEFICIENCY: ICD-10-CM

## 2023-04-20 DIAGNOSIS — I67.9 SMALL VESSEL DISEASE, CEREBROVASCULAR: ICD-10-CM

## 2023-04-20 DIAGNOSIS — G35 MS (MULTIPLE SCLEROSIS) (HCC): Primary | ICD-10-CM

## 2023-04-20 DIAGNOSIS — G62.9 PERIPHERAL POLYNEUROPATHY: ICD-10-CM

## 2023-04-20 DIAGNOSIS — G43.709 CHRONIC MIGRAINE W/O AURA W/O STATUS MIGRAINOSUS, NOT INTRACTABLE: ICD-10-CM

## 2023-04-20 DIAGNOSIS — M25.50 POLYARTHRALGIA: ICD-10-CM

## 2023-04-20 DIAGNOSIS — G43.019 INTRACTABLE MIGRAINE WITHOUT AURA AND WITHOUT STATUS MIGRAINOSUS: ICD-10-CM

## 2023-04-20 PROCEDURE — G8420 CALC BMI NORM PARAMETERS: HCPCS | Performed by: PSYCHIATRY & NEUROLOGY

## 2023-04-20 PROCEDURE — G8399 PT W/DXA RESULTS DOCUMENT: HCPCS | Performed by: PSYCHIATRY & NEUROLOGY

## 2023-04-20 PROCEDURE — 3017F COLORECTAL CA SCREEN DOC REV: CPT | Performed by: PSYCHIATRY & NEUROLOGY

## 2023-04-20 PROCEDURE — 3074F SYST BP LT 130 MM HG: CPT | Performed by: PSYCHIATRY & NEUROLOGY

## 2023-04-20 PROCEDURE — 1101F PT FALLS ASSESS-DOCD LE1/YR: CPT | Performed by: PSYCHIATRY & NEUROLOGY

## 2023-04-20 PROCEDURE — G9717 DOC PT DX DEP/BP F/U NT REQ: HCPCS | Performed by: PSYCHIATRY & NEUROLOGY

## 2023-04-20 PROCEDURE — 1090F PRES/ABSN URINE INCON ASSESS: CPT | Performed by: PSYCHIATRY & NEUROLOGY

## 2023-04-20 PROCEDURE — G8536 NO DOC ELDER MAL SCRN: HCPCS | Performed by: PSYCHIATRY & NEUROLOGY

## 2023-04-20 PROCEDURE — 3078F DIAST BP <80 MM HG: CPT | Performed by: PSYCHIATRY & NEUROLOGY

## 2023-04-20 PROCEDURE — 1123F ACP DISCUSS/DSCN MKR DOCD: CPT | Performed by: PSYCHIATRY & NEUROLOGY

## 2023-04-20 PROCEDURE — 99215 OFFICE O/P EST HI 40 MIN: CPT | Performed by: PSYCHIATRY & NEUROLOGY

## 2023-04-20 PROCEDURE — G8427 DOCREV CUR MEDS BY ELIG CLIN: HCPCS | Performed by: PSYCHIATRY & NEUROLOGY

## 2023-04-20 PROCEDURE — G9899 SCRN MAM PERF RSLTS DOC: HCPCS | Performed by: PSYCHIATRY & NEUROLOGY

## 2023-04-20 RX ORDER — DULOXETIN HYDROCHLORIDE 30 MG/1
30 CAPSULE, DELAYED RELEASE ORAL DAILY
Qty: 90 CAPSULE | Refills: 1 | Status: SHIPPED | OUTPATIENT
Start: 2023-04-20

## 2023-04-20 RX ORDER — RIMEGEPANT SULFATE 75 MG/75MG
75 TABLET, ORALLY DISINTEGRATING ORAL
Qty: 16 TABLET | Refills: 5 | Status: SHIPPED | OUTPATIENT
Start: 2023-04-20

## 2023-04-20 NOTE — ASSESSMENT & PLAN NOTE
Supported by EMG discussed these results again at today's office visit and that this is likely the cause of a lot of her neuropathic pain in her legs  For now she is to continue on Lyrica    We talked about behavioral interventions such as pacing herself on good days and resting on bad days

## 2023-04-20 NOTE — PATIENT INSTRUCTIONS
As per discussion we have a lot going on    A fair amount of blood work has been ordered to include checking for myasthenic antibodies depending on those results when I see you back we may talk about possibly considering weaning off the Mestinon but for now stay on it and do not change that    I have discontinued the Paxil and starting you on duloxetine 30 mg/day we can increase that if needed  Continue on your Lyrica 200 mg twice a day    Continue on Aimovig monthly  You need to stop all the over-the-counter medications you are using for headaches and migraines in the long run it only makes things worse  I have started you on NurTec will need to get a prior authorization once that is completed you can take that as rescue medicine for your headaches    I want you to check with primary care and make sure they check your thyroid panel to make sure you are on the correct dose of thyroid medicine they will probably automatically do that but I just want to make sure he gets completed    You need to have realistic expectations about what I am going to be able to do for you you have been to to pain management clinics if they can fix your pain I certainly am not going to be able to    On good days I want you to get out and do stuff but do not over function because that we will set you back for 2 3 and 4 days where you are sitting in bed  On good days do a little extra activity and then rest and relax and perhaps you will get more good days    You do have neuropathy there is no question about that that is causing the numbness tingling and pain in your legs  At this time your EMGs do not support CIDP it most likely is gone into remission therefore we do not need to be on IVIG anymore.   We will recheck your EMGs in about a year to make sure things have stabilized    Cervical spine MRI scan has also been ordered to check disease process related to [address]  For now continue on Gilenya    Continue to work to keep all your other medical conditions under good control  You do have what we call small vessel cerebrovascular disease this is strokes of the little blood vessels and we talked about that today many things because that and the best thing you can do for yourself is to stay on an 81 mg aspirin daily and to keep all your other medical conditions under good control      Office Policies      Appointments  Please make sure that you arrive for your next appointment at least 15 minutes prior to your appointment time. If for some reason you are going to be late please notify the office to determine if you need to be rescheduled or we can adjust your appointment time      Phone calls/patient messages:  Please allow up to 24 hours for someone in the office to contact you about your call or message. Be mindful your provider may be out of the office or your message may require further review. We encourage you to use [employer] for your messages as this is a faster, more efficient way to communicate with our office    Medication Refills:  Prescription medications require up to 48 business hours to process. We encourage you to use [employer] for your refills. For controlled medications: Please allow up to 72 business hours to process. Certain medications may require you to  a written prescription at our office. NO narcotic/controlled medications will be prescribed after 4pm Monday through Friday or on weekends    Form/Paperwork Completion:  We ask that you allow 7-14 business days. You may also download your forms to [employer] to have your doctor print off.

## 2023-04-20 NOTE — PROGRESS NOTES
[facility] 85  In Office FOLLOW-UP VISIT         Sirena Galvin is a 74 y.o. female who presents today for the following:  Chief Complaint   Patient presents with    Multiple Sclerosis     Seems like none of her medications are working- in the bed most of the time, headaches for about 2 months         ASSESSMENT AND PLAN  Patient is known to this practice. This is my first time seeing the patient. Chart and history reviewed in detail at today's office visit. 1. MS (multiple sclerosis) ([facility]  Assessment & Plan:  Most recent MRI scan of the brain is not supportive of MS at this time we will check cervical spine MRI scan to see if there is any additional supporting evidence    Review of prior MRI scans we do not have consistency regarding reports supporting definitive MS  Unable to find any CSF studies    For now continue on Gilenya      Orders:  -     CBC WITH AUTOMATED DIFF; Future  -     METABOLIC PANEL, COMPREHENSIVE; Future  -     MRI CERV SPINE W WO CONT; Future  2. Myasthenia gravis ([facility]  Assessment & Plan:  Working diagnosis  Unable to locate any lab work related to myasthenic antibody testing or EMGs that would support diagnosis    Unclear where this diagnosis came from and patient is unable to help me with these details  I have reviewed notes back as far as I can through medical records in epic    We will recheck antibody levels if normal we will look to slowly wean Mestinon and perhaps repeat EMG with single-fiber evaluation for MG  Orders:  -     ACETYLCHOLINE BINDING AB; Future  -     ACETYLCHOLINE BLOCKING AB; Future  -     CBC WITH AUTOMATED DIFF; Future  -     METABOLIC PANEL, COMPREHENSIVE; Future  3.  Peripheral polyneuropathy  Assessment & Plan:   Supported by EMG discussed these results again at today's office visit and that this is likely the cause of a lot of her neuropathic pain in her legs  For now she is to continue on Lyrica    We talked about behavioral interventions such as pacing herself on good days and resting on bad days  Orders:  -     DULoxetine (CYMBALTA) 30 mg capsule; Take 1 Capsule by mouth daily. Indications: diabetic complication causing injury to some body nerves, major depressive disorder, Normal, Disp-90 Capsule, R-1  4. Chronic migraine w/o aura w/o status migrainosus, not intractable  -     DULoxetine (CYMBALTA) 30 mg capsule; Take 1 Capsule by mouth daily. Indications: diabetic complication causing injury to some body nerves, major depressive disorder, Normal, Disp-90 Capsule, R-1  -     rimegepant (Nurtec ODT) 75 mg disintegrating tablet; Take 1 Tablet by mouth daily as needed for Migraine (Take at onset of severe migraine). Indications: a migraine headache, Normal, Disp-16 Tablet, R-5  5. Intractable migraine without aura and without status migrainosus  Assessment & Plan:   Patient has multiple complicating factors for now she is to stay on the Aimovig monthly along with the Topamax  She is to stop OTCs  NurTec rescue has been ordered we will need to get a prior authorization  Orders:  -     DULoxetine (CYMBALTA) 30 mg capsule; Take 1 Capsule by mouth daily. Indications: diabetic complication causing injury to some body nerves, major depressive disorder, Normal, Disp-90 Capsule, R-1  6. Vitamin D deficiency  -     VITAMIN D, 25 HYDROXY; Future  7. Myalgia  -     LYME AB, IGG & IGM BY WB; Future  -     SED RATE (ESR); Future  -     SHAHIDA COMPREHENSIVE PLUS PANEL; Future  -     LUPUS ANTICOAGULANT COMP PANEL; Future  -     RA + CCP ABS  8. Polyarthralgia  -     LYME AB, IGG & IGM BY WB; Future  -     SED RATE (ESR); Future  -     SHAHIDA COMPREHENSIVE PLUS PANEL; Future  -     LUPUS ANTICOAGULANT COMP PANEL; Future  -     RA + CCP ABS  9. Chronic fatigue  -     VITAMIN B12; Future  10.  Small vessel disease, cerebrovascular  Assessment & Plan:   Continue on baby aspirin a day patient has been encouraged to keep all of her comorbid conditions under good control    Education was completed today regarding causes related to small vessel cerebrovascular disease and ways to help prevent further accumulation      Patient and/or family was given time to ask questions and voice concerns. I believe all questions concerns were adequately addressed at this  office visit. Patient and/or family also verbalized agreement and understanding of the above-stated plan    Complex neurologic decision making secondary any or all of the following to include unclear etiology, and /or polypharmacy, and/or significant comorbid conditions, and/or use of high-risk medications which complicate the decision making process related to patient's neurologic diagnosis          ICD-10-CM ICD-9-CM    1. MS (multiple sclerosis) ([facility]  G35 340 CBC WITH AUTOMATED DIFF      METABOLIC PANEL, COMPREHENSIVE      MRI CERV SPINE W WO CONT      2. Myasthenia gravis ([facility]  G70.00 358.00 ACETYLCHOLINE BINDING AB      ACETYLCHOLINE BLOCKING AB      CBC WITH AUTOMATED DIFF      METABOLIC PANEL, COMPREHENSIVE      3. Peripheral polyneuropathy  G62.9 356.9 DULoxetine (CYMBALTA) 30 mg capsule      4. Chronic migraine w/o aura w/o status migrainosus, not intractable  G43.709 346.70 DULoxetine (CYMBALTA) 30 mg capsule      rimegepant (Nurtec ODT) 75 mg disintegrating tablet      5. Intractable migraine without aura and without status migrainosus  G43.019 346.11 DULoxetine (CYMBALTA) 30 mg capsule      6. Vitamin D deficiency  E55.9 268.9 VITAMIN D, 25 HYDROXY      7. Myalgia  M79.10 729.1 LYME AB, IGG & IGM BY WB      SED RATE (ESR)      SHAHIDA COMPREHENSIVE PLUS PANEL      LUPUS ANTICOAGULANT COMP PANEL      RA + CCP ABS      8. Polyarthralgia  M25.50 719.49 LYME AB, IGG & IGM BY WB      SED RATE (ESR)      SHAHIDA COMPREHENSIVE PLUS PANEL      LUPUS ANTICOAGULANT COMP PANEL      RA + CCP ABS      9. Chronic fatigue  R53.82 780.79 VITAMIN B12      10.  Small vessel disease, cerebrovascular  I67.9 437.9             I attest that 40 minutes was spent on today's visit reviewing medical records and diagnostic testing deemed pertinent to this patient's care, along with direct time spent at patient's visit including the history, physical assessment and plan, discussing diagnosis and management along with documentation.       HPI  Historical Data  Patient is known to the practice and was previously seen by multiple providers in the practice but was predominantly followed by Dr Tommie Yusuf and more recently by Dr. Giorgio Bucio and SWATI Webber    Neurologic diagnosis  Chronic headaches and migraines   Preventative medications    Aimovig    Topamax    Cymbalta    Emgality    Trazodone     Rescue medications    Tylenol    Fioricet    Maxalt    Imitrex    Tramadol    Multiple sclerosis   Prior DMT    Tecfidera    Copaxone     Current DMT    Gilenya    Myasthenia gravis   Has been on Mestinon 180 mg   Currently on Mestinon 60 mg 3 times a day    CIDP   Not supported by most recent EMGs completed January 2023   Patient not being treated at this time for CIDP    List of treatment for chronic pain of various etiologies:   Durogesic 75 mics   Lyrica   Percocet   Various NSAIDs   Flexeril   Robaxin     She is reports being seen by 2 different pain management units especially for her headaches and migraines as well as general chronic pain but that has been unhelpful        Other significant comorbid conditions/concerns  DM A1c 6.2      Interim Data:   Patient is here with his son Tu Evans with history is obtained exclusively from the patient    Chronic pain  Patient reports having chronic headaches 24/7 has been to several pain management units without good results  She generally has pain from head to toe including 24-hour 7-day week HAs myalgias polyarthralgias numbness tingling and burning in her feet  Reports previously seen by chronic pain management units x2 without significant benefit      Multiple sclerosis   Presently on Gilenya     Associated symptoms    Neuropathic pain    Fatigue     Review of medical records show use of Acthar for exacerbations but nothing recently     Review of medical records show that she has been on Ritalin presumably for fatigue she is also been on Symmetrel          Myasthenia gravis   Presently on Mestinon 60 mg 3 times a day      Other  At this time patient does not carry a diagnosis of CIDP most recent EMGs were not supportive of disease process    Small vessel cerebrovascular disease supported by MRI scan    Pertinent diagnostic data    EMGs completed 1/30/2023   Impression: This is an abnormal study. 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 There is no electrodiagnostic evidence of demyelinating disease or a lumbosacral radiculopathy. The patient had a prior history of CIDP listed as a diagnosis by former neurologist Dr. Tran Sarmiento. I did explain that based on the study, there is no evidence of CIDP and the patient would not require treatment with IVIg. The patient understood. ___________________________  MARILU MoultonO        Results from [facility] Encounter encounter on 08/30/22    MRI BRAIN W WO CONT    Impression  No acute intracranial abnormality. 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 Vasculitis is in  the differential diagnostic consideration. Patchy old cortical infarcts, unchanged. Results from [place] encounter on 08/08/20    MRI BRAIN W WO CONT    Impression  IMPRESSION:  Scattered foci of abnormal increased T2 signal intensity predominantly  subcortical and superior adjacent to the vertex. Stable overall appearance  compared to the 12/26/2018 examination. No postcontrast enhancement or diffusion  restriction to suggest active demyelination.     Right maxillary sinus disease. No intracranial mass, hemorrhage or evidence of acute infarction. Allergies   Allergen Reactions    Bee Sting [Sting, Bee] Anaphylaxis     Also allergic to egg products    Penicillins Anaphylaxis     Patient screened for any delayed non-IgE-mediated reaction to PCN. Patient notes the following:    No delayed non-IgE-mediated reaction to PCN            Betadine [Povidone-Iodine] Rash    Egg Itching       Current Outpatient Medications   Medication Sig    DULoxetine (CYMBALTA) 30 mg capsule Take 1 Capsule by mouth daily. Indications: diabetic complication causing injury to some body nerves, major depressive disorder    rimegepant (Nurtec ODT) 75 mg disintegrating tablet Take 1 Tablet by mouth daily as needed for Migraine (Take at onset of severe migraine). Indications: a migraine headache    metFORMIN ER (GLUCOPHAGE XR) 500 mg tablet TAKE 3 TABLETS BY MOUTH DAILY    glucose blood VI test strips (True Metrix Glucose Test Strip) strip True Metrix Glucose Test Strip   USE AS DIRECTED TO MONITOR BLOOD GLUCOSE ONCE DAILY    aspirin delayed-release 81 mg tablet TAKE 1 TABLET BY MOUTH EVERY DAY IN THE MORNING    rosuvastatin (CRESTOR) 40 mg tablet TAKE 1 TABLET BY MOUTH EVERY DAY    erenumab-aooe (Aimovig Autoinjector) 140 mg/mL injection ADMINISTER 1 ML(140MG) UNDER THE SKIN EVERY 30 DAYS    pyridostigmine (MESTINON) 60 mg tablet TAKE 2 TABLETS BY MOUTH THREE (3) TIMES DAILY. topiramate (TOPAMAX) 200 mg tablet Take 1 Tablet by mouth two (2) times a day. pregabalin (Lyrica) 200 mg capsule Take 1 Capsule by mouth two (2) times a day. Max Daily Amount: 400 mg.    Gilenya 0.5 mg cap Take 1 Capsule by mouth nightly. glucose blood VI test strips (True Metrix Glucose Test Strip) strip 1 Each by Does Not Apply route daily. Use as directed to monitor blood glucose once daily    glucose blood VI test strips (blood glucose test) strip 100 Each by Does Not Apply route See Admin Instructions.  One Touch Ultra Test Strips=Check blood sugar daily dx E11.8    acetaminophen (TYLENOL 8 HOUR PO) Take 2 Tablets by mouth two (2) times daily as needed for Pain. For pain scale 1-5/10.    dantrolene (DANTRIUM) 100 mg capsule Take 1 Capsule by mouth daily as needed (spasms). levothyroxine (SYNTHROID) 137 mcg tablet TAKE 1 TABLET BY MOUTH EVERY DAY BEFORE BREAKFAST    albuterol (PROVENTIL VENTOLIN) 2.5 mg /3 mL (0.083 %) nebulizer solution 3 mL by Nebulization route every six (6) hours as needed for Wheezing. menthol-zinc oxide (CALMOSEPTINE) 0.44-20.6 % oint Apply 1 Each to affected area daily as needed for PRN Reason (Other) (thin layer to buttocks for skin irritation). lidocaine (LIDODERM) 5 % Apply patch to the affected area for 12 hours a day and remove for 12 hours a day. (Patient taking differently: 1 Patch by TransDERmal route every twenty-four (24) hours. Apply patch to the affected area low back pain for 12 hours a day and remove for 12 hours a day.)     No current facility-administered medications for this visit. Past medical history/surgical history, family history, and social history have been reviewed for today's visit      ROS    A ten system review of constitutional, cardiovascular, respiratory, musculoskeletal, endocrine, skin, SHEENT, genitourinary, psychiatric and neurologic systems was obtained and is unremarkable except as mentioned under HPI          EXAMINATION:     Visit Vitals  /64 (BP 1 Location: Left upper arm, BP Patient Position: Sitting, BP Cuff Size: Large adult)   Pulse 75   Resp 18   Ht 6' (1.829 m)   Wt 177 lb (80.3 kg)   LMP 09/01/2010   BMI 24.01 kg/m²         General appearance: Patient is well-developed and well-nourished in no apparent distress and well groomed.     Psych/mental health:  Affect: Appropriate    PHQ  3 most recent PHQ Screens 4/20/2023   PHQ Not Done -   Little interest or pleasure in doing things Not at all   Feeling down, depressed, irritable, or hopeless Not at all   Total Score PHQ 2 0   Trouble falling or staying asleep, or sleeping too much -   Feeling tired or having little energy -   Poor appetite, weight loss, or overeating -   Feeling bad about yourself - or that you are a failure or have let yourself or your family down -   Trouble concentrating on things such as school, work, reading, or watching TV -   Moving or speaking so slowly that other people could have noticed; or the opposite being so fidgety that others notice -   Thoughts of being better off dead, or hurting yourself in some way -   PHQ 9 Score -   How difficult have these problems made it for you to do your work, take care of your home and get along with others -       HEENT:   Normocephalic  With evidence of trauma: No  Full range of motion head neck: Yes  Tenderness to palpation of the head neck region: No      Cardiovascular:     Extremities warm to touch: Yes  Extremity swelling: No  Discoloration: No  Evidence of PVD: No    Respiratory:   Dyspnea on exertion: No   Abnormal effort on casual observation: No   Use of portable oxygen: No   Evidence of cyanosis: No     Musculoskeletal:   Evidence of significant bone deformities: No   Spinal curvature: No     Integumentary:    Obvious bruising: No   Lacerations or discoloration on casual observation: No       Neurological Examination:   Mental Status:        MMSE  No flowsheet data found. Formal testing was not completed    there was nothing concerning on general observation and discussion.    Alert oriented and appropriate to general conversation  Normal processing on general observation  Followed conversation and responded seemingly appropriate throughout the office visit  No word finding difficulties noted on casual observation  Able to follow directions without difficulty     Cranial Nerves:    Grossly intact    Motor:   Normal bulk  No tremor appreciated on today's exam  No abnormal movements appreciated on today's exam  Moves extremities spontaneously and with purpose        Sensation: Intact to light touch    Coordination/Cerebellar:   Grossly intact    Gait: Ambulates with the use of a 1 point cane    Reflexes: Not tested today    Fall risk assessment  Fall Risk Assessment, last 12 mths 4/20/2023   Able to walk? Yes   Fall in past 12 months? 0   Do you feel unsteady? -   Are you worried about falling -   Is TUG test greater than 12 seconds? -   Is the gait abnormal? -   Number of falls in past 12 months -   Fall with injury? -         Follow-up and Dispositions    Return for Keep previously scheduled appointment, In office appointment.            [place] Denny, ANP-C

## 2023-04-20 NOTE — ASSESSMENT & PLAN NOTE
Most recent MRI scan of the brain is not supportive of MS at this time we will check cervical spine MRI scan to see if there is any additional supporting evidence    Review of prior MRI scans we do not have consistency regarding reports supporting definitive MS  Unable to find any CSF studies    For now continue on Gilenya

## 2023-04-20 NOTE — ASSESSMENT & PLAN NOTE
Working diagnosis  Unable to locate any lab work related to myasthenic antibody testing or EMGs that would support diagnosis    Unclear where this diagnosis came from and patient is unable to help me with these details  I have reviewed notes back as far as I can through medical records in epic    We will recheck antibody levels if normal we will look to slowly wean Mestinon and perhaps repeat EMG with single-fiber evaluation for MG

## 2023-04-20 NOTE — ASSESSMENT & PLAN NOTE
Continue on baby aspirin a day patient has been encouraged to keep all of her comorbid conditions under good control    Education was completed today regarding causes related to small vessel cerebrovascular disease and ways to help prevent further accumulation

## 2023-04-20 NOTE — PROGRESS NOTES
1. Have you been to the ER, urgent care clinic since your last visit? Hospitalized since your last visit? No.    2. Have you seen or consulted any other health care providers outside of the [address] [place] [address] since your last visit? Include any pap smears or colon screening.    No.        Chief Complaint   Patient presents with    Multiple Sclerosis     Seems like none of her medications are working- in the bed most of the time, headaches for about 2 months

## 2023-04-20 NOTE — ASSESSMENT & PLAN NOTE
Patient has multiple complicating factors for now she is to stay on the Aimovig monthly along with the Topamax  She is to stop OTCs  NurTec rescue has been ordered we will need to get a prior authorization

## 2023-04-20 NOTE — HOME HEALTH
History of condition: Pt has diagnosis of MS which causes significant bouts of weakness and immobility. High fall risk concerns as patient has had many falls in the home. Subjective: Pt continues to report feeling tired and sore. States that she is trying to clean up her home. Falls since last visit (if yes include . bsrifallreport): No  Caregiver involvement:  Pt lives with her son Koren Kanner who works during the day. He is not available for PT visits and teaching. Clinician asked if patient has had any physician contact since last home care visit and patient states: no    Clinician asked if patient has any new or changed medications and patient states:  no    Home health supplies by type and quantity ordered/delivered this visit include: NA  Does the patient have any new or changed medications? No  If Yes, were medications reconciled? NA  Was the certifying physician notified of changes in medications? NA  Clinical assessment: Why do I still need to come? What does this visit mean for patient overall? Ms. Moira Mary suffers from MS and Bilateral knee arthritis which limits her strength and mobility. She lives in very cluttered home and relies on ambulation equipment to safely navigate around. Curently she is very weak compared to her baseline and has difficulty with all ambulation. Skilled PT is needed for strengthening and balance improvement to reduce fall risks and improve independence in the home    Progress or lack of progress toward specific goals: Pt is progressing gait each week and is learning home therex for strength. Interdisciplinary communication: REYES    Discharge planning as follows: Patient will be discharge from home PT when they are no longer homebound, have reached their maximum functional potential and maximum safety in their home and/or When goals are met.       Specific plan for next visit: Instruct caregiver/patient in home therex for strength and balance improvement as well as continue gait training.

## 2023-04-21 ENCOUNTER — HOME CARE VISIT (OUTPATIENT)
Dept: SCHEDULING | Facility: HOME HEALTH | Age: 68
End: 2023-04-21
Payer: MEDICARE

## 2023-04-21 VITALS
HEART RATE: 84 BPM | RESPIRATION RATE: 16 BRPM | OXYGEN SATURATION: 99 % | SYSTOLIC BLOOD PRESSURE: 132 MMHG | TEMPERATURE: 97.5 F | DIASTOLIC BLOOD PRESSURE: 68 MMHG

## 2023-04-21 DIAGNOSIS — E55.9 VITAMIN D DEFICIENCY: ICD-10-CM

## 2023-04-21 DIAGNOSIS — M25.50 POLYARTHRALGIA: ICD-10-CM

## 2023-04-21 DIAGNOSIS — M79.10 MYALGIA: ICD-10-CM

## 2023-04-21 DIAGNOSIS — Z11.59 ENCOUNTER FOR SCREENING FOR OTHER VIRAL DISEASES: ICD-10-CM

## 2023-04-21 DIAGNOSIS — G70.00 MYASTHENIA GRAVIS (HCC): ICD-10-CM

## 2023-04-21 DIAGNOSIS — G35 MS (MULTIPLE SCLEROSIS) (HCC): Primary | ICD-10-CM

## 2023-04-21 DIAGNOSIS — R53.82 CHRONIC FATIGUE: ICD-10-CM

## 2023-04-21 PROCEDURE — G0151 HHCP-SERV OF PT,EA 15 MIN: HCPCS

## 2023-04-21 NOTE — PROGRESS NOTES
I had to reorder all of the patient's lab panels that I did yesterday because the lab was unable to draw the patient's blood work. Unfortunately this is not the first time this month that this has occurred in multiple patients of mine.     Requested the patient go to [facility] and not [facility] for blood draw since this seems to be an ongoing problem

## 2023-04-24 ENCOUNTER — HOME CARE VISIT (OUTPATIENT)
Dept: SCHEDULING | Facility: HOME HEALTH | Age: 68
End: 2023-04-24
Payer: MEDICARE

## 2023-04-24 VITALS
HEART RATE: 76 BPM | SYSTOLIC BLOOD PRESSURE: 124 MMHG | TEMPERATURE: 97.9 F | DIASTOLIC BLOOD PRESSURE: 82 MMHG | OXYGEN SATURATION: 96 %

## 2023-04-24 PROCEDURE — G0152 HHCP-SERV OF OT,EA 15 MIN: HCPCS

## 2023-04-24 NOTE — HOME HEALTH
Subjective: I had a lot of changes to my meds last week  Falls since last visit NO(if yes complete the Fall Tracking Form and include bsrifallreport):  Caregiver involvement changes: No changes to caregiver involvement  Home health supplies by type and quantity ordered/delivered this visit include: NA    Clinician asked if patient has had any physician contact since last home care visit and patient states: Yes  Clinician asked if patient has any new or changed medications and patient states:  Yes  If Yes, were medications reconciled? Yes  Was the certifying physician notified of changes in medications? Yes    Clinical assessment (what this visit means for the patient overall and need for ongoing skilled care) and progress or lack of progress towards SPECIFIC goals: Pt participated in ADL training and UE strengthening however requires additional training due to risk for falls    Written Teaching Material Utilized: N/A       Interdisciplinary communication with: Changes in meds have been aware to PT    Discharge planning as follows:  Will discharge when the patient has reached their maximum functional potential and maximum safety in their home and When goals are met    Specific plan for next visit: assist with equipment management, follow up with [facility], ADL training

## 2023-04-24 NOTE — HOME HEALTH
History of condition: Pt has diagnosis of MS which causes significant bouts of weakness and immobility. High fall risk concerns as patient has had many falls in the home. Subjective: Pt with no complaints today    Falls since last visit (if yes include . bsrifallreport): No  Caregiver involvement:  Pt lives with her son Renée Bob who works during the day. He is not available for PT visits and teaching. Clinician asked if patient has had any physician contact since last home care visit and patient states: no    Clinician asked if patient has any new or changed medications and patient states:  no    Home health supplies by type and quantity ordered/delivered this visit include: NA  Does the patient have any new or changed medications? No  If Yes, were medications reconciled? NA  Was the certifying physician notified of changes in medications? NA  Clinical assessment: Why do I still need to come? What does this visit mean for patient overall? Ms. Katherine Bush suffers from MS and Bilateral knee arthritis which limits her strength and mobility. She lives in very cluttered home and relies on ambulation equipment to safely navigate around. Curently she is very weak compared to her baseline and has difficulty with all ambulation. Skilled PT is needed for strengthening and balance improvement to reduce fall risks and improve independence in the home    Progress or lack of progress toward specific goals: Pt is progressing gait each week and is learning home therex for strength. Interdisciplinary communication: REYES    Discharge planning as follows: Patient will be discharge from home PT when they are no longer homebound, have reached their maximum functional potential and maximum safety in their home and/or When goals are met. Specific plan for next visit: Instruct caregiver/patient in home therex for strength and balance improvement as well as continue gait training.

## 2023-04-25 ENCOUNTER — HOME CARE VISIT (OUTPATIENT)
Dept: HOME HEALTH SERVICES | Facility: HOME HEALTH | Age: 68
End: 2023-04-25
Payer: MEDICARE

## 2023-04-25 ENCOUNTER — HOME CARE VISIT (OUTPATIENT)
Dept: SCHEDULING | Facility: HOME HEALTH | Age: 68
End: 2023-04-25
Payer: MEDICARE

## 2023-04-25 VITALS
TEMPERATURE: 97.8 F | SYSTOLIC BLOOD PRESSURE: 123 MMHG | OXYGEN SATURATION: 97 % | HEART RATE: 76 BPM | DIASTOLIC BLOOD PRESSURE: 70 MMHG

## 2023-04-25 PROCEDURE — G0152 HHCP-SERV OF OT,EA 15 MIN: HCPCS

## 2023-04-25 NOTE — HOME HEALTH
Subjective: I want to be able to fold those clothes  Falls since last visit NO(if yes complete the Fall Tracking Form and include bsrifallreport):  Caregiver involvement changes: No changes to caregiver involvement  Home health supplies by type and quantity ordered/delivered this visit include: NA    Clinician asked if patient has had any physician contact since last home care visit and patient states: NO  Clinician asked if patient has any new or changed medications and patient states:  YES   If Yes, were medications reconciled? YES   Was the certifying physician notified of changes in medications? Yes    Clinical assessment (what this visit means for the patient overall and need for ongoing skilled care) and progress or lack of progress towards SPECIFIC goals: Pt participated in IADL training and functional mobility training requiring additional training to reduce falls. [place] [place] [address] regarding scooter in order to determine type of battery replacement needed for specific model and details/info given to pt. Written Teaching Material Utilized: N/A       Interdisciplinary communication with: REYES      Discharge planning as follows:  Will discharge when the patient has reached their maximum functional potential and maximum safety in their home and When goals are met    Specific plan for next visit: ADL training

## 2023-04-27 ENCOUNTER — HOME CARE VISIT (OUTPATIENT)
Dept: SCHEDULING | Facility: HOME HEALTH | Age: 68
End: 2023-04-27
Payer: MEDICARE

## 2023-04-27 PROCEDURE — G0151 HHCP-SERV OF PT,EA 15 MIN: HCPCS

## 2023-04-28 VITALS
TEMPERATURE: 97.8 F | HEART RATE: 78 BPM | DIASTOLIC BLOOD PRESSURE: 75 MMHG | OXYGEN SATURATION: 97 % | RESPIRATION RATE: 16 BRPM | SYSTOLIC BLOOD PRESSURE: 132 MMHG

## 2023-04-28 NOTE — HOME HEALTH
History of condition: Pt has diagnosis of MS which causes significant bouts of weakness and immobility. High fall risk concerns as patient has had many falls in the home. Subjective: Pt with no complaints today. States that she wants to ambulate with her cane. Falls since last visit (if yes include . bsrifallreport): No  Caregiver involvement:  Pt lives with her son Abigail Vickers who works during the day. He is not available for PT visits and teaching. Clinician asked if patient has had any physician contact since last home care visit and patient states: no    Clinician asked if patient has any new or changed medications and patient states:  no    Home health supplies by type and quantity ordered/delivered this visit include: NA  Does the patient have any new or changed medications? No  If Yes, were medications reconciled? NA  Was the certifying physician notified of changes in medications? NA  Clinical assessment: Why do I still need to come? What does this visit mean for patient overall? Ms. Parish Dsouza suffers from MS and Bilateral knee arthritis which limits her strength and mobility. She lives in very cluttered home and relies on ambulation equipment to safely navigate around. Curently she is very weak compared to her baseline and has difficulty with all ambulation. Skilled PT is needed for strengthening and balance improvement to reduce fall risks and improve independence in the home    Progress or lack of progress toward specific goals: Pt is progressing gait each week and is learning home therex for strength. Worked with [facility] today but patient still has some stability issues before this will be safe on her own. Interdisciplinary communication: NA    Discharge planning as follows: Patient will be discharge from home PT when they are no longer homebound, have reached their maximum functional potential and maximum safety in their home and/or When goals are met.       Specific plan for next visit: Instruct caregiver/patient in home therex for strength and balance improvement as well as continue gait training.

## 2023-04-30 ENCOUNTER — HOSPITAL ENCOUNTER (OUTPATIENT)
Dept: MRI IMAGING | Age: 68
Discharge: HOME | End: 2023-04-30
Payer: MEDICARE

## 2023-04-30 DIAGNOSIS — G35 MS (MULTIPLE SCLEROSIS) (HCC): ICD-10-CM

## 2023-04-30 PROCEDURE — A9576 INJ PROHANCE MULTIPACK: HCPCS

## 2023-04-30 PROCEDURE — 72156 MRI NECK SPINE W/O & W/DYE: CPT

## 2023-04-30 PROCEDURE — 74011250636 HC RX REV CODE- 250/636

## 2023-04-30 RX ADMIN — GADOTERIDOL 15 ML: 279.3 INJECTION, SOLUTION INTRAVENOUS at 14:10

## 2023-05-01 ENCOUNTER — HOME CARE VISIT (OUTPATIENT)
Dept: SCHEDULING | Facility: HOME HEALTH | Age: 68
End: 2023-05-01
Payer: MEDICARE

## 2023-05-01 ENCOUNTER — HOME HEALTH ADMISSION (OUTPATIENT)
Dept: HOME HEALTH SERVICES | Facility: HOME HEALTH | Age: 68
End: 2023-05-01

## 2023-05-01 VITALS
DIASTOLIC BLOOD PRESSURE: 71 MMHG | TEMPERATURE: 97.9 F | HEART RATE: 71 BPM | OXYGEN SATURATION: 96 % | SYSTOLIC BLOOD PRESSURE: 121 MMHG

## 2023-05-01 PROCEDURE — G0152 HHCP-SERV OF OT,EA 15 MIN: HCPCS

## 2023-05-02 LAB
INDEX VALUE: NORMAL
INTERPRETATION: NORMAL
INTERPRETATION: NORMAL
JCPYV AB SERPL QL IA: NORMAL
JCV AB BY INHIBITION: NORMAL

## 2023-05-03 ENCOUNTER — HOME CARE VISIT (OUTPATIENT)
Dept: HOME HEALTH SERVICES | Facility: HOME HEALTH | Age: 68
End: 2023-05-03
Payer: MEDICARE

## 2023-05-04 ENCOUNTER — HOME CARE VISIT (OUTPATIENT)
Dept: SCHEDULING | Facility: HOME HEALTH | Age: 68
End: 2023-05-04
Payer: MEDICARE

## 2023-05-04 VITALS
OXYGEN SATURATION: 97 % | DIASTOLIC BLOOD PRESSURE: 68 MMHG | SYSTOLIC BLOOD PRESSURE: 133 MMHG | TEMPERATURE: 97.8 F | HEART RATE: 76 BPM

## 2023-05-04 PROCEDURE — G0155 HHCP-SVS OF CSW,EA 15 MIN: HCPCS

## 2023-05-04 PROCEDURE — G0152 HHCP-SERV OF OT,EA 15 MIN: HCPCS

## 2023-05-05 ENCOUNTER — HOME CARE VISIT (OUTPATIENT)
Dept: HOME HEALTH SERVICES | Facility: HOME HEALTH | Age: 68
End: 2023-05-05
Payer: MEDICARE

## 2023-05-08 ENCOUNTER — HOME CARE VISIT (OUTPATIENT)
Dept: HOME HEALTH SERVICES | Facility: HOME HEALTH | Age: 68
End: 2023-05-08
Payer: MEDICARE

## 2023-05-08 ENCOUNTER — HOME CARE VISIT (OUTPATIENT)
Dept: SCHEDULING | Facility: HOME HEALTH | Age: 68
End: 2023-05-08
Payer: MEDICARE

## 2023-05-08 VITALS
DIASTOLIC BLOOD PRESSURE: 63 MMHG | TEMPERATURE: 97.7 F | OXYGEN SATURATION: 100 % | SYSTOLIC BLOOD PRESSURE: 106 MMHG | HEART RATE: 78 BPM

## 2023-05-08 PROCEDURE — G0152 HHCP-SERV OF OT,EA 15 MIN: HCPCS

## 2023-05-09 ENCOUNTER — HOME CARE VISIT (OUTPATIENT)
Dept: SCHEDULING | Facility: HOME HEALTH | Age: 68
End: 2023-05-09
Payer: MEDICARE

## 2023-05-09 PROCEDURE — G0151 HHCP-SERV OF PT,EA 15 MIN: HCPCS

## 2023-05-10 ENCOUNTER — HOME CARE VISIT (OUTPATIENT)
Dept: SCHEDULING | Facility: HOME HEALTH | Age: 68
End: 2023-05-10
Payer: MEDICARE

## 2023-05-10 VITALS
OXYGEN SATURATION: 97 % | HEART RATE: 78 BPM | DIASTOLIC BLOOD PRESSURE: 68 MMHG | SYSTOLIC BLOOD PRESSURE: 132 MMHG | RESPIRATION RATE: 16 BRPM | TEMPERATURE: 97.8 F

## 2023-05-10 VITALS
HEART RATE: 80 BPM | SYSTOLIC BLOOD PRESSURE: 124 MMHG | OXYGEN SATURATION: 93 % | TEMPERATURE: 98.1 F | DIASTOLIC BLOOD PRESSURE: 77 MMHG

## 2023-05-10 PROCEDURE — G0158 HHC OT ASSISTANT EA 15: HCPCS

## 2023-05-11 ENCOUNTER — HOME CARE VISIT (OUTPATIENT)
Dept: SCHEDULING | Facility: HOME HEALTH | Age: 68
End: 2023-05-11
Payer: MEDICARE

## 2023-05-11 PROCEDURE — G0151 HHCP-SERV OF PT,EA 15 MIN: HCPCS

## 2023-05-14 VITALS
HEART RATE: 84 BPM | SYSTOLIC BLOOD PRESSURE: 126 MMHG | DIASTOLIC BLOOD PRESSURE: 78 MMHG | TEMPERATURE: 97.5 F | OXYGEN SATURATION: 98 % | RESPIRATION RATE: 16 BRPM

## 2023-05-15 ENCOUNTER — HOME CARE VISIT (OUTPATIENT)
Dept: SCHEDULING | Facility: HOME HEALTH | Age: 68
End: 2023-05-15
Payer: MEDICARE

## 2023-05-15 ENCOUNTER — HOME CARE VISIT (OUTPATIENT)
Dept: HOME HEALTH SERVICES | Facility: HOME HEALTH | Age: 68
End: 2023-05-15
Payer: MEDICARE

## 2023-05-15 VITALS
DIASTOLIC BLOOD PRESSURE: 85 MMHG | SYSTOLIC BLOOD PRESSURE: 117 MMHG | HEART RATE: 76 BPM | OXYGEN SATURATION: 97 % | TEMPERATURE: 97.8 F

## 2023-05-15 PROCEDURE — G0152 HHCP-SERV OF OT,EA 15 MIN: HCPCS

## 2023-05-15 PROCEDURE — G0151 HHCP-SERV OF PT,EA 15 MIN: HCPCS

## 2023-05-16 ENCOUNTER — OFFICE VISIT (OUTPATIENT)
Age: 68
End: 2023-05-16
Payer: MEDICARE

## 2023-05-16 VITALS
SYSTOLIC BLOOD PRESSURE: 130 MMHG | HEART RATE: 66 BPM | TEMPERATURE: 97.3 F | OXYGEN SATURATION: 94 % | WEIGHT: 178 LBS | HEIGHT: 72 IN | RESPIRATION RATE: 17 BRPM | DIASTOLIC BLOOD PRESSURE: 74 MMHG | BODY MASS INDEX: 24.11 KG/M2

## 2023-05-16 DIAGNOSIS — Z87.891 PERSONAL HISTORY OF NICOTINE DEPENDENCE: ICD-10-CM

## 2023-05-16 DIAGNOSIS — E11.9 TYPE 2 DIABETES MELLITUS WITHOUT COMPLICATION, WITHOUT LONG-TERM CURRENT USE OF INSULIN (HCC): Primary | ICD-10-CM

## 2023-05-16 DIAGNOSIS — Z12.31 VISIT FOR SCREENING MAMMOGRAM: ICD-10-CM

## 2023-05-16 LAB — HBA1C MFR BLD: 6.7 %

## 2023-05-16 PROCEDURE — 3075F SYST BP GE 130 - 139MM HG: CPT | Performed by: FAMILY MEDICINE

## 2023-05-16 PROCEDURE — 4004F PT TOBACCO SCREEN RCVD TLK: CPT | Performed by: FAMILY MEDICINE

## 2023-05-16 PROCEDURE — G8427 DOCREV CUR MEDS BY ELIG CLIN: HCPCS | Performed by: FAMILY MEDICINE

## 2023-05-16 PROCEDURE — 1090F PRES/ABSN URINE INCON ASSESS: CPT | Performed by: FAMILY MEDICINE

## 2023-05-16 PROCEDURE — G8399 PT W/DXA RESULTS DOCUMENT: HCPCS | Performed by: FAMILY MEDICINE

## 2023-05-16 PROCEDURE — 99213 OFFICE O/P EST LOW 20 MIN: CPT | Performed by: FAMILY MEDICINE

## 2023-05-16 PROCEDURE — 3017F COLORECTAL CA SCREEN DOC REV: CPT | Performed by: FAMILY MEDICINE

## 2023-05-16 PROCEDURE — 3078F DIAST BP <80 MM HG: CPT | Performed by: FAMILY MEDICINE

## 2023-05-16 PROCEDURE — 2022F DILAT RTA XM EVC RTNOPTHY: CPT | Performed by: FAMILY MEDICINE

## 2023-05-16 PROCEDURE — G8420 CALC BMI NORM PARAMETERS: HCPCS | Performed by: FAMILY MEDICINE

## 2023-05-16 PROCEDURE — PBSHW AMB POC HEMOGLOBIN A1C: Performed by: FAMILY MEDICINE

## 2023-05-16 PROCEDURE — 83036 HEMOGLOBIN GLYCOSYLATED A1C: CPT | Performed by: FAMILY MEDICINE

## 2023-05-16 PROCEDURE — 1123F ACP DISCUSS/DSCN MKR DOCD: CPT | Performed by: FAMILY MEDICINE

## 2023-05-16 PROCEDURE — 3044F HG A1C LEVEL LT 7.0%: CPT | Performed by: FAMILY MEDICINE

## 2023-05-16 SDOH — ECONOMIC STABILITY: FOOD INSECURITY: WITHIN THE PAST 12 MONTHS, THE FOOD YOU BOUGHT JUST DIDN'T LAST AND YOU DIDN'T HAVE MONEY TO GET MORE.: NEVER TRUE

## 2023-05-16 SDOH — ECONOMIC STABILITY: HOUSING INSECURITY
IN THE LAST 12 MONTHS, WAS THERE A TIME WHEN YOU DID NOT HAVE A STEADY PLACE TO SLEEP OR SLEPT IN A SHELTER (INCLUDING NOW)?: NO

## 2023-05-16 SDOH — ECONOMIC STABILITY: FOOD INSECURITY: WITHIN THE PAST 12 MONTHS, YOU WORRIED THAT YOUR FOOD WOULD RUN OUT BEFORE YOU GOT MONEY TO BUY MORE.: NEVER TRUE

## 2023-05-16 SDOH — ECONOMIC STABILITY: INCOME INSECURITY: HOW HARD IS IT FOR YOU TO PAY FOR THE VERY BASICS LIKE FOOD, HOUSING, MEDICAL CARE, AND HEATING?: NOT HARD AT ALL

## 2023-05-16 ASSESSMENT — ENCOUNTER SYMPTOMS
CHEST TIGHTNESS: 0
WHEEZING: 0
NAUSEA: 0
ABDOMINAL PAIN: 0
SHORTNESS OF BREATH: 0
VOMITING: 0
CONSTIPATION: 0
COUGH: 0
DIARRHEA: 0

## 2023-05-16 NOTE — PROGRESS NOTES
Chief Complaint   Patient presents with    Diabetes     Follow Up          1. \"Have you been to the ER, urgent care clinic since your last visit? Hospitalized since your last visit? \" no    2. \"Have you seen or consulted any other health care providers outside of the [address] [place] [address] since your last visit? \" no     3. For patients aged 39-70: Has the patient had a colonoscopy / FIT/ Cologuard? Yes      If the patient is female:    4. For patients aged 41-77: Has the patient had a mammogram within the past 2 years? due      5. For patients aged 21-65: Has the patient had a pap smear? No longer get them         No flowsheet data found. Health Maintenance Due   Topic Date Due    Shingles vaccine (1 of 2) Never done    Low dose CT lung screening  Never done    Diabetic retinal exam  03/28/2020    Diabetic foot exam  07/05/2020    COVID-19 Vaccine (4 - Booster for Pfizer series) 08/19/2022    Breast cancer screen  05/18/2023         PHQ-9  4/20/2023   Little interest or pleasure in doing things 0   Little interest or pleasure in doing things -   Feeling down, depressed, or hopeless 0   Trouble falling or staying asleep, or sleeping too much -   Feeling tired or having little energy -   Poor appetite or overeating -   Feeling bad about yourself - or that you are a failure or have let yourself or your family down -   Trouble concentrating on things, such as reading the newspaper or watching television -   Moving or speaking so slowly that other people could have noticed.  Or the opposite - being so fidgety or restless that you have been moving around a lot more than usual -   PHQ-2 Score 0   Total Score PHQ 2 -   PHQ-9 Total Score 0   How difficult have these problems made it for you to do your work, take care of your home and get along with others -

## 2023-05-16 NOTE — PROGRESS NOTES
Patient Name: Madelyn Wilson   MRN: [id number]    Jenifer Pro is a 79 y.o. female who presents with the following:     History of diet-controlled diabetes. Has never had a CT lung cancer screening test.  She has been smoking 1/2 pack/day since age 27. She is also overdue for her screening mammogram.  She recently saw a new neurologist for her ongoing MS symptoms. She plans to reestablish with Dr. Sally Bryan who was her former neurologist who left for [place] but she states that he is reportedly back in [place] but with a different clinic. She hopes to follow-up with him instead. Review of Systems   Constitutional:  Negative for activity change, appetite change, fatigue, fever and unexpected weight change. Respiratory:  Negative for cough, chest tightness, shortness of breath and wheezing. Cardiovascular:  Negative for chest pain, palpitations and leg swelling. Gastrointestinal:  Negative for abdominal pain, constipation, diarrhea, nausea and vomiting. Genitourinary:  Negative for dysuria, frequency and urgency. Skin:  Negative for rash. Neurological:  Negative for dizziness, weakness and headaches. Psychiatric/Behavioral:  Negative for dysphoric mood and suicidal ideas. The patient is not nervous/anxious. All other systems reviewed and are negative. The patient's medications, allergies, past medical history, surgical history, family history and social history were reviewed and updated where appropriate.       Current Outpatient Medications:     blood glucose test strips (ASCENSIA AUTODISC VI;ONE TOUCH ULTRA TEST VI) strip, 100 each by Other route See Admin Instructions, Disp: , Rfl:     albuterol (PROVENTIL) (2.5 MG/3ML) 0.083% nebulizer solution, Inhale into the lungs every 6 hours as needed, Disp: , Rfl:     amantadine (SYMMETREL) 100 MG capsule, Take by mouth 2 times daily, Disp: , Rfl:     aspirin 81 MG EC tablet, TAKE 1 TABLET BY MOUTH EVERY DAY IN THE MORNING, Disp: ,

## 2023-05-17 ENCOUNTER — HOME CARE VISIT (OUTPATIENT)
Dept: SCHEDULING | Facility: HOME HEALTH | Age: 68
End: 2023-05-17
Payer: MEDICARE

## 2023-05-17 VITALS
SYSTOLIC BLOOD PRESSURE: 140 MMHG | TEMPERATURE: 97.6 F | RESPIRATION RATE: 16 BRPM | DIASTOLIC BLOOD PRESSURE: 78 MMHG | OXYGEN SATURATION: 97 % | HEART RATE: 74 BPM

## 2023-05-17 VITALS
TEMPERATURE: 97.8 F | DIASTOLIC BLOOD PRESSURE: 76 MMHG | OXYGEN SATURATION: 96 % | HEART RATE: 66 BPM | SYSTOLIC BLOOD PRESSURE: 121 MMHG

## 2023-05-17 PROCEDURE — G0152 HHCP-SERV OF OT,EA 15 MIN: HCPCS

## 2023-05-18 ENCOUNTER — HOME CARE VISIT (OUTPATIENT)
Dept: SCHEDULING | Facility: HOME HEALTH | Age: 68
End: 2023-05-18
Payer: MEDICARE

## 2023-05-18 ENCOUNTER — TELEPHONE (OUTPATIENT)
Age: 68
End: 2023-05-18

## 2023-05-18 PROCEDURE — G0151 HHCP-SERV OF PT,EA 15 MIN: HCPCS

## 2023-05-22 ENCOUNTER — TELEPHONE (OUTPATIENT)
Age: 68
End: 2023-05-22

## 2023-05-22 ENCOUNTER — HOME CARE VISIT (OUTPATIENT)
Dept: HOME HEALTH SERVICES | Facility: HOME HEALTH | Age: 68
End: 2023-05-22
Payer: MEDICARE

## 2023-05-22 VITALS
HEART RATE: 75 BPM | SYSTOLIC BLOOD PRESSURE: 125 MMHG | TEMPERATURE: 97.8 F | RESPIRATION RATE: 16 BRPM | DIASTOLIC BLOOD PRESSURE: 68 MMHG | OXYGEN SATURATION: 98 %

## 2023-05-22 NOTE — TELEPHONE ENCOUNTER
RE:Nurtec prior authorization sent to [facility] through cover my meds     Status is pending-     Key: [id number] - [place] Case ID: [id number]

## 2023-05-23 ENCOUNTER — HOME CARE VISIT (OUTPATIENT)
Dept: HOME HEALTH SERVICES | Facility: HOME HEALTH | Age: 68
End: 2023-05-23
Payer: MEDICARE

## 2023-05-23 ENCOUNTER — HOME CARE VISIT (OUTPATIENT)
Dept: SCHEDULING | Facility: HOME HEALTH | Age: 68
End: 2023-05-23
Payer: MEDICARE

## 2023-05-23 ENCOUNTER — TELEPHONE (OUTPATIENT)
Age: 68
End: 2023-05-23

## 2023-05-23 PROCEDURE — G0152 HHCP-SERV OF OT,EA 15 MIN: HCPCS

## 2023-05-23 PROCEDURE — G0155 HHCP-SVS OF CSW,EA 15 MIN: HCPCS

## 2023-05-23 NOTE — TELEPHONE ENCOUNTER
[employer] Patient enrollment forms faxed to [phone] Olumiant Pregnancy And Lactation Text: Based on animal studies, Olumiant may cause embryo-fetal harm when administered to pregnant women.  The medication should not be used in pregnancy.  Breastfeeding is not recommended during treatment.

## 2023-05-23 NOTE — TELEPHONE ENCOUNTER
RE:Nurtec prior authorization approved through [facility] Key: [id number] - PA Case ID: [id number]      Approved on May 22    PA Case: [id number], Status: Approved,     Coverage Starts on: 1/1/2023 12:00:00 AM,     Coverage Ends on: 12/31/2023 12:00:00 AM.     Questions? Contact [phone].     No approval letter at this time to scan into media     Faxed approval to the pharmacy

## 2023-05-24 ENCOUNTER — HOME CARE VISIT (OUTPATIENT)
Dept: SCHEDULING | Facility: HOME HEALTH | Age: 68
End: 2023-05-24
Payer: MEDICARE

## 2023-05-24 VITALS
SYSTOLIC BLOOD PRESSURE: 115 MMHG | DIASTOLIC BLOOD PRESSURE: 74 MMHG | RESPIRATION RATE: 18 BRPM | OXYGEN SATURATION: 98 % | TEMPERATURE: 98 F | HEART RATE: 66 BPM

## 2023-05-24 PROCEDURE — G0152 HHCP-SERV OF OT,EA 15 MIN: HCPCS

## 2023-05-24 PROCEDURE — G0151 HHCP-SERV OF PT,EA 15 MIN: HCPCS

## 2023-05-25 VITALS
OXYGEN SATURATION: 97 % | RESPIRATION RATE: 16 BRPM | HEART RATE: 60 BPM | SYSTOLIC BLOOD PRESSURE: 140 MMHG | TEMPERATURE: 97 F | DIASTOLIC BLOOD PRESSURE: 80 MMHG

## 2023-05-25 VITALS
SYSTOLIC BLOOD PRESSURE: 132 MMHG | DIASTOLIC BLOOD PRESSURE: 68 MMHG | HEART RATE: 75 BPM | TEMPERATURE: 97.6 F | RESPIRATION RATE: 16 BRPM | OXYGEN SATURATION: 98 %

## 2023-05-26 ENCOUNTER — HOME CARE VISIT (OUTPATIENT)
Dept: SCHEDULING | Facility: HOME HEALTH | Age: 68
End: 2023-05-26
Payer: MEDICARE

## 2023-05-26 ENCOUNTER — TELEPHONE (OUTPATIENT)
Age: 68
End: 2023-05-26

## 2023-05-26 PROCEDURE — G0151 HHCP-SERV OF PT,EA 15 MIN: HCPCS

## 2023-05-30 ENCOUNTER — HOME CARE VISIT (OUTPATIENT)
Dept: HOME HEALTH SERVICES | Facility: HOME HEALTH | Age: 68
End: 2023-05-30
Payer: MEDICARE

## 2023-05-30 NOTE — TELEPHONE ENCOUNTER
Outbound call to [facility] health nurse)   Name and  verified. Let [facility] know Rx was faxed. [facility] understood.

## 2023-06-05 ENCOUNTER — NURSE TRIAGE (OUTPATIENT)
Dept: OTHER | Facility: CLINIC | Age: 68
End: 2023-06-05

## 2023-06-05 ENCOUNTER — APPOINTMENT (OUTPATIENT)
Facility: HOSPITAL | Age: 68
End: 2023-06-05
Payer: MEDICARE

## 2023-06-05 ENCOUNTER — HOSPITAL ENCOUNTER (EMERGENCY)
Facility: HOSPITAL | Age: 68
Discharge: HOME | End: 2023-06-05
Attending: STUDENT IN AN ORGANIZED HEALTH CARE EDUCATION/TRAINING PROGRAM
Payer: MEDICARE

## 2023-06-05 ENCOUNTER — TELEPHONE (OUTPATIENT)
Age: 68
End: 2023-06-05

## 2023-06-05 VITALS
HEIGHT: 72 IN | TEMPERATURE: 98 F | WEIGHT: 179.01 LBS | BODY MASS INDEX: 24.25 KG/M2 | HEART RATE: 88 BPM | DIASTOLIC BLOOD PRESSURE: 82 MMHG | SYSTOLIC BLOOD PRESSURE: 177 MMHG | RESPIRATION RATE: 20 BRPM | OXYGEN SATURATION: 98 %

## 2023-06-05 DIAGNOSIS — S16.1XXA STRAIN OF NECK MUSCLE, INITIAL ENCOUNTER: ICD-10-CM

## 2023-06-05 DIAGNOSIS — S09.90XA INJURY OF HEAD, INITIAL ENCOUNTER: Primary | ICD-10-CM

## 2023-06-05 DIAGNOSIS — S20.212A CONTUSION OF LEFT CHEST WALL, INITIAL ENCOUNTER: ICD-10-CM

## 2023-06-05 PROCEDURE — 99284 EMERGENCY DEPT VISIT MOD MDM: CPT

## 2023-06-05 PROCEDURE — 71250 CT THORAX DX C-: CPT

## 2023-06-05 PROCEDURE — 72125 CT NECK SPINE W/O DYE: CPT

## 2023-06-05 PROCEDURE — 6370000000 HC RX 637 (ALT 250 FOR IP): Performed by: STUDENT IN AN ORGANIZED HEALTH CARE EDUCATION/TRAINING PROGRAM

## 2023-06-05 PROCEDURE — 70450 CT HEAD/BRAIN W/O DYE: CPT

## 2023-06-05 RX ORDER — ACETAMINOPHEN 500 MG
1000 TABLET ORAL
Status: COMPLETED | OUTPATIENT
Start: 2023-06-05 | End: 2023-06-05

## 2023-06-05 RX ORDER — CYCLOBENZAPRINE HCL 10 MG
10 TABLET ORAL ONCE
Status: COMPLETED | OUTPATIENT
Start: 2023-06-05 | End: 2023-06-05

## 2023-06-05 RX ORDER — CYCLOBENZAPRINE HCL 10 MG
10 TABLET ORAL 3 TIMES DAILY PRN
Qty: 12 TABLET | Refills: 0 | Status: SHIPPED | OUTPATIENT
Start: 2023-06-05 | End: 2023-06-15

## 2023-06-05 RX ADMIN — CYCLOBENZAPRINE 10 MG: 10 TABLET, FILM COATED ORAL at 18:39

## 2023-06-05 RX ADMIN — ACETAMINOPHEN 1000 MG: 500 TABLET ORAL at 18:38

## 2023-06-05 ASSESSMENT — PAIN SCALES - GENERAL: PAINLEVEL_OUTOF10: 10

## 2023-06-05 ASSESSMENT — PAIN - FUNCTIONAL ASSESSMENT: PAIN_FUNCTIONAL_ASSESSMENT: 0-10

## 2023-06-05 NOTE — ED PROVIDER NOTES
EMERGENCY DEPARTMENT HISTORY AND PHYSICAL EXAM      Date: 6/5/2023  Patient Name: Jaida Elizabeth    History of Presenting Illness     Chief Complaint   Patient presents with    Fall     Pt ambulatory into triage with a cc of back pain, head pain and ambulatory problems secondary to GLF on Friday; pt has hx of M.S. and fell out of wheelchair onto ramp         HPI: History From: patient, History limited by: none  Jamila FITCH Minor, 79 y.o. female presents to the ED with cc of left-sided pain after a fall. On Friday, she was going up a ramp in a wheelchair when she fell off the side, approximately 5 feet. She did hit her head, she is unsure if she lost consciousness. Since then, she describes diffuse stiffness of her neck, pain in her left-sided thoracic back as well as headache. She denies any new weakness numbness or tingling arms or legs, she does not take any blood thinners. She is a history of MS denies any pelvis, uses wheelchair intermittently. There are no other complaints, changes, or physical findings at this time. PCP: Garey Kocher, MD    No current facility-administered medications on file prior to encounter. Current Outpatient Medications on File Prior to Encounter   Medication Sig Dispense Refill    Misc.  Devices MISC New wheelchair battery replacement 1 each 0    blood glucose test strips (ASCENSIA AUTODISC VI;ONE TOUCH ULTRA TEST VI) strip 100 each by Other route See Admin Instructions      albuterol (PROVENTIL) (2.5 MG/3ML) 0.083% nebulizer solution Inhale into the lungs every 6 hours as needed      amantadine (SYMMETREL) 100 MG capsule Take by mouth 2 times daily      aspirin 81 MG EC tablet TAKE 1 TABLET BY MOUTH EVERY DAY IN THE MORNING      dantrolene (DANTRIUM) 100 MG capsule Take by mouth daily as needed      Erenumab-aooe (AIMOVIG) 140 MG/ML SOAJ ADMINISTER 1 ML(140MG) UNDER THE SKIN EVERY 30 DAYS      Fingolimod HCl 0.5 MG CAPS Take 1 capsule by mouth      levothyroxine

## 2023-06-05 NOTE — TELEPHONE ENCOUNTER
Incoming call to patient. Name and  verified. Patient stated she fell and hurt knees, head and neck. Per provider patent was recommended to go to urgent care, ED and offered dispatch health number to patient. Patient wanted to know if any other provider in the office had an available appointments in office. Patient was transferred up front.

## 2023-06-05 NOTE — TELEPHONE ENCOUNTER
Location of patient: [address] [place] from [facility] at [facility] with [employer]. Subjective: Caller states \"I fell out of my wheelchair and hit my head\"     Current Symptoms: The wheelchair fell thru the ramp and she and the wheelchair fell 4 feet to the ground. Hit her head. Unsure if had LOC. Scrapes and bruises. Terrible headache. Has MS. Double vision. Nauseated. No vomiting. Onset: 3 days ago; worsening    Associated Symptoms: reduced activity    Pain Severity:  11 /10; ; constant, severe, excruciating    LMP: NA Pregnant: NA    Recommended disposition: Go to ED Now    Care advice provided, patient verbalizes understanding; denies any other questions or concerns; instructed to call back for any new or worsening symptoms. Patient/caller agrees to proceed to [facility]    Attention Provider: Thank you for allowing me to participate in the care of your patient. The patient was connected to triage in response to information provided to the ECC/PSC. Please do not respond through this encounter as the response is not directed to a shared pool.     Reason for Disposition   [1] Loss of vision or double vision AND [2] present now    Protocols used: Head Injury-ADULT-

## 2023-06-05 NOTE — TELEPHONE ENCOUNTER
Pt called said 6/2 her wheelchair fell over the ramp and she fell out. She has severe headache that is more than usual. She has cuts on both legs but she is not sure if  the cuts are from the nails on the ground. Next available appt is 6/22, pt wants sooner appt. Requested callback.      [facility]# [phone]

## 2023-06-08 ENCOUNTER — HOSPITAL ENCOUNTER (OUTPATIENT)
Facility: HOSPITAL | Age: 68
Discharge: HOME | End: 2023-06-08
Payer: MEDICARE

## 2023-06-08 ENCOUNTER — HOSPITAL ENCOUNTER (OUTPATIENT)
Facility: HOSPITAL | Age: 68
End: 2023-06-08
Payer: MEDICARE

## 2023-06-08 DIAGNOSIS — Z87.891 PERSONAL HISTORY OF NICOTINE DEPENDENCE: ICD-10-CM

## 2023-06-08 DIAGNOSIS — Z12.31 VISIT FOR SCREENING MAMMOGRAM: ICD-10-CM

## 2023-06-08 PROCEDURE — 71271 CT THORAX LUNG CANCER SCR C-: CPT

## 2023-06-08 PROCEDURE — 77063 BREAST TOMOSYNTHESIS BI: CPT

## 2023-08-17 ENCOUNTER — OFFICE VISIT (OUTPATIENT)
Age: 68
End: 2023-08-17
Payer: MEDICARE

## 2023-08-17 VITALS
HEIGHT: 72 IN | TEMPERATURE: 97.1 F | WEIGHT: 181.2 LBS | RESPIRATION RATE: 14 BRPM | BODY MASS INDEX: 24.54 KG/M2 | SYSTOLIC BLOOD PRESSURE: 124 MMHG | HEART RATE: 66 BPM | OXYGEN SATURATION: 99 % | DIASTOLIC BLOOD PRESSURE: 70 MMHG

## 2023-08-17 DIAGNOSIS — E11.9 TYPE 2 DIABETES MELLITUS WITHOUT COMPLICATION, WITHOUT LONG-TERM CURRENT USE OF INSULIN (HCC): Primary | ICD-10-CM

## 2023-08-17 DIAGNOSIS — E03.9 HYPOTHYROIDISM, UNSPECIFIED TYPE: ICD-10-CM

## 2023-08-17 DIAGNOSIS — E78.5 HYPERLIPIDEMIA, UNSPECIFIED HYPERLIPIDEMIA TYPE: ICD-10-CM

## 2023-08-17 LAB
ALBUMIN SERPL-MCNC: 3.5 G/DL (ref 3.5–5)
ALBUMIN/GLOB SERPL: 1.1 (ref 1.1–2.2)
ALP SERPL-CCNC: 65 U/L (ref 45–117)
ALT SERPL-CCNC: 12 U/L (ref 12–78)
ANION GAP SERPL CALC-SCNC: 6 MMOL/L (ref 5–15)
AST SERPL-CCNC: 6 U/L (ref 15–37)
BILIRUB SERPL-MCNC: 0.3 MG/DL (ref 0.2–1)
BUN SERPL-MCNC: 18 MG/DL (ref 6–20)
BUN/CREAT SERPL: 17 (ref 12–20)
CALCIUM SERPL-MCNC: 9.4 MG/DL (ref 8.5–10.1)
CHLORIDE SERPL-SCNC: 111 MMOL/L (ref 97–108)
CHOLEST SERPL-MCNC: 246 MG/DL
CO2 SERPL-SCNC: 25 MMOL/L (ref 21–32)
CREAT SERPL-MCNC: 1.03 MG/DL (ref 0.55–1.02)
GLOBULIN SER CALC-MCNC: 3.1 G/DL (ref 2–4)
GLUCOSE SERPL-MCNC: 159 MG/DL (ref 65–100)
HDLC SERPL-MCNC: 50 MG/DL
HDLC SERPL: 4.9 (ref 0–5)
LDLC SERPL CALC-MCNC: 159.2 MG/DL (ref 0–100)
POTASSIUM SERPL-SCNC: 4.1 MMOL/L (ref 3.5–5.1)
PROT SERPL-MCNC: 6.6 G/DL (ref 6.4–8.2)
SODIUM SERPL-SCNC: 142 MMOL/L (ref 136–145)
TRIGL SERPL-MCNC: 184 MG/DL
TSH SERPL DL<=0.05 MIU/L-ACNC: 2.17 UIU/ML (ref 0.36–3.74)
VLDLC SERPL CALC-MCNC: 36.8 MG/DL

## 2023-08-17 PROCEDURE — 99214 OFFICE O/P EST MOD 30 MIN: CPT | Performed by: FAMILY MEDICINE

## 2023-08-17 PROCEDURE — 3017F COLORECTAL CA SCREEN DOC REV: CPT | Performed by: FAMILY MEDICINE

## 2023-08-17 PROCEDURE — G8420 CALC BMI NORM PARAMETERS: HCPCS | Performed by: FAMILY MEDICINE

## 2023-08-17 PROCEDURE — G8427 DOCREV CUR MEDS BY ELIG CLIN: HCPCS | Performed by: FAMILY MEDICINE

## 2023-08-17 PROCEDURE — 3078F DIAST BP <80 MM HG: CPT | Performed by: FAMILY MEDICINE

## 2023-08-17 PROCEDURE — 2022F DILAT RTA XM EVC RTNOPTHY: CPT | Performed by: FAMILY MEDICINE

## 2023-08-17 PROCEDURE — 3044F HG A1C LEVEL LT 7.0%: CPT | Performed by: FAMILY MEDICINE

## 2023-08-17 PROCEDURE — G8399 PT W/DXA RESULTS DOCUMENT: HCPCS | Performed by: FAMILY MEDICINE

## 2023-08-17 PROCEDURE — 1123F ACP DISCUSS/DSCN MKR DOCD: CPT | Performed by: FAMILY MEDICINE

## 2023-08-17 PROCEDURE — 4004F PT TOBACCO SCREEN RCVD TLK: CPT | Performed by: FAMILY MEDICINE

## 2023-08-17 PROCEDURE — 3074F SYST BP LT 130 MM HG: CPT | Performed by: FAMILY MEDICINE

## 2023-08-17 PROCEDURE — 1090F PRES/ABSN URINE INCON ASSESS: CPT | Performed by: FAMILY MEDICINE

## 2023-08-17 RX ORDER — BUTALBITAL, ACETAMINOPHEN AND CAFFEINE 50; 325; 40 MG/1; MG/1; MG/1
1 TABLET ORAL EVERY 4 HOURS PRN
COMMUNITY

## 2023-08-17 RX ORDER — METHYLPREDNISOLONE SODIUM SUCCINATE 1 G/16ML
1000 INJECTION, POWDER, LYOPHILIZED, FOR SOLUTION INTRAMUSCULAR; INTRAVENOUS ONCE
COMMUNITY

## 2023-08-17 RX ORDER — DULOXETIN HYDROCHLORIDE 30 MG/1
30 CAPSULE, DELAYED RELEASE ORAL DAILY
COMMUNITY
Start: 2023-07-20

## 2023-08-17 RX ORDER — DANTROLENE SODIUM 50 MG/1
50 CAPSULE ORAL 2 TIMES DAILY
COMMUNITY
Start: 2023-06-22

## 2023-08-17 ASSESSMENT — ENCOUNTER SYMPTOMS
SHORTNESS OF BREATH: 0
CONSTIPATION: 0
VOMITING: 0
WHEEZING: 0
CHEST TIGHTNESS: 0
COUGH: 0
NAUSEA: 0
DIARRHEA: 0
ABDOMINAL PAIN: 0

## 2023-08-17 NOTE — PROGRESS NOTES
Patient Name: Mile Ding   MRN: [id number]    Taye Chen is a 79 y.o. female who presents with the following:     Has had more diffuse body pain from her MS.  Just saw her neurologist today at [facility] and start Solu-Medrol. Due for diabetes labs. BP Readings from Last 3 Encounters:   08/17/23 124/70   06/05/23 (!) 177/82   05/24/23 (!) 140/80     Wt Readings from Last 3 Encounters:   08/17/23 181 lb 3.2 oz (82.2 kg)   06/05/23 179 lb 0.2 oz (81.2 kg)   05/16/23 178 lb (80.7 kg)     Review of Systems   Constitutional:  Negative for activity change, appetite change, fatigue, fever and unexpected weight change. Respiratory:  Negative for cough, chest tightness, shortness of breath and wheezing. Cardiovascular:  Negative for chest pain, palpitations and leg swelling. Gastrointestinal:  Negative for abdominal pain, constipation, diarrhea, nausea and vomiting. Genitourinary:  Negative for dysuria, frequency and urgency. Skin:  Negative for rash. Neurological:  Negative for dizziness, weakness and headaches. Psychiatric/Behavioral:  Negative for dysphoric mood and suicidal ideas. The patient is not nervous/anxious. All other systems reviewed and are negative. The patient's medications, allergies, past medical history, surgical history, family history and social history were reviewed and updated where appropriate. Current Outpatient Medications:     DULoxetine (CYMBALTA) 30 MG extended release capsule, Take 1 capsule by mouth daily, Disp: , Rfl:     Diclofenac Sodium 100 MG TB24, TAKE ONE TAB BY MOUTH EVERY MORNING WITH FOOD, Disp: , Rfl:     dantrolene (DANTRIUM) 50 MG capsule, Take 1 capsule by mouth 2 times daily, Disp: , Rfl:     methylPREDNISolone sodium (SOLU-MEDROL) 1000 MG injection, Infuse 16 mLs intravenously once, Disp: , Rfl:     Misc.  Devices MISC, New wheelchair battery replacement, Disp: 1 each, Rfl: 0    blood glucose test strips (ASCENSIA AUTODISC VI;ONE

## 2023-08-17 NOTE — PROGRESS NOTES
Chief Complaint   Patient presents with    Diabetes         1. \"Have you been to the ER, urgent care clinic since your last visit? Hospitalized since your last visit? \" no    2. \"Have you seen or consulted any other health care providers outside of the [address] [place] [address] since your last visit? \" no      PHQ-9  4/20/2023   Little interest or pleasure in doing things 0   Little interest or pleasure in doing things -   Feeling down, depressed, or hopeless 0   Trouble falling or staying asleep, or sleeping too much -   Feeling tired or having little energy -   Poor appetite or overeating -   Feeling bad about yourself - or that you are a failure or have let yourself or your family down -   Trouble concentrating on things, such as reading the newspaper or watching television -   Moving or speaking so slowly that other people could have noticed.  Or the opposite - being so fidgety or restless that you have been moving around a lot more than usual -   PHQ-2 Score 0   Total Score PHQ 2 -   PHQ-9 Total Score 0   How difficult have these problems made it for you to do your work, take care of your home and get along with others -           Financial Resource Strain: Low Risk     Difficulty of Paying Living Expenses: Not hard at all      Food Insecurity: No Food Insecurity    Worried About Running Out of Food in the Last Year: Never true    [address] in the Last Year: Never true          Health Maintenance Due   Topic Date Due    Shingles vaccine (1 of 2) Never done    Diabetic retinal exam  03/28/2020    Diabetic foot exam  07/05/2020    COVID-19 Vaccine (4 - Booster for Zighra series) 08/19/2022    Flu vaccine (1) 08/01/2023    Lipids  08/31/2023

## 2023-08-18 LAB
ERYTHROCYTE [DISTWIDTH] IN BLOOD BY AUTOMATED COUNT: 14.1 % (ref 11.5–14.5)
EST. AVERAGE GLUCOSE BLD GHB EST-MCNC: 140 MG/DL
HBA1C MFR BLD: 6.5 % (ref 4–5.6)
HCT VFR BLD AUTO: 43.7 % (ref 35–47)
HGB BLD-MCNC: 13.8 G/DL (ref 11.5–16)
MCH RBC QN AUTO: 29.7 PG (ref 26–34)
MCHC RBC AUTO-ENTMCNC: 31.6 G/DL (ref 30–36.5)
MCV RBC AUTO: 94 FL (ref 80–99)
NRBC # BLD: 0 K/UL (ref 0–0.01)
NRBC BLD-RTO: 0 PER 100 WBC
PLATELET # BLD AUTO: 122 K/UL (ref 150–400)
PMV BLD AUTO: 11 FL (ref 8.9–12.9)
RBC # BLD AUTO: 4.65 M/UL (ref 3.8–5.2)
WBC # BLD AUTO: 8.2 K/UL (ref 3.6–11)

## 2023-08-18 RX ORDER — EZETIMIBE 10 MG/1
10 TABLET ORAL DAILY
Qty: 30 TABLET | Refills: 3 | Status: SHIPPED | OUTPATIENT
Start: 2023-08-18

## 2023-09-09 ENCOUNTER — HOSPITAL ENCOUNTER (OUTPATIENT)
Facility: HOSPITAL | Age: 68
End: 2023-09-09
Attending: PSYCHIATRY & NEUROLOGY
Payer: MEDICARE

## 2023-09-09 DIAGNOSIS — G35 MULTIPLE SCLEROSIS (HCC): ICD-10-CM

## 2023-09-09 DIAGNOSIS — G44.52 NEW DAILY PERSISTENT HEADACHE: ICD-10-CM

## 2023-09-09 PROCEDURE — A9579 GAD-BASE MR CONTRAST NOS,1ML: HCPCS | Performed by: PSYCHIATRY & NEUROLOGY

## 2023-09-09 PROCEDURE — 6360000004 HC RX CONTRAST MEDICATION: Performed by: PSYCHIATRY & NEUROLOGY

## 2023-09-09 PROCEDURE — 70553 MRI BRAIN STEM W/O & W/DYE: CPT

## 2023-09-09 PROCEDURE — 72156 MRI NECK SPINE W/O & W/DYE: CPT

## 2023-09-09 RX ADMIN — GADOTERIDOL 15 ML: 279.3 INJECTION, SOLUTION INTRAVENOUS at 10:38

## 2023-09-19 ENCOUNTER — HOME HEALTH ADMISSION (OUTPATIENT)
Dept: HOME HEALTH SERVICES | Facility: HOME HEALTH | Age: 68
End: 2023-09-19
Payer: MEDICARE

## 2023-09-21 ENCOUNTER — HOME CARE VISIT (OUTPATIENT)
Facility: HOME HEALTH | Age: 68
End: 2023-09-21

## 2023-09-21 PROCEDURE — G0151 HHCP-SERV OF PT,EA 15 MIN: HCPCS

## 2023-09-21 PROCEDURE — 0221000100 HH NO PAY CLAIM PROCEDURE

## 2023-09-22 VITALS
TEMPERATURE: 97.5 F | DIASTOLIC BLOOD PRESSURE: 75 MMHG | SYSTOLIC BLOOD PRESSURE: 137 MMHG | RESPIRATION RATE: 16 BRPM | HEART RATE: 68 BPM | OXYGEN SATURATION: 98 %

## 2023-09-22 ASSESSMENT — ENCOUNTER SYMPTOMS
DYSPNEA ACTIVITY LEVEL: AFTER AMBULATING 10 - 20 FT
PAIN LOCATION - PAIN QUALITY: ACHY/SORE

## 2023-09-22 NOTE — HOME HEALTH
Reason for referral, [facility] SUMMARY of clinical condition: Ms. Jeremias Pathak is 78 yo female with primary diagnosis of Multiple Sclerosis and co-morbidities of DM, HTN.   admitted to home care for recent decline in mobility and multple falls at home. Pt referred for PT and OT. Clinical Assessment/Skilled reason for admission to home health (What this means for the patient overall and need for ongoing skilled care): Ms. Elizabeth is known to PT and has seen significant decline over the past 6 months. She reports that she is spending about 5 days out of 7 in her bed due to fatigue and fear of falling. She is also more reliant on powered mobility to get around which is not efficient in the home. Pt needs the ability to use her RW safely. Diagnosis: Multiple Sclerosis, DM, HTN    Subjective (statement from pt/cg that is relative to why you are there): I want to be able to safely walk with my walker and be able to go outside as needed. Caregiver: Pt lives with her son Irene العراقي and Tamara. Both assist with patient care as needed but son works during the day. Caregiver assists with: Meals, Bathing, ADL, IADL, Transportation and Housekeeping Caregiver is not present at this visit and did not participate with clinician. Medications reconciled and all medications are available in the home this visit. The following education was provided regarding medications: medication interactions and look alike medications: NA. Patient/CG able to demonstrate knowledge through teach back with 90 percent accuracy. A list of reconciled medications has been given to the patient/caregiver. List to be uploaded to media when printed.      High risk med teaching was performed on Oxycodone, Metformin, and Aspirin    Patient at risk for falls Yes  Recommended requesting PT/OT orders due to fall risk Yes  Patient response to recommended requesting of PT/OT orders: Pt agreeable    Interdisciplinary communication with: OT for the purpose of

## 2023-09-25 ENCOUNTER — HOME CARE VISIT (OUTPATIENT)
Facility: HOME HEALTH | Age: 68
End: 2023-09-25

## 2023-09-25 VITALS
TEMPERATURE: 97.9 F | DIASTOLIC BLOOD PRESSURE: 72 MMHG | RESPIRATION RATE: 17 BRPM | SYSTOLIC BLOOD PRESSURE: 133 MMHG | OXYGEN SATURATION: 96 % | HEART RATE: 65 BPM

## 2023-09-25 PROCEDURE — G0151 HHCP-SERV OF PT,EA 15 MIN: HCPCS

## 2023-09-25 PROCEDURE — G0152 HHCP-SERV OF OT,EA 15 MIN: HCPCS

## 2023-09-25 ASSESSMENT — ENCOUNTER SYMPTOMS: PAIN LOCATION - PAIN QUALITY: ACHING

## 2023-09-25 NOTE — HOME HEALTH
skilled [address] [facility] OT to increase independence and reduce risk of falls with with ADLs, IADLs and functional transfers along with strength, endurance, balance deficits, DME/AE training and fall prevention training to return to highest level of independent functioning. Patient to benefit from skilled OT services for 1d1, 1w1 and 2w3 to address the above deficits with patient verbalizing understanding and in agreement with POC. Written Teaching Material Utilized: NA   Specific plan for next visit: Fall prevention and independence with toilet transfers and toileting tasks along with initiating UE HEP   Interdisciplinary communication with: Cheral Harada, PT for POC collaboration     PCP:  Ryanne Colon MD  Next scheduled doctor appointment:  TBD  Patient/Caregiver instructed to keep follow up appointment because lack of follow through with physician appointments could result in discontinuation of home care services for non-compliance. Patient/Caregiver verbalize knowledge of above through teach back with 100 percent accuracy.

## 2023-09-26 ENCOUNTER — TELEPHONE (OUTPATIENT)
Age: 68
End: 2023-09-26

## 2023-09-26 VITALS
RESPIRATION RATE: 16 BRPM | OXYGEN SATURATION: 96 % | DIASTOLIC BLOOD PRESSURE: 72 MMHG | TEMPERATURE: 97.9 F | HEART RATE: 65 BPM | SYSTOLIC BLOOD PRESSURE: 133 MMHG

## 2023-09-26 ASSESSMENT — ENCOUNTER SYMPTOMS: PAIN LOCATION - PAIN QUALITY: ACHY/SORE

## 2023-09-26 NOTE — TELEPHONE ENCOUNTER
Lvm with person from [facility] to get a little more information for Monalisa Hemphill about orders they want signed.

## 2023-09-26 NOTE — TELEPHONE ENCOUNTER
Vaishali Heron called and states that she faxed the order for home health over this morning. Found the order and will  request that it be signed by Monalisa Mcconnell

## 2023-09-26 NOTE — TELEPHONE ENCOUNTER
Calling to speak with nurse regarding signing intervention orders, please call.  Can np  sign orders, if not, who?

## 2023-09-26 NOTE — HOME HEALTH
Subjective: Pt with no complaints today  Falls since last visit: No  Caregiver involvement changes: No changes  Home health supplies by type and quantity ordered/delivered this visit include: N/A    Clinician asked if patient has had any physician contact since last home care visit and patient states: NO  Clinician asked if patient has any new or changed medications and patient states:  NO   If Yes, were medications reconciled? NA  Was the certifying physician notified of changes in medications? NA    Clinical assessment (what this visit means for the patient overall and need for ongoing skilled care) and progress or lack of progress towards SPECIFIC goals: Ms. Elizabeth suffers from Muliple Sclerosis which causes significant LE weakness and impaired balance;      Written Teaching Material Utilized: None yet    Interdisciplinary communication with: Yes with OT today regarding patient care and coordination    Discharge planning as follows: Is no longer homebound, Will discharge when the patient has reached their maximum functional potential and maximum safety in their home and When goals are met    Specific plan for next visit: Instruct caregiver/patient in therex for LE strength and balance improvement. Continue gait training and transfer training.

## 2023-09-27 ENCOUNTER — HOME CARE VISIT (OUTPATIENT)
Facility: HOME HEALTH | Age: 68
End: 2023-09-27

## 2023-09-27 VITALS
DIASTOLIC BLOOD PRESSURE: 70 MMHG | OXYGEN SATURATION: 99 % | HEART RATE: 62 BPM | TEMPERATURE: 97.2 F | SYSTOLIC BLOOD PRESSURE: 122 MMHG

## 2023-09-27 PROCEDURE — G0151 HHCP-SERV OF PT,EA 15 MIN: HCPCS

## 2023-09-27 PROCEDURE — G0152 HHCP-SERV OF OT,EA 15 MIN: HCPCS

## 2023-09-27 ASSESSMENT — ENCOUNTER SYMPTOMS: PAIN LOCATION - PAIN QUALITY: HEADACHE

## 2023-09-28 VITALS
DIASTOLIC BLOOD PRESSURE: 70 MMHG | RESPIRATION RATE: 16 BRPM | OXYGEN SATURATION: 99 % | SYSTOLIC BLOOD PRESSURE: 122 MMHG | HEART RATE: 65 BPM | TEMPERATURE: 97.2 F

## 2023-09-28 ASSESSMENT — ENCOUNTER SYMPTOMS: PAIN LOCATION - PAIN QUALITY: ACHY/SORE

## 2023-09-28 NOTE — HOME HEALTH
Subjective: Pt with no complaints today  Falls since last visit: No  Caregiver involvement changes: No changes  Home health supplies by type and quantity ordered/delivered this visit include: N/A    Clinician asked if patient has had any physician contact since last home care visit and patient states: NO  Clinician asked if patient has any new or changed medications and patient states:  NO   If Yes, were medications reconciled? NA  Was the certifying physician notified of changes in medications? NA    Clinical assessment (what this visit means for the patient overall and need for ongoing skilled care) and progress or lack of progress towards SPECIFIC goals: MsClover Elizabeth suffers from Muliple Sclerosis which causes significant LE weakness and impaired balance; Pt with more RLE weakness compared to the L which causes knee buckling, risk for falls, and reduced foot clearance during gait. Written Teaching Material Utilized: None yet    Interdisciplinary communication with: ERNA    Discharge planning as follows: Is no longer homebound, Will discharge when the patient has reached their maximum functional potential and maximum safety in their home and When goals are met    Specific plan for next visit: Instruct caregiver/patient in therex for LE strength and balance improvement. Continue gait training and transfer training.

## 2023-10-02 ENCOUNTER — HOME CARE VISIT (OUTPATIENT)
Facility: HOME HEALTH | Age: 68
End: 2023-10-02
Payer: MEDICARE

## 2023-10-02 VITALS
SYSTOLIC BLOOD PRESSURE: 138 MMHG | HEART RATE: 71 BPM | DIASTOLIC BLOOD PRESSURE: 70 MMHG | TEMPERATURE: 97.8 F | OXYGEN SATURATION: 96 %

## 2023-10-02 PROCEDURE — G0152 HHCP-SERV OF OT,EA 15 MIN: HCPCS

## 2023-10-02 ASSESSMENT — ENCOUNTER SYMPTOMS: PAIN LOCATION - PAIN QUALITY: HEADACHE

## 2023-10-02 NOTE — HOME HEALTH
Subjective: \"I did my exercises over the weekend and I have a few questions. \"   Falls since last visit: (if yes complete the Fall Tracking Form and include bsrifallreport): No   Caregiver involvement changes: none   Home health supplies by type and quantity ordered/delivered this visit include: None     Clinician asked if patient has had any physician contact since last home care visit and patient states: No   Clinician asked if patient has any new or changed medications and patient states:  No   If Yes, were medications reconciled? N/A  Was the certifying physician notified of changes in medications? N/A     Clinical assessment (what this visit means for the patient overall and need for ongoing skilled care) and progress or lack of progress towards SPECIFIC goals: Patient demonstrated the ability to complete toilet transfers and toileting tasks with therapist providing verbal cueing for fall prevention techniques implementing at a mod I level. She remains at a continued risk for falls with dressing and bathing tasks along with shower transfers requiring additional instruction from skilled [facility] OT to achieve higher level of functional independence and reduce falls as caregiver not able to provide this needed level of assistance safely. Patient met mod I level goals set for toilet transfers and toileting tasks. Steady progress towards independent level with UE HEP using theraband. Written Teaching Material Utilized: UE HEP using theraband   Interdisciplinary communication with: Mino Le PT for POC colloboration   Discharge planning as follows: Discharge from [facility] OT when goals are met or maximum level of function is achieved.      Specific plan for next visit:  Focus on increasing independence and reducing risk of falls with shower transfers

## 2023-10-04 ENCOUNTER — HOME CARE VISIT (OUTPATIENT)
Facility: HOME HEALTH | Age: 68
End: 2023-10-04
Payer: MEDICARE

## 2023-10-04 VITALS
OXYGEN SATURATION: 97 % | DIASTOLIC BLOOD PRESSURE: 78 MMHG | TEMPERATURE: 98 F | SYSTOLIC BLOOD PRESSURE: 128 MMHG | HEART RATE: 63 BPM

## 2023-10-04 PROCEDURE — G0151 HHCP-SERV OF PT,EA 15 MIN: HCPCS

## 2023-10-04 PROCEDURE — G0152 HHCP-SERV OF OT,EA 15 MIN: HCPCS

## 2023-10-04 ASSESSMENT — ENCOUNTER SYMPTOMS: PAIN LOCATION - PAIN QUALITY: HEADACHE

## 2023-10-05 ENCOUNTER — HOME CARE VISIT (OUTPATIENT)
Facility: HOME HEALTH | Age: 68
End: 2023-10-05
Payer: MEDICARE

## 2023-10-05 VITALS — SYSTOLIC BLOOD PRESSURE: 158 MMHG | DIASTOLIC BLOOD PRESSURE: 83 MMHG | TEMPERATURE: 97.8 F | OXYGEN SATURATION: 99 %

## 2023-10-05 PROCEDURE — G0153 HHCP-SVS OF S/L PATH,EA 15MN: HCPCS

## 2023-10-05 ASSESSMENT — ENCOUNTER SYMPTOMS: PAIN LOCATION - PAIN QUALITY: THROBBING

## 2023-10-05 NOTE — HOME HEALTH
Reason for referral, [facility] SUMMARY of clinical condition:  MS admitted to home care for . Speech Language Pathology needed for cognitive deficits, safety and indpendence in home. Clinical Assessment/Skilled reason for admission to home health (What this means for the patient overall and need for ongoing skilled care):Pt seen for speech evaluation for cognitive deficits, memory, reasoning and safety concerns to maintain max independence. Diagnosis: MS    Subjective (statement from pt/cg that is relative to why you are there): \"I am having trouble coming up with what I want to say. \"    Caregiver: relative. Caregiver assists with: Meals, Transportation and Housekeeping Caregiver unable to assist with: Bathing, ADL and Wound care. Caregiver is available In the evenings Caregiver is not present at this visit and did not participate with clinician. Medications reconciled and all medications are available in the home this visit. The following education was provided regarding medications: medication interactions and look alike medications: Patient/CG able to demonstrate knowledge through teach back with 100% percent accuracy. A list of reconciled medications has been given to the patient/caregiver . High risk med teaching N/A     Patient at risk for falls Yes:   Recommended requesting PT/OT orders due to fall risk YES:   Patient response to recommended requesting of PT/OT orders: agreeable    Interdisciplinary communication with:  LPN for the purpose of [employer] collaboration    Written Teaching Material Utilized: N/A    Clinician reviewed orientation to home health booklet with patient/caregiver including agency phone number, agency complaint process, state hotline number, as well as [employer]'s quality hotline number. Emergency preparedness reviewed with patient/caregiver in home health booklet.  Consent forms signed    Patient/caregiver instructed on plan of care and are agreeable to plan of

## 2023-10-05 NOTE — HOME HEALTH
Subjective: \"I'm having a pretty bad headache. \"   Falls since last visit: (if yes complete the Fall Tracking Form and include bsrifallreport): No   Caregiver involvement changes: none   Home health supplies by type and quantity ordered/delivered this visit include: Theraputty    Clinician asked if patient has had any physician contact since last home care visit and patient states: No   Clinician asked if patient has any new or changed medications and patient states:  No   If Yes, were medications reconciled? N/A  Was the certifying physician notified of changes in medications? N/A     Clinical assessment (what this visit means for the patient overall and need for ongoing skilled care) and progress or lack of progress towards SPECIFIC goals:  Patient continues to demonstrate decreased strength needed for increased independence and reducing risk of falls ADLs, IADLs and functional transfers requiring [facility] to achieve higher level of functional independence and reduce falls as caregiver unable to provide level of needed assistance safely at this time. Patient making steady progress to independent level goal set for UE HEP using theraputty. Written Teaching Material Utilized: UE HEP handout consisting of theraputty exercises   Interdisciplinary communication with: Latoya Culver PT for POC colloboration   Discharge planning as follows: [facility] when goals are met or maximum level of function is achieved.      Specific plan for next visit:  Focus on increasing independence and reducing risk of falls with shower transfers and bathing tasks

## 2023-10-06 ENCOUNTER — HOME CARE VISIT (OUTPATIENT)
Facility: HOME HEALTH | Age: 68
End: 2023-10-06
Payer: MEDICARE

## 2023-10-06 VITALS
OXYGEN SATURATION: 97 % | DIASTOLIC BLOOD PRESSURE: 75 MMHG | TEMPERATURE: 97.5 F | HEART RATE: 72 BPM | SYSTOLIC BLOOD PRESSURE: 140 MMHG | RESPIRATION RATE: 16 BRPM

## 2023-10-06 PROCEDURE — G0151 HHCP-SERV OF PT,EA 15 MIN: HCPCS

## 2023-10-06 ASSESSMENT — ENCOUNTER SYMPTOMS: PAIN LOCATION - PAIN QUALITY: ACHY/SORE

## 2023-10-06 NOTE — HOME HEALTH
Subjective: Pt with no complaints today  Falls since last visit: No  Caregiver involvement changes: No changes  Home health supplies by type and quantity ordered/delivered this visit include: N/A    Clinician asked if patient has had any physician contact since last home care visit and patient states: NO  Clinician asked if patient has any new or changed medications and patient states:  NO   If Yes, were medications reconciled? NA  Was the certifying physician notified of changes in medications? 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 This poses fall risk concerns and impairments in gait. PT interventions needed for strengthening and improved mechanics. Written Teaching Material Utilized: None yet    Interdisciplinary communication with: ERNA    Discharge planning as follows: Is no longer homebound, Will discharge when the patient has reached their maximum functional potential and maximum safety in their home and When goals are met    Specific plan for next visit: Instruct caregiver/patient in therex for LE strength and balance improvement. Continue gait training and transfer training.

## 2023-10-09 ENCOUNTER — HOME CARE VISIT (OUTPATIENT)
Facility: HOME HEALTH | Age: 68
End: 2023-10-09
Payer: MEDICARE

## 2023-10-09 ENCOUNTER — TELEPHONE (OUTPATIENT)
Age: 68
End: 2023-10-09

## 2023-10-09 VITALS
DIASTOLIC BLOOD PRESSURE: 75 MMHG | OXYGEN SATURATION: 98 % | HEART RATE: 72 BPM | RESPIRATION RATE: 16 BRPM | SYSTOLIC BLOOD PRESSURE: 142 MMHG | TEMPERATURE: 97.2 F

## 2023-10-09 VITALS
TEMPERATURE: 97.6 F | OXYGEN SATURATION: 96 % | SYSTOLIC BLOOD PRESSURE: 136 MMHG | DIASTOLIC BLOOD PRESSURE: 87 MMHG | HEART RATE: 81 BPM

## 2023-10-09 PROCEDURE — G0151 HHCP-SERV OF PT,EA 15 MIN: HCPCS

## 2023-10-09 PROCEDURE — G0152 HHCP-SERV OF OT,EA 15 MIN: HCPCS

## 2023-10-09 ASSESSMENT — ENCOUNTER SYMPTOMS
PAIN LOCATION - PAIN QUALITY: HEADACHE
PAIN LOCATION - PAIN QUALITY: ACHY/SORE

## 2023-10-09 NOTE — HOME HEALTH
Subjective: \"I was a little lightheaded this morning but I feel better since I ate breakfast.\"   Falls since last visit: (if yes complete the Fall Tracking Form and include bsrifallreport): No   Caregiver involvement changes: none   Home health supplies by type and quantity ordered/delivered this visit include: Theraputty    Clinician asked if patient has had any physician contact since last home care visit and patient states: No   Clinician asked if patient has any new or changed medications and patient states:  No   If Yes, were medications reconciled? N/A  Was the certifying physician notified of changes in medications? N/A     Clinical assessment (what this visit means for the patient overall and need for ongoing skilled care) and progress or lack of progress towards SPECIFIC goals:  Patient continues to demonstrate decreased strength and coordination needed for increased independence and reducing risk of falls ADLs, IADLs and functional transfers requiring additional instruction from skilled [facility] OT to achieve higher level of functional independence and reduce falls. Patient making steady progress to independent level goal set for UE HEP using theraputty. Written Teaching Material Utilized: UE HEP handout consisting of theraputty exercises   Interdisciplinary communication with: Tete Davis PT for POC colloboration   Discharge planning as follows: Discharge from [facility] OT when goals are met or maximum level of function is achieved.      Specific plan for next visit:  Focus on increasing independence and reducing risk of falls with shower transfers

## 2023-10-09 NOTE — TELEPHONE ENCOUNTER
Spoke with pt.  Pt says she has found a lump in left breast and would like to know if Dr. Casey Oakley can place a referral to have diagnostic mammogram and US left Breast. Best call number [phone]

## 2023-10-09 NOTE — HOME HEALTH
Subjective: Pt with no complaints today  Falls since last visit: No  Caregiver involvement changes: No changes  Home health supplies by type and quantity ordered/delivered this visit include: N/A    Clinician asked if patient has had any physician contact since last home care visit and patient states: NO  Clinician asked if patient has any new or changed medications and patient states:  NO   If Yes, were medications reconciled? NA  Was the certifying physician notified of changes in medications? 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 This poses fall risk concerns and impairments in gait. PT interventions needed for strengthening and improved mechanics. Progress towards goals:  Pt is improving gait ability and distance each week. Working with PT to build RLE strength. Written Teaching Material Utilized: None yet    Interdisciplinary communication with: ERNA    Discharge planning as follows: Is no longer homebound, Will discharge when the patient has reached their maximum functional potential and maximum safety in their home and When goals are met    Specific plan for next visit: Instruct caregiver/patient in therex for LE strength and balance improvement. Continue gait training and transfer training.

## 2023-10-10 VITALS
OXYGEN SATURATION: 96 % | SYSTOLIC BLOOD PRESSURE: 136 MMHG | RESPIRATION RATE: 16 BRPM | DIASTOLIC BLOOD PRESSURE: 87 MMHG | HEART RATE: 81 BPM | TEMPERATURE: 97.6 F

## 2023-10-10 NOTE — TELEPHONE ENCOUNTER
Left VMx2 for pt to call office back. If pt calls back please let her know per Dr. Raina Dunbar pt would need an in office visit for lump in left breast before she can put in a referral for diagnostic mammogram. If nothing is available no time soon per Dr. Raina Dunbar pt can be schedule 10/16/23 at 1 pm, just change visit to in office. -TM 10/10/23

## 2023-10-11 ENCOUNTER — HOME CARE VISIT (OUTPATIENT)
Facility: HOME HEALTH | Age: 68
End: 2023-10-11
Payer: MEDICARE

## 2023-10-11 VITALS
OXYGEN SATURATION: 96 % | HEART RATE: 64 BPM | SYSTOLIC BLOOD PRESSURE: 133 MMHG | TEMPERATURE: 97.6 F | DIASTOLIC BLOOD PRESSURE: 78 MMHG

## 2023-10-11 PROCEDURE — G0151 HHCP-SERV OF PT,EA 15 MIN: HCPCS

## 2023-10-11 PROCEDURE — G0152 HHCP-SERV OF OT,EA 15 MIN: HCPCS

## 2023-10-11 ASSESSMENT — ENCOUNTER SYMPTOMS: PAIN LOCATION - PAIN QUALITY: SHARP

## 2023-10-12 ENCOUNTER — HOME CARE VISIT (OUTPATIENT)
Dept: HOME HEALTH SERVICES | Facility: HOME HEALTH | Age: 68
End: 2023-10-12
Payer: MEDICARE

## 2023-10-12 VITALS
RESPIRATION RATE: 16 BRPM | DIASTOLIC BLOOD PRESSURE: 76 MMHG | HEART RATE: 66 BPM | OXYGEN SATURATION: 96 % | SYSTOLIC BLOOD PRESSURE: 135 MMHG | TEMPERATURE: 97.6 F

## 2023-10-12 PROCEDURE — G0153 HHCP-SVS OF S/L PATH,EA 15MN: HCPCS

## 2023-10-12 ASSESSMENT — ENCOUNTER SYMPTOMS: PAIN LOCATION - PAIN QUALITY: ACHY/SORE

## 2023-10-12 NOTE — HOME HEALTH
Subjective: Pt with no complaints today  Falls since last visit: No  Caregiver involvement changes: No changes  Home health supplies by type and quantity ordered/delivered this visit include: N/A    Clinician asked if patient has had any physician contact since last home care visit and patient states: NO  Clinician asked if patient has any new or changed medications and patient states:  NO   If Yes, were medications reconciled? NA  Was the certifying physician notified of changes in medications? 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 This poses fall risk concerns and impairments in gait. PT interventions needed for strengthening and improved mechanics. Progress towards goals:  Pt is improving gait ability and distance each week. Pt has improved foot clerance and stability during gait. Working with PT to build RLE strength. Pt is showing improved quad and hip flexor strength already since [facility]. Pt is now ready to add more resistance to therex with her gained strength. Written Teaching Material Utilized: None yet    Interdisciplinary communication with: NA    Discharge planning as follows: Is no longer homebound, Will discharge when the patient has reached their maximum functional potential and maximum safety in their home and When goals are met    Specific plan for next visit: Instruct caregiver/patient in therex for LE strength and balance improvement. Continue gait training and transfer training.

## 2023-10-12 NOTE — HOME HEALTH
Subjective: \"I've been keeping up with my exercises. \"   Falls since last visit: (if yes complete the Fall Tracking Form and include bsrifallreport): No   Caregiver involvement changes: none   Home health supplies by type and quantity ordered/delivered this visit include: None     Clinician asked if patient has had any physician contact since last home care visit and patient states: No   Clinician asked if patient has any new or changed medications and patient states:  No   If Yes, were medications reconciled? N/AWas the certifying physician notified of changes in medications? N/A     Clinical assessment (what this visit means for the patient overall and need for ongoing skilled care) and progress or lack of progress towards SPECIFIC goals: Patient demonstrated the ability to complete shower transfers with therapist providing verbal cueing for fall prevention techniques implementing at a mod I level. She remains at a continued risk for falls with dressing and bathing tasks requiring additional instruction from skilled [facility] OT to achieve higher level of functional independence and reduce falls as caregiver not able to provide this needed level of assistance safely. Patient met mod I level goals set for shower tasks. Steady progress towards mod I level with dressing and bathing tasks. Written Teaching Material Utilized: UE HEP using theraband   Interdisciplinary communication with: Lefty Brantley PT for POC colloboration   Discharge planning as follows: Discharge from [facility] OT when goals are met or maximum level of function is achieved.      Specific plan for next visit:  Focus on increasing independence and reducing risk of falls with dressing and bathing tasks

## 2023-10-13 VITALS
HEART RATE: 87 BPM | DIASTOLIC BLOOD PRESSURE: 80 MMHG | TEMPERATURE: 97.6 F | OXYGEN SATURATION: 99 % | SYSTOLIC BLOOD PRESSURE: 140 MMHG

## 2023-10-13 ASSESSMENT — ENCOUNTER SYMPTOMS: PAIN LOCATION - PAIN QUALITY: THROBBING

## 2023-10-13 NOTE — HOME HEALTH
Subjective: \"I'm so exhausted today. \"  Falls since last visit No(if yes complete the Fall Tracking Form and include bsrifallreport):   Caregiver involvement changes: none  Home health supplies by type and quantity ordered/delivered this visit include: none    Clinician asked if patient has had any physician contact since last home care visit and patient states: NO  Clinician asked if patient has any new or changed medications and patient states:  NO   If Yes, were medications reconciled? N/A   Was the certifying physician notified of changes in medications? N/A     Clinical assessment (what this visit means for the patient overall and need for ongoing skilled care) and progress or lack of progress towards SPECIFIC goals: Visual cues placed in home for patients ability to utilize reminders for daily tasks. Pt exhibiting cognitive deficits affecting ability to recall daily events that are improtant to her for functional independence. Pt compliant w/ visual aids and feels they will assist her in recalling daily tasks. Written Teaching Material Utilized: visual aids left in home for improved recall    Interdisciplinary communication with:  PT and  OT for the purpose of POC collaboration, medication concerns and home safety issues    Discharge planning as follows:  Will discharge when the patient has reached their maximum functional potential and maximum safety in their home    Specific plan for next visit: Instruct caregiver/patient in safety, organization and recall of daily tasks for increased independence

## 2023-10-16 ENCOUNTER — OFFICE VISIT (OUTPATIENT)
Age: 68
End: 2023-10-16
Payer: MEDICARE

## 2023-10-16 VITALS
BODY MASS INDEX: 24.24 KG/M2 | OXYGEN SATURATION: 93 % | DIASTOLIC BLOOD PRESSURE: 70 MMHG | HEART RATE: 73 BPM | TEMPERATURE: 97.1 F | SYSTOLIC BLOOD PRESSURE: 124 MMHG | WEIGHT: 179 LBS | HEIGHT: 72 IN | RESPIRATION RATE: 14 BRPM

## 2023-10-16 DIAGNOSIS — N63.22 MASS OF UPPER INNER QUADRANT OF LEFT BREAST: Primary | ICD-10-CM

## 2023-10-16 PROCEDURE — 1123F ACP DISCUSS/DSCN MKR DOCD: CPT | Performed by: FAMILY MEDICINE

## 2023-10-16 PROCEDURE — 3078F DIAST BP <80 MM HG: CPT | Performed by: FAMILY MEDICINE

## 2023-10-16 PROCEDURE — 1090F PRES/ABSN URINE INCON ASSESS: CPT | Performed by: FAMILY MEDICINE

## 2023-10-16 PROCEDURE — 99214 OFFICE O/P EST MOD 30 MIN: CPT | Performed by: FAMILY MEDICINE

## 2023-10-16 PROCEDURE — 3074F SYST BP LT 130 MM HG: CPT | Performed by: FAMILY MEDICINE

## 2023-10-16 PROCEDURE — G8427 DOCREV CUR MEDS BY ELIG CLIN: HCPCS | Performed by: FAMILY MEDICINE

## 2023-10-16 PROCEDURE — 3017F COLORECTAL CA SCREEN DOC REV: CPT | Performed by: FAMILY MEDICINE

## 2023-10-16 PROCEDURE — G8399 PT W/DXA RESULTS DOCUMENT: HCPCS | Performed by: FAMILY MEDICINE

## 2023-10-16 PROCEDURE — G8420 CALC BMI NORM PARAMETERS: HCPCS | Performed by: FAMILY MEDICINE

## 2023-10-16 PROCEDURE — G8484 FLU IMMUNIZE NO ADMIN: HCPCS | Performed by: FAMILY MEDICINE

## 2023-10-16 PROCEDURE — 4004F PT TOBACCO SCREEN RCVD TLK: CPT | Performed by: FAMILY MEDICINE

## 2023-10-16 ASSESSMENT — ENCOUNTER SYMPTOMS
ABDOMINAL PAIN: 0
CONSTIPATION: 0
NAUSEA: 0
VOMITING: 0
WHEEZING: 0
DIARRHEA: 0
SHORTNESS OF BREATH: 0
COUGH: 0
CHEST TIGHTNESS: 0

## 2023-10-17 ENCOUNTER — HOME CARE VISIT (OUTPATIENT)
Facility: HOME HEALTH | Age: 68
End: 2023-10-17
Payer: MEDICARE

## 2023-10-17 VITALS
DIASTOLIC BLOOD PRESSURE: 88 MMHG | HEART RATE: 87 BPM | SYSTOLIC BLOOD PRESSURE: 136 MMHG | TEMPERATURE: 98.6 F | OXYGEN SATURATION: 96 %

## 2023-10-17 PROCEDURE — G0152 HHCP-SERV OF OT,EA 15 MIN: HCPCS

## 2023-10-17 ASSESSMENT — ENCOUNTER SYMPTOMS: PAIN LOCATION - PAIN QUALITY: HEADACHE

## 2023-10-17 NOTE — HOME HEALTH
Subjective: \"I was a little lightheaded this morning but I feel better since I ate breakfast.\"   Falls since last visit: (if yes complete the Fall Tracking Form and include bsrifallreport): No   Caregiver involvement changes: none   Home health supplies by type and quantity ordered/delivered this visit include: None     Clinician asked if patient has had any physician contact since last home care visit and patient states: Yes   Clinician asked if patient has any new or changed medications and patient states:  No   If Yes, were medications reconciled? N/A  Was the certifying physician notified of changes in medications? N/A     Clinical assessment (what this visit means for the patient overall and need for ongoing skilled care) and progress or lack of progress towards SPECIFIC goals:  Patient continues to demonstrate decreased strength and coordination needed for increased independence and reducing risk of falls ADLs, IADLs and functional transfers requiring additional instruction from skilled [address] [place] [address],[address] OT to achieve higher level of functional independence and reduce falls. Patient making steady progress to independent level goal set for UE HEP using theraputty. Written Teaching Material Utilized: UE HEP handout consisting of theraputty exercises   Interdisciplinary communication with: Matthew Roberto PT for POC colloboration   Discharge planning as follows: Discharge from [address] [place] [address],[address] OT when goals are met or maximum level of function is achieved.      Specific plan for next visit:  Focus on increasing independence and reducing risk of falls with medication management using pill box

## 2023-10-18 ENCOUNTER — HOME CARE VISIT (OUTPATIENT)
Facility: HOME HEALTH | Age: 68
End: 2023-10-18
Payer: MEDICARE

## 2023-10-18 VITALS
TEMPERATURE: 98.1 F | OXYGEN SATURATION: 99 % | SYSTOLIC BLOOD PRESSURE: 139 MMHG | DIASTOLIC BLOOD PRESSURE: 81 MMHG | HEART RATE: 92 BPM

## 2023-10-18 VITALS
HEART RATE: 73 BPM | DIASTOLIC BLOOD PRESSURE: 74 MMHG | OXYGEN SATURATION: 99 % | SYSTOLIC BLOOD PRESSURE: 120 MMHG | TEMPERATURE: 97.8 F

## 2023-10-18 PROCEDURE — G0153 HHCP-SVS OF S/L PATH,EA 15MN: HCPCS

## 2023-10-18 PROCEDURE — G0152 HHCP-SERV OF OT,EA 15 MIN: HCPCS

## 2023-10-18 PROCEDURE — G0151 HHCP-SERV OF PT,EA 15 MIN: HCPCS

## 2023-10-18 ASSESSMENT — ENCOUNTER SYMPTOMS
PAIN LOCATION - PAIN QUALITY: HEADACHE
PAIN LOCATION - PAIN QUALITY: THROBBING

## 2023-10-18 NOTE — HOME HEALTH
Subjective: \"That was challenging but I really need it. \" (referring to cognitive exercises)  Falls since last visit No(if yes complete the Fall Tracking Form and include bsrifallreport):   Caregiver involvement changes: no changes  Home health supplies by type and quantity ordered/delivered this visit include: none    Clinician asked if patient has had any physician contact since last home care visit and patient states: YES  Clinician asked if patient has any new or changed medications and patient states:  NO   If Yes, were medications reconciled? N/A   Was the certifying physician notified of changes in medications? N/A     Clinical assessment (what this visit means for the patient overall and need for ongoing skilled care) and progress or lack of progress towards SPECIFIC goals: Educated/instructed patient on utilizing computer for daily cognitive tasks. Downloaded content and walked through process of completing daily. Encouraged patient to complete 20 minutes daily of cognitive exercises. Pt stated it was challenging and she needed that. Pt achieved approx 60% acc. w sequencing, visual perception and spatial exercises      Written Teaching Material Utilized: utilized computer for dailt cognitive exercises    Interdisciplinary communication with:  PT for the purpose of POC collaboration    Discharge planning as follows:  Will discharge when the patient has reached their maximum functional potential and maximum safety in their home    Specific plan for next visit: Instruct caregiver/patient in daily participation in cognitive exercises

## 2023-10-18 NOTE — HOME HEALTH
Subjective: \"I did my arm exercises before you got here. \"   Falls since last visit: (if yes complete the Fall Tracking Form and include bsrifallreport): No   Caregiver involvement changes: none   Home health supplies by type and quantity ordered/delivered this visit include: None     Clinician asked if patient has had any physician contact since last home care visit and patient states: No   Clinician asked if patient has any new or changed medications and patient states: No   If Yes, were medications reconciled? NA  Was the certifying physician notified of changes in medications? NA     Clinical assessment (what this visit means for the patient overall and need for ongoing skilled care) and progress or lack of progress towards SPECIFIC goals: Patient is making good steady progress with the following functional gains going from min A for toileting tasks to mod I level, min A for toilet and shower transfers to mod I level. The Barthel index assessment score at initial evaluation was 45/100 indicating partially dependent improving to 60/100 indicating minimally dependent with self care tasks. Patient will continue to benefit from skilled [facility] OT to address independence and safety with dressing and bathing tasks along with meal prep tasks focusing on fall prevention training to return to highest level of independent functioning. Patient to benefit from skilled OT services for 2w4 to address the above deficits with patient verbalizing understanding and in agreement with POC. Written Teaching Material Utilized: None   Interdisciplinary communication with: Vivian Carlisle PT for POC collaboration   Discharge planning as follows: Discharge from [facility] OT when goals are met or maximum level of function is achieved.      Specific plan for next visit:  Increasing independence and reducing risk of falls with dressing and bathing tasks

## 2023-10-19 VITALS
HEART RATE: 85 BPM | DIASTOLIC BLOOD PRESSURE: 72 MMHG | TEMPERATURE: 97.5 F | RESPIRATION RATE: 16 BRPM | SYSTOLIC BLOOD PRESSURE: 128 MMHG | OXYGEN SATURATION: 99 %

## 2023-10-19 NOTE — HOME HEALTH
Subjective: Pt with no complaints today. States that she is feeling stronger. Falls since last visit: No  Caregiver involvement changes: No changes  Home health supplies by type and quantity ordered/delivered this visit include: N/A    Clinician asked if patient has had any physician contact since last home care visit and patient states: NO  Clinician asked if patient has any new or changed medications and patient states:  NO   If Yes, were medications reconciled? NA  Was the certifying physician notified of changes in medications? 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 This poses fall risk concerns and impairments in gait. PT interventions needed for strengthening and improved mechanics. Progress towards goals:  Pt is improving gait ability and distance each week. Pt has improved foot clerance and stability during gait. Working with PT to build RLE strength. Pt is showing improved quad and hip flexor strength already since [facility]. Measured at 3/5 per reassessment today. Written Teaching Material Utilized: None yet    Interdisciplinary communication with: NA    Discharge planning as follows: Is no longer homebound, Will discharge when the patient has reached their maximum functional potential and maximum safety in their home and When goals are met    Specific plan for next visit: Instruct caregiver/patient in therex for LE strength and balance improvement. Continue gait training and transfer training.

## 2023-10-20 ENCOUNTER — HOME CARE VISIT (OUTPATIENT)
Facility: HOME HEALTH | Age: 68
End: 2023-10-20
Payer: MEDICARE

## 2023-10-23 ENCOUNTER — HOME CARE VISIT (OUTPATIENT)
Facility: HOME HEALTH | Age: 68
End: 2023-10-23
Payer: MEDICARE

## 2023-10-23 VITALS
DIASTOLIC BLOOD PRESSURE: 86 MMHG | OXYGEN SATURATION: 95 % | TEMPERATURE: 97.6 F | HEART RATE: 76 BPM | SYSTOLIC BLOOD PRESSURE: 140 MMHG

## 2023-10-23 PROCEDURE — G0152 HHCP-SERV OF OT,EA 15 MIN: HCPCS

## 2023-10-23 PROCEDURE — G0151 HHCP-SERV OF PT,EA 15 MIN: HCPCS

## 2023-10-23 ASSESSMENT — ENCOUNTER SYMPTOMS: PAIN LOCATION - PAIN QUALITY: HEADACHE

## 2023-10-23 NOTE — HOME HEALTH
Subjective: \"I'm having a really bad headache this morning. \"   Falls since last visit: (if yes complete the Fall Tracking Form and include bsrifallreport): No   Caregiver involvement changes: none   Home health supplies by type and quantity ordered/delivered this visit include: None     Clinician asked if patient has had any physician contact since last home care visit and patient states: No   Clinician asked if patient has any new or changed medications and patient states:  No   If Yes, were medications reconciled? N/A  Was the certifying physician notified of changes in medications? N/A     Clinical assessment (what this visit means for the patient overall and need for ongoing skilled care) and progress or lack of progress towards SPECIFIC goals: Patient demonstrated the ability to complete UE HEP using theraband with therapist providing verbal cueing for form and pacing implementing at an independent level for improving strength needed for increased independence and reducing risk of falls with ADLs and IADls. Patient met independent level goals set for UE HEP using theraband. Written Teaching Material Utilized: UE HEP using theraband   Interdisciplinary communication with: Ira Calhoun PT for POC colloboration   Discharge planning as follows: Discharge from [facility] OT when goals are met or maximum level of function is achieved.      Specific plan for next visit:  Focus on increasing independence and reducing risk of falls with dressing and bathing

## 2023-10-24 ENCOUNTER — HOME CARE VISIT (OUTPATIENT)
Dept: HOME HEALTH SERVICES | Facility: HOME HEALTH | Age: 68
End: 2023-10-24
Payer: MEDICARE

## 2023-10-24 PROCEDURE — G0153 HHCP-SVS OF S/L PATH,EA 15MN: HCPCS

## 2023-10-25 ENCOUNTER — HOME CARE VISIT (OUTPATIENT)
Facility: HOME HEALTH | Age: 68
End: 2023-10-25
Payer: MEDICARE

## 2023-10-25 VITALS
DIASTOLIC BLOOD PRESSURE: 78 MMHG | TEMPERATURE: 97.8 F | SYSTOLIC BLOOD PRESSURE: 133 MMHG | OXYGEN SATURATION: 96 % | HEART RATE: 81 BPM

## 2023-10-25 VITALS
SYSTOLIC BLOOD PRESSURE: 138 MMHG | RESPIRATION RATE: 16 BRPM | HEART RATE: 76 BPM | TEMPERATURE: 97.5 F | DIASTOLIC BLOOD PRESSURE: 84 MMHG | OXYGEN SATURATION: 96 %

## 2023-10-25 VITALS
OXYGEN SATURATION: 98 % | SYSTOLIC BLOOD PRESSURE: 130 MMHG | TEMPERATURE: 98.7 F | DIASTOLIC BLOOD PRESSURE: 78 MMHG | HEART RATE: 73 BPM

## 2023-10-25 PROCEDURE — G0152 HHCP-SERV OF OT,EA 15 MIN: HCPCS

## 2023-10-25 ASSESSMENT — ENCOUNTER SYMPTOMS: PAIN LOCATION - PAIN QUALITY: HEADACHE

## 2023-10-25 NOTE — HOME HEALTH
new problem and frequent falls    The patient/caregiver expressed knowledge and understanding of Discharge Instructions      Interdisciplinary communication with:  PT, OT and  MSW for the purpose of POC collaboration and home safety issues

## 2023-10-25 NOTE — HOME HEALTH
Subjective: \"I'm having my usual headache. \"   Falls since last visit: (if yes complete the Fall Tracking Form and include bsrifallreport): No   Caregiver involvement changes: none   Home health supplies by type and quantity ordered/delivered this visit include: None     Clinician asked if patient has had any physician contact since last home care visit and patient states: No   Clinician asked if patient has any new or changed medications and patient states:  No   If Yes, were medications reconciled? N/A  Was the certifying physician notified of changes in medications? N/A     Clinical assessment (what this visit means for the patient overall and need for ongoing skilled care) and progress or lack of progress towards SPECIFIC goals:  Patient demonstrated the ability to complete laundry tasks with therapist providing verbal cueing for fall prevention techniques implementing at a mod I level. She remains at a continued risk for falls with dressing and bathing tasks along with meal prep tasks requiring additional instruction from skilled [facility] OT to achieve higher level of functional independence and reduce falls as caregiver not able to provide this needed level of assistance safely. Patient met mod I level goals set for laundry tasks. Written Teaching Material Utilized: None  Interdisciplinary communication with: Cm Verdugo PT for POC colloboration   Discharge planning as follows: Discharge from [facility] OT when goals are met or maximum level of function is achieved.      Specific plan for next visit:  Focus on increasing independence and reducing risk of falls with dressing and bathing

## 2023-10-25 NOTE — HOME HEALTH
Subjective: Pt reports having issues with her nephew who was staying in the home but is no longer there. \"This is causing a lot of stress for me today. \"    Falls since last visit: No  Caregiver involvement changes: No changes  Home health supplies by type and quantity ordered/delivered this visit include: N/A    Clinician asked if patient has had any physician contact since last home care visit and patient states: NO  Clinician asked if patient has any new or changed medications and patient states:  NO   If Yes, were medications reconciled? NA  Was the certifying physician notified of changes in medications? 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 This poses fall risk concerns and impairments in gait. PT interventions needed for strengthening and improved mechanics. Progress towards goals:  Pt is improving gait ability and distance each week. Now up to 250' before needing PT assist due to fatigue. Pt has improved foot clerance and stability during gait. Working with PT to build RLE strength. Pt is showing improved quad and hip flexor strength already since [facility]. Written Teaching Material Utilized: None yet    Interdisciplinary communication with: ERNA    Discharge planning as follows: Is no longer homebound, Will discharge when the patient has reached their maximum functional potential and maximum safety in their home and When goals are met    Specific plan for next visit: Instruct caregiver/patient in therex for LE strength and balance improvement. Continue gait training and transfer training.

## 2023-10-27 ENCOUNTER — HOME CARE VISIT (OUTPATIENT)
Facility: HOME HEALTH | Age: 68
End: 2023-10-27
Payer: MEDICARE

## 2023-10-27 PROCEDURE — G0151 HHCP-SERV OF PT,EA 15 MIN: HCPCS

## 2023-10-30 ENCOUNTER — HOME CARE VISIT (OUTPATIENT)
Facility: HOME HEALTH | Age: 68
End: 2023-10-30
Payer: MEDICARE

## 2023-10-30 VITALS
HEART RATE: 82 BPM | TEMPERATURE: 97.9 F | DIASTOLIC BLOOD PRESSURE: 76 MMHG | SYSTOLIC BLOOD PRESSURE: 139 MMHG | OXYGEN SATURATION: 96 %

## 2023-10-30 VITALS
OXYGEN SATURATION: 97 % | DIASTOLIC BLOOD PRESSURE: 65 MMHG | RESPIRATION RATE: 16 BRPM | HEART RATE: 68 BPM | SYSTOLIC BLOOD PRESSURE: 135 MMHG | TEMPERATURE: 97.4 F

## 2023-10-30 PROCEDURE — G0151 HHCP-SERV OF PT,EA 15 MIN: HCPCS

## 2023-10-30 PROCEDURE — G0152 HHCP-SERV OF OT,EA 15 MIN: HCPCS

## 2023-10-30 ASSESSMENT — ENCOUNTER SYMPTOMS
PAIN LOCATION - PAIN QUALITY: ACHY/SORE
PAIN LOCATION - PAIN QUALITY: HEADACHE

## 2023-10-30 NOTE — HOME HEALTH
Subjective: \"I've been doing my exercises. \"   Falls since last visit: (if yes complete the Fall Tracking Form and include bsrifallreport): No   Caregiver involvement changes: none   Home health supplies by type and quantity ordered/delivered this visit include: None     Clinician asked if patient has had any physician contact since last home care visit and patient states: No   Clinician asked if patient has any new or changed medications and patient states:  No   If Yes, were medications reconciled? NA  Was the certifying physician notified of changes in medications? NA    Clinical assessment (what this visit means for the patient overall and need for ongoing skilled care) and progress or lack of progress towards SPECIFIC goals: Patient continues to demonstrate an inability to complete meal prep tasks without therapist providing verbal cueing for fall prevention techniques requiring additional instruction from skilled [facility] OT to achieve higher level of functional independence and reduce falls as caregiver unbable to provided needed level of assistance safely as son works during the day with patient performing own meal prep. Patient making steady progress towards mod I level goal set for meal prep tasks. Written Teaching Material Utilized: ERNA  Interdisciplinary communication with: Trell Covarrubias PT for POC colloboration   Discharge planning as follows: Discharge from [facility] OT when goals are met or maximum level of function is achieved.      Specific plan for next visit:  Focus on increasing independence and reducing risk of falls with ADLs

## 2023-10-30 NOTE — HOME HEALTH
Subjective: Pt with no complaints today. Falls since last visit: No  Caregiver involvement changes: No changes  Home health supplies by type and quantity ordered/delivered this visit include: N/A    Clinician asked if patient has had any physician contact since last home care visit and patient states: NO  Clinician asked if patient has any new or changed medications and patient states:  NO   If Yes, were medications reconciled? NA  Was the certifying physician notified of changes in medications? 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 This poses fall risk concerns and impairments in gait. PT interventions needed for strengthening and improved mechanics. Progress towards goals:  Pt is improving gait ability and distance each week. Now up to 250' before needing PT assist due to fatigue. Pt has improved foot clerance and stability during gait. Working with PT to build RLE strength. Pt is showing improved quad and hip flexor strength already since [facility]. Written Teaching Material Utilized: None yet,  Working on development of safe HEP that Ms. Elizabeth can perform without PT help. Interdisciplinary communication with: ERNA    Discharge planning as follows: Is no longer homebound, Will discharge when the patient has reached their maximum functional potential and maximum safety in their home and When goals are met    Specific plan for next visit: Instruct caregiver/patient in therex for LE strength and balance improvement. Continue gait training and transfer training.

## 2023-10-31 VITALS
HEART RATE: 80 BPM | SYSTOLIC BLOOD PRESSURE: 139 MMHG | RESPIRATION RATE: 16 BRPM | OXYGEN SATURATION: 96 % | DIASTOLIC BLOOD PRESSURE: 75 MMHG | TEMPERATURE: 97.5 F

## 2023-10-31 ASSESSMENT — ENCOUNTER SYMPTOMS: PAIN LOCATION - PAIN QUALITY: ACHY/SORE

## 2023-10-31 NOTE — HOME HEALTH
Subjective: Pt with no complaints today. Falls since last visit: No  Caregiver involvement changes: No changes  Home health supplies by type and quantity ordered/delivered this visit include: N/A    Clinician asked if patient has had any physician contact since last home care visit and patient states: NO  Clinician asked if patient has any new or changed medications and patient states:  NO   If Yes, were medications reconciled? NA  Was the certifying physician notified of changes in medications? 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 This poses fall risk concerns and impairments in gait. PT interventions needed for strengthening and improved mechanics. Progress towards goals:  Pt is improving gait ability and distance each week. Now up to 250' before needing PT assist due to fatigue. Pt has improved foot clerance and stability during gait. Working with PT to build RLE strength. Pt is showing improved quad and hip flexor strength already since [facility]. Pt still needs a lot fo PT help with balance therex. Written Teaching Material Utilized: None yet,  Working on development of safe HEP that Ms. Elizabeth can perform without PT help. Interdisciplinary communication with: ERNA    Discharge planning as follows: Is no longer homebound, Will discharge when the patient has reached their maximum functional potential and maximum safety in their home and When goals are met    Specific plan for next visit: Instruct caregiver/patient in therex for LE strength and balance improvement. Continue gait training and transfer training.

## 2023-11-01 ENCOUNTER — HOME CARE VISIT (OUTPATIENT)
Facility: HOME HEALTH | Age: 68
End: 2023-11-01
Payer: MEDICARE

## 2023-11-01 VITALS
SYSTOLIC BLOOD PRESSURE: 132 MMHG | DIASTOLIC BLOOD PRESSURE: 77 MMHG | HEART RATE: 76 BPM | OXYGEN SATURATION: 96 % | TEMPERATURE: 97.6 F

## 2023-11-01 PROCEDURE — G0152 HHCP-SERV OF OT,EA 15 MIN: HCPCS

## 2023-11-01 ASSESSMENT — ENCOUNTER SYMPTOMS: PAIN LOCATION - PAIN QUALITY: HEADACHE

## 2023-11-01 NOTE — HOME HEALTH
Subjective: \"I'm having a really bad headache this morning. \"   Falls since last visit: (if yes complete the Fall Tracking Form and include bsrifallreport): No   Caregiver involvement changes: none   Home health supplies by type and quantity ordered/delivered this visit include: None     Clinician asked if patient has had any physician contact since last home care visit and patient states: No   Clinician asked if patient has any new or changed medications and patient states:  No   If Yes, were medications reconciled? N/A  Was the certifying physician notified of changes in medications? N/A     Clinical assessment (what this visit means for the patient overall and need for ongoing skilled care) and progress or lack of progress towards SPECIFIC goals: Patient demonstrated the ability to complete UE HEP using theraputty with therapist providing verbal cueing for form and pacing implementing at an independent level for improving strength needed for increased independence and reducing risk of falls with ADLs and IADls. Patient met independent level goals set for UE HEP using theraputty. Visit limited this am due to chronic headache. Written Teaching Material Utilized: UE HEP using theraputty   Interdisciplinary communication with: Kristian Fay PT for POC colloboration   Discharge planning as follows: Discharge from [facility] OT when goals are met or maximum level of function is achieved.      Specific plan for next visit:  Focus on increasing independence and reducing risk of falls with dressing and bathing

## 2023-11-03 ENCOUNTER — HOME CARE VISIT (OUTPATIENT)
Facility: HOME HEALTH | Age: 68
End: 2023-11-03
Payer: MEDICARE

## 2023-11-03 PROCEDURE — G0151 HHCP-SERV OF PT,EA 15 MIN: HCPCS

## 2023-11-06 ENCOUNTER — HOME CARE VISIT (OUTPATIENT)
Facility: HOME HEALTH | Age: 68
End: 2023-11-06
Payer: MEDICARE

## 2023-11-06 VITALS
OXYGEN SATURATION: 98 % | TEMPERATURE: 97.2 F | SYSTOLIC BLOOD PRESSURE: 137 MMHG | DIASTOLIC BLOOD PRESSURE: 70 MMHG | RESPIRATION RATE: 16 BRPM | HEART RATE: 75 BPM

## 2023-11-06 VITALS
HEART RATE: 73 BPM | DIASTOLIC BLOOD PRESSURE: 86 MMHG | SYSTOLIC BLOOD PRESSURE: 140 MMHG | TEMPERATURE: 97.5 F | OXYGEN SATURATION: 96 %

## 2023-11-06 PROCEDURE — G0151 HHCP-SERV OF PT,EA 15 MIN: HCPCS

## 2023-11-06 PROCEDURE — G0152 HHCP-SERV OF OT,EA 15 MIN: HCPCS

## 2023-11-06 ASSESSMENT — ENCOUNTER SYMPTOMS
PAIN LOCATION - PAIN QUALITY: ACHY/SORE
PAIN LOCATION - PAIN QUALITY: HEADACHE

## 2023-11-06 NOTE — HOME HEALTH
Subjective: Pt with no complaints today. Falls since last visit: No  Caregiver involvement changes: No changes  Home health supplies by type and quantity ordered/delivered this visit include: N/A    Clinician asked if patient has had any physician contact since last home care visit and patient states: NO  Clinician asked if patient has any new or changed medications and patient states:  NO   If Yes, were medications reconciled? NA  Was the certifying physician notified of changes in medications? 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 This poses fall risk concerns and impairments in gait. PT interventions needed for strengthening and improved mechanics. Progress towards goals:  Pt is improving gait ability and distance each week. Now up to 300' before needing PT assist due to fatigue. Pt has improved foot clerance and stability during gait. Working with PT to build RLE strength. Pt is showing improved quad and hip flexor strength already since [facility]. Pt still needs a lot fo PT help with balance therex. Plan is to progress towards more SC ambulation as pt is not fully able to use RW in the home. Written Teaching Material Utilized: None yet,  Working on development of safe HEP that Ms. Elizabeth can perform without PT help. Interdisciplinary communication with: ERNA    Discharge planning as follows: Is no longer homebound, Will discharge when the patient has reached their maximum functional potential and maximum safety in their home and When goals are met    Specific plan for next visit: Instruct caregiver/patient in therex for LE strength and balance improvement.   Continue gait training and

## 2023-11-07 VITALS
TEMPERATURE: 97.5 F | HEART RATE: 73 BPM | DIASTOLIC BLOOD PRESSURE: 86 MMHG | SYSTOLIC BLOOD PRESSURE: 140 MMHG | OXYGEN SATURATION: 96 % | RESPIRATION RATE: 16 BRPM

## 2023-11-07 NOTE — HOME HEALTH
Subjective: Pt states that she is still getting headaches weekly. Falls since last visit: No  Caregiver involvement changes: No changes  Home health supplies by type and quantity ordered/delivered this visit include: N/A    Clinician asked if patient has had any physician contact since last home care visit and patient states: NO  Clinician asked if patient has any new or changed medications and patient states:  NO   If Yes, were medications reconciled? NA  Was the certifying physician notified of changes in medications? 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 This poses fall risk concerns and impairments in gait. PT interventions needed for strengthening and improved mechanics. Progress towards goals:  Pt is improving gait ability and distance each week. Now up to 300' (200' with SC) before needing PT assist due to fatigue. Pt has improved foot clerance and stability during gait. Working with PT to build RLE strength and balance which patient is progressing in all areas. Written Teaching Material Utilized: None yet,  Working on development of safe HEP that Ms. Elizabeth can perform without PT help. Interdisciplinary communication with: ERNA    Discharge planning as follows: Is no longer homebound, Will discharge when the patient has reached their maximum functional potential and maximum safety in their home and When goals are met    Specific plan for next visit: Instruct caregiver/patient in therex for LE strength and balance improvement. Continue gait training and transfer training.

## 2023-11-07 NOTE — HOME HEALTH
Subjective: \"I've been doing my exercises. \"   Falls since last visit: (if yes complete the Fall Tracking Form and include bsrifallreport): No   Caregiver involvement changes: none   Home health supplies by type and quantity ordered/delivered this visit include: None     Clinician asked if patient has had any physician contact since last home care visit and patient states: No   Clinician asked if patient has any new or changed medications and patient states:  No   If Yes, were medications reconciled? NA  Was the certifying physician notified of changes in medications? NA    Clinical assessment (what this visit means for the patient overall and need for ongoing skilled care) and progress or lack of progress towards SPECIFIC goals: Patient demonstrated the ability to complete meal prep tasks with therapist providing verbal cueing for fall prevention techniques requiring additional instruction from skilled [address] [place] [address] [address] OT to achieve higher level of functional independence and reduce falls as caregiver unbable to provided needed level of assistance safely as son works during the day with patient performing own meal prep. Patient met mod I level goal set for meal prep tasks. Written Teaching Material Utilized: NA  Interdisciplinary communication with: Janessa Wood PT for POC colloboration   Discharge planning as follows: Discharge from [address] [place] [address],[facility] OT when goals are met or maximum level of function is achieved.      Specific plan for next visit:  Focus on increasing independence and reducing risk of falls with dressing and bathing tasks

## 2023-11-08 ENCOUNTER — HOME CARE VISIT (OUTPATIENT)
Facility: HOME HEALTH | Age: 68
End: 2023-11-08
Payer: MEDICARE

## 2023-11-08 VITALS
SYSTOLIC BLOOD PRESSURE: 139 MMHG | DIASTOLIC BLOOD PRESSURE: 84 MMHG | TEMPERATURE: 97.8 F | HEART RATE: 75 BPM | RESPIRATION RATE: 17 BRPM | OXYGEN SATURATION: 96 %

## 2023-11-08 PROCEDURE — G0152 HHCP-SERV OF OT,EA 15 MIN: HCPCS

## 2023-11-08 PROCEDURE — G0151 HHCP-SERV OF PT,EA 15 MIN: HCPCS

## 2023-11-08 ASSESSMENT — ENCOUNTER SYMPTOMS: PAIN LOCATION - PAIN QUALITY: HEADACHE

## 2023-11-08 NOTE — HOME HEALTH
Subjective: \"I'm think I'm doing much better. \"   Falls since last visit: (if yes complete the Fall Tracking Form and include bsrifallreport): No   Caregiver involvement changes: none   Home health supplies by type and quantity ordered/delivered this visit include: None     Clinician asked if patient has had any physician contact since last home care visit and patient states: No   Clinician asked if patient has any new or changed medications and patient states: No  If Yes, were medications reconciled? N/A   Was the certifying physician notified of changes in medications? N/A     Clinical assessment (what this visit means for the patient overall and need for ongoing skilled care) and progress or lack of progress towards SPECIFIC goals: Patient has made good steady progress during [facility] OT services the following functional gains going from min A for bathing and toileting tasks to mod I level, min A for retrieval/transporting of clothing items during dressing tasks to mod I level, min A for toilet and shower transfers to mod I level and mod A for meal prep and laundry tasks to mod I level. The Barthel index assessment score at initial evaluation was 45/100 indicating partially dependent improving to 90/100 indicating total independence with self care tasks. MACH-10 assessment score was 9/10 at initial evaluation improving to 8/10 continuing to indicate patient is at risk for falls as instruction on fall prevention was provided throughout episode of care. Patient has met all goals set for ADLs, IADLs and functional transfers implementing fall prevention techniques along with completing UE HEP with independence. Written Teaching Material Utilized: None   Interdisciplinary communication with: Kathy Schuler for POC collaboration   Discharge planning as follows: Discharge from [facility] OT services.    Specific plan for next visit: Patient discharge from [facility] OT services with goals met at this time with patient verbalizing understanding

## 2023-11-09 VITALS
OXYGEN SATURATION: 96 % | HEART RATE: 75 BPM | RESPIRATION RATE: 16 BRPM | SYSTOLIC BLOOD PRESSURE: 139 MMHG | TEMPERATURE: 97.8 F | DIASTOLIC BLOOD PRESSURE: 82 MMHG

## 2023-11-09 NOTE — HOME HEALTH
Subjective: Pt reports a lot of activity yesterday going out with her son. I did a lot of walking. Falls since last visit: No  Caregiver involvement changes: No changes  Home health supplies by type and quantity ordered/delivered this visit include: N/A    Clinician asked if patient has had any physician contact since last home care visit and patient states: NO  Clinician asked if patient has any new or changed medications and patient states:  NO   If Yes, were medications reconciled? NA  Was the certifying physician notified of changes in medications? 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 This poses fall risk concerns and impairments in gait. PT interventions needed for strengthening and improved mechanics. Progress towards goals:  Pt is improving gait ability and distance each week. Now up to 300' (250' with SC) before needing PT assist due to fatigue. Pt has improved foot clerance and stability during gait. Working with PT to build RLE strength and balance which patient is progressing in all areas. Written Teaching Material Utilized: None yet,  Working on development of safe HEP that Ms. Elizabeth can perform without PT help. Interdisciplinary communication with: ERNA    Discharge planning as follows: Is no longer homebound, Will discharge when the patient has reached their maximum functional potential and maximum safety in their home and When goals are met    Specific plan for next visit: Instruct caregiver/patient in therex for LE strength and balance improvement. Continue gait training and transfer training.

## 2023-11-13 ENCOUNTER — HOME CARE VISIT (OUTPATIENT)
Facility: HOME HEALTH | Age: 68
End: 2023-11-13
Payer: MEDICARE

## 2023-11-13 PROCEDURE — G0151 HHCP-SERV OF PT,EA 15 MIN: HCPCS

## 2023-11-13 RX ORDER — EZETIMIBE 10 MG/1
10 TABLET ORAL DAILY
Qty: 90 TABLET | Refills: 1 | Status: SHIPPED | OUTPATIENT
Start: 2023-11-13

## 2023-11-13 NOTE — TELEPHONE ENCOUNTER
PCP: Richar Baum MD    Last appt: 10/16/2023       Future Appointments   Date Time Provider [address]   11/13/2023 10:00 AM Inge Araujo, [address] [address] [address]   11/15/2023 10:00 [place] [id number] [address]   11/22/2023  9:30 AM [facility] RICHARD [facility]   11/22/2023 10:30 AM [facility] US [facility]   11/28/2023 10:15 AM MD DIPAK Whitaker BS AMB   12/29/2023 11:00 AM [facility]GSON ECHO 2 SUNG BS AMB   12/29/2023 11:40 AM MD SUNG Santana BS AMB   2/20/2024  1:30 PM Jyotsna Starks, ANP NEUMRSPB BS AMB       Requested Prescriptions     Pending Prescriptions Disp Refills    ezetimibe (ZETIA) 10 MG tablet [Pharmacy Med Name: EZETIMIBE 10 MG TABLET] 90 tablet 1     Sig: TAKE 1 TABLET BY MOUTH EVERY DAY       Prior labs and Blood pressures:  BP Readings from Last 3 Encounters:   11/08/23 139/82   11/08/23 139/84   11/06/23 (!) 140/86     Lab Results   Component Value Date/Time     08/17/2023 03:36 PM    K 4.1 08/17/2023 03:36 PM     08/17/2023 03:36 PM    CO2 25 08/17/2023 03:36 PM    BUN 18 08/17/2023 03:36 PM    GFRAA >60 08/31/2022 04:20 AM     Lab Results   Component Value Date/Time    OIW5GSMO 6.7 05/16/2023 11:50 AM     Lab Results   Component Value Date/Time    CHOL 246 08/17/2023 03:36 PM    HDL 50 08/17/2023 03:36 PM    VLDL 28 10/20/2021 02:48 PM     No results found for: \"VITD3\", \"VD3RIA\"    Lab Results   Component Value Date/Time    TSH 3.56 03/21/2023 09:58 AM

## 2023-11-14 ENCOUNTER — HOME CARE VISIT (OUTPATIENT)
Dept: HOME HEALTH SERVICES | Facility: HOME HEALTH | Age: 68
End: 2023-11-14
Payer: MEDICARE

## 2023-11-14 VITALS
SYSTOLIC BLOOD PRESSURE: 135 MMHG | TEMPERATURE: 97.5 F | DIASTOLIC BLOOD PRESSURE: 72 MMHG | OXYGEN SATURATION: 98 % | HEART RATE: 75 BPM | RESPIRATION RATE: 16 BRPM

## 2023-11-14 PROCEDURE — G0299 HHS/HOSPICE OF RN EA 15 MIN: HCPCS

## 2023-11-14 NOTE — HOME HEALTH
Subjective:  Pt with no complaints today. States that she is very pleased with gained strength from PT and is feeling like she can live her normal life. Falls since last visit: No  Caregiver involvement changes: No changes  Home health supplies by type and quantity ordered/delivered this visit include: N/A    Clinician asked if patient has had any physician contact since last home care visit and patient states: NO  Clinician asked if patient has any new or changed medications and patient states:  NO   If Yes, were medications reconciled? NA  Was the certifying physician notified of changes in medications? 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 This poses fall risk concerns and impairments in gait. PT interventions needed for strengthening and improved mechanics. Progress towards goals:  Pt is improving gait ability and distance each week. Now up to 300' (250' with SC) before needing PT assist due to fatigue. Pt has improved foot clerance and stability during gait. Working with PT to build RLE strength and balance which patient is progressing in all areas. Pt needs to demonstrate full ability to complete HEP without any PT assist prior to discharge. Written Teaching Material Utilized: None yet,  Working on development of safe HEP that Ms. Elizabeth can perform without PT help. Interdisciplinary communication with: ERNA    Discharge planning as follows:  Is no longer homebound, Will discharge when the patient has reached their maximum functional potential and maximum safety in their home and When goals are met    Specific plan for next visit: Instruct caregiver/patient in therex for LE strength and

## 2023-11-17 ENCOUNTER — HOME CARE VISIT (OUTPATIENT)
Facility: HOME HEALTH | Age: 68
End: 2023-11-17
Payer: MEDICARE

## 2023-11-17 PROCEDURE — G0151 HHCP-SERV OF PT,EA 15 MIN: HCPCS

## 2023-11-17 RX ORDER — ROSUVASTATIN CALCIUM 40 MG/1
TABLET, COATED ORAL
Qty: 90 TABLET | Refills: 3 | Status: SHIPPED | OUTPATIENT
Start: 2023-11-17

## 2023-11-17 NOTE — TELEPHONE ENCOUNTER
PCP: Steffen Reyes MD    Last appt: 10/16/2023       Future Appointments   Date Time Provider Department Center   11/17/2023 10:00 AM Duarte Figueroa, PT [facility] HOME HEAL   11/22/2023  9:30 AM [facility]ARD 6 [facility]   11/22/2023 10:30 AM SM [facility]ARD US 2 [facility]   11/22/2023  2:00 PM [facility] 1 [facility]   11/22/2023  3:00 PM [facility] MRI 1 [facility]   11/28/2023 10:15 AM Steffen Reyes MD PA[facility] AMB   12/29/2023 11:00 AM [facility] ECHO 2 [facility] BS AMB   12/29/2023 11:40 AM Wilber Atkinson MD [facility] BS AMB   2/20/2024  1:30 PM Debra Starks, ANP NEUMRSPB BS AMB       Requested Prescriptions     Pending Prescriptions Disp Refills    rosuvastatin (CRESTOR) 40 MG tablet [Pharmacy Med Name: ROSUVASTATIN CALCIUM 40 MG TAB] 90 tablet 5     Sig: TAKE 1 TABLET BY MOUTH EVERY DAY       Prior labs and Blood pressures:  BP Readings from Last 3 Encounters:   11/13/23 135/72   11/08/23 139/82   11/08/23 139/84     Lab Results   Component Value Date/Time     08/17/2023 03:36 PM    K 4.1 08/17/2023 03:36 PM     08/17/2023 03:36 PM    CO2 25 08/17/2023 03:36 PM    BUN 18 08/17/2023 03:36 PM    GFRAA >60 08/31/2022 04:20 AM     Lab Results   Component Value Date/Time    ELT5RYYH 6.7 05/16/2023 11:50 AM     Lab Results   Component Value Date/Time    CHOL 246 08/17/2023 03:36 PM    HDL 50 08/17/2023 03:36 PM    VLDL 28 10/20/2021 02:48 PM     No results found for: \"VITD3\", \"VD3RIA\"    Lab Results   Component Value Date/Time    TSH 3.56 03/21/2023 09:58 AM

## 2023-11-20 VITALS
HEART RATE: 78 BPM | TEMPERATURE: 97.5 F | OXYGEN SATURATION: 98 % | DIASTOLIC BLOOD PRESSURE: 68 MMHG | RESPIRATION RATE: 16 BRPM | SYSTOLIC BLOOD PRESSURE: 132 MMHG

## 2023-11-20 ASSESSMENT — ENCOUNTER SYMPTOMS: DYSPNEA ACTIVITY LEVEL: AFTER AMBULATING MORE THAN 20 FT

## 2023-11-20 NOTE — HOME HEALTH
Subjective:  Pt with no complaints today. States that she can continue all PT as taught. Falls since last visit: No  Caregiver involvement changes: No changes  Home health supplies by type and quantity ordered/delivered this visit include: N/A    Clinician asked if patient has had any physician contact since last home care visit and patient states: NO  Clinician asked if patient has any new or changed medications and patient states:  NO   If Yes, were medications reconciled? NA  Was the certifying physician notified of changes in medications? 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 This poses fall risk concerns and impairments in gait. PT interventions needed for strengthening and improved mechanics. Progress towards goals:  Pt is improving gait ability and distance each week. Now up to 300' (250' with SC) before needing PT assist due to fatigue. Pt has improved foot clerance and stability during gait. Pt is now independent with all therex. Written Teaching Material Utilized: Pt instructed in final HEP. Given written handout for instruction     Interdisciplinary communication with: NA    Discharge planning as follows: Home/Self Care    Specific plan for next visit: No further skilled PT is needed.

## 2023-11-22 ENCOUNTER — HOSPITAL ENCOUNTER (OUTPATIENT)
Facility: HOSPITAL | Age: 68
Discharge: HOME | End: 2023-11-25
Attending: PSYCHIATRY & NEUROLOGY
Payer: MEDICARE

## 2023-11-22 ENCOUNTER — HOSPITAL ENCOUNTER (OUTPATIENT)
Facility: HOSPITAL | Age: 68
Discharge: HOME | End: 2023-11-25
Payer: MEDICARE

## 2023-11-22 VITALS — BODY MASS INDEX: 23.03 KG/M2 | WEIGHT: 170 LBS | HEIGHT: 72 IN

## 2023-11-22 DIAGNOSIS — N63.22 MASS OF UPPER INNER QUADRANT OF LEFT BREAST: ICD-10-CM

## 2023-11-22 DIAGNOSIS — G35 MS (MULTIPLE SCLEROSIS) (HCC): ICD-10-CM

## 2023-11-22 DIAGNOSIS — R26.9 GAIT DIFFICULTY: ICD-10-CM

## 2023-11-22 PROCEDURE — A9579 GAD-BASE MR CONTRAST NOS,1ML: HCPCS | Performed by: PSYCHIATRY & NEUROLOGY

## 2023-11-22 PROCEDURE — 6360000004 HC RX CONTRAST MEDICATION: Performed by: PSYCHIATRY & NEUROLOGY

## 2023-11-22 PROCEDURE — G0279 TOMOSYNTHESIS, MAMMO: HCPCS

## 2023-11-22 PROCEDURE — 72157 MRI CHEST SPINE W/O & W/DYE: CPT

## 2023-11-22 PROCEDURE — 76642 ULTRASOUND BREAST LIMITED: CPT

## 2023-11-22 PROCEDURE — 72156 MRI NECK SPINE W/O & W/DYE: CPT

## 2023-11-22 RX ADMIN — GADOTERIDOL 17 ML: 279.3 INJECTION, SOLUTION INTRAVENOUS at 17:13

## 2023-11-28 ENCOUNTER — OFFICE VISIT (OUTPATIENT)
Age: 68
End: 2023-11-28
Payer: MEDICARE

## 2023-11-28 VITALS
BODY MASS INDEX: 24 KG/M2 | DIASTOLIC BLOOD PRESSURE: 68 MMHG | SYSTOLIC BLOOD PRESSURE: 118 MMHG | WEIGHT: 177.2 LBS | HEART RATE: 78 BPM | HEIGHT: 72 IN | TEMPERATURE: 96.9 F | RESPIRATION RATE: 14 BRPM | OXYGEN SATURATION: 98 %

## 2023-11-28 DIAGNOSIS — Z01.84 IMMUNITY STATUS TESTING: ICD-10-CM

## 2023-11-28 DIAGNOSIS — E78.5 HYPERLIPIDEMIA, UNSPECIFIED HYPERLIPIDEMIA TYPE: ICD-10-CM

## 2023-11-28 DIAGNOSIS — G43.709 CHRONIC MIGRAINE W/O AURA W/O STATUS MIGRAINOSUS, NOT INTRACTABLE: ICD-10-CM

## 2023-11-28 DIAGNOSIS — Z00.00 ENCOUNTER FOR MEDICARE ANNUAL WELLNESS EXAM: Primary | ICD-10-CM

## 2023-11-28 DIAGNOSIS — E11.9 TYPE 2 DIABETES MELLITUS WITHOUT COMPLICATION, WITHOUT LONG-TERM CURRENT USE OF INSULIN (HCC): ICD-10-CM

## 2023-11-28 DIAGNOSIS — E03.9 HYPOTHYROIDISM, UNSPECIFIED TYPE: ICD-10-CM

## 2023-11-28 PROCEDURE — 99214 OFFICE O/P EST MOD 30 MIN: CPT | Performed by: FAMILY MEDICINE

## 2023-11-28 PROCEDURE — 3078F DIAST BP <80 MM HG: CPT | Performed by: FAMILY MEDICINE

## 2023-11-28 PROCEDURE — 1090F PRES/ABSN URINE INCON ASSESS: CPT | Performed by: FAMILY MEDICINE

## 2023-11-28 PROCEDURE — G8484 FLU IMMUNIZE NO ADMIN: HCPCS | Performed by: FAMILY MEDICINE

## 2023-11-28 PROCEDURE — G8399 PT W/DXA RESULTS DOCUMENT: HCPCS | Performed by: FAMILY MEDICINE

## 2023-11-28 PROCEDURE — 3017F COLORECTAL CA SCREEN DOC REV: CPT | Performed by: FAMILY MEDICINE

## 2023-11-28 PROCEDURE — 3074F SYST BP LT 130 MM HG: CPT | Performed by: FAMILY MEDICINE

## 2023-11-28 PROCEDURE — 4004F PT TOBACCO SCREEN RCVD TLK: CPT | Performed by: FAMILY MEDICINE

## 2023-11-28 PROCEDURE — G8427 DOCREV CUR MEDS BY ELIG CLIN: HCPCS | Performed by: FAMILY MEDICINE

## 2023-11-28 PROCEDURE — 1123F ACP DISCUSS/DSCN MKR DOCD: CPT | Performed by: FAMILY MEDICINE

## 2023-11-28 PROCEDURE — G0439 PPPS, SUBSEQ VISIT: HCPCS | Performed by: FAMILY MEDICINE

## 2023-11-28 PROCEDURE — G8420 CALC BMI NORM PARAMETERS: HCPCS | Performed by: FAMILY MEDICINE

## 2023-11-28 PROCEDURE — 2022F DILAT RTA XM EVC RTNOPTHY: CPT | Performed by: FAMILY MEDICINE

## 2023-11-28 PROCEDURE — 3044F HG A1C LEVEL LT 7.0%: CPT | Performed by: FAMILY MEDICINE

## 2023-11-28 ASSESSMENT — ENCOUNTER SYMPTOMS
DIARRHEA: 0
NAUSEA: 0
SHORTNESS OF BREATH: 0
COUGH: 0
WHEEZING: 0
ABDOMINAL PAIN: 0
CHEST TIGHTNESS: 0
CONSTIPATION: 0
VOMITING: 0

## 2023-11-28 ASSESSMENT — PATIENT HEALTH QUESTIONNAIRE - PHQ9
SUM OF ALL RESPONSES TO PHQ QUESTIONS 1-9: 0
9. THOUGHTS THAT YOU WOULD BE BETTER OFF DEAD, OR OF HURTING YOURSELF: 0
1. LITTLE INTEREST OR PLEASURE IN DOING THINGS: 0
8. MOVING OR SPEAKING SO SLOWLY THAT OTHER PEOPLE COULD HAVE NOTICED. OR THE OPPOSITE, BEING SO FIGETY OR RESTLESS THAT YOU HAVE BEEN MOVING AROUND A LOT MORE THAN USUAL: 0
SUM OF ALL RESPONSES TO PHQ QUESTIONS 1-9: 0
5. POOR APPETITE OR OVEREATING: 0
3. TROUBLE FALLING OR STAYING ASLEEP: 0
7. TROUBLE CONCENTRATING ON THINGS, SUCH AS READING THE NEWSPAPER OR WATCHING TELEVISION: 0
SUM OF ALL RESPONSES TO PHQ QUESTIONS 1-9: 0
10. IF YOU CHECKED OFF ANY PROBLEMS, HOW DIFFICULT HAVE THESE PROBLEMS MADE IT FOR YOU TO DO YOUR WORK, TAKE CARE OF THINGS AT HOME, OR GET ALONG WITH OTHER PEOPLE: 0
SUM OF ALL RESPONSES TO PHQ9 QUESTIONS 1 & 2: 0
2. FEELING DOWN, DEPRESSED OR HOPELESS: 0
6. FEELING BAD ABOUT YOURSELF - OR THAT YOU ARE A FAILURE OR HAVE LET YOURSELF OR YOUR FAMILY DOWN: 0
SUM OF ALL RESPONSES TO PHQ QUESTIONS 1-9: 0
4. FEELING TIRED OR HAVING LITTLE ENERGY: 0

## 2023-11-28 ASSESSMENT — COLUMBIA-SUICIDE SEVERITY RATING SCALE - C-SSRS
7. DID THIS OCCUR IN THE LAST THREE MONTHS: NO
4. HAVE YOU HAD THESE THOUGHTS AND HAD SOME INTENTION OF ACTING ON THEM?: NO
5. HAVE YOU STARTED TO WORK OUT OR WORKED OUT THE DETAILS OF HOW TO KILL YOURSELF? DO YOU INTEND TO CARRY OUT THIS PLAN?: NO
3. HAVE YOU BEEN THINKING ABOUT HOW YOU MIGHT KILL YOURSELF?: NO

## 2023-11-28 ASSESSMENT — LIFESTYLE VARIABLES
HOW OFTEN DO YOU HAVE A DRINK CONTAINING ALCOHOL: MONTHLY OR LESS
HOW MANY STANDARD DRINKS CONTAINING ALCOHOL DO YOU HAVE ON A TYPICAL DAY: 1 OR 2

## 2023-11-28 NOTE — PROGRESS NOTES
Medicare Annual Wellness Visit    Jamila Elizabeth is here for Medicare AWV    Assessment & Plan   Encounter for Medicare annual wellness exam  Chronic migraine w/o aura w/o status migrainosus, not intractable  Type 2 diabetes mellitus without complication, without long-term current use of insulin (HCC)  -     Hemoglobin A1C; Future  -     Microalbumin / Creatinine Urine Ratio; Future  Hypothyroidism, unspecified type  -     TSH; Future  Hyperlipidemia, unspecified hyperlipidemia type  -     CBC; Future  -     Comprehensive Metabolic Panel; Future  -     Lipid Panel; Future  Immunity status testing  -     Hepatitis B Surface Antibody; Future     Recommendations for Preventive Services Due: see orders and patient instructions/AVS.  Recommended screening schedule for the next 5-10 years is provided to the patient in written form: see Patient Instructions/AVS.     No follow-ups on file. Subjective     Patient's complete Health Risk Assessment and screening values have been reviewed and are found in Flowsheets. The following problems were reviewed today and where indicated follow up appointments were made and/or referrals ordered. Positive Risk Factor Screenings with Interventions:    Fall Risk:  Do you feel unsteady or are you worried about falling? : (!) yes  2 or more falls in past year?: (!) yes  Fall with injury in past year?: no   Interventions:    Patient declines any further evaluation or treatment    Cognitive: Words recalled: 0 Words Recalled   Clock Drawing Test (CDT): Normal   Total Score: (!) 2   Total Score Interpretation: Abnormal Mini-Cog    Interventions:  Patient declines any further evaluation or treatment       Controlled Medication Review:     Today's Pain Level: Pain Score: EIGHT       Opioid Risk: (High risk score ?55) Opioid risk score: 61      Last PDMP Rusty as Reviewed:  Review User Review Instant Review Result   ROBERT TO 11/28/2023 10:55 AM @   Reviewed PDMP [1]

## 2023-11-28 NOTE — PROGRESS NOTES
Patient Name: Olga Simon   MRN: [id number]    Julio Valdes is a 79 y.o. female who presents with the following:     Reports severe migraine/headache today; states she has had similar migraines in the past.  She is usually followed by neurology who manages her migraines. Gets monthly Aimovig injections. Has not benefited from CGRP inhibitors. Is requesting oxycodone from me today. She has not had this filled in over a year but has recently used some old pills. Also using Tylenol. Wt Readings from Last 3 Encounters:   11/28/23 80.4 kg (177 lb 3.2 oz)   11/22/23 77.1 kg (170 lb)   10/16/23 81.2 kg (179 lb)     Hemoglobin A1C   Date Value Ref Range Status   08/17/2023 6.5 (H) 4.0 - 5.6 % Final     Comment:     NEW METHOD  PLEASE NOTE NEW REFERENCE RANGE  (NOTE)  HbA1C Interpretive Ranges  <5.7              Normal  5.7 - 6.4         Consider Prediabetes  >6.5              Consider Diabetes         Review of Systems   Constitutional:  Negative for activity change, appetite change, fatigue, fever and unexpected weight change. Respiratory:  Negative for cough, chest tightness, shortness of breath and wheezing. Cardiovascular:  Negative for chest pain, palpitations and leg swelling. Gastrointestinal:  Negative for abdominal pain, constipation, diarrhea, nausea and vomiting. Genitourinary:  Negative for dysuria, frequency and urgency. Skin:  Negative for rash. Neurological:  Negative for dizziness, weakness and headaches. Psychiatric/Behavioral:  Negative for dysphoric mood and suicidal ideas. The patient is not nervous/anxious. All other systems reviewed and are negative. The patient's medications, allergies, past medical history, surgical history, family history and social history were reviewed and updated where appropriate.     Current Outpatient Medications on File Prior to Visit   Medication Sig Dispense Refill    rosuvastatin (CRESTOR) 40 MG tablet TAKE 1 TABLET BY MOUTH

## 2023-12-01 LAB
ALBUMIN SERPL-MCNC: 4.5 G/DL (ref 3.9–4.9)
ALBUMIN/CREAT UR: 14 MG/G CREAT (ref 0–29)
ALBUMIN/GLOB SERPL: 1.8 {RATIO} (ref 1.2–2.2)
ALP SERPL-CCNC: 68 IU/L (ref 44–121)
ALT SERPL-CCNC: 11 IU/L (ref 0–32)
AST SERPL-CCNC: 15 IU/L (ref 0–40)
BASOPHILS # BLD AUTO: 0 X10E3/UL (ref 0–0.2)
BASOPHILS NFR BLD AUTO: 1 %
BILIRUB SERPL-MCNC: 0.5 MG/DL (ref 0–1.2)
BUN SERPL-MCNC: 14 MG/DL (ref 8–27)
BUN/CREAT SERPL: 16 (ref 12–28)
CALCIUM SERPL-MCNC: 9.8 MG/DL (ref 8.7–10.3)
CHLORIDE SERPL-SCNC: 102 MMOL/L (ref 96–106)
CHOLEST SERPL-MCNC: 164 MG/DL (ref 100–199)
CO2 SERPL-SCNC: 18 MMOL/L (ref 20–29)
CREAT SERPL-MCNC: 0.89 MG/DL (ref 0.57–1)
CREAT UR-MCNC: 136.6 MG/DL
EGFRCR SERPLBLD CKD-EPI 2021: 71 ML/MIN/1.73
EOSINOPHIL # BLD AUTO: 0.1 X10E3/UL (ref 0–0.4)
EOSINOPHIL NFR BLD AUTO: 2 %
ERYTHROCYTE [DISTWIDTH] IN BLOOD BY AUTOMATED COUNT: 13.1 % (ref 11.7–15.4)
GLOBULIN SER CALC-MCNC: 2.5 G/DL (ref 1.5–4.5)
GLUCOSE SERPL-MCNC: 88 MG/DL (ref 70–99)
HBA1C MFR BLD: 7.5 % (ref 4.8–5.6)
HBV SURFACE AB SER-ACNC: <3.1 MIU/ML
HCT VFR BLD AUTO: 45.6 % (ref 34–46.6)
HDLC SERPL-MCNC: 51 MG/DL
HGB BLD-MCNC: 14.9 G/DL (ref 11.1–15.9)
IMM GRANULOCYTES # BLD AUTO: 0 X10E3/UL (ref 0–0.1)
IMM GRANULOCYTES NFR BLD AUTO: 0 %
IMP & REVIEW OF LAB RESULTS: NORMAL
LDLC SERPL CALC-MCNC: 90 MG/DL (ref 0–99)
LYMPHOCYTES # BLD AUTO: 2.5 X10E3/UL (ref 0.7–3.1)
LYMPHOCYTES NFR BLD AUTO: 34 %
MCH RBC QN AUTO: 29.7 PG (ref 26.6–33)
MCHC RBC AUTO-ENTMCNC: 32.7 G/DL (ref 31.5–35.7)
MCV RBC AUTO: 91 FL (ref 79–97)
MICROALBUMIN UR-MCNC: 18.8 UG/ML
MONOCYTES # BLD AUTO: 0.5 X10E3/UL (ref 0.1–0.9)
MONOCYTES NFR BLD AUTO: 6 %
NEUTROPHILS # BLD AUTO: 4.2 X10E3/UL (ref 1.4–7)
NEUTROPHILS NFR BLD AUTO: 57 %
PLATELET # BLD AUTO: 137 X10E3/UL (ref 150–450)
POTASSIUM SERPL-SCNC: 4.8 MMOL/L (ref 3.5–5.2)
PROT SERPL-MCNC: 7 G/DL (ref 6–8.5)
RBC # BLD AUTO: 5.02 X10E6/UL (ref 3.77–5.28)
SODIUM SERPL-SCNC: 143 MMOL/L (ref 134–144)
TRIGL SERPL-MCNC: 128 MG/DL (ref 0–149)
TSH SERPL DL<=0.005 MIU/L-ACNC: 3.13 UIU/ML (ref 0.45–4.5)
VLDLC SERPL CALC-MCNC: 23 MG/DL (ref 5–40)
WBC # BLD AUTO: 7.4 X10E3/UL (ref 3.4–10.8)

## 2023-12-01 RX ORDER — METFORMIN HYDROCHLORIDE 500 MG/1
2000 TABLET, EXTENDED RELEASE ORAL DAILY
Qty: 360 TABLET | Refills: 3 | Status: SHIPPED | OUTPATIENT
Start: 2023-12-01

## 2023-12-29 ENCOUNTER — ANCILLARY PROCEDURE (OUTPATIENT)
Age: 68
End: 2023-12-29
Payer: MEDICARE

## 2023-12-29 ENCOUNTER — OFFICE VISIT (OUTPATIENT)
Age: 68
End: 2023-12-29
Payer: MEDICARE

## 2023-12-29 VITALS — WEIGHT: 177 LBS | HEIGHT: 72 IN | BODY MASS INDEX: 23.98 KG/M2

## 2023-12-29 VITALS
DIASTOLIC BLOOD PRESSURE: 70 MMHG | OXYGEN SATURATION: 99 % | SYSTOLIC BLOOD PRESSURE: 130 MMHG | WEIGHT: 177 LBS | HEART RATE: 75 BPM | HEIGHT: 72 IN | BODY MASS INDEX: 23.98 KG/M2 | RESPIRATION RATE: 18 BRPM

## 2023-12-29 DIAGNOSIS — Z87.891 HISTORY OF TOBACCO USE: ICD-10-CM

## 2023-12-29 DIAGNOSIS — E78.2 MIXED HYPERLIPIDEMIA: ICD-10-CM

## 2023-12-29 DIAGNOSIS — I35.0 AORTIC STENOSIS: ICD-10-CM

## 2023-12-29 DIAGNOSIS — I35.1 MODERATE AORTIC REGURGITATION: Primary | ICD-10-CM

## 2023-12-29 DIAGNOSIS — I49.3 PVC (PREMATURE VENTRICULAR CONTRACTION): ICD-10-CM

## 2023-12-29 DIAGNOSIS — R00.2 PALPITATIONS: ICD-10-CM

## 2023-12-29 LAB
ECHO AO ASC DIAM: 4.1 CM
ECHO AO ASCENDING AORTA INDEX: 2.03 CM/M2
ECHO AO ROOT DIAM: 3.3 CM
ECHO AO ROOT INDEX: 1.63 CM/M2
ECHO AR MAX VEL PISA: 3.8 M/S
ECHO AV AREA PEAK VELOCITY: 2.5 CM2
ECHO AV AREA VTI: 2.9 CM2
ECHO AV AREA/BSA PEAK VELOCITY: 1.2 CM2/M2
ECHO AV AREA/BSA VTI: 1.4 CM2/M2
ECHO AV MEAN GRADIENT: 3 MMHG
ECHO AV MEAN VELOCITY: 0.8 M/S
ECHO AV PEAK GRADIENT: 7 MMHG
ECHO AV PEAK VELOCITY: 1.3 M/S
ECHO AV REGURGITANT PHT: 440.2 MILLISECOND
ECHO AV VELOCITY RATIO: 0.62
ECHO AV VTI: 23.2 CM
ECHO BSA: 2.02 M2
ECHO EST RA PRESSURE: 3 MMHG
ECHO LA DIAMETER INDEX: 1.73 CM/M2
ECHO LA DIAMETER: 3.5 CM
ECHO LA TO AORTIC ROOT RATIO: 1.06
ECHO LA VOL A-L A2C: 81 ML (ref 22–52)
ECHO LA VOL A-L A4C: 57 ML (ref 22–52)
ECHO LA VOL BP: 65 ML (ref 22–52)
ECHO LA VOL MOD A2C: 73 ML (ref 22–52)
ECHO LA VOL MOD A4C: 53 ML (ref 22–52)
ECHO LA VOL/BSA BIPLANE: 32 ML/M2 (ref 16–34)
ECHO LA VOLUME AREA LENGTH: 71 ML
ECHO LA VOLUME INDEX A-L A2C: 40 ML/M2 (ref 16–34)
ECHO LA VOLUME INDEX A-L A4C: 28 ML/M2 (ref 16–34)
ECHO LA VOLUME INDEX AREA LENGTH: 35 ML/M2 (ref 16–34)
ECHO LA VOLUME INDEX MOD A2C: 36 ML/M2 (ref 16–34)
ECHO LA VOLUME INDEX MOD A4C: 26 ML/M2 (ref 16–34)
ECHO LV E' LATERAL VELOCITY: 6 CM/S
ECHO LV E' SEPTAL VELOCITY: 6 CM/S
ECHO LV EDV A2C: 99 ML
ECHO LV EDV A4C: 87 ML
ECHO LV EDV BP: 93 ML (ref 56–104)
ECHO LV EDV INDEX A4C: 43 ML/M2
ECHO LV EDV INDEX BP: 46 ML/M2
ECHO LV EDV NDEX A2C: 49 ML/M2
ECHO LV EJECTION FRACTION A2C: 41 %
ECHO LV EJECTION FRACTION A4C: 46 %
ECHO LV EJECTION FRACTION BIPLANE: 43 % (ref 55–100)
ECHO LV ESV A2C: 58 ML
ECHO LV ESV A4C: 47 ML
ECHO LV ESV BP: 53 ML (ref 19–49)
ECHO LV ESV INDEX A2C: 29 ML/M2
ECHO LV ESV INDEX A4C: 23 ML/M2
ECHO LV ESV INDEX BP: 26 ML/M2
ECHO LV FRACTIONAL SHORTENING: 29 % (ref 28–44)
ECHO LV INTERNAL DIMENSION DIASTOLE INDEX: 2.43 CM/M2
ECHO LV INTERNAL DIMENSION DIASTOLIC: 4.9 CM (ref 3.9–5.3)
ECHO LV INTERNAL DIMENSION SYSTOLIC INDEX: 1.73 CM/M2
ECHO LV INTERNAL DIMENSION SYSTOLIC: 3.5 CM
ECHO LV IVSD: 1.3 CM (ref 0.6–0.9)
ECHO LV MASS 2D: 226.4 G (ref 67–162)
ECHO LV MASS INDEX 2D: 112.1 G/M2 (ref 43–95)
ECHO LV POSTERIOR WALL DIASTOLIC: 1.1 CM (ref 0.6–0.9)
ECHO LV RELATIVE WALL THICKNESS RATIO: 0.45
ECHO LVOT AREA: 3.8 CM2
ECHO LVOT AV VTI INDEX: 0.76
ECHO LVOT DIAM: 2.2 CM
ECHO LVOT MEAN GRADIENT: 1 MMHG
ECHO LVOT PEAK GRADIENT: 3 MMHG
ECHO LVOT PEAK VELOCITY: 0.8 M/S
ECHO LVOT STROKE VOLUME INDEX: 33.1 ML/M2
ECHO LVOT SV: 66.9 ML
ECHO LVOT VTI: 17.6 CM
ECHO MV A VELOCITY: 0.45 M/S
ECHO MV E DECELERATION TIME (DT): 223.6 MS
ECHO MV E VELOCITY: 0.41 M/S
ECHO MV E/A RATIO: 0.91
ECHO MV E/E' LATERAL: 6.83
ECHO MV E/E' RATIO (AVERAGED): 6.83
ECHO PV MAX VELOCITY: 0.4 M/S
ECHO PV PEAK GRADIENT: 1 MMHG
ECHO RIGHT VENTRICULAR SYSTOLIC PRESSURE (RVSP): 39 MMHG
ECHO RV TAPSE: 1.7 CM (ref 1.7–?)
ECHO TV REGURGITANT MAX VELOCITY: 2.98 M/S
ECHO TV REGURGITANT PEAK GRADIENT: 36 MMHG

## 2023-12-29 PROCEDURE — 4004F PT TOBACCO SCREEN RCVD TLK: CPT | Performed by: INTERNAL MEDICINE

## 2023-12-29 PROCEDURE — 1123F ACP DISCUSS/DSCN MKR DOCD: CPT | Performed by: INTERNAL MEDICINE

## 2023-12-29 PROCEDURE — G8420 CALC BMI NORM PARAMETERS: HCPCS | Performed by: INTERNAL MEDICINE

## 2023-12-29 PROCEDURE — G8427 DOCREV CUR MEDS BY ELIG CLIN: HCPCS | Performed by: INTERNAL MEDICINE

## 2023-12-29 PROCEDURE — 3078F DIAST BP <80 MM HG: CPT | Performed by: INTERNAL MEDICINE

## 2023-12-29 PROCEDURE — 93306 TTE W/DOPPLER COMPLETE: CPT | Performed by: INTERNAL MEDICINE

## 2023-12-29 PROCEDURE — 99214 OFFICE O/P EST MOD 30 MIN: CPT | Performed by: INTERNAL MEDICINE

## 2023-12-29 PROCEDURE — 3075F SYST BP GE 130 - 139MM HG: CPT | Performed by: INTERNAL MEDICINE

## 2023-12-29 PROCEDURE — 93005 ELECTROCARDIOGRAM TRACING: CPT | Performed by: INTERNAL MEDICINE

## 2023-12-29 PROCEDURE — 3017F COLORECTAL CA SCREEN DOC REV: CPT | Performed by: INTERNAL MEDICINE

## 2023-12-29 PROCEDURE — G8484 FLU IMMUNIZE NO ADMIN: HCPCS | Performed by: INTERNAL MEDICINE

## 2023-12-29 PROCEDURE — 93010 ELECTROCARDIOGRAM REPORT: CPT | Performed by: INTERNAL MEDICINE

## 2023-12-29 PROCEDURE — 1090F PRES/ABSN URINE INCON ASSESS: CPT | Performed by: INTERNAL MEDICINE

## 2023-12-29 PROCEDURE — G8399 PT W/DXA RESULTS DOCUMENT: HCPCS | Performed by: INTERNAL MEDICINE

## 2023-12-29 RX ORDER — TIZANIDINE 2 MG/1
2 TABLET ORAL EVERY 6 HOURS PRN
COMMUNITY

## 2023-12-29 NOTE — PROGRESS NOTES
[facility] [place]: [place]  [phone]      History of Present Illness:    Ms. Elizabeth is a 75 yo F with multiple sclerosis, myasthenia gravis, type 2 diabetes, mixed hyperlipidemia, hypothyroid, seen in the past for chest pain. Echo in the past with normal EF and moderate aortic regurgitation, mild aortic stenosis. Since her last visit, she has been having progression of her MS. She will get diffuse body aches and was also having issues with migraines at times. Cardiac suarez, she denies any exertional chest pain. Overall, she has baseline shortness of breath, but this is unchanged. Just rare palpitations. Her heart monitor last year showed benign PVCs, PACs. Her echocardiogram today was unchanged with preserved LV function and moderate aortic regurgitation and we discussed the results. Soc hx. Tobacco 1 pack 2-3 days. Have stopped in past.  Fam hx. Younger sister with CAD. Assessment and Plan:  1. Aortic regurgitation. Remains in the moderate range and overall minimally changed. Will have her follow up with a same day echocardiogram in one year. 2. PVCs, PACs. Benign and she is asymptomatic from these. 3. History of CVA. Loop monitor thereafter was normal.    4. Tobacco use. Encouraged cessation. 5. MS. Followed by neurology, Dr. Ghazala Santana. This has been flaring more and they have been trying to make adjustments to her medications. 6. Myasthenia gravis. 7. Type 2 diabetes.           She  has a past medical history of Arrhythmia, Arthritis, Benign cyst of left breast, Blindness and low vision, Cataract, Cervical spinal stenosis, Chronic pain, COVID-19 vaccine series completed, Depression, Diabetes mellitus type 2, controlled ([address]), Diet-controlled diabetes mellitus ([address]), Endometriosis, FH: breast cancer, History of CVA (cerebrovascular accident), HTN, goal below 140/90, Hypercholesterolemia, Hypothyroid, Incontinence, Lumbosacral plexopathy, Migraine, MS (multiple sclerosis) ([address]),

## 2023-12-29 NOTE — PROGRESS NOTES
Since her last visit, she has been having progression of her MS. She will get diffuse body aches and was also having issues with migraines at times. Cardiac suarez, she denies any exertional chest pain. Overall, she has baseline shortness of breath, but this is unchanged. Just rare palpitations. Her heart monitor last year showed benign PVCs, PACs. Her echocardiogram today was unchanged with preserved LV function and moderate aortic regurgitation and we discussed the results. Assessment and Plan:  1. Aortic regurgitation. Remains in the moderate range and overall minimally changed. Will have her follow up with a same day echocardiogram in one year. 2. PVCs, PACs. Benign and she is asymptomatic from these. 3. History of CVA. Loop monitor thereafter was normal.    4. Tobacco use. Encouraged cessation. 5. MS. Followed by neurology, Dr. Jackson April. This has been flaring more and they have been trying to make adjustments to her medications. 6. Myasthenia gravis. 7. Type 2 diabetes.

## 2024-03-05 ENCOUNTER — OFFICE VISIT (OUTPATIENT)
Age: 69
End: 2024-03-05
Payer: MEDICARE

## 2024-03-05 VITALS
WEIGHT: 177 LBS | OXYGEN SATURATION: 98 % | RESPIRATION RATE: 14 BRPM | TEMPERATURE: 97.3 F | SYSTOLIC BLOOD PRESSURE: 132 MMHG | DIASTOLIC BLOOD PRESSURE: 76 MMHG | BODY MASS INDEX: 23.98 KG/M2 | HEART RATE: 80 BPM | HEIGHT: 72 IN

## 2024-03-05 DIAGNOSIS — Z23 ENCOUNTER FOR IMMUNIZATION: ICD-10-CM

## 2024-03-05 DIAGNOSIS — G81.91 HEMIPLEGIA AFFECTING RIGHT DOMINANT SIDE, UNSPECIFIED ETIOLOGY, UNSPECIFIED HEMIPLEGIA TYPE (HCC): ICD-10-CM

## 2024-03-05 DIAGNOSIS — Z78.9 NOT IMMUNE TO HEPATITIS B VIRUS: ICD-10-CM

## 2024-03-05 DIAGNOSIS — D69.6 THROMBOCYTOPENIA, UNSPECIFIED (HCC): ICD-10-CM

## 2024-03-05 DIAGNOSIS — G35 MS (MULTIPLE SCLEROSIS) (HCC): ICD-10-CM

## 2024-03-05 DIAGNOSIS — G70.00 MYASTHENIA GRAVIS WITHOUT (ACUTE) EXACERBATION (HCC): ICD-10-CM

## 2024-03-05 DIAGNOSIS — E11.9 TYPE 2 DIABETES MELLITUS WITHOUT COMPLICATION, WITHOUT LONG-TERM CURRENT USE OF INSULIN (HCC): ICD-10-CM

## 2024-03-05 DIAGNOSIS — Z00.00 ENCOUNTER FOR MEDICARE ANNUAL WELLNESS EXAM: Primary | ICD-10-CM

## 2024-03-05 PROBLEM — I67.9 SMALL VESSEL DISEASE, CEREBROVASCULAR: Status: RESOLVED | Noted: 2023-04-20 | Resolved: 2024-03-05

## 2024-03-05 PROBLEM — N18.30 CHRONIC RENAL DISEASE, STAGE III (HCC): Status: RESOLVED | Noted: 2022-07-15 | Resolved: 2024-03-05

## 2024-03-05 PROBLEM — G43.019 INTRACTABLE MIGRAINE WITHOUT AURA AND WITHOUT STATUS MIGRAINOSUS: Status: RESOLVED | Noted: 2023-04-20 | Resolved: 2024-03-05

## 2024-03-05 PROBLEM — I63.9 CVA (CEREBRAL VASCULAR ACCIDENT) (HCC): Status: RESOLVED | Noted: 2022-08-30 | Resolved: 2024-03-05

## 2024-03-05 PROBLEM — M16.9 DEGENERATIVE JOINT DISEASE (DJD) OF HIP: Status: RESOLVED | Noted: 2017-06-04 | Resolved: 2024-03-05

## 2024-03-05 PROBLEM — G90.1 DYSAUTONOMIA (HCC): Status: RESOLVED | Noted: 2023-01-17 | Resolved: 2024-03-05

## 2024-03-05 PROCEDURE — 1123F ACP DISCUSS/DSCN MKR DOCD: CPT | Performed by: FAMILY MEDICINE

## 2024-03-05 PROCEDURE — 99213 OFFICE O/P EST LOW 20 MIN: CPT | Performed by: FAMILY MEDICINE

## 2024-03-05 PROCEDURE — G8420 CALC BMI NORM PARAMETERS: HCPCS | Performed by: FAMILY MEDICINE

## 2024-03-05 PROCEDURE — 90746 HEPB VACCINE 3 DOSE ADULT IM: CPT | Performed by: FAMILY MEDICINE

## 2024-03-05 PROCEDURE — PBSHW HEP B, ENGERIX-B, (AGE 20 YRS+), IM, 1ML, 3-DOSE: Performed by: FAMILY MEDICINE

## 2024-03-05 PROCEDURE — G8427 DOCREV CUR MEDS BY ELIG CLIN: HCPCS | Performed by: FAMILY MEDICINE

## 2024-03-05 PROCEDURE — 3078F DIAST BP <80 MM HG: CPT | Performed by: FAMILY MEDICINE

## 2024-03-05 PROCEDURE — 4004F PT TOBACCO SCREEN RCVD TLK: CPT | Performed by: FAMILY MEDICINE

## 2024-03-05 PROCEDURE — 3046F HEMOGLOBIN A1C LEVEL >9.0%: CPT | Performed by: FAMILY MEDICINE

## 2024-03-05 PROCEDURE — PBSHW PBB SHADOW CHARGE: Performed by: FAMILY MEDICINE

## 2024-03-05 PROCEDURE — G8399 PT W/DXA RESULTS DOCUMENT: HCPCS | Performed by: FAMILY MEDICINE

## 2024-03-05 PROCEDURE — 3075F SYST BP GE 130 - 139MM HG: CPT | Performed by: FAMILY MEDICINE

## 2024-03-05 PROCEDURE — 1090F PRES/ABSN URINE INCON ASSESS: CPT | Performed by: FAMILY MEDICINE

## 2024-03-05 PROCEDURE — 3017F COLORECTAL CA SCREEN DOC REV: CPT | Performed by: FAMILY MEDICINE

## 2024-03-05 PROCEDURE — 2022F DILAT RTA XM EVC RTNOPTHY: CPT | Performed by: FAMILY MEDICINE

## 2024-03-05 PROCEDURE — G0439 PPPS, SUBSEQ VISIT: HCPCS | Performed by: FAMILY MEDICINE

## 2024-03-05 PROCEDURE — G8484 FLU IMMUNIZE NO ADMIN: HCPCS | Performed by: FAMILY MEDICINE

## 2024-03-05 ASSESSMENT — PATIENT HEALTH QUESTIONNAIRE - PHQ9
4. FEELING TIRED OR HAVING LITTLE ENERGY: 0
SUM OF ALL RESPONSES TO PHQ QUESTIONS 1-9: 0
7. TROUBLE CONCENTRATING ON THINGS, SUCH AS READING THE NEWSPAPER OR WATCHING TELEVISION: 0
2. FEELING DOWN, DEPRESSED OR HOPELESS: 0
5. POOR APPETITE OR OVEREATING: 0
1. LITTLE INTEREST OR PLEASURE IN DOING THINGS: 0
10. IF YOU CHECKED OFF ANY PROBLEMS, HOW DIFFICULT HAVE THESE PROBLEMS MADE IT FOR YOU TO DO YOUR WORK, TAKE CARE OF THINGS AT HOME, OR GET ALONG WITH OTHER PEOPLE: 0
SUM OF ALL RESPONSES TO PHQ QUESTIONS 1-9: 0
SUM OF ALL RESPONSES TO PHQ QUESTIONS 1-9: 0
9. THOUGHTS THAT YOU WOULD BE BETTER OFF DEAD, OR OF HURTING YOURSELF: 0
6. FEELING BAD ABOUT YOURSELF - OR THAT YOU ARE A FAILURE OR HAVE LET YOURSELF OR YOUR FAMILY DOWN: 0
8. MOVING OR SPEAKING SO SLOWLY THAT OTHER PEOPLE COULD HAVE NOTICED. OR THE OPPOSITE, BEING SO FIGETY OR RESTLESS THAT YOU HAVE BEEN MOVING AROUND A LOT MORE THAN USUAL: 0
SUM OF ALL RESPONSES TO PHQ9 QUESTIONS 1 & 2: 0
3. TROUBLE FALLING OR STAYING ASLEEP: 0
SUM OF ALL RESPONSES TO PHQ QUESTIONS 1-9: 0

## 2024-03-05 NOTE — PROGRESS NOTES
Medicare Annual Wellness Visit    Jamila Elizabeth is here for Follow-up    Assessment & Plan   Encounter for Medicare annual wellness exam  Type 2 diabetes mellitus without complication, without long-term current use of insulin (HCC)  -     Basic Metabolic Panel; Future  -     Hemoglobin A1C; Future  MS (multiple sclerosis) (HCC)  Myasthenia gravis without (acute) exacerbation (HCC)  Hemiplegia affecting right dominant side, unspecified etiology, unspecified hemiplegia type (HCC)  Thrombocytopenia, unspecified (HCC)  Not immune to hepatitis B virus  -     Hep B, ENGERIX-B, (age 20 yrs+), IM, 1mL, 3-dose  Encounter for immunization  -     Hep B, ENGERIX-B, (age 20 yrs+), IM, 1mL, 3-dose     Recommendations for Preventive Services Due: see orders and patient instructions/AVS.  Recommended screening schedule for the next 5-10 years is provided to the patient in written form: see Patient Instructions/AVS.     Return in about 3 months (around 6/5/2024) for DM.     Subjective     Patient's complete Health Risk Assessment and screening values have been reviewed and are found in Flowsheets. The following problems were reviewed today and where indicated follow up appointments were made and/or referrals ordered.    Positive Risk Factor Screenings with Interventions:    Fall Risk:        Interventions:    Reviewed medications, home hazards, visual acuity, and co-morbidities that can increase risk for falls  Patient declines any further evaluation or treatment                    Tobacco Use:  Tobacco Use: High Risk (3/5/2024)    Patient History     Smoking Tobacco Use: Every Day     Smokeless Tobacco Use: Never     Passive Exposure: Current     E-cigarette/Vaping       Questions Responses    E-cigarette/Vaping Use     Start Date     Passive Exposure     Quit Date     Counseling Given     Comments         Interventions:  Patient declined any further intervention or treatment          Objective   Vitals:    03/05/24 1052   BP:

## 2024-03-05 NOTE — PROGRESS NOTES
Patient Name: Jamila Elizabeth   MRN: [id number]    SUBJECTIVE  Jamila Elizabeth is a 68 y.o. female who presents with the following:     Since last visit, we increased her metformin to 4 tablets a day.  Tolerating it well.  Nonimmune to hepatitis B.  She does recall receiving the vaccines in the past.    Wt Readings from Last 3 Encounters:   03/05/24 80.3 kg (177 lb)   12/29/23 80.3 kg (177 lb)   12/29/23 80.3 kg (177 lb)     Hemoglobin A1C   Date Value Ref Range Status   11/30/2023 7.5 (H) 4.8 - 5.6 % Final     Comment:                 Prediabetes: 5.7 - 6.4           Diabetes: >6.4           Glycemic control for adults with diabetes: <7.0       BP Readings from Last 3 Encounters:   03/05/24 132/76   12/29/23 130/70   11/28/23 118/68     Review of Systems      The patient's medications, allergies, past medical history, surgical history, family history and social history were reviewed and updated where appropriate.    Current Outpatient Medications on File Prior to Visit   Medication Sig Dispense Refill    Ozanimod HCl (ZEPOSIA PO) Take 2 mg by mouth Daily      tiZANidine (ZANAFLEX) 2 MG tablet Take 1 tablet by mouth every 6 hours as needed      metFORMIN (GLUCOPHAGE-XR) 500 MG extended release tablet Take 4 tablets by mouth daily 360 tablet 3    rosuvastatin (CRESTOR) 40 MG tablet TAKE 1 TABLET BY MOUTH EVERY DAY 90 tablet 3    ezetimibe (ZETIA) 10 MG tablet TAKE 1 TABLET BY MOUTH EVERY DAY 90 tablet 1    DULoxetine (CYMBALTA) 30 MG extended release capsule Take 1 capsule by mouth daily      Diclofenac Sodium 100 MG TB24 TAKE ONE TAB BY MOUTH EVERY MORNING WITH FOOD      dantrolene (DANTRIUM) 50 MG capsule Take 1 capsule by mouth 2 times daily      Misc. Devices MISC New wheelchair battery replacement 1 each 0    blood glucose test strips (ASCENSIA AUTODISC VI;ONE TOUCH ULTRA TEST VI) strip 100 each by Other route See Admin Instructions      albuterol (PROVENTIL) (2.5 MG/3ML) 0.083% nebulizer solution Inhale into the

## 2024-03-05 NOTE — PROGRESS NOTES
Chief Complaint   Patient presents with    Follow-up     \"Have you been to the ER, urgent care clinic since your last visit?  Hospitalized since your last visit?\"    NO    “Have you seen or consulted any other health care providers outside of [facility] since your last visit?”    NO      Financial Resource Strain: Low Risk  (5/16/2023)    Overall Financial Resource Strain (CARDIA)     Difficulty of Paying Living Expenses: Not hard at all      Food Insecurity: Not on file (5/16/2023)            3/5/2024    10:47 AM   PHQ-9    Little interest or pleasure in doing things 0   Feeling down, depressed, or hopeless 0   Trouble falling or staying asleep, or sleeping too much 0   Feeling tired or having little energy 0   Poor appetite or overeating 0   Feeling bad about yourself - or that you are a failure or have let yourself or your family down 0   Trouble concentrating on things, such as reading the newspaper or watching television 0   Moving or speaking so slowly that other people could have noticed. Or the opposite - being so fidgety or restless that you have been moving around a lot more than usual 0   Thoughts that you would be better off dead, or of hurting yourself in some way 0   PHQ-2 Score 0   PHQ-9 Total Score 0   If you checked off any problems, how difficult have these problems made it for you to do your work, take care of things at home, or get along with other people? 0       Health Maintenance Due   Topic Date Due    Shingles vaccine (1 of 2) Never done    Respiratory Syncytial Virus (RSV) Pregnant or age 60 yrs+ (1 - 1-dose 60+ series) Never done    Diabetic retinal exam  03/28/2020    Diabetic foot exam  07/05/2020    Pneumococcal 65+ years Vaccine (2 - PCV) 11/19/2021    Flu vaccine (1) 08/01/2023    COVID-19 Vaccine (4 - 2023-24 season) 09/01/2023    Annual Wellness Visit (Medicare Advantage)  01/01/2024

## 2024-03-06 LAB
BUN SERPL-MCNC: 13 MG/DL (ref 8–27)
BUN/CREAT SERPL: 15 (ref 12–28)
CALCIUM SERPL-MCNC: 9.8 MG/DL (ref 8.7–10.3)
CHLORIDE SERPL-SCNC: 103 MMOL/L (ref 96–106)
CO2 SERPL-SCNC: 22 MMOL/L (ref 20–29)
CREAT SERPL-MCNC: 0.85 MG/DL (ref 0.57–1)
EGFRCR SERPLBLD CKD-EPI 2021: 76 ML/MIN/1.73
GLUCOSE SERPL-MCNC: 102 MG/DL (ref 70–99)
HBA1C MFR BLD: 7.1 % (ref 4.8–5.6)
POTASSIUM SERPL-SCNC: 4.6 MMOL/L (ref 3.5–5.2)
SODIUM SERPL-SCNC: 142 MMOL/L (ref 134–144)

## 2024-03-09 ENCOUNTER — APPOINTMENT (OUTPATIENT)
Facility: HOSPITAL | Age: 69
End: 2024-03-09
Payer: MEDICARE

## 2024-03-09 ENCOUNTER — HOSPITAL ENCOUNTER (EMERGENCY)
Facility: HOSPITAL | Age: 69
Discharge: HOME | End: 2024-03-09
Attending: EMERGENCY MEDICINE
Payer: MEDICARE

## 2024-03-09 VITALS
RESPIRATION RATE: 14 BRPM | TEMPERATURE: 97.4 F | SYSTOLIC BLOOD PRESSURE: 122 MMHG | OXYGEN SATURATION: 96 % | BODY MASS INDEX: 22.08 KG/M2 | WEIGHT: 163 LBS | HEART RATE: 88 BPM | HEIGHT: 72 IN | DIASTOLIC BLOOD PRESSURE: 59 MMHG

## 2024-03-09 DIAGNOSIS — J81.1 CHRONIC PULMONARY EDEMA: ICD-10-CM

## 2024-03-09 DIAGNOSIS — R11.2 NAUSEA AND VOMITING, UNSPECIFIED VOMITING TYPE: ICD-10-CM

## 2024-03-09 DIAGNOSIS — E86.0 DEHYDRATION: ICD-10-CM

## 2024-03-09 DIAGNOSIS — R07.89 ATYPICAL CHEST PAIN: Primary | ICD-10-CM

## 2024-03-09 LAB
ALBUMIN SERPL-MCNC: 3.6 G/DL (ref 3.5–5)
ALBUMIN/GLOB SERPL: 1 (ref 1.1–2.2)
ALP SERPL-CCNC: 53 U/L (ref 45–117)
ALT SERPL-CCNC: 16 U/L (ref 12–78)
ANION GAP SERPL CALC-SCNC: 5 MMOL/L (ref 5–15)
AST SERPL-CCNC: 13 U/L (ref 15–37)
BASOPHILS # BLD: 0 K/UL (ref 0–0.1)
BASOPHILS NFR BLD: 0 % (ref 0–1)
BILIRUB SERPL-MCNC: 1.2 MG/DL (ref 0.2–1)
BUN SERPL-MCNC: 24 MG/DL (ref 6–20)
BUN/CREAT SERPL: 22 (ref 12–20)
CALCIUM SERPL-MCNC: 9.1 MG/DL (ref 8.5–10.1)
CHLORIDE SERPL-SCNC: 108 MMOL/L (ref 97–108)
CO2 SERPL-SCNC: 25 MMOL/L (ref 21–32)
CREAT SERPL-MCNC: 1.08 MG/DL (ref 0.55–1.02)
DIFFERENTIAL METHOD BLD: ABNORMAL
EKG ATRIAL RATE: 82 BPM
EKG DIAGNOSIS: NORMAL
EKG P AXIS: 57 DEGREES
EKG P-R INTERVAL: 160 MS
EKG Q-T INTERVAL: 384 MS
EKG QRS DURATION: 82 MS
EKG QTC CALCULATION (BAZETT): 448 MS
EKG R AXIS: -17 DEGREES
EKG T AXIS: 53 DEGREES
EKG VENTRICULAR RATE: 82 BPM
EOSINOPHIL # BLD: 0.1 K/UL (ref 0–0.4)
EOSINOPHIL NFR BLD: 1 % (ref 0–7)
ERYTHROCYTE [DISTWIDTH] IN BLOOD BY AUTOMATED COUNT: 14.3 % (ref 11.5–14.5)
GLOBULIN SER CALC-MCNC: 3.5 G/DL (ref 2–4)
GLUCOSE SERPL-MCNC: 205 MG/DL (ref 65–100)
HCT VFR BLD AUTO: 44.3 % (ref 35–47)
HGB BLD-MCNC: 13.6 G/DL (ref 11.5–16)
IMM GRANULOCYTES # BLD AUTO: 0 K/UL (ref 0–0.04)
IMM GRANULOCYTES NFR BLD AUTO: 0 % (ref 0–0.5)
LIPASE SERPL-CCNC: 27 U/L (ref 13–75)
LYMPHOCYTES # BLD: 0.9 K/UL (ref 0.8–3.5)
LYMPHOCYTES NFR BLD: 9 % (ref 12–49)
MCH RBC QN AUTO: 29.8 PG (ref 26–34)
MCHC RBC AUTO-ENTMCNC: 30.7 G/DL (ref 30–36.5)
MCV RBC AUTO: 96.9 FL (ref 80–99)
MONOCYTES # BLD: 0.9 K/UL (ref 0–1)
MONOCYTES NFR BLD: 9 % (ref 5–13)
NEUTS SEG # BLD: 8.1 K/UL (ref 1.8–8)
NEUTS SEG NFR BLD: 80 % (ref 32–75)
NRBC # BLD: 0 K/UL (ref 0–0.01)
NRBC BLD-RTO: 0 PER 100 WBC
NT PRO BNP: 454 PG/ML
PLATELET # BLD AUTO: 105 K/UL (ref 150–400)
PMV BLD AUTO: 10.5 FL (ref 8.9–12.9)
POTASSIUM SERPL-SCNC: 4.5 MMOL/L (ref 3.5–5.1)
PROT SERPL-MCNC: 7.1 G/DL (ref 6.4–8.2)
RBC # BLD AUTO: 4.57 M/UL (ref 3.8–5.2)
SODIUM SERPL-SCNC: 138 MMOL/L (ref 136–145)
TROPONIN I SERPL HS-MCNC: 10 NG/L (ref 0–51)
WBC # BLD AUTO: 10.1 K/UL (ref 3.6–11)

## 2024-03-09 PROCEDURE — 83690 ASSAY OF LIPASE: CPT

## 2024-03-09 PROCEDURE — 36415 COLL VENOUS BLD VENIPUNCTURE: CPT

## 2024-03-09 PROCEDURE — 85025 COMPLETE CBC W/AUTO DIFF WBC: CPT

## 2024-03-09 PROCEDURE — 71046 X-RAY EXAM CHEST 2 VIEWS: CPT

## 2024-03-09 PROCEDURE — 6370000000 HC RX 637 (ALT 250 FOR IP): Performed by: EMERGENCY MEDICINE

## 2024-03-09 PROCEDURE — 96374 THER/PROPH/DIAG INJ IV PUSH: CPT

## 2024-03-09 PROCEDURE — 84484 ASSAY OF TROPONIN QUANT: CPT

## 2024-03-09 PROCEDURE — 6360000004 HC RX CONTRAST MEDICATION: Performed by: EMERGENCY MEDICINE

## 2024-03-09 PROCEDURE — 96375 TX/PRO/DX INJ NEW DRUG ADDON: CPT

## 2024-03-09 PROCEDURE — 71275 CT ANGIOGRAPHY CHEST: CPT

## 2024-03-09 PROCEDURE — 99285 EMERGENCY DEPT VISIT HI MDM: CPT

## 2024-03-09 PROCEDURE — 83880 ASSAY OF NATRIURETIC PEPTIDE: CPT

## 2024-03-09 PROCEDURE — 6360000002 HC RX W HCPCS: Performed by: EMERGENCY MEDICINE

## 2024-03-09 PROCEDURE — 80053 COMPREHEN METABOLIC PANEL: CPT

## 2024-03-09 RX ORDER — ONDANSETRON 4 MG/1
4 TABLET, FILM COATED ORAL EVERY 8 HOURS PRN
Qty: 12 TABLET | Refills: 0 | Status: SHIPPED | OUTPATIENT
Start: 2024-03-09

## 2024-03-09 RX ORDER — PANTOPRAZOLE SODIUM 40 MG/1
40 TABLET, DELAYED RELEASE ORAL
Status: DISCONTINUED | OUTPATIENT
Start: 2024-03-09 | End: 2024-03-09 | Stop reason: HOSPADM

## 2024-03-09 RX ORDER — KETOROLAC TROMETHAMINE 30 MG/ML
15 INJECTION, SOLUTION INTRAMUSCULAR; INTRAVENOUS
Status: COMPLETED | OUTPATIENT
Start: 2024-03-09 | End: 2024-03-09

## 2024-03-09 RX ORDER — FENTANYL CITRATE 50 UG/ML
50 INJECTION, SOLUTION INTRAMUSCULAR; INTRAVENOUS
Status: COMPLETED | OUTPATIENT
Start: 2024-03-09 | End: 2024-03-09

## 2024-03-09 RX ORDER — ONDANSETRON 2 MG/ML
4 INJECTION INTRAMUSCULAR; INTRAVENOUS ONCE
Status: COMPLETED | OUTPATIENT
Start: 2024-03-09 | End: 2024-03-09

## 2024-03-09 RX ADMIN — KETOROLAC TROMETHAMINE 15 MG: 30 INJECTION, SOLUTION INTRAMUSCULAR at 02:16

## 2024-03-09 RX ADMIN — PANTOPRAZOLE SODIUM 40 MG: 40 TABLET, DELAYED RELEASE ORAL at 04:49

## 2024-03-09 RX ADMIN — ONDANSETRON 4 MG: 2 INJECTION INTRAMUSCULAR; INTRAVENOUS at 04:33

## 2024-03-09 RX ADMIN — FENTANYL CITRATE 50 MCG: 50 INJECTION INTRAMUSCULAR; INTRAVENOUS at 04:33

## 2024-03-09 RX ADMIN — IOPAMIDOL 80 ML: 755 INJECTION, SOLUTION INTRAVENOUS at 02:28

## 2024-03-09 ASSESSMENT — PAIN SCALES - GENERAL
PAINLEVEL_OUTOF10: 8
PAINLEVEL_OUTOF10: 9
PAINLEVEL_OUTOF10: 10

## 2024-03-09 ASSESSMENT — PAIN DESCRIPTION - LOCATION: LOCATION: CHEST

## 2024-03-09 ASSESSMENT — LIFESTYLE VARIABLES
HOW OFTEN DO YOU HAVE A DRINK CONTAINING ALCOHOL: NEVER
HOW MANY STANDARD DRINKS CONTAINING ALCOHOL DO YOU HAVE ON A TYPICAL DAY: PATIENT DOES NOT DRINK

## 2024-03-09 ASSESSMENT — ENCOUNTER SYMPTOMS
NAUSEA: 0
ABDOMINAL PAIN: 0
DIARRHEA: 0
SHORTNESS OF BREATH: 0
VOMITING: 0

## 2024-03-09 ASSESSMENT — PAIN DESCRIPTION - DESCRIPTORS: DESCRIPTORS: SHARP

## 2024-03-09 NOTE — DISCHARGE INSTRUCTIONS
It was a pleasure taking care of you in our Emergency Department today.  We know that when you come to [facility], you are entrusting us with your health, comfort, and safety.  Our physicians and nurses honor that trust, and truly appreciate the opportunity to care for you and your loved ones.      We also value your feedback.  If you receive a survey about your Emergency Department experience today, please fill it out.  We care about our patients' feedback, and we listen to what you have to say.  Thank you!      Dr. Risa Monzon MD.

## 2024-03-09 NOTE — ED PROVIDER NOTES
as soon as possible for a visit           DISCHARGE MEDICATIONS:     Medication List        START taking these medications      ondansetron 4 MG tablet  Commonly known as: Zofran  Take 1 tablet by mouth every 8 hours as needed for Nausea or Vomiting            CONTINUE taking these medications      Misc. Devices Misc  New wheelchair battery replacement            ASK your doctor about these medications      Aimovig 140 MG/ML Soaj  Generic drug: Erenumab-aooe     albuterol (2.5 MG/3ML) 0.083% nebulizer solution  Commonly known as: PROVENTIL     amantadine 100 MG capsule  Commonly known as: SYMMETREL     aspirin 81 MG EC tablet     blood glucose test strips strip  Commonly known as: ASCENSIA AUTODISC VI;ONE TOUCH ULTRA TEST VI     * dantrolene 100 MG capsule  Commonly known as: DANTRIUM     * dantrolene 50 MG capsule  Commonly known as: DANTRIUM     Diclofenac Sodium 100 MG Tb24     DULoxetine 30 MG extended release capsule  Commonly known as: CYMBALTA     ezetimibe 10 MG tablet  Commonly known as: ZETIA  TAKE 1 TABLET BY MOUTH EVERY DAY     levothyroxine 137 MCG tablet  Commonly known as: SYNTHROID     lidocaine 5 %  Commonly known as: LIDODERM     lisinopril 2.5 MG tablet  Commonly known as: PRINIVIL;ZESTRIL     menthol-zinc oxide 0.44-20.6 % Oint ointment  Commonly known as: CALMOSEPTINE     metFORMIN 500 MG extended release tablet  Commonly known as: GLUCOPHAGE-XR  Take 4 tablets by mouth daily     pyRIDostigmine 60 MG tablet  Commonly known as: MESTINON     rosuvastatin 40 MG tablet  Commonly known as: CRESTOR  TAKE 1 TABLET BY MOUTH EVERY DAY     tiZANidine 2 MG tablet  Commonly known as: ZANAFLEX     ZEPOSIA PO           * This list has 2 medication(s) that are the same as other medications prescribed for you. Read the directions carefully, and ask your doctor or other care provider to review them with you.                   Where to Get Your Medications        These medications were sent to [facility]/pharmacy #[id number]  - [place], [place] - [address] - P [phone] - F [phone]  [address], [place] [address]      Phone: [phone]   ondansetron 4 MG tablet           DISCONTINUED MEDICATIONS:  Current Discharge Medication List          I am the Primary Clinician of Record.   Risa Monzon MD (electronically signed)    (Please note that parts of this dictation were completed with voice recognition software. Quite often unanticipated grammatical, syntax, homophones, and other interpretive errors are inadvertently transcribed by the computer software. Please disregards these errors. Please excuse any errors that have escaped final proofreading.)           Risa Monzon MD  03/09/24 0523

## 2024-03-11 ENCOUNTER — TELEPHONE (OUTPATIENT)
Age: 69
End: 2024-03-11

## 2024-03-11 NOTE — TELEPHONE ENCOUNTER
Patient called. 2 PI verified. The patient has been having active Cp since Friday. She went to the ED. They performed labs, CT, and an xray which ruled out PE or MI. She said now she is having a left chest stabbing that comes and goes. It's not as bad as it was. She is still nauseous and vomiting occasionally. She said she doesn't feel the pain when she walks verses sitting. She said when she moves she gets really short of breath. I let her know that she would need a pulmonary doctor. I told her it didn't sound cardiac related and follow up with her PCP. I will forward to Dr. Atkinson for his recommendations.

## 2024-03-12 ENCOUNTER — TELEPHONE (OUTPATIENT)
Age: 69
End: 2024-03-12

## 2024-03-12 LAB
EKG ATRIAL RATE: 82 BPM
EKG DIAGNOSIS: NORMAL
EKG P AXIS: 57 DEGREES
EKG P-R INTERVAL: 160 MS
EKG Q-T INTERVAL: 384 MS
EKG QRS DURATION: 82 MS
EKG QTC CALCULATION (BAZETT): 448 MS
EKG R AXIS: -17 DEGREES
EKG T AXIS: 53 DEGREES
EKG VENTRICULAR RATE: 82 BPM

## 2024-03-12 NOTE — TELEPHONE ENCOUNTER
Patient called stating that she was in the ED on Friday, and was told that we would give her a call back to make her an ED follow up. I was able to put patient on the schedule for an ED follow up on 3/28/24. Patient is hoping that she can be squeezed in for something sooner with Dr. Reyes. Patient is hoping to get a call back on weather or not this is possible.    Best call back number  [phone]

## 2024-03-12 NOTE — TELEPHONE ENCOUNTER
Called Patient. Name and  confirmed.  Scheduled ER follow up on 3/14/24 at 1 PM. Verbalized understanding.

## 2024-03-13 ENCOUNTER — TELEPHONE (OUTPATIENT)
Age: 69
End: 2024-03-13

## 2024-03-13 NOTE — TELEPHONE ENCOUNTER
----- Message from Sosa Arriola MA sent at 3/11/2024  9:25 AM EDT -----  Subject: Appointment Request    Reason for Call: Established Patient Appointment needed: Routine ED Follow   Up Visit    QUESTIONS    Reason for appointment request? No appointments available during search     Additional Information for Provider? Patient was seen at the ER on Friday   with Chest pain. She states that they monitored her and sent her home   advised for her to follow up with her PCP. Patient states that any day and   time works for her. Please advise   ---------------------------------------------------------------------------  --------------  CALL BACK INFO  [phone]; OK to leave message on voicemail  ---------------------------------------------------------------------------  --------------  SCRIPT ANSWERS

## 2024-03-14 ENCOUNTER — OFFICE VISIT (OUTPATIENT)
Age: 69
End: 2024-03-14
Payer: MEDICARE

## 2024-03-14 VITALS
TEMPERATURE: 97.5 F | SYSTOLIC BLOOD PRESSURE: 132 MMHG | RESPIRATION RATE: 14 BRPM | BODY MASS INDEX: 24.06 KG/M2 | WEIGHT: 177.6 LBS | HEIGHT: 72 IN | DIASTOLIC BLOOD PRESSURE: 68 MMHG | OXYGEN SATURATION: 100 % | HEART RATE: 58 BPM

## 2024-03-14 DIAGNOSIS — R06.02 SOB (SHORTNESS OF BREATH): Primary | ICD-10-CM

## 2024-03-14 DIAGNOSIS — R07.9 CHEST PAIN, UNSPECIFIED TYPE: ICD-10-CM

## 2024-03-14 PROCEDURE — 1123F ACP DISCUSS/DSCN MKR DOCD: CPT | Performed by: FAMILY MEDICINE

## 2024-03-14 PROCEDURE — 3017F COLORECTAL CA SCREEN DOC REV: CPT | Performed by: FAMILY MEDICINE

## 2024-03-14 PROCEDURE — G8427 DOCREV CUR MEDS BY ELIG CLIN: HCPCS | Performed by: FAMILY MEDICINE

## 2024-03-14 PROCEDURE — 99214 OFFICE O/P EST MOD 30 MIN: CPT | Performed by: FAMILY MEDICINE

## 2024-03-14 PROCEDURE — 4004F PT TOBACCO SCREEN RCVD TLK: CPT | Performed by: FAMILY MEDICINE

## 2024-03-14 PROCEDURE — G8484 FLU IMMUNIZE NO ADMIN: HCPCS | Performed by: FAMILY MEDICINE

## 2024-03-14 PROCEDURE — 1090F PRES/ABSN URINE INCON ASSESS: CPT | Performed by: FAMILY MEDICINE

## 2024-03-14 PROCEDURE — 3078F DIAST BP <80 MM HG: CPT | Performed by: FAMILY MEDICINE

## 2024-03-14 PROCEDURE — G8420 CALC BMI NORM PARAMETERS: HCPCS | Performed by: FAMILY MEDICINE

## 2024-03-14 PROCEDURE — 3075F SYST BP GE 130 - 139MM HG: CPT | Performed by: FAMILY MEDICINE

## 2024-03-14 PROCEDURE — G8399 PT W/DXA RESULTS DOCUMENT: HCPCS | Performed by: FAMILY MEDICINE

## 2024-03-14 RX ORDER — FUROSEMIDE 20 MG/1
20 TABLET ORAL DAILY
Qty: 30 TABLET | Refills: 0 | Status: SHIPPED | OUTPATIENT
Start: 2024-03-14 | End: 2024-03-14

## 2024-03-14 RX ORDER — FUROSEMIDE 20 MG/1
20 TABLET ORAL DAILY
Qty: 3 TABLET | Refills: 0 | Status: SHIPPED | OUTPATIENT
Start: 2024-03-14

## 2024-03-14 ASSESSMENT — ENCOUNTER SYMPTOMS
CHEST TIGHTNESS: 0
VOMITING: 0
COUGH: 0
DIARRHEA: 0
ABDOMINAL PAIN: 0
NAUSEA: 0
WHEEZING: 0
SHORTNESS OF BREATH: 1
CONSTIPATION: 0

## 2024-03-14 NOTE — PROGRESS NOTES
Chief Complaint   Patient presents with    Follow-up     ER     \"Have you been to the ER, urgent care clinic since your last visit?  Hospitalized since your last visit?\"    Yes. [facility] on 3/9/24 for chest pain.     “Have you seen or consulted any other health care providers outside of [facility] since your last visit?”    NO       Financial Resource Strain: Low Risk  (5/16/2023)    Overall Financial Resource Strain (CARDIA)     Difficulty of Paying Living Expenses: Not hard at all      Food Insecurity: Not on file (5/16/2023)            3/5/2024    10:47 AM   PHQ-9    Little interest or pleasure in doing things 0   Feeling down, depressed, or hopeless 0   Trouble falling or staying asleep, or sleeping too much 0   Feeling tired or having little energy 0   Poor appetite or overeating 0   Feeling bad about yourself - or that you are a failure or have let yourself or your family down 0   Trouble concentrating on things, such as reading the newspaper or watching television 0   Moving or speaking so slowly that other people could have noticed. Or the opposite - being so fidgety or restless that you have been moving around a lot more than usual 0   Thoughts that you would be better off dead, or of hurting yourself in some way 0   PHQ-2 Score 0   PHQ-9 Total Score 0   If you checked off any problems, how difficult have these problems made it for you to do your work, take care of things at home, or get along with other people? 0       Health Maintenance Due   Topic Date Due    Shingles vaccine (1 of 2) Never done    Respiratory Syncytial Virus (RSV) Pregnant or age 60 yrs+ (1 - 1-dose 60+ series) Never done    Diabetic retinal exam  03/28/2020    Diabetic foot exam  07/05/2020    Pneumococcal 65+ years Vaccine (2 - PCV) 11/19/2021    Flu vaccine (1) 08/01/2023    COVID-19 Vaccine (4 - 2023-24 season) 09/01/2023             
She is alert and oriented to person, place, and time. Mental status is at baseline.         Treatment risks/benefits/costs/interactions/alternatives discussed with patient.  Advised patient to call back or return to office if symptoms worsen/change/persist. If patient cannot reach us or should anything more severe/urgent arise he/she should proceed directly to the nearest emergency department.  Discussed expected course/resolution/complications of diagnosis in detail with patient.  Patient expressed understanding with the diagnosis and plan.     This dictation may have been completed with Dragon, the [employer] voice recognition software.  Unanticipated grammatical, syntax, homophones, and other interpretive errors are sometimes inadvertently transcribed by the computer software.  Please disregard any errors that have escaped final proofreading.      Steffen Reyes M.D.

## 2024-03-21 RX ORDER — EZETIMIBE 10 MG/1
10 TABLET ORAL DAILY
Qty: 90 TABLET | Refills: 1 | Status: SHIPPED | OUTPATIENT
Start: 2024-03-21

## 2024-03-21 NOTE — TELEPHONE ENCOUNTER
PCP: Steffen Reyes MD    Last appt: 3/14/2024       Future Appointments   Date Time Provider Department Center   3/26/2024  1:40 PM Wilber [place] MD [facility] AM[facility]   6/17/2024 10:45 AM Steffen Reyes MD [facility] AMB   12/30/2024 10:00 AM [facility] ECHO 2 [facility] AMB   12/30/2024 11:20 AM Wilber Atkinson MD [facility] AMB       Requested Prescriptions     Pending Prescriptions Disp Refills    ezetimibe (ZETIA) 10 MG tablet [Pharmacy Med Name: EZETIMIBE 10 MG TABLET] 90 tablet 1     Sig: TAKE 1 TABLET BY MOUTH EVERY DAY       Prior labs and Blood pressures:  BP Readings from Last 3 Encounters:   03/14/24 132/68   03/09/24 (!) 122/59   03/05/24 132/76     Lab Results   Component Value Date/Time     03/09/2024 02:15 AM    K 4.5 03/09/2024 02:15 AM     03/09/2024 02:15 AM    CO2 25 03/09/2024 02:15 AM    BUN 24 03/09/2024 02:15 AM    GFRAA >60 08/31/2022 04:20 AM     Lab Results   Component Value Date/Time    [id number] 6.7 05/16/2023 11:50 AM     Lab Results   Component Value Date/Time    CHOL 164 11/30/2023 12:06 PM    CHOL 246 08/17/2023 03:36 PM    HDL 51 11/30/2023 12:06 PM    VLDL 28 10/20/2021 02:48 PM     No results found for: \"VITD3\", \"VD3RIA\"    Lab Results   Component Value Date/Time    TSH 3.130 11/30/2023 12:06 PM

## 2024-03-26 ENCOUNTER — OFFICE VISIT (OUTPATIENT)
Age: 69
End: 2024-03-26
Payer: MEDICARE

## 2024-03-26 VITALS
HEIGHT: 72 IN | SYSTOLIC BLOOD PRESSURE: 130 MMHG | WEIGHT: 177.4 LBS | OXYGEN SATURATION: 98 % | HEART RATE: 91 BPM | DIASTOLIC BLOOD PRESSURE: 78 MMHG | BODY MASS INDEX: 24.03 KG/M2

## 2024-03-26 DIAGNOSIS — Z72.0 TOBACCO USE: ICD-10-CM

## 2024-03-26 DIAGNOSIS — Z86.73 HISTORY OF CVA (CEREBROVASCULAR ACCIDENT): ICD-10-CM

## 2024-03-26 DIAGNOSIS — I35.1 NONRHEUMATIC AORTIC (VALVE) INSUFFICIENCY: ICD-10-CM

## 2024-03-26 DIAGNOSIS — E11.8 TYPE 2 DIABETES MELLITUS WITH UNSPECIFIED COMPLICATIONS (HCC): ICD-10-CM

## 2024-03-26 DIAGNOSIS — I20.0 UNSTABLE ANGINA (HCC): Primary | ICD-10-CM

## 2024-03-26 PROCEDURE — 4004F PT TOBACCO SCREEN RCVD TLK: CPT | Performed by: INTERNAL MEDICINE

## 2024-03-26 PROCEDURE — 1090F PRES/ABSN URINE INCON ASSESS: CPT | Performed by: INTERNAL MEDICINE

## 2024-03-26 PROCEDURE — 2022F DILAT RTA XM EVC RTNOPTHY: CPT | Performed by: INTERNAL MEDICINE

## 2024-03-26 PROCEDURE — 3075F SYST BP GE 130 - 139MM HG: CPT | Performed by: INTERNAL MEDICINE

## 2024-03-26 PROCEDURE — 1123F ACP DISCUSS/DSCN MKR DOCD: CPT | Performed by: INTERNAL MEDICINE

## 2024-03-26 PROCEDURE — G8399 PT W/DXA RESULTS DOCUMENT: HCPCS | Performed by: INTERNAL MEDICINE

## 2024-03-26 PROCEDURE — 3078F DIAST BP <80 MM HG: CPT | Performed by: INTERNAL MEDICINE

## 2024-03-26 PROCEDURE — G8427 DOCREV CUR MEDS BY ELIG CLIN: HCPCS | Performed by: INTERNAL MEDICINE

## 2024-03-26 PROCEDURE — 3017F COLORECTAL CA SCREEN DOC REV: CPT | Performed by: INTERNAL MEDICINE

## 2024-03-26 PROCEDURE — 3051F HG A1C>EQUAL 7.0%<8.0%: CPT | Performed by: INTERNAL MEDICINE

## 2024-03-26 PROCEDURE — 99214 OFFICE O/P EST MOD 30 MIN: CPT | Performed by: INTERNAL MEDICINE

## 2024-03-26 PROCEDURE — G8420 CALC BMI NORM PARAMETERS: HCPCS | Performed by: INTERNAL MEDICINE

## 2024-03-26 PROCEDURE — G8484 FLU IMMUNIZE NO ADMIN: HCPCS | Performed by: INTERNAL MEDICINE

## 2024-03-26 ASSESSMENT — PATIENT HEALTH QUESTIONNAIRE - PHQ9
3. TROUBLE FALLING OR STAYING ASLEEP: NOT AT ALL
9. THOUGHTS THAT YOU WOULD BE BETTER OFF DEAD, OR OF HURTING YOURSELF: NOT AT ALL
10. IF YOU CHECKED OFF ANY PROBLEMS, HOW DIFFICULT HAVE THESE PROBLEMS MADE IT FOR YOU TO DO YOUR WORK, TAKE CARE OF THINGS AT HOME, OR GET ALONG WITH OTHER PEOPLE: NOT DIFFICULT AT ALL
6. FEELING BAD ABOUT YOURSELF - OR THAT YOU ARE A FAILURE OR HAVE LET YOURSELF OR YOUR FAMILY DOWN: NOT AT ALL
1. LITTLE INTEREST OR PLEASURE IN DOING THINGS: NOT AT ALL
2. FEELING DOWN, DEPRESSED OR HOPELESS: NOT AT ALL
SUM OF ALL RESPONSES TO PHQ QUESTIONS 1-9: 0
SUM OF ALL RESPONSES TO PHQ QUESTIONS 1-9: 0
5. POOR APPETITE OR OVEREATING: NOT AT ALL
8. MOVING OR SPEAKING SO SLOWLY THAT OTHER PEOPLE COULD HAVE NOTICED. OR THE OPPOSITE, BEING SO FIGETY OR RESTLESS THAT YOU HAVE BEEN MOVING AROUND A LOT MORE THAN USUAL: NOT AT ALL
SUM OF ALL RESPONSES TO PHQ QUESTIONS 1-9: 0
4. FEELING TIRED OR HAVING LITTLE ENERGY: NOT AT ALL
SUM OF ALL RESPONSES TO PHQ9 QUESTIONS 1 & 2: 0
7. TROUBLE CONCENTRATING ON THINGS, SUCH AS READING THE NEWSPAPER OR WATCHING TELEVISION: NOT AT ALL
SUM OF ALL RESPONSES TO PHQ QUESTIONS 1-9: 0

## 2024-03-26 NOTE — PATIENT INSTRUCTIONS
You will be scheduled for a Nuclear Stress Test after your appointment today.    Nuclear stress testing evaluates blood flow to your heart muscle and assesses cardiac function. There are 2 parts (Rest/Stress) to this procedure and will include either an exercise on a treadmill or an IV administration of a stressing medication called Lexiscan. Your cardiologist will determine which type of testing is best for you. This test can be performed in one day unless it is determined that better quality images will be obtained by performing the test over two days.     *Please arrive 15 minutes prior to your appointment time    Test Duration:    -One day testing will take 4 hours     Day of testing instructions:    NO CAFFEINE (not even decaffeinated products) 24 HOURS PRIOR TO TESTING. This includes coffee, soda, tea, chocolate, multivitamins, and migraine medication, like Excedrin or Fioricet that contains caffeine.  Nothing to eat or drink 4 HOURS prior to testing  NO NICOTINE 12 hours prior to testing  Hold any medications requested by your cardiologist. Otherwise take medications as directed with a few sips of water. If you are unsure you may bring your medications with you to take after instructed by your stressing nurse. It is recommended you do not hold meds prior to your test. DIABETIC PATIENTS: Take half of your insulin with a light meal 4 hours before your test.  Wear comfortable clothes and shoes (Shirts with no metal, shorts or pants, tennis shoes, no heels or flip flops)    IMPORTANT: This testing involves a cardiac tracer ordered specifically for you. If you are unable to make your appointment, please call to cancel/reschedule AT LEAST 24 hours prior to your appointment so your tracer can be cancelled. [phone].

## 2024-03-26 NOTE — PROGRESS NOTES
[place]: [place]  [phone]      History of Present Illness:    Ms. Elizabeth is a 67 yo F with multiple sclerosis, myasthenia gravis, type 2 diabetes, mixed hyperlipidemia, hypothyroid, seen in the past for chest pain. Echo in the past with normal EF and moderate aortic regurgitation, mild aortic stenosis.    She is here now after a recent ER visit for chest pain.  She was playing cards at the time, when all of a sudden she had substernal chest pain that lasted a few hours.  She did have associated shortness of breath. Workup in the emergency room was negative for MI, but CT scan did demonstrate pulmonary edema, as well as emphysema, no PE.  Since then, she has had occasional chest discomfort.  She has continued to have shortness of breath.  She is sleeping with three pillows.  Her MS has been flaring some as well. She is compensated on exam with clear lungs and no lower extremity edema.  Reviewed personally her EKG from March 9th and this was normal sinus rhythm, nonspecific ST-T wave abnormality, heart rate of 82.  Soc hx. Tobacco 1 pack 2-3 days. Have stopped in past.  Fam hx. Younger sister with CAD.  Assessment and Plan:   1. Unstable angina.  Chest pain, shortness of breath with typical/atypical features and multiple risk factors; will proceed with a stress test for further evaluation. She cannot fully complete a treadmill, walks with a cane and has MS and will do this Lexiscan. She did have an echo a few months ago, so no reason to repeat.  Would have a low threshold to proceed with cardiac cath.  We did discuss a little bit about cardiac cath and if her stress test was abnormal, would go ahead and set this up.  2. Aortic stenosis.  Has been in the mild to moderate range.  3. PVCs, PACs.  Has been asymptomatic with these.  4. History of CVA.  Loop monitor thereafter was normal.  5. Tobacco use.  Encouraged cessation.  6. MS.  Followed by neurology, Dr. Mera.  7. Myasthenia gravis.  8. Type 2

## 2024-03-27 RX ORDER — ONDANSETRON 4 MG/1
4 TABLET, FILM COATED ORAL EVERY 8 HOURS PRN
Qty: 20 TABLET | Refills: 0 | Status: SHIPPED | OUTPATIENT
Start: 2024-03-27

## 2024-03-27 NOTE — TELEPHONE ENCOUNTER
Patient called stating that she is needing a refill on her Zofran medication.    Best call back number  [phone]

## 2024-03-27 NOTE — TELEPHONE ENCOUNTER
PCP: Steffen Reyes MD    Last appt: 3/14/2024       Future Appointments   Date Time Provider Department Center   4/4/2024  8:00 AM [facility] NUCLEAR 1 [facility] AMB   6/17/2024 10:45 AM Steffen Reyes MD [facility] BS AMB   12/30/2024 10:00 AM [facility] ECHO 2 [facility] AMB   12[facility]30/2024 11:20 [facility]       Requested Prescriptions     Pending Prescriptions Disp Refills    ondansetron (ZOFRAN) 4 MG tablet 12 tablet 0     Sig: Take 1 tablet by mouth every 8 hours as needed for Nausea or Vomiting       Prior labs and Blood pressures:  BP Readings from Last 3 Encounters:   03/26/24 130/78   03/14/24 132/68   03/09/24 (!) 122/59     Lab Results   Component Value Date/Time     03/09/2024 02:15 AM    K 4.5 03/09/2024 02:15 AM     03/09/2024 02:15 AM    CO2 25 03/09/2024 02:15 AM    BUN 24 03/09/2024 02:15 AM    GFRAA >60 08/31/2022 04:20 AM     Lab Results   Component Value Date/Time    [id number] 6.7 05/16/2023 11:50 AM     Lab Results   Component Value Date/Time    CHOL 164 11/30/2023 12:06 PM    CHOL 246 08/17/2023 03:36 PM    HDL 51 11/30/2023 12:06 PM    VLDL 28 10/20/2021 02:48 PM     No results found for: \"VITD3\", \"VD3RIA\"    Lab Results   Component Value Date/Time    TSH 3.130 11/30/2023 12:06 PM

## 2024-04-01 ENCOUNTER — HOME HEALTH ADMISSION (OUTPATIENT)
Dept: HOME HEALTH SERVICES | Facility: HOME HEALTH | Age: 69
End: 2024-04-01
Payer: MEDICARE

## 2024-04-02 ENCOUNTER — HOME CARE VISIT (OUTPATIENT)
Facility: HOME HEALTH | Age: 69
End: 2024-04-02

## 2024-04-02 VITALS
SYSTOLIC BLOOD PRESSURE: 120 MMHG | TEMPERATURE: 97 F | RESPIRATION RATE: 16 BRPM | HEART RATE: 80 BPM | DIASTOLIC BLOOD PRESSURE: 64 MMHG

## 2024-04-02 PROCEDURE — G0151 HHCP-SERV OF PT,EA 15 MIN: HCPCS

## 2024-04-02 PROCEDURE — 0221000100 HH NO PAY CLAIM PROCEDURE

## 2024-04-02 RX ORDER — LISINOPRIL 2.5 MG/1
2.5 TABLET ORAL DAILY
Qty: 90 TABLET | Refills: 2 | Status: SHIPPED | OUTPATIENT
Start: 2024-04-02

## 2024-04-02 ASSESSMENT — ENCOUNTER SYMPTOMS: DYSPNEA ACTIVITY LEVEL: AFTER AMBULATING 10 - 20 FT

## 2024-04-02 NOTE — HOME HEALTH
Reason for referral, [facility] SUMMARY of clinical condition: Ms. Elizabeth is a 67 yo female who is referred for home PT by her neurologist due to reported decline in function due to MS. PMHX also includes MG, HTN, DM2, HLD, hypothyroidism, CVA, +smoking. She was seen in the ED on 3/9/24 due to atypical chest pain. She was not admitted but has a stress test in two days. She is well known to this agency having had at least 4 admissions for home therapy over the past year. Despite extensive intervention she remains non compliant with HEP and addressing the extreme clutter and fall hazards throughout the home. Most of the cabinet doors and drawers in kitchen are open and spilling out, she cannot access her bedroom or  reza bathroom with rollator due to tight furniture placement and bathroom door width and extreme clutter in her bedroom. The path from her bed to bathroom door in her room is obstructed with clutter on floor and a space heater. Pill bottles are scattered throughout the home and she cannot find her lisinopril bottle today. She is very unsafe ambulating as a result and also due to R LE weakness and pain and she drags her R foot when \"furniture walking\" throughout the home though she is able to adequately clear the R foot when using her rollator. Due to noncompliance and having had many therapy visits recently skilled PT is recommended 2w3 to re-establish HEP and reiterate/re-eduate patient on making her home accessible to her assistive devices so she use them correctly to prevent falls in the home.    Functional Mobility:  Bed Mobility (rolling/scooting): Supervision or Touching Assistance  Transfers (supine to chair and return to supine): Minimal /Moderate Assistance (requires assistance with less than 50% of the effort).  Patient uses device: needs AD for safety but does not own and/or use  Gait:  Ambulates with moderate assistance using no equipment  Safety concerns with mobility include: unsteady gait, impaired

## 2024-04-04 ENCOUNTER — ANCILLARY PROCEDURE (OUTPATIENT)
Age: 69
End: 2024-04-04
Payer: MEDICARE

## 2024-04-04 VITALS
HEART RATE: 70 BPM | SYSTOLIC BLOOD PRESSURE: 130 MMHG | WEIGHT: 177 LBS | BODY MASS INDEX: 23.98 KG/M2 | HEIGHT: 72 IN | DIASTOLIC BLOOD PRESSURE: 80 MMHG

## 2024-04-04 DIAGNOSIS — Z86.73 HISTORY OF CVA (CEREBROVASCULAR ACCIDENT): ICD-10-CM

## 2024-04-04 DIAGNOSIS — Z72.0 TOBACCO USE: ICD-10-CM

## 2024-04-04 DIAGNOSIS — I35.1 NONRHEUMATIC AORTIC (VALVE) INSUFFICIENCY: ICD-10-CM

## 2024-04-04 DIAGNOSIS — E11.8 TYPE 2 DIABETES MELLITUS WITH UNSPECIFIED COMPLICATIONS (HCC): ICD-10-CM

## 2024-04-04 DIAGNOSIS — I20.0 UNSTABLE ANGINA (HCC): ICD-10-CM

## 2024-04-04 LAB
ECHO BSA: 2.02 M2
NUC STRESS EJECTION FRACTION: 61 %
STRESS BASELINE DIAS BP: 80 MMHG
STRESS BASELINE HR: 68 BPM
STRESS BASELINE ST DEPRESSION: 0 MM
STRESS BASELINE SYS BP: 130 MMHG
STRESS ESTIMATED WORKLOAD: 1 METS
STRESS O2 SAT PEAK: 99 %
STRESS O2 SAT REST: 99 %
STRESS PEAK DIAS BP: 60 MMHG
STRESS PEAK SYS BP: 120 MMHG
STRESS PERCENT HR ACHIEVED: 62 %
STRESS POST PEAK HR: 96 BPM
STRESS RATE PRESSURE PRODUCT: NORMAL BPM*MMHG
STRESS ST DEPRESSION: 0 MM
STRESS TARGET HR: 154 BPM
TID: 0.89

## 2024-04-04 PROCEDURE — 93018 CV STRESS TEST I&R ONLY: CPT | Performed by: SPECIALIST

## 2024-04-04 PROCEDURE — PBSHW PBB SHADOW CHARGE: Performed by: SPECIALIST

## 2024-04-04 PROCEDURE — PBSHW PBB SHADOW CHARGE: Performed by: INTERNAL MEDICINE

## 2024-04-04 PROCEDURE — 78452 HT MUSCLE IMAGE SPECT MULT: CPT | Performed by: SPECIALIST

## 2024-04-04 PROCEDURE — 93016 CV STRESS TEST SUPVJ ONLY: CPT | Performed by: SPECIALIST

## 2024-04-04 PROCEDURE — A9500 TC99M SESTAMIBI: HCPCS | Performed by: SPECIALIST

## 2024-04-04 RX ORDER — TETRAKIS(2-METHOXYISOBUTYLISOCYANIDE)COPPER(I) TETRAFLUOROBORATE 1 MG/ML
8.8 INJECTION, POWDER, LYOPHILIZED, FOR SOLUTION INTRAVENOUS
Status: COMPLETED | OUTPATIENT
Start: 2024-04-04 | End: 2024-04-04

## 2024-04-04 RX ORDER — REGADENOSON 0.08 MG/ML
0.4 INJECTION, SOLUTION INTRAVENOUS
Status: COMPLETED | OUTPATIENT
Start: 2024-04-04 | End: 2024-04-04

## 2024-04-04 RX ORDER — AMINOPHYLLINE 25 MG/ML
100 INJECTION, SOLUTION INTRAVENOUS
Status: COMPLETED | OUTPATIENT
Start: 2024-04-04 | End: 2024-04-04

## 2024-04-04 RX ORDER — TETRAKIS(2-METHOXYISOBUTYLISOCYANIDE)COPPER(I) TETRAFLUOROBORATE 1 MG/ML
26.3 INJECTION, POWDER, LYOPHILIZED, FOR SOLUTION INTRAVENOUS
Status: COMPLETED | OUTPATIENT
Start: 2024-04-04 | End: 2024-04-04

## 2024-04-04 RX ADMIN — REGADENOSON 0.4 MG: 0.08 INJECTION, SOLUTION INTRAVENOUS at 09:40

## 2024-04-04 RX ADMIN — TECHNETIUM TC-99M SESTAMIBI 26.3 MILLICURIE: 1 INJECTION INTRAVENOUS at 09:40

## 2024-04-04 RX ADMIN — AMINOPHYLLINE 100 MG: 25 INJECTION, SOLUTION INTRAVENOUS at 09:45

## 2024-04-04 RX ADMIN — TECHNETIUM TC-99M SESTAMIBI 8.8 MILLICURIE: 1 INJECTION INTRAVENOUS at 08:25

## 2024-04-05 ENCOUNTER — TELEPHONE (OUTPATIENT)
Age: 69
End: 2024-04-05

## 2024-04-05 ENCOUNTER — HOME CARE VISIT (OUTPATIENT)
Facility: HOME HEALTH | Age: 69
End: 2024-04-05

## 2024-04-05 PROCEDURE — G0151 HHCP-SERV OF PT,EA 15 MIN: HCPCS

## 2024-04-05 NOTE — TELEPHONE ENCOUNTER
----- Message from Wilber Atkinson MD sent at 4/4/2024 10:01 PM EDT -----  Please let pt know stress test was overall normal. There was an area of artifact. Recommend follow up in 2-3 months. If symptoms worsen, may still need to consider further evaluation (cath). thx

## 2024-04-05 NOTE — TELEPHONE ENCOUNTER
Called pt. Verified patient's identity with two identifiers. Notified pt of results and message. Pt agreed. Scheduled f/u and pt will call if anything changes or worsen. Patient verbalized understanding and denied further questions or concerns.     Future Appointments   Date Time Provider Department Center   4/8/2024 To Be Determined Kristofer Alexandra, OT [facility]   4/9/2024 To Be Determined Duarte Figueroa, PT [facility]   4/12/2024 To Be Determined Duarte Figueroa, PT [facility]   4/16/2024 To Be Determined Duarte Figueroa, PT [facility]   4/19/2024 To Be Determined Duarte Figueroa, PT [facility]   6/17/2024 10:45 AM Steffen Reyes MD [facility] AMB   6/24/2024 10:40 AM Wilber [place] MD [facility] AMB   12/30/2024 10:00 AM [facility] ECHO 2 [facility] BS AMB   12/30/2024 11:20 AM Wilber [place] MD [facility] AMB

## 2024-04-07 VITALS
TEMPERATURE: 97.4 F | SYSTOLIC BLOOD PRESSURE: 150 MMHG | OXYGEN SATURATION: 97 % | HEART RATE: 66 BPM | RESPIRATION RATE: 16 BRPM | DIASTOLIC BLOOD PRESSURE: 85 MMHG

## 2024-04-09 ENCOUNTER — HOME CARE VISIT (OUTPATIENT)
Facility: HOME HEALTH | Age: 69
End: 2024-04-09
Payer: MEDICARE

## 2024-04-09 VITALS
SYSTOLIC BLOOD PRESSURE: 138 MMHG | OXYGEN SATURATION: 98 % | DIASTOLIC BLOOD PRESSURE: 76 MMHG | TEMPERATURE: 98.1 F | RESPIRATION RATE: 18 BRPM | HEART RATE: 76 BPM

## 2024-04-09 PROCEDURE — G0152 HHCP-SERV OF OT,EA 15 MIN: HCPCS

## 2024-04-09 PROCEDURE — G0151 HHCP-SERV OF PT,EA 15 MIN: HCPCS

## 2024-04-09 ASSESSMENT — ENCOUNTER SYMPTOMS: PAIN LOCATION - PAIN QUALITY: SORE, ACHING

## 2024-04-09 NOTE — HOME HEALTH
Pt is 68 year old female referred for skilled services due to neurologist referral.  PMHX also includes MG, HTN, DM2, HLD, hypothyroidism, CVA, +smoking.  Pt home is cluttered and poorly maintained.  She lives in one story home with ramp access; PLOF fluctuates pending her pain.  Pt requires some assistance with ADLs and IADLs.  Subjective: I need to work on doing stuff in the kitchen  Falls since last visit No(if yes complete the Fall Tracking Form and include bsrifallreport): no recent falls however high fall risk via [facility] 10  Caregiver involvement changes: No changes in caregiver involvement; son assists with care however not present during assessment  Home health supplies by type and quantity ordered/delivered this visit include: NA    Clinician asked if patient has had any physician contact since last home care visit and patient states: NO  Clinician asked if patient has any new or changed medications and patient states:  NO   If Yes, were medications reconciled? NA  Was the certifying physician notified of changes in medications? NA    Clinical assessment (what this visit means for the patient overall and need for ongoing skilled care) and progress or lack of progress towards SPECIFIC goals: OT assessment completed indicating pt requires CGA to min assist for toileting, dressing, sponge bathing, grooming tasks as evidenced by Barthel score of 65/100 indicating minimal dependence. BUE strength of 3+/5 MMT.  Limited functional reach of 4 inches. Pt has a history of non-compliance and poor carryover;  trial to determine potential for progress    Written Teaching Material Utilized: N/A    Interdisciplinary communication with: Discussed with PT  Discharge planning as follows: Will discharge when the patient has reached their maximum functional potential and maximum safety in their home and When goals are met    Specific plan for next visit: IADL training

## 2024-04-10 VITALS
DIASTOLIC BLOOD PRESSURE: 75 MMHG | TEMPERATURE: 97.2 F | OXYGEN SATURATION: 97 % | RESPIRATION RATE: 16 BRPM | HEART RATE: 80 BPM | SYSTOLIC BLOOD PRESSURE: 138 MMHG

## 2024-04-10 ASSESSMENT — ENCOUNTER SYMPTOMS: PAIN LOCATION - PAIN QUALITY: ACHY/SORE

## 2024-04-10 NOTE — HOME HEALTH
Subjective: Pt with no complaints today.  States that she was falling behind this morning getting ready    Falls since last visit: No  Caregiver involvement changes: No changes  Home health supplies by type and quantity ordered/delivered this visit include: N/A    Clinician asked if patient has had any physician contact since last home care visit and patient states: NO  Clinician asked if patient has any new or changed medications and patient states:  NO   If Yes, were medications reconciled? NA  Was the certifying physician notified of changes in medications? NA    Clinical assessment (what this visit means for the patient overall and need for ongoing skilled care) and progress or lack of progress towards SPECIFIC goals: Ms. Elizabeth suffers from MS which results in generalized weakness and impaired balance for mobility.  She lives in a very cluttered home with high fall risk concerns.  Home is not fully condusive to RW use due to clutter and tight spaces so patient is often ambulating without AD.  Will need skilled PT to work on balance, strength and improving home safety.     Written Teaching Material Utilized: NA    Interdisciplinary communication with: N/A     Discharge planning as follows: Is no longer homebound, Will discharge when the patient has reached their maximum functional potential and maximum safety in their home and When goals are met    Specific plan for next visit: Instruct caregiver/patient in home therex for treatment of R knee pain as well as strength and balance improvement.  Instruct in gait training to improve ambulation and ability to get out of the home.

## 2024-04-11 ENCOUNTER — HOME CARE VISIT (OUTPATIENT)
Facility: HOME HEALTH | Age: 69
End: 2024-04-11
Payer: MEDICARE

## 2024-04-11 PROCEDURE — G0151 HHCP-SERV OF PT,EA 15 MIN: HCPCS

## 2024-04-12 VITALS
SYSTOLIC BLOOD PRESSURE: 138 MMHG | HEART RATE: 78 BPM | TEMPERATURE: 97.2 F | RESPIRATION RATE: 16 BRPM | OXYGEN SATURATION: 98 % | DIASTOLIC BLOOD PRESSURE: 75 MMHG

## 2024-04-12 ASSESSMENT — ENCOUNTER SYMPTOMS: PAIN LOCATION - PAIN QUALITY: ACHY/SORE

## 2024-04-12 NOTE — HOME HEALTH
Subjective: Pt with no complaints today.      Falls since last visit: No  Caregiver involvement changes: No changes  Home health supplies by type and quantity ordered/delivered this visit include: N/A    Clinician asked if patient has had any physician contact since last home care visit and patient states: NO  Clinician asked if patient has any new or changed medications and patient states:  NO   If Yes, were medications reconciled? NA  Was the certifying physician notified of changes in medications? NA    Clinical assessment (what this visit means for the patient overall and need for ongoing skilled care) and progress or lack of progress towards SPECIFIC goals: Ms. Elizabeth suffers from MS which results in generalized weakness and impaired balance for mobility.  Currently she is a ways from her baseline mobility as she needs help from PT for all safe gait activity.  She lives in a very cluttered home with high fall risk concerns.  Home is not fully condusive to RW use due to clutter and tight spaces so patient is often ambulating without AD.  Will need skilled PT to work on balance, strength and improving home safety.     Written Teaching Material Utilized: NA    Interdisciplinary communication with: N/A     Discharge planning as follows: Is no longer homebound, Will discharge when the patient has reached their maximum functional potential and maximum safety in their home and When goals are met    Specific plan for next visit: Instruct caregiver/patient in home therex for treatment of R knee pain as well as strength and balance improvement.  Instruct in gait training to improve ambulation and ability to get out of the home.

## 2024-04-16 ENCOUNTER — HOME CARE VISIT (OUTPATIENT)
Facility: HOME HEALTH | Age: 69
End: 2024-04-16
Payer: MEDICARE

## 2024-04-16 VITALS
HEART RATE: 76 BPM | RESPIRATION RATE: 18 BRPM | OXYGEN SATURATION: 98 % | DIASTOLIC BLOOD PRESSURE: 77 MMHG | TEMPERATURE: 98.1 F | SYSTOLIC BLOOD PRESSURE: 121 MMHG

## 2024-04-16 PROCEDURE — G0152 HHCP-SERV OF OT,EA 15 MIN: HCPCS

## 2024-04-16 PROCEDURE — G0151 HHCP-SERV OF PT,EA 15 MIN: HCPCS

## 2024-04-16 ASSESSMENT — ENCOUNTER SYMPTOMS: PAIN LOCATION - PAIN QUALITY: SORE, ACHING

## 2024-04-16 NOTE — HOME HEALTH
Subjective: I am feeling pretty good today  Falls since last visit No(if yes complete the Fall Tracking Form and include bsrifallreport):   Caregiver involvement changes: No changes to caregiver involvement  Home health supplies by type and quantity ordered/delivered this visit include: NA    Clinician asked if patient has had any physician contact since last home care visit and patient states: NO  Clinician asked if patient has any new or changed medications and patient states:  NO   If Yes, were medications reconciled? NA  Was the certifying physician notified of changes in medications? NA    Clinical assessment (what this visit means for the patient overall and need for ongoing skilled care) and progress or lack of progress towards SPECIFIC goals: Pt participated in ADLs, IADLs and strengthening with focus on higher level tasks to improve performance in tasks. Pt participated in toileting, dishes/gardening tasks as well as strengthening.  OT provided training on exercises to improve strengthening; good carryover.  Additional training to reduce risk for falls    Written Teaching Material Utilized: N/A    Interdisciplinary communication with: ERNA    Discharge planning as follows: Will discharge when the patient has reached their maximum functional potential and maximum safety in their home and When goals are met    Specific plan for next visit: IADL training

## 2024-04-17 ENCOUNTER — HOME CARE VISIT (OUTPATIENT)
Facility: HOME HEALTH | Age: 69
End: 2024-04-17
Payer: MEDICARE

## 2024-04-17 VITALS
RESPIRATION RATE: 16 BRPM | HEART RATE: 75 BPM | SYSTOLIC BLOOD PRESSURE: 135 MMHG | TEMPERATURE: 97.2 F | OXYGEN SATURATION: 97 % | DIASTOLIC BLOOD PRESSURE: 75 MMHG

## 2024-04-17 VITALS
DIASTOLIC BLOOD PRESSURE: 64 MMHG | SYSTOLIC BLOOD PRESSURE: 122 MMHG | OXYGEN SATURATION: 98 % | RESPIRATION RATE: 16 BRPM | TEMPERATURE: 98.1 F

## 2024-04-17 PROCEDURE — G0158 HHC OT ASSISTANT EA 15: HCPCS

## 2024-04-17 ASSESSMENT — ENCOUNTER SYMPTOMS
PAIN LOCATION - PAIN QUALITY: ACHY/SORE
PAIN LOCATION - PAIN QUALITY: ACHE

## 2024-04-17 NOTE — HOME HEALTH
Subjective: Pt with no complaints today.      Falls since last visit: No  Caregiver involvement changes: No changes  Home health supplies by type and quantity ordered/delivered this visit include: N/A    Clinician asked if patient has had any physician contact since last home care visit and patient states: NO  Clinician asked if patient has any new or changed medications and patient states:  NO   If Yes, were medications reconciled? NA  Was the certifying physician notified of changes in medications? NA    Clinical assessment (what this visit means for the patient overall and need for ongoing skilled care) and progress or lack of progress towards SPECIFIC goals: Ms. Elizabeth suffers from MS which results in generalized weakness and impaired balance for mobility.  Currently she is a ways from her baseline mobility as she needs help from PT for all safe gait activity.  She lives in a very cluttered home with high fall risk concerns.  Home is not fully condusive to RW use due to clutter and tight spaces so patient is often ambulating without AD.  Will need skilled PT to work on balance, strength and improving home safety.  Also PT needed to work on R knee pain as this limits mobility and poses buckling risk during gait.     Written Teaching Material Utilized: NA    Interdisciplinary communication with: N/A     Discharge planning as follows: Is no longer homebound, Will discharge when the patient has reached their maximum functional potential and maximum safety in their home and When goals are met    Specific plan for next visit: Instruct caregiver/patient in home therex for treatment of R knee pain as well as strength and balance improvement.  Instruct in gait training to improve ambulation and ability to get out of the home.

## 2024-04-17 NOTE — HOME HEALTH
Subjective: im always in pain  Falls since last visit (if yes complete the Fall Tracking Form and include bsrifallreport): No   Caregiver involvement changes: No   Home health supplies by type and quantity ordered/delivered this visit include: no   Clinician asked if patient has had any physician contact since last home care visit and patient states: No   Clinician asked if patient has any new or changed medications and patient states: No   If Yes, were medications reconciled? N/a   Was the certifying physician notified of changes in medications? n/a   Clinical assessment (what this visit means for the patient overall and need for ongoing skilled care) and progress or lack of progress towards SPECIFIC goals: Focus of today's session with balance training for ADL tasks around home and kitchen, modified opening techniques due to decrease strength and hands for jars and other food packaging. Patients clutter in home makes her high fall risk along with impaired balance and will continue to require skilled OT services to address environmental modifications, task/Work simplification safety with ADL and ADL routines  Written Teaching Material Utilized: no   Interdisciplinary communication with: no   Discharge planning as follows: Per physician order, Will discharge when the patient has reached their maximum functional potential and maximum safety in their home and When goals are met   Specific plan for next visit: IADL training/ HEP progression

## 2024-04-18 ENCOUNTER — HOME CARE VISIT (OUTPATIENT)
Facility: HOME HEALTH | Age: 69
End: 2024-04-18
Payer: MEDICARE

## 2024-04-18 PROCEDURE — G0151 HHCP-SERV OF PT,EA 15 MIN: HCPCS

## 2024-04-20 VITALS
DIASTOLIC BLOOD PRESSURE: 68 MMHG | HEART RATE: 88 BPM | SYSTOLIC BLOOD PRESSURE: 135 MMHG | OXYGEN SATURATION: 97 % | TEMPERATURE: 97.2 F | RESPIRATION RATE: 16 BRPM

## 2024-04-20 ASSESSMENT — ENCOUNTER SYMPTOMS: PAIN LOCATION - PAIN QUALITY: ACHY/SORE

## 2024-04-20 NOTE — HOME HEALTH
Subjective: Pt with no complaints today.      Falls since last visit: No  Caregiver involvement changes: No changes  Home health supplies by type and quantity ordered/delivered this visit include: N/A    Clinician asked if patient has had any physician contact since last home care visit and patient states: NO  Clinician asked if patient has any new or changed medications and patient states:  NO   If Yes, were medications reconciled? NA  Was the certifying physician notified of changes in medications? NA    Clinical assessment (what this visit means for the patient overall and need for ongoing skilled care) and progress or lack of progress towards SPECIFIC goals: Ms. lEizabeth suffers from MS which results in generalized weakness and impaired balance for mobility.  Currently she is a ways from her baseline mobility as she needs help from PT for all safe gait activity.  She lives in a very cluttered home with high fall risk concerns.  Home is not fully condusive to RW use due to clutter and tight spaces so patient is often ambulating without AD.  Will need skilled PT to work on balance, strength and improving home safety.  Also PT needed to work on R knee pain as this limits mobility and poses buckling risk during gait.     Written Teaching Material Utilized: NA    Interdisciplinary communication with: N/A     Discharge planning as follows: Is no longer homebound, Will discharge when the patient has reached their maximum functional potential and maximum safety in their home and When goals are met    Specific plan for next visit: Instruct caregiver/patient in home therex for treatment of R knee pain as well as strength and balance improvement.  Instruct in gait training to improve ambulation and ability to get out of the home.

## 2024-04-22 ENCOUNTER — HOME CARE VISIT (OUTPATIENT)
Facility: HOME HEALTH | Age: 69
End: 2024-04-22
Payer: MEDICARE

## 2024-04-22 VITALS
HEART RATE: 81 BPM | TEMPERATURE: 98.1 F | DIASTOLIC BLOOD PRESSURE: 76 MMHG | RESPIRATION RATE: 18 BRPM | OXYGEN SATURATION: 98 % | SYSTOLIC BLOOD PRESSURE: 122 MMHG

## 2024-04-22 PROCEDURE — G0152 HHCP-SERV OF OT,EA 15 MIN: HCPCS

## 2024-04-22 ASSESSMENT — ENCOUNTER SYMPTOMS: PAIN LOCATION - PAIN QUALITY: SORE, ACHING

## 2024-04-22 NOTE — HOME HEALTH
Subjective: Im hurting today  Falls since last visit No(if yes complete the Fall Tracking Form and include bsrifallreport):   Caregiver involvement changes: No changes to caregiver involvement  Home health supplies by type and quantity ordered/delivered this visit include: NA    Clinician asked if patient has had any physician contact since last home care visit and patient states: NO  Clinician asked if patient has any new or changed medications and patient states:  NO   If Yes, were medications reconciled? NA  Was the certifying physician notified of changes in medications? NA    Clinical assessment (what this visit means for the patient overall and need for ongoing skilled care) and progress or lack of progress towards SPECIFIC goals: OT reassessment completed indicating pt requires additional OT services.  At initial assessment pt required CGA to min assist for toileting, dressing, sponge bathing, grooming tasks as evidenced by Barthel score of 65/100 indicating minimal dependence.  Pt continues to require CGA for ADLs with Barthel score of 65/100. BUE strength of 3+/5 MMT. Limited functional reach of 4 inches.  Pt with increased participation during trial period and reassessed to continue with functional progression.        Written Teaching Material Utilized: ERNA    Interdisciplinary communication with: Discussed reassesment with PT    Discharge planning as follows: Will discharge when the patient has reached their maximum functional potential and maximum safety in their home and When goals are met    Specific plan for next visit: ADL training, IADL training

## 2024-04-23 ENCOUNTER — HOME CARE VISIT (OUTPATIENT)
Dept: HOME HEALTH SERVICES | Facility: HOME HEALTH | Age: 69
End: 2024-04-23
Payer: MEDICARE

## 2024-04-24 ENCOUNTER — TELEPHONE (OUTPATIENT)
Age: 69
End: 2024-04-24

## 2024-04-24 ENCOUNTER — HOME CARE VISIT (OUTPATIENT)
Facility: HOME HEALTH | Age: 69
End: 2024-04-24
Payer: MEDICARE

## 2024-04-24 VITALS
SYSTOLIC BLOOD PRESSURE: 130 MMHG | RESPIRATION RATE: 16 BRPM | HEART RATE: 81 BPM | TEMPERATURE: 97.9 F | DIASTOLIC BLOOD PRESSURE: 72 MMHG | OXYGEN SATURATION: 99 %

## 2024-04-24 VITALS
TEMPERATURE: 97.2 F | HEART RATE: 75 BPM | DIASTOLIC BLOOD PRESSURE: 70 MMHG | OXYGEN SATURATION: 98 % | SYSTOLIC BLOOD PRESSURE: 130 MMHG | RESPIRATION RATE: 16 BRPM

## 2024-04-24 PROCEDURE — G0151 HHCP-SERV OF PT,EA 15 MIN: HCPCS

## 2024-04-24 PROCEDURE — G0158 HHC OT ASSISTANT EA 15: HCPCS

## 2024-04-24 ASSESSMENT — ENCOUNTER SYMPTOMS: PAIN LOCATION - PAIN QUALITY: ACHY/SORE

## 2024-04-24 NOTE — TELEPHONE ENCOUNTER
Melanie from [facility] called and stated that she had medication concern. Patient having some problems with back pain. Sitting still her pain is at a 7 moving around it goes to 10.  Call back at [phone]

## 2024-04-24 NOTE — TELEPHONE ENCOUNTER
Called Patient. Name and  confirmed.  Informed her a per Dr. Reyes's note. Verbalized understanding.

## 2024-04-24 NOTE — HOME HEALTH
Subjective: my back hurts so bad today  Falls since last visit (if yes complete the Fall Tracking Form and include bsrifallreport): No   Caregiver involvement changes: No   Home health supplies by type and quantity ordered/delivered this visit include: no   Clinician asked if patient has had any physician contact since last home care visit and patient states: No   Clinician asked if patient has any new or changed medications and patient states: No   If Yes, were medications reconciled? N/a   Was the certifying physician notified of changes in medications? n/a   Clinical assessment (what this visit means for the patient overall and need for ongoing skilled care) and progress or lack of progress towards SPECIFIC goals: pt declining much activity today due to severe back pain levels; called Dr Reyes office for pain management and they will call pt back for POC. Set up an deducation on checking blood sugar today but will need follow up that she is checking frequently as she currently is not. Batteries are now in and easy access in office room. no goals met today due to pain levels  Written Teaching Material Utilized: no   Interdisciplinary communication with: no   Discharge planning as follows: Per physician order, Will discharge when the patient has reached their maximum functional potential and maximum safety in their home and When goals are met   Specific plan for next visit: IADL balance training

## 2024-04-24 NOTE — TELEPHONE ENCOUNTER
Her neurologist usually manages her pain so I would defer to him; in the meantime, she can try OTC lidocaine patches, heat, Tylenol but if it continues, she may need in person evaluation.

## 2024-04-24 NOTE — TELEPHONE ENCOUNTER
Called sal. Patient name and  verified. She stated patient complaining about severe back pain 7/10.she can not move. Started yesterday, no fall, she tried ice and heat does not work. She does not know what happened. Patient wants to know which medication she can take.

## 2024-04-26 ENCOUNTER — HOME CARE VISIT (OUTPATIENT)
Facility: HOME HEALTH | Age: 69
End: 2024-04-26
Payer: MEDICARE

## 2024-04-26 VITALS
SYSTOLIC BLOOD PRESSURE: 121 MMHG | HEART RATE: 89 BPM | RESPIRATION RATE: 16 BRPM | OXYGEN SATURATION: 94 % | DIASTOLIC BLOOD PRESSURE: 76 MMHG | TEMPERATURE: 97.2 F

## 2024-04-26 PROCEDURE — G0151 HHCP-SERV OF PT,EA 15 MIN: HCPCS

## 2024-04-26 NOTE — HOME HEALTH
Subjective: Pt with no complaints today.  States that she felt very tired and sore yesterday after performing cleaning in the home.     Falls since last visit: No  Caregiver involvement changes: No changes  Home health supplies by type and quantity ordered/delivered this visit include: N/A    Clinician asked if patient has had any physician contact since last home care visit and patient states: NO  Clinician asked if patient has any new or changed medications and patient states:  NO   If Yes, were medications reconciled? NA  Was the certifying physician notified of changes in medications? NA    Clinical assessment (what this visit means for the patient overall and need for ongoing skilled care) and progress or lack of progress towards SPECIFIC goals: Ms. Elizabeth suffers from MS which results in generalized weakness and impaired balance for mobility.  Currently she is a ways from her baseline mobility as she needs help from PT for all safe gait activity.  She lives in a very cluttered home with high fall risk concerns.  Home is not fully condusive to RW use due to clutter and tight spaces so patient is often ambulating without AD.  Will need skilled PT to work on balance, strength and improving home safety.  Also PT needed to work on R knee pain as this limits mobility and poses buckling risk during gait.     Progress towards goals:  Ms Elizabeth is progressing ambulation safety and distance each week and is improving safety and ability to perform ADL's in the home.      Written Teaching Material Utilized: ERNA    Interdisciplinary communication with: N/A     Discharge planning as follows: Is no longer homebound, Will discharge when the patient has reached their maximum functional potential and maximum safety in their home and When goals are met    Specific plan for next visit: Instruct caregiver/patient in home therex for treatment of R knee pain as well as strength and balance improvement.  Instruct in gait training to

## 2024-05-01 ENCOUNTER — HOME CARE VISIT (OUTPATIENT)
Facility: HOME HEALTH | Age: 69
End: 2024-05-01
Payer: MEDICARE

## 2024-05-01 VITALS
OXYGEN SATURATION: 98 % | DIASTOLIC BLOOD PRESSURE: 76 MMHG | TEMPERATURE: 98.1 F | RESPIRATION RATE: 16 BRPM | SYSTOLIC BLOOD PRESSURE: 124 MMHG | HEART RATE: 68 BPM

## 2024-05-01 PROCEDURE — G0152 HHCP-SERV OF OT,EA 15 MIN: HCPCS

## 2024-05-01 ASSESSMENT — ENCOUNTER SYMPTOMS: PAIN LOCATION - PAIN QUALITY: SORE, ACHING

## 2024-05-01 NOTE — HOME HEALTH
Subjective: I can do a little but not too much today  Falls since last visit No(if yes complete the Fall Tracking Form and include bsrifallreport):   Caregiver involvement changes: NO changes to caregiver involvement  Home health supplies by type and quantity ordered/delivered this visit include: NA    Clinician asked if patient has had any physician contact since last home care visit and patient states: NO  Clinician asked if patient has any new or changed medications and patient states:  NO   If Yes, were medications reconciled? NA  Was the certifying physician notified of changes in medications? NA      Clinical assessment (what this visit means for the patient overall and need for ongoing skilled care) and progress or lack of progress towards SPECIFIC goals: Pt participated in exercises, ADLs and IADLs provided with SBA to CGA.  Pt demonstrates improvement with task however requires cues for stability, technique and carryover. Additional training to reduce risk for falls    Written Teaching Material Utilized: N/A    Interdisciplinary communication with: ERNA  Discharge planning as follows: Will discharge when the patient has reached their maximum functional potential and maximum safety in their home and When goals are met    Specific plan for next visit: ADL training (1) Other Medications/None

## 2024-05-02 ENCOUNTER — HOME CARE VISIT (OUTPATIENT)
Facility: HOME HEALTH | Age: 69
End: 2024-05-02
Payer: MEDICARE

## 2024-05-02 PROCEDURE — G0151 HHCP-SERV OF PT,EA 15 MIN: HCPCS

## 2024-05-03 ENCOUNTER — HOME CARE VISIT (OUTPATIENT)
Facility: HOME HEALTH | Age: 69
End: 2024-05-03
Payer: MEDICARE

## 2024-05-03 PROCEDURE — G0151 HHCP-SERV OF PT,EA 15 MIN: HCPCS

## 2024-05-04 VITALS
DIASTOLIC BLOOD PRESSURE: 80 MMHG | TEMPERATURE: 97.4 F | SYSTOLIC BLOOD PRESSURE: 125 MMHG | SYSTOLIC BLOOD PRESSURE: 138 MMHG | RESPIRATION RATE: 16 BRPM | OXYGEN SATURATION: 97 % | RESPIRATION RATE: 16 BRPM | TEMPERATURE: 97.4 F | DIASTOLIC BLOOD PRESSURE: 72 MMHG | OXYGEN SATURATION: 95 % | HEART RATE: 90 BPM | HEART RATE: 80 BPM

## 2024-05-04 ASSESSMENT — ENCOUNTER SYMPTOMS
PAIN LOCATION - PAIN QUALITY: ACHY/SORE
PAIN LOCATION - PAIN QUALITY: ACHY/SORE

## 2024-05-04 NOTE — HOME HEALTH
training to improve ambulation and ability to get out of the home.  Working towards less dependence on RW as she cannot use effectively in the home and has goals to not need outside.

## 2024-05-06 ENCOUNTER — HOME CARE VISIT (OUTPATIENT)
Facility: HOME HEALTH | Age: 69
End: 2024-05-06
Payer: MEDICARE

## 2024-05-06 VITALS
DIASTOLIC BLOOD PRESSURE: 76 MMHG | RESPIRATION RATE: 18 BRPM | HEART RATE: 76 BPM | SYSTOLIC BLOOD PRESSURE: 131 MMHG | OXYGEN SATURATION: 98 % | TEMPERATURE: 98.3 F

## 2024-05-06 PROCEDURE — G0152 HHCP-SERV OF OT,EA 15 MIN: HCPCS

## 2024-05-06 ASSESSMENT — ENCOUNTER SYMPTOMS: PAIN LOCATION - PAIN QUALITY: SORE, ACHING

## 2024-05-06 NOTE — HOME HEALTH
Subjective: Im doing okay for now, but I get sore when it rains  Falls since last visit No(if yes complete the Fall Tracking Form and include bsrifallreport):   Caregiver involvement changes: No changes to caregiver involvement  Home health supplies by type and quantity ordered/delivered this visit include: Renetta    Clinician asked if patient has had any physician contact since last home care visit and patient states: NO  Clinician asked if patient has any new or changed medications and patient states:  NO     If Yes, were medications reconciled? NA  Was the certifying physician notified of changes in medications? NA    Clinical assessment (what this visit means for the patient overall and need for ongoing skilled care) and progress or lack of progress towards SPECIFIC goals: Pt participated in strengthening and ADL strategies to improve independence.  Pt open to new strategies including rest breaks and educated on need to complete exercises throughout the week, verbalizing understanding.  Pt with pain limiting particpation this session and requires additional training to reduce risk for falls    Written Teaching Material Utilized: N/A    Interdisciplinary communication with: ERNA    Discharge planning as follows: Will discharge when the patient has reached their maximum functional potential and maximum safety in their home and When goals are met    Specific plan for next visit: IADL training

## 2024-05-07 ENCOUNTER — HOME CARE VISIT (OUTPATIENT)
Facility: HOME HEALTH | Age: 69
End: 2024-05-07
Payer: MEDICARE

## 2024-05-07 PROCEDURE — G0151 HHCP-SERV OF PT,EA 15 MIN: HCPCS

## 2024-05-08 ENCOUNTER — HOME CARE VISIT (OUTPATIENT)
Facility: HOME HEALTH | Age: 69
End: 2024-05-08
Payer: MEDICARE

## 2024-05-08 VITALS
TEMPERATURE: 97.8 F | DIASTOLIC BLOOD PRESSURE: 68 MMHG | RESPIRATION RATE: 17 BRPM | HEART RATE: 76 BPM | OXYGEN SATURATION: 98 % | SYSTOLIC BLOOD PRESSURE: 121 MMHG

## 2024-05-08 VITALS
HEART RATE: 87 BPM | RESPIRATION RATE: 16 BRPM | DIASTOLIC BLOOD PRESSURE: 78 MMHG | SYSTOLIC BLOOD PRESSURE: 140 MMHG | OXYGEN SATURATION: 98 % | TEMPERATURE: 97.4 F

## 2024-05-08 PROCEDURE — G0152 HHCP-SERV OF OT,EA 15 MIN: HCPCS

## 2024-05-08 ASSESSMENT — ENCOUNTER SYMPTOMS
PAIN LOCATION - PAIN QUALITY: SORE ACHING
PAIN LOCATION - PAIN QUALITY: ACHY/SORE

## 2024-05-08 NOTE — HOME HEALTH
Subjective: I forgot you were coming today  Falls since last visit No(if yes complete the Fall Tracking Form and include bsrifallreport):   Caregiver involvement changes: NO changes to caregiver involvement  Home health supplies by type and quantity ordered/delivered this visit include: NA    Clinician asked if patient has had any physician contact since last home care visit and patient states: Yes  Clinician asked if patient has any new or changed medications and patient states:  Yes  If Yes, were medications reconciled? Yes, qulipta added to POC    Was the certifying physician notified of changes in medications? yes MD aware, PT made aware    Clinical assessment (what this visit means for the patient overall and need for ongoing skilled care) and progress or lack of progress towards SPECIFIC goals: Pt participated in strengthening and IADL tasks with focus on increased safety to declutter and organize home.  Pt with fair participation; limited by pain however agreeable to session.  Additional training required to maximize potential and reduce risk for falls      Written Teaching Material Utilized: N/A    Interdisciplinary communication with: PT/MD aware of new medications    Discharge planning as follows: Will discharge when the patient has reached their maximum functional potential and maximum safety in their home and When goals are met    Specific plan for next visit: IADL training with focus on laundry

## 2024-05-08 NOTE — HOME HEALTH
Subjective: Pt with no complaints today.       Falls since last visit: No  Caregiver involvement changes: No changes  Home health supplies by type and quantity ordered/delivered this visit include: N/A    Clinician asked if patient has had any physician contact since last home care visit and patient states: NO  Clinician asked if patient has any new or changed medications and patient states:  NO   If Yes, were medications reconciled? NA  Was the certifying physician notified of changes in medications? NA    Clinical assessment (what this visit means for the patient overall and need for ongoing skilled care) and progress or lack of progress towards SPECIFIC goals: Ms. Elizabeth suffers from MS which results in generalized weakness and impaired balance for mobility.   She lives in a very cluttered home with high fall risk concerns.  Home is not fully condusive to RW use due to clutter and tight spaces so patient is often ambulating without AD.  Will need skilled PT to work on balance, strength and improving home safety.  Also PT needed to work on R knee and back pain as this limits mobility and poses buckling risk during gait.     Progress towards goals:  Ms Elizabeth is progressing ambulation safety and distance each week and is improving safety and ability to perform ADL's in the home.  Able to perform SC ambulation today under min A of PT.  Endurance up to 250' at this time.     Written Teaching Material Utilized: NA    Interdisciplinary communication with: N/A     Discharge planning as follows: Is no longer homebound, Will discharge when the patient has reached their maximum functional potential and maximum safety in their home and When goals are met    Specific plan for next visit: Instruct caregiver/patient in home therex for treatment of R knee and back pain as well as strength and balance improvement.  Instruct in gait training to improve ambulation and ability to get out of the home.  Working towards less dependence

## 2024-05-10 ENCOUNTER — HOME CARE VISIT (OUTPATIENT)
Facility: HOME HEALTH | Age: 69
End: 2024-05-10
Payer: MEDICARE

## 2024-05-10 PROCEDURE — G0151 HHCP-SERV OF PT,EA 15 MIN: HCPCS

## 2024-05-13 ENCOUNTER — HOME CARE VISIT (OUTPATIENT)
Facility: HOME HEALTH | Age: 69
End: 2024-05-13
Payer: MEDICARE

## 2024-05-13 VITALS
OXYGEN SATURATION: 99 % | HEART RATE: 81 BPM | SYSTOLIC BLOOD PRESSURE: 131 MMHG | RESPIRATION RATE: 17 BRPM | DIASTOLIC BLOOD PRESSURE: 67 MMHG | TEMPERATURE: 98.2 F

## 2024-05-13 PROCEDURE — G0152 HHCP-SERV OF OT,EA 15 MIN: HCPCS

## 2024-05-13 ASSESSMENT — ENCOUNTER SYMPTOMS: PAIN LOCATION - PAIN QUALITY: SORE, ACHING

## 2024-05-13 NOTE — HOME HEALTH
Subjective: I went outside and walked a little yesterday  Falls since last visit No(if yes complete the Fall Tracking Form and include bsrifallreport):   Caregiver involvement changes: No changes to caregiver involvement  Home health supplies by type and quantity ordered/delivered this visit include: NA    Clinician asked if patient has had any physician contact since last home care visit and patient states: NO  Clinician asked if patient has any new or changed medications and patient states:  NO   If Yes, were medications reconciled? NA  Was the certifying physician notified of changes in medications? NA      Clinical assessment (what this visit means for the patient overall and need for ongoing skilled care) and progress or lack of progress towards SPECIFIC goals: Pt participated in strengthening and IADL tasks with good participation; no recent falls and improved pain this session.  Additional trainng to reduce risk for falls    Written Teaching Material Utilized: N/A    Interdisciplinary communication with: ERNA    Discharge planning as follows: Will discharge when the patient has reached their maximum functional potential and maximum safety in their home and When goals are met    Specific plan for next visit: ADL training, IADL training

## 2024-05-14 VITALS
OXYGEN SATURATION: 98 % | DIASTOLIC BLOOD PRESSURE: 78 MMHG | HEART RATE: 75 BPM | SYSTOLIC BLOOD PRESSURE: 135 MMHG | RESPIRATION RATE: 16 BRPM | TEMPERATURE: 97.2 F

## 2024-05-14 ASSESSMENT — ENCOUNTER SYMPTOMS: PAIN LOCATION - PAIN QUALITY: ACHY/SORE

## 2024-05-14 NOTE — HOME HEALTH
ability to get out of the home.  Working towards less dependence on RW as she cannot use effectively in the home and has goals to not need outside. Pt is now Min A with SC

## 2024-05-15 ENCOUNTER — HOME CARE VISIT (OUTPATIENT)
Dept: HOME HEALTH SERVICES | Facility: HOME HEALTH | Age: 69
End: 2024-05-15
Payer: MEDICARE

## 2024-05-15 VITALS
DIASTOLIC BLOOD PRESSURE: 68 MMHG | OXYGEN SATURATION: 98 % | SYSTOLIC BLOOD PRESSURE: 118 MMHG | HEART RATE: 84 BPM | RESPIRATION RATE: 16 BRPM | TEMPERATURE: 97.7 F

## 2024-05-15 PROCEDURE — G0158 HHC OT ASSISTANT EA 15: HCPCS

## 2024-05-15 ASSESSMENT — ENCOUNTER SYMPTOMS: PAIN LOCATION - PAIN QUALITY: HEADACHE

## 2024-05-15 NOTE — HOME HEALTH
Subjective: I feel better today but yesterday I couldnt get out of bed  Falls since last visit No(if yes complete the Fall Tracking Form and include bsrifallreport):   Caregiver involvement changes: No changes to caregiver involvement   Home health supplies by type and quantity ordered/delivered this visit include: NA   Clinician asked if patient has had any physician contact since last home care visit and patient states: NO   Clinician asked if patient has any new or changed medications and patient states: NO   If Yes, were medications reconciled? NA   Was the certifying physician notified of changes in medications? NA   Clinical assessment (what this visit means for the patient overall and need for ongoing skilled care) and progress or lack of progress towards SPECIFIC goals: Patient progressing with ADL/IADL safety with increased standing balance during graded functional reach and folding of clothes and other household item retrieval. Focus and today on EC strategies for incorporating prioritizing and planning of activities for patient to reduce overdoing it as she is often doing tasks one day and unable to leave the bed in pain the next few days due to overdoing when she feels good. Will continue to benefit from skilled OT services to incorporate those EC and task prioritizing strategies.  Written Teaching Material Utilized: N/A   Interdisciplinary communication with: ERNA   Discharge planning as follows: Will discharge when the patient has reached their maximum functional potential and maximum safety in their home and When goals are met   Specific plan for next visit: ADL training, IADL training

## 2024-05-16 ENCOUNTER — HOME CARE VISIT (OUTPATIENT)
Facility: HOME HEALTH | Age: 69
End: 2024-05-16
Payer: MEDICARE

## 2024-05-16 PROCEDURE — G0151 HHCP-SERV OF PT,EA 15 MIN: HCPCS

## 2024-05-17 ENCOUNTER — HOME CARE VISIT (OUTPATIENT)
Facility: HOME HEALTH | Age: 69
End: 2024-05-17
Payer: MEDICARE

## 2024-05-17 VITALS
TEMPERATURE: 97.2 F | DIASTOLIC BLOOD PRESSURE: 78 MMHG | RESPIRATION RATE: 16 BRPM | HEART RATE: 82 BPM | OXYGEN SATURATION: 98 % | SYSTOLIC BLOOD PRESSURE: 135 MMHG

## 2024-05-17 NOTE — HOME HEALTH
Subjective: Pt with no complaints today.   Reports feeling stronger and more mobile    Falls since last visit: No  Caregiver involvement changes: No changes  Home health supplies by type and quantity ordered/delivered this visit include: N/A    Clinician asked if patient has had any physician contact since last home care visit and patient states: NO  Clinician asked if patient has any new or changed medications and patient states:  NO   If Yes, were medications reconciled? NA  Was the certifying physician notified of changes in medications? NA    Clinical assessment (what this visit means for the patient overall and need for ongoing skilled care) and progress or lack of progress towards SPECIFIC goals: Ms. Elizabeth suffers from MS which results in generalized weakness and impaired balance for mobility.   She lives in a very cluttered home with high fall risk concerns.  Home is not fully condusive to RW use due to clutter and tight spaces so patient is often ambulating without AD.  Will need skilled PT to work on balance, strength and improving home safety.  Also PT needed to work on R knee and back pain as this limits mobility and poses buckling risk during gait.     Progress towards goals:  Ms Elizabeth is progressing ambulation safety and distance each week and is improving safety and ability to perform ADL's in the home.  Able to perform SC ambulation today under moslty CGA of PT at this time.  Endurance up to 300'.  Pt making progress with home therex ability and performance.  Improving balance with all ambulation.     Written Teaching Material Utilized: ERNA    Interdisciplinary communication with: N/A     Discharge planning as follows: Is no longer homebound, Will discharge when the patient has reached their maximum functional potential and maximum safety in their home and When goals are met    Specific plan for next visit: Instruct caregiver/patient in home therex for treatment of R knee and back pain as well as

## 2024-05-20 ENCOUNTER — HOME CARE VISIT (OUTPATIENT)
Facility: HOME HEALTH | Age: 69
End: 2024-05-20
Payer: MEDICARE

## 2024-05-20 ENCOUNTER — HOME CARE VISIT (OUTPATIENT)
Dept: HOME HEALTH SERVICES | Facility: HOME HEALTH | Age: 69
End: 2024-05-20
Payer: MEDICARE

## 2024-05-20 VITALS
HEART RATE: 76 BPM | TEMPERATURE: 97.8 F | SYSTOLIC BLOOD PRESSURE: 121 MMHG | OXYGEN SATURATION: 98 % | RESPIRATION RATE: 18 BRPM | DIASTOLIC BLOOD PRESSURE: 75 MMHG

## 2024-05-20 PROCEDURE — G0151 HHCP-SERV OF PT,EA 15 MIN: HCPCS

## 2024-05-20 PROCEDURE — G0152 HHCP-SERV OF OT,EA 15 MIN: HCPCS

## 2024-05-20 ASSESSMENT — ENCOUNTER SYMPTOMS: PAIN LOCATION - PAIN QUALITY: ACHING, THROBBING

## 2024-05-20 NOTE — HOME HEALTH
Subjective: Ondina had migraines all weekend and havent done much  Falls since last visit No(if yes complete the Fall Tracking Form and include bsrifallreport):   Caregiver involvement changes: No changes to caregiver involvement  Home health supplies by type and quantity ordered/delivered this visit include: NA    Clinician asked if patient has had any physician contact since last home care visit and patient states: NO  Clinician asked if patient has any new or changed medications and patient states:  NO   If Yes, were medications reconciled? NA  Was the certifying physician notified of changes in medications?NA      Clinical assessment (what this visit means for the patient overall and need for ongoing skilled care) and progress or lack of progress towards SPECIFIC goals: OT reassessment completed indicating pt has not met goals and requires additional OT services. At initial assessment pt required CGA to min assist for toileting, dressing, sponge bathing, grooming tasks as evidenced by Barthel score of 65/100 indicating minimal dependence. Pt is completing ADLs with SBA however requires some cues for safety and follow through.  Barthel score has improved rom 65/100 to 75/100. BUE strength has improved from 3+/5 MMT to 4-/5 MMT and demonstrates some carryover of HEP however limited follow through when therapy not present. Functional reach has progressed from 4 inches to 6 inches. Additional training to maximize potential and reduce risk for falls.    Written Teaching Material Utilized: ERNA  Interdisciplinary communication with: ERNA  Discharge planning as follows: Will discharge when the patient has reached their maximum functional potential and maximum safety in their home and When goals are met    Specific plan for next visit: IADL training, bathing ADL

## 2024-05-21 ENCOUNTER — HOME CARE VISIT (OUTPATIENT)
Dept: HOME HEALTH SERVICES | Facility: HOME HEALTH | Age: 69
End: 2024-05-21
Payer: MEDICARE

## 2024-05-21 VITALS
TEMPERATURE: 97.5 F | DIASTOLIC BLOOD PRESSURE: 70 MMHG | RESPIRATION RATE: 16 BRPM | SYSTOLIC BLOOD PRESSURE: 125 MMHG | HEART RATE: 80 BPM | OXYGEN SATURATION: 98 %

## 2024-05-21 ASSESSMENT — ENCOUNTER SYMPTOMS: PAIN LOCATION - PAIN QUALITY: ACHY/SORE

## 2024-05-21 NOTE — HOME HEALTH
Subjective: Pt with no complaints today.   States that she is still struggling with heaches from time to time but overall is doing well.      Falls since last visit: No  Caregiver involvement changes: No changes  Home health supplies by type and quantity ordered/delivered this visit include: N/A    Clinician asked if patient has had any physician contact since last home care visit and patient states: NO  Clinician asked if patient has any new or changed medications and patient states:  NO   If Yes, were medications reconciled? NA  Was the certifying physician notified of changes in medications? NA    Clinical assessment (what this visit means for the patient overall and need for ongoing skilled care) and progress or lack of progress towards SPECIFIC goals: Ms. Elizabeth suffers from MS which results in generalized weakness and impaired balance for mobility.   She lives in a very cluttered home with high fall risk concerns.  Home is not fully condusive to RW use due to clutter and tight spaces so patient is often ambulating without AD.  Will need skilled PT to work on balance, strength and improving home safety.  Also PT needed to work on R knee and back pain as this limits mobility and poses buckling risk during gait.     Progress towards goals:  Ms Elizabeth is progressing ambulation safety and distance each week and is improving safety and ability to perform ADL's in the home.  Able to perform SC ambulation today under moslty CGA of PT at this time.  Endurance up to 300'.  Pt making progress with home therex ability and performance.  Improving balance with all ambulation.   PT plan is to start ensuring full independence in HEP with plan for discharge in 2 weeks.     Written Teaching Material Utilized: NA    Interdisciplinary communication with: N/A     Discharge planning as follows: Is no longer homebound, Will discharge when the patient has reached their maximum functional potential and maximum safety in their home and

## 2024-05-22 ENCOUNTER — HOME CARE VISIT (OUTPATIENT)
Facility: HOME HEALTH | Age: 69
End: 2024-05-22
Payer: MEDICARE

## 2024-05-22 VITALS
RESPIRATION RATE: 18 BRPM | TEMPERATURE: 97.8 F | HEART RATE: 76 BPM | DIASTOLIC BLOOD PRESSURE: 76 MMHG | OXYGEN SATURATION: 98 % | SYSTOLIC BLOOD PRESSURE: 122 MMHG

## 2024-05-22 PROCEDURE — G0152 HHCP-SERV OF OT,EA 15 MIN: HCPCS

## 2024-05-22 PROCEDURE — G0151 HHCP-SERV OF PT,EA 15 MIN: HCPCS

## 2024-05-22 ASSESSMENT — ENCOUNTER SYMPTOMS: PAIN LOCATION - PAIN QUALITY: SORE, ACHING

## 2024-05-22 NOTE — HOME HEALTH
Subjective: I am ready to take a shower  Falls since last visit No(if yes complete the Fall Tracking Form and include bsrifallreport):   Caregiver involvement changes: No changes to caregiver involvement  Home health supplies by type and quantity ordered/delivered this visit include: NA    Clinician asked if patient has had any physician contact since last home care visit and patient states: NO  Clinician asked if patient has any new or changed medications and patient states:  NO   If Yes, were medications reconciled? NA  Was the certifying physician notified of changes in medications? NA    Clinical assessment (what this visit means for the patient overall and need for ongoing skilled care) and progress or lack of progress towards SPECIFIC goals: Pt participated in ADL training with focus on bathing, toileting,dressing completing tasks with SBA.  Pt with good participation and improvement in task.  Additional training to reduce risk for falls    Written Teaching Material Utilized: N/A    Interdisciplinary communication with: ERNA    Discharge planning as follows: Will discharge when the patient has reached their maximum functional potential and maximum safety in their home and When goals are met    Specific plan for next visit: IADL training and strengthening

## 2024-05-23 VITALS
OXYGEN SATURATION: 98 % | SYSTOLIC BLOOD PRESSURE: 135 MMHG | HEART RATE: 95 BPM | DIASTOLIC BLOOD PRESSURE: 70 MMHG | RESPIRATION RATE: 16 BRPM | TEMPERATURE: 97.4 F

## 2024-05-23 ASSESSMENT — ENCOUNTER SYMPTOMS: PAIN LOCATION - PAIN QUALITY: ACHY/SORE

## 2024-05-23 NOTE — HOME HEALTH
Subjective: Pt with no complaints today.       Falls since last visit: No  Caregiver involvement changes: No changes  Home health supplies by type and quantity ordered/delivered this visit include: N/A    Clinician asked if patient has had any physician contact since last home care visit and patient states: NO  Clinician asked if patient has any new or changed medications and patient states:  NO   If Yes, were medications reconciled? NA  Was the certifying physician notified of changes in medications? NA    Clinical assessment (what this visit means for the patient overall and need for ongoing skilled care) and progress or lack of progress towards SPECIFIC goals: Ms. Elizabeth suffers from MS which results in generalized weakness and impaired balance for mobility.   She lives in a very cluttered home with high fall risk concerns.  Home is not fully condusive to RW use due to clutter and tight spaces so patient is often ambulating without AD.  Will need skilled PT to work on balance, strength and improving home safety.  Also PT needed to work on R knee and back pain as this limits mobility and poses buckling risk during gait.     Progress towards goals:  Ms Elizabeth is progressing ambulation safety and distance each week and is improving safety and ability to perform ADL's in the home.  Able to perform SC ambulation today under moslty CGA of PT at this time.  Endurance up to 300'.  Pt making progress with home therex ability and performance.  Improving balance with all ambulation.   Pt is on pace for discharge next week.     Written Teaching Material Utilized: ERNA    Interdisciplinary communication with: Yes with OT regarding plan for discharge next week.     Discharge planning as follows: Is no longer homebound, Will discharge when the patient has reached their maximum functional potential and maximum safety in their home and When goals are met    Specific plan for next visit: Instruct caregiver/patient in home therex for

## 2024-05-28 ENCOUNTER — HOME CARE VISIT (OUTPATIENT)
Facility: HOME HEALTH | Age: 69
End: 2024-05-28
Payer: MEDICARE

## 2024-05-28 VITALS
RESPIRATION RATE: 17 BRPM | SYSTOLIC BLOOD PRESSURE: 125 MMHG | OXYGEN SATURATION: 96 % | DIASTOLIC BLOOD PRESSURE: 77 MMHG | TEMPERATURE: 98.4 F | HEART RATE: 67 BPM

## 2024-05-28 PROCEDURE — G0152 HHCP-SERV OF OT,EA 15 MIN: HCPCS

## 2024-05-28 ASSESSMENT — ENCOUNTER SYMPTOMS: PAIN LOCATION - PAIN QUALITY: SORE, ACHING

## 2024-05-28 NOTE — HOME HEALTH
Subjective: I am a little achy but not too bad  Falls since last visit No(if yes complete the Fall Tracking Form and include bsrifallreport):   Caregiver involvement changes: NO changes to caregiver involvement  Home health supplies by type and quantity ordered/delivered this visit include: NA    Clinician asked if patient has had any physician contact since last home care visit and patient states: NO  Clinician asked if patient has any new or changed medications and patient states:  NO   If Yes, were medications reconciled? NA  Was the certifying physician notified of changes in medications? NA    Clinical assessment (what this visit means for the patient overall and need for ongoing skilled care) and progress or lack of progress towards SPECIFIC goals: OT reviewed ADL training and HEP with 80% carryover and increased independence in ADLs.  OT educated on upcoming discharge with pt in agreement.    Written Teaching Material Utilized: ERNA    Interdisciplinary communication with: Communicated with PT upcoming discharge      Discharge planning as follows: DC next session    Specific plan for next visit: OT discharge

## 2024-05-29 ENCOUNTER — HOME CARE VISIT (OUTPATIENT)
Facility: HOME HEALTH | Age: 69
End: 2024-05-29
Payer: MEDICARE

## 2024-05-29 VITALS
RESPIRATION RATE: 17 BRPM | DIASTOLIC BLOOD PRESSURE: 77 MMHG | SYSTOLIC BLOOD PRESSURE: 138 MMHG | TEMPERATURE: 97.8 F | OXYGEN SATURATION: 98 % | HEART RATE: 71 BPM

## 2024-05-29 PROCEDURE — G0152 HHCP-SERV OF OT,EA 15 MIN: HCPCS

## 2024-05-29 PROCEDURE — G0151 HHCP-SERV OF PT,EA 15 MIN: HCPCS

## 2024-05-29 ASSESSMENT — ENCOUNTER SYMPTOMS: PAIN LOCATION - PAIN QUALITY: SORE, ACHING

## 2024-05-29 NOTE — HOME HEALTH
Subjective: I am doing so much better  Falls since last visit No(if yes complete the Fall Tracking Form and include bsrifallreport):   Caregiver involvement changes: No changes to caregiver involvement  Home health supplies by type and quantity ordered/delivered this visit include: NA    Clinician asked if patient has had any physician contact since last home care visit and patient states: NO  Clinician asked if patient has any new or changed medications and patient states:  NO   If Yes, were medications reconciled? NA  Was the certifying physician notified of changes in medications? NA    Clinical assessment (what this visit means for the patient overall and need for ongoing skilled care) and progress or lack of progress towards SPECIFIC goals: OT discharge completed indicating pt has met goals and is being discharged from OT at this time. At initial assessment pt required CGA to min assist for toileting, dressing, sponge bathing, grooming tasks, at discharge pt is completing ADLs with mod I. Barthel score has improved from 65/100 to 85/100. BUE strength has improved from 3+/5 MMT to 4/5 MMT. Functional reach has progressed from 4 inches to 8 inches. Discharge completed at this time        Written Teaching Material Utilized: N/A    Interdisciplinary communication with: PT notified of dc; NMNC signed    Discharge planning as follows: DC this session    Specific plan for next visit: NA

## 2024-05-30 VITALS
SYSTOLIC BLOOD PRESSURE: 137 MMHG | OXYGEN SATURATION: 97 % | TEMPERATURE: 97.4 F | DIASTOLIC BLOOD PRESSURE: 78 MMHG | RESPIRATION RATE: 16 BRPM | HEART RATE: 75 BPM

## 2024-05-30 NOTE — HOME HEALTH
Subjective: Pt with no complaints today.   I feel ready for discharge this week.     Falls since last visit: No  Caregiver involvement changes: No changes  Home health supplies by type and quantity ordered/delivered this visit include: N/A    Clinician asked if patient has had any physician contact since last home care visit and patient states: NO  Clinician asked if patient has any new or changed medications and patient states:  NO   If Yes, were medications reconciled? NA  Was the certifying physician notified of changes in medications? NA    Clinical assessment (what this visit means for the patient overall and need for ongoing skilled care) and progress or lack of progress towards SPECIFIC goals: Ms. Elizabeth suffers from MS which results in generalized weakness and impaired balance for mobility.   She lives in a very cluttered home with high fall risk concerns.  Home is not fully condusive to RW use due to clutter and tight spaces so patient is often ambulating without AD.  Will need skilled PT to work on balance, strength and improving home safety.  Also PT needed to work on R knee and back pain as this limits mobility and poses buckling risk during gait.     Progress towards goals:  Ms Elizabeth is progressing ambulation safety and distance each week and is improving safety and ability to perform ADL's in the home.  Able to perform SC ambulation today under supervision level assist.  Endurance up to 300'.  Pt making progress with home therex ability and performance.  Improving balance with all ambulation.   Pt is on pace for discharge next visit.     Written Teaching Material Utilized: ERNA    Interdisciplinary communication with: Yes with OT regarding plan for discharge this week.     Discharge planning as follows: Is no longer homebound, Will discharge when the patient has reached their maximum functional potential and maximum safety in their home and When goals are met    Specific plan for next visit: Plan to have

## 2024-05-31 ENCOUNTER — HOME CARE VISIT (OUTPATIENT)
Facility: HOME HEALTH | Age: 69
End: 2024-05-31
Payer: MEDICARE

## 2024-05-31 PROCEDURE — G0151 HHCP-SERV OF PT,EA 15 MIN: HCPCS

## 2024-06-01 VITALS
RESPIRATION RATE: 16 BRPM | OXYGEN SATURATION: 98 % | SYSTOLIC BLOOD PRESSURE: 131 MMHG | DIASTOLIC BLOOD PRESSURE: 75 MMHG | HEART RATE: 76 BPM | TEMPERATURE: 98.1 F

## 2024-06-17 ENCOUNTER — OFFICE VISIT (OUTPATIENT)
Age: 69
End: 2024-06-17
Payer: MEDICARE

## 2024-06-17 VITALS
HEIGHT: 72 IN | RESPIRATION RATE: 16 BRPM | TEMPERATURE: 96.9 F | WEIGHT: 180.6 LBS | BODY MASS INDEX: 24.46 KG/M2 | DIASTOLIC BLOOD PRESSURE: 74 MMHG | OXYGEN SATURATION: 96 % | SYSTOLIC BLOOD PRESSURE: 132 MMHG | HEART RATE: 74 BPM

## 2024-06-17 DIAGNOSIS — Z01.84 IMMUNITY STATUS TESTING: ICD-10-CM

## 2024-06-17 DIAGNOSIS — Z12.31 VISIT FOR SCREENING MAMMOGRAM: ICD-10-CM

## 2024-06-17 DIAGNOSIS — G70.00 MYASTHENIA GRAVIS (HCC): ICD-10-CM

## 2024-06-17 DIAGNOSIS — E03.9 HYPOTHYROIDISM, UNSPECIFIED TYPE: ICD-10-CM

## 2024-06-17 DIAGNOSIS — R11.0 NAUSEA: ICD-10-CM

## 2024-06-17 DIAGNOSIS — G43.709 CHRONIC MIGRAINE W/O AURA W/O STATUS MIGRAINOSUS, NOT INTRACTABLE: ICD-10-CM

## 2024-06-17 DIAGNOSIS — E11.9 TYPE 2 DIABETES MELLITUS WITHOUT COMPLICATION, WITHOUT LONG-TERM CURRENT USE OF INSULIN (HCC): Primary | ICD-10-CM

## 2024-06-17 DIAGNOSIS — G35 MS (MULTIPLE SCLEROSIS) (HCC): ICD-10-CM

## 2024-06-17 DIAGNOSIS — E78.5 HYPERLIPIDEMIA, UNSPECIFIED HYPERLIPIDEMIA TYPE: ICD-10-CM

## 2024-06-17 PROCEDURE — 2022F DILAT RTA XM EVC RTNOPTHY: CPT | Performed by: FAMILY MEDICINE

## 2024-06-17 PROCEDURE — 4004F PT TOBACCO SCREEN RCVD TLK: CPT | Performed by: FAMILY MEDICINE

## 2024-06-17 PROCEDURE — 3075F SYST BP GE 130 - 139MM HG: CPT | Performed by: FAMILY MEDICINE

## 2024-06-17 PROCEDURE — G8399 PT W/DXA RESULTS DOCUMENT: HCPCS | Performed by: FAMILY MEDICINE

## 2024-06-17 PROCEDURE — G8427 DOCREV CUR MEDS BY ELIG CLIN: HCPCS | Performed by: FAMILY MEDICINE

## 2024-06-17 PROCEDURE — 1123F ACP DISCUSS/DSCN MKR DOCD: CPT | Performed by: FAMILY MEDICINE

## 2024-06-17 PROCEDURE — 3051F HG A1C>EQUAL 7.0%<8.0%: CPT | Performed by: FAMILY MEDICINE

## 2024-06-17 PROCEDURE — 3017F COLORECTAL CA SCREEN DOC REV: CPT | Performed by: FAMILY MEDICINE

## 2024-06-17 PROCEDURE — 1090F PRES/ABSN URINE INCON ASSESS: CPT | Performed by: FAMILY MEDICINE

## 2024-06-17 PROCEDURE — 3078F DIAST BP <80 MM HG: CPT | Performed by: FAMILY MEDICINE

## 2024-06-17 PROCEDURE — 99214 OFFICE O/P EST MOD 30 MIN: CPT | Performed by: FAMILY MEDICINE

## 2024-06-17 PROCEDURE — G8420 CALC BMI NORM PARAMETERS: HCPCS | Performed by: FAMILY MEDICINE

## 2024-06-17 RX ORDER — ONDANSETRON 4 MG/1
4 TABLET, FILM COATED ORAL EVERY 8 HOURS PRN
Qty: 20 TABLET | Refills: 0 | Status: SHIPPED | OUTPATIENT
Start: 2024-06-17

## 2024-06-17 SDOH — ECONOMIC STABILITY: FOOD INSECURITY: WITHIN THE PAST 12 MONTHS, THE FOOD YOU BOUGHT JUST DIDN'T LAST AND YOU DIDN'T HAVE MONEY TO GET MORE.: NEVER TRUE

## 2024-06-17 SDOH — ECONOMIC STABILITY: FOOD INSECURITY: WITHIN THE PAST 12 MONTHS, YOU WORRIED THAT YOUR FOOD WOULD RUN OUT BEFORE YOU GOT MONEY TO BUY MORE.: NEVER TRUE

## 2024-06-17 SDOH — ECONOMIC STABILITY: INCOME INSECURITY: HOW HARD IS IT FOR YOU TO PAY FOR THE VERY BASICS LIKE FOOD, HOUSING, MEDICAL CARE, AND HEATING?: NOT HARD AT ALL

## 2024-06-17 ASSESSMENT — ENCOUNTER SYMPTOMS
WHEEZING: 0
ABDOMINAL PAIN: 0
COUGH: 0
SHORTNESS OF BREATH: 0
NAUSEA: 1
CONSTIPATION: 1
CHEST TIGHTNESS: 0
DIARRHEA: 0
VOMITING: 1

## 2024-06-17 NOTE — PROGRESS NOTES
Chief Complaint   Patient presents with    Follow-up    Breast Mass     Left side     \"Have you been to the ER, urgent care clinic since your last visit?  Hospitalized since your last visit?\"    NO    “Have you seen or consulted any other health care providers outside of [facility] since your last visit?”    NO         Financial Resource Strain: Low Risk  (6/17/2024)    Overall Financial Resource Strain (CARDIA)     Difficulty of Paying Living Expenses: Not hard at all      Food Insecurity: No Food Insecurity (6/17/2024)    Hunger Vital Sign     Worried About Running Out of Food in the Last Year: Never true     Ran Out of Food in the Last Year: Never true            3/26/2024     1:52 PM   PHQ-9    Little interest or pleasure in doing things 0   Feeling down, depressed, or hopeless 0   Trouble falling or staying asleep, or sleeping too much 0   Feeling tired or having little energy 0   Poor appetite or overeating 0   Feeling bad about yourself - or that you are a failure or have let yourself or your family down 0   Trouble concentrating on things, such as reading the newspaper or watching television 0   Moving or speaking so slowly that other people could have noticed. Or the opposite - being so fidgety or restless that you have been moving around a lot more than usual 0   Thoughts that you would be better off dead, or of hurting yourself in some way 0   PHQ-2 Score 0   PHQ-9 Total Score 0   If you checked off any problems, how difficult have these problems made it for you to do your work, take care of things at home, or get along with other people? 0       Health Maintenance Due   Topic Date Due    Shingles vaccine (1 of 2) Never done    Respiratory Syncytial Virus (RSV) Pregnant or age 60 yrs+ (1 - 1-dose 60+ series) Never done    Diabetic retinal exam  03/28/2020    Diabetic foot exam  07/05/2020    Pneumococcal 65+ years Vaccine (2 of 2 - PCV) 11/19/2021    COVID-19 Vaccine (4 - 2023-24 season)

## 2024-06-17 NOTE — PROGRESS NOTES
Patient Name: Jamila Elizabeth   MRN: [id number]    ASSESSMENT AND PLAN  Jamila Elizabeth is a 69 y.o. female who presents today for:    1. Type 2 diabetes mellitus without complication, without long-term current use of insulin (HCC)  Stable, continue current treatment, pending review of labs.  Likely uncontrolled as she often misses her metformin dose.   - Hemoglobin A1C; Future  - Hemoglobin A1C    2. Nausea  Recommend ultrasound to evaluate gallbladder. Possibly due to her medication/polypharmacy. Okay to use prn Zofran. Consider GI evaluation if persistent.  - ondansetron (ZOFRAN) 4 MG tablet; Take 1 tablet by mouth every 8 hours as needed for Nausea or Vomiting  Dispense: 20 tablet; Refill: 0  - US ABDOMEN COMPLETE; Future    3. Hypothyroidism, unspecified type  Stable, continue current treatment, pending review of labs.    - TSH + Free T4 Panel; Future  - TSH + Free T4 Panel    4. Hyperlipidemia, unspecified hyperlipidemia type  - CBC; Future  - Comprehensive Metabolic Panel; Future  - Lipid Panel; Future  - Lipid Panel  - Comprehensive Metabolic Panel  - CBC    5. Immunity status testing  - Hepatitis B Surface Antibody; Future  - Hepatitis B Surface Antibody    6. Chronic migraine w/o aura w/o status migrainosus, not intractable  Refer to pain management. Pt also to follow up with neurology.  - Amb External Referral To Pain Medicine    7. MS (multiple sclerosis) (HCC)  - Amb External Referral To Pain Medicine    8. Myasthenia gravis (HCC)  - Amb External Referral To Pain Medicine    9. Visit for screening mammogram  Consider breast MRI given family history of breast cancer.  - [facility] JOAN DIGITAL SCREEN BILATERAL; Future      Orders Placed This Encounter   Medications    ondansetron (ZOFRAN) 4 MG tablet     Sig: Take 1 tablet by mouth every 8 hours as needed for Nausea or Vomiting     Dispense:  20 tablet     Refill:  0        Medications Discontinued During This Encounter   Medication Reason    Erenumab-aooe (AIMOVIG)

## 2024-06-18 LAB
ALBUMIN SERPL-MCNC: 3.8 G/DL (ref 3.5–5)
ALBUMIN/GLOB SERPL: 1.2 (ref 1.1–2.2)
ALP SERPL-CCNC: 58 U/L (ref 45–117)
ALT SERPL-CCNC: 17 U/L (ref 12–78)
ANION GAP SERPL CALC-SCNC: 8 MMOL/L (ref 5–15)
AST SERPL-CCNC: 20 U/L (ref 15–37)
BILIRUB SERPL-MCNC: 0.5 MG/DL (ref 0.2–1)
BUN SERPL-MCNC: 20 MG/DL (ref 6–20)
BUN/CREAT SERPL: 19 (ref 12–20)
CALCIUM SERPL-MCNC: 9.6 MG/DL (ref 8.5–10.1)
CHLORIDE SERPL-SCNC: 114 MMOL/L (ref 97–108)
CHOLEST SERPL-MCNC: 138 MG/DL
CO2 SERPL-SCNC: 20 MMOL/L (ref 21–32)
CREAT SERPL-MCNC: 1.05 MG/DL (ref 0.55–1.02)
ERYTHROCYTE [DISTWIDTH] IN BLOOD BY AUTOMATED COUNT: 14.4 % (ref 11.5–14.5)
EST. AVERAGE GLUCOSE BLD GHB EST-MCNC: 148 MG/DL
GLOBULIN SER CALC-MCNC: 3.2 G/DL (ref 2–4)
GLUCOSE SERPL-MCNC: 102 MG/DL (ref 65–100)
HBA1C MFR BLD: 6.8 % (ref 4–5.6)
HBV SURFACE AB SER QL: REACTIVE
HBV SURFACE AB SER-ACNC: 34.25 MIU/ML
HCT VFR BLD AUTO: 45 % (ref 35–47)
HDLC SERPL-MCNC: 58 MG/DL
HDLC SERPL: 2.4 (ref 0–5)
HGB BLD-MCNC: 14.4 G/DL (ref 11.5–16)
LDLC SERPL CALC-MCNC: 57.2 MG/DL (ref 0–100)
MCH RBC QN AUTO: 30.5 PG (ref 26–34)
MCHC RBC AUTO-ENTMCNC: 32 G/DL (ref 30–36.5)
MCV RBC AUTO: 95.3 FL (ref 80–99)
NRBC # BLD: 0 K/UL (ref 0–0.01)
NRBC BLD-RTO: 0 PER 100 WBC
PLATELET # BLD AUTO: 107 K/UL (ref 150–400)
POTASSIUM SERPL-SCNC: 5.3 MMOL/L (ref 3.5–5.1)
PROT SERPL-MCNC: 7 G/DL (ref 6.4–8.2)
SODIUM SERPL-SCNC: 142 MMOL/L (ref 136–145)
T4 FREE SERPL-MCNC: 1.2 NG/DL (ref 0.8–1.5)
TRIGL SERPL-MCNC: 114 MG/DL
TSH SERPL DL<=0.05 MIU/L-ACNC: 2.75 UIU/ML (ref 0.36–3.74)
VLDLC SERPL CALC-MCNC: 22.8 MG/DL
WBC # BLD AUTO: 7 K/UL (ref 3.6–11)

## 2024-07-02 ENCOUNTER — HOSPITAL ENCOUNTER (OUTPATIENT)
Facility: HOSPITAL | Age: 69
Discharge: HOME | End: 2024-07-05
Payer: MEDICARE

## 2024-07-02 ENCOUNTER — OFFICE VISIT (OUTPATIENT)
Age: 69
End: 2024-07-02
Payer: MEDICARE

## 2024-07-02 VITALS
BODY MASS INDEX: 24.03 KG/M2 | DIASTOLIC BLOOD PRESSURE: 58 MMHG | OXYGEN SATURATION: 99 % | SYSTOLIC BLOOD PRESSURE: 126 MMHG | HEIGHT: 72 IN | HEART RATE: 87 BPM | WEIGHT: 177.4 LBS

## 2024-07-02 VITALS — WEIGHT: 170 LBS | BODY MASS INDEX: 23.03 KG/M2 | HEIGHT: 72 IN

## 2024-07-02 DIAGNOSIS — Z12.31 VISIT FOR SCREENING MAMMOGRAM: ICD-10-CM

## 2024-07-02 DIAGNOSIS — E11.8 TYPE 2 DIABETES MELLITUS WITH UNSPECIFIED COMPLICATIONS (HCC): ICD-10-CM

## 2024-07-02 DIAGNOSIS — I35.1 NONRHEUMATIC AORTIC (VALVE) INSUFFICIENCY: ICD-10-CM

## 2024-07-02 DIAGNOSIS — R00.2 HEART PALPITATIONS: Primary | ICD-10-CM

## 2024-07-02 DIAGNOSIS — Z87.891 HISTORY OF TOBACCO USE: ICD-10-CM

## 2024-07-02 DIAGNOSIS — Z86.73 HISTORY OF CVA (CEREBROVASCULAR ACCIDENT): ICD-10-CM

## 2024-07-02 DIAGNOSIS — R11.0 NAUSEA: ICD-10-CM

## 2024-07-02 PROCEDURE — 3017F COLORECTAL CA SCREEN DOC REV: CPT | Performed by: INTERNAL MEDICINE

## 2024-07-02 PROCEDURE — 2022F DILAT RTA XM EVC RTNOPTHY: CPT | Performed by: INTERNAL MEDICINE

## 2024-07-02 PROCEDURE — 3074F SYST BP LT 130 MM HG: CPT | Performed by: INTERNAL MEDICINE

## 2024-07-02 PROCEDURE — 3078F DIAST BP <80 MM HG: CPT | Performed by: INTERNAL MEDICINE

## 2024-07-02 PROCEDURE — 1123F ACP DISCUSS/DSCN MKR DOCD: CPT | Performed by: INTERNAL MEDICINE

## 2024-07-02 PROCEDURE — 1090F PRES/ABSN URINE INCON ASSESS: CPT | Performed by: INTERNAL MEDICINE

## 2024-07-02 PROCEDURE — G8399 PT W/DXA RESULTS DOCUMENT: HCPCS | Performed by: INTERNAL MEDICINE

## 2024-07-02 PROCEDURE — G8427 DOCREV CUR MEDS BY ELIG CLIN: HCPCS | Performed by: INTERNAL MEDICINE

## 2024-07-02 PROCEDURE — 77063 BREAST TOMOSYNTHESIS BI: CPT

## 2024-07-02 PROCEDURE — 4004F PT TOBACCO SCREEN RCVD TLK: CPT | Performed by: INTERNAL MEDICINE

## 2024-07-02 PROCEDURE — G8420 CALC BMI NORM PARAMETERS: HCPCS | Performed by: INTERNAL MEDICINE

## 2024-07-02 PROCEDURE — 76700 US EXAM ABDOM COMPLETE: CPT

## 2024-07-02 PROCEDURE — 99214 OFFICE O/P EST MOD 30 MIN: CPT | Performed by: INTERNAL MEDICINE

## 2024-07-02 PROCEDURE — 3044F HG A1C LEVEL LT 7.0%: CPT | Performed by: INTERNAL MEDICINE

## 2024-07-02 ASSESSMENT — PATIENT HEALTH QUESTIONNAIRE - PHQ9
SUM OF ALL RESPONSES TO PHQ QUESTIONS 1-9: 0
2. FEELING DOWN, DEPRESSED OR HOPELESS: NOT AT ALL
SUM OF ALL RESPONSES TO PHQ QUESTIONS 1-9: 0
1. LITTLE INTEREST OR PLEASURE IN DOING THINGS: NOT AT ALL
SUM OF ALL RESPONSES TO PHQ QUESTIONS 1-9: 0
SUM OF ALL RESPONSES TO PHQ9 QUESTIONS 1 & 2: 0
SUM OF ALL RESPONSES TO PHQ QUESTIONS 1-9: 0

## 2024-07-02 NOTE — PATIENT INSTRUCTIONS
A 30 day loop monitor has been ordered for you.  This will be mailed to the address given to us.  Please read over the instructions given to you about [employer] loop monitors. Save the pre-paid box so that you can use it to mail monitor back to company. Your heart rate and rhythm will be monitored continuously and our office will be notified regarding any concerns.    If you have any insurance questions regarding coverage and out of pocket cost please contact the number on the instructions - [phone].

## 2024-07-02 NOTE — PROGRESS NOTES
[facility] [place]  [address], [address]  [place] [address]

## 2024-08-01 ENCOUNTER — TELEPHONE (OUTPATIENT)
Age: 69
End: 2024-08-01

## 2024-08-01 NOTE — TELEPHONE ENCOUNTER
Enrolled with [employer] - Ordered and being shipped to patient's home address on file.  ETA STAT.

## 2024-09-09 ENCOUNTER — TELEPHONE (OUTPATIENT)
Age: 69
End: 2024-09-09

## 2024-10-07 ENCOUNTER — OFFICE VISIT (OUTPATIENT)
Age: 69
End: 2024-10-07
Payer: MEDICARE

## 2024-10-07 VITALS
TEMPERATURE: 97.1 F | DIASTOLIC BLOOD PRESSURE: 80 MMHG | HEIGHT: 72 IN | BODY MASS INDEX: 24.52 KG/M2 | SYSTOLIC BLOOD PRESSURE: 116 MMHG | OXYGEN SATURATION: 98 % | HEART RATE: 79 BPM | WEIGHT: 181 LBS | RESPIRATION RATE: 16 BRPM

## 2024-10-07 DIAGNOSIS — E11.9 TYPE 2 DIABETES MELLITUS WITHOUT COMPLICATION, WITHOUT LONG-TERM CURRENT USE OF INSULIN (HCC): Primary | ICD-10-CM

## 2024-10-07 DIAGNOSIS — R11.0 NAUSEA: ICD-10-CM

## 2024-10-07 LAB — HBA1C MFR BLD: 7.5 %

## 2024-10-07 PROCEDURE — 99214 OFFICE O/P EST MOD 30 MIN: CPT | Performed by: FAMILY MEDICINE

## 2024-10-07 PROCEDURE — PBSHW AMB POC HEMOGLOBIN A1C: Performed by: FAMILY MEDICINE

## 2024-10-07 PROCEDURE — 3017F COLORECTAL CA SCREEN DOC REV: CPT | Performed by: FAMILY MEDICINE

## 2024-10-07 PROCEDURE — G8484 FLU IMMUNIZE NO ADMIN: HCPCS | Performed by: FAMILY MEDICINE

## 2024-10-07 PROCEDURE — 1123F ACP DISCUSS/DSCN MKR DOCD: CPT | Performed by: FAMILY MEDICINE

## 2024-10-07 PROCEDURE — G8420 CALC BMI NORM PARAMETERS: HCPCS | Performed by: FAMILY MEDICINE

## 2024-10-07 PROCEDURE — 3074F SYST BP LT 130 MM HG: CPT | Performed by: FAMILY MEDICINE

## 2024-10-07 PROCEDURE — 1090F PRES/ABSN URINE INCON ASSESS: CPT | Performed by: FAMILY MEDICINE

## 2024-10-07 PROCEDURE — 3079F DIAST BP 80-89 MM HG: CPT | Performed by: FAMILY MEDICINE

## 2024-10-07 PROCEDURE — 3044F HG A1C LEVEL LT 7.0%: CPT | Performed by: FAMILY MEDICINE

## 2024-10-07 PROCEDURE — 83036 HEMOGLOBIN GLYCOSYLATED A1C: CPT | Performed by: FAMILY MEDICINE

## 2024-10-07 PROCEDURE — G8399 PT W/DXA RESULTS DOCUMENT: HCPCS | Performed by: FAMILY MEDICINE

## 2024-10-07 PROCEDURE — G8427 DOCREV CUR MEDS BY ELIG CLIN: HCPCS | Performed by: FAMILY MEDICINE

## 2024-10-07 PROCEDURE — 4004F PT TOBACCO SCREEN RCVD TLK: CPT | Performed by: FAMILY MEDICINE

## 2024-10-07 PROCEDURE — 2022F DILAT RTA XM EVC RTNOPTHY: CPT | Performed by: FAMILY MEDICINE

## 2024-10-07 RX ORDER — KETOROLAC TROMETHAMINE 10 MG/1
TABLET, FILM COATED ORAL
COMMUNITY
Start: 2024-10-01

## 2024-10-07 ASSESSMENT — ENCOUNTER SYMPTOMS
COUGH: 0
VOMITING: 1
CONSTIPATION: 1
NAUSEA: 1
ABDOMINAL PAIN: 0
SHORTNESS OF BREATH: 0
CHEST TIGHTNESS: 0
DIARRHEA: 0
WHEEZING: 0

## 2024-10-07 NOTE — PROGRESS NOTES
Chief Complaint   Patient presents with    Follow-up     \"Have you been to the ER, urgent care clinic since your last visit?  Hospitalized since your last visit?\"    NO    “Have you seen or consulted any other health care providers outside of [facility] since your last visit?”    NO     Financial Resource Strain: Low Risk  (6/17/2024)    Overall Financial Resource Strain (CARDIA)     Difficulty of Paying Living Expenses: Not hard at all      Food Insecurity: No Food Insecurity (6/17/2024)    Hunger Vital Sign     Worried About Running Out of Food in the Last Year: Never true     Ran Out of Food in the Last Year: Never true            7/2/2024    11:17 AM   PHQ-9    Little interest or pleasure in doing things 0   Feeling down, depressed, or hopeless 0   PHQ-2 Score 0   PHQ-9 Total Score 0       Health Maintenance Due   Topic Date Due    Shingles vaccine (1 of 2) Never done    Respiratory Syncytial Virus (RSV) Pregnant or age 60 yrs+ (1 - 1-dose 60+ series) Never done    Diabetic retinal exam  03/28/2020    Diabetic foot exam  07/05/2020    Pneumococcal 65+ years Vaccine (2 of 2 - PCV) 11/19/2021    DTaP/Tdap/Td vaccine (2 - Td or Tdap) 06/16/2024    Flu vaccine (1) 08/01/2024    COVID-19 Vaccine (4 - 2023-24 season) 09/01/2024

## 2024-10-07 NOTE — PROGRESS NOTES
Patient Name: Jamila Elizabeth   MRN: [id number]    ASSESSMENT AND PLAN  Jamila Elizabeth is a 69 y.o. female who presents today for:    1. Type 2 diabetes mellitus without complication, without long-term current use of insulin (HCC)  Not at goal today; discussed medication compliance but barrier is ongoing nausea. Recommend eating less noodles and try to take metformin daily.  - AMB POC HEMOGLOBIN A1C    2. Nausea  Unclear etiology. Recommend GI evaluation. Could consider CRC consultation given constipation and abdominal history.  - [facility] - Sabas Zamora MD, Gastroenterology, [employer] ([facility])      No orders of the defined types were placed in this encounter.       Medications Discontinued During This Encounter   Medication Reason    Atogepant (QULIPTA) 60 MG TABS ERROR    dantrolene (DANTRIUM) 50 MG capsule ERROR    dantrolene (DANTRIUM) 100 MG capsule ERROR    furosemide (LASIX) 20 MG tablet ERROR       Return in about 3 months (around 1/7/2025) for DM.    SUBJECTIVE  Jamila Elizabeth is a 69 y.o. female who presents with the following:     Patient has reported daily nausea that she will wake up with.  Often will vomit.  Taking as needed Zofran.  It often affects her ability to take her medications which she tends to miss up to 4 days a week.  Denies any abdominal pain.  Is more constipated than usual.  No blood in stool.  Due for screening colonoscopy in 12/2025.  Previously had an abdominal ultrasound done 3 months ago which was normal.  She states that she had a bowel resection done about 20 years ago.  Will miss her metformin a few days a week due to an ongoing nausea.  Tends to eat a lot of Ramen noodles.  A1c today is 7.5.    Hemoglobin A1C   Date Value Ref Range Status   06/17/2024 6.8 (H) 4.0 - 5.6 % Final     Comment:     (NOTE)  HbA1C Interpretive Ranges  <5.7              Normal  5.7 - 6.4         Consider Prediabetes  >6.5              Consider Diabetes       Wt Readings from Last 3 Encounters:   10/07/24

## 2024-10-09 ENCOUNTER — TELEPHONE (OUTPATIENT)
Age: 69
End: 2024-10-09

## 2024-10-09 NOTE — TELEPHONE ENCOUNTER
Patient called and stated that the gastro office you gave her can't see her until 2025. She would like the name of another doctor.      Call back at [phone]

## 2024-10-10 NOTE — TELEPHONE ENCOUNTER
She can try [facility] or [facility] for sooner appt.    [facility]  [address] [address] [phone]

## 2024-11-08 DIAGNOSIS — R11.0 NAUSEA: ICD-10-CM

## 2024-11-08 RX ORDER — ONDANSETRON 4 MG/1
4 TABLET, FILM COATED ORAL EVERY 8 HOURS PRN
Qty: 20 TABLET | Refills: 0 | Status: SHIPPED | OUTPATIENT
Start: 2024-11-08

## 2024-11-08 NOTE — TELEPHONE ENCOUNTER
PCP: Steffen Reyes MD    Last appt: 10/7/2024       Future Appointments   Date Time Provider Department Center   1/6/2025 10:00 AM [facility]   1/6/2025 11:20 AM Wilber [place] MD [facility]       Requested Prescriptions     Pending Prescriptions Disp Refills    ondansetron (ZOFRAN) 4 MG tablet [Pharmacy Med Name: ONDANSETRON HCL 4 MG TABLET] 20 tablet 0     Sig: TAKE 1 TABLET BY MOUTH EVERY 8 HOURS AS NEEDED FOR NAUSEA AND VOMITING       Prior labs and Blood pressures:  BP Readings from Last 3 Encounters:   10/07/24 116/80   07/02/24 (!) 126/58   06/17/24 132/74     Lab Results   Component Value Date/Time     06/17/2024 11:42 AM    K 5.3 06/17/2024 11:42 AM     06/17/2024 11:42 AM    CO2 20 06/17/2024 11:42 AM    BUN 20 06/17/2024 11:42 AM    GFRAA >60 08/31/2022 04:20 AM     Lab Results   Component Value Date/Time    TZL2IZXT 7.5 10/07/2024 04:02 PM     Lab Results   Component Value Date/Time    CHOL 138 06/17/2024 11:42 AM    HDL 58 06/17/2024 11:42 AM    LDL 57.2 06/17/2024 11:42 AM    LDL 90 11/30/2023 12:06 PM    VLDL 22.8 06/17/2024 11:42 AM    VLDL 28 10/20/2021 02:48 PM     No results found for: \"VITD3\"    Lab Results   Component Value Date/Time    TSH 2.75 06/17/2024 11:42 AM

## 2024-12-26 DIAGNOSIS — R11.0 NAUSEA: ICD-10-CM

## 2024-12-26 RX ORDER — METFORMIN HYDROCHLORIDE 500 MG/1
2000 TABLET, EXTENDED RELEASE ORAL DAILY
Qty: 360 TABLET | Refills: 3 | Status: SHIPPED | OUTPATIENT
Start: 2024-12-26

## 2024-12-27 RX ORDER — ONDANSETRON 4 MG/1
4 TABLET, FILM COATED ORAL EVERY 8 HOURS PRN
Qty: 20 TABLET | Refills: 0 | Status: SHIPPED | OUTPATIENT
Start: 2024-12-27

## 2024-12-27 NOTE — TELEPHONE ENCOUNTER
PCP: Steffen Reyes MD    Last appt: 10/7/2024       Future Appointments   Date Time Provider Department Center   1/6/2025 10:00 AM [facility]   1/6/2025 11:20 AM Wilber [place] MD [facility]       Requested Prescriptions     Pending Prescriptions Disp Refills    ondansetron (ZOFRAN) 4 MG tablet [Pharmacy Med Name: ONDANSETRON HCL 4 MG TABLET] 20 tablet 0     Sig: TAKE 1 TABLET BY MOUTH EVERY 8 HOURS AS NEEDED FOR NAUSEA AND VOMITING       Prior labs and Blood pressures:  BP Readings from Last 3 Encounters:   10/07/24 116/80   07/02/24 (!) 126/58   06/17/24 132/74     Lab Results   Component Value Date/Time     06/17/2024 11:42 AM    K 5.3 06/17/2024 11:42 AM     06/17/2024 11:42 AM    CO2 20 06/17/2024 11:42 AM    BUN 20 06/17/2024 11:42 AM    GFRAA >60 08/31/2022 04:20 AM     Lab Results   Component Value Date/Time    GQD6AAGI 7.5 10/07/2024 04:02 PM     Lab Results   Component Value Date/Time    CHOL 138 06/17/2024 11:42 AM    HDL 58 06/17/2024 11:42 AM    LDL 57.2 06/17/2024 11:42 AM    LDL 90 11/30/2023 12:06 PM    VLDL 22.8 06/17/2024 11:42 AM    VLDL 28 10/20/2021 02:48 PM     No results found for: \"VITD3\"    Lab Results   Component Value Date/Time    TSH 2.75 06/17/2024 11:42 AM

## 2025-01-03 NOTE — PROGRESS NOTES
[facility] [place]: [place]  [phone]      History of Present Illness:    Ms. Elizabeth is a 70 yo F with multiple sclerosis, myasthenia gravis, type 2 diabetes, mixed hyperlipidemia, hypothyroid, seen in the past for chest pain. Echo in the past with normal EF and moderate aortic regurgitation, mild aortic stenosis.    Since her last visit, overall, she has been doing okay.  She has had some increase in shortness of breath she noticed with walking.  No exertional chest pain.  She does continue to have palpitations.  On her last visit, her heart monitor was overall normal with PVCs/PACs less than 1% of the time and reassured her. Her echo today was unchanged with normal EF of 60%, mild to moderate aortic regurgitation previously known and reviewed the results.  EKG was normal sinus rhythm.  Soc hx. Tobacco 1 pack 2-3 days. Have stopped in past.  Fam hx. Younger sister with CAD.  Assessment and Plan:  1. Shortness of breath.  No evidence of cardiac contribution.  Recent stress test was normal.  Her echo today was unchanged.  Given her smoking history, consider pulmonary evaluation if this persists.  2. Aortic regurgitation.  Remains in the mild to moderate range.  Not contributing to symptoms.  3. Palpitations.  Consistent with benign PVCs/PACs that are less than 1%.  No specific therapy indicated for this.  4. History of CVA.  5. Tobacco use.  Focus on cessation.  6. MS.  Followed by neurology Dr. Mera.  7. Myasthenia gravis.  8. Type 2 diabetes.    She  has a past medical history of Arrhythmia, Arthritis, Benign cyst of left breast, Blindness and low vision, Cataract, Cervical spinal stenosis, Chronic pain, COVID-19 vaccine series completed, Depression, Diabetes mellitus type 2, controlled (HCC), Diet-controlled diabetes mellitus (HCC), Endometriosis, FH: breast cancer, History of CVA (cerebrovascular accident), HTN, goal below 140/90, Hypercholesterolemia, Hypothyroid, Incontinence, Lumbosacral plexopathy,

## 2025-01-06 ENCOUNTER — ANCILLARY PROCEDURE (OUTPATIENT)
Age: 70
End: 2025-01-06
Payer: MEDICARE

## 2025-01-06 ENCOUNTER — OFFICE VISIT (OUTPATIENT)
Age: 70
End: 2025-01-06
Payer: MEDICARE

## 2025-01-06 VITALS
HEIGHT: 72 IN | RESPIRATION RATE: 16 BRPM | HEART RATE: 76 BPM | OXYGEN SATURATION: 98 % | DIASTOLIC BLOOD PRESSURE: 60 MMHG | BODY MASS INDEX: 25.06 KG/M2 | SYSTOLIC BLOOD PRESSURE: 110 MMHG | WEIGHT: 185 LBS

## 2025-01-06 VITALS — BODY MASS INDEX: 25.06 KG/M2 | HEIGHT: 72 IN | WEIGHT: 185 LBS

## 2025-01-06 DIAGNOSIS — I35.1 NONRHEUMATIC AORTIC (VALVE) INSUFFICIENCY: ICD-10-CM

## 2025-01-06 DIAGNOSIS — R06.02 SHORT OF BREATH ON EXERTION: Primary | ICD-10-CM

## 2025-01-06 DIAGNOSIS — R00.2 HEART PALPITATIONS: ICD-10-CM

## 2025-01-06 DIAGNOSIS — Z87.891 HISTORY OF TOBACCO USE: ICD-10-CM

## 2025-01-06 DIAGNOSIS — Z86.73 HISTORY OF CVA (CEREBROVASCULAR ACCIDENT): ICD-10-CM

## 2025-01-06 LAB
ECHO AO ASC DIAM: 3 CM
ECHO AO ASCENDING AORTA INDEX: 1.46 CM/M2
ECHO AO ROOT DIAM: 3.3 CM
ECHO AO ROOT INDEX: 1.6 CM/M2
ECHO AR MAX VEL PISA: 3.8 M/S
ECHO AV AREA PEAK VELOCITY: 3.3 CM2
ECHO AV AREA VTI: 4.2 CM2
ECHO AV AREA/BSA PEAK VELOCITY: 1.6 CM2/M2
ECHO AV AREA/BSA VTI: 2 CM2/M2
ECHO AV MEAN GRADIENT: 3 MMHG
ECHO AV MEAN VELOCITY: 0.8 M/S
ECHO AV PEAK GRADIENT: 7 MMHG
ECHO AV PEAK VELOCITY: 1.3 M/S
ECHO AV REGURGITANT PHT: 661 MILLISECOND
ECHO AV VELOCITY RATIO: 0.85
ECHO AV VTI: 19.9 CM
ECHO BSA: 2.06 M2
ECHO LA DIAMETER INDEX: 1.5 CM/M2
ECHO LA DIAMETER: 3.1 CM
ECHO LA TO AORTIC ROOT RATIO: 0.94
ECHO LA VOL A-L A2C: 50 ML (ref 22–52)
ECHO LA VOL A-L A4C: 60 ML (ref 22–52)
ECHO LA VOL BP: 51 ML (ref 22–52)
ECHO LA VOL MOD A2C: 47 ML (ref 22–52)
ECHO LA VOL MOD A4C: 55 ML (ref 22–52)
ECHO LA VOL/BSA BIPLANE: 25 ML/M2 (ref 16–34)
ECHO LA VOLUME AREA LENGTH: 55 ML
ECHO LA VOLUME INDEX A-L A2C: 24 ML/M2 (ref 16–34)
ECHO LA VOLUME INDEX A-L A4C: 29 ML/M2 (ref 16–34)
ECHO LA VOLUME INDEX AREA LENGTH: 27 ML/M2 (ref 16–34)
ECHO LA VOLUME INDEX MOD A2C: 23 ML/M2 (ref 16–34)
ECHO LA VOLUME INDEX MOD A4C: 27 ML/M2 (ref 16–34)
ECHO LV E' LATERAL VELOCITY: 5.8 CM/S
ECHO LV E' SEPTAL VELOCITY: 5.36 CM/S
ECHO LV EDV A2C: 87 ML
ECHO LV EDV A4C: 74 ML
ECHO LV EDV BP: 81 ML (ref 56–104)
ECHO LV EDV INDEX A4C: 36 ML/M2
ECHO LV EDV INDEX BP: 39 ML/M2
ECHO LV EDV NDEX A2C: 42 ML/M2
ECHO LV EJECTION FRACTION A2C: 60 %
ECHO LV EJECTION FRACTION A4C: 48 %
ECHO LV EJECTION FRACTION BIPLANE: 55 % (ref 55–100)
ECHO LV ESV A2C: 34 ML
ECHO LV ESV A4C: 38 ML
ECHO LV ESV BP: 36 ML (ref 19–49)
ECHO LV ESV INDEX A2C: 17 ML/M2
ECHO LV ESV INDEX A4C: 18 ML/M2
ECHO LV ESV INDEX BP: 17 ML/M2
ECHO LV FRACTIONAL SHORTENING: 24 % (ref 28–44)
ECHO LV INTERNAL DIMENSION DIASTOLE INDEX: 2.23 CM/M2
ECHO LV INTERNAL DIMENSION DIASTOLIC: 4.6 CM (ref 3.9–5.3)
ECHO LV INTERNAL DIMENSION SYSTOLIC INDEX: 1.7 CM/M2
ECHO LV INTERNAL DIMENSION SYSTOLIC: 3.5 CM
ECHO LV IVSD: 1 CM (ref 0.6–0.9)
ECHO LV MASS 2D: 181.2 G (ref 67–162)
ECHO LV MASS INDEX 2D: 88 G/M2 (ref 43–95)
ECHO LV POSTERIOR WALL DIASTOLIC: 1.2 CM (ref 0.6–0.9)
ECHO LV RELATIVE WALL THICKNESS RATIO: 0.52
ECHO LVOT AREA: 3.8 CM2
ECHO LVOT AV VTI INDEX: 1.07
ECHO LVOT DIAM: 2.2 CM
ECHO LVOT MEAN GRADIENT: 2 MMHG
ECHO LVOT PEAK GRADIENT: 5 MMHG
ECHO LVOT PEAK VELOCITY: 1.1 M/S
ECHO LVOT STROKE VOLUME INDEX: 39.3 ML/M2
ECHO LVOT SV: 80.9 ML
ECHO LVOT VTI: 21.3 CM
ECHO MV A VELOCITY: 0.67 M/S
ECHO MV E DECELERATION TIME (DT): 231.5 MS
ECHO MV E VELOCITY: 0.41 M/S
ECHO MV E/A RATIO: 0.61
ECHO MV E/E' LATERAL: 7.07
ECHO MV E/E' RATIO (AVERAGED): 7.36
ECHO MV E/E' SEPTAL: 7.65
ECHO PV MAX VELOCITY: 0.6 M/S
ECHO PV PEAK GRADIENT: 1 MMHG

## 2025-01-06 PROCEDURE — 99214 OFFICE O/P EST MOD 30 MIN: CPT | Performed by: INTERNAL MEDICINE

## 2025-01-06 PROCEDURE — 93005 ELECTROCARDIOGRAM TRACING: CPT | Performed by: INTERNAL MEDICINE

## 2025-01-06 PROCEDURE — 93306 TTE W/DOPPLER COMPLETE: CPT | Performed by: INTERNAL MEDICINE

## 2025-01-06 RX ORDER — POLYETHYLENE GLYCOL-3350 AND ELECTROLYTES 236; 6.74; 5.86; 2.97; 22.74 G/274.31G; G/274.31G; G/274.31G; G/274.31G; G/274.31G
POWDER, FOR SOLUTION ORAL
COMMUNITY

## 2025-01-06 RX ORDER — LINACLOTIDE 145 UG/1
90 CAPSULE, GELATIN COATED ORAL
COMMUNITY
Start: 2024-12-12

## 2025-01-06 RX ORDER — TRAMADOL HYDROCHLORIDE 50 MG/1
50 TABLET ORAL EVERY 8 HOURS PRN
COMMUNITY

## 2025-01-23 ENCOUNTER — OFFICE VISIT (OUTPATIENT)
Age: 70
End: 2025-01-23
Payer: MEDICARE

## 2025-01-23 VITALS
TEMPERATURE: 96.9 F | WEIGHT: 181.6 LBS | HEIGHT: 72 IN | OXYGEN SATURATION: 95 % | RESPIRATION RATE: 18 BRPM | BODY MASS INDEX: 24.6 KG/M2 | DIASTOLIC BLOOD PRESSURE: 66 MMHG | SYSTOLIC BLOOD PRESSURE: 122 MMHG | HEART RATE: 69 BPM

## 2025-01-23 DIAGNOSIS — E11.9 TYPE 2 DIABETES MELLITUS WITHOUT COMPLICATION, WITHOUT LONG-TERM CURRENT USE OF INSULIN (HCC): ICD-10-CM

## 2025-01-23 DIAGNOSIS — G81.91 HEMIPLEGIA AFFECTING RIGHT DOMINANT SIDE, UNSPECIFIED ETIOLOGY, UNSPECIFIED HEMIPLEGIA TYPE (HCC): ICD-10-CM

## 2025-01-23 DIAGNOSIS — Z23 ENCOUNTER FOR IMMUNIZATION: ICD-10-CM

## 2025-01-23 DIAGNOSIS — E03.9 HYPOTHYROIDISM, UNSPECIFIED TYPE: ICD-10-CM

## 2025-01-23 DIAGNOSIS — Z00.00 ENCOUNTER FOR MEDICARE ANNUAL WELLNESS EXAM: Primary | ICD-10-CM

## 2025-01-23 DIAGNOSIS — J34.89 NASAL LESION: ICD-10-CM

## 2025-01-23 DIAGNOSIS — G70.00 MYASTHENIA GRAVIS (HCC): ICD-10-CM

## 2025-01-23 DIAGNOSIS — G35 MS (MULTIPLE SCLEROSIS) (HCC): ICD-10-CM

## 2025-01-23 DIAGNOSIS — E78.5 HYPERLIPIDEMIA, UNSPECIFIED HYPERLIPIDEMIA TYPE: ICD-10-CM

## 2025-01-23 DIAGNOSIS — R11.0 NAUSEA: ICD-10-CM

## 2025-01-23 PROBLEM — E11.22 TYPE 2 DIABETES MELLITUS WITH CHRONIC KIDNEY DISEASE (HCC): Status: ACTIVE | Noted: 2025-01-23

## 2025-01-23 PROCEDURE — 90677 PCV20 VACCINE IM: CPT | Performed by: FAMILY MEDICINE

## 2025-01-23 PROCEDURE — 99203 OFFICE O/P NEW LOW 30 MIN: CPT | Performed by: FAMILY MEDICINE

## 2025-01-23 RX ORDER — ONDANSETRON 4 MG/1
4 TABLET, FILM COATED ORAL EVERY 8 HOURS PRN
Qty: 20 TABLET | Refills: 0 | Status: SHIPPED | OUTPATIENT
Start: 2025-01-23

## 2025-01-23 RX ORDER — MUPIROCIN 20 MG/G
OINTMENT TOPICAL NIGHTLY
Qty: 15 G | Refills: 0 | Status: SHIPPED | OUTPATIENT
Start: 2025-01-23 | End: 2025-01-30

## 2025-01-23 SDOH — ECONOMIC STABILITY: FOOD INSECURITY: WITHIN THE PAST 12 MONTHS, YOU WORRIED THAT YOUR FOOD WOULD RUN OUT BEFORE YOU GOT MONEY TO BUY MORE.: NEVER TRUE

## 2025-01-23 SDOH — ECONOMIC STABILITY: FOOD INSECURITY: WITHIN THE PAST 12 MONTHS, THE FOOD YOU BOUGHT JUST DIDN'T LAST AND YOU DIDN'T HAVE MONEY TO GET MORE.: NEVER TRUE

## 2025-01-23 ASSESSMENT — PATIENT HEALTH QUESTIONNAIRE - PHQ9
8. MOVING OR SPEAKING SO SLOWLY THAT OTHER PEOPLE COULD HAVE NOTICED. OR THE OPPOSITE, BEING SO FIGETY OR RESTLESS THAT YOU HAVE BEEN MOVING AROUND A LOT MORE THAN USUAL: NOT AT ALL
3. TROUBLE FALLING OR STAYING ASLEEP: NOT AT ALL
10. IF YOU CHECKED OFF ANY PROBLEMS, HOW DIFFICULT HAVE THESE PROBLEMS MADE IT FOR YOU TO DO YOUR WORK, TAKE CARE OF THINGS AT HOME, OR GET ALONG WITH OTHER PEOPLE: NOT DIFFICULT AT ALL
SUM OF ALL RESPONSES TO PHQ QUESTIONS 1-9: 2
SUM OF ALL RESPONSES TO PHQ QUESTIONS 1-9: 2
SUM OF ALL RESPONSES TO PHQ9 QUESTIONS 1 & 2: 2
SUM OF ALL RESPONSES TO PHQ QUESTIONS 1-9: 2
9. THOUGHTS THAT YOU WOULD BE BETTER OFF DEAD, OR OF HURTING YOURSELF: NOT AT ALL
1. LITTLE INTEREST OR PLEASURE IN DOING THINGS: SEVERAL DAYS
4. FEELING TIRED OR HAVING LITTLE ENERGY: NOT AT ALL
5. POOR APPETITE OR OVEREATING: NOT AT ALL
6. FEELING BAD ABOUT YOURSELF - OR THAT YOU ARE A FAILURE OR HAVE LET YOURSELF OR YOUR FAMILY DOWN: NOT AT ALL
SUM OF ALL RESPONSES TO PHQ QUESTIONS 1-9: 2
2. FEELING DOWN, DEPRESSED OR HOPELESS: SEVERAL DAYS
7. TROUBLE CONCENTRATING ON THINGS, SUCH AS READING THE NEWSPAPER OR WATCHING TELEVISION: NOT AT ALL

## 2025-01-23 NOTE — PROGRESS NOTES
Chief Complaint   Patient presents with    Medicare AWV     \"Have you been to the ER, urgent care clinic since your last visit?  Hospitalized since your last visit?\"    NO    “Have you seen or consulted any other health care providers outside of [facility] since your last visit?”    NO  Financial Resource Strain: Low Risk  (6/17/2024)    Overall Financial Resource Strain (CARDIA)     Difficulty of Paying Living Expenses: Not hard at all      Food Insecurity: No Food Insecurity (1/23/2025)    Hunger Vital Sign     Worried About Running Out of Food in the Last Year: Never true     Ran Out of Food in the Last Year: Never true            1/23/2025    11:03 AM   PHQ-9    Little interest or pleasure in doing things 1   Feeling down, depressed, or hopeless 1   Trouble falling or staying asleep, or sleeping too much 0   Feeling tired or having little energy 0   Poor appetite or overeating 0   Feeling bad about yourself - or that you are a failure or have let yourself or your family down 0   Trouble concentrating on things, such as reading the newspaper or watching television 0   Moving or speaking so slowly that other people could have noticed. Or the opposite - being so fidgety or restless that you have been moving around a lot more than usual 0   Thoughts that you would be better off dead, or of hurting yourself in some way 0   PHQ-2 Score 2   PHQ-9 Total Score 2   If you checked off any problems, how difficult have these problems made it for you to do your work, take care of things at home, or get along with other people? 0       Health Maintenance Due   Topic Date Due    Shingles vaccine (1 of 2) Never done    Respiratory Syncytial Virus (RSV) Pregnant or age 60 yrs+ (1 - Risk 60-74 years 1-dose series) Never done    Diabetic retinal exam  03/28/2020    Diabetic foot exam  07/05/2020    Pneumococcal 65+ years Vaccine (2 of 2 - PCV) 11/19/2021    DTaP/Tdap/Td vaccine (2 - Td or Tdap) 06/16/2024    Diabetic 
Medicare Annual Wellness Visit    Jamila Elizabeth is here for Medicare AWV    Assessment & Plan   Encounter for Medicare annual wellness exam  MS (multiple sclerosis) (HCC)  Myasthenia gravis (HCC)  Hemiplegia affecting right dominant side, unspecified etiology, unspecified hemiplegia type (HCC)  Type 2 diabetes mellitus without complication, without long-term current use of insulin (HCC)  -     Hemoglobin A1C; Future  -     Albumin/Creatinine Ratio, Urine; Future  Hypothyroidism, unspecified type  -     TSH + Free T4 Panel; Future  Hyperlipidemia, unspecified hyperlipidemia type  -     CBC; Future  -     Comprehensive Metabolic Panel; Future  -     Lipid Panel; Future  Nausea  -     ondansetron (ZOFRAN) 4 MG tablet; Take 1 tablet by mouth every 8 hours as needed for Nausea or Vomiting, Disp-20 tablet, R-0DX Code Needed  .Normal  Nasal lesion  -     mupirocin (BACTROBAN) 2 % ointment; Apply topically at bedtime for 7 days, Topical, Nightly Starting Thu 1/23/2025, Until Thu 1/30/2025, For 7 days, Disp-15 g, R-0, Normal  Encounter for immunization  -     Pneumococcal, PCV20, PREVNAR 20, (age 6w+), IM, PF     Recommendations for Preventive Services Due: see orders and patient instructions/AVS.  Recommended screening schedule for the next 5-10 years is provided to the patient in written form: see Patient Instructions/AVS.     Return in about 6 months (around 7/23/2025) for DM.     Subjective     Patient's complete Health Risk Assessment and screening values have been reviewed and are found in Flowsheets. The following problems were reviewed today and where indicated follow up appointments were made and/or referrals ordered.    Positive Risk Factor Screenings with Interventions:    Fall Risk:  Do you feel unsteady or are you worried about falling? : (!) yes  2 or more falls in past year?: no  Fall with injury in past year?: no     Interventions:    Reviewed medications, home hazards, visual acuity, and co-morbidities that 
change, or persist. If patient cannot reach us or should anything more urgent arise, patient should proceed directly to the nearest emergency department. Discussed expected course, resolution, and complications of diagnosis in detail with patient. Patient expressed understanding with the diagnosis and plan.     This dictation may have been completed with Dragon, the computer voice recognition software.  Unanticipated grammatical, syntax, homophones, and other interpretive errors are sometimes inadvertently transcribed by the computer software.  Please disregard any errors that have escaped final proofreading.      Steffen Reyes M.D.      
25

## 2025-01-24 LAB
ALBUMIN SERPL-MCNC: 4.3 G/DL (ref 3.9–4.9)
ALBUMIN/CREAT UR: 55 MG/G CREAT (ref 0–29)
ALP SERPL-CCNC: 54 IU/L (ref 44–121)
ALT SERPL-CCNC: 46 IU/L (ref 0–32)
AST SERPL-CCNC: 44 IU/L (ref 0–40)
BILIRUB SERPL-MCNC: 0.3 MG/DL (ref 0–1.2)
BUN SERPL-MCNC: 16 MG/DL (ref 8–27)
BUN/CREAT SERPL: 18 (ref 12–28)
CALCIUM SERPL-MCNC: 9.4 MG/DL (ref 8.7–10.3)
CHLORIDE SERPL-SCNC: 104 MMOL/L (ref 96–106)
CHOLEST SERPL-MCNC: 112 MG/DL (ref 100–199)
CO2 SERPL-SCNC: 18 MMOL/L (ref 20–29)
CREAT SERPL-MCNC: 0.9 MG/DL (ref 0.57–1)
CREAT UR-MCNC: 83.5 MG/DL
EGFRCR SERPLBLD CKD-EPI 2021: 70 ML/MIN/1.73
ERYTHROCYTE [DISTWIDTH] IN BLOOD BY AUTOMATED COUNT: 13.3 % (ref 11.7–15.4)
GLOBULIN SER CALC-MCNC: 2.6 G/DL (ref 1.5–4.5)
GLUCOSE SERPL-MCNC: 111 MG/DL (ref 70–99)
HBA1C MFR BLD: 7.5 % (ref 4.8–5.6)
HCT VFR BLD AUTO: 45.6 % (ref 34–46.6)
HDLC SERPL-MCNC: 46 MG/DL
HGB BLD-MCNC: 14.2 G/DL (ref 11.1–15.9)
IMP & REVIEW OF LAB RESULTS: NORMAL
LDLC SERPL CALC-MCNC: 45 MG/DL (ref 0–99)
MCH RBC QN AUTO: 29 PG (ref 26.6–33)
MCHC RBC AUTO-ENTMCNC: 31.1 G/DL (ref 31.5–35.7)
MCV RBC AUTO: 93 FL (ref 79–97)
MICROALBUMIN UR-MCNC: 45.7 UG/ML
PLATELET # BLD AUTO: 129 X10E3/UL (ref 150–450)
POTASSIUM SERPL-SCNC: 4.8 MMOL/L (ref 3.5–5.2)
PROT SERPL-MCNC: 6.9 G/DL (ref 6–8.5)
RBC # BLD AUTO: 4.9 X10E6/UL (ref 3.77–5.28)
SODIUM SERPL-SCNC: 141 MMOL/L (ref 134–144)
T4 FREE SERPL-MCNC: 1.48 NG/DL (ref 0.82–1.77)
TRIGL SERPL-MCNC: 113 MG/DL (ref 0–149)
TSH SERPL DL<=0.005 MIU/L-ACNC: 2.19 UIU/ML (ref 0.45–4.5)
VLDLC SERPL CALC-MCNC: 21 MG/DL (ref 5–40)
WBC # BLD AUTO: 5 X10E3/UL (ref 3.4–10.8)

## 2025-01-24 RX ORDER — LISINOPRIL 2.5 MG/1
2.5 TABLET ORAL DAILY
Qty: 90 TABLET | Refills: 3 | Status: SHIPPED | OUTPATIENT
Start: 2025-01-24

## 2025-02-03 ENCOUNTER — TELEPHONE (OUTPATIENT)
Age: 70
End: 2025-02-03

## 2025-02-03 NOTE — TELEPHONE ENCOUNTER
----- Message from Gee MCINTYRE sent at 2/3/2025 12:57 PM EST -----  Regarding: ECC Appointment Request  ECC Appointment Request    Patient needs appointment for ECC Appointment Type: Existing Condition Follow Up.    Patient Requested Dates(s): April 30, 2025  Patient Requested Time: anytime other than 2:00 PM  Provider Name: Steffen Reyes     Reason for Appointment Request: Established Patient - Available appointments did not meet patient need. Patient needs to reschedule their appointment on 4/24/2025 at 10:45 AM with Steffen Reyes for their 3 months follow up  --------------------------------------------------------------------------------------------------------------------------    Relationship to Patient: Self     Call Back Information: OK to leave message on voicemail  Preferred Call Back Number: Phone  [phone]

## 2025-02-14 ENCOUNTER — TELEPHONE (OUTPATIENT)
Age: 70
End: 2025-02-14

## 2025-02-14 NOTE — TELEPHONE ENCOUNTER
Pt called and stated she just found a lump in her left breast and wants to know if Dr. Reyes can order a mammo or does she need to be seen first? Best call back number [phone].-TM 2/14/25

## 2025-02-14 NOTE — TELEPHONE ENCOUNTER
Called Patient. Name and  confirmed.  Scheduled OV on 25 at 2:45 PM. She verbalized understanding.

## 2025-02-17 ENCOUNTER — OFFICE VISIT (OUTPATIENT)
Age: 70
End: 2025-02-17
Payer: MEDICARE

## 2025-02-17 VITALS
RESPIRATION RATE: 18 BRPM | DIASTOLIC BLOOD PRESSURE: 72 MMHG | WEIGHT: 183 LBS | BODY MASS INDEX: 24.79 KG/M2 | TEMPERATURE: 96.8 F | SYSTOLIC BLOOD PRESSURE: 124 MMHG | OXYGEN SATURATION: 99 % | HEART RATE: 66 BPM | HEIGHT: 72 IN

## 2025-02-17 DIAGNOSIS — R92.323 MAMMOGRAPHIC FIBROGLANDULAR DENSITY, BILATERAL BREASTS: ICD-10-CM

## 2025-02-17 DIAGNOSIS — Z80.3 FAMILY HISTORY OF BREAST CANCER: ICD-10-CM

## 2025-02-17 DIAGNOSIS — N63.20 MASS OF LEFT BREAST, UNSPECIFIED QUADRANT: Primary | ICD-10-CM

## 2025-02-17 DIAGNOSIS — R59.0 ENLARGED LYMPH NODE IN NECK: ICD-10-CM

## 2025-02-17 PROCEDURE — 3078F DIAST BP <80 MM HG: CPT | Performed by: FAMILY MEDICINE

## 2025-02-17 PROCEDURE — 99214 OFFICE O/P EST MOD 30 MIN: CPT | Performed by: FAMILY MEDICINE

## 2025-02-17 PROCEDURE — 1126F AMNT PAIN NOTED NONE PRSNT: CPT | Performed by: FAMILY MEDICINE

## 2025-02-17 PROCEDURE — G2211 COMPLEX E/M VISIT ADD ON: HCPCS | Performed by: FAMILY MEDICINE

## 2025-02-17 PROCEDURE — 1123F ACP DISCUSS/DSCN MKR DOCD: CPT | Performed by: FAMILY MEDICINE

## 2025-02-17 PROCEDURE — 1159F MED LIST DOCD IN RCRD: CPT | Performed by: FAMILY MEDICINE

## 2025-02-17 PROCEDURE — 3074F SYST BP LT 130 MM HG: CPT | Performed by: FAMILY MEDICINE

## 2025-02-17 NOTE — PROGRESS NOTES
Chief Complaint   Patient presents with    Breast Mass     Left breast ; noticed a week ago      \"Have you been to the ER, urgent care clinic since your last visit?  Hospitalized since your last visit?\"    NO    “Have you seen or consulted any other health care providers outside of [facility] since your last visit?”    NO         Financial Resource Strain: Low Risk  (6/17/2024)    Overall Financial Resource Strain (CARDIA)     Difficulty of Paying Living Expenses: Not hard at all      Food Insecurity: No Food Insecurity (1/23/2025)    Hunger Vital Sign     Worried About Running Out of Food in the Last Year: Never true     Ran Out of Food in the Last Year: Never true            1/23/2025    11:03 AM   PHQ-9    Little interest or pleasure in doing things 1   Feeling down, depressed, or hopeless 1   Trouble falling or staying asleep, or sleeping too much 0   Feeling tired or having little energy 0   Poor appetite or overeating 0   Feeling bad about yourself - or that you are a failure or have let yourself or your family down 0   Trouble concentrating on things, such as reading the newspaper or watching television 0   Moving or speaking so slowly that other people could have noticed. Or the opposite - being so fidgety or restless that you have been moving around a lot more than usual 0   Thoughts that you would be better off dead, or of hurting yourself in some way 0   PHQ-2 Score 2   PHQ-9 Total Score 2   If you checked off any problems, how difficult have these problems made it for you to do your work, take care of things at home, or get along with other people? 0       Health Maintenance Due   Topic Date Due    Shingles vaccine (1 of 2) Never done    Respiratory Syncytial Virus (RSV) Pregnant or age 60 yrs+ (1 - Risk 60-74 years 1-dose series) Never done    Diabetic retinal exam  03/28/2020    Diabetic foot exam  07/05/2020    DTaP/Tdap/Td vaccine (2 - Td or Tdap) 06/16/2024             
breakfast)      traMADol (ULTRAM) 50 MG tablet Take 1 tablet by mouth every 8 hours as needed.      metFORMIN (GLUCOPHAGE-XR) 500 MG extended release tablet TAKE 4 TABLETS BY MOUTH DAILY 360 tablet 3    ketorolac (TORADOL) 10 MG tablet TAKE ONE TAB BY MOUTH EVERY 8 HOURS      acetaminophen (TYLENOL) 500 MG CAPS capsule Take 2 capsules by mouth every 6 hours as needed for Pain      pregabalin (LYRICA) 200 MG capsule Take 1 capsule by mouth 2 times daily.      Fremanezumab-vfrm (AJOVY SC) Inject 1.5 mLs into the skin See Admin Instructions monthly      ezetimibe (ZETIA) 10 MG tablet TAKE 1 TABLET BY MOUTH EVERY DAY 90 tablet 1    Ozanimod HCl (ZEPOSIA PO) Take 2 mg by mouth Daily      tiZANidine (ZANAFLEX) 2 MG tablet Take 1 tablet by mouth every 6 hours as needed      rosuvastatin (CRESTOR) 40 MG tablet TAKE 1 TABLET BY MOUTH EVERY DAY 90 tablet 3    DULoxetine (CYMBALTA) 30 MG extended release capsule Take 1 capsule by mouth daily      Diclofenac Sodium 100 MG TB24 TAKE ONE TAB BY MOUTH EVERY MORNING WITH FOOD      Misc. Devices MISC New wheelchair battery replacement 1 each 0    blood glucose test strips (ASCENSIA AUTODISC VI;ONE TOUCH ULTRA TEST VI) strip 100 each by Other route See Admin Instructions      albuterol (PROVENTIL) (2.5 MG/3ML) 0.083% nebulizer solution Inhale 3 mLs into the lungs every 6 hours as needed for Shortness of Breath or Wheezing      amantadine (SYMMETREL) 100 MG capsule Take 1 capsule by mouth 2 times daily      aspirin 81 MG EC tablet TAKE 1 TABLET BY MOUTH EVERY DAY IN THE MORNING      levothyroxine (SYNTHROID) 137 MCG tablet TAKE 1 TABLET BY MOUTH EVERY DAY BEFORE BREAKFAST      lidocaine (LIDODERM) 5 % Apply patch to the affected area for 12 hours a day and remove for 12 hours a day.      menthol-zinc oxide (CALMOSEPTINE) 0.44-20.6 % OINT ointment Apply 1 each topically daily as needed (prevent skin breakdown) apply to buttocks as needed      pyridostigmine (MESTINON) 60 MG tablet TAKE

## 2025-02-25 ENCOUNTER — HOSPITAL ENCOUNTER (OUTPATIENT)
Facility: HOSPITAL | Age: 70
Setting detail: OUTPATIENT SURGERY
Discharge: HOME | End: 2025-02-25
Attending: INTERNAL MEDICINE | Admitting: INTERNAL MEDICINE
Payer: MEDICARE

## 2025-02-25 ENCOUNTER — ANESTHESIA EVENT (OUTPATIENT)
Facility: HOSPITAL | Age: 70
End: 2025-02-25
Payer: MEDICARE

## 2025-02-25 ENCOUNTER — ANESTHESIA (OUTPATIENT)
Facility: HOSPITAL | Age: 70
End: 2025-02-25
Payer: MEDICARE

## 2025-02-25 VITALS
DIASTOLIC BLOOD PRESSURE: 87 MMHG | OXYGEN SATURATION: 97 % | HEIGHT: 72 IN | WEIGHT: 178.5 LBS | RESPIRATION RATE: 12 BRPM | HEART RATE: 76 BPM | TEMPERATURE: 98 F | BODY MASS INDEX: 24.18 KG/M2 | SYSTOLIC BLOOD PRESSURE: 154 MMHG

## 2025-02-25 PROCEDURE — 7100000010 HC PHASE II RECOVERY - FIRST 15 MIN: Performed by: INTERNAL MEDICINE

## 2025-02-25 PROCEDURE — 2580000003 HC RX 258: Performed by: INTERNAL MEDICINE

## 2025-02-25 PROCEDURE — 6360000002 HC RX W HCPCS: Performed by: NURSE ANESTHETIST, CERTIFIED REGISTERED

## 2025-02-25 PROCEDURE — 3600007512: Performed by: INTERNAL MEDICINE

## 2025-02-25 PROCEDURE — 88305 TISSUE EXAM BY PATHOLOGIST: CPT

## 2025-02-25 PROCEDURE — 3700000000 HC ANESTHESIA ATTENDED CARE: Performed by: INTERNAL MEDICINE

## 2025-02-25 PROCEDURE — 3600007502: Performed by: INTERNAL MEDICINE

## 2025-02-25 PROCEDURE — 2709999900 HC NON-CHARGEABLE SUPPLY: Performed by: INTERNAL MEDICINE

## 2025-02-25 PROCEDURE — 3700000001 HC ADD 15 MINUTES (ANESTHESIA): Performed by: INTERNAL MEDICINE

## 2025-02-25 RX ORDER — SODIUM CHLORIDE 0.9 % (FLUSH) 0.9 %
5-40 SYRINGE (ML) INJECTION PRN
Status: DISCONTINUED | OUTPATIENT
Start: 2025-02-25 | End: 2025-02-25 | Stop reason: HOSPADM

## 2025-02-25 RX ORDER — PHENYLEPHRINE HCL IN 0.9% NACL 0.4MG/10ML
SYRINGE (ML) INTRAVENOUS
Status: DISCONTINUED | OUTPATIENT
Start: 2025-02-25 | End: 2025-02-25 | Stop reason: SDUPTHER

## 2025-02-25 RX ORDER — SODIUM CHLORIDE 9 MG/ML
INJECTION, SOLUTION INTRAVENOUS CONTINUOUS
Status: DISCONTINUED | OUTPATIENT
Start: 2025-02-25 | End: 2025-02-25 | Stop reason: HOSPADM

## 2025-02-25 RX ADMIN — LIDOCAINE HYDROCHLORIDE 100 MG: 20 INJECTION, SOLUTION EPIDURAL; INFILTRATION; INTRACAUDAL; PERINEURAL at 07:44

## 2025-02-25 RX ADMIN — SODIUM CHLORIDE: 9 INJECTION, SOLUTION INTRAVENOUS at 07:22

## 2025-02-25 RX ADMIN — PROPOFOL 400 MG: 10 INJECTION, EMULSION INTRAVENOUS at 08:30

## 2025-02-25 RX ADMIN — Medication 80 MCG: at 08:16

## 2025-02-25 RX ADMIN — SODIUM CHLORIDE: 9 INJECTION, SOLUTION INTRAVENOUS at 08:19

## 2025-02-25 ASSESSMENT — PAIN - FUNCTIONAL ASSESSMENT: PAIN_FUNCTIONAL_ASSESSMENT: NONE - DENIES PAIN

## 2025-02-25 NOTE — H&P
[facility]  Outpatient History and Physical    Patient: Jamila FITCH Minor    Physician: Dusty Pack MD    Vital Signs: Blood pressure (!) 141/63, pulse 79, temperature 98 °F (36.7 °C), temperature source Temporal, resp. rate 15, height 1.829 m (6'), weight 81 kg (178 lb 8 oz), SpO2 97%.    Allergies:   Allergies   Allergen Reactions    Bee Venom Anaphylaxis    Penicillins Anaphylaxis    Egg Shells Nausea And Vomiting    Egg Solids, Whole Nausea And Vomiting    Povidone-Iodine Rash       Chief Complaint: Nausea/vomiting, abd pain, hx of colon polyps    History of Present Illness: Nausea/vomiting, abd pain, hx of colon polyps    Indication for Procedure: Nausea/vomiting, abd pain, hx of colon polyps    History:  Past Medical History:   Diagnosis Date    Arrhythmia     Dr. Atkinson    Arthritis     Benign cyst of left breast     Cervical spinal stenosis     Moderate C6-7    Depression     Diabetes mellitus type 2, controlled (HCC)     Endometriosis     Hypercholesterolemia     Hypertension     Hypothyroid     Incontinence     Migraine     MS (multiple sclerosis) (HCC)     Myasthenia gravis (HCC)     Stroke (HCC) 2018    x2 with residual right sided weakness      Past Surgical History:   Procedure Laterality Date    APPENDECTOMY  2022    CARPAL TUNNEL RELEASE Left     CATARACT REMOVAL Bilateral      SECTION  1986    COLONOSCOPY      CYST INCISION AND DRAINAGE      HEENT      removal of thyroglossal duct cyst    IR REMOVE TUNNELED CVAD WO SQ PORT/PUMP  2022    KNEE ARTHROPLASTY Right     KNEE ARTHROSCOPY Right 1998    UT UNLISTED PROCEDURE ABDOMEN PERITONEUM & OMENTUM  2001    bowel resection    SMALL INTESTINE SURGERY      THUMB ARTHROSCOPY Right     thumb tendon repair x 2    THYROIDECTOMY  1974    TOTAL HIP ARTHROPLASTY Bilateral     BOTH HIPS REPLACED    VASCULAR SURGERY  2006    PORT -A- CATH X 2      Social History     Socioeconomic History    Marital

## 2025-02-25 NOTE — ANESTHESIA PRE PROCEDURE
Department of Anesthesiology  Preprocedure Note       Name:  Jamila FITHC Minor   Age:  69 y.o.  :  1955                                          MRN:  [id number]         Date:  2025      Surgeon: Surgeon(s):  Dusty Pack MD    Procedure: Procedure(s):  COLONOSCOPY, ESOPHAGOGASTRODUODENOSCOPY  ESOPHAGOGASTRODUODENOSCOPY    Medications prior to admission:   Prior to Admission medications    Medication Sig Start Date End Date Taking? Authorizing Provider   lisinopril (PRINIVIL;ZESTRIL) 2.5 MG tablet Take 1 tablet by mouth daily 25  Yes Steffen Reyes MD   LINZESS 145 MCG capsule Take 90 capsules by mouth as needed 24  Yes ProviderMary Carmen MD   metFORMIN (GLUCOPHAGE-XR) 500 MG extended release tablet TAKE 4 TABLETS BY MOUTH DAILY  Patient taking differently: Take 4 tablets by mouth nightly 24  Yes Steffen Reyes MD   pregabalin (LYRICA) 200 MG capsule Take 1 capsule by mouth 2 times daily.   Yes Provider, MD Mary Carmen   Fremanezumab-vfrm (AJOVY SC) Inject 1.5 mLs into the skin See Admin Instructions monthly   Yes ProviderMary Carmen MD   ezetimibe (ZETIA) 10 MG tablet TAKE 1 TABLET BY MOUTH EVERY DAY 3/21/24  Yes Steffen Reyes MD   Ozanimod HCl (ZEPOSIA PO) Take 2 mg by mouth Daily   Yes ProviderMary Carmen MD   rosuvastatin (CRESTOR) 40 MG tablet TAKE 1 TABLET BY MOUTH EVERY DAY 23  Yes Steffen Reyes MD   DULoxetine (CYMBALTA) 30 MG extended release capsule Take 1 capsule by mouth daily 23  Yes Provider, MD Mary Carmen   Diclofenac Sodium 100 MG TB24 TAKE ONE TAB BY MOUTH EVERY MORNING WITH FOOD 23  Yes ProviderMary Carmen MD   albuterol (PROVENTIL) (2.5 MG/3ML) 0.083% nebulizer solution Inhale 3 mLs into the lungs every 6 hours as needed for Shortness of Breath or Wheezing 18  Yes Automatic Reconciliation, Ar   amantadine (SYMMETREL) 100 MG capsule Take 1 capsule by mouth 2 times daily 10/18/22  Yes Automatic Reconciliation, Ar

## 2025-02-25 NOTE — PROGRESS NOTES
ARRIVAL INFORMATION:  Verified patient name and date of birth, scheduled procedure, and informed consent.     : Osiel (son)contact number: [phone]  Physician and staff can share information with the .     Receive texts: Yes    Belongings with patient include:  Clothing,Glasses, Cane    GI FOCUSED ASSESSMENT:  Neuro: Awake, alert, oriented x4  Respiratory: even and unlabored   GI: soft and non-distended  EKG Rhythm: normal sinus rhythm    Education:Reviewed general discharge instructions and  information.     The risks and benefits of the bite block have been explained to patient.  Patient verbalizes understanding.

## 2025-02-25 NOTE — DISCHARGE INSTRUCTIONS
Jamila Elizabeth  [id number]  1955    COLON/EGD DISCHARGE INSTRUCTIONS  Discomfort:  Redness at IV site- apply warm compress to area; if redness or soreness persist- contact your physician  There may be a slight amount of blood passed from the rectum  Gaseous discomfort- walking, belching will help relieve any discomfort  Sore throat- throat lozenges or warm salt water gargle  You may not operate a vehicle for 12 hours  You may not engage in an occupation involving machinery or appliances for rest of today  You may not drink alcoholic beverages for at least 12 hours  Avoid making any critical decisions for at least 24 hour  DIET:   Regular diet.   - however -  remember your colon is empty and a heavy meal will produce gas.   Avoid these foods:  vegetables, fried / greasy foods, carbonated drinks for today.    MEDICATIONS:      Regarding Aspirin or Nonsteroidal medications, please see below.    ACTIVITY:  You may resume your normal daily activities it is recommended that you spend the remainder of the day resting -  avoid any strenuous activity.    CALL M.D.  ANY SIGN OF:  Increasing pain, nausea, vomiting  Abdominal distension (swelling)  New increased bleeding (oral or rectal)  Fever (chills)  Pain in chest area  Bloody discharge from nose or mouth  Shortness of breath    The use of some over-the-counter pain medication may lead to bleeding after biopsies or other procedures you may have had done.  Tylenol (also called acetaminophen) is safe to take and will not lead to bleeding.      Impressions:  EGD: 2 to 3 cm hiatal hernia.  Severe erosive gastritis and duodenitis with superficial small gastric and duodenal ulcers.  Colonoscopy: Inflamed, edematous ileocecal valve, biopsies taken.  Possible NSAID injury.  Normal TI.  6 diminutive colon polyps removed by cold snare.  Mild diverticulosis.    Recommendations:   -Pathology results  -Resume diet as tolerated.  -Resume all home medications.  -Start omeprazole 40

## 2025-02-25 NOTE — PROGRESS NOTES
Endoscopy Case End Note:     Procedure scope was pre-cleaned, per protocol, at bedside by JOSEPH Tipton.       Report received from anesthesia.  See anesthesia flowsheet for intra-procedure vital signs and events.    Belongings remain under stretcher with patient.

## 2025-02-25 NOTE — ANESTHESIA POSTPROCEDURE EVALUATION
Department of Anesthesiology  Postprocedure Note    Patient: Jamila Elizabeth  MRN: [id number]  YOB: 1955  Date of evaluation: 2/25/2025    Procedure Summary       Date: 02/25/25 Room / Location: [facility] ENDO 04 / [facility] ENDOSCOPY    Anesthesia Start: 0744 Anesthesia Stop: 0834    Procedures:       COLONOSCOPY, ESOPHAGOGASTRODUODENOSCOPY (Lower GI Region)      ESOPHAGOGASTRODUODENOSCOPY (Upper GI Region) Diagnosis:       Irritable bowel syndrome with constipation      (Irritable bowel syndrome with constipation [K58.1])    Surgeons: Dusty Pack MD Responsible Provider: Rusty Pak MD    Anesthesia Type: MAC ASA Status: 3            Anesthesia Type: MAC    Chicoh Phase I: Chicho Score: 10    [place] Phase II:      Anesthesia Post Evaluation    Patient location during evaluation: PACU  Patient participation: complete - patient participated  Level of consciousness: awake  Airway patency: patent  Nausea & Vomiting: no vomiting  Cardiovascular status: hemodynamically stable  Respiratory status: acceptable  Hydration status: euvolemic    No notable events documented.

## 2025-03-20 RX ORDER — ROSUVASTATIN CALCIUM 40 MG/1
40 TABLET, COATED ORAL DAILY
Qty: 90 TABLET | Refills: 3 | Status: SHIPPED | OUTPATIENT
Start: 2025-03-20

## 2025-03-25 ENCOUNTER — HOSPITAL ENCOUNTER (OUTPATIENT)
Facility: HOSPITAL | Age: 70
Discharge: HOME | End: 2025-03-28
Payer: MEDICARE

## 2025-03-25 DIAGNOSIS — R92.323 MAMMOGRAPHIC FIBROGLANDULAR DENSITY, BILATERAL BREASTS: ICD-10-CM

## 2025-03-25 DIAGNOSIS — Z80.3 FAMILY HISTORY OF BREAST CANCER: ICD-10-CM

## 2025-03-25 DIAGNOSIS — R59.0 ENLARGED LYMPH NODE IN NECK: ICD-10-CM

## 2025-03-25 DIAGNOSIS — N63.20 MASS OF LEFT BREAST, UNSPECIFIED QUADRANT: ICD-10-CM

## 2025-03-25 PROCEDURE — 76536 US EXAM OF HEAD AND NECK: CPT

## 2025-03-25 PROCEDURE — G0279 TOMOSYNTHESIS, MAMMO: HCPCS

## 2025-03-25 PROCEDURE — 76642 ULTRASOUND BREAST LIMITED: CPT

## 2025-03-27 ENCOUNTER — RESULTS FOLLOW-UP (OUTPATIENT)
Age: 70
End: 2025-03-27

## 2025-04-17 ENCOUNTER — TRANSCRIBE ORDERS (OUTPATIENT)
Facility: HOSPITAL | Age: 70
End: 2025-04-17

## 2025-04-17 DIAGNOSIS — R51.9 INTRACTABLE HEADACHE, UNSPECIFIED CHRONICITY PATTERN, UNSPECIFIED HEADACHE TYPE: Primary | ICD-10-CM

## 2025-04-24 ENCOUNTER — OFFICE VISIT (OUTPATIENT)
Age: 70
End: 2025-04-24
Payer: MEDICARE

## 2025-04-24 VITALS
RESPIRATION RATE: 18 BRPM | WEIGHT: 176.8 LBS | OXYGEN SATURATION: 95 % | HEART RATE: 76 BPM | TEMPERATURE: 96.9 F | SYSTOLIC BLOOD PRESSURE: 122 MMHG | HEIGHT: 72 IN | BODY MASS INDEX: 23.95 KG/M2 | DIASTOLIC BLOOD PRESSURE: 72 MMHG

## 2025-04-24 DIAGNOSIS — R82.90 ABNORMAL URINE ODOR: ICD-10-CM

## 2025-04-24 DIAGNOSIS — R29.898 DECREASED GRIP STRENGTH OF RIGHT HAND: ICD-10-CM

## 2025-04-24 DIAGNOSIS — R32 URINARY INCONTINENCE, UNSPECIFIED TYPE: ICD-10-CM

## 2025-04-24 DIAGNOSIS — J34.89 NASAL LESION: ICD-10-CM

## 2025-04-24 DIAGNOSIS — E11.9 TYPE 2 DIABETES MELLITUS WITHOUT COMPLICATION, WITHOUT LONG-TERM CURRENT USE OF INSULIN (HCC): Primary | ICD-10-CM

## 2025-04-24 DIAGNOSIS — E03.9 HYPOTHYROIDISM, UNSPECIFIED TYPE: ICD-10-CM

## 2025-04-24 DIAGNOSIS — M21.941 ACQUIRED DEFORMITY OF RIGHT HAND: ICD-10-CM

## 2025-04-24 PROBLEM — E11.22 TYPE 2 DIABETES MELLITUS WITH CHRONIC KIDNEY DISEASE (HCC): Status: RESOLVED | Noted: 2025-01-23 | Resolved: 2025-04-24

## 2025-04-24 PROCEDURE — G2211 COMPLEX E/M VISIT ADD ON: HCPCS | Performed by: FAMILY MEDICINE

## 2025-04-24 PROCEDURE — 3074F SYST BP LT 130 MM HG: CPT | Performed by: FAMILY MEDICINE

## 2025-04-24 PROCEDURE — 3051F HG A1C>EQUAL 7.0%<8.0%: CPT | Performed by: FAMILY MEDICINE

## 2025-04-24 PROCEDURE — 1123F ACP DISCUSS/DSCN MKR DOCD: CPT | Performed by: FAMILY MEDICINE

## 2025-04-24 PROCEDURE — 99214 OFFICE O/P EST MOD 30 MIN: CPT | Performed by: FAMILY MEDICINE

## 2025-04-24 PROCEDURE — 3078F DIAST BP <80 MM HG: CPT | Performed by: FAMILY MEDICINE

## 2025-04-24 PROCEDURE — 1159F MED LIST DOCD IN RCRD: CPT | Performed by: FAMILY MEDICINE

## 2025-04-24 PROCEDURE — 1126F AMNT PAIN NOTED NONE PRSNT: CPT | Performed by: FAMILY MEDICINE

## 2025-04-24 RX ORDER — EZETIMIBE 10 MG/1
10 TABLET ORAL DAILY
Qty: 90 TABLET | Refills: 3 | Status: SHIPPED | OUTPATIENT
Start: 2025-04-24

## 2025-04-24 ASSESSMENT — PATIENT HEALTH QUESTIONNAIRE - PHQ9
9. THOUGHTS THAT YOU WOULD BE BETTER OFF DEAD, OR OF HURTING YOURSELF: NOT AT ALL
SUM OF ALL RESPONSES TO PHQ QUESTIONS 1-9: 0
6. FEELING BAD ABOUT YOURSELF - OR THAT YOU ARE A FAILURE OR HAVE LET YOURSELF OR YOUR FAMILY DOWN: NOT AT ALL
5. POOR APPETITE OR OVEREATING: NOT AT ALL
4. FEELING TIRED OR HAVING LITTLE ENERGY: NOT AT ALL
SUM OF ALL RESPONSES TO PHQ QUESTIONS 1-9: 0
3. TROUBLE FALLING OR STAYING ASLEEP: NOT AT ALL
SUM OF ALL RESPONSES TO PHQ QUESTIONS 1-9: 0
8. MOVING OR SPEAKING SO SLOWLY THAT OTHER PEOPLE COULD HAVE NOTICED. OR THE OPPOSITE, BEING SO FIGETY OR RESTLESS THAT YOU HAVE BEEN MOVING AROUND A LOT MORE THAN USUAL: NOT AT ALL
SUM OF ALL RESPONSES TO PHQ QUESTIONS 1-9: 0
7. TROUBLE CONCENTRATING ON THINGS, SUCH AS READING THE NEWSPAPER OR WATCHING TELEVISION: NOT AT ALL
2. FEELING DOWN, DEPRESSED OR HOPELESS: NOT AT ALL
1. LITTLE INTEREST OR PLEASURE IN DOING THINGS: NOT AT ALL
10. IF YOU CHECKED OFF ANY PROBLEMS, HOW DIFFICULT HAVE THESE PROBLEMS MADE IT FOR YOU TO DO YOUR WORK, TAKE CARE OF THINGS AT HOME, OR GET ALONG WITH OTHER PEOPLE: NOT DIFFICULT AT ALL

## 2025-04-24 NOTE — PROGRESS NOTES
Chief Complaint   Patient presents with    Follow-up     \"Have you been to the ER, urgent care clinic since your last visit?  Hospitalized since your last visit?\"    NO    “Have you seen or consulted any other health care providers outside of [facility] since your last visit?”    NO       Financial Resource Strain: Low Risk  (6/17/2024)    Overall Financial Resource Strain (CARDIA)     Difficulty of Paying Living Expenses: Not hard at all      Food Insecurity: No Food Insecurity (1/23/2025)    Hunger Vital Sign     Worried About Running Out of Food in the Last Year: Never true     Ran Out of Food in the Last Year: Never true            4/24/2025    10:50 AM   PHQ-9    Little interest or pleasure in doing things 0   Feeling down, depressed, or hopeless 0   Trouble falling or staying asleep, or sleeping too much 0   Feeling tired or having little energy 0   Poor appetite or overeating 0   Feeling bad about yourself - or that you are a failure or have let yourself or your family down 0   Trouble concentrating on things, such as reading the newspaper or watching television 0   Moving or speaking so slowly that other people could have noticed. Or the opposite - being so fidgety or restless that you have been moving around a lot more than usual 0   Thoughts that you would be better off dead, or of hurting yourself in some way 0   PHQ-2 Score 0   PHQ-9 Total Score 0   If you checked off any problems, how difficult have these problems made it for you to do your work, take care of things at home, or get along with other people? 0       Health Maintenance Due   Topic Date Due    Shingles vaccine (1 of 2) Never done    Respiratory Syncytial Virus (RSV) Pregnant or age 60 yrs+ (1 - Risk 60-74 years 1-dose series) Never done    Diabetic retinal exam  03/28/2020    Diabetic foot exam  07/05/2020    DTaP/Tdap/Td vaccine (2 - Td or Tdap) 06/16/2024    COVID-19 Vaccine (5 - 2024-25 season) 04/25/2025

## 2025-04-24 NOTE — ASSESSMENT & PLAN NOTE
Chronic, not at goal (unstable), continue current plan pending work up below    Orders:    Comprehensive Metabolic Panel; Future    Hemoglobin A1C; Future    CBC with Auto Differential; Future

## 2025-04-24 NOTE — PROGRESS NOTES
Patient Name: Jamila Elizabeth   MRN: [id number]    ASSESSMENT AND PLAN  Jamila Elizabeth is a 69 y.o. female who presents today for:    Assessment & Plan  Type 2 diabetes mellitus without complication, without long-term current use of insulin (HCC)   Chronic, not at goal (unstable), continue current plan pending work up below    Orders:    Comprehensive Metabolic Panel; Future    Hemoglobin A1C; Future    CBC with Auto Differential; Future    Abnormal urine odor   Will evaluate urine studies.    Orders:    Culture, Urine; Future    Urinalysis with Microscopic; Future    Urinary incontinence, unspecified type     Orders:    Culture, Urine; Future    Urinalysis with Microscopic; Future    Hypothyroidism, unspecified type   Chronic, at goal (stable), continue current plan pending work up below    Orders:    TSH + Free T4 Panel; Future    Acquired deformity of right hand   Chronic, worsening (exacerbation), will evaluate further with xray; consider hand orthopedics.    Orders:    XR HAND RIGHT (MIN 3 VIEWS); Future    Decreased  strength of right hand    Chronic, worsening (exacerbation), will evaluate further with xray; consider hand orthopedics.    Orders:    XR HAND RIGHT (MIN 3 VIEWS); Future    Nasal lesion   Chronic, not at goal (unstable), refer to ENT.    Orders:    Diego Tao MD, [facility] ([address]      Orders Placed This Encounter   Medications    ezetimibe (ZETIA) 10 MG tablet     Sig: Take 1 tablet by mouth daily     Dispense:  90 tablet     Refill:  3        Medications Discontinued During This Encounter   Medication Reason    ezetimibe (ZETIA) 10 MG tablet REORDER       Return in about 3 months (around 7/24/2025) for DM.      SUBJECTIVE  Jamila Elizabeth is a 69 y.o. female who presents with the following:     Since last visit, we increased her metformin to 4 tabs daily.  Tolerating it well.  Admits that she has not been eating very healthy.  Reports chronic pain and decreased

## 2025-04-25 LAB
ALBUMIN SERPL-MCNC: 4.4 G/DL (ref 3.9–4.9)
ALP SERPL-CCNC: 70 IU/L (ref 44–121)
ALT SERPL-CCNC: 25 IU/L (ref 0–32)
APPEARANCE UR: CLEAR
AST SERPL-CCNC: 28 IU/L (ref 0–40)
BACTERIA #/AREA URNS HPF: ABNORMAL /[HPF]
BASOPHILS # BLD AUTO: 0 X10E3/UL (ref 0–0.2)
BASOPHILS NFR BLD AUTO: 1 %
BILIRUB SERPL-MCNC: 0.3 MG/DL (ref 0–1.2)
BILIRUB UR QL STRIP: NEGATIVE
BUN SERPL-MCNC: 12 MG/DL (ref 8–27)
BUN/CREAT SERPL: 12 (ref 12–28)
CALCIUM SERPL-MCNC: 9.6 MG/DL (ref 8.7–10.3)
CASTS URNS QL MICRO: ABNORMAL /LPF
CHLORIDE SERPL-SCNC: 100 MMOL/L (ref 96–106)
CO2 SERPL-SCNC: 19 MMOL/L (ref 20–29)
COLOR UR: YELLOW
CREAT SERPL-MCNC: 0.99 MG/DL (ref 0.57–1)
EGFRCR SERPLBLD CKD-EPI 2021: 62 ML/MIN/1.73
EOSINOPHIL # BLD AUTO: 0.1 X10E3/UL (ref 0–0.4)
EOSINOPHIL NFR BLD AUTO: 1 %
EPI CELLS #/AREA URNS HPF: ABNORMAL /HPF (ref 0–10)
ERYTHROCYTE [DISTWIDTH] IN BLOOD BY AUTOMATED COUNT: 13.7 % (ref 11.7–15.4)
GLOBULIN SER CALC-MCNC: 2.2 G/DL (ref 1.5–4.5)
GLUCOSE SERPL-MCNC: 187 MG/DL (ref 70–99)
GLUCOSE UR QL STRIP: NEGATIVE
HBA1C MFR BLD: 9 % (ref 4.8–5.6)
HCT VFR BLD AUTO: 46.1 % (ref 34–46.6)
HGB BLD-MCNC: 14.8 G/DL (ref 11.1–15.9)
HGB UR QL STRIP: NEGATIVE
IMM GRANULOCYTES # BLD AUTO: 0.1 X10E3/UL (ref 0–0.1)
IMM GRANULOCYTES NFR BLD AUTO: 1 %
KETONES UR QL STRIP: NEGATIVE
LEUKOCYTE ESTERASE UR QL STRIP: NEGATIVE
LYMPHOCYTES # BLD AUTO: 0.8 X10E3/UL (ref 0.7–3.1)
LYMPHOCYTES NFR BLD AUTO: 14 %
MCH RBC QN AUTO: 30.1 PG (ref 26.6–33)
MCHC RBC AUTO-ENTMCNC: 32.1 G/DL (ref 31.5–35.7)
MCV RBC AUTO: 94 FL (ref 79–97)
MICRO URNS: NORMAL
MICRO URNS: NORMAL
MONOCYTES # BLD AUTO: 0.5 X10E3/UL (ref 0.1–0.9)
MONOCYTES NFR BLD AUTO: 8 %
MORPHOLOGY BLD-IMP: ABNORMAL
NEUTROPHILS # BLD AUTO: 4.2 X10E3/UL (ref 1.4–7)
NEUTROPHILS NFR BLD AUTO: 75 %
NITRITE UR QL STRIP: NEGATIVE
PH UR STRIP: 5.5 [PH] (ref 5–7.5)
PLATELET # BLD AUTO: 95 X10E3/UL (ref 150–450)
POTASSIUM SERPL-SCNC: 4.5 MMOL/L (ref 3.5–5.2)
PROT SERPL-MCNC: 6.6 G/DL (ref 6–8.5)
PROT UR QL STRIP: NEGATIVE
RBC # BLD AUTO: 4.91 X10E6/UL (ref 3.77–5.28)
RBC #/AREA URNS HPF: ABNORMAL /HPF (ref 0–2)
SODIUM SERPL-SCNC: 138 MMOL/L (ref 134–144)
SP GR UR STRIP: 1.01 (ref 1–1.03)
T4 FREE SERPL-MCNC: 1 NG/DL (ref 0.82–1.77)
TSH SERPL DL<=0.005 MIU/L-ACNC: 5.78 UIU/ML (ref 0.45–4.5)
UROBILINOGEN UR STRIP-MCNC: 0.2 MG/DL (ref 0.2–1)
WBC # BLD AUTO: 5.6 X10E3/UL (ref 3.4–10.8)
WBC #/AREA URNS HPF: ABNORMAL /HPF (ref 0–5)

## 2025-04-26 LAB — BACTERIA UR CULT: NORMAL

## 2025-04-27 ENCOUNTER — RESULTS FOLLOW-UP (OUTPATIENT)
Age: 70
End: 2025-04-27

## 2025-04-27 DIAGNOSIS — E11.9 TYPE 2 DIABETES MELLITUS WITHOUT COMPLICATION, WITHOUT LONG-TERM CURRENT USE OF INSULIN (HCC): Primary | ICD-10-CM

## 2025-04-27 DIAGNOSIS — E03.9 HYPOTHYROIDISM, UNSPECIFIED TYPE: ICD-10-CM

## 2025-04-27 RX ORDER — LEVOTHYROXINE SODIUM 150 UG/1
150 TABLET ORAL DAILY
Qty: 90 TABLET | Refills: 1 | Status: SHIPPED | OUTPATIENT
Start: 2025-04-27

## 2025-05-01 ENCOUNTER — HOSPITAL ENCOUNTER (OUTPATIENT)
Facility: HOSPITAL | Age: 70
Discharge: HOME | End: 2025-05-01
Attending: PSYCHIATRY & NEUROLOGY

## 2025-05-01 DIAGNOSIS — R51.9 INTRACTABLE HEADACHE, UNSPECIFIED CHRONICITY PATTERN, UNSPECIFIED HEADACHE TYPE: ICD-10-CM

## 2025-05-01 PROCEDURE — 6360000004 HC RX CONTRAST MEDICATION: Performed by: PSYCHIATRY & NEUROLOGY

## 2025-05-01 PROCEDURE — 70553 MRI BRAIN STEM W/O & W/DYE: CPT

## 2025-05-01 PROCEDURE — A9579 GAD-BASE MR CONTRAST NOS,1ML: HCPCS | Performed by: PSYCHIATRY & NEUROLOGY

## 2025-05-01 RX ADMIN — GADOTERIDOL 15 ML: 279.3 INJECTION, SOLUTION INTRAVENOUS at 19:29

## 2025-06-05 ENCOUNTER — HOSPITAL ENCOUNTER (OUTPATIENT)
Facility: HOSPITAL | Age: 70
Discharge: HOME | End: 2025-06-08
Payer: MEDICARE

## 2025-06-05 ENCOUNTER — HOSPITAL ENCOUNTER (OUTPATIENT)
Facility: HOSPITAL | Age: 70
Setting detail: SPECIMEN
Discharge: HOME | End: 2025-06-08

## 2025-06-05 DIAGNOSIS — R29.898 DECREASED GRIP STRENGTH OF RIGHT HAND: ICD-10-CM

## 2025-06-05 DIAGNOSIS — M21.941 ACQUIRED DEFORMITY OF RIGHT HAND: ICD-10-CM

## 2025-06-05 PROCEDURE — 73130 X-RAY EXAM OF HAND: CPT

## 2025-06-10 ENCOUNTER — TRANSCRIBE ORDERS (OUTPATIENT)
Facility: HOSPITAL | Age: 70
End: 2025-06-10

## 2025-06-10 DIAGNOSIS — Z12.31 ENCOUNTER FOR MAMMOGRAM TO ESTABLISH BASELINE MAMMOGRAM: Primary | ICD-10-CM

## 2025-06-13 DIAGNOSIS — R11.0 NAUSEA: ICD-10-CM

## 2025-06-13 RX ORDER — ONDANSETRON 4 MG/1
4 TABLET, FILM COATED ORAL EVERY 8 HOURS PRN
Qty: 20 TABLET | Refills: 0 | Status: SHIPPED | OUTPATIENT
Start: 2025-06-13

## 2025-06-13 NOTE — TELEPHONE ENCOUNTER
PCP: Steffen Reyes MD    Last appt: 4/24/2025       Future Appointments   Date Time Provider Department Center   7/2/2025 12:15 PM [facility] RICHARD 1 [facility]AM [facility]   7/7/2025 10:20 AM Wilber Atkinson MD [facility]   7/15/2025  8:00 AM [facility] PAT EXAM RM 3 [facility]PAT [facility]   7/15/2025 10:30 AM [facility]       Requested Prescriptions     Pending Prescriptions Disp Refills    ondansetron (ZOFRAN) 4 MG tablet [Pharmacy Med Name: ONDANSETRON HCL 4 MG TABLET] 20 tablet 0     Sig: TAKE 1 TABLET BY MOUTH EVERY 8 HOURS AS NEEDED FOR NAUSEA AND VOMITING       Prior labs and Blood pressures:  BP Readings from Last 3 Encounters:   04/24/25 122/72   02/25/25 (!) 154/87   02/17/25 124/72     Lab Results   Component Value Date/Time     04/24/2025 12:00 AM    K 4.5 04/24/2025 12:00 AM     04/24/2025 12:00 AM    CO2 19 04/24/2025 12:00 AM    BUN 12 04/24/2025 12:00 AM    GFRAA >60 08/31/2022 04:20 AM     Lab Results   Component Value Date/Time    YZN8CWLG 7.5 10/07/2024 04:02 PM     Lab Results   Component Value Date/Time    CHOL 112 01/23/2025 12:00 AM    HDL 46 01/23/2025 12:00 AM    LDL 45 01/23/2025 12:00 AM    LDL 90 11/30/2023 12:06 PM    VLDL 21 01/23/2025 12:00 AM    VLDL 28 10/20/2021 02:48 PM     No results found for: \"VITD3\"    Lab Results   Component Value Date/Time    TSH 5.780 04/24/2025 12:00 AM    TSH 2.75 06/17/2024 11:42 AM

## 2025-06-23 ENCOUNTER — TELEPHONE (OUTPATIENT)
Age: 70
End: 2025-06-23

## 2025-06-23 NOTE — TELEPHONE ENCOUNTER
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  Cardiac Clearance faxed. Confirmation fax receipt received.

## 2025-07-02 ENCOUNTER — HOSPITAL ENCOUNTER (OUTPATIENT)
Facility: HOSPITAL | Age: 70
Discharge: HOME | End: 2025-07-05
Payer: MEDICARE

## 2025-07-02 VITALS — WEIGHT: 160 LBS | BODY MASS INDEX: 21.67 KG/M2 | HEIGHT: 72 IN

## 2025-07-02 DIAGNOSIS — Z12.31 ENCOUNTER FOR MAMMOGRAM TO ESTABLISH BASELINE MAMMOGRAM: ICD-10-CM

## 2025-07-02 PROCEDURE — 77067 SCR MAMMO BI INCL CAD: CPT

## 2025-07-03 ENCOUNTER — RESULTS FOLLOW-UP (OUTPATIENT)
Age: 70
End: 2025-07-03

## 2025-07-15 ENCOUNTER — HOSPITAL ENCOUNTER (OUTPATIENT)
Facility: HOSPITAL | Age: 70
Discharge: HOME | End: 2025-07-18
Payer: MEDICARE

## 2025-07-15 ENCOUNTER — OFFICE VISIT (OUTPATIENT)
Age: 70
End: 2025-07-15
Payer: MEDICARE

## 2025-07-15 VITALS
HEIGHT: 72 IN | DIASTOLIC BLOOD PRESSURE: 67 MMHG | BODY MASS INDEX: 21.59 KG/M2 | HEART RATE: 81 BPM | OXYGEN SATURATION: 96 % | TEMPERATURE: 98.4 F | WEIGHT: 159.4 LBS | SYSTOLIC BLOOD PRESSURE: 131 MMHG

## 2025-07-15 VITALS
OXYGEN SATURATION: 98 % | HEIGHT: 72 IN | RESPIRATION RATE: 20 BRPM | BODY MASS INDEX: 21.54 KG/M2 | SYSTOLIC BLOOD PRESSURE: 130 MMHG | WEIGHT: 159 LBS | DIASTOLIC BLOOD PRESSURE: 76 MMHG | HEART RATE: 77 BPM | TEMPERATURE: 96.9 F

## 2025-07-15 DIAGNOSIS — G43.709 CHRONIC MIGRAINE W/O AURA W/O STATUS MIGRAINOSUS, NOT INTRACTABLE: ICD-10-CM

## 2025-07-15 DIAGNOSIS — E03.9 HYPOTHYROIDISM, UNSPECIFIED TYPE: ICD-10-CM

## 2025-07-15 DIAGNOSIS — R63.4 WEIGHT LOSS: ICD-10-CM

## 2025-07-15 DIAGNOSIS — E11.9 TYPE 2 DIABETES MELLITUS WITHOUT COMPLICATION, WITHOUT LONG-TERM CURRENT USE OF INSULIN (HCC): Primary | ICD-10-CM

## 2025-07-15 LAB
ALBUMIN SERPL-MCNC: 3.8 G/DL (ref 3.5–5)
ALBUMIN/GLOB SERPL: 1.4 (ref 1.1–2.2)
ALP SERPL-CCNC: 47 U/L (ref 45–117)
ALT SERPL-CCNC: 45 U/L (ref 12–78)
ANION GAP SERPL CALC-SCNC: 6 MMOL/L (ref 2–12)
AST SERPL-CCNC: 32 U/L (ref 15–37)
BASOPHILS # BLD: 0.02 K/UL (ref 0–0.1)
BASOPHILS NFR BLD: 0.4 % (ref 0–1)
BILIRUB SERPL-MCNC: 0.5 MG/DL (ref 0.2–1)
BUN SERPL-MCNC: 13 MG/DL (ref 6–20)
BUN/CREAT SERPL: 14 (ref 12–20)
CALCIUM SERPL-MCNC: 9.8 MG/DL (ref 8.5–10.1)
CHLORIDE SERPL-SCNC: 107 MMOL/L (ref 97–108)
CO2 SERPL-SCNC: 25 MMOL/L (ref 21–32)
CREAT SERPL-MCNC: 0.91 MG/DL (ref 0.55–1.02)
DIFFERENTIAL METHOD BLD: ABNORMAL
EKG ATRIAL RATE: 70 BPM
EKG DIAGNOSIS: NORMAL
EKG P AXIS: 11 DEGREES
EKG P-R INTERVAL: 142 MS
EKG Q-T INTERVAL: 384 MS
EKG QRS DURATION: 76 MS
EKG QTC CALCULATION (BAZETT): 414 MS
EKG R AXIS: -17 DEGREES
EKG T AXIS: 53 DEGREES
EKG VENTRICULAR RATE: 70 BPM
EOSINOPHIL # BLD: 0.13 K/UL (ref 0–0.4)
EOSINOPHIL NFR BLD: 2.5 % (ref 0–7)
ERYTHROCYTE [DISTWIDTH] IN BLOOD BY AUTOMATED COUNT: 14.1 % (ref 11.5–14.5)
EST. AVERAGE GLUCOSE BLD GHB EST-MCNC: 148 MG/DL
GLOBULIN SER CALC-MCNC: 2.8 G/DL (ref 2–4)
GLUCOSE SERPL-MCNC: 120 MG/DL (ref 65–100)
HBA1C MFR BLD: 6.8 % (ref 4–5.6)
HCT VFR BLD AUTO: 44.8 % (ref 35–47)
HGB BLD-MCNC: 14.1 G/DL (ref 11.5–16)
IMM GRANULOCYTES # BLD AUTO: 0.02 K/UL (ref 0–0.04)
IMM GRANULOCYTES NFR BLD AUTO: 0.4 % (ref 0–0.5)
LYMPHOCYTES # BLD: 0.71 K/UL (ref 0.8–3.5)
LYMPHOCYTES NFR BLD: 13.7 % (ref 12–49)
MCH RBC QN AUTO: 29.6 PG (ref 26–34)
MCHC RBC AUTO-ENTMCNC: 31.5 G/DL (ref 30–36.5)
MCV RBC AUTO: 93.9 FL (ref 80–99)
MONOCYTES # BLD: 0.82 K/UL (ref 0–1)
MONOCYTES NFR BLD: 15.8 % (ref 5–13)
NEUTS SEG # BLD: 3.5 K/UL (ref 1.8–8)
NEUTS SEG NFR BLD: 67.2 % (ref 32–75)
NRBC # BLD: 0 K/UL (ref 0–0.01)
NRBC BLD-RTO: 0 PER 100 WBC
PLATELET # BLD AUTO: 139 K/UL (ref 150–400)
PMV BLD AUTO: 10.9 FL (ref 8.9–12.9)
POTASSIUM SERPL-SCNC: 4.6 MMOL/L (ref 3.5–5.1)
PROT SERPL-MCNC: 6.6 G/DL (ref 6.4–8.2)
RBC # BLD AUTO: 4.77 M/UL (ref 3.8–5.2)
RBC MORPH BLD: ABNORMAL
SODIUM SERPL-SCNC: 138 MMOL/L (ref 136–145)
WBC # BLD AUTO: 5.2 K/UL (ref 3.6–11)

## 2025-07-15 PROCEDURE — 3052F HG A1C>EQUAL 8.0%<EQUAL 9.0%: CPT | Performed by: FAMILY MEDICINE

## 2025-07-15 PROCEDURE — 3078F DIAST BP <80 MM HG: CPT | Performed by: FAMILY MEDICINE

## 2025-07-15 PROCEDURE — 1159F MED LIST DOCD IN RCRD: CPT | Performed by: FAMILY MEDICINE

## 2025-07-15 PROCEDURE — 1125F AMNT PAIN NOTED PAIN PRSNT: CPT | Performed by: FAMILY MEDICINE

## 2025-07-15 PROCEDURE — 93005 ELECTROCARDIOGRAM TRACING: CPT | Performed by: OTOLARYNGOLOGY

## 2025-07-15 PROCEDURE — 85025 COMPLETE CBC W/AUTO DIFF WBC: CPT

## 2025-07-15 PROCEDURE — 99214 OFFICE O/P EST MOD 30 MIN: CPT | Performed by: FAMILY MEDICINE

## 2025-07-15 PROCEDURE — 83036 HEMOGLOBIN GLYCOSYLATED A1C: CPT

## 2025-07-15 PROCEDURE — 80053 COMPREHEN METABOLIC PANEL: CPT

## 2025-07-15 PROCEDURE — G2211 COMPLEX E/M VISIT ADD ON: HCPCS | Performed by: FAMILY MEDICINE

## 2025-07-15 PROCEDURE — 1123F ACP DISCUSS/DSCN MKR DOCD: CPT | Performed by: FAMILY MEDICINE

## 2025-07-15 PROCEDURE — 3075F SYST BP GE 130 - 139MM HG: CPT | Performed by: FAMILY MEDICINE

## 2025-07-15 RX ORDER — OMEPRAZOLE 20 MG/1
CAPSULE, DELAYED RELEASE ORAL
COMMUNITY
Start: 2025-07-02

## 2025-07-15 RX ORDER — KETOROLAC TROMETHAMINE 30 MG/ML
1 INJECTION, SOLUTION INTRAMUSCULAR; INTRAVENOUS DAILY
Qty: 1 EACH | Refills: 0 | Status: SHIPPED | OUTPATIENT
Start: 2025-07-15

## 2025-07-15 RX ORDER — UBROGEPANT 100 MG/1
1 TABLET ORAL PRN
COMMUNITY

## 2025-07-15 RX ORDER — ACYCLOVIR 800 MG/1
TABLET ORAL
Qty: 6 EACH | Refills: 2 | Status: SHIPPED | OUTPATIENT
Start: 2025-07-15

## 2025-07-15 NOTE — ASSESSMENT & PLAN NOTE
Chronic, not at goal (unstable), continue current plan pending work up below    Orders:    Continuous Glucose Sensor (FREESTYLE ZACKERY 3 SENSOR) MISC; Apply every 2 weeks    Continuous Glucose  (FREESTYLE ZACKERY 3 READER) KAITLIN; 1 Device by Does not apply route daily

## 2025-07-15 NOTE — PROGRESS NOTES
Chief Complaint   Patient presents with    Follow-up     \"Have you been to the ER, urgent care clinic since your last visit?  Hospitalized since your last visit?\"    NO    “Have you seen or consulted any other health care providers outside of [facility] since your last visit?”    NO     Financial Resource Strain: Low Risk  (6/17/2024)    Overall Financial Resource Strain (CARDIA)     Difficulty of Paying Living Expenses: Not hard at all      Food Insecurity: No Food Insecurity (1/23/2025)    Hunger Vital Sign     Worried About Running Out of Food in the Last Year: Never true     Ran Out of Food in the Last Year: Never true            4/24/2025    10:50 AM   PHQ-9    Little interest or pleasure in doing things 0   Feeling down, depressed, or hopeless 0   Trouble falling or staying asleep, or sleeping too much 0   Feeling tired or having little energy 0   Poor appetite or overeating 0   Feeling bad about yourself - or that you are a failure or have let yourself or your family down 0   Trouble concentrating on things, such as reading the newspaper or watching television 0   Moving or speaking so slowly that other people could have noticed. Or the opposite - being so fidgety or restless that you have been moving around a lot more than usual 0   Thoughts that you would be better off dead, or of hurting yourself in some way 0   PHQ-2 Score 0   PHQ-9 Total Score 0   If you checked off any problems, how difficult have these problems made it for you to do your work, take care of things at home, or get along with other people? 0       Health Maintenance Due   Topic Date Due    Shingles vaccine (1 of 2) Never done    Respiratory Syncytial Virus (RSV) Pregnant or age 60 yrs+ (1 - Risk 60-74 years 1-dose series) Never done    Diabetic retinal exam  03/28/2020    Diabetic foot exam  07/05/2020    DTaP/Tdap/Td vaccine (2 - Td or Tdap) 06/16/2024    Lung Cancer Screening &/or Counseling  03/09/2025    COVID-19 
Patient Name: Jamila Elizabeth   MRN: [id number]    ASSESSMENT AND PLAN  Jamila Elizabeth is a 70 y.o. female who presents today for:    Assessment & Plan  Type 2 diabetes mellitus without complication, without long-term current use of insulin (HCC)   Chronic, not at goal (unstable), continue current plan pending work up below    Orders:    Continuous Glucose Sensor (FREESTYLE ZACKERY 3 SENSOR) MISC; Apply every 2 weeks    Continuous Glucose  (FREESTYLE ZACKERY 3 READER) KAITLIN; 1 Device by Does not apply route daily    Hypothyroidism, unspecified type   Chronic, at goal (stable), continue current plan pending work up below    Orders:    TSH + Free T4 Panel; Future    Chronic migraine w/o aura w/o status migrainosus, not intractable   Monitored by specialist- no acute findings meriting change in the plan         Weight loss  Somewhat anticipated due to the addition of Jardiance.  Will continue to monitor.           Orders Placed This Encounter   Medications    Continuous Glucose Sensor (FREESTYLE ZACKERY 3 SENSOR) MISC     Sig: Apply every 2 weeks     Dispense:  6 each     Refill:  2    Continuous Glucose  (FREESTYLE ZACKERY 3 READER) KAITLIN     Si Device by Does not apply route daily     Dispense:  1 each     Refill:  0        Medications Discontinued During This Encounter   Medication Reason    lidocaine (LIDODERM) 5 % ERROR       Return in about 3 months (around 10/15/2025) for DM.      SUBJECTIVE  Jamila Elizabeth is a 70 y.o. female who presents with the following:     Patient has upcoming appointment with ENT for excision of left nasal carcinoma.  She previously has been cleared by cardiology.  Completed preadmission testing today.  Since last visit, she has lost about 17 pounds somewhat unintentionally.  We did add Jardiance as her A1c had increased to 9.0 on maximum dose metformin.  Interested in continuous glucose monitor.  We also had increased her levothyroxine dose.  Now seeing a new neurologist at [facility] for 
[id number] Detailed

## 2025-07-15 NOTE — PERIOP NOTE
not allow pets to sleep in your bed with you or touch your surgical wound.  Do not smoke - smoking delays wound healing. This may be a good time to stop smoking.  If you have diabetes, it is important for you to manage your blood sugar levels properly before your surgery as well as after your surgery. Poorly managed blood sugar levels slow down wound healing and prevent you from healing completely.     Follow all instructions so your surgery won’t be cancelled.  Please, be on time.                    If a situation occurs and you are delayed the day of surgery, call [phone]/ [phone].    If your physical condition changes (like a fever, cold, flu, etc.) call your surgeon as soon as possible.    The patient was contacted in person.     She verbalized understanding of all instructions does not need reinforcement.

## 2025-07-22 RX ORDER — PROCHLORPERAZINE EDISYLATE 5 MG/ML
5 INJECTION INTRAMUSCULAR; INTRAVENOUS
Status: CANCELLED | OUTPATIENT
Start: 2025-07-22

## 2025-07-22 RX ORDER — SODIUM CHLORIDE 0.9 % (FLUSH) 0.9 %
5-40 SYRINGE (ML) INJECTION EVERY 12 HOURS SCHEDULED
Status: CANCELLED | OUTPATIENT
Start: 2025-07-22

## 2025-07-22 RX ORDER — FENTANYL CITRATE 50 UG/ML
25 INJECTION, SOLUTION INTRAMUSCULAR; INTRAVENOUS EVERY 5 MIN PRN
Refills: 0 | Status: CANCELLED | OUTPATIENT
Start: 2025-07-22

## 2025-07-22 RX ORDER — HYDROMORPHONE HYDROCHLORIDE 1 MG/ML
0.5 INJECTION, SOLUTION INTRAMUSCULAR; INTRAVENOUS; SUBCUTANEOUS EVERY 5 MIN PRN
Refills: 0 | Status: CANCELLED | OUTPATIENT
Start: 2025-07-22

## 2025-07-22 RX ORDER — OXYCODONE HYDROCHLORIDE 5 MG/1
5 TABLET ORAL
Refills: 0 | Status: CANCELLED | OUTPATIENT
Start: 2025-07-22

## 2025-07-22 RX ORDER — HYDRALAZINE HYDROCHLORIDE 20 MG/ML
10 INJECTION INTRAMUSCULAR; INTRAVENOUS ONCE
Status: CANCELLED | OUTPATIENT
Start: 2025-07-22 | End: 2025-07-22

## 2025-07-22 RX ORDER — SODIUM CHLORIDE 0.9 % (FLUSH) 0.9 %
5-40 SYRINGE (ML) INJECTION PRN
Status: CANCELLED | OUTPATIENT
Start: 2025-07-22

## 2025-07-22 RX ORDER — SODIUM CHLORIDE 9 MG/ML
INJECTION, SOLUTION INTRAVENOUS PRN
Status: CANCELLED | OUTPATIENT
Start: 2025-07-22

## 2025-07-22 RX ORDER — ONDANSETRON 2 MG/ML
4 INJECTION INTRAMUSCULAR; INTRAVENOUS
Status: CANCELLED | OUTPATIENT
Start: 2025-07-22

## 2025-07-23 ENCOUNTER — HOSPITAL ENCOUNTER (OUTPATIENT)
Facility: HOSPITAL | Age: 70
Setting detail: OUTPATIENT SURGERY
Discharge: HOME | End: 2025-07-23
Attending: OTOLARYNGOLOGY | Admitting: OTOLARYNGOLOGY
Payer: MEDICARE

## 2025-07-23 ENCOUNTER — ANESTHESIA (OUTPATIENT)
Facility: HOSPITAL | Age: 70
End: 2025-07-23
Payer: MEDICARE

## 2025-07-23 ENCOUNTER — ANESTHESIA EVENT (OUTPATIENT)
Facility: HOSPITAL | Age: 70
End: 2025-07-23
Payer: MEDICARE

## 2025-07-23 VITALS
RESPIRATION RATE: 13 BRPM | HEIGHT: 72 IN | BODY MASS INDEX: 21.67 KG/M2 | DIASTOLIC BLOOD PRESSURE: 60 MMHG | WEIGHT: 160 LBS | HEART RATE: 73 BPM | SYSTOLIC BLOOD PRESSURE: 147 MMHG | OXYGEN SATURATION: 100 % | TEMPERATURE: 97.1 F

## 2025-07-23 DIAGNOSIS — G89.18 POST-OP PAIN: Primary | ICD-10-CM

## 2025-07-23 LAB
GLUCOSE BLD STRIP.AUTO-MCNC: 103 MG/DL (ref 65–117)
GLUCOSE BLD STRIP.AUTO-MCNC: 116 MG/DL (ref 65–117)
SERVICE CMNT-IMP: NORMAL
SERVICE CMNT-IMP: NORMAL

## 2025-07-23 PROCEDURE — 88305 TISSUE EXAM BY PATHOLOGIST: CPT

## 2025-07-23 PROCEDURE — 3700000000 HC ANESTHESIA ATTENDED CARE: Performed by: OTOLARYNGOLOGY

## 2025-07-23 PROCEDURE — 82962 GLUCOSE BLOOD TEST: CPT

## 2025-07-23 PROCEDURE — 3600000002 HC SURGERY LEVEL 2 BASE: Performed by: OTOLARYNGOLOGY

## 2025-07-23 PROCEDURE — 6370000000 HC RX 637 (ALT 250 FOR IP): Performed by: OTOLARYNGOLOGY

## 2025-07-23 PROCEDURE — 3600000012 HC SURGERY LEVEL 2 ADDTL 15MIN: Performed by: OTOLARYNGOLOGY

## 2025-07-23 PROCEDURE — 7100000001 HC PACU RECOVERY - ADDTL 15 MIN: Performed by: OTOLARYNGOLOGY

## 2025-07-23 PROCEDURE — 7100000000 HC PACU RECOVERY - FIRST 15 MIN: Performed by: OTOLARYNGOLOGY

## 2025-07-23 PROCEDURE — 3700000001 HC ADD 15 MINUTES (ANESTHESIA): Performed by: OTOLARYNGOLOGY

## 2025-07-23 PROCEDURE — 6360000002 HC RX W HCPCS: Performed by: OTOLARYNGOLOGY

## 2025-07-23 PROCEDURE — 2709999900 HC NON-CHARGEABLE SUPPLY: Performed by: OTOLARYNGOLOGY

## 2025-07-23 PROCEDURE — 6360000002 HC RX W HCPCS: Performed by: NURSE ANESTHETIST, CERTIFIED REGISTERED

## 2025-07-23 PROCEDURE — 2580000003 HC RX 258: Performed by: NURSE ANESTHETIST, CERTIFIED REGISTERED

## 2025-07-23 PROCEDURE — 2500000003 HC RX 250 WO HCPCS: Performed by: NURSE ANESTHETIST, CERTIFIED REGISTERED

## 2025-07-23 PROCEDURE — 88331 PATH CONSLTJ SURG 1 BLK 1SPC: CPT

## 2025-07-23 RX ORDER — HYDROCODONE BITARTRATE AND ACETAMINOPHEN 5; 325 MG/1; MG/1
1 TABLET ORAL EVERY 6 HOURS PRN
Qty: 12 TABLET | Refills: 0 | Status: SHIPPED | OUTPATIENT
Start: 2025-07-23 | End: 2025-07-26

## 2025-07-23 RX ORDER — LIDOCAINE HYDROCHLORIDE 20 MG/ML
INJECTION, SOLUTION EPIDURAL; INFILTRATION; INTRACAUDAL; PERINEURAL
Status: DISCONTINUED | OUTPATIENT
Start: 2025-07-23 | End: 2025-07-23 | Stop reason: SDUPTHER

## 2025-07-23 RX ORDER — LIDOCAINE HYDROCHLORIDE 10 MG/ML
1 INJECTION, SOLUTION EPIDURAL; INFILTRATION; INTRACAUDAL; PERINEURAL
Status: DISCONTINUED | OUTPATIENT
Start: 2025-07-23 | End: 2025-07-23 | Stop reason: HOSPADM

## 2025-07-23 RX ORDER — OXYMETAZOLINE HYDROCHLORIDE 0.05 G/100ML
SPRAY NASAL PRN
Status: DISCONTINUED | OUTPATIENT
Start: 2025-07-23 | End: 2025-07-23 | Stop reason: HOSPADM

## 2025-07-23 RX ORDER — CLINDAMYCIN HYDROCHLORIDE 300 MG/1
300 CAPSULE ORAL 3 TIMES DAILY
Qty: 30 CAPSULE | Refills: 0 | Status: SHIPPED | OUTPATIENT
Start: 2025-07-23 | End: 2025-08-02

## 2025-07-23 RX ORDER — SODIUM CHLORIDE 0.9 % (FLUSH) 0.9 %
5-40 SYRINGE (ML) INJECTION PRN
Status: DISCONTINUED | OUTPATIENT
Start: 2025-07-23 | End: 2025-07-23 | Stop reason: HOSPADM

## 2025-07-23 RX ORDER — MIDAZOLAM HYDROCHLORIDE 2 MG/2ML
2 INJECTION, SOLUTION INTRAMUSCULAR; INTRAVENOUS PRN
Status: DISCONTINUED | OUTPATIENT
Start: 2025-07-23 | End: 2025-07-23 | Stop reason: HOSPADM

## 2025-07-23 RX ORDER — ACETAMINOPHEN 500 MG
1000 TABLET ORAL ONCE
Status: DISCONTINUED | OUTPATIENT
Start: 2025-07-23 | End: 2025-07-23 | Stop reason: ALTCHOICE

## 2025-07-23 RX ORDER — ONDANSETRON 2 MG/ML
INJECTION INTRAMUSCULAR; INTRAVENOUS
Status: DISCONTINUED | OUTPATIENT
Start: 2025-07-23 | End: 2025-07-23 | Stop reason: SDUPTHER

## 2025-07-23 RX ORDER — MIDAZOLAM HYDROCHLORIDE 1 MG/ML
INJECTION, SOLUTION INTRAMUSCULAR; INTRAVENOUS
Status: DISCONTINUED | OUTPATIENT
Start: 2025-07-23 | End: 2025-07-23 | Stop reason: SDUPTHER

## 2025-07-23 RX ORDER — PROPOFOL 10 MG/ML
INJECTION, EMULSION INTRAVENOUS
Status: DISCONTINUED | OUTPATIENT
Start: 2025-07-23 | End: 2025-07-23 | Stop reason: SDUPTHER

## 2025-07-23 RX ORDER — SODIUM CHLORIDE, SODIUM LACTATE, POTASSIUM CHLORIDE, CALCIUM CHLORIDE 600; 310; 30; 20 MG/100ML; MG/100ML; MG/100ML; MG/100ML
INJECTION, SOLUTION INTRAVENOUS
Status: DISCONTINUED | OUTPATIENT
Start: 2025-07-23 | End: 2025-07-23 | Stop reason: SDUPTHER

## 2025-07-23 RX ORDER — SODIUM CHLORIDE 9 MG/ML
INJECTION, SOLUTION INTRAVENOUS PRN
Status: DISCONTINUED | OUTPATIENT
Start: 2025-07-23 | End: 2025-07-23 | Stop reason: HOSPADM

## 2025-07-23 RX ORDER — SUCCINYLCHOLINE/SOD CL,ISO/PF 200MG/10ML
SYRINGE (ML) INTRAVENOUS
Status: DISCONTINUED | OUTPATIENT
Start: 2025-07-23 | End: 2025-07-23 | Stop reason: SDUPTHER

## 2025-07-23 RX ORDER — SODIUM CHLORIDE, SODIUM LACTATE, POTASSIUM CHLORIDE, CALCIUM CHLORIDE 600; 310; 30; 20 MG/100ML; MG/100ML; MG/100ML; MG/100ML
INJECTION, SOLUTION INTRAVENOUS CONTINUOUS
Status: DISCONTINUED | OUTPATIENT
Start: 2025-07-23 | End: 2025-07-23 | Stop reason: HOSPADM

## 2025-07-23 RX ORDER — HYDROCODONE BITARTRATE AND ACETAMINOPHEN 5; 325 MG/1; MG/1
1 TABLET ORAL ONCE
Refills: 0 | Status: COMPLETED | OUTPATIENT
Start: 2025-07-23 | End: 2025-07-23

## 2025-07-23 RX ORDER — SODIUM CHLORIDE 0.9 % (FLUSH) 0.9 %
5-40 SYRINGE (ML) INJECTION EVERY 12 HOURS SCHEDULED
Status: DISCONTINUED | OUTPATIENT
Start: 2025-07-23 | End: 2025-07-23 | Stop reason: HOSPADM

## 2025-07-23 RX ORDER — LIDOCAINE HYDROCHLORIDE AND EPINEPHRINE 10; 10 MG/ML; UG/ML
INJECTION, SOLUTION INFILTRATION; PERINEURAL PRN
Status: DISCONTINUED | OUTPATIENT
Start: 2025-07-23 | End: 2025-07-23 | Stop reason: HOSPADM

## 2025-07-23 RX ORDER — FENTANYL CITRATE 50 UG/ML
INJECTION, SOLUTION INTRAMUSCULAR; INTRAVENOUS
Status: DISCONTINUED | OUTPATIENT
Start: 2025-07-23 | End: 2025-07-23 | Stop reason: SDUPTHER

## 2025-07-23 RX ORDER — DEXAMETHASONE SODIUM PHOSPHATE 4 MG/ML
INJECTION, SOLUTION INTRA-ARTICULAR; INTRALESIONAL; INTRAMUSCULAR; INTRAVENOUS; SOFT TISSUE
Status: DISCONTINUED | OUTPATIENT
Start: 2025-07-23 | End: 2025-07-23 | Stop reason: SDUPTHER

## 2025-07-23 RX ORDER — PHENYLEPHRINE HCL IN 0.9% NACL 0.4MG/10ML
SYRINGE (ML) INTRAVENOUS
Status: DISCONTINUED | OUTPATIENT
Start: 2025-07-23 | End: 2025-07-23 | Stop reason: SDUPTHER

## 2025-07-23 RX ORDER — FENTANYL CITRATE 50 UG/ML
100 INJECTION, SOLUTION INTRAMUSCULAR; INTRAVENOUS
Status: DISCONTINUED | OUTPATIENT
Start: 2025-07-23 | End: 2025-07-23 | Stop reason: HOSPADM

## 2025-07-23 RX ADMIN — Medication 40 MCG: at 09:38

## 2025-07-23 RX ADMIN — FENTANYL CITRATE 25 MCG: 50 INJECTION INTRAMUSCULAR; INTRAVENOUS at 09:27

## 2025-07-23 RX ADMIN — PROPOFOL 150 MG: 10 INJECTION, EMULSION INTRAVENOUS at 09:15

## 2025-07-23 RX ADMIN — PHENYLEPHRINE HYDROCHLORIDE 40 MCG/MIN: 10 INJECTION INTRAVENOUS at 09:34

## 2025-07-23 RX ADMIN — DEXAMETHASONE SODIUM PHOSPHATE 4 MG: 4 INJECTION INTRA-ARTICULAR; INTRALESIONAL; INTRAMUSCULAR; INTRAVENOUS; SOFT TISSUE at 09:24

## 2025-07-23 RX ADMIN — Medication 40 MCG: at 09:40

## 2025-07-23 RX ADMIN — Medication 80 MCG: at 09:30

## 2025-07-23 RX ADMIN — LIDOCAINE HYDROCHLORIDE 60 MG: 20 INJECTION, SOLUTION EPIDURAL; INFILTRATION; INTRACAUDAL; PERINEURAL at 09:15

## 2025-07-23 RX ADMIN — Medication 120 MG: at 09:16

## 2025-07-23 RX ADMIN — HYDROCODONE BITARTRATE AND ACETAMINOPHEN 1 TABLET: 5; 325 TABLET ORAL at 11:00

## 2025-07-23 RX ADMIN — SODIUM CHLORIDE, POTASSIUM CHLORIDE, SODIUM LACTATE AND CALCIUM CHLORIDE: 600; 310; 30; 20 INJECTION, SOLUTION INTRAVENOUS at 09:02

## 2025-07-23 RX ADMIN — ONDANSETRON 4 MG: 2 INJECTION, SOLUTION INTRAMUSCULAR; INTRAVENOUS at 09:56

## 2025-07-23 RX ADMIN — FENTANYL CITRATE 50 MCG: 50 INJECTION INTRAMUSCULAR; INTRAVENOUS at 09:15

## 2025-07-23 RX ADMIN — Medication 80 MCG: at 09:32

## 2025-07-23 RX ADMIN — MIDAZOLAM 2 MG: 1 INJECTION INTRAMUSCULAR; INTRAVENOUS at 09:02

## 2025-07-23 RX ADMIN — Medication 40 MCG: at 09:33

## 2025-07-23 RX ADMIN — Medication 80 MCG: at 09:47

## 2025-07-23 RX ADMIN — CLINDAMYCIN PHOSPHATE 900 MG: 150 INJECTION, SOLUTION INTRAVENOUS at 09:22

## 2025-07-23 ASSESSMENT — PAIN - FUNCTIONAL ASSESSMENT: PAIN_FUNCTIONAL_ASSESSMENT: 0-10

## 2025-07-23 ASSESSMENT — PAIN SCALES - GENERAL
PAINLEVEL_OUTOF10: 0
PAINLEVEL_OUTOF10: 6

## 2025-07-23 NOTE — BRIEF OP NOTE
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 [C44.321]    Post-Op Diagnosis: Basal cell carcinoma left nasal cavity       Procedure(s):  EXCISION LEFT NASAL CARCINOMA WITH LOCAL TISSUE REARRANGEMENT FOR CLOSURE    Surgeon(s):  Diego Bolanos MD    Assistant:  * No surgical staff found *    Anesthesia: General    Estimated Blood Loss (mL): Minimal    Complications: None    Specimens:   ID Type Source Tests Collected by Time Destination   1 : LEFT NOSTRIL CARCINOMA Tissue Nose SURGICAL PATHOLOGY Diego Bolanos MD 7/23/2025 0937        Implants:  * No implants in log *      Drains: * No LDAs found *    Findings:  Present At Time Of Surgery (PATOS) (choose all levels that have infection present):  No infection present  Other Findings: frozen confirmed basal cell carcinoma and that margins was negative.  No squamous cell carcinoma in the specimen  This procedure was not performed to treat primary cutaneous melanoma through wide local excision    Electronically signed by Diego Bolanos MD on 7/23/2025 at 10:13 AM

## 2025-07-23 NOTE — H&P
OTOLARYNGOLOGY - HEAD AND NECK SURGERY HISTORY AND PHYSICAL    CC:   Nasal cancer    HPI:     Jamila Elizabeth is a 70 y.o. female with left nasal squamous cell carcinoma presenting for excision and repair.  She feels well today.    Past Medical History:   Diagnosis Date    Aortic regurgitation     Arrhythmia     Dr. Atkinson    Arthritis     Benign cyst of left breast     Cervical spinal stenosis     Moderate C6-7    Depression     Diabetes mellitus type 2, controlled (HCC)     Endometriosis     History of blood transfusion     Hypercholesterolemia     Hypertension     Hypothyroid     Incontinence     Migraine     MS (multiple sclerosis) (HCC)     Myasthenia gravis (HCC)     Palpitations     Sleep apnea     NO CPAP- \"I THINK ITS RESOLVED.\"    Squamous cell cancer of skin of ala nasi     Stroke (HCC) 2018    x2 with residual right sided weakness     Past Surgical History:   Procedure Laterality Date    APPENDECTOMY  2022    CARPAL TUNNEL RELEASE Left     CATARACT REMOVAL Bilateral      SECTION  1986    COLONOSCOPY      COLONOSCOPY N/A 2025    COLONOSCOPY, ESOPHAGOGASTRODUODENOSCOPY performed by Dusty Pack MD at hospitals ENDOSCOPY    HEENT      removal of thyroglossal duct cyst    IR REMOVE TUNNELED CVAD WO SQ PORT/PUMP  2022    KNEE ARTHROPLASTY Right     KNEE ARTHROSCOPY Right 1998    GA UNLISTED PROCEDURE ABDOMEN PERITONEUM & OMENTUM  2001    bowel resection    SMALL INTESTINE SURGERY      THUMB ARTHROSCOPY Right     thumb tendon repair x 2    THYROIDECTOMY  1974    TOTAL HIP ARTHROPLASTY Bilateral     BOTH HIPS REPLACED    UPPER GASTROINTESTINAL ENDOSCOPY N/A 2025    ESOPHAGOGASTRODUODENOSCOPY performed by Dusty Pack MD at hospitals ENDOSCOPY    US BREAST FINE NEEDLE ASPIRATION N/A     Benign cyst aspiration.  Pt cannot remember which breast.  30 + years ago.    VASCULAR SURGERY  2006    PORT -A- CATH X 2- FOR IVIG     No current facility-administered medications

## 2025-07-23 NOTE — ANESTHESIA PRE PROCEDURE
Department of Anesthesiology  Preprocedure Note       Name:  Jamila FITCH Minor   Age:  70 y.o.  :  1955                                          MRN:  [id number]         Date:  2025      Surgeon: Surgeon(s):  Diego Bolanos MD    Procedure: Procedure(s):  EXCISION LEFT NASAL CARCINOMA WITH LOCAL TISSUE REARRANGEMENT FOR CLOSURE    Medications prior to admission:   Prior to Admission medications    Medication Sig Start Date End Date Taking? Authorizing Provider   clindamycin (CLEOCIN) 300 MG capsule Take 1 capsule by mouth 3 times daily for 10 days 25 Yes Diego Bolanos MD   HYDROcodone-acetaminophen (NORCO) 5-325 MG per tablet Take 1 tablet by mouth every 6 hours as needed for Pain for up to 3 days. Intended supply: 3 days. Take lowest dose possible to manage pain Max Daily Amount: 4 tablets 25 Yes Diego Bloanos MD   Diclofenac Sodium 100 MG TB24 TAKE ONE TAB BY MOUTH EVERY MORNING WITH FOOD 23  Yes Mary Carmen Alejandro MD   Ubrogepant (UBRELVY) 100 MG TABS Take 1 tablet by mouth as needed CURRENTLY NO TAKING. WAITING FOR INSURANCE TO APPROVE.  Patient not taking: Reported on 2025    Mary Carmen Alejandro MD   Galcanezumab-gnlm (EMGALITY SC) Inject into the skin CURRENTLY NO TAKING. WAITING FOR INSURANCE TO APPROVE.  Patient not taking: Reported on 2025    Mary Carmen Alejandro MD   omeprazole (PRILOSEC) 20 MG delayed release capsule TAKE 1 CAPSULE BY MOUTH EVERY DAY BEFORE BREAKFAST 25   Mary Carmen Alejandro MD   Continuous Glucose Sensor (FREESTYLE ZACKERY 3 SENSOR) MISC Apply every 2 weeks 7/15/25   Steffen Reyes MD   Continuous Glucose  (FREESTYLE ZACKERY 3 READER) KAITLIN 1 Device by Does not apply route daily 7/15/25   Steffen Reyes MD   ondansetron (ZOFRAN) 4 MG tablet TAKE 1 TABLET BY MOUTH EVERY 8 HOURS AS NEEDED FOR NAUSEA AND VOMITING 25   Chiki Valverde APRN - CNP   empagliflozin (JARDIANCE) 10 MG tablet Take 1 tablet by

## 2025-07-23 NOTE — ANESTHESIA POSTPROCEDURE EVALUATION
Post-Anesthesia Evaluation and Assessment    Patient: Jamila Elizabeth MRN: [id number]  SSN: [id number]    YOB: 1955  Age: 70 y.o.  Sex: female      I have evaluated the patient and they are stable and ready for discharge from the PACU.     Cardiovascular Function/Vital Signs  Visit Vitals  BP (!) 147/60   Pulse 73   Temp 97.1 °F (36.2 °C) (Infrared)   Resp 13   Ht 1.829 m (6')   Wt 72.6 kg (160 lb)   SpO2 100%   BMI 21.70 kg/m²       Patient is status post General anesthesia for Procedure(s):  EXCISION LEFT NASAL CARCINOMA WITH LOCAL TISSUE REARRANGEMENT FOR CLOSURE.    Nausea/Vomiting: None    Postoperative hydration reviewed and adequate.    Pain:  Managed    Neurological Status:   At baseline    Mental Status, Level of Consciousness: Alert and  oriented to person, place, and time    Pulmonary Status:   Adequate oxygenation and airway patent    Complications related to anesthesia: None    Post-anesthesia assessment completed. No concerns    Signed By: Alonso Gates MD     July 23, 2025

## 2025-07-23 NOTE — OP NOTE
[address]  [address]                            OPERATIVE REPORT      PATIENT NAME: MORELIA CUMMINGS                : 1955  MED REC NO: [id number]                       ROOM: [facility]  ACCOUNT NO: [id number]                       ADMIT DATE: 2025  PROVIDER: Diego Bolanos MD    DATE OF SERVICE:  2025    PREOPERATIVE DIAGNOSES:  Cutaneous carcinoma, left nasal sill 1 x 1 cm.    POSTOPERATIVE DIAGNOSES:       1. Basal cell carcinoma, left nasal sill 1 x 1 cm.     2. Complex defect of left nasal sill/nasal cavity measuring 1 x 1 cm.    PROCEDURES PERFORMED:       1. Excision of left nasal sill basal cell carcinoma, 1 x 1 cm.     2. Local tissue rearrangement for closure of complex defect, left nasal cavity, 1 x 1 cm.    SURGEON:  Diego Bolanos MD    ASSISTANT:  None.    ANESTHESIA:  General endotracheal.    ESTIMATED BLOOD LOSS:  5 mL.    SPECIMENS REMOVED:  Left nasal cavity carcinoma stitch superior 12 o'clock.    INTRAOPERATIVE FINDINGS:       1. There is a carcinoma centered on the left nasal sill extending up onto the columella, which was completely excised.  Frozen section confirmed this was basal cell carcinoma with negative margins.     2. The complex defect was closed with bilateral advancement flaps extending from the septum moving anteriorly and from the nasal floor moving laterally to medially.     COMPLICATIONS:  None.    IMPLANTS:  None.    INDICATIONS:  The patient is a 70-year-old female with a history of an ulcerating area in the left nasal cavity.  Biopsy in the office showed that there was a carcinoma, suspicious for either squamous cell or basal cell carcinoma.  She presents for excision and reconstruction of the area.    DESCRIPTION OF PROCEDURE:  The patient was identified in the preop holding area, then brought to the operating room where she was placed in supine position.  General anesthesia was induced

## 2025-07-23 NOTE — DISCHARGE INSTRUCTIONS
POST-OPERATIVE CARE FOLLOWING  Mohs Reconstruction       Call my office promptly if:   ? The area becomes red, develops purulent (pus) drainage, or becomes warm to the touch.   ? The area suddenly swells, becomes very painful, or develops dark purple color.       If any of the above should occur after regular office hours, the on-call otolaryngologist can be reached via the  at our clinic's number.    If you have severe bleeding, chest pain, shortness of breath or other medical emergency, call 911 and go to the nearest hospital.      Care of the Surgical Site   ? The main goal of postoperative care is to keep the surgical incisions moist with an emollient (Bacitracin*, Vaseline, or Aquaphor) and prevent drying or crust formation. The following instructions tell you how to do this.         ? If any bleeding occurs, apply firm pressure for 5-10 minutes.               ? You may get the area wet.   You may clean with diluted hydrogen peroxide (50:50 with water) and apply antibiotic ointment (Bacitracin) with Q-tips 4-5 times a day for 7 days.  Do not allow the incision to dry out in between applications of ointment.  After 7 days, you may switch from antibiotic ointment to Vaseline or Aquaphor.               ? Do not allow drying or crust formation. Keep the site moist with the above procedure.       ? Do not wear make-up until sutures are removed or a new, thin layer of skin covers the area (usually 7-10 days).        *Bacitracin is an antibiotic ointment that may be purchased from the drug store.  We prefer this over any combination ointment that contains Neosporin or Polysporin.         ACTIVITY:  No heavy lifting (10 pounds or more) for the next 2 weeks.  No vigorous exercise, either.      DIET:  Resume your normal diet.      MEDICATIONS:  Resume all of your home medications except for the ones I asked you to stop before surgery.  Generally these medications include things like aspirin, coumadin, Plavix, or

## 2025-08-04 DIAGNOSIS — R11.0 NAUSEA: ICD-10-CM

## 2025-08-04 RX ORDER — ONDANSETRON 4 MG/1
4 TABLET, FILM COATED ORAL EVERY 8 HOURS PRN
Qty: 20 TABLET | Refills: 0 | Status: SHIPPED | OUTPATIENT
Start: 2025-08-04

## (undated) DEVICE — SUTURE VCRL SZ 2-0 L27IN ABSRB UD L26MM SH 1/2 CIR J417H

## (undated) DEVICE — ADHESIVE SKIN CLOSURE 4X22 CM PREMIERPRO EXOFINFUSION DISP

## (undated) DEVICE — SOLUTION IRRIG 3000ML 0.9% SOD CHL FLX CONT 0797208] ICU MEDICAL INC]

## (undated) DEVICE — GARMENT,MEDLINE,DVT,INT,CALF,MED, GEN2: Brand: MEDLINE

## (undated) DEVICE — SUTURE VCRL SZ 3-0 L27IN ABSRB UD L26MM SH 1/2 CIR J416H

## (undated) DEVICE — Z DISCONTINUED USE 2744636  DRESSING AQUACEL 14 IN ALG W3.5XL14IN POLYUR FLM CVR W/ HYDRCOLL

## (undated) DEVICE — SUTURE PERMAHAND SZ 2-0 L18IN NONABSORBABLE BLK L26MM PS 1588H

## (undated) DEVICE — BANDAGE COMPR M W6INXL10YD WHT BGE VELC E MTRX HK AND LOOP

## (undated) DEVICE — STRIP,CLOSURE,WOUND,MEDI-STRIP,1/2X4: Brand: MEDLINE

## (undated) DEVICE — SOLUTION IRRIG 1000ML H2O STRL BLT

## (undated) DEVICE — SOLUTION IRRIG 3000ML 0.9% SOD CHL USP UROMATIC PLAS CONT

## (undated) DEVICE — PADDING CAST SPEC 6INX4YD COT --

## (undated) DEVICE — TROCAR ENDOSCP L100MM DIA12MM BLDELSS OBT RADLUC STBL SL

## (undated) DEVICE — SYRINGE MED 10ML TRNSLUC BRL PLUNG BLK MRK POLYPR CTRL

## (undated) DEVICE — MINOR BASIN -SMH: Brand: MEDLINE INDUSTRIES, INC.

## (undated) DEVICE — CORD ES L12FT BPLR FRCP

## (undated) DEVICE — SUTURE MCRYL SZ 2-0 L36IN ABSRB UD L36MM CT-1 1/2 CIR Y945H

## (undated) DEVICE — T4 HOOD

## (undated) DEVICE — STERILE POLYISOPRENE POWDER-FREE SURGICAL GLOVES: Brand: PROTEXIS

## (undated) DEVICE — COVIDIEN KENDALL DL DISPOSABLE 3 LEAD SY: Brand: MEDLINE RENEWAL

## (undated) DEVICE — Device

## (undated) DEVICE — DRAPE,U/ SHT,SPLIT,PLAS,STERIL: Brand: MEDLINE

## (undated) DEVICE — SCRUB DRY SURG EZ SCRUB BRUSH PREOPERATIVE GRN

## (undated) DEVICE — INTENT OT USE PROVIDES A STERILE INTERFACE BETWEEN THE OPERATING ROOM SURGICAL LAMPS (NON-STERILE) AND THE SURGEON OR STAFF WORKING IN THE STERILE FIELD.: Brand: ASPEN® ALC PLUS LIGHT HANDLE COVER

## (undated) DEVICE — STERILE POLYISOPRENE POWDER-FREE SURGICAL GLOVES WITH EMOLLIENT COATING: Brand: PROTEXIS

## (undated) DEVICE — DERMABOND SKIN ADH 0.7ML -- DERMABOND ADVANCED 12/BX

## (undated) DEVICE — SYR 10ML CTRL LR LCK NSAF LF --

## (undated) DEVICE — Z DISCONTINUED GLOVE SURG SZ 7 L12IN FNGR THK13MIL WHT ISOLEX POLYISOPRENE

## (undated) DEVICE — 4-PORT MANIFOLD: Brand: NEPTUNE 2

## (undated) DEVICE — SUTURE ABSORBABLE BRAIDED 2-0 CT-1 27 IN UD VICRYL J259H

## (undated) DEVICE — DRAPE XR C ARM 41X74IN LF --

## (undated) DEVICE — BLADE SAW W073XL276IN THK0031IN CUT THK0036IN REPL SAG

## (undated) DEVICE — BLUNTFILL: Brand: MONOJECT

## (undated) DEVICE — SYSTEM EVAC SMOKE LAPARSCOPIC

## (undated) DEVICE — Z CONVERTED USE 2271043 CONTAINER SPEC COLL 4OZ SCR ON LID PEEL PCH

## (undated) DEVICE — SUT SLK 2-0SH 30IN BLK --

## (undated) DEVICE — GLOVE SURG SZ 65 L12IN FNGR THK94MIL STD WHT LTX FREE

## (undated) DEVICE — INSULATED BLADE ELECTRODE: Brand: EDGE

## (undated) DEVICE — SOLUTION IV 50ML 0.9% SOD CHL

## (undated) DEVICE — SUTURE VCRL SZ 2-0 L36IN ABSRB UD L40MM CT 1/2 CIR J957H

## (undated) DEVICE — ELECTRODE PT RET AD L9FT HI MOIST COND ADH HYDRGEL CORDED

## (undated) DEVICE — SUTURE VCRL 0 L27IN ABSRB UD CT L40MM 1/2 CIR TAPERPOINT J280H

## (undated) DEVICE — TUBING, SUCTION, 1/4" X 12', STRAIGHT: Brand: MEDLINE

## (undated) DEVICE — SYR LR LCK 1ML GRAD NSAF 30ML --

## (undated) DEVICE — IV START KIT: Brand: MEDLINE

## (undated) DEVICE — SUTURE PDS II SZ 0 L27IN ABSRB VLT L26MM CT-2 1/2 CIR Z334H

## (undated) DEVICE — DRAPE,EENT,SPLIT,STERILE: Brand: MEDLINE

## (undated) DEVICE — 6619 2 PTNT ISO SYS INCISE AREA&LT;(&GT;&&LT;)&GT;P: Brand: STERI-DRAPE™ IOBAN™ 2

## (undated) DEVICE — GENERAL LAPAROSCOPY - SMH: Brand: MEDLINE INDUSTRIES, INC.

## (undated) DEVICE — CATHETER IV 20GA L1.16IN OD1.0414-1.1176MM ID0.762-0.8382MM

## (undated) DEVICE — INFECTION CONTROL KIT SYS

## (undated) DEVICE — REM POLYHESIVE ADULT PATIENT RETURN ELECTRODE: Brand: VALLEYLAB

## (undated) DEVICE — SUTURE MCRYL SZ 4-0 L27IN ABSRB UD L19MM PS-2 1/2 CIR PRIM Y426H

## (undated) DEVICE — GLOVE ORANGE PI 7   MSG9070

## (undated) DEVICE — SUTURE VCRL SZ 2 L54IN ABSRB UD L65MM TP-1 1/2 CIR J880T

## (undated) DEVICE — TOTAL TRAY, 16FR 10ML SIL FOLEY, URN: Brand: MEDLINE

## (undated) DEVICE — HEADLESS TROCHAR PIN 75MM: Brand: ZUK

## (undated) DEVICE — DRSG AQUACEL SURG 3.5 X 10IN -- CONVERT TO ITEM 370183

## (undated) DEVICE — TROCAR ENDOSCP L100MM DIA5MM BLDELSS STBL SL THRD OPT VW

## (undated) DEVICE — COVER,MAYO STAND,STERILE: Brand: MEDLINE

## (undated) DEVICE — FORCEPS BX L240CM JAW DIA2.4MM ORNG L CAP W/ NDL DISP RAD

## (undated) DEVICE — CODMAN® SURGICAL PATTIES 1/2" X 3" (1.27CM X 7.62CM): Brand: CODMAN®

## (undated) DEVICE — SCR BNE CORT HEX FEM 2.5X25MM

## (undated) DEVICE — SYRINGE MED 20ML STD CLR PLAS LUERLOCK TIP N CTRL DISP

## (undated) DEVICE — TRAY CATH 16F URIN MTR LTX -- CONVERT TO ITEM 363111

## (undated) DEVICE — X-RAY DETECTABLE SPONGES,16 PLY: Brand: VISTEC

## (undated) DEVICE — STERILE COTTON BALLS LARGE 5/P: Brand: MEDLINE

## (undated) DEVICE — NEEDLE HYPO 21GA L1.5IN INTRAMUSCULAR S STL LATCH BVL UP

## (undated) DEVICE — PAD,NON-ADHERENT,W/AD,3X4,ST,LF,1/PK: Brand: CURAD

## (undated) DEVICE — NEEDLE HYPO 25GA L1.5IN BVL ORIENTED ECLIPSE

## (undated) DEVICE — SYR 20ML LL STRL LF --

## (undated) DEVICE — TROCAR ENDOSCP L100MM DIA5MM BLDELSS STBL SL OBT RADLUC

## (undated) DEVICE — SOLUTION SCRB 2OZ 10% POVIDONE IOD ANTIMIC BTL

## (undated) DEVICE — ARTICULATION RELOAD WITH TRI-STAPLE TECHNOLOGY: Brand: ENDO GIA

## (undated) DEVICE — SKIN CLOS DERMABND PRINEO 60CM -- DERMABOUND PRINEO

## (undated) DEVICE — TIP SUCT TRNSPAR RIB SURF STD BLB RIG NVENT W/ 5IN1 CONN DYND50138] MEDLINE INDUSTRIES INC]

## (undated) DEVICE — SYRINGE,EAR/ULCER, 2 OZ, STERILE: Brand: MEDLINE

## (undated) DEVICE — BLADE SAW W083XL354IN THK0047IN CUT THK0047IN SAG FLR

## (undated) DEVICE — CARTRIDGE BNE CEM MIX UNIV TWR VAC ROTOR BRK OFF NOZ W/O

## (undated) DEVICE — 10 FRENCH DRAIN SYSTEM, STERILE: Brand: TLS

## (undated) DEVICE — VISUALIZATION SYSTEM: Brand: CLEARIFY

## (undated) DEVICE — TOTAL TRAY, DB, 100% SILI FOLEY, 16FR 10: Brand: MEDLINE

## (undated) DEVICE — PADDING CST 6IN STERILE --

## (undated) DEVICE — PREP SKN PREVAIL 40ML APPL --

## (undated) DEVICE — ENDOSCOPIC KIT COMPLIANCE ENDOKIT

## (undated) DEVICE — DECANTER BAG 9": Brand: MEDLINE INDUSTRIES, INC.

## (undated) DEVICE — SCISSORS ENDOSCP DIA5MM CRV MPLR CAUT W/ RATCH HNDL

## (undated) DEVICE — YANKAUER,FLEXIBLE HANDLE,REGLR CAPACITY: Brand: MEDLINE INDUSTRIES, INC.

## (undated) DEVICE — NEEDLE HYPO 18GA L1.5IN PNK S STL HUB POLYPR SHLD REG BVL

## (undated) DEVICE — CONTAINER,SPECIMEN,3OZ,OR STRL: Brand: MEDLINE

## (undated) DEVICE — HANDPIECE SET WITH BONE CLEANING TIP AND SUCTION TUBE: Brand: INTERPULSE

## (undated) DEVICE — SUTURE MCRYL SZ 5-0 L18IN ABSRB UD L13MM P-3 3/8 CIR PRIM Y493G

## (undated) DEVICE — SUTURE STRATAFIX SYMMETRIC PDS + SZ 1 L18IN ABSRB VLT L48MM SXPP1A400

## (undated) DEVICE — CUFF BLD PRSS AD CLTH SGL TB W/ BAYNT CONN ROUNDED CORNER

## (undated) DEVICE — TUBING SUCT 10FR MAL ALUM SHFT FN CAP VENT UNIV CONN W/ OBT

## (undated) DEVICE — SUTURE MCRYL SZ 4 0 L18IN ABSRB VLT PS 1 L24MM 3 8 CIR REV Y682H

## (undated) DEVICE — FILTER SMK EVAC FLO CLR MEGADYNE

## (undated) DEVICE — HYPODERMIC SAFETY NEEDLE: Brand: MONOJECT

## (undated) DEVICE — BIPOLAR FORCEPS CORD,BANANA LEADS: Brand: VALLEYLAB

## (undated) DEVICE — SOLUTION IRRIG 1000ML STRL H2O USP PLAS POUR BTL

## (undated) DEVICE — Z DISCONTINUED NO SUB IDED SUTURE CHROMIC GUT SZ 5-0 L18IN ABSRB BRN P-3 L13MM 3/8 CIR 687G

## (undated) DEVICE — SUTURE PERMA-HAND SZ 3-0 L18IN NONABSORBABLE BLK L24MM PS-1 1684G

## (undated) DEVICE — SOLUTION IRRIG 1000ML 09% SOD CHL USP PIC PLAS CONTAINER

## (undated) DEVICE — SOLUTION IV 1000ML 0.9% SOD CHL

## (undated) DEVICE — PREP SKN CHLRAPRP APL 26ML STR --

## (undated) DEVICE — BLADE ES ELASTOMERIC COAT INSUL DURABLE BEND UPTO 90DEG

## (undated) DEVICE — GLOVE SURG SZ 7 L12IN FNGR THK79MIL GRN LTX FREE

## (undated) DEVICE — NEEDLE SPNL 22GA L3.5IN BLK HUB S STL REG WALL FIT STYL W/

## (undated) DEVICE — DEVON™ KNEE AND BODY STRAP 60" X 3" (1.5 M X 7.6 CM): Brand: DEVON

## (undated) DEVICE — CONTAINER SPEC 20 ML LID NEUT BUFF FORMALIN 10 % POLYPR STS

## (undated) DEVICE — SET GRAV CK VLV NEEDLESS ST 3 GANGED 4WAY STPCOCK HI FLO 10

## (undated) DEVICE — POWER SHELL: Brand: SIGNIA

## (undated) DEVICE — PACK,EENT,TURBAN DRAPE,PK II: Brand: MEDLINE